# Patient Record
Sex: MALE | Race: BLACK OR AFRICAN AMERICAN | Employment: OTHER | URBAN - METROPOLITAN AREA
[De-identification: names, ages, dates, MRNs, and addresses within clinical notes are randomized per-mention and may not be internally consistent; named-entity substitution may affect disease eponyms.]

---

## 2021-05-25 ENCOUNTER — APPOINTMENT (OUTPATIENT)
Dept: GENERAL RADIOLOGY | Age: 86
DRG: 309 | End: 2021-05-25
Payer: MEDICARE

## 2021-05-25 ENCOUNTER — HOSPITAL ENCOUNTER (INPATIENT)
Age: 86
LOS: 2 days | Discharge: HOME | DRG: 309 | End: 2021-05-27
Attending: EMERGENCY MEDICINE | Admitting: INTERNAL MEDICINE
Payer: MEDICARE

## 2021-05-25 DIAGNOSIS — R07.9 CHEST PAIN, UNSPECIFIED TYPE: Primary | ICD-10-CM

## 2021-05-25 PROBLEM — Z96.641 HISTORY OF RIGHT HIP REPLACEMENT: Status: ACTIVE | Noted: 2021-05-25

## 2021-05-25 PROBLEM — E78.5 HYPERLIPIDEMIA: Status: ACTIVE | Noted: 2021-05-25

## 2021-05-25 PROBLEM — Z86.718 HISTORY OF DVT OF LOWER EXTREMITY: Status: ACTIVE | Noted: 2021-05-25

## 2021-05-25 PROBLEM — Z98.890 HISTORY OF BACK SURGERY: Status: ACTIVE | Noted: 2021-05-25

## 2021-05-25 PROBLEM — I48.91 ATRIAL FIBRILLATION (HCC): Status: ACTIVE | Noted: 2021-05-25

## 2021-05-25 PROBLEM — I10 HYPERTENSION: Status: ACTIVE | Noted: 2021-05-25

## 2021-05-25 PROBLEM — I50.9 CHF (CONGESTIVE HEART FAILURE) (HCC): Status: ACTIVE | Noted: 2021-05-25

## 2021-05-25 PROBLEM — Z87.891 FORMER SMOKER: Status: ACTIVE | Noted: 2021-05-25

## 2021-05-25 PROBLEM — J18.9 PNEUMONIA: Status: ACTIVE | Noted: 2021-05-25

## 2021-05-25 PROBLEM — M54.50 LOW BACK PAIN: Status: ACTIVE | Noted: 2021-05-25

## 2021-05-25 PROBLEM — Z79.01 ON CONTINUOUS ORAL ANTICOAGULATION: Status: ACTIVE | Noted: 2021-05-25

## 2021-05-25 PROBLEM — Z98.890 HISTORY OF INGUINAL HERNIA REPAIR: Status: ACTIVE | Noted: 2021-05-25

## 2021-05-25 PROBLEM — H26.9 CATARACT OF BOTH EYES: Status: ACTIVE | Noted: 2021-05-25

## 2021-05-25 PROBLEM — K21.9 GERD (GASTROESOPHAGEAL REFLUX DISEASE): Status: ACTIVE | Noted: 2021-05-25

## 2021-05-25 PROBLEM — Z87.19 HISTORY OF INGUINAL HERNIA REPAIR: Status: ACTIVE | Noted: 2021-05-25

## 2021-05-25 PROBLEM — N40.0 ENLARGED PROSTATE: Status: ACTIVE | Noted: 2021-05-25

## 2021-05-25 PROBLEM — Z95.0 PACEMAKER: Status: ACTIVE | Noted: 2021-05-25

## 2021-05-25 LAB
ABSOLUTE EOS #: 0.2 K/UL (ref 0–0.4)
ABSOLUTE IMMATURE GRANULOCYTE: ABNORMAL K/UL (ref 0–0.3)
ABSOLUTE LYMPH #: 1 K/UL (ref 1–4.8)
ABSOLUTE MONO #: 0.5 K/UL (ref 0.1–1.3)
ALBUMIN SERPL-MCNC: 3.9 G/DL (ref 3.5–5.2)
ALBUMIN/GLOBULIN RATIO: ABNORMAL (ref 1–2.5)
ALP BLD-CCNC: 119 U/L (ref 40–129)
ALT SERPL-CCNC: 40 U/L (ref 5–41)
ANION GAP SERPL CALCULATED.3IONS-SCNC: 10 MMOL/L (ref 9–17)
AST SERPL-CCNC: 42 U/L
BASOPHILS # BLD: 1 % (ref 0–2)
BASOPHILS ABSOLUTE: 0 K/UL (ref 0–0.2)
BILIRUB SERPL-MCNC: 0.28 MG/DL (ref 0.3–1.2)
BNP INTERPRETATION: ABNORMAL
BUN BLDV-MCNC: 33 MG/DL (ref 8–23)
BUN/CREAT BLD: ABNORMAL (ref 9–20)
CALCIUM SERPL-MCNC: 9.2 MG/DL (ref 8.6–10.4)
CHLORIDE BLD-SCNC: 102 MMOL/L (ref 98–107)
CO2: 25 MMOL/L (ref 20–31)
CREAT SERPL-MCNC: 1.25 MG/DL (ref 0.7–1.2)
DIFFERENTIAL TYPE: ABNORMAL
EOSINOPHILS RELATIVE PERCENT: 4 % (ref 0–4)
GFR AFRICAN AMERICAN: >60 ML/MIN
GFR NON-AFRICAN AMERICAN: 55 ML/MIN
GFR SERPL CREATININE-BSD FRML MDRD: ABNORMAL ML/MIN/{1.73_M2}
GFR SERPL CREATININE-BSD FRML MDRD: ABNORMAL ML/MIN/{1.73_M2}
GLUCOSE BLD-MCNC: 104 MG/DL (ref 70–99)
HCT VFR BLD CALC: 29.5 % (ref 41–53)
HEMOGLOBIN: 9.4 G/DL (ref 13.5–17.5)
IMMATURE GRANULOCYTES: ABNORMAL %
LYMPHOCYTES # BLD: 21 % (ref 24–44)
MCH RBC QN AUTO: 28.3 PG (ref 26–34)
MCHC RBC AUTO-ENTMCNC: 31.8 G/DL (ref 31–37)
MCV RBC AUTO: 88.9 FL (ref 80–100)
MONOCYTES # BLD: 11 % (ref 1–7)
NRBC AUTOMATED: ABNORMAL PER 100 WBC
PDW BLD-RTO: 15.4 % (ref 11.5–14.9)
PLATELET # BLD: 235 K/UL (ref 150–450)
PLATELET ESTIMATE: ABNORMAL
PMV BLD AUTO: 7 FL (ref 6–12)
POTASSIUM SERPL-SCNC: 3.9 MMOL/L (ref 3.7–5.3)
PRO-BNP: 1700 PG/ML
RBC # BLD: 3.31 M/UL (ref 4.5–5.9)
RBC # BLD: ABNORMAL 10*6/UL
SEG NEUTROPHILS: 63 % (ref 36–66)
SEGMENTED NEUTROPHILS ABSOLUTE COUNT: 2.9 K/UL (ref 1.3–9.1)
SODIUM BLD-SCNC: 137 MMOL/L (ref 135–144)
TOTAL PROTEIN: 7.7 G/DL (ref 6.4–8.3)
TROPONIN INTERP: ABNORMAL
TROPONIN INTERP: ABNORMAL
TROPONIN T: ABNORMAL NG/ML
TROPONIN T: ABNORMAL NG/ML
TROPONIN, HIGH SENSITIVITY: 27 NG/L (ref 0–22)
TROPONIN, HIGH SENSITIVITY: 31 NG/L (ref 0–22)
WBC # BLD: 4.6 K/UL (ref 3.5–11)
WBC # BLD: ABNORMAL 10*3/UL

## 2021-05-25 PROCEDURE — 84484 ASSAY OF TROPONIN QUANT: CPT

## 2021-05-25 PROCEDURE — 99283 EMERGENCY DEPT VISIT LOW MDM: CPT

## 2021-05-25 PROCEDURE — 80053 COMPREHEN METABOLIC PANEL: CPT

## 2021-05-25 PROCEDURE — 71046 X-RAY EXAM CHEST 2 VIEWS: CPT

## 2021-05-25 PROCEDURE — 99223 1ST HOSP IP/OBS HIGH 75: CPT | Performed by: INTERNAL MEDICINE

## 2021-05-25 PROCEDURE — 2060000000 HC ICU INTERMEDIATE R&B

## 2021-05-25 PROCEDURE — 93005 ELECTROCARDIOGRAM TRACING: CPT | Performed by: STUDENT IN AN ORGANIZED HEALTH CARE EDUCATION/TRAINING PROGRAM

## 2021-05-25 PROCEDURE — 83880 ASSAY OF NATRIURETIC PEPTIDE: CPT

## 2021-05-25 PROCEDURE — 36415 COLL VENOUS BLD VENIPUNCTURE: CPT

## 2021-05-25 PROCEDURE — 85025 COMPLETE CBC W/AUTO DIFF WBC: CPT

## 2021-05-25 RX ORDER — BUPRENORPHINE AND NALOXONE 8; 2 MG/1; MG/1
1 FILM, SOLUBLE BUCCAL; SUBLINGUAL 2 TIMES DAILY
COMMUNITY

## 2021-05-25 RX ORDER — METOLAZONE 5 MG/1
5 TABLET ORAL DAILY
Status: ON HOLD | COMMUNITY
End: 2021-05-27 | Stop reason: HOSPADM

## 2021-05-25 RX ORDER — ERGOCALCIFEROL 1.25 MG/1
50000 CAPSULE ORAL WEEKLY
COMMUNITY
End: 2021-12-17

## 2021-05-25 RX ORDER — SIMVASTATIN 10 MG
10 TABLET ORAL NIGHTLY
COMMUNITY
End: 2021-10-26 | Stop reason: ALTCHOICE

## 2021-05-25 RX ORDER — FUROSEMIDE 40 MG/1
40 TABLET ORAL DAILY
Status: ON HOLD | COMMUNITY
End: 2021-05-27 | Stop reason: HOSPADM

## 2021-05-25 RX ORDER — MAGNESIUM OXIDE 400 MG/1
400 TABLET ORAL DAILY
COMMUNITY

## 2021-05-25 RX ORDER — METOPROLOL SUCCINATE 25 MG/1
25 TABLET, EXTENDED RELEASE ORAL NIGHTLY
Status: ON HOLD | COMMUNITY
End: 2021-06-27 | Stop reason: HOSPADM

## 2021-05-25 RX ORDER — METOPROLOL SUCCINATE 25 MG/1
50 TABLET, EXTENDED RELEASE ORAL DAILY
Status: ON HOLD | COMMUNITY
End: 2022-01-08 | Stop reason: HOSPADM

## 2021-05-25 RX ORDER — LISINOPRIL 40 MG/1
40 TABLET ORAL 2 TIMES DAILY
Status: ON HOLD | COMMUNITY
End: 2021-05-27 | Stop reason: HOSPADM

## 2021-05-25 RX ORDER — AMLODIPINE BESYLATE 5 MG/1
5 TABLET ORAL DAILY
Status: ON HOLD | COMMUNITY
End: 2021-05-27 | Stop reason: HOSPADM

## 2021-05-25 RX ORDER — LATANOPROST 50 UG/ML
1 SOLUTION/ DROPS OPHTHALMIC NIGHTLY
COMMUNITY

## 2021-05-25 RX ORDER — ASPIRIN 81 MG/1
324 TABLET, CHEWABLE ORAL ONCE
Status: COMPLETED | OUTPATIENT
Start: 2021-05-25 | End: 2021-05-26

## 2021-05-25 RX ORDER — FINASTERIDE 5 MG/1
5 TABLET, FILM COATED ORAL DAILY
COMMUNITY
End: 2022-01-06 | Stop reason: SDUPTHER

## 2021-05-25 NOTE — ED PROVIDER NOTES
[address]  Emergency Department Encounter  EmergencyMedicine Resident     Pt Madie Maldonado  MRN: [id number]  [place] 1935  Date of evaluation: 5/25/21  PCP:  No primary care provider on file. CHIEF COMPLAINT       Chief Complaint   Patient presents with    Palpitations    Dizziness       HISTORY OF PRESENT ILLNESS  (Location/Symptom, Timing/Onset, Context/Setting, Quality, Duration, Modifying Factors, Severity.)      Justin Daniels is a 80 y.o. male who presents with chest pain. Patient presents with 1 hour of chest pain, described as a pressure anterior, nonradiating, constant, moderate severity, does not know what makes it better or worse, associated with lightheadedness, shortness of breath, palpitations. Patient denies diaphoresis, fevers, chills, cough, congestion, rhinorrhea, headache, visual changes, abdominal pain, nausea, vomiting, diarrhea, dysuria, lower extremity swelling or pain. Patient has history of atrial fibrillation on Xarelto, has a pacemaker, HLD, HTN, CHF. Patient just moved to [place] from [place], is living with his son now, does not have a primary care provider or cardiologist in [place]. PAST MEDICAL / SURGICAL / SOCIAL / FAMILY HISTORY      has no past medical history on file. A. fib, CHF     has no past surgical history on file. Cardiac catheterization, pacer implantation    Social History     Socioeconomic History    Marital status:       Spouse name: Not on file    Number of children: Not on file    Years of education: Not on file    Highest education level: Not on file   Occupational History    Not on file   Tobacco Use    Smoking status: Not on file   Substance and Sexual Activity    Alcohol use: Not on file    Drug use: Not on file    Sexual activity: Not on file   Other Topics Concern    Not on file   Social History Narrative    Not on file     Social Determinants of Health     Financial Resource Strain:     Difficulty of Paying Living Expenses:    Food Insecurity:     Worried About Running Out of Food in the Last Year:     [address] in the Last Year:    Transportation Needs:     Lack of Transportation (Medical):  Lack of Transportation (Non-Medical):    Physical Activity:     Days of Exercise per Week:     Minutes of Exercise per Session:    Stress:     Feeling of Stress :    Social Connections:     Frequency of Communication with Friends and Family:     Frequency of Social Gatherings with Friends and Family:     Attends Zoroastrianism Services:     Active Member of Clubs or Organizations:     Attends Club or Organization Meetings:     Marital Status:    Intimate Partner Violence:     Fear of Current or Ex-Partner:     Emotionally Abused:     Physically Abused:     Sexually Abused:        History reviewed. No pertinent family history. Allergies:  Patient has no known allergies. Home Medications:  Prior to Admission medications    Medication Sig Start Date End Date Taking? Authorizing Provider   buprenorphine-naloxone (SUBOXONE) 8-2 MG FILM SL film Place 1 Film under the tongue 2 times daily.    Yes Historical Provider, MD   vitamin D (ERGOCALCIFEROL) 1.25 MG (14877 UT) CAPS capsule Take 50,000 Units by mouth once a week   Yes Historical Provider, MD   rivaroxaban (XARELTO) 15 MG TABS tablet Take 15 mg by mouth daily (with breakfast)   Yes Historical Provider, MD   magnesium oxide (MAG-OX) 400 MG tablet Take 400 mg by mouth daily   Yes Historical Provider, MD   latanoprost (XALATAN) 0.005 % ophthalmic solution Place 1 drop into both eyes nightly   Yes Historical Provider, MD   metOLazone (ZAROXOLYN) 5 MG tablet Take 5 mg by mouth daily   Yes Historical Provider, MD   metoprolol succinate (TOPROL XL) 25 MG extended release tablet Take 50 mg by mouth daily   Yes Historical Provider, MD   metoprolol succinate (TOPROL XL) 25 MG extended release tablet Take 25 mg by mouth nightly   Yes Historical Provider, MD finasteride (PROSCAR) 5 MG tablet Take 5 mg by mouth daily   Yes Historical Provider, MD   simvastatin (ZOCOR) 10 MG tablet Take 10 mg by mouth nightly   Yes Historical Provider, MD   lisinopril (PRINIVIL;ZESTRIL) 40 MG tablet Take 40 mg by mouth 2 times daily   Yes Historical Provider, MD   amLODIPine (NORVASC) 5 MG tablet Take 5 mg by mouth daily   Yes Historical Provider, MD   furosemide (LASIX) 40 MG tablet Take 40 mg by mouth daily   Yes Historical Provider, MD       REVIEW OF SYSTEMS    (2-9 systems for level 4, 10 or more for level 5)      Review of Systems   Positive for chest pain, lightheadedness, shortness of breath, palpitations. Patient denies diaphoresis, fevers, chills, cough, congestion, rhinorrhea, headache, visual changes, abdominal pain, nausea, vomiting, diarrhea, dysuria, lower extremity swelling or pain.     PHYSICAL EXAM   (up to 7 for level 4, 8 or more for level 5)      INITIAL VITALS:   BP (!) 173/83   Pulse 92   Temp 98.6 °F (37 °C) (Oral)   Resp 22   Ht 6' 1\" (1.854 m)   Wt 198 lb (89.8 kg)   SpO2 98%   BMI 26.12 kg/m²     Physical Exam   Patient is alert and oriented,, no acute distress, nontoxic-appearing, does not appear to be in a significant amount of pain, speaking in full sentences, head is atraumatic, EOMI, CTAB, regular rate and rhythm, no extra heart sounds, no abdominal tenderness, bilateral lower extremity swelling, but no pain to palpation, distal pulses intact and equal bilateral.    DIFFERENTIAL  DIAGNOSIS     PLAN (LABS / IMAGING / EKG):  Orders Placed This Encounter   Procedures    XR CHEST (2 VW)    CBC Auto Differential    Troponin    Brain Natriuretic Peptide    Comprehensive Metabolic Panel w/ Reflex to MG    Troponin    Telemetry monitoring - continuous duration    Pacer Interrogate    EKG 12 Lead    PATIENT STATUS (FROM ED OR OR/PROCEDURAL) Inpatient       MEDICATIONS ORDERED:  Orders Placed This Encounter   Medications    aspirin chewable tablet Total Protein 7.7 6.4 - 8.3 g/dL    Albumin 3.9 3.5 - 5.2 g/dL    Albumin/Globulin Ratio NOT REPORTED 1.0 - 2.5    GFR Non-African American 55 (L) >60 mL/min    GFR African American >60 >60 mL/min    GFR Comment          GFR Staging NOT REPORTED    Troponin   Result Value Ref Range    Troponin, High Sensitivity 27 (H) 0 - 22 ng/L    Troponin T NOT REPORTED <0.03 ng/mL    Troponin Interp NOT REPORTED    EKG 12 Lead   Result Value Ref Range    Ventricular Rate 77 BPM    Atrial Rate 75 BPM    QRS Duration 84 ms    Q-T Interval 382 ms    QTc Calculation (Bazett) 432 ms    R Axis 3 degrees    T Axis -74 degrees       IMPRESSION: 78-year-old male with history of heart disease, unable to visualize past procedures, presenting with chest pain, near syncope, will obtain cardiac work-up to evaluate for ACS. RADIOLOGY:  XR CHEST (2 VW)    Result Date: 5/25/2021  EXAMINATION: TWO XRAY VIEWS OF THE CHEST 5/25/2021 4:40 pm COMPARISON: None. HISTORY: ORDERING SYSTEM PROVIDED HISTORY: Chest pain TECHNOLOGIST PROVIDED HISTORY: Chest pain Reason for Exam: palpitations, dizziness Acuity: Acute Type of Exam: Initial FINDINGS: Low lung volumes with strandy densities at the lung bases. No focal consolidation, pneumothorax, or sizable effusion. Heart size is prominent with a dual lead left chest pacing device in place. Incidental note of colonic interposition on the right. No acute bony findings. Low lung volumes with strandy densities at the lung bases which are presumably due to atelectasis in the absence of infectious symptoms.        EKG  EKG Interpretation    Interpreted by me    Rhythm: normal sinus   Rate: normal  Axis: normal  Ectopy: none  Conduction: normal  ST Segments: no acute change  T Waves: no acute change  Q Waves: none    Clinical Impression: no acute changes and normal EKG    All EKG's are interpreted by the Emergency Department Physician who either signs or Co-signs this chart in the absence of a cardiologist.    EMERGENCY DEPARTMENT COURSE:  Patient came to emergency department, HPI and physical exam were conducted. All nursing notes were reviewed. EKG found no acute ST or T wave changes, initial troponin 31, follow-up troponin 27, will admit patient to the hospital for further cardiology evaluation. BNP elevated to 1700, unknown prior BNP levels. PROCEDURES:      CONSULTS:  IP CONSULT TO CARDIOLOGY    CRITICAL CARE:  Please see attending note    FINAL IMPRESSION      1. Chest pain, unspecified type          DISPOSITION / PLAN     DISPOSITION Admitted 05/25/2021 10:36:14 PM      PATIENT REFERRED TO:  No follow-up provider specified.     DISCHARGE MEDICATIONS:  New Prescriptions    No medications on file       Colin Elizondo MD  Emergency Medicine Resident    (Please note that portions of thisnote were completed with a voice recognition program.  Efforts were made to edit the dictations but occasionally words are mis-transcribed.)        Colin Elizondo MD  Resident  05/25/21 9041

## 2021-05-26 LAB
ANION GAP SERPL CALCULATED.3IONS-SCNC: 8 MMOL/L (ref 9–17)
BUN BLDV-MCNC: 27 MG/DL (ref 8–23)
BUN/CREAT BLD: ABNORMAL (ref 9–20)
CALCIUM SERPL-MCNC: 8.8 MG/DL (ref 8.6–10.4)
CHLORIDE BLD-SCNC: 104 MMOL/L (ref 98–107)
CO2: 26 MMOL/L (ref 20–31)
CREAT SERPL-MCNC: 0.95 MG/DL (ref 0.7–1.2)
EKG ATRIAL RATE: 75 BPM
EKG Q-T INTERVAL: 382 MS
EKG QRS DURATION: 84 MS
EKG QTC CALCULATION (BAZETT): 432 MS
EKG R AXIS: 3 DEGREES
EKG T AXIS: -74 DEGREES
EKG VENTRICULAR RATE: 77 BPM
GFR AFRICAN AMERICAN: >60 ML/MIN
GFR NON-AFRICAN AMERICAN: >60 ML/MIN
GFR SERPL CREATININE-BSD FRML MDRD: ABNORMAL ML/MIN/{1.73_M2}
GFR SERPL CREATININE-BSD FRML MDRD: ABNORMAL ML/MIN/{1.73_M2}
GLUCOSE BLD-MCNC: 133 MG/DL (ref 70–99)
HCT VFR BLD CALC: 27.1 % (ref 41–53)
HEMOGLOBIN: 8.7 G/DL (ref 13.5–17.5)
LV EF: 63 %
LVEF MODALITY: NORMAL
MAGNESIUM: 2 MG/DL (ref 1.6–2.6)
MCH RBC QN AUTO: 28.6 PG (ref 26–34)
MCHC RBC AUTO-ENTMCNC: 32.1 G/DL (ref 31–37)
MCV RBC AUTO: 89 FL (ref 80–100)
NRBC AUTOMATED: ABNORMAL PER 100 WBC
PDW BLD-RTO: 15.3 % (ref 11.5–14.9)
PLATELET # BLD: 211 K/UL (ref 150–450)
PMV BLD AUTO: 7.4 FL (ref 6–12)
POTASSIUM SERPL-SCNC: 3.6 MMOL/L (ref 3.7–5.3)
RBC # BLD: 3.04 M/UL (ref 4.5–5.9)
SODIUM BLD-SCNC: 138 MMOL/L (ref 135–144)
TSH SERPL DL<=0.05 MIU/L-ACNC: 1.08 MIU/L (ref 0.3–5)
WBC # BLD: 3.3 K/UL (ref 3.5–11)

## 2021-05-26 PROCEDURE — 84443 ASSAY THYROID STIM HORMONE: CPT

## 2021-05-26 PROCEDURE — 6370000000 HC RX 637 (ALT 250 FOR IP): Performed by: NURSE PRACTITIONER

## 2021-05-26 PROCEDURE — 93306 TTE W/DOPPLER COMPLETE: CPT

## 2021-05-26 PROCEDURE — 93010 ELECTROCARDIOGRAM REPORT: CPT | Performed by: INTERNAL MEDICINE

## 2021-05-26 PROCEDURE — 6370000000 HC RX 637 (ALT 250 FOR IP): Performed by: STUDENT IN AN ORGANIZED HEALTH CARE EDUCATION/TRAINING PROGRAM

## 2021-05-26 PROCEDURE — 2060000000 HC ICU INTERMEDIATE R&B

## 2021-05-26 PROCEDURE — 2580000003 HC RX 258: Performed by: NURSE PRACTITIONER

## 2021-05-26 PROCEDURE — 6370000000 HC RX 637 (ALT 250 FOR IP): Performed by: INTERNAL MEDICINE

## 2021-05-26 PROCEDURE — 36415 COLL VENOUS BLD VENIPUNCTURE: CPT

## 2021-05-26 PROCEDURE — 83735 ASSAY OF MAGNESIUM: CPT

## 2021-05-26 PROCEDURE — 85027 COMPLETE CBC AUTOMATED: CPT

## 2021-05-26 PROCEDURE — 80048 BASIC METABOLIC PNL TOTAL CA: CPT

## 2021-05-26 RX ORDER — FAMOTIDINE 20 MG/1
20 TABLET, FILM COATED ORAL DAILY
Status: DISCONTINUED | OUTPATIENT
Start: 2021-05-26 | End: 2021-05-27 | Stop reason: HOSPADM

## 2021-05-26 RX ORDER — BUPRENORPHINE AND NALOXONE 8; 2 MG/1; MG/1
1 FILM, SOLUBLE BUCCAL; SUBLINGUAL 2 TIMES DAILY
Status: DISCONTINUED | OUTPATIENT
Start: 2021-05-26 | End: 2021-05-27 | Stop reason: HOSPADM

## 2021-05-26 RX ORDER — POTASSIUM CHLORIDE 20 MEQ/1
40 TABLET, EXTENDED RELEASE ORAL PRN
Status: DISCONTINUED | OUTPATIENT
Start: 2021-05-26 | End: 2021-05-27 | Stop reason: HOSPADM

## 2021-05-26 RX ORDER — AMLODIPINE BESYLATE 5 MG/1
5 TABLET ORAL DAILY
Status: DISCONTINUED | OUTPATIENT
Start: 2021-05-26 | End: 2021-05-26

## 2021-05-26 RX ORDER — SODIUM CHLORIDE 0.9 % (FLUSH) 0.9 %
5-40 SYRINGE (ML) INJECTION EVERY 12 HOURS SCHEDULED
Status: DISCONTINUED | OUTPATIENT
Start: 2021-05-26 | End: 2021-05-27 | Stop reason: HOSPADM

## 2021-05-26 RX ORDER — ACETAMINOPHEN 650 MG/1
650 SUPPOSITORY RECTAL EVERY 6 HOURS PRN
Status: DISCONTINUED | OUTPATIENT
Start: 2021-05-26 | End: 2021-05-27 | Stop reason: HOSPADM

## 2021-05-26 RX ORDER — FUROSEMIDE 40 MG/1
40 TABLET ORAL DAILY
Status: DISCONTINUED | OUTPATIENT
Start: 2021-05-26 | End: 2021-05-26

## 2021-05-26 RX ORDER — METOLAZONE 5 MG/1
5 TABLET ORAL DAILY
Status: DISCONTINUED | OUTPATIENT
Start: 2021-05-26 | End: 2021-05-26

## 2021-05-26 RX ORDER — METOPROLOL SUCCINATE 50 MG/1
50 TABLET, EXTENDED RELEASE ORAL DAILY
Status: DISCONTINUED | OUTPATIENT
Start: 2021-05-26 | End: 2021-05-27

## 2021-05-26 RX ORDER — ACETAMINOPHEN 325 MG/1
650 TABLET ORAL EVERY 6 HOURS PRN
Status: DISCONTINUED | OUTPATIENT
Start: 2021-05-26 | End: 2021-05-27 | Stop reason: HOSPADM

## 2021-05-26 RX ORDER — MAGNESIUM SULFATE 1 G/100ML
1000 INJECTION INTRAVENOUS PRN
Status: DISCONTINUED | OUTPATIENT
Start: 2021-05-26 | End: 2021-05-27 | Stop reason: HOSPADM

## 2021-05-26 RX ORDER — LATANOPROST 50 UG/ML
1 SOLUTION/ DROPS OPHTHALMIC NIGHTLY
Status: DISCONTINUED | OUTPATIENT
Start: 2021-05-26 | End: 2021-05-27 | Stop reason: HOSPADM

## 2021-05-26 RX ORDER — POLYETHYLENE GLYCOL 3350 17 G/17G
17 POWDER, FOR SOLUTION ORAL DAILY PRN
Status: DISCONTINUED | OUTPATIENT
Start: 2021-05-26 | End: 2021-05-27 | Stop reason: HOSPADM

## 2021-05-26 RX ORDER — LISINOPRIL 20 MG/1
40 TABLET ORAL DAILY
Status: DISCONTINUED | OUTPATIENT
Start: 2021-05-27 | End: 2021-05-27 | Stop reason: HOSPADM

## 2021-05-26 RX ORDER — FUROSEMIDE 20 MG/1
20 TABLET ORAL DAILY
Status: DISCONTINUED | OUTPATIENT
Start: 2021-05-26 | End: 2021-05-27 | Stop reason: HOSPADM

## 2021-05-26 RX ORDER — FINASTERIDE 5 MG/1
5 TABLET, FILM COATED ORAL DAILY
Status: DISCONTINUED | OUTPATIENT
Start: 2021-05-26 | End: 2021-05-27 | Stop reason: HOSPADM

## 2021-05-26 RX ORDER — ASPIRIN 81 MG/1
81 TABLET, CHEWABLE ORAL DAILY
Status: DISCONTINUED | OUTPATIENT
Start: 2021-05-26 | End: 2021-05-27 | Stop reason: HOSPADM

## 2021-05-26 RX ORDER — MECLIZINE HYDROCHLORIDE 25 MG/1
12.5 TABLET ORAL 3 TIMES DAILY
Status: DISCONTINUED | OUTPATIENT
Start: 2021-05-26 | End: 2021-05-27 | Stop reason: HOSPADM

## 2021-05-26 RX ORDER — LANOLIN ALCOHOL/MO/W.PET/CERES
3 CREAM (GRAM) TOPICAL NIGHTLY PRN
Status: DISCONTINUED | OUTPATIENT
Start: 2021-05-26 | End: 2021-05-27 | Stop reason: HOSPADM

## 2021-05-26 RX ORDER — LISINOPRIL 20 MG/1
40 TABLET ORAL 2 TIMES DAILY
Status: DISCONTINUED | OUTPATIENT
Start: 2021-05-26 | End: 2021-05-26

## 2021-05-26 RX ORDER — SODIUM CHLORIDE 9 MG/ML
25 INJECTION, SOLUTION INTRAVENOUS PRN
Status: DISCONTINUED | OUTPATIENT
Start: 2021-05-26 | End: 2021-05-27 | Stop reason: HOSPADM

## 2021-05-26 RX ORDER — FAMOTIDINE 20 MG/1
20 TABLET, FILM COATED ORAL 2 TIMES DAILY
Status: DISCONTINUED | OUTPATIENT
Start: 2021-05-26 | End: 2021-05-26 | Stop reason: DRUGHIGH

## 2021-05-26 RX ORDER — ATORVASTATIN CALCIUM 10 MG/1
10 TABLET, FILM COATED ORAL NIGHTLY
Status: DISCONTINUED | OUTPATIENT
Start: 2021-05-26 | End: 2021-05-27 | Stop reason: HOSPADM

## 2021-05-26 RX ORDER — METOPROLOL SUCCINATE 25 MG/1
25 TABLET, EXTENDED RELEASE ORAL NIGHTLY
Status: DISCONTINUED | OUTPATIENT
Start: 2021-05-26 | End: 2021-05-27

## 2021-05-26 RX ORDER — PROMETHAZINE HYDROCHLORIDE 25 MG/1
12.5 TABLET ORAL EVERY 6 HOURS PRN
Status: DISCONTINUED | OUTPATIENT
Start: 2021-05-26 | End: 2021-05-27 | Stop reason: HOSPADM

## 2021-05-26 RX ORDER — SODIUM CHLORIDE 0.9 % (FLUSH) 0.9 %
10 SYRINGE (ML) INJECTION PRN
Status: DISCONTINUED | OUTPATIENT
Start: 2021-05-26 | End: 2021-05-27 | Stop reason: HOSPADM

## 2021-05-26 RX ORDER — ONDANSETRON 2 MG/ML
4 INJECTION INTRAMUSCULAR; INTRAVENOUS EVERY 6 HOURS PRN
Status: DISCONTINUED | OUTPATIENT
Start: 2021-05-26 | End: 2021-05-27 | Stop reason: HOSPADM

## 2021-05-26 RX ORDER — POTASSIUM CHLORIDE 7.45 MG/ML
10 INJECTION INTRAVENOUS PRN
Status: DISCONTINUED | OUTPATIENT
Start: 2021-05-26 | End: 2021-05-27 | Stop reason: HOSPADM

## 2021-05-26 RX ADMIN — ASPIRIN 81 MG CHEWABLE TABLET 324 MG: 81 TABLET CHEWABLE at 00:01

## 2021-05-26 RX ADMIN — METOPROLOL SUCCINATE 50 MG: 50 TABLET, EXTENDED RELEASE ORAL at 10:13

## 2021-05-26 RX ADMIN — MECLIZINE HYDROCHLORIDE 12.5 MG: 25 TABLET ORAL at 20:32

## 2021-05-26 RX ADMIN — FUROSEMIDE 20 MG: 20 TABLET ORAL at 12:24

## 2021-05-26 RX ADMIN — MECLIZINE HYDROCHLORIDE 12.5 MG: 25 TABLET ORAL at 12:23

## 2021-05-26 RX ADMIN — METOPROLOL SUCCINATE 25 MG: 25 TABLET, EXTENDED RELEASE ORAL at 20:32

## 2021-05-26 RX ADMIN — ATORVASTATIN CALCIUM 10 MG: 10 TABLET, FILM COATED ORAL at 20:32

## 2021-05-26 RX ADMIN — BUPRENORPHINE AND NALOXONE 1 FILM: 8; 2 FILM BUCCAL; SUBLINGUAL at 20:32

## 2021-05-26 RX ADMIN — LISINOPRIL 40 MG: 20 TABLET ORAL at 10:13

## 2021-05-26 RX ADMIN — SODIUM CHLORIDE, PRESERVATIVE FREE 10 ML: 5 INJECTION INTRAVENOUS at 20:35

## 2021-05-26 RX ADMIN — LATANOPROST 1 DROP: 50 SOLUTION OPHTHALMIC at 20:41

## 2021-05-26 RX ADMIN — POLYETHYLENE GLYCOL 3350 17 G: 17 POWDER, FOR SOLUTION ORAL at 17:52

## 2021-05-26 RX ADMIN — Medication 3 MG: at 02:04

## 2021-05-26 RX ADMIN — RIVAROXABAN 15 MG: 15 TABLET, FILM COATED ORAL at 10:17

## 2021-05-26 RX ADMIN — SODIUM CHLORIDE, PRESERVATIVE FREE 10 ML: 5 INJECTION INTRAVENOUS at 10:17

## 2021-05-26 RX ADMIN — ASPIRIN 81 MG: 81 TABLET, CHEWABLE ORAL at 10:13

## 2021-05-26 RX ADMIN — FAMOTIDINE 20 MG: 20 TABLET ORAL at 10:13

## 2021-05-26 RX ADMIN — FINASTERIDE 5 MG: 5 TABLET, FILM COATED ORAL at 10:13

## 2021-05-26 RX ADMIN — FUROSEMIDE 40 MG: 40 TABLET ORAL at 10:13

## 2021-05-26 ASSESSMENT — ENCOUNTER SYMPTOMS
DIARRHEA: 0
TROUBLE SWALLOWING: 0
BACK PAIN: 1
WHEEZING: 0
CONSTIPATION: 1
PHOTOPHOBIA: 0
SHORTNESS OF BREATH: 1
COLOR CHANGE: 0
ABDOMINAL PAIN: 1
VOMITING: 0
COUGH: 0
RHINORRHEA: 0
NAUSEA: 0
ALLERGIC/IMMUNOLOGIC NEGATIVE: 1

## 2021-05-26 ASSESSMENT — PAIN DESCRIPTION - ORIENTATION: ORIENTATION: LEFT

## 2021-05-26 ASSESSMENT — PAIN DESCRIPTION - LOCATION: LOCATION: HEAD

## 2021-05-26 ASSESSMENT — PAIN DESCRIPTION - PAIN TYPE: TYPE: ACUTE PAIN

## 2021-05-26 NOTE — PROGRESS NOTES
Nurse called Dr. Edison Phoenix to ask about resuming the suboxone order in home meds. Dr. Edison Phoenix requesting that Chip Norton np be contacted to restart. Nurse sent secure message to Andrea Pastor updating her that this medication was verified and patient requesting suboxone.

## 2021-05-26 NOTE — CONSULTS
[facility]  In Patient Cardiology Consult             Date:   5/26/2021  Patient name: Ashly Chairez  Date of admission:  5/25/2021  7:00 PM  MRN:   [id number]  YOB: 1935    Reason for Admission:  Chest pain, dizziness. CHIEF COMPLAINT:  Chest pain, dizziness. History Obtained From:  Pt and chart    HISTORY OF PRESENT ILLNESS:    This is an 80year old male who presents due to chest pain, dizziness. He admits to chest pain, sharp in nature, shooting from back, comes and goes. Admits to dizziness- describes room spinning sensation- like vertigo he has had in past.   Recently had cardiac cath after stress test.   Showed non obstructive CAD. Known to [facility] for:  Pt is a very pleasant 80year old male with hx of HF with preserved EF, a. Fib, SSS with pacemaker in 2019 and HTN . Last echo in 2020 December showed EF 17% with Diastolic dysfunction and mild AR, mild MR. Status post cardiac catheterization which showed no obstructive disease after the stress test was abnormal  He is feeling better with no further chest pain but has shortness of breath with exertion  The edema has improved and has no palpitation or syncope      Past Medical History:   has no past medical history on file. Past Surgical History:   has no past surgical history on file. Home Medications:    Prior to Admission medications    Medication Sig Start Date End Date Taking? Authorizing Provider   buprenorphine-naloxone (SUBOXONE) 8-2 MG FILM SL film Place 1 Film under the tongue 2 times daily.    Yes Historical Provider, MD   vitamin D (ERGOCALCIFEROL) 1.25 MG (61201 UT) CAPS capsule Take 50,000 Units by mouth once a week   Yes Historical Provider, MD   rivaroxaban (XARELTO) 15 MG TABS tablet Take 15 mg by mouth daily (with breakfast)   Yes Historical Provider, MD   magnesium oxide (MAG-OX) 400 MG tablet Take 400 mg by mouth daily   Yes Historical Provider, MD   latanoprost (XALATAN) 0.005 % ophthalmic solution Place 1 drop into both eyes nightly   Yes Historical Provider, MD   metOLazone (ZAROXOLYN) 5 MG tablet Take 5 mg by mouth daily   Yes Historical Provider, MD   metoprolol succinate (TOPROL XL) 25 MG extended release tablet Take 50 mg by mouth daily   Yes Historical Provider, MD   metoprolol succinate (TOPROL XL) 25 MG extended release tablet Take 25 mg by mouth nightly   Yes Historical Provider, MD   finasteride (PROSCAR) 5 MG tablet Take 5 mg by mouth daily   Yes Historical Provider, MD   simvastatin (ZOCOR) 10 MG tablet Take 10 mg by mouth nightly   Yes Historical Provider, MD   lisinopril (PRINIVIL;ZESTRIL) 40 MG tablet Take 40 mg by mouth 2 times daily   Yes Historical Provider, MD   amLODIPine (NORVASC) 5 MG tablet Take 5 mg by mouth daily   Yes Historical Provider, MD   furosemide (LASIX) 40 MG tablet Take 40 mg by mouth daily   Yes Historical Provider, MD       Allergies:  Aspirin, Cyclobenzaprine, Ibuprofen, and Azithromycin    Social History:   reports that he has never smoked. He has never used smokeless tobacco.     Family History:   History reviewed. No pertinent family history. REVIEW OF SYSTEMS:    · Constitutional: there has been no unanticipated weight loss. There's been No change in energy level, No change in activity level. · Eyes: No visual changes or diplopia. No scleral icterus. · ENT: No Headaches, hearing loss or vertigo. No mouth sores or sore throat. · Cardiovascular: As HPI  · Respiratory: As HPI  · Gastrointestinal: No abdominal pain, appetite loss, blood in stools. No change in bowel or bladder habits. · Genitourinary: No dysuria, trouble voiding, or hematuria. · Musculoskeletal:  No gait disturbance, No weakness or joint complaints. · Integumentary: No rash or pruritis. · Neurological: No headache, diplopia, change in muscle strength, numbness or tingling.  No change in gait, balance, coordination, mood, affect, memory, mentation, behavior. · Psychiatric: No anxiety, or depression. · Endocrine: No temperature intolerance. No excessive thirst, fluid intake, or urination. No tremor. · Hematologic/Lymphatic: No abnormal bruising or bleeding, blood clots or swollen lymph nodes. · Allergic/Immunologic: No nasal congestion or hives. PHYSICAL EXAM:    Physical Examination:    /81   Pulse 70   Temp 97.9 °F (36.6 °C) (Oral)   Resp 17   Ht 6' 1\" (1.854 m)   Wt 201 lb 11.5 oz (91.5 kg)   SpO2 97%   BMI 26.61 kg/m²    Constitutional and General Appearance: alert, cooperative, no distress and appears stated age  HEENT: PERRL, no cervical lymphadenopathy. No masses palpable. Normal oral mucosa  Respiratory:  · Normal excursion and expansion without use of accessory muscles  · Resp Auscultation: Good respiratory effort. No for increased work of breathing. On auscultation: Clear  Cardiovascular:  · Heart tones are crisp and normal. regular S1 and S2. Murmurs: none  · Jugular venous pulsation Normal  Abdomen:  · No masses or tenderness  · Bowel sounds present  Extremities:  ·  No Cyanosis or Clubbing  ·  Lower extremity edema: none  ·  Skin: Warm and dry  Neurological:  · Alert and oriented.   · Moves all extremities well  · No abnormalities of mood, affect, memory, mentation, or behavior are noted    DATA:    Diagnostics:      EKG:   Results for orders placed or performed during the hospital encounter of 05/25/21   EKG 12 Lead   Result Value Ref Range    Ventricular Rate 77 BPM    Atrial Rate 75 BPM    QRS Duration 84 ms    Q-T Interval 382 ms    QTc Calculation (Bazett) 432 ms    R Axis 3 degrees    T Axis -74 degrees    Narrative    Atrial fibrillation  with some V Paced beats (number 1-3, 9, 13)  ST & T wave abnormality, consider inferolateral ischemia  Abnormal ECG  No previous ECGs available         Labs:     CBC:   Recent Labs     05/25/21  1936 05/26/21  0437   WBC 4.6 3.3*   HGB 9.4* 8.7*   HCT 29.5* 27.1*    211     BMP: Recent Labs     05/25/21 1936 05/26/21  0437    138   K 3.9 3.6*   CO2 25 26   BUN 33* 27*   CREATININE 1.25* 0.95   LABGLOM 55* >60   GLUCOSE 104* 133*     BNP: No results for input(s): BNP in the last 72 hours. PT/INR: No results for input(s): PROTIME, INR in the last 72 hours. APTT:No results for input(s): APTT in the last 72 hours. CARDIAC ENZYMES:  Recent Labs     05/25/21 1936 05/25/21  2145   TROPHS 31* 32*     FASTING LIPID PANEL:No results found for: HDL, LDLDIRECT, LDLCALC, TRIG  LIVER PROFILE:  Recent Labs     05/25/21 1936   AST 42*   ALT 40   LABALBU 3.9         IMPRESSION:    Patient Active Problem List   Diagnosis    Atrial fibrillation ([address]    On continuous oral anticoagulation    CHF (congestive heart failure) ([address]   [place] Hyperlipidemia    Former smoker    Pacemaker    History of DVT of lower extremity    Enlarged prostate    Cataract of both eyes    GERD (gastroesophageal reflux disease)    History of inguinal hernia repair    Hypertension    Pneumonia    History of right hip replacement    History of back surgery    Low back pain    Chest pain     - Atypical Chest Pain- likely non- cardiac, sharp, shooting  - Recent cath - non obstructive CAD  - Minimally elevated trop, in setting of VARSHA, not suggestive of NSTMEI  - VARSHA  - Afib / SSS  - S/p PPM  - HTN  - Echo 12/20- EF 55%    RECOMMENDATIONS:  - get recent cath records  - check 2d Echo  - decrease lisinopril to qd (from bid)  - decrease lasix go 20 qd  - stop metolazone  - continue xarelto  - add meclizine  - will follow    Discussed with patient and nursing. Thank you for allowing me to participate in the care of this patient, please do not hesitate to call if you have any questions. Queen Doron DO, [address] [facility], [address], [address] [facility]  [facility]  [phone]

## 2021-05-26 NOTE — H&P
[address] [facility]     HISTORY AND PHYSICAL EXAMINATION            Date:   5/26/2021  Patientname:  Fan Donahue  Date of admission:  5/25/2021  7:00 PM  MRN:   [id number]  Account:  [de-identified]  YOB: 1935  PCP:    No primary care provider on file. Room:   [facility]  Code Status:    Full Code    CHIEF COMPLAINT     Chief Complaint   Patient presents with    Palpitations    Dizziness       HISTORY OF PRESENT ILLNESS  (Character, Onset, Location, Duration,  Exacerbating/RelievingFactors, Radiation,   Associated Symptoms, Severity )      The patient is a 80 y.o.  male, with a history of atrial fibrillation on Xarelto, pacemaker, hyperlipidemia, hypertension, CHF, enlarged prostate, GERD, low back pain, and former smoker. Patient presents with chest pain, shortness of breath, upper abdominal pain, and lightheadedness approximately 1 hour PTA. Patient denies headache, visual disturbance, cough, nausea, vomiting, diarrhea, dysuria, fever or chills. Patient recently moved to [facility] from [place]. He does not have a PCP or cardiologist in this area. He was previously seeing cardiologist at [facility]. HPI   1) Location/Symptom chest pain, abdominal pain, lightheadedness, shortness of breath  2) Timing/Onset: Sudden onset 1 hour PTA,  3) Context/Setting: Patient was lying down when symptoms started. When he got up symptoms did not go away. History of constipation, states he had a bowel movement yesterday  4) Quality: Substernal chest pressure nonradiating, sharp pain upper abdomen  5) Duration: continuous   6) Modifying Factors: No aggravating or alleviating factors  7) Severity: moderate        PAST MEDICAL HISTORY   Patient  has no past medical history on file. PAST SURGICAL HISTORY    Patient  has no past surgical history on file. FAMILY HISTORY    Patient family history is not on file.     SOCIAL HISTORY    Patient       HOME MEDICATIONS        Prior to Admission medications    Medication Sig Start Date End Date Taking? Authorizing Provider   buprenorphine-naloxone (SUBOXONE) 8-2 MG FILM SL film Place 1 Film under the tongue 2 times daily. Yes Historical Provider, MD   vitamin D (ERGOCALCIFEROL) 1.25 MG (48839 UT) CAPS capsule Take 50,000 Units by mouth once a week   Yes Historical Provider, MD   rivaroxaban (XARELTO) 15 MG TABS tablet Take 15 mg by mouth daily (with breakfast)   Yes Historical Provider, MD   magnesium oxide (MAG-OX) 400 MG tablet Take 400 mg by mouth daily   Yes Historical Provider, MD   latanoprost (XALATAN) 0.005 % ophthalmic solution Place 1 drop into both eyes nightly   Yes Historical Provider, MD   metOLazone (ZAROXOLYN) 5 MG tablet Take 5 mg by mouth daily   Yes Historical Provider, MD   metoprolol succinate (TOPROL XL) 25 MG extended release tablet Take 50 mg by mouth daily   Yes Historical Provider, MD   metoprolol succinate (TOPROL XL) 25 MG extended release tablet Take 25 mg by mouth nightly   Yes Historical Provider, MD   finasteride (PROSCAR) 5 MG tablet Take 5 mg by mouth daily   Yes Historical Provider, MD   simvastatin (ZOCOR) 10 MG tablet Take 10 mg by mouth nightly   Yes Historical Provider, MD   lisinopril (PRINIVIL;ZESTRIL) 40 MG tablet Take 40 mg by mouth 2 times daily   Yes Historical Provider, MD   amLODIPine (NORVASC) 5 MG tablet Take 5 mg by mouth daily   Yes Historical Provider, MD   furosemide (LASIX) 40 MG tablet Take 40 mg by mouth daily   Yes Historical Provider, MD       ALLERGIES      Patient has no known allergies. REVIEW OF SYSTEMS     Review of Systems   Constitutional: Positive for activity change. Negative for appetite change, chills, diaphoresis, fatigue and fever. HENT: Negative for congestion, rhinorrhea and trouble swallowing. Eyes: Negative for photophobia and visual disturbance. Respiratory: Positive for shortness of breath. Negative for cough and wheezing.     Cardiovascular: Positive for chest pain and leg swelling. Gastrointestinal: Positive for abdominal pain and constipation. Negative for diarrhea, nausea and vomiting. Endocrine: Negative for polydipsia, polyphagia and polyuria. Genitourinary: Negative for dysuria, flank pain and hematuria. Musculoskeletal: Positive for back pain. Negative for arthralgias and myalgias. Skin: Negative for color change, pallor and rash. Allergic/Immunologic: Negative. Neurological: Positive for dizziness and light-headedness. Negative for tremors, syncope, facial asymmetry, speech difficulty, weakness, numbness and headaches. Hematological: Negative. Psychiatric/Behavioral: Negative for agitation, behavioral problems, confusion, decreased concentration and hallucinations. The patient is not nervous/anxious. PHYSICAL EXAM      BP (!) 150/84   Pulse 70   Temp 97.9 °F (36.6 °C) (Oral)   Resp 18   Ht 6' 1\" (1.854 m)   Wt 198 lb (89.8 kg)   SpO2 97%   BMI 26.12 kg/m²  Body mass index is 26.12 kg/m². Physical Exam  Constitutional:       General: He is not in acute distress. Appearance: Normal appearance. He is well-developed, well-groomed and overweight. HENT:      Right Ear: External ear normal.      Left Ear: External ear normal.      Nose: Nose normal.   Eyes:      Extraocular Movements: Extraocular movements intact. Conjunctiva/sclera: Conjunctivae normal.      Pupils: Pupils are equal, round, and reactive to light. Cardiovascular:      Rate and Rhythm: Normal rate and regular rhythm. Pulses: Normal pulses. Pulmonary:      Effort: Tachypnea present. Breath sounds: Normal breath sounds. No decreased breath sounds, wheezing, rhonchi or rales. Abdominal:      General: There is no distension. Palpations: Abdomen is soft. Tenderness: There is abdominal tenderness in the epigastric area. There is no guarding or rebound. Negative signs include Leal's sign and McBurney's sign.    Musculoskeletal:      Right upper arm: Normal.      Left upper arm: Normal.      Cervical back: Normal range of motion and neck supple. Right lower le+ Edema present. Left lower le+ Edema present. Skin:     General: Skin is warm and dry. Capillary Refill: Capillary refill takes 2 to 3 seconds. Findings: No erythema. Neurological:      Mental Status: He is alert and oriented to person, place, and time. GCS: GCS eye subscore is 4. GCS verbal subscore is 5. GCS motor subscore is 6. Psychiatric:         Mood and Affect: Mood normal.         Behavior: Behavior normal.         Thought Content: Thought content normal.         Judgment: Judgment normal.       DIAGNOSTICS      EKG: (as documented in ED note):    Rhythm: normal sinus   Rate: normal  Axis: normal  Ectopy: none  Conduction: normal  ST Segments: no acute change  T Waves: no acute change  Q Waves: none     Clinical Impression: no acute changes and normal EKG    Labs:  CBC:   Recent Labs     21   WBC 4.6   HGB 9.4*        BMP:    Recent Labs     21      K 3.9      CO2 25   BUN 33*   CREATININE 1.25*   GLUCOSE 104*     S. Calcium:  Recent Labs     21   CALCIUM 9.2     S. Ionized Calcium:No results for input(s): IONCA in the last 72 hours. S. Magnesium:No results for input(s): MG in the last 72 hours. S. Phosphorus:No results for input(s): PHOS in the last 72 hours. S. Glucose:No results for input(s): POCGLU in the last 72 hours. Glycosylated hemoglobin A1C: No results found for: LABA1C  Hepatic:   Recent Labs     21   AST 42*   ALT 40   ALKPHOS 119     CARDIAC ENZY:   Recent Labs     21  2145   TROPHS 31* 27*     INR: No results for input(s): INR in the last 72 hours. BNP:   Recent Labs     21   PROBNP 1,700*      ABGs: No results for input(s): PH, PCO2, PO2, HCO3, O2SAT in the last 72 hours.   Lipids: No results for input(s): CHOL, TRIG, HDL, LDLCALC in the last 72 hours. Invalid input(s): LDL  Pancreatic functions:No results for input(s): LIPASE, AMYLASE in the last 72 hours. Daron Gallop: No results for input(s): LACTA in the last 72 hours. Thyroid functions: No results found for: TSH   U/A:No results for input(s): NITRITE, COLORU, WBCUA, RBCUA, MUCUS, BACTERIA, CLARITYU, SPECGRAV, LEUKOCYTESUR, BLOODU, GLUCOSEU, AMORPHOUS in the last 72 hours. Invalid input(s): Christina DivvyDown    Imaging/Diagonstics:     XR CHEST (2 VW)    Result Date: 5/25/2021  EXAMINATION: TWO XRAY VIEWS OF THE CHEST 5/25/2021 4:40 pm COMPARISON: None. HISTORY: ORDERING SYSTEM PROVIDED HISTORY: Chest pain TECHNOLOGIST PROVIDED HISTORY: Chest pain Reason for Exam: palpitations, dizziness Acuity: Acute Type of Exam: Initial FINDINGS: Low lung volumes with strandy densities at the lung bases. No focal consolidation, pneumothorax, or sizable effusion. Heart size is prominent with a dual lead left chest pacing device in place. Incidental note of colonic interposition on the right. No acute bony findings. Low lung volumes with strandy densities at the lung bases which are presumably due to atelectasis in the absence of infectious symptoms. ASSESSMENT  and  PLAN     Active Problems:    Atrial fibrillation (HCC)    On continuous oral anticoagulation    CHF (congestive heart failure) (HCC)    Hyperlipidemia    Pacemaker    Cataract of both eyes    GERD (gastroesophageal reflux disease)    Hypertension    Low back pain    Chest pain  Resolved Problems:    * No resolved hospital problems.  *    Plan:    Chest pain  -EKG shows normal sinus rhythm, no acute ST changes  -Chest x-ray shows low lung volumes with strandy densities at the lung bases which are presently due to atelectasis in the absence of infectious symptoms  -Troponin 31, 27  -Patient given aspirin 324 mg in the ED  -Aspirin 81 mg daily  -Consult cardiology    Atrial fibrillation  -Rate controlled, heart rate 70  -Xarelto 15

## 2021-05-26 NOTE — PROGRESS NOTES
Admit from ER to 2121. Pt presented to ER w/ palpitations and dizzyness. Admit Dx- chest pain. Hx- Afib, pacemaker (V-paced), CHF. Chest pain has resolved- per ER. Pt alert & oriented x3. Ambulates with cane (pt brought own). Pt voices pain on top of their head, on the left side- voicing that the pain \". ..comes and goes. \" Per pt- this pain has been going on for 3 weeks. Pt voices that they have had multiple surgeries\" on their stomach. Pt oriented to call light/procedures/policies. Pt denies needs at this time.

## 2021-05-26 NOTE — PROGRESS NOTES
Nurse called Dr. Abbey Barfield to update that patient wants suboxone reordered. Dr. Abbey Barfield requested this be on home med list and he will reconcile.  Nurse ensured that was listed on home med list.

## 2021-05-27 VITALS
RESPIRATION RATE: 16 BRPM | WEIGHT: 199.52 LBS | HEART RATE: 72 BPM | HEIGHT: 73 IN | DIASTOLIC BLOOD PRESSURE: 87 MMHG | TEMPERATURE: 97.3 F | BODY MASS INDEX: 26.44 KG/M2 | SYSTOLIC BLOOD PRESSURE: 132 MMHG | OXYGEN SATURATION: 100 %

## 2021-05-27 LAB
ANION GAP SERPL CALCULATED.3IONS-SCNC: 6 MMOL/L (ref 9–17)
BNP INTERPRETATION: ABNORMAL
BUN BLDV-MCNC: 24 MG/DL (ref 8–23)
BUN/CREAT BLD: ABNORMAL (ref 9–20)
CALCIUM SERPL-MCNC: 8.8 MG/DL (ref 8.6–10.4)
CHLORIDE BLD-SCNC: 107 MMOL/L (ref 98–107)
CO2: 27 MMOL/L (ref 20–31)
CREAT SERPL-MCNC: 0.83 MG/DL (ref 0.7–1.2)
GFR AFRICAN AMERICAN: >60 ML/MIN
GFR NON-AFRICAN AMERICAN: >60 ML/MIN
GFR SERPL CREATININE-BSD FRML MDRD: ABNORMAL ML/MIN/{1.73_M2}
GFR SERPL CREATININE-BSD FRML MDRD: ABNORMAL ML/MIN/{1.73_M2}
GLUCOSE BLD-MCNC: 101 MG/DL (ref 70–99)
HCT VFR BLD CALC: 30.3 % (ref 41–53)
HEMOGLOBIN: 9.7 G/DL (ref 13.5–17.5)
MAGNESIUM: 1.7 MG/DL (ref 1.6–2.6)
MCH RBC QN AUTO: 28.4 PG (ref 26–34)
MCHC RBC AUTO-ENTMCNC: 32 G/DL (ref 31–37)
MCV RBC AUTO: 88.6 FL (ref 80–100)
NRBC AUTOMATED: ABNORMAL PER 100 WBC
PDW BLD-RTO: 15.6 % (ref 11.5–14.9)
PLATELET # BLD: 222 K/UL (ref 150–450)
PMV BLD AUTO: 7.4 FL (ref 6–12)
POTASSIUM SERPL-SCNC: 3.9 MMOL/L (ref 3.7–5.3)
PRO-BNP: 3249 PG/ML
RBC # BLD: 3.42 M/UL (ref 4.5–5.9)
SODIUM BLD-SCNC: 140 MMOL/L (ref 135–144)
WBC # BLD: 3.5 K/UL (ref 3.5–11)

## 2021-05-27 PROCEDURE — 80048 BASIC METABOLIC PNL TOTAL CA: CPT

## 2021-05-27 PROCEDURE — 99239 HOSP IP/OBS DSCHRG MGMT >30: CPT | Performed by: INTERNAL MEDICINE

## 2021-05-27 PROCEDURE — 85027 COMPLETE CBC AUTOMATED: CPT

## 2021-05-27 PROCEDURE — 6370000000 HC RX 637 (ALT 250 FOR IP): Performed by: NURSE PRACTITIONER

## 2021-05-27 PROCEDURE — 83735 ASSAY OF MAGNESIUM: CPT

## 2021-05-27 PROCEDURE — 36415 COLL VENOUS BLD VENIPUNCTURE: CPT

## 2021-05-27 PROCEDURE — 2580000003 HC RX 258: Performed by: NURSE PRACTITIONER

## 2021-05-27 PROCEDURE — 6370000000 HC RX 637 (ALT 250 FOR IP): Performed by: INTERNAL MEDICINE

## 2021-05-27 PROCEDURE — 83880 ASSAY OF NATRIURETIC PEPTIDE: CPT

## 2021-05-27 PROCEDURE — 97161 PT EVAL LOW COMPLEX 20 MIN: CPT

## 2021-05-27 PROCEDURE — 97116 GAIT TRAINING THERAPY: CPT

## 2021-05-27 RX ORDER — LISINOPRIL 40 MG/1
40 TABLET ORAL DAILY
Qty: 30 TABLET | Refills: 3 | Status: ON HOLD | OUTPATIENT
Start: 2021-05-28 | End: 2021-09-25 | Stop reason: HOSPADM

## 2021-05-27 RX ORDER — ASPIRIN 81 MG/1
81 TABLET, CHEWABLE ORAL DAILY
Qty: 30 TABLET | Refills: 3 | Status: SHIPPED | OUTPATIENT
Start: 2021-05-28 | End: 2021-06-25

## 2021-05-27 RX ORDER — MECLIZINE HCL 12.5 MG/1
12.5 TABLET ORAL 3 TIMES DAILY
Qty: 30 TABLET | Refills: 0 | Status: SHIPPED | OUTPATIENT
Start: 2021-05-27 | End: 2021-06-06

## 2021-05-27 RX ORDER — METOPROLOL TARTRATE 50 MG/1
50 TABLET, FILM COATED ORAL EVERY MORNING
Status: DISCONTINUED | OUTPATIENT
Start: 2021-05-27 | End: 2021-05-27 | Stop reason: HOSPADM

## 2021-05-27 RX ORDER — FUROSEMIDE 20 MG/1
20 TABLET ORAL DAILY
Qty: 60 TABLET | Refills: 3 | Status: ON HOLD | OUTPATIENT
Start: 2021-05-28 | End: 2021-06-27 | Stop reason: HOSPADM

## 2021-05-27 RX ADMIN — SODIUM CHLORIDE, PRESERVATIVE FREE 10 ML: 5 INJECTION INTRAVENOUS at 08:39

## 2021-05-27 RX ADMIN — FAMOTIDINE 20 MG: 20 TABLET ORAL at 08:37

## 2021-05-27 RX ADMIN — FINASTERIDE 5 MG: 5 TABLET, FILM COATED ORAL at 08:36

## 2021-05-27 RX ADMIN — BUPRENORPHINE AND NALOXONE 1 FILM: 8; 2 FILM BUCCAL; SUBLINGUAL at 08:37

## 2021-05-27 RX ADMIN — ASPIRIN 81 MG: 81 TABLET, CHEWABLE ORAL at 08:37

## 2021-05-27 RX ADMIN — METOPROLOL SUCCINATE 50 MG: 50 TABLET, EXTENDED RELEASE ORAL at 08:36

## 2021-05-27 RX ADMIN — LISINOPRIL 40 MG: 20 TABLET ORAL at 08:36

## 2021-05-27 RX ADMIN — METOPROLOL TARTRATE 50 MG: 50 TABLET, FILM COATED ORAL at 15:11

## 2021-05-27 RX ADMIN — MECLIZINE HYDROCHLORIDE 12.5 MG: 25 TABLET ORAL at 08:36

## 2021-05-27 RX ADMIN — FUROSEMIDE 20 MG: 20 TABLET ORAL at 08:37

## 2021-05-27 RX ADMIN — MECLIZINE HYDROCHLORIDE 12.5 MG: 25 TABLET ORAL at 15:07

## 2021-05-27 RX ADMIN — POLYETHYLENE GLYCOL 3350 17 G: 17 POWDER, FOR SOLUTION ORAL at 08:43

## 2021-05-27 RX ADMIN — RIVAROXABAN 15 MG: 15 TABLET, FILM COATED ORAL at 08:37

## 2021-05-27 ASSESSMENT — PAIN DESCRIPTION - PROGRESSION: CLINICAL_PROGRESSION: NOT CHANGED

## 2021-05-27 ASSESSMENT — PAIN DESCRIPTION - ONSET: ONSET: ON-GOING

## 2021-05-27 ASSESSMENT — PAIN DESCRIPTION - FREQUENCY: FREQUENCY: CONTINUOUS

## 2021-05-27 ASSESSMENT — PAIN SCALES - GENERAL: PAINLEVEL_OUTOF10: 0

## 2021-05-27 ASSESSMENT — PAIN DESCRIPTION - DESCRIPTORS: DESCRIPTORS: ACHING

## 2021-05-27 NOTE — DISCHARGE SUMMARY
[place] [address] Internal Medicine    Discharge Summary     Patient ID: Ashly Chairez  :  1935   MRN: [id number]     ACCOUNT:  [de-identified]   Patient's PCP: No primary care provider on file.   Admit Date: 2021   Discharge Date: 2021    Length of Stay: 2  Code Status:  Full Code  Admitting Physician: Kaushik Wilkes MD  Discharge Physician: Kaushik Wilkes MD     Active Discharge Diagnoses:     Primary Problem  <principal problem not specified>      [place] Problems    Diagnosis Date Noted    Atrial fibrillation ([facility] [address] [I48.91] 2021    On continuous oral anticoagulation [Z79.01] 2021    CHF (congestive heart failure) ([address] [I50.9] 2021    Hyperlipidemia [E78.5] 2021    Pacemaker [Z95.0] 2021    Cataract of both eyes [H26.9] 2021    GERD (gastroesophageal reflux disease) [K21.9] 2021    Hypertension [I10] 2021    Low back pain [M54.5] 2021    Chest pain [R07.9] 2021       Admission Condition:  fair     Discharged Condition: fair    Hospital Stay:     Hospital Course:  Ashly Chairez is a 80 y.o. male who was admitted for the management of <principal problem not specified> , presented to ER with Palpitations and Dizziness  Patient has multiple medical problems include hypertension, atrial fibrillation on anticoagulation, heart failure with preserved ejection fraction s/p pacemaker with history of sick sinus syndrome, admitted with dizziness, his chronic neck arthritis, was evaluated by cardiologist, he has recent cardiac cath done at [facility], his medications were adjusted, patient was negative for orthostatic blood pressure checkup  Pacemaker interrogation was okay, patient getting discharged home        Significant therapeutic interventions:     Significant Diagnostic Studies:   Labs / Micro:        ,     Radiology:    ECHO Complete 2D W Doppler W Color    Result Date: Mary Jane Polo(Sonographer) on 2021 02:41  PM ---------------------------------------------------------------------------- ----------------------------------------------------------------------------  Electronically signed by Josh Ames(Interpreting physician) on 2021  03:48 PM ---------------------------------------------------------------------------- FINDINGS Left Atrium Left atrium is mildly dilated. Left Ventricle Left ventricle is normal in size and wall thickness. Global left ventricular systolic function is normal. Estimated LV EF 60-65 %. No obvious wall motion abnormality seen. Grade I (mild) left ventricular diastolic dysfunction. Right Atrium Right atrium is mildly dilated . Right Ventricle Right ventricle is mildly enlarged with normal RV systolic function. Prominent moderator band. Mitral Valve No obvious valvular abnormality seen. Mild mitral regurgitation. Aortic Valve No obvious valvular abnormality seen. No evidence of aortic insufficiency or stenosis. Tricuspid Valve No obvious valvular abnormality. Mild tricuspid regurgitation. Estimated right ventricular systolic pressure is 95TJFB. Pulmonic Valve Pulmonic valve was not well visualized. No evidence of pulmonic insufficiency or stenosis. Pericardial Effusion No significant pericardial effusion is seen. Pleural Effusion No pleural effusion seen. Miscellaneous Normal aortic root dimension. E/E' average = 10.4.  M-mode / 2D Measurements & Calculations:   LVIDd:5.27 cm(3.7 - 5.6 cm)       Diastolic YOTOCH:655 ml  FNYYX:1.60 cm(2.2 - 4.0 cm)       Systolic PJKIJC:83.0 ml  CUSJ:2.15 cm(0.6 - 1.1 cm)        Aortic Root:3.5 cm(2.0 - 3.7 cm)  LVPWd:1.04 cm(0.6 - 1.1 cm)       LA Dimension: 4 cm(1.9 - 4.0 cm)  Fractional Shortenin.83 %     LA volume/Index: 86 ml /40m^2  Calculated LVEF (%): 69.59 %      LVOT:1.8 cm   Mitral:                                Aortic   Peak E-Wave: 0.94 m/s                  Peak Velocity: 1.60 m/s  Peak A-Wave: 0.34 m/s                  Mean Velocity: 1.18 m/s  E/A Ratio: 2.79                        Peak Gradient: 10.24 mmHg  Peak Gradient: 3.52 mmHg               Mean Gradient: 6 mmHg  Deceleration Time: 208 msec                                          Area (continuity): 1.79 cm^2                                         AV VTI: 32.7 cm   Tricuspid:                             Pulmonic:   Estimated RVSP: 35 mmHg  Peak TR Velocity: 2.86 m/s  Peak TR Gradient: 32.7184 mmHg  Estimated RA Pressure: 3 mmHg                                         Estimated PASP: 35.72 mmHg  Septal Wall E' velocity:0.08 m/s Lateral Wall E' velocity:0.11 m/s    XR CHEST (2 VW)    Result Date: 5/25/2021  EXAMINATION: TWO XRAY VIEWS OF THE CHEST 5/25/2021 4:40 pm COMPARISON: None. HISTORY: ORDERING SYSTEM PROVIDED HISTORY: Chest pain TECHNOLOGIST PROVIDED HISTORY: Chest pain Reason for Exam: palpitations, dizziness Acuity: Acute Type of Exam: Initial FINDINGS: Low lung volumes with strandy densities at the lung bases. No focal consolidation, pneumothorax, or sizable effusion. Heart size is prominent with a dual lead left chest pacing device in place. Incidental note of colonic interposition on the right. No acute bony findings. Low lung volumes with strandy densities at the lung bases which are presumably due to atelectasis in the absence of infectious symptoms. Consultations:    Consults:     Final Specialist Recommendations/Findings:   IP CONSULT TO CARDIOLOGY      The patient was seen and examined on day of discharge and this discharge summary is in conjunction with any daily progress note from day of discharge. Discharge plan:     Disposition: Home    Physician Follow Up:     No follow-up provider specified.      Requiring Further Evaluation/Follow Up POST HOSPITALIZATION/Incidental Findings:    Diet: cardiac diet    Activity: As tolerated    Instructions to Patient:     Discharge Medications:      Medication List      START taking these medications    aspirin 81 MG chewable tablet  Take 1 tablet by mouth daily  Start taking on: May 28, 2021     meclizine 12.5 MG tablet  Commonly known as: ANTIVERT  Take 1 tablet by mouth 3 times daily for 10 days        CHANGE how you take these medications    furosemide 20 MG tablet  Commonly known as: LASIX  Take 1 tablet by mouth daily  Start taking on: May 28, 2021  What changed:   · medication strength  · how much to take     lisinopril 40 MG tablet  Commonly known as: PRINIVIL;ZESTRIL  Take 1 tablet by mouth daily  Start taking on: May 28, 2021  What changed: when to take this        CONTINUE taking these medications    finasteride 5 MG tablet  Commonly known as: PROSCAR     latanoprost 0.005 % ophthalmic solution  Commonly known as: XALATAN     magnesium oxide 400 MG tablet  Commonly known as: MAG-OX     * metoprolol succinate 25 MG extended release tablet  Commonly known as: TOPROL XL     * metoprolol succinate 25 MG extended release tablet  Commonly known as: TOPROL XL     simvastatin 10 MG tablet  Commonly known as: ZOCOR     Suboxone 8-2 MG Film SL film  Generic drug: buprenorphine-naloxone     vitamin D 1.25 MG (37495 UT) Caps capsule  Commonly known as: ERGOCALCIFEROL     Xarelto 15 MG Tabs tablet  Generic drug: rivaroxaban         * This list has 2 medication(s) that are the same as other medications prescribed for you. Read the directions carefully, and ask your doctor or other care provider to review them with you.             STOP taking these medications    amLODIPine 5 MG tablet  Commonly known as: NORVASC     metOLazone 5 MG tablet  Commonly known as: ZAROXOLYN           Where to Get Your Medications      These medications were sent to [facility] [address], [address] [phone] [facility], [address] [place] [address] Str. [address]    Phone: [phone]   · aspirin 81 MG chewable tablet  · furosemide 20 MG tablet  · lisinopril 40 MG

## 2021-05-27 NOTE — PROGRESS NOTES
[facility]   Progress Note                   Date:   5/27/2021  Patient name: Ashly Chairez  Date of admission:  5/25/2021  7:00 PM  MRN:   [id number]  YOB: 1935  PCP: No primary care provider on file. Reason for Admission:      Subjective:       Clinical Changes / Abnormalities: Patient denies any chest pain or pressures breathing is better. Reviewed the heart cath from 4/30/ 21,  Showed   40% LAD and left circumflex      Medications:   Scheduled Meds:   finasteride  5 mg Oral Daily    latanoprost  1 drop Both Eyes Nightly    metoprolol succinate  50 mg Oral Daily    metoprolol succinate  25 mg Oral Nightly    rivaroxaban  15 mg Oral Daily with breakfast    atorvastatin  10 mg Oral Nightly    sodium chloride flush  5-40 mL Intravenous 2 times per day    aspirin  81 mg Oral Daily    famotidine  20 mg Oral Daily    lisinopril  40 mg Oral Daily    meclizine  12.5 mg Oral TID    furosemide  20 mg Oral Daily    buprenorphine-naloxone  1 Film Sublingual BID     Continuous Infusions:   sodium chloride       CBC:   Recent Labs     05/25/21 1936 05/26/21 0437 05/27/21  0423   WBC 4.6 3.3* 3.5   HGB 9.4* 8.7* 9.7*    211 222     BMP:    Recent Labs     05/25/21 1936 05/26/21 0437 05/27/21  0423    138 140   K 3.9 3.6* 3.9    104 107   CO2 25 26 27   BUN 33* 27* 24*   CREATININE 1.25* 0.95 0.83   GLUCOSE 104* 133* 101*     Hepatic:   Recent Labs     05/25/21 1936   AST 42*   ALT 40   BILITOT 0.28*   ALKPHOS 119     Troponin: No results for input(s): TROPONINI in the last 72 hours. BNP: No results for input(s): BNP in the last 72 hours. Lipids: No results for input(s): CHOL, HDL in the last 72 hours. Invalid input(s): LDLCALCU  INR: No results for input(s): INR in the last 72 hours.     Objective:   Vitals: BP (!) 155/97   Pulse 70   Temp 98.4 °F (36.9 °C) (Oral)   Resp 15   Ht 6' 1\" (1.854 m)   Wt 199 lb 8.3 oz (90.5 kg)   SpO2 98%   BMI 26.32 kg/m²   I/O last 3 completed shifts: In: 900 [P.O.:900]  Out: 1900 [Urine:1900]  I/O this shift: In: 5 [P.O.:420]  Out: 200 [Urine:200]  Scheduled Meds:   finasteride  5 mg Oral Daily    latanoprost  1 drop Both Eyes Nightly    metoprolol succinate  50 mg Oral Daily    metoprolol succinate  25 mg Oral Nightly    rivaroxaban  15 mg Oral Daily with breakfast    atorvastatin  10 mg Oral Nightly    sodium chloride flush  5-40 mL Intravenous 2 times per day    aspirin  81 mg Oral Daily    famotidine  20 mg Oral Daily    lisinopril  40 mg Oral Daily    meclizine  12.5 mg Oral TID    furosemide  20 mg Oral Daily    buprenorphine-naloxone  1 Film Sublingual BID     Continuous Infusions:   sodium chloride       PRN Meds:.sodium chloride flush, sodium chloride, potassium chloride **OR** potassium alternative oral replacement **OR** potassium chloride, magnesium sulfate, promethazine **OR** ondansetron, polyethylene glycol, acetaminophen **OR** acetaminophen, melatonin, perflutren lipid microspheres    CONSTITUTIONAL: AOx4, no apparent distress, appears stated age   HEAD: normocephalic, atraumatic   EYES: PERRLA, EOMI   ENT: moist mucous membranes, uvula midline   NECK: symmetric, no midline tenderness to palpation   LUNGS: clear to auscultation bilaterally   CARDIOVASCULAR: IRregular rate and rhythm, no murmurs, rubs or gallops   ABDOMEN: Soft, non-tender, non-distended with normal active bowel sounds   SKIN: no rash       EKG: Baseline A. fib with V paced  ECHO: 5/25/21  Summary  Left ventricle is normal in size and wall thickness. Global left ventricular systolic function is normal. Estimated LV EF 60-65  %. Grade I (mild) left ventricular diastolic dysfunction. Both atria are mildly dilated. Right ventricle is mildly enlarged with normal RV systolic function. Prominent moderator band. Mild mitral regurgitation. Mild tricuspid regurgitation.  Estimated right ventricular systolic pressure  is 35mmHg.           Cardiac Angiography: 4/30/21 , LAD 40 % , LCX 40% , RCA mild irregularities            Assessment / Acute Cardiac Problems:       Patient Active Problem List:     Atrial fibrillation ([facility] [address]     On continuous oral anticoagulation     CHF (congestive heart failure) ([facility] [address]     Hyperlipidemia     Former smoker     Pacemaker     History of DVT of lower extremity     Enlarged prostate     Cataract of both eyes     GERD (gastroesophageal reflux disease)     History of inguinal hernia repair     Hypertension     Pneumonia     History of right hip replacement     History of back surgery     Low back pain     Chest pain      Plan of Treatment:   CAD with recent cath noncritical lesions we will continue current medications  Sick sinus syndrome, underlying A. fib, pacemaker in situ  We will continue current medication and Xarelto  Diastolic CHF continue low-dose diuretic, ACE inhibitor beta-blockers  Patient can be discharged home and follow in 2 to 3 weeks    Anette Blackmon MD, MD  [facility]  [phone]

## 2021-05-27 NOTE — CARE COORDINATION
ONGOING DISCHARGE PLAN:    Patient is alert and oriented x4. Spoke with patient regarding discharge plan and patient confirms that plan is still to return to home where he lives with son and DIL. He requested information for warm meals and pamphlet given for [employer] in [facility] - Patient was VERY appreciative. Patient states he no longer needs information about senior living apartments. Should discharge home later today. Will continue to follow for additional discharge needs.     Electronically signed by Jose Okeefe RN on 5/27/2021 at 3:02 PM

## 2021-05-27 NOTE — PROGRESS NOTES
Physical Therapy    Facility/Department: [address] [place] [address] CARE  Initial Assessment    NAME: Justin Daniels  : 1935  MRN: [id number]    Date of Service: 2021    Discharge Recommendations:  Patient would benefit from continued therapy after discharge   PT Equipment Recommendations  Equipment Needed: Yes  Mobility Devices: Caretha Lick: Rollator (4 Wheeled)    Assessment   Body structures, Functions, Activity limitations: Decreased functional mobility ; Decreased ADL status; Decreased strength;Decreased balance; Increased pain;Decreased posture;Decreased endurance;Decreased safe awareness  Assessment: Pt requiring 1 assist for safe mobility. Will trial rollator as pt may benefit from rollator use for fatigue and R LE discomfort. Treatment Diagnosis: Impaired functional mobility 2* chest pain  Specific instructions for Next Treatment: TRIAL ROLLATOR, stairs, HEP  Prognosis: Good  Decision Making: Low Complexity  History: 80 y.o. male who presents with chest pain. Patient presents with 1 hour of chest pain, described as a pressure anterior, nonradiating, constant, moderate severity, does not know what makes it better or worse, associated with lightheadedness, shortness of breath, palpitations. Patient denies diaphoresis, fevers, chills, cough, congestion, rhinorrhea, headache, visual changes, abdominal pain, nausea, vomiting, diarrhea, dysuria, lower extremity swelling or pain. Patient has history of atrial fibrillation on Xarelto, has a pacemaker, HLD, HTN, CHF. Patient just moved to [place] from [place], is living with his son now, does not have a primary care provider or cardiologist in [place]. Exam: ROM, MMT, bed mobility, transfers, amb, balance, endurance  Clinical Presentation: Pt alert, cooperative, pleasant.    Barriers to Learning: safety awareness, pain  REQUIRES PT FOLLOW UP: Yes  Activity Tolerance  Activity Tolerance: Patient Tolerated treatment well;Patient limited by endurance       Patient Diagnosis(es): The encounter diagnosis was Chest pain, unspecified type. has no past medical history on file. has no past surgical history on file. Restrictions  Restrictions/Precautions  Restrictions/Precautions: General Precautions, Fall Risk  Required Braces or Orthoses?: No  Implants present? : Metal implants, Pacemaker (B AGUSTO, L TKA,back fusion x2)  Position Activity Restriction  Other position/activity restrictions: up with assistance  Vision/Hearing  Vision: Impaired  Vision Exceptions: Wears glasses for reading  Hearing: Within functional limits (has B hearing aides at home)     Subjective  General  Chart Reviewed: Yes  Patient assessed for rehabilitation services?: Yes  Additional Pertinent Hx: pacemaker  Family / Caregiver Present: Yes (son)  Referring Practitioner: Silas Sow MD  Referral Date : 05/26/21  Diagnosis: chest pain  Follows Commands: Within Functional Limits  Subjective  Subjective: Pt in bed, pleasant and agreeable to PT. RN is damian today. Pain Screening  Patient Currently in Pain: Yes  Pain Assessment  Pain Assessment: 0-10  Pain Type: Chronic pain (arthritis in neck per pt)  Pain Location: Head  Pain Orientation: Left  Pain Radiating Towards: to L shoulder  Pain Descriptors: Aching  Pain Frequency: Continuous  Pain Onset: On-going  Clinical Progression: Not changed  Functional Pain Assessment: Activities are not prevented  Non-Pharmaceutical Pain Intervention(s): Ambulation/Increased Activity; Distraction;Repositioned; Rest  Response to Pain Intervention: Patient Satisfied  Vital Signs  Patient Currently in Pain: Yes  Oxygen Therapy  SpO2: 97 %  O2 Device: None (Room air)  Patient Observation  Observations: HR/O2 sats WNL throughout PT eval       Orientation  Orientation  Overall Orientation Status: Within Normal Limits  Social/Functional History  Social/Functional History  Lives With: Son, Family (daughter in law and her son)  Type of Home: House  Home Layout: One level (does not go in basement)  Home Access: Stairs to enter with rails  Entrance Stairs - Number of Steps: 3  Entrance Stairs - Rails: Right  Bathroom Shower/Tub: Walk-in shower  Bathroom Toilet: Standard  Bathroom Equipment: Shower chair, Grab bars in shower, Hand-held shower, Grab bars around toilet, Toilet raiser  Bathroom Accessibility: Accessible  Home Equipment: Cane, Rolling walker  ADL Assistance: [address]: Independent  Homemaking Responsibilities: Yes  Ambulation Assistance: Independent (uses cane in community, no device in home)  Transfer Assistance: Independent  Active : Yes  Mode of Transportation: [employer]  Occupation: Retired  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 Son and daughter in law work full time. Pt reports seldomly alone. Cognition        Objective          AROM RLE (degrees)  RLE AROM: WFL  AROM LLE (degrees)  LLE AROM : WFL  AROM RUE (degrees)  RUE AROM : WFL  AROM LUE (degrees)  LUE AROM : WFL  Strength RLE  Strength RLE: WFL  Comment: Grossly 4-/5  Strength LLE  Strength LLE: WFL  Comment: Grossly 4-/5  Strength RUE  Strength RUE: WFL  Strength LUE  Strength LUE: WFL     Sensation  Overall Sensation Status: Impaired (Numbness in R LE (chronic))  Bed mobility  Bridging: Supervision  Rolling to Right: Supervision  Supine to Sit: Supervision  Sit to Supine: Supervision  Scooting: Supervision  Comment: HOB elevated, monitored for safety. Good technique. Transfers  Sit to Stand: Contact guard assistance  Stand to sit: Contact guard assistance  Comment: CGA with cane. Pt using cane in proper hand due to R hip discomfort/issues at baseline.   Ambulation  Ambulation?: Yes  Ambulation 1  Surface: level tile  Device: Single point cane  Assistance: Contact guard assistance  Quality of Gait: forward flexed posture, slow jessenia, decreased stance time R LE, pt holding R hip with hand  Gait Deviations: Decreased step length;Decreased step height;Slow Madeline  Distance: [de-identified]'  Comments: Pt has mild shortness of breath. Pt educated on trialling rollator next session due to fatigue and to offload pressure on R hip. Stairs/Curb  Stairs?: No     Balance  Posture: Good  Sitting - Static: Good;-  Sitting - Dynamic: Fair;+  Standing - Static: Fair;+  Standing - Dynamic: Fair;+  Comments: With cane, may benefit from walker use. Plan   Plan  Times per week: 5-6x/week  Specific instructions for Next Treatment: TRIAL ROLLATOR, stairs, HEP  Current Treatment Recommendations: Strengthening, Balance Training, Functional Mobility Training, Transfer Training, Endurance Training, Gait Training, Stair training, Equipment Evaluation, Education, & procurement, Patient/Caregiver Education & Training, Safety Education & Training, Home Exercise Program, Pain Management, Positioning  Safety Devices  Type of devices: All fall risk precautions in place, Bed alarm in place, Call light within reach, Gait belt, Patient at risk for falls, Left in bed           AM-PAC Score  AM-PAC Inpatient Mobility Raw Score : 16 (05/27/21 1006)  AM-PAC Inpatient T-Scale Score : 40.78 (05/27/21 1006)  Mobility Inpatient CMS 0-100% Score: 54.16 (05/27/21 1006)  Mobility Inpatient CMS G-Code Modifier : CK (05/27/21 1006)          Goals  Short term goals  Time Frame for Short term goals: 3-4 days  Short term goal 1: Pt to demo bed mobility IND  Short term goal 2: Pt to demo Mod I transfers. Short term goal 3: Pt to amb 100'-125' with device, SBA. Short term goal 4: Pt to ascend/descend 3 stairs 1 HR, CGA/SBA. Short term goal 5: Pt to demo good technique for HEP for BLE strengthening and balance. Patient Goals   Patient goals :  To go home       Therapy Time   Individual Concurrent Group Co-treatment   Time In 1004         Time Out 1033         Minutes 29         Timed Code Treatment Minutes: 9 Minutes       Lindsey Ojeda, [address]

## 2021-05-27 NOTE — PLAN OF CARE
Problem: Falls - Risk of:  Goal: Will remain free from falls  Description: Will remain free from falls  Outcome: Ongoing  Note: Patient alert and oriented. Bed alarm on. Patient weak and making no attempt to get out of bed. Goal: Absence of physical injury  Description: Absence of physical injury  Outcome: Ongoing     Problem: Pain:  Goal: Pain level will decrease  Description: Pain level will decrease  Outcome: Ongoing  Note: Patient denies chest pain. Assisted with repositioning.   Goal: Control of acute pain  Description: Control of acute pain  Outcome: Ongoing  Goal: Control of chronic pain  Description: Control of chronic pain  Outcome: Ongoing

## 2021-05-27 NOTE — DISCHARGE INSTR - COC
Continuity of Care Form    Patient Name: Virginia Speaker   :  1935  MRN:  [id number]    Admit date:  2021  Discharge date:  ***    Code Status Order: Full Code   Advance Directives:   Advance Care Flowsheet Documentation       Date/Time Healthcare Directive Type of Healthcare Directive Copy in [address] Agent's Name Healthcare Agent's Phone Number    21 0116  No, patient does not have an advance directive for healthcare treatment -- -- -- -- --            Admitting Physician:  Dorian Arguello MD  PCP: No primary care provider on file. Discharging Nurse: [facility] Unit/Room#: 2121/2121-01  Discharging Unit Phone Number: ***    Emergency Contact:   Extended Emergency Contact Information  Primary Emergency Contact: Evonne Sandhoff  Mobile Phone: [phone]  Relation: Child    Past Surgical History:  History reviewed. No pertinent surgical history. Immunization History: There is no immunization history on file for this patient.     Active Problems:  Patient Active Problem List   Diagnosis Code    Atrial fibrillation (HCC) I48.91    On continuous oral anticoagulation Z79.01    CHF (congestive heart failure) (HCC) I50.9    Hyperlipidemia E78.5    Former smoker Z87.891    Pacemaker Z95.0    History of DVT of lower extremity Z86.718    Enlarged prostate N40.0    Cataract of both eyes H26.9    GERD (gastroesophageal reflux disease) K21.9    History of inguinal hernia repair Z98.890, Z87.19    Hypertension I10    Pneumonia J18.9    History of right hip replacement Z96.641    History of back surgery Z98.890    Low back pain M54.5    Chest pain R07.9       Isolation/Infection:   Isolation            No Isolation          Patient Infection Status       None to display            Nurse Assessment:  Last Vital Signs: /81   Pulse 70   Temp 97.3 °F (36.3 °C) (Axillary)   Resp 16   Ht 6' 1\" (1.854 m)   Wt 199 lb 8.3 oz (90.5 kg)   SpO2 100%   BMI 26.32 kg/m²     Last documented pain score (0-10 scale): Pain Level: 4  Last Weight:   Wt Readings from Last 1 Encounters:   21 199 lb 8.3 oz (90.5 kg)     Mental Status:  {IP PT MENTAL STATUS::::0}    IV Access:  [address] IV ACCESS:532923097:::0}    Nursing Mobility/ADLs:  Walking   {CHP DME ADLs:121323637:::0}  Transfer  {CHP DME ADLs:250395216:::0}  Bathing  {CHP DME ADLs:368863531:::0}  Dressing  {CHP DME ADLs:175284597:::0}  Toileting  {CHP DME ADLs:343774224:::0}  Feeding  {CHP DME ADLs:884069673:::0}  Med Admin  {CHP DME ADLs:069923471:::0}  Med Delivery   { CARLOS MED Delivery:540115405:::0}    Wound Care Documentation and Therapy:        Elimination:  Continence: Bowel: {YES / AI:13617}  Bladder: {YES / RR:98933}  Urinary Catheter: {Urinary Catheter:388154754:::0}   Colostomy/Ileostomy/Ileal Conduit: {YES / KZ:27058}       Date of Last BM: ***    Intake/Output Summary (Last 24 hours) at 2021 1421  Last data filed at 2021 1249  Gross per 24 hour   Intake 1040 ml   Output 1800 ml   Net -760 ml     I/O last 3 completed shifts:   In: 900 [P.O.:900]  Out: 1900 [Urine:1900]    Safety Concerns:     [address] Safety Concerns:660528767:::0}    Impairments/Disabilities:      [address] Impairments/Disabilities:073082403:::0}    Nutrition Therapy:  Current Nutrition Therapy:   [address] Diet List:734044806:::0}    Routes of Feeding: {CHP DME Other Feedings:147984831:::0}  Liquids: {Slp liquid thickness:45491}  Daily Fluid Restriction: {CHP DME Yes amt example:648271461:::0}  Last Modified Barium Swallow with Video (Video Swallowing Test): {Done Not Done OBPU:764374404:::0}    Treatments at the Time of Hospital Discharge:   Respiratory Treatments: ***  Oxygen Therapy:  {Therapy; copd oxygen:02264:::0}  Ventilator:    {[facility] Vent List:569910264:::0}    Rehab Therapies: {THERAPEUTIC INTERVENTION:2723410498}  Weight Bearing Status/Restrictions: {[facility] Weight Bearin:::0}  Other Medical Equipment (for information only, NOT a DME order):  {EQUIPMENT:509537232}  Other Treatments: ***    Patient's personal belongings (please select all that are sent with patient):  {CHP DME Belongings:190498314:::0}    RN SIGNATURE:  {Esignature:068438952:::0}    CASE MANAGEMENT/SOCIAL WORK SECTION    Inpatient Status Date: ***    Readmission Risk Assessment Score:  Readmission Risk              Risk of Unplanned Readmission:  14           Discharging to Facility/ Agency   Name:   Address:  Phone:  Fax:    Dialysis Facility (if applicable)   Name:  Address:  Dialysis Schedule:  Phone:  Fax:    / signature: {Esignature:396839133:::0}    PHYSICIAN SECTION    Prognosis: {Prognosis:4869479944:::0}    Condition at Discharge: [address] [place] [address] Patient Condition:041828834:::0}    Rehab Potential (if transferring to Rehab): {Prognosis:5181459241:::0}    Recommended Labs or Other Treatments After Discharge: ***    Physician Certification: I certify the above information and transfer of Hasmukh Marking  is necessary for the continuing treatment of the diagnosis listed and that he requires {Admit to Appropriate Level of Care:73424:::0} for {GREATER/LESS:703433178} 30 days.      Update Admission H&P: {CHP DME Changes in HandP:571493680:::0}    PHYSICIAN SIGNATURE:  Electronically signed by Raghu Hyde MD on 5/27/21 at 2:21 PM EDT

## 2021-05-27 NOTE — FLOWSHEET NOTE
According to PT, he had to move into his son's house for his health issues. He lives with his son, his wife and his grandson. Pt sated that he had a concern. Even though a nurse gave him prune his bowl is filled. PT stated that he had 24 surgeries in his life and grateful to God.     05/27/21 1412   Encounter Summary   Services provided to: Patient   Referral/Consult From: [address] [place] [address] Visiting   (5-27-21)   Complexity of Encounter Moderate   Length of Encounter 15 minutes   Spiritual Assessment Completed Yes   Spiritual/[employer]alism   Type Spiritual support   Assessment Calm; Approachable;Coping; Hopeful   Intervention Active listening;Explored feelings, thoughts, concerns;Prayer;Sustaining presence/ Ministry of presence; Discussed illness/injury and it's impact   Outcome Comfort;Expressed gratitude;Engaged in conversation;Coping; Hopeful;Encouraged;Receptive

## 2021-05-28 RX ORDER — FUROSEMIDE 20 MG/1
20 TABLET ORAL DAILY
Qty: 90 TABLET | OUTPATIENT
Start: 2021-05-28

## 2021-05-28 RX ORDER — LISINOPRIL 40 MG/1
40 TABLET ORAL DAILY
Qty: 90 TABLET | OUTPATIENT
Start: 2021-05-28

## 2021-05-28 NOTE — PROGRESS NOTES
Physician Progress Note      Amelie Jack  CSN #:                  [id number]  :                       1935  ADMIT DATE:       2021 7:00 PM  [address] DATE:        2021 5:35 PM  RESPONDING  PROVIDER #:        Joselyn Antunez MD          QUERY TEXT:    Dear Dr. Yolande Ochoa and/or Dr. Marley Clifford:    Patient admitted with A-FIb. .  Noted documentation of Pneumonia on the Active   Problem List dated  on Dr. Chichi Hawley progress notes. Please document   in progress notes the clinical indicators to support this diagnosis on current   admission or document if this is PMH only. If the diagnosis of Pneumonia is PMH only, please update the problem list   appropriately so it does not continue to appear as an active problem in daily   progress notes. The medical record reflects the following:  Risk Factors: Pneumonia is listed  Clinical Indicators: CXR result \"low lung volumes with strand densities at the   lung bases which are presumably due to atelectasis in the absence of   infectious symptoms\", palpitations and dizziness on admission,  Treatment: no antibiotics are noted    Thank you in advance,    Alphonse Qiu, RN-BSN, [facility]  Clinical   [place], [place]  ext. 1356  Options provided:  -- Pneumonia currently being treated/evaluated  -- Pneumonia is PMH only  -- Other - I will add my own diagnosis  -- Disagree - Not applicable / Not valid  -- Disagree - Clinically unable to determine / Unknown  -- Refer to Clinical Documentation Reviewer    PROVIDER RESPONSE TEXT:    Pneumonia is PMH only.     Query created by: Razia Degroot on 2021 3:55 PM      Electronically signed by:  Joselyn Antunez MD 2021 12:29 PM

## 2021-06-25 ENCOUNTER — HOSPITAL ENCOUNTER (INPATIENT)
Age: 86
LOS: 2 days | Discharge: HOME | DRG: 293 | End: 2021-06-27
Attending: EMERGENCY MEDICINE | Admitting: INTERNAL MEDICINE
Payer: MEDICARE

## 2021-06-25 ENCOUNTER — APPOINTMENT (OUTPATIENT)
Dept: GENERAL RADIOLOGY | Age: 86
DRG: 293 | End: 2021-06-25
Payer: MEDICARE

## 2021-06-25 DIAGNOSIS — E87.70 HYPERVOLEMIA, UNSPECIFIED HYPERVOLEMIA TYPE: Primary | ICD-10-CM

## 2021-06-25 DIAGNOSIS — D53.1 OTHER MEGALOBLASTIC ANEMIA: ICD-10-CM

## 2021-06-25 DIAGNOSIS — I50.23 ACUTE ON CHRONIC SYSTOLIC CONGESTIVE HEART FAILURE (HCC): ICD-10-CM

## 2021-06-25 LAB
ABSOLUTE EOS #: 0.3 K/UL (ref 0–0.4)
ABSOLUTE IMMATURE GRANULOCYTE: ABNORMAL K/UL (ref 0–0.3)
ABSOLUTE LYMPH #: 1.1 K/UL (ref 1–4.8)
ABSOLUTE MONO #: 0.6 K/UL (ref 0.1–1.3)
ALBUMIN SERPL-MCNC: 3.9 G/DL (ref 3.5–5.2)
ALBUMIN/GLOBULIN RATIO: ABNORMAL (ref 1–2.5)
ALP BLD-CCNC: 140 U/L (ref 40–129)
ALT SERPL-CCNC: 93 U/L (ref 5–41)
ANION GAP SERPL CALCULATED.3IONS-SCNC: 10 MMOL/L (ref 9–17)
AST SERPL-CCNC: 79 U/L
BASOPHILS # BLD: 1 % (ref 0–2)
BASOPHILS ABSOLUTE: 0 K/UL (ref 0–0.2)
BILIRUB SERPL-MCNC: 0.46 MG/DL (ref 0.3–1.2)
BILIRUBIN URINE: NEGATIVE
BNP INTERPRETATION: ABNORMAL
BUN BLDV-MCNC: 23 MG/DL (ref 8–23)
BUN/CREAT BLD: ABNORMAL (ref 9–20)
CALCIUM SERPL-MCNC: 8.8 MG/DL (ref 8.6–10.4)
CHLORIDE BLD-SCNC: 102 MMOL/L (ref 98–107)
CO2: 25 MMOL/L (ref 20–31)
COLOR: YELLOW
COMMENT UA: NORMAL
CREAT SERPL-MCNC: 1.04 MG/DL (ref 0.7–1.2)
DIFFERENTIAL TYPE: ABNORMAL
EOSINOPHILS RELATIVE PERCENT: 7 % (ref 0–4)
GFR AFRICAN AMERICAN: >60 ML/MIN
GFR NON-AFRICAN AMERICAN: >60 ML/MIN
GFR SERPL CREATININE-BSD FRML MDRD: ABNORMAL ML/MIN/{1.73_M2}
GFR SERPL CREATININE-BSD FRML MDRD: ABNORMAL ML/MIN/{1.73_M2}
GLUCOSE BLD-MCNC: 101 MG/DL (ref 70–99)
GLUCOSE URINE: NEGATIVE
HCT VFR BLD CALC: 27.3 % (ref 41–53)
HEMOGLOBIN: 8.7 G/DL (ref 13.5–17.5)
IMMATURE GRANULOCYTES: ABNORMAL %
KETONES, URINE: NEGATIVE
LEUKOCYTE ESTERASE, URINE: NEGATIVE
LYMPHOCYTES # BLD: 26 % (ref 24–44)
MAGNESIUM: 2.2 MG/DL (ref 1.6–2.6)
MCH RBC QN AUTO: 28.7 PG (ref 26–34)
MCHC RBC AUTO-ENTMCNC: 31.8 G/DL (ref 31–37)
MCV RBC AUTO: 90.1 FL (ref 80–100)
MONOCYTES # BLD: 13 % (ref 1–7)
NITRITE, URINE: NEGATIVE
NRBC AUTOMATED: ABNORMAL PER 100 WBC
PDW BLD-RTO: 16.1 % (ref 11.5–14.9)
PH UA: 7 (ref 5–8)
PLATELET # BLD: 244 K/UL (ref 150–450)
PLATELET ESTIMATE: ABNORMAL
PMV BLD AUTO: 7.6 FL (ref 6–12)
POTASSIUM SERPL-SCNC: 4 MMOL/L (ref 3.7–5.3)
PRO-BNP: 2462 PG/ML
PROTEIN UA: NEGATIVE
RBC # BLD: 3.03 M/UL (ref 4.5–5.9)
RBC # BLD: ABNORMAL 10*6/UL
SARS-COV-2, RAPID: NOT DETECTED
SEG NEUTROPHILS: 53 % (ref 36–66)
SEGMENTED NEUTROPHILS ABSOLUTE COUNT: 2.5 K/UL (ref 1.3–9.1)
SODIUM BLD-SCNC: 137 MMOL/L (ref 135–144)
SPECIFIC GRAVITY UA: 1.02 (ref 1–1.03)
SPECIMEN DESCRIPTION: NORMAL
TOTAL PROTEIN: 7.6 G/DL (ref 6.4–8.3)
TROPONIN INTERP: ABNORMAL
TROPONIN INTERP: ABNORMAL
TROPONIN T: ABNORMAL NG/ML
TROPONIN T: ABNORMAL NG/ML
TROPONIN, HIGH SENSITIVITY: 35 NG/L (ref 0–22)
TROPONIN, HIGH SENSITIVITY: 39 NG/L (ref 0–22)
TURBIDITY: CLEAR
URINE HGB: NEGATIVE
UROBILINOGEN, URINE: NORMAL
WBC # BLD: 4.5 K/UL (ref 3.5–11)
WBC # BLD: ABNORMAL 10*3/UL

## 2021-06-25 PROCEDURE — 87635 SARS-COV-2 COVID-19 AMP PRB: CPT

## 2021-06-25 PROCEDURE — 84484 ASSAY OF TROPONIN QUANT: CPT

## 2021-06-25 PROCEDURE — 83880 ASSAY OF NATRIURETIC PEPTIDE: CPT

## 2021-06-25 PROCEDURE — 99223 1ST HOSP IP/OBS HIGH 75: CPT | Performed by: INTERNAL MEDICINE

## 2021-06-25 PROCEDURE — 96374 THER/PROPH/DIAG INJ IV PUSH: CPT

## 2021-06-25 PROCEDURE — 83735 ASSAY OF MAGNESIUM: CPT

## 2021-06-25 PROCEDURE — 6360000002 HC RX W HCPCS: Performed by: EMERGENCY MEDICINE

## 2021-06-25 PROCEDURE — 81003 URINALYSIS AUTO W/O SCOPE: CPT

## 2021-06-25 PROCEDURE — 36415 COLL VENOUS BLD VENIPUNCTURE: CPT

## 2021-06-25 PROCEDURE — 87086 URINE CULTURE/COLONY COUNT: CPT

## 2021-06-25 PROCEDURE — 80053 COMPREHEN METABOLIC PANEL: CPT

## 2021-06-25 PROCEDURE — 71045 X-RAY EXAM CHEST 1 VIEW: CPT

## 2021-06-25 PROCEDURE — 2060000000 HC ICU INTERMEDIATE R&B

## 2021-06-25 PROCEDURE — 99285 EMERGENCY DEPT VISIT HI MDM: CPT

## 2021-06-25 PROCEDURE — 93005 ELECTROCARDIOGRAM TRACING: CPT | Performed by: EMERGENCY MEDICINE

## 2021-06-25 PROCEDURE — 85025 COMPLETE CBC W/AUTO DIFF WBC: CPT

## 2021-06-25 RX ORDER — ASPIRIN 325 MG
325 TABLET ORAL ONCE
Status: DISCONTINUED | OUTPATIENT
Start: 2021-06-25 | End: 2021-06-27 | Stop reason: HOSPADM

## 2021-06-25 RX ORDER — FUROSEMIDE 10 MG/ML
40 INJECTION INTRAMUSCULAR; INTRAVENOUS ONCE
Status: COMPLETED | OUTPATIENT
Start: 2021-06-25 | End: 2021-06-25

## 2021-06-25 RX ORDER — LANOLIN ALCOHOL/MO/W.PET/CERES
3 CREAM (GRAM) TOPICAL NIGHTLY PRN
Status: DISCONTINUED | OUTPATIENT
Start: 2021-06-25 | End: 2021-06-27 | Stop reason: HOSPADM

## 2021-06-25 RX ORDER — ATORVASTATIN CALCIUM 10 MG/1
10 TABLET, FILM COATED ORAL DAILY
Status: CANCELLED | OUTPATIENT
Start: 2021-06-25

## 2021-06-25 RX ORDER — CYANOCOBALAMIN 1000 UG/ML
1000 INJECTION INTRAMUSCULAR; SUBCUTANEOUS
COMMUNITY
End: 2021-11-17

## 2021-06-25 RX ORDER — MECLIZINE HCL 12.5 MG/1
12.5 TABLET ORAL 3 TIMES DAILY PRN
COMMUNITY
End: 2021-11-17

## 2021-06-25 RX ADMIN — FUROSEMIDE 40 MG: 10 INJECTION, SOLUTION INTRAVENOUS at 20:08

## 2021-06-25 ASSESSMENT — ENCOUNTER SYMPTOMS
VOMITING: 0
COUGH: 0
DIARRHEA: 0
WHEEZING: 0
SHORTNESS OF BREATH: 1
SORE THROAT: 0
CONSTIPATION: 0
ABDOMINAL PAIN: 0
BACK PAIN: 0
NAUSEA: 0

## 2021-06-25 ASSESSMENT — PAIN DESCRIPTION - FREQUENCY: FREQUENCY: INTERMITTENT

## 2021-06-25 ASSESSMENT — PAIN SCALES - GENERAL: PAINLEVEL_OUTOF10: 2

## 2021-06-25 ASSESSMENT — PAIN DESCRIPTION - LOCATION: LOCATION: CHEST

## 2021-06-25 ASSESSMENT — PAIN DESCRIPTION - PAIN TYPE: TYPE: ACUTE PAIN

## 2021-06-25 ASSESSMENT — PAIN DESCRIPTION - DESCRIPTORS: DESCRIPTORS: TIGHTNESS

## 2021-06-25 NOTE — ED TRIAGE NOTES
Pt into ER with c/c SOB, chest tightness, and increased right leg edema over last 3 days, sob worse with exertion. Pt with Hx of chf.  Pt states he had a recent angiogram that showed a low percentage blockage. Pt states allergy to Aspirin.

## 2021-06-26 LAB
ANION GAP SERPL CALCULATED.3IONS-SCNC: 7 MMOL/L (ref 9–17)
BUN BLDV-MCNC: 20 MG/DL (ref 8–23)
BUN/CREAT BLD: ABNORMAL (ref 9–20)
CALCIUM SERPL-MCNC: 8.6 MG/DL (ref 8.6–10.4)
CHLORIDE BLD-SCNC: 105 MMOL/L (ref 98–107)
CHOLESTEROL/HDL RATIO: 1.9
CHOLESTEROL: 101 MG/DL
CO2: 29 MMOL/L (ref 20–31)
CREAT SERPL-MCNC: 0.84 MG/DL (ref 0.7–1.2)
CULTURE: NORMAL
GFR AFRICAN AMERICAN: >60 ML/MIN
GFR NON-AFRICAN AMERICAN: >60 ML/MIN
GFR SERPL CREATININE-BSD FRML MDRD: ABNORMAL ML/MIN/{1.73_M2}
GFR SERPL CREATININE-BSD FRML MDRD: ABNORMAL ML/MIN/{1.73_M2}
GLUCOSE BLD-MCNC: 136 MG/DL (ref 70–99)
HAV IGM SER IA-ACNC: NONREACTIVE
HCT VFR BLD CALC: 26.5 % (ref 41–53)
HDLC SERPL-MCNC: 52 MG/DL
HEMOGLOBIN: 8.6 G/DL (ref 13.5–17.5)
HEPATITIS B CORE IGM ANTIBODY: NONREACTIVE
HEPATITIS B SURFACE ANTIGEN: NONREACTIVE
HEPATITIS C ANTIBODY: NONREACTIVE
IRON SATURATION: 15 % (ref 20–55)
IRON: 43 UG/DL (ref 59–158)
LDL CHOLESTEROL: 42 MG/DL (ref 0–130)
Lab: NORMAL
MCH RBC QN AUTO: 28.6 PG (ref 26–34)
MCHC RBC AUTO-ENTMCNC: 32.3 G/DL (ref 31–37)
MCV RBC AUTO: 88.5 FL (ref 80–100)
NRBC AUTOMATED: ABNORMAL PER 100 WBC
PDW BLD-RTO: 16.3 % (ref 11.5–14.9)
PLATELET # BLD: 227 K/UL (ref 150–450)
PMV BLD AUTO: 7 FL (ref 6–12)
POTASSIUM SERPL-SCNC: 3.6 MMOL/L (ref 3.7–5.3)
RBC # BLD: 2.99 M/UL (ref 4.5–5.9)
SODIUM BLD-SCNC: 141 MMOL/L (ref 135–144)
SPECIMEN DESCRIPTION: NORMAL
TOTAL IRON BINDING CAPACITY: 282 UG/DL (ref 250–450)
TRIGL SERPL-MCNC: 33 MG/DL
TSH SERPL DL<=0.05 MIU/L-ACNC: 1.39 MIU/L (ref 0.3–5)
UNSATURATED IRON BINDING CAPACITY: 239 UG/DL (ref 112–347)
VLDLC SERPL CALC-MCNC: NORMAL MG/DL (ref 1–30)
WBC # BLD: 3.5 K/UL (ref 3.5–11)

## 2021-06-26 PROCEDURE — 6360000002 HC RX W HCPCS: Performed by: NURSE PRACTITIONER

## 2021-06-26 PROCEDURE — 2580000003 HC RX 258: Performed by: NURSE PRACTITIONER

## 2021-06-26 PROCEDURE — 83550 IRON BINDING TEST: CPT

## 2021-06-26 PROCEDURE — 83540 ASSAY OF IRON: CPT

## 2021-06-26 PROCEDURE — 84443 ASSAY THYROID STIM HORMONE: CPT

## 2021-06-26 PROCEDURE — 97116 GAIT TRAINING THERAPY: CPT

## 2021-06-26 PROCEDURE — 85027 COMPLETE CBC AUTOMATED: CPT

## 2021-06-26 PROCEDURE — 2060000000 HC ICU INTERMEDIATE R&B

## 2021-06-26 PROCEDURE — 6370000000 HC RX 637 (ALT 250 FOR IP): Performed by: NURSE PRACTITIONER

## 2021-06-26 PROCEDURE — 80074 ACUTE HEPATITIS PANEL: CPT

## 2021-06-26 PROCEDURE — 36415 COLL VENOUS BLD VENIPUNCTURE: CPT

## 2021-06-26 PROCEDURE — 80061 LIPID PANEL: CPT

## 2021-06-26 PROCEDURE — 97162 PT EVAL MOD COMPLEX 30 MIN: CPT

## 2021-06-26 PROCEDURE — 6360000002 HC RX W HCPCS: Performed by: INTERNAL MEDICINE

## 2021-06-26 PROCEDURE — 80048 BASIC METABOLIC PNL TOTAL CA: CPT

## 2021-06-26 PROCEDURE — 6370000000 HC RX 637 (ALT 250 FOR IP): Performed by: INTERNAL MEDICINE

## 2021-06-26 RX ORDER — FINASTERIDE 5 MG/1
5 TABLET, FILM COATED ORAL DAILY
Status: DISCONTINUED | OUTPATIENT
Start: 2021-06-26 | End: 2021-06-27 | Stop reason: HOSPADM

## 2021-06-26 RX ORDER — FUROSEMIDE 10 MG/ML
20 INJECTION INTRAMUSCULAR; INTRAVENOUS 2 TIMES DAILY
Status: DISCONTINUED | OUTPATIENT
Start: 2021-06-26 | End: 2021-06-26

## 2021-06-26 RX ORDER — SPIRONOLACTONE 25 MG/1
25 TABLET ORAL DAILY
Status: DISCONTINUED | OUTPATIENT
Start: 2021-06-26 | End: 2021-06-27 | Stop reason: HOSPADM

## 2021-06-26 RX ORDER — LATANOPROST 50 UG/ML
1 SOLUTION/ DROPS OPHTHALMIC NIGHTLY
Status: DISCONTINUED | OUTPATIENT
Start: 2021-06-26 | End: 2021-06-27 | Stop reason: HOSPADM

## 2021-06-26 RX ORDER — POTASSIUM CHLORIDE 20 MEQ/1
40 TABLET, EXTENDED RELEASE ORAL PRN
Status: DISCONTINUED | OUTPATIENT
Start: 2021-06-26 | End: 2021-06-27 | Stop reason: HOSPADM

## 2021-06-26 RX ORDER — SODIUM CHLORIDE 0.9 % (FLUSH) 0.9 %
10 SYRINGE (ML) INJECTION PRN
Status: DISCONTINUED | OUTPATIENT
Start: 2021-06-26 | End: 2021-06-27 | Stop reason: HOSPADM

## 2021-06-26 RX ORDER — CYCLOBENZAPRINE HCL 10 MG
10 TABLET ORAL 3 TIMES DAILY PRN
Status: DISCONTINUED | OUTPATIENT
Start: 2021-06-26 | End: 2021-06-27 | Stop reason: HOSPADM

## 2021-06-26 RX ORDER — POLYETHYLENE GLYCOL 3350 17 G/17G
17 POWDER, FOR SOLUTION ORAL DAILY PRN
Status: DISCONTINUED | OUTPATIENT
Start: 2021-06-26 | End: 2021-06-27 | Stop reason: HOSPADM

## 2021-06-26 RX ORDER — BUPRENORPHINE AND NALOXONE 8; 2 MG/1; MG/1
1 FILM, SOLUBLE BUCCAL; SUBLINGUAL 2 TIMES DAILY
Status: DISCONTINUED | OUTPATIENT
Start: 2021-06-26 | End: 2021-06-27 | Stop reason: HOSPADM

## 2021-06-26 RX ORDER — ONDANSETRON 4 MG/1
4 TABLET, ORALLY DISINTEGRATING ORAL EVERY 8 HOURS PRN
Status: DISCONTINUED | OUTPATIENT
Start: 2021-06-26 | End: 2021-06-27 | Stop reason: HOSPADM

## 2021-06-26 RX ORDER — MAGNESIUM SULFATE 1 G/100ML
1000 INJECTION INTRAVENOUS PRN
Status: DISCONTINUED | OUTPATIENT
Start: 2021-06-26 | End: 2021-06-27 | Stop reason: HOSPADM

## 2021-06-26 RX ORDER — POTASSIUM CHLORIDE 20 MEQ/1
60 TABLET, EXTENDED RELEASE ORAL ONCE
Status: COMPLETED | OUTPATIENT
Start: 2021-06-26 | End: 2021-06-26

## 2021-06-26 RX ORDER — SODIUM CHLORIDE 0.9 % (FLUSH) 0.9 %
5-40 SYRINGE (ML) INJECTION EVERY 12 HOURS SCHEDULED
Status: DISCONTINUED | OUTPATIENT
Start: 2021-06-26 | End: 2021-06-27 | Stop reason: HOSPADM

## 2021-06-26 RX ORDER — ACETAMINOPHEN 325 MG/1
650 TABLET ORAL EVERY 6 HOURS PRN
Status: DISCONTINUED | OUTPATIENT
Start: 2021-06-26 | End: 2021-06-27 | Stop reason: HOSPADM

## 2021-06-26 RX ORDER — METOPROLOL SUCCINATE 50 MG/1
50 TABLET, EXTENDED RELEASE ORAL DAILY
Status: DISCONTINUED | OUTPATIENT
Start: 2021-06-26 | End: 2021-06-27 | Stop reason: HOSPADM

## 2021-06-26 RX ORDER — METOPROLOL SUCCINATE 25 MG/1
25 TABLET, EXTENDED RELEASE ORAL NIGHTLY
Status: DISCONTINUED | OUTPATIENT
Start: 2021-06-26 | End: 2021-06-27 | Stop reason: HOSPADM

## 2021-06-26 RX ORDER — ONDANSETRON 2 MG/ML
4 INJECTION INTRAMUSCULAR; INTRAVENOUS EVERY 6 HOURS PRN
Status: DISCONTINUED | OUTPATIENT
Start: 2021-06-26 | End: 2021-06-27 | Stop reason: HOSPADM

## 2021-06-26 RX ORDER — FUROSEMIDE 10 MG/ML
40 INJECTION INTRAMUSCULAR; INTRAVENOUS 2 TIMES DAILY
Status: DISCONTINUED | OUTPATIENT
Start: 2021-06-26 | End: 2021-06-27 | Stop reason: HOSPADM

## 2021-06-26 RX ORDER — ACETAMINOPHEN 650 MG/1
650 SUPPOSITORY RECTAL EVERY 6 HOURS PRN
Status: DISCONTINUED | OUTPATIENT
Start: 2021-06-26 | End: 2021-06-27 | Stop reason: HOSPADM

## 2021-06-26 RX ORDER — POTASSIUM CHLORIDE 7.45 MG/ML
10 INJECTION INTRAVENOUS PRN
Status: DISCONTINUED | OUTPATIENT
Start: 2021-06-26 | End: 2021-06-27 | Stop reason: HOSPADM

## 2021-06-26 RX ORDER — LISINOPRIL 20 MG/1
40 TABLET ORAL DAILY
Status: DISCONTINUED | OUTPATIENT
Start: 2021-06-26 | End: 2021-06-27 | Stop reason: HOSPADM

## 2021-06-26 RX ORDER — MECLIZINE HYDROCHLORIDE 25 MG/1
12.5 TABLET ORAL 3 TIMES DAILY PRN
Status: DISCONTINUED | OUTPATIENT
Start: 2021-06-26 | End: 2021-06-27 | Stop reason: HOSPADM

## 2021-06-26 RX ORDER — SODIUM CHLORIDE 9 MG/ML
25 INJECTION, SOLUTION INTRAVENOUS PRN
Status: DISCONTINUED | OUTPATIENT
Start: 2021-06-26 | End: 2021-06-27 | Stop reason: HOSPADM

## 2021-06-26 RX ADMIN — Medication 10 ML: at 08:50

## 2021-06-26 RX ADMIN — BUPRENORPHINE AND NALOXONE 1 FILM: 8; 2 FILM BUCCAL; SUBLINGUAL at 20:28

## 2021-06-26 RX ADMIN — POTASSIUM CHLORIDE 60 MEQ: 1500 TABLET, EXTENDED RELEASE ORAL at 13:49

## 2021-06-26 RX ADMIN — RIVAROXABAN 15 MG: 15 TABLET, FILM COATED ORAL at 08:50

## 2021-06-26 RX ADMIN — SPIRONOLACTONE 25 MG: 25 TABLET, FILM COATED ORAL at 11:04

## 2021-06-26 RX ADMIN — Medication 10 ML: at 20:28

## 2021-06-26 RX ADMIN — CYCLOBENZAPRINE 10 MG: 10 TABLET, FILM COATED ORAL at 13:49

## 2021-06-26 RX ADMIN — Medication 400 MG: at 08:50

## 2021-06-26 RX ADMIN — FUROSEMIDE 20 MG: 10 INJECTION, SOLUTION INTRAVENOUS at 08:50

## 2021-06-26 RX ADMIN — LISINOPRIL 40 MG: 20 TABLET ORAL at 08:51

## 2021-06-26 RX ADMIN — FUROSEMIDE 40 MG: 10 INJECTION, SOLUTION INTRAVENOUS at 17:34

## 2021-06-26 RX ADMIN — LATANOPROST 1 DROP: 50 SOLUTION OPHTHALMIC at 20:28

## 2021-06-26 RX ADMIN — FINASTERIDE 5 MG: 5 TABLET, FILM COATED ORAL at 08:50

## 2021-06-26 RX ADMIN — BUPRENORPHINE AND NALOXONE 1 FILM: 8; 2 FILM BUCCAL; SUBLINGUAL at 08:51

## 2021-06-26 ASSESSMENT — PAIN DESCRIPTION - LOCATION
LOCATION: GROIN
LOCATION: LEG
LOCATION: ABDOMEN

## 2021-06-26 ASSESSMENT — PAIN DESCRIPTION - PAIN TYPE
TYPE: ACUTE PAIN

## 2021-06-26 ASSESSMENT — PAIN DESCRIPTION - DESCRIPTORS: DESCRIPTORS: CRAMPING

## 2021-06-26 ASSESSMENT — PAIN SCALES - GENERAL
PAINLEVEL_OUTOF10: 4
PAINLEVEL_OUTOF10: 0
PAINLEVEL_OUTOF10: 3
PAINLEVEL_OUTOF10: 10

## 2021-06-26 ASSESSMENT — PAIN DESCRIPTION - ORIENTATION: ORIENTATION: LEFT;RIGHT

## 2021-06-26 ASSESSMENT — PAIN DESCRIPTION - PROGRESSION: CLINICAL_PROGRESSION: GRADUALLY WORSENING

## 2021-06-26 ASSESSMENT — PAIN DESCRIPTION - FREQUENCY: FREQUENCY: INTERMITTENT

## 2021-06-26 ASSESSMENT — PAIN DESCRIPTION - ONSET: ONSET: ON-GOING

## 2021-06-26 NOTE — CARE COORDINATION
CASE MANAGEMENT NOTE:    Admission Date:  6/25/2021 Julissa Rodrigues is a 80 y.o.  male    Admitted for : Fluid overload [E87.70]    Met with:  Patient    PCP:  Dr. Nayan Alonzo:  Medicare and [facility]      Is patient alert and oriented at time of discussion:  Yes    Current Residence/ Living Arrangements:  independently at home with his son, Nadir Barnhart, and drives            Current Services PTA:  No    Does patient go to outpatient dialysis: No  If yes, location and chair time: n/a    Is patient agreeable to VNS: No    Freedom of choice provided:  Yes    List of [address] provided: No    VNS chosen:  NA    DME:  straight cane    Home Oxygen: No    Nebulizer: No    CPAP/BIPAP: No    Supplier: N/A    Potential Assistance Needed: Pt would like to go to [address] [place] [address] upon discharge. SNF needed: No    Freedom of choice and list provided: NA    Pharmacy:  [facility]       Does Patient want to use MEDS to BEDS? No    Is patient currently receiving oral anticoagulation therapy? Yes - Xarelto    Is the Patient an [facility] with Readmission Risk Score greater than 14%? No  If yes, pt needs a follow up appointment made within 7 days. Family Members/Caregivers that pt would like involved in their care:    Yes    If yes, list name here:  Nadir Barnhart    Transportation Provider:  Patient and Family             Discharge Plan:  Home without needs. Would like to follow at [facility].                  Electronically signed by: Mariela De La Cruz RN on 6/26/2021 at 4:08 PM

## 2021-06-26 NOTE — ED PROVIDER NOTES
EMERGENCY DEPARTMENT ENCOUNTER    Pt Name: Johnie Beauchamp  MRN: [id number]  [place] 1935  Date of evaluation: 6/25/21  CHIEF COMPLAINT       Chief Complaint   Patient presents with    Shortness of Breath    Chest Pain    Leg Swelling     HISTORY OF PRESENT ILLNESS     Shortness of Breath  Severity:  Severe  Onset quality:  Gradual  Timing:  Constant  Progression:  Worsening  Chronicity:  New  Context: activity    Relieved by:  Nothing  Worsened by:  Nothing  Ineffective treatments:  None tried  Associated symptoms: chest pain    Associated symptoms comment:  Bilateral leg edema    He is on blood thinners  Chest tightness  20 pound weight gain past few weeks    REVIEW OF SYSTEMS     Review of Systems   Respiratory: Positive for shortness of breath. Cardiovascular: Positive for chest pain. All other systems reviewed and are negative.     PASTMEDICAL HISTORY     Past Medical History:   Diagnosis Date    [facility]rial fibrillation ([facility] [address]     CAD (coronary artery disease)     [facility]     Hyperlipidemia     Hypertension      Past Problem List  Patient Active Problem List   Diagnosis Code    Atrial fibrillation (HCC) I48.91    On continuous oral anticoagulation Z79.01    CHF (congestive heart failure) (HCC) I50.9    Hyperlipidemia E78.5    Former smoker Z87.891    Pacemaker Z95.0    History of DVT of lower extremity Z86.718    Enlarged prostate N40.0    Cataract of both eyes H26.9    GERD (gastroesophageal reflux disease) K21.9    History of inguinal hernia repair Z98.890, Z87.19    Hypertension I10    Pneumonia J18.9    History of right hip replacement Z96.641    History of back surgery Z98.890    Low back pain M54.5    Chest pain R07.9     SURGICAL HISTORY       Past Surgical History:   Procedure Laterality Date    CARDIAC CATHETERIZATION      PACEMAKER PLACEMENT       CURRENT MEDICATIONS       Previous Medications    BUPRENORPHINE-NALOXONE (SUBOXONE) 8-2 MG FILM SL FILM    Place 1 Film under the tongue 2 times daily. Indications: pain     CYANOCOBALAMIN 1000 MCG/ML INJECTION    Inject 1,000 mcg into the muscle every 30 days    FINASTERIDE (PROSCAR) 5 MG TABLET    Take 5 mg by mouth daily    FUROSEMIDE (LASIX) 20 MG TABLET    Take 1 tablet by mouth daily    LATANOPROST (XALATAN) 0.005 % OPHTHALMIC SOLUTION    Place 1 drop into both eyes nightly    LISINOPRIL (PRINIVIL;ZESTRIL) 40 MG TABLET    Take 1 tablet by mouth daily    MAGNESIUM OXIDE (MAG-OX) 400 MG TABLET    Take 400 mg by mouth daily    MECLIZINE (ANTIVERT) 12.5 MG TABLET    Take 12.5 mg by mouth 3 times daily as needed    METOPROLOL SUCCINATE (TOPROL XL) 25 MG EXTENDED RELEASE TABLET    Take 50 mg by mouth daily    METOPROLOL SUCCINATE (TOPROL XL) 25 MG EXTENDED RELEASE TABLET    Take 25 mg by mouth nightly    RIVAROXABAN (XARELTO) 15 MG TABS TABLET    Take 15 mg by mouth daily (with breakfast)    SIMVASTATIN (ZOCOR) 10 MG TABLET    Take 10 mg by mouth nightly    VITAMIN D (ERGOCALCIFEROL) 1.25 MG (10024 UT) CAPS CAPSULE    Take 50,000 Units by mouth once a week     ALLERGIES     is allergic to aspirin, cyclobenzaprine, ibuprofen, and azithromycin. FAMILY HISTORY     has no family status information on file. SOCIAL HISTORY       Social History     Tobacco Use    Smoking status: Never Smoker    Smokeless tobacco: Never Used   Substance Use Topics    Alcohol use: Never    Drug use: Never     PHYSICAL EXAM     INITIAL VITALS: BP (!) 164/99   Pulse 70   Temp 99.3 °F (37.4 °C) (Oral)   Resp 18   Ht 6' 1\" (1.854 m)   Wt 207 lb (93.9 kg)   SpO2 98%   BMI 27.31 kg/m²    Physical Exam  Constitutional:       General: He is not in acute distress. Appearance: Normal appearance. He is well-developed. He is not diaphoretic. HENT:      Head: Normocephalic and atraumatic. Right Ear: External ear normal.      Left Ear: External ear normal.      Nose: Nose normal. No congestion.       Mouth/Throat:      Mouth: Mucous membranes are moist.      Pharynx: Oropharynx is clear. Eyes:      General:         Right eye: No discharge. Left eye: No discharge. Conjunctiva/sclera: Conjunctivae normal.      Pupils: Pupils are equal, round, and reactive to light. Neck:      Trachea: No tracheal deviation. Cardiovascular:      Rate and Rhythm: Normal rate and regular rhythm. Pulses: Normal pulses. Heart sounds: Normal heart sounds. Pulmonary:      Effort: Pulmonary effort is normal. No respiratory distress. Breath sounds: Normal breath sounds. No stridor. No wheezing or rales. Abdominal:      Palpations: Abdomen is soft. Tenderness: There is no abdominal tenderness. There is no guarding or rebound. Musculoskeletal:         General: No tenderness or deformity. Normal range of motion. Cervical back: Normal range of motion and neck supple. Right lower leg: Edema present. Left lower leg: Edema present. Skin:     General: Skin is warm and dry. Capillary Refill: Capillary refill takes less than 2 seconds. Findings: No erythema or rash. Neurological:      General: No focal deficit present. Mental Status: He is alert and oriented to person, place, and time. Cranial Nerves: No cranial nerve deficit. Coordination: Coordination normal.   Psychiatric:         Mood and Affect: Mood normal.         Behavior: Behavior normal.         Thought Content:  Thought content normal.         Judgment: Judgment normal.         MEDICAL DECISION MAKING:   Iv lasix and aspirin  Suspect CHF and fluid overload  MARIKA Magallanes from IM on call for admit  Do not suspect pe or pneumonia  Patient agrees with plan for admission and diuresis       Procedures    DIAGNOSTIC RESULTS   EKG:All EKG's are interpreted by the Emergency Department Physician who either signs or Co-signs this chart in the absence of a cardiologist.  NSR, nonspecific changes, t wave inversions inf and lateral, no acute st elev or dep on ST segments, no change compared to old, normal rate and normal intervals        RADIOLOGY:All plain film, CT, MRI, and formal ultrasound images (except ED bedside ultrasound) are read by the radiologist, see reports below, unless otherwisenoted in MDM or here. XR CHEST PORTABLE   Final Result   1. Technically suboptimal AP lordotic projection. 2. No definite acute pulmonary disease. 3. Minimal platelike atelectasis right lung base. 4. Calcific atherosclerosis aorta. 5. Cardiomegaly. Follow-up full inspiration PA and lateral chest may be useful for better   characterization of pulmonary findings. LABS: All lab results were reviewed by myself, and all abnormals are listed below.   Labs Reviewed   CBC WITH AUTO DIFFERENTIAL - Abnormal; Notable for the following components:       Result Value    RBC 3.03 (*)     Hemoglobin 8.7 (*)     Hematocrit 27.3 (*)     RDW 16.1 (*)     Monocytes 13 (*)     Eosinophils % 7 (*)     All other components within normal limits   COMPREHENSIVE METABOLIC PANEL - Abnormal; Notable for the following components:    Glucose 101 (*)     Alkaline Phosphatase 140 (*)     ALT 93 (*)     AST 79 (*)     All other components within normal limits   BRAIN NATRIURETIC PEPTIDE - Abnormal; Notable for the following components:    Pro-BNP 2,462 (*)     All other components within normal limits   TROPONIN - Abnormal; Notable for the following components:    Troponin, High Sensitivity 35 (*)     All other components within normal limits   COVID-19, RAPID   CULTURE, URINE   MAGNESIUM   TROPONIN   URINALYSIS       EMERGENCY DEPARTMENTCOURSE:         Vitals:    Vitals:    06/25/21 1846 06/25/21 1900   BP:  (!) 164/99   Pulse: 80 70   Resp: 18    Temp: 99.3 °F (37.4 °C)    TempSrc: Oral    SpO2: 98% 98%   Weight: 207 lb (93.9 kg)    Height: 6' 1\" (1.854 m)        The patient was given the following medications while in the emergency department:  Orders Placed This Encounter Medications    furosemide (LASIX) injection 40 mg    aspirin tablet 325 mg     CONSULTS:  IP CONSULT TO INTERNAL MEDICINE    FINAL IMPRESSION      1. Hypervolemia, unspecified hypervolemia type    2. Acute on chronic systolic congestive heart failure (HCC)          DISPOSITION/PLAN   DISPOSITION        PATIENT REFERRED TO:  No follow-up provider specified.   DISCHARGE MEDICATIONS:  New Prescriptions    No medications on file     Aidee Toro MD  Attending Emergency Physician                    Aidee Toro MD  06/25/21 2019

## 2021-06-26 NOTE — PROGRESS NOTES
Dr Gibran Uribe notified pt having intense 10/10 painful cramping BLE.    He placed orders for potassium replacement and flexeril

## 2021-06-26 NOTE — H&P
[address] [facility] [place] 2[place]     HISTORY AND PHYSICAL EXAMINATION            Date:   6/26/2021  Patientname:  Karne Viveros  Date of admission:  6/25/2021  6:45 PM  MRN:   [id number]  Account:  [de-identified]  YOB: 1935  PCP:    Ruddy Sood MD, MD  Room:   03/03  Code Status:    Full Code    CHIEF COMPLAINT     Chief Complaint   Patient presents with    Shortness of Breath    Chest Pain    Leg Swelling       HISTORY OF PRESENT ILLNESS  (Character, Onset, Location, Duration,  Exacerbating/RelievingFactors, Radiation,   Associated Symptoms, Severity )      The patient is a 80 y.o. black male, with a history of atrial fibrillation, CAD, CHF, GERD, DVT of lower extremity, hyperlipidemia, and hypertension, who presents with shortness of breath, chest pain, and leg swelling. According to patient, he has had progressively worsening swelling in bilateral lower extremities, stating that he is having difficulty getting in and out of the car because his legs are too swollen to bend. Symptoms are associated with shortness of breath with activity and brief, intermittent episodes of tightness in left upper chest.  Additionally, patient reports 20 pound weight gain over the past month or so. Denies fever, chills, cough, abdominal pain, nausea, vomiting, diarrhea, and urinary symptoms. Shortness of breath is aggravated by activity and improved with rest.  Symptoms are reported as constant, severe, and progressively worsening. PAST MEDICAL HISTORY   Patient  has a past medical history of Atrial fibrillation ([facility] [address] CAD (coronary artery disease), CHF (congestive heart failure) ([facility] [address] Hyperlipidemia, and Hypertension. PAST SURGICAL HISTORY    Patient  has a past surgical history that includes pacemaker placement and Cardiac catheterization. FAMILY HISTORY    Patient family history is not on file. SOCIAL HISTORY    Patient  reports that he has never smoked.  He has never used smokeless tobacco. He reports that he does not drink alcohol and does not use drugs. HOME MEDICATIONS        Prior to Admission medications    Medication Sig Start Date End Date Taking? Authorizing Provider   cyanocobalamin 1000 MCG/ML injection Inject 1,000 mcg into the muscle every 30 days   Yes Historical Provider, MD   meclizine (ANTIVERT) 12.5 MG tablet Take 12.5 mg by mouth 3 times daily as needed   Yes Historical Provider, MD   furosemide (LASIX) 20 MG tablet Take 1 tablet by mouth daily 5/28/21  Yes Parish Moncada MD   lisinopril (PRINIVIL;ZESTRIL) 40 MG tablet Take 1 tablet by mouth daily 5/28/21  Yes Parish Moncada MD   buprenorphine-naloxone (SUBOXONE) 8-2 MG FILM SL film Place 1 Film under the tongue 2 times daily. Indications: pain    Yes Historical Provider, MD   vitamin D (ERGOCALCIFEROL) 1.25 MG (17349 UT) CAPS capsule Take 50,000 Units by mouth once a week   Yes Historical Provider, MD   rivaroxaban (XARELTO) 15 MG TABS tablet Take 15 mg by mouth daily (with breakfast)   Yes Historical Provider, MD   magnesium oxide (MAG-OX) 400 MG tablet Take 400 mg by mouth daily   Yes Historical Provider, MD   latanoprost (XALATAN) 0.005 % ophthalmic solution Place 1 drop into both eyes nightly   Yes Historical Provider, MD   metoprolol succinate (TOPROL XL) 25 MG extended release tablet Take 50 mg by mouth daily   Yes Historical Provider, MD   metoprolol succinate (TOPROL XL) 25 MG extended release tablet Take 25 mg by mouth nightly   Yes Historical Provider, MD   finasteride (PROSCAR) 5 MG tablet Take 5 mg by mouth daily   Yes Historical Provider, MD   simvastatin (ZOCOR) 10 MG tablet Take 10 mg by mouth nightly   Yes Historical Provider, MD       ALLERGIES      Aspirin, Cyclobenzaprine, Ibuprofen, and Azithromycin    REVIEW OF SYSTEMS     Review of Systems   Constitutional: Positive for unexpected weight change (reports 20 # weight gain since last admission).  Negative for chills, diaphoresis and fever.   HENT: Negative for congestion and sore throat. Respiratory: Positive for shortness of breath. Negative for cough and wheezing. Cardiovascular: Positive for chest pain and leg swelling. Negative for palpitations. Gastrointestinal: Negative for abdominal pain, constipation, diarrhea, nausea and vomiting. Genitourinary: Negative for dysuria, frequency and urgency. Musculoskeletal: Negative for back pain and myalgias. Skin: Negative for rash. Neurological: Negative for dizziness, weakness and headaches. Psychiatric/Behavioral: The patient is not nervous/anxious. PHYSICAL EXAM      BP (!) 145/91   Pulse 69   Temp 98.2 °F (36.8 °C) (Oral)   Resp 14   Ht 6' 1\" (1.854 m)   Wt 207 lb (93.9 kg)   SpO2 100%   BMI 27.31 kg/m²  Body mass index is 27.31 kg/m². Physical Exam  Constitutional:       General: He is not in acute distress. Appearance: He is well-developed. He is not diaphoretic. HENT:      Head: Normocephalic and atraumatic. Eyes:      Conjunctiva/sclera: Conjunctivae normal.      Pupils: Pupils are equal, round, and reactive to light. Neck:      Trachea: No tracheal deviation. Cardiovascular:      Rate and Rhythm: Normal rate and regular rhythm. Heart sounds: Normal heart sounds. No murmur heard. No friction rub. No gallop. Pulmonary:      Effort: Pulmonary effort is normal. No respiratory distress. Breath sounds: Normal breath sounds. No wheezing or rales. Chest:      Chest wall: No tenderness. Abdominal:      General: Bowel sounds are normal. There is no distension. Palpations: Abdomen is soft. Tenderness: There is no abdominal tenderness. There is no guarding. Musculoskeletal:         General: No tenderness. Normal range of motion. Cervical back: Normal range of motion and neck supple. Right lower leg: Edema present. Left lower leg: Edema present.       Comments: Pitting edema extends from feet to upper thighs Lymphadenopathy:      Cervical: No cervical adenopathy. Skin:     General: Skin is warm and dry. Coloration: Skin is not pale. Findings: No erythema or rash. Neurological:      Mental Status: He is alert and oriented to person, place, and time. Motor: No seizure activity. Coordination: Coordination normal.   Psychiatric:         Behavior: Behavior normal.         Thought Content: Thought content normal.       DIAGNOSTICS      EKG:   EKG 12 Lead [[id number]]    Collected: 06/25/21 1901    Updated: 06/25/21 1903     Ventricular Rate 68 BPM    Atrial Rate 68 BPM    P-R Interval 178 ms    QRS Duration 84 ms    Q-T Interval 408 ms    QTc Calculation (Bazett) 433 ms    P Axis -2 degrees    R Axis -8 degrees    T Axis -75 degrees   Narrative:     Sinus rhythm with Premature atrial complexes   ST & T wave abnormality, consider inferior ischemia   ST & T wave abnormality, consider anterolateral ischemia   Abnormal ECG   When compared with ECG of 25-MAY-2021 19:20,   Sinus rhythm has replaced Atrial fibrillation     Labs:  CBC:   Recent Labs     06/25/21 1915   WBC 4.5   HGB 8.7*        BMP:    Recent Labs     06/25/21 1915      K 4.0      CO2 25   BUN 23   CREATININE 1.04   GLUCOSE 101*     S. Calcium:  Recent Labs     06/25/21 1915   CALCIUM 8.8     S. Ionized Calcium:No results for input(s): IONCA in the last 72 hours. S. Magnesium:  Recent Labs     06/25/21 1915   MG 2.2     S. Phosphorus:No results for input(s): PHOS in the last 72 hours. S. Glucose:No results for input(s): POCGLU in the last 72 hours. Glycosylated hemoglobin A1C: No results found for: LABA1C  Hepatic:   Recent Labs     06/25/21 1915   AST 79*   ALT 93*   ALKPHOS 140*     CARDIAC ENZY:   Recent Labs     06/25/21 1915 06/25/21 2145   TROPHS 35* 39*     INR: No results for input(s): INR in the last 72 hours.   BNP:   Recent Labs     06/25/21 1915   PROBNP 2,462*      ABGs: No results for input(s): PH, PCO2, PO2, HCO3, O2SAT in the last 72 hours. Lipids: No results for input(s): CHOL, TRIG, HDL, LDLCALC in the last 72 hours. Invalid input(s): LDL  Pancreatic functions:No results for input(s): LIPASE, AMYLASE in the last 72 hours. Mendel Minks: No results for input(s): LACTA in the last 72 hours. Thyroid functions:   Lab Results   Component Value Date    TSH 1.08 05/26/2021      U/A:  Recent Labs     06/25/21 2015   [address] [place] [address] 1.017   LEUKOCYTESUR NEGATIVE   GLUCOSEU NEGATIVE       Imaging/Diagonstics:     XR CHEST PORTABLE    Result Date: 6/25/2021  EXAMINATION: ONE XRAY VIEW OF THE CHEST 6/25/2021 7:34 pm COMPARISON: Chest 05/25/2021 HISTORY: ORDERING SYSTEM PROVIDED HISTORY: CHF, short of breath TECHNOLOGIST PROVIDED HISTORY: CHF Reason for Exam: CHF Acuity: Unknown Type of Exam: Unknown FINDINGS: 2 images are presented. Technically suboptimal AP lordotic projection. The cardiac silhouette is enlarged. Calcifications involving the aorta reflect atherosclerosis. The mediastinal and hilar silhouettes appear unremarkable. No definite pulmonary infiltrate or consolidation evident. Thin curvilinear density at the right lung base is typical of platelike atelectasis. No pleural effusion is seen. No pneumothorax is seen. No acute osseous abnormality is identified. Stable left-sided pacemaker. 1. Technically suboptimal AP lordotic projection. 2. No definite acute pulmonary disease. 3. Minimal platelike atelectasis right lung base. 4. Calcific atherosclerosis aorta. 5. Cardiomegaly. Follow-up full inspiration PA and lateral chest may be useful for better characterization of pulmonary findings. ASSESSMENT  and  PLAN     Principal Problem:    Fluid overload  Active Problems: On continuous oral anticoagulation    CHF, acute on chronic [facility])    Pacemaker  Resolved Problems:    * No resolved hospital problems.  *    Plan:    Volume Overload and Acute on Chronic CHF  -Patient with history of CHF  --Presents with increased edema and shortness of breath  ---Reports 20 # weight gain over past month  -Per d/c meds 5/27, lasix decreased from 40 mg daily to 20 mg daily  --Patient previously on Zaroxolyn?  -Lasix 40 mg IV x 1 dose in ED  --Continue Lasix 20 mg IV BID  -Continue home dose Lisinopril 40 mg daily   -Continue home dose Metoprolol  -Daily weights; monitor I&O  -Monitor CBC, BMP; Check Lipids and TSH in am  --Mag 2.2 in ED  -Low sodium diet   -2D echo completed 5/26/21  --Estimated LV EF 60-65 %  -Consult CHF Clinic/Cardiac Rehab for possible admission to program    Elevated Liver Enzymes  -Alk Phos 140, -ALT 93, -AST 79  -Levels WNL on 5/65/21  -Hold atorvastatin  -Check hepatitis Panel  -Liver and GB ultrasound in am    Chronic Anticoagulation  -Patient currently on Xarelto 15 mg daily  -anticoagulated d/t history of a-fib and past DVT  -monitor for signs of bleeding    Consultations:     [facility], APRN - CNP   6/26/2021  3:33 AM    [address] [facility] [address] Tyrell Brown [address] [place] [address], [address] [place] [address]. Phone [phone]     Attending Physician Statement  I have discussed the care of Willy Sicard and I have examined the patient myselft and taken ros and hpi , including pertinent history and exam findings,  with the resident. I have reviewed the key elements of all parts of the encounter with the resident. I agree with the assessment, plan and orders as documented by the resident.   15-year-old -American gentleman with a chronic history of anemia normocytic history of grade 1 diastolic congestive heart failure   History of blood transfusion and iron supplements  Admitted with increasing weight gain 20 pounds leg swelling and dyspnea orthopnea elevated proBNP over 2500 clinically and failure diagnosed with acute on chronic diastolic congestive heart failure with the high-output cardiac failure due to anemia anemia work-up W57 folic acid iron ferritin  IV Lasix 40 twice daily fluid restriction      Electronically signed by Daria Corrales MD

## 2021-06-26 NOTE — PROGRESS NOTES
Medication History completed:    New medications: meclizine, vitamin B12    Medications discontinued: aspirin    Changes to dosing: none    Stated allergies: As listed    Other pertinent information: Medications confirmed with Jessica.      Thank you,  Mj Ruvalcaba, PharmD, BCPS  [phone]

## 2021-06-26 NOTE — PROGRESS NOTES
Physical Therapy    Facility/Department: [facility] PROGRESSIVE CARE  Initial Assessment    NAME: Elmer Pettit  : 1935  MRN: [id number]    Date of Service: 2021    Discharge Recommendations:  Patient would benefit from continued therapy after discharge   PT Equipment Recommendations  Equipment Needed:  (TBD)    Assessment   Body structures, Functions, Activity limitations: Decreased functional mobility ; Decreased endurance;Decreased balance  Assessment: continue per POC to maxmize potential for safe D/C  Treatment Diagnosis: impaired mobility due to LE edema  Specific instructions for Next Treatment: advance gait distance, instruct in HEP, progress to 3 steps w/ right rail  Prognosis: Good  Decision Making: Medium Complexity  History: admitted due to fluid overload  Exam: ROM, MMT, balance and mobility assessments  Clinical Presentation: gait w/ s cane 20' x 4 w/ CGA x 1, transfers sit <> stand w/ CGA x 1  PT Education: Goals;PT Role;Plan of Care  Barriers to Learning: none  REQUIRES PT FOLLOW UP: Yes  Activity Tolerance  Activity Tolerance: Patient limited by fatigue;Patient limited by endurance       Patient Diagnosis(es): The primary encounter diagnosis was Hypervolemia, unspecified hypervolemia type. A diagnosis of Acute on chronic systolic congestive heart failure (HCC) was also pertinent to this visit. has a past medical history of Atrial fibrillation ([address] CAD (coronary artery disease), CHF (congestive heart failure) ([address] Hyperlipidemia, and Hypertension. has a past surgical history that includes pacemaker placement and Cardiac catheterization.     Restrictions  Restrictions/Precautions  Restrictions/Precautions: Fall Risk (peripheral IV left antecuubital, troponins 39 on 2021)  Required Braces or Orthoses?: Yes (LS corsett- occassional use)  Implants present? : Metal implants (right  AGUSTO x 2, Left  TKA, back fusion x2)  Required Braces or Orthoses  Spinal: Lumbar Corset  Position Activity Restriction  Other position/activity restrictions: up w/ assist  Vision/Hearing  Vision: Impaired  Vision Exceptions: Wears glasses for reading  Hearing: Exceptions to [facility]  Hearing Exceptions: Hard of hearing/hearing concerns;Bilateral hearing aid (aides need repaired)     Subjective  General  Patient assessed for rehabilitation services?: Yes  Response To Previous Treatment: Not applicable  Family / Caregiver Present: No  Referring Practitioner: Dexter Metcalf CNP  Referral Date : 06/26/21  Diagnosis: fluid overload, hypervolemia, CHF  Follows Commands: Within Functional Limits  Other (Comment): OK per nurse Jennifer MCGUIRE to proceed w/ PT evaluation  Subjective  Subjective: pt reports that he has had difficulty breathing and walking due to LE edema. Pt reports that the swelling has been cycling but became very severe the last 4 days PTA.   Pain Screening  Patient Currently in Pain: Yes  Pain Assessment  Pain Assessment: 0-10  Pain Level: 3  Patient's Stated Pain Goal: No pain  Pain Type: Acute pain  Pain Location: Groin  Pain Descriptors: Cramping  Pain Frequency: Intermittent  Pain Onset: On-going  Clinical Progression: Gradually worsening  Non-Pharmaceutical Pain Intervention(s): Ambulation/Increased Activity;Repositioned  Response to Pain Intervention: Patient Satisfied  Multiple Pain Sites: No  Vital Signs  Patient Currently in Pain: Yes       Orientation  Orientation  Overall Orientation Status: Within Functional Limits (answered all l questions correctly)  Social/Functional History  Social/Functional History  Lives With: Family (son, daughter-in-law and her son)  Type of Home: House  Home Layout: One level ((does not go in basement)  Home Access: Stairs to enter with rails  Entrance Stairs - Number of Steps: 3  Entrance Stairs - Rails: Right  Bathroom Shower/Tub: Walk-in shower, Doors, Shower chair with back  [employer]: Standard (toilet raiser w/ grab bars)  Bathroom Equipment: Shower chair, Grab bars in shower, Hand-held shower, Grab bars around toilet, Toilet raiser  Bathroom Accessibility: Accessible  Home Equipment: Cane, Rolling walker, Reacher, Long-handled shoehorn, Sock aid  ADL Assistance: [address]: Independent (pt does hos own cooking, cleaning, laundry and grocery shopping)  Homemaking Responsibilities: Yes (pt does hos own cooking, cleaning, laundry and grocery shopping)  Ambulation Assistance: Independent (uses cane in community, no device in home)  Transfer Assistance: Independent  Active : Yes  Mode of Transportation: [employer]  Occupation: Retired  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 Heri Ferrara is a Senior in [employer]. Pt reports seldomly alone.   Cognition        Objective     Observation/Palpation  Observation: peripheral IV left antecuubital  Edema: edema bilateral LEs    AROM RLE (degrees)  RLE AROM: WFL  RLE General AROM: dorsiflexion to neutral  AROM LLE (degrees)  LLE AROM : WFL  LLE General AROM: dorsiflexion to neutral  AROM RUE (degrees)  RUE General AROM: see OT for UE assessment  AROM LUE (degrees)  LUE General AROM: see OT for UE assessment  Strength RLE  Comment: Grossly 4/5  Strength LLE  Comment: Grossly 4/5  Strength RUE  Comment: see OT for UE assessment  Strength LUE  Comment: see OT for UE assessment     Sensation  Overall Sensation Status: Impaired (C/O tingling and numbness right LE- chronic since back surgery)  Bed mobility  Rolling to Right: Modified independent  Supine to Sit: Supervision  Sit to Supine: Supervision  Scooting: Supervision  Comment: HOB elevated, pt dangled at the EOB w/ supervision  Transfers  Sit to Stand: Contact guard assistance  Stand to sit: Contact guard assistance  Stand Pivot Transfers: Contact guard assistance (used s cane)  Comment: initial step out to the right side w/ standing but pt self corrected his balance  Ambulation  Ambulation?: Yes  Ambulation 1  Surface: level tile  Device: Single point cane  Assistance: Contact guard assistance  Quality of Gait: forward flexed posture  Gait Deviations: Increased PAVITHRA; Slow Madeline  Distance: 20' x 4 in room  Comments: Pt has mild shortness of breath; O2 sat post gait 99% HR 69  Stairs/Curb  Stairs?: No     Balance  Sitting - Static: Good  Sitting - Dynamic: Good  Standing - Static: Good;- (used s cane)  Standing - Dynamic: Fair;+ (used s cane)        Plan   Plan  Times per week: 3-5 treatments/ 5 days  Times per day:  (3-5 treatments/ 5 days)  Specific instructions for Next Treatment: advance gait distance, instruct in HEP, progress to 3 steps w/ right rail  Current Treatment Recommendations: Strengthening, Home Exercise Program, Equipment Evaluation, Education, & procurement, Safety Education & Training, Gait Training, Transfer Training, Balance Training, Endurance Training, Stair training, Patient/Caregiver Education & Training  Safety Devices  Type of devices: All fall risk precautions in place, Gait belt, Bed alarm in place, Call light within reach, Patient at risk for falls, Left in bed, Nurse notified (nurse Mariela Boo.)    G-Code       OutComes Score                                                  AM-PAC Score  AM-PAC Inpatient Mobility Raw Score : 17 (06/26/21 0932)  AM-PAC Inpatient T-Scale Score : 42.13 (06/26/21 0932)  Mobility Inpatient CMS 0-100% Score: 50.57 (06/26/21 0932)  Mobility Inpatient CMS G-Code Modifier : CK (06/26/21 0932)          Goals  Short term goals  Time Frame for Short term goals: 3-5 treatments/ 5 days  Short term goal 1: Pt to demo good technique for HEP for BLE strengthening and balance  Short term goal 2: Pt to demo bed mobility IND  Short term goal 3: Pt to demo Mod I transfers.   Short term goal 4: Pt to amb 100'-125' with device MOD I  Short term goal 5: Pt to ascend/descend 3 stairs 1 HR, CGA/SBA  Patient Goals   Patient goals : return home       Therapy Time   Individual Concurrent Group Co-treatment   Time In 0932         Time Out 1005         Minutes 33         Timed Code Treatment Minutes: [address], PT

## 2021-06-26 NOTE — FLOWSHEET NOTE
06/26/21 1505   Encounter Summary   Services provided to: Patient   Referral/Consult From: Rounding   Complexity of Encounter Low   Length of Encounter 15 minutes   Spiritual/Voodoo   Type Spiritual support   Assessment Sleeping   Intervention Prayer

## 2021-06-26 NOTE — ED NOTES
Bed: 03  Expected date:   Expected time:   Means of arrival:   Comments:  Michael Villarreal RN  06/25/21 9303

## 2021-06-27 VITALS
RESPIRATION RATE: 16 BRPM | BODY MASS INDEX: 26.21 KG/M2 | HEART RATE: 72 BPM | SYSTOLIC BLOOD PRESSURE: 134 MMHG | OXYGEN SATURATION: 96 % | TEMPERATURE: 97.9 F | WEIGHT: 197.75 LBS | DIASTOLIC BLOOD PRESSURE: 81 MMHG | HEIGHT: 73 IN

## 2021-06-27 LAB
ANION GAP SERPL CALCULATED.3IONS-SCNC: 6 MMOL/L (ref 9–17)
BUN BLDV-MCNC: 22 MG/DL (ref 8–23)
BUN/CREAT BLD: ABNORMAL (ref 9–20)
CALCIUM SERPL-MCNC: 8.6 MG/DL (ref 8.6–10.4)
CHLORIDE BLD-SCNC: 104 MMOL/L (ref 98–107)
CO2: 28 MMOL/L (ref 20–31)
CREAT SERPL-MCNC: 0.82 MG/DL (ref 0.7–1.2)
GFR AFRICAN AMERICAN: >60 ML/MIN
GFR NON-AFRICAN AMERICAN: >60 ML/MIN
GFR SERPL CREATININE-BSD FRML MDRD: ABNORMAL ML/MIN/{1.73_M2}
GFR SERPL CREATININE-BSD FRML MDRD: ABNORMAL ML/MIN/{1.73_M2}
GLUCOSE BLD-MCNC: 115 MG/DL (ref 70–99)
HCT VFR BLD CALC: 30 % (ref 41–53)
HEMOGLOBIN: 9.5 G/DL (ref 13.5–17.5)
MCH RBC QN AUTO: 28.5 PG (ref 26–34)
MCHC RBC AUTO-ENTMCNC: 31.8 G/DL (ref 31–37)
MCV RBC AUTO: 89.7 FL (ref 80–100)
NRBC AUTOMATED: ABNORMAL PER 100 WBC
PDW BLD-RTO: 16.3 % (ref 11.5–14.9)
PLATELET # BLD: 248 K/UL (ref 150–450)
PMV BLD AUTO: 7 FL (ref 6–12)
POTASSIUM SERPL-SCNC: 4.2 MMOL/L (ref 3.7–5.3)
RBC # BLD: 3.34 M/UL (ref 4.5–5.9)
SODIUM BLD-SCNC: 138 MMOL/L (ref 135–144)
WBC # BLD: 4 K/UL (ref 3.5–11)

## 2021-06-27 PROCEDURE — 36415 COLL VENOUS BLD VENIPUNCTURE: CPT

## 2021-06-27 PROCEDURE — 80048 BASIC METABOLIC PNL TOTAL CA: CPT

## 2021-06-27 PROCEDURE — 85027 COMPLETE CBC AUTOMATED: CPT

## 2021-06-27 PROCEDURE — 99239 HOSP IP/OBS DSCHRG MGMT >30: CPT | Performed by: INTERNAL MEDICINE

## 2021-06-27 PROCEDURE — 6360000002 HC RX W HCPCS: Performed by: INTERNAL MEDICINE

## 2021-06-27 PROCEDURE — 2580000003 HC RX 258: Performed by: NURSE PRACTITIONER

## 2021-06-27 PROCEDURE — 6370000000 HC RX 637 (ALT 250 FOR IP): Performed by: INTERNAL MEDICINE

## 2021-06-27 PROCEDURE — 6370000000 HC RX 637 (ALT 250 FOR IP): Performed by: NURSE PRACTITIONER

## 2021-06-27 RX ORDER — BUMETANIDE 2 MG/1
2 TABLET ORAL DAILY
Qty: 30 TABLET | Refills: 3 | Status: SHIPPED | OUTPATIENT
Start: 2021-06-27 | End: 2021-11-05

## 2021-06-27 RX ORDER — SPIRONOLACTONE 25 MG/1
25 TABLET ORAL DAILY
Qty: 30 TABLET | Refills: 3 | Status: SHIPPED | OUTPATIENT
Start: 2021-06-28 | End: 2021-11-05

## 2021-06-27 RX ADMIN — BUPRENORPHINE AND NALOXONE 1 FILM: 8; 2 FILM BUCCAL; SUBLINGUAL at 07:41

## 2021-06-27 RX ADMIN — FUROSEMIDE 40 MG: 10 INJECTION, SOLUTION INTRAVENOUS at 07:40

## 2021-06-27 RX ADMIN — LISINOPRIL 40 MG: 20 TABLET ORAL at 07:41

## 2021-06-27 RX ADMIN — Medication 10 ML: at 07:40

## 2021-06-27 RX ADMIN — SPIRONOLACTONE 25 MG: 25 TABLET, FILM COATED ORAL at 07:41

## 2021-06-27 RX ADMIN — FINASTERIDE 5 MG: 5 TABLET, FILM COATED ORAL at 07:41

## 2021-06-27 RX ADMIN — POLYETHYLENE GLYCOL 3350 17 G: 17 POWDER, FOR SOLUTION ORAL at 07:41

## 2021-06-27 RX ADMIN — Medication 400 MG: at 07:40

## 2021-06-27 RX ADMIN — RIVAROXABAN 15 MG: 15 TABLET, FILM COATED ORAL at 07:41

## 2021-06-27 ASSESSMENT — PAIN SCALES - GENERAL: PAINLEVEL_OUTOF10: 0

## 2021-06-27 NOTE — PROGRESS NOTES
D/c paperwork reviewed with pt. Reviewed meds and next due times. Reviewed follow up appts. Iv and heart monitor removed.    Awaiting son

## 2021-06-27 NOTE — DISCHARGE SUMMARY
[place] [address] Internal Medicine    Discharge Summary     Patient ID: Jonn Chaevz  :  1935   MRN: [id number]     ACCOUNT:  [de-identified]   Patient's PCP: Juan Ramon Roe MD, MD  Admit Date: 2021   Discharge Date: 2021    Length of Stay: 2  Code Status:  Full Code  Admitting Physician: Jaskaran Duque MD  Discharge Physician: Jaskaran Duque MD     Active Discharge Diagnoses:     Primary Problem  Fluid overload      Hospital Problems  Active Hospital Problems    Diagnosis Date Noted    Fluid overload [E87.70] 2021    CHF, acute on chronic [address] [address] [I50.9] 2021    On continuous oral anticoagulation [Z79.01] 2021    Pacemaker [Z95.0] 2021       Admission Condition:  fair     Discharged Condition: fair    Hospital Stay:     Hospital Course:  Jonn Chavez is a 80 y.o. male who was admitted for the management of Fluid overload , presented to ER with Shortness of Breath, Chest Pain, and Leg Swelling  10year-old elderly -American gentleman with a history of normal ejection fraction history of chronic anemia undiagnosed admitted with increasing dyspnea leg edema weight gain proBNP 2600 diagnosed with acute on chronic diastolic congestive heart failure likely high-output cardiac failure from anemia preliminary results did not show any iron deficiency differential diagnosis is anemia of chronic disease versus bone marrow hypoproliferation patient was treated with the diuretics discharged on Bumex discontinued Lasix added Aldactone refer to hematologist for further anemia work-up patient would benefit from a congestive heart failure clinic  Patient interested in following the PCP close to [facility]  Ref to new pcp        Significant therapeutic interventions:     Significant Diagnostic Studies:   Labs / Micro:        ,     Radiology:    XR CHEST PORTABLE    Result Date: 2021  EXAMINATION: ONE XRAY VIEW OF THE CHEST 6/25/2021 7:34 pm COMPARISON: Chest 05/25/2021 HISTORY: ORDERING SYSTEM PROVIDED HISTORY: CHF, short of breath TECHNOLOGIST PROVIDED HISTORY: CHF Reason for Exam: CHF Acuity: Unknown Type of Exam: Unknown FINDINGS: 2 images are presented. Technically suboptimal AP lordotic projection. The cardiac silhouette is enlarged. Calcifications involving the aorta reflect atherosclerosis. The mediastinal and hilar silhouettes appear unremarkable. No definite pulmonary infiltrate or consolidation evident. Thin curvilinear density at the right lung base is typical of platelike atelectasis. No pleural effusion is seen. No pneumothorax is seen. No acute osseous abnormality is identified. Stable left-sided pacemaker. 1. Technically suboptimal AP lordotic projection. 2. No definite acute pulmonary disease. 3. Minimal platelike atelectasis right lung base. 4. Calcific atherosclerosis aorta. 5. Cardiomegaly. Follow-up full inspiration PA and lateral chest may be useful for better characterization of pulmonary findings. Consultations:    Consults:     Final Specialist Recommendations/Findings:   IP CONSULT TO INTERNAL MEDICINE  IP CONSULT TO CARDIAC REHAB      The patient was seen and examined on day of discharge and this discharge summary is in conjunction with any daily progress note from day of discharge.     Discharge plan:     Disposition: Home    Physician Follow Up:     Nidia Chacon [address] [place] [address] [facility] [address]  [phone]  Schedule an appointment as soon as possible for a visit      Lexii Gonzáles MD  [address] [facility]  [phone]    In 1 week  hospital follow up     [facility]  [phone]    In 1 week       Hem onc dr Zack Ellis    Requiring Further Evaluation/Follow Up POST HOSPITALIZATION/Incidental Findings:    Diet: cardiac diet low salt diet    Activity: As tolerated    Instructions to Patient: Discharge Medications:      Medication List      START taking these medications    bumetanide 2 MG tablet  Commonly known as: BUMEX  Take 1 tablet by mouth daily     spironolactone 25 MG tablet  Commonly known as: ALDACTONE  Take 1 tablet by mouth daily  Start taking on: June 28, 2021        CHANGE how you take these medications    metoprolol succinate 25 MG extended release tablet  Commonly known as: TOPROL XL  What changed: Another medication with the same name was removed. Continue taking this medication, and follow the directions you see here. CONTINUE taking these medications    cyanocobalamin 1000 MCG/ML injection     finasteride 5 MG tablet  Commonly known as: PROSCAR     latanoprost 0.005 % ophthalmic solution  Commonly known as: XALATAN     lisinopril 40 MG tablet  Commonly known as: PRINIVIL;ZESTRIL  Take 1 tablet by mouth daily     magnesium oxide 400 MG tablet  Commonly known as: MAG-OX     meclizine 12.5 MG tablet  Commonly known as: ANTIVERT     simvastatin 10 MG tablet  Commonly known as: ZOCOR     Suboxone 8-2 MG Film SL film  Generic drug: buprenorphine-naloxone     vitamin D 1.25 MG ([address]322 UT) Caps capsule  Commonly known as: ERGOCALCIFEROL     Xarelto 15 MG Tabs tablet  Generic drug: rivaroxaban        STOP taking these medications    furosemide 20 MG tablet  Commonly known as: LASIX           Where to Get Your Medications      These medications were sent to [address] [address]    Phone: [phone]   · bumetanide 2 MG tablet  · spironolactone 25 MG tablet         Time Spent on discharge is  35 mins in patient examination, evaluation, counseling as well as medication reconciliation, prescriptions for required medications, discharge plan and follow up.     Electronically signed by   Jane Woodruff MD  6/27/2021  10:14 AM      Thank you Dr. Calin Thacker Araceli Ferguson MD, MD for the opportunity to be involved in this patient's care.

## 2021-06-27 NOTE — DISCHARGE INSTR - DIET

## 2021-06-28 LAB
EKG ATRIAL RATE: 68 BPM
EKG P AXIS: -2 DEGREES
EKG P-R INTERVAL: 178 MS
EKG Q-T INTERVAL: 408 MS
EKG QRS DURATION: 84 MS
EKG QTC CALCULATION (BAZETT): 433 MS
EKG R AXIS: -8 DEGREES
EKG T AXIS: -75 DEGREES
EKG VENTRICULAR RATE: 68 BPM

## 2021-08-09 RX ORDER — LISINOPRIL 40 MG/1
40 TABLET ORAL DAILY
Qty: 90 TABLET | OUTPATIENT
Start: 2021-08-09

## 2021-08-20 ENCOUNTER — HOSPITAL ENCOUNTER (OUTPATIENT)
Age: 86
Discharge: HOME | End: 2021-08-20
Payer: MEDICARE

## 2021-08-20 ENCOUNTER — INITIAL CONSULT (OUTPATIENT)
Dept: ONCOLOGY | Age: 86
End: 2021-08-20
Payer: MEDICARE

## 2021-08-20 ENCOUNTER — TELEPHONE (OUTPATIENT)
Dept: ONCOLOGY | Age: 86
End: 2021-08-20

## 2021-08-20 VITALS
HEIGHT: 74 IN | WEIGHT: 205.7 LBS | TEMPERATURE: 98.4 F | BODY MASS INDEX: 26.4 KG/M2 | HEART RATE: 74 BPM | SYSTOLIC BLOOD PRESSURE: 181 MMHG | DIASTOLIC BLOOD PRESSURE: 85 MMHG

## 2021-08-20 DIAGNOSIS — Z86.718 HISTORY OF DVT OF LOWER EXTREMITY: ICD-10-CM

## 2021-08-20 DIAGNOSIS — I48.19 PERSISTENT ATRIAL FIBRILLATION (HCC): ICD-10-CM

## 2021-08-20 DIAGNOSIS — Z87.891 FORMER SMOKER: ICD-10-CM

## 2021-08-20 DIAGNOSIS — Z79.01 ON CONTINUOUS ORAL ANTICOAGULATION: ICD-10-CM

## 2021-08-20 DIAGNOSIS — D64.9 ANEMIA, UNSPECIFIED TYPE: ICD-10-CM

## 2021-08-20 DIAGNOSIS — D64.9 ANEMIA, UNSPECIFIED TYPE: Primary | ICD-10-CM

## 2021-08-20 LAB
ABSOLUTE EOS #: 0.2 K/UL (ref 0–0.4)
ABSOLUTE IMMATURE GRANULOCYTE: ABNORMAL K/UL (ref 0–0.3)
ABSOLUTE LYMPH #: 0.7 K/UL (ref 1–4.8)
ABSOLUTE MONO #: 0.4 K/UL (ref 0.1–1.2)
ABSOLUTE RETIC #: 0.04 M/UL (ref 0.03–0.08)
ANION GAP SERPL CALCULATED.3IONS-SCNC: 9 MMOL/L (ref 9–17)
BASOPHILS # BLD: 1 % (ref 0–2)
BASOPHILS ABSOLUTE: 0 K/UL (ref 0–0.2)
BUN BLDV-MCNC: 23 MG/DL (ref 8–23)
BUN/CREAT BLD: ABNORMAL (ref 9–20)
CALCIUM SERPL-MCNC: 9.3 MG/DL (ref 8.6–10.4)
CHLORIDE BLD-SCNC: 105 MMOL/L (ref 98–107)
CO2: 24 MMOL/L (ref 20–31)
CREAT SERPL-MCNC: 1.05 MG/DL (ref 0.7–1.2)
DIFFERENTIAL TYPE: ABNORMAL
EOSINOPHILS RELATIVE PERCENT: 5 % (ref 1–4)
FERRITIN: 35 UG/L (ref 30–400)
FOLATE: >20 NG/ML
FREE KAPPA/LAMBDA RATIO: 2.46 (ref 0.26–1.65)
GFR AFRICAN AMERICAN: >60 ML/MIN
GFR NON-AFRICAN AMERICAN: >60 ML/MIN
GFR SERPL CREATININE-BSD FRML MDRD: ABNORMAL ML/MIN/{1.73_M2}
GFR SERPL CREATININE-BSD FRML MDRD: ABNORMAL ML/MIN/{1.73_M2}
GLUCOSE BLD-MCNC: 110 MG/DL (ref 70–99)
HCT VFR BLD CALC: 28.5 % (ref 41–53)
HEMOGLOBIN: 9 G/DL (ref 13.5–17.5)
IMMATURE GRANULOCYTES: ABNORMAL %
IMMATURE RETIC FRACT: 16.8 % (ref 2.7–18.3)
IRON SATURATION: 12 % (ref 20–55)
IRON: 38 UG/DL (ref 59–158)
KAPPA FREE LIGHT CHAINS QNT: 5.32 MG/DL (ref 0.37–1.94)
LAMBDA FREE LIGHT CHAINS QNT: 2.16 MG/DL (ref 0.57–2.63)
LYMPHOCYTES # BLD: 22 % (ref 24–44)
MCH RBC QN AUTO: 28.2 PG (ref 26–34)
MCHC RBC AUTO-ENTMCNC: 31.7 G/DL (ref 31–37)
MCV RBC AUTO: 88.9 FL (ref 80–100)
MONOCYTES # BLD: 12 % (ref 2–11)
NRBC AUTOMATED: ABNORMAL PER 100 WBC
PDW BLD-RTO: 15.7 % (ref 12.5–15.4)
PLATELET # BLD: 254 K/UL (ref 140–450)
PLATELET ESTIMATE: ABNORMAL
PMV BLD AUTO: 6.9 FL (ref 6–12)
POTASSIUM SERPL-SCNC: 4.6 MMOL/L (ref 3.7–5.3)
RBC # BLD: 3.21 M/UL (ref 4.5–5.9)
RBC # BLD: ABNORMAL 10*6/UL
RETIC %: 1.4 % (ref 0.5–1.9)
RETIC HEMOGLOBIN: 28.5 PG (ref 28.2–35.7)
SEG NEUTROPHILS: 60 % (ref 36–66)
SEGMENTED NEUTROPHILS ABSOLUTE COUNT: 2 K/UL (ref 1.8–7.7)
SODIUM BLD-SCNC: 138 MMOL/L (ref 135–144)
TOTAL IRON BINDING CAPACITY: 319 UG/DL (ref 250–450)
TSH SERPL DL<=0.05 MIU/L-ACNC: 1.32 MIU/L (ref 0.3–5)
UNSATURATED IRON BINDING CAPACITY: 281 UG/DL (ref 112–347)
VITAMIN B-12: 370 PG/ML (ref 232–1245)
WBC # BLD: 3.4 K/UL (ref 3.5–11)
WBC # BLD: ABNORMAL 10*3/UL

## 2021-08-20 PROCEDURE — 84155 ASSAY OF PROTEIN SERUM: CPT

## 2021-08-20 PROCEDURE — G8427 DOCREV CUR MEDS BY ELIG CLIN: HCPCS | Performed by: INTERNAL MEDICINE

## 2021-08-20 PROCEDURE — 36415 COLL VENOUS BLD VENIPUNCTURE: CPT

## 2021-08-20 PROCEDURE — G8419 CALC BMI OUT NRM PARAM NOF/U: HCPCS | Performed by: INTERNAL MEDICINE

## 2021-08-20 PROCEDURE — 82607 VITAMIN B-12: CPT

## 2021-08-20 PROCEDURE — 84443 ASSAY THYROID STIM HORMONE: CPT

## 2021-08-20 PROCEDURE — 83540 ASSAY OF IRON: CPT

## 2021-08-20 PROCEDURE — 99202 OFFICE O/P NEW SF 15 MIN: CPT | Performed by: INTERNAL MEDICINE

## 2021-08-20 PROCEDURE — 85045 AUTOMATED RETICULOCYTE COUNT: CPT

## 2021-08-20 PROCEDURE — 84165 PROTEIN E-PHORESIS SERUM: CPT

## 2021-08-20 PROCEDURE — 82728 ASSAY OF FERRITIN: CPT

## 2021-08-20 PROCEDURE — 85025 COMPLETE CBC W/AUTO DIFF WBC: CPT

## 2021-08-20 PROCEDURE — 80048 BASIC METABOLIC PNL TOTAL CA: CPT

## 2021-08-20 PROCEDURE — 82746 ASSAY OF FOLIC ACID SERUM: CPT

## 2021-08-20 PROCEDURE — 86334 IMMUNOFIX E-PHORESIS SERUM: CPT

## 2021-08-20 PROCEDURE — 99204 OFFICE O/P NEW MOD 45 MIN: CPT | Performed by: INTERNAL MEDICINE

## 2021-08-20 PROCEDURE — 83550 IRON BINDING TEST: CPT

## 2021-08-20 PROCEDURE — 83883 ASSAY NEPHELOMETRY NOT SPEC: CPT

## 2021-08-20 RX ORDER — ASCORBIC ACID 250 MG
250 TABLET ORAL 2 TIMES DAILY
Qty: 60 TABLET | Refills: 3 | Status: SHIPPED | OUTPATIENT
Start: 2021-08-20 | End: 2022-02-07

## 2021-08-20 RX ORDER — FUROSEMIDE 40 MG/1
40 TABLET ORAL 2 TIMES DAILY
COMMUNITY
End: 2021-09-24 | Stop reason: ALTCHOICE

## 2021-08-20 RX ORDER — METOLAZONE 5 MG/1
5 TABLET ORAL DAILY
Status: ON HOLD | COMMUNITY
End: 2021-09-25 | Stop reason: HOSPADM

## 2021-08-20 RX ORDER — FERROUS SULFATE 325(65) MG
325 TABLET ORAL 2 TIMES DAILY
Qty: 60 TABLET | Refills: 5 | Status: SHIPPED | OUTPATIENT
Start: 2021-08-20 | End: 2022-06-03

## 2021-08-20 NOTE — PROGRESS NOTES
Dave Groves                                                                                                                  8/20/2021  MRN:   [id number]  YOB: 1935  PCP:                           Tayler Daley MD, MD  Referring Physician: Armand Chavez  Treating Physician Name: Stephanie Frazier MD      Reason for consultation:  Chief Complaint   Patient presents with    Consultation     Anemia    Fatigue   Patient presents to the clinic to establish care and for further work-up and evaluation. Current problems:  Anemia   Iron deficiency  Sick sinus syndrome and atrial fibrillation status post pacemaker 0108  Diastolic dysfunction  Chronic venous insufficiency  Chronic anticoagulation    Active and recent treatments:  Oral iron supplementation with vitamin C    Summary of Case/History:    Dave Groves a 80 y.o.male is a patient with anemia who presents to the clinic to establish care and for further work-up and evaluation. Patient was recently hospitalized back in June and at [facility] was noted to be anemic with hemoglobin 8.6. Iron studies showed iron saturation of 15%. Ferritin was not checked. Patient states that he is not taking any oral iron. Patient does take Xarelto 15 mg daily due to history of atrial fibrillation. Patient also has history of DVT once. Patient states that he does follow-up with a GI doctor in [place] and had endoscopy done last year. We do not have the records at this point. Patient also follows up with his cardiologist in [facility]. Patient is in the process of transferring his care to [place] and presents to the clinic to establish care. Denies any bloody bowel movements.     Past Medical History:   Past Medical History:   Diagnosis Date    Atrial fibrillation ([address]     CAD (coronary artery disease)     CHF (congestive heart failure) (HCC)     Hyperlipidemia     Hypertension        Past Surgical History:     Past Surgical History:   Procedure Laterality Date    CARDIAC CATHETERIZATION      PACEMAKER PLACEMENT         Patient Family Social History:     Social History     Socioeconomic History    Marital status:      Spouse name: None    Number of children: None    Years of education: None    Highest education level: None   Occupational History    None   Tobacco Use    Smoking status: Never Smoker    Smokeless tobacco: Never Used   Substance and Sexual Activity    Alcohol use: Never    Drug use: Never    Sexual activity: None   Other Topics Concern    None   Social History Narrative    None     Social Determinants of Health     Financial Resource Strain:     Difficulty of Paying Living Expenses:    Food Insecurity:     Worried About Running Out of Food in the Last Year:     Ran Out of Food in the Last Year:    Transportation Needs:     Lack of Transportation (Medical):      Lack of Transportation (Non-Medical):    Physical Activity:     Days of Exercise per Week:     Minutes of Exercise per Session:    Stress:     Feeling of Stress :    Social Connections:     Frequency of Communication with Friends and Family:     Frequency of Social Gatherings with Friends and Family:     Attends [employer] Services:     Active Member of Clubs or Organizations:     Attends Club or Organization Meetings:     Marital Status:    Intimate Partner Violence:     Fear of Current or Ex-Partner:     Emotionally Abused:     Physically Abused:     Sexually Abused:      Family history:    Hypertension and diabetes mellitus    Current Medications:     Current Outpatient Medications   Medication Sig Dispense Refill    furosemide (LASIX) 40 MG tablet Take 40 mg by mouth 2 times daily      metOLazone (ZAROXOLYN) 5 MG tablet Take 5 mg by mouth daily      spironolactone (ALDACTONE) 25 MG tablet Take 1 tablet by mouth daily 30 tablet 3    bumetanide (BUMEX) 2 MG tablet Take 1 tablet by mouth daily 30 tablet 3    meclizine (ANTIVERT) 12.5 MG tablet Take 12.5 mg by mouth 3 times daily as needed      lisinopril (PRINIVIL;ZESTRIL) 40 MG tablet Take 1 tablet by mouth daily 30 tablet 3    buprenorphine-naloxone (SUBOXONE) 8-2 MG FILM SL film Place 1 Film under the tongue 2 times daily. Indications: pain       vitamin D (ERGOCALCIFEROL) 1.25 MG (78660 UT) CAPS capsule Take 50,000 Units by mouth once a week      rivaroxaban (XARELTO) 15 MG TABS tablet Take 15 mg by mouth daily (with breakfast)      magnesium oxide (MAG-OX) 400 MG tablet Take 400 mg by mouth daily      latanoprost (XALATAN) 0.005 % ophthalmic solution Place 1 drop into both eyes nightly      metoprolol succinate (TOPROL XL) 25 MG extended release tablet Take 50 mg by mouth daily      finasteride (PROSCAR) 5 MG tablet Take 5 mg by mouth daily      simvastatin (ZOCOR) 10 MG tablet Take 10 mg by mouth nightly      cyanocobalamin 1000 MCG/ML injection Inject 1,000 mcg into the muscle every 30 days (Patient not taking: Reported on 8/20/2021)       No current facility-administered medications for this visit. Allergies:   Aspirin, Cyclobenzaprine, Ibuprofen, and Azithromycin    Review of Systems:    Constitutional: No fever or chills. No night sweats, no weight loss. Positive fatigue  Eyes: No eye discharge, double vision, or eye pain   HEENT: negative for sore mouth, sore throat, hoarseness and voice change   Respiratory: negative for cough , sputum, dyspnea, wheezing, hemoptysis, chest pain   Cardiovascular: negative for chest pain, dyspnea, palpitations, orthopnea, PND   Gastrointestinal: negative for nausea, vomiting, diarrhea, constipation, abdominal pain, Dysphagia, hematemesis and hematochezia   Genitourinary: negative for frequency, dysuria, nocturia, urinary incontinence, and hematuria   Integument: negative for rash, skin lesions, bruises.    Hematologic/Lymphatic: negative for easy bruising, bleeding, lymphadenopathy, or petechiae   Endocrine: negative for heat or cold intolerance,weight changes, change in bowel habits and hair loss   Musculoskeletal: negative for myalgias, arthralgias, pain, joint swelling,and bone pain   Neurological: negative for headaches, dizziness, seizures, weakness, numbness    Physical Exam:    Vitals: BP (!) 181/85   Pulse 74   Temp 98.4 °F (36.9 °C) (Oral)   Ht 6' 1.5\" (1.867 m)   Wt 205 lb 11.2 oz (93.3 kg)   BMI 26.77 kg/m²   General appearance - well appearing, no in pain or distress  Mental status - AAO X3  Eyes - pupils equal and reactive, extraocular eye movements intact  Mouth - mucous membranes moist, pharynx normal without lesions  Neck - supple, no significant adenopathy  Lymphatics - no palpable lymphadenopathy, no hepatosplenomegaly  Chest - clear to auscultation, no wheezes, rales or rhonchi, symmetric air entry  Heart - normal rate, regular rhythm, normal S1, S2, no murmurs  Abdomen - soft, nontender, nondistended, no masses or organomegaly  Neurological - alert, oriented, normal speech, no focal findings or movement disorder noted  Extremities - peripheral pulses normal, no pedal edema, no clubbing or cyanosis  Skin - normal coloration and turgor, no rashes, no suspicious skin lesions noted       DATA:    Results for orders placed or performed during the hospital encounter of 06/25/21   COVID-19, Rapid    Specimen: Nasopharyngeal Swab   Result Value Ref Range    Specimen Description . NASOPHARYNGEAL SWAB     SARS-CoV-2, Rapid Not Detected Not Detected   Culture, Urine    Specimen: Urine, clean catch   Result Value Ref Range    Specimen Description . CLEAN CATCH URINE     Special Requests NOT REPORTED     Culture NO SIGNIFICANT GROWTH    CBC Auto Differential   Result Value Ref Range    WBC 4.5 3.5 - 11.0 k/uL    RBC 3.03 (L) 4.5 - 5.9 m/uL    Hemoglobin 8.7 (L) 13.5 - 17.5 g/dL    Hematocrit 27.3 (L) 41 - 53 %    MCV 90.1 80 - 100 fL    MCH 28.7 26 - 34 pg    MCHC 31.8 31 - 37 g/dL    RDW 16.1 (H) 11.5 - 14.9 % Platelets 668 519 - 009 k/uL    MPV 7.6 6.0 - 12.0 fL    NRBC Automated NOT REPORTED per 100 WBC    Differential Type NOT REPORTED     Seg Neutrophils 53 36 - 66 %    Lymphocytes 26 24 - 44 %    Monocytes 13 (H) 1 - 7 %    Eosinophils % 7 (H) 0 - 4 %    Basophils 1 0 - 2 %    Immature Granulocytes NOT REPORTED 0 %    Segs Absolute 2.50 1.3 - 9.1 k/uL    Absolute Lymph # 1.10 1.0 - 4.8 k/uL    Absolute Mono # 0.60 0.1 - 1.3 k/uL    Absolute Eos # 0.30 0.0 - 0.4 k/uL    Basophils Absolute 0.00 0.0 - 0.2 k/uL    Absolute Immature Granulocyte NOT REPORTED 0.00 - 0.30 k/uL    WBC Morphology NOT REPORTED     RBC Morphology NOT REPORTED     Platelet Estimate NOT REPORTED    Comprehensive Metabolic Panel   Result Value Ref Range    Glucose 101 (H) 70 - 99 mg/dL    BUN 23 8 - 23 mg/dL    CREATININE 1.04 0.70 - 1.20 mg/dL    Bun/Cre Ratio NOT REPORTED 9 - 20    Calcium 8.8 8.6 - 10.4 mg/dL    Sodium 137 135 - 144 mmol/L    Potassium 4.0 3.7 - 5.3 mmol/L    Chloride 102 98 - 107 mmol/L    CO2 25 20 - 31 mmol/L    Anion Gap 10 9 - 17 mmol/L    Alkaline Phosphatase 140 (H) 40 - 129 U/L    ALT 93 (H) 5 - 41 U/L    AST 79 (H) <40 U/L    Total Bilirubin 0.46 0.3 - 1.2 mg/dL    Total Protein 7.6 6.4 - 8.3 g/dL    Albumin 3.9 3.5 - 5.2 g/dL    Albumin/Globulin Ratio NOT REPORTED 1.0 - 2.5    GFR Non-African American >60 >60 mL/min    GFR African American >60 >60 mL/min    GFR Comment          GFR Staging NOT REPORTED    Brain Natriuretic Peptide   Result Value Ref Range    Pro-BNP 2,462 (H) <300 pg/mL    BNP Interpretation Pro-BNP Reference Range:    Troponin   Result Value Ref Range    Troponin, High Sensitivity 35 (H) 0 - 22 ng/L    Troponin T NOT REPORTED <0.03 ng/mL    Troponin Interp NOT REPORTED    Troponin   Result Value Ref Range    Troponin, High Sensitivity 39 (H) 0 - 22 ng/L    Troponin T NOT REPORTED <0.03 ng/mL    Troponin Interp NOT REPORTED    Magnesium   Result Value Ref Range    Magnesium 2.2 1.6 - 2.6 mg/dL Urinalysis   Result Value Ref Range    Color, UA YELLOW YELLOW    Turbidity UA CLEAR CLEAR    Glucose, Ur NEGATIVE NEGATIVE    Bilirubin Urine NEGATIVE NEGATIVE    Ketones, Urine NEGATIVE NEGATIVE    Specific Gravity, UA 1.017 1.000 - 1.030    Urine Hgb NEGATIVE NEGATIVE    pH, UA 7.0 5.0 - 8.0    Protein, UA NEGATIVE NEGATIVE    Urobilinogen, Urine Normal Normal    Nitrite, Urine NEGATIVE NEGATIVE    Leukocyte Esterase, Urine NEGATIVE NEGATIVE    Urinalysis Comments       Microscopic exam not performed based on chemical results unless requested in original order.    Lipid Profile   Result Value Ref Range    Cholesterol 101 <200 mg/dL    HDL 52 >40 mg/dL    LDL Cholesterol 42 0 - 130 mg/dL    Chol/HDL Ratio 1.9 <5    Triglycerides 33 <150 mg/dL    VLDL NOT REPORTED 1 - 30 mg/dL   TSH w/reflex to FT4   Result Value Ref Range    TSH 1.39 0.30 - 5.00 mIU/L   Hepatitis Acute Kristen   Result Value Ref Range    Hepatitis B Surface Ag NONREACTIVE NONREACTIVE    Hepatitis C Ab NONREACTIVE NONREACTIVE    Hep B Core Ab, IgM NONREACTIVE NONREACTIVE    Hep A IgM NONREACTIVE NONREACTIVE   Basic Metabolic Panel w/ Reflex to MG   Result Value Ref Range    Glucose 136 (H) 70 - 99 mg/dL    BUN 20 8 - 23 mg/dL    CREATININE 0.84 0.70 - 1.20 mg/dL    Bun/Cre Ratio NOT REPORTED 9 - 20    Calcium 8.6 8.6 - 10.4 mg/dL    Sodium 141 135 - 144 mmol/L    Potassium 3.6 (L) 3.7 - 5.3 mmol/L    Chloride 105 98 - 107 mmol/L    CO2 29 20 - 31 mmol/L    Anion Gap 7 (L) 9 - 17 mmol/L    GFR Non-African American >60 >60 mL/min    GFR African American >60 >60 mL/min    GFR Comment          GFR Staging NOT REPORTED    CBC   Result Value Ref Range    WBC 3.5 3.5 - 11.0 k/uL    RBC 2.99 (L) 4.5 - 5.9 m/uL    Hemoglobin 8.6 (L) 13.5 - 17.5 g/dL    Hematocrit 26.5 (L) 41 - 53 %    MCV 88.5 80 - 100 fL    MCH 28.6 26 - 34 pg    MCHC 32.3 31 - 37 g/dL    RDW 16.3 (H) 11.5 - 14.9 %    Platelets 430 286 - 772 k/uL    MPV 7.0 6.0 - 12.0 fL    NRBC Automated NOT REPORTED per 100 WBC   Iron and TIBC   Result Value Ref Range    Iron 43 (L) 59 - 158 ug/dL    TIBC 282 250 - 450 ug/dL    Iron Saturation 15 (L) 20 - 55 %    UIBC 239 112 - 347 ug/dL   Basic Metabolic Panel w/ Reflex to MG   Result Value Ref Range    Glucose 115 (H) 70 - 99 mg/dL    BUN 22 8 - 23 mg/dL    CREATININE 0.82 0.70 - 1.20 mg/dL    Bun/Cre Ratio NOT REPORTED 9 - 20    Calcium 8.6 8.6 - 10.4 mg/dL    Sodium 138 135 - 144 mmol/L    Potassium 4.2 3.7 - 5.3 mmol/L    Chloride 104 98 - 107 mmol/L    CO2 28 20 - 31 mmol/L    Anion Gap 6 (L) 9 - 17 mmol/L    GFR Non-African American >60 >60 mL/min    GFR African American >60 >60 mL/min    GFR Comment          GFR Staging NOT REPORTED    CBC   Result Value Ref Range    WBC 4.0 3.5 - 11.0 k/uL    RBC 3.34 (L) 4.5 - 5.9 m/uL    Hemoglobin 9.5 (L) 13.5 - 17.5 g/dL    Hematocrit 30.0 (L) 41 - 53 %    MCV 89.7 80 - 100 fL    MCH 28.5 26 - 34 pg    MCHC 31.8 31 - 37 g/dL    RDW 16.3 (H) 11.5 - 14.9 %    Platelets 559 563 - 882 k/uL    MPV 7.0 6.0 - 12.0 fL    NRBC Automated NOT REPORTED per 100 WBC   EKG 12 Lead   Result Value Ref Range    Ventricular Rate 68 BPM    Atrial Rate 68 BPM    P-R Interval 178 ms    QRS Duration 84 ms    Q-T Interval 408 ms    QTc Calculation (Bazett) 433 ms    P Axis -2 degrees    R Axis -8 degrees    T Axis -75 degrees     Chest x-ray:    Impression   1. Technically suboptimal AP lordotic projection. 2. No definite acute pulmonary disease. 3. Minimal platelike atelectasis right lung base. 4. Calcific atherosclerosis aorta. 5. Cardiomegaly.    Follow-up full inspiration PA and lateral chest may be useful for better   characterization of pulmonary findings.           Impression:  Anemia   Iron deficiency  Sick sinus syndrome and atrial fibrillation status post pacemaker 8370  Diastolic dysfunction  Chronic venous insufficiency  Chronic anticoagulation    Plan:  I had a detailed discussion with the patient and personally went over results of lab work-up imaging studies and other relevant clinical data. Personally reviewed hospitalization record. Reviewed records from outside facility including patient's cardiologist.  Currently I do not have the patient's records from his gastroenterologist.  Patient will notify about his gastroenterologist name and office information and we will request records. Patient states that he has had endoscopy done without any obvious source of bleeding. Reviewed records from hospitalization which show iron deficiency anemia. I will start patient on oral iron supplementation. I will recheck labs today as well as iron status to see if patient would benefit from IV iron in the meanwhile we will continue home and oral iron and vitamin C  I will also order work-up to rule out other etiologies which could be contributing to the anemia  We will see patient back in office with the results of above test.      Pawan Valerio MD    I spent more than 60 minutes examining, evaluating, reviewing data, counseling the patient and coordinating care. Greater than 50% of time was spent face-to-face with the patient this note is created with the assistance of a speech recognition program.  While intending to generate a document that actually reflects the content of the visit, the document can still have some errors including those of syntax and sound a like substitutions which may escape proof reading. It such instances, actual meaning can be extrapolated by contextual diversion.

## 2021-08-20 NOTE — TELEPHONE ENCOUNTER
AVS from 8/20/21    Labs now  rv in 5 weeks ( will order labs for rv depending on labs from today)    PT had labs drawn today    RV scheduled 9/24/21 @ 11am    PT was given AVS and an appt schedule    Electronically signed by Janelle Ojeda on 8/20/2021 at 2:33 PM

## 2021-08-23 LAB — SURGICAL PATHOLOGY REPORT: NORMAL

## 2021-08-24 LAB
ALBUMIN (CALCULATED): 4.1 G/DL (ref 3.2–5.2)
ALBUMIN PERCENT: 60 % (ref 45–65)
ALPHA 1 PERCENT: 2 % (ref 3–6)
ALPHA 2 PERCENT: 9 % (ref 6–13)
ALPHA-1-GLOBULIN: 0.2 G/DL (ref 0.1–0.4)
ALPHA-2-GLOBULIN: 0.6 G/DL (ref 0.5–0.9)
BETA GLOBULIN: 0.7 G/DL (ref 0.5–1.1)
BETA PERCENT: 11 % (ref 11–19)
GAMMA GLOBULIN %: 19 % (ref 9–20)
GAMMA GLOBULIN: 1.3 G/DL (ref 0.5–1.5)
PATHOLOGIST REVIEW: NORMAL
PATHOLOGIST: ABNORMAL
PATHOLOGIST: NORMAL
PROTEIN ELECTROPHORESIS, SERUM: ABNORMAL
SERUM IFX INTERP: NORMAL
TOTAL PROT. SUM,%: 101 % (ref 98–102)
TOTAL PROT. SUM: 6.9 G/DL (ref 6.3–8.2)
TOTAL PROTEIN: 6.9 G/DL (ref 6.4–8.3)

## 2021-09-24 ENCOUNTER — APPOINTMENT (OUTPATIENT)
Dept: GENERAL RADIOLOGY | Age: 86
End: 2021-09-24
Payer: MEDICARE

## 2021-09-24 ENCOUNTER — HOSPITAL ENCOUNTER (OUTPATIENT)
Age: 86
Setting detail: OBSERVATION
Discharge: HOME | End: 2021-09-25
Attending: STUDENT IN AN ORGANIZED HEALTH CARE EDUCATION/TRAINING PROGRAM | Admitting: INTERNAL MEDICINE
Payer: MEDICARE

## 2021-09-24 DIAGNOSIS — G89.29 CHRONIC MIDLINE LOW BACK PAIN WITHOUT SCIATICA: ICD-10-CM

## 2021-09-24 DIAGNOSIS — R07.9 CHEST PAIN, UNSPECIFIED TYPE: Primary | ICD-10-CM

## 2021-09-24 DIAGNOSIS — M54.50 CHRONIC MIDLINE LOW BACK PAIN WITHOUT SCIATICA: ICD-10-CM

## 2021-09-24 DIAGNOSIS — N17.9 AKI (ACUTE KIDNEY INJURY) (HCC): ICD-10-CM

## 2021-09-24 DIAGNOSIS — R25.2 CRAMPS, EXTREMITY: ICD-10-CM

## 2021-09-24 LAB
ABSOLUTE EOS #: 0.2 K/UL (ref 0–0.4)
ABSOLUTE IMMATURE GRANULOCYTE: ABNORMAL K/UL (ref 0–0.3)
ABSOLUTE LYMPH #: 1.1 K/UL (ref 1–4.8)
ABSOLUTE MONO #: 0.5 K/UL (ref 0.1–1.3)
ANION GAP SERPL CALCULATED.3IONS-SCNC: 9 MMOL/L (ref 9–17)
BASOPHILS # BLD: 1 % (ref 0–2)
BASOPHILS ABSOLUTE: 0 K/UL (ref 0–0.2)
BNP INTERPRETATION: ABNORMAL
BUN BLDV-MCNC: 30 MG/DL (ref 8–23)
BUN/CREAT BLD: ABNORMAL (ref 9–20)
CALCIUM SERPL-MCNC: 8.8 MG/DL (ref 8.6–10.4)
CHLORIDE BLD-SCNC: 102 MMOL/L (ref 98–107)
CO2: 28 MMOL/L (ref 20–31)
CREAT SERPL-MCNC: 1.51 MG/DL (ref 0.7–1.2)
DIFFERENTIAL TYPE: ABNORMAL
EOSINOPHILS RELATIVE PERCENT: 6 % (ref 0–4)
GFR AFRICAN AMERICAN: 53 ML/MIN
GFR NON-AFRICAN AMERICAN: 44 ML/MIN
GFR SERPL CREATININE-BSD FRML MDRD: ABNORMAL ML/MIN/{1.73_M2}
GFR SERPL CREATININE-BSD FRML MDRD: ABNORMAL ML/MIN/{1.73_M2}
GLUCOSE BLD-MCNC: 90 MG/DL (ref 70–99)
HCT VFR BLD CALC: 28.3 % (ref 41–53)
HEMOGLOBIN: 9 G/DL (ref 13.5–17.5)
IMMATURE GRANULOCYTES: ABNORMAL %
LYMPHOCYTES # BLD: 28 % (ref 24–44)
MAGNESIUM: 2.1 MG/DL (ref 1.6–2.6)
MCH RBC QN AUTO: 28.7 PG (ref 26–34)
MCHC RBC AUTO-ENTMCNC: 31.9 G/DL (ref 31–37)
MCV RBC AUTO: 90.1 FL (ref 80–100)
MONOCYTES # BLD: 13 % (ref 1–7)
NRBC AUTOMATED: ABNORMAL PER 100 WBC
PDW BLD-RTO: 15.7 % (ref 11.5–14.9)
PHOSPHORUS: 4.2 MG/DL (ref 2.5–4.5)
PLATELET # BLD: 251 K/UL (ref 150–450)
PLATELET ESTIMATE: ABNORMAL
PMV BLD AUTO: 6.7 FL (ref 6–12)
POTASSIUM SERPL-SCNC: 4.6 MMOL/L (ref 3.7–5.3)
PRO-BNP: 905 PG/ML
RBC # BLD: 3.14 M/UL (ref 4.5–5.9)
RBC # BLD: ABNORMAL 10*6/UL
SEG NEUTROPHILS: 52 % (ref 36–66)
SEGMENTED NEUTROPHILS ABSOLUTE COUNT: 2.1 K/UL (ref 1.3–9.1)
SODIUM BLD-SCNC: 139 MMOL/L (ref 135–144)
TROPONIN INTERP: ABNORMAL
TROPONIN INTERP: ABNORMAL
TROPONIN T: ABNORMAL NG/ML
TROPONIN T: ABNORMAL NG/ML
TROPONIN, HIGH SENSITIVITY: 33 NG/L (ref 0–22)
TROPONIN, HIGH SENSITIVITY: 35 NG/L (ref 0–22)
WBC # BLD: 4 K/UL (ref 3.5–11)
WBC # BLD: ABNORMAL 10*3/UL

## 2021-09-24 PROCEDURE — 99284 EMERGENCY DEPT VISIT MOD MDM: CPT

## 2021-09-24 PROCEDURE — 80048 BASIC METABOLIC PNL TOTAL CA: CPT

## 2021-09-24 PROCEDURE — 71046 X-RAY EXAM CHEST 2 VIEWS: CPT

## 2021-09-24 PROCEDURE — 84100 ASSAY OF PHOSPHORUS: CPT

## 2021-09-24 PROCEDURE — 93005 ELECTROCARDIOGRAM TRACING: CPT | Performed by: STUDENT IN AN ORGANIZED HEALTH CARE EDUCATION/TRAINING PROGRAM

## 2021-09-24 PROCEDURE — 85025 COMPLETE CBC W/AUTO DIFF WBC: CPT

## 2021-09-24 PROCEDURE — 84484 ASSAY OF TROPONIN QUANT: CPT

## 2021-09-24 PROCEDURE — 6370000000 HC RX 637 (ALT 250 FOR IP): Performed by: STUDENT IN AN ORGANIZED HEALTH CARE EDUCATION/TRAINING PROGRAM

## 2021-09-24 PROCEDURE — 83735 ASSAY OF MAGNESIUM: CPT

## 2021-09-24 PROCEDURE — G0378 HOSPITAL OBSERVATION PER HR: HCPCS

## 2021-09-24 PROCEDURE — 83880 ASSAY OF NATRIURETIC PEPTIDE: CPT

## 2021-09-24 PROCEDURE — 36415 COLL VENOUS BLD VENIPUNCTURE: CPT

## 2021-09-24 RX ORDER — ACETAMINOPHEN 325 MG/1
650 TABLET ORAL ONCE
Status: COMPLETED | OUTPATIENT
Start: 2021-09-24 | End: 2021-09-24

## 2021-09-24 RX ADMIN — ACETAMINOPHEN 650 MG: 325 TABLET ORAL at 22:52

## 2021-09-24 ASSESSMENT — PAIN DESCRIPTION - LOCATION: LOCATION: CHEST

## 2021-09-24 ASSESSMENT — PAIN SCALES - GENERAL
PAINLEVEL_OUTOF10: 6
PAINLEVEL_OUTOF10: 5

## 2021-09-25 VITALS
DIASTOLIC BLOOD PRESSURE: 79 MMHG | BODY MASS INDEX: 25.18 KG/M2 | HEART RATE: 62 BPM | RESPIRATION RATE: 16 BRPM | OXYGEN SATURATION: 97 % | WEIGHT: 190 LBS | SYSTOLIC BLOOD PRESSURE: 124 MMHG | HEIGHT: 73 IN | TEMPERATURE: 98.4 F

## 2021-09-25 LAB
-: ABNORMAL
ALBUMIN SERPL-MCNC: 3.4 G/DL (ref 3.5–5.2)
ALBUMIN/GLOBULIN RATIO: ABNORMAL (ref 1–2.5)
ALP BLD-CCNC: 114 U/L (ref 40–129)
ALT SERPL-CCNC: 28 U/L (ref 5–41)
AMORPHOUS: ABNORMAL
ANION GAP SERPL CALCULATED.3IONS-SCNC: 9 MMOL/L (ref 9–17)
AST SERPL-CCNC: 27 U/L
BACTERIA: ABNORMAL
BILIRUB SERPL-MCNC: 0.23 MG/DL (ref 0.3–1.2)
BILIRUBIN URINE: NEGATIVE
BUN BLDV-MCNC: 26 MG/DL (ref 8–23)
BUN/CREAT BLD: ABNORMAL (ref 9–20)
CALCIUM SERPL-MCNC: 8.5 MG/DL (ref 8.6–10.4)
CASTS UA: ABNORMAL /LPF
CHLORIDE BLD-SCNC: 107 MMOL/L (ref 98–107)
CO2: 26 MMOL/L (ref 20–31)
COLOR: YELLOW
COMMENT UA: ABNORMAL
CREAT SERPL-MCNC: 1.04 MG/DL (ref 0.7–1.2)
CRYSTALS, UA: ABNORMAL /HPF
EKG ATRIAL RATE: 72 BPM
EKG P AXIS: 53 DEGREES
EKG P-R INTERVAL: 202 MS
EKG Q-T INTERVAL: 450 MS
EKG QRS DURATION: 148 MS
EKG QTC CALCULATION (BAZETT): 492 MS
EKG R AXIS: -72 DEGREES
EKG T AXIS: 92 DEGREES
EKG VENTRICULAR RATE: 72 BPM
EPITHELIAL CELLS UA: ABNORMAL /HPF
GFR AFRICAN AMERICAN: >60 ML/MIN
GFR NON-AFRICAN AMERICAN: >60 ML/MIN
GFR SERPL CREATININE-BSD FRML MDRD: ABNORMAL ML/MIN/{1.73_M2}
GFR SERPL CREATININE-BSD FRML MDRD: ABNORMAL ML/MIN/{1.73_M2}
GLUCOSE BLD-MCNC: 108 MG/DL (ref 70–99)
GLUCOSE URINE: NEGATIVE
HCT VFR BLD CALC: 26.6 % (ref 41–53)
HEMOGLOBIN: 8.6 G/DL (ref 13.5–17.5)
INR BLD: 1.2
KETONES, URINE: NEGATIVE
LEUKOCYTE ESTERASE, URINE: NEGATIVE
MCH RBC QN AUTO: 29.3 PG (ref 26–34)
MCHC RBC AUTO-ENTMCNC: 32.5 G/DL (ref 31–37)
MCV RBC AUTO: 90.2 FL (ref 80–100)
MUCUS: ABNORMAL
NITRITE, URINE: NEGATIVE
NRBC AUTOMATED: ABNORMAL PER 100 WBC
OTHER OBSERVATIONS UA: ABNORMAL
PDW BLD-RTO: 15.6 % (ref 11.5–14.9)
PH UA: 7.5 (ref 5–8)
PLATELET # BLD: 226 K/UL (ref 150–450)
PMV BLD AUTO: 7 FL (ref 6–12)
POTASSIUM SERPL-SCNC: 4.4 MMOL/L (ref 3.7–5.3)
PROTEIN UA: NEGATIVE
PROTHROMBIN TIME: 15 SEC (ref 11.8–14.6)
RBC # BLD: 2.95 M/UL (ref 4.5–5.9)
RBC UA: ABNORMAL /HPF
RENAL EPITHELIAL, UA: ABNORMAL /HPF
SODIUM BLD-SCNC: 142 MMOL/L (ref 135–144)
SODIUM,UR: 96 MMOL/L
SPECIFIC GRAVITY UA: 1.01 (ref 1–1.03)
TOTAL CK: 501 U/L (ref 39–308)
TOTAL PROTEIN, URINE: 4 MG/DL
TOTAL PROTEIN: 6.5 G/DL (ref 6.4–8.3)
TRICHOMONAS: ABNORMAL
TURBIDITY: CLEAR
URINE HGB: NEGATIVE
UROBILINOGEN, URINE: NORMAL
WBC # BLD: 3.6 K/UL (ref 3.5–11)
WBC UA: ABNORMAL /HPF
YEAST: ABNORMAL

## 2021-09-25 PROCEDURE — 36415 COLL VENOUS BLD VENIPUNCTURE: CPT

## 2021-09-25 PROCEDURE — 93010 ELECTROCARDIOGRAM REPORT: CPT | Performed by: INTERNAL MEDICINE

## 2021-09-25 PROCEDURE — 81001 URINALYSIS AUTO W/SCOPE: CPT

## 2021-09-25 PROCEDURE — 85610 PROTHROMBIN TIME: CPT

## 2021-09-25 PROCEDURE — 6370000000 HC RX 637 (ALT 250 FOR IP): Performed by: NURSE PRACTITIONER

## 2021-09-25 PROCEDURE — 84300 ASSAY OF URINE SODIUM: CPT

## 2021-09-25 PROCEDURE — 82550 ASSAY OF CK (CPK): CPT

## 2021-09-25 PROCEDURE — G0378 HOSPITAL OBSERVATION PER HR: HCPCS

## 2021-09-25 PROCEDURE — 99223 1ST HOSP IP/OBS HIGH 75: CPT | Performed by: INTERNAL MEDICINE

## 2021-09-25 PROCEDURE — 85027 COMPLETE CBC AUTOMATED: CPT

## 2021-09-25 PROCEDURE — 2580000003 HC RX 258: Performed by: NURSE PRACTITIONER

## 2021-09-25 PROCEDURE — 80053 COMPREHEN METABOLIC PANEL: CPT

## 2021-09-25 PROCEDURE — 84156 ASSAY OF PROTEIN URINE: CPT

## 2021-09-25 RX ORDER — ACETAMINOPHEN 650 MG/1
650 SUPPOSITORY RECTAL EVERY 6 HOURS PRN
Status: DISCONTINUED | OUTPATIENT
Start: 2021-09-25 | End: 2021-09-25 | Stop reason: HOSPADM

## 2021-09-25 RX ORDER — SODIUM CHLORIDE 9 MG/ML
25 INJECTION, SOLUTION INTRAVENOUS PRN
Status: DISCONTINUED | OUTPATIENT
Start: 2021-09-25 | End: 2021-09-25 | Stop reason: HOSPADM

## 2021-09-25 RX ORDER — ONDANSETRON 4 MG/1
4 TABLET, ORALLY DISINTEGRATING ORAL EVERY 8 HOURS PRN
Status: DISCONTINUED | OUTPATIENT
Start: 2021-09-25 | End: 2021-09-25 | Stop reason: HOSPADM

## 2021-09-25 RX ORDER — SODIUM CHLORIDE 0.9 % (FLUSH) 0.9 %
5-40 SYRINGE (ML) INJECTION EVERY 12 HOURS SCHEDULED
Status: DISCONTINUED | OUTPATIENT
Start: 2021-09-25 | End: 2021-09-25 | Stop reason: HOSPADM

## 2021-09-25 RX ORDER — ACETAMINOPHEN 325 MG/1
650 TABLET ORAL EVERY 6 HOURS PRN
Status: DISCONTINUED | OUTPATIENT
Start: 2021-09-25 | End: 2021-09-25 | Stop reason: HOSPADM

## 2021-09-25 RX ORDER — NITROGLYCERIN 0.4 MG/1
0.4 TABLET SUBLINGUAL EVERY 5 MIN PRN
Status: DISCONTINUED | OUTPATIENT
Start: 2021-09-25 | End: 2021-09-25 | Stop reason: HOSPADM

## 2021-09-25 RX ORDER — ATORVASTATIN CALCIUM 10 MG/1
10 TABLET, FILM COATED ORAL DAILY
Status: DISCONTINUED | OUTPATIENT
Start: 2021-09-25 | End: 2021-09-25 | Stop reason: HOSPADM

## 2021-09-25 RX ORDER — FERROUS SULFATE 325(65) MG
325 TABLET ORAL 2 TIMES DAILY
Status: DISCONTINUED | OUTPATIENT
Start: 2021-09-25 | End: 2021-09-25 | Stop reason: HOSPADM

## 2021-09-25 RX ORDER — BUPRENORPHINE AND NALOXONE 8; 2 MG/1; MG/1
1 FILM, SOLUBLE BUCCAL; SUBLINGUAL 2 TIMES DAILY
Status: DISCONTINUED | OUTPATIENT
Start: 2021-09-25 | End: 2021-09-25 | Stop reason: HOSPADM

## 2021-09-25 RX ORDER — SODIUM CHLORIDE 0.9 % (FLUSH) 0.9 %
5-40 SYRINGE (ML) INJECTION PRN
Status: DISCONTINUED | OUTPATIENT
Start: 2021-09-25 | End: 2021-09-25 | Stop reason: HOSPADM

## 2021-09-25 RX ORDER — AMLODIPINE BESYLATE 5 MG/1
5 TABLET ORAL DAILY
Qty: 30 TABLET | Refills: 3 | Status: SHIPPED | OUTPATIENT
Start: 2021-09-25 | End: 2021-11-10 | Stop reason: SDUPTHER

## 2021-09-25 RX ORDER — FINASTERIDE 5 MG/1
5 TABLET, FILM COATED ORAL DAILY
Status: DISCONTINUED | OUTPATIENT
Start: 2021-09-25 | End: 2021-09-25 | Stop reason: HOSPADM

## 2021-09-25 RX ORDER — ASCORBIC ACID 500 MG
250 TABLET ORAL 2 TIMES DAILY
Status: DISCONTINUED | OUTPATIENT
Start: 2021-09-25 | End: 2021-09-25 | Stop reason: HOSPADM

## 2021-09-25 RX ORDER — SODIUM CHLORIDE 9 MG/ML
INJECTION, SOLUTION INTRAVENOUS CONTINUOUS
Status: DISCONTINUED | OUTPATIENT
Start: 2021-09-25 | End: 2021-09-25

## 2021-09-25 RX ORDER — ONDANSETRON 2 MG/ML
4 INJECTION INTRAMUSCULAR; INTRAVENOUS EVERY 6 HOURS PRN
Status: DISCONTINUED | OUTPATIENT
Start: 2021-09-25 | End: 2021-09-25 | Stop reason: HOSPADM

## 2021-09-25 RX ORDER — AMLODIPINE BESYLATE 2.5 MG/1
2.5 TABLET ORAL DAILY
Status: DISCONTINUED | OUTPATIENT
Start: 2021-09-25 | End: 2021-09-25

## 2021-09-25 RX ORDER — METOPROLOL SUCCINATE 50 MG/1
50 TABLET, EXTENDED RELEASE ORAL DAILY
Status: DISCONTINUED | OUTPATIENT
Start: 2021-09-25 | End: 2021-09-25 | Stop reason: HOSPADM

## 2021-09-25 RX ORDER — LATANOPROST 50 UG/ML
1 SOLUTION/ DROPS OPHTHALMIC NIGHTLY
Status: DISCONTINUED | OUTPATIENT
Start: 2021-09-25 | End: 2021-09-25 | Stop reason: HOSPADM

## 2021-09-25 RX ADMIN — OXYCODONE HYDROCHLORIDE AND ACETAMINOPHEN 250 MG: 500 TABLET ORAL at 01:20

## 2021-09-25 RX ADMIN — FERROUS SULFATE TAB 325 MG (65 MG ELEMENTAL FE) 325 MG: 325 (65 FE) TAB at 09:38

## 2021-09-25 RX ADMIN — BUPRENORPHINE AND NALOXONE 1 FILM: 8; 2 FILM BUCCAL; SUBLINGUAL at 09:38

## 2021-09-25 RX ADMIN — FERROUS SULFATE TAB 325 MG (65 MG ELEMENTAL FE) 325 MG: 325 (65 FE) TAB at 01:20

## 2021-09-25 RX ADMIN — LATANOPROST 1 DROP: 50 SOLUTION OPHTHALMIC at 01:39

## 2021-09-25 RX ADMIN — OXYCODONE HYDROCHLORIDE AND ACETAMINOPHEN 250 MG: 500 TABLET ORAL at 09:38

## 2021-09-25 RX ADMIN — SODIUM CHLORIDE: 9 INJECTION, SOLUTION INTRAVENOUS at 01:21

## 2021-09-25 RX ADMIN — METOPROLOL SUCCINATE 50 MG: 50 TABLET, EXTENDED RELEASE ORAL at 09:38

## 2021-09-25 RX ADMIN — Medication 10 ML: at 09:45

## 2021-09-25 RX ADMIN — ATORVASTATIN CALCIUM 10 MG: 10 TABLET, FILM COATED ORAL at 09:38

## 2021-09-25 RX ADMIN — BUPRENORPHINE AND NALOXONE 1 FILM: 8; 2 FILM BUCCAL; SUBLINGUAL at 01:20

## 2021-09-25 RX ADMIN — RIVAROXABAN 15 MG: 15 TABLET, FILM COATED ORAL at 09:45

## 2021-09-25 RX ADMIN — FINASTERIDE 5 MG: 5 TABLET, FILM COATED ORAL at 09:38

## 2021-09-25 RX ADMIN — Medication 400 MG: at 09:38

## 2021-09-25 ASSESSMENT — PAIN SCALES - GENERAL: PAINLEVEL_OUTOF10: 3

## 2021-09-25 ASSESSMENT — ENCOUNTER SYMPTOMS
VOMITING: 0
WHEEZING: 0
SORE THROAT: 0
BACK PAIN: 0
COUGH: 0
ABDOMINAL PAIN: 0
DIARRHEA: 0
SHORTNESS OF BREATH: 1
CONSTIPATION: 0
NAUSEA: 0

## 2021-09-25 ASSESSMENT — PAIN DESCRIPTION - LOCATION: LOCATION: CHEST

## 2021-09-25 NOTE — ED PROVIDER NOTES
EMERGENCY DEPARTMENT ENCOUNTER   ATTENDING ATTESTATION     Pt Name: Kristina Larkin  MRN: [id number]  [place] 1935  Date of evaluation: 9/24/21       Kristina Larkin is a 80 y.o. male who presents with Chest Pain      MDM:   54-year-old male history of A. fib on Xarelto, CHF status post AICD pacemaker presents for evaluation with some shortness of breath, leg and hand cramping. Patient is taking Lasix for diuresis. Vital signs are stable. Work-up revealed a clear chest x-ray, baseline EKG with right bundle branch block and left axis deviation, labs show VARSHA. Suspect some overdiuresis with dehydration and acute kidney injury. Will admit for cardiology evaluation, repeat labs in the morning. Patient agreeable. EKG  Sinus rhythm rate of 72 left axis, bundle branch block T wave inversions in V1 V2 aVL unchanged from prior    Vitals:   Vitals:    09/24/21 2145 09/24/21 2200 09/24/21 2215 09/24/21 2245   BP: (!) 148/86 (!) 140/83 133/77 (!) 152/86   Pulse: 70 64 64 68   Resp: 14 19 16 16   Temp:       TempSrc:       SpO2: 97% 97% 98% 98%   Weight:       Height:             I personally evaluated and examined the patient in conjunction with the resident and agree with the assessment, treatment plan, and disposition of the patient as recorded by the resident. I performed a history and physical examination of the patient and discussed management with the resident. I reviewed the residents note and agree with the documented findings and plan of care. Any areas of disagreement are noted on the chart. I was personally present for the key portions of any procedures. I have documented in the chart those procedures where I was not present during the key portions. I have personally reviewed all images and agree with the resident's interpretation. I have reviewed the emergency nurses triage note.  I agree with the chief complaint, past medical history, past surgical history, allergies, medications, social and family history as documented unless otherwise noted.     Sandra Galvan MD  Attending Emergency Physician            Sandra Galvan MD  09/25/21 [id number]

## 2021-09-25 NOTE — ED NOTES
Report given to LEON Vazquez from ER. Report method in person   The following was reviewed with receiving RN:   Current vital signs:  BP (!) 152/86   Pulse 68   Temp 98.4 °F (36.9 °C) (Oral)   Resp 16   Ht 6' 1\" (1.854 m)   Wt 190 lb (86.2 kg)   SpO2 98%   BMI 25.07 kg/m²                MEWS Score: 1     Any medication or safety alerts were reviewed. Any pending diagnostics and notifications were also reviewed, as well as any safety concerns or issues, abnormal labs, abnormal imaging, and abnormal assessment findings. Questions were answered.             Patricia Galarza RN  09/24/21 0509

## 2021-09-25 NOTE — H&P
[address] [facility] [place]     HISTORY AND PHYSICAL EXAMINATION            Date:   9/25/2021  Patientname:  Jen Gutierrez  Date of admission:  9/24/2021  8:52 PM  MRN:   [id number]  Account:  [de-identified]  YOB: 1935  PCP:    Rajat Greer MD, MD  Room:   2176/5819-06  Code Status:    Full Code    CHIEF COMPLAINT     Chief Complaint   Patient presents with    Chest Pain       HISTORY OF PRESENT ILLNESS  (Character, Onset, Location, Duration,  Exacerbating/RelievingFactors, Radiation,   Associated Symptoms, Severity )      The patient is a 80 y.o. -American male, with a history of atrial fibrillation - anticoagulated on Xarelto, CHF, GERD hyperlipidemia, HTN, and CAD with pacemaker in place, who presents with chest pain. According to patient, he has been having intermittent episodes of left-sided chest pain and shortness of breath today; however, symptoms had resolved prior to arriving to the ED. Additionally, patient reports new onset cramping of lower extremities and left hand. Symptoms are associated with generalized weakness, which also started today. Denies fever, chills, cough, abdominal pain, nausea, vomiting, diarrhea, and urinary symptoms. There are no aggravating or alleviating factors. Symptoms are reported as intermittent and moderate; chest pain currently resolved. Patient sees Dr. Coty Orellana, cardiologist at [facility] [place], whose progress note indicates the patient had an echocardiogram 6/2021 showing EF 60% with mild MR and cardiac cath completed in spring 2020 showing nonobstructive CAD. PAST MEDICAL HISTORY   Patient  has a past medical history of Atrial fibrillation ([address] CAD (coronary artery disease), CHF (congestive heart failure) ([address] Hyperlipidemia, and Hypertension. PAST SURGICAL HISTORY    Patient  has a past surgical history that includes pacemaker placement and Cardiac catheterization.      FAMILY HISTORY 12.5 MG tablet Take 12.5 mg by mouth 3 times daily as needed    Historical Provider, MD   simvastatin (ZOCOR) 10 MG tablet Take 10 mg by mouth nightly    Historical Provider, MD       ALLERGIES      Aspirin, Cyclobenzaprine, Ibuprofen, and Azithromycin    REVIEW OF SYSTEMS     Review of Systems   Constitutional: Negative for chills, diaphoresis and fever. HENT: Negative for congestion and sore throat. Respiratory: Positive for shortness of breath. Negative for cough and wheezing. Cardiovascular: Positive for chest pain. Negative for palpitations and leg swelling. Gastrointestinal: Negative for abdominal pain, constipation, diarrhea, nausea and vomiting. Genitourinary: Negative for dysuria, frequency and urgency. Musculoskeletal: Positive for myalgias (muscle cramping bilateral lower extremities and left hand). Negative for back pain. Skin: Negative for rash. Neurological: Positive for weakness (generalized) and numbness (left fingertips). Negative for dizziness and headaches. Psychiatric/Behavioral: The patient is not nervous/anxious. PHYSICAL EXAM      BP (!) 148/78   Pulse 71   Temp 98.3 °F (36.8 °C) (Oral)   Resp 16   Ht 6' 1\" (1.854 m)   Wt 190 lb (86.2 kg)   SpO2 95%   BMI 25.07 kg/m²  Body mass index is 25.07 kg/m². Physical Exam  Constitutional:       General: He is not in acute distress. Appearance: He is well-developed. He is not diaphoretic. HENT:      Head: Normocephalic and atraumatic. Mouth/Throat:      Mouth: Mucous membranes are dry. Eyes:      Conjunctiva/sclera: Conjunctivae normal.      Pupils: Pupils are equal, round, and reactive to light. Neck:      Trachea: No tracheal deviation. Cardiovascular:      Rate and Rhythm: Normal rate and regular rhythm. Heart sounds: Normal heart sounds. No murmur heard. No friction rub. No gallop. Pulmonary:      Effort: Pulmonary effort is normal. No respiratory distress.       Breath sounds: Normal breath sounds. No wheezing or rales. Chest:      Chest wall: No tenderness. Abdominal:      General: Bowel sounds are normal. There is no distension. Palpations: Abdomen is soft. Tenderness: There is no abdominal tenderness. There is no guarding. Musculoskeletal:         General: No tenderness. Normal range of motion. Cervical back: Normal range of motion and neck supple. Right lower leg: Edema present. Left lower leg: Edema present. Comments: Mild edema - BLE; compression stockings in place   Lymphadenopathy:      Cervical: No cervical adenopathy. Skin:     General: Skin is warm and dry. Coloration: Skin is not pale. Findings: No erythema or rash. Neurological:      General: No focal deficit present. Mental Status: He is alert and oriented to person, place, and time. Motor: No seizure activity. Coordination: Coordination normal.      Comments: No neurovascular deficiencies noted of left hand   Psychiatric:         Behavior: Behavior normal.         Thought Content: Thought content normal.       DIAGNOSTICS      EKG: (as documented in ED note):  Rhythm: Sinus  Rate: normal  Axis: Left  Ectopy: none  Conduction: normal  ST Segments: no acute change  T Waves: T wave inversions in 1 aVL, V1, V2  Q Waves: none     Clinical Impression: LVH pattern, pacer spikes not evident, first-degree AV block, when compared to ECG in May of this year T wave inversions in aVL, V2 are new, resolution of T wave inversions in aVF, V5, V6. Abnormal EKG. Labs:  CBC:   Recent Labs     09/24/21 2109   WBC 4.0   HGB 9.0*        BMP:    Recent Labs     09/24/21 2109      K 4.6      CO2 28   BUN 30*   CREATININE 1.51*   GLUCOSE 90     S. Calcium:  Recent Labs     09/24/21 2109   CALCIUM 8.8     S. Ionized Calcium:No results for input(s): IONCA in the last 72 hours. S. Magnesium:  Recent Labs     09/24/21  2303   MG 2.1     S.  Phosphorus:  Recent Labs 09/24/21  2303   PHOS 4.2     S. Glucose:No results for input(s): POCGLU in the last 72 hours. Glycosylated hemoglobin A1C: No results found for: LABA1C  Hepatic: No results for input(s): AST, ALT, ALB, ALKPHOS in the last 72 hours. Invalid input(s):  PROT,  BILITOT,  BILIDIR  CARDIAC ENZY:   Recent Labs     09/24/21 2109 09/24/21  2303   TROPHS 33* 35*     INR: No results for input(s): INR in the last 72 hours. BNP:   Recent Labs     09/24/21 2109   PROBNP 905*      ABGs: No results for input(s): PH, PCO2, PO2, HCO3, O2SAT in the last 72 hours. Lipids: No results for input(s): CHOL, TRIG, HDL, LDLCALC in the last 72 hours. Invalid input(s): LDL  Pancreatic functions:No results for input(s): LIPASE, AMYLASE in the last 72 hours. [employer]: No results for input(s): LACTA in the last 72 hours. Thyroid functions:   Lab Results   Component Value Date    TSH 1.32 08/20/2021      U/A:  Recent Labs     09/25/21  0148   COLORU Yellow   WBCUA 0 TO 2   RBCUA 0 TO 2   MUCUS NOT REPORTED   BACTERIA FEW*   SPECGRAV 1.013   LEUKOCYTESUR NEGATIVE   GLUCOSEU NEGATIVE   AMORPHOUS NOT REPORTED       Imaging/Diagonstics:     XR CHEST (2 VW)    Result Date: 9/24/2021  EXAMINATION: TWO XRAY VIEWS OF THE CHEST 9/24/2021 9:46 pm COMPARISON: 06/25/2021. HISTORY: ORDERING SYSTEM PROVIDED HISTORY: Chest pain TECHNOLOGIST PROVIDED HISTORY: Chest pain Reason for Exam: Chest pain, back pain, shortness of breath, leg cramping. Acuity: Acute Type of Exam: Initial Additional signs and symptoms: Chest pain, back pain, shortness of breath, leg cramping. Relevant Medical/Surgical History: Chest pain, back pain, shortness of breath, leg cramping. FINDINGS: Again seen left chest pacemaker. Cardiomediastinal silhouette appears within normal limits. Again seen right basilar linear opacification, may related to atelectasis or scarring. No evidence of focal consolidation or overt pulmonary edema. Costophrenic angles are sharp.   No atrial fibrillation on anticoagulation, he was found to be in acute kidney injury, which resolved with IV fluids  Patient is requesting a cardiologist in left arm, I spoke with Olivia Cabrera , he will see the patient  Patient also has anemia, no obvious GI bleed  Patient follows with hematologist, started on iron pills and vitamin C  As per records from hematologist office, they are trying to get records from his GI since he has scopes in the past  Likely discharge today  We will discharge on lisinopril, Bumex, Aldactone, will hold metolazone  Will get BMP next week  I will see patient in the  office.

## 2021-09-25 NOTE — PLAN OF CARE
Problem: Infection:  Goal: Will remain free from infection  Description: Will remain free from infection  Note: Patient remains afebrile;  no signs of erythema, edema, or warmth. Will continue to monitor for signs/symptoms of infection. Problem: Safety:  Goal: Free from accidental physical injury  Description: Free from accidental physical injury  Note: No falls noted this shift. Patient ambulates with x1 staff assistance without difficulty. Bed kept in low position. Safe environment maintained. Bedside table & call light in reach. Uses call light appropriately when needing assistance. Problem: Pain:  Goal: Patient's pain/discomfort is manageable  Description: Patient's pain/discomfort is manageable  Note: Pt medicated with pain medication prn. Assessed all pain characteristics including level, type, location, frequency, and onset. Non-pharmacologic interventions offered to pt as well. Pt states pain is tolerable at this time. Will continue to monitor. Problem: Skin Integrity:  Goal: Skin integrity will stabilize  Description: Skin integrity will stabilize  Note: Skin assessment complete. Patient encouraged to turned and repositioned every two hours. Area kept free from moisture. Proper nourishment and fluids encouraged, as appropriate. Will continue to monitor for additional needs and changes in skin breakdown.

## 2021-09-25 NOTE — ED TRIAGE NOTES
Mode of arrival (squad #, walk in, police, etc) : walk in        Chief complaint(s): chest pain        Arrival Note (brief scenario, treatment PTA, etc). : Patient states he started with chest pain this morning and also has became short of breath. Patient states the pain is more on the left side of his chest. Patient denies pain radiating. Patient states he is also having tingling in both hands more in the left        C= \"Have you ever felt that you should Cut down on your drinking? \"     A= \"Have people Annoyed you by criticizing your drinking? \"  G= \"Have you ever felt bad or Guilty about your drinking? \"  E= \"Have you ever had a drink as an Eye-opener first thing in the morning to steady your nerves or to help a hangover? \" Deferred []      Reason for deferring: If yes to two or more: probable alcohol abuse.

## 2021-09-25 NOTE — CARE COORDINATION
After Visit Summary  Amalia Gutierres  MRN: [id number]    Icon primary diagnosis        VARSHA (acute kidney injury) (HCC)  Icon Date   9/24/2021 - 9/25/2021  Icon Location  [address] Icon phone   [phone]   Icon Checklist header    Care Plan Once You Return Home    Icon do   Do   Icon Checkbox     these medications from [place] [address] [address], [address] [phone] - f [phone]  1 amLODIPine  Icon read   Read   Icon Checkbox    Read these attachments  1 Renal Failure: Acute (English)  [employer] Sign-Up    Send messages to your doctor, view your test results, renew your prescriptions, schedule appointments, and more. Go to [email or web address], click \"Sign Up Now\", and enter your personal activation code: [email or web address]. Activation code expires 10/4/2021. After Visit Summary  Instructions     Icon phone    Need Help? After Visit Summary  (Discharge Instructions)     This summary was created for you.     Thank you for entrusting your care to us. The following information includes details about your hospital/visit stay along with steps you should take to help with your recovery once you leave the hospital. Follow-up with your Primary Care Provider when condition worsens. In the event of an emergency, call 911 or go to the nearest Emergency Department. At your follow-up appointment, ask your physician about any tests or studies that were not available at discharge. In this packet, you will find information about the topics listed below:     ? Instructions about your medications including a list of your home medications  ? A summary of your hospital visit  ? Follow-up appointments once you have left the hospital  ? Your care plan at home        You may receive a survey regarding the care you received during your stay. Your input is valuable to us.  We encourage you to complete and return your survey in the envelope provided. We hope you will choose us in the future for your healthcare needs.      Icon medication changes this visit         Your medications have changed    Icon medications to start taking   START taking:   amLODIPine (NORVASC)   Icon medications to stop taking   STOP taking:   furosemide 40 MG tablet (LASIX)    lisinopril 40 MG tablet (PRINIVIL;ZESTRIL)    metOLazone 5 MG tablet (ZAROXOLYN)   Review your updated medication list below. Care Plan Once You Return Home  Icon do   Do  Icon read   Read   Icon Lab Orders Placed Today            Labs and Other Follow-ups after Discharge    Jim Han MD, Internal Medicine, [place]   The patient can be scheduled with any member of the group, including the provider with the first available appointments. [facility] Internal Medicine - Shannan Jacobo, [address] [place]. [place], [place], [address] [facility]  Basic Metabolic Panel   Complete by: Oct 02, 2021        Your Visit     Here you will find information about your visit, including the reason for your visit. Please take this sheet with you when you visit your doctor or other health care provider in the future. It will help determine the best possible medical care for you at that time. If you have any questions once you leave the hospital, please call the department phone number listed below. In the event of an emergency, call 911 or go to the nearest Emergency Department.   Preventive Care     Date Due   Tetanus Combination Vaccine (1 - Tdap) Never done   Shingles Vaccine (1 of 2) Never done   Medicare Annual Wellness Visit Never done   Yearly Flu Vaccine (1) 09/01/2021   Cholesterol Screening 06/26/2022   Potassium monitoring 09/25/2022   Creatinine monitoring 09/25/2022          Follow Up Information and Future Appointments    Follow Up Information and Future Appointments          Follow up with Shannan Jacobo MD in 1 week(s)  Specialty: Internal Medicine [address] [facility] you have symptoms of withdrawal when the medication is stopped. Withdrawal symptoms can include nausea, sweating, chills, diarrhea, stomach cramps, and muscle aches. Withdrawal can last up to several weeks, depending on which drug you took and how long you took it. · Increased sensitivity to pain  · Constipation  · Nausea, vomiting, and dry mouth  · Sleepiness and dizziness  · Confusion  · Depression  · Low levels of testosterone that can result in lower sex drive, energy, and strength  · Itching and sweating     RISKS ARE GREATER WITH:                                                   · History of drug misuse, substance use disorder, or overdose  · Mental health conditions (such as depression or anxiety)  · Sleep apnea  · Older age (72 years or older)  · Pregnancy     Avoid alcohol while taking prescription opioids. Also, unless specifically advised by your health care provider, medications to avoid include:  · Benzodiazepines (such as alprazolam (Xanax) or diazepam (Valium))  · Muscle relaxants (such as carisoprodol (Soma) or cyclobenzaprine (Flexeril))  · Hypnotics (such as zolpidem (Ambien) or eszopiclone (Lunesta))  · Other prescription opioids     KNOW YOUR OPTIONS  Talk to your health care provider about ways to manage your pain that don't involve prescription opioids. Some of these options may actually work better and have fewer risks and side effects. Consult your physician before adding or stopping any medications, treatments, or physical activity. Options may include:  · Pain relievers such as acetaminophen (Tylenol), ibuprofen (Motrin, Advil), and naproxen (Naprosyn, Aleve)  · Some medications that are also used for depression or seizures  · Physical therapy and exercise  · Counseling to help patients learn how to cope better with triggers of pain and stress.   · Application of heat or cold compress  · Massage therapy  · Relaxation techniques     Be Informed  Make sure you know the name of your medication, how much and how often to take it, and its potential risks & side effects.     IF YOU ARE PRESCRIBED OPIOIDS FOR PAIN:  · Never take opioids in greater amounts or more often than prescribed. Remember the goal is not to be pain-free but to manage your pain at a tolerable level. · Follow up with your primary care provider to:  § Work together to create a plan on how to manage your pain. § Talk about ways to help manage your pain that don't involve prescription opioids. § Talk about any and all concerns and side effects. · Help prevent misuse and abuse. § Never sell or share prescription opioids  § Help prevent misuse and abuse. · Store prescription opioids in a secure place and out of reach of others (this may include visitors, children, friends, and family). · Safely dispose of unused/unwanted prescription opioids: Find your community drug take-back program or your pharmacy mail-back program or following guidance from the Food and Drug Administration (www.fda.gov/Drugs/ResourcesForYou). · Visit www.cdc.gov/drugoverdose to learn about the risks of opioid abuse and overdose. · If you believe you may be struggling with addiction, tell your health care provider and ask for guidance or call [address] [place] [address] at [phone].                                                               Medication List - All medications must be taken as prescribed. Contact your provider before stopping medications. There are NEW medications for you. START taking them after you leave the hospital    There are NEW medications for you.  START taking them after you leave the hospital     Next Dose Due AM NOON PM NIGHT   Icon medications to start taking   amLODIPine 5 MG tablet  Commonly known as: NORVASC  Take 1 tablet by mouth daily  Notes to patient: Tomorrow morning        These are medications you told us you were taking at home, CONTINUE taking them after you leave the hospital    These are medications you told us you were taking at home, CONTINUE taking them after you leave the hospital     Next Dose Due AM NOON PM NIGHT    ascorbic acid 250 MG tablet  Commonly known as: V-R VITAMIN C  Take 1 tablet by mouth 2 times daily Take with iron Tonight        bumetanide 2 MG tablet  Commonly known as: BUMEX  Take 1 tablet by mouth daily Tomorrow morning        cyanocobalamin 1000 MCG/ML injection  Inject 1,000 mcg into the muscle every 30 days Continue as before        ferrous sulfate 325 (65 Fe) MG tablet  Commonly known as: IRON 325  Take 1 tablet by mouth 2 times daily This afternoon        finasteride 5 MG tablet  Commonly known as: PROSCAR  Take 5 mg by mouth daily Tomorrow morning        latanoprost 0.005 % ophthalmic solution  Commonly known as: XALATAN  Place 1 drop into both eyes nightly Tonight        magnesium oxide 400 MG tablet  Commonly known as: MAG-OX  Take 400 mg by mouth daily Tomorrow morning        meclizine 12.5 MG tablet  Commonly known as: ANTIVERT  Take 12.5 mg by mouth 3 times daily as needed Continue as before        metoprolol succinate 25 MG extended release tablet  Commonly known as: TOPROL XL  Take 50 mg by mouth daily Tomorrow morning        simvastatin 10 MG tablet  Commonly known as: ZOCOR  Take 10 mg by mouth nightly Tonight        spironolactone 25 MG tablet  Commonly known as: ALDACTONE  Take 1 tablet by mouth daily Tomorrow morning        Suboxone 8-2 MG Film SL film  Generic drug: buprenorphine-naloxone  Place 1 Film under the tongue 2 times daily.  Indications: pain This afternoon        vitamin D 1.25 MG (69752 UT) Caps capsule  Commonly known as: ERGOCALCIFEROL  Take 50,000 Units by mouth once a week tuesdays Continue as before        Xarelto 15 MG Tabs tablet  Generic drug: rivaroxaban  Take 15 mg by mouth daily (with breakfast) Tomorrow morning       These are the medications you have told us you were taking at home STOP taking them after you leave the hospital    These are the medications you have told us you were taking at home STOP taking them after you leave the hospital   Icon medications to stop taking   furosemide 40 MG tablet  Commonly known as: LASIX   Icon medications to stop taking   lisinopril 40 MG tablet  Commonly known as: PRINIVIL;ZESTRIL   Icon medications to stop taking   metOLazone 5 MG tablet  Commonly known as: ZAROXOLYN          Where to  your medications     Icon medication  information                these medications at [facility] [address], [address] [phone] - F [phone]     amLODIPine  Address: [place] [address]   Phone: [phone]         CARLOS Instructions    Continuity of Care Form     Patient Name: Ondina Kaufman   :  1935                    MRN:  [id number]     Admit date:  2021                     Discharge date:  21     Code Status Order: Full Code   Advance Directives:       Admitting Physician:  Sam Tapia MD  PCP: Chacorta Edward MD, MD     Discharging Nurse:   [address] Unit/Room#: 2087/2087-01  Discharging Unit Phone Number: [phone]     Emergency Contact:   Extended Emergency Contact Information  Primary Emergency Contact: Dorothy Lepe  Mobile Phone: [phone]  Relation: Child     Past Surgical History:        Past Surgical History:   Procedure Laterality Date    CARDIAC CATHETERIZATION        PACEMAKER PLACEMENT             Immunization History:      There is no immunization history on file for this patient.     Active Problems:       Patient Active Problem List   Diagnosis Code    Atrial fibrillation ([facility] [address] I48.91    On continuous oral anticoagulation Z79.01    CHF, acute on chronic (HCC) I50.9    Hyperlipidemia E78.5    Former smoker Z87.891    Pacemaker Z95.0    History of DVT of lower extremity Z86.718    Enlarged prostate N40.0    Cataract of both eyes H26.9    GERD (gastroesophageal reflux disease) K21.9    History of inguinal hernia repair Z98.890, Z87.19    Hypertension I10    Pneumonia J18.9    History of right hip replacement Z96.641    History of back surgery Z98.890    Low back pain M54.5    Chest pain R07.9    Fluid overload E87.70    VARSHA (acute kidney injury) ([facility]) N17.9         Isolation/Infection:   Isolation               No Isolation             Patient Infection Status         Infection Onset Added Last Indicated Last Indicated By Review Planned Expiration Resolved Resolved By     None active     Resolved     COVID-19 Rule Out 06/25/21 06/25/21 06/25/21 COVID-19, Rapid (Ordered)     06/25/21 Rule-Out Test Resulted                Nurse Assessment:  Last Vital Signs: /79   Pulse 60   Temp 97.8 °F (36.6 °C) (Oral)   Resp 16   Ht 6' 1\" (1.854 m)   Wt 190 lb (86.2 kg)   SpO2 96%   BMI 25.07 kg/m²     Last documented pain score (0-10 scale): Pain Level: 3  Last Weight:       Wt Readings from Last 1 Encounters:   09/24/21 190 lb (86.2 kg)      Mental Status:  oriented and alert     IV Access:  - None     Nursing Mobility/ADLs:  Walking            Independent  Transfer            Independent  Bathing             Independent  Dressing           Independent  Toileting           Independent  Feeding            Independent  Med Admin      Independent  Med Delivery   whole     Wound Care Documentation and Therapy:     Elimination:  Continence:   · Bowel: Yes  · Bladder: Yes  Urinary Catheter: None   Colostomy/Ileostomy/Ileal Conduit: No     Date of Last BM: 9/24/21     Intake/Output Summary (Last 24 hours) at 9/25/2021 0942  Last data filed at 9/25/2021 0901      Gross per 24 hour   Intake 720 ml   Output 425 ml   Net 295 ml      I/O last 3 completed shifts:   In: 18 [P.O.:480]  Out: 225 [Urine:225]     Safety Concerns:     None     Impairments/Disabilities:      None     Nutrition Therapy:  Current Nutrition Therapy:   - Oral Diet:  Low Sodium (3-4gm)     Routes of Feeding: Oral  Liquids: No Restrictions  Daily Fluid Restriction: no  Last Modified Barium Swallow with Video (Video Swallowing Test): not done     Treatments at the Time of Hospital Discharge:   Respiratory Treatments: n/a  Oxygen Therapy:  is not on home oxygen therapy.   Ventilator:    - No ventilator support     Rehab Therapies: Physical Therapy  Weight Bearing Status/Restrictions: No weight bearing restirctions  Other Medical Equipment (for information only, NOT a DME order):  cane  Other Treatments: n/a     Patient's personal belongings (please select all that are sent with patient):  Glasses, Hearing Aides bilateral, Dentures upper and lower     RN SIGNATURE:  Electronically signed by Monie Meyer RN on 9/25/21 at 10:55 AM EDT     CASE MANAGEMENT/SOCIAL WORK SECTION     Inpatient Status Date: obs     Readmission Risk Assessment Score:  Readmission Risk              Risk of Unplanned Readmission:  0            Discharging to [address] [place] [address]  P: [phone]  F: [phone]     Dialysis Facility (if applicable)   · Name:  · Address:  · Dialysis Schedule:  · Phone:  · Fax:     / signature: Electronically signed by Shana Enrique RN on 9/25/21 at 11:07 AM EDT     PHYSICIAN SECTION     Prognosis: Good     Condition at Discharge: Stable     Rehab Potential (if transferring to Rehab): Good     Recommended Labs or Other Treatments After Discharge:      Physician Certification: I certify the above information and transfer of Herrera Neri  is necessary for the continuing treatment of the diagnosis listed and that he requires Home Care for greater 30 days.      Update Admission H&P: No change in H&P     PHYSICIAN SIGNATURE:  Electronically signed by Mir James MD on 9/25/21 at 9:42 AM EDT

## 2021-09-25 NOTE — DISCHARGE INSTR - COC
Continuity of Care Form    Patient Name: Idalia Olvera   :  1935  MRN:  [id number]    Admit date:  2021  Discharge date:  21    Code Status Order: Full Code   Advance Directives:      Admitting Physician:  Melchor Harper MD  PCP: Nuno Brown MD, MD    Discharging Nurse:   [address] Unit/Room#: 2087/2087-01  Discharging Unit Phone Number: [phone]    Emergency Contact:   Extended Emergency Contact Information  Primary Emergency Contact: Manjeet An  Mobile Phone: [phone]  Relation: Child    Past Surgical History:  Past Surgical History:   Procedure Laterality Date    CARDIAC CATHETERIZATION      PACEMAKER PLACEMENT         Immunization History: There is no immunization history on file for this patient.     Active Problems:  Patient Active Problem List   Diagnosis Code    Atrial fibrillation (HCC) I48.91    On continuous oral anticoagulation Z79.01    CHF, acute on chronic (HCC) I50.9    Hyperlipidemia E78.5    Former smoker Z87.891    Pacemaker Z95.0    History of DVT of lower extremity Z86.718    Enlarged prostate N40.0    Cataract of both eyes H26.9    GERD (gastroesophageal reflux disease) K21.9    History of inguinal hernia repair Z98.890, Z87.19    Hypertension I10    Pneumonia J18.9    History of right hip replacement Z96.641    History of back surgery Z98.890    Low back pain M54.5    Chest pain R07.9    Fluid overload E87.70    VARSHA (acute kidney injury) ([facility] [address] N17.9       Isolation/Infection:   Isolation            No Isolation          Patient Infection Status       Infection Onset Added Last Indicated Last Indicated By Review Planned Expiration Resolved Resolved By    None active    Resolved    COVID-19 Rule Out 21 COVID-19, Rapid (Ordered)   21 Rule-Out Test Resulted            Nurse Assessment:  Last Vital Signs: /79   Pulse 60   Temp 97.8 °F (36.6 °C) (Oral)   Resp 16   Ht 6' 1\" (1.854 m) Wt 190 lb (86.2 kg)   SpO2 96%   BMI 25.07 kg/m²     Last documented pain score (0-10 scale): Pain Level: 3  Last Weight:   Wt Readings from Last 1 Encounters:   09/24/21 190 lb (86.2 kg)     Mental Status:  oriented and alert    IV Access:  - None    Nursing Mobility/ADLs:  Walking   Independent  Transfer  Independent  Bathing  Independent  Dressing  Independent  Toileting  Independent  Feeding  Independent  Med [address] [facility] Delivery   whole    Wound Care Documentation and Therapy:        Elimination:  Continence:   · Bowel: Yes  · Bladder: Yes  Urinary Catheter: None   Colostomy/Ileostomy/Ileal Conduit: No       Date of Last BM: 9/24/21    Intake/Output Summary (Last 24 hours) at 9/25/2021 0942  Last data filed at 9/25/2021 0901  Gross per 24 hour   Intake 720 ml   Output 425 ml   Net 295 ml     I/O last 3 completed shifts: In: 480 [P.O.:480]  Out: 225 [Urine:225]    Safety Concerns:     None    Impairments/Disabilities:      None    Nutrition Therapy:  Current Nutrition Therapy:   - Oral Diet:  Low Sodium (3-4gm)    Routes of Feeding: Oral  Liquids: No Restrictions  Daily Fluid Restriction: no  Last Modified Barium Swallow with Video (Video Swallowing Test): not done    Treatments at the Time of Hospital Discharge:   Respiratory Treatments: n/a  Oxygen Therapy:  is not on home oxygen therapy.   Ventilator:    - No ventilator support    Rehab Therapies: Physical Therapy  Weight Bearing Status/Restrictions: No weight bearing restirctions  Other Medical Equipment (for information only, NOT a DME order):  cane  Other Treatments: n/a    Patient's personal belongings (please select all that are sent with patient):  Glasses, Hearing Aides bilateral, Dentures upper and lower    RN SIGNATURE:  Electronically signed by Lyn Mims RN on 9/25/21 at 10:55 AM EDT    CASE MANAGEMENT/SOCIAL WORK SECTION    Inpatient Status Date: obs    Readmission Risk Assessment Score:  Readmission Risk              Risk of

## 2021-09-25 NOTE — PROGRESS NOTES
Patient arrived to the unit from the ED vis wheelchair. Patient ambulated to his bed with a cane. All assessments completed. Patient was oriented to his room in addition to bed safety and use of call light. All patient items are within reach with bed to the lowest position beside raised up x2. No complain of pain or sign of distress noted at this time.

## 2021-09-25 NOTE — CONSULTS
[facility]  In Patient Cardiology Consult             Date:   9/25/2021  Patient name: Jonn Chavez  Date of admission:  9/24/2021  8:52 PM  MRN:   [id number]  YOB: 1935    Reason for Admission: Muscle cramping and chest pain    CHIEF COMPLAINT: Muscle cramping and chest pain    History Obtained From: The patient and chart    HISTORY OF PRESENT ILLNESS:    This is a 80-year-old male who previously used to reside in [place] who now presents to the hospital due to muscle cramping and chest pain. He describes his chest pain is sharp in nature. It started near the lateral aspect of his left rib and then radiated across his chest.  He denies any associated diaphoresis, nausea, vomiting, arm pain, jaw pain. He describes feeling increasing cramping in both his upper extremities and lower extremities. In the emergency room he was noted to be in acute kidney injury. We are consulted as the patient has now moved to [place] and would like to be established here locally. He is currently chest pain-free. His VARSHA has improved. Known to cardiology at [facility] for:  Impression / Diagnosis:  ICD-10-CM   1. Paroxysmal atrial fibrillation (CMS-[facility]) I48.0   2. Essential hypertension I10   3. Mixed hyperlipidemia E78.2   4. Venous insufficiency I87.2   5. Generalized edema R60.1   6. Chronic heart failure with preserved ejection fraction (CMS-[facility]) I50.32   7. Sick sinus syndrome (CMS-[facility]) I49.5     Cardiac Testing: ECHO 6/2021: EF 60% with mild MR  Cardiac cath spring 2021: Nonobstructive CAD. Plan:  1. Pt has wore his compression stockings for over 6 months and has failed treatment. He further is complaining of pain in his lower extremities. We will plan for venous ablation of the right leg and then reasses but he will likely need of the left leg as well. 2. Continue his compression stockings, lasix and metolazone. 3. Continue xeralto for A.  Fib and the metoprolol, rates are controlled. 4. Educated pt on importance of medical education and knowning his medicines and what he is taking. Total time spent evaluating the patient's status and his labs with previous records from my chart and PC that took more than 30 minutes. Enriqueta Ryan MD, [facility]  8/18/2021  [facility] [place] [facility] [place]         Past Medical History:    Past Medical History:   Diagnosis Date    Atrial fibrillation ([facility] [address]     CAD (coronary artery disease)     CHF (congestive heart failure) ([facility] [address]     Hyperlipidemia     Hypertension          Past Surgical History:    Past Surgical History:   Procedure Laterality Date    CARDIAC CATHETERIZATION      PACEMAKER PLACEMENT           Home Medications:    Outpatient Medications Marked as Taking for the 9/24/21 encounter [facility] Encounter)   Medication Sig Dispense Refill    ferrous sulfate (IRON 325) 325 (65 Fe) MG tablet Take 1 tablet by mouth 2 times daily 60 tablet 5    ascorbic acid (V-R VITAMIN C) 250 MG tablet Take 1 tablet by mouth 2 times daily Take with iron 60 tablet 3    spironolactone (ALDACTONE) 25 MG tablet Take 1 tablet by mouth daily 30 tablet 3    bumetanide (BUMEX) 2 MG tablet Take 1 tablet by mouth daily 30 tablet 3    cyanocobalamin 1000 MCG/ML injection Inject 1,000 mcg into the muscle every 30 days       lisinopril (PRINIVIL;ZESTRIL) 40 MG tablet Take 1 tablet by mouth daily 30 tablet 3    buprenorphine-naloxone (SUBOXONE) 8-2 MG FILM SL film Place 1 Film under the tongue 2 times daily.  Indications: pain       vitamin D (ERGOCALCIFEROL) 1.25 MG (26478 UT) CAPS capsule Take 50,000 Units by mouth once a week tuesdays      rivaroxaban (XARELTO) 15 MG TABS tablet Take 15 mg by mouth daily (with breakfast)      magnesium oxide (MAG-OX) 400 MG tablet Take 400 mg by mouth daily      latanoprost (XALATAN) 0.005 % ophthalmic solution Place 1 drop into both eyes nightly      metoprolol succinate (TOPROL XL) 25 MG extended release tablet Take 50 mg by mouth daily      finasteride (PROSCAR) 5 MG tablet Take 5 mg by mouth daily          Allergies:  Aspirin, Cyclobenzaprine, Ibuprofen, and Azithromycin    Social History:    Social History     Socioeconomic History    Marital status:      Spouse name: Not on file    Number of children: Not on file    Years of education: Not on file    Highest education level: Not on file   Occupational History    Not on file   Tobacco Use    Smoking status: Never Smoker    Smokeless tobacco: Never Used   Substance and Sexual Activity    Alcohol use: Never    Drug use: Never    Sexual activity: Not on file   Other Topics Concern    Not on file   Social History Narrative    Not on file     Social Determinants of Health     Financial Resource Strain:     Difficulty of Paying Living Expenses:    Food Insecurity:     Worried About Running Out of Food in the Last Year:     [address] in the Last Year:    Transportation Needs:     Lack of Transportation (Medical):  Lack of Transportation (Non-Medical):    Physical Activity:     Days of Exercise per Week:     Minutes of Exercise per Session:    Stress:     Feeling of Stress :    Social Connections:     Frequency of Communication with Friends and Family:     Frequency of Social Gatherings with Friends and Family:     Attends Cheondoism Services:     Active Member of Clubs or Organizations:     Attends Club or Organization Meetings:     Marital Status:    Intimate Partner Violence:     Fear of Current or Ex-Partner:     Emotionally Abused:     Physically Abused:     Sexually Abused:         Family History:   No family history on file. REVIEW OF SYSTEMS:    · Constitutional: there has been no unanticipated weight loss. There's been No change in energy level, No change in activity level. · Eyes: No visual changes or diplopia. No scleral icterus. · ENT: No Headaches, hearing loss or vertigo.  No mouth sores or sore throat. · Cardiovascular: As HPI  · Respiratory: As HPI  · Gastrointestinal: No abdominal pain, appetite loss, blood in stools. No change in bowel or bladder habits. · Genitourinary: No dysuria, trouble voiding, or hematuria. · Musculoskeletal:  No gait disturbance, No weakness or joint complaints. · Integumentary: No rash or pruritis. · Neurological: No headache, diplopia, change in muscle strength, numbness or tingling. No change in gait, balance, coordination, mood, affect, memory, mentation, behavior. · Psychiatric: No anxiety, or depression. · Endocrine: No temperature intolerance. No excessive thirst, fluid intake, or urination. No tremor. · Hematologic/Lymphatic: No abnormal bruising or bleeding, blood clots or swollen lymph nodes. · Allergic/Immunologic: No nasal congestion or hives. PHYSICAL EXAM:    Physical Examination:    /79   Pulse 60   Temp 97.8 °F (36.6 °C) (Oral)   Resp 16   Ht 6' 1\" (1.854 m)   Wt 190 lb (86.2 kg)   SpO2 96%   BMI 25.07 kg/m²    Constitutional and General Appearance: alert, cooperative, no distress and appears stated age  HEENT: PERRL, no cervical lymphadenopathy. No masses palpable. Normal oral mucosa  Respiratory:  · Normal excursion and expansion without use of accessory muscles  · Resp Auscultation: Good respiratory effort. No for increased work of breathing. On auscultation: Clear  Cardiovascular:  · Heart tones are crisp and normal. regular S1 and S2. Murmurs: none  · Jugular venous pulsation Normal  Abdomen:  · No masses or tenderness  · Bowel sounds present  Extremities:  ·  No Cyanosis or Clubbing  ·  Lower extremity edema: none  ·  Skin: Warm and dry  Neurological:  · Alert and oriented.   · Moves all extremities well  · No abnormalities of mood, affect, memory, mentation, or behavior are noted    DATA:    Diagnostics:      EKG:   Results for orders placed or performed during the hospital encounter of 09/24/21   EKG 12 Lead   Result Value Ref Range Ventricular Rate 72 BPM    Atrial Rate 72 BPM    P-R Interval 202 ms    QRS Duration 148 ms    Q-T Interval 450 ms    QTc Calculation (Bazett) 492 ms    P Axis 53 degrees    R Axis -72 degrees    T Axis 92 degrees    Narrative    Normal sinus rhythm  Left axis deviation  Right bundle branch block  Left ventricular hypertrophy with repolarization abnormality  Cannot rule out Anteroseptal infarct , age undetermined  Abnormal ECG  When compared with ECG of 25-JUN-2021 19:01,  Premature atrial complexes are no longer Present  Right bundle branch block is now Present  Minimal criteria for Anteroseptal infarct are now Present       Echo:  Results for orders placed or performed during the hospital encounter of 05/25/21   ECHO Complete 2D W Doppler W Color  Left ventricle is normal in size and wall thickness. Global left ventricular systolic function is normal. Estimated LV EF 60-65  %. Grade I (mild) left ventricular diastolic dysfunction. Both atria are mildly dilated. Right ventricle is mildly enlarged with normal RV systolic function. Prominent moderator band. Mild mitral regurgitation. Mild tricuspid regurgitation. Estimated right ventricular systolic pressure  is [id number]. Labs:     CBC:   Recent Labs     09/24/21 2109 09/25/21  0631   WBC 4.0 3.6   HGB 9.0* 8.6*   HCT 28.3* 26.6*    226     BMP:   Recent Labs     09/24/21 2109 09/25/21  0631    142   K 4.6 4.4   CO2 28 26   BUN 30* 26*   CREATININE 1.51* 1.04   LABGLOM 44* >60   GLUCOSE 90 108*     BNP: No results for input(s): BNP in the last 72 hours. PT/INR:   Recent Labs     09/25/21  0631   PROTIME 15.0*   INR 1.2     APTT:No results for input(s): APTT in the last 72 hours.   CARDIAC ENZYMES:  Recent Labs     09/24/21 2109 09/24/21  2303   TROPHS 33* 35*     FASTING LIPID PANEL:  Lab Results   Component Value Date    HDL 52 06/26/2021    TRIG 33 06/26/2021     LIVER PROFILE:  Recent Labs     09/25/21  0631   AST 27   ALT 28 LABALBU 3.4*         IMPRESSION:      - Atypical- likely non cardiac CP- resolved  - Cramping from VARSHA from diuretics  - Minimally elevated troponin- not suggestive of NSTEMI  - VARSHA / Dehydration- improved on IVF  - Non obs CAD on Cath Spring 2021- per Sharmila Penn  - PAF  - SSS s/p PPM  - Diastolic CHF- Preserved LVEF Echo 5/21    RECOMMENDATIONS:  - keep off metolazone  - agree with resumption of bumex  - keep of lisinopril  - add norvasc 5 qd  - can continue aldactone  - can follow up in clinic with me in 2 weeks. Discussed with patient and nursing. Thank you for allowing me to participate in the care of this patient, please do not hesitate to call if you have any questions. Jabari Morales DO, [facility], [address], [address], [place] [address] [facility]  [email or web address]  [phone]

## 2021-09-25 NOTE — CARE COORDINATION
Patient discharged to home.  Faxed CARLOS Facesheet and discharge AVS to [facility]  P: [phone]  F: [phone]

## 2021-09-25 NOTE — ED PROVIDER NOTES
[address] ED  Emergency Department Encounter  EmergencyMedicine Resident     Pt Kimi Lopez  MRN: [id number]  [place] 1935  Date of evaluation: 9/24/21  PCP:  Halima Lomax MD, MD    CHIEF COMPLAINT       Chief Complaint   Patient presents with    Chest Pain       HISTORY OF PRESENT ILLNESS  (Location/Symptom, Timing/Onset, Context/Setting, Quality, Duration, Modifying Factors, Severity.)      Alyson Morrison is a 80 y.o. male who presents with chest pain and shortness of breath. Started this morning and has been on and off. Unable to say if it is exertional or not. No associated nausea vomiting or diaphoresis. Of muscle cramps. No coughing or fevers. Patient has a history of A. fib, CAD without stents, CHF, hypertension, hyperlipidemia. Does have a pacemaker in place. Patient follows with cardiologist in [place] a doctor Armenta. He reports his last cardiac catheterization was a few months ago, no stents were placed. States he has never had a stent placed. Patient also complains of chronic body aches due to arthritis. PAST MEDICAL / SURGICAL / SOCIAL / FAMILY HISTORY      has a past medical history of Atrial fibrillation ([facility] [address] CAD (coronary artery disease), CHF (congestive heart failure) ([facility] [address] Hyperlipidemia, and Hypertension. has a past surgical history that includes pacemaker placement and Cardiac catheterization. Social History     Socioeconomic History    Marital status:       Spouse name: Not on file    Number of children: Not on file    Years of education: Not on file    Highest education level: Not on file   Occupational History    Not on file   Tobacco Use    Smoking status: Never Smoker    Smokeless tobacco: Never Used   Substance and Sexual Activity    Alcohol use: Never    Drug use: Never    Sexual activity: Not on file   Other Topics Concern    Not on file   Social History Narrative    Not on file     Social Determinants of Health     Financial Resource Strain:     Difficulty of Paying Living Expenses:    Food Insecurity:     Worried About Running Out of Food in the Last Year:     [address] in the Last Year:    Transportation Needs:     Lack of Transportation (Medical):  Lack of Transportation (Non-Medical):    Physical Activity:     Days of Exercise per Week:     Minutes of Exercise per Session:    Stress:     Feeling of Stress :    Social Connections:     Frequency of Communication with Friends and Family:     Frequency of Social Gatherings with Friends and Family:     Attends Jainism Services:     Active Member of Clubs or Organizations:     Attends Club or Organization Meetings:     Marital Status:    Intimate Partner Violence:     Fear of Current or Ex-Partner:     Emotionally Abused:     Physically Abused:     Sexually Abused:        No family history on file. Allergies:  Aspirin, Cyclobenzaprine, Ibuprofen, and Azithromycin    Home Medications:  Prior to Admission medications    Medication Sig Start Date End Date Taking? Authorizing Provider   ferrous sulfate (IRON 325) 325 (65 Fe) MG tablet Take 1 tablet by mouth 2 times daily 8/20/21  Yes Tad Palma MD   ascorbic acid (V-R VITAMIN C) 250 MG tablet Take 1 tablet by mouth 2 times daily Take with iron 8/20/21  Yes Tad Palma MD   spironolactone (ALDACTONE) 25 MG tablet Take 1 tablet by mouth daily 6/28/21  Yes Celia Mendoza MD   bumetanide (BUMEX) 2 MG tablet Take 1 tablet by mouth daily 6/27/21  Yes Celia Mendoza MD   cyanocobalamin 1000 MCG/ML injection Inject 1,000 mcg into the muscle every 30 days    Yes Historical Provider, MD   lisinopril (PRINIVIL;ZESTRIL) 40 MG tablet Take 1 tablet by mouth daily 5/28/21  Yes Sebastián Sommer MD   buprenorphine-naloxone (SUBOXONE) 8-2 MG FILM SL film Place 1 Film under the tongue 2 times daily.  Indications: pain    Yes Historical Provider, MD   vitamin D (ERGOCALCIFEROL) 1.25 MG (34228 UT) CAPS capsule Take 50,000 Units by mouth once a week tuesdays   Yes Historical Provider, MD   rivaroxaban (XARELTO) 15 MG TABS tablet Take 15 mg by mouth daily (with breakfast)   Yes Historical Provider, MD   magnesium oxide (MAG-OX) 400 MG tablet Take 400 mg by mouth daily   Yes Historical Provider, MD   latanoprost (XALATAN) 0.005 % ophthalmic solution Place 1 drop into both eyes nightly   Yes Historical Provider, MD   metoprolol succinate (TOPROL XL) 25 MG extended release tablet Take 50 mg by mouth daily   Yes Historical Provider, MD   finasteride (PROSCAR) 5 MG tablet Take 5 mg by mouth daily   Yes Historical Provider, MD   simvastatin (ZOCOR) 10 MG tablet Take 10 mg by mouth nightly   Yes Historical Provider, MD   metOLazone (ZAROXOLYN) 5 MG tablet Take 5 mg by mouth daily    Historical Provider, MD   meclizine (ANTIVERT) 12.5 MG tablet Take 12.5 mg by mouth 3 times daily as needed    Historical Provider, MD       REVIEW OF SYSTEMS    (2-9 systems for level 4, 10 or more for level 5)      Review of Systems   Constitutional: Negative for chills and fever. HENT: Negative for congestion and sore throat. Respiratory: Positive for shortness of breath. Negative for cough. Cardiovascular: Positive for chest pain. Gastrointestinal: Negative for abdominal pain, nausea and vomiting. Genitourinary: Negative for dysuria and frequency. Musculoskeletal: Positive for neck pain (chronic,. unchaged). Negative for neck stiffness. Cramps   Skin: Negative for rash. Neurological: Negative for weakness, numbness and headaches.         PHYSICAL EXAM   (up to 7 for level 4, 8 or more for level 5)      INITIAL VITALS:   BP (!) 147/88   Pulse 63   Temp 98.4 °F (36.9 °C) (Oral)   Resp 15   Ht 6' 1\" (1.854 m)   Wt 190 lb (86.2 kg)   SpO2 97%   BMI 25.07 kg/m²      Vitals:    09/24/21 2200 09/24/21 2215 09/24/21 2245 09/25/21 0000   BP: (!) 140/83 133/77 (!) 152/86 (!) 147/88   Pulse: 64 64 68 63 Resp: 19 16 16 15   Temp:       TempSrc:       SpO2: 97% 98% 98% 97%   Weight:       Height:            Physical Exam  Vitals reviewed. Constitutional:       General: He is not in acute distress. Appearance: He is well-developed. He is not ill-appearing. HENT:      Head: Normocephalic and atraumatic. Eyes:      Extraocular Movements: Extraocular movements intact. Pupils: Pupils are equal, round, and reactive to light. Cardiovascular:      Rate and Rhythm: Normal rate and regular rhythm. Pulses: Normal pulses. Pulmonary:      Effort: Pulmonary effort is normal.      Breath sounds: Normal breath sounds. Abdominal:      General: There is no distension. Palpations: Abdomen is soft. Tenderness: There is no abdominal tenderness. Musculoskeletal:         General: Normal range of motion. Cervical back: Normal range of motion and neck supple. Skin:     General: Skin is warm and dry. Neurological:      General: No focal deficit present. Mental Status: He is alert and oriented to person, place, and time.        DIFFERENTIAL  DIAGNOSIS     PLAN (LABS / IMAGING / EKG):  Orders Placed This Encounter   Procedures    XR CHEST (2 VW)    CBC Auto Differential    Basic Metabolic Panel w/ Reflex to MG    Troponin    Brain Natriuretic Peptide    Troponin    Magnesium    Phosphorus    Telemetry monitoring - continuous duration    Inpatient consult to Hospitalist    Pacer Interrogate    EKG 12 Lead    PATIENT STATUS (FROM ED OR OR/PROCEDURAL) Observation       MEDICATIONS ORDERED:  Orders Placed This Encounter   Medications    acetaminophen (TYLENOL) tablet 650 mg       DIAGNOSTIC RESULTS / EMERGENCY DEPARTMENT COURSE / MDM   LAB RESULTS:  Results for orders placed or performed during the hospital encounter of 09/24/21   CBC Auto Differential   Result Value Ref Range    WBC 4.0 3.5 - 11.0 k/uL    RBC 3.14 (L) 4.5 - 5.9 m/uL    Hemoglobin 9.0 (L) 13.5 - 17.5 g/dL Hematocrit 28.3 (L) 41 - 53 %    MCV 90.1 80 - 100 fL    MCH 28.7 26 - 34 pg    MCHC 31.9 31 - 37 g/dL    RDW 15.7 (H) 11.5 - 14.9 %    Platelets 581 410 - 261 k/uL    MPV 6.7 6.0 - 12.0 fL    NRBC Automated NOT REPORTED per 100 WBC    Differential Type NOT REPORTED     Seg Neutrophils 52 36 - 66 %    Lymphocytes 28 24 - 44 %    Monocytes 13 (H) 1 - 7 %    Eosinophils % 6 (H) 0 - 4 %    Basophils 1 0 - 2 %    Immature Granulocytes NOT REPORTED 0 %    Segs Absolute 2.10 1.3 - 9.1 k/uL    Absolute Lymph # 1.10 1.0 - 4.8 k/uL    Absolute Mono # 0.50 0.1 - 1.3 k/uL    Absolute Eos # 0.20 0.0 - 0.4 k/uL    Basophils Absolute 0.00 0.0 - 0.2 k/uL    Absolute Immature Granulocyte NOT REPORTED 0.00 - 0.30 k/uL    WBC Morphology NOT REPORTED     RBC Morphology NOT REPORTED     Platelet Estimate NOT REPORTED    Basic Metabolic Panel w/ Reflex to MG   Result Value Ref Range    Glucose 90 70 - 99 mg/dL    BUN 30 (H) 8 - 23 mg/dL    CREATININE 1.51 (H) 0.70 - 1.20 mg/dL    Bun/Cre Ratio NOT REPORTED 9 - 20    Calcium 8.8 8.6 - 10.4 mg/dL    Sodium 139 135 - 144 mmol/L    Potassium 4.6 3.7 - 5.3 mmol/L    Chloride 102 98 - 107 mmol/L    CO2 28 20 - 31 mmol/L    Anion Gap 9 9 - 17 mmol/L    GFR Non-African American 44 (L) >60 mL/min    GFR  53 (L) >60 mL/min    GFR Comment          GFR Staging NOT REPORTED    Troponin   Result Value Ref Range    Troponin, High Sensitivity 33 (H) 0 - 22 ng/L    Troponin T NOT REPORTED <0.03 ng/mL    Troponin Interp NOT REPORTED    Brain Natriuretic Peptide   Result Value Ref Range    Pro- (H) <300 pg/mL    BNP Interpretation Pro-BNP Reference Range:    Troponin   Result Value Ref Range    Troponin, High Sensitivity 35 (H) 0 - 22 ng/L    Troponin T NOT REPORTED <0.03 ng/mL    Troponin Interp NOT REPORTED    Magnesium   Result Value Ref Range    Magnesium 2.1 1.6 - 2.6 mg/dL   Phosphorus   Result Value Ref Range    Phosphorus 4.2 2.5 - 4.5 mg/dL         RADIOLOGY:  XR CHEST (2 VW)   Final Result   No acute cardiopulmonary process. EKG  EKG Interpretation    Interpreted by me    Rhythm: Sinus  Rate: normal  Axis: Left  Ectopy: none  Conduction: normal  ST Segments: no acute change  T Waves: T wave inversions in 1 aVL, V1, V2  Q Waves: none    Clinical Impression: LVH pattern, pacer spikes not evident, first-degree AV block, when compared to ECG in May of this year T wave inversions in aVL, V2 are new, resolution of T wave inversions in aVF, V5, V6. Abnormal EKG. All EKG's are interpreted by the Emergency Department Physician who either signs or Co-signs this chart in the absence of a cardiologist.      [facility]:    Patient here with chest pain and shortness of breath that resolved prior to coming to the emergency room. He appears younger than stated age, speaks clearly, vital signs within normal limits. Patient is on Xarelto for A. fib. He has strong equal radial pulses, chest x-ray shows nonwidened mediastinum doubt dissection. EKG does show some new T wave inversions but also resolution of all T wave inversions, no ST elevations or depressions. Pacer was interrogated and did not show any events. Concerned that chest pain could be cardiac in nature, given age and cardiac history patient was admitted for observation, repeat troponins and gentle fluid hydration. Patient has a mild VARSHA suspect his cramps and VARSHA may be due to overdiuresis. ED Course as of Sep 25 0035   Fri Sep 24, 2021   2144 At baseline   Troponin, High Sensitivity(!): 33 [CS]   2144 Below baseline. Lungs clear, no edema   Pro-BNP(!): 905 [CS]   2145 Mild varsha. Baseline 1   Creatinine(!): 1.51 [CS]   [address] [employer] Pacer: chronic  A. Fib, no ventricular arrhythmias, device working    [CS]   5319 Plan to admit patient for rehydration, trend troponins and possible cardiology evaluation. Patient may be overdiuresis and that is why he is having cramping sensations. [CS]      ED Course User Index  [CS] Jyoti Brooke DO         PROCEDURES:      CONSULTS:  IP CONSULT TO HOSPITALIST    CRITICAL CARE:  Please see attending note    FINAL IMPRESSION      1. Chest pain, unspecified type    2. Cramps, extremity    3. VARSHA (acute kidney injury) ([facility] [address]          DISPOSITION / PLAN     DISPOSITION Admitted 09/24/2021 11:07:19 PM      PATIENT REFERRED TO:  No follow-up provider specified.     DISCHARGE MEDICATIONS:  New Prescriptions    No medications on file       Jyoti Brooke DO  Emergency Medicine Resident    (Please note that portions of thisnote were completed with a voice recognition program.  Efforts were made to edit the dictations but occasionally words are mis-transcribed.)        Jyoti Brooke DO  Resident  09/25/21 9811

## 2021-09-27 NOTE — DISCHARGE SUMMARY
[place] [address] Internal Medicine    Discharge Summary     Patient ID: Kathleen Johnston  :  1935   MRN: [id number]     ACCOUNT:  [de-identified]   Patient's PCP: Lety Gutierrez MD, MD  Admit Date: 2021   Discharge Date: 2021    Length of Stay: 0  Code Status:  Prior  Admitting Physician: Lexii Gonzáles MD  Discharge Physician: Lexii Gonzáles MD     Active Discharge Diagnoses:     Primary Problem  VARSHA (acute kidney injury) [facility] Problems    Diagnosis Date Noted    VARSHA (acute kidney injury) ([facility] [address] [N17.9] 2021    On continuous oral anticoagulation [Z79.01] 2021    Chest pain [R07.9] 2021       Admission Condition:  fair     Discharged Condition: fair    Hospital Stay:     Hospital Course:  Kathleen Johnston is a 80 y.o. male who was admitted for the management of VARSHA (acute kidney injury) ([facility] , presented to ER with Chest Pain  Patient has multiple medical problems which include hypertension, coronary artery disease, sick sinus syndrome s/p pacemaker, chronic neck pain.   Patient admitted yesterday with complaints of chest pain, chest pain completely resolved  His troponins are flat  EKG is reported by ED physician, is unchanged  Patient as per record had cardiac cath done earlier this year which was negative  Last echo was 60%  Pacer was interrogated in emergency room which was negative  He has history of atrial fibrillation on anticoagulation, he was found to be in acute kidney injury, which resolved with IV fluids  Patient is requesting a cardiologist in left arm, I spoke with Debbie Lucero ,  Patient follows with hematologist, started on iron pills and vitamin C  As per records from hematologist office, they are trying to get records from his GI since he has scopes in the past  No further work-up was planned per cardiologist, patient getting discharged home, he will follow with cardiologist as medications    amLODIPine 5 MG tablet  Commonly known as: NORVASC  Take 1 tablet by mouth daily  Notes to patient: Tomorrow morning        CONTINUE taking these medications    ascorbic acid 250 MG tablet  Commonly known as: V-R VITAMIN C  Take 1 tablet by mouth 2 times daily Take with iron     bumetanide 2 MG tablet  Commonly known as: BUMEX  Take 1 tablet by mouth daily     cyanocobalamin 1000 MCG/ML injection     ferrous sulfate 325 (65 Fe) MG tablet  Commonly known as: IRON 325  Take 1 tablet by mouth 2 times daily     finasteride 5 MG tablet  Commonly known as: PROSCAR     latanoprost 0.005 % ophthalmic solution  Commonly known as: XALATAN     magnesium oxide 400 MG tablet  Commonly known as: MAG-OX     meclizine 12.5 MG tablet  Commonly known as: ANTIVERT     metoprolol succinate 25 MG extended release tablet  Commonly known as: TOPROL XL     simvastatin 10 MG tablet  Commonly known as: ZOCOR     spironolactone 25 MG tablet  Commonly known as: ALDACTONE  Take 1 tablet by mouth daily     Suboxone 8-2 MG Film SL film  Generic drug: buprenorphine-naloxone     vitamin D 1.25 MG ([address]70 UT) Caps capsule  Commonly known as: ERGOCALCIFEROL     Xarelto 15 MG Tabs tablet  Generic drug: rivaroxaban        STOP taking these medications    furosemide 40 MG tablet  Commonly known as: LASIX     lisinopril 40 MG tablet  Commonly known as: PRINIVIL;ZESTRIL     metOLazone 5 MG tablet  Commonly known as: ZAROXOLYN           Where to Get Your Medications      These medications were sent to [address] [facility], [address] [address] [address]    Phone: [phone]   · amLODIPine 5 MG tablet         Time Spent on discharge is  35 mins in patient examination, evaluation, counseling as well as medication reconciliation, prescriptions for required medications, discharge plan and follow up.     Electronically signed by   Clinton Che MD  9/27/2021  11:40 AM      Thank you Dr. Florinda Renteria MD, MD for the opportunity to be involved in this patient's care.

## 2021-10-26 ENCOUNTER — OFFICE VISIT (OUTPATIENT)
Dept: INTERNAL MEDICINE CLINIC | Age: 86
End: 2021-10-26
Payer: MEDICARE

## 2021-10-26 ENCOUNTER — TELEPHONE (OUTPATIENT)
Dept: INTERNAL MEDICINE CLINIC | Age: 86
End: 2021-10-26

## 2021-10-26 VITALS
SYSTOLIC BLOOD PRESSURE: 134 MMHG | HEIGHT: 73 IN | WEIGHT: 188 LBS | BODY MASS INDEX: 24.92 KG/M2 | DIASTOLIC BLOOD PRESSURE: 70 MMHG

## 2021-10-26 DIAGNOSIS — I50.33 ACUTE ON CHRONIC DIASTOLIC CONGESTIVE HEART FAILURE (HCC): ICD-10-CM

## 2021-10-26 DIAGNOSIS — I10 PRIMARY HYPERTENSION: ICD-10-CM

## 2021-10-26 DIAGNOSIS — I48.19 PERSISTENT ATRIAL FIBRILLATION (HCC): ICD-10-CM

## 2021-10-26 DIAGNOSIS — K59.03 DRUG-INDUCED CONSTIPATION: ICD-10-CM

## 2021-10-26 DIAGNOSIS — G89.29 CHRONIC MIDLINE LOW BACK PAIN WITHOUT SCIATICA: ICD-10-CM

## 2021-10-26 DIAGNOSIS — E53.8 VITAMIN B12 DEFICIENCY: Primary | ICD-10-CM

## 2021-10-26 DIAGNOSIS — Z95.0 PACEMAKER: ICD-10-CM

## 2021-10-26 DIAGNOSIS — K21.00 GASTROESOPHAGEAL REFLUX DISEASE WITH ESOPHAGITIS, UNSPECIFIED WHETHER HEMORRHAGE: ICD-10-CM

## 2021-10-26 DIAGNOSIS — M54.50 CHRONIC MIDLINE LOW BACK PAIN WITHOUT SCIATICA: ICD-10-CM

## 2021-10-26 DIAGNOSIS — R74.8 ELEVATED CK: ICD-10-CM

## 2021-10-26 PROCEDURE — G8484 FLU IMMUNIZE NO ADMIN: HCPCS | Performed by: INTERNAL MEDICINE

## 2021-10-26 PROCEDURE — 4040F PNEUMOC VAC/ADMIN/RCVD: CPT | Performed by: INTERNAL MEDICINE

## 2021-10-26 PROCEDURE — 99214 OFFICE O/P EST MOD 30 MIN: CPT | Performed by: INTERNAL MEDICINE

## 2021-10-26 PROCEDURE — G8427 DOCREV CUR MEDS BY ELIG CLIN: HCPCS | Performed by: INTERNAL MEDICINE

## 2021-10-26 PROCEDURE — G8420 CALC BMI NORM PARAMETERS: HCPCS | Performed by: INTERNAL MEDICINE

## 2021-10-26 PROCEDURE — 1036F TOBACCO NON-USER: CPT | Performed by: INTERNAL MEDICINE

## 2021-10-26 PROCEDURE — 1123F ACP DISCUSS/DSCN MKR DOCD: CPT | Performed by: INTERNAL MEDICINE

## 2021-10-26 RX ORDER — PSEUDOEPHEDRINE HCL 30 MG
TABLET ORAL
COMMUNITY
End: 2021-11-17 | Stop reason: SDUPTHER

## 2021-10-26 RX ORDER — DOCUSATE SODIUM 100 MG/1
100 CAPSULE, LIQUID FILLED ORAL 2 TIMES DAILY PRN
Qty: 60 CAPSULE | Refills: 3 | Status: SHIPPED | OUTPATIENT
Start: 2021-10-26 | End: 2021-11-25

## 2021-10-26 RX ORDER — FAMOTIDINE 20 MG/1
20 TABLET, FILM COATED ORAL DAILY
Qty: 60 TABLET | Refills: 3 | Status: SHIPPED | OUTPATIENT
Start: 2021-10-26 | End: 2022-05-26

## 2021-10-26 RX ORDER — DOCUSATE SODIUM 100 MG/1
CAPSULE, LIQUID FILLED ORAL
COMMUNITY
Start: 2021-10-13 | End: 2021-10-26 | Stop reason: SDUPTHER

## 2021-10-26 SDOH — ECONOMIC STABILITY: FOOD INSECURITY: WITHIN THE PAST 12 MONTHS, YOU WORRIED THAT YOUR FOOD WOULD RUN OUT BEFORE YOU GOT MONEY TO BUY MORE.: NEVER TRUE

## 2021-10-26 SDOH — ECONOMIC STABILITY: FOOD INSECURITY: WITHIN THE PAST 12 MONTHS, THE FOOD YOU BOUGHT JUST DIDN'T LAST AND YOU DIDN'T HAVE MONEY TO GET MORE.: NEVER TRUE

## 2021-10-26 ASSESSMENT — PATIENT HEALTH QUESTIONNAIRE - PHQ9
SUM OF ALL RESPONSES TO PHQ QUESTIONS 1-9: 0
SUM OF ALL RESPONSES TO PHQ9 QUESTIONS 1 & 2: 0
SUM OF ALL RESPONSES TO PHQ QUESTIONS 1-9: 0
SUM OF ALL RESPONSES TO PHQ QUESTIONS 1-9: 0
2. FEELING DOWN, DEPRESSED OR HOPELESS: 0
1. LITTLE INTEREST OR PLEASURE IN DOING THINGS: 0

## 2021-10-26 ASSESSMENT — SOCIAL DETERMINANTS OF HEALTH (SDOH): HOW HARD IS IT FOR YOU TO PAY FOR THE VERY BASICS LIKE FOOD, HOUSING, MEDICAL CARE, AND HEATING?: NOT HARD AT ALL

## 2021-10-26 NOTE — PROGRESS NOTES
Subjective:      Patient ID: Max Bradshaw is a 80 y.o. male. HPI-patient is here to establish care. He has multiple medical problems which include hypertension, coronary artery disease, history of sick sinus syndrome s/p pacemaker, chronic neck pain, chronic anemia, patient follows with hematologist  He was recently admitted at [facility], with chest pain, patient was evaluated by cardiologist, he underwent echocardiogram, which was okay in May  Patient underwent recent interrogation  Patient today is feeling much better, he has appointment coming with cardiologist  Patient complaining of muscle ache affecting both thighs , his last CK was high on statins  No new complaints    Review of Systems   Constitutional: Positive for fatigue. Negative for activity change, appetite change, chills and diaphoresis. HENT: Negative for congestion, dental problem, ear discharge, facial swelling and hearing loss. Respiratory: Positive for shortness of breath (Occasional ). Negative for apnea, cough, chest tightness and wheezing. Cardiovascular: Negative for chest pain and leg swelling. Gastrointestinal: Negative for abdominal distention, abdominal pain, constipation and diarrhea. Genitourinary: Negative for difficulty urinating, dysuria, enuresis, flank pain and frequency. Musculoskeletal: Positive for arthralgias (both Thighs ), back pain, gait problem (use scane ) and neck pain. Negative for joint swelling. Skin: Negative for color change, pallor and rash. Neurological: Negative for dizziness, seizures, facial asymmetry, light-headedness, numbness and headaches. Psychiatric/Behavioral: Negative for agitation, behavioral problems, confusion, decreased concentration and dysphoric mood. Objective:   Physical Exam  Vitals and nursing note reviewed. Constitutional:       General: He is not in acute distress. Appearance: He is well-developed. He is obese. He is not diaphoretic. Comments: Uses cane    HENT:      Head: Normocephalic and atraumatic. Mouth/Throat:      Pharynx: No oropharyngeal exudate. Eyes:      General: No scleral icterus. Right eye: No discharge. Left eye: No discharge. Conjunctiva/sclera: Conjunctivae normal.      Pupils: Pupils are equal, round, and reactive to light. Neck:      Thyroid: No thyromegaly. Vascular: No JVD. Trachea: No tracheal deviation. Cardiovascular:      Rate and Rhythm: Normal rate. Heart sounds: Normal heart sounds. No murmur heard. No gallop. Comments: Pacemaker present   Pulmonary:      Effort: Pulmonary effort is normal. No respiratory distress. Breath sounds: Normal breath sounds. No stridor. No wheezing or rales. Abdominal:      General: Bowel sounds are normal. There is no distension. Palpations: Abdomen is soft. Tenderness: There is no abdominal tenderness. There is no guarding or rebound. Musculoskeletal:         General: No tenderness. Normal range of motion. Cervical back: Normal range of motion and neck supple. Skin:     General: Skin is warm and dry. Findings: No erythema or rash. Neurological:      Mental Status: He is alert and oriented to person, place, and time. Assessment / Plan:     Social History     Socioeconomic History    Marital status:       Spouse name: None    Number of children: None    Years of education: None    Highest education level: None   Occupational History    None   Tobacco Use    Smoking status: Never Smoker    Smokeless tobacco: Never Used   Substance and Sexual Activity    Alcohol use: Never    Drug use: Never    Sexual activity: None   Other Topics Concern    None   Social History Narrative    None     Social Determinants of Health     Financial Resource Strain: Low Risk     Difficulty of Paying Living Expenses: Not hard at all   Food Insecurity: No Food Insecurity    Worried About Running Out of Food in the Last Year: Never true    [address] in the Last Year: Never true   Transportation Needs:     Lack of Transportation (Medical):  Lack of Transportation (Non-Medical):    Physical Activity:     Days of Exercise per Week:     Minutes of Exercise per Session:    Stress:     Feeling of Stress :    Social Connections:     Frequency of Communication with Friends and Family:     Frequency of Social Gatherings with Friends and Family:     Attends Orthodox Services:     Active Member of Clubs or Organizations:     Attends Club or Organization Meetings:     Marital Status:    Intimate Partner Violence:     Fear of Current or Ex-Partner:     Emotionally Abused:     Physically Abused:     Sexually Abused:         No family history on file. 1. Vitamin B12 deficiency    - cyanocobalamin ([employer] VITAMIN B12) 1000 MCG tablet; Take 1 tablet by mouth daily  Dispense: 30 tablet; Refill: 3    2. Persistent atrial fibrillation (HCC)  Rate Controlled ,on AC     3. Acute on chronic diastolic congestive heart failure ([facility] [address]  Compensated     4. Pacemaker  Had recent pacer interrogation, follows with cardiology    5. Chronic midline low back pain without sciatica  Stable    6. Primary hypertension  Controlled    7. Drug-induced constipation    - docusate sodium (COLACE) 100 MG capsule; Take 1 capsule by mouth 2 times daily as needed for Constipation  Dispense: 60 capsule; Refill: 3    8. Elevated CK  Had elevated CK, will stop statins  - Coenzyme Q10 100 MG CHEW; Take 1 tablet by mouth daily  Dispense: 90 tablet; Refill: 0    9. Gastroesophageal reflux disease with esophagitis, unspecified whether hemorrhage  - famotidine (PEPCID) 20 MG tablet; Take 1 tablet by mouth daily  Dispense: 60 tablet; Refill: 3      · Return in about 2 months (around 12/26/2021). · Reviewed prior labs and health maintenance. · Discussed use, benefit, and side effects of prescribed medications.   Barriers to medication

## 2021-10-28 DIAGNOSIS — E53.8 VITAMIN B12 DEFICIENCY: ICD-10-CM

## 2021-10-28 ASSESSMENT — ENCOUNTER SYMPTOMS
ABDOMINAL DISTENTION: 0
SHORTNESS OF BREATH: 1
WHEEZING: 0
ABDOMINAL PAIN: 0
COLOR CHANGE: 0
FACIAL SWELLING: 0
COUGH: 0
BACK PAIN: 1
APNEA: 0
CHEST TIGHTNESS: 0
CONSTIPATION: 0
DIARRHEA: 0

## 2021-10-28 NOTE — TELEPHONE ENCOUNTER
Pt called in regards to not receiving cyanocobalamin ([employer] VITAMIN B12) 1000 MCG tablet when medication was picked up from pharmacy.  Pt uses [facility] [address], [address] [facility] [phone]

## 2021-11-02 ENCOUNTER — HOSPITAL ENCOUNTER (OUTPATIENT)
Age: 86
Setting detail: SPECIMEN
Discharge: HOME | End: 2021-11-02
Payer: MEDICARE

## 2021-11-02 LAB
ALT SERPL-CCNC: 42 U/L (ref 5–41)
AST SERPL-CCNC: 47 U/L
CHOLESTEROL/HDL RATIO: 2.2
CHOLESTEROL: 125 MG/DL
HDLC SERPL-MCNC: 57 MG/DL
LDL CHOLESTEROL: 54 MG/DL (ref 0–130)
TRIGL SERPL-MCNC: 70 MG/DL
VLDLC SERPL CALC-MCNC: NORMAL MG/DL (ref 1–30)

## 2021-11-02 PROCEDURE — 84460 ALANINE AMINO (ALT) (SGPT): CPT

## 2021-11-02 PROCEDURE — 80061 LIPID PANEL: CPT

## 2021-11-02 PROCEDURE — 84450 TRANSFERASE (AST) (SGOT): CPT

## 2021-11-05 ENCOUNTER — NURSE TRIAGE (OUTPATIENT)
Dept: OTHER | Facility: CLINIC | Age: 86
End: 2021-11-05

## 2021-11-05 RX ORDER — SPIRONOLACTONE 25 MG/1
25 TABLET ORAL DAILY
Qty: 90 TABLET | Refills: 1 | Status: SHIPPED | OUTPATIENT
Start: 2021-11-05 | End: 2022-03-07 | Stop reason: ALTCHOICE

## 2021-11-05 RX ORDER — BUMETANIDE 2 MG/1
2 TABLET ORAL DAILY
Qty: 90 TABLET | Refills: 0 | Status: ON HOLD | OUTPATIENT
Start: 2021-11-05 | End: 2022-01-08 | Stop reason: HOSPADM

## 2021-11-05 NOTE — TELEPHONE ENCOUNTER
Received call from Kayleigh garcia  at [facility]  with [employer]. Brief description of triage: swelling in both legs, SOB, back pain. During call the patient said the connection was bad and this writer did disconnect after clarifying a phone number. This writer returned a call to the patient and received a VM message on an unidentified line. This writer returned a second call to the patient. Triage indicates for patient to have a second level triage. This writer did speak to [place] at the PCP office. Douglas Medrano at the PCP office requested the call come to her. Care advice to be given by the office. Writer provided warm transfer to [place]  at PCP office      Attention Provider: Thank you for allowing me to participate in the care of your patient. The patient was connected to triage in response to information provided to the ECC/PSC. Please do not respond through this encounter as the response is not directed to a shared pool. Reason for Disposition   SEVERE swelling (e.g., swelling extends above knee, entire leg is swollen, weeping fluid)    Answer Assessment - Initial Assessment Questions  1. ONSET: \"When did the swelling start? \" (e.g., minutes, hours, days)      2 days ago    2. LOCATION: \"What part of the leg is swollen? \"  \"Are both legs swollen or just one leg? \"      Thighs into his feet    3. SEVERITY: \"How bad is the swelling? \" (e.g., localized; mild, moderate, severe)   - Localized - small area of swelling localized to one leg   - MILD pedal edema - swelling limited to foot and ankle, pitting edema < 1/4 inch (6 mm) deep, rest and elevation eliminate most or all swelling   - MODERATE edema - swelling of lower leg to knee, pitting edema > 1/4 inch (6 mm) deep, rest and elevation only partially reduce swelling   - SEVERE edema - swelling extends above knee, facial or hand swelling present       Moderate swelling- pitting     4. REDNESS: \"Does the swelling look red or infected? \"        No redness    5. PAIN: \"Is the swelling painful to touch? \" If so, ask: \"How painful is it? \"   (Scale 1-10; mild, moderate or severe)       Uncomfortable when walking but not when sitting    6. FEVER: \"Do you have a fever? \" If so, ask: \"What is it, how was it measured, and when did it start? \"         Denies    7. CAUSE: \"What do you think is causing the leg swelling? \"      Has happened before and he was on a water pill and he got dehydrated from taking the water pills    8. MEDICAL HISTORY: \"Do you have a history of heart failure, kidney disease, liver failure, or cancer? \"      Pacemaker      9. RECURRENT SYMPTOM: \"Have you had leg swelling before? \" If so, ask: \"When was the last time? \" \"What happened that time? \"        Has happened before 3 weeks ago    10. OTHER SYMPTOMS: \"Do you have any other symptoms? \" (e.g., chest pain, difficulty breathing)      SOB with exertion        11. PREGNANCY: \"Is there any chance you are pregnant? \" \"When was your last menstrual period? \"        N/a male    Protocols used: LEG SWELLING AND EDEMA-ADULT-[place]    See above documentation

## 2021-11-08 ENCOUNTER — NURSE TRIAGE (OUTPATIENT)
Dept: OTHER | Facility: CLINIC | Age: 86
End: 2021-11-08

## 2021-11-08 NOTE — TELEPHONE ENCOUNTER
Received call from BODØ  at [facility] with [employer]. Pt hung up prior to [facility]  1st attempt 0927    Brief description of triage: 81 y/o with swelling of legs. Pt called on 3 days ago  Pt had an appointment was told to rescheduled   Shortness of breath and not as bad now per pt reports history of CHF     Pt reports being in the hospital 3 weeks ago for the same symptoms pt saw the heart doctor     Contacting [facility] pt had an appointment 3 days ago did not make it because his car broke down  Advised pt if uable to make appointments and having new or worsening symptoms  See ucc/ED       Care advice provided, patient verbalizes understanding; denies any other questions or concerns; instructed to call back for any new or worsening symptoms. Message sent to     Attention Provider: Thank you for allowing me to participate in the care of your patient. The patient was connected to triage in response to information provided to the ECC/PSC. Please do not respond through this encounter as the response is not directed to a shared pool. Reason for Disposition   Caller requesting an appointment, triage offered and declined     See notes    Answer Assessment - Initial Assessment Questions  1. REASON FOR CALL or QUESTION: \"What is your reason for calling today? \" or \"How can I best  help you? \" or \"What question do you have that I can help answer? \"      Reschedule a missed appointment     2. CALLER: Document the source of call. (e.g., laboratory, patient).       Pt    Protocols used: PCP CALL - NO TRIAGE-ADULT-

## 2021-11-10 ENCOUNTER — HOSPITAL ENCOUNTER (OUTPATIENT)
Facility: CLINIC | Age: 86
Discharge: HOME | End: 2021-11-12
Payer: MEDICARE

## 2021-11-10 ENCOUNTER — HOSPITAL ENCOUNTER (OUTPATIENT)
Age: 86
Setting detail: SPECIMEN
Discharge: HOME | End: 2021-11-10
Payer: MEDICARE

## 2021-11-10 ENCOUNTER — OFFICE VISIT (OUTPATIENT)
Dept: INTERNAL MEDICINE CLINIC | Age: 86
End: 2021-11-10
Payer: MEDICARE

## 2021-11-10 ENCOUNTER — HOSPITAL ENCOUNTER (OUTPATIENT)
Dept: GENERAL RADIOLOGY | Facility: CLINIC | Age: 86
Discharge: HOME | End: 2021-11-12
Payer: MEDICARE

## 2021-11-10 VITALS
HEART RATE: 163 BPM | OXYGEN SATURATION: 91 % | HEIGHT: 73 IN | DIASTOLIC BLOOD PRESSURE: 90 MMHG | WEIGHT: 189 LBS | SYSTOLIC BLOOD PRESSURE: 164 MMHG | BODY MASS INDEX: 25.05 KG/M2

## 2021-11-10 DIAGNOSIS — I10 PRIMARY HYPERTENSION: ICD-10-CM

## 2021-11-10 DIAGNOSIS — M25.511 CHRONIC RIGHT SHOULDER PAIN: ICD-10-CM

## 2021-11-10 DIAGNOSIS — I48.91 ATRIAL FIBRILLATION, UNSPECIFIED TYPE (HCC): Primary | ICD-10-CM

## 2021-11-10 DIAGNOSIS — I50.9 CONGESTIVE HEART FAILURE, UNSPECIFIED HF CHRONICITY, UNSPECIFIED HEART FAILURE TYPE (HCC): ICD-10-CM

## 2021-11-10 DIAGNOSIS — I48.19 PERSISTENT ATRIAL FIBRILLATION (HCC): ICD-10-CM

## 2021-11-10 DIAGNOSIS — N17.9 AKI (ACUTE KIDNEY INJURY) (HCC): ICD-10-CM

## 2021-11-10 DIAGNOSIS — G89.29 CHRONIC RIGHT SHOULDER PAIN: ICD-10-CM

## 2021-11-10 DIAGNOSIS — M79.89 RIGHT LEG SWELLING: ICD-10-CM

## 2021-11-10 LAB
ANION GAP SERPL CALCULATED.3IONS-SCNC: 12 MMOL/L (ref 9–17)
BUN BLDV-MCNC: 29 MG/DL (ref 8–23)
BUN/CREAT BLD: ABNORMAL (ref 9–20)
CALCIUM SERPL-MCNC: 8.9 MG/DL (ref 8.6–10.4)
CHLORIDE BLD-SCNC: 105 MMOL/L (ref 98–107)
CO2: 25 MMOL/L (ref 20–31)
CREAT SERPL-MCNC: 1.11 MG/DL (ref 0.7–1.2)
GFR AFRICAN AMERICAN: >60 ML/MIN
GFR NON-AFRICAN AMERICAN: >60 ML/MIN
GFR SERPL CREATININE-BSD FRML MDRD: ABNORMAL ML/MIN/{1.73_M2}
GFR SERPL CREATININE-BSD FRML MDRD: ABNORMAL ML/MIN/{1.73_M2}
GLUCOSE BLD-MCNC: 105 MG/DL (ref 70–99)
POTASSIUM SERPL-SCNC: 4.3 MMOL/L (ref 3.7–5.3)
SODIUM BLD-SCNC: 142 MMOL/L (ref 135–144)

## 2021-11-10 PROCEDURE — G8427 DOCREV CUR MEDS BY ELIG CLIN: HCPCS | Performed by: INTERNAL MEDICINE

## 2021-11-10 PROCEDURE — 99214 OFFICE O/P EST MOD 30 MIN: CPT | Performed by: INTERNAL MEDICINE

## 2021-11-10 PROCEDURE — 73030 X-RAY EXAM OF SHOULDER: CPT

## 2021-11-10 PROCEDURE — G8420 CALC BMI NORM PARAMETERS: HCPCS | Performed by: INTERNAL MEDICINE

## 2021-11-10 PROCEDURE — 4040F PNEUMOC VAC/ADMIN/RCVD: CPT | Performed by: INTERNAL MEDICINE

## 2021-11-10 PROCEDURE — 1036F TOBACCO NON-USER: CPT | Performed by: INTERNAL MEDICINE

## 2021-11-10 PROCEDURE — 1123F ACP DISCUSS/DSCN MKR DOCD: CPT | Performed by: INTERNAL MEDICINE

## 2021-11-10 PROCEDURE — G8484 FLU IMMUNIZE NO ADMIN: HCPCS | Performed by: INTERNAL MEDICINE

## 2021-11-10 RX ORDER — BUMETANIDE 1 MG/1
1 TABLET ORAL DAILY
Qty: 90 TABLET | Refills: 1 | Status: SHIPPED | OUTPATIENT
Start: 2021-11-10 | End: 2021-12-02 | Stop reason: DRUGHIGH

## 2021-11-10 RX ORDER — AMLODIPINE BESYLATE 5 MG/1
10 TABLET ORAL DAILY
Qty: 90 TABLET | Refills: 3 | Status: SHIPPED | OUTPATIENT
Start: 2021-11-10 | End: 2022-03-21

## 2021-11-10 ASSESSMENT — ENCOUNTER SYMPTOMS
CHEST TIGHTNESS: 0
ABDOMINAL DISTENTION: 0
FACIAL SWELLING: 0
DIARRHEA: 0
BACK PAIN: 1
SHORTNESS OF BREATH: 1
WHEEZING: 0
APNEA: 0
ABDOMINAL PAIN: 0
CONSTIPATION: 0
COLOR CHANGE: 0
COUGH: 0

## 2021-11-10 NOTE — PROGRESS NOTES
Subjective:      Patient ID: Amaris Cameron is a 80 y.o. male. HPI      HPI   1) Location/Symptom - swelling in Right leg   2) Timing/Onset: Long time   3) Context/Setting: Since he has Replacement of Right Hip   4) Quality: no pain in right leg   5) Duration: continuous   6) Modifying Factors: Associated with Shortness of Breath   7) Severity: moderate   Patient was Admitted in hospital Recently , was on Metolozone and Bumex , off [address] since Discharge from Hospital   Has Chronic Pain in Neck , has pain with Movement of Right arm at Shoulder level   Taking Norvasc for long period of time  Review of Systems   Constitutional: Negative for activity change, appetite change, chills and diaphoresis. HENT: Negative for congestion, dental problem, ear discharge, facial swelling and hearing loss. Respiratory: Positive for shortness of breath. Negative for apnea, cough, chest tightness and wheezing. Cardiovascular: Positive for leg swelling (right leg ). Negative for chest pain. Gastrointestinal: Negative for abdominal distention, abdominal pain, constipation and diarrhea. Genitourinary: Negative for difficulty urinating, dysuria, enuresis, flank pain and frequency. Musculoskeletal: Positive for arthralgias (Right shoulder ), back pain, neck pain and neck stiffness. Negative for gait problem and joint swelling. Skin: Negative for color change, pallor and rash. Neurological: Negative for dizziness, seizures, facial asymmetry, light-headedness, numbness and headaches. Psychiatric/Behavioral: Negative for agitation, behavioral problems, confusion, decreased concentration and dysphoric mood. Objective:   Physical Exam  Vitals and nursing note reviewed. Constitutional:       General: He is not in acute distress. Appearance: He is well-developed. He is obese. He is not diaphoretic. HENT:      Head: Normocephalic and atraumatic. Mouth/Throat:      Pharynx: No oropharyngeal exudate. Eyes:      General: No scleral icterus. Right eye: No discharge. Left eye: No discharge. Conjunctiva/sclera: Conjunctivae normal.      Pupils: Pupils are equal, round, and reactive to light. Neck:      Thyroid: No thyromegaly. Vascular: No JVD. Trachea: No tracheal deviation. Cardiovascular:      Rate and Rhythm: Normal rate. Heart sounds: Normal heart sounds. No murmur heard. No gallop. Pulmonary:      Effort: Pulmonary effort is normal. No respiratory distress. Breath sounds: Normal breath sounds. No stridor. No wheezing or rales. Abdominal:      General: Bowel sounds are normal. There is no distension. Palpations: Abdomen is soft. Tenderness: There is no abdominal tenderness. There is no guarding or rebound. Musculoskeletal:         General: No tenderness. Normal range of motion. Cervical back: Normal range of motion and neck supple. Right lower leg: Edema (2+ edema) present. Comments: Uses cane   Skin:     General: Skin is warm and dry. Findings: No erythema or rash. Neurological:      Mental Status: He is alert and oriented to person, place, and time. Assessment / Plan:   1. Atrial fibrillation, unspecified type (HCC)  Rate Controlled, on AC     2. VARSHA (acute kidney injury) ([address]  stable     3. Persistent atrial fibrillation ([address]    4. Primary hypertension  Uncontrolled   Increasing Norvasc to 10   - amLODIPine (NORVASC) 5 MG tablet; Take 2 tablets by mouth daily  Dispense: 90 tablet; Refill: 3    5. Congestive heart failure, unspecified HF chronicity, unspecified heart failure type ([address]  Increasing Bumex from 2 mg daily to 2 mg in the morning and 1 mg in the afternoon  Low-sodium diet  - bumetanide (BUMEX) 1 MG tablet; Take 1 tablet by mouth daily  Dispense: 90 tablet; Refill: 1  - Basic Metabolic Panel; Future    6. Right leg swelling  - US DUP LOWER EXTREMITY RIGHT BRITNEY; Future    7.  Chronic right shoulder pain  - Pneumococcal 65+ years Vaccine  Completed    Hepatitis A vaccine  Aged Out    Hepatitis B vaccine  Aged Out    Hib vaccine  Aged Out    Meningococcal (ACWY) vaccine  Aged Out

## 2021-11-16 ENCOUNTER — HOSPITAL ENCOUNTER (OUTPATIENT)
Dept: VASCULAR LAB | Age: 86
Discharge: HOME | DRG: 281 | End: 2021-11-16
Payer: MEDICARE

## 2021-11-16 DIAGNOSIS — M79.89 RIGHT LEG SWELLING: ICD-10-CM

## 2021-11-16 PROCEDURE — 93971 EXTREMITY STUDY: CPT

## 2021-11-17 ENCOUNTER — HOSPITAL ENCOUNTER (INPATIENT)
Age: 86
LOS: 1 days | Discharge: HOME | DRG: 281 | End: 2021-11-18
Attending: EMERGENCY MEDICINE | Admitting: INTERNAL MEDICINE
Payer: MEDICARE

## 2021-11-17 ENCOUNTER — APPOINTMENT (OUTPATIENT)
Dept: GENERAL RADIOLOGY | Age: 86
DRG: 281 | End: 2021-11-17
Payer: MEDICARE

## 2021-11-17 DIAGNOSIS — R07.9 CHEST PAIN, UNSPECIFIED TYPE: Primary | ICD-10-CM

## 2021-11-17 PROBLEM — I50.9 CHRONIC CHF (CONGESTIVE HEART FAILURE) (HCC): Status: ACTIVE | Noted: 2021-11-17

## 2021-11-17 PROBLEM — I21.4 NSTEMI (NON-ST ELEVATED MYOCARDIAL INFARCTION) (HCC): Status: ACTIVE | Noted: 2021-11-17

## 2021-11-17 LAB
ABSOLUTE EOS #: 0.4 K/UL (ref 0–0.4)
ABSOLUTE IMMATURE GRANULOCYTE: ABNORMAL K/UL (ref 0–0.3)
ABSOLUTE LYMPH #: 1.1 K/UL (ref 1–4.8)
ABSOLUTE MONO #: 0.5 K/UL (ref 0.1–1.3)
ANION GAP SERPL CALCULATED.3IONS-SCNC: 10 MMOL/L (ref 9–17)
BASOPHILS # BLD: 1 % (ref 0–2)
BASOPHILS ABSOLUTE: 0 K/UL (ref 0–0.2)
BNP INTERPRETATION: ABNORMAL
BUN BLDV-MCNC: 34 MG/DL (ref 8–23)
BUN/CREAT BLD: ABNORMAL (ref 9–20)
CALCIUM SERPL-MCNC: 9.2 MG/DL (ref 8.6–10.4)
CHLORIDE BLD-SCNC: 102 MMOL/L (ref 98–107)
CO2: 26 MMOL/L (ref 20–31)
CREAT SERPL-MCNC: 1.52 MG/DL (ref 0.7–1.2)
DIFFERENTIAL TYPE: ABNORMAL
EOSINOPHILS RELATIVE PERCENT: 9 % (ref 0–4)
GFR AFRICAN AMERICAN: 53 ML/MIN
GFR NON-AFRICAN AMERICAN: 44 ML/MIN
GFR SERPL CREATININE-BSD FRML MDRD: ABNORMAL ML/MIN/{1.73_M2}
GFR SERPL CREATININE-BSD FRML MDRD: ABNORMAL ML/MIN/{1.73_M2}
GLUCOSE BLD-MCNC: 109 MG/DL (ref 70–99)
HCT VFR BLD CALC: 29.4 % (ref 41–53)
HCT VFR BLD CALC: 31.6 % (ref 41–53)
HEMOGLOBIN: 10.2 G/DL (ref 13.5–17.5)
HEMOGLOBIN: 9.7 G/DL (ref 13.5–17.5)
IMMATURE GRANULOCYTES: ABNORMAL %
INR BLD: 1.4
LYMPHOCYTES # BLD: 22 % (ref 24–44)
MAGNESIUM: 2.4 MG/DL (ref 1.6–2.6)
MCH RBC QN AUTO: 29.2 PG (ref 26–34)
MCH RBC QN AUTO: 29.9 PG (ref 26–34)
MCHC RBC AUTO-ENTMCNC: 32.3 G/DL (ref 31–37)
MCHC RBC AUTO-ENTMCNC: 33 G/DL (ref 31–37)
MCV RBC AUTO: 90.5 FL (ref 80–100)
MCV RBC AUTO: 90.6 FL (ref 80–100)
MONOCYTES # BLD: 11 % (ref 1–7)
NRBC AUTOMATED: ABNORMAL PER 100 WBC
NRBC AUTOMATED: ABNORMAL PER 100 WBC
PARTIAL THROMBOPLASTIN TIME: 100.9 SEC (ref 24–36)
PDW BLD-RTO: 14.8 % (ref 11.5–14.9)
PDW BLD-RTO: 15 % (ref 11.5–14.9)
PLATELET # BLD: 221 K/UL (ref 150–450)
PLATELET # BLD: 238 K/UL (ref 150–450)
PLATELET ESTIMATE: ABNORMAL
PMV BLD AUTO: 7.2 FL (ref 6–12)
PMV BLD AUTO: 7.3 FL (ref 6–12)
POTASSIUM SERPL-SCNC: 4.5 MMOL/L (ref 3.7–5.3)
PRO-BNP: 632 PG/ML
PROTHROMBIN TIME: 17.4 SEC (ref 11.8–14.6)
RBC # BLD: 3.24 M/UL (ref 4.5–5.9)
RBC # BLD: 3.49 M/UL (ref 4.5–5.9)
RBC # BLD: ABNORMAL 10*6/UL
SARS-COV-2, RAPID: NOT DETECTED
SEG NEUTROPHILS: 57 % (ref 36–66)
SEGMENTED NEUTROPHILS ABSOLUTE COUNT: 2.8 K/UL (ref 1.3–9.1)
SODIUM BLD-SCNC: 138 MMOL/L (ref 135–144)
SPECIMEN DESCRIPTION: NORMAL
TROPONIN INTERP: ABNORMAL
TROPONIN INTERP: ABNORMAL
TROPONIN T: ABNORMAL NG/ML
TROPONIN T: ABNORMAL NG/ML
TROPONIN, HIGH SENSITIVITY: 40 NG/L (ref 0–22)
TROPONIN, HIGH SENSITIVITY: 41 NG/L (ref 0–22)
WBC # BLD: 4.5 K/UL (ref 3.5–11)
WBC # BLD: 4.9 K/UL (ref 3.5–11)
WBC # BLD: ABNORMAL 10*3/UL

## 2021-11-17 PROCEDURE — 93005 ELECTROCARDIOGRAM TRACING: CPT | Performed by: STUDENT IN AN ORGANIZED HEALTH CARE EDUCATION/TRAINING PROGRAM

## 2021-11-17 PROCEDURE — 6370000000 HC RX 637 (ALT 250 FOR IP): Performed by: STUDENT IN AN ORGANIZED HEALTH CARE EDUCATION/TRAINING PROGRAM

## 2021-11-17 PROCEDURE — 85730 THROMBOPLASTIN TIME PARTIAL: CPT

## 2021-11-17 PROCEDURE — 87635 SARS-COV-2 COVID-19 AMP PRB: CPT

## 2021-11-17 PROCEDURE — 99284 EMERGENCY DEPT VISIT MOD MDM: CPT

## 2021-11-17 PROCEDURE — 85025 COMPLETE CBC W/AUTO DIFF WBC: CPT

## 2021-11-17 PROCEDURE — 83735 ASSAY OF MAGNESIUM: CPT

## 2021-11-17 PROCEDURE — 84484 ASSAY OF TROPONIN QUANT: CPT

## 2021-11-17 PROCEDURE — 6370000000 HC RX 637 (ALT 250 FOR IP)

## 2021-11-17 PROCEDURE — 2060000000 HC ICU INTERMEDIATE R&B

## 2021-11-17 PROCEDURE — 6360000002 HC RX W HCPCS

## 2021-11-17 PROCEDURE — 85027 COMPLETE CBC AUTOMATED: CPT

## 2021-11-17 PROCEDURE — 2580000003 HC RX 258

## 2021-11-17 PROCEDURE — 36415 COLL VENOUS BLD VENIPUNCTURE: CPT

## 2021-11-17 PROCEDURE — 99223 1ST HOSP IP/OBS HIGH 75: CPT | Performed by: INTERNAL MEDICINE

## 2021-11-17 PROCEDURE — 80048 BASIC METABOLIC PNL TOTAL CA: CPT

## 2021-11-17 PROCEDURE — 83880 ASSAY OF NATRIURETIC PEPTIDE: CPT

## 2021-11-17 PROCEDURE — 85610 PROTHROMBIN TIME: CPT

## 2021-11-17 PROCEDURE — 71045 X-RAY EXAM CHEST 1 VIEW: CPT

## 2021-11-17 RX ORDER — SODIUM CHLORIDE 0.9 % (FLUSH) 0.9 %
5-40 SYRINGE (ML) INJECTION PRN
Status: DISCONTINUED | OUTPATIENT
Start: 2021-11-17 | End: 2021-11-18 | Stop reason: HOSPADM

## 2021-11-17 RX ORDER — FINASTERIDE 5 MG/1
5 TABLET, FILM COATED ORAL DAILY
Status: DISCONTINUED | OUTPATIENT
Start: 2021-11-17 | End: 2021-11-18 | Stop reason: HOSPADM

## 2021-11-17 RX ORDER — AMLODIPINE BESYLATE 10 MG/1
10 TABLET ORAL DAILY
Status: DISCONTINUED | OUTPATIENT
Start: 2021-11-17 | End: 2021-11-18 | Stop reason: HOSPADM

## 2021-11-17 RX ORDER — SODIUM CHLORIDE 0.9 % (FLUSH) 0.9 %
5-40 SYRINGE (ML) INJECTION EVERY 12 HOURS SCHEDULED
Status: DISCONTINUED | OUTPATIENT
Start: 2021-11-17 | End: 2021-11-18 | Stop reason: HOSPADM

## 2021-11-17 RX ORDER — POLYETHYLENE GLYCOL 3350 17 G/17G
17 POWDER, FOR SOLUTION ORAL DAILY PRN
Status: DISCONTINUED | OUTPATIENT
Start: 2021-11-17 | End: 2021-11-18 | Stop reason: HOSPADM

## 2021-11-17 RX ORDER — FAMOTIDINE 20 MG/1
20 TABLET, FILM COATED ORAL DAILY
Status: DISCONTINUED | OUTPATIENT
Start: 2021-11-17 | End: 2021-11-18 | Stop reason: HOSPADM

## 2021-11-17 RX ORDER — ONDANSETRON 2 MG/ML
4 INJECTION INTRAMUSCULAR; INTRAVENOUS EVERY 6 HOURS PRN
Status: DISCONTINUED | OUTPATIENT
Start: 2021-11-17 | End: 2021-11-18 | Stop reason: HOSPADM

## 2021-11-17 RX ORDER — HEPARIN SODIUM 1000 [USP'U]/ML
4000 INJECTION, SOLUTION INTRAVENOUS; SUBCUTANEOUS PRN
Status: DISCONTINUED | OUTPATIENT
Start: 2021-11-17 | End: 2021-11-18

## 2021-11-17 RX ORDER — BUMETANIDE 1 MG/1
2 TABLET ORAL DAILY
Status: DISCONTINUED | OUTPATIENT
Start: 2021-11-17 | End: 2021-11-18 | Stop reason: HOSPADM

## 2021-11-17 RX ORDER — HEPARIN SODIUM 10000 [USP'U]/100ML
5-30 INJECTION, SOLUTION INTRAVENOUS CONTINUOUS
Status: DISCONTINUED | OUTPATIENT
Start: 2021-11-17 | End: 2021-11-18

## 2021-11-17 RX ORDER — HEPARIN SODIUM 1000 [USP'U]/ML
4000 INJECTION, SOLUTION INTRAVENOUS; SUBCUTANEOUS ONCE
Status: COMPLETED | OUTPATIENT
Start: 2021-11-17 | End: 2021-11-17

## 2021-11-17 RX ORDER — HEPARIN SODIUM 1000 [USP'U]/ML
2000 INJECTION, SOLUTION INTRAVENOUS; SUBCUTANEOUS PRN
Status: DISCONTINUED | OUTPATIENT
Start: 2021-11-17 | End: 2021-11-18

## 2021-11-17 RX ORDER — ASPIRIN 81 MG/1
324 TABLET, CHEWABLE ORAL ONCE
Status: COMPLETED | OUTPATIENT
Start: 2021-11-17 | End: 2021-11-17

## 2021-11-17 RX ORDER — SPIRONOLACTONE 25 MG/1
25 TABLET ORAL DAILY
Status: DISCONTINUED | OUTPATIENT
Start: 2021-11-17 | End: 2021-11-18 | Stop reason: HOSPADM

## 2021-11-17 RX ORDER — METOPROLOL SUCCINATE 25 MG/1
25 TABLET, EXTENDED RELEASE ORAL NIGHTLY
COMMUNITY
End: 2022-01-07

## 2021-11-17 RX ORDER — LATANOPROST 50 UG/ML
1 SOLUTION/ DROPS OPHTHALMIC NIGHTLY
Status: DISCONTINUED | OUTPATIENT
Start: 2021-11-17 | End: 2021-11-18 | Stop reason: HOSPADM

## 2021-11-17 RX ORDER — SODIUM CHLORIDE 9 MG/ML
25 INJECTION, SOLUTION INTRAVENOUS PRN
Status: DISCONTINUED | OUTPATIENT
Start: 2021-11-17 | End: 2021-11-18 | Stop reason: HOSPADM

## 2021-11-17 RX ORDER — BUMETANIDE 1 MG/1
1 TABLET ORAL DAILY
Status: DISCONTINUED | OUTPATIENT
Start: 2021-11-17 | End: 2021-11-18 | Stop reason: HOSPADM

## 2021-11-17 RX ORDER — SPIRONOLACTONE 25 MG/1
1 TABLET ORAL DAILY
Status: CANCELLED | OUTPATIENT
Start: 2021-11-17

## 2021-11-17 RX ORDER — ONDANSETRON 4 MG/1
4 TABLET, ORALLY DISINTEGRATING ORAL EVERY 8 HOURS PRN
Status: DISCONTINUED | OUTPATIENT
Start: 2021-11-17 | End: 2021-11-18 | Stop reason: HOSPADM

## 2021-11-17 RX ORDER — METOPROLOL SUCCINATE 25 MG/1
25 TABLET, EXTENDED RELEASE ORAL NIGHTLY
Status: DISCONTINUED | OUTPATIENT
Start: 2021-11-17 | End: 2021-11-18 | Stop reason: HOSPADM

## 2021-11-17 RX ORDER — METOPROLOL SUCCINATE 50 MG/1
50 TABLET, EXTENDED RELEASE ORAL DAILY
Status: DISCONTINUED | OUTPATIENT
Start: 2021-11-17 | End: 2021-11-18 | Stop reason: HOSPADM

## 2021-11-17 RX ADMIN — HEPARIN SODIUM 11.4 UNITS/KG/HR: 10000 INJECTION, SOLUTION INTRAVENOUS at 20:59

## 2021-11-17 RX ADMIN — LATANOPROST 1 DROP: 50 SOLUTION OPHTHALMIC at 21:01

## 2021-11-17 RX ADMIN — SODIUM CHLORIDE, PRESERVATIVE FREE 10 ML: 5 INJECTION INTRAVENOUS at 20:58

## 2021-11-17 RX ADMIN — METOPROLOL SUCCINATE 25 MG: 25 TABLET, EXTENDED RELEASE ORAL at 21:01

## 2021-11-17 RX ADMIN — ASPIRIN 81 MG CHEWABLE TABLET 324 MG: 81 TABLET CHEWABLE at 17:57

## 2021-11-17 RX ADMIN — HEPARIN SODIUM 4000 UNITS: 1000 INJECTION, SOLUTION INTRAVENOUS; SUBCUTANEOUS at 20:59

## 2021-11-17 ASSESSMENT — ENCOUNTER SYMPTOMS
SHORTNESS OF BREATH: 1
VOMITING: 0
ABDOMINAL DISTENTION: 0
APNEA: 0
SORE THROAT: 0
RHINORRHEA: 0
WHEEZING: 0
DIARRHEA: 0
COUGH: 0
CHEST TIGHTNESS: 0
NAUSEA: 0
ABDOMINAL PAIN: 0
CONSTIPATION: 0
BACK PAIN: 0
CHOKING: 0

## 2021-11-17 ASSESSMENT — PAIN DESCRIPTION - PAIN TYPE
TYPE: ACUTE PAIN
TYPE: CHRONIC PAIN

## 2021-11-17 ASSESSMENT — PAIN DESCRIPTION - LOCATION
LOCATION: SHOULDER
LOCATION: CHEST

## 2021-11-17 ASSESSMENT — PAIN SCALES - GENERAL
PAINLEVEL_OUTOF10: 5
PAINLEVEL_OUTOF10: 6

## 2021-11-17 NOTE — ED PROVIDER NOTES
[facility] ED  Emergency Department Encounter  Emergency Medicine Resident     Pt Name: Bolling Cranker  MRN: [id number]  Maylingflaly 1935  Date of evaluation: 11/17/21  PCP:  Daniel Melendez MD    [address] [place] [address]       Chief Complaint   Patient presents with    Chest Pain    Shortness of Breath    Leg Pain       HISTORY OFPRESENT ILLNESS  (Location/Symptom, Timing/Onset, Context/Setting, Quality, Duration, Modifying Mattie Iba.)      Bolling Cranker is a 80 y.o. male who presents with chest pain, shortness of breath and leg pain. Patient was making his bed sitting in a wheelchair when the pain began. He had sharp/stabbing substernal chest pain that was 6 out of 10 in intensity. He did not take anything to try to relieve the pain. He was also complaining of pain to bilateral shoulders and neck that was cramping in nature for the past week. Also with cramping of the lower extremities for 2 weeks. Medical history significant for CHF, CAD, atrial fibrillation, AICD placement. No abdominal pain, nausea, vomiting, fever, chills, diarrhea, constipation, headache or blurred vision. No numbness/tingling of the arms or legs. PAST MEDICAL / SURGICAL / SOCIAL / FAMILY HISTORY      has a past medical history of Atrial fibrillation ([address] CAD (coronary artery disease), CHF (congestive heart failure) ([address] Hyperlipidemia, and Hypertension. has a past surgical history that includes pacemaker placement and Cardiac catheterization. Social:  reports that he has never smoked. He has never used smokeless tobacco. He reports that he does not drink alcohol and does not use drugs. Family Hx: History reviewed. No pertinent family history. Allergies:  Aspirin, Cyclobenzaprine, Ibuprofen, Azithromycin, and Hydrocodone-acetaminophen    Home Medications:  Prior to Admission medications    Medication Sig Start Date End Date Taking?  Authorizing Provider   amLODIPine (NORVASC) 5 MG tablet Take 2 tablets by mouth daily 11/10/21   Radha Osman MD   bumetanide (BUMEX) 1 MG tablet Take 1 tablet by mouth daily 11/10/21   Radha Osman MD   spironolactone (ALDACTONE) 25 MG tablet TAKE 1 TABLET BY MOUTH DAILY 11/5/21   Radha Osman MD   bumetanide (BUMEX) 2 MG tablet TAKE 1 TABLET BY MOUTH DAILY 11/5/21   Radha Osman MD   cyanocobalamin ([employer] VITAMIN B12) 1000 MCG tablet Take 1 tablet by mouth daily 11/3/21   Radha Osman MD   docusate (COLACE, DULCOLAX) 100 MG CAPS docusate sodium 100 mg capsule   TAKE 1 CAPSULE BY MOUTH DAILY AS NEEDED    Historical Provider, MD   docusate sodium (COLACE) 100 MG capsule Take 1 capsule by mouth 2 times daily as needed for Constipation 10/26/21 11/25/21  Radha Osman MD   Coenzyme Q10 100 MG CHEW Take 1 tablet by mouth daily 10/26/21   Radha Osman MD   famotidine (PEPCID) 20 MG tablet Take 1 tablet by mouth daily 10/26/21   Radha Osman MD   ferrous sulfate (IRON 325) 325 (65 Fe) MG tablet Take 1 tablet by mouth 2 times daily 8/20/21   Solomon Elias MD   ascorbic acid (V-R VITAMIN C) 250 MG tablet Take 1 tablet by mouth 2 times daily Take with iron 8/20/21   Solomon Elias MD   cyanocobalamin 1000 MCG/ML injection Inject 1,000 mcg into the muscle every 30 days     Historical Provider, MD   meclizine (ANTIVERT) 12.5 MG tablet Take 12.5 mg by mouth 3 times daily as needed    Historical Provider, MD   buprenorphine-naloxone (SUBOXONE) 8-2 MG FILM SL film Place 1 Film under the tongue 2 times daily.  Indications: pain     Historical Provider, MD   vitamin D (ERGOCALCIFEROL) 1.25 MG (89227 UT) CAPS capsule Take 50,000 Units by mouth once a week tuesdays    Historical Provider, MD   rivaroxaban (XARELTO) 15 MG TABS tablet Take 15 mg by mouth daily (with breakfast)    Historical Provider, MD   magnesium oxide (MAG-OX) 400 MG tablet Take 400 mg by mouth daily    Historical Provider, MD   latanoprost (XALATAN) 0.005 % ophthalmic solution Place 1 drop into both eyes nightly    Historical Provider, MD   metoprolol succinate (TOPROL XL) 25 MG extended release tablet Take 50 mg by mouth daily    Historical Provider, MD   finasteride (PROSCAR) 5 MG tablet Take 5 mg by mouth daily    Historical Provider, MD       REVIEW OFSYSTEMS    (2-9 systems for level 4, 10 or more for level 5)      Review of Systems   Constitutional: Positive for fatigue. Negative for appetite change, chills and fever. HENT: Negative for congestion, rhinorrhea, sneezing and sore throat. Eyes: Negative for visual disturbance. Respiratory: Positive for shortness of breath. Negative for cough. Cardiovascular: Positive for chest pain and leg swelling. Gastrointestinal: Negative for abdominal pain, diarrhea, nausea and vomiting. Genitourinary: Negative for dysuria. Musculoskeletal: Positive for myalgias. Negative for neck pain and neck stiffness. Skin: Negative for rash and wound. Neurological: Negative for dizziness, syncope, light-headedness and headaches. Psychiatric/Behavioral: Negative for dysphoric mood and suicidal ideas. PHYSICAL EXAM   (up to 7 for level 4, 8 or more forlevel 5)      INITIAL VITALS:   Vitals:    11/17/21 1550   BP: 112/77   Pulse: 69   Resp: 18   Temp: 98 °F (36.7 °C)   SpO2: 100%        Physical Exam  Vitals and nursing note reviewed. Constitutional:       General: He is not in acute distress. Appearance: Normal appearance. He is normal weight. He is not ill-appearing, toxic-appearing or diaphoretic. HENT:      Nose: Nose normal.      Mouth/Throat:      Mouth: Mucous membranes are moist.      Pharynx: Oropharynx is clear. Eyes:      Extraocular Movements: Extraocular movements intact. Conjunctiva/sclera: Conjunctivae normal.      Pupils: Pupils are equal, round, and reactive to light. Cardiovascular:      Rate and Rhythm: Normal rate and regular rhythm. Pulses: Normal pulses. Heart sounds: Normal heart sounds.    Pulmonary:      Effort: Pulmonary effort is normal.      Breath sounds: Normal breath sounds. Abdominal:      General: There is no distension. Palpations: Abdomen is soft. Tenderness: There is no abdominal tenderness. There is no right CVA tenderness, left CVA tenderness or guarding. Musculoskeletal:         General: Normal range of motion. Cervical back: Normal range of motion and neck supple. Skin:     General: Skin is warm. Capillary Refill: Capillary refill takes less than 2 seconds. Neurological:      General: No focal deficit present. Mental Status: He is alert and oriented to person, place, and time. Psychiatric:         Mood and Affect: Mood normal.         Behavior: Behavior normal.         DIFFERENTIAL  DIAGNOSIS     Initial MDM/Plan: 80 y.o. male who presents with acute onset chest pain while making his bed. Also has had weeks of cramps. Significant coronary artery disease history. Vital signs reviewed and within normal limits. Physical examination unremarkable, heart rate and rhythm with no murmurs rubs or gallops. Lungs clear to auscultation bilaterally. Plan to obtain EKG, CBC, BMP, troponin, chest x-ray. Concern for possible ACS, NSTEMI or STEMI. Also considering CHF exacerbation or COPD exacerbation.     DIAGNOSTIC RESULTS / EMERGENCYDEPARTMENT COURSE / MDM     LABS:  Labs Reviewed   CBC WITH AUTO DIFFERENTIAL - Abnormal; Notable for the following components:       Result Value    RBC 3.49 (*)     Hemoglobin 10.2 (*)     Hematocrit 31.6 (*)     RDW 15.0 (*)     Lymphocytes 22 (*)     Monocytes 11 (*)     Eosinophils % 9 (*)     All other components within normal limits   BASIC METABOLIC PANEL - Abnormal; Notable for the following components:    Glucose 109 (*)     BUN 34 (*)     CREATININE 1.52 (*)     GFR Non- 44 (*)     GFR  53 (*)     All other components within normal limits   TROPONIN - Abnormal; Notable for the following components:    Troponin, High Sensitivity 41 (*)     All other components within normal limits   BRAIN NATRIURETIC PEPTIDE - Abnormal; Notable for the following components:    Pro- (*)     All other components within normal limits   COVID-19, RAPID   MAGNESIUM   TROPONIN     RADIOLOGY:  XR CHEST PORTABLE    Result Date: 11/17/2021  EXAMINATION: ONE XRAY VIEW OF THE CHEST 11/17/2021 4:29 pm COMPARISON: Chest 09/24/2021 HISTORY: ORDERING SYSTEM PROVIDED HISTORY: CP, SOB, hx CHF TECHNOLOGIST PROVIDED HISTORY: CP, SOB, hx CHF Reason for Exam: CP, SOB, hx CHF Acuity: Unknown Type of Exam: Unknown FINDINGS: Stable appearing interposition splenic flexure under 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 Possible small hiatal hernia. Stable left-sided pacemaker. The cardiac silhouette is enlarged. Calcifications involving the aorta reflect atherosclerosis. The mediastinal and hilar silhouettes appear unremarkable. Minimal subsegmental atelectasis lateral lower right lung. No focal consolidation nodule evident. No pneumothorax is seen. No acute osseous abnormality is identified. 1. No acute pulmonary disease. 2. Calcific atherosclerosis aorta. 3. Cardiomegaly. 4. 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 5. Possible small hiatal hernia.        EKG  EKG Interpretation    Interpreted by emergency department physician    Rhythm: atrial fibrillation - controlled  Rate: normal  Axis: left  Ectopy: none  Conduction: normal  ST Segments: no acute change  T Waves: inversion in anterior, lateral leads  Q Waves: none    Clinical Impression: More pronounced T wave inversions in V1, V2, V3, V4, V5, V6 then compared to EKG 9/24    Ananda Roach MD    All EKG's are interpreted by the Emergency Department Physicianwho either signs or Co-signs this chart in the absence of a cardiologist.    Robin REAGAN [address] COURSE:  ED Course as of 11/17/21 1751   Wed Nov 17, 2021   1557 EKG reviewed, I don't think it is stemi criteria, but it does like like subendocardial ischemia, he does have some of these changes seen on old ekg studies, triage nurse bringing him back now. [WM]   1734 Troponin, High Sensitivity(!): 41  Baseline ~35, will repeat [JT]   1735 Creatinine(!): 1.52  Baseline 1.1 [JT]   1750 DW Dr Elton Babinski for admit [WM]      ED Course User Index  [JT] Keenan Hu MD  [] Karma Tello MD          PROCEDURES:  None    CONSULTS:  IP CONSULT TO INTERNAL MEDICINE      FINAL IMPRESSION      1. Chest pain, unspecified type         DISPOSITION / PLAN     DISPOSITION Admitted 11/17/2021 06:48:46 PM      PATIENT REFERRED TO:  No follow-up provider specified.     DISCHARGE MEDICATIONS:  New Prescriptions    No medications on file       Keenan Hu MD  Emergency Medicine Resident    (Please note that portions of this note were completed with a voice recognition program.Efforts were made to edit the dictations but occasionally words are mis-transcribed.)        Keenan Hu MD  Resident  11/21/21 9928

## 2021-11-17 NOTE — ED TRIAGE NOTES
Mode of arrival (squad #, walk in, police, etc) : Walk in        Chief complaint(s): Chest pain, leg cramps, shortness of breath        Arrival Note (brief scenario, treatment PTA, etc). : Pt arrives to ED c/o chest pain with shortness of breath that started about 5 hours ago. Patient also states he has had palpitations. Patient c/o cramping in his legs as well. EKG completed in triage. C= \"Have you ever felt that you should Cut down on your drinking? \"  No  A= \"Have people Annoyed you by criticizing your drinking? \"  No  G= \"Have you ever felt bad or Guilty about your drinking? \"  No  E= \"Have you ever had a drink as an Eye-opener first thing in the morning to steady your nerves or to help a hangover? \"  No      Deferred []      Reason for deferring: N/A    *If yes to two or more: probable alcohol abuse. *

## 2021-11-17 NOTE — H&P
[address]     HISTORY AND PHYSICAL EXAMINATION            Date:   11/17/2021  Patient name:  Regina Melendez  Date of admission:  11/17/2021  3:58 PM  MRN:   [id number]  Account:  [de-identified]  YOB: 1935  PCP:    Kena Mccarty MD  Room:   04/04  Code Status:    Prior    Chief Complaint:     Chief Complaint   Patient presents with    Chest Pain    Shortness of Breath    Leg Pain       History Obtained From:     patient    History of Present Illness: The patient is a 80 y.o. Non- / non  male who presents withChest Pain, Shortness of Breath, and Leg Pain   and he is admitted to the hospital for the management of NSTEMI. Patient was making his but this morning when he began having a stabbing substernal chest pain. The pain was approximately 6 out of 10, not relieved by rest and aggravated by activity. Patient did not take any medication for his pain. Patient also has had a cramping pain in his bilateral shoulders, arms and legs for the past 2 weeks that have been getting worse. Patient denies any diaphoresis, nausea or vomiting. He is sitting comfortably now. Relevant past medical history includes CHF, CAD, A. fib and sick sinus syndrome with AICD implanted, multiple abdominal procedures and severe osteoarthritis of the cervical spine. Patient becomes short of breath when just walking across the room. Patient had a cardiac cath earlier this year which was negative and most recent echo showed an EF of 60%. Troponins done in the ED show 41 and 40 on repeat. ECG showed increasing T wave inversions. Patient also has an VARSHA at presentation with a creatinine of 1.5 from a baseline of 1.1.         Past Medical History:     Past Medical History:   Diagnosis Date    Atrial fibrillation ([address]     CAD (coronary artery disease)     CHF (congestive heart failure) (HCC)     Hyperlipidemia     Hypertension         Past SurgicalHistory:     Past Surgical History:   Procedure Laterality Date    CARDIAC CATHETERIZATION      PACEMAKER PLACEMENT          Medications Prior to Admission:        Prior to Admission medications    Medication Sig Start Date End Date Taking? Authorizing Provider   metoprolol succinate (TOPROL XL) 25 MG extended release tablet Take 25 mg by mouth nightly   Yes Historical Provider, MD   amLODIPine (NORVASC) 5 MG tablet Take 2 tablets by mouth daily 11/10/21  Yes Lord Beverley MD   bumetanide (BUMEX) 1 MG tablet Take 1 tablet by mouth daily 11/10/21  Yes Lord Beverley MD   spironolactone (ALDACTONE) 25 MG tablet TAKE 1 TABLET BY MOUTH DAILY 11/5/21  Yes Lord Beverley MD   bumetanide (BUMEX) 2 MG tablet TAKE 1 TABLET BY MOUTH DAILY 11/5/21  Yes Lord Beverley MD   cyanocobalamin ([employer] VITAMIN B12) 1000 MCG tablet Take 1 tablet by mouth daily 11/3/21  Yes Lord Beverley MD   docusate sodium (COLACE) 100 MG capsule Take 1 capsule by mouth 2 times daily as needed for Constipation 10/26/21 11/25/21 Yes Lord Beverley MD   Coenzyme Q10 100 MG CHEW Take 1 tablet by mouth daily 10/26/21  Yes Lord Beverley MD   famotidine (PEPCID) 20 MG tablet Take 1 tablet by mouth daily 10/26/21  Yes Lord Beverley MD   ferrous sulfate (IRON 325) 325 (65 Fe) MG tablet Take 1 tablet by mouth 2 times daily 8/20/21  Yes Nayely Ford MD   ascorbic acid (V-R VITAMIN C) 250 MG tablet Take 1 tablet by mouth 2 times daily Take with iron 8/20/21  Yes Nayely Ford MD   buprenorphine-naloxone (SUBOXONE) 8-2 MG FILM SL film Place 1 Film under the tongue 2 times daily.  Indications: pain    Yes Historical Provider, MD   vitamin D (ERGOCALCIFEROL) 1.25 MG (32973 UT) CAPS capsule Take 50,000 Units by mouth once a week tuesdays   Yes Historical Provider, MD   rivaroxaban (XARELTO) 15 MG TABS tablet Take 15 mg by mouth daily (with breakfast) Yes Historical Provider, MD   magnesium oxide (MAG-OX) 400 MG tablet Take 400 mg by mouth daily   Yes Historical Provider, MD   latanoprost (XALATAN) 0.005 % ophthalmic solution Place 1 drop into both eyes nightly   Yes Historical Provider, MD   metoprolol succinate (TOPROL XL) 25 MG extended release tablet Take 50 mg by mouth daily   Yes Historical Provider, MD   finasteride (PROSCAR) 5 MG tablet Take 5 mg by mouth daily   Yes Historical Provider, MD        Allergies:     Aspirin, Cyclobenzaprine, Ibuprofen, Azithromycin, and Hydrocodone-acetaminophen    Social History:     Tobacco:    reports that he has never smoked. He has never used smokeless tobacco.  Alcohol:      reports no history of alcohol use. Drug Use:  reports no history of drug use. Family History:     History reviewed. No pertinent family history. Review of Systems:     Positive and Negative as described in HPI. Review of Systems   Constitutional: Negative for diaphoresis, fatigue and fever. Respiratory: Positive for shortness of breath. Negative for apnea, cough, choking, chest tightness and wheezing. Cardiovascular: Positive for chest pain and leg swelling. Negative for palpitations. Gastrointestinal: Negative for abdominal distention, abdominal pain, constipation, diarrhea, nausea and vomiting. Genitourinary: Negative for dysuria, flank pain, frequency and urgency. Musculoskeletal: Positive for arthralgias (moderate neck pain and crepitus), neck pain and neck stiffness. Negative for back pain, gait problem and myalgias. Neurological: Negative for dizziness, tremors, syncope, facial asymmetry, weakness, light-headedness, numbness and headaches. Psychiatric/Behavioral: Negative for dysphoric mood and sleep disturbance. The patient is not nervous/anxious.         Physical Exam:   /77   Pulse 69   Temp 98 °F (36.7 °C) (Oral)   Resp 18   Ht 6' 1\" (1.854 m)   Wt 186 lb (84.4 kg)   SpO2 100%   BMI 24.54 kg/m²   Temp Hematocrit 31.6 (L) 41 - 53 %    MCV 90.5 80 - 100 fL    MCH 29.2 26 - 34 pg    MCHC 32.3 31 - 37 g/dL    RDW 15.0 (H) 11.5 - 14.9 %    Platelets 441 664 - 827 k/uL    MPV 7.2 6.0 - 12.0 fL    NRBC Automated NOT REPORTED per 100 WBC    Differential Type NOT REPORTED     Seg Neutrophils 57 36 - 66 %    Lymphocytes 22 (L) 24 - 44 %    Monocytes 11 (H) 1 - 7 %    Eosinophils % 9 (H) 0 - 4 %    Basophils 1 0 - 2 %    Immature Granulocytes NOT REPORTED 0 %    Segs Absolute 2.80 1.3 - 9.1 k/uL    Absolute Lymph # 1.10 1.0 - 4.8 k/uL    Absolute Mono # 0.50 0.1 - 1.3 k/uL    Absolute Eos # 0.40 0.0 - 0.4 k/uL    Basophils Absolute 0.00 0.0 - 0.2 k/uL    Absolute Immature Granulocyte NOT REPORTED 0.00 - 0.30 k/uL    WBC Morphology NOT REPORTED     RBC Morphology NOT REPORTED     Platelet Estimate NOT REPORTED    Basic Metabolic Prof    Collection Time: 11/17/21  5:00 PM   Result Value Ref Range    Glucose 109 (H) 70 - 99 mg/dL    BUN 34 (H) 8 - 23 mg/dL    CREATININE 1.52 (H) 0.70 - 1.20 mg/dL    Bun/Cre Ratio NOT REPORTED 9 - 20    Calcium 9.2 8.6 - 10.4 mg/dL    Sodium 138 135 - 144 mmol/L    Potassium 4.5 3.7 - 5.3 mmol/L    Chloride 102 98 - 107 mmol/L    CO2 26 20 - 31 mmol/L    Anion Gap 10 9 - 17 mmol/L    GFR Non-African American 44 (L) >60 mL/min    GFR  53 (L) >60 mL/min    GFR Comment          GFR Staging NOT REPORTED    Magnesium    Collection Time: 11/17/21  5:00 PM   Result Value Ref Range    Magnesium 2.4 1.6 - 2.6 mg/dL   Troponin    Collection Time: 11/17/21  5:00 PM   Result Value Ref Range    Troponin, High Sensitivity 41 (H) 0 - 22 ng/L    Troponin T NOT REPORTED <0.03 ng/mL    Troponin Interp NOT REPORTED    Brain Natriuretic Peptide    Collection Time: 11/17/21  5:00 PM   Result Value Ref Range    Pro- (H) <300 pg/mL    BNP Interpretation NOT REPORTED    COVID-19, Rapid    Collection Time: 11/17/21  5:03 PM    Specimen: Nasopharyngeal Swab   Result Value Ref Range    Specimen Description . NASOPHARYNGEAL SWAB     SARS-CoV-2, Rapid Not Detected Not Detected   Troponin    Collection Time: 11/17/21  5:55 PM   Result Value Ref Range    Troponin, High Sensitivity 40 (H) 0 - 22 ng/L    Troponin T NOT REPORTED <0.03 ng/mL    Troponin Interp NOT REPORTED        Imaging/Diagnostics:  XR SHOULDER RIGHT (MIN 2 VIEWS)    Result Date: 11/10/2021  EXAMINATION: THREE XRAY VIEWS OF THE RIGHT SHOULDER 11/10/2021 10:06 am COMPARISON: None. HISTORY: ORDERING SYSTEM PROVIDED HISTORY: Chronic right shoulder pain TECHNOLOGIST PROVIDED HISTORY: Reason for Exam: Pt states chronic right shoulder pain many years pt states pain radiates across neck into left shoulder also Acuity: Chronic Type of Exam: Initial Additional signs and symptoms: bilateral shoulder pain with right being worse 80year-old male with chronic right shoulder pain FINDINGS: Right AC joint grossly unremarkable. Mild degenerative changes of the right glenohumeral joint. Visualized ribs appear intact. No acute fracture or dislocation. Left-sided cardiac pacemaker device leads partially visualized. Atherosclerotic calcification of the aorta. Mild right basilar atelectasis. 1. Mild degenerative change of the right glenohumeral joint. 2. No acute fracture or dislocation. 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 Possible small hiatal hernia. Stable left-sided pacemaker. The cardiac silhouette is enlarged. Calcifications involving the aorta reflect atherosclerosis. The mediastinal and hilar silhouettes appear unremarkable.  Minimal subsegmental atelectasis lateral lower right lung. No focal consolidation nodule evident. No pneumothorax is seen. No acute osseous abnormality is identified. 1. No acute pulmonary disease. 2. Calcific atherosclerosis aorta. 3. Cardiomegaly. 4. 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 5. Possible small hiatal hernia. VL Lower Extremity Venous Right    Result Date: 11/16/2021    [address] [place] [address]  Vascular Lower Extremities DVT Study Procedure   Patient Name      Merged with [facility]     Date of Study             11/16/2021                    DONNA   Date of Birth     1935   Gender                    Male   Age               80 year(s)   Race                      Black   Room Number   Corporate ID #    [id number]   Patient Acct #    [de-identified]   MR #              [id number]       Baylee Matson   Accession #       [id number]   Interpreting Physician    Raj Cortés   Referring Nurse                Referring Physician       Neftaly Contreras  Practitioner  Procedure Type of Study:   Veins: Lower Extremities DVT Study, Venous Scan Lower Right. Indications for Study:Leg Swelling. Patient Status:Out Patient. Technical Quality:Adequate visualization. Conclusions   Summary   No evidence of deep venous thrombosis in the right lower extremity. Chronic phlebitis of the right small saphenous vein. Signature   ----------------------------------------------------------------  Electronically signed by Yehuda Hoffman(Sonographer) on  11/16/2021 08:25 AM  ----------------------------------------------------------------   ----------------------------------------------------------------  Electronically signed by Raj Cortés(Interpreting physician)  on 11/16/2021 06:05 PM  ----------------------------------------------------------------  Findings:   Right Impression:                           Left Impression:   The common femoral, femoral, popliteal and  The common femoral vein  tibial veins demonstrate normal             demonstrates normal  compressibility and augmentation. compressibility. Normal compressibility of the great  saphenous vein. Partial compressibility of the small  saphenous vein in the proximal calf. Velocities are measured in cm/s ; Diameters are measured in cm Right Lower Extremities DVT Study Measurements Right 2D Measurements +------------------------------------+----------+---------------+----------+ ! Location                            ! Visualized! Compressibility! Thrombosis! +------------------------------------+----------+---------------+----------+ ! Common Femoral                      !Yes       ! Yes            ! None      ! +------------------------------------+----------+---------------+----------+ ! Prox Femoral                        !Yes       ! Yes            ! None      ! +------------------------------------+----------+---------------+----------+ ! Mid Femoral                         !Yes       ! Yes            ! None      ! +------------------------------------+----------+---------------+----------+ ! Dist Femoral                        !Yes       ! Yes            ! None      ! +------------------------------------+----------+---------------+----------+ ! Popliteal                           !Yes       ! Yes            ! None      ! +------------------------------------+----------+---------------+----------+ ! Sapheno Femoral Junction            ! Yes       ! Yes            ! None      ! +------------------------------------+----------+---------------+----------+ ! PTV                                 ! Yes       ! Yes            ! None      ! +------------------------------------+----------+---------------+----------+ ! Peroneal                            !Yes       ! Yes            ! None      ! +------------------------------------+----------+---------------+----------+ ! Gastroc !Yes       !Yes            ! None      ! +------------------------------------+----------+---------------+----------+ ! GSV Thigh                           ! Yes       ! Yes            ! None      ! +------------------------------------+----------+---------------+----------+ ! GSV Knee                            ! Yes       ! Yes            ! None      ! +------------------------------------+----------+---------------+----------+ ! GSV Ankle                           ! Yes       ! Yes            ! None      ! +------------------------------------+----------+---------------+----------+ ! SSV                                 ! Yes       ! Yes            ! None      ! +------------------------------------+----------+---------------+----------+ Right Doppler Measurements +---------------------------+------+------+--------------------------------+ ! Location                   ! Signal!Reflux! Reflux (msec)                   ! +---------------------------+------+------+--------------------------------+ ! Common Femoral             !Phasic!      !                                ! +---------------------------+------+------+--------------------------------+ ! Prox Femoral               !Phasic!      !                                ! +---------------------------+------+------+--------------------------------+ ! Popliteal                  !Phasic!      !                                ! +---------------------------+------+------+--------------------------------+ Left Lower Extremities DVT Study Measurements Left 2D Measurements +------------------------------------+----------+---------------+----------+ ! Location                            ! Visualized! Compressibility! Thrombosis! +------------------------------------+----------+---------------+----------+ ! Common Femoral                      !Yes       ! Yes            ! None      ! +------------------------------------+----------+---------------+----------+ Left Doppler Measurements +----------------------------+------+------+-------------------------------+ ! Location                    ! Signal!Reflux! Reflux (msec)                  ! +----------------------------+------+------+-------------------------------+ ! Common Femoral              !Phasic!      !                               ! +----------------------------+------+------+-------------------------------+      Assessment :      Primary Problem  NSTEMI (non-ST elevated myocardial infarction) [facility])    Active Hospital Problems    Diagnosis Date Noted    NSTEMI (non-ST elevated myocardial infarction) ([facility] [I21.4] 11/17/2021    Chronic CHF (congestive heart failure) ([facility] [I50.9] 11/17/2021    VARSHA (acute kidney injury) ([facility] [N17.9] 09/24/2021    Atrial fibrillation ([facility] [I48.91] 05/25/2021       Plan:     Patient status Admit as inpatient in the  Progressive Unit/Step down    NSTEMI  Patient has history of CAD  Patient was taking Xarelto 15 at home  1 day history of chest pain  Tropes in the ED were 41 and 40  ECG showed increasing T wave inversions  Full dose heparin  Cardiology consult    Atrial fibrillation  Continue home dose metoprolol 25 nightly  Patient has AICD implanted    VARSHA  Creatinine was found to be 1.5 in the ED from a baseline of 1.1  Holding patient's home Bumex  Holding off on IV fluids as to not aggravate patient's CHF  We will follow creatinine    CHF    Symptoms of SOB on exertion  CXR clear  Continuing home dose of aldactone, metoprolol    Hypertension  Continue home dose amlodipine 10 mg, metoprolol 25 nightly    Full dose heparin, patiently adequately anticoagulated  Diet: low salt, NPO at midnight      Consultations:   IP CONSULT TO INTERNAL MEDICINE  IP CONSULT TO CARDIOLOGY  IP CONSULT TO SOCIAL WORK     Patient is admitted as inpatient status because of co-morbiditieslisted above, severity of signs and symptoms as outlined, requirement for current medical therapies and most importantly because of direct risk to patient if care not provided in a hospital setting. Halley Weston MD  11/17/2021  7:00 PM    Copy sent to Dr. Usama Rubio MD    Attestation and add on       I have discussed the care of Ashely Brizuela , including pertinent history and exam findings,      11/17/21    with the resident. I have seen and examined the patient and the key elements of all parts of the encounter have been performed by me . I agree with the assessment, plan and orders as documented by the resident. Principal Problem:    NSTEMI (non-ST elevated myocardial infarction) ([facility]  Active Problems:    Atrial fibrillation (HCC)    VARSHA (acute kidney injury) ([facility]    Chronic CHF (congestive heart failure) ([facility]    Acute inferolateral myocardial infarction [facility])  Resolved Problems:    * No resolved hospital problems. *            ---- ;        Medications: Allergies:     Allergies   Allergen Reactions    Aspirin Nausea Only    Cyclobenzaprine Nausea Only    Ibuprofen Nausea Only    Azithromycin Nausea Only    Hydrocodone-Acetaminophen      per pt, he is having upset stomach when taking norco       Current Meds:   Scheduled Meds:    buprenorphine-naloxone  1 Film SubLINGual BID    coenzyme Q10  100 mg Oral Daily    cyanocobalamin  1,000 mcg Oral Daily    ferrous sulfate  325 mg Oral BID    vitamin D  50,000 Units Oral Weekly    ascorbic acid  250 mg Oral BID    amLODIPine  10 mg Oral Daily    [Held by provider] bumetanide  1 mg Oral Daily    [Held by provider] bumetanide  2 mg Oral Daily    famotidine  20 mg Oral Daily    finasteride  5 mg Oral Daily    latanoprost  1 drop Both Eyes Nightly    magnesium oxide  400 mg Oral Daily    metoprolol succinate  50 mg Oral Daily    [Held by provider] rivaroxaban  15 mg Oral Daily with breakfast    metoprolol succinate  25 mg Oral Nightly    spironolactone  25 mg Oral Daily    sodium chloride flush  5-40 mL IntraVENous 2 times per day     Continuous Infusions:    sodium chloride       PRN Meds: sodium chloride flush, sodium chloride, ondansetron **OR** ondansetron, polyethylene glycol        MD JACQUELINE [address], [address] [place] [address].    Phone [phone]   Fax: [phone]  Answering Service: [phone]

## 2021-11-17 NOTE — ED PROVIDER NOTES
EMERGENCY DEPARTMENT ENCOUNTER   ATTENDING ATTESTATION     Pt Name: Waylon Aldana  MRN: [id number]  [place] 1935  Date of evaluation: 11/17/21       Waylon Aldana is a 80 y.o. male who presents with Chest Pain, Shortness of Breath, and Leg Pain      MDM:   smiley shoulder pain for 2 weeks, progressing to cp  Hx of pacemaker and CHF  Concern for CAD ACS  ekg showing some new changes  Doesn't meet stemi criteria  Admitting to medicine with card eval    Vitals:   Vitals:    11/17/21 1550   BP: 112/77   Pulse: 69   Resp: 18   Temp: 98 °F (36.7 °C)   TempSrc: Oral   SpO2: 100%   Weight: 186 lb (84.4 kg)   Height: 6' 1\" (1.854 m)         I personally evaluated and examined the patient in conjunction with the resident and agree with the assessment, treatment plan, and disposition of the patient as recorded by the resident. I performed a history and physical examination of the patient and discussed management with the resident. I reviewed the residents note and agree with the documented findings and plan of care. Any areas of disagreement are noted on the chart. I was personally present for the key portions of any procedures. I have documented in the chart those procedures where I was not present during the key portions. I have personally reviewed all images and agree with the resident's interpretation. I have reviewed the emergency nurses triage note. I agree with the chief complaint, past medical history, past surgical history, allergies, medications, social and family history as documented unless otherwise noted. The care is provided during an unprecedented national emergency due to the novel coronavirus, COVID 19.   Laury Wilkes MD  Attending Emergency Physician           Laury Wilkes MD  11/17/21 4724

## 2021-11-17 NOTE — PROGRESS NOTES
Medication History completed:    New medications: None    Medications discontinued: cyanocobalamin injection, meclizine    Changes to dosing: metoprolol succinate 25 m tablets every morning, 1 tablet every night    Stated allergies: As listed    Other pertinent information: Confirmed medication list with [address] [place] [address]. Buprenorphine-naloxone last filled for 30 day supply on 10/25/2021 per [facility].      Santa Neri, PharmD Candidate [id number]

## 2021-11-18 VITALS
TEMPERATURE: 98.7 F | HEART RATE: 70 BPM | DIASTOLIC BLOOD PRESSURE: 90 MMHG | OXYGEN SATURATION: 98 % | RESPIRATION RATE: 20 BRPM | HEIGHT: 73 IN | BODY MASS INDEX: 25.51 KG/M2 | SYSTOLIC BLOOD PRESSURE: 137 MMHG | WEIGHT: 192.46 LBS

## 2021-11-18 PROBLEM — I21.19 ACUTE INFEROLATERAL MYOCARDIAL INFARCTION (HCC): Status: ACTIVE | Noted: 2021-11-18

## 2021-11-18 LAB
ANION GAP SERPL CALCULATED.3IONS-SCNC: 8 MMOL/L (ref 9–17)
BUN BLDV-MCNC: 23 MG/DL (ref 8–23)
BUN/CREAT BLD: ABNORMAL (ref 9–20)
CALCIUM SERPL-MCNC: 8.9 MG/DL (ref 8.6–10.4)
CHLORIDE BLD-SCNC: 106 MMOL/L (ref 98–107)
CO2: 25 MMOL/L (ref 20–31)
CREAT SERPL-MCNC: 0.88 MG/DL (ref 0.7–1.2)
EKG ATRIAL RATE: 76 BPM
EKG Q-T INTERVAL: 374 MS
EKG QRS DURATION: 76 MS
EKG QTC CALCULATION (BAZETT): 420 MS
EKG R AXIS: 8 DEGREES
EKG T AXIS: -78 DEGREES
EKG VENTRICULAR RATE: 76 BPM
GFR AFRICAN AMERICAN: >60 ML/MIN
GFR NON-AFRICAN AMERICAN: >60 ML/MIN
GFR SERPL CREATININE-BSD FRML MDRD: ABNORMAL ML/MIN/{1.73_M2}
GFR SERPL CREATININE-BSD FRML MDRD: ABNORMAL ML/MIN/{1.73_M2}
GLUCOSE BLD-MCNC: 108 MG/DL (ref 70–99)
PARTIAL THROMBOPLASTIN TIME: 100.5 SEC (ref 24–36)
PARTIAL THROMBOPLASTIN TIME: 43.5 SEC (ref 24–36)
POTASSIUM SERPL-SCNC: 4.1 MMOL/L (ref 3.7–5.3)
SODIUM BLD-SCNC: 139 MMOL/L (ref 135–144)
TROPONIN INTERP: ABNORMAL
TROPONIN T: ABNORMAL NG/ML
TROPONIN, HIGH SENSITIVITY: 34 NG/L (ref 0–22)

## 2021-11-18 PROCEDURE — 36415 COLL VENOUS BLD VENIPUNCTURE: CPT

## 2021-11-18 PROCEDURE — 97162 PT EVAL MOD COMPLEX 30 MIN: CPT

## 2021-11-18 PROCEDURE — 99239 HOSP IP/OBS DSCHRG MGMT >30: CPT | Performed by: INTERNAL MEDICINE

## 2021-11-18 PROCEDURE — 85730 THROMBOPLASTIN TIME PARTIAL: CPT

## 2021-11-18 PROCEDURE — 80048 BASIC METABOLIC PNL TOTAL CA: CPT

## 2021-11-18 PROCEDURE — 93010 ELECTROCARDIOGRAM REPORT: CPT | Performed by: INTERNAL MEDICINE

## 2021-11-18 PROCEDURE — 6360000002 HC RX W HCPCS

## 2021-11-18 PROCEDURE — 84484 ASSAY OF TROPONIN QUANT: CPT

## 2021-11-18 PROCEDURE — 6370000000 HC RX 637 (ALT 250 FOR IP)

## 2021-11-18 PROCEDURE — 97166 OT EVAL MOD COMPLEX 45 MIN: CPT

## 2021-11-18 PROCEDURE — 97530 THERAPEUTIC ACTIVITIES: CPT

## 2021-11-18 PROCEDURE — 97116 GAIT TRAINING THERAPY: CPT

## 2021-11-18 RX ORDER — LANOLIN ALCOHOL/MO/W.PET/CERES
1000 CREAM (GRAM) TOPICAL DAILY
Status: DISCONTINUED | OUTPATIENT
Start: 2021-11-18 | End: 2021-11-18 | Stop reason: HOSPADM

## 2021-11-18 RX ORDER — BUPRENORPHINE AND NALOXONE 8; 2 MG/1; MG/1
1 FILM, SOLUBLE BUCCAL; SUBLINGUAL 2 TIMES DAILY
Status: DISCONTINUED | OUTPATIENT
Start: 2021-11-18 | End: 2021-11-18 | Stop reason: HOSPADM

## 2021-11-18 RX ORDER — ERGOCALCIFEROL 1.25 MG/1
50000 CAPSULE ORAL WEEKLY
Status: DISCONTINUED | OUTPATIENT
Start: 2021-11-18 | End: 2021-11-18 | Stop reason: HOSPADM

## 2021-11-18 RX ORDER — ASCORBIC ACID 500 MG
250 TABLET ORAL 2 TIMES DAILY
Status: DISCONTINUED | OUTPATIENT
Start: 2021-11-18 | End: 2021-11-18 | Stop reason: HOSPADM

## 2021-11-18 RX ORDER — UBIDECARENONE 100 MG
100 CAPSULE ORAL DAILY
Status: DISCONTINUED | OUTPATIENT
Start: 2021-11-18 | End: 2021-11-18 | Stop reason: HOSPADM

## 2021-11-18 RX ORDER — FERROUS SULFATE 325(65) MG
325 TABLET ORAL 2 TIMES DAILY
Status: DISCONTINUED | OUTPATIENT
Start: 2021-11-18 | End: 2021-11-18 | Stop reason: HOSPADM

## 2021-11-18 RX ADMIN — Medication 400 MG: at 08:05

## 2021-11-18 RX ADMIN — BUPRENORPHINE AND NALOXONE 1 FILM: 8; 2 FILM BUCCAL; SUBLINGUAL at 13:33

## 2021-11-18 RX ADMIN — OXYCODONE HYDROCHLORIDE AND ACETAMINOPHEN 250 MG: 500 TABLET ORAL at 13:33

## 2021-11-18 RX ADMIN — ERGOCALCIFEROL 50000 UNITS: 1.25 CAPSULE ORAL at 14:16

## 2021-11-18 RX ADMIN — FERROUS SULFATE TAB 325 MG (65 MG ELEMENTAL FE) 325 MG: 325 (65 FE) TAB at 13:33

## 2021-11-18 RX ADMIN — HEPARIN SODIUM 2000 UNITS: 1000 INJECTION, SOLUTION INTRAVENOUS; SUBCUTANEOUS at 11:52

## 2021-11-18 RX ADMIN — AMLODIPINE BESYLATE 10 MG: 10 TABLET ORAL at 08:04

## 2021-11-18 RX ADMIN — CYANOCOBALAMIN TAB 1000 MCG 1000 MCG: 1000 TAB at 13:35

## 2021-11-18 RX ADMIN — METOPROLOL SUCCINATE 50 MG: 50 TABLET, EXTENDED RELEASE ORAL at 08:04

## 2021-11-18 RX ADMIN — POLYETHYLENE GLYCOL 3350 17 G: 17 POWDER, FOR SOLUTION ORAL at 13:33

## 2021-11-18 RX ADMIN — HEPARIN SODIUM 9.4 UNITS/KG/HR: 10000 INJECTION, SOLUTION INTRAVENOUS at 11:50

## 2021-11-18 RX ADMIN — FINASTERIDE 5 MG: 5 TABLET, FILM COATED ORAL at 08:04

## 2021-11-18 RX ADMIN — FAMOTIDINE 20 MG: 20 TABLET, FILM COATED ORAL at 08:05

## 2021-11-18 RX ADMIN — SPIRONOLACTONE 25 MG: 25 TABLET, FILM COATED ORAL at 08:04

## 2021-11-18 RX ADMIN — Medication 100 MG: at 14:16

## 2021-11-18 ASSESSMENT — PAIN - FUNCTIONAL ASSESSMENT: PAIN_FUNCTIONAL_ASSESSMENT: PREVENTS OR INTERFERES SOME ACTIVE ACTIVITIES AND ADLS

## 2021-11-18 ASSESSMENT — PAIN SCALES - GENERAL
PAINLEVEL_OUTOF10: 5
PAINLEVEL_OUTOF10: 6
PAINLEVEL_OUTOF10: 6
PAINLEVEL_OUTOF10: 5
PAINLEVEL_OUTOF10: 6

## 2021-11-18 ASSESSMENT — PAIN DESCRIPTION - PAIN TYPE
TYPE: CHRONIC PAIN

## 2021-11-18 ASSESSMENT — PAIN DESCRIPTION - LOCATION
LOCATION: SHOULDER
LOCATION: SHOULDER;NECK;LEG
LOCATION: SHOULDER;NECK
LOCATION: SHOULDER;NECK
LOCATION: SHOULDER

## 2021-11-18 ASSESSMENT — ENCOUNTER SYMPTOMS
DIARRHEA: 0
SHORTNESS OF BREATH: 1
NAUSEA: 0
CONSTIPATION: 0
APNEA: 0
WHEEZING: 0
CHEST TIGHTNESS: 0
VOMITING: 0
BACK PAIN: 0
ABDOMINAL PAIN: 1
CHOKING: 0
ABDOMINAL DISTENTION: 0
COUGH: 0

## 2021-11-18 ASSESSMENT — PAIN DESCRIPTION - FREQUENCY
FREQUENCY: CONTINUOUS

## 2021-11-18 ASSESSMENT — PAIN DESCRIPTION - PROGRESSION
CLINICAL_PROGRESSION: GRADUALLY WORSENING
CLINICAL_PROGRESSION: GRADUALLY WORSENING

## 2021-11-18 ASSESSMENT — PAIN DESCRIPTION - ONSET
ONSET: ON-GOING
ONSET: ON-GOING

## 2021-11-18 NOTE — PROGRESS NOTES
[address]    PROGRESS NOTE             11/18/2021    8:25 AM    Name:   Amaris Cameron  MRN:     [id number]     Acct:      [de-identified]   Room:   2094/2094-01  IP Day:  1  Admit Date:  11/17/2021  3:58 PM    PCP:  Noa Henriquez MD  Code Status:  Full Code    Subjective:     C/C:   Chief Complaint   Patient presents with    Chest Pain    Shortness of Breath    Leg Pain     Interval History Status: not changed. Patient seen and examined at bedside. Chest pain is better but patient continues to feel uncomfortable and malaise. Pain in his neck with radiating numbness bilateral arms this causes him discomfort. Repeat troponin this morning showed downward trending. Patient to be evaluated by cardiology today for further recommendations. Brief History:     The patient is a 80 y.o. Non- / non  male who presents withChest Pain, Shortness of Breath, and Leg Pain   and he is admitted to the hospital for the management of NSTEMI. Patient was making his but this morning when he began having a stabbing substernal chest pain. The pain was approximately 6 out of 10, not relieved by rest and aggravated by activity. Patient did not take any medication for his pain. Patient also has had a cramping pain in his bilateral shoulders, arms and legs for the past 2 weeks that have been getting worse. Patient denies any diaphoresis, nausea or vomiting. He is sitting comfortably now. Relevant past medical history includes CHF, CAD, A. fib and sick sinus syndrome with AICD implanted, multiple abdominal procedures and severe osteoarthritis of the cervical spine. Patient becomes short of breath when just walking across the room. Patient had a cardiac cath earlier this year which was negative and most recent echo showed an EF of 60%. Troponins done in the ED show 41 and 40 on repeat. ECG showed increasing T wave inversions.   Patient also has an VARSHA at presentation with a creatinine of 1.5 from a baseline of 1.1. Full dose heparin was started. Repeat troponins in the morning are trending downwards. Review of Systems:     Review of Systems   Constitutional: Negative for diaphoresis, fatigue and fever. Respiratory: Positive for shortness of breath. Negative for apnea, cough, choking, chest tightness and wheezing. Cardiovascular: Positive for chest pain and leg swelling. Negative for palpitations. Gastrointestinal: Positive for abdominal pain (Sharp abdominal pain right lower quadrant). Negative for abdominal distention, constipation, diarrhea, nausea and vomiting. Genitourinary: Negative for dysuria, flank pain, frequency and urgency. Musculoskeletal: Positive for arthralgias (moderate neck pain and crepitus), neck pain and neck stiffness. Negative for back pain, gait problem and myalgias. Neurological: Positive for numbness (Numbness in bilateral arms and hands). Negative for dizziness, tremors, syncope, facial asymmetry, weakness, light-headedness and headaches. Psychiatric/Behavioral: Negative for dysphoric mood and sleep disturbance. The patient is not nervous/anxious. Medications: Allergies:     Allergies   Allergen Reactions    Aspirin Nausea Only    Cyclobenzaprine Nausea Only    Ibuprofen Nausea Only    Azithromycin Nausea Only    Hydrocodone-Acetaminophen      per pt, he is having upset stomach when taking norco       Current Meds:   Scheduled Meds:    amLODIPine  10 mg Oral Daily    [Held by provider] bumetanide  1 mg Oral Daily    [Held by provider] bumetanide  2 mg Oral Daily    famotidine  20 mg Oral Daily    finasteride  5 mg Oral Daily    latanoprost  1 drop Both Eyes Nightly    magnesium oxide  400 mg Oral Daily    metoprolol succinate  50 mg Oral Daily    [Held by provider] rivaroxaban  15 mg Oral Daily with breakfast    metoprolol succinate  25 mg Oral Nightly    spironolactone  25 mg Oral Daily    sodium chloride flush  5-40 mL IntraVENous 2 times per day     Continuous Infusions:    heparin (PORCINE) Infusion 7.4 Units/kg/hr (21 0529)    sodium chloride       PRN Meds: heparin (porcine), heparin (porcine), sodium chloride flush, sodium chloride, ondansetron **OR** ondansetron, polyethylene glycol    Data:     Past Medical History:   has a past medical history of Atrial fibrillation ([facility] [address] CAD (coronary artery disease), CHF (congestive heart failure) ([facility] [address] Hyperlipidemia, and Hypertension. Social History:   reports that he has never smoked. He has never used smokeless tobacco. He reports that he does not drink alcohol and does not use drugs. Family History: History reviewed. No pertinent family history. Vitals:  /77   Pulse 70   Temp 98.1 °F (36.7 °C) (Oral)   Resp 16   Ht 6' 1\" (1.854 m)   Wt 192 lb 7.4 oz (87.3 kg)   SpO2 98%   BMI 25.39 kg/m²   Temp (24hrs), Av °F (36.7 °C), Min:97.9 °F (36.6 °C), Max:98.1 °F (36.7 °C)    No results for input(s): POCGLU in the last 72 hours. I/O(24Hr): Intake/Output Summary (Last 24 hours) at 2021 0825  Last data filed at 2021 2357  Gross per 24 hour   Intake    Output 250 ml   Net -250 ml       Labs:    [unfilled]    Lab Results   Component Value Date/Time    SPECIAL NOT REPORTED 2021 08:15 PM     Lab Results   Component Value Date/Time    CULTURE NO SIGNIFICANT GROWTH 2021 08:15 PM       [unfilled]    Radiology:    XR SHOULDER RIGHT (MIN 2 VIEWS)    Result Date: 11/10/2021  EXAMINATION: THREE XRAY VIEWS OF THE RIGHT SHOULDER 11/10/2021 10:06 am COMPARISON: None.  HISTORY: ORDERING SYSTEM PROVIDED HISTORY: Chronic right shoulder pain TECHNOLOGIST PROVIDED HISTORY: Reason for Exam: Pt states chronic right shoulder pain many years pt states pain radiates across neck into left shoulder also Acuity: Chronic Type of Exam: Initial Additional signs and symptoms: bilateral shoulder pain with right being Number   Corporate ID #    [id number]   Patient Acct #    [de-identified]   MR #              [id number]       [facility]   Accession #       [id number]   Interpreting Physician    Raj Cortés   Referring Nurse                Referring Physician       Neftaly Contreras  Practitioner  Procedure Type of Study:   Veins: Lower Extremities DVT Study, Venous Scan Lower Right. Indications for Study:Leg Swelling. Patient Status:Out Patient. Technical Quality:Adequate visualization. Conclusions   Summary   No evidence of deep venous thrombosis in the right lower extremity. Chronic phlebitis of the right small saphenous vein. Signature   ----------------------------------------------------------------  Electronically signed by Yehuda Wheat(Sonographer) on  11/16/2021 08:25 AM  ----------------------------------------------------------------   ----------------------------------------------------------------  Electronically signed by Raj Cortés(Interpreting physician)  on 11/16/2021 06:05 PM  ----------------------------------------------------------------  Findings:   Right Impression:                           Left Impression:  The common femoral, femoral, popliteal and  The common femoral vein  tibial veins demonstrate normal             demonstrates normal  compressibility and augmentation. compressibility. Normal compressibility of the great  saphenous vein. Partial compressibility of the small  saphenous vein in the proximal calf. Velocities are measured in cm/s ; Diameters are measured in cm Right Lower Extremities DVT Study Measurements Right 2D Measurements +------------------------------------+----------+---------------+----------+ ! Location                            ! Visualized! Compressibility! Thrombosis! +------------------------------------+----------+---------------+----------+ ! Common Femoral                      !Yes       ! Yes            ! None      ! +------------------------------------+----------+---------------+----------+ ! Prox Femoral                        !Yes       ! Yes            ! None      ! +------------------------------------+----------+---------------+----------+ ! Mid Femoral                         !Yes       ! Yes            ! None      ! +------------------------------------+----------+---------------+----------+ ! Dist Femoral                        !Yes       ! Yes            ! None      ! +------------------------------------+----------+---------------+----------+ ! Popliteal                           !Yes       ! Yes            ! None      ! +------------------------------------+----------+---------------+----------+ ! Sapheno Femoral Junction            ! Yes       ! Yes            ! None      ! +------------------------------------+----------+---------------+----------+ ! PTV                                 ! Yes       ! Yes            ! None      ! +------------------------------------+----------+---------------+----------+ ! Peroneal                            !Yes       ! Yes            ! None      ! +------------------------------------+----------+---------------+----------+ ! Gastroc                             ! Yes       ! Yes            ! None      ! +------------------------------------+----------+---------------+----------+ ! GSV Thigh                           ! Yes       ! Yes            ! None      ! +------------------------------------+----------+---------------+----------+ ! GSV Knee                            ! Yes       ! Yes            ! None      ! +------------------------------------+----------+---------------+----------+ ! GSV Ankle                           ! Yes       ! Yes            ! None      ! +------------------------------------+----------+---------------+----------+ ! SSV                                 ! Yes       ! Yes            ! None      ! +------------------------------------+----------+---------------+----------+ Right Doppler Measurements +---------------------------+------+------+--------------------------------+ ! Location                   ! Signal!Reflux! Reflux (msec)                   ! +---------------------------+------+------+--------------------------------+ ! Common Femoral             !Phasic!      !                                ! +---------------------------+------+------+--------------------------------+ ! Prox Femoral               !Phasic!      !                                ! +---------------------------+------+------+--------------------------------+ ! Popliteal                  !Phasic!      !                                ! +---------------------------+------+------+--------------------------------+ Left Lower Extremities DVT Study Measurements Left 2D Measurements +------------------------------------+----------+---------------+----------+ ! Location                            ! Visualized! Compressibility! Thrombosis! +------------------------------------+----------+---------------+----------+ ! Common Femoral                      !Yes       ! Yes            ! None      ! +------------------------------------+----------+---------------+----------+ Left Doppler Measurements +----------------------------+------+------+-------------------------------+ ! Location                    ! Signal!Reflux! Reflux (msec)                  ! +----------------------------+------+------+-------------------------------+ ! Common Femoral              !Phasic!      !                               ! +----------------------------+------+------+-------------------------------+        Physical Examination:        Physical Exam  Constitutional:       Appearance: Normal appearance. HENT:      Mouth/Throat:      Mouth: Mucous membranes are moist.      Pharynx: Oropharynx is clear. Eyes:      Extraocular Movements: Extraocular movements intact. Conjunctiva/sclera: Conjunctivae normal.      Pupils: Pupils are equal, round, and reactive to light. Comments: Arcus faustinalizabeth   Neck:      Comments: Pain throughout range of motion  Cardiovascular:      Rate and Rhythm: Normal rate and regular rhythm. Pulses: Normal pulses. Heart sounds: Murmur (Systolic murmur) heard. Pulmonary:      Effort: Pulmonary effort is normal.      Breath sounds: Normal breath sounds. No wheezing or rales. Abdominal:      General: Abdomen is flat. There is no distension. Palpations: Abdomen is soft. There is mass. Tenderness: There is no abdominal tenderness. There is no guarding. Comments: Small 2 cm in diameter mass in epigastric region  No sign of hernia   Musculoskeletal:         General: No swelling or deformity. Normal range of motion. Cervical back: Neck supple. Tenderness present. No rigidity. Right lower leg: Edema present. Left lower leg: Edema present. Comments: Patient wearing compression stockings   Skin:     General: Skin is warm and dry. Capillary Refill: Capillary refill takes less than 2 seconds. Neurological:      General: No focal deficit present. Mental Status: He is alert and oriented to person, place, and time. Psychiatric:         Mood and Affect: Mood normal.         Behavior: Behavior normal.           Assessment:        Primary Problem  NSTEMI (non-ST elevated myocardial infarction) [facility])    Active Hospital Problems    Diagnosis Date Noted    NSTEMI (non-ST elevated myocardial infarction) ([facility] [address] [I21.4] 11/17/2021    Chronic CHF (congestive heart failure) ([facility] [address] [I50.9] 11/17/2021    VARSHA (acute kidney injury) ([facility] [address] [N17.9] 09/24/2021    Atrial fibrillation ([facility] [address] [I48.91] 05/25/2021       Plan:        NSTEMI  Patient has history of CAD  Patient was taking Xarelto 15 at home, currently off it during hospitalization  1 day history of chest pain  Tropes in the ED were 39 and 40. Repeat this morning showed 34  ECG showed increasing T wave inversions, repeat ECG in a.m.   Full dose heparin  Cardiology consult     Atrial fibrillation  Continue home dose metoprolol 25 nightly  Patient has AICD implanted     AVRSHA  Creatinine was found to be 1.5 in the ED from a baseline of 1.1  Creatinine today 0.88  Holding patient's home Bumex  Holding off on IV fluids as to not aggravate patient's CHF  We will follow creatinine     CHF    Symptoms of SOB on exertion  CXR clear  Continuing home dose of aldactone, metoprolol     Hypertension  Continue home dose amlodipine 10 mg, metoprolol 25 nightly     Full dose heparin, patiently adequately anticoagulated  Diet: low salt, NPO at midnight  CODE STATUS full    Funmi Felipe MD  11/18/2021  8:25 AM

## 2021-11-18 NOTE — DISCHARGE INSTR - COC
Continuity of Care Form    Patient Name: Ree Mi   :  1935  MRN:  [id number]    Admit date:  2021  Discharge date:  ***    Code Status Order: Full Code   Advance Directives:      Admitting Physician:  Aniceto Payne MD  PCP: Gerrianne Cranker, MD    Discharging Nurse: [facility] Unit/Room#: 2094/2094-01  Discharging Unit Phone Number: ***    Emergency Contact:   Extended Emergency Contact Information  Primary Emergency Contact: Mojgan Higgins  Mobile Phone: [phone]  Relation: Child    Past Surgical History:  Past Surgical History:   Procedure Laterality Date    CARDIAC CATHETERIZATION      PACEMAKER PLACEMENT         Immunization History:   Immunization History   Administered Date(s) Administered    COVID-19, Pfizer, PF, 30mcg/0.3mL 2021, 2021, 2021    Influenza Virus Vaccine 10/30/2011, 10/09/2015, 2016    Influenza, High Dose (Fluzone 65 yrs and older) 10/20/2018, 10/01/2019    Influenza, High-dose, Quadv, 72 yrs +, IM (Fluzone) 2020    Pneumococcal Conjugate 13-valent ([facility]) 10/30/2019    Pneumococcal Polysaccharide (Uttcjaoax47) 10/30/2011, 2020       Active Problems:  Patient Active Problem List   Diagnosis Code    Atrial fibrillation ([facility] I48.91    On continuous oral anticoagulation Z79.01    CHF, acute on chronic ([facility] I50.9    Hyperlipidemia E78.5    Former smoker Z87.891    Pacemaker Z95.0    History of DVT of lower extremity Z86.718    Enlarged prostate N40.0    Cataract of both eyes H26.9    GERD (gastroesophageal reflux disease) K21.9    History of inguinal hernia repair Z98.890, Z87.19    Hypertension I10    Pneumonia J18.9    History of right hip replacement Z96.641    History of back surgery Z98.890    Low back pain M54.50    Chest pain R07.9    Fluid overload E87.70    VARSHA (acute kidney injury) ([facility] N17.9    NSTEMI (non-ST elevated myocardial infarction) ([facility] I21.4    Chronic CHF (congestive heart failure) ([facility] I50.9 Acute inferolateral myocardial infarction (HCC) I21.19       Isolation/Infection:   Isolation            No Isolation          Patient Infection Status       Infection Onset Added Last Indicated Last Indicated By Review Planned Expiration Resolved Resolved By    None active    Resolved    COVID-19 (Rule Out) 11/17/21 11/17/21 11/17/21 COVID-19, Rapid (Ordered)   11/17/21 Rule-Out Test Resulted    COVID-19 (Rule Out) 06/25/21 06/25/21 06/25/21 COVID-19, Rapid (Ordered)   06/25/21 Rule-Out Test Resulted            Nurse Assessment:  Last Vital Signs: BP (!) 137/90   Pulse 70   Temp 98.7 °F (37.1 °C) (Oral)   Resp 20   Ht 6' 1\" (1.854 m)   Wt 192 lb 7.4 oz (87.3 kg)   SpO2 98%   BMI 25.39 kg/m²     Last documented pain score (0-10 scale): Pain Level: 5  Last Weight:   Wt Readings from Last 1 Encounters:   11/17/21 192 lb 7.4 oz (87.3 kg)     Mental Status:  oriented and alert    IV Access:  - None    Nursing Mobility/ADLs:  Walking   Independent  Transfer  Independent  Bathing  Independent  Dressing  Independent  Toileting  Independent  Feeding  [address]  Independent  Med Delivery   whole    Wound Care Documentation and Therapy:        Elimination:  Continence: Bowel: Yes  Bladder: Yes  Urinary Catheter: None   Colostomy/Ileostomy/Ileal Conduit: No       Date of Last BM: 11/17/21    Intake/Output Summary (Last 24 hours) at 11/18/2021 1448  Last data filed at 11/18/2021 0903  Gross per 24 hour   Intake    Output 360 ml   Net -360 ml     I/O last 3 completed shifts:  In: -   Out: 250 [Urine:250]    Safety Concerns: At Risk for Falls    Impairments/Disabilities:      None    Nutrition Therapy:  Current Nutrition Therapy:   - Oral Diet:  Low Sodium (2gm)    Routes of Feeding: Oral  Liquids:  Thin Liquids  Daily Fluid Restriction: no  Last Modified Barium Swallow with Video (Video Swallowing Test): not done    Treatments at the Time of Hospital Discharge:   Respiratory Treatments: none  Oxygen Therapy:  is not on home oxygen therapy. Ventilator:    - No ventilator support    Rehab Therapies: none  Weight Bearing Status/Restrictions: No weight bearing restirctions  Other Medical Equipment (for information only, NOT a DME order):  cane  Other Treatments: none    Patient's personal belongings (please select all that are sent with patient):  Glasses, Hearing Aides bilateral, Dentures upper and lower, clothes, cane, cell phone    RN SIGNATURE:  Electronically signed by Kasia Min RN on 11/18/21 at 3:10 PM EST    CASE MANAGEMENT/SOCIAL WORK SECTION    Inpatient Status Date: ***    Readmission Risk Assessment Score:  Readmission Risk              Risk of Unplanned Readmission:  21           Discharging to Facility/ Agency   Name:   Address:  Phone:  Fax:    Dialysis Facility (if applicable)   Name:  Address:  Dialysis Schedule:  Phone:  Fax:    / signature: {Esignature:[id number]}    PHYSICIAN SECTION    Prognosis: {Prognosis:5167076451}    Condition at Discharge: Vincent Andrews Patient Condition:[id number]}    Rehab Potential (if transferring to Rehab): {Prognosis:2646892266}    Recommended Labs or Other Treatments After Discharge: ***    Physician Certification: I certify the above information and transfer of Nikolai Winchester  is necessary for the continuing treatment of the diagnosis listed and that he requires {Admit to Appropriate Level of Care:42497} for {GREATER/LESS:702816733} 30 days.      Update Admission H&P: {P DME Changes in [facility]:032599006}    PHYSICIAN SIGNATURE:  Electronically signed by Faye Moore MD on 11/18/21 at 2:48 PM EST

## 2021-11-18 NOTE — DISCHARGE SUMMARY
[address]    Discharge Summary     Patient ID: Arya Elder  :  1935   MRN: [id number]     ACCOUNT:  [de-identified]   Patient's PCP: Js Yanes MD  Admit Date: 2021   Discharge Date: 2021    Length of Stay: 1  Code Status:  Full Code  Admitting Physician: Alys Schlatter, MD  Discharge Physician: Juvenal Chen MD     Active Discharge Diagnoses:       Primary Problem  NSTEMI (non-ST elevated myocardial infarction) [facility])      [place] Problems    Diagnosis Date Noted    Acute inferolateral myocardial infarction [facility]) [I21.19] 2021    NSTEMI (non-ST elevated myocardial infarction) ([facility] [I21.4] 2021    Chronic CHF (congestive heart failure) ([facility] [I50.9] 2021    VARSHA (acute kidney injury) ([facility] [N17.9] 2021    Atrial fibrillation ([facility] [I48.91] 2021       Admission Condition:  poor     Discharged Condition: good    Hospital Stay:       Hospital Course:  Arya Elder is a 80 y.o. male who was admitted for the management of   NSTEMI (non-ST elevated myocardial infarction) ([facility] , presented to ER with Chest Pain, Shortness of Breath, and Leg Pain. Patient was making his bed the morning of presentation when he began having a stabbing substernal chest pain. The pain was approximately 6 out of 10, not relieved by rest and hydrate by activity. He did not take any medications for relief of his pain. Patient is also complaining of a cramping pain in his bilateral shoulders, arms and legs for the past 2 weeks having getting worse. Patient denied any diaphoresis, nausea or vomiting. Past medical history significant for CHF, CAD, A. fib and sick sinus syndrome with AICD implanted, multiple abdominal surgeries and severe osteoarthritis of the cervical spine. Patient became short of breath just walking across the room.   Patient had a cardiac cath recently which was negative and most recent echo showing EF of 60%. Troponins done in the ED were 41 and 40 on repeat. ECG showed increasing T wave inversions and possible ST depression suggestive of inferolateral wall MI. Patient also had an VARSHA on presentation with creatinine of 1.5. Patient was started on full dose heparin and cardiology consult was put in. Patient was admitted overnight. Patient was evaluated by cardiology who recommended no further cardiac work-up and follow-up as an outpatient. Patient is already on Xarelto 15 mg for anticoagulation. Patient is medically stable for discharge          Significant therapeutic interventions: Full dose heparin    Significant Diagnostic Studies:   Labs / Micro:  CBC:   Lab Results   Component Value Date    WBC 4.5 11/17/2021    RBC 3.24 11/17/2021    HGB 9.7 11/17/2021    HCT 29.4 11/17/2021    MCV 90.6 11/17/2021    MCH 29.9 11/17/2021    MCHC 33.0 11/17/2021    RDW 14.8 11/17/2021     11/17/2021     BMP:    Lab Results   Component Value Date    GLUCOSE 108 11/18/2021     11/18/2021    K 4.1 11/18/2021     11/18/2021    CO2 25 11/18/2021    ANIONGAP 8 11/18/2021    BUN 23 11/18/2021    CREATININE 0.88 11/18/2021    BUNCRER NOT REPORTED 11/18/2021    CALCIUM 8.9 11/18/2021    LABGLOM >60 11/18/2021    GFRAA >60 11/18/2021    GFR      11/18/2021    GFR NOT REPORTED 11/18/2021         Radiology:    XR SHOULDER RIGHT (MIN 2 VIEWS)    Result Date: 11/10/2021  EXAMINATION: THREE XRAY VIEWS OF THE RIGHT SHOULDER 11/10/2021 10:06 am COMPARISON: None.  HISTORY: ORDERING SYSTEM PROVIDED HISTORY: Chronic right shoulder pain TECHNOLOGIST PROVIDED HISTORY: Reason for Exam: Pt states chronic right shoulder pain many years pt states pain radiates across neck into left shoulder also Acuity: Chronic Type of Exam: Initial Additional signs and symptoms: bilateral shoulder pain with right being worse 80year-old male with chronic right shoulder pain FINDINGS: Right AC joint grossly unremarkable. Mild degenerative changes of the right glenohumeral joint. Visualized ribs appear intact. No acute fracture or dislocation. Left-sided cardiac pacemaker device leads partially visualized. Atherosclerotic calcification of the aorta. Mild right basilar atelectasis. 1. Mild degenerative change of the right glenohumeral joint. 2. No acute fracture or dislocation. 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 Possible small hiatal hernia. Stable left-sided pacemaker. The cardiac silhouette is enlarged. Calcifications involving the aorta reflect atherosclerosis. The mediastinal and hilar silhouettes appear unremarkable. Minimal subsegmental atelectasis lateral lower right lung. No focal consolidation nodule evident. No pneumothorax is seen. No acute osseous abnormality is identified. 1. No acute pulmonary disease. 2. Calcific atherosclerosis aorta. 3. Cardiomegaly. 4. 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 5. Possible small hiatal hernia.      VL Lower Extremity Venous Right    Result Date: 11/16/2021    [address] [place] [address]  Vascular Lower Extremities DVT Study Procedure   Patient Name      [facility]     Date of Study             11/16/2021                    DONNA   Date of Birth     1935   Gender                    Male   Age               80 year(s)   Race                      Black   Room Number   Corporate ID #    [id number]   Patient Acct #    [de-identified]   MR # [id number] Shey Welchchandrakant   Accession #       [id number]   Interpreting Physician    Raj Cortés   Referring Nurse                Referring Physician       Neftaly Contreras  Practitioner  Procedure Type of Study:   Veins: Lower Extremities DVT Study, Venous Scan Lower Right. Indications for Study:Leg Swelling. Patient Status:Out Patient. Technical Quality:Adequate visualization. Conclusions   Summary   No evidence of deep venous thrombosis in the right lower extremity. Chronic phlebitis of the right small saphenous vein. Signature   ----------------------------------------------------------------  Electronically signed by Yehuda Hightower(Sonographer) on  11/16/2021 08:25 AM  ----------------------------------------------------------------   ----------------------------------------------------------------  Electronically signed by Raj Cortés(Interpreting physician)  on 11/16/2021 06:05 PM  ----------------------------------------------------------------  Findings:   Right Impression:                           Left Impression:  The common femoral, femoral, popliteal and  The common femoral vein  tibial veins demonstrate normal             demonstrates normal  compressibility and augmentation. compressibility. Normal compressibility of the great  saphenous vein. Partial compressibility of the small  saphenous vein in the proximal calf. Velocities are measured in cm/s ; Diameters are measured in cm Right Lower Extremities DVT Study Measurements Right 2D Measurements +------------------------------------+----------+---------------+----------+ ! Location                            ! Visualized! Compressibility! Thrombosis! +------------------------------------+----------+---------------+----------+ ! Common Femoral                      !Yes       ! Yes            ! None      ! +------------------------------------+----------+---------------+----------+ ! Prox Femoral !Yes       !Yes            ! None      ! +------------------------------------+----------+---------------+----------+ ! Mid Femoral                         !Yes       ! Yes            ! None      ! +------------------------------------+----------+---------------+----------+ ! Dist Femoral                        !Yes       ! Yes            ! None      ! +------------------------------------+----------+---------------+----------+ ! Popliteal                           !Yes       ! Yes            ! None      ! +------------------------------------+----------+---------------+----------+ ! Sapheno Femoral Junction            ! Yes       ! Yes            ! None      ! +------------------------------------+----------+---------------+----------+ ! PTV                                 ! Yes       ! Yes            ! None      ! +------------------------------------+----------+---------------+----------+ ! Peroneal                            !Yes       ! Yes            ! None      ! +------------------------------------+----------+---------------+----------+ ! Gastroc                             ! Yes       ! Yes            ! None      ! +------------------------------------+----------+---------------+----------+ ! GSV Thigh                           ! Yes       ! Yes            ! None      ! +------------------------------------+----------+---------------+----------+ ! GSV Knee                            ! Yes       ! Yes            ! None      ! +------------------------------------+----------+---------------+----------+ ! GSV Ankle                           ! Yes       ! Yes            ! None      ! +------------------------------------+----------+---------------+----------+ ! SSV                                 ! Yes       ! Yes            ! None      ! +------------------------------------+----------+---------------+----------+ Right Doppler Measurements +---------------------------+------+------+--------------------------------+ ! Location appointment as soon as possible for a visit      MD [facility] [address] [facility] [phone]    In 2 weeks         Requiring Further Evaluation/Follow Up POST HOSPITALIZATION/Incidental Findings: Severe osteoarthritis of the neck resulting in numbness of the bilateral shoulders and arms    Diet: cardiac diet    Activity: As tolerated    Instructions to Patient: Follow-up with primary care physician and cardiology as an outpatient. If symptoms recur or worsen please return to the ED immediately.     Discharge Medications:      Medication List      ASK your doctor about these medications    amLODIPine 5 MG tablet  Commonly known as: NORVASC  Take 2 tablets by mouth daily     ascorbic acid 250 MG tablet  Commonly known as: V-R VITAMIN C  Take 1 tablet by mouth 2 times daily Take with iron     * bumetanide 2 MG tablet  Commonly known as: BUMEX  TAKE 1 TABLET BY MOUTH DAILY     * bumetanide 1 MG tablet  Commonly known as: BUMEX  Take 1 tablet by mouth daily     Coenzyme Q10 100 MG Chew  Take 1 tablet by mouth daily     cyanocobalamin 1000 MCG tablet  Commonly known as: [employer] VITAMIN B12  Take 1 tablet by mouth daily  Ask about: Which instructions should I use?     docusate sodium 100 MG capsule  Commonly known as: COLACE  Take 1 capsule by mouth 2 times daily as needed for Constipation  Ask about: Which instructions should I use?     famotidine 20 MG tablet  Commonly known as: PEPCID  Take 1 tablet by mouth daily     ferrous sulfate 325 (65 Fe) MG tablet  Commonly known as: IRON 325  Take 1 tablet by mouth 2 times daily     finasteride 5 MG tablet  Commonly known as: PROSCAR     latanoprost 0.005 % ophthalmic solution  Commonly known as: XALATAN     magnesium oxide 400 MG tablet  Commonly known as: MAG-OX     * metoprolol succinate 25 MG extended release tablet  Commonly known as: TOPROL XL     * metoprolol succinate 25 MG extended release tablet  Commonly known as: TOPROL XL  cyanocobalamin  1,000 mcg Oral Daily    ferrous sulfate  325 mg Oral BID    vitamin D  50,000 Units Oral Weekly    ascorbic acid  250 mg Oral BID    amLODIPine  10 mg Oral Daily    [Held by provider] bumetanide  1 mg Oral Daily    [Held by provider] bumetanide  2 mg Oral Daily    famotidine  20 mg Oral Daily    finasteride  5 mg Oral Daily    latanoprost  1 drop Both Eyes Nightly    magnesium oxide  400 mg Oral Daily    metoprolol succinate  50 mg Oral Daily    [Held by provider] rivaroxaban  15 mg Oral Daily with breakfast    metoprolol succinate  25 mg Oral Nightly    spironolactone  25 mg Oral Daily    sodium chloride flush  5-40 mL IntraVENous 2 times per day     Continuous Infusions:    sodium chloride       PRN Meds: sodium chloride flush, sodium chloride, ondansetron **OR** ondansetron, polyethylene glycol        MD JACQUELINE Perez [address], [address] [place] [address].    Phone [phone]   Fax: [phone]  Answering Service: [phone]

## 2021-11-18 NOTE — CONSULTS
[facility]    Consult                        Today's Date: 11/18/2021  Patient Name: Brigitte Ceja  Date of admission: 11/17/2021  3:58 PM  Patient's age: 80 y. o., 1935  Admission Dx: NSTEMI (non-ST elevated [facility] infarction) [facility] [I21.4]  Chest pain, unspecified type [R07.9]    Reason for Consult:  NSTEMI. Requesting Physician: Alondra Huston MD    CHIEF COMPLAINT: Chest pain and shortness of breath    History Obtained From:  patient    HISTORY OF PRESENT ILLNESS:      The patient is a 80 y.o.  male who is admitted to the hospital for chest pain  The patient presented with chest pain. Left-sided. More with deep breathing and coughing and reproducible over his chest.  He does have shortness of breath on exertion with no orthopnea or paroxysmal nocturnal dyspnea no dizziness no lightheadedness and no syncope. Past Medical History:   has a past medical history of Atrial fibrillation ([facility] CAD (coronary artery disease), CHF (congestive heart failure) ([facility] Hyperlipidemia, and Hypertension. Past Surgical History:   has a past surgical history that includes pacemaker placement and Cardiac catheterization. Home Medications:    Prior to Admission medications    Medication Sig Start Date End Date Taking?  Authorizing Provider   metoprolol succinate (TOPROL XL) 25 MG extended release tablet Take 25 mg by mouth nightly   Yes Historical Provider, MD   amLODIPine (NORVASC) 5 MG tablet Take 2 tablets by mouth daily 11/10/21  Yes Silvana Valverde MD   bumetanide (BUMEX) 1 MG tablet Take 1 tablet by mouth daily 11/10/21  Yes Silvana Valverde MD   spironolactone (ALDACTONE) 25 MG tablet TAKE 1 TABLET BY MOUTH DAILY 11/5/21  Yes Silvana Valverde MD   bumetanide (BUMEX) 2 MG tablet TAKE 1 TABLET BY MOUTH DAILY 11/5/21  Yes Silvana Valverde MD   cyanocobalamin ([employer] VITAMIN B12) 1000 MCG tablet Take 1 tablet by mouth daily 11/3/21  Yes Silvana Valverde MD   docusate sodium (COLACE) 100 MG capsule Take 1 capsule by mouth 2 times daily as needed for Constipation 10/26/21 11/25/21 Yes Samia Yan MD   Coenzyme Q10 100 MG CHEW Take 1 tablet by mouth daily 10/26/21  Yes Samia Yan MD   famotidine (PEPCID) 20 MG tablet Take 1 tablet by mouth daily 10/26/21  Yes Samia Yan MD   ferrous sulfate (IRON 325) 325 (65 Fe) MG tablet Take 1 tablet by mouth 2 times daily 8/20/21  Yes Cassidy Duran MD   ascorbic acid (V-R VITAMIN C) 250 MG tablet Take 1 tablet by mouth 2 times daily Take with iron 8/20/21  Yes Cassidy Duran MD   buprenorphine-naloxone (SUBOXONE) 8-2 MG FILM SL film Place 1 Film under the tongue 2 times daily. Indications: pain    Yes Historical Provider, MD   vitamin D (ERGOCALCIFEROL) 1.25 MG (76905 UT) CAPS capsule Take 50,000 Units by mouth once a week tuesdays   Yes Historical Provider, MD   rivaroxaban (XARELTO) 15 MG TABS tablet Take 15 mg by mouth daily (with breakfast)   Yes Historical Provider, MD   magnesium oxide (MAG-OX) 400 MG tablet Take 400 mg by mouth daily   Yes Historical Provider, MD   latanoprost (XALATAN) 0.005 % ophthalmic solution Place 1 drop into both eyes nightly   Yes Historical Provider, MD   metoprolol succinate (TOPROL XL) 25 MG extended release tablet Take 50 mg by mouth daily   Yes Historical Provider, MD   finasteride (PROSCAR) 5 MG tablet Take 5 mg by mouth daily   Yes Historical Provider, MD       Allergies:  Aspirin, Cyclobenzaprine, Ibuprofen, Azithromycin, and Hydrocodone-acetaminophen    Social History:   reports that he has never smoked. He has never used smokeless tobacco. He reports that he does not drink alcohol and does not use drugs. Family History: family history is not on file. No h/o sudden cardiac death. No for premature CAD    REVIEW OF SYSTEMS:    · Constitutional: there has been no unanticipated weight loss. There's been Yes change in energy level, Yes change in activity level.      · Eyes: No visual changes or diplopia. No scleral icterus. · ENT: No Headaches, hearing loss or vertigo. No mouth sores or sore throat. · Cardiovascular: Yes chest pain, Yes dyspnea on exertion, No palpitations or No loss of consciousness. No cough, hemoptysis, No pleuritic pain, or phlebitis. · Respiratory: No cough or wheezing, no sputum production. No hematemesis. · Gastrointestinal: No abdominal pain, appetite loss, blood in stools. No change in bowel or bladder habits. · Genitourinary: No dysuria, trouble voiding, or hematuria. · Musculoskeletal:  No gait disturbance, No weakness or joint complaints. · Integumentary: No rash or pruritis. · Neurological: No headache, diplopia, change in muscle strength, numbness or tingling. No change in gait, balance, coordination, mood, affect, memory, mentation, behavior. · Psychiatric: No anxiety, or depression. · Endocrine: No temperature intolerance. No excessive thirst, fluid intake, or urination. No tremor. · Hematologic/Lymphatic: No abnormal bruising or bleeding, blood clots or swollen lymph nodes. · Allergic/Immunologic: No nasal congestion or hives. PHYSICAL EXAM:      BP (!) 137/90   Pulse 70   Temp 98.7 °F (37.1 °C) (Oral)   Resp 20   Ht 6' 1\" (1.854 m)   Wt 192 lb 7.4 oz (87.3 kg)   SpO2 98%   BMI 25.39 kg/m²    Constitutional and General Appearance: alert, cooperative, no distress and appears stated age  HEENT: PERRL, no cervical lymphadenopathy. No masses palpable. Normal oral mucosa  Respiratory:  · Normal excursion and expansion without use of accessory muscles  · Resp Auscultation: Good respiratory effort. No for increased work of breathing.  On auscultation: clear to auscultation bilaterally  Cardiovascular:  · The apical impulse is not displaced  · Heart tones are crisp and normal. regular S1 and S2.  · Jugular venous pulsation Normal  · The carotid upstroke is normal in amplitude and contour without delay or bruit  · Peripheral pulses are symmetrical and full  Abdomen:  · No masses or tenderness  · Bowel sounds present  Extremities:  ·  No Cyanosis or Clubbing  ·  Lower extremity edema: Yes  · Skin: Warm and dry  Neurological:  · Alert and oriented. · Moves all extremities well  · No abnormalities of mood, affect, memory, mentation, or behavior are noted    DATA:    Diagnostics:    EKG: Ventricular paced rhythm    Echo 5/2021  Left ventricle is normal in size and wall thickness. Global left ventricular systolic function is normal. Estimated LV EF 60-65 %. Grade I (mild) left ventricular diastolic dysfunction. Both atria are mildly dilated. Right ventricle is mildly enlarged with normal RV systolic function. Prominent moderator band. Mild mitral regurgitation. Mild tricuspid regurgitation. Estimated right ventricular systolic pressure is 86WWHL. Cardiac Angiography: 4/30/21 , LAD 40 % , LCX 40% , RCA mild irregularities     Labs:     CBC:   Recent Labs     11/17/21  1700 11/17/21 2110   WBC 4.9 4.5   HGB 10.2* 9.7*   HCT 31.6* 29.4*    221     BMP:   Recent Labs     11/17/21  1700 11/18/21  0624    139   K 4.5 4.1   CO2 26 25   BUN 34* 23   CREATININE 1.52* 0.88   LABGLOM 44* >60   GLUCOSE 109* 108*     BNP: No results for input(s): BNP in the last 72 hours. PT/INR:   Recent Labs     11/17/21 2110   PROTIME 17.4*   INR 1.4     APTT:  Recent Labs     11/18/21  0307 11/18/21  1121   APTT 100.5* 43.5*     CARDIAC ENZYMES:No results for input(s): CKTOTAL, CKMB, CKMBINDEX, TROPONINI in the last 72 hours. FASTING LIPID PANEL:  Lab Results   Component Value Date    HDL 57 11/02/2021    TRIG 70 11/02/2021     LIVER PROFILE:No results for input(s): AST, ALT, LABALBU in the last 72 hours. IMPRESSION/RECOMMENDATIONS:  1. Chest pain atypical and reproducible. Mildly elevated high-sensitivity troponin and is trending down. Coronary angiography on April 2021 showed mild nonobstructive CAD.   No further cardiac work-up is needed at the present time okay to stop IV heparin. 2.  History of permanent atrial fibrillation. Rate controlled on Xarelto. History of sick sinus syndrome s/p pacemaker insertion. 3.  Chronic CHF with preserved EF. Continue current medications. No further cardiac work-up will follow up with the patient as an outpatient. Discussed with patient and Nurse.     Negra Gresham MD, MD  [facility]           [phone]

## 2021-11-18 NOTE — PROGRESS NOTES
Physical Therapy    Facility/Department: [facility] PROGRESSIVE CARE  Initial Assessment    NAME: Nima Cervantes  : 1935  MRN: [id number]    Date of Service: 2021    Discharge Recommendations:  Patient would benefit from continued therapy after discharge   PT Equipment Recommendations  Equipment Needed:  (TBD)    Assessment   Body structures, Functions, Activity limitations: Decreased functional mobility ; Decreased high-level IADLs; Decreased posture; Decreased ADL status; Decreased ROM; Decreased strength; Increased pain; Decreased balance; Decreased endurance  Assessment: pt needs supervision with supine <=>sit transfers, SBA for sit <=> stand, CGA (SBA- 2nd person for line management) for walking, CGA for stepping. increased SOB with ambulation and step training, and unsteadiness with gait. FALL RISK  Treatment Diagnosis: Impaired fucntional mobility secondary to medical complications, decrease ROM of in R hip, and increased fatigue with fucntional activity. Specific instructions for Next Treatment: instruct on bed mobility, transfers and ambulation. progress to step training  Prognosis: Good  Decision Making: Medium Complexity  History: CAD (coronary artery disease), CHF (congestive heart failure) (HCC),Hyperlipidemia, Hypertension  Exam: MMTs, ROM, bed mobility, transfers and ambulation  PT Education: Goals; PT Role; Plan of Care; Transfer Training; General Safety; Gait Training; Functional Mobility Training; Injury Prevention  REQUIRES PT FOLLOW UP: Yes  Activity Tolerance  Activity Tolerance: Patient limited by fatigue; Treatment limited secondary to medical complications (free text); Patient limited by endurance       Patient Diagnosis(es): The encounter diagnosis was Chest pain, unspecified type. has a past medical history of Atrial fibrillation ([address] CAD (coronary artery disease), CHF (congestive heart failure) ([address] Hyperlipidemia, and Hypertension.    has a past surgical history that includes pacemaker placement and Cardiac catheterization. Restrictions  Restrictions/Precautions  Restrictions/Precautions: Fall Risk, General Precautions, Up as Tolerated  Required Braces or Orthoses?: Yes (R elastic knee brace PRN)  Implants present? : Metal implants, Pacemaker (Rods/screws lumbar, R AGUSTO, L TKA)  Vision/Hearing  Vision: Impaired  Vision Exceptions: Wears glasses for distance; Wears glasses for reading  Hearing: Exceptions to [facility]  Hearing Exceptions: Bilateral hearing aid; Hard of hearing/hearing concerns (hearing aids not present)     Subjective  General  Chart Reviewed: Yes  Patient assessed for rehabilitation services?: Yes  Additional Pertinent Hx: CAD (coronary artery disease), CHF (congestive heart failure) (HCC),Hyperlipidemia, Hypertension  Response To Previous Treatment: Not applicable  Family / Caregiver Present: No  Referring Practitioner: Steph So MD  Referral Date : 11/17/21  Diagnosis: NSTEMI  Follows Commands: Within Functional Limits  Other (Comment): okay per nurse Jennifer to see patient  Subjective  Subjective: Pt seen laying in with HOB elevated. Reports pain in neck, shoulders and legs rated at 6/10. Pt states he got bad artrithis in neck, back and legs.   Pain Screening  Patient Currently in Pain: Yes  Pain Assessment  Pain Assessment: 0-10  Pain Level: 6 (5 to 6)  Pain Type: Chronic pain  Pain Location: Shoulder; Neck; Leg  Pain Frequency: Continuous  Pain Onset: On-going  Clinical Progression: Gradually worsening  Functional Pain Assessment: Prevents or interferes some active activities and ADLs  Non-Pharmaceutical Pain Intervention(s): Ambulation/Increased Activity; Repositioned  Response to Pain Intervention: Patient Satisfied  Vital Signs  Patient Currently in Pain: Yes (Pain with movement)       Orientation  Orientation  Overall Orientation Status: Within Functional Limits  Social/Functional History  Social/Functional History  Lives With: Son (Son, son's wife, grandson)  Type of Home: House  Home Layout: One level (Basement but pt does not go down)  Home Access: Stairs to enter without rails  Entrance Stairs - Number of Steps: 1 step at front, 1 step in garage (pt mainly uses garage entrance)  Bathroom Shower/Tub: Walk-in shower  Bathroom Toilet: Handicap height  Bathroom Equipment: Grab bars in shower, Hand-held shower, Toilet raiser, Grab bars around toilet  Bathroom Accessibility: Not accessible  Home Equipment: Cane, Rolling walker  Receives Help From: Family  ADL Assistance: [address]: Independent  Homemaking Responsibilities: Yes  Ambulation Assistance: Independent (Uses cane for community distances)  Transfer Assistance: Independent  Active : Yes  Mode of Transportation: [employer]  IADL Comments: sleeps in a flat bed, no recent falls  Additional Comments: Pt's family able to assist as needed, son and son's wife work full time  Cognition        Objective     Observation/Palpation  Posture: Fair  Observation: peripheral IV L antecube    AROM RLE (degrees)  RLE General AROM: Limited hip flexiion d/t RTHA,  otherwise ankle and knee are without fucntional limits  AROM LLE (degrees)  LLE AROM : WFL  AROM RUE (degrees)  RUE General AROM: see OT for UE assessement  AROM LUE (degrees)  LUE General AROM: see OT for UE assessement  Strength RLE  R Hip Flexion: 4-/5  R Hip ABduction: 4-/5  R Hip ADduction: 4-/5  R Knee Flexion: 4/5  R Knee Extension: 4/5  R Ankle Dorsiflexion: 4/5  R Ankle Plantar flexion: 4/5  Strength LLE  L Hip Flexion: 4/5  L Hip ABduction: 4/5  L Hip ADduction: 4/5  L Knee Flexion: 4/5  L Knee Extension: 4/5  L Ankle Dorsiflexion: 4/5  L Ankle Plantar Flexion: 4/5  Strength RUE  Comment: see OT for UE assessement  Strength LUE  Comment: see OT for UE assessement     Sensation  Overall Sensation Status: Impaired (Numbness/Tingling in L fingers tips intermittently)  Bed mobility  Supine to Sit: Supervision  Sit to Supine: Supervision  Transfers  Sit to Stand: Stand by assistance (use of straight cane)  Stand to sit: Stand by assistance (use of straight cane)  Ambulation  Ambulation?: Yes  WB Status: full WB  Ambulation 1  Surface: level tile  Device: Single point cane  Assistance: Contact guard assistance (SBA for IV)  Gait Deviations: Slow Madeline; Increased PAVITHRA  Distance: 20ft + 20ft  Comments: increased unsteadiness with gait, increased PAVITHRA. increased SOB with ambulation- O2 sat(99)  and HR(70 to 72) monitored -  Stairs/Curb  Stairs?: Yes  Stairs  # Steps : 3  Stairs Height: 4\"  Rails: Right ascending  Device: Single pt cane  Assistance: Contact guard assistance (SBA for IV)     Balance  Posture: Fair  Sitting - Static: Fair  Sitting - Dynamic: Fair  Standing - Static: Fair (use of single point cane)  Standing - Dynamic: Fair (use of single point cane)  Comments: pt is minimally unsteady with gait and needs UE support to maintain sitting/standing. Functional Reach Test  Fall Risk (Score of <10 inches is fall risk): Yes        Plan   Plan  Times per week: 5 to 7 times  Times per day:  (5 to 7 times)  Specific instructions for Next Treatment: instruct on bed mobility, transfers and ambulation. progress to step training  Current Treatment Recommendations: Strengthening, ROM, Balance Training, Functional Mobility Training, Transfer Training, ADL/Self-care Training, Gait Training, IADL Training, Endurance Training, Pain Management, Safety Education & Training, Positioning  Safety Devices  Type of devices:  All fall risk precautions in place, Left in chair, Nurse notified, Gait belt, Patient at risk for falls, Call light within reach (nurse Jennifer notified)  Restraints  Initially in place: No    G-Code       OutComes Score                                                  AM-PAC Score  AM-PAC Inpatient Mobility Raw Score : 18 (11/18/21 0828)  AM-PAC Inpatient T-Scale Score : 43.63 (11/18/21 2034)  Mobility Inpatient CMS 0-100% Score: 46.58 (11/18/21 9069)  Mobility Inpatient CMS G-Code Modifier : CK (11/18/21 8197)          Goals  Short term goals  Time Frame for Short term goals: 5 to 7 times  Short term goal 1: Pt to demonstrate ability to perform sit <=> stand transfers with supervision using Single point cane. Short term goal 2: Pt to demonstrate ability to walk 150ft with SBA using Single point cane. Short term goal 3: Pt to demonstrate ability to ascend and descend 1 step with supersion using single point cane  Short term goal 4: Pt to demonstrate ability to maintain sitting at EOB for 10 to 15mins with minimal use of UE support  Short term goal 5: Pt to demonstrate ability to decreased unsteadiness with static and dynamic gait to improve safety with ambulation. Patient Goals   Patient goals : pt will like go back home       Therapy Time   Individual Concurrent Group Co-treatment   Time In 0828         Time Out 0904         Minutes 36         Timed Code Treatment Minutes: 12 Minutes    PT evaluation/treatment is completed by Mary Lou Peacock, SPT under the direct supervision of co-signing therapist, who agrees with all documentation and evaluation/treatment.     Ruth Delgadooh SPT

## 2021-11-18 NOTE — PROGRESS NOTES
Spoke to dr Allison Licea with cardio regarding pt. Keep pt npo at this time, he will see him around 1 pm to evaluate.

## 2021-11-18 NOTE — PROGRESS NOTES
Per dr Aleyda Watkins cardio, ok to d/c heparin drip. And ok to eat.  Residents notified no further cardiac workup needed

## 2021-11-19 ENCOUNTER — OFFICE VISIT (OUTPATIENT)
Dept: ONCOLOGY | Age: 86
End: 2021-11-19
Payer: MEDICARE

## 2021-11-19 ENCOUNTER — TELEPHONE (OUTPATIENT)
Dept: INTERNAL MEDICINE CLINIC | Age: 86
End: 2021-11-19

## 2021-11-19 ENCOUNTER — TELEPHONE (OUTPATIENT)
Dept: ONCOLOGY | Age: 86
End: 2021-11-19

## 2021-11-19 ENCOUNTER — HOSPITAL ENCOUNTER (OUTPATIENT)
Age: 86
Discharge: HOME | End: 2021-11-19
Payer: MEDICARE

## 2021-11-19 VITALS
BODY MASS INDEX: 26.33 KG/M2 | HEART RATE: 91 BPM | WEIGHT: 199.6 LBS | DIASTOLIC BLOOD PRESSURE: 82 MMHG | SYSTOLIC BLOOD PRESSURE: 157 MMHG | TEMPERATURE: 97.7 F

## 2021-11-19 DIAGNOSIS — I48.19 PERSISTENT ATRIAL FIBRILLATION (HCC): ICD-10-CM

## 2021-11-19 DIAGNOSIS — Z79.01 ON CONTINUOUS ORAL ANTICOAGULATION: ICD-10-CM

## 2021-11-19 DIAGNOSIS — Z86.718 HISTORY OF DVT OF LOWER EXTREMITY: ICD-10-CM

## 2021-11-19 DIAGNOSIS — D64.9 ANEMIA, UNSPECIFIED TYPE: Primary | ICD-10-CM

## 2021-11-19 DIAGNOSIS — D64.9 ANEMIA, UNSPECIFIED TYPE: ICD-10-CM

## 2021-11-19 DIAGNOSIS — Z87.891 FORMER SMOKER: ICD-10-CM

## 2021-11-19 LAB
ABSOLUTE EOS #: 0.2 K/UL (ref 0–0.4)
ABSOLUTE IMMATURE GRANULOCYTE: ABNORMAL K/UL (ref 0–0.3)
ABSOLUTE LYMPH #: 1.2 K/UL (ref 1–4.8)
ABSOLUTE MONO #: 0.4 K/UL (ref 0.1–1.2)
BASOPHILS # BLD: 1 % (ref 0–2)
BASOPHILS ABSOLUTE: 0 K/UL (ref 0–0.2)
DIFFERENTIAL TYPE: ABNORMAL
EOSINOPHILS RELATIVE PERCENT: 6 % (ref 1–4)
FERRITIN: 69 UG/L (ref 30–400)
FOLATE: >20 NG/ML
HCT VFR BLD CALC: 32.9 % (ref 41–53)
HEMOGLOBIN: 10.5 G/DL (ref 13.5–17.5)
IMMATURE GRANULOCYTES: ABNORMAL %
IRON SATURATION: 40 % (ref 20–55)
IRON: 122 UG/DL (ref 59–158)
LYMPHOCYTES # BLD: 31 % (ref 24–44)
MCH RBC QN AUTO: 28.7 PG (ref 26–34)
MCHC RBC AUTO-ENTMCNC: 31.9 G/DL (ref 31–37)
MCV RBC AUTO: 90.2 FL (ref 80–100)
MONOCYTES # BLD: 11 % (ref 2–11)
NRBC AUTOMATED: ABNORMAL PER 100 WBC
PDW BLD-RTO: 15 % (ref 12.5–15.4)
PLATELET # BLD: 271 K/UL (ref 140–450)
PLATELET ESTIMATE: ABNORMAL
PMV BLD AUTO: 6.6 FL (ref 6–12)
RBC # BLD: 3.65 M/UL (ref 4.5–5.9)
RBC # BLD: ABNORMAL 10*6/UL
SEG NEUTROPHILS: 51 % (ref 36–66)
SEGMENTED NEUTROPHILS ABSOLUTE COUNT: 1.9 K/UL (ref 1.8–7.7)
TOTAL IRON BINDING CAPACITY: 304 UG/DL (ref 250–450)
UNSATURATED IRON BINDING CAPACITY: 182 UG/DL (ref 112–347)
VITAMIN B-12: 995 PG/ML (ref 232–1245)
WBC # BLD: 3.7 K/UL (ref 3.5–11)
WBC # BLD: ABNORMAL 10*3/UL

## 2021-11-19 PROCEDURE — G8484 FLU IMMUNIZE NO ADMIN: HCPCS | Performed by: INTERNAL MEDICINE

## 2021-11-19 PROCEDURE — 1123F ACP DISCUSS/DSCN MKR DOCD: CPT | Performed by: INTERNAL MEDICINE

## 2021-11-19 PROCEDURE — 82746 ASSAY OF FOLIC ACID SERUM: CPT

## 2021-11-19 PROCEDURE — 99211 OFF/OP EST MAY X REQ PHY/QHP: CPT | Performed by: INTERNAL MEDICINE

## 2021-11-19 PROCEDURE — G8427 DOCREV CUR MEDS BY ELIG CLIN: HCPCS | Performed by: INTERNAL MEDICINE

## 2021-11-19 PROCEDURE — 99214 OFFICE O/P EST MOD 30 MIN: CPT | Performed by: INTERNAL MEDICINE

## 2021-11-19 PROCEDURE — 4040F PNEUMOC VAC/ADMIN/RCVD: CPT | Performed by: INTERNAL MEDICINE

## 2021-11-19 PROCEDURE — 82728 ASSAY OF FERRITIN: CPT

## 2021-11-19 PROCEDURE — 85025 COMPLETE CBC W/AUTO DIFF WBC: CPT

## 2021-11-19 PROCEDURE — 83550 IRON BINDING TEST: CPT

## 2021-11-19 PROCEDURE — 82607 VITAMIN B-12: CPT

## 2021-11-19 PROCEDURE — G8417 CALC BMI ABV UP PARAM F/U: HCPCS | Performed by: INTERNAL MEDICINE

## 2021-11-19 PROCEDURE — 36415 COLL VENOUS BLD VENIPUNCTURE: CPT

## 2021-11-19 PROCEDURE — 1036F TOBACCO NON-USER: CPT | Performed by: INTERNAL MEDICINE

## 2021-11-19 PROCEDURE — 1111F DSCHRG MED/CURRENT MED MERGE: CPT | Performed by: INTERNAL MEDICINE

## 2021-11-19 PROCEDURE — 83540 ASSAY OF IRON: CPT

## 2021-11-19 NOTE — TELEPHONE ENCOUNTER
AVS from 11/19/21     rv in 3 months with labs prior      rv scheduled for 2/18/22 @ 11am pt will have labs drawn one week prior      Pt was given AVS and appt schedule

## 2021-11-19 NOTE — PROGRESS NOTES
Xu Uriostegui                                                                                                                  11/19/2021  MRN:   [id number]  YOB: 1935  PCP:                           Gerardo Smith MD  Referring Physician: No ref. provider found  Treating Physician Name: Taj Roman MD      Reason for Visit:  Chief Complaint   Patient presents with    Follow-up     review status of disease    Follow-Up from Hospital     Patient was in [facility] for his heart    Patient presents to the clinic to establish care and for further work-up and evaluation. Current problems:  Anemia   Iron deficiency  Sick sinus syndrome and atrial fibrillation status post pacemaker 4387  Diastolic dysfunction  Chronic venous insufficiency  Chronic anticoagulation    Active and recent treatments:  Oral iron supplementation with vitamin C    Interval history:    Patient presents to the clinic for follow-up visit and to discuss results of his lab work-up. Patient was hospitalized earlier and discharged yesterday. Patient was hospitalized with the chest pain. Patient continues to be on oral iron. Hemoglobin has improved to 10.5. Patient had his labs drawn today and iron studies are not available yet. States that he is taking iron along with vitamin C twice a day. Does complain of constipation with iron however takes laxatives which takes care of it. Summary of Case/History:    Xu Uriostegui a 80 y.o.male is a patient with anemia who presents to the clinic to establish care and for further work-up and evaluation. Patient was recently hospitalized back in June and at [facility] was noted to be anemic with hemoglobin 8.6. Iron studies showed iron saturation of 15%. Ferritin was not checked. Patient states that he is not taking any oral iron. Patient does take Xarelto 15 mg daily due to history of atrial fibrillation. Patient also has history of DVT once. Patient states that he does follow-up with a GI doctor in [place] and had endoscopy done last year. We do not have the records at this point. Patient also follows up with his cardiologist in [facility]. Patient is in the process of transferring his care to [place] and presents to the clinic to establish care. Denies any bloody bowel movements. Past Medical History:   Past Medical History:   Diagnosis Date    Atrial fibrillation ([address]     CAD (coronary artery disease)     CHF (congestive heart failure) (HCC)     Hyperlipidemia     Hypertension        Past Surgical History:     Past Surgical History:   Procedure Laterality Date    CARDIAC CATHETERIZATION      PACEMAKER PLACEMENT         Patient Family Social History:     Social History     Socioeconomic History    Marital status:      Spouse name: None    Number of children: None    Years of education: None    Highest education level: None   Occupational History    None   Tobacco Use    Smoking status: Never Smoker    Smokeless tobacco: Never Used   Substance and Sexual Activity    Alcohol use: Never    Drug use: Never    Sexual activity: None   Other Topics Concern    None   Social History Narrative    None     Social Determinants of Health     Financial Resource Strain: Low Risk     Difficulty of Paying Living Expenses: Not hard at all   Food Insecurity: No Food Insecurity    Worried About Running Out of Food in the Last Year: Never true    [address] in the Last Year: Never true   Transportation Needs:     Lack of Transportation (Medical): Not on file    Lack of Transportation (Non-Medical):  Not on file   Physical Activity:     Days of Exercise per Week: Not on file    Minutes of Exercise per Session: Not on file   Stress:     Feeling of Stress : Not on file   Social Connections:     Frequency of Communication with Friends and Family: Not on file    Frequency of Social Gatherings with Friends and Family: Not on file   Courtenay Spurling Attends [employer] Services: Not on file   [address] [facility] or Organizations: Not on file    Attends Club or Organization Meetings: Not on file    Marital Status: Not on file   Intimate Partner Violence:     Fear of Current or Ex-Partner: Not on file    Emotionally Abused: Not on file    Physically Abused: Not on file    Sexually Abused: Not on file   Housing Stability:     Unable to Pay for Housing in the Last Year: Not on file    Number of [place] in the Last Year: Not on file    Unstable Housing in the Last Year: Not on file     Family history:    Hypertension and diabetes mellitus    Current Medications:     Current Outpatient Medications   Medication Sig Dispense Refill    metoprolol succinate (TOPROL XL) 25 MG extended release tablet Take 25 mg by mouth nightly      amLODIPine (NORVASC) 5 MG tablet Take 2 tablets by mouth daily 90 tablet 3    bumetanide (BUMEX) 1 MG tablet Take 1 tablet by mouth daily 90 tablet 1    spironolactone (ALDACTONE) 25 MG tablet TAKE 1 TABLET BY MOUTH DAILY 90 tablet 1    cyanocobalamin ([employer] VITAMIN B12) 1000 MCG tablet Take 1 tablet by mouth daily 30 tablet 3    docusate sodium (COLACE) 100 MG capsule Take 1 capsule by mouth 2 times daily as needed for Constipation 60 capsule 3    Coenzyme Q10 100 MG CHEW Take 1 tablet by mouth daily 90 tablet 0    famotidine (PEPCID) 20 MG tablet Take 1 tablet by mouth daily 60 tablet 3    ferrous sulfate (IRON 325) 325 (65 Fe) MG tablet Take 1 tablet by mouth 2 times daily 60 tablet 5    ascorbic acid (V-R VITAMIN C) 250 MG tablet Take 1 tablet by mouth 2 times daily Take with iron 60 tablet 3    vitamin D (ERGOCALCIFEROL) 1.25 MG (43295 UT) CAPS capsule Take 50,000 Units by mouth once a week tuesdays      rivaroxaban (XARELTO) 15 MG TABS tablet Take 15 mg by mouth daily (with breakfast)      magnesium oxide (MAG-OX) 400 MG tablet Take 400 mg by mouth daily      latanoprost (XALATAN) 0.005 % ophthalmic solution Place 1 drop into both eyes nightly      metoprolol succinate (TOPROL XL) 25 MG extended release tablet Take 50 mg by mouth daily      finasteride (PROSCAR) 5 MG tablet Take 5 mg by mouth daily      bumetanide (BUMEX) 2 MG tablet TAKE 1 TABLET BY MOUTH DAILY 90 tablet 0    buprenorphine-naloxone (SUBOXONE) 8-2 MG FILM SL film Place 1 Film under the tongue 2 times daily. Indications: pain        No current facility-administered medications for this visit. Allergies:   Aspirin, Cyclobenzaprine, Ibuprofen, Azithromycin, and Hydrocodone-acetaminophen    Review of Systems:    Constitutional: No fever or chills. No night sweats, no weight loss. Positive fatigue  Eyes: No eye discharge, double vision, or eye pain   HEENT: negative for sore mouth, sore throat, hoarseness and voice change   Respiratory: negative for cough , sputum, dyspnea, wheezing, hemoptysis, chest pain   Cardiovascular: negative for chest pain, dyspnea, palpitations, orthopnea, PND   Gastrointestinal: negative for nausea, vomiting, diarrhea, constipation, abdominal pain, Dysphagia, hematemesis and hematochezia   Genitourinary: negative for frequency, dysuria, nocturia, urinary incontinence, and hematuria   Integument: negative for rash, skin lesions, bruises.    Hematologic/Lymphatic: negative for easy bruising, bleeding, lymphadenopathy, or petechiae   Endocrine: negative for heat or cold intolerance,weight changes, change in bowel habits and hair loss   Musculoskeletal: negative for myalgias, arthralgias, pain, joint swelling,and bone pain   Neurological: negative for headaches, dizziness, seizures, weakness, numbness    Physical Exam:    Vitals: BP (!) 157/82   Pulse 91   Temp 97.7 °F (36.5 °C) (Temporal)   Wt 199 lb 9.6 oz (90.5 kg)   BMI 26.33 kg/m²   General appearance - well appearing, no in pain or distress  Mental status - AAO X3  Eyes - pupils equal and reactive, extraocular eye movements intact  Mouth - mucous membranes moist, pharynx normal without lesions  Neck - supple, no significant adenopathy  Lymphatics - no palpable lymphadenopathy, no hepatosplenomegaly  Chest - clear to auscultation, no wheezes, rales or rhonchi, symmetric air entry  Heart - normal rate, regular rhythm, normal S1, S2, no murmurs  Abdomen - soft, nontender, nondistended, no masses or organomegaly  Neurological - alert, oriented, normal speech, no focal findings or movement disorder noted  Extremities - peripheral pulses normal, no pedal edema, no clubbing or cyanosis  Skin - normal coloration and turgor, no rashes, no suspicious skin lesions noted       DATA:    Results for orders placed or performed during the hospital encounter of 11/19/21   CBC Auto Differential   Result Value Ref Range    WBC 3.7 3.5 - 11.0 k/uL    RBC 3.65 (L) 4.5 - 5.9 m/uL    Hemoglobin 10.5 (L) 13.5 - 17.5 g/dL    Hematocrit 32.9 (L) 41 - 53 %    MCV 90.2 80 - 100 fL    MCH 28.7 26 - 34 pg    MCHC 31.9 31 - 37 g/dL    RDW 15.0 12.5 - 15.4 %    Platelets 925 269 - 024 k/uL    MPV 6.6 6.0 - 12.0 fL    NRBC Automated NOT REPORTED per 100 WBC    Differential Type NOT REPORTED     Seg Neutrophils 51 36 - 66 %    Lymphocytes 31 24 - 44 %    Monocytes 11 2 - 11 %    Eosinophils % 6 (H) 1 - 4 %    Basophils 1 0 - 2 %    Immature Granulocytes NOT REPORTED 0 %    Segs Absolute 1.90 1.8 - 7.7 k/uL    Absolute Lymph # 1.20 1.0 - 4.8 k/uL    Absolute Mono # 0.40 0.1 - 1.2 k/uL    Absolute Eos # 0.20 0.0 - 0.4 k/uL    Basophils Absolute 0.00 0.0 - 0.2 k/uL    Absolute Immature Granulocyte NOT REPORTED 0.00 - 0.30 k/uL    WBC Morphology NOT REPORTED     RBC Morphology NOT REPORTED     Platelet Estimate NOT REPORTED      Chest x-ray:    Impression   1. Technically suboptimal AP lordotic projection. 2. No definite acute pulmonary disease. 3. Minimal platelike atelectasis right lung base. 4. Calcific atherosclerosis aorta. 5. Cardiomegaly.    Follow-up full inspiration PA and lateral chest may be useful for better   characterization of pulmonary findings.           Impression:  Anemia   Iron deficiency  Sick sinus syndrome and atrial fibrillation status post pacemaker 4307  Diastolic dysfunction  Chronic venous insufficiency  Chronic anticoagulation    Plan:  I had a detailed discussion with the patient and personally went over results of lab work-up imaging studies and other relevant clinical data. Reviewed results of available clinical data. Hemoglobin has improved. Iron studies are pending. I will follow up on the results  As previously planned if patient has had an adequate response to oral iron we will give IV iron. Patient has had IV iron in the past.  Reviewed records from hospitalization  Patient continues to be on anticoagulation. Denies any bleeding episodes. Primary bone marrow disorder is also differential however will monitor patient at this point and assess response to oral iron. No plans for bone marrow biopsy at this point    Addendum  Patient labs show hemoglobin of 10.5. Iron stores are adequate. Patient has adequate J82 folic acid stores. Patient's kidney function is within range. Primary bone marrow disorder is high in differential.  We will continue to monitor at this time given advanced age and overall frail condition. If hemoglobin drops below 10 we will consider KADYEN therapy. Bridger Machado MD    this note is created with the assistance of a speech recognition program.  While intending to generate a document that actually reflects the content of the visit, the document can still have some errors including those of syntax and sound a like substitutions which may escape proof reading. It such instances, actual meaning can be extrapolated by contextual diversion.

## 2021-11-19 NOTE — TELEPHONE ENCOUNTER
Pt called requesting results of shoulder xray and also venous scan of rt lower leg. Pt states that he is still having rt shoulder pain and rt leg pain. Please advise.

## 2021-11-19 NOTE — TELEPHONE ENCOUNTER
Pt informed per Dr. Judieth Lesch, xray of shoulder shows mild degree of arthritic changes, pt should use otc Tylenol & lidocain patches to relieve pain. Pt was also informed per Dr. Judieth Lesch, belen scan was negative for DVT. Pt states that swelling has resolved and did not have any further questions.

## 2021-11-22 ENCOUNTER — TELEPHONE (OUTPATIENT)
Dept: INTERNAL MEDICINE CLINIC | Age: 86
End: 2021-11-22

## 2021-11-22 NOTE — TELEPHONE ENCOUNTER
----- Message from Paula Dunlap sent at 11/19/2021  1:12 PM EST -----  Subject: Appointment Request    Reason for Call: Routine ED Follow Up Visit    QUESTIONS  Type of Appointment? Established Patient  Reason for appointment request? Available appointments did not meet   patient need  Additional Information for Provider? PT needs to have an ED follow up with   his provider, Dr. Santana Anguiano, to follow up on heart attack - no upcoming   appointments for [facility] please reach out to PT  ---------------------------------------------------------------------------  --------------  [address]  What is the best way for the office to contact you? OK to leave message on   voicemail  Preferred Call Back Phone Number? [phone]  ---------------------------------------------------------------------------  --------------  SCRIPT ANSWERS  Relationship to Patient? Self  (Patient requests to see provider urgently. )? No  Do you have any questions for your primary care provider that need to be   answered prior to your appointment? No  Have you been diagnosed with, awaiting test results for, or told that you   are suspected of having COVID-19 (Coronavirus)? (If patient has tested   negative or was tested as a requirement for work, school, or travel and   not based on symptoms, answer no)? No  Within the past two weeks have you developed any of the following symptoms   (answer no if symptoms have been present longer than 2 weeks or began   more than 2 weeks ago)? Fever or Chills, Cough, Shortness of breath or   difficulty breathing, Loss of taste or smell, Sore throat, Nasal   congestion, Sneezing or runny nose, Fatigue or generalized body aches   (answer no if pain is specific to a body part e.g. back pain), Diarrhea,   Headache? No  Have you had close contact with someone with COVID-19 in the last 14 days? No  (Service Expert  click yes below to proceed with [email or web address] As Usual   Scheduling)?  Yes

## 2021-11-22 NOTE — TELEPHONE ENCOUNTER
Called pt, stated he has an appt with Dr Amber Payan on 11/26, he can do his ED follow up then. Pt stated okay, he also has an appt with the cardiologist 11/26 also.

## 2021-11-24 LAB
EKG ATRIAL RATE: 74 BPM
EKG Q-T INTERVAL: 458 MS
EKG QRS DURATION: 156 MS
EKG QTC CALCULATION (BAZETT): 494 MS
EKG R AXIS: -74 DEGREES
EKG T AXIS: 84 DEGREES
EKG VENTRICULAR RATE: 70 BPM

## 2021-11-24 NOTE — PROGRESS NOTES
Physician Progress Note      Shukri Mcdonald  CSN #:                  [id number]  :                       1935  ADMIT DATE:       2021 3:58 PM  Va Jovel DATE:        2021 4:24 PM  RESPONDING  PROVIDER #:        Clint ARIZMENDI          QUERY TEXT:    Patient admitted with chest pain. If possible, please document in the   progress notes and discharge summary if you are evaluating and/or treating any   of the following: The medical record reflects the following:  Risk Factors: 80 yr old with CAD, permanent Afib  Clinical Indicators: Troponins 34>40>41 ECG showed increasing T wave   inversions and possible ST depression suggestive of inferolateral wall MI, Per   cardiology Chest pain reproducible, Coronary cath 2021 showed mild   nonobstructive CAD  Treatment: Heparin gtt stopped, Xarelto, EKG, lab monitoring    Thank you, please contact me for any questions! Mahogany Johnson RN CDI Supervisor   [phone]  Options provided:  -- NSTEMI  -- Type 2 MI  -- Demand Ischemia only, no MI  -- Unstable Angina  -- Other - I will add my own diagnosis  -- Disagree - Not applicable / Not valid  -- Disagree - Clinically unable to determine / Unknown  -- Refer to Clinical Documentation Reviewer    PROVIDER RESPONSE TEXT:    This patient has an Acute inferolateral myocardial infarction confirmed.     Query created by: Bucky Packer on 2021 7:37 AM      Electronically signed by:  Malinda Martínez 2021 7:54 AM

## 2021-12-02 ENCOUNTER — OFFICE VISIT (OUTPATIENT)
Dept: INTERNAL MEDICINE CLINIC | Age: 86
End: 2021-12-02
Payer: MEDICARE

## 2021-12-02 VITALS
DIASTOLIC BLOOD PRESSURE: 80 MMHG | OXYGEN SATURATION: 96 % | BODY MASS INDEX: 26.77 KG/M2 | SYSTOLIC BLOOD PRESSURE: 138 MMHG | HEART RATE: 71 BPM | WEIGHT: 202 LBS | HEIGHT: 73 IN

## 2021-12-02 DIAGNOSIS — I48.91 ATRIAL FIBRILLATION, UNSPECIFIED TYPE (HCC): ICD-10-CM

## 2021-12-02 DIAGNOSIS — I21.4 NSTEMI (NON-ST ELEVATED MYOCARDIAL INFARCTION) (HCC): Primary | ICD-10-CM

## 2021-12-02 DIAGNOSIS — N17.9 AKI (ACUTE KIDNEY INJURY) (HCC): ICD-10-CM

## 2021-12-02 DIAGNOSIS — Z86.718 HISTORY OF DVT OF LOWER EXTREMITY: ICD-10-CM

## 2021-12-02 DIAGNOSIS — D50.9 IRON DEFICIENCY ANEMIA, UNSPECIFIED IRON DEFICIENCY ANEMIA TYPE: ICD-10-CM

## 2021-12-02 DIAGNOSIS — Z95.0 PACEMAKER: ICD-10-CM

## 2021-12-02 DIAGNOSIS — I50.9 CHRONIC CONGESTIVE HEART FAILURE, UNSPECIFIED HEART FAILURE TYPE (HCC): ICD-10-CM

## 2021-12-02 PROCEDURE — G8427 DOCREV CUR MEDS BY ELIG CLIN: HCPCS | Performed by: INTERNAL MEDICINE

## 2021-12-02 PROCEDURE — 1123F ACP DISCUSS/DSCN MKR DOCD: CPT | Performed by: INTERNAL MEDICINE

## 2021-12-02 PROCEDURE — G8417 CALC BMI ABV UP PARAM F/U: HCPCS | Performed by: INTERNAL MEDICINE

## 2021-12-02 PROCEDURE — 1036F TOBACCO NON-USER: CPT | Performed by: INTERNAL MEDICINE

## 2021-12-02 PROCEDURE — 99214 OFFICE O/P EST MOD 30 MIN: CPT | Performed by: INTERNAL MEDICINE

## 2021-12-02 PROCEDURE — 4040F PNEUMOC VAC/ADMIN/RCVD: CPT | Performed by: INTERNAL MEDICINE

## 2021-12-02 PROCEDURE — 1111F DSCHRG MED/CURRENT MED MERGE: CPT | Performed by: INTERNAL MEDICINE

## 2021-12-02 PROCEDURE — G8484 FLU IMMUNIZE NO ADMIN: HCPCS | Performed by: INTERNAL MEDICINE

## 2021-12-02 NOTE — PROGRESS NOTES
Visit Information    Have you changed or started any medications since your last visit including any over-the-counter medicines, vitamins, or herbal medicines? no   Are you having any side effects from any of your medications? -  no  Have you stopped taking any of your medications? Is so, why? -  no    Have you seen any other physician or provider since your last visit? Yes - Records Obtained  Have you had any other diagnostic tests since your last visit? Yes - Records Obtained  Have you been seen in the emergency room and/or had an admission to a hospital since we last saw you? Yes - Records Obtained  Have you had your routine dental cleaning in the past 6 months? no    Have you activated your [employer] account? If not, what are your barriers?  No:      Patient Care Team:  Nikole Mccarty MD as PCP - General (Internal Medicine)  Nikole Mccarty MD as PCP - [facility]    Medical History Review  Past Medical, Family, and Social History reviewed and does contribute to the patient presenting condition    Health Maintenance   Topic Date Due    DTaP/Tdap/Td vaccine (1 - Tdap) Never done    Shingles Vaccine (1 of 2) Never done   [employer] Visit (AWV)  Never done    Potassium monitoring  11/18/2022    Creatinine monitoring  11/18/2022    Flu vaccine  Completed    Pneumococcal 65+ years Vaccine  Completed    COVID-19 Vaccine  Completed    Hepatitis A vaccine  Aged Out    Hepatitis B vaccine  Aged Out    Hib vaccine  Aged Out    Meningococcal (ACWY) vaccine  Aged Out     SUBJECTIVE:  Omar Hdez is a 80 y.o. male patient who  comes for complaints of   Chief Complaint   Patient presents with   [address] from [facility] [place]     11/17/21 [facility] chest pain     Leg Swelling     Hospital follow up  Was admitted from 11/17-11/18  Was seen for Chest pain  treated for suspected NSTEMI and VARSHA- seen by cardiology, advsied OP cardiac w/up- seein cards 12/8  Denies any CP/SOB since    Context-history significant for CHF chronic diastolic EF 34%, CAD, A. fib and sick sinus syndrome with AICD implanted, multiple abdominal surgeries and severe osteoarthritis of the cervical spine  On Eliquis for [facility]    Has h/o DVT,   Also anemia  Seeing hematology    A-fib  HR controlled  Denies CP/plpitaations/SOB    CHF  Currently compensated. on lisinopril  Metoprolol  Started on aldactone in this admission  Pt was on bumex 2mg daily prior to admission, no changes made in bumex dose  Med list cleaned up- d/c'd the 1mg tab script. REVIEW OF SYSTEMS (except Subjective (HPI))  GENERAL: No fevers / chills  RESPIRATORY: Negative for cough, wheezing or shortness of breath  CARDIOVASCULAR: Negative for chest pain or palpitations. GI: no nausea, vomiting, or diarrhea  NEURO: No history of headaches    Past Medical History:   Diagnosis Date    Atrial fibrillation ([facility]     CAD (coronary artery disease)     CHF (congestive heart failure) (HCC)     Hyperlipidemia     Hypertension        SOCIAL HISTORY:  Social History     Socioeconomic History    Marital status:      Spouse name: Not on file    Number of children: Not on file    Years of education: Not on file    Highest education level: Not on file   Occupational History    Not on file   Tobacco Use    Smoking status: Never Smoker    Smokeless tobacco: Never Used   Substance and Sexual Activity    Alcohol use: Never    Drug use: Never    Sexual activity: Not on file   Other Topics Concern    Not on file   Social History Narrative    Not on file     Social Determinants of Health     Financial Resource Strain: Low Risk     Difficulty of Paying Living Expenses: Not hard at all   Food Insecurity: No Food Insecurity    Worried About Running Out of Food in the Last Year: Never true    [address] in the Last Year: Never true   Transportation Needs:     Lack of Transportation (Medical): Not on file    Lack of Transportation (Non-Medical):  Not on file Physical Activity:     Days of Exercise per Week: Not on file    Minutes of Exercise per Session: Not on file   Stress:     Feeling of Stress : Not on file   Social Connections:     Frequency of Communication with Friends and Family: Not on file    Frequency of Social Gatherings with Friends and Family: Not on file    Attends [employer] Services: Not on file    Active Member of Clubs or Organizations: Not on file    Attends Club or Organization Meetings: Not on file    Marital Status: Not on file   Intimate Partner Violence:     Fear of Current or Ex-Partner: Not on file    Emotionally Abused: Not on file    Physically Abused: Not on file    Sexually Abused: Not on file   Housing Stability:     Unable to Pay for Housing in the Last Year: Not on file    Number of Places Lived in the Last Year: Not on file    Unstable Housing in the Last Year: Not on file           CURRENT MEDICATIONS:  Current Outpatient Medications   Medication Sig Dispense Refill    triamterene-hydroCHLOROthiazide (DYAZIDE) 37.5-25 MG per capsule triamterene 37.5 mg-hydrochlorothiazide 25 mg capsule   TK 1 C PO QAM      traZODone (DESYREL) 50 MG tablet trazodone 50 mg tablet   TK 1 T PO QD IN THE NOHEMI      senna-docusate (PERICOLACE) 8.6-50 MG per tablet Stimulant Laxative Plus 8.6 mg-50 mg tablet   TAKE 1 TABLET BY MOUTH 1 TO 2 TIMES A DAY AS NEEDED FOR CONSTIPATION      metOLazone (ZAROXOLYN) 5 MG tablet metolazone 5 mg tablet      methocarbamol (ROBAXIN) 750 MG tablet methocarbamol 750 mg tablet   TAKE 1 TABLET BY MOUTH TWICE DAILY AS NEEDED FOR SPASMS      MAGNESIUM-OXIDE 400 (241.3 Mg) MG TABS tablet TAKE 1 TABLET BY MOUTH EVERY DAY      lisinopril (PRINIVIL;ZESTRIL) 40 MG tablet lisinopril 40 mg tablet   TAKE 1 TABLET BY MOUTH TWICE DAILY      HYDROcodone-acetaminophen (NORCO) 7.5-325 MG per tablet hydrocodone 7.5 mg-acetaminophen 325 mg tablet   TAKE 1 TABLET BY MOUTH EVERY 6 HOURS AS NEEDED FOR PAIN      hydrALAZINE (APRESOLINE) 25 MG tablet hydralazine 25 mg tablet      furosemide (LASIX) 40 MG tablet Take 40 mg by mouth      escitalopram (LEXAPRO) 10 MG tablet escitalopram 10 mg tablet   TAKE 1 TABLET BY MOUTH EVERY DAY      metoprolol succinate (TOPROL XL) 25 MG extended release tablet Take 25 mg by mouth nightly      amLODIPine (NORVASC) 5 MG tablet Take 2 tablets by mouth daily 90 tablet 3    bumetanide (BUMEX) 1 MG tablet Take 1 tablet by mouth daily 90 tablet 1    spironolactone (ALDACTONE) 25 MG tablet TAKE 1 TABLET BY MOUTH DAILY 90 tablet 1    bumetanide (BUMEX) 2 MG tablet TAKE 1 TABLET BY MOUTH DAILY 90 tablet 0    cyanocobalamin ([employer]S VITAMIN B12) 1000 MCG tablet Take 1 tablet by mouth daily 30 tablet 3    Coenzyme Q10 100 MG CHEW Take 1 tablet by mouth daily 90 tablet 0    famotidine (PEPCID) 20 MG tablet Take 1 tablet by mouth daily 60 tablet 3    ferrous sulfate (IRON 325) 325 (65 Fe) MG tablet Take 1 tablet by mouth 2 times daily 60 tablet 5    ascorbic acid (V-R VITAMIN C) 250 MG tablet Take 1 tablet by mouth 2 times daily Take with iron 60 tablet 3    buprenorphine-naloxone (SUBOXONE) 8-2 MG FILM SL film Place 1 Film under the tongue 2 times daily. Indications: pain       vitamin D (ERGOCALCIFEROL) 1.25 MG (34509 UT) CAPS capsule Take 50,000 Units by mouth once a week tuesdays      rivaroxaban (XARELTO) 15 MG TABS tablet Take 15 mg by mouth daily (with breakfast)      magnesium oxide (MAG-OX) 400 MG tablet Take 400 mg by mouth daily      latanoprost (XALATAN) 0.005 % ophthalmic solution Place 1 drop into both eyes nightly      metoprolol succinate (TOPROL XL) 25 MG extended release tablet Take 50 mg by mouth daily      finasteride (PROSCAR) 5 MG tablet Take 5 mg by mouth daily       No current facility-administered medications for this visit. OBJECTIVE:  Vitals:    12/02/21 1118   BP: 138/80   Pulse: 71   SpO2: 96%     Body mass index is 26.65 kg/m².           General exam (except above):  General appearance - well appearing, alert, in no acute distress  Head - Atraumatic, normocephalic  Eyes - EOMI, no jaundice or pallor  Lungs - Lungs clear to auscultation. No wheezing, rhonchi, rales  Heart - irregular  without murmur, gallop, or rubs. No ectopy  Abdomen - Abdomen soft, non-tender. Bowel sounds normal. No masses, organomegaly  Extremities -No significant edema, or skin discoloration. Good capillary refill. Neuro - Pt Alert, awake and oriented x 3. No gross focal neurological deficits    ASSESSMENT AND PLAN (MEDICAL DECISION MAKING):     Domitila Cazares was seen today for follow-up from hospital and leg swelling.     Diagnoses and all orders for this visit:    NSTEMI (non-ST elevated myocardial infarction) ([address]  Comments:  cards recommended OP follow up    VARSHA (acute kidney injury) ([address]    Atrial fibrillation, unspecified type ([address]    Chronic congestive heart failure, unspecified heart failure type ([address]  Comments:  some leg swelling,     History of DVT of lower extremity    Pacemaker    Iron deficiency anemia, unspecified iron deficiency anemia type  Comments:  Hb 10.5 , has orders for repeat labs from Hematology           Follow up in: Ava Velasco MD

## 2021-12-08 ENCOUNTER — HOSPITAL ENCOUNTER (OUTPATIENT)
Age: 86
Setting detail: SPECIMEN
Discharge: HOME | End: 2021-12-08

## 2021-12-08 DIAGNOSIS — Z86.718 HISTORY OF DVT OF LOWER EXTREMITY: ICD-10-CM

## 2021-12-08 DIAGNOSIS — Z79.01 ON CONTINUOUS ORAL ANTICOAGULATION: ICD-10-CM

## 2021-12-08 DIAGNOSIS — D64.9 ANEMIA, UNSPECIFIED TYPE: ICD-10-CM

## 2021-12-08 DIAGNOSIS — I48.19 PERSISTENT ATRIAL FIBRILLATION (HCC): ICD-10-CM

## 2021-12-08 LAB
ABSOLUTE EOS #: 0.32 K/UL (ref 0–0.44)
ABSOLUTE IMMATURE GRANULOCYTE: <0.03 K/UL (ref 0–0.3)
ABSOLUTE LYMPH #: 1.48 K/UL (ref 1.1–3.7)
ABSOLUTE MONO #: 0.54 K/UL (ref 0.1–1.2)
ANION GAP SERPL CALCULATED.3IONS-SCNC: 12 MMOL/L (ref 9–17)
BASOPHILS # BLD: 1 % (ref 0–2)
BASOPHILS ABSOLUTE: 0.04 K/UL (ref 0–0.2)
BUN BLDV-MCNC: 24 MG/DL (ref 8–23)
BUN/CREAT BLD: ABNORMAL (ref 9–20)
CALCIUM SERPL-MCNC: 9 MG/DL (ref 8.6–10.4)
CHLORIDE BLD-SCNC: 105 MMOL/L (ref 98–107)
CO2: 23 MMOL/L (ref 20–31)
CREAT SERPL-MCNC: 1.03 MG/DL (ref 0.7–1.2)
DIFFERENTIAL TYPE: ABNORMAL
EOSINOPHILS RELATIVE PERCENT: 8 % (ref 1–4)
FERRITIN: 55 UG/L (ref 30–400)
FOLATE: >20 NG/ML
GFR AFRICAN AMERICAN: >60 ML/MIN
GFR NON-AFRICAN AMERICAN: >60 ML/MIN
GFR SERPL CREATININE-BSD FRML MDRD: ABNORMAL ML/MIN/{1.73_M2}
GFR SERPL CREATININE-BSD FRML MDRD: ABNORMAL ML/MIN/{1.73_M2}
GLUCOSE BLD-MCNC: 113 MG/DL (ref 70–99)
HCT VFR BLD CALC: 32.5 % (ref 40.7–50.3)
HEMOGLOBIN: 9.6 G/DL (ref 13–17)
IMMATURE GRANULOCYTES: 0 %
IRON SATURATION: 33 % (ref 20–55)
IRON: 91 UG/DL (ref 59–158)
LYMPHOCYTES # BLD: 35 % (ref 24–43)
MCH RBC QN AUTO: 29.3 PG (ref 25.2–33.5)
MCHC RBC AUTO-ENTMCNC: 29.5 G/DL (ref 28.4–34.8)
MCV RBC AUTO: 99.1 FL (ref 82.6–102.9)
MONOCYTES # BLD: 13 % (ref 3–12)
NRBC AUTOMATED: 0 PER 100 WBC
PDW BLD-RTO: 14.9 % (ref 11.8–14.4)
PLATELET # BLD: 186 K/UL (ref 138–453)
PLATELET ESTIMATE: ABNORMAL
PMV BLD AUTO: 10.1 FL (ref 8.1–13.5)
POTASSIUM SERPL-SCNC: 4.6 MMOL/L (ref 3.7–5.3)
RBC # BLD: 3.28 M/UL (ref 4.21–5.77)
RBC # BLD: ABNORMAL 10*6/UL
SEG NEUTROPHILS: 43 % (ref 36–65)
SEGMENTED NEUTROPHILS ABSOLUTE COUNT: 1.8 K/UL (ref 1.5–8.1)
SODIUM BLD-SCNC: 140 MMOL/L (ref 135–144)
TOTAL IRON BINDING CAPACITY: 278 UG/DL (ref 250–450)
UNSATURATED IRON BINDING CAPACITY: 187 UG/DL (ref 112–347)
VITAMIN B-12: 1087 PG/ML (ref 232–1245)
WBC # BLD: 4.2 K/UL (ref 3.5–11.3)
WBC # BLD: ABNORMAL 10*3/UL

## 2021-12-17 RX ORDER — ERGOCALCIFEROL 1.25 MG/1
CAPSULE ORAL
Qty: 12 CAPSULE | Refills: 1 | Status: SHIPPED | OUTPATIENT
Start: 2021-12-17 | End: 2022-01-07

## 2021-12-17 RX ORDER — ERGOCALCIFEROL 1.25 MG/1
CAPSULE ORAL
Qty: 12 CAPSULE | Refills: 1 | Status: SHIPPED | OUTPATIENT
Start: 2021-12-17 | End: 2022-05-26

## 2022-01-06 RX ORDER — FINASTERIDE 5 MG/1
5 TABLET, FILM COATED ORAL DAILY
Qty: 30 TABLET | Refills: 1 | Status: SHIPPED | OUTPATIENT
Start: 2022-01-06 | End: 2022-03-21

## 2022-01-07 ENCOUNTER — APPOINTMENT (OUTPATIENT)
Dept: GENERAL RADIOLOGY | Age: 87
DRG: 303 | End: 2022-01-07
Payer: MEDICARE

## 2022-01-07 ENCOUNTER — HOSPITAL ENCOUNTER (INPATIENT)
Age: 87
LOS: 1 days | Discharge: HOME | DRG: 303 | End: 2022-01-08
Attending: EMERGENCY MEDICINE | Admitting: INTERNAL MEDICINE
Payer: MEDICARE

## 2022-01-07 DIAGNOSIS — R07.9 CHEST PAIN, UNSPECIFIED TYPE: Primary | ICD-10-CM

## 2022-01-07 LAB
ABSOLUTE EOS #: 0.2 K/UL (ref 0–0.4)
ABSOLUTE IMMATURE GRANULOCYTE: ABNORMAL K/UL (ref 0–0.3)
ABSOLUTE LYMPH #: 1.2 K/UL (ref 1–4.8)
ABSOLUTE MONO #: 0.5 K/UL (ref 0.1–1.3)
ALBUMIN SERPL-MCNC: 3.9 G/DL (ref 3.5–5.2)
ALBUMIN/GLOBULIN RATIO: ABNORMAL (ref 1–2.5)
ALP BLD-CCNC: 132 U/L (ref 40–129)
ALT SERPL-CCNC: 28 U/L (ref 5–41)
ANION GAP SERPL CALCULATED.3IONS-SCNC: 10 MMOL/L (ref 9–17)
AST SERPL-CCNC: 25 U/L
BASOPHILS # BLD: 1 % (ref 0–2)
BASOPHILS ABSOLUTE: 0 K/UL (ref 0–0.2)
BILIRUB SERPL-MCNC: 0.39 MG/DL (ref 0.3–1.2)
BUN BLDV-MCNC: 21 MG/DL (ref 8–23)
BUN/CREAT BLD: ABNORMAL (ref 9–20)
CALCIUM SERPL-MCNC: 9 MG/DL (ref 8.6–10.4)
CHLORIDE BLD-SCNC: 104 MMOL/L (ref 98–107)
CO2: 23 MMOL/L (ref 20–31)
CREAT SERPL-MCNC: 1.2 MG/DL (ref 0.7–1.2)
DIFFERENTIAL TYPE: ABNORMAL
EOSINOPHILS RELATIVE PERCENT: 4 % (ref 0–4)
GFR AFRICAN AMERICAN: >60 ML/MIN
GFR NON-AFRICAN AMERICAN: 57 ML/MIN
GFR SERPL CREATININE-BSD FRML MDRD: ABNORMAL ML/MIN/{1.73_M2}
GFR SERPL CREATININE-BSD FRML MDRD: ABNORMAL ML/MIN/{1.73_M2}
GLUCOSE BLD-MCNC: 114 MG/DL (ref 70–99)
HCT VFR BLD CALC: 30 % (ref 41–53)
HEMOGLOBIN: 9.8 G/DL (ref 13.5–17.5)
IMMATURE GRANULOCYTES: ABNORMAL %
LYMPHOCYTES # BLD: 27 % (ref 24–44)
MCH RBC QN AUTO: 29.7 PG (ref 26–34)
MCHC RBC AUTO-ENTMCNC: 32.7 G/DL (ref 31–37)
MCV RBC AUTO: 90.8 FL (ref 80–100)
MONOCYTES # BLD: 11 % (ref 1–7)
NRBC AUTOMATED: ABNORMAL PER 100 WBC
PDW BLD-RTO: 14.8 % (ref 11.5–14.9)
PLATELET # BLD: 244 K/UL (ref 150–450)
PLATELET ESTIMATE: ABNORMAL
PMV BLD AUTO: 6.7 FL (ref 6–12)
POTASSIUM SERPL-SCNC: 4.7 MMOL/L (ref 3.7–5.3)
RBC # BLD: 3.3 M/UL (ref 4.5–5.9)
RBC # BLD: ABNORMAL 10*6/UL
SEG NEUTROPHILS: 57 % (ref 36–66)
SEGMENTED NEUTROPHILS ABSOLUTE COUNT: 2.6 K/UL (ref 1.3–9.1)
SODIUM BLD-SCNC: 137 MMOL/L (ref 135–144)
TOTAL PROTEIN: 6.8 G/DL (ref 6.4–8.3)
TROPONIN INTERP: ABNORMAL
TROPONIN T: ABNORMAL NG/ML
TROPONIN, HIGH SENSITIVITY: 30 NG/L (ref 0–22)
TROPONIN, HIGH SENSITIVITY: 31 NG/L (ref 0–22)
TROPONIN, HIGH SENSITIVITY: 32 NG/L (ref 0–22)
TROPONIN, HIGH SENSITIVITY: 34 NG/L (ref 0–22)
WBC # BLD: 4.5 K/UL (ref 3.5–11)
WBC # BLD: ABNORMAL 10*3/UL

## 2022-01-07 PROCEDURE — 2580000003 HC RX 258

## 2022-01-07 PROCEDURE — 93005 ELECTROCARDIOGRAM TRACING: CPT

## 2022-01-07 PROCEDURE — 6370000000 HC RX 637 (ALT 250 FOR IP): Performed by: EMERGENCY MEDICINE

## 2022-01-07 PROCEDURE — 85025 COMPLETE CBC W/AUTO DIFF WBC: CPT

## 2022-01-07 PROCEDURE — 84484 ASSAY OF TROPONIN QUANT: CPT

## 2022-01-07 PROCEDURE — 71045 X-RAY EXAM CHEST 1 VIEW: CPT

## 2022-01-07 PROCEDURE — 2060000000 HC ICU INTERMEDIATE R&B

## 2022-01-07 PROCEDURE — 36415 COLL VENOUS BLD VENIPUNCTURE: CPT

## 2022-01-07 PROCEDURE — 80053 COMPREHEN METABOLIC PANEL: CPT

## 2022-01-07 PROCEDURE — 6370000000 HC RX 637 (ALT 250 FOR IP)

## 2022-01-07 PROCEDURE — 99284 EMERGENCY DEPT VISIT MOD MDM: CPT

## 2022-01-07 RX ORDER — AMLODIPINE BESYLATE 10 MG/1
10 TABLET ORAL DAILY
Status: DISCONTINUED | OUTPATIENT
Start: 2022-01-07 | End: 2022-01-08 | Stop reason: HOSPADM

## 2022-01-07 RX ORDER — NITROGLYCERIN 0.4 MG/1
0.4 TABLET SUBLINGUAL EVERY 5 MIN PRN
Status: DISCONTINUED | OUTPATIENT
Start: 2022-01-07 | End: 2022-01-08 | Stop reason: HOSPADM

## 2022-01-07 RX ORDER — ACETAMINOPHEN 650 MG/1
650 SUPPOSITORY RECTAL EVERY 6 HOURS PRN
Status: DISCONTINUED | OUTPATIENT
Start: 2022-01-07 | End: 2022-01-08 | Stop reason: HOSPADM

## 2022-01-07 RX ORDER — FERROUS SULFATE 325(65) MG
325 TABLET ORAL 2 TIMES DAILY
Status: DISCONTINUED | OUTPATIENT
Start: 2022-01-07 | End: 2022-01-08 | Stop reason: HOSPADM

## 2022-01-07 RX ORDER — ACETAMINOPHEN 325 MG/1
650 TABLET ORAL EVERY 6 HOURS PRN
Status: DISCONTINUED | OUTPATIENT
Start: 2022-01-07 | End: 2022-01-08 | Stop reason: HOSPADM

## 2022-01-07 RX ORDER — HYDRALAZINE HYDROCHLORIDE 25 MG/1
25 TABLET, FILM COATED ORAL 2 TIMES DAILY
COMMUNITY
End: 2022-03-07

## 2022-01-07 RX ORDER — BUMETANIDE 1 MG/1
2 TABLET ORAL DAILY
Status: DISCONTINUED | OUTPATIENT
Start: 2022-01-07 | End: 2022-01-08

## 2022-01-07 RX ORDER — FAMOTIDINE 20 MG/1
20 TABLET, FILM COATED ORAL DAILY
Status: DISCONTINUED | OUTPATIENT
Start: 2022-01-07 | End: 2022-01-08 | Stop reason: HOSPADM

## 2022-01-07 RX ORDER — ASCORBIC ACID 500 MG
250 TABLET ORAL 2 TIMES DAILY
Status: DISCONTINUED | OUTPATIENT
Start: 2022-01-07 | End: 2022-01-08 | Stop reason: HOSPADM

## 2022-01-07 RX ORDER — ONDANSETRON 2 MG/ML
4 INJECTION INTRAMUSCULAR; INTRAVENOUS EVERY 6 HOURS PRN
Status: DISCONTINUED | OUTPATIENT
Start: 2022-01-07 | End: 2022-01-08 | Stop reason: HOSPADM

## 2022-01-07 RX ORDER — UBIDECARENONE 50 MG
50 CAPSULE ORAL DAILY
Status: DISCONTINUED | OUTPATIENT
Start: 2022-01-07 | End: 2022-01-07

## 2022-01-07 RX ORDER — SODIUM CHLORIDE 0.9 % (FLUSH) 0.9 %
5-40 SYRINGE (ML) INJECTION EVERY 12 HOURS SCHEDULED
Status: DISCONTINUED | OUTPATIENT
Start: 2022-01-07 | End: 2022-01-08 | Stop reason: HOSPADM

## 2022-01-07 RX ORDER — DOCUSATE SODIUM 100 MG/1
100 CAPSULE, LIQUID FILLED ORAL 2 TIMES DAILY PRN
COMMUNITY

## 2022-01-07 RX ORDER — SODIUM CHLORIDE 0.9 % (FLUSH) 0.9 %
5-40 SYRINGE (ML) INJECTION PRN
Status: DISCONTINUED | OUTPATIENT
Start: 2022-01-07 | End: 2022-01-08 | Stop reason: HOSPADM

## 2022-01-07 RX ORDER — ASPIRIN 325 MG
325 TABLET ORAL ONCE
Status: COMPLETED | OUTPATIENT
Start: 2022-01-07 | End: 2022-01-07

## 2022-01-07 RX ORDER — SODIUM CHLORIDE 9 MG/ML
25 INJECTION, SOLUTION INTRAVENOUS PRN
Status: DISCONTINUED | OUTPATIENT
Start: 2022-01-07 | End: 2022-01-08 | Stop reason: HOSPADM

## 2022-01-07 RX ORDER — POLYETHYLENE GLYCOL 3350 17 G/17G
17 POWDER, FOR SOLUTION ORAL DAILY PRN
Status: DISCONTINUED | OUTPATIENT
Start: 2022-01-07 | End: 2022-01-08 | Stop reason: HOSPADM

## 2022-01-07 RX ORDER — LATANOPROST 50 UG/ML
1 SOLUTION/ DROPS OPHTHALMIC NIGHTLY
Status: DISCONTINUED | OUTPATIENT
Start: 2022-01-07 | End: 2022-01-08 | Stop reason: HOSPADM

## 2022-01-07 RX ORDER — SPIRONOLACTONE 25 MG/1
25 TABLET ORAL DAILY
Status: DISCONTINUED | OUTPATIENT
Start: 2022-01-07 | End: 2022-01-08 | Stop reason: HOSPADM

## 2022-01-07 RX ORDER — METOPROLOL SUCCINATE 50 MG/1
50 TABLET, EXTENDED RELEASE ORAL DAILY
Status: DISCONTINUED | OUTPATIENT
Start: 2022-01-07 | End: 2022-01-08 | Stop reason: HOSPADM

## 2022-01-07 RX ORDER — ERGOCALCIFEROL 1.25 MG/1
50000 CAPSULE ORAL WEEKLY
Status: DISCONTINUED | OUTPATIENT
Start: 2022-01-09 | End: 2022-01-08 | Stop reason: HOSPADM

## 2022-01-07 RX ORDER — BUPRENORPHINE AND NALOXONE 8; 2 MG/1; MG/1
1 FILM, SOLUBLE BUCCAL; SUBLINGUAL 2 TIMES DAILY
Status: DISCONTINUED | OUTPATIENT
Start: 2022-01-07 | End: 2022-01-08 | Stop reason: HOSPADM

## 2022-01-07 RX ORDER — LANOLIN ALCOHOL/MO/W.PET/CERES
1000 CREAM (GRAM) TOPICAL DAILY
Status: DISCONTINUED | OUTPATIENT
Start: 2022-01-07 | End: 2022-01-08 | Stop reason: HOSPADM

## 2022-01-07 RX ORDER — POTASSIUM CHLORIDE 20 MEQ/1
40 TABLET, EXTENDED RELEASE ORAL PRN
Status: DISCONTINUED | OUTPATIENT
Start: 2022-01-07 | End: 2022-01-08 | Stop reason: HOSPADM

## 2022-01-07 RX ORDER — ESCITALOPRAM OXALATE 10 MG/1
10 TABLET ORAL DAILY
Status: DISCONTINUED | OUTPATIENT
Start: 2022-01-07 | End: 2022-01-08 | Stop reason: HOSPADM

## 2022-01-07 RX ORDER — ONDANSETRON 4 MG/1
4 TABLET, ORALLY DISINTEGRATING ORAL EVERY 8 HOURS PRN
Status: DISCONTINUED | OUTPATIENT
Start: 2022-01-07 | End: 2022-01-08 | Stop reason: HOSPADM

## 2022-01-07 RX ORDER — POTASSIUM CHLORIDE 7.45 MG/ML
10 INJECTION INTRAVENOUS PRN
Status: DISCONTINUED | OUTPATIENT
Start: 2022-01-07 | End: 2022-01-08 | Stop reason: HOSPADM

## 2022-01-07 RX ADMIN — FERROUS SULFATE TAB 325 MG (65 MG ELEMENTAL FE) 325 MG: 325 (65 FE) TAB at 20:38

## 2022-01-07 RX ADMIN — ASPIRIN 325 MG ORAL TABLET 325 MG: 325 PILL ORAL at 10:45

## 2022-01-07 RX ADMIN — LATANOPROST 1 DROP: 50 SOLUTION OPHTHALMIC at 20:39

## 2022-01-07 RX ADMIN — SODIUM CHLORIDE, PRESERVATIVE FREE 10 ML: 5 INJECTION INTRAVENOUS at 20:45

## 2022-01-07 RX ADMIN — NITROGLYCERIN 0.4 MG: 0.4 TABLET SUBLINGUAL at 10:46

## 2022-01-07 RX ADMIN — BUPRENORPHINE AND NALOXONE 1 FILM: 8; 2 FILM BUCCAL; SUBLINGUAL at 20:38

## 2022-01-07 RX ADMIN — OXYCODONE HYDROCHLORIDE AND ACETAMINOPHEN 250 MG: 500 TABLET ORAL at 20:38

## 2022-01-07 ASSESSMENT — ENCOUNTER SYMPTOMS
SHORTNESS OF BREATH: 0
CONSTIPATION: 0
SHORTNESS OF BREATH: 1
NAUSEA: 0
ABDOMINAL PAIN: 0
VOMITING: 0
CHOKING: 0
COUGH: 0
BACK PAIN: 1
PHOTOPHOBIA: 0
WHEEZING: 0
EYE PAIN: 0
CHEST TIGHTNESS: 1
APNEA: 0
DIARRHEA: 0
ABDOMINAL PAIN: 1
ABDOMINAL DISTENTION: 0

## 2022-01-07 NOTE — ED PROVIDER NOTES
[facility]      Pt Name: Starr Colon  MRN: [id number]  [place] 1935  Date of evaluation: 1/7/22    CHIEF COMPLAINT       Chief Complaint   Patient presents with    Chest Pain    Shortness of Breath     HISTORY OF PRESENT ILLNESS   80 y.o. male presents with c/o chest pain. Symptoms started about a week ago. Pain is described as \"gas\" in character, 5/10 in severity. The pain is located primarily in the left chest with no radiation. Symptoms would last about 5 to 10 minutes. The pain has been intermittent in course. Symptoms have happened about 4-5 times over the last week. The patient tried alcaseltzer prior to arrival with  relief of symptoms. The patient reports that he was driving to his cardiologist office today and the symptoms happened again. They were associated with dizziness and sob and so he came right to the emergency department. Pt was recently admitted to the hospital in November of 2021 for evaluation of chest pain. He had a mild troponin elevation. He had a cardiac catheterization in April 2021 that showed non-obstructive cad. He had an echocardiogram in may 2021 that showed normal ejection fraction and grade 1 diastolic dysfunction. The patient has afib. He has a pacemaker. REVIEW OF SYSTEMS       Review of Systems   Constitutional: Negative for chills and fever. HENT: Negative for congestion. Eyes: Negative for visual disturbance. Respiratory: Negative for cough and shortness of breath. Cardiovascular: Positive for chest pain. Gastrointestinal: Negative for abdominal pain, diarrhea, nausea and vomiting. Genitourinary: Negative for difficulty urinating. Musculoskeletal: Negative for gait problem. Skin: Negative for rash. Neurological: Positive for dizziness and light-headedness.        PAST MEDICAL HISTORY     Past Medical History:   Diagnosis Date    Atrial fibrillation ([address]     CAD (coronary artery disease)  CHF [facility]congestive heart failure) ([facility]     Hyperlipidemia     Hypertension        SURGICAL HISTORY       Past Surgical History:   Procedure Laterality Date    CARDIAC CATHETERIZATION      PACEMAKER PLACEMENT         CURRENT MEDICATIONS       Current Discharge Medication List      CONTINUE these medications which have NOT CHANGED    Details   docusate sodium (COLACE) 100 MG capsule Take 100 mg by mouth 2 times daily as needed for Constipation      hydrALAZINE (APRESOLINE) 25 MG tablet Take 25 mg by mouth 2 times daily      finasteride (PROSCAR) 5 MG tablet Take 1 tablet by mouth daily  Qty: 30 tablet, Refills: 1      rivaroxaban (XARELTO) 15 MG TABS tablet Take 1 tablet by mouth daily (with breakfast)  Qty: 30 tablet, Refills: 1      vitamin D (ERGOCALCIFEROL) 1.25 MG (46893 UT) CAPS capsule TAKE 1 CAPSULE BY MOUTH EVERY WEEK  Qty: 12 capsule, Refills: 1      escitalopram (LEXAPRO) 10 MG tablet escitalopram 10 mg tablet   TAKE 1 TABLET BY MOUTH EVERY DAY      amLODIPine (NORVASC) 5 MG tablet Take 2 tablets by mouth daily  Qty: 90 tablet, Refills: 3    Associated Diagnoses: Primary hypertension      spironolactone (ALDACTONE) 25 MG tablet TAKE 1 TABLET BY MOUTH DAILY  Qty: 90 tablet, Refills: 1    Comments: **Patient requests 90 days supply**      bumetanide (BUMEX) 2 MG tablet TAKE 1 TABLET BY MOUTH DAILY  Qty: 90 tablet, Refills: 0    Comments: **Patient requests 90 days supply**      cyanocobalamin ([employer]S VITAMIN B12) 1000 MCG tablet Take 1 tablet by mouth daily  Qty: 30 tablet, Refills: 3    Associated Diagnoses: Vitamin B12 deficiency      Coenzyme Q10 100 MG CHEW Take 1 tablet by mouth daily  Qty: 90 tablet, Refills: 0    Associated Diagnoses: Elevated CK      famotidine (PEPCID) 20 MG tablet Take 1 tablet by mouth daily  Qty: 60 tablet, Refills: 3    Associated Diagnoses: Gastroesophageal reflux disease with esophagitis, unspecified whether hemorrhage      ferrous sulfate (IRON 325) 325 (65 Fe) MG tablet Take 1 tablet by mouth 2 times daily  Qty: 60 tablet, Refills: 5    Associated Diagnoses: Anemia, unspecified type      ascorbic acid (V-R VITAMIN C) 250 MG tablet Take 1 tablet by mouth 2 times daily Take with iron  Qty: 60 tablet, Refills: 3    Associated Diagnoses: Anemia, unspecified type      buprenorphine-naloxone (SUBOXONE) 8-2 MG FILM SL film Place 1 Film under the tongue 2 times daily. Indications: pain       magnesium oxide (MAG-OX) 400 MG tablet Take 400 mg by mouth daily      latanoprost (XALATAN) 0.005 % ophthalmic solution Place 1 drop into both eyes nightly      metoprolol succinate (TOPROL XL) 25 MG extended release tablet Take 50 mg by mouth daily      HYDROcodone-acetaminophen (NORCO) 7.5-325 MG per tablet hydrocodone 7.5 mg-acetaminophen 325 mg tablet   TAKE 1 TABLET BY MOUTH EVERY 6 HOURS AS NEEDED FOR PAIN             ALLERGIES     is allergic to aspirin, cyclobenzaprine, ibuprofen, azithromycin, and hydrocodone-acetaminophen. FAMILY HISTORY     has no family status information on file. SOCIAL HISTORY      reports that he has never smoked. He has never used smokeless tobacco. He reports that he does not drink alcohol and does not use drugs.     PHYSICAL EXAM     INITIAL VITALS: /76   Pulse 70   Temp 98 °F (36.7 °C) (Oral)   Resp 22   SpO2 98%   Gen: NAD  Head: Normocephalic, atraumatic, no diaphoresis  Eye: Pupils equal round reactive to light, no conjunctivitis  ENT: MMM  Neck: no JVD  Heart: Regular rate and rhythm no murmurs, no rubs  Lungs: Clear to auscultation bilaterally, no respiratory distress, no crackles, no rales  Chest wall: No crepitus, no focal tenderness palpation  Abdomen: Soft, nontender, nondistended, with no peritoneal signs  Neurologic: Patient is alert and oriented x3, motor and sensation is intact in all 4 extremities, fluent speech  Extremities: Full range of motion, no cyanosis, bilateral edema, no signs of trauma, no tenderness to palpation, no calf ttp    MEDICAL DECISION MAKING:     MDM  80 y.o. male presenting with chest pain. Differential diagnosis of ACS, Pna, lung mass, pneumothorax, symptomatic anemia. CBC, biochemical profile, troponin, EKG, chest x-ray  ordered. Treatment with aspirin and nitroglyerin. ED Course as of 01/07/22 1842   Marleen Pineda Jan 07, 2022   1433 Spoke with Dr. Deo Green, cardiology. He would like the patient placed in the hospital for further cardiac evaluation given his worsening symptoms. Consulting internal medicine service. [KW]      ED Course User Index  [KW] Angie Barlow MD     DIAGNOSTIC RESULTS     EKG: All EKG's are interpreted by the Emergency Department Physician who either signs or Co-signs this chart in the absence of a cardiologist.  EKG shows a sinus rhythm. HR is 70, , , no GIOVANNI, No STD, TWI, the axis Is leftwards. RADIOLOGY:All plain film, CT, MRI, and formal ultrasound images (except ED bedside ultrasound) are read by the radiologist and the images and interpretations are directly viewed by the emergency physician. XR CHEST PORTABLE   Final Result   Unchanged appearance of the chest without acute airspace disease identified. LABS: All lab results were reviewed by myself, and all abnormals are listed below.   Labs Reviewed   CBC WITH AUTO DIFFERENTIAL - Abnormal; Notable for the following components:       Result Value    RBC 3.30 (*)     Hemoglobin 9.8 (*)     Hematocrit 30.0 (*)     Monocytes 11 (*)     All other components within normal limits   COMPREHENSIVE METABOLIC PANEL - Abnormal; Notable for the following components:    Glucose 114 (*)     Alkaline Phosphatase 132 (*)     GFR Non- 57 (*)     All other components within normal limits   TROPONIN - Abnormal; Notable for the following components:    Troponin, High Sensitivity 34 (*)     All other components within normal limits   TROPONIN - Abnormal; Notable for the following components:    Troponin, High Sensitivity 31 (*)     All other components within normal limits   TROPONIN - Abnormal; Notable for the following components:    Troponin, High Sensitivity 30 (*)     All other components within normal limits   TROPONIN   CBC   BASIC METABOLIC PANEL W/ REFLEX TO MG FOR LOW K       EMERGENCY DEPARTMENT COURSE:   Vitals:    Vitals:    01/07/22 1330 01/07/22 1615 01/07/22 1759 01/07/22 1806   BP: 131/89  137/76    Pulse: 70 75 70 70   Resp: 20 9 22    Temp:   98 °F (36.7 °C)    TempSrc:   Oral    SpO2: 96% 97% 98%        The patient was given the following medications while in the emergency department:  Orders Placed This Encounter   Medications    nitroGLYCERIN (NITROSTAT) SL tablet 0.4 mg    aspirin tablet 325 mg    amLODIPine (NORVASC) tablet 10 mg    ascorbic acid (VITAMIN C) tablet 250 mg    bumetanide (BUMEX) tablet 2 mg    Coenzyme Q10 CAPS 50 mg    vitamin B-12 (CYANOCOBALAMIN) tablet 1,000 mcg    buprenorphine-naloxone (SUBOXONE) 8-2 MG SL film 1 Film    escitalopram (LEXAPRO) tablet 10 mg    famotidine (PEPCID) tablet 20 mg    ferrous sulfate (IRON 325) tablet 325 mg    latanoprost (XALATAN) 0.005 % ophthalmic solution 1 drop    metoprolol succinate (TOPROL XL) extended release tablet 50 mg    spironolactone (ALDACTONE) tablet 25 mg    vitamin D (ERGOCALCIFEROL) capsule 50,000 Units    rivaroxaban (XARELTO) tablet 15 mg    sodium chloride flush 0.9 % injection 5-40 mL    sodium chloride flush 0.9 % injection 5-40 mL    0.9 % sodium chloride infusion    OR Linked Order Group     ondansetron (ZOFRAN-ODT) disintegrating tablet 4 mg     ondansetron (ZOFRAN) injection 4 mg    OR Linked Order Group     acetaminophen (TYLENOL) tablet 650 mg     acetaminophen (TYLENOL) suppository 650 mg    polyethylene glycol (GLYCOLAX) packet 17 g    OR Linked Order Group     potassium chloride (KLOR-CON M) extended release tablet 40 mEq     potassium bicarb-citric acid (EFFER-K) effervescent tablet 40 mEq     potassium chloride 10 mEq/100 mL IVPB (Peripheral Line)    nitroGLYCERIN (NITROSTAT) SL tablet 0.4 mg     -------------------------  CRITICAL CARE:   CONSULTS: IP CONSULT TO CARDIOLOGY  IP CONSULT TO SOCIAL WORK  PROCEDURES: Procedures     FINAL IMPRESSION      1. Chest pain, unspecified type          DISPOSITION/PLAN   DISPOSITION Admitted 01/07/2022 04:14:30 PM      PATIENT REFERRED TO:  No follow-up provider specified.     DISCHARGE MEDICATIONS:  Current Discharge Medication List            Jihan Harmon MD  Attending Emergency Physician                      Jihan Harmon MD  01/07/22 1023

## 2022-01-07 NOTE — ED NOTES
Patient placed on cardiac, SPO2, and NIBP. IV established, and blood work labeled with correct patient label and sent to lab.        Marshal Vizcaino RN  01/07/22 4000

## 2022-01-07 NOTE — PROGRESS NOTES
Medication History completed:    New medications: none    Medications discontinued:  Lisinopril 40 mg  Methocarbamol 750 mg  Senna  Trazodone 50 mg    Changes to dosing: none    Stated allergies: as listed     Other pertinent information: Medications confirmed by [employer]. Thank you,  Cherelle Del Toro   PharmD.  Candidate 2022

## 2022-01-07 NOTE — ED NOTES
Patient to emergency department with complaints of  Left sided chest tightness, Sob, and tingling in his left digits. Pt states this started this morning while he was on his way to his cardiologist appointment. Pt states he noticed some chest discomfort described as tightness last night.       Sidney Naidu RN  01/07/22 1057

## 2022-01-07 NOTE — ED NOTES
Pt states chest pain has been relieved after administration of nitro     Jhon Stockton, LEON  01/07/22 111

## 2022-01-07 NOTE — ED NOTES
Pts pace maker interrogated by [employer], per tech information will be faxed to ED within 20 minutes      Cyndy Tillman RN  01/07/22 7477

## 2022-01-07 NOTE — H&P
[address]      [facility]     HISTORY AND PHYSICAL EXAMINATION            Date:   1/7/2022  Patient name:  Justin Daniels  Date of admission:  1/7/2022 10:32 AM  MRN:   [id number]  Account:  [de-identified]  YOB: 1935  PCP:    Garrick Mayfield MD  Room:   01/01  Code Status:    Prior    Chief Complaint:     Chief Complaint   Patient presents with    Chest Pain    Shortness of Breath       History Obtained From:     patient    History of Present Illness: The patient is a 80 y.o. Non- / non  male who presents withChest Pain and Shortness of Breath   and he is admitted to the hospital for the management of Chest pain. PMH significant for CHF, CAD, A. FIB and sick sinus syndrome with AICD implanted, DVT, multiple abdominal surgeries, and severe osteoarthritis of the cervical spine. He was evaluated at [facility] in November for a similar presentation which was negative and he has been following up with cardiology as an outpatient. Patient was having an argument in the morning when he began having shortness of breath, substernal chest pressure and pain 6/10 radiating to the lateral chest wall and left shoulder. He had associated lightheadedness and confusion but did not lose consciousness. He denied any nausea, vomiting, diaphoresis. The pain was relieved by nitroglycerin in the ED. The patient is sitting in bed comfortably and denies any pain. Troponins were 34,31, ECG showed a paced rhythm. Rest of the workup was unremarkable. Patient had been taking Xarelto as prescribed as an outpatient. He states that he has recently been having worsening shortness of breath, not associated with pain, on mild exertion. He has been getting winded walking from his car to the door. Denies orthopnea.     Most recent echo 5/25/21 showed EF 60-65% and no tablet Take 1 tablet by mouth 2 times daily Take with iron 8/20/21  Yes Dorita Riley MD   buprenorphine-naloxone (SUBOXONE) 8-2 MG FILM SL film Place 1 Film under the tongue 2 times daily. Indications: pain    Yes Historical Provider, MD   magnesium oxide (MAG-OX) 400 MG tablet Take 400 mg by mouth daily   Yes Historical Provider, MD   latanoprost (XALATAN) 0.005 % ophthalmic solution Place 1 drop into both eyes nightly   Yes Historical Provider, MD   metoprolol succinate (TOPROL XL) 25 MG extended release tablet Take 50 mg by mouth daily   Yes Historical Provider, MD   HYDROcodone-acetaminophen (NORCO) 7.5-325 MG per tablet hydrocodone 7.5 mg-acetaminophen 325 mg tablet   TAKE 1 TABLET BY MOUTH EVERY 6 HOURS AS NEEDED FOR PAIN    Historical Provider, MD        Allergies:     Aspirin, Cyclobenzaprine, Ibuprofen, Azithromycin, and Hydrocodone-acetaminophen    Social History:     Tobacco:    reports that he has never smoked. He has never used smokeless tobacco.  Alcohol:      reports no history of alcohol use. Drug Use:  reports no history of drug use. Family History:     History reviewed. No pertinent family history. Review of Systems:     Positive and Negative as described in HPI. Review of Systems   Constitutional: Negative for activity change, appetite change, chills, fatigue and fever. Eyes: Negative for photophobia, pain and visual disturbance. Respiratory: Positive for chest tightness and shortness of breath. Negative for apnea, cough, choking and wheezing. Cardiovascular: Positive for chest pain and leg swelling. Negative for palpitations. Gastrointestinal: Positive for abdominal pain (RLQ pain secondary to small known hernia). Negative for abdominal distention, constipation, diarrhea, nausea and vomiting. Genitourinary: Negative for dysuria, flank pain, frequency and urgency.    Musculoskeletal: Positive for arthralgias (Severe osteoarthritis of cervical spine, and bilateral shoulders), back pain and neck pain. Negative for gait problem, myalgias and neck stiffness. Neurological: Negative for dizziness, tremors, syncope, facial asymmetry, speech difficulty, weakness, light-headedness, numbness and headaches. Psychiatric/Behavioral: Negative for agitation, behavioral problems, confusion, decreased concentration, dysphoric mood, hallucinations and sleep disturbance. The patient is not nervous/anxious and is not hyperactive. Physical Exam:   /89   Pulse 75   Temp 97.9 °F (36.6 °C)   Resp 9   SpO2 97%   Temp (24hrs), Av.9 °F (36.6 °C), Min:97.9 °F (36.6 °C), Max:97.9 °F (36.6 °C)    No results for input(s): POCGLU in the last 72 hours. No intake or output data in the 24 hours ending 22 1644    Physical Exam  Constitutional:       General: He is not in acute distress. Appearance: Normal appearance. HENT:      Head: Normocephalic and atraumatic. Mouth/Throat:      Mouth: Mucous membranes are moist.      Pharynx: Oropharynx is clear. Eyes:      Extraocular Movements: Extraocular movements intact. Conjunctiva/sclera: Conjunctivae normal.      Pupils: Pupils are equal, round, and reactive to light. Comments: Arcus senilis   Cardiovascular:      Rate and Rhythm: Normal rate and regular rhythm. Pulses: Normal pulses. Heart sounds: Murmur (systolic murmur) heard. Pulmonary:      Effort: Pulmonary effort is normal. No respiratory distress. Breath sounds: Normal breath sounds. No wheezing, rhonchi or rales. Abdominal:      General: Abdomen is flat. There is no distension. Palpations: Abdomen is soft. There is mass (Central solid mass post surgery). Tenderness: There is no abdominal tenderness. There is no right CVA tenderness, left CVA tenderness or guarding. Hernia: A hernia is present. Musculoskeletal:      Cervical back: Normal range of motion and neck supple. Right lower leg: Edema present.       Left lower leg: Edema present. Comments: Compression socks on  Range of movement limited bilateral shoulders   Skin:     General: Skin is warm and dry. Capillary Refill: Capillary refill takes less than 2 seconds. Coloration: Skin is not jaundiced or pale. Findings: No bruising. Neurological:      General: No focal deficit present. Mental Status: He is alert and oriented to person, place, and time. Psychiatric:         Mood and Affect: Mood normal.         Behavior: Behavior normal.         Thought Content:  Thought content normal.         Judgment: Judgment normal.         Investigations:     Laboratory Testing:  Recent Results (from the past 24 hour(s))   EKG 12 Lead    Collection Time: 01/07/22 10:39 AM   Result Value Ref Range    Ventricular Rate 70 BPM    Atrial Rate 72 BPM    QRS Duration 152 ms    Q-T Interval 442 ms    QTc Calculation (Bazett) 477 ms    R Axis -77 degrees    T Axis 90 degrees   CBC with DIFF    Collection Time: 01/07/22 10:44 AM   Result Value Ref Range    WBC 4.5 3.5 - 11.0 k/uL    RBC 3.30 (L) 4.5 - 5.9 m/uL    Hemoglobin 9.8 (L) 13.5 - 17.5 g/dL    Hematocrit 30.0 (L) 41 - 53 %    MCV 90.8 80 - 100 fL    MCH 29.7 26 - 34 pg    MCHC 32.7 31 - 37 g/dL    RDW 14.8 11.5 - 14.9 %    Platelets 125 111 - 890 k/uL    MPV 6.7 6.0 - 12.0 fL    NRBC Automated NOT REPORTED per 100 WBC    Differential Type NOT REPORTED     Seg Neutrophils 57 36 - 66 %    Lymphocytes 27 24 - 44 %    Monocytes 11 (H) 1 - 7 %    Eosinophils % 4 0 - 4 %    Basophils 1 0 - 2 %    Immature Granulocytes NOT REPORTED 0 %    Segs Absolute 2.60 1.3 - 9.1 k/uL    Absolute Lymph # 1.20 1.0 - 4.8 k/uL    Absolute Mono # 0.50 0.1 - 1.3 k/uL    Absolute Eos # 0.20 0.0 - 0.4 k/uL    Basophils Absolute 0.00 0.0 - 0.2 k/uL    Absolute Immature Granulocyte NOT REPORTED 0.00 - 0.30 k/uL    WBC Morphology NOT REPORTED     RBC Morphology NOT REPORTED     Platelet Estimate NOT REPORTED    Comprehensive Metabolic Panel Collection Time: 01/07/22 10:44 AM   Result Value Ref Range    Glucose 114 (H) 70 - 99 mg/dL    BUN 21 8 - 23 mg/dL    CREATININE 1.20 0.70 - 1.20 mg/dL    Bun/Cre Ratio NOT REPORTED 9 - 20    Calcium 9.0 8.6 - 10.4 mg/dL    Sodium 137 135 - 144 mmol/L    Potassium 4.7 3.7 - 5.3 mmol/L    Chloride 104 98 - 107 mmol/L    CO2 23 20 - 31 mmol/L    Anion Gap 10 9 - 17 mmol/L    Alkaline Phosphatase 132 (H) 40 - 129 U/L    ALT 28 5 - 41 U/L    AST 25 <40 U/L    Total Bilirubin 0.39 0.3 - 1.2 mg/dL    Total Protein 6.8 6.4 - 8.3 g/dL    Albumin 3.9 3.5 - 5.2 g/dL    Albumin/Globulin Ratio NOT REPORTED 1.0 - 2.5    GFR Non- 57 (L) >60 mL/min    GFR African American >60 >60 mL/min    GFR Comment          GFR Staging NOT REPORTED    Troponin    Collection Time: 01/07/22 10:44 AM   Result Value Ref Range    Troponin, High Sensitivity 34 (H) 0 - 22 ng/L    Troponin T NOT REPORTED <0.03 ng/mL    Troponin Interp NOT REPORTED    Troponin    Collection Time: 01/07/22 12:40 PM   Result Value Ref Range    Troponin, High Sensitivity 31 (H) 0 - 22 ng/L    Troponin T NOT REPORTED <0.03 ng/mL    Troponin Interp NOT REPORTED        Imaging/Diagnostics:  XR CHEST PORTABLE    Result Date: 1/7/2022  EXAMINATION: ONE XRAY VIEW OF THE CHEST 1/7/2022 10:59 am COMPARISON: 11/17/2021, 09/24/2021 HISTORY: ORDERING SYSTEM PROVIDED HISTORY: cp TECHNOLOGIST PROVIDED HISTORY: cp Reason for Exam: Chest pain today h/o heart attack FINDINGS: The cardiomediastinal silhouette is unchanged in appearance. Left subclavian dual chamber pacer in place. Linear opacities in the lung bases to the again demonstrated, suggestive of scarring versus subsegmental atelectasis. There is no consolidation, pneumothorax, or evidence of edema. No effusion is appreciated. The osseous structures are unchanged in appearance. Unchanged appearance of the chest without acute airspace disease identified.        Assessment :      Primary Problem  Chest pain    Active Hospital Problems    Diagnosis Date Noted    Chest pain [R07.9] 05/25/2021    Atrial fibrillation ([facility] [I48.91] 05/25/2021    On continuous oral anticoagulation [Z79.01] 05/25/2021    CHF, acute on chronic ([facility] [address] [I50.9] 05/25/2021    Pacemaker [Z95.0] 05/25/2021    History of DVT of lower extremity [Z86.718] 05/25/2021       Plan:     Patient status Admit as inpatient in the  Progressive Unit/Step down    Chest Pain  - Recently Worked up 9/21 for similar presentation  - history of Afib on xeralto  - Most recent Cardiac echo 5/21 shows EF 60-65%  - Cardiac Cath spring 2021: non-obstructive CAD  - AICD implanted  - Cardiac consult  - Continue home Bumex 2 mg daily  - PRN Nitrostat  - ECG in the AM  - Telemetry monitoring  - Cardiology evaluation    CHF  - Continue home Norvasc 10 mg daily, Toprol 50 mg daily, Aldactone 25 mg daily  - Low sodium diet  - F/U cardio recs    Anemia  - Hg 9.8 today, stable  - Continue home Iron, Vit B12  - F/U CBC    DVT Prophylaxis: [id number] daily  GI prophylaxis: Home Pepcid  Diet: Cardiac diet  Dispo: TBD      Consultations:   IP CONSULT TO CARDIOLOGY  IP CONSULT TO SOCIAL WORK    Patient is admitted as inpatient status because of co-morbiditieslisted above, severity of signs and symptoms as outlined, requirement for current medical therapies and most importantly because of direct risk to patient if care not provided in a hospital setting.     Adrianne Multani MD  1/7/2022  4:44 PM    Copy sent to Dr. Clara Carroll MD

## 2022-01-08 VITALS
HEIGHT: 73 IN | SYSTOLIC BLOOD PRESSURE: 122 MMHG | BODY MASS INDEX: 26 KG/M2 | DIASTOLIC BLOOD PRESSURE: 78 MMHG | RESPIRATION RATE: 18 BRPM | TEMPERATURE: 98.4 F | OXYGEN SATURATION: 97 % | WEIGHT: 196.21 LBS | HEART RATE: 67 BPM

## 2022-01-08 LAB
ANION GAP SERPL CALCULATED.3IONS-SCNC: 6 MMOL/L (ref 9–17)
BUN BLDV-MCNC: 18 MG/DL (ref 8–23)
BUN/CREAT BLD: ABNORMAL (ref 9–20)
CALCIUM SERPL-MCNC: 8.8 MG/DL (ref 8.6–10.4)
CHLORIDE BLD-SCNC: 108 MMOL/L (ref 98–107)
CO2: 25 MMOL/L (ref 20–31)
CREAT SERPL-MCNC: 0.89 MG/DL (ref 0.7–1.2)
GFR AFRICAN AMERICAN: >60 ML/MIN
GFR NON-AFRICAN AMERICAN: >60 ML/MIN
GFR SERPL CREATININE-BSD FRML MDRD: ABNORMAL ML/MIN/{1.73_M2}
GFR SERPL CREATININE-BSD FRML MDRD: ABNORMAL ML/MIN/{1.73_M2}
GLUCOSE BLD-MCNC: 101 MG/DL (ref 70–99)
HCT VFR BLD CALC: 29.7 % (ref 41–53)
HEMOGLOBIN: 9.7 G/DL (ref 13.5–17.5)
MCH RBC QN AUTO: 29.7 PG (ref 26–34)
MCHC RBC AUTO-ENTMCNC: 32.7 G/DL (ref 31–37)
MCV RBC AUTO: 90.9 FL (ref 80–100)
NRBC AUTOMATED: ABNORMAL PER 100 WBC
PDW BLD-RTO: 14.9 % (ref 11.5–14.9)
PLATELET # BLD: 229 K/UL (ref 150–450)
PMV BLD AUTO: 7 FL (ref 6–12)
POTASSIUM SERPL-SCNC: 4.6 MMOL/L (ref 3.7–5.3)
RBC # BLD: 3.27 M/UL (ref 4.5–5.9)
SODIUM BLD-SCNC: 139 MMOL/L (ref 135–144)
TROPONIN INTERP: ABNORMAL
TROPONIN T: ABNORMAL NG/ML
TROPONIN, HIGH SENSITIVITY: 29 NG/L (ref 0–22)
WBC # BLD: 3.7 K/UL (ref 3.5–11)

## 2022-01-08 PROCEDURE — 99223 1ST HOSP IP/OBS HIGH 75: CPT | Performed by: INTERNAL MEDICINE

## 2022-01-08 PROCEDURE — 97530 THERAPEUTIC ACTIVITIES: CPT

## 2022-01-08 PROCEDURE — 2580000003 HC RX 258

## 2022-01-08 PROCEDURE — 6370000000 HC RX 637 (ALT 250 FOR IP): Performed by: INTERNAL MEDICINE

## 2022-01-08 PROCEDURE — 80048 BASIC METABOLIC PNL TOTAL CA: CPT

## 2022-01-08 PROCEDURE — 97166 OT EVAL MOD COMPLEX 45 MIN: CPT

## 2022-01-08 PROCEDURE — 93005 ELECTROCARDIOGRAM TRACING: CPT

## 2022-01-08 PROCEDURE — 97162 PT EVAL MOD COMPLEX 30 MIN: CPT

## 2022-01-08 PROCEDURE — 6370000000 HC RX 637 (ALT 250 FOR IP)

## 2022-01-08 PROCEDURE — 97165 OT EVAL LOW COMPLEX 30 MIN: CPT

## 2022-01-08 PROCEDURE — 85027 COMPLETE CBC AUTOMATED: CPT

## 2022-01-08 PROCEDURE — 97116 GAIT TRAINING THERAPY: CPT

## 2022-01-08 PROCEDURE — 36415 COLL VENOUS BLD VENIPUNCTURE: CPT

## 2022-01-08 PROCEDURE — 84484 ASSAY OF TROPONIN QUANT: CPT

## 2022-01-08 RX ORDER — METOPROLOL SUCCINATE 50 MG/1
50 TABLET, EXTENDED RELEASE ORAL DAILY
Qty: 30 TABLET | Refills: 3 | Status: SHIPPED | OUTPATIENT
Start: 2022-01-09 | End: 2022-01-12

## 2022-01-08 RX ORDER — BUMETANIDE 2 MG/1
2 TABLET ORAL 2 TIMES DAILY
Qty: 60 TABLET | Refills: 3 | Status: ON HOLD | OUTPATIENT
Start: 2022-01-08 | End: 2022-03-08 | Stop reason: SDUPTHER

## 2022-01-08 RX ORDER — NITROGLYCERIN 0.4 MG/1
TABLET SUBLINGUAL
Qty: 25 TABLET | Refills: 0 | Status: SHIPPED | OUTPATIENT
Start: 2022-01-08

## 2022-01-08 RX ORDER — BUMETANIDE 1 MG/1
2 TABLET ORAL 2 TIMES DAILY
Status: DISCONTINUED | OUTPATIENT
Start: 2022-01-08 | End: 2022-01-08 | Stop reason: HOSPADM

## 2022-01-08 RX ADMIN — FERROUS SULFATE TAB 325 MG (65 MG ELEMENTAL FE) 325 MG: 325 (65 FE) TAB at 08:15

## 2022-01-08 RX ADMIN — BUPRENORPHINE AND NALOXONE 1 FILM: 8; 2 FILM BUCCAL; SUBLINGUAL at 09:54

## 2022-01-08 RX ADMIN — SPIRONOLACTONE 25 MG: 25 TABLET ORAL at 08:15

## 2022-01-08 RX ADMIN — Medication 1000 MCG: at 08:15

## 2022-01-08 RX ADMIN — FAMOTIDINE 20 MG: 20 TABLET, FILM COATED ORAL at 08:21

## 2022-01-08 RX ADMIN — METOPROLOL SUCCINATE 50 MG: 50 TABLET, EXTENDED RELEASE ORAL at 08:15

## 2022-01-08 RX ADMIN — ESCITALOPRAM OXALATE 10 MG: 10 TABLET ORAL at 08:15

## 2022-01-08 RX ADMIN — Medication 50 MG: at 09:54

## 2022-01-08 RX ADMIN — OXYCODONE HYDROCHLORIDE AND ACETAMINOPHEN 250 MG: 500 TABLET ORAL at 08:15

## 2022-01-08 RX ADMIN — RIVAROXABAN 15 MG: 15 TABLET, FILM COATED ORAL at 08:15

## 2022-01-08 RX ADMIN — SODIUM CHLORIDE, PRESERVATIVE FREE 10 ML: 5 INJECTION INTRAVENOUS at 09:55

## 2022-01-08 RX ADMIN — BUMETANIDE 2 MG: 1 TABLET ORAL at 08:15

## 2022-01-08 RX ADMIN — AMLODIPINE BESYLATE 10 MG: 10 TABLET ORAL at 08:15

## 2022-01-08 RX ADMIN — BUMETANIDE 2 MG: 1 TABLET ORAL at 14:16

## 2022-01-08 ASSESSMENT — PAIN DESCRIPTION - ORIENTATION
ORIENTATION: LEFT
ORIENTATION: LEFT

## 2022-01-08 ASSESSMENT — PAIN SCALES - GENERAL
PAINLEVEL_OUTOF10: 4
PAINLEVEL_OUTOF10: 4

## 2022-01-08 ASSESSMENT — ENCOUNTER SYMPTOMS
ABDOMINAL PAIN: 0
VOMITING: 0
CONSTIPATION: 0
NAUSEA: 0
DIARRHEA: 0
SHORTNESS OF BREATH: 0
COUGH: 0

## 2022-01-08 ASSESSMENT — PAIN DESCRIPTION - LOCATION
LOCATION: CHEST
LOCATION: CHEST

## 2022-01-08 ASSESSMENT — PAIN DESCRIPTION - PAIN TYPE: TYPE: ACUTE PAIN

## 2022-01-08 NOTE — PROGRESS NOTES
[facility]    PROGRESS NOTE             1/8/2022    8:53 AM    Name:   Yonatan Galvez  MRN:     [id number]     Acct:      [de-identified]   Room:   2097/2097-01   Day:  1  Admit Date:  1/7/2022 10:32 AM    PCP:  Contreras Ridley MD  Code Status:  Full Code    Subjective:     C/C:   Chief Complaint   Patient presents with    Chest Pain    Shortness of Breath     Interval History Status: improved. Patient seen and examined at bedside. Vital signs stable this morning. Patient is on Xarelto 15 mg, Norvasc 10 mg, Bumex 2 mg, metoprolol 50 mg, and Aldactone 25 mg. No acute events reported overnight. Cardiology will see patient today. Patient reported shortness of breath with physical therapy today. Denies chest pain, nausea, vomiting, headaches, dizziness and lightheadedness. Brief History:     The patient is a 80 y.o. Non- / non  male who presents withChest Pain and Shortness of Breath   and he is admitted to the hospital for the management of Chest pain. PMH significant for CHF, CAD, A. FIB and sick sinus syndrome with AICD implanted, DVT, multiple abdominal surgeries, and severe osteoarthritis of the cervical spine. He was evaluated at [facility] in November for a similar presentation which was negative and he has been following up with cardiology as an outpatient.      Patient was having an argument in the morning when he began having shortness of breath, substernal chest pressure and pain 6/10 radiating to the lateral chest wall and left shoulder. He had associated lightheadedness and confusion but did not lose consciousness. He denied any nausea, vomiting, diaphoresis. The pain was relieved by nitroglycerin in the ED. The patient is sitting in bed comfortably and denies any pain. Troponins were 34,31, ECG showed a paced rhythm. Rest of the workup was unremarkable.  Patient had been taking Xarelto as prescribed as an outpatient. He states that he has recently been having worsening shortness of breath, not associated with pain, on mild exertion. He has been getting winded walking from his car to the door. Denies orthopnea.     Most recent echo 5/25/21 showed EF 60-65% and no concerning valvular dysfunction. Cardiac Cath done in spring 2021 showed nonobstructive CAD.        Review of Systems:     Review of Systems   Constitutional: Negative for chills, fatigue and fever. Respiratory: Negative for cough and shortness of breath. Cardiovascular: Negative for chest pain. Gastrointestinal: Negative for abdominal pain, constipation, diarrhea, nausea and vomiting. Genitourinary: Negative for dysuria and frequency. Neurological: Negative for dizziness, light-headedness, numbness and headaches. Medications: Allergies:     Allergies   Allergen Reactions    Aspirin Nausea Only    Cyclobenzaprine Nausea Only    Ibuprofen Nausea Only    Azithromycin Nausea Only    Hydrocodone-Acetaminophen      per pt, he is having upset stomach when taking norco       Current Meds:   Scheduled Meds:    amLODIPine  10 mg Oral Daily    ascorbic acid  250 mg Oral BID    bumetanide  2 mg Oral Daily    cyanocobalamin  1,000 mcg Oral Daily    buprenorphine-naloxone  1 Film SubLINGual BID    escitalopram  10 mg Oral Daily    famotidine  20 mg Oral Daily    ferrous sulfate  325 mg Oral BID    latanoprost  1 drop Both Eyes Nightly    metoprolol succinate  50 mg Oral Daily    spironolactone  25 mg Oral Daily    [START ON 1/9/2022] vitamin D  50,000 Units Oral Weekly    rivaroxaban  15 mg Oral Daily with breakfast    sodium chloride flush  5-40 mL IntraVENous 2 times per day    co-enzyme Q-10  50 mg Oral Daily     Continuous Infusions:    sodium chloride       PRN Meds: nitroGLYCERIN, sodium chloride flush, sodium chloride, ondansetron **OR** ondansetron, acetaminophen **OR** acetaminophen, polyethylene glycol, potassium chloride **OR** potassium alternative oral replacement **OR** potassium chloride, nitroGLYCERIN    Data:     Past Medical History:   has a past medical history of Atrial fibrillation ([facility] [address] CAD (coronary artery disease), CHF (congestive heart failure) ([facility] [address] Hyperlipidemia, and Hypertension. Social History:   reports that he has never smoked. He has never used smokeless tobacco. He reports that he does not drink alcohol and does not use drugs. Family History: History reviewed. No pertinent family history. Vitals:  BP (!) 157/98   Pulse 67   Temp 98.1 °F (36.7 °C) (Oral)   Resp 19   Ht 6' 1\" (1.854 m)   Wt 196 lb 3.4 oz (89 kg)   SpO2 98%   BMI 25.89 kg/m²   Temp (24hrs), Av.9 °F (36.6 °C), Min:97.7 °F (36.5 °C), Max:98.1 °F (36.7 °C)    No results for input(s): POCGLU in the last 72 hours. I/O(24Hr): Intake/Output Summary (Last 24 hours) at 2022 0853  Last data filed at 2022 0806  Gross per 24 hour   Intake 930 ml   Output --   Net 930 ml       Labs:  [unfilled]    Lab Results   Component Value Date/Time    SPECIAL NOT REPORTED 2021 08:15 PM     Lab Results   Component Value Date/Time    CULTURE NO SIGNIFICANT GROWTH 2021 08:15 PM       [unfilled]    Radiology:    XR CHEST PORTABLE    Result Date: 2022  EXAMINATION: ONE XRAY VIEW OF THE CHEST 2022 10:59 am COMPARISON: 2021, 2021 HISTORY: ORDERING SYSTEM PROVIDED HISTORY: cp TECHNOLOGIST PROVIDED HISTORY: cp Reason for Exam: Chest pain today h/o heart attack FINDINGS: The cardiomediastinal silhouette is unchanged in appearance. Left subclavian dual chamber pacer in place. Linear opacities in the lung bases to the again demonstrated, suggestive of scarring versus subsegmental atelectasis. There is no consolidation, pneumothorax, or evidence of edema. No effusion is appreciated. The osseous structures are unchanged in appearance.      Unchanged appearance of the chest without acute airspace disease identified. Physical Examination:        Physical Exam  Constitutional:       Appearance: Normal appearance. HENT:      Head: Normocephalic and atraumatic. Cardiovascular:      Rate and Rhythm: Normal rate and regular rhythm. Pulses: Normal pulses. Heart sounds: Normal heart sounds. No murmur heard. No friction rub. No gallop. Pulmonary:      Effort: Pulmonary effort is normal. No respiratory distress. Breath sounds: Normal breath sounds. No stridor. No wheezing or rhonchi. Abdominal:      General: Abdomen is flat. There is no distension. Palpations: Abdomen is soft. There is no mass. Tenderness: There is no abdominal tenderness. There is no guarding or rebound. Hernia: No hernia is present. Neurological:      Mental Status: He is alert.            Assessment:        Primary Problem  Chest pain    Active Hospital Problems    Diagnosis Date Noted    Chest pain [R07.9] 05/25/2021    Atrial fibrillation ([facility] [address] [I48.91] 05/25/2021    On continuous oral anticoagulation [Z79.01] 05/25/2021    CHF, acute on chronic ([address] [I50.9] 05/25/2021    Pacemaker [Z95.0] 05/25/2021    History of DVT of lower extremity [Z86.718] 05/25/2021       Plan:        Chest Pain  - Recently Worked up 9/21 for similar presentation  - history of Afib on xeralto  - Most recent Cardiac echo 5/21 shows EF 60-65%  - Cardiac Cath spring 2021: non-obstructive CAD  - AICD implanted  - Cardiac consult  - Continue home Bumex 2 mg daily  - PRN Nitrostat  - ECG in the AM  - Telemetry monitoring  - Cardiology evaluation     CHF  - Continue home Norvasc 10 mg daily, Toprol 50 mg daily, Aldactone 25 mg daily  - Low sodium diet  - F/U cardio recs     Anemia  - Hg 9.8 today, stable  - Continue home Iron, Vit B12  - F/U CBC     DVT Prophylaxis: [id number] daily  GI prophylaxis: Home Pepcid  Diet: Cardiac diet  Dispo: ROBBY Abernathy MD  1/8/2022  8:53 AM     I have discussed the care of Susan Byrd including pertinent history and exam findings,    today with the resident. I have seen and examined the patient and the key elements of all parts of the encounter have been performed by me . I agree with the assessment, plan and orders as documented by the resident. Principal Problem:    Chest pain  Active Problems:    Atrial fibrillation (HCC)    On continuous oral anticoagulation    CHF, acute on chronic [facility])    Pacemaker    History of DVT of lower extremity  Resolved Problems:    * No resolved hospital problems. *        Overall  course ;                                   are improving over time.         Admitted with chest pain, shortness of breath  Has multiple medical problems cardiomyopathy, s/p pacemaker  Troponins, stable  EKG, unchanged  Eval by cardiologist, dose of Bumex increased  DC planning          Electronically signed by Ricky Deshpande MD

## 2022-01-08 NOTE — PROGRESS NOTES
Physical Therapy    Facility/Department: [address] [place] [address] CARE  Initial Assessment    NAME: Mary Merritt  : 1935  MRN: [id number]    Date of Service: 2022    Discharge Recommendations:  Patient would benefit from continued therapy after discharge   PT Equipment Recommendations  Equipment Needed: No (pt hase necessary equipment at home)    Assessment   Body structures, Functions, Activity limitations: Decreased functional mobility ; Decreased safe awareness;Decreased endurance;Decreased balance;Decreased posture;Decreased strength  Assessment: Pt presents with decreased functional mobility and activity tolerance after being hospitalized for cardiac concerns. He does have mild SOB with assessment but vitals remain stable. He demos bed mobility with supervision, transfers with SBA, and ambulation with SPC with CGA. He frequently needs redirected to activity in session. Cues provided to slow down movements for safety as tends to move quickly. Encouraged pt to remain active while in the hospital with ambulation with staff and AROM of extermities. Feel he would benefit from continuing with PT while hospitalized to increase activity tolerance and overall mobility to ensure he can safely return home near his P[facility] of [facility] with intermittent supervision  Treatment Diagnosis: Impaired fucntional mobility secondary to medical complications, decrease ROM of in R hip, and increased fatigue with functional activity.   Specific instructions for Next Treatment: ambulation with vitals monitoring, 1 step training with SPC  Prognosis: Good  Decision Making: Medium Complexity  History: CAD, packemaker, CHF, HTN, multiple back and joint surgeries  Exam: MMTs, ROM, bed mobility, transfers and ambulation  PT Education: Goals;Plan of Care;PT Role;General Safety;Gait Training;Equipment  Patient Education: Discussed safety with needing to slow down ambulation to improve stability  Activity Tolerance  Activity Tolerance: Patient Tolerated treatment well  Activity Tolerance: Pt does complain of SOB after ambulation but vitals stable. Improves with rest       Patient Diagnosis(es): The encounter diagnosis was Chest pain, unspecified type. has a past medical history of Atrial fibrillation ([address] CAD (coronary artery disease), CHF (congestive heart failure) ([address] Hyperlipidemia, and Hypertension. has a past surgical history that includes pacemaker placement and Cardiac catheterization. Restrictions  Restrictions/Precautions  Restrictions/Precautions: General Precautions,Fall Risk  Required Braces or Orthoses?: No (Pt does report he intermittently wears a back brace)  Implants present? : Pacemaker,Metal implants (multiple spine sx; R AGUSTO, L TKA)  Vision/Hearing  Vision: Impaired  Vision Exceptions: Wears glasses for reading  Hearing: Exceptions to [facility]  Hearing Exceptions: Bilateral hearing aid;Hard of hearing/hearing concerns (not currently wearing hearing aids)     Subjective  General  Chart Reviewed: Yes  Patient assessed for rehabilitation services?: Yes  Additional Pertinent Hx: Hx of multiple cardiac conditions, multiple joint and back surgeries  Family / Caregiver Present: No  Referring Practitioner: Dr. Madrid Rather  Diagnosis: chest pain  Follows Commands: Within Functional Limits  Other (Comment): okay for eval per RN -- Armand whom is present in room administering meds on arrival  General Comment  Comments: Pt sitting in bed upon arrival and agreeable to evaluation. Subjective  Subjective: Pt reports he was admitted to hospital due chest pain and SOB. He is having mild discomfort on the L side of his chest but would still like to participate with PT.   Pain Screening  Patient Currently in Pain: Yes  Pain Assessment  Pain Assessment: 0-10  Pain Level: 4  Pain Location: Chest  Pain Orientation: Left  Vital Signs  Patient Currently in Pain: Yes       Orientation  Orientation  Overall Orientation Status: Within Normal Limits  Social/Functional History  Social/Functional History  Lives With: Son,Family (son, son's wife, and grandson)  Type of Home: House  Home Layout: One level  Home Access: Stairs to enter without rails  Entrance Stairs - Number of Steps: 1 step at front, 1 in garage  Bathroom Shower/Tub: Walk-in shower (has a shower chair if needed, but standing for showers)  Bathroom Toilet: Handicap height  Bathroom Equipment: Grab bars in shower,Hand-held shower,Toilet raiser,Shower chair  Bathroom Accessibility: Not accessible  Home Equipment: Cane,Rolling walker (uses cane for distances, no AD use in home)  Receives Help From: Family  ADL Assistance: Independent  Homemaking Assistance: Independent  Homemaking Responsibilities: Yes (does the cooking, IND)  Ambulation Assistance: Independent (no device inside home, cane for longer distances)  Transfer Assistance: Independent  Active : Yes  Mode of Transportation: SUV  IADL Comments: sleeps in a flat bed, no recent falls  Additional Comments: Pt's family able to assist as needed, son and son's wife work full time  Cognition        Objective     Observation/Palpation  Posture: Fair (forward flexed)  Observation: Pt on RA; HR ranges 67 bpm @ rest to 92 bpm with ambulation; SpO2 ranges 100-97% within session    AROM RLE (degrees)  RLE General AROM: Limited R hip flexion d/t R AGUSTO; otherwise ankle & knee WFL  AROM LLE (degrees)  LLE AROM : WFL  AROM RUE (degrees)  RUE General AROM: see OT for assessment  AROM LUE (degrees)  LUE General AROM: see OT for assessment  Strength RLE  R Hip Flexion: 4/5  R Hip ABduction: 4/5  R Hip ADduction: 5/5  R Knee Flexion: 4/5  R Knee Extension: 4+/5  R Ankle Dorsiflexion: 5/5  R Ankle Plantar flexion: 5/5  Strength LLE  L Hip Flexion: 4+/5  L Hip ABduction: 4/5  L Hip ADduction: 4+/5  L Knee Flexion: 4/5  L Knee Extension: 4+/5  L Ankle Dorsiflexion: 4+/5  L Ankle Plantar Flexion: 4+/5  Strength RUE  Comment: see OT for assessment  Strength RUTH  Comment: see OT for assessment     Sensation  Overall Sensation Status: Impaired (intermittent tingling in L finger tips reported)  Bed mobility  Rolling to Left: Supervision  Supine to Sit: Supervision  Comment: completes supine to sit EOB with supervision from flat bed  Transfers  Sit to Stand: Stand by assistance  Stand to sit: Stand by assistance  Stand Pivot Transfers: Stand by assistance  Comment: Pt completes STS from EOB with SBA due to moving fast and needing to stabilize. Use SPC for transfers, cues to slow down movements  Ambulation  Ambulation?: Yes  Ambulation 1  Surface: level tile  Device: Single point cane  Assistance: Contact guard assistance  Quality of Gait: Forward flexed trunk, limited hip extension, decreased push off, tends to move quickly, cues to slow down. Mild lateral path deviations  Distance: 100  Comments: Completed 100ft into hallway with SPC and CGA. Does report mild SOB by end of walk, SpO2 97% on RA. Pt returned to sitting in chair.      Balance  Posture: Fair  Sitting - Static: Good (tends to frequently lean back showing core strength)  Sitting - Dynamic: Good  Standing - Static: Fair;+  Standing - Dynamic: [address]  Times per week: 5-7x/wk  Specific instructions for Next Treatment: ambulation with vitals monitoring, 1 step training with SPC  Current Treatment Recommendations: Strengthening,Balance Training,Transfer Training,Endurance Training,Gait Training,Stair training,Patient/Caregiver Education & Training,Home Exercise Program,Safety Education & Training  Safety Devices  Type of devices: Left in chair,Gait belt,Nurse notified,Call light within reach    AM-PAC Score  AM-PAC Inpatient Mobility Raw Score : 20 (01/08/22 1005)  AM-PAC Inpatient T-Scale Score : 47.67 (01/08/22 1005)  Mobility Inpatient CMS 0-100% Score: 35.83 (01/08/22 1005)  Mobility Inpatient CMS G-Code Modifier : Leslie Beatty (01/08/22 1005)          Goals  Short term goals  Time Frame for Short term goals: 5 to 7 days  Short term goal 1: Pt to demonstrate ability to walk 150ft with SBA using Single point cane to navigate home  Short term goal 2: Pt to be IND with bed mobility from flat bed to allow for return home at [facility]  Short term goal 3: Pt to demonstrate ability to ascend and descend 1 step with supersion using single point cane to access home  Short term goal 4: Pt to be able to tolerate at least 30 minutes of gross exercise with appropriate rest breaks and no increase in symptoms demonstrating improved functional activity tolerance needed to complete  ADLs  Patient Goals   Patient goals : to return home       Therapy Time   Individual Concurrent Group Co-treatment   Time In 0818         Time Out 0844         Minutes 26         Timed Code Treatment Minutes: 8 Minutes       Marcy Franklin PT

## 2022-01-08 NOTE — CARE COORDINATION
CASE MANAGEMENT NOTE:    Admission Date:  1/7/2022 Ozzie Louise is a 80 y.o.  male    Admitted for : Chest pain [R07.9]  Chest pain, unspecified type [R07.9]    Met with:  Patient    PCP:  Dr. Riley Figueroa:  Medicare      Is patient alert and oriented at time of discussion:  Yes    Current Residence/ Living Arrangements:  independently at home , Kareem Mccoy lives w/ him          Current Services PTA:  Yes, VNS, [facility]    Does patient go to outpatient dialysis: No  If yes, location and chair time: NA    Is patient agreeable to VNS: Yes    Freedom of choice provided:  Yes    List of [address] provided: No    VNS chosen:  Shweta Guthrie? DME:  straight cane, toilet Riser, GB, Walk In Shower    Home Oxygen: No    Nebulizer: No    CPAP/BIPAP: No    Supplier: N/A    Potential Assistance Needed: Wants CHF, Clinic, did not have Ride last Admission, Can drive himself    SNF needed: No    Freedom of choice and list provided: NA    Pharmacy:  [facility] in        Does Patient want to use MEDS to BEDS? No    Is patient currently receiving oral anticoagulation therapy? Yes,Xarelto PTA    Is the Patient an [facility] with Readmission Risk Score greater than 14%? No  If yes, pt needs a follow up appointment made within 7 days. Family Members/Caregivers that pt would like involved in their care:    Yes    If yes, list name here:  Kareem Mccoy    Transportation Provider:  Patient             Discharge Plan:  1/8/22 Medicare Pt. Lives in 1 story home w/ SonIndia. DME- Oscar Isaacp, toilet Riser, GB, Walk In [facility]. Current w/ VNS,Elarmin Sánchez, Colby Galindo, following, Xarelto PTA, PT/OT, Cardio, Pacer, Cath in April. Wants CHF Clinic, now drives. Dulce will need signed/completed.  Will follow//KB                 Electronically signed by: Jama Alpers, RN on 1/8/2022 at 11:29 AM

## 2022-01-08 NOTE — DISCHARGE SUMMARY
[address]    Discharge Summary     Patient ID: Kimmie Kapoor  :  1935   MRN: [id number]     ACCOUNT:  [de-identified]   Patient's PCP: Jeronimo Ty MD  Admit Date: 2022   Discharge Date: 2022     Length of Stay: 1  Code Status:  Full Code  Admitting Physician: Jeronimo Ty MD  Discharge Physician: Sami Roca MD     Active Discharge Diagnoses:       Primary Problem  Chest pain      [place] Problems    Diagnosis Date Noted    Chest pain [R07.9] 2021    Atrial fibrillation ([facility] [I48.91] 2021    On continuous oral anticoagulation [Z79.01] 2021    CHF, acute on chronic ([facility] [I50.9] 2021    Pacemaker [Z95.0] 2021    History of DVT of lower extremity [Z86.718] 2021       Admission Condition:  fair     Discharged Condition: good    Hospital Stay:       Hospital Course: The patient is X 89 y.o.  Non- / non  male who presents withChest Pain and Shortness of Breath and he is admitted to the hospital for the management of Chest pain. PMH significant for CHF, CAD, A. FIB and sick sinus syndrome with AICD implanted, DVT, multiple abdominal surgeries, and severe osteoarthritis of the cervical spine.   Patient was having an argument in the morning when he began having shortness of breath, substernal chest pressure and pain 6/10 radiating to the lateral chest wall and left shoulder. He had associated lightheadedness and confusion but did not lose consciousness. He denied any nausea, vomiting, diaphoresis. The pain was relieved by nitroglycerin in the ED. Troponins were 34,31, ECG showed a paced rhythm. Rest of the workup was unremarkable. Patient had been taking Xarelto as prescribed as an outpatient. He states that he has recently been having worsening shortness of breath, not associated with pain, on mild exertion.  He has been getting winded walking from his car to the door. Denies orthopnea. Most recent echo 5/25/21 showed EF 60-65% and no concerning valvular dysfunction. Cardiac Cath done in spring 2021 showed nonobstructive CAD.   Cardiology evaluated patient, and increase Bumex to 2 mg oral twice daily.   Patient cleared for discharge and follow-up in 1 to 2 weeks.         Significant therapeutic interventions:   Chest x-ray  Bumex    Significant Diagnostic Studies:   Labs / Micro:  CBC:   Lab Results   Component Value Date    WBC 3.7 01/08/2022    RBC 3.27 01/08/2022    HGB 9.7 01/08/2022    HCT 29.7 01/08/2022    MCV 90.9 01/08/2022    MCH 29.7 01/08/2022    MCHC 32.7 01/08/2022    RDW 14.9 01/08/2022     01/08/2022     BMP:    Lab Results   Component Value Date    GLUCOSE 101 01/08/2022     01/08/2022    K 4.6 01/08/2022     01/08/2022    CO2 25 01/08/2022    ANIONGAP 6 01/08/2022    BUN 18 01/08/2022    CREATININE 0.89 01/08/2022    BUNCRER NOT REPORTED 01/08/2022    CALCIUM 8.8 01/08/2022    LABGLOM >60 01/08/2022    GFRAA >60 01/08/2022    GFR      01/08/2022    GFR NOT REPORTED 01/08/2022     HFP:    Lab Results   Component Value Date    PROT 6.8 01/07/2022     CMP:    Lab Results   Component Value Date    GLUCOSE 101 01/08/2022     01/08/2022    K 4.6 01/08/2022     01/08/2022    CO2 25 01/08/2022    BUN 18 01/08/2022    CREATININE 0.89 01/08/2022    ANIONGAP 6 01/08/2022    ALKPHOS 132 01/07/2022    ALT 28 01/07/2022    AST 25 01/07/2022    BILITOT 0.39 01/07/2022    LABALBU 3.9 01/07/2022    ALBUMIN NOT REPORTED 01/07/2022    LABGLOM >60 01/08/2022    GFRAA >60 01/08/2022    GFR      01/08/2022    GFR NOT REPORTED 01/08/2022    PROT 6.8 01/07/2022    CALCIUM 8.8 01/08/2022     PT/INR:    Lab Results   Component Value Date    PROTIME 17.4 11/17/2021    INR 1.4 11/17/2021     U/A:    Lab Results   Component Value Date    COLORU Yellow 09/25/2021    TURBIDITY Clear 09/25/2021    SPECGRAV 1.013 09/25/2021    HGBUR NEGATIVE 09/25/2021    PHUR 7.5 09/25/2021    PROTEINU NEGATIVE 09/25/2021    GLUCOSEU NEGATIVE 09/25/2021    KETUA NEGATIVE 09/25/2021    BILIRUBINUR NEGATIVE 09/25/2021    UROBILINOGEN Normal 09/25/2021    NITRU NEGATIVE 09/25/2021    LEUKOCYTESUR NEGATIVE 09/25/2021       Radiology:    XR CHEST PORTABLE    Result Date: 1/7/2022  EXAMINATION: ONE XRAY VIEW OF THE CHEST 1/7/2022 10:59 am COMPARISON: 11/17/2021, 09/24/2021 HISTORY: ORDERING SYSTEM PROVIDED HISTORY: cp TECHNOLOGIST PROVIDED HISTORY: cp Reason for Exam: Chest pain today h/o heart attack FINDINGS: The cardiomediastinal silhouette is unchanged in appearance. Left subclavian dual chamber pacer in place. Linear opacities in the lung bases to the again demonstrated, suggestive of scarring versus subsegmental atelectasis. There is no consolidation, pneumothorax, or evidence of edema. No effusion is appreciated. The osseous structures are unchanged in appearance. Unchanged appearance of the chest without acute airspace disease identified. Consultations:    Consults:     Final Specialist Recommendations/Findings:   IP CONSULT TO CARDIOLOGY  IP CONSULT TO SOCIAL WORK      The patient was seen and examined on day of discharge and this discharge summary is in conjunction with any daily progress note from day of discharge. Discharge plan:       Disposition: Home    Physician Follow Up:     [address] [facility]     They will call you at home to set up a time to come to your house. [facility]  [address] [facility]  [phone]    Call them when you get home to see if you can still get services.     Shaka Camarena, [address] [facility]  [phone]    In 1 week  Call and schedule when you get home       Requiring Further Evaluation/Follow Up POST HOSPITALIZATION/Incidental Findings:     Diet: regular diet    Activity: As tolerated    Instructions to Patient:   Take medications as prescribed. Follow-up with cardiologist in 1 to 2 weeks. If symptoms recur and chest pain worsens and shortness of breath develops return to ER. Discharge Medications:      Medication List      START taking these medications    nitroGLYCERIN 0.4 MG SL tablet  Commonly known as: NITROSTAT  up to max of 3 total doses. If no relief after 1 dose, call 911.         CHANGE how you take these medications    bumetanide 2 MG tablet  Commonly known as: BUMEX  Take 1 tablet by mouth 2 times daily  What changed: when to take this     metoprolol succinate 50 MG extended release tablet  Commonly known as: TOPROL XL  Take 1 tablet by mouth daily  Start taking on: January 9, 2022  What changed: medication strength        CONTINUE taking these medications    amLODIPine 5 MG tablet  Commonly known as: NORVASC  Take 2 tablets by mouth daily     ascorbic acid 250 MG tablet  Commonly known as: V-R VITAMIN C  Take 1 tablet by mouth 2 times daily Take with iron     Coenzyme Q10 100 MG Chew  Take 1 tablet by mouth daily     cyanocobalamin 1000 MCG tablet  Commonly known as: [employer] VITAMIN B12  Take 1 tablet by mouth daily     docusate sodium 100 MG capsule  Commonly known as: COLACE     escitalopram 10 MG tablet  Commonly known as: LEXAPRO     famotidine 20 MG tablet  Commonly known as: PEPCID  Take 1 tablet by mouth daily     ferrous sulfate 325 (65 Fe) MG tablet  Commonly known as: IRON 325  Take 1 tablet by mouth 2 times daily     finasteride 5 MG tablet  Commonly known as: PROSCAR  Take 1 tablet by mouth daily     hydrALAZINE 25 MG tablet  Commonly known as: APRESOLINE     HYDROcodone-acetaminophen 7.5-325 MG per tablet  Commonly known as: NORCO     latanoprost 0.005 % ophthalmic solution  Commonly known as: XALATAN     magnesium oxide 400 MG tablet  Commonly known as: MAG-OX     rivaroxaban 15 MG Tabs tablet  Commonly known as: Xarelto  Take 1 tablet by mouth daily (with breakfast)     spironolactone 25 MG tablet  Commonly known as: ALDACTONE  TAKE 1 TABLET BY MOUTH DAILY     Suboxone 8-2 MG Film SL film  Generic drug: buprenorphine-naloxone     vitamin D 1.25 MG (84652 UT) Caps capsule  Commonly known as: ERGOCALCIFEROL  TAKE 1 CAPSULE BY MOUTH EVERY WEEK           Where to Get Your Medications      These medications were sent to [facility] [address], [address] [phone] - [address] [address]    Phone: [phone]   · bumetanide 2 MG tablet  · metoprolol succinate 50 MG extended release tablet  · nitroGLYCERIN 0.4 MG SL tablet         Time Spent on discharge is  31 mins in patient examination, evaluation, counseling as well as medication reconciliation, prescriptions for required medications, discharge plan and follow up. Electronically signed by   Angelica Lynn MD  1/8/2022  4:07 PM      Thank you Dr. Sebastien Gama MD for the opportunity to be involved in this patient's care. Attending Physician Statement  I have discussed the care of Starr Colon and I have examined the patient myselft and taken ros and hpi , including pertinent history and exam findings,  with the resident. I have reviewed the key elements of all parts of the encounter with the resident. I agree with the assessment, plan and orders as documented by the resident. I spent over 35 mins in direct patient care as above and reviewing medications and counseling for discharge .         Electronically signed by Sebastien Gama MD

## 2022-01-08 NOTE — CONSULTS
[facility]   Consult Note         Today's Date: 1/8/2022  Patient Name: Cb Kee  Date of admission: 1/7/2022 10:32 AM  Patient's age: 80 y. o., 1935  Admission Dx: Chest pain [R07.9]  Chest pain, unspecified type [R07.9]    Reason for Consult:  Cardiac evaluation    Requesting Physician: Maxi Correa MD    REASON FOR CONSULT:  SOB    History Obtained From:  Patient, chart, staff, records    HISTORY OF PRESENT ILLNESS:      The patient is a 80 y.o. male who is admitted to the hospital for SOB and ARAUJO. No CP. Says he is out of breath pretty quickly. Cardiac work up has been negative. Has known CM and ICD in place. Past Medical History:   has a past medical history of Atrial fibrillation ([facility] CAD (coronary artery disease), CHF (congestive heart failure) ([facility] Hyperlipidemia, and Hypertension. Past Surgical History:   has a past surgical history that includes pacemaker placement and Cardiac catheterization. Home Medications:    Prior to Admission medications    Medication Sig Start Date End Date Taking?  Authorizing Provider   docusate sodium (COLACE) 100 MG capsule Take 100 mg by mouth 2 times daily as needed for Constipation   Yes Historical Provider, MD   hydrALAZINE (APRESOLINE) 25 MG tablet Take 25 mg by mouth 2 times daily   Yes Historical Provider, MD   finasteride (PROSCAR) 5 MG tablet Take 1 tablet by mouth daily 1/6/22  Yes Maxi Correa MD   rivaroxaban (XARELTO) 15 MG TABS tablet Take 1 tablet by mouth daily (with breakfast) 1/6/22  Yes Maxi Correa MD   vitamin D (ERGOCALCIFEROL) 1.25 MG (06122 UT) CAPS capsule TAKE 1 CAPSULE BY MOUTH EVERY WEEK 12/17/21  Yes Margie Law MD   escitalopram (LEXAPRO) 10 MG tablet escitalopram 10 mg tablet   TAKE 1 TABLET BY MOUTH EVERY DAY   Yes Historical Provider, MD   amLODIPine (NORVASC) 5 MG tablet Take 2 tablets by mouth daily 11/10/21  Yes Maxi Correa MD   spironolactone (ALDACTONE) 25 MG tablet TAKE 1 TABLET BY MOUTH DAILY 11/5/21  Yes Arthur Rowe MD   bumetanide (BUMEX) 2 MG tablet TAKE 1 TABLET BY MOUTH DAILY 11/5/21  Yes Arthur Rowe MD   cyanocobalamin ([employer] VITAMIN B12) 1000 MCG tablet Take 1 tablet by mouth daily 11/3/21  Yes Arthur Rowe MD   Coenzyme Q10 100 MG CHEW Take 1 tablet by mouth daily 10/26/21  Yes Arthur Rowe MD   famotidine (PEPCID) 20 MG tablet Take 1 tablet by mouth daily 10/26/21  Yes Arthur Rowe MD   ferrous sulfate (IRON 325) 325 (65 Fe) MG tablet Take 1 tablet by mouth 2 times daily 8/20/21  Yes Linus Fox MD   ascorbic acid (V-R VITAMIN C) 250 MG tablet Take 1 tablet by mouth 2 times daily Take with iron 8/20/21  Yes Linus Fox MD   buprenorphine-naloxone (SUBOXONE) 8-2 MG FILM SL film Place 1 Film under the tongue 2 times daily.  Indications: pain    Yes Historical Provider, MD   magnesium oxide (MAG-OX) 400 MG tablet Take 400 mg by mouth daily   Yes Historical Provider, MD   latanoprost (XALATAN) 0.005 % ophthalmic solution Place 1 drop into both eyes nightly   Yes Historical Provider, MD   metoprolol succinate (TOPROL XL) 25 MG extended release tablet Take 50 mg by mouth daily   Yes Historical Provider, MD   HYDROcodone-acetaminophen (NORCO) 7.5-325 MG per tablet hydrocodone 7.5 mg-acetaminophen 325 mg tablet   TAKE 1 TABLET BY MOUTH EVERY 6 HOURS AS NEEDED FOR PAIN    Historical Provider, MD     amLODIPine, 10 mg, Oral, Daily    ascorbic acid, 250 mg, Oral, BID    bumetanide, 2 mg, Oral, Daily    cyanocobalamin, 1,000 mcg, Oral, Daily    buprenorphine-naloxone, 1 Film, SubLINGual, BID    escitalopram, 10 mg, Oral, Daily    famotidine, 20 mg, Oral, Daily    ferrous sulfate, 325 mg, Oral, BID    latanoprost, 1 drop, Both Eyes, Nightly    metoprolol succinate, 50 mg, Oral, Daily    spironolactone, 25 mg, Oral, Daily    [START ON 1/9/2022] vitamin D, 50,000 Units, Oral, Weekly    rivaroxaban, 15 mg, Oral, Daily with breakfast    sodium chloride flush, 5-40 mL, IntraVENous, 2 times per day    co-enzyme Q-10, 50 mg, Oral, Daily      Allergies:  Aspirin, Cyclobenzaprine, Ibuprofen, Azithromycin, and Hydrocodone-acetaminophen    Social History:   reports that he has never smoked. He has never used smokeless tobacco. He reports that he does not drink alcohol and does not use drugs. Family History: family history is not on file. No h/o sudden cardiac death. REVIEW OF SYSTEMS:    · Constitutional: there has been no unanticipated weight loss. There's been No change in energy level, No change in activity level. · Eyes: No visual changes or diplopia. No scleral icterus. · ENT: No Headaches, hearing loss or vertigo. No mouth sores or sore throat. · Cardiovascular: per HPI  · Respiratory: per HPI  · Gastrointestinal: No abdominal pain, appetite loss, blood in stools. No change in bowel or bladder habits. · Genitourinary: No dysuria, trouble voiding, or hematuria. · Musculoskeletal:  No gait disturbance, No weakness or joint complaints. · Integumentary: No rash or pruritis. · Neurological: No headache, diplopia, change in muscle strength, numbness or tingling. No change in gait, balance, coordination, mood, affect, memory, mentation, behavior. · Psychiatric: No anxiety, or depression. · Endocrine: No temperature intolerance. No excessive thirst, fluid intake, or urination. No tremor. · Hematologic/Lymphatic: No abnormal bruising or bleeding, blood clots or swollen lymph nodes. · Allergic/Immunologic: No nasal congestion or hives. PHYSICAL EXAM:      BP (!) 157/98   Pulse 67   Temp 98.1 °F (36.7 °C) (Oral)   Resp 19   Ht 6' 1\" (1.854 m)   Wt 196 lb 3.4 oz (89 kg)   SpO2 98%   BMI 25.89 kg/m²    Constitutional and General Appearance: alert, cooperative, no distress and appears stated age  HEENT: PERRL, no cervical lymphadenopathy. No masses palpable.  Normal oral mucosa  Respiratory:  · Normal excursion and expansion without use of accessory muscles  · Resp Auscultation: Good respiratory effort. No for increased work of breathing. On auscultation: clear to auscultation bilaterally  Cardiovascular:  · The apical impulse is not displaced  · Heart tones are crisp and normal. regular S1 and S2.  · Jugular venous pulsation Normal  · The carotid upstroke is normal in amplitude and contour without delay or bruit  · Peripheral pulses are symmetrical and full   Abdomen:   · No masses or tenderness  · Bowel sounds present  Extremities:  ·  No Cyanosis or Clubbing  ·  Lower extremity edema: No  ·  Skin: Warm and dry  Neurological:  · Alert and oriented. · Moves all extremities well  · No abnormalities of mood, affect, memory, mentation, or behavior are noted        EKG:    Date: 01/08/22  Reading: No acute ischemia      LAST ECHO:  Date:  Findings Summary:      LAST Stress Test:   Date of last ST:  Major Findings:    LAST Cardiac Angiography:.  Date:  Findings:      Labs:     CBC:   Recent Labs     01/07/22  1044 01/08/22  0610   WBC 4.5 3.7   HGB 9.8* 9.7*   HCT 30.0* 29.7*    229     BMP:   Recent Labs     01/07/22  1044 01/08/22  0610    139   K 4.7 4.6   CO2 23 25   BUN 21 18   CREATININE 1.20 0.89   LABGLOM 57* >60   GLUCOSE 114* 101*     BNP: No results for input(s): BNP in the last 72 hours. PT/INR: No results for input(s): PROTIME, INR in the last 72 hours. APTT:No results for input(s): APTT in the last 72 hours. CARDIAC ENZYMES:No results for input(s): CKTOTAL, CKMB, CKMBINDEX, TROPONINI in the last 72 hours.   FASTING LIPID PANEL:  Lab Results   Component Value Date    HDL 57 11/02/2021    TRIG 70 11/02/2021     LIVER PROFILE:  Recent Labs     01/07/22  1044   AST 25   ALT 28   LABALBU 3.9     Troponins: Invalid input(s): TROPONIN     Other Current Problems  Patient Active Problem List   Diagnosis    Atrial fibrillation ([facility]    On continuous oral anticoagulation    CHF, acute on chronic ([facility]    Hyperlipidemia    Former smoker    Pacemaker    History of DVT of lower extremity    Enlarged prostate    Cataract of both eyes    GERD (gastroesophageal reflux disease)    History of inguinal hernia repair    Hypertension    Pneumonia    History of right hip replacement    History of back surgery    Low back pain    Chest pain    Fluid overload    VARSHA (acute kidney injury) ([address]    NSTEMI (non-ST elevated myocardial infarction) (HCC)    Chronic CHF (congestive heart failure) (HCC)    Acute inferolateral myocardial infarction ([address]           IMPRESSION & Recommendations:      ARAUJO and SOB- likely diastolic CHF. Increasse bumex to 2mg po bid  No CP. ECG unremarkable and trops negative. EF 65% on recent echo  Afib- stable  Cath 2021- mild non obstructive CAD  If otherwise stable, can DC home  Follow up in 1-2 weeks  Will follow peripherally, please call back if needed. Discussed with patient, family, and Nurse. Electronically signed by Vero Fisher DO on 1/8/2022 at 12:12 PM    Vero Fisher, [id number] [facility]  [email or web address]  [phone]

## 2022-01-08 NOTE — PROGRESS NOTES
[address] [facility]   Occupational Therapy Evaluation  Date: 22  Patient Name: Arlette Osman       Room: 91/6975-64  MRN: [id number]  Account: [de-identified]   : 1935  (80 y.o.) Gender: male     Discharge Recommendations: The patient's needs are being met with no further Occupational Therapy recommended at discharge. Equipment Needed:  (TBD)    Referring Practitioner: Cody Ha MD  Diagnosis: Chest pain  Additional Pertinent Hx: 80 y.o. male who was admitted to the hospital for the management of Chest pain. PMH significant for CHF, CAD, A. FIB and sick sinus syndrome with AICD implanted, DVT, multiple abdominal surgeries, and severe osteoarthritis of the cervical spine. He was evaluated at [address] [place] [address] in November for a similar presentation which was negative and he has been following up with cardiology as an outpatient    Treatment Diagnosis: Impaired self-care status  Past Medical History:  has a past medical history of Atrial fibrillation ([address] CAD (coronary artery disease), CHF (congestive heart failure) ([address] Hyperlipidemia, and Hypertension. Past Surgical History:   has a past surgical history that includes pacemaker placement and Cardiac catheterization.     Restrictions  Restrictions/Precautions: General Precautions,Fall Risk  Implants present? : Pacemaker,Metal implants  Required Braces or Orthoses?: Yes (Intermittent use of back brace, R elastic knee brace)     Vitals  Temp: 98.1 °F (36.7 °C)  Pulse: 67  Resp: 19  BP: (!) 157/98  Height: 6' 1\" (185.4 cm)  Weight: 196 lb 3.4 oz (89 kg)  BMI (Calculated): 25.9  Oxygen Therapy  SpO2: 98 %  Pulse Oximeter Device Mode: Intermittent  Pulse Oximeter Device Location: Finger  O2 Device: None (Room air)  Level of Consciousness: Alert (0)    Subjective     Overall Orientation Status: Within Functional Limits  Vision  Vision: Impaired  Vision Exceptions: Wears glasses for reading  Hearing  Hearing: Exceptions to WFL  Hearing Exceptions: Bilateral hearing aid,Hard of hearing/hearing concerns (not currently wearing hearing aids)  Social/Functional History  Lives With: Son,Family (son, son's wife, and grandson)  Type of Home: House  Home Layout: One level  Home Access: Stairs to enter without rails  Entrance Stairs - Number of Steps: 1 step at front, 1 in garage  Bathroom Shower/Tub: Walk-in shower (has a shower chair if needed, but standing for showers)  Bathroom Toilet: Handicap height  Bathroom Equipment: Grab bars in shower,Hand-held shower,Toilet raiser,Shower chair  Bathroom Accessibility: Not accessible  Home Equipment: Cane,Rolling walker (uses cane for distances, no AD use in home)  Receives Help From: Family  ADL Assistance: Independent  Homemaking Assistance: Independent  Homemaking Responsibilities: Yes (does the cooking, IND)  Ambulation Assistance: Independent (no device inside home, cane for longer distances)  Transfer Assistance: Independent  Active : Yes  Mode of Transportation: [facility]  IADL Comments: sleeps in a flat bed, no recent falls  Additional Comments: Pt's family able to assist as needed, son and son's wife work full time  Pain Assessment  Pain Assessment: 0-10  Pain Level: 4  Pain Type: Acute pain  Pain Location: Chest  Pain Orientation: Left    Objective      Cognition  Overall Cognitive Status: WFL   Sensation  Overall Sensation Status: Impaired (Episode of tingling in L finger tips)   ADL  Feeding: Modified independent   Grooming: Modified independent   UE Bathing: Stand by assistance  LE Bathing: Contact guard assistance  UE Dressing: Stand by assistance  LE Dressing: Contact guard assistance  Toileting: Contact guard assistance  Additional Comments: ADL scores based on skilled observation and clinical reasoning, unless otherwise noted. Assistance required due to low endurance, SOB, impacting safety and independence with self care.      UE Function           LUE Strength  L Hand General: 4/5  LUE Strength Comment: Overall 4/5     LUE Tone: Normotonic     LUE AROM (degrees)  LUE AROM : WFL     Left Hand AROM (degrees)  Left Hand AROM: WFL  RUE Strength  R Hand General: 4/5  RUE Strength Comment: Overall 4/5      RUE Tone: Normotonic     RUE AROM (degrees)  RUE AROM : WFL     Right Hand AROM (degrees)  Right Hand AROM: WFL    Fine Motor Skills  Coordination  Movements Are Fluid And Coordinated: Yes                           Mobility  Supine to Sit: Supervision       Balance  Sitting Balance: Supervision  Standing Balance: Contact guard assistance  Standing Balance  Time: 3-4 minutes  Activity: functional transfers, functional mobility  Comment: UE support with straight cane  Functional Mobility  Functional - Mobility Device: Cane  Activity:  (Throughout hallway)  Assist Level: Contact guard assistance  Functional Mobility Comments: CGA for safety; quick pace, cued to slow   Bed mobility  Rolling to Left: Supervision  Supine to Sit: Supervision  Scooting: Supervision  Comment: HOB slightly elevated, no complaints of lightheadedness/dizziness     Transfers  Sit to stand: Stand by assistance  Stand to sit: Stand by assistance  Functional Activity Tolerance  Functional Activity Tolerance:  Tolerates 30 min exercise with multiple rests     Assessment  Assessment  Performance deficits / Impairments: Decreased functional mobility ,Decreased strength,Decreased ADL status,Decreased ROM,Decreased endurance,Decreased balance  Treatment Diagnosis: Impaired self-care status  Prognosis: Good  Decision Making: Low Complexity  REQUIRES OT FOLLOW UP: Yes  Discharge Recommendations: Home with assist PRN  Activity Tolerance: Patient Tolerated treatment well         Functional Outcome Measures  AM-PAC Daily Activity Inpatient   How much help for putting on and taking off regular lower body clothing?: A Little  How much help for Bathing?: A Little  How much help for Toileting?: A Little  How much help for putting on and taking off regular upper body clothing?: None  How much help for taking care of personal grooming?: None  How much help for eating meals?: None  AM-PAC Inpatient Daily Activity Raw Score: 21  AM-PAC Inpatient ADL T-Scale Score : 44.27  ADL Inpatient CMS 0-100% Score: 32.79  ADL Inpatient CMS G-Code Modifier : CJ       Goals  Short term goals  Time Frame for Short term goals: By discharge  Short term goal 1: Patient will perform BADLs with mod I and good safety  Short term goal 2: Patient will perform tranfers/functional mobility with Pattie and good safety  Short term goal 3: Patient will V/D EC/WS techniques that are applicable to his daily routine  Short term goal 4: Patient will actively participate in 15+ minutes of therapeutic exercise/functional activity to promote safety and independence with self care and mobility    Plan  Safety Devices  Safety Devices in place: Yes  Type of devices:  All fall risk precautions in place,Call light within reach,Gait belt,Left in chair,Nurse notified     Plan  Times per week: 3-4  Current Treatment Recommendations: Strengthening,Balance Training,ROM,Functional Mobility Training,Endurance Training,Patient/Caregiver Education & Training,Equipment Evaluation, Education, & procurement,Self-Care / ADL,Safety Education & Training       Equipment Recommendations  Equipment Needed:  (TBD)  OT Individual Minutes  Time In: 0818  Time Out: 7605  Minutes: 26    Electronically signed by Carrie Avila OT on 1/8/22 at 10:00 AM EST         01/08/22 1000   OT Individual Minutes   Time In 0818   Time Out 8462   Minutes 26   Time Code Minutes    Timed Code Treatment Minutes 10 Minutes

## 2022-01-08 NOTE — DISCHARGE INSTR - COC
Continuity of Care Form    Patient Name: Jimi Triplett   :  1935  MRN:  [id number]    Admit date:  2022  Discharge date:  ***    Code Status Order: Full Code   Advance Directives:      Admitting Physician:  Ricky Deshpande MD  PCP: Ricky Deshpande MD    Discharging Nurse: [facility] Unit/Room#: 2097/2097-01  Discharging Unit Phone Number: ***    Emergency Contact:   Extended Emergency Contact Information  Primary Emergency Contact: Lebron Gonzalez  Mobile Phone: [phone]  Relation: Child    Past Surgical History:  Past Surgical History:   Procedure Laterality Date    CARDIAC CATHETERIZATION      PACEMAKER PLACEMENT         Immunization History:   Immunization History   Administered Date(s) Administered    COVID-19, Pfizer, PF, 30mcg/0.3mL 2021, 2021, 2021    Influenza Virus Vaccine 10/30/2011, 10/09/2015, 2016    Influenza, High Dose (Fluzone 65 yrs and older) 10/20/2018, 10/01/2019    Influenza, High-dose, Quadv, 72 yrs +, IM (Fluzone) 2020, 10/13/2021    Pneumococcal Conjugate 13-valent (Clemon Anes) 10/30/2019    Pneumococcal Polysaccharide ([id number]) 10/30/2011, 2020       Active Problems:  Patient Active Problem List   Diagnosis Code    Atrial fibrillation ([facility] I48.91    On continuous oral anticoagulation Z79.01    CHF, acute on chronic ([facility] I50.9    Hyperlipidemia E78.5    Former smoker Z87.891    Pacemaker Z95.0    History of DVT of lower extremity Z86.718    Enlarged prostate N40.0    Cataract of both eyes H26.9    GERD (gastroesophageal reflux disease) K21.9    History of inguinal hernia repair Z98.890, Z87.19    Hypertension I10    Pneumonia J18.9    History of right hip replacement Z96.641    History of back surgery Z98.890    Low back pain M54.50    Chest pain R07.9    Fluid overload E87.70    VARSHA (acute kidney injury) ([facility] N17.9    NSTEMI (non-ST elevated myocardial infarction) ([facility] I21.4    Chronic CHF (congestive heart failure) ([facility] I50.9    Acute inferolateral myocardial infarction (HCC) I21.19       Isolation/Infection:   Isolation            No Isolation          Patient Infection Status       Infection Onset Added Last Indicated Last Indicated By Review Planned Expiration Resolved Resolved By    None active    Resolved    COVID-19 (Rule Out) 11/17/21 11/17/21 11/17/21 COVID-19, Rapid (Ordered)   11/17/21 Rule-Out Test Resulted    COVID-19 (Rule Out) 06/25/21 06/25/21 06/25/21 COVID-19, Rapid (Ordered)   06/25/21 Rule-Out Test Resulted            Nurse Assessment:  Last Vital Signs: /78   Pulse 67   Temp 98.4 °F (36.9 °C) (Axillary)   Resp 18   Ht 6' 1\" (1.854 m)   Wt 196 lb 3.4 oz (89 kg)   SpO2 97%   BMI 25.89 kg/m²     Last documented pain score (0-10 scale): Pain Level: 4  Last Weight:   Wt Readings from Last 1 Encounters:   01/08/22 196 lb 3.4 oz (89 kg)     Mental Status:  {IP PT MENTAL STATUS:20030}    IV Access:  { CARLOS IV ACCESS:945310514}    Nursing Mobility/ADLs:  Walking   {[facility] DME TEPT:101136790}  Transfer  {[facility] DME VDVU:891166928}  Bathing  {P DME CYYK:450855802}  Dressing  {P DME XUEF:438026186}  Toileting  {P DME VAMB:843363744}  Feeding  {[facility] DME DOTE:119758139}  Med Admin  {[facility] DME EJPN:831768763}  Med Delivery   { CARLOS MED Delivery:208215419}    Wound Care Documentation and Therapy:        Elimination:  Continence: Bowel: {YES / PS:53205}  Bladder: {YES / LO:83535}  Urinary Catheter: {Urinary Catheter:237852834}   Colostomy/Ileostomy/Ileal Conduit: {YES / NN:21208}       Date of Last BM: 1/7/22    Intake/Output Summary (Last 24 hours) at 1/8/2022 1427  Last data filed at 1/8/2022 1212  Gross per 24 hour   Intake 1110 ml   Output --   Net 1110 ml     I/O last 3 completed shifts:   In: 750 [P.O.:750]  Out: -     Safety Concerns:     [address] Safety Concerns:907929608}    Impairments/Disabilities:      [address] GONZ[address] Impairments/Disabilities:131753765}    Nutrition Therapy:  Current Nutrition Therapy:   Vincent GONZALEZ Diet List:767408377}    Routes of Feeding: {CHP DME Other Feedings:365388167}  Liquids: {Slp liquid thickness:47943}  Daily Fluid Restriction: {CHP DME Yes amt example:584455090}  Last Modified Barium Swallow with Video (Video Swallowing Test): {Done Not Done UUNT:706601616}    Treatments at the Time of Hospital Discharge:   Respiratory Treatments: none  Oxygen Therapy:  {Therapy; copd oxygen:34791}  Ventilator:    {MH CC Vent TPSN:790100216}    Rehab Therapies: Physical Therapy and Occupational Therapy  Weight Bearing Status/Restrictions: No weight bearing restirctions  Other Medical Equipment (for information only, NOT a DME order):  cane  Other Treatments: Skilled Nursing Assessment Per Protocol. Medication Education & Monitoring. Resume previous services as PTA. Pt. Wants to go to [facility]. Please follow. Patient's personal belongings (please select all that are sent with patient):  {CHP DME Belongings:280874970}    RN SIGNATURE:  Electronically signed by Ana Riddle RN on 1/8/22 at 3:21 PM EST    CASE MANAGEMENT/SOCIAL WORK SECTION    Inpatient Status Date: ***    Readmission Risk Assessment Score:  Readmission Risk              Risk of Unplanned Readmission:  22           Discharging to [facility]: [phone]  F: [phone]     Dialysis Facility (if applicable)   Name:  Address:  Dialysis Schedule:  Phone:  Fax:    / signature: Electronically signed by Triny Mahan RN on 1/8/22 at 2:28 PM EST    PHYSICIAN SECTION    Prognosis: Good    Condition at Discharge: Stable    Rehab Potential (if transferring to Rehab): Good    Recommended Labs or Other Treatments After Discharge:     Physician Certification: I certify the above information and transfer of Justin Daniels  is necessary for the continuing treatment of the diagnosis listed and that he requires [address] for less 30 days.      Update Admission H&P: No change in H&P    PHYSICIAN SIGNATURE: Electronically signed by Alok Jaquez MD on 1/8/22 at 2:29 PM EST

## 2022-01-08 NOTE — CARE COORDINATION
Writer notified, Ramona Horan, from [facility] [employer] [place] [facility] [place] today. He will follow & will process all paperwork.

## 2022-01-08 NOTE — PLAN OF CARE
Problem: Falls - Risk of:  Goal: Will remain free from falls  Description: Will remain free from falls  Outcome: Ongoing  Goal: Absence of physical injury  Description: Absence of physical injury  Outcome: Ongoing     Problem: Skin Integrity:  Goal: Will show no infection signs and symptoms  Description: Will show no infection signs and symptoms  Outcome: Ongoing  Goal: Absence of new skin breakdown  Description: Absence of new skin breakdown  Outcome: Ongoing  Note: Assess the overall condition of the skin. Check on bony prominences such as the sacrum, trochanters, scapulae, elbows, heels, inner and outer malleolus, inner and outer knees, back of head). Reinforce the importance of turning, mobility, and ambulation. Problem: Pain:  Goal: Pain level will decrease  Description: Pain level will decrease  Outcome: Ongoing  Goal: Control of acute pain  Description: Control of acute pain  Outcome: Ongoing  Note: Assess the location, characteristics, onset, duration, frequency, quality, and severity of pain. Encourage immediate report of pain. Use appropriate pain scale to rate pain. Manage pain using nonpharmacologic/pharmacologic interventions.    Goal: Control of chronic pain  Description: Control of chronic pain  Outcome: Ongoing

## 2022-01-11 NOTE — PROGRESS NOTES
Physician Progress Note      B[place]  CSN #:                  [id number]  :                       1935  ADMIT DATE:       2022 10:32 AM  DISCH DATE:        2022 4:40 PM  RESPONDING  PROVIDER #:        Mariel Garcia MD          QUERY TEXT:    Pt admitted with complaints of chest pain. Pt noted to have multiple noted   potential causes of chest pain. If possible, please document in progress notes   and discharge summary if you are evaluating and/or treating any of the   following: The medical record reflects the following:  Risk Factors: 80 y.o. male with extensive PMH including atrial fibrillation,   CHF, CAD, Hypertension, Hyperlipidemia, and presence of pacemaker/ AICD. Clinical Indicators: In the setting of above risk factors, pt presented for   evaluation of chest pain off and on for 1 week. Per H&P : Most recent echo   21 showed EF 60-65%. Per  Medicine PN: He states that he has recently   been having worsening shortness of breath, not associated with pain, on mild   exertion. Cardiac Cath done in spring 2021 showed nonobstructive CAD. CXR   : There is no consolidation, pneumothorax, or evidence of edema. No   effusion is appreciated. Per  Cardiology Consult Note: ARAUJO and SOB- likely   diastolic CHF. Increase Bumex to 2mg po bid. T  Treatment: Bumex 2 mg increased from once daily to twice daily, Low sodium   diet, cardiology consult. Options provided:  -- Chest pain due to CAD with unstable angina  -- Chest pain due to CHF  -- Chest pain due to demand ischemia secondary to anemia  -- Chest pain due to ***  -- Other - I will add my own diagnosis  -- Disagree - Not applicable / Not valid  -- Disagree - Clinically unable to determine / Unknown  -- Refer to Clinical Documentation Reviewer    PROVIDER RESPONSE TEXT:    This patient has chest pain due to CAD.     Query created by: Salome Welch on 2022 6:58 AM      Electronically signed by:   Mariel Garcia MD 1/11/2022 11:45 AM

## 2022-01-12 ENCOUNTER — APPOINTMENT (OUTPATIENT)
Dept: GENERAL RADIOLOGY | Age: 87
DRG: 287 | End: 2022-01-12
Payer: MEDICARE

## 2022-01-12 ENCOUNTER — HOSPITAL ENCOUNTER (INPATIENT)
Age: 87
LOS: 2 days | Discharge: HOME HEALTH | DRG: 287 | End: 2022-01-14
Attending: EMERGENCY MEDICINE | Admitting: INTERNAL MEDICINE
Payer: MEDICARE

## 2022-01-12 DIAGNOSIS — I21.4 NSTEMI (NON-ST ELEVATED MYOCARDIAL INFARCTION) (HCC): Primary | ICD-10-CM

## 2022-01-12 LAB
ABSOLUTE EOS #: 0.1 K/UL (ref 0–0.4)
ABSOLUTE IMMATURE GRANULOCYTE: ABNORMAL K/UL (ref 0–0.3)
ABSOLUTE LYMPH #: 0.6 K/UL (ref 1–4.8)
ABSOLUTE MONO #: 0.3 K/UL (ref 0.1–1.3)
ANION GAP SERPL CALCULATED.3IONS-SCNC: 12 MMOL/L (ref 9–17)
BASOPHILS # BLD: 1 % (ref 0–2)
BASOPHILS ABSOLUTE: 0 K/UL (ref 0–0.2)
BNP INTERPRETATION: ABNORMAL
BUN BLDV-MCNC: 22 MG/DL (ref 8–23)
BUN/CREAT BLD: ABNORMAL (ref 9–20)
CALCIUM SERPL-MCNC: 9.7 MG/DL (ref 8.6–10.4)
CHLORIDE BLD-SCNC: 101 MMOL/L (ref 98–107)
CO2: 25 MMOL/L (ref 20–31)
CREAT SERPL-MCNC: 1.2 MG/DL (ref 0.7–1.2)
DIFFERENTIAL TYPE: ABNORMAL
EKG ATRIAL RATE: 72 BPM
EKG Q-T INTERVAL: 442 MS
EKG QRS DURATION: 152 MS
EKG QTC CALCULATION (BAZETT): 477 MS
EKG R AXIS: -77 DEGREES
EKG T AXIS: 90 DEGREES
EKG VENTRICULAR RATE: 70 BPM
EOSINOPHILS RELATIVE PERCENT: 1 % (ref 0–4)
GFR AFRICAN AMERICAN: >60 ML/MIN
GFR NON-AFRICAN AMERICAN: 57 ML/MIN
GFR SERPL CREATININE-BSD FRML MDRD: ABNORMAL ML/MIN/{1.73_M2}
GFR SERPL CREATININE-BSD FRML MDRD: ABNORMAL ML/MIN/{1.73_M2}
GLUCOSE BLD-MCNC: 126 MG/DL (ref 70–99)
HCT VFR BLD CALC: 34.5 % (ref 41–53)
HEMOGLOBIN: 11.3 G/DL (ref 13.5–17.5)
IMMATURE GRANULOCYTES: ABNORMAL %
INR BLD: 2
LYMPHOCYTES # BLD: 13 % (ref 24–44)
MCH RBC QN AUTO: 29.7 PG (ref 26–34)
MCHC RBC AUTO-ENTMCNC: 32.8 G/DL (ref 31–37)
MCV RBC AUTO: 90.6 FL (ref 80–100)
MONOCYTES # BLD: 8 % (ref 1–7)
NRBC AUTOMATED: ABNORMAL PER 100 WBC
PARTIAL THROMBOPLASTIN TIME: 35.1 SEC (ref 24–36)
PARTIAL THROMBOPLASTIN TIME: 52.6 SEC (ref 24–36)
PDW BLD-RTO: 14.8 % (ref 11.5–14.9)
PLATELET # BLD: 260 K/UL (ref 150–450)
PLATELET ESTIMATE: ABNORMAL
PMV BLD AUTO: 7 FL (ref 6–12)
POTASSIUM SERPL-SCNC: 4.3 MMOL/L (ref 3.7–5.3)
PRO-BNP: 1264 PG/ML
PROTHROMBIN TIME: 22.7 SEC (ref 11.8–14.6)
RBC # BLD: 3.81 M/UL (ref 4.5–5.9)
RBC # BLD: ABNORMAL 10*6/UL
SEG NEUTROPHILS: 77 % (ref 36–66)
SEGMENTED NEUTROPHILS ABSOLUTE COUNT: 3.6 K/UL (ref 1.3–9.1)
SODIUM BLD-SCNC: 138 MMOL/L (ref 135–144)
TROPONIN INTERP: ABNORMAL
TROPONIN INTERP: ABNORMAL
TROPONIN T: ABNORMAL NG/ML
TROPONIN T: ABNORMAL NG/ML
TROPONIN, HIGH SENSITIVITY: 44 NG/L (ref 0–22)
TROPONIN, HIGH SENSITIVITY: 51 NG/L (ref 0–22)
TSH SERPL DL<=0.05 MIU/L-ACNC: 1.3 MIU/L (ref 0.3–5)
WBC # BLD: 4.7 K/UL (ref 3.5–11)
WBC # BLD: ABNORMAL 10*3/UL

## 2022-01-12 PROCEDURE — 6370000000 HC RX 637 (ALT 250 FOR IP): Performed by: STUDENT IN AN ORGANIZED HEALTH CARE EDUCATION/TRAINING PROGRAM

## 2022-01-12 PROCEDURE — 80048 BASIC METABOLIC PNL TOTAL CA: CPT

## 2022-01-12 PROCEDURE — 2580000003 HC RX 258: Performed by: STUDENT IN AN ORGANIZED HEALTH CARE EDUCATION/TRAINING PROGRAM

## 2022-01-12 PROCEDURE — 85730 THROMBOPLASTIN TIME PARTIAL: CPT

## 2022-01-12 PROCEDURE — 2500000003 HC RX 250 WO HCPCS

## 2022-01-12 PROCEDURE — 85025 COMPLETE CBC W/AUTO DIFF WBC: CPT

## 2022-01-12 PROCEDURE — 6370000000 HC RX 637 (ALT 250 FOR IP): Performed by: EMERGENCY MEDICINE

## 2022-01-12 PROCEDURE — 6360000002 HC RX W HCPCS: Performed by: EMERGENCY MEDICINE

## 2022-01-12 PROCEDURE — 93005 ELECTROCARDIOGRAM TRACING: CPT | Performed by: EMERGENCY MEDICINE

## 2022-01-12 PROCEDURE — 99285 EMERGENCY DEPT VISIT HI MDM: CPT

## 2022-01-12 PROCEDURE — 71045 X-RAY EXAM CHEST 1 VIEW: CPT

## 2022-01-12 PROCEDURE — 2060000000 HC ICU INTERMEDIATE R&B

## 2022-01-12 PROCEDURE — 6370000000 HC RX 637 (ALT 250 FOR IP): Performed by: NURSE PRACTITIONER

## 2022-01-12 PROCEDURE — 2580000003 HC RX 258: Performed by: EMERGENCY MEDICINE

## 2022-01-12 PROCEDURE — 36415 COLL VENOUS BLD VENIPUNCTURE: CPT

## 2022-01-12 PROCEDURE — 84443 ASSAY THYROID STIM HORMONE: CPT

## 2022-01-12 PROCEDURE — 84484 ASSAY OF TROPONIN QUANT: CPT

## 2022-01-12 PROCEDURE — 96365 THER/PROPH/DIAG IV INF INIT: CPT

## 2022-01-12 PROCEDURE — 99223 1ST HOSP IP/OBS HIGH 75: CPT | Performed by: INTERNAL MEDICINE

## 2022-01-12 PROCEDURE — 83880 ASSAY OF NATRIURETIC PEPTIDE: CPT

## 2022-01-12 PROCEDURE — 85610 PROTHROMBIN TIME: CPT

## 2022-01-12 RX ORDER — SODIUM CHLORIDE 0.9 % (FLUSH) 0.9 %
5-40 SYRINGE (ML) INJECTION EVERY 12 HOURS SCHEDULED
Status: DISCONTINUED | OUTPATIENT
Start: 2022-01-12 | End: 2022-01-14 | Stop reason: HOSPADM

## 2022-01-12 RX ORDER — BUMETANIDE 1 MG/1
2 TABLET ORAL 2 TIMES DAILY
Status: CANCELLED | OUTPATIENT
Start: 2022-01-12

## 2022-01-12 RX ORDER — FINASTERIDE 5 MG/1
5 TABLET, FILM COATED ORAL DAILY
Status: DISCONTINUED | OUTPATIENT
Start: 2022-01-12 | End: 2022-01-14 | Stop reason: HOSPADM

## 2022-01-12 RX ORDER — SPIRONOLACTONE 25 MG/1
25 TABLET ORAL DAILY
Status: DISCONTINUED | OUTPATIENT
Start: 2022-01-12 | End: 2022-01-14 | Stop reason: HOSPADM

## 2022-01-12 RX ORDER — HEPARIN SODIUM 1000 [USP'U]/ML
4000 INJECTION, SOLUTION INTRAVENOUS; SUBCUTANEOUS ONCE
Status: COMPLETED | OUTPATIENT
Start: 2022-01-12 | End: 2022-01-12

## 2022-01-12 RX ORDER — AMLODIPINE BESYLATE 5 MG/1
10 TABLET ORAL DAILY
Status: DISCONTINUED | OUTPATIENT
Start: 2022-01-12 | End: 2022-01-14 | Stop reason: HOSPADM

## 2022-01-12 RX ORDER — METOPROLOL SUCCINATE 25 MG/1
50 TABLET, EXTENDED RELEASE ORAL EVERY MORNING
COMMUNITY
End: 2022-02-09 | Stop reason: SDUPTHER

## 2022-01-12 RX ORDER — METOPROLOL SUCCINATE 25 MG/1
25 TABLET, EXTENDED RELEASE ORAL NIGHTLY
Status: ON HOLD | COMMUNITY
End: 2022-01-14 | Stop reason: HOSPADM

## 2022-01-12 RX ORDER — HEPARIN SODIUM 1000 [USP'U]/ML
2000 INJECTION, SOLUTION INTRAVENOUS; SUBCUTANEOUS PRN
Status: DISCONTINUED | OUTPATIENT
Start: 2022-01-12 | End: 2022-01-13

## 2022-01-12 RX ORDER — POLYETHYLENE GLYCOL 3350 17 G/17G
17 POWDER, FOR SOLUTION ORAL DAILY PRN
Status: DISCONTINUED | OUTPATIENT
Start: 2022-01-12 | End: 2022-01-14 | Stop reason: HOSPADM

## 2022-01-12 RX ORDER — HYDRALAZINE HYDROCHLORIDE 25 MG/1
25 TABLET, FILM COATED ORAL 2 TIMES DAILY
Status: DISCONTINUED | OUTPATIENT
Start: 2022-01-12 | End: 2022-01-14 | Stop reason: HOSPADM

## 2022-01-12 RX ORDER — BUPRENORPHINE AND NALOXONE 8; 2 MG/1; MG/1
1 FILM, SOLUBLE BUCCAL; SUBLINGUAL 2 TIMES DAILY
Status: DISCONTINUED | OUTPATIENT
Start: 2022-01-12 | End: 2022-01-14 | Stop reason: HOSPADM

## 2022-01-12 RX ORDER — HEPARIN SODIUM 1000 [USP'U]/ML
4000 INJECTION, SOLUTION INTRAVENOUS; SUBCUTANEOUS PRN
Status: DISCONTINUED | OUTPATIENT
Start: 2022-01-12 | End: 2022-01-13

## 2022-01-12 RX ORDER — SODIUM CHLORIDE 0.9 % (FLUSH) 0.9 %
5-40 SYRINGE (ML) INJECTION PRN
Status: DISCONTINUED | OUTPATIENT
Start: 2022-01-12 | End: 2022-01-14 | Stop reason: HOSPADM

## 2022-01-12 RX ORDER — BUMETANIDE 0.25 MG/ML
2 INJECTION, SOLUTION INTRAMUSCULAR; INTRAVENOUS 2 TIMES DAILY
Status: DISCONTINUED | OUTPATIENT
Start: 2022-01-12 | End: 2022-01-13

## 2022-01-12 RX ORDER — SODIUM CHLORIDE 9 MG/ML
25 INJECTION, SOLUTION INTRAVENOUS PRN
Status: DISCONTINUED | OUTPATIENT
Start: 2022-01-12 | End: 2022-01-14 | Stop reason: HOSPADM

## 2022-01-12 RX ORDER — METOPROLOL SUCCINATE 25 MG/1
25 TABLET, EXTENDED RELEASE ORAL NIGHTLY
Status: DISCONTINUED | OUTPATIENT
Start: 2022-01-12 | End: 2022-01-14 | Stop reason: HOSPADM

## 2022-01-12 RX ORDER — ONDANSETRON 4 MG/1
4 TABLET, ORALLY DISINTEGRATING ORAL EVERY 8 HOURS PRN
Status: DISCONTINUED | OUTPATIENT
Start: 2022-01-12 | End: 2022-01-14 | Stop reason: HOSPADM

## 2022-01-12 RX ORDER — ONDANSETRON 2 MG/ML
4 INJECTION INTRAMUSCULAR; INTRAVENOUS EVERY 6 HOURS PRN
Status: DISCONTINUED | OUTPATIENT
Start: 2022-01-12 | End: 2022-01-14 | Stop reason: HOSPADM

## 2022-01-12 RX ORDER — NITROGLYCERIN 0.4 MG/1
0.4 TABLET SUBLINGUAL EVERY 5 MIN PRN
Status: DISCONTINUED | OUTPATIENT
Start: 2022-01-12 | End: 2022-01-14 | Stop reason: HOSPADM

## 2022-01-12 RX ADMIN — BUPRENORPHINE AND NALOXONE 1 FILM: 8; 2 FILM BUCCAL; SUBLINGUAL at 23:27

## 2022-01-12 RX ADMIN — FINASTERIDE 5 MG: 5 TABLET, FILM COATED ORAL at 20:40

## 2022-01-12 RX ADMIN — HYDRALAZINE HYDROCHLORIDE 25 MG: 25 TABLET, FILM COATED ORAL at 20:40

## 2022-01-12 RX ADMIN — Medication 10 ML: at 20:41

## 2022-01-12 RX ADMIN — SPIRONOLACTONE 25 MG: 25 TABLET ORAL at 20:40

## 2022-01-12 RX ADMIN — HEPARIN SODIUM 4000 UNITS: 1000 INJECTION, SOLUTION INTRAVENOUS; SUBCUTANEOUS at 16:01

## 2022-01-12 RX ADMIN — HEPARIN SODIUM 12 UNITS/KG/HR: 10000 INJECTION, SOLUTION INTRAVENOUS; SUBCUTANEOUS at 16:02

## 2022-01-12 RX ADMIN — NITROGLYCERIN 0.4 MG: 0.4 TABLET, ORALLY DISINTEGRATING SUBLINGUAL at 14:15

## 2022-01-12 RX ADMIN — AMLODIPINE BESYLATE 10 MG: 5 TABLET ORAL at 19:10

## 2022-01-12 RX ADMIN — BUMETANIDE 2 MG: 0.25 INJECTION, SOLUTION INTRAMUSCULAR; INTRAVENOUS at 20:40

## 2022-01-12 RX ADMIN — METOPROLOL SUCCINATE 25 MG: 25 TABLET, EXTENDED RELEASE ORAL at 20:39

## 2022-01-12 ASSESSMENT — ENCOUNTER SYMPTOMS
ABDOMINAL PAIN: 0
BACK PAIN: 1
CONSTIPATION: 0
SHORTNESS OF BREATH: 1
CHOKING: 0
EYE PAIN: 0
CHEST TIGHTNESS: 1
WHEEZING: 0
COLOR CHANGE: 0
ABDOMINAL PAIN: 1
BACK PAIN: 0
DIARRHEA: 0
NAUSEA: 0
PHOTOPHOBIA: 0
COUGH: 0
APNEA: 0
VOMITING: 0
ABDOMINAL DISTENTION: 0

## 2022-01-12 ASSESSMENT — PAIN DESCRIPTION - LOCATION: LOCATION: CHEST

## 2022-01-12 ASSESSMENT — PAIN DESCRIPTION - PAIN TYPE: TYPE: ACUTE PAIN

## 2022-01-12 ASSESSMENT — PAIN DESCRIPTION - DESCRIPTORS: DESCRIPTORS: ACHING

## 2022-01-12 ASSESSMENT — PAIN SCALES - GENERAL: PAINLEVEL_OUTOF10: 5

## 2022-01-12 NOTE — ED PROVIDER NOTES
EMERGENCY DEPARTMENT ENCOUNTER    Pt Name: Cb Kee  MRN: [id number]  [place] 1935  Date of evaluation: 1/12/22  CHIEF COMPLAINT       Chief Complaint   Patient presents with    Chest Pain    Atrial Fibrillation     HISTORY OF PRESENT ILLNESS   80-year-old male presents for complaint of palpitations, chest pain, and feeling he is going to pass out. Patient reports he woke up this morning and started getting ready and getting dressed states he developed chest pain at that time, states he pain was occasionally sharp but felt mostly like a full pressure sensation, states he was also feeling some palpitations and felt like he was going to pass out. Patient reports he was also feeling little short of breath at that time. Patient then stated he called squad who brought him in for evaluation. Patient was given nitro by squad which improved his chest pain. Patient reports that he is still having some fullness sensation, states he is still feeling intermittent sharp pain, states he is just feeling overall weak right now. Reports currently that his shortness of breath is improved, denies any significant swelling in the lower extremities, admits compliance with his meds. The history is provided by the patient. REVIEW OF SYSTEMS     Review of Systems   Constitutional: Negative for chills and fever. HENT: Negative for congestion and ear pain. Eyes: Negative for pain. Respiratory: Positive for shortness of breath. Cardiovascular: Positive for chest pain and palpitations. Negative for leg swelling. Gastrointestinal: Negative for abdominal pain. Genitourinary: Negative for dysuria and flank pain. Musculoskeletal: Negative for back pain. Skin: Negative for color change. Neurological: Positive for dizziness and light-headedness. Negative for numbness and headaches. Psychiatric/Behavioral: Negative for confusion. All other systems reviewed and are negative.     PASTMEDICAL HISTORY Past Medical History:   Diagnosis Date    Atrial fibrillation ([facility]     CAD (coronary artery disease)     CH[facility]congestive heart failure) ([facility])     Hyperlipidemia     Hypertension      Past Problem List  Patient Active Problem List   Diagnosis Code    Atrial fibrillation ([facility] I48.91    On continuous oral anticoagulation Z79.01    CHF, acute on chronic ([facility]) I50.9    Hyperlipidemia E78.5    Former smoker Z87.891    Pacemaker Z95.0    History of DVT of lower extremity Z86.718    Enlarged prostate N40.0    Cataract of both eyes H26.9    GERD (gastroesophageal reflux disease) K21.9    History of inguinal hernia repair Z98.890, Z87.19    Hypertension I10    Pneumonia J18.9    History of right hip replacement Z96.641    History of back surgery Z98.890    Low back pain M54.50    Chest pain R07.9    Fluid overload E87.70    VARSHA (acute kidney injury) ([facility] N17.9    NSTEMI (non-ST elevated myocardial infarction) ([facility] I21.4    Chronic CHF (congestive heart failure) ([facility]) I50.9    Acute inferolateral myocardial infarction ([facility]) I21.19     SURGICAL HISTORY       Past Surgical History:   Procedure Laterality Date    CARDIAC CATHETERIZATION      PACEMAKER PLACEMENT       CURRENT MEDICATIONS       Previous Medications    AMLODIPINE (NORVASC) 5 MG TABLET    Take 2 tablets by mouth daily    ASCORBIC ACID (V-R VITAMIN C) 250 MG TABLET    Take 1 tablet by mouth 2 times daily Take with iron    BUMETANIDE (BUMEX) 2 MG TABLET    Take 1 tablet by mouth 2 times daily    BUPRENORPHINE-NALOXONE (SUBOXONE) 8-2 MG FILM SL FILM    Place 1 Film under the tongue 2 times daily.  Indications: pain     COENZYME Q10 100 MG CHEW    Take 1 tablet by mouth daily    CYANOCOBALAMIN ([employer] VITAMIN B12) 1000 MCG TABLET    Take 1 tablet by mouth daily    DOCUSATE SODIUM (COLACE) 100 MG CAPSULE    Take 100 mg by mouth 2 times daily as needed for Constipation    FAMOTIDINE (PEPCID) 20 MG TABLET    Take 1 tablet by mouth daily FERROUS SULFATE (IRON 325) 325 (65 FE) MG TABLET    Take 1 tablet by mouth 2 times daily    FINASTERIDE (PROSCAR) 5 MG TABLET    Take 1 tablet by mouth daily    HYDRALAZINE (APRESOLINE) 25 MG TABLET    Take 25 mg by mouth 2 times daily    LATANOPROST (XALATAN) 0.005 % OPHTHALMIC SOLUTION    Place 1 drop into both eyes nightly    MAGNESIUM OXIDE (MAG-OX) 400 MG TABLET    Take 400 mg by mouth daily    METOPROLOL SUCCINATE (TOPROL XL) 25 MG EXTENDED RELEASE TABLET    Take 50 mg by mouth every morning    METOPROLOL SUCCINATE (TOPROL XL) 25 MG EXTENDED RELEASE TABLET    Take 25 mg by mouth nightly    NITROGLYCERIN (NITROSTAT) 0.4 MG SL TABLET    up to max of 3 total doses. If no relief after 1 dose, call 911. RIVAROXABAN (XARELTO) 15 MG TABS TABLET    Take 1 tablet by mouth daily (with breakfast)    SPIRONOLACTONE (ALDACTONE) 25 MG TABLET    TAKE 1 TABLET BY MOUTH DAILY    VITAMIN D (ERGOCALCIFEROL) 1.25 MG (88019 UT) CAPS CAPSULE    TAKE 1 CAPSULE BY MOUTH EVERY WEEK     ALLERGIES     is allergic to aspirin, cyclobenzaprine, ibuprofen, azithromycin, and hydrocodone-acetaminophen. FAMILY HISTORY     has no family status information on file. SOCIAL HISTORY       Social History     Tobacco Use    Smoking status: Never Smoker    Smokeless tobacco: Never Used   Substance Use Topics    Alcohol use: Never    Drug use: Never     PHYSICAL EXAM     INITIAL VITALS: /84   Pulse 70   Temp 98.8 °F (37.1 °C)   Resp 18   SpO2 99%    Physical Exam  Vitals and nursing note reviewed. Constitutional:       Appearance: Normal appearance. HENT:      Head: Normocephalic and atraumatic. Right Ear: External ear normal.      Left Ear: External ear normal.      Nose: Nose normal.      Mouth/Throat:      Mouth: Mucous membranes are moist.   Eyes:      Pupils: Pupils are equal, round, and reactive to light. Cardiovascular:      Rate and Rhythm: Normal rate. Rhythm irregularly irregular. Pulses: Normal pulses. Radial pulses are 2+ on the right side and 2+ on the left side. Heart sounds: Normal heart sounds. Comments: Trace edema in bilateral lower extremities  Pulmonary:      Effort: Pulmonary effort is normal.      Breath sounds: Normal breath sounds. Abdominal:      General: Abdomen is flat. Palpations: Abdomen is soft. Tenderness: There is no abdominal tenderness. Musculoskeletal:         General: No tenderness. Normal range of motion. Cervical back: Neck supple. Right lower leg: Edema present. Left lower leg: Edema present. Skin:     General: Skin is warm and dry. Capillary Refill: Capillary refill takes less than 2 seconds. Neurological:      General: No focal deficit present. Mental Status: He is alert and oriented to person, place, and time. Cranial Nerves: Cranial nerves are intact. Sensory: Sensation is intact. Motor: Motor function is intact. Psychiatric:         Behavior: Behavior normal.         MEDICAL DECISION MAKINyear-old male presents for chest pain, shortness of breath, and palpitations.   On initial exam patient in no acute distress, vitals are stable, patient does have extensive cardiac history, does have pacemaker, will check cardiac work-up, patient did have pacemaker interrogated as well, discussed with pacemaker rep who said that patient has had some runs of A. fib with RVR, no V. tach or V. fib episodes, reports that pacer is working correctly    Labs reviewed initial troponin noted to be 44, repeat of 51    Patient was reevaluated without significant change of his chest pain after nitro    Discussed with cardiology Dr. Geoff Arrington, he would like patient started on heparin drip given the change in troponin and would like patient admitted for further work-up and cardiology will see him    Results were discussed with patient, discussed need for admission, patient is agreeable    Spoke with Dr. Elza Harper who accepts admission with cardiology consult. Patient demonstrates understanding and agreement with the plan, was given the opportunity to ask questions, and these questions were answered to the best of the provided information at this time. VS stable for transfer. This dictation was prepared using [employer] voice recognition software. CRITICAL CARE:       PROCEDURES:    Procedures    DIAGNOSTIC RESULTS   EKG:All EKG's are interpreted by the Emergency Department Physician who either signs or Co-signs this chart in the absence of a cardiologist.     A. fib with ventricular paced complexes rate of 78, no significant ST segment elevation or depression, T wave inversions noted in the inferior and lateral leads, when compared to prior, no significant changes    Repeat EKG showing paced rhythm, no significant change from prior    RADIOLOGY:All plain film, CT, MRI, and formal ultrasound images (except ED bedside ultrasound) are read by the radiologist, see reports below, unless otherwisenoted in MDM or here. XR CHEST PORTABLE   Final Result   Similar appearance of opacities in the right lower lobe compatible with   atelectasis. No acute airspace infiltrate. LABS: All lab results were reviewed by myself, and all abnormals are listed below.   Labs Reviewed   CBC WITH AUTO DIFFERENTIAL - Abnormal; Notable for the following components:       Result Value    RBC 3.81 (*)     Hemoglobin 11.3 (*)     Hematocrit 34.5 (*)     Seg Neutrophils 77 (*)     Lymphocytes 13 (*)     Monocytes 8 (*)     Absolute Lymph # 0.60 (*)     All other components within normal limits   BASIC METABOLIC PANEL W/ REFLEX TO MG FOR LOW K - Abnormal; Notable for the following components:    Glucose 126 (*)     GFR Non- 57 (*)     All other components within normal limits   TROPONIN - Abnormal; Notable for the following components:    Troponin, High Sensitivity 44 (*)     All other components within normal limits   BRAIN NATRIURETIC PEPTIDE - Abnormal; Notable for the following components:    Pro-BNP 1,264 (*)     All other components within normal limits   PROTIME-INR - Abnormal; Notable for the following components:    Protime 22.7 (*)     All other components within normal limits   TROPONIN - Abnormal; Notable for the following components:    Troponin, High Sensitivity 51 (*)     All other components within normal limits   APTT   TSH WITH REFLEX   APTT   APTT       EMERGENCY DEPARTMENTCOURSE:         Vitals:    Vitals:    01/12/22 1403 01/12/22 1431 01/12/22 1501 01/12/22 1531   BP:  123/88 129/81 118/84   Pulse:  128 70 70   Resp:  14 15 18   Temp:       SpO2: 95% 97% 98% 99%       The patient was given the following medications while in the emergency department:  Orders Placed This Encounter   Medications    nitroGLYCERIN (NITROSTAT) SL tablet 0.4 mg    heparin (porcine) injection 4,000 Units    heparin (porcine) injection 4,000 Units    heparin (porcine) injection 2,000 Units    heparin (porcine) 25,000 Units in dextrose 5 % 250 mL infusion     CONSULTS:  IP CONSULT TO CARDIOLOGY  IP CONSULT TO INTERNAL MEDICINE    FINAL IMPRESSION      1. NSTEMI (non-ST elevated myocardial infarction) (HCC)          DISPOSITION/PLAN   DISPOSITION        PATIENT REFERRED TO:  No follow-up provider specified. DISCHARGE MEDICATIONS:  New Prescriptions    No medications on file     The care is provided during an unprecedented national emergency due to the novel coronavirus, COVID 19.   Arti Stover DO  Attending Emergency Physician                  Arti Stover DO  01/12/22 [id number]

## 2022-01-12 NOTE — PROGRESS NOTES
Medication History completed:    New medications: none     Medications discontinued: Norco, Escitalopram     Changes to dosing: Metoprolol succinate changed to 25 mg 2 tablets in the morning and 1 tablet in the evening     Stated allergies: as listed     Other pertinent information: Medications confirmed with [facility]. Thank you,  Berny McginnisD.  Candidate 2022

## 2022-01-12 NOTE — H&P
[address] [facility] [address]      [address]     HISTORY AND PHYSICAL EXAMINATION            Date:   1/12/2022  Patient name:  Jimi Triplett  Date of admission:  1/12/2022 11:37 AM  MRN:   [id number]  Account:  [de-identified]  YOB: 1935  PCP:    Ricky Deshpande MD  Room:   03/03  Code Status:    Prior    Chief Complaint:     Chief Complaint   Patient presents with    Chest Pain    Atrial Fibrillation       History Obtained From:     patient    History of Present Illness: The patient is a 80 y.o. Non- / non  male who presents withChest Pain and Atrial Fibrillation   and he is admitted to the hospital for the management of  NSTEMI. Patient was recently discharged on 1/8 for a similar presentation. PMH significant for CHF, CAD, Afib, and sick sinus syndrome with AICD implanted, DVT, multiple abdominal surgeries, and severe osteoarthritis of the cervical spine. He has been following up with cardiology as an outpatient. Patient was at home when he began feeling chest pain 4/10 with heaviness, shortness of breath, headache, light headedness, tunneled vision, malaise and weakness. He called his home nurse who then called EMS. He was given nitrostat and his symptoms resolved. He is still having intermittent sharp jolts of pain in his chest. His pacer was interrogated in the ED. EKG similar to previous, RBBB and previous inferior and anterior infarcts. Trops were 44, repeat 51. ProBNP 1200. Rest of the workup is unremarkable. Patient has been compliant with eliquis. Cardiology was contacted and started patient on full dose heparin and will assess patient in the morning. Code status confirmed with patient, he desires to be FULL CODE.         Past Medical History:     Past Medical History:   Diagnosis Date    Atrial fibrillation ([address]     CAD (coronary mouth 2 times daily Take with iron 8/20/21  Yes Adiel Zuniga MD   buprenorphine-naloxone (SUBOXONE) 8-2 MG FILM SL film Place 1 Film under the tongue 2 times daily. Indications: pain    Yes Historical Provider, MD   magnesium oxide (MAG-OX) 400 MG tablet Take 400 mg by mouth daily   Yes Historical Provider, MD   latanoprost (XALATAN) 0.005 % ophthalmic solution Place 1 drop into both eyes nightly   Yes Historical Provider, MD   Coenzyme Q10 100 MG CHEW Take 1 tablet by mouth daily 10/26/21   Sebastien Gama MD   famotidine (PEPCID) 20 MG tablet Take 1 tablet by mouth daily 10/26/21   Sebastien Gama MD        Allergies:     Aspirin, Cyclobenzaprine, Ibuprofen, Azithromycin, and Hydrocodone-acetaminophen    Social History:     Tobacco:    reports that he has never smoked. He has never used smokeless tobacco.  Alcohol:      reports no history of alcohol use. Drug Use:  reports no history of drug use. Family History:     History reviewed. No pertinent family history. Review of Systems:     Positive and Negative as described in HPI. Review of Systems   Constitutional: Negative for activity change, appetite change, chills, fatigue and fever. Eyes: Negative for photophobia, pain and visual disturbance. Respiratory: Positive for chest tightness and shortness of breath. Negative for apnea, cough, choking and wheezing. Cardiovascular: Positive for chest pain and leg swelling. Negative for palpitations. Gastrointestinal: Positive for abdominal pain (RLQ pain secondary to small known hernia). Negative for abdominal distention, constipation, diarrhea, nausea and vomiting. Genitourinary: Negative for dysuria, flank pain, frequency and urgency. Musculoskeletal: Positive for arthralgias (Severe osteoarthritis of cervical spine, and bilateral shoulders), back pain and neck pain. Negative for gait problem, myalgias and neck stiffness.    Neurological: Negative for dizziness, tremors, syncope, facial asymmetry, speech difficulty, weakness, light-headedness, numbness and headaches. Psychiatric/Behavioral: Negative for agitation, behavioral problems, confusion, decreased concentration, dysphoric mood, hallucinations and sleep disturbance. The patient is not nervous/anxious and is not hyperactive. Physical Exam:   BP (!) 148/92   Pulse 70   Temp 98.8 °F (37.1 °C)   Resp 18   SpO2 99%   Temp (24hrs), Av.8 °F (37.1 °C), Min:98.8 °F (37.1 °C), Max:98.8 °F (37.1 °C)    No results for input(s): POCGLU in the last 72 hours. No intake or output data in the 24 hours ending 22 1748    Physical Exam  Constitutional:       General: He is not in acute distress. Appearance: Normal appearance. HENT:      Head: Normocephalic and atraumatic. Mouth/Throat:      Mouth: Mucous membranes are moist.      Pharynx: Oropharynx is clear. Eyes:      Extraocular Movements: Extraocular movements intact. Conjunctiva/sclera: Conjunctivae normal.      Pupils: Pupils are equal, round, and reactive to light. Comments: Arcus senilis   Cardiovascular:      Rate and Rhythm: Normal rate. Rhythm irregular. Pulses: Normal pulses. Heart sounds: Murmur (systolic murmur) heard. Comments: Intermittent Sharp shooting substernal pain, possibly AICD firing  Pulmonary:      Effort: Pulmonary effort is normal. No respiratory distress. Breath sounds: Normal breath sounds. No wheezing, rhonchi or rales. Abdominal:      General: Abdomen is flat. There is no distension. Palpations: Abdomen is soft. There is mass (Central solid mass post surgery). Tenderness: There is no abdominal tenderness. There is no right CVA tenderness, left CVA tenderness or guarding. Hernia: A hernia is present. Musculoskeletal:      Cervical back: Normal range of motion and neck supple. Right lower leg: Edema (Edema to upper thighs. Compression socks on) present. Left lower leg: Edema present. Comments: Compression socks on  Range of movement limited bilateral shoulders   Skin:     General: Skin is warm and dry. Capillary Refill: Capillary refill takes less than 2 seconds. Coloration: Skin is not jaundiced or pale. Findings: No bruising. Neurological:      General: No focal deficit present. Mental Status: He is alert and oriented to person, place, and time. Psychiatric:         Mood and Affect: Mood normal.         Behavior: Behavior normal.         Thought Content:  Thought content normal.         Judgment: Judgment normal.         Investigations:     Laboratory Testing:  Recent Results (from the past 24 hour(s))   CBC Auto Differential    Collection Time: 01/12/22 11:54 AM   Result Value Ref Range    WBC 4.7 3.5 - 11.0 k/uL    RBC 3.81 (L) 4.5 - 5.9 m/uL    Hemoglobin 11.3 (L) 13.5 - 17.5 g/dL    Hematocrit 34.5 (L) 41 - 53 %    MCV 90.6 80 - 100 fL    MCH 29.7 26 - 34 pg    MCHC 32.8 31 - 37 g/dL    RDW 14.8 11.5 - 14.9 %    Platelets 531 336 - 217 k/uL    MPV 7.0 6.0 - 12.0 fL    NRBC Automated NOT REPORTED per 100 WBC    Differential Type NOT REPORTED     Seg Neutrophils 77 (H) 36 - 66 %    Lymphocytes 13 (L) 24 - 44 %    Monocytes 8 (H) 1 - 7 %    Eosinophils % 1 0 - 4 %    Basophils 1 0 - 2 %    Immature Granulocytes NOT REPORTED 0 %    Segs Absolute 3.60 1.3 - 9.1 k/uL    Absolute Lymph # 0.60 (L) 1.0 - 4.8 k/uL    Absolute Mono # 0.30 0.1 - 1.3 k/uL    Absolute Eos # 0.10 0.0 - 0.4 k/uL    Basophils Absolute 0.00 0.0 - 0.2 k/uL    Absolute Immature Granulocyte NOT REPORTED 0.00 - 0.30 k/uL    WBC Morphology NOT REPORTED     RBC Morphology NOT REPORTED     Platelet Estimate NOT REPORTED    Basic Metabolic Panel w/ Reflex to MG    Collection Time: 01/12/22 11:54 AM   Result Value Ref Range    Glucose 126 (H) 70 - 99 mg/dL    BUN 22 8 - 23 mg/dL    CREATININE 1.20 0.70 - 1.20 mg/dL    Bun/Cre Ratio NOT REPORTED 9 - 20    Calcium 9.7 8.6 - 10.4 mg/dL    Sodium 138 135 - 144 mmol/L    Potassium 4.3 3.7 - 5.3 mmol/L    Chloride 101 98 - 107 mmol/L    CO2 25 20 - 31 mmol/L    Anion Gap 12 9 - 17 mmol/L    GFR Non-African American 57 (L) >60 mL/min    GFR African American >60 >60 mL/min    GFR Comment          GFR Staging NOT REPORTED    Troponin    Collection Time: 01/12/22 11:54 AM   Result Value Ref Range    Troponin, High Sensitivity 44 (H) 0 - 22 ng/L    Troponin T NOT REPORTED <0.03 ng/mL    Troponin Interp NOT REPORTED    Brain Natriuretic Peptide    Collection Time: 01/12/22 11:54 AM   Result Value Ref Range    Pro-BNP 1,264 (H) <300 pg/mL    BNP Interpretation NOT REPORTED    Protime-INR    Collection Time: 01/12/22 11:54 AM   Result Value Ref Range    Protime 22.7 (H) 11.8 - 14.6 sec    INR 2.0    APTT    Collection Time: 01/12/22 11:54 AM   Result Value Ref Range    PTT 35.1 24.0 - 36.0 sec   EKG 12 Lead    Collection Time: 01/12/22  1:49 PM   Result Value Ref Range    Ventricular Rate 72 BPM    Atrial Rate 80 BPM    QRS Duration 162 ms    Q-T Interval 442 ms    QTc Calculation (Bazett) 483 ms    R Axis -75 degrees    T Axis 90 degrees   Troponin    Collection Time: 01/12/22  2:00 PM   Result Value Ref Range    Troponin, High Sensitivity 51 (H) 0 - 22 ng/L    Troponin T NOT REPORTED <0.03 ng/mL    Troponin Interp NOT REPORTED    TSH w/reflex to FT4    Collection Time: 01/12/22  2:00 PM   Result Value Ref Range    TSH 1.30 0.30 - 5.00 mIU/L       Imaging/Diagnostics:  XR CHEST PORTABLE    Result Date: 1/12/2022  EXAMINATION: ONE XRAY VIEW OF THE CHEST 1/12/2022 11:48 am COMPARISON: Chest x-ray dated 7 January 2022 HISTORY: ORDERING SYSTEM PROVIDED HISTORY: pain TECHNOLOGIST PROVIDED HISTORY: pain Reason for Exam: Chest pain FINDINGS: Left-sided ICD with leads in stable position. Similar appearance of strand-like opacities in the right lower lobe likely represent atelectasis.  No pneumothorax or pleural effusion stable cardiomediastinal silhouette     Similar appearance of opacities in the right lower lobe compatible with atelectasis. No acute airspace infiltrate. XR CHEST PORTABLE    Result Date: 1/7/2022  EXAMINATION: ONE XRAY VIEW OF THE CHEST 1/7/2022 10:59 am COMPARISON: 11/17/2021, 09/24/2021 HISTORY: ORDERING SYSTEM PROVIDED HISTORY: cp TECHNOLOGIST PROVIDED HISTORY: cp Reason for Exam: Chest pain today h/o heart attack FINDINGS: The cardiomediastinal silhouette is unchanged in appearance. Left subclavian dual chamber pacer in place. Linear opacities in the lung bases to the again demonstrated, suggestive of scarring versus subsegmental atelectasis. There is no consolidation, pneumothorax, or evidence of edema. No effusion is appreciated. The osseous structures are unchanged in appearance. Unchanged appearance of the chest without acute airspace disease identified.        Assessment :      Primary Problem  Chest pain    Active Hospital Problems    Diagnosis Date Noted    Chest pain [R07.9] 05/25/2021    Atrial fibrillation ([address] [I48.91] 05/25/2021    On continuous oral anticoagulation [Z79.01] 05/25/2021    Pacemaker [Z95.0] 05/25/2021    History of DVT of lower extremity [Z86.718] 05/25/2021    Hypertension [I10] 05/25/2021       Plan:     Patient status Admit as inpatient in the  Progressive Unit/Step down    NSTEMI  - Recently Worked up 9/21 and 1/7 for similar presentation  - history of Afib on xeralto  - Most recent Cardiac echo 5/21 shows EF 60-65%  - Cardiac Cath spring 2021: non-obstructive CAD  - AICD implanted  - Trops 44, 51  - Full dose Heparin  - PRN Nitrostat  - Telemetry monitoring  - Cardiology evaluation     CHF  - Continue home Norvasc 10 mg daily, Toprol 25 mg nightly, Aldactone 25 mg daily  - Low sodium diet  - Continue home Bumex 2 mg BID  - F/U cardio recs     DVT Prophylaxis: On heparin full dose  GI prophylaxis: Home Pepcid  Diet: Cardiac diet  Dispo: TBD    Consultations:   IP CONSULT TO CARDIOLOGY  IP CONSULT TO INTERNAL MEDICINE  IP CONSULT TO SOCIAL WORK    Patient is admitted as inpatient status because of co-morbiditieslisted above, severity of signs and symptoms as outlined, requirement for current medical therapies and most importantly because of direct risk to patient if care not provided in a hospital setting. Rohit Skelton MD  1/12/2022  5:48 PM    Copy sent to Dr. Spike Maki MD      Attestation and add on       I have discussed the care of Ozzie Louise , including pertinent history and exam findings,      1/12/22    with the resident. I have seen and examined the patient and the key elements of all parts of the encounter have been performed by me . I agree with the assessment, plan and orders as documented by the resident. Principal Problem:    Chest pain  Active Problems:    Atrial fibrillation (HCC)    On continuous oral anticoagulation    Pacemaker    History of DVT of lower extremity    Hypertension  Resolved Problems:    * No resolved hospital problems. *         ---- ;     MD JACQUELINE Ruelas[address], [address] [place] [address].    Phone [phone]   Fax: [phone]  Answering Service: [phone]

## 2022-01-12 NOTE — ED TRIAGE NOTES
Mode of arrival (squad #, walk in, police, etc) : Etta Day        Chief complaint(s): Chest pain, Afib        Arrival Note (brief scenario, treatment PTA, etc). : Pt states he was feeling dizzy this morning with chest pain. Pt states he tool 1 nitro at home with relief. Pt states he was here last week and nothing was found to be wrong at that time. C= \"Have you ever felt that you should Cut down on your drinking? \"  No  A= \"Have people Annoyed you by criticizing your drinking? \"  No  G= \"Have you ever felt bad or Guilty about your drinking? \"  No  E= \"Have you ever had a drink as an Eye-opener first thing in the morning to steady your nerves or to help a hangover? \"  No      Deferred []      Reason for deferring: N/A    *If yes to two or more: probable alcohol abuse. *

## 2022-01-13 ENCOUNTER — HOSPITAL ENCOUNTER (OUTPATIENT)
Dept: CARDIAC CATH/INVASIVE PROCEDURES | Age: 87
Discharge: HOME | End: 2022-01-13
Payer: COMMERCIAL

## 2022-01-13 ENCOUNTER — APPOINTMENT (OUTPATIENT)
Dept: NON INVASIVE DIAGNOSTICS | Age: 87
DRG: 287 | End: 2022-01-13
Payer: MEDICARE

## 2022-01-13 PROBLEM — I50.32 CHRONIC DIASTOLIC CONGESTIVE HEART FAILURE (HCC): Status: ACTIVE | Noted: 2022-01-13

## 2022-01-13 PROBLEM — I20.0 UNSTABLE ANGINA (HCC): Status: ACTIVE | Noted: 2022-01-13

## 2022-01-13 LAB
ABSOLUTE EOS #: 0.07 K/UL (ref 0–0.4)
ABSOLUTE IMMATURE GRANULOCYTE: ABNORMAL K/UL (ref 0–0.3)
ABSOLUTE LYMPH #: 1.22 K/UL (ref 1–4.8)
ABSOLUTE MONO #: 0.3 K/UL (ref 0.1–1.3)
ANION GAP SERPL CALCULATED.3IONS-SCNC: 10 MMOL/L (ref 9–17)
BASOPHILS # BLD: 0 % (ref 0–2)
BASOPHILS ABSOLUTE: 0 K/UL (ref 0–0.2)
BUN BLDV-MCNC: 24 MG/DL (ref 8–23)
BUN/CREAT BLD: ABNORMAL (ref 9–20)
CALCIUM SERPL-MCNC: 9.2 MG/DL (ref 8.6–10.4)
CHLORIDE BLD-SCNC: 102 MMOL/L (ref 98–107)
CO2: 26 MMOL/L (ref 20–31)
CREAT SERPL-MCNC: 1.22 MG/DL (ref 0.7–1.2)
DIFFERENTIAL TYPE: ABNORMAL
EKG ATRIAL RATE: 394 BPM
EKG ATRIAL RATE: 80 BPM
EKG Q-T INTERVAL: 362 MS
EKG Q-T INTERVAL: 442 MS
EKG QRS DURATION: 162 MS
EKG QRS DURATION: 74 MS
EKG QTC CALCULATION (BAZETT): 387 MS
EKG QTC CALCULATION (BAZETT): 483 MS
EKG R AXIS: -75 DEGREES
EKG R AXIS: 36 DEGREES
EKG T AXIS: -79 DEGREES
EKG T AXIS: 90 DEGREES
EKG VENTRICULAR RATE: 69 BPM
EKG VENTRICULAR RATE: 72 BPM
EOSINOPHILS RELATIVE PERCENT: 2 % (ref 0–4)
GFR AFRICAN AMERICAN: >60 ML/MIN
GFR NON-AFRICAN AMERICAN: 56 ML/MIN
GFR SERPL CREATININE-BSD FRML MDRD: ABNORMAL ML/MIN/{1.73_M2}
GFR SERPL CREATININE-BSD FRML MDRD: ABNORMAL ML/MIN/{1.73_M2}
GLUCOSE BLD-MCNC: 148 MG/DL (ref 70–99)
HCT VFR BLD CALC: 32.2 % (ref 41–53)
HEMOGLOBIN: 10.6 G/DL (ref 13.5–17.5)
IMMATURE GRANULOCYTES: ABNORMAL %
LV EF: 55 %
LVEF MODALITY: NORMAL
LYMPHOCYTES # BLD: 37 % (ref 24–44)
MCH RBC QN AUTO: 29.6 PG (ref 26–34)
MCHC RBC AUTO-ENTMCNC: 32.9 G/DL (ref 31–37)
MCV RBC AUTO: 90 FL (ref 80–100)
MONOCYTES # BLD: 9 % (ref 1–7)
MORPHOLOGY: ABNORMAL
MORPHOLOGY: ABNORMAL
NRBC AUTOMATED: ABNORMAL PER 100 WBC
PARTIAL THROMBOPLASTIN TIME: 36.4 SEC (ref 24–36)
PARTIAL THROMBOPLASTIN TIME: >150 SEC (ref 24–36)
PDW BLD-RTO: 14.7 % (ref 11.5–14.9)
PLATELET # BLD: 232 K/UL (ref 150–450)
PLATELET ESTIMATE: ABNORMAL
PMV BLD AUTO: 7 FL (ref 6–12)
POTASSIUM SERPL-SCNC: 3.8 MMOL/L (ref 3.7–5.3)
RBC # BLD: 3.58 M/UL (ref 4.5–5.9)
RBC # BLD: ABNORMAL 10*6/UL
SEG NEUTROPHILS: 52 % (ref 36–66)
SEGMENTED NEUTROPHILS ABSOLUTE COUNT: 1.71 K/UL (ref 1.3–9.1)
SODIUM BLD-SCNC: 138 MMOL/L (ref 135–144)
WBC # BLD: 3.3 K/UL (ref 3.5–11)
WBC # BLD: ABNORMAL 10*3/UL

## 2022-01-13 PROCEDURE — C1894 INTRO/SHEATH, NON-LASER: HCPCS

## 2022-01-13 PROCEDURE — 80048 BASIC METABOLIC PNL TOTAL CA: CPT

## 2022-01-13 PROCEDURE — 2709999900 HC NON-CHARGEABLE SUPPLY

## 2022-01-13 PROCEDURE — 6360000002 HC RX W HCPCS: Performed by: STUDENT IN AN ORGANIZED HEALTH CARE EDUCATION/TRAINING PROGRAM

## 2022-01-13 PROCEDURE — 93454 CORONARY ARTERY ANGIO S&I: CPT

## 2022-01-13 PROCEDURE — 6370000000 HC RX 637 (ALT 250 FOR IP)

## 2022-01-13 PROCEDURE — 2060000000 HC ICU INTERMEDIATE R&B

## 2022-01-13 PROCEDURE — 97165 OT EVAL LOW COMPLEX 30 MIN: CPT

## 2022-01-13 PROCEDURE — 93010 ELECTROCARDIOGRAM REPORT: CPT | Performed by: INTERNAL MEDICINE

## 2022-01-13 PROCEDURE — 7100000000 HC PACU RECOVERY - FIRST 15 MIN

## 2022-01-13 PROCEDURE — C8929 TTE W OR WO FOL WCON,DOPPLER: HCPCS

## 2022-01-13 PROCEDURE — 6370000000 HC RX 637 (ALT 250 FOR IP): Performed by: INTERNAL MEDICINE

## 2022-01-13 PROCEDURE — 6360000004 HC RX CONTRAST MEDICATION: Performed by: INTERNAL MEDICINE

## 2022-01-13 PROCEDURE — 99233 SBSQ HOSP IP/OBS HIGH 50: CPT | Performed by: INTERNAL MEDICINE

## 2022-01-13 PROCEDURE — 85025 COMPLETE CBC W/AUTO DIFF WBC: CPT

## 2022-01-13 PROCEDURE — 2500000003 HC RX 250 WO HCPCS

## 2022-01-13 PROCEDURE — 7100000001 HC PACU RECOVERY - ADDTL 15 MIN

## 2022-01-13 PROCEDURE — 36415 COLL VENOUS BLD VENIPUNCTURE: CPT

## 2022-01-13 PROCEDURE — 6360000004 HC RX CONTRAST MEDICATION

## 2022-01-13 PROCEDURE — 85730 THROMBOPLASTIN TIME PARTIAL: CPT

## 2022-01-13 PROCEDURE — 6370000000 HC RX 637 (ALT 250 FOR IP): Performed by: STUDENT IN AN ORGANIZED HEALTH CARE EDUCATION/TRAINING PROGRAM

## 2022-01-13 PROCEDURE — C1760 CLOSURE DEV, VASC: HCPCS

## 2022-01-13 PROCEDURE — 6360000002 HC RX W HCPCS

## 2022-01-13 PROCEDURE — 2580000003 HC RX 258: Performed by: STUDENT IN AN ORGANIZED HEALTH CARE EDUCATION/TRAINING PROGRAM

## 2022-01-13 PROCEDURE — 6370000000 HC RX 637 (ALT 250 FOR IP): Performed by: NURSE PRACTITIONER

## 2022-01-13 PROCEDURE — B2111ZZ FLUOROSCOPY OF MULTIPLE CORONARY ARTERIES USING LOW OSMOLAR CONTRAST: ICD-10-PCS | Performed by: INTERNAL MEDICINE

## 2022-01-13 PROCEDURE — 4A023N7 MEASUREMENT OF CARDIAC SAMPLING AND PRESSURE, LEFT HEART, PERCUTANEOUS APPROACH: ICD-10-PCS | Performed by: INTERNAL MEDICINE

## 2022-01-13 PROCEDURE — 97161 PT EVAL LOW COMPLEX 20 MIN: CPT

## 2022-01-13 RX ORDER — BUMETANIDE 1 MG/1
2 TABLET ORAL DAILY
Status: DISCONTINUED | OUTPATIENT
Start: 2022-01-13 | End: 2022-01-14 | Stop reason: HOSPADM

## 2022-01-13 RX ORDER — ASPIRIN 81 MG/1
81 TABLET, CHEWABLE ORAL DAILY
Status: DISCONTINUED | OUTPATIENT
Start: 2022-01-13 | End: 2022-01-14 | Stop reason: HOSPADM

## 2022-01-13 RX ORDER — SODIUM CHLORIDE 0.9 % (FLUSH) 0.9 %
5-40 SYRINGE (ML) INJECTION EVERY 12 HOURS SCHEDULED
Status: CANCELLED | OUTPATIENT
Start: 2022-01-13

## 2022-01-13 RX ORDER — ACETAMINOPHEN 325 MG/1
650 TABLET ORAL EVERY 4 HOURS PRN
Status: CANCELLED | OUTPATIENT
Start: 2022-01-13

## 2022-01-13 RX ORDER — SODIUM CHLORIDE 0.9 % (FLUSH) 0.9 %
5-40 SYRINGE (ML) INJECTION PRN
Status: CANCELLED | OUTPATIENT
Start: 2022-01-13

## 2022-01-13 RX ORDER — SODIUM CHLORIDE 9 MG/ML
25 INJECTION, SOLUTION INTRAVENOUS PRN
Status: CANCELLED | OUTPATIENT
Start: 2022-01-13

## 2022-01-13 RX ADMIN — HYDRALAZINE HYDROCHLORIDE 25 MG: 25 TABLET, FILM COATED ORAL at 09:55

## 2022-01-13 RX ADMIN — Medication 10 ML: at 20:24

## 2022-01-13 RX ADMIN — PERFLUTREN 2.2 MG: 6.52 INJECTION, SUSPENSION INTRAVENOUS at 12:52

## 2022-01-13 RX ADMIN — ASPIRIN 81 MG 81 MG: 81 TABLET ORAL at 12:22

## 2022-01-13 RX ADMIN — AMLODIPINE BESYLATE 10 MG: 5 TABLET ORAL at 09:55

## 2022-01-13 RX ADMIN — METOPROLOL SUCCINATE 25 MG: 25 TABLET, EXTENDED RELEASE ORAL at 23:37

## 2022-01-13 RX ADMIN — BUMETANIDE 2 MG: 1 TABLET ORAL at 09:54

## 2022-01-13 RX ADMIN — FINASTERIDE 5 MG: 5 TABLET, FILM COATED ORAL at 09:55

## 2022-01-13 RX ADMIN — BUPRENORPHINE AND NALOXONE 1 FILM: 8; 2 FILM BUCCAL; SUBLINGUAL at 20:23

## 2022-01-13 RX ADMIN — BUPRENORPHINE AND NALOXONE 1 FILM: 8; 2 FILM BUCCAL; SUBLINGUAL at 09:55

## 2022-01-13 RX ADMIN — SPIRONOLACTONE 25 MG: 25 TABLET ORAL at 09:55

## 2022-01-13 RX ADMIN — HEPARIN SODIUM 2000 UNITS: 1000 INJECTION, SOLUTION INTRAVENOUS; SUBCUTANEOUS at 09:54

## 2022-01-13 ASSESSMENT — ENCOUNTER SYMPTOMS
BACK PAIN: 1
PHOTOPHOBIA: 0
EYE PAIN: 0
ABDOMINAL DISTENTION: 0
SHORTNESS OF BREATH: 1
NAUSEA: 0
CHOKING: 0
DIARRHEA: 0
CONSTIPATION: 0
COUGH: 0
VOMITING: 0
WHEEZING: 0
CHEST TIGHTNESS: 1
APNEA: 0
ABDOMINAL PAIN: 1

## 2022-01-13 ASSESSMENT — PAIN SCALES - GENERAL: PAINLEVEL_OUTOF10: 0

## 2022-01-13 ASSESSMENT — PAIN DESCRIPTION - LOCATION: LOCATION: LEG

## 2022-01-13 ASSESSMENT — PAIN DESCRIPTION - DESCRIPTORS: DESCRIPTORS: CRAMPING

## 2022-01-13 ASSESSMENT — PAIN DESCRIPTION - PAIN TYPE: TYPE: ACUTE PAIN

## 2022-01-13 ASSESSMENT — PAIN DESCRIPTION - ORIENTATION: ORIENTATION: RIGHT

## 2022-01-13 NOTE — ED NOTES
Nurse to nurse report given to Lisy Britt, [place]. No acute distress noted.       Luciano Alexander RN  01/12/22 1914

## 2022-01-13 NOTE — DISCHARGE INSTR - COC
Continuity of Care Form    Patient Name: Crystal Decker   :  1935  MRN:  [id number]    Admit date:  2022  Discharge date:  22    Code Status Order: Full Code   Advance Directives:      Admitting Physician:  Rossy Mederos MD  PCP: Alexandre Saavedra MD    Discharging Nurse: [facility] Unit/Room#: 2099/2099-01  Discharging Unit Phone Number: [phone]    Emergency Contact:   Extended Emergency Contact Information  Primary Emergency Contact: Tierra Nava S Damián Rincon Phone: [phone]  Mobile Phone: [phone]  Relation: Child    Past Surgical History:  Past Surgical History:   Procedure Laterality Date    CARDIAC CATHETERIZATION      PACEMAKER PLACEMENT         Immunization History:   Immunization History   Administered Date(s) Administered    COVID-19, Pfizer, PF, 30mcg/0.3mL 2021, 2021, 2021    Influenza Virus Vaccine 10/30/2011, 10/09/2015, 2016    Influenza, High Dose (Fluzone 65 yrs and older) 10/20/2018, 10/01/2019    Influenza, High-dose, Quadv, 65 yrs +, IM (Fluzone) 2020, 10/13/2021    Pneumococcal Conjugate 13-valent (Landon Grain) 10/30/2019    Pneumococcal Polysaccharide ([id number]) 10/30/2011, 2020       Active Problems:  Patient Active Problem List   Diagnosis Code    [facility] I48.91    On continuous oral anticoagulation Z79.01    CHF, acute on chronic (HCC) I50.9    Hyperlipidemia E78.5    Former smoker Z87.891    Pacemaker Z95.0    History of DVT of lower extremity Z86.718    Enlarged prostate N40.0    Cataract of both eyes H26.9    GERD (gastroesophageal reflux disease) K21.9    History of inguinal hernia repair Z98.890, Z87.19    Hypertension I10    Pneumonia J18.9    History of right hip replacement Z96.641    History of back surgery Z98.890    Low back pain M54.50    Chest pain R07.9    Fluid overload E87.70    VARSHA (acute kidney injury) ([facility] N17.9    NSTEMI (non-ST elevated myocardial infarction) ([facility] I21.4 Chronic CHF (congestive heart failure) ([facility]) I50.9    Acute inferolateral myocardial infarction ([facility]) I21.19       Isolation/Infection:   Isolation            No Isolation          Patient Infection Status       Infection Onset Added Last Indicated Last Indicated By Review Planned Expiration Resolved Resolved By    None active    Resolved    COVID-19 (Rule Out) 11/17/21 11/17/21 11/17/21 COVID-19, Rapid (Ordered)   11/17/21 Rule-Out Test Resulted    COVID-19 (Rule Out) 06/25/21 06/25/21 06/25/21 COVID-19, Rapid (Ordered)   06/25/21 Rule-Out Test Resulted            Nurse Assessment:  Last Vital Signs: /76   Pulse 70   Temp 98.4 °F (36.9 °C)   Resp 20   SpO2 97%     Last documented pain score (0-10 scale): Pain Level: 0  Last Weight:   Wt Readings from Last 1 Encounters:   01/08/22 196 lb 3.4 oz (89 kg)     Mental Status:  oriented    IV Access:  - None    Nursing Mobility/ADLs:  Walking   Assisted  Transfer  Assisted  Bathing  Independent  Dressing  Independent  Toileting  Assisted  Feeding  Independent  Med Admin  Independent  Med Delivery   whole    Wound Care Documentation and Therapy:        Elimination:  Continence: Bowel: Yes  Bladder: Yes  Urinary Catheter: None   Colostomy/Ileostomy/Ileal Conduit: No       Date of Last BM: 1-12-22    Intake/Output Summary (Last 24 hours) at 1/13/2022 1322  Last data filed at 1/13/2022 1216  Gross per 24 hour   Intake 0 ml   Output 2000 ml   Net -2000 ml     I/O last 3 completed shifts:  In: -   Out: 2000 [Urine:2000]    Safety Concerns:      At Risk for Falls    Impairments/Disabilities:      None    Nutrition Therapy:  Current Nutrition Therapy:   - Oral Diet:  Cardiac and Low Sodium (3-4gm)    Routes of Feeding: Oral  Liquids: No Restrictions  Daily Fluid Restriction: no  Last Modified Barium Swallow with Video (Video Swallowing Test): not done    Treatments at the Time of Hospital Discharge:   Respiratory Treatments: as needed  Oxygen Therapy:  is not on home oxygen therapy. Ventilator:    - No ventilator support    Rehab Therapies: Physical Therapy and Occupational Therapy  Weight Bearing Status/Restrictions: No weight bearing restirctions  Other Medical Equipment (for information only, NOT a DME order):  cane  Other Treatments: Skilled Nursing assessment and monitoring,medication education. Resume services as PTA. Patient's personal belongings (please select all that are sent with patient):  Glasses    RN SIGNATURE:  Electronically signed by Stevenson Campos RN on 1/14/22 at 4:19 PM EST    CASE MANAGEMENT/SOCIAL WORK SECTION    Inpatient Status Date: 1/12/2022    Readmission Risk Assessment Score:  Readmission Risk              Risk of Unplanned Readmission:  28           Discharging to Facility/ [address] [place] [address] Street: [phone]  F: [address] [facility]  to evaluate patient two weeks prior to discharge from home health to determine post-discharge services. / signature: Electronically signed by Shelly Dunham RN on 1/13/22 at 1:22 PM EST    PHYSICIAN SECTION    Prognosis: Good    Condition at Discharge: Stable    Rehab Potential (if transferring to Rehab): Good    Recommended Labs or Other Treatments After Discharge:     Physician Certification: I certify the above information and transfer of Esme Pierson  is necessary for the continuing treatment of the diagnosis listed and that he requires Home Care for less 30 days.      Update Admission H&P: No change in H&P    PHYSICIAN SIGNATURE:  Electronically signed by Art Redd MD on 1/14/22 at 1:00 PM EST

## 2022-01-13 NOTE — FLOWSHEET NOTE
Patient is a delightful man with a big smile who shared his medical history with writer. He lives with his son and grandson and shared info about the family dynamics. He is a man bridgette and enjoyed taking with a  and welcomed prayer. 01/13/22 1822   Encounter Summary   Services provided to: Patient   Referral/Consult From: [address] [place] [address] Visiting   (1-13-22)   Complexity of Encounter Moderate   Length of Encounter 15 minutes   Spiritual Assessment Completed Yes   Spiritual/Islam   Type Spiritual support   Assessment Calm; Approachable   Intervention Active listening;Explored feelings, thoughts, concerns;Explored coping resources;Sustaining presence/ Ministry of presence;Prayer;Discussed illness/injury and it's impact; Discussed belief system/Restorationist practices/bridgette   Outcome Expressed gratitude;Engaged in conversation;Expressed feelings/needs/concerns;Coping;Receptive; Shared life review

## 2022-01-13 NOTE — CARE COORDINATION
Pt returned from procedure at 1700 via cot/transport. Order received to d/c all Heparin orders and to start Xarelto Friday morning. He is currently eating a boxed lunch and is having no pain.

## 2022-01-13 NOTE — PROGRESS NOTES
[address] [facility] [place]    PROGRESS NOTE             1/13/2022    4:34 PM    Name:   Rolly Shay  MRN:     [id number]     Acct:      [de-identified]   Room:   2099/2099-01  IP Day:  1  Admit Date:  1/12/2022 11:37 AM    PCP:  Hailey Cox MD  Code Status:  Full Code    Subjective:     C/C:   Chief Complaint   Patient presents with    Chest Pain    Atrial Fibrillation     Interval History Status: improved. Patient seen and examined at bedside. Patient states that his chest pain has subsided and he is no longer having the jolting sensation in his chest. Patient NPO AM prior to cardio eval.    Brief History:     The patient is a 80 y.o.  presents with Chest Pain and Atrial Fibrillation   and he is admitted to the hospital for the management of  NSTEMI. Patient was recently discharged on 1/8 for a similar presentation. PMH significant for CHF, CAD, Afib, and sick sinus syndrome with AICD implanted, DVT, multiple abdominal surgeries, and severe osteoarthritis of the cervical spine. He has been following up with cardiology as an outpatient.      Patient was at home when he began feeling chest pain 4/10 with heaviness, shortness of breath, headache, light headedness, tunneled vision, malaise and weakness. He called his home nurse who then called EMS. He was given nitrostat and his symptoms resolved. He is still having intermittent sharp jolts of pain in his chest. His pacer was interrogated in the ED. EKG similar to previous, RBBB and previous inferior and anterior infarcts.     Trops were 44, repeat 51. ProBNP 1200. Rest of the workup is unremarkable. Patient has been compliant with eliquis. Cardiology was contacted and started patient on full dose heparin and will assess patient in the morning. Code status confirmed with patient, he desires to be FULL CODE.     Review of Systems:     Review of Systems   Constitutional: Negative for activity change, appetite change, chills, fatigue and fever. Eyes: Negative for photophobia, pain and visual disturbance. Respiratory: Positive for chest tightness and shortness of breath. Negative for apnea, cough, choking and wheezing. Cardiovascular: Positive for chest pain and leg swelling. Negative for palpitations. Gastrointestinal: Positive for abdominal pain (RLQ pain secondary to small known hernia). Negative for abdominal distention, constipation, diarrhea, nausea and vomiting. Genitourinary: Negative for dysuria, flank pain, frequency and urgency. Musculoskeletal: Positive for arthralgias (Severe osteoarthritis of cervical spine, and bilateral shoulders), back pain and neck pain. Negative for gait problem, myalgias and neck stiffness. Neurological: Negative for dizziness, tremors, syncope, facial asymmetry, speech difficulty, weakness, light-headedness, numbness and headaches. Psychiatric/Behavioral: Negative for agitation, behavioral problems, confusion, decreased concentration, dysphoric mood, hallucinations and sleep disturbance. The patient is not nervous/anxious and is not hyperactive. Medications: Allergies:     Allergies   Allergen Reactions    Aspirin Nausea Only    Cyclobenzaprine Nausea Only    Ibuprofen Nausea Only    Azithromycin Nausea Only    Hydrocodone-Acetaminophen      per pt, he is having upset stomach when taking norco       Current Meds:   Scheduled Meds:    bumetanide  2 mg Oral Daily    aspirin  81 mg Oral Daily    amLODIPine  10 mg Oral Daily    hydrALAZINE  25 mg Oral BID    finasteride  5 mg Oral Daily    metoprolol succinate  25 mg Oral Nightly    spironolactone  25 mg Oral Daily    sodium chloride flush  5-40 mL IntraVENous 2 times per day    [Held by provider] rivaroxaban  15 mg Oral Daily with breakfast    buprenorphine-naloxone  1 Film SubLINGual BID     Continuous Infusions:    heparin (PORCINE) Infusion 8 Units/kg/hr (01/13/22 0515)    sodium chloride       PRN Meds: nitroGLYCERIN, heparin (porcine), heparin (porcine), sodium chloride flush, sodium chloride, ondansetron **OR** ondansetron, polyethylene glycol    Data:     Past Medical History:   has a past medical history of Atrial fibrillation ([facility] [address] CAD (coronary artery disease), CHF (congestive heart failure) ([facility] [address] Hyperlipidemia, and Hypertension. Social History:   reports that he has never smoked. He has never used smokeless tobacco. He reports that he does not drink alcohol and does not use drugs. Family History: History reviewed. No pertinent family history. Vitals:  /76   Pulse 70   Temp 98.4 °F (36.9 °C)   Resp 20   SpO2 97%   Temp (24hrs), Av.3 °F (36.8 °C), Min:98.2 °F (36.8 °C), Max:98.4 °F (36.9 °C)    No results for input(s): POCGLU in the last 72 hours. I/O(24Hr): Intake/Output Summary (Last 24 hours) at 2022 1634  Last data filed at 2022 1216  Gross per 24 hour   Intake 0 ml   Output 2000 ml   Net -2000 ml       Labs:    [unfilled]    Lab Results   Component Value Date/Time    SPECIAL NOT REPORTED 2021 08:15 PM     Lab Results   Component Value Date/Time    CULTURE NO SIGNIFICANT GROWTH 2021 08:15 PM       [unfilled]    Radiology:    XR CHEST PORTABLE    Result Date: 2022  EXAMINATION: ONE XRAY VIEW OF THE CHEST 2022 11:48 am COMPARISON: Chest x-ray dated 2022 HISTORY: ORDERING SYSTEM PROVIDED HISTORY: pain TECHNOLOGIST PROVIDED HISTORY: pain Reason for Exam: Chest pain FINDINGS: Left-sided ICD with leads in stable position. Similar appearance of strand-like opacities in the right lower lobe likely represent atelectasis. No pneumothorax or pleural effusion stable cardiomediastinal silhouette     Similar appearance of opacities in the right lower lobe compatible with atelectasis. No acute airspace infiltrate.      XR CHEST PORTABLE    Result Date: 2022  EXAMINATION: ONE XRAY VIEW OF THE CHEST 2022 10:59 am COMPARISON: 11/17/2021, 09/24/2021 HISTORY: ORDERING SYSTEM PROVIDED HISTORY: cp TECHNOLOGIST PROVIDED HISTORY: cp Reason for Exam: Chest pain today h/o heart attack FINDINGS: The cardiomediastinal silhouette is unchanged in appearance. Left subclavian dual chamber pacer in place. Linear opacities in the lung bases to the again demonstrated, suggestive of scarring versus subsegmental atelectasis. There is no consolidation, pneumothorax, or evidence of edema. No effusion is appreciated. The osseous structures are unchanged in appearance. Unchanged appearance of the chest without acute airspace disease identified. Physical Examination:        Physical Exam  Constitutional:       General: He is not in acute distress. Appearance: Normal appearance. HENT:      Head: Normocephalic and atraumatic. Mouth/Throat:      Mouth: Mucous membranes are moist.      Pharynx: Oropharynx is clear. Eyes:      Extraocular Movements: Extraocular movements intact. Conjunctiva/sclera: Conjunctivae normal.      Pupils: Pupils are equal, round, and reactive to light. Comments: Arcus senilis   Cardiovascular:      Rate and Rhythm: Normal rate. Rhythm irregular. Pulses: Normal pulses. Heart sounds: Murmur (systolic murmur) heard. Comments: Intermittent Sharp shooting substernal pain, possibly AICD firing  Pulmonary:      Effort: Pulmonary effort is normal. No respiratory distress. Breath sounds: Normal breath sounds. No wheezing, rhonchi or rales. Abdominal:      General: Abdomen is flat. There is no distension. Palpations: Abdomen is soft. There is mass (Central solid mass post surgery). Tenderness: There is no abdominal tenderness. There is no right CVA tenderness, left CVA tenderness or guarding. Hernia: A hernia is present. Musculoskeletal:      Cervical back: Normal range of motion and neck supple. Right lower leg: Edema (Edema to upper thighs.  Compression socks on) present. Left lower leg: Edema present. Comments: Compression socks on  Range of movement limited bilateral shoulders   Skin:     General: Skin is warm and dry. Capillary Refill: Capillary refill takes less than 2 seconds. Coloration: Skin is not jaundiced or pale. Findings: No bruising. Neurological:      General: No focal deficit present. Mental Status: He is alert and oriented to person, place, and time. Psychiatric:         Mood and Affect: Mood normal.         Behavior: Behavior normal.         Thought Content: Thought content normal.         Judgment: Judgment normal.           Assessment:        Primary Problem  Chest pain    Active Hospital Problems    Diagnosis Date Noted    Unstable angina ([facility] [address] [I20.0] 01/13/2022    Chronic diastolic congestive heart failure (HCC) [I50.32] 01/13/2022    Chest pain [R07.9] 05/25/2021    Atrial fibrillation ([facility] [address] [I48.91] 05/25/2021    On continuous oral anticoagulation [Z79.01] 05/25/2021    Pacemaker [Z95.0] 05/25/2021    History of DVT of lower extremity [Z86.718] 05/25/2021    Hypertension [I10] 05/25/2021       Plan:        NSTEMI  - Recently Worked up 9/21 and 1/7 for similar presentation  - history of Afib on xeralto  - Most recent Cardiac echo 5/21 shows EF 60-65%  - Cardiac Cath spring 2021: non-obstructive CAD  - AICD implanted  - Trops 44, 51  - Full dose Heparin  - PRN Nitrostat  - Telemetry monitoring  - Cardiology evaluation     CHF  - Continue home Norvasc 10 mg daily, Toprol 25 mg nightly, Aldactone 25 mg daily  - Low sodium diet  - Continue home Bumex 2 mg BID. IV switched to oral  - Urine output 2L in 24hrs  - F/U cardio recs    VARSHA  - Small Creatinine bump this morning, possibly secondary to bumex  - Cr. 1.22 this am, 1.2 yesterday.  Appears baseline is around 0.88  - Bumex switched to oral     DVT Prophylaxis: On heparin full dose  GI prophylaxis: Home Pepcid  Diet: NPO  Dispo: TBKAYLYNN Higuera MD  1/13/2022  4:34 PM         Attestation and add on       I have discussed the care of Tawanda Meneses , including pertinent history and exam findings,      1/13/22    with the resident. I have seen and examined the patient and the key elements of all parts of the encounter have been performed by me . I agree with the assessment, plan and orders as documented by the resident. Principal Problem:    Chest pain  Active Problems:    Atrial fibrillation (HCC)    On continuous oral anticoagulation    Pacemaker    History of DVT of lower extremity    Hypertension    Unstable angina (HCC)    Chronic diastolic congestive heart failure ([address]  Resolved Problems:    * No resolved hospital problems. *         --cardiology input  - ;Chest pain recurrent admission  CAD  Chronic diastolic CHF  H/o AICD  Chronic atrial fibrillation     RECOMMENDATIONS:  ASA 81mg po qday  Continue IV heparin  Continue bumex  Continue metoprolol and amlodipine with aldactone  Obtain TTE  Need cardiac cath for risk stratification. Risk and benefits of cardiac catheterization were discussed in detail. Risk of bleeding, requiring blood transfusion, vascular complication requiring surgery, renal insufficieny with need of dialysis, CVA, MI, death and anesthesia complications including intubation were discussed. Patient agrees to proceed and verbalizes MD JACQUELINE Hopkins [address], [address] [place] [address].    Phone [phone]   Fax: [phone]  Answering Service: [phone]

## 2022-01-13 NOTE — ED NOTES
Patient ambulated to bathroom and back with cane from home without problems. States had BM. Back to bed without problems.      Brent Richardson RN  01/13/22 [id number]

## 2022-01-13 NOTE — H&P
[facility]  Procedure History and Physical Update          Patient Name: Justin Daniels  MRN:    [id number]  YOB: 1935  Date of evaluation:  1/13/2022    Procedure:    Cardiac cath +/- PCI    Indication for procedure:  NSTEMi      Please refer to the office note completed by Dr. Caryn Robison on 1/13/2022 in the medical record and note that:    [x] I have examined the patient and reviewed the H&P/Consult and there are no changes to be made to the assessment or plan. [] I have examined the patient and reviewed the H&P/Consult and have noted the following changes:    Past Medical History:   Diagnosis Date    Atrial fibrillation ([facility] [address]     CAD (coronary artery disease)     CHF (congestive heart failure) ([facility] [address]     Hyperlipidemia     Hypertension        Past Surgical History:   Procedure Laterality Date    CARDIAC CATHETERIZATION      PACEMAKER PLACEMENT         No family history on file. Allergies   Allergen Reactions    Aspirin Nausea Only    Cyclobenzaprine Nausea Only    Ibuprofen Nausea Only    Azithromycin Nausea Only    Hydrocodone-Acetaminophen      per pt, he is having upset stomach when taking norco       Prior to Admission medications    Medication Sig Start Date End Date Taking? Authorizing Provider   metoprolol succinate (TOPROL XL) 25 MG extended release tablet Take 50 mg by mouth every morning    Historical Provider, MD   metoprolol succinate (TOPROL XL) 25 MG extended release tablet Take 25 mg by mouth nightly    Historical Provider, MD   nitroGLYCERIN (NITROSTAT) 0.4 MG SL tablet up to max of 3 total doses.  If no relief after 1 dose, call 911. 1/8/22   Marga Kelly MD   bumetanide (BUMEX) 2 MG tablet Take 1 tablet by mouth 2 times daily 1/8/22   Marga Kelly MD   docusate sodium (COLACE) 100 MG capsule Take 100 mg by mouth 2 times daily as needed for Constipation    Historical Provider, MD   hydrALAZINE (APRESOLINE) 25 MG tablet Take 25 mg by mouth 2 times daily Historical Provider, MD   finasteride (PROSCAR) 5 MG tablet Take 1 tablet by mouth daily 1/6/22   Patricia Vincent MD   rivaroxaban (XARELTO) 15 MG TABS tablet Take 1 tablet by mouth daily (with breakfast) 1/6/22   Patricia Vincent MD   vitamin D (ERGOCALCIFEROL) 1.25 MG (18114 UT) CAPS capsule TAKE 1 CAPSULE BY MOUTH EVERY WEEK 12/17/21   Simeon Willard MD   amLODIPine (NORVASC) 5 MG tablet Take 2 tablets by mouth daily 11/10/21   Patricia Vincent MD   spironolactone (ALDACTONE) 25 MG tablet TAKE 1 TABLET BY MOUTH DAILY 11/5/21   Patricia Vincent MD   cyanocobalamin ([employer] VITAMIN B12) 1000 MCG tablet Take 1 tablet by mouth daily 11/3/21   Patricia Vincent MD   Coenzyme Q10 100 MG CHEW Take 1 tablet by mouth daily 10/26/21   Patricia Vincent MD   famotidine (PEPCID) 20 MG tablet Take 1 tablet by mouth daily 10/26/21   Patricia Vincent MD   ferrous sulfate (IRON 325) 325 (65 Fe) MG tablet Take 1 tablet by mouth 2 times daily 8/20/21   Simeon Willard MD   ascorbic acid (V-R VITAMIN C) 250 MG tablet Take 1 tablet by mouth 2 times daily Take with iron 8/20/21   Simeon Willard MD   buprenorphine-naloxone (SUBOXONE) 8-2 MG FILM SL film Place 1 Film under the tongue 2 times daily. Indications: pain     Historical Provider, MD   magnesium oxide (MAG-OX) 400 MG tablet Take 400 mg by mouth daily    Historical Provider, MD   latanoprost (XALATAN) 0.005 % ophthalmic solution Place 1 drop into both eyes nightly    Historical Provider, MD         There were no vitals filed for this visit. Constitutional and General Appearance:   alert, cooperative, no distress and appears stated age  [de-identified]:  PERRL, EOMI  Respiratory:  Normal excursion and expansion without use of accessory muscles  Resp Auscultation:  Good respiratory effort. No for increased work of breathing. On auscultation: clear to auscultation bilaterally  Cardiovascular:  Regular rate and rhythm.   S1/S2  No murmurs  The apical impulse is not displaced  Abdomen:  Soft  Bowel sounds present  Non-tender to palpation  Extremities:  No cyanosis or clubbing  Lower extremity edema:   Skin:  Warm and dry  Neurological:  Alert and oriented. Moves all extremities well      Plan:  Proceed with planned procedure. Further orders to follow. Risks, benefits, and alternatives of cardiac catheterization were discussed, in detail, with patient. Risks include, but not limited to, bleeding, requiring blood transfusion, vascular complication requiring surgery, renal failure with need of dialysis, CVA, MI, death and anesthesia complications including intubation were discussed. Patient verbalized understanding and agreed to proceed with the procedure understanding the above risks and alternatives to the procedure. Jazzmine Duarte MD       Cardiovascular Fellow PGY-4  1/13/2022, 12:26 PM      Attending Physician Statement  I have discussed the case of Carmen Case including pertinent history and exam findings with the resident. I have seen and examined the patient and the key elements of the encounter have been performed by me. I agree with the assessment, plan and orders as documented by the resident With changes made to the note.      Electronically signed by Sarwat Poe MD on 1/13/2022 at 4:00 PM.    [facility]      [phone]

## 2022-01-13 NOTE — ED NOTES
Report called to [facility], RN on progressive.      Manuel López RN  01/13/22 2438

## 2022-01-13 NOTE — PROGRESS NOTES
Patient admitted, consent signed and questions answered. Patient ready for procedure. Call light to reach with side rails up 2 of 2. SITES clipped.

## 2022-01-13 NOTE — OP NOTE
[facility]    CARDIAC CATHETERIZATION    Date:   1/13/2022  Patient name:  Jeffery Simeon  Date of admission:  1/13/2022  1:00 PM  MRN:   [id number]  YOB: 1935    Operators:  Primary:   Lorenza Sellers MD (Attending Physician)    Assistant/CV fellow:    Procedure performed:     [x] Left Heart Catheterization. [] Graft Angiography.  [] Left Ventriculography. [] Right Heart Catheterization. [x] Coronary Angiography. [] Aortic Valve Studies. [] PCI:      [] Other:       Pre Procedure Conscious Sedation Data:  ASA Class:    [] I [] II [x] III [] IV    Mallampati Class:  [] I [x] II [] III [] IV      Indication:  [] STEMI      [] + Stress test  [x] ACS      [] + EKG Changes  [] Non Q MI       [] Significant Risk Factors  [] Recurrent Angina             [] Diabetes Mellitus    [] New LBBB      [] Uncontrolled HTN. [] CHF / Low EF changes     [] Abnormal CTA / Ca Score      Procedure:  Access:  [x] Femoral  [] Radial  artery       [x] Right  [] Left    Procedure: After informed consent was obtained with explanation of the risks and benefits, patient was brought to the cath lab. The access area was prepped and draped in sterile fashion. 1% lidocaine was used for local block. The artery was cannulated with 6  Fr sheath with brisk arterial blood return. The side port was frequently flushed and aspirated with normal saline. Findings:    LMCA: Normal 0% stenosis. LAD: Diffuse irregualrtities 40-50%. LCx: Diffuse irregularities 30-40%. RCA: Diffuse irregularities 30-40%. Coronary Tree      Dominance: Right     Ventriculography Findings:  LV was not done     Procedure Data  Procedure Start Time: 01/13/2022 14:23. Procedure End Time: 01/13/2022  14:40. The procedure was explained in detail to the patient. Risks, complications  and alternative treatments were reviewed. Written consent was obtained.      Diagnostic Cath Status: Urgent     Entry Locations    - Percutaneous access was performed through the Right Femoral artery. A 6      Fr sheath was inserted. Hemostasis was successfully obtained using Mynx      (Access). Estimated Blood Loss: Less than 25 mL    Conclusions:   Non-obstructive CAD. LV was not done. Recommendations:      Medical therapy as needed. Risk factor modification. ____________________________________________________________________    History and Risk Factors    [x] Hypertension     [] Family history of CAD  [x] Hyperlipidemia     [] Cerebrovascular Disease   [] Prior MI       [] Peripheral Vascular disease   [] Prior PCI              [] Diabetes Mellitus    [] Left Main PCI. [] Currently on Dialysis. [] Prior CABG. [] Currently smoker. [] Cardiac Arrest outside of healthcare facility. [] Yes    [x] No        Witnessed     [] Yes   [] No     Arrest after arrival of EMS  [] Yes   [] No     [] Cardiac Arrest at other Facility. [] Yes   [x] No    Pre-Procedure Information. Heart Failure       [] Yes    [x] No        Class  [] I      [] II  [] III    [] IV. New Diagnosis    [] Yes  [] No    HF Type      [] Systolic   [] Diastolic          [] Unknown. Diagnostic Test:   EKG       [] Normal   [x] Abnormal    New antiarrhythmia medications:    [] Yes   [] No   New onset atrial fibrillation / Flutter     [] Yes   [] No   ECG Abnormalities:      [] V. Fib   [] Sepideh V. Tach           [] NS V. T   [] New LBBB           [] T.  Inv  []  ST dev > 0.5 mm         [] PVC's freq  [] PVC's infrequent    Stress Test Performed:      [] Yes    [x] No     Type:     [] Stress Echo   [] Exercise Stress Test (no imaging)      [] Stress Nuclear  [] Stress Imaging     Results   [] Negative   [] Positive        [] Indeterminate  [] Unavailable     If Positive/ Risk / Extent of Ischemia:       [] Low  [] Intermediate         [] High  [] Unavailable      Cardiac CTA Performed:     [] Yes    [x] No      Results   [] CAD   [] Non obstructive CAD      [] No CAD   [] Uncertain      [] Unknown   [] Structural Disease. Pre Procedure Medications:   [] Yes    [x] No         [] ASA   [] Beta Blockers      [] Nitrate   [] Ca Channel Blockers      [] Ranolazine   [] Statin       [] Plavix/Others antiplatelets      Cristy Pitts MD       Cardiovascular Fellow PGY-4  1/13/2022, 2:50 PM      Physician Statement  I have discussed the case of Rolan Fuller including pertinent history and exam findings with the resident. I have seen and examined the patient and the key elements of the encounter have been performed by me. I agree with the assessment, plan and orders as documented by the resident With changes made to the note. Procedure performed by me.     Electronically signed by Jaron Vazquez MD on 1/13/2022 at 4:02 PM.    [facility]      [phone]

## 2022-01-13 NOTE — CONSULTS
[facility]    Consult                        Today's Date: 1/13/2022  Patient Name: Justin Daniels  Date of admission: 1/12/2022 11:37 AM  Patient's age: 80 y. o., 1935  Admission Dx: Chest pain [R07.9]  NSTEMI (non-ST elevated myocardial infarction) ([facility] [address] [I21.4]    Reason for Consult:  Cardiac evaluation    Requesting Physician: Da Guallpa MD    CHIEF COMPLAINT:  Chest pain, dyspnea    History Obtained From:  patient, electronic medical record    HISTORY OF PRESENT ILLNESS:      The patient is a 80 y.o.  male who is admitted to the hospital for chest pain. He reported 4/10 chest pressure and dyspnea. Patient was discharged few days ago. Patient has h/o CHF, afib, AICD, DVT. Past Medical History:   has a past medical history of Atrial fibrillation ([facility] [address] CAD (coronary artery disease), CHF (congestive heart failure) ([facility] [address] Hyperlipidemia, and Hypertension. Past Surgical History:   has a past surgical history that includes pacemaker placement and Cardiac catheterization. Home Medications:    Prior to Admission medications    Medication Sig Start Date End Date Taking? Authorizing Provider   metoprolol succinate (TOPROL XL) 25 MG extended release tablet Take 50 mg by mouth every morning   Yes Historical Provider, MD   metoprolol succinate (TOPROL XL) 25 MG extended release tablet Take 25 mg by mouth nightly   Yes Historical Provider, MD   nitroGLYCERIN (NITROSTAT) 0.4 MG SL tablet up to max of 3 total doses.  If no relief after 1 dose, call 911. 1/8/22  Yes Marga Kelly MD   bumetanide (BUMEX) 2 MG tablet Take 1 tablet by mouth 2 times daily 1/8/22  Yes Marga Kelly MD   docusate sodium (COLACE) 100 MG capsule Take 100 mg by mouth 2 times daily as needed for Constipation   Yes Historical Provider, MD   hydrALAZINE (APRESOLINE) 25 MG tablet Take 25 mg by mouth 2 times daily   Yes Historical Provider, MD   finasteride (PROSCAR) 5 MG tablet Take 1 tablet by mouth daily 1/6/22  Yes Rosalee Fallon MD   rivaroxaban (XARELTO) 15 MG TABS tablet Take 1 tablet by mouth daily (with breakfast) 1/6/22  Yes Rosalee Fallon MD   vitamin D (ERGOCALCIFEROL) 1.25 MG (70459 UT) CAPS capsule TAKE 1 CAPSULE BY MOUTH EVERY WEEK 12/17/21  Yes Flako Diaz MD   amLODIPine (NORVASC) 5 MG tablet Take 2 tablets by mouth daily 11/10/21  Yes Rosalee Fallon MD   spironolactone (ALDACTONE) 25 MG tablet TAKE 1 TABLET BY MOUTH DAILY 11/5/21  Yes Rosalee Fallon MD   cyanocobalamin ([employer] VITAMIN B12) 1000 MCG tablet Take 1 tablet by mouth daily 11/3/21  Yes Rosalee Fallon MD   ferrous sulfate (IRON 325) 325 (65 Fe) MG tablet Take 1 tablet by mouth 2 times daily 8/20/21  Yes Flako Diaz MD   ascorbic acid (V-R VITAMIN C) 250 MG tablet Take 1 tablet by mouth 2 times daily Take with iron 8/20/21  Yes Flako Diaz MD   buprenorphine-naloxone (SUBOXONE) 8-2 MG FILM SL film Place 1 Film under the tongue 2 times daily. Indications: pain    Yes Historical Provider, MD   magnesium oxide (MAG-OX) 400 MG tablet Take 400 mg by mouth daily   Yes Historical Provider, MD   latanoprost (XALATAN) 0.005 % ophthalmic solution Place 1 drop into both eyes nightly   Yes Historical Provider, MD   Coenzyme Q10 100 MG CHEW Take 1 tablet by mouth daily 10/26/21   Rosalee Fallon MD   famotidine (PEPCID) 20 MG tablet Take 1 tablet by mouth daily 10/26/21   Rosalee Fallon MD       Allergies:  Aspirin, Cyclobenzaprine, Ibuprofen, Azithromycin, and Hydrocodone-acetaminophen    Social History:   reports that he has never smoked. He has never used smokeless tobacco. He reports that he does not drink alcohol and does not use drugs. Family History: family history is not on file. No h/o sudden cardiac death. No for premature CAD    REVIEW OF SYSTEMS:    · Constitutional: there has been no unanticipated weight loss. There's been No change in energy level, No change in activity level. · Eyes: No visual changes or diplopia.  No scleral icterus. · ENT: No Headaches, hearing loss or vertigo. No mouth sores or sore throat. · Cardiovascular: see above  · Respiratory: see above  · Gastrointestinal: No abdominal pain, appetite loss, blood in stools. · Genitourinary: No dysuria, trouble voiding, or hematuria. · Musculoskeletal:  No gait disturbance, No weakness or joint complaints. · Integumentary: No rash or pruritis. · Neurological: No headache or diplopia. No tingling  · Psychiatric: No anxiety, or depression. · Endocrine: No temperature intolerance. · Hematologic/Lymphatic: No abnormal bruising or bleeding, blood clots or swollen lymph nodes. · Allergic/Immunologic: No nasal congestion or hives. PHYSICAL EXAM:      /84   Pulse 70   Temp 98.2 °F (36.8 °C)   Resp 16   SpO2 99%    Constitutional and General Appearance: alert, cooperative, no distress and appears stated age  HEENT: PERRL, no cervical lymphadenopathy. No masses palpable. Normal oral mucosa  Respiratory:  · Normal excursion and expansion without use of accessory muscles  · Resp Auscultation: Good respiratory effort. No for increased work of breathing. On auscultation: clear to auscultation bilaterally  Cardiovascular:  · Heart tones are crisp and normal. regular S1 and S2.  · Jugular venous pulsation Normal  · The carotid upstroke is normal in amplitude and contour without delay or bruit   Abdomen:   · soft  · Bowel sounds present  Extremities:  ·  No edema  Neurological:  · Alert and oriented. DATA:    Diagnostics:    EKG: Atrial fibrillation, demand pacing. ST-T wave abnormality inferior ischemia. TTE 5/25/21  Summary  Left ventricle is normal in size and wall thickness. Global left ventricular systolic function is normal. Estimated LV EF 60-65%. Grade I (mild) left ventricular diastolic dysfunction. Both atria are mildly dilated. Right ventricle is mildly enlarged with normal RV systolic function. Prominent moderator band.   Mild mitral risk stratification. Risk and benefits of cardiac catheterization were discussed in detail. Risk of bleeding, requiring blood transfusion, vascular complication requiring surgery, renal insufficieny with need of dialysis, CVA, MI, death and anesthesia complications including intubation were discussed. Patient agrees to proceed and verbalizes understanding. Discussed with patient and Nurse.     Nidia Oliveros [address] Cardiology Consult           [phone]

## 2022-01-13 NOTE — PROGRESS NOTES
Received post CATH procedure to [facility] room 7. Assessment obtained. Restrictions reviewed with patient. Post procedure pathway initiated. GROIN site soft ,GROIN dry and intact. No hematoma noted. Family at side. Patient without complaints. Head of bed flat with RIGHT leg straight.

## 2022-01-13 NOTE — PROGRESS NOTES
[address]   Occupational Therapy Evaluation  Date: 22  Patient Name: Susan Byrd       Room: 7350/5076-10  MRN: [id number]  Account: [de-identified]   : 1935  (80 y.o.) Gender: male     Discharge Recommendations: The patient may need non-skilled ADL assistance after discharge. Equipment Needed:  (TBD)    Referring Practitioner: Trung Avila MD  Diagnosis: Chest pain  Additional Pertinent Hx: 80year old male who was admitted to the hospital for the management of  NSTEMI. Patient was recently discharged on  for a similar presentation. PMH significant for CHF, CAD, Afib, and sick sinus syndrome with AICD implanted, DVT, multiple abdominal surgeries, and severe osteoarthritis of the cervical spine. Treatment Diagnosis: Impaired self-care status  Past Medical History:  has a past medical history of Atrial fibrillation ([address] CAD (coronary artery disease), CHF (congestive heart failure) ([address] Hyperlipidemia, and Hypertension. Past Surgical History:   has a past surgical history that includes pacemaker placement and Cardiac catheterization. Restrictions  Restrictions/Precautions: General Precautions,Cardiac,Fall Risk  Implants present? : Pacemaker,Metal implants  Required Braces or Orthoses?: Yes (Back brace, R elastic knee brace PRN)     Vitals  Temp: 98.4 °F (36.9 °C)  Pulse: 70  Resp: 20  BP: 108/76  Oxygen Therapy  SpO2: 97 %  Pulse Oximeter Device Mode: Intermittent  Pulse Oximeter Device Location: Finger  O2 Device: None (Room air)  Level of Consciousness: Alert (0)    Subjective  Subjective: \"Ouch! See? I keep getting muscle cramps. \" Pt referring to pain/cramps in LEs, especially RLE  Comments: Okay for OT/PT per RN   Overall Orientation Status: Within Functional Limits  Vision  Vision: Impaired  Vision Exceptions: Wears glasses for reading  Hearing  Hearing: Exceptions to [facility]  Hearing Exceptions: Bilateral hearing aid (Aids not present at time of eval)  Social/Functional History  Lives With: Family (son and his family)  Type of Home: House  Home Layout: One level  Home Access: Stairs to enter without rails  Entrance Stairs - Number of Steps: 1  Bathroom Shower/Tub: Walk-in shower  Bathroom Toilet: Handicap height  Bathroom Equipment: Shower chair,Grab bars in [place] [facility] Financial Accessibility: Not accessible  Home Equipment: [employer] Help From: Kirk Stallworth (nurse 3x/wk)  ADL Assistance: Independent  Homemaking Assistance: Independent  Homemaking Responsibilities: Yes  Ambulation Assistance: Independent (with cane)  Transfer Assistance: Independent  Active : Yes  Mode of Transportation: Appear  IADL Comments: sleeps in a flat bed  Additional Comments: Majority of social history gathered from previous eval (1/8). [facility] visiting 2-3 times a week. Pt's son and son's wife work full time. Pain Assessment  Pain Assessment: 0-10  Pain Type: Acute pain  Pain Location: Leg  Pain Orientation: Right  Pain Descriptors: Cramping    Objective      Cognition  Overall Cognitive Status: WFL   Sensation  Overall Sensation Status: WFL (pt denies)   ADL  Feeding: Modified independent   Grooming: Modified independent   UE Bathing: Stand by assistance  LE Bathing: Contact guard assistance  UE Dressing: Stand by assistance  LE Dressing: Contact guard assistance  Toileting: Contact guard assistance  Additional Comments: ADL scores based on skilled observation and clinical reasoning, unless otherwise noted.  Assistance required due to increased SOB, pain, low endurance, impacting safety and independence with self care    UE Function           LUE Strength  L Hand General: 4/5  LUE Strength Comment: Overall 4/5     LUE Tone: Normotonic     LUE AROM (degrees)  LUE AROM : WFL     Left Hand AROM (degrees)  Left Hand AROM: WFL  RUE Strength  R Hand General: 4/5  RUE Strength Comment: Overall 4/5      RUE Tone: Normotonic     RUE AROM (degrees)  RUE AROM : WFL     Right Hand AROM (degrees)  Right Hand AROM: WFL    Fine Motor Skills  Coordination  Movements Are Fluid And Coordinated: Yes                           Mobility  Supine to Sit: Stand by assistance  Sit to Supine: Stand by assistance       Balance  Sitting Balance: Supervision  Standing Balance: Contact guard assistance  Standing Balance  Time: 5-6 minutes  Activity: functional transfers, functional mobility  Comment: with cane for UE support. Functional Mobility  Functional - Mobility Device: Cane  Activity:  (Throughout hallway)  Assist Level: Contact guard assistance  Functional Mobility Comments: slightly unsteady gait, increased SOB with activity. Pt requested a standing rest break. Bed mobility  Supine to Sit: Stand by assistance  Sit to Supine: Stand by assistance  Scooting: Supervision  Comment: HOB flat     Transfers  Sit to stand: Contact guard assistance  Stand to sit: Contact guard assistance  Transfer Comments: Cane in R hand  Functional Activity Tolerance  Functional Activity Tolerance:  Tolerates 30 min exercise with multiple rests     Assessment  Assessment  Performance deficits / Impairments: Decreased functional mobility ,Decreased ADL status,Decreased strength,Decreased endurance,Decreased balance,Decreased high-level IADLs  Treatment Diagnosis: Impaired self-care status  Prognosis: Good  Decision Making: Low Complexity  REQUIRES OT FOLLOW UP: Yes  Discharge Recommendations: Home with assist PRN  Activity Tolerance: Patient Tolerated treatment well,Patient limited by pain         Functional Outcome Measures  AM-PAC Daily Activity Inpatient   How much help for putting on and taking off regular lower body clothing?: A Little  How much help for Bathing?: A Little  How much help for Toileting?: A Little  How much help for putting on and taking off regular upper body clothing?: None  How much help for taking care of personal grooming?: None  How much help for eating meals?: None  AM-[facility] Inpatient Daily Activity Raw Score: 21  AM-PAC Inpatient ADL T-Scale Score : 44.27  ADL Inpatient CMS 0-100% Score: 32.79  ADL Inpatient CMS G-Code Modifier : CJ       Goals  Short term goals  Time Frame for Short term goals: By discharge  Short term goal 1: Patient will perform BADLs with mod I and good safety  Short term goal 2: Patient will tolerate standing 10+ minutes, with no LOB, while engaged in self care/functional activity of choice  Short term goal 3: Patient will V/D at least three EC/WS techniques that are applicable to his daily routine  Short term goal 4: Patient will V/D at least three fall prevention strategies to promote safety with daily routine  Short term goal 5: Patient will actively participate in 15+ minutes of therapeutic exercise/functional activity to promote safety and independence with self care and mobility  Short term goal 6: Patient will perform transfers/functional mobility with mod I and good safety    Plan  Safety Devices  Safety Devices in place: Yes  Type of devices:  All fall risk precautions in place,Call light within reach,Gait belt,Patient at risk for falls,Left in bed,Nurse notified     Plan  Times per week: 3-4  Current Treatment Recommendations: Bam Cabral Mobility Training,Endurance Training       Equipment Recommendations  Equipment Needed:  (TBD)  OT Individual Minutes  Time In: 4253  Time Out: 9922  Minutes: 20    Electronically signed by Subhash Guo OT on 1/13/22 at 4:49 PM EST         01/13/22 1649   OT Individual Minutes   Time In 1018   Time Out 6471   [id number] 21

## 2022-01-13 NOTE — PROGRESS NOTES
Physical Therapy    Facility/Department: [address] [place] [address] CARE  Initial Assessment    NAME: Sumaya Massey  : 1935  MRN: [id number]    Date of Service: 2022    Discharge Recommendations:  Home with assist PRN   PT Equipment Recommendations  Equipment Needed: No    Assessment   Body structures, Functions, Activity limitations: Decreased functional mobility ; Decreased endurance  Assessment: Impaired mobility due to decreased tolerance to activity  Decision Making: Low Complexity  History: CHF  Exam: decreased endurance  Clinical Presentation: evolving  REQUIRES PT FOLLOW UP: Yes  Activity Tolerance  Activity Tolerance: Patient limited by endurance       Patient Diagnosis(es): The encounter diagnosis was NSTEMI (non-ST elevated myocardial infarction) ([facility] has a past medical history of Atrial fibrillation ([facility] CAD (coronary artery disease), CHF (congestive heart failure) ([facility] Hyperlipidemia, and Hypertension. has a past surgical history that includes pacemaker placement and Cardiac catheterization.     Restrictions  Restrictions/Precautions  Restrictions/Precautions: Cardiac  Implants present? : Pacemaker          Subjective  General  Patient assessed for rehabilitation services?: Yes  Family / Caregiver Present: No  Follows Commands: Within Functional Limits  Subjective  Subjective: pt has c/c of \" getting cramps\" in both of his calves  Pain Screening  Patient Currently in Pain: Yes          Orientation  Orientation  Overall Orientation Status: Within Functional Limits  Social/Functional History  Social/Functional History  Lives With: Family (son and his family)  Type of Home: House  Home Layout: One level  Home Access: Stairs to enter without rails  Entrance Stairs - Number of Steps: 1  Bathroom Shower/Tub: Walk-in shower  Bathroom Toilet: Handicap height  Bathroom Equipment: Shower chair,Grab bars in shower,Hand-held shower  Bathroom Accessibility: Not accessible  Home Equipment: Rolling Ming myers  Receives Help From: [facility] (nurse 3x/wk)  ADL Assistance: Independent  Ambulation Assistance: Independent (with cane)  Transfer Assistance: Independent  Active : Yes  Mode of Transportation: SUV  IADL Comments: sleeps in a flat bed  Additional Comments: [place] visiting 2-3 times a week.         Objective          AROM RLE (degrees)  RLE AROM: WFL  AROM LLE (degrees)  LLE AROM : WFL  Strength RLE  Strength RLE: WFL  Strength Other  Other: testing limited due to pt getting cramps in both calves with attempts at MMT        Bed mobility  Supine to Sit: Stand by assistance  Sit to Supine: Stand by assistance  Scooting: Supervision  Transfers  Sit to Stand: Stand by assistance  Stand to sit: Stand by assistance  Ambulation  Ambulation?: Yes  Ambulation 1  Device: Single point cane  Assistance: Stand by assistance;Contact guard assistance  Gait Deviations: Slow Madeline  Distance: 75ft x 2  Comments: pt had to have one standing rest break  Stairs/Curb  Stairs?: No     Balance  Sitting - Static: Good  Sitting - Dynamic: Good  Standing - Static: Fair;+ (SBA)  Standing - Dynamic: Fair (CGA)        Plan   Plan  Times per week: 6-7x/wk  Current Treatment Recommendations: Functional Mobility Training,Gait Training,Endurance Training,Safety Education & Training  Safety Devices  Type of devices: Call light within reach,Patient at risk for falls,Left in bed,Bed alarm in place    Goals  Short term goals  Time Frame for Short term goals: 4-5 days  Short term goal 1: mod-I bed mobility  Short term goal 2: mod-I transfers  Short term goal 3: mod-I gait with cane x 100-150ft without rest break  Short term goal 4: negotiate 1-2 steps with SBA  Short term goal 5: pt able to tolerate 15-20min of therapeutic activity/exercise with min/no SOB       Therapy Time   Individual Concurrent Group Co-treatment   Time In 1017         Time Out 1036         Minutes [address], PT

## 2022-01-13 NOTE — ED NOTES
Spoke with patient's son, Karen Crawford, and updated on plan of care. Patient aware.      Vane Stephenson RN  01/12/22 2025

## 2022-01-13 NOTE — CARE COORDINATION
CASE MANAGEMENT NOTE:    Admission Date:  1/12/2022 Jessica Hill is a 80 y.o.  male    Admitted for : Chest pain [R07.9]  NSTEMI (non-ST elevated myocardial infarction) ([facility] [address] [I21.4]    Met with:  Patient    PCP:  Dr Sotomayor Seek:  Medicare      Is patient alert and oriented at time of discussion:  Yes    Current Residence/ Living Arrangements:  independently at home             Current Services PTA:  Yes, current with [facility]    Does patient go to outpatient dialysis: No  If yes, location and chair time:     Is patient agreeable to VNS: Yes    Freedom of choice provided:  Yes    List of [address] provided: No    VNS chosen:  Yes, [facility] and wants to resume services    DME:  straight cane and toilt riser    Home Oxygen: No    Nebulizer: No    CPAP/BIPAP: No    Supplier: N/A    Potential Assistance Needed: No    SNF needed: No    Freedom of choice and list provided: NA    Pharmacy:  Jessica in [address]       Does Patient want to use MEDS to BEDS? No    Is patient currently receiving oral anticoagulation therapy? Yes    Is the Patient an JAYESH VOFany [facility] with Readmission Risk Score greater than 14%? No  If yes, pt needs a follow up appointment made within 7 days. Family Members/Caregivers that pt would like involved in their care:    Yes    If yes, list name here:  Salome Trejo    Transportation Provider:  Family             Discharge Plan:  1/13/22 - Medicare - Patient lives with  Son Sherlene Essex. DME: cane, Toilet riser, [facility] - [facility]. Heparin gtt, Cardiac consult,  To have Cardiac cath, plan is to return home with VNS, CARLOS needs signed and completed. Will follow . //pf                Electronically signed by: Cornell Rice RN on 1/13/2022 at 1:16 PM

## 2022-01-14 VITALS
SYSTOLIC BLOOD PRESSURE: 119 MMHG | HEART RATE: 67 BPM | DIASTOLIC BLOOD PRESSURE: 70 MMHG | RESPIRATION RATE: 18 BRPM | BODY MASS INDEX: 25.6 KG/M2 | WEIGHT: 194 LBS | OXYGEN SATURATION: 94 % | TEMPERATURE: 97.9 F

## 2022-01-14 LAB
ANION GAP SERPL CALCULATED.3IONS-SCNC: 11 MMOL/L (ref 9–17)
BUN BLDV-MCNC: 27 MG/DL (ref 8–23)
BUN/CREAT BLD: ABNORMAL (ref 9–20)
CALCIUM SERPL-MCNC: 9.4 MG/DL (ref 8.6–10.4)
CHLORIDE BLD-SCNC: 101 MMOL/L (ref 98–107)
CO2: 28 MMOL/L (ref 20–31)
CREAT SERPL-MCNC: 1.16 MG/DL (ref 0.7–1.2)
GFR AFRICAN AMERICAN: >60 ML/MIN
GFR NON-AFRICAN AMERICAN: 60 ML/MIN
GFR SERPL CREATININE-BSD FRML MDRD: ABNORMAL ML/MIN/{1.73_M2}
GFR SERPL CREATININE-BSD FRML MDRD: ABNORMAL ML/MIN/{1.73_M2}
GLUCOSE BLD-MCNC: 119 MG/DL (ref 70–99)
HCT VFR BLD CALC: 34.8 % (ref 41–53)
HEMOGLOBIN: 11.6 G/DL (ref 13.5–17.5)
MCH RBC QN AUTO: 30.1 PG (ref 26–34)
MCHC RBC AUTO-ENTMCNC: 33.4 G/DL (ref 31–37)
MCV RBC AUTO: 89.9 FL (ref 80–100)
NRBC AUTOMATED: ABNORMAL PER 100 WBC
PDW BLD-RTO: 14.8 % (ref 11.5–14.9)
PLATELET # BLD: 240 K/UL (ref 150–450)
PMV BLD AUTO: 7.4 FL (ref 6–12)
POTASSIUM SERPL-SCNC: 4.4 MMOL/L (ref 3.7–5.3)
RBC # BLD: 3.87 M/UL (ref 4.5–5.9)
SODIUM BLD-SCNC: 140 MMOL/L (ref 135–144)
WBC # BLD: 3.2 K/UL (ref 3.5–11)

## 2022-01-14 PROCEDURE — 6370000000 HC RX 637 (ALT 250 FOR IP): Performed by: STUDENT IN AN ORGANIZED HEALTH CARE EDUCATION/TRAINING PROGRAM

## 2022-01-14 PROCEDURE — 6370000000 HC RX 637 (ALT 250 FOR IP): Performed by: INTERNAL MEDICINE

## 2022-01-14 PROCEDURE — 80048 BASIC METABOLIC PNL TOTAL CA: CPT

## 2022-01-14 PROCEDURE — 97116 GAIT TRAINING THERAPY: CPT

## 2022-01-14 PROCEDURE — 99232 SBSQ HOSP IP/OBS MODERATE 35: CPT | Performed by: INTERNAL MEDICINE

## 2022-01-14 PROCEDURE — 6370000000 HC RX 637 (ALT 250 FOR IP): Performed by: NURSE PRACTITIONER

## 2022-01-14 PROCEDURE — 6370000000 HC RX 637 (ALT 250 FOR IP)

## 2022-01-14 PROCEDURE — 2580000003 HC RX 258: Performed by: STUDENT IN AN ORGANIZED HEALTH CARE EDUCATION/TRAINING PROGRAM

## 2022-01-14 PROCEDURE — 85027 COMPLETE CBC AUTOMATED: CPT

## 2022-01-14 PROCEDURE — 36415 COLL VENOUS BLD VENIPUNCTURE: CPT

## 2022-01-14 PROCEDURE — 97530 THERAPEUTIC ACTIVITIES: CPT

## 2022-01-14 RX ORDER — METOPROLOL SUCCINATE 25 MG/1
25 TABLET, EXTENDED RELEASE ORAL NIGHTLY
Qty: 30 TABLET | Refills: 3 | Status: SHIPPED | OUTPATIENT
Start: 2022-01-14 | End: 2022-02-09 | Stop reason: SDUPTHER

## 2022-01-14 RX ADMIN — BUPRENORPHINE AND NALOXONE 1 FILM: 8; 2 FILM BUCCAL; SUBLINGUAL at 09:27

## 2022-01-14 RX ADMIN — SPIRONOLACTONE 25 MG: 25 TABLET ORAL at 09:26

## 2022-01-14 RX ADMIN — Medication 10 ML: at 09:30

## 2022-01-14 RX ADMIN — ASPIRIN 81 MG 81 MG: 81 TABLET ORAL at 09:26

## 2022-01-14 RX ADMIN — AMLODIPINE BESYLATE 10 MG: 5 TABLET ORAL at 09:29

## 2022-01-14 RX ADMIN — RIVAROXABAN 15 MG: 15 TABLET, FILM COATED ORAL at 09:27

## 2022-01-14 RX ADMIN — BUMETANIDE 2 MG: 1 TABLET ORAL at 09:29

## 2022-01-14 RX ADMIN — FINASTERIDE 5 MG: 5 TABLET, FILM COATED ORAL at 09:26

## 2022-01-14 RX ADMIN — HYDRALAZINE HYDROCHLORIDE 25 MG: 25 TABLET, FILM COATED ORAL at 09:29

## 2022-01-14 ASSESSMENT — ENCOUNTER SYMPTOMS
CHOKING: 0
DIARRHEA: 0
EYE PAIN: 0
APNEA: 0
ABDOMINAL DISTENTION: 0
ABDOMINAL PAIN: 1
PHOTOPHOBIA: 0
WHEEZING: 0
NAUSEA: 0
CHEST TIGHTNESS: 1
COUGH: 0
CONSTIPATION: 0
SHORTNESS OF BREATH: 1
BACK PAIN: 1
VOMITING: 0

## 2022-01-14 NOTE — DISCHARGE SUMMARY
[address]    Discharge Summary     Patient ID: Sumaya Massey  :  1935   MRN: [id number]     ACCOUNT:  [de-identified]   Patient's PCP: Belén Douglas MD  Admit Date: 2022   Discharge Date: 2022   Length of Stay: 2  Code Status:  Full Code  Admitting Physician: Niesha Lowe MD  Discharge Physician: Adan Zarate MD     Active Discharge Diagnoses:       Primary Problem  Chest pain      [place] Problems    Diagnosis Date Noted    Unstable angina ([facility] [I20.0] 2022    Chronic diastolic congestive heart failure ([facility] [I50.32] 2022    Chest pain [R07.9] 2021    Atrial fibrillation ([facility] [I48.91] 2021    On continuous oral anticoagulation [Z79.01] 2021    Pacemaker [Z95.0] 2021    History of DVT of lower extremity [Z86.718] 2021    Hypertension [I10] 2021       Admission Condition:  fair     Discharged Condition: stable    Hospital Stay:       Hospital Course:  Sumaya Massey is a 80 y.o. male who was admitted for the management of   Chest pain , presented to ER with Chest Pain and Atrial Fibrillation. Patient was recently discharged on  for a similar presentation. PMH significant for CHF, CAD, Afib, and sick sinus syndrome with AICD implanted, DVT, multiple abdominal surgeries, and severe osteoarthritis of the cervical spine. He has been following up with cardiology as an outpatient.      Patient was at home when he began feeling chest pain 4/10 with heaviness, shortness of breath, headache, light headedness, tunneled vision, malaise and weakness. He called his home nurse who then called EMS. He was given nitrostat and his symptoms resolved. He is still having intermittent sharp jolts of pain in his chest. His pacer was interrogated in the ED. EKG similar to previous, RBBB and previous inferior and anterior infarcts.     Trops were 44, repeat 51.  ProBNP 1200. Rest of the workup is unremarkable. Patient has been compliant with eliquis. Cardiology was contacted and started patient on full dose heparin and will assess patient in the morning. Code status confirmed with patient, he desired to be FULL CODE.     The next day patient is no longer complaining of chest pain. Cardiac cath done unchanged from previous, non obstructive cardiomyopathy and 40-50% obstruction. Heparin DC'd and restarted on home xarelto. Stable to DC from cardiovascular standpoint.           Significant therapeutic interventions: Cardiac cath done 1/13    Significant Diagnostic Studies:   Labs / Micro:  CBC:   Lab Results   Component Value Date    WBC 3.2 01/14/2022    RBC 3.87 01/14/2022    HGB 11.6 01/14/2022    HCT 34.8 01/14/2022    MCV 89.9 01/14/2022    MCH 30.1 01/14/2022    MCHC 33.4 01/14/2022    RDW 14.8 01/14/2022     01/14/2022     BMP:    Lab Results   Component Value Date    GLUCOSE 119 01/14/2022     01/14/2022    K 4.4 01/14/2022     01/14/2022    CO2 28 01/14/2022    ANIONGAP 11 01/14/2022    BUN 27 01/14/2022    CREATININE 1.16 01/14/2022    BUNCRER NOT REPORTED 01/14/2022    CALCIUM 9.4 01/14/2022    LABGLOM 60 01/14/2022    GFRAA >60 01/14/2022    GFR      01/14/2022    GFR NOT REPORTED 01/14/2022     Radiology:    ECHO Complete 2D W Doppler W Color    Result Date: 1/14/2022  16[facility] Transthoracic Echocardiography Report (TTE)  Patient Name [facility]    Date of Study               01/13/2022               DONNA   Date of      1935  Gender                      Male  Birth   Age          80 year(s)  Race                        Black   Room Number  2099        Height:                     73 inch, 185.42 cm   Corporate ID [id number]    Weight:                     196 pounds, 88.9 kg  #   Patient Acct [de-identified]   BSA:          2.13 m^2      BMI:      25.86  #                                                              kg/m^2   MR # [id number] [facility]   Accession #  [id number]  Interpreting Physician      Karol Garcia   Fellow                   Referring Nurse                           Practitioner   Interpreting             Referring Physician         Gama Murray  Type of Study   TTE procedure:2D Echocardiogram, M-Mode, Doppler, Color Doppler. Procedure Date Date: 01/13/2022 Start: 11:56 AM Study Location: [facility] Technical Quality: Fair visualization Indications:Chest pain and Myocardial infarction. History / Tech. Comments: CHF ICD AF Patient Status: Inpatient Height: 73 inches Weight: 196 pounds BSA: 2.13 m^2 BMI: 25.86 kg/m^2 Rhythm: Ventricular paced rhythm HR: 70 bpm BP: 125/84 mmHg Allergies   - *Unlisted:(ASA, ibuprofen, cyclobenzepine, azithromycin, hydrocordone     acetaminaphen). CONCLUSIONS Summary Contrast was utilized on this study. Small LV cavity. Moderately increased LV wall thickness. Normal left ventricular ejection fraction >55% No obvious segmental wall motion abnormalities seen. LA and RA appears dilated Normal right ventricular size and function. Pacemaker / ICD lead seen in right ventricle. Aortic valve is trileaflet. No aortic stenosis. Mild aortic insufficiency. Normal aortic root dimension. Normal mitral valve structure and function. Mild mitral regurgitation. Normal tricuspid valve structure and function. Mild tricuspid regurgitation. Estimated right ventricular systolic pressure is 24 mmHg. Normal right ventricular systolic pressure. IVC normal diameter & inspiratory collapse indicating normal RA filling pressure . No significant pericardial effusion is seen.  Signature ----------------------------------------------------------------------------  Electronically signed by Karol Garcia(Interpreting physician) on  01/14/2022 11:27 AM ---------------------------------------------------------------------------- FINDINGS Left Atrium Left atrial dilatation. Left Ventricle Small LV cavity. Normal left ventricular ejection fraction >55 %. No segmental wall motion abnormalities seen. Right Atrium Right atrial dilatation. Right Ventricle Normal right ventricular size and function. Pacemaker / ICD lead seen in right ventricle. Mitral Valve Normal mitral valve structure and function. Mild mitral regurgitation. Aortic Valve Aortic valve is trileaflet. Mild aortic insufficiency. No aortic stenosis. Tricuspid Valve Normal tricuspid valve structure and function. Mild tricuspid regurgitation. Estimated right ventricular systolic pressure is 24 mmHg. Normal right ventricular systolic pressure. Pulmonic Valve Pulmonic valve not well visualized but Doppler velocities are normal. No pulmonic insufficiency. Pericardial Effusion No significant pericardial effusion is seen. Miscellaneous Normal aortic root dimension. E/e' average 12.2 IVC normal diameter & inspiratory collapse indicating normal RA filling pressure .  M-mode / 2D Measurements & Calculations:   LVIDd:3.94 cm(3.7 - 5.6 cm)      Diastolic WHFZTP:28 ml  NQJGH:9.21 cm(2.2 - 4.0 cm)      Systolic URZIDU:86 ml  DGEI:8.6 cm(0.6 - 1.1 cm)        Aortic Root:3.6 cm(2.0 - 3.7 cm)  LVPWd:1.61 cm(0.6 - 1.1 cm)      LA Dimension: 5.3 cm(1.9 - 4.0 cm)  Fractional Shortenin.58 %    LA volume/Index: 57 ml /27m^2  Calculated LVEF (%): 60.24 %     LVOT:2.1 cm   Mitral:                                Aortic   Peak E-Wave: 0.80 m/s                  Peak Velocity: 1.49 m/s                                         Mean Velocity: 1.09 m/s  Peak Gradient: 2.56 mmHg               Peak Gradient: 8.88 mmHg  Deceleration Time: 232 msec            Mean Gradient: 5 mmHg                                         AI P1/2t: 540 msec                                          Area (continuity): 2.01 cm^2                                         AV VTI: 26.4 cm   Tricuspid:                             Pulmonic:   Estimated RVSP: 24 mmHg  Peak TR Velocity: 2.28 m/s  Peak TR Gradient: 20.7936 mmHg  Estimated RA Pressure: 3 mmHg                                         Estimated PASP: 23.79 mmHg  Septal Wall E' velocity:0.06 m/s Lateral Wall E' velocity:0.06 m/s    XR CHEST PORTABLE    Result Date: 1/12/2022  EXAMINATION: ONE XRAY VIEW OF THE CHEST 1/12/2022 11:48 am COMPARISON: Chest x-ray dated 7 January 2022 HISTORY: ORDERING SYSTEM PROVIDED HISTORY: pain TECHNOLOGIST PROVIDED HISTORY: pain Reason for Exam: Chest pain FINDINGS: Left-sided ICD with leads in stable position. Similar appearance of strand-like opacities in the right lower lobe likely represent atelectasis. No pneumothorax or pleural effusion stable cardiomediastinal silhouette     Similar appearance of opacities in the right lower lobe compatible with atelectasis. No acute airspace infiltrate. XR CHEST PORTABLE    Result Date: 1/7/2022  EXAMINATION: ONE XRAY VIEW OF THE CHEST 1/7/2022 10:59 am COMPARISON: 11/17/2021, 09/24/2021 HISTORY: ORDERING SYSTEM PROVIDED HISTORY: cp TECHNOLOGIST PROVIDED HISTORY: cp Reason for Exam: Chest pain today h/o heart attack FINDINGS: The cardiomediastinal silhouette is unchanged in appearance. Left subclavian dual chamber pacer in place. Linear opacities in the lung bases to the again demonstrated, suggestive of scarring versus subsegmental atelectasis. There is no consolidation, pneumothorax, or evidence of edema. No effusion is appreciated. The osseous structures are unchanged in appearance. Unchanged appearance of the chest without acute airspace disease identified. Consultations:    Consults:     Final Specialist Recommendations/Findings:   IP CONSULT TO CARDIOLOGY  IP CONSULT TO INTERNAL MEDICINE  IP CONSULT TO SOCIAL WORK      The patient was seen and examined on day of discharge and this discharge summary is in conjunction with any daily progress note from day of discharge.     Discharge plan:       Disposition: Home    Physician Follow Up:     [address] [facility] [phone]        [facility]  [address]  On 2/1/2022  at 9:45 with Luna Madden, CNP  [facility]. Elena Dean, [address]  [facility]  [phone]    In 1 week         Requiring Further Evaluation/Follow Up POST HOSPITALIZATION/Incidental Findings: Patient may be having some degree of anxiety that is provoking his symptoms, they appear to only happen when he is alone and subside when he gets to the hospital.    Diet: cardiac diet    Activity: As tolerated    Instructions to Patient: Please take medication as prescribed and follow up with PCP and cardiologist. If symptoms worsen or return please return to the ED.     Discharge Medications:      Medication List      CONTINUE taking these medications    amLODIPine 5 MG tablet  Commonly known as: NORVASC  Take 2 tablets by mouth daily     ascorbic acid 250 MG tablet  Commonly known as: V-R VITAMIN C  Take 1 tablet by mouth 2 times daily Take with iron     bumetanide 2 MG tablet  Commonly known as: BUMEX  Take 1 tablet by mouth 2 times daily     Coenzyme Q10 100 MG Chew  Take 1 tablet by mouth daily     cyanocobalamin 1000 MCG tablet  Commonly known as: CVS VITAMIN B12  Take 1 tablet by mouth daily     docusate sodium 100 MG capsule  Commonly known as: COLACE     famotidine 20 MG tablet  Commonly known as: PEPCID  Take 1 tablet by mouth daily     ferrous sulfate 325 (65 Fe) MG tablet  Commonly known as: IRON 325  Take 1 tablet by mouth 2 times daily     finasteride 5 MG tablet  Commonly known as: PROSCAR  Take 1 tablet by mouth daily     hydrALAZINE 25 MG tablet  Commonly known as: APRESOLINE     latanoprost 0.005 % ophthalmic solution  Commonly known as: XALATAN     magnesium oxide 400 MG tablet  Commonly known as: MAG-OX     * metoprolol succinate 25 MG extended release tablet  Commonly known as: TOPROL XL     * metoprolol succinate 25 MG extended release tablet  Commonly known as: TOPROL XL  Take 1 tablet by mouth nightly     nitroGLYCERIN 0.4 MG SL tablet  Commonly known as: NITROSTAT  up to max of 3 total doses. If no relief after 1 dose, call 911. rivaroxaban 15 MG Tabs tablet  Commonly known as: Xarelto  Take 1 tablet by mouth daily (with breakfast)     spironolactone 25 MG tablet  Commonly known as: ALDACTONE  TAKE 1 TABLET BY MOUTH DAILY     Suboxone 8-2 MG Film SL film  Generic drug: buprenorphine-naloxone     vitamin D 1.25 MG (40465 UT) Caps capsule  Commonly known as: ERGOCALCIFEROL  TAKE 1 CAPSULE BY MOUTH EVERY WEEK         * This list has 2 medication(s) that are the same as other medications prescribed for you. Read the directions carefully, and ask your doctor or other care provider to review them with you. Where to Get Your Medications      These medications were sent to [address] [facility], [address] [place] [address] [phone] [facility] [phone]  [address] [address]    Phone: [phone]   · metoprolol succinate 25 MG extended release tablet       Electronically signed by   Rohit Skelton MD  1/14/2022  2:49 PM      Thank you Dr. Spike Maki MD for the opportunity to be involved in this patient's care.

## 2022-01-14 NOTE — PROGRESS NOTES
[facility]    PROGRESS NOTE             1/14/2022    7:16 AM    Name:   Jamaal Veliz  MRN:     [id number]     Acct:      [de-identified]   Room:   2099/2099-01  IP Day:  2  Admit Date:  1/12/2022 11:37 AM    PCP:  Yohan Coburn MD  Code Status:  Full Code    Subjective:     C/C:   Chief Complaint   Patient presents with    Chest Pain    Atrial Fibrillation     Interval History Status: improved. Patient seen and examined at bedside. Had an episode of dizziness while laying in bed early this morning. It has since subsided. Cath site looks good, pulses strong. Brief History:     The patient is a 80 y.o.  presents with Chest Pain and Atrial Fibrillation   and he is admitted to the hospital for the management of  NSTEMI. Patient was recently discharged on 1/8 for a similar presentation. PMH significant for CHF, CAD, Afib, and sick sinus syndrome with AICD implanted, DVT, multiple abdominal surgeries, and severe osteoarthritis of the cervical spine. He has been following up with cardiology as an outpatient. Patient was at home when he began feeling chest pain 4/10 with heaviness, shortness of breath, headache, light headedness, tunneled vision, malaise and weakness. He called his home nurse who then called EMS. He was given nitrostat and his symptoms resolved. He is still having intermittent sharp jolts of pain in his chest. His pacer was interrogated in the ED. EKG similar to previous, RBBB and previous inferior and anterior infarcts. Trops were 44, repeat 51. ProBNP 1200. Rest of the workup is unremarkable. Patient has been compliant with eliquis. Cardiology was contacted and started patient on full dose heparin and will assess patient in the morning. Code status confirmed with patient, he desires to be FULL CODE.    1/13: No longer complaining of chest pain.  Cardiac cath done yesterday unchanged from previous, non obstructive buprenorphine-naloxone  1 Film SubLINGual BID     Continuous Infusions:    sodium chloride       PRN Meds: nitroGLYCERIN, sodium chloride flush, sodium chloride, ondansetron **OR** ondansetron, polyethylene glycol    Data:     Past Medical History:   has a past medical history of Atrial fibrillation ([facility] CAD (coronary artery disease), CHF (congestive heart failure) ([facility] [address] Hyperlipidemia, and Hypertension. Social History:   reports that he has never smoked. He has never used smokeless tobacco. He reports that he does not drink alcohol and does not use drugs. Family History: History reviewed. No pertinent family history. Vitals:  /77   Pulse 70   Temp 98 °F (36.7 °C) (Oral)   Resp 16   Wt 194 lb 0.1 oz (88 kg)   SpO2 96%   BMI 25.60 kg/m²   Temp (24hrs), Av.2 °F (36.8 °C), Min:98 °F (36.7 °C), Max:98.4 °F (36.9 °C)    No results for input(s): POCGLU in the last 72 hours. I/O(24Hr): Intake/Output Summary (Last 24 hours) at 2022 0716  Last data filed at 2022 0420  Gross per 24 hour   Intake 355 ml   Output 450 ml   Net -95 ml       Labs:    [unfilled]    Lab Results   Component Value Date/Time    SPECIAL NOT REPORTED 2021 08:15 PM     Lab Results   Component Value Date/Time    CULTURE NO SIGNIFICANT GROWTH 2021 08:15 PM       [unfilled]    Radiology:    XR CHEST PORTABLE    Result Date: 2022  EXAMINATION: ONE XRAY VIEW OF THE CHEST 2022 11:48 am COMPARISON: Chest x-ray dated 2022 HISTORY: ORDERING SYSTEM PROVIDED HISTORY: pain TECHNOLOGIST PROVIDED HISTORY: pain Reason for Exam: Chest pain FINDINGS: Left-sided ICD with leads in stable position. Similar appearance of strand-like opacities in the right lower lobe likely represent atelectasis. No pneumothorax or pleural effusion stable cardiomediastinal silhouette     Similar appearance of opacities in the right lower lobe compatible with atelectasis. No acute airspace infiltrate.      XR CHEST PORTABLE    Result Date: 1/7/2022  EXAMINATION: ONE XRAY VIEW OF THE CHEST 1/7/2022 10:59 am COMPARISON: 11/17/2021, 09/24/2021 HISTORY: ORDERING SYSTEM PROVIDED HISTORY: cp TECHNOLOGIST PROVIDED HISTORY: cp Reason for Exam: Chest pain today h/o heart attack FINDINGS: The cardiomediastinal silhouette is unchanged in appearance. Left subclavian dual chamber pacer in place. Linear opacities in the lung bases to the again demonstrated, suggestive of scarring versus subsegmental atelectasis. There is no consolidation, pneumothorax, or evidence of edema. No effusion is appreciated. The osseous structures are unchanged in appearance. Unchanged appearance of the chest without acute airspace disease identified. Physical Examination:        Physical Exam  Constitutional:       General: He is not in acute distress. Appearance: Normal appearance. HENT:      Head: Normocephalic and atraumatic. Mouth/Throat:      Mouth: Mucous membranes are moist.      Pharynx: Oropharynx is clear. Eyes:      Extraocular Movements: Extraocular movements intact. Conjunctiva/sclera: Conjunctivae normal.      Pupils: Pupils are equal, round, and reactive to light. Comments: Arcus senilis   Cardiovascular:      Rate and Rhythm: Normal rate. Rhythm irregular. Pulses: Normal pulses. Heart sounds: Murmur (systolic murmur) heard. Comments: Intermittent Sharp shooting substernal pain, possibly AICD firing  Pulmonary:      Effort: Pulmonary effort is normal. No respiratory distress. Breath sounds: Normal breath sounds. No wheezing, rhonchi or rales. Abdominal:      General: Abdomen is flat. There is no distension. Palpations: Abdomen is soft. There is mass (Central solid mass post surgery). Tenderness: There is no abdominal tenderness. There is no right CVA tenderness, left CVA tenderness or guarding. Hernia: A hernia is present.    Musculoskeletal:      Cervical back: Normal range of motion and neck supple. Right lower leg: No edema (Edema to upper thighs. Compression socks on). Left lower leg: No edema. Comments: R fem cath site, no signs of hematoma   Compression socks on  Range of movement limited bilateral shoulders     Skin:     General: Skin is warm and dry. Capillary Refill: Capillary refill takes less than 2 seconds. Coloration: Skin is not jaundiced or pale. Findings: No bruising. Neurological:      General: No focal deficit present. Mental Status: He is alert and oriented to person, place, and time. Psychiatric:         Mood and Affect: Mood normal.         Behavior: Behavior normal.         Thought Content: Thought content normal.         Judgment: Judgment normal.           Assessment:        Primary Problem  Chest pain    Active Hospital Problems    Diagnosis Date Noted    Unstable angina ([facility] [address] [I20.0] 01/13/2022    Chronic diastolic congestive heart failure (HCC) [I50.32] 01/13/2022    Chest pain [R07.9] 05/25/2021    Atrial fibrillation ([facility] [facility] [I48.91] 05/25/2021    On continuous oral anticoagulation [Z79.01] 05/25/2021    Pacemaker [Z95.0] 05/25/2021    History of DVT of lower extremity [Z86.718] 05/25/2021    Hypertension [I10] 05/25/2021       Plan:        NSTEMI  - Recently Worked up 9/21 and 1/7 for similar presentation  - history of Afib on xeralto  - Most recent Cardiac echo 5/21 shows EF 60-65%  - Cardiac Cath spring 2021: non-obstructive CAD  - AICD implanted  - Trops 44, 51  - PRN Nitrostat  - Telemetry monitoring  - Cardiology evaluation  - Cardiac cath done 1/14 shows 40-50% stenosis of LAD, non-obstructive cardiomyopathy  - Heparin DC'd, restart Xarelto 15 mg daily  - F/U echo results     CHF  - Continue home Norvasc 10 mg daily, Toprol 25 mg nightly, Aldactone 25 mg daily  - Low sodium diet  - Continue home Bumex 2 mg BID.  IV switched to oral  - F/U cardio recs    VARSHA  - Small Creatinine bump this morning, possibly secondary to bumex  - Cr. 1.22. Appears baseline is around 0.88  - Bumex switched to oral     DVT Prophylaxis: On heparin full dose  GI prophylaxis: Home Pepcid  Diet: Cardiac diet  Dispo: Home with care, patient expresses interest in SNF placement    Evon Johnson MD  1/14/2022  7:16 AM         Attestation and add on       I have discussed the care of Gisela Figueroa , including pertinent history and exam findings,      1/14/22    with the resident. I have seen and examined the patient and the key elements of all parts of the encounter have been performed by me . I agree with the assessment, plan and orders as documented by the resident. Principal Problem:    Chest pain  Active Problems:    Atrial fibrillation (HCC)    On continuous oral anticoagulation    Pacemaker    History of DVT of lower extremity    Hypertension    Unstable angina (HCC)    Chronic diastolic congestive heart failure ([facility] [address]  Resolved Problems:    * No resolved hospital problems. *         ---- ;     MD JACQUELINE [facility]  [address] [place] [address].    Phone [phone]   Fax: [phone]  Answering Service: [phone]

## 2022-01-14 NOTE — PROGRESS NOTES
[facility]  Progress Note                   Date:   1/14/2022  Patient name: Rolan Fuller  Date of admission:  1/12/2022 11:37 AM  MRN:   [id number]  YOB: 1935  PCP: Dawn Damon MD    Reason for Admission: Chest pain [R07.9]  NSTEMI (non-ST elevated myocardial infarction) ([facility] [address] [I21.4]    Subjective:       Clinical Changes /Abnormalities:  Patient seen and examined in room after discussion with RN. Denies chest pain or SOB. S/p CATH at [facility] Vs yesterday with non obstructive CAD reviewed. V pacing on tele. Review of Systems    Medications:   Scheduled Meds:   bumetanide  2 mg Oral Daily    aspirin  81 mg Oral Daily    amLODIPine  10 mg Oral Daily    hydrALAZINE  25 mg Oral BID    finasteride  5 mg Oral Daily    metoprolol succinate  25 mg Oral Nightly    spironolactone  25 mg Oral Daily    sodium chloride flush  5-40 mL IntraVENous 2 times per day    rivaroxaban  15 mg Oral Daily with breakfast    buprenorphine-naloxone  1 Film SubLINGual BID     Continuous Infusions:   sodium chloride       CBC:   Recent Labs     01/12/22  1154 01/13/22  0619 01/14/22  0554   WBC 4.7 3.3* 3.2*   HGB 11.3* 10.6* 11.6*    232 240     BMP:    Recent Labs     01/12/22  1154 01/13/22  0619 01/14/22  0554    138 140   K 4.3 3.8 4.4    102 101   CO2 25 26 28   BUN 22 24* 27*   CREATININE 1.20 1.22* 1.16   GLUCOSE 126* 148* 119*     Hepatic:No results for input(s): AST, ALT, ALB, BILITOT, ALKPHOS in the last 72 hours. Troponin:   Recent Labs     01/12/22  1154 01/12/22  1400   TROPHS 44* 51*     BNP: No results for input(s): BNP in the last 72 hours. Lipids: No results for input(s): CHOL, HDL in the last 72 hours.     Invalid input(s): LDLCALCU  INR:   Recent Labs     01/12/22  1154   INR 2.0     DIAGNOSTIC DATA  EKG  ECHO  STRESS  CATH 1/13/2022  Findings:     LMCA: Normal 0% stenosis.     LAD: Diffuse irregualrtities 40-50%.     LCx: Diffuse irregularities 30-40%.     RCA: Diffuse irregularities 30-40%.     Coronary Tree      Dominance: Right     Ventriculography Findings:  LV was not done     Procedure Data  Procedure Start Time: 01/13/2022 14:23. Procedure End Time: 01/13/2022  14:40.     The procedure was explained in detail to the patient. Risks, complications  and alternative treatments were reviewed. Written consent was obtained.     Diagnostic Cath Status: Urgent     Entry Locations    - Percutaneous access was performed through the Right Femoral artery. A 6      Fr sheath was inserted. Hemostasis was successfully obtained using [employer]      (Access).     Estimated Blood Loss: Less than 25 mL     Conclusions:   Non-obstructive CAD.   LV was not done.     Recommendations:      Medical therapy as needed.   Risk factor modification. EKG: Atrial fibrillation, demand pacing. ST-T wave abnormality inferior ischemia.     ECHO 1/13/2022  Summary  Contrast was utilized on this study. Small LV cavity. Moderately increased LV wall thickness. Normal left ventricular ejection fraction >55%  No obvious segmental wall motion abnormalities seen. LA and RA appears dilated  Normal right ventricular size and function. Pacemaker / ICD lead seen in  right ventricle. Aortic valve is trileaflet. No aortic stenosis. Mild aortic insufficiency. Normal aortic root dimension. Normal mitral valve structure and function. Mild mitral regurgitation. Normal tricuspid valve structure and function. Mild tricuspid regurgitation. Estimated right ventricular systolic pressure is 24 mmHg. Normal right  ventricular systolic pressure. IVC normal diameter & inspiratory collapse indicating normal RA filling  pressure . No significant pericardial effusion is seen. TTE 5/25/21  Summary  Left ventricle is normal in size and wall thickness. Global left ventricular systolic function is normal. Estimated LV EF 60-65%. Grade I (mild) left ventricular diastolic dysfunction.   Both atria are mildly dilated. Right ventricle is mildly enlarged with normal RV systolic function. Prominent moderator band. Mild mitral regurgitation. Mild tricuspid regurgitation. Estimated right ventricular systolic pressure is 38SUDF.     Cath 4/30/21 at [facility] 40% LAD and LCX  Objective:   Vitals: /83   Pulse 70   Temp 98 °F (36.7 °C)   Resp 18   Wt 194 lb 0.1 oz (88 kg)   SpO2 98%   BMI 25.60 kg/m²   General appearance: alert and cooperative with exam  HEENT: Head: Normocephalic, no lesions, without obvious abnormality. Neck:no JVD, trachea midline, no adenopathy  Lungs: Clear to auscultation  Heart: Regular rate and rhythm, s1/s2 auscultated, no murmurs  Abdomen: soft, non-tender, bowel sounds active  Extremities: no edema  Neurologic: not done  Right Femoral artery site:  CDI         Assessment / Acute Cardiac Problems:   Chest pain recurrent admission  CAD  Chronic diastolic CHF  H/o AICD  Chronic atrial fibrillation    Patient Active Problem List:     Atrial fibrillation (HCC)     On continuous oral anticoagulation     CHF, acute on chronic (HCC)     Hyperlipidemia     Former smoker     Pacemaker     History of DVT of lower extremity     Enlarged prostate     Cataract of both eyes     GERD (gastroesophageal reflux disease)     History of inguinal hernia repair     Hypertension     Pneumonia     History of right hip replacement     History of back surgery     Low back pain     Chest pain     Fluid overload     VARSHA (acute kidney injury) ([address]     NSTEMI (non-ST elevated myocardial infarction) ([facility])     Chronic CHF (congestive heart failure) (HCC)     Acute inferolateral myocardial infarction (HCC)     Unstable angina (HCC)     Chronic diastolic congestive heart failure ([address]      Plan of Treatment:   1. Chest pain. S/p cath 1/13/2022 with non-obstructive CAD.   Discussed in detail with patient post cath POC including but not limited to medications, diet, exercise, right femoral artery site care, and

## 2022-01-14 NOTE — PROGRESS NOTES
[address]   INPATIENT OCCUPATIONAL THERAPY  PROGRESS NOTE  Date: 2022  Patient Name: Hubert Howe      Room: 2400/6785-45  MRN: [id number]    : 1935  (80 y.o.) Gender: male     Discharge Recommendations: The patient's needs are being met with no further Occupational Therapy recommended at discharge. Equipment Needed:  (TBD)    Referring Practitioner: Sb Uriarte MD  Diagnosis: Chest pain  General  Chart Reviewed: Yes  Patient assessed for rehabilitation services?: Yes  Additional Pertinent Hx: 80year old male who was admitted to the hospital for the management of  NSTEMI. Patient was recently discharged on  for a similar presentation. PMH significant for CHF, CAD, Afib, and sick sinus syndrome with AICD implanted, DVT, multiple abdominal surgeries, and severe osteoarthritis of the cervical spine. Family / Caregiver Present: No  Referring Practitioner: Sb Uriarte MD  Diagnosis: Chest pain    Restrictions  Restrictions/Precautions: General Precautions,Cardiac,Fall Risk  Implants present? : Pacemaker,Metal implants  Required Braces or Orthoses?: Yes (Back brace, R elastic knee brace PRN)      Subjective  Subjective: pt states that he lives with his son. Comments: pt alert and motivated  Patient Currently in Pain: Denies  Overall Orientation Status: Within Functional Limits          Objective  Cognition  Overall Cognitive Status: WFL  Bed mobility  Rolling to Right: Stand by assistance  Supine to Sit: Stand by assistance  Sit to Supine: Stand by assistance  Scooting: Stand by assistance  Balance  Sitting Balance: Supervision  Standing Balance: Stand by assistance  Standing Balance  Time: 5-6 minutes  Activity:  functional mobility  Comment: with straight cane for UE support.    Functional Mobility  Functional - Mobility Device: Cane  Activity:  (Throughout hallway)  Assist Level: Stand by assistance  Functional Mobility Comments: slightly unsteady gait, increased SOB with activity. Pt requested a standing rest break. ADL  Feeding: Modified independent   Grooming: Modified independent   UE Bathing: Stand by assistance  LE Bathing: Stand by assistance  UE Dressing: Stand by assistance  LE Dressing: Stand by assistance (donns socks sitting EOB)  Toileting: Modified independent   Additional Comments: ADL scores based on skilled observation and clinical reasoning, unless otherwise noted. Assistance required due to increased SOB and decreased endurance     Transfers  Sit to stand: Stand by assistance  Stand to sit: Stand by assistance  Transfer Comments: straight Cane in R hand                             Assessment  Performance deficits / Impairments: Decreased functional mobility ; Decreased ADL status; Decreased strength;Decreased endurance;Decreased balance;Decreased high-level IADLs  Prognosis: Good  Discharge Recommendations: Home with assist PRN  Activity Tolerance: Patient Tolerated treatment well;Patient limited by pain  Safety Devices in place: Yes  Type of devices: All fall risk precautions in place;Call light within reach;Gait belt;Patient at risk for falls; Left in bed;Nurse notified  Equipment Recommendations  Equipment Needed:  (TBD)          Patient Education:  Patient Education: OT POC, HOME SAFETY/FALL PREVENTION, pursed lip breathing tech for SOB mgt  Learner:patient  Method: demonstration, explanation and handout       Outcome: acknowledged understanding and demonstrated understanding     Plan  Safety Devices  Safety Devices in place: Yes  Type of devices:  All fall risk precautions in place,Call light within reach,Gait belt,Patient at risk for falls,Left in bed,Nurse notified  Plan  Times per week: 3-4  Current Treatment Recommendations: Strengthening,ROM,Balance Training,Functional Mobility Training,Endurance Training      Goals  Short term goals  Time Frame for Short term goals: By discharge  Short term goal 1: Patient will perform BADLs with mod I and good safety  Short term goal 2: Patient will tolerate standing 10+ minutes, with no LOB, while engaged in self care/functional activity of choice  Short term goal 3: Patient will V/D at least three EC/WS techniques that are applicable to his daily routine  Short term goal 4: Patient will V/D at least three fall prevention strategies to promote safety with daily routine  Short term goal 5: Patient will actively participate in 15+ minutes of therapeutic exercise/functional activity to promote safety and independence with self care and mobility  Short term goal 6: Patient will perform transfers/functional mobility with mod I and good safety    OT Individual Minutes  Time In: 1458  Time Out: [address] [facility]  Minutes: 27      Electronically signed by FAYE Romo on 1/14/22 at 4:02 PM EST

## 2022-01-14 NOTE — PROGRESS NOTES
Physician Progress Note      B[place]ineda  CSN #:                  [id number]  :                       1935  ADMIT DATE:       2022 11:37 AM  DISCH DATE:  RESPONDING  PROVIDER #:        ZABRINA ARIZMENDI          QUERY TEXT:    Patient admitted with NSTEMI. Noted documentation of Acute Kidney Injury in   IM progress note on   In order to support the diagnosis of VARSHA, please   include additional clinical indicators in your documentation. Or please   document if the diagnosis of VARSHA has been ruled out after further study    The medical record reflects the following:  Risk Factors: 86yo, Cardiac cath, IV bumex x1  Clinical Indicators: Creat 1.2-->1.22-->1.16; per IM progress note--> small   creatinine bump, possibly secondary to bumex, appears creat baseline is around   0.88; Treatment: IV bumex switched to PO, BMPx3. I/O q8h, monitoring,    Defined by Kidney Disease Improving Global Outcomes (KDIGO) clinical practice   guideline for acute kidney injury:  -Increase in SCr by greater than or equal to 0.3 mg/dl within 48 hours; or  -Increase or decrease in SCr to greater than or equal to 1.5 times baseline,   which is known or presumed to have occurred within the prior 7 days; or  -Urine volume < 0.5ml/kg/h for 6 hours  Options provided:  -- Acute kidney injury evidenced by, Please document evidence as well as   baseline creatinine, if known.   -- Acute kidney injury ruled out after study  -- Other - I will add my own diagnosis  -- Disagree - Not applicable / Not valid  -- Disagree - Clinically unable to determine / Unknown  -- Refer to Clinical Documentation Reviewer    PROVIDER RESPONSE TEXT:    This patient has an acute kidney injury as evidenced by Increased creatinine   >20% above baseline of 0.88    Query created by: Myranda Alvarez on 2022 9:16 AM      Electronically signed by:  Zander ARIZMENDI 2022 10:31 AM

## 2022-01-14 NOTE — PROGRESS NOTES
Physical Therapy  [address] [facility]    Physical Therapy Progress Note    Date: 22  Patient Name: Ema Zuleta       Room: /2099-01  MRN: [id number]   Account: [de-identified]   : 1935  (80 y.o.)   Gender: male     Discharge Recommendations   Home with assist PRN  Equipment Needed: No    Referring Practitioner: (P) Anne Melgar MD  Diagnosis: (P) Chest pain  Restrictions/Precautions: Cardiac  Implants present? : Pacemaker   Past Medical History:  has a past medical history of Atrial fibrillation ([address] CAD (coronary artery disease), CHF (congestive heart failure) ([address] Hyperlipidemia, and Hypertension. Past Surgical History:   has a past surgical history that includes pacemaker placement and Cardiac catheterization. Overall Orientation Status: Within Functional Limits  Restrictions/Precautions  Restrictions/Precautions: Cardiac  Required Braces or Orthoses?: Yes (Back brace, R elastic knee brace PRN)  Implants present? : Pacemaker    Subjective: Patient reports he is planning to DC this PM.  Comments: Patient very pleasant and motivated to get up out of bed. Vital Signs  Patient Currently in Pain: Denies                   Bed Mobility:   Rolling: Supervision  Supine to Sit: Supervision  Sit to Supine: Supervision  Scooting: Supervision      Transfers:  Sit to Stand: Stand by assistance  Stand to sit: Stand by assistance  Bed to Chair: Stand by assistance              Ambulation 1  Surface: level tile  Device: Single point cane  Assistance: Stand by assistance  Quality of Gait: Poor posture but steady pace. No LOB. Gait Deviations: Slow Madeline  Distance: 300 ft  Comments: Patient required seated break post amb.         Stairs/Curb  Stairs?: No                        Sitting - Static: Good  Sitting - Dynamic: Good  Standing - Static: Fair;+ (SBA)  Standing - Dynamic: Fair (CGA)                 Activity Tolerance: Patient limited by endurance  PT Equipment Recommendations  Equipment Needed: No       Assessment  Activity Tolerance: Patient limited by endurance   Body structures, Functions, Activity limitations: Decreased functional mobility ; Decreased endurance  Assessment: Impaired mobility due to decreased tolerance to activity  Discharge Recommendations: Home with assist PRN     Type of devices: Call light within reach; Patient at risk for falls; Left in bed;Gait belt; All fall risk precautions in place     Plan  Times per week: 6-7x/wk  Current Treatment Recommendations: Functional Mobility Training,Gait Training,Endurance Training,Safety Education & Training        Goals  Short term goals  Time Frame for Short term goals: 4-5 days  Short term goal 1: mod-I bed mobility  Short term goal 2: mod-I transfers  Short term goal 3: mod-I gait with cane x 100-150ft without rest break  Short term goal 4: negotiate 1-2 steps with SBA  Short term goal 5: pt able to tolerate 15-20min of therapeutic activity/exercise with min/no SOB    PT Individual Minutes  Time In: 1458  Time Out: [address]  Minutes: 27    Electronically signed by Michael Callejas PTA on 1/14/22 at 3:53 PM EST

## 2022-01-14 NOTE — PROGRESS NOTES
Physician Progress Note      Anurag Chambers  CSN #:                  [id number]  :                       1935  ADMIT DATE:       2022 11:37 AM  DISCH DATE:  RESPONDING  PROVIDER #:        ZABRINA ARIZMENDI          QUERY TEXT:    Pt admitted with documented NSTEMI. Pt noted to have non-obstructive CAD on   cardiac cath. Pt also noted to have runs of A. Fib with RVR per pacemaker   interrogation. If possible, please document in progress notes and discharge   summary if you are evaluating and/or treating any of the following: The medical record reflects the following:  Risk Factors: 85yo Hx CAD HTN CHF afib, has pacer/AICD  Clinical Indicators: per ED notes--> pt presents for complaint of   palpitations, chest pain, and feeling like he is going to pass out, given   Nitro by squad which improved his chest pain, still with some fullness   sensation with intermittent sharp pain, pacemaker interrogated as well,   discussed with pacemaker rep who said pt has had some runs of A. Fib with RVR;   cardiac cath report--> Non-obstructive CAD; echo--> EF > 55%, no wall motion   abnormalities; EKG from --> atrial fib with frequent V-paced complexes ;   troponin 44-->51  Treatment: IV heparin, cardiac cath, cardio consult, PRN Nitro, continuous   telemetry, echo, EKG, troponin levels, pacer interrogation  Options provided:  -- Chest pain due to atrial fib  -- Chest pain due to CAD with angina, NSTEMI ruled out  -- Chest pain due to, Please document cause. -- Other - I will add my own diagnosis  -- Disagree - Not applicable / Not valid  -- Disagree - Clinically unable to determine / Unknown  -- Refer to Clinical Documentation Reviewer    PROVIDER RESPONSE TEXT:    This patient has chest pain due to CAD with angina, NSTEMI ruled out after   study.     Query created by: Praveena Perales on 2022 2:03 PM      Electronically signed by:  Rebekah Hu 2022 2:52 PM

## 2022-01-14 NOTE — CARE COORDINATION
Writer notified, Jon Fletcher, from [facility], that pt. Will DC today. He will process all information & will follow. Instructed to call writer w/ any questions.

## 2022-01-14 NOTE — PLAN OF CARE
Problem: Pain:  Goal: Pain level will decrease  Description: Pain level will decrease  Outcome: Ongoing   Pain will be controlled during this shift  Problem: Falls - Risk of:  Goal: Will remain free from falls  Description: Will remain free from falls  Outcome: Ongoing   There will be no falls during this shift  Problem: SKIN INTEGRITY  Goal: Skin integrity is maintained or improved  Outcome: Ongoing   Skin will remain intact during this shift

## 2022-01-14 NOTE — DISCHARGE INSTR - DIET
Good nutrition is important when healing from an illness, injury, or surgery. Follow any nutrition recommendations given to you during your hospital stay. If you were given an oral nutrition supplement while in the hospital, continue to take this supplement at home. You can take it with meals, in-between meals, and/or before bedtime. These supplements can be purchased at most local grocery stores, pharmacies, and chain [email or web address]. If you have any questions about your diet or nutrition, call the hospital and ask for the dietitian.       CARDIAC LOW SODIUM DIET

## 2022-01-14 NOTE — PLAN OF CARE
Problem: Pain:  Goal: Pain level will decrease  Description: Pain level will decrease  1/14/2022 0247 by Mimi Long RN  Outcome: Ongoing     No pain reported this shift. Problem: Falls - Risk of:  Goal: Will remain free from falls  Description: Will remain free from falls  1/14/2022 0247 by Mimi Long RN  Outcome: Ongoing     No falls noted this shift. Bed kept in low position. Safe environment maintained. Bedside table & call light in reach. Uses call light appropriately when needing assistance.         Problem: SKIN INTEGRITY  Goal: Skin integrity is maintained or improved  1/14/2022 0247 by Mimi Long RN  Outcome: Ongoing

## 2022-02-01 ENCOUNTER — HOSPITAL ENCOUNTER (OUTPATIENT)
Age: 87
Setting detail: SPECIMEN
Discharge: HOME | End: 2022-02-01

## 2022-02-01 LAB
ANION GAP SERPL CALCULATED.3IONS-SCNC: 19 MMOL/L (ref 9–17)
BUN BLDV-MCNC: 30 MG/DL (ref 8–23)
BUN/CREAT BLD: ABNORMAL (ref 9–20)
CALCIUM SERPL-MCNC: 9.5 MG/DL (ref 8.6–10.4)
CHLORIDE BLD-SCNC: 106 MMOL/L (ref 98–107)
CO2: 21 MMOL/L (ref 20–31)
CREAT SERPL-MCNC: 1.23 MG/DL (ref 0.7–1.2)
GFR AFRICAN AMERICAN: >60 ML/MIN
GFR NON-AFRICAN AMERICAN: 56 ML/MIN
GFR SERPL CREATININE-BSD FRML MDRD: ABNORMAL ML/MIN/{1.73_M2}
GFR SERPL CREATININE-BSD FRML MDRD: ABNORMAL ML/MIN/{1.73_M2}
GLUCOSE BLD-MCNC: 94 MG/DL (ref 70–99)
MAGNESIUM: 2.2 MG/DL (ref 1.6–2.6)
POTASSIUM SERPL-SCNC: 4.2 MMOL/L (ref 3.7–5.3)
SODIUM BLD-SCNC: 146 MMOL/L (ref 135–144)

## 2022-02-02 ENCOUNTER — VIRTUAL VISIT (OUTPATIENT)
Dept: INTERNAL MEDICINE CLINIC | Age: 87
End: 2022-02-02
Payer: MEDICARE

## 2022-02-02 DIAGNOSIS — I48.91 ATRIAL FIBRILLATION, UNSPECIFIED TYPE (HCC): Primary | ICD-10-CM

## 2022-02-02 DIAGNOSIS — D50.9 IRON DEFICIENCY ANEMIA, UNSPECIFIED IRON DEFICIENCY ANEMIA TYPE: ICD-10-CM

## 2022-02-02 DIAGNOSIS — I10 PRIMARY HYPERTENSION: ICD-10-CM

## 2022-02-02 PROCEDURE — 99213 OFFICE O/P EST LOW 20 MIN: CPT | Performed by: INTERNAL MEDICINE

## 2022-02-02 PROCEDURE — 1111F DSCHRG MED/CURRENT MED MERGE: CPT | Performed by: INTERNAL MEDICINE

## 2022-02-02 PROCEDURE — G8427 DOCREV CUR MEDS BY ELIG CLIN: HCPCS | Performed by: INTERNAL MEDICINE

## 2022-02-02 PROCEDURE — 1123F ACP DISCUSS/DSCN MKR DOCD: CPT | Performed by: INTERNAL MEDICINE

## 2022-02-02 PROCEDURE — 4040F PNEUMOC VAC/ADMIN/RCVD: CPT | Performed by: INTERNAL MEDICINE

## 2022-02-02 ASSESSMENT — ENCOUNTER SYMPTOMS
ABDOMINAL DISTENTION: 0
DIARRHEA: 0
CONSTIPATION: 0
APNEA: 0
FACIAL SWELLING: 0
CHEST TIGHTNESS: 0
ABDOMINAL PAIN: 1
WHEEZING: 0
SHORTNESS OF BREATH: 0
BACK PAIN: 0
COLOR CHANGE: 0
COUGH: 0

## 2022-02-02 NOTE — PROGRESS NOTES
Subjective:      Patient ID: Sumaya Massey is a 80 y.o. male. HPI  Patient is here for evaluation of multiple medical problems. He has hypertension, atrial fibrillation, coronary artery disease  Was recently admitted in [facility] with chest pain  Had cardiac cath, which was negative  Patient seen nurse petitioner with cardiologist recently, had lab work done just yesterday  He follows with oncologist for vitamin B12 deficiency anemia  No new complaints, chest pain has improved  He told me that occasionally he noticed pain in left side of his abdominal, when he bent to tie shoes, pain improved after change in position, going for long time , no nausea, vomiting, loose stools, blood in stools  He told me he had multiple surgeries in the past  Review of Systems   Constitutional: Negative for activity change, appetite change, chills and diaphoresis. HENT: Negative for congestion, dental problem, ear discharge, facial swelling and hearing loss. Respiratory: Negative for apnea, cough, chest tightness, shortness of breath and wheezing. Cardiovascular: Negative for chest pain and leg swelling. Gastrointestinal: Positive for abdominal pain (left lower abdomen ). Negative for abdominal distention, constipation and diarrhea. Genitourinary: Negative for difficulty urinating, dysuria, enuresis, flank pain and frequency. Musculoskeletal: Negative for arthralgias, back pain, gait problem and joint swelling. Skin: Negative for color change, pallor and rash. Neurological: Negative for dizziness, seizures, facial asymmetry, light-headedness, numbness and headaches. Psychiatric/Behavioral: Negative for agitation, behavioral problems, confusion, decreased concentration and dysphoric mood. Objective:   Physical Exam  Patient examined via telephone visit  Assessment / Plan:   1. Atrial fibrillation, unspecified type ([address]  Rate Controlled     2. Primary hypertension  Controlled     3.  Iron done   [employer] Visit (AWV)  Never done    Depression Screen  10/26/2022    Potassium monitoring  02/01/2023    Creatinine monitoring  02/01/2023    Flu vaccine  Completed    Pneumococcal 65+ years Vaccine  Completed    COVID-19 Vaccine  Completed    Hepatitis A vaccine  Aged Out    Hepatitis B vaccine  Aged Out    Hib vaccine  Aged Out    Meningococcal (ACWY) vaccine  Aged Out

## 2022-02-03 LAB
EKG ATRIAL RATE: 394 BPM
EKG Q-T INTERVAL: 454 MS
EKG QRS DURATION: 156 MS
EKG QTC CALCULATION (BAZETT): 490 MS
EKG R AXIS: -75 DEGREES
EKG T AXIS: 85 DEGREES
EKG VENTRICULAR RATE: 70 BPM

## 2022-02-04 DIAGNOSIS — D64.9 ANEMIA, UNSPECIFIED TYPE: ICD-10-CM

## 2022-02-07 RX ORDER — MULTIVIT WITH MINERALS/LUTEIN
TABLET ORAL
Qty: 60 TABLET | Refills: 3 | Status: SHIPPED | OUTPATIENT
Start: 2022-02-07

## 2022-02-09 RX ORDER — METOPROLOL SUCCINATE 25 MG/1
25 TABLET, EXTENDED RELEASE ORAL NIGHTLY
Qty: 30 TABLET | Refills: 3 | Status: SHIPPED | OUTPATIENT
Start: 2022-02-09 | End: 2022-03-07 | Stop reason: SDUPTHER

## 2022-02-09 RX ORDER — METOPROLOL SUCCINATE 25 MG/1
50 TABLET, EXTENDED RELEASE ORAL EVERY MORNING
Qty: 60 TABLET | Refills: 2 | Status: SHIPPED | OUTPATIENT
Start: 2022-02-09 | End: 2022-05-26 | Stop reason: SDUPTHER

## 2022-02-09 NOTE — TELEPHONE ENCOUNTER
----- Message from Suzan Hernandez sent at 2/8/2022  4:52 PM EST -----  Subject: Refill Request    QUESTIONS  Name of Medication? metoprolol succinate (TOPROL XL) 25 MG extended   release tablet  Patient-reported dosage and instructions? 25 mg 2 in the morning 1 at   night   How many days do you have left? 0  [facility]erred [facility]? [id number]  Pharmacy phone number (if available)? [phone]  ---------------------------------------------------------------------------  --------------  CALL BACK INFO  What is the best way for the office to contact you? OK to leave message on   voicemail  Preferred Call Back Phone Number?  [phone]

## 2022-02-10 NOTE — TELEPHONE ENCOUNTER
Patient called and we confirmed that he is only taking one 25 mg tablet daily. He said that he has always gone by the bottles so he is not sure how it got sent the way it did.

## 2022-02-23 ENCOUNTER — HOSPITAL ENCOUNTER (OUTPATIENT)
Age: 87
Setting detail: SPECIMEN
Discharge: HOME | End: 2022-02-23

## 2022-02-23 LAB
ANION GAP SERPL CALCULATED.3IONS-SCNC: 15 MMOL/L (ref 9–17)
BUN BLDV-MCNC: 28 MG/DL (ref 8–23)
CALCIUM SERPL-MCNC: 9.2 MG/DL (ref 8.6–10.4)
CHLORIDE BLD-SCNC: 105 MMOL/L (ref 98–107)
CO2: 25 MMOL/L (ref 20–31)
CREAT SERPL-MCNC: 0.96 MG/DL (ref 0.7–1.2)
GFR AFRICAN AMERICAN: >60 ML/MIN
GFR NON-AFRICAN AMERICAN: >60 ML/MIN
GFR SERPL CREATININE-BSD FRML MDRD: ABNORMAL ML/MIN/{1.73_M2}
GLUCOSE BLD-MCNC: 110 MG/DL (ref 70–99)
POTASSIUM SERPL-SCNC: 4.6 MMOL/L (ref 3.7–5.3)
SODIUM BLD-SCNC: 145 MMOL/L (ref 135–144)

## 2022-03-07 ENCOUNTER — APPOINTMENT (OUTPATIENT)
Dept: GENERAL RADIOLOGY | Age: 87
End: 2022-03-07
Payer: MEDICARE

## 2022-03-07 ENCOUNTER — HOSPITAL ENCOUNTER (OUTPATIENT)
Age: 87
Setting detail: OBSERVATION
Discharge: HOME | End: 2022-03-08
Attending: STUDENT IN AN ORGANIZED HEALTH CARE EDUCATION/TRAINING PROGRAM | Admitting: INTERNAL MEDICINE
Payer: MEDICARE

## 2022-03-07 DIAGNOSIS — R07.9 CHEST PAIN, UNSPECIFIED TYPE: Primary | ICD-10-CM

## 2022-03-07 LAB
ABSOLUTE EOS #: 0.2 K/UL (ref 0–0.4)
ABSOLUTE LYMPH #: 1 K/UL (ref 1–4.8)
ABSOLUTE MONO #: 0.4 K/UL (ref 0.1–1.3)
ALBUMIN SERPL-MCNC: 4.1 G/DL (ref 3.5–5.2)
ALP BLD-CCNC: 139 U/L (ref 40–129)
ALT SERPL-CCNC: 25 U/L (ref 5–41)
ANION GAP SERPL CALCULATED.3IONS-SCNC: 10 MMOL/L (ref 9–17)
AST SERPL-CCNC: 27 U/L
BACTERIA: NORMAL
BASOPHILS # BLD: 1 % (ref 0–2)
BASOPHILS ABSOLUTE: 0 K/UL (ref 0–0.2)
BILIRUB SERPL-MCNC: 0.29 MG/DL (ref 0.3–1.2)
BILIRUBIN URINE: NEGATIVE
BUN BLDV-MCNC: 27 MG/DL (ref 8–23)
CALCIUM SERPL-MCNC: 8.9 MG/DL (ref 8.6–10.4)
CASTS UA: NORMAL /LPF
CHLORIDE BLD-SCNC: 104 MMOL/L (ref 98–107)
CO2: 25 MMOL/L (ref 20–31)
COLOR: YELLOW
CREAT SERPL-MCNC: 1.08 MG/DL (ref 0.7–1.2)
EOSINOPHILS RELATIVE PERCENT: 5 % (ref 0–4)
EPITHELIAL CELLS UA: NORMAL /HPF
GFR AFRICAN AMERICAN: >60 ML/MIN
GFR NON-AFRICAN AMERICAN: >60 ML/MIN
GFR SERPL CREATININE-BSD FRML MDRD: ABNORMAL ML/MIN/{1.73_M2}
GLUCOSE BLD-MCNC: 99 MG/DL (ref 70–99)
GLUCOSE URINE: NEGATIVE
HCT VFR BLD CALC: 29.9 % (ref 41–53)
HEMOGLOBIN: 10.2 G/DL (ref 13.5–17.5)
INR BLD: 1.4
KETONES, URINE: NEGATIVE
LEUKOCYTE ESTERASE, URINE: NEGATIVE
LYMPHOCYTES # BLD: 23 % (ref 24–44)
MCH RBC QN AUTO: 31 PG (ref 26–34)
MCHC RBC AUTO-ENTMCNC: 34.2 G/DL (ref 31–37)
MCV RBC AUTO: 90.5 FL (ref 80–100)
MONOCYTES # BLD: 9 % (ref 1–7)
NITRITE, URINE: NEGATIVE
PARTIAL THROMBOPLASTIN TIME: 32.8 SEC (ref 24–36)
PDW BLD-RTO: 14 % (ref 11.5–14.9)
PH UA: 6.5 (ref 5–8)
PLATELET # BLD: 253 K/UL (ref 150–450)
PMV BLD AUTO: 7 FL (ref 6–12)
POTASSIUM SERPL-SCNC: 4 MMOL/L (ref 3.7–5.3)
PROTEIN UA: NEGATIVE
PROTHROMBIN TIME: 16.7 SEC (ref 11.8–14.6)
RBC # BLD: 3.31 M/UL (ref 4.5–5.9)
RBC UA: NORMAL /HPF
SEG NEUTROPHILS: 62 % (ref 36–66)
SEGMENTED NEUTROPHILS ABSOLUTE COUNT: 2.7 K/UL (ref 1.3–9.1)
SODIUM BLD-SCNC: 139 MMOL/L (ref 135–144)
SPECIFIC GRAVITY UA: 1.01 (ref 1–1.03)
TOTAL PROTEIN: 7.4 G/DL (ref 6.4–8.3)
TROPONIN, HIGH SENSITIVITY: 35 NG/L (ref 0–22)
TROPONIN, HIGH SENSITIVITY: 35 NG/L (ref 0–22)
TURBIDITY: CLEAR
URINE HGB: NEGATIVE
UROBILINOGEN, URINE: NORMAL
WBC # BLD: 4.3 K/UL (ref 3.5–11)
WBC UA: NORMAL /HPF

## 2022-03-07 PROCEDURE — 81001 URINALYSIS AUTO W/SCOPE: CPT

## 2022-03-07 PROCEDURE — 83880 ASSAY OF NATRIURETIC PEPTIDE: CPT

## 2022-03-07 PROCEDURE — 93005 ELECTROCARDIOGRAM TRACING: CPT | Performed by: STUDENT IN AN ORGANIZED HEALTH CARE EDUCATION/TRAINING PROGRAM

## 2022-03-07 PROCEDURE — 99285 EMERGENCY DEPT VISIT HI MDM: CPT

## 2022-03-07 PROCEDURE — 71045 X-RAY EXAM CHEST 1 VIEW: CPT

## 2022-03-07 PROCEDURE — 99220 PR INITIAL OBSERVATION CARE/DAY 70 MINUTES: CPT | Performed by: INTERNAL MEDICINE

## 2022-03-07 PROCEDURE — 80053 COMPREHEN METABOLIC PANEL: CPT

## 2022-03-07 PROCEDURE — 85730 THROMBOPLASTIN TIME PARTIAL: CPT

## 2022-03-07 PROCEDURE — 85025 COMPLETE CBC W/AUTO DIFF WBC: CPT

## 2022-03-07 PROCEDURE — 85610 PROTHROMBIN TIME: CPT

## 2022-03-07 PROCEDURE — G0378 HOSPITAL OBSERVATION PER HR: HCPCS

## 2022-03-07 PROCEDURE — 6370000000 HC RX 637 (ALT 250 FOR IP): Performed by: STUDENT IN AN ORGANIZED HEALTH CARE EDUCATION/TRAINING PROGRAM

## 2022-03-07 PROCEDURE — 36415 COLL VENOUS BLD VENIPUNCTURE: CPT

## 2022-03-07 PROCEDURE — 84484 ASSAY OF TROPONIN QUANT: CPT

## 2022-03-07 RX ORDER — FINASTERIDE 5 MG/1
5 TABLET, FILM COATED ORAL DAILY
Status: DISCONTINUED | OUTPATIENT
Start: 2022-03-08 | End: 2022-03-08 | Stop reason: HOSPADM

## 2022-03-07 RX ORDER — SODIUM CHLORIDE 9 MG/ML
25 INJECTION, SOLUTION INTRAVENOUS PRN
Status: DISCONTINUED | OUTPATIENT
Start: 2022-03-07 | End: 2022-03-08 | Stop reason: HOSPADM

## 2022-03-07 RX ORDER — BUMETANIDE 0.25 MG/ML
1.5 INJECTION, SOLUTION INTRAMUSCULAR; INTRAVENOUS 2 TIMES DAILY
Status: DISCONTINUED | OUTPATIENT
Start: 2022-03-07 | End: 2022-03-08

## 2022-03-07 RX ORDER — LATANOPROST 50 UG/ML
1 SOLUTION/ DROPS OPHTHALMIC NIGHTLY
Status: DISCONTINUED | OUTPATIENT
Start: 2022-03-07 | End: 2022-03-08 | Stop reason: HOSPADM

## 2022-03-07 RX ORDER — SODIUM CHLORIDE 0.9 % (FLUSH) 0.9 %
5-40 SYRINGE (ML) INJECTION EVERY 12 HOURS SCHEDULED
Status: DISCONTINUED | OUTPATIENT
Start: 2022-03-07 | End: 2022-03-08 | Stop reason: HOSPADM

## 2022-03-07 RX ORDER — ONDANSETRON 2 MG/ML
4 INJECTION INTRAMUSCULAR; INTRAVENOUS EVERY 6 HOURS PRN
Status: DISCONTINUED | OUTPATIENT
Start: 2022-03-07 | End: 2022-03-08 | Stop reason: HOSPADM

## 2022-03-07 RX ORDER — AMLODIPINE BESYLATE 5 MG/1
10 TABLET ORAL DAILY
Status: DISCONTINUED | OUTPATIENT
Start: 2022-03-08 | End: 2022-03-08 | Stop reason: HOSPADM

## 2022-03-07 RX ORDER — SODIUM CHLORIDE 0.9 % (FLUSH) 0.9 %
10 SYRINGE (ML) INJECTION PRN
Status: DISCONTINUED | OUTPATIENT
Start: 2022-03-07 | End: 2022-03-08 | Stop reason: HOSPADM

## 2022-03-07 RX ORDER — LANOLIN ALCOHOL/MO/W.PET/CERES
1000 CREAM (GRAM) TOPICAL DAILY
Status: DISCONTINUED | OUTPATIENT
Start: 2022-03-08 | End: 2022-03-08 | Stop reason: HOSPADM

## 2022-03-07 RX ORDER — METOPROLOL SUCCINATE 100 MG/1
100 TABLET, EXTENDED RELEASE ORAL EVERY MORNING
Status: DISCONTINUED | OUTPATIENT
Start: 2022-03-08 | End: 2022-03-08 | Stop reason: HOSPADM

## 2022-03-07 RX ORDER — ERGOCALCIFEROL 1.25 MG/1
50000 CAPSULE ORAL WEEKLY
Status: DISCONTINUED | OUTPATIENT
Start: 2022-03-08 | End: 2022-03-08 | Stop reason: HOSPADM

## 2022-03-07 RX ORDER — ACETAMINOPHEN 325 MG/1
650 TABLET ORAL EVERY 6 HOURS PRN
Status: DISCONTINUED | OUTPATIENT
Start: 2022-03-07 | End: 2022-03-08 | Stop reason: HOSPADM

## 2022-03-07 RX ORDER — MULTIVIT WITH MINERALS/LUTEIN
250 TABLET ORAL 2 TIMES DAILY
Status: DISCONTINUED | OUTPATIENT
Start: 2022-03-07 | End: 2022-03-08

## 2022-03-07 RX ORDER — ACETAMINOPHEN 650 MG/1
650 SUPPOSITORY RECTAL EVERY 6 HOURS PRN
Status: DISCONTINUED | OUTPATIENT
Start: 2022-03-07 | End: 2022-03-08 | Stop reason: HOSPADM

## 2022-03-07 RX ORDER — FERROUS SULFATE 325(65) MG
325 TABLET ORAL 2 TIMES DAILY
Status: DISCONTINUED | OUTPATIENT
Start: 2022-03-07 | End: 2022-03-08

## 2022-03-07 RX ORDER — NITROGLYCERIN 0.4 MG/1
0.4 TABLET SUBLINGUAL EVERY 5 MIN PRN
Status: DISCONTINUED | OUTPATIENT
Start: 2022-03-07 | End: 2022-03-08 | Stop reason: SDUPTHER

## 2022-03-07 RX ORDER — ONDANSETRON 4 MG/1
4 TABLET, ORALLY DISINTEGRATING ORAL EVERY 8 HOURS PRN
Status: DISCONTINUED | OUTPATIENT
Start: 2022-03-07 | End: 2022-03-08 | Stop reason: HOSPADM

## 2022-03-07 RX ORDER — BUPRENORPHINE AND NALOXONE 8; 2 MG/1; MG/1
1 FILM, SOLUBLE BUCCAL; SUBLINGUAL 2 TIMES DAILY
Status: DISCONTINUED | OUTPATIENT
Start: 2022-03-07 | End: 2022-03-08

## 2022-03-07 RX ORDER — DOCUSATE SODIUM 100 MG/1
100 CAPSULE, LIQUID FILLED ORAL 2 TIMES DAILY PRN
Status: DISCONTINUED | OUTPATIENT
Start: 2022-03-07 | End: 2022-03-08 | Stop reason: HOSPADM

## 2022-03-07 RX ORDER — FAMOTIDINE 20 MG/1
20 TABLET, FILM COATED ORAL DAILY
Status: DISCONTINUED | OUTPATIENT
Start: 2022-03-08 | End: 2022-03-08 | Stop reason: HOSPADM

## 2022-03-07 RX ORDER — NITROGLYCERIN 0.4 MG/1
0.4 TABLET SUBLINGUAL EVERY 5 MIN PRN
Status: DISCONTINUED | OUTPATIENT
Start: 2022-03-07 | End: 2022-03-08 | Stop reason: HOSPADM

## 2022-03-07 RX ADMIN — NITROGLYCERIN 0.4 MG: 0.4 TABLET SUBLINGUAL at 21:48

## 2022-03-07 ASSESSMENT — ENCOUNTER SYMPTOMS
BACK PAIN: 0
FACIAL SWELLING: 0
EYE PAIN: 0
DIARRHEA: 0
WHEEZING: 0
SHORTNESS OF BREATH: 1
SORE THROAT: 0
COLOR CHANGE: 0
ABDOMINAL PAIN: 0
CONSTIPATION: 0
EYE REDNESS: 0
RHINORRHEA: 0
NAUSEA: 0
SHORTNESS OF BREATH: 0
COUGH: 0
VOMITING: 0

## 2022-03-07 NOTE — ED TRIAGE NOTES
Patient to emergency department with complaints of chest pain, sob, left arm pain for 2 hours, along with right thigh swelling. Pt A& O x4, ambulatory in triage. 100% RA.

## 2022-03-07 NOTE — Clinical Note
Discharge Plan[de-identified] Other/[facility])   Telemetry/Cardiac Monitoring Required?: Yes

## 2022-03-08 VITALS
TEMPERATURE: 98 F | RESPIRATION RATE: 16 BRPM | HEART RATE: 70 BPM | DIASTOLIC BLOOD PRESSURE: 85 MMHG | HEIGHT: 73 IN | BODY MASS INDEX: 25.71 KG/M2 | WEIGHT: 194 LBS | OXYGEN SATURATION: 97 % | SYSTOLIC BLOOD PRESSURE: 142 MMHG

## 2022-03-08 LAB — PRO-BNP: 3004 PG/ML

## 2022-03-08 PROCEDURE — G0378 HOSPITAL OBSERVATION PER HR: HCPCS

## 2022-03-08 PROCEDURE — 6370000000 HC RX 637 (ALT 250 FOR IP): Performed by: NURSE PRACTITIONER

## 2022-03-08 PROCEDURE — 2500000003 HC RX 250 WO HCPCS: Performed by: NURSE PRACTITIONER

## 2022-03-08 PROCEDURE — 2580000003 HC RX 258: Performed by: NURSE PRACTITIONER

## 2022-03-08 PROCEDURE — 96374 THER/PROPH/DIAG INJ IV PUSH: CPT

## 2022-03-08 RX ORDER — MULTIVIT WITH MINERALS/LUTEIN
250 TABLET ORAL 2 TIMES DAILY
Status: DISCONTINUED | OUTPATIENT
Start: 2022-03-08 | End: 2022-03-08 | Stop reason: HOSPADM

## 2022-03-08 RX ORDER — BUPRENORPHINE AND NALOXONE 8; 2 MG/1; MG/1
1 FILM, SOLUBLE BUCCAL; SUBLINGUAL 2 TIMES DAILY
Status: DISCONTINUED | OUTPATIENT
Start: 2022-03-08 | End: 2022-03-08 | Stop reason: HOSPADM

## 2022-03-08 RX ORDER — UBIDECARENONE 100 MG
100 CAPSULE ORAL DAILY
Status: DISCONTINUED | OUTPATIENT
Start: 2022-03-08 | End: 2022-03-08 | Stop reason: HOSPADM

## 2022-03-08 RX ORDER — FERROUS SULFATE 325(65) MG
325 TABLET ORAL 2 TIMES DAILY
Status: DISCONTINUED | OUTPATIENT
Start: 2022-03-08 | End: 2022-03-08 | Stop reason: HOSPADM

## 2022-03-08 RX ORDER — BUMETANIDE 0.25 MG/ML
1.5 INJECTION, SOLUTION INTRAMUSCULAR; INTRAVENOUS 2 TIMES DAILY
Status: DISCONTINUED | OUTPATIENT
Start: 2022-03-08 | End: 2022-03-08 | Stop reason: HOSPADM

## 2022-03-08 RX ORDER — BUMETANIDE 2 MG/1
2 TABLET ORAL 2 TIMES DAILY
Qty: 60 TABLET | Refills: 3 | Status: SHIPPED | OUTPATIENT
Start: 2022-03-08 | End: 2022-05-09

## 2022-03-08 RX ORDER — SPIRONOLACTONE 25 MG/1
25 TABLET ORAL DAILY
Qty: 90 TABLET | Refills: 1 | Status: SHIPPED | OUTPATIENT
Start: 2022-03-08 | End: 2022-05-26 | Stop reason: ALTCHOICE

## 2022-03-08 RX ADMIN — SODIUM CHLORIDE, PRESERVATIVE FREE 10 ML: 5 INJECTION INTRAVENOUS at 03:12

## 2022-03-08 RX ADMIN — SODIUM CHLORIDE, PRESERVATIVE FREE 10 ML: 5 INJECTION INTRAVENOUS at 09:00

## 2022-03-08 RX ADMIN — Medication 250 MG: at 05:26

## 2022-03-08 RX ADMIN — BUPRENORPHINE AND NALOXONE 1 FILM: 8; 2 FILM BUCCAL; SUBLINGUAL at 09:00

## 2022-03-08 RX ADMIN — FERROUS SULFATE TAB 325 MG (65 MG ELEMENTAL FE) 325 MG: 325 (65 FE) TAB at 05:26

## 2022-03-08 RX ADMIN — BUMETANIDE 1.5 MG: 0.25 INJECTION INTRAMUSCULAR; INTRAVENOUS at 05:27

## 2022-03-08 ASSESSMENT — PAIN DESCRIPTION - ORIENTATION: ORIENTATION: RIGHT

## 2022-03-08 ASSESSMENT — PAIN - FUNCTIONAL ASSESSMENT: PAIN_FUNCTIONAL_ASSESSMENT: ACTIVITIES ARE NOT PREVENTED

## 2022-03-08 ASSESSMENT — PAIN DESCRIPTION - ONSET: ONSET: SUDDEN

## 2022-03-08 ASSESSMENT — PAIN DESCRIPTION - PROGRESSION: CLINICAL_PROGRESSION: GRADUALLY WORSENING

## 2022-03-08 ASSESSMENT — PAIN DESCRIPTION - PAIN TYPE: TYPE: ACUTE PAIN

## 2022-03-08 ASSESSMENT — PAIN DESCRIPTION - LOCATION: LOCATION: CHEST

## 2022-03-08 ASSESSMENT — PAIN DESCRIPTION - DESCRIPTORS: DESCRIPTORS: SHARP;JABBING

## 2022-03-08 ASSESSMENT — PAIN SCALES - GENERAL: PAINLEVEL_OUTOF10: 5

## 2022-03-08 ASSESSMENT — PAIN DESCRIPTION - FREQUENCY: FREQUENCY: INTERMITTENT

## 2022-03-08 NOTE — CARE COORDINATION
DISCHARGE PLANNING NOTE:      Notified Mar Yates with [facility] that patients plan is to now discharge today.      Electronically signed by Darcie Resendez RN on 3/8/2022 at 3:01 PM

## 2022-03-08 NOTE — ED NOTES
Report given to [facility] LEON ANGUIANO from PCU. Report method by phone   The following was reviewed with receiving RN:   Current vital signs:  /80   Pulse 70   Temp 98.6 °F (37 °C)   Resp 11   Ht 6' 1\" (1.854 m)   Wt 194 lb (88 kg)   SpO2 99%   BMI 25.60 kg/m²                MEWS Score: 1     Any medication or safety alerts were reviewed. Any pending diagnostics and notifications were also reviewed, as well as any safety concerns or issues, abnormal labs, abnormal imaging, and abnormal assessment findings. Questions were answered.             [facility]  03/08/22 [id number]

## 2022-03-08 NOTE — DISCHARGE SUMMARY
[place] [address] Internal Medicine    Discharge Summary     Patient ID: Linda Powell  :  1935   MRN: [id number]     ACCOUNT:  [de-identified]   Patient's PCP: Frank George MD  Admit Date: 3/7/2022   Discharge Date: 3/8/22  Length of Stay: 0  Code Status:  Full Code  Admitting Physician: Freedom Espinoza MD  Discharge Physician: Freedom Espinoza MD     Active Discharge Diagnoses:     Primary Problem  Chest pain      [place] Problems    Diagnosis Date Noted    Fluid overload [E87.70] 2021    On continuous oral anticoagulation [Z79.01] 2021    Atrial fibrillation ([address] [I48.91] 2021    Chest pain [R07.9] 2021       Admission Condition:  fair     Discharged Condition: fair    Hospital Stay:     Hospital Course:  Linda Powell is a 80 y.o. male who was admitted for the management of Chest pain , presented to ER with Chest Pain and Leg Swelling    80year-old male admitted for leg swelling secondary to acute exacerbation of chronic diastolic heart failure and cardiac sounding chest pain   1. Leg swelling, no hypoxia, elevated proBNP -CHF exacerbation, responding to IV Lasix  2. Chest pain-unstable angina, recent cardiac cath  nonobstructive CAD-troponins flat, EKG nonischemic, first-degree AV block  3. Known A. fib-rate controlled, continue Xarelto    Cardiology review was obtained. Agreed with iv diuresis and transition to PO bumex  Ok to take nitro as needed.    Okay to discharge from cardiac standpoint    Significant therapeutic interventions: As above    Significant Diagnostic Studies:   Labs / Micro:    Lab Results   Component Value Date    WBC 4.3 2022    HGB 10.2 (L) 2022    HCT 29.9 (L) 2022    MCV 90.5 2022     2022          Lab Results   Component Value Date     2022    K 4.0 2022     2022    CO2 25 2022    BUN 27 2022    CREATININE 1.08 03/07/2022    GLUCOSE 99 03/07/2022    CALCIUM 8.9 03/07/2022          Radiology:    XR CHEST PORTABLE    Result Date: 3/7/2022  EXAMINATION: ONE XRAY VIEW OF THE CHEST 3/7/2022 8:14 pm COMPARISON: 01/12/2022 HISTORY: ORDERING SYSTEM PROVIDED HISTORY: chest pain TECHNOLOGIST PROVIDED HISTORY: chest pain Reason for Exam: chest pain, sob FINDINGS: Cardiomediastinal silhouette is unchanged in size. Left subclavian cardiac pacing device is unchanged. Aortic atherosclerosis. No large pleural effusion. Lucency in the left lung apex is only seen on 1 AP view, likely due to a skin fold. Linear scarring/atelectasis in the right lung base is again noted. No consolidation in the left lung. No acute osseous abnormality. Linear scarring/atelectasis in the right lung base is again noted. No new cardiopulmonary abnormality. Consultations:    Consults:     Final Specialist Recommendations/Findings:   IP CONSULT TO CARDIOLOGY      The patient was seen and examined on day of discharge and this discharge summary is in conjunction with any daily progress note from day of discharge. Discharge plan:     Disposition: home      Instructions to Patient: Keep follow-up appointments      Requiring Further Evaluation/Follow Up POST HOSPITALIZATION/Incidental Findings:     Physician Follow Up:     No follow-up provider specified. Activity: Resume as directed    Discharge Medications:      Medication List      CHANGE how you take these medications    bumetanide 2 MG tablet  Commonly known as: BUMEX  Take 1 tablet by mouth 2 times daily  What changed:   · how much to take  · when to take this     metoprolol succinate 25 MG extended release tablet  Commonly known as: TOPROL XL  Take 2 tablets by mouth every morning  What changed: how much to take     vitamin D 1.25 MG (02636 UT) Caps capsule  Commonly known as: ERGOCALCIFEROL  TAKE 1 CAPSULE BY MOUTH EVERY WEEK  What changed: See the new instructions. CONTINUE taking these medications    amLODIPine 5 MG tablet  Commonly known as: NORVASC  Take 2 tablets by mouth daily     Coenzyme Q10 100 MG Chew  Take 1 tablet by mouth daily     cyanocobalamin 1000 MCG tablet  Commonly known as: [employer] VITAMIN B12  Take 1 tablet by mouth daily     docusate sodium 100 MG capsule  Commonly known as: COLACE     famotidine 20 MG tablet  Commonly known as: PEPCID  Take 1 tablet by mouth daily     ferrous sulfate 325 (65 Fe) MG tablet  Commonly known as: IRON 325  Take 1 tablet by mouth 2 times daily     finasteride 5 MG tablet  Commonly known as: PROSCAR  Take 1 tablet by mouth daily     latanoprost 0.005 % ophthalmic solution  Commonly known as: XALATAN     magnesium oxide 400 MG tablet  Commonly known as: MAG-OX     nitroGLYCERIN 0.4 MG SL tablet  Commonly known as: NITROSTAT  up to max of 3 total doses. If no relief after 1 dose, call 911. rivaroxaban 15 MG Tabs tablet  Commonly known as: Xarelto  Take 1 tablet by mouth daily (with breakfast)     spironolactone 25 MG tablet  Commonly known as: ALDACTONE  Take 1 tablet by mouth daily     Suboxone 8-2 MG Film SL film  Generic drug: buprenorphine-naloxone     vitamin C 250 MG tablet  TAKE 1 TABLET BY MOUTH TWICE DAILY(TAKE WITH IRON)           Where to Get Your Medications      These medications were sent to [address] [address]    Phone: [phone]   · bumetanide 2 MG tablet  · spironolactone 25 MG tablet         Time Spent on discharge is  35 mins in patient examination, evaluation, counseling as well as medication reconciliation, prescriptions for required medications, discharge plan and follow up. Electronically signed by   Wong Castro MD  3/8/2022  1:52 PM      Thank you Dr. Suze Avilez MD for the opportunity to be involved in this patient's care.

## 2022-03-08 NOTE — PROGRESS NOTES
Discharge teaching and instructions for diagnosis/procedure of Chest pain completed with patient using teachback method. AVS reviewed. Printed prescriptions given to patient. Patient voiced understanding regarding prescriptions, follow up appointments, and care of self at home.  Discharged in a wheelchair to  home with support per family

## 2022-03-08 NOTE — ED NOTES
Patient resting comfortably with no complaints at this time. Pt denies chest pain after 1 nitroglycerin SL administration.       [facility]  03/07/22 [id number]

## 2022-03-08 NOTE — H&P
[address] [facility]     HISTORY AND PHYSICAL EXAMINATION            Date:   3/8/2022  Patientname:  Gely Hinds  Date of admission:  3/7/2022  7:30 PM  MRN:   [id number]  Account:  [de-identified]  YOB: 1935  PCP:    Flaco Marrero MD  Room:   09/09  Code Status:    Full Code    CHIEF COMPLAINT     Chief Complaint   Patient presents with    Chest Pain    Leg Swelling       HISTORY OF PRESENT ILLNESS  (Character, Onset, Location, Duration,  Exacerbating/RelievingFactors, Radiation,   Associated Symptoms, Severity )      The patient is a 80 y.o. -American male, with a history of MI, A. Fib-chronically anticoagulated, chronic CHF, hyperlipidemia, HTN, pacemaker, and unstable angina, who presents with chest pain and leg swelling. According to patient, he had a sudden onset of stabbing pain in the left side of his chest while sitting on the edge of the bed talking to  on the telephone. Symptoms are associated with shortness of breath and leg swelling. Denies fever, chills, cough, abdominal pain, nausea, vomiting, diarrhea, and urinary symptoms. Symptoms are aggravated by stress. Patient reports that pain resolved after receiving a dose of NTG in ED. States that he did not think to try the NTG at home when symptoms started. Symptoms are reported as constant and moderate, but now resolved. PAST MEDICAL HISTORY   Patient  has a past medical history of Atrial fibrillation ([address] CAD (coronary artery disease), CHF (congestive heart failure) ([address] Hyperlipidemia, and Hypertension. PAST SURGICAL HISTORY    Patient  has a past surgical history that includes pacemaker placement and Cardiac catheterization. FAMILY HISTORY    Patient family history is not on file. SOCIAL HISTORY    Patient  reports that he has never smoked. He has never used smokeless tobacco. He reports that he does not drink alcohol and does not use drugs.      HOME MEDICATIONS Prior to Admission medications    Medication Sig Start Date End Date Taking? Authorizing Provider   metoprolol succinate (TOPROL XL) 25 MG extended release tablet Take 2 tablets by mouth every morning  Patient taking differently: Take 100 mg by mouth every morning  2/9/22  Yes Contreras Ridley MD   Ascorbic Acid (VITAMIN C) 250 MG tablet TAKE 1 TABLET BY MOUTH TWICE DAILY(TAKE WITH IRON) 2/7/22  Yes Vladimir Duran MD   bumetanide (BUMEX) 2 MG tablet Take 1 tablet by mouth 2 times daily  Patient taking differently: Take 3 mg by mouth daily  1/8/22  Yes Jenifer Lu MD   docusate sodium (COLACE) 100 MG capsule Take 100 mg by mouth 2 times daily as needed for Constipation   Yes Historical Provider, MD   finasteride (PROSCAR) 5 MG tablet Take 1 tablet by mouth daily 1/6/22  Yes Contreras Ridley MD   rivaroxaban (XARELTO) 15 MG TABS tablet Take 1 tablet by mouth daily (with breakfast) 1/6/22  Yes Contreras Ridley MD   vitamin D (ERGOCALCIFEROL) 1.25 MG (06325 UT) CAPS capsule TAKE 1 CAPSULE BY MOUTH EVERY WEEK  Patient taking differently: once a week tuesdays 12/17/21  Yes Vladimir Duran MD   amLODIPine (NORVASC) 5 MG tablet Take 2 tablets by mouth daily 11/10/21  Yes Contreras Ridley MD   cyanocobalamin ([employer] VITAMIN B12) 1000 MCG tablet Take 1 tablet by mouth daily 11/3/21  Yes Contreras Ridley MD   Coenzyme Q10 100 MG CHEW Take 1 tablet by mouth daily 10/26/21  Yes Contreras Ridley MD   famotidine (PEPCID) 20 MG tablet Take 1 tablet by mouth daily 10/26/21  Yes Contreras Ridley MD   ferrous sulfate (IRON 325) 325 (65 Fe) MG tablet Take 1 tablet by mouth 2 times daily 8/20/21  Yes Vladimir Duran MD   buprenorphine-naloxone (SUBOXONE) 8-2 MG FILM SL film Place 1 Film under the tongue 2 times daily.  Indications: pain    Yes Historical Provider, MD   magnesium oxide (MAG-OX) 400 MG tablet Take 400 mg by mouth daily   Yes Historical Provider, MD   latanoprost (XALATAN) 0.005 % ophthalmic solution Place 1 drop into both eyes nightly Yes Historical Provider, MD   nitroGLYCERIN (NITROSTAT) 0.4 MG SL tablet up to max of 3 total doses. If no relief after 1 dose, call 911. 1/8/22   Lebron Vallecillo MD       ALLERGIES      Aspirin, Cyclobenzaprine, Ibuprofen, Azithromycin, and Hydrocodone-acetaminophen    REVIEW OF SYSTEMS     Review of Systems   Constitutional: Negative for chills, diaphoresis and fever. HENT: Negative for congestion and sore throat. Respiratory: Positive for shortness of breath. Negative for cough and wheezing. Cardiovascular: Positive for chest pain and leg swelling. Negative for palpitations. Gastrointestinal: Negative for abdominal pain, constipation, diarrhea, nausea and vomiting. Genitourinary: Negative for dysuria, frequency and urgency. Musculoskeletal: Negative for back pain and myalgias. Skin: Negative for rash. Neurological: Negative for dizziness, weakness and headaches. Psychiatric/Behavioral: The patient is not nervous/anxious. PHYSICAL EXAM      /80   Pulse 70   Temp 98.6 °F (37 °C)   Resp 11   Ht 6' 1\" (1.854 m)   Wt 194 lb (88 kg)   SpO2 99%   BMI 25.60 kg/m²  Body mass index is 25.6 kg/m². Physical Exam  Constitutional:       General: He is not in acute distress. Appearance: He is well-developed. He is not diaphoretic. HENT:      Head: Normocephalic and atraumatic. Eyes:      Conjunctiva/sclera: Conjunctivae normal.      Pupils: Pupils are equal, round, and reactive to light. Neck:      Trachea: No tracheal deviation. Cardiovascular:      Rate and Rhythm: Normal rate and regular rhythm. Heart sounds: Normal heart sounds. No murmur heard. No friction rub. No gallop. Pulmonary:      Effort: Pulmonary effort is normal. No respiratory distress. Breath sounds: Normal breath sounds. No wheezing or rales. Chest:      Chest wall: Tenderness (left lateral near axilla) present. Abdominal:      General: Bowel sounds are normal. There is no distension. Palpations: Abdomen is soft. Tenderness: There is no abdominal tenderness. There is no guarding. Musculoskeletal:         General: No tenderness. Normal range of motion. Cervical back: Normal range of motion and neck supple. Right lower leg: Edema present. Left lower leg: Edema present. Comments: 2+ edema BLE    Lymphadenopathy:      Cervical: No cervical adenopathy. Skin:     General: Skin is warm and dry. Coloration: Skin is not pale. Findings: No erythema or rash. Neurological:      Mental Status: He is alert and oriented to person, place, and time. Motor: No seizure activity. Coordination: Coordination normal.   Psychiatric:         Behavior: Behavior normal.         Thought Content: Thought content normal.       DIAGNOSTICS      EKG: (as documented in ED note):    Ventricularly paced rhythm rate of 70 no change from prior     Labs:  CBC:   Recent Labs     03/07/22 2034   WBC 4.3   HGB 10.2*        BMP:    Recent Labs     03/07/22 2034      K 4.0      CO2 25   BUN 27*   CREATININE 1.08   GLUCOSE 99     S. Calcium:  Recent Labs     03/07/22 2034   CALCIUM 8.9     S. Ionized Calcium:No results for input(s): IONCA in the last 72 hours. S. Magnesium:No results for input(s): MG in the last 72 hours. S. Phosphorus:No results for input(s): PHOS in the last 72 hours. S. Glucose:No results for input(s): POCGLU in the last 72 hours. Glycosylated hemoglobin A1C: No results found for: LABA1C  Hepatic:   Recent Labs     03/07/22 2034   AST 27   ALT 25   ALKPHOS 139*     CARDIAC ENZY:   Recent Labs     03/07/22 2034 03/07/22 2232   TROPHS 35* 35*     INR:   Recent Labs     03/07/22 2122   INR 1.4     BNP:   Recent Labs     03/07/22 2034   PROBNP 3,004*      ABGs: No results for input(s): PH, PCO2, PO2, HCO3, O2SAT in the last 72 hours. Lipids: No results for input(s): CHOL, TRIG, HDL, LDL, LDLCALC in the last 72 hours.   Pancreatic functions:No results for input(s): LIPASE, AMYLASE in the last 72 hours. Madelin Lease: No results for input(s): LACTA in the last 72 hours. Thyroid functions:   Lab Results   Component Value Date    TSH 1.30 01/12/2022      U/A:  Recent Labs     03/07/22 2038   COLORU Yellow   WBCUA 0 TO 2   RBCUA 0 TO 2   BACTERIA None   SPECGRAV 1.011   LEUKOCYTESUR NEGATIVE   GLUCOSEU NEGATIVE     No results for input(s): COVID19 in the last 72 hours. Imaging/Diagonstics:     XR CHEST PORTABLE    Result Date: 3/7/2022  EXAMINATION: ONE XRAY VIEW OF THE CHEST 3/7/2022 8:14 pm COMPARISON: 01/12/2022 HISTORY: ORDERING SYSTEM PROVIDED HISTORY: chest pain TECHNOLOGIST PROVIDED HISTORY: chest pain Reason for Exam: chest pain, sob FINDINGS: Cardiomediastinal silhouette is unchanged in size. Left subclavian cardiac pacing device is unchanged. Aortic atherosclerosis. No large pleural effusion. Lucency in the left lung apex is only seen on 1 AP view, likely due to a skin fold. Linear scarring/atelectasis in the right lung base is again noted. No consolidation in the left lung. No acute osseous abnormality. Linear scarring/atelectasis in the right lung base is again noted. No new cardiopulmonary abnormality. ASSESSMENT  and  PLAN     Principal Problem:    Chest pain  Active Problems:    Atrial fibrillation (HCC)    On continuous oral anticoagulation    Fluid overload  Resolved Problems:    * No resolved hospital problems.  *    Plan:    Chest Pain  -Troponin  35 x 2  -EKG - ventricular paced rhythm  -Pro-BNP - 3,004  -Check magnesium, & HgbA1c in am  --had recent TSH and lipid panel - WNL  -CXR - linear scarring/atelectasis - rt lung base  --No new abnormality  -2D echocardiogram completed on 1/13/22  -Pain currently resolved  -EKG PRN chest pain  -Cardiac cath completed 1/13/2022  --Non-obstructive CAD  -Cardiology consulted in ED  --Will see in am    Fluid Overload  -2+ BLE edema  -Bumex dose recent;y decreased from 4 mg  to 3 mg daily  --Higher dose \"Dried up kidneys\"  -Change bumex to IV for now  -Monitor Urine output  -Low sodium diet    Chronic Anticoagulation  -Patient anticoagulated d/t atrial fibirlation  -Continue home dose Xarelto  -monitor for signs of bleeding      Consultations:     [address] DARCI Fitzgerald - CNP   3/8/2022  5:56 AM    [address]  [place], [address] [place] [address]. Phone [phone]     Attending Physician Statement  I have discussed the care of Tawanda Meneses with the CNP. I have examined the patient myself and taken ros and hpi , including pertinent history and exam findings. I have reviewed the key elements of all parts of the encounter with the CNPt. I agree with the assessment, plan and orders as documented by the CNP. 60-year-old male admitted for leg swelling secondary to acute exacerbation of chronic diastolic heart failure and cardiac sounding chest pain   1. Leg swelling, no hypoxia, elevated proBNP -CHF exacerbation, responding to IV Lasix  2. Chest pain-unstable angina, recent cardiac cath January 22 nonobstructive CAD-troponins flat, EKG nonischemic, first-degree AV block  3. Known A. fib-rate controlled, continue Xarelto    Body mass index is 25.6 kg/m².       DVT prophylaxis-Xarelto    Electronically signed by Dawn Mcdonnell MD

## 2022-03-08 NOTE — ED NOTES
Pt resting comfortably with no complaints at this time.       [facility]  03/08/22 7670

## 2022-03-08 NOTE — ED PROVIDER NOTES
EMERGENCY DEPARTMENT ENCOUNTER    Pt Name: Ozzie Louise  MRN: [id number]  [place] 1935  Date of evaluation: 3/7/22  CHIEF COMPLAINT       Chief Complaint   Patient presents with    Chest Pain    Leg Swelling     HISTORY OF PRESENT ILLNESS   HPI  49-year-old male history of A. fib, CAD, hypertension, hyperlipidemia presents for evaluation of leg swelling and chest pain. Patient deals with a symptoms intermittently chronically but they worsened over the past several hours. Describes left-sided substernal stabbing pain. Nonradiating. Moderate and constant. Worse with exertion. No shortness of breath. No nausea vomiting or cough or fever. Having associated bilateral leg swelling and some cramping type sensation. no other associated symptoms. REVIEW OF SYSTEMS     Review of Systems   Constitutional: Negative for chills and fatigue. HENT: Negative for facial swelling, nosebleeds, rhinorrhea and sore throat. Eyes: Negative for pain and redness. Respiratory: Negative for cough and shortness of breath. Cardiovascular: Positive for chest pain and leg swelling. Gastrointestinal: Negative for abdominal pain, diarrhea, nausea and vomiting. Genitourinary: Negative for flank pain and hematuria. Musculoskeletal: Negative for arthralgias and back pain. Skin: Negative for color change and rash. Neurological: Negative for dizziness, tremors, facial asymmetry, speech difficulty, weakness and numbness.      PASTMEDICAL HISTORY     Past Medical History:   Diagnosis Date    Atrial fibrillation ([facility] [address]     CAD (coronary artery disease)     CHF (congestive heart failure) (HCC)     Hyperlipidemia     Hypertension      Past Problem List  Patient Active Problem List   Diagnosis Code    Atrial fibrillation (HCC) I48.91    On continuous oral anticoagulation Z79.01    CHF, acute on chronic (HCC) I50.9    Hyperlipidemia E78.5    Former smoker Z87.891    Pacemaker Z95.0    History of DVT of lower extremity Z86.718    Enlarged prostate N40.0    Cataract of both eyes H26.9    GERD (gastroesophageal reflux disease) K21.9    History of inguinal hernia repair Z98.890, Z87.19    Hypertension I10    Pneumonia J18.9    History of right hip replacement Z96.641    History of back surgery Z98.890    Low back pain M54.50    Chest pain R07.9    Fluid overload E87.70    VARSHA (acute kidney injury) ([facility]) N17.9    NSTEMI (non-ST elevated myocardial infarction) ([facility]) I21.4    Chronic CHF (congestive heart failure) ([facility]) I50.9    Acute inferolateral myocardial infarction ([facility]) I21.19    Unstable angina ([facility]) I20.0    Chronic diastolic congestive heart failure ([facility]) I50.32     SURGICAL HISTORY       Past Surgical History:   Procedure Laterality Date    CARDIAC CATHETERIZATION      PACEMAKER PLACEMENT       CURRENT MEDICATIONS       Previous Medications    AMLODIPINE (NORVASC) 5 MG TABLET    Take 2 tablets by mouth daily    ASCORBIC ACID (VITAMIN C) 250 MG TABLET    TAKE 1 TABLET BY MOUTH TWICE DAILY(TAKE WITH IRON)    BUMETANIDE (BUMEX) 2 MG TABLET    Take 1 tablet by mouth 2 times daily    BUPRENORPHINE-NALOXONE (SUBOXONE) 8-2 MG FILM SL FILM    Place 1 Film under the tongue 2 times daily.  Indications: pain     COENZYME Q10 100 MG CHEW    Take 1 tablet by mouth daily    CYANOCOBALAMIN ([employer] VITAMIN B12) 1000 MCG TABLET    Take 1 tablet by mouth daily    DOCUSATE SODIUM (COLACE) 100 MG CAPSULE    Take 100 mg by mouth 2 times daily as needed for Constipation    FAMOTIDINE (PEPCID) 20 MG TABLET    Take 1 tablet by mouth daily    FERROUS SULFATE (IRON 325) 325 (65 FE) MG TABLET    Take 1 tablet by mouth 2 times daily    FINASTERIDE (PROSCAR) 5 MG TABLET    Take 1 tablet by mouth daily    HYDRALAZINE (APRESOLINE) 25 MG TABLET    Take 25 mg by mouth 2 times daily    LATANOPROST (XALATAN) 0.005 % OPHTHALMIC SOLUTION    Place 1 drop into both eyes nightly    MAGNESIUM OXIDE (MAG-OX) 400 MG TABLET    Take 400 mg by mouth daily    METOPROLOL SUCCINATE (TOPROL XL) 25 MG EXTENDED RELEASE TABLET    Take 2 tablets by mouth every morning    METOPROLOL SUCCINATE (TOPROL XL) 25 MG EXTENDED RELEASE TABLET    Take 1 tablet by mouth nightly    NITROGLYCERIN (NITROSTAT) 0.4 MG SL TABLET    up to max of 3 total doses. If no relief after 1 dose, call 911. RIVAROXABAN (XARELTO) 15 MG TABS TABLET    Take 1 tablet by mouth daily (with breakfast)    SPIRONOLACTONE (ALDACTONE) 25 MG TABLET    TAKE 1 TABLET BY MOUTH DAILY    VITAMIN D (ERGOCALCIFEROL) 1.25 MG (18131 UT) CAPS CAPSULE    TAKE 1 CAPSULE BY MOUTH EVERY WEEK     ALLERGIES     is allergic to aspirin, cyclobenzaprine, ibuprofen, azithromycin, and hydrocodone-acetaminophen. FAMILY HISTORY     has no family status information on file. SOCIAL HISTORY       Social History     Tobacco Use    Smoking status: Never Smoker    Smokeless tobacco: Never Used   Substance Use Topics    Alcohol use: Never    Drug use: Never     PHYSICAL EXAM     INITIAL VITALS: /80   Pulse 75   Temp 98.6 °F (37 °C)   Resp 16   Ht 6' 1\" (1.854 m)   Wt 194 lb (88 kg)   SpO2 100%   BMI 25.60 kg/m²    Physical Exam  Vitals and nursing note reviewed. Constitutional:       Appearance: Normal appearance. HENT:      Head: Normocephalic and atraumatic. Nose: Nose normal.   Eyes:      Extraocular Movements: Extraocular movements intact. Pupils: Pupils are equal, round, and reactive to light. Cardiovascular:      Rate and Rhythm: Normal rate and regular rhythm. Pulmonary:      Effort: Pulmonary effort is normal. No respiratory distress. Breath sounds: Normal breath sounds. Abdominal:      General: Abdomen is flat. There is no distension. Palpations: Abdomen is soft. There is no mass. Musculoskeletal:         General: No swelling. Normal range of motion. Cervical back: Normal range of motion. No rigidity.       Comments: 2+ symmetric bilateral lower extremity edema Skin:     General: Skin is warm and dry. Neurological:      General: No focal deficit present. Mental Status: He is alert. Mental status is at baseline. Cranial Nerves: No cranial nerve deficit. MEDICAL DECISION MAKIN-year-old male presents for evaluation of chest pain and bilateral lower extremity swelling. Will evaluate for heart failure, renal failure, pneumonia, pneumothorax, pleural effusion, arrhythmia, symptomatic anemia. Initial work-up is reassuring. Discussed with cardiology and patient. Patient does not feel comfortable going home. We will bring in for observation and evaluation by cardiology in the morning. CRITICAL CARE:       PROCEDURES:    Procedures    DIAGNOSTIC RESULTS   EKG:All EKG's are interpreted by the Emergency Department Physician who either signs or Co-signs this chart in the absence of a cardiologist.    Ventricularly paced rhythm rate of 70 no change from prior    RADIOLOGY:All plain film, CT, MRI, and formal ultrasound images (except ED bedside ultrasound) are read by the radiologist, see reports below, unless otherwisenoted in MDM or here. XR CHEST PORTABLE   Final Result   Linear scarring/atelectasis in the right lung base is again noted. No new   cardiopulmonary abnormality. LABS: All lab results were reviewed by myself, and all abnormals are listed below.   Labs Reviewed   CBC WITH AUTO DIFFERENTIAL - Abnormal; Notable for the following components:       Result Value    RBC 3.31 (*)     Hemoglobin 10.2 (*)     Hematocrit 29.9 (*)     Lymphocytes 23 (*)     Monocytes 9 (*)     Eosinophils % 5 (*)     All other components within normal limits   COMPREHENSIVE METABOLIC PANEL W/ REFLEX TO MG FOR LOW K - Abnormal; Notable for the following components:    BUN 27 (*)     Alkaline Phosphatase 139 (*)     Total Bilirubin 0.29 (*)     All other components within normal limits   TROPONIN - Abnormal; Notable for the following components:

## 2022-03-08 NOTE — CONSULTS
[facility]   Consult Note         Today's Date: 3/8/2022  Patient Name: Rolly Shay  Date of admission: 3/7/2022  7:30 PM  Patient's age: 80 y. o., 1935  Admission Dx: Chest pain [R07.9]  Chest pain, unspecified type [R07.9]    Reason for Consult:  Cardiac evaluation    Requesting Physician: Marlon Fuller MD    REASON FOR CONSULT:  CP and congestion. , leg edema    History Obtained From:  Patient, chart, staff, records    HISTORY OF PRESENT ILLNESS:      The patient is a 80 y.o. male who is admitted to the hospital for chest pressure and leg edema. Was voluem overloaded. Resolvede with iv diuretics. Says he feels much batter and is back at baseline. Past Medical History:   has a past medical history of Atrial fibrillation ([address] CAD (coronary artery disease), CHF (congestive heart failure) ([facility] [address] Hyperlipidemia, and Hypertension. Past Surgical History:   has a past surgical history that includes pacemaker placement and Cardiac catheterization. Home Medications:    Prior to Admission medications    Medication Sig Start Date End Date Taking?  Authorizing Provider   bumetanide (BUMEX) 2 MG tablet Take 1 tablet by mouth 2 times daily 3/8/22  Yes Marlon Fuller MD   spironolactone (ALDACTONE) 25 MG tablet Take 1 tablet by mouth daily 3/8/22  Yes Marlon Fuller MD   metoprolol succinate (TOPROL XL) 25 MG extended release tablet Take 2 tablets by mouth every morning  Patient taking differently: Take 100 mg by mouth every morning  2/9/22  Yes Hailey Cox MD   Ascorbic Acid (VITAMIN C) 250 MG tablet TAKE 1 TABLET BY MOUTH TWICE DAILY(TAKE WITH IRON) 2/7/22  Yes Ashly Hester MD   docusate sodium (COLACE) 100 MG capsule Take 100 mg by mouth 2 times daily as needed for Constipation   Yes Historical Provider, MD   finasteride (PROSCAR) 5 MG tablet Take 1 tablet by mouth daily 1/6/22  Yes Hailey Cox MD   rivaroxaban (XARELTO) 15 MG TABS tablet Take 1 tablet by mouth daily (with breakfast) 1/6/22  Yes Spike Maki MD   vitamin D (ERGOCALCIFEROL) 1.25 MG (30392 UT) CAPS capsule TAKE 1 CAPSULE BY MOUTH EVERY WEEK  Patient taking differently: once a week tuesdays 12/17/21  Yes Jenny Munguia MD   amLODIPine (NORVASC) 5 MG tablet Take 2 tablets by mouth daily 11/10/21  Yes Spike Maki MD   cyanocobalamin ([employer] VITAMIN B12) 1000 MCG tablet Take 1 tablet by mouth daily 11/3/21  Yes Spike Maki MD   Coenzyme Q10 100 MG CHEW Take 1 tablet by mouth daily 10/26/21  Yes Spike Maki MD   famotidine (PEPCID) 20 MG tablet Take 1 tablet by mouth daily 10/26/21  Yes Spike Maki MD   ferrous sulfate (IRON 325) 325 (65 Fe) MG tablet Take 1 tablet by mouth 2 times daily 8/20/21  Yes Jenny Munguia MD   buprenorphine-naloxone (SUBOXONE) 8-2 MG FILM SL film Place 1 Film under the tongue 2 times daily. Indications: pain    Yes Historical Provider, MD   magnesium oxide (MAG-OX) 400 MG tablet Take 400 mg by mouth daily   Yes Historical Provider, MD   latanoprost (XALATAN) 0.005 % ophthalmic solution Place 1 drop into both eyes nightly   Yes Historical Provider, MD   nitroGLYCERIN (NITROSTAT) 0.4 MG SL tablet up to max of 3 total doses.  If no relief after 1 dose, call 911. 1/8/22   Bc Orr MD       buprenorphine-naloxone, 1 Film, SubLINGual, BID    bumetanide, 1.5 mg, IntraVENous, BID    ferrous sulfate, 325 mg, Oral, BID    vitamin C, 250 mg, Oral, BID    coenzyme Q10, 100 mg, Oral, Daily    amLODIPine, 10 mg, Oral, Daily    cyanocobalamin, 1,000 mcg, Oral, Daily    famotidine, 20 mg, Oral, Daily    finasteride, 5 mg, Oral, Daily    latanoprost, 1 drop, Both Eyes, Nightly    magnesium oxide, 400 mg, Oral, Daily    metoprolol succinate, 100 mg, Oral, QAM    rivaroxaban, 15 mg, Oral, Daily with breakfast    vitamin D, 50,000 Units, Oral, Weekly    sodium chloride flush, 5-40 mL, IntraVENous, 2 times per day      Allergies:  Aspirin, Cyclobenzaprine, Ibuprofen, Azithromycin, and Hydrocodone-acetaminophen    Social History:   reports that he has never smoked. He has never used smokeless tobacco. He reports that he does not drink alcohol and does not use drugs. Family History: family history is not on file. No h/o sudden cardiac death. REVIEW OF SYSTEMS:    · Constitutional: there has been no unanticipated weight loss. There's been No change in energy level, No change in activity level. · Eyes: No visual changes or diplopia. No scleral icterus. · ENT: No Headaches, hearing loss or vertigo. No mouth sores or sore throat. · Cardiovascular: per HPI  · Respiratory: per HPI  · Gastrointestinal: No abdominal pain, appetite loss, blood in stools. No change in bowel or bladder habits. · Genitourinary: No dysuria, trouble voiding, or hematuria. · Musculoskeletal:  No gait disturbance, No weakness or joint complaints. · Integumentary: No rash or pruritis. · Neurological: No headache, diplopia, change in muscle strength, numbness or tingling. No change in gait, balance, coordination, mood, affect, memory, mentation, behavior. · Psychiatric: No anxiety, or depression. · Endocrine: No temperature intolerance. No excessive thirst, fluid intake, or urination. No tremor. · Hematologic/Lymphatic: No abnormal bruising or bleeding, blood clots or swollen lymph nodes. · Allergic/Immunologic: No nasal congestion or hives. PHYSICAL EXAM:      BP (!) 142/85   Pulse 70   Temp 97.8 °F (36.6 °C) (Oral)   Resp 14   Ht 6' 1\" (1.854 m)   Wt 194 lb (88 kg)   SpO2 98%   BMI 25.60 kg/m²    Constitutional and General Appearance: alert, cooperative, no distress and appears stated age  HEENT: PERRL, no cervical lymphadenopathy. No masses palpable. Normal oral mucosa  Respiratory:  · Normal excursion and expansion without use of accessory muscles  · Resp Auscultation: Good respiratory effort. No for increased work of breathing.  On auscultation: clear to auscultation bilaterally  Cardiovascular:  · The apical impulse is not displaced  · Heart tones are crisp and normal. regular S1 and S2.  · Jugular venous pulsation Normal  · The carotid upstroke is normal in amplitude and contour without delay or bruit  · Peripheral pulses are symmetrical and full   Abdomen:   · No masses or tenderness  · Bowel sounds present  Extremities:  ·  No Cyanosis or Clubbing  ·  Lower extremity edema: No  ·  Skin: Warm and dry  Neurological:  · Alert and oriented. · Moves all extremities well  · No abnormalities of mood, affect, memory, mentation, or behavior are noted        EKG:    Date: 03/08/22  Reading: No acute ischemia      LAST ECHO:  Date:  Findings Summary:      LAST Stress Test:   Date of last ST:  Major Findings:    LAST Cardiac Angiography:.  Date:  Findings:      Labs:     CBC:   Recent Labs     03/07/22 2034   WBC 4.3   HGB 10.2*   HCT 29.9*        BMP:   Recent Labs     03/07/22 2034      K 4.0   CO2 25   BUN 27*   CREATININE 1.08   LABGLOM >60   GLUCOSE 99     BNP: No results for input(s): BNP in the last 72 hours. PT/INR:   Recent Labs     03/07/22 2122   PROTIME 16.7*   INR 1.4     APTT:  Recent Labs     03/07/22 2122   APTT 32.8     CARDIAC ENZYMES:No results for input(s): CKTOTAL, CKMB, CKMBINDEX, TROPONINI in the last 72 hours.   FASTING LIPID PANEL:  Lab Results   Component Value Date    HDL 57 11/02/2021    TRIG 70 11/02/2021     LIVER PROFILE:  Recent Labs     03/07/22 2034   AST 27   ALT 25   LABALBU 4.1     Troponins: Invalid input(s): TROPONIN     Other Current Problems  Patient Active Problem List   Diagnosis    Atrial fibrillation ([address]    On continuous oral anticoagulation    CHF, acute on chronic ([address]    Hyperlipidemia    Former smoker    Pacemaker    History of DVT of lower extremity    Enlarged prostate    Cataract of both eyes    GERD (gastroesophageal reflux disease)    History of inguinal hernia repair    Hypertension    Pneumonia  History of right hip replacement    History of back surgery    Low back pain    Chest pain    Fluid overload    VARSHA (acute kidney injury) ([address]    [facility]STEMI [facility]non-ST elevated [facility] infarction) ([facility]    Chronic CHF (congestive heart failure) (HCC)    Acute inferolateral myocardial infarction (HCC)    Unstable angina (HCC)    Chronic diastolic congestive heart failure ([address]   ECHO 1/13/2022  Summary  Contrast was utilized on this study. Small LV cavity. Moderately increased LV wall thickness. Normal left ventricular ejection fraction >55%  No obvious segmental wall motion abnormalities seen. LA and RA appears dilated  Normal right ventricular size and function. Pacemaker / ICD lead seen in  right ventricle. Aortic valve is trileaflet. No aortic stenosis. Mild aortic insufficiency. Normal aortic root dimension. Normal mitral valve structure and function. Mild mitral regurgitation. Normal tricuspid valve structure and function. Mild tricuspid regurgitation. Estimated right ventricular systolic pressure is 24 mmHg. Normal right  ventricular systolic pressure. IVC normal diameter & inspiratory collapse indicating normal RA filling  pressure . No significant pericardial effusion is seen.     TTE 5/25/21  Summary  Left ventricle is normal in size and wall thickness. Global left ventricular systolic function is normal. Estimated LV EF 60-65%. Grade I (mild) left ventricular diastolic dysfunction. Both atria are mildly dilated. Right ventricle is mildly enlarged with normal RV systolic function. Prominent moderator band. Mild mitral regurgitation. Mild tricuspid regurgitation. Estimated right ventricular systolic pressure is 50NBVT. Cath 1/13/22  LMCA: Normal 0% stenosis. LAD: Diffuse irregualrtities 40-50%. LCx: Diffuse irregularities 30-40%. RCA: Diffuse irregularities 30-40%      IMPRESSION & Recommendations:      Chest pain and congestion from volume overload.  Resolved with iv diuretics. Says home dose bumex 3mg works well for him. Ok to take nitro as needed  Recent cath from January reviewed  Afib- chronic  SSS- has ICD  Ok to DC home from cardiac standpoint and follow up in 1-2 weeks. Discussed with patient, family, and Nurse. Electronically signed by Chely Singleton DO on 3/8/2022 at [address] [place] [address], [address] [place] [address] [facility]  [facility] [employer]  [phone]

## 2022-03-08 NOTE — ED NOTES
Ambulated pt to restroom. Pt tolerated well.       Jaspreet [facility]  03/08/22 6926

## 2022-03-08 NOTE — DISCHARGE INSTR - COC
Continuity of Care Form    Patient Name: Jeffery Simeon   :  1935  MRN:  [id number]    Admit date:  3/7/2022  Discharge date:  ***    Code Status Order: Full Code   Advance Directives:      Admitting Physician:  Mikal Liao MD  PCP: Stephanie Brooke MD    Discharging Nurse: [facility] Unit/Room#: 2103/2103-01  Discharging Unit Phone Number: ***    Emergency Contact:   Extended Emergency Contact Information  Primary Emergency Contact: Franc Bailey, [address]a Rincon Phone: [phone]  Mobile Phone: [phone]  Relation: Child    Past Surgical History:  Past Surgical History:   Procedure Laterality Date    CARDIAC CATHETERIZATION      PACEMAKER PLACEMENT         Immunization History:   Immunization History   Administered Date(s) Administered    COVID-19, Pfizer Purple top, DILUTE for use, 12+ yrs, 30mcg/0.3mL dose 2021, 2021, 2021    Influenza Virus Vaccine 10/30/2011, 10/09/2015, 2016    Influenza, High Dose (Fluzone 65 yrs and older) 10/20/2018, 10/01/2019    Influenza, High-dose, Quadv, 72 yrs +, IM (Fluzone) 2020, 10/13/2021    Pneumococcal Conjugate 13-valent (Michaeleen Presser) 10/30/2019    Pneumococcal Polysaccharide (Intvcxzgz13) 10/30/2011, 2020       Active Problems:  Patient Active Problem List   Diagnosis Code    Atrial fibrillation ([facility] [address] I48.91    On continuous oral anticoagulation Z79.01    CHF, acute on chronic ([address] I50.9    Hyperlipidemia E78.5    Former smoker Z87.891    Pacemaker Z95.0    History of DVT of lower extremity Z86.718    Enlarged prostate N40.0    Cataract of both eyes H26.9    GERD (gastroesophageal reflux disease) K21.9    History of inguinal hernia repair Z98.890, Z87.19    Hypertension I10    Pneumonia J18.9    History of right hip replacement Z96.641    History of back surgery Z98.890    Low back pain M54.50    Chest pain R07.9    Fluid overload E87.70    VARSHA (acute kidney injury) ([facility] [address] N17.9    NSTEMI (non-ST elevated myocardial infarction) ([facility] [address] I21.4    Chronic CHF (congestive heart failure) ([facility]) I50.9    Acute inferolateral myocardial infarction (HCC) I21.19    Unstable angina ([facility]) I20.0    Chronic diastolic congestive heart failure (HCC) I50.32       Isolation/Infection:   Isolation            No Isolation          Patient Infection Status       Infection Onset Added Last Indicated Last Indicated By Review Planned Expiration Resolved Resolved By    None active    Resolved    COVID-19 (Rule Out) 21 COVID-19, Rapid (Ordered)   21 Rule-Out Test Resulted    COVID-19 (Rule Out) 21 COVID-19, Rapid (Ordered)   21 Rule-Out Test Resulted            Nurse Assessment:  Last Vital Signs: BP (!) 142/85   Pulse 70   Temp 97.8 °F (36.6 °C) (Oral)   Resp 14   Ht 6' 1\" (1.854 m)   Wt 194 lb (88 kg)   SpO2 98%   BMI 25.60 kg/m²     Last documented pain score (0-10 scale):    Last Weight:   Wt Readings from Last 1 Encounters:   22 194 lb (88 kg)     Mental Status:  {IP PT MENTAL STATUS:}    IV Access:  { CARLOS IV ACCESS:[id number]}    Nursing Mobility/ADLs:  Walking   {P DME DAK}  Transfer  {[facility] DME GFYF:920771469}  Bathing  {[facility] DME XARV:479832935}  Dressing  {[facility] DME SQRU:170727086}  Toileting  {[facility] DME PHHD:224004221}  Feeding  {[facility] DME MASU:695744297}  Med Admin  {[facility] DME RQTL:455548999}  Med Delivery   { CARLOS MED Delivery:886960871}    Wound Care Documentation and Therapy:        Elimination:  Continence:    Bowel: {YES / ZD:68962}  Bladder: {YES / JN:48461}  Urinary Catheter: {Urinary Catheter:606433753}   Colostomy/Ileostomy/Ileal Conduit: {YES / JT:07522}       Date of Last BM: ***    Intake/Output Summary (Last 24 hours) at 3/8/2022 1355  Last data filed at 3/8/2022 1049  Gross per 24 hour   Intake --   Output 1500 ml   Net -1500 ml     I/O last 3 completed shifts:  In: -   Out: 1000 [Urine:1000]    Safety Concerns:     [address] Changes in QXKCS:[id number]}    PHYSICIAN SIGNATURE:  Electronically signed by Jen Childers MD on 3/8/22 at 1:55 PM EST

## 2022-03-10 DIAGNOSIS — I50.9 ACUTE ON CHRONIC CONGESTIVE HEART FAILURE, UNSPECIFIED HEART FAILURE TYPE (HCC): Primary | ICD-10-CM

## 2022-03-10 DIAGNOSIS — I20.0 UNSTABLE ANGINA (HCC): ICD-10-CM

## 2022-03-10 LAB
EKG ATRIAL RATE: 70 BPM
EKG P AXIS: 74 DEGREES
EKG P-R INTERVAL: 306 MS
EKG Q-T INTERVAL: 444 MS
EKG QRS DURATION: 142 MS
EKG QTC CALCULATION (BAZETT): 479 MS
EKG R AXIS: -75 DEGREES
EKG T AXIS: 86 DEGREES
EKG VENTRICULAR RATE: 70 BPM

## 2022-03-10 PROCEDURE — 93010 ELECTROCARDIOGRAM REPORT: CPT | Performed by: INTERNAL MEDICINE

## 2022-03-11 RX ORDER — RIVAROXABAN 15 MG/1
TABLET, FILM COATED ORAL
Qty: 30 TABLET | Refills: 1 | Status: SHIPPED | OUTPATIENT
Start: 2022-03-11 | End: 2022-05-16

## 2022-03-12 ENCOUNTER — APPOINTMENT (OUTPATIENT)
Dept: GENERAL RADIOLOGY | Age: 87
DRG: 389 | End: 2022-03-12
Payer: MEDICARE

## 2022-03-12 ENCOUNTER — APPOINTMENT (OUTPATIENT)
Dept: CT IMAGING | Age: 87
DRG: 389 | End: 2022-03-12
Payer: MEDICARE

## 2022-03-12 ENCOUNTER — TELEPHONE (OUTPATIENT)
Dept: OTHER | Facility: CLINIC | Age: 87
End: 2022-03-12

## 2022-03-12 ENCOUNTER — HOSPITAL ENCOUNTER (INPATIENT)
Age: 87
LOS: 3 days | Discharge: HOME HEALTH | DRG: 389 | End: 2022-03-15
Attending: EMERGENCY MEDICINE | Admitting: INTERNAL MEDICINE
Payer: MEDICARE

## 2022-03-12 DIAGNOSIS — K56.609 SMALL BOWEL OBSTRUCTION (HCC): Primary | ICD-10-CM

## 2022-03-12 LAB
ABSOLUTE EOS #: 0 K/UL (ref 0–0.4)
ABSOLUTE LYMPH #: 0.4 K/UL (ref 1–4.8)
ABSOLUTE MONO #: 0.4 K/UL (ref 0.1–1.3)
ALBUMIN SERPL-MCNC: 4 G/DL (ref 3.5–5.2)
ALP BLD-CCNC: 149 U/L (ref 40–129)
ALT SERPL-CCNC: 23 U/L (ref 5–41)
ANION GAP SERPL CALCULATED.3IONS-SCNC: 12 MMOL/L (ref 9–17)
AST SERPL-CCNC: 23 U/L
BACTERIA: ABNORMAL
BASOPHILS # BLD: 0 % (ref 0–2)
BASOPHILS ABSOLUTE: 0 K/UL (ref 0–0.2)
BILIRUB SERPL-MCNC: 0.58 MG/DL (ref 0.3–1.2)
BILIRUBIN URINE: NEGATIVE
BUN BLDV-MCNC: 15 MG/DL (ref 8–23)
CALCIUM SERPL-MCNC: 9.3 MG/DL (ref 8.6–10.4)
CASTS UA: ABNORMAL /LPF
CHLORIDE BLD-SCNC: 99 MMOL/L (ref 98–107)
CO2: 23 MMOL/L (ref 20–31)
COLOR: YELLOW
CREAT SERPL-MCNC: 1 MG/DL (ref 0.7–1.2)
EOSINOPHILS RELATIVE PERCENT: 0 % (ref 0–4)
EPITHELIAL CELLS UA: ABNORMAL /HPF
GFR AFRICAN AMERICAN: >60 ML/MIN
GFR NON-AFRICAN AMERICAN: >60 ML/MIN
GFR SERPL CREATININE-BSD FRML MDRD: ABNORMAL ML/MIN/{1.73_M2}
GLUCOSE BLD-MCNC: 145 MG/DL (ref 70–99)
GLUCOSE URINE: NEGATIVE
HCT VFR BLD CALC: 34.4 % (ref 41–53)
HEMOGLOBIN: 11.4 G/DL (ref 13.5–17.5)
INR BLD: 1.1
KETONES, URINE: NEGATIVE
LEUKOCYTE ESTERASE, URINE: ABNORMAL
LIPASE: 36 U/L (ref 13–60)
LYMPHOCYTES # BLD: 7 % (ref 24–44)
MCH RBC QN AUTO: 29.8 PG (ref 26–34)
MCHC RBC AUTO-ENTMCNC: 33 G/DL (ref 31–37)
MCV RBC AUTO: 90.3 FL (ref 80–100)
MONOCYTES # BLD: 8 % (ref 1–7)
NITRITE, URINE: NEGATIVE
PARTIAL THROMBOPLASTIN TIME: 29.4 SEC (ref 24–36)
PDW BLD-RTO: 13.9 % (ref 11.5–14.9)
PH UA: 7.5 (ref 5–8)
PLATELET # BLD: 257 K/UL (ref 150–450)
PMV BLD AUTO: 7 FL (ref 6–12)
POTASSIUM SERPL-SCNC: 4 MMOL/L (ref 3.7–5.3)
PRO-BNP: 3233 PG/ML
PROTEIN UA: NEGATIVE
PROTHROMBIN TIME: 14.5 SEC (ref 11.8–14.6)
RBC # BLD: 3.81 M/UL (ref 4.5–5.9)
RBC UA: ABNORMAL /HPF
SEG NEUTROPHILS: 85 % (ref 36–66)
SEGMENTED NEUTROPHILS ABSOLUTE COUNT: 4.9 K/UL (ref 1.3–9.1)
SODIUM BLD-SCNC: 134 MMOL/L (ref 135–144)
SPECIFIC GRAVITY UA: 1.01 (ref 1–1.03)
TOTAL PROTEIN: 7.6 G/DL (ref 6.4–8.3)
TROPONIN, HIGH SENSITIVITY: 28 NG/L (ref 0–22)
TURBIDITY: CLEAR
URINE HGB: NEGATIVE
UROBILINOGEN, URINE: NORMAL
WBC # BLD: 5.8 K/UL (ref 3.5–11)
WBC UA: ABNORMAL /HPF

## 2022-03-12 PROCEDURE — 83690 ASSAY OF LIPASE: CPT

## 2022-03-12 PROCEDURE — 2580000003 HC RX 258: Performed by: INTERNAL MEDICINE

## 2022-03-12 PROCEDURE — 6360000002 HC RX W HCPCS: Performed by: INTERNAL MEDICINE

## 2022-03-12 PROCEDURE — 84484 ASSAY OF TROPONIN QUANT: CPT

## 2022-03-12 PROCEDURE — 81001 URINALYSIS AUTO W/SCOPE: CPT

## 2022-03-12 PROCEDURE — 6370000000 HC RX 637 (ALT 250 FOR IP): Performed by: EMERGENCY MEDICINE

## 2022-03-12 PROCEDURE — 1200000000 HC SEMI PRIVATE

## 2022-03-12 PROCEDURE — 99223 1ST HOSP IP/OBS HIGH 75: CPT | Performed by: INTERNAL MEDICINE

## 2022-03-12 PROCEDURE — 80053 COMPREHEN METABOLIC PANEL: CPT

## 2022-03-12 PROCEDURE — 85610 PROTHROMBIN TIME: CPT

## 2022-03-12 PROCEDURE — 74177 CT ABD & PELVIS W/CONTRAST: CPT

## 2022-03-12 PROCEDURE — 96374 THER/PROPH/DIAG INJ IV PUSH: CPT

## 2022-03-12 PROCEDURE — 99285 EMERGENCY DEPT VISIT HI MDM: CPT

## 2022-03-12 PROCEDURE — 6370000000 HC RX 637 (ALT 250 FOR IP)

## 2022-03-12 PROCEDURE — 6360000002 HC RX W HCPCS: Performed by: EMERGENCY MEDICINE

## 2022-03-12 PROCEDURE — 2580000003 HC RX 258: Performed by: EMERGENCY MEDICINE

## 2022-03-12 PROCEDURE — 71045 X-RAY EXAM CHEST 1 VIEW: CPT

## 2022-03-12 PROCEDURE — 85730 THROMBOPLASTIN TIME PARTIAL: CPT

## 2022-03-12 PROCEDURE — 93005 ELECTROCARDIOGRAM TRACING: CPT | Performed by: EMERGENCY MEDICINE

## 2022-03-12 PROCEDURE — 83880 ASSAY OF NATRIURETIC PEPTIDE: CPT

## 2022-03-12 PROCEDURE — 6360000004 HC RX CONTRAST MEDICATION: Performed by: EMERGENCY MEDICINE

## 2022-03-12 PROCEDURE — 36415 COLL VENOUS BLD VENIPUNCTURE: CPT

## 2022-03-12 PROCEDURE — 85025 COMPLETE CBC W/AUTO DIFF WBC: CPT

## 2022-03-12 RX ORDER — POTASSIUM CHLORIDE 20 MEQ/1
40 TABLET, EXTENDED RELEASE ORAL PRN
Status: DISCONTINUED | OUTPATIENT
Start: 2022-03-12 | End: 2022-03-15 | Stop reason: HOSPADM

## 2022-03-12 RX ORDER — METOPROLOL SUCCINATE 50 MG/1
50 TABLET, EXTENDED RELEASE ORAL EVERY MORNING
Status: DISCONTINUED | OUTPATIENT
Start: 2022-03-13 | End: 2022-03-15 | Stop reason: HOSPADM

## 2022-03-12 RX ORDER — BUPRENORPHINE AND NALOXONE 8; 2 MG/1; MG/1
1 FILM, SOLUBLE BUCCAL; SUBLINGUAL DAILY
Status: DISCONTINUED | OUTPATIENT
Start: 2022-03-12 | End: 2022-03-12

## 2022-03-12 RX ORDER — POLYETHYLENE GLYCOL 3350 17 G/17G
17 POWDER, FOR SOLUTION ORAL DAILY PRN
Status: DISCONTINUED | OUTPATIENT
Start: 2022-03-12 | End: 2022-03-15 | Stop reason: HOSPADM

## 2022-03-12 RX ORDER — SODIUM CHLORIDE 0.9 % (FLUSH) 0.9 %
5-40 SYRINGE (ML) INJECTION EVERY 12 HOURS SCHEDULED
Status: DISCONTINUED | OUTPATIENT
Start: 2022-03-12 | End: 2022-03-15 | Stop reason: HOSPADM

## 2022-03-12 RX ORDER — BUPRENORPHINE AND NALOXONE 8; 2 MG/1; MG/1
1 FILM, SOLUBLE BUCCAL; SUBLINGUAL 2 TIMES DAILY
Status: DISCONTINUED | OUTPATIENT
Start: 2022-03-12 | End: 2022-03-15 | Stop reason: HOSPADM

## 2022-03-12 RX ORDER — BUPRENORPHINE AND NALOXONE 8; 2 MG/1; MG/1
1 FILM, SOLUBLE BUCCAL; SUBLINGUAL ONCE
Status: COMPLETED | OUTPATIENT
Start: 2022-03-12 | End: 2022-03-12

## 2022-03-12 RX ORDER — SODIUM CHLORIDE 9 MG/ML
25 INJECTION, SOLUTION INTRAVENOUS PRN
Status: DISCONTINUED | OUTPATIENT
Start: 2022-03-12 | End: 2022-03-15 | Stop reason: HOSPADM

## 2022-03-12 RX ORDER — ONDANSETRON 4 MG/1
4 TABLET, ORALLY DISINTEGRATING ORAL EVERY 8 HOURS PRN
Status: DISCONTINUED | OUTPATIENT
Start: 2022-03-12 | End: 2022-03-15 | Stop reason: HOSPADM

## 2022-03-12 RX ORDER — ACETAMINOPHEN 325 MG/1
650 TABLET ORAL EVERY 6 HOURS PRN
Status: DISCONTINUED | OUTPATIENT
Start: 2022-03-12 | End: 2022-03-15 | Stop reason: HOSPADM

## 2022-03-12 RX ORDER — ONDANSETRON 2 MG/ML
4 INJECTION INTRAMUSCULAR; INTRAVENOUS EVERY 6 HOURS PRN
Status: DISCONTINUED | OUTPATIENT
Start: 2022-03-12 | End: 2022-03-14

## 2022-03-12 RX ORDER — POTASSIUM CHLORIDE 7.45 MG/ML
10 INJECTION INTRAVENOUS PRN
Status: DISCONTINUED | OUTPATIENT
Start: 2022-03-12 | End: 2022-03-15 | Stop reason: HOSPADM

## 2022-03-12 RX ORDER — FINASTERIDE 5 MG/1
5 TABLET, FILM COATED ORAL DAILY
Status: DISCONTINUED | OUTPATIENT
Start: 2022-03-13 | End: 2022-03-15 | Stop reason: HOSPADM

## 2022-03-12 RX ORDER — SODIUM CHLORIDE 0.9 % (FLUSH) 0.9 %
10 SYRINGE (ML) INJECTION PRN
Status: DISCONTINUED | OUTPATIENT
Start: 2022-03-12 | End: 2022-03-15 | Stop reason: HOSPADM

## 2022-03-12 RX ORDER — MAGNESIUM SULFATE 1 G/100ML
1000 INJECTION INTRAVENOUS PRN
Status: DISCONTINUED | OUTPATIENT
Start: 2022-03-12 | End: 2022-03-15 | Stop reason: HOSPADM

## 2022-03-12 RX ORDER — ACETAMINOPHEN 650 MG/1
650 SUPPOSITORY RECTAL EVERY 6 HOURS PRN
Status: DISCONTINUED | OUTPATIENT
Start: 2022-03-12 | End: 2022-03-15 | Stop reason: HOSPADM

## 2022-03-12 RX ORDER — METOCLOPRAMIDE HYDROCHLORIDE 5 MG/ML
10 INJECTION INTRAMUSCULAR; INTRAVENOUS ONCE
Status: COMPLETED | OUTPATIENT
Start: 2022-03-12 | End: 2022-03-12

## 2022-03-12 RX ORDER — SODIUM CHLORIDE 0.9 % (FLUSH) 0.9 %
5-40 SYRINGE (ML) INJECTION PRN
Status: DISCONTINUED | OUTPATIENT
Start: 2022-03-12 | End: 2022-03-15 | Stop reason: HOSPADM

## 2022-03-12 RX ORDER — ONDANSETRON 2 MG/ML
4 INJECTION INTRAMUSCULAR; INTRAVENOUS EVERY 6 HOURS PRN
Status: DISCONTINUED | OUTPATIENT
Start: 2022-03-12 | End: 2022-03-15 | Stop reason: HOSPADM

## 2022-03-12 RX ORDER — 0.9 % SODIUM CHLORIDE 0.9 %
80 INTRAVENOUS SOLUTION INTRAVENOUS ONCE
Status: COMPLETED | OUTPATIENT
Start: 2022-03-12 | End: 2022-03-12

## 2022-03-12 RX ORDER — BUMETANIDE 1 MG/1
2 TABLET ORAL 2 TIMES DAILY
Status: DISCONTINUED | OUTPATIENT
Start: 2022-03-12 | End: 2022-03-15 | Stop reason: HOSPADM

## 2022-03-12 RX ORDER — SPIRONOLACTONE 25 MG/1
25 TABLET ORAL DAILY
Status: DISCONTINUED | OUTPATIENT
Start: 2022-03-13 | End: 2022-03-15 | Stop reason: HOSPADM

## 2022-03-12 RX ORDER — ONDANSETRON 4 MG/1
4 TABLET, ORALLY DISINTEGRATING ORAL EVERY 8 HOURS PRN
Status: DISCONTINUED | OUTPATIENT
Start: 2022-03-12 | End: 2022-03-14

## 2022-03-12 RX ORDER — LIDOCAINE HYDROCHLORIDE 20 MG/ML
JELLY TOPICAL
Status: COMPLETED
Start: 2022-03-12 | End: 2022-03-12

## 2022-03-12 RX ORDER — SODIUM CHLORIDE 9 MG/ML
1000 INJECTION, SOLUTION INTRAVENOUS CONTINUOUS
Status: DISCONTINUED | OUTPATIENT
Start: 2022-03-12 | End: 2022-03-13

## 2022-03-12 RX ORDER — MORPHINE SULFATE 2 MG/ML
2 INJECTION, SOLUTION INTRAMUSCULAR; INTRAVENOUS EVERY 4 HOURS PRN
Status: DISCONTINUED | OUTPATIENT
Start: 2022-03-12 | End: 2022-03-14

## 2022-03-12 RX ORDER — AMLODIPINE BESYLATE 10 MG/1
10 TABLET ORAL DAILY
Status: DISCONTINUED | OUTPATIENT
Start: 2022-03-13 | End: 2022-03-15 | Stop reason: HOSPADM

## 2022-03-12 RX ADMIN — SODIUM CHLORIDE 80 ML: 9 INJECTION, SOLUTION INTRAVENOUS at 12:20

## 2022-03-12 RX ADMIN — SODIUM CHLORIDE 1000 ML: 9 INJECTION, SOLUTION INTRAVENOUS at 15:43

## 2022-03-12 RX ADMIN — METOCLOPRAMIDE 10 MG: 5 INJECTION, SOLUTION INTRAMUSCULAR; INTRAVENOUS at 11:03

## 2022-03-12 RX ADMIN — SODIUM CHLORIDE, PRESERVATIVE FREE 10 ML: 5 INJECTION INTRAVENOUS at 12:25

## 2022-03-12 RX ADMIN — Medication 240 ML: at 18:00

## 2022-03-12 RX ADMIN — MORPHINE SULFATE 2 MG: 2 INJECTION, SOLUTION INTRAMUSCULAR; INTRAVENOUS at 22:32

## 2022-03-12 RX ADMIN — BUPRENORPHINE HYDROCHLORIDE, NALOXONE HYDROCHLORIDE 1 FILM: 8; 2 FILM, SOLUBLE BUCCAL; SUBLINGUAL at 14:01

## 2022-03-12 RX ADMIN — ENOXAPARIN SODIUM 40 MG: 100 INJECTION SUBCUTANEOUS at 18:57

## 2022-03-12 RX ADMIN — LIDOCAINE HYDROCHLORIDE: 20 JELLY TOPICAL at 15:39

## 2022-03-12 RX ADMIN — Medication 10 ML: at 22:32

## 2022-03-12 RX ADMIN — IOPAMIDOL 75 ML: 755 INJECTION, SOLUTION INTRAVENOUS at 12:25

## 2022-03-12 ASSESSMENT — ENCOUNTER SYMPTOMS
ABDOMINAL PAIN: 1
EYE PAIN: 0
NAUSEA: 1
BACK PAIN: 0
VOMITING: 1
COLOR CHANGE: 0
SHORTNESS OF BREATH: 0
CONSTIPATION: 1

## 2022-03-12 ASSESSMENT — PAIN SCALES - GENERAL
PAINLEVEL_OUTOF10: 5

## 2022-03-12 ASSESSMENT — PAIN - FUNCTIONAL ASSESSMENT: PAIN_FUNCTIONAL_ASSESSMENT: 0-10

## 2022-03-12 NOTE — ED NOTES
Patient had large amount formed brown stool after enema.   Shannon-care given and back to bed with assist.     Donna Woodruff RN  03/12/22 8995

## 2022-03-12 NOTE — ED NOTES
Stand by assist with patient to and from bathroom, patient was able to have a medium size bowel movement, patient back in bed resting comfortably, no needs at this time, comfort measures provided.  Patient asking for stool softener prescription will speak with Chrissy about this request      Irena Jenkins RN  03/12/22 5580

## 2022-03-12 NOTE — ED PROVIDER NOTES
EMERGENCY DEPARTMENT ENCOUNTER    Pt Name: Sumaya Massey  MRN: [id number]  [place] 1935  Date of evaluation: 3/12/22  CHIEF COMPLAINT       Chief Complaint   Patient presents with    Emesis     HISTORY OF PRESENT ILLNESS   42-year-old male presents for complaint of nausea, abdominal pain. Patient reports symptoms started yesterday, states that he woke up this morning was having worsening nausea and had an episode of vomiting, states been having some right-sided abdominal pain, describes as an aching pain, denies radiation of the pain, denies anything making it better or worse, denies any associated fevers or chills, denies any diarrhea, does report he has been constipated and has not had a bowel movement in the last 2 or 3 days. Patient does report multiple abdominal surgeries and history of prior bowel obstruction as well. Denies any difficulty urinating or pain with urination. Denies any associated chest pain, shortness of breath, recent illness or sick contacts. The history is provided by the patient. REVIEW OF SYSTEMS     Review of Systems   Constitutional: Negative for chills and fever. HENT: Negative for congestion and ear pain. Eyes: Negative for pain. Respiratory: Negative for shortness of breath. Cardiovascular: Negative for chest pain, palpitations and leg swelling. Gastrointestinal: Positive for abdominal pain, constipation, nausea and vomiting. Genitourinary: Negative for dysuria and flank pain. Musculoskeletal: Negative for back pain. Skin: Negative for color change. Neurological: Negative for numbness and headaches. Psychiatric/Behavioral: Negative for confusion. All other systems reviewed and are negative.     PASTMEDICAL HISTORY     Past Medical History:   Diagnosis Date    Atrial fibrillation ([address]     CAD (coronary artery disease)     CHF (congestive heart failure) (HCC)     Hyperlipidemia     Hypertension      Past Problem List  Patient Active Problem List   Diagnosis Code    Atrial fibrillation (HCC) I48.91    On continuous oral anticoagulation Z79.01    CHF, acute on chronic ([facility]) I50.9    Hyperlipidemia E78.5    Former smoker Z87.891    Pacemaker Z95.0    History of DVT of lower extremity Z86.718    Enlarged prostate N40.0    Cataract of both eyes H26.9    GERD (gastroesophageal reflux disease) K21.9    History of inguinal hernia repair Z98.890, Z87.19    Hypertension I10    Pneumonia J18.9    History of right hip replacement Z96.641    History of back surgery Z98.890    Low back pain M54.50    Chest pain R07.9    Fluid overload E87.70    VARSHA (acute kidney injury) ([facility] [address] N17.9    NSTEMI (non-ST elevated myocardial infarction) ([facility]) I21.4    Chronic CHF (congestive heart failure) ([facility]) I50.9    Acute inferolateral myocardial infarction ([facility]) I21.19    Unstable angina ([facility]) I20.0    Chronic diastolic congestive heart failure ([facility]) I50.32     SURGICAL HISTORY       Past Surgical History:   Procedure Laterality Date    CARDIAC CATHETERIZATION      PACEMAKER PLACEMENT       CURRENT MEDICATIONS       Previous Medications    AMLODIPINE (NORVASC) 5 MG TABLET    Take 2 tablets by mouth daily    ASCORBIC ACID (VITAMIN C) 250 MG TABLET    TAKE 1 TABLET BY MOUTH TWICE DAILY(TAKE WITH IRON)    BUMETANIDE (BUMEX) 2 MG TABLET    Take 1 tablet by mouth 2 times daily    BUPRENORPHINE-NALOXONE (SUBOXONE) 8-2 MG FILM SL FILM    Place 1 Film under the tongue 2 times daily.  Indications: pain     COENZYME Q10 100 MG CHEW    Take 1 tablet by mouth daily    CYANOCOBALAMIN ([employer] VITAMIN B12) 1000 MCG TABLET    Take 1 tablet by mouth daily    DOCUSATE SODIUM (COLACE) 100 MG CAPSULE    Take 100 mg by mouth 2 times daily as needed for Constipation    FAMOTIDINE (PEPCID) 20 MG TABLET    Take 1 tablet by mouth daily    FERROUS SULFATE (IRON 325) 325 (65 FE) MG TABLET    Take 1 tablet by mouth 2 times daily    FINASTERIDE (PROSCAR) 5 MG TABLET    Take 1 tablet by mouth daily    LATANOPROST (XALATAN) 0.005 % OPHTHALMIC SOLUTION    Place 1 drop into both eyes nightly    MAGNESIUM OXIDE (MAG-OX) 400 MG TABLET    Take 400 mg by mouth daily    METOPROLOL SUCCINATE (TOPROL XL) 25 MG EXTENDED RELEASE TABLET    Take 2 tablets by mouth every morning    NITROGLYCERIN (NITROSTAT) 0.4 MG SL TABLET    up to max of 3 total doses. If no relief after 1 dose, call 911. SPIRONOLACTONE (ALDACTONE) 25 MG TABLET    Take 1 tablet by mouth daily    VITAMIN D (ERGOCALCIFEROL) 1.25 MG (31781 UT) CAPS CAPSULE    TAKE 1 CAPSULE BY MOUTH EVERY WEEK    XARELTO 15 MG TABS TABLET    TAKE 1 TABLET BY MOUTH DAILY WITH BREAKFAST     ALLERGIES     is allergic to aspirin, cyclobenzaprine, ibuprofen, azithromycin, and hydrocodone-acetaminophen. FAMILY HISTORY     has no family status information on file. SOCIAL HISTORY       Social History     Tobacco Use    Smoking status: Never Smoker    Smokeless tobacco: Never Used   Substance Use Topics    Alcohol use: Never    Drug use: Never     PHYSICAL EXAM     INITIAL VITALS: /84   Pulse 67   Temp 98.8 °F (37.1 °C)   Resp 18   Ht 6' 1\" (1.854 m)   Wt 194 lb (88 kg)   SpO2 95%   BMI 25.60 kg/m²    Physical Exam  Vitals and nursing note reviewed. Constitutional:       Appearance: Normal appearance. HENT:      Head: Normocephalic and atraumatic. Right Ear: External ear normal.      Left Ear: External ear normal.      Nose: Nose normal.      Mouth/Throat:      Mouth: Mucous membranes are moist.   Eyes:      Pupils: Pupils are equal, round, and reactive to light. Cardiovascular:      Rate and Rhythm: Normal rate and regular rhythm. Pulses: Normal pulses. Heart sounds: Normal heart sounds. Pulmonary:      Effort: Pulmonary effort is normal.      Breath sounds: Normal breath sounds. Abdominal:      General: Abdomen is flat. A surgical scar is present. Palpations: Abdomen is soft. Tenderness:  There is abdominal tenderness in the right upper quadrant and right lower quadrant. There is no guarding or rebound. Musculoskeletal:         General: No tenderness. Normal range of motion. Cervical back: Neck supple. Skin:     General: Skin is warm and dry. Capillary Refill: Capillary refill takes less than 2 seconds. Neurological:      General: No focal deficit present. Mental Status: He is alert and oriented to person, place, and time. Psychiatric:         Behavior: Behavior normal.         MEDICAL DECISION MAKIN-year-old male presents for abdominal pain nausea and vomiting. On initial exam patient in no acute distress, vitals are stable, does have some right-sided abdominal tenderness, abdomen is soft no rebound or guarding, will check labs and CT    Labs reviewed and unremarkable CT reviewed showing dilated loops of proximal small bowel concerning for obstruction with transition point in the left mid abdomen    We will place NG tube, did discuss with general surgery Dr. Ulises Robbins, who agrees with NG tube continued fluids and would like patient to have a milk and molasses enema and admitted    Discussed result with patient, discussed need for admission, he is agreeable    Spoke with Dr. Mirlaned Chris who accepts admission with general surgery consult. Patient demonstrates understanding and agreement with the plan, was given the opportunity to ask questions, and these questions were answered to the best of the provided information at this time. VS stable for transfer. This dictation was prepared using G[employer]o voice recognition software.            CRITICAL CARE:       PROCEDURES:    Procedures    DIAGNOSTIC RESULTS   EKG:All EKG's are interpreted by the Emergency Department Physician who either signs or Co-signs this chart in the absence of a cardiologist.        RADIOLOGY:All plain film, CT, MRI, and formal ultrasound images (except ED bedside ultrasound) are read by the radiologist, see reports below, unless otherwisenoted in MDM or here. CT ABDOMEN PELVIS W IV CONTRAST Additional Contrast? None   Preliminary Result   Dilated loops of proximal small bowel are concerning for obstruction. Transition point is suspected within the left mid abdomen. Focal dilation of   the large bowel at the splenic flexure could represent underlying ileus. Large colonic and rectal stool, suggesting constipation. Right adrenal gland nodule measures 1.9 x 1.8 cm. Consider further   evaluation with nonemergent adrenal CT or MR. Subcentimeter hypodensity within the tail of the pancreas is too small to   adequately characterize by CT. Consider further evaluation with MRI. XR CHEST PORTABLE   Final Result   No acute findings in the chest.           LABS: All lab results were reviewed by myself, and all abnormals are listed below.   Labs Reviewed   CBC WITH AUTO DIFFERENTIAL - Abnormal; Notable for the following components:       Result Value    RBC 3.81 (*)     Hemoglobin 11.4 (*)     Hematocrit 34.4 (*)     Seg Neutrophils 85 (*)     Lymphocytes 7 (*)     Monocytes 8 (*)     Absolute Lymph # 0.40 (*)     All other components within normal limits   COMPREHENSIVE METABOLIC PANEL W/ REFLEX TO MG FOR LOW K - Abnormal; Notable for the following components:    Glucose 145 (*)     Sodium 134 (*)     Alkaline Phosphatase 149 (*)     All other components within normal limits   TROPONIN - Abnormal; Notable for the following components:    Troponin, High Sensitivity 28 (*)     All other components within normal limits   BRAIN NATRIURETIC PEPTIDE - Abnormal; Notable for the following components:    Pro-BNP 3,233 (*)     All other components within normal limits   URINALYSIS WITH MICROSCOPIC - Abnormal; Notable for the following components:    Leukocyte Esterase, Urine TRACE (*)     All other components within normal limits   LIPASE   PROTIME-INR   APTT       EMERGENCY DEPARTMENTCOURSE:         Vitals: Vitals:    03/12/22 1109 03/12/22 1233 03/12/22 1403 03/12/22 1554   BP: 122/72 (!) 144/92 135/76 130/84   Pulse: 70 61 72 67   Resp: 16 16 16 18   Temp:       SpO2: 97% 99% 99% 95%   Weight:       Height:           The patient was given the following medications while in the emergency department:  Orders Placed This Encounter   Medications    metoclopramide (REGLAN) injection 10 mg    0.9 % sodium chloride bolus    sodium chloride flush 0.9 % injection 10 mL    iopamidol (ISOVUE-370) 76 % injection 75 mL    DISCONTD: buprenorphine-naloxone (SUBOXONE) 8-2 MG SL film 1 Film    buprenorphine-naloxone (SUBOXONE) 8-2 MG SL film 1 Film    0.9 % sodium chloride infusion    milk and molasses enema 240 mL    lidocaine (XYLOCAINE) 2 % uro-jet     Jennifer Scarce: cabinet override     CONSULTS:  IP CONSULT TO GENERAL SURGERY  IP CONSULT TO INTERNAL MEDICINE    FINAL IMPRESSION      1. Small bowel obstruction [facility])          DISPOSITION/PLAN   DISPOSITION Decision To Admit 03/12/2022 02:29:22 PM      PATIENT REFERRED TO:  No follow-up provider specified. DISCHARGE MEDICATIONS:  New Prescriptions    No medications on file     The care is provided during an unprecedented national emergency due to the novel coronavirus, COVID 19.   DO Ana Melgar DO  03/12/22 4137

## 2022-03-12 NOTE — ED TRIAGE NOTES
Patient to emergency department with complaints of vomiting, acid reflux and RUQ pain since this morning. Pt states he takes 2 saboxone a day, but has not taken it for 3 days.  Also reports constipation

## 2022-03-13 ENCOUNTER — APPOINTMENT (OUTPATIENT)
Dept: GENERAL RADIOLOGY | Age: 87
DRG: 389 | End: 2022-03-13
Payer: MEDICARE

## 2022-03-13 LAB
ABSOLUTE EOS #: 0.3 K/UL (ref 0–0.4)
ABSOLUTE LYMPH #: 1 K/UL (ref 1–4.8)
ABSOLUTE MONO #: 0.5 K/UL (ref 0.1–1.3)
ANION GAP SERPL CALCULATED.3IONS-SCNC: 11 MMOL/L (ref 9–17)
BASOPHILS # BLD: 1 % (ref 0–2)
BASOPHILS ABSOLUTE: 0 K/UL (ref 0–0.2)
BUN BLDV-MCNC: 19 MG/DL (ref 8–23)
CALCIUM SERPL-MCNC: 9.1 MG/DL (ref 8.6–10.4)
CHLORIDE BLD-SCNC: 105 MMOL/L (ref 98–107)
CO2: 24 MMOL/L (ref 20–31)
CREAT SERPL-MCNC: 0.9 MG/DL (ref 0.7–1.2)
EOSINOPHILS RELATIVE PERCENT: 7 % (ref 0–4)
GFR AFRICAN AMERICAN: >60 ML/MIN
GFR NON-AFRICAN AMERICAN: >60 ML/MIN
GFR SERPL CREATININE-BSD FRML MDRD: ABNORMAL ML/MIN/{1.73_M2}
GLUCOSE BLD-MCNC: 101 MG/DL (ref 70–99)
HCT VFR BLD CALC: 33.6 % (ref 41–53)
HEMOGLOBIN: 11 G/DL (ref 13.5–17.5)
INR BLD: 1.2
LYMPHOCYTES # BLD: 25 % (ref 24–44)
MCH RBC QN AUTO: 29.9 PG (ref 26–34)
MCHC RBC AUTO-ENTMCNC: 32.8 G/DL (ref 31–37)
MCV RBC AUTO: 91.4 FL (ref 80–100)
MONOCYTES # BLD: 13 % (ref 1–7)
PDW BLD-RTO: 14 % (ref 11.5–14.9)
PLATELET # BLD: 249 K/UL (ref 150–450)
PMV BLD AUTO: 7 FL (ref 6–12)
POTASSIUM SERPL-SCNC: 3.8 MMOL/L (ref 3.7–5.3)
PROTHROMBIN TIME: 14.9 SEC (ref 11.8–14.6)
RBC # BLD: 3.67 M/UL (ref 4.5–5.9)
SEG NEUTROPHILS: 54 % (ref 36–66)
SEGMENTED NEUTROPHILS ABSOLUTE COUNT: 2.2 K/UL (ref 1.3–9.1)
SODIUM BLD-SCNC: 140 MMOL/L (ref 135–144)
TROPONIN, HIGH SENSITIVITY: 28 NG/L (ref 0–22)
TROPONIN, HIGH SENSITIVITY: 29 NG/L (ref 0–22)
WBC # BLD: 4 K/UL (ref 3.5–11)

## 2022-03-13 PROCEDURE — 93005 ELECTROCARDIOGRAM TRACING: CPT | Performed by: INTERNAL MEDICINE

## 2022-03-13 PROCEDURE — 97116 GAIT TRAINING THERAPY: CPT

## 2022-03-13 PROCEDURE — 6370000000 HC RX 637 (ALT 250 FOR IP): Performed by: INTERNAL MEDICINE

## 2022-03-13 PROCEDURE — 6360000002 HC RX W HCPCS: Performed by: INTERNAL MEDICINE

## 2022-03-13 PROCEDURE — 85025 COMPLETE CBC W/AUTO DIFF WBC: CPT

## 2022-03-13 PROCEDURE — 74018 RADEX ABDOMEN 1 VIEW: CPT

## 2022-03-13 PROCEDURE — 6360000002 HC RX W HCPCS: Performed by: SURGERY

## 2022-03-13 PROCEDURE — 36415 COLL VENOUS BLD VENIPUNCTURE: CPT

## 2022-03-13 PROCEDURE — 97162 PT EVAL MOD COMPLEX 30 MIN: CPT

## 2022-03-13 PROCEDURE — C9113 INJ PANTOPRAZOLE SODIUM, VIA: HCPCS | Performed by: SURGERY

## 2022-03-13 PROCEDURE — 2580000003 HC RX 258: Performed by: INTERNAL MEDICINE

## 2022-03-13 PROCEDURE — 85610 PROTHROMBIN TIME: CPT

## 2022-03-13 PROCEDURE — 80048 BASIC METABOLIC PNL TOTAL CA: CPT

## 2022-03-13 PROCEDURE — 2580000003 HC RX 258: Performed by: SURGERY

## 2022-03-13 PROCEDURE — 1200000000 HC SEMI PRIVATE

## 2022-03-13 PROCEDURE — 84484 ASSAY OF TROPONIN QUANT: CPT

## 2022-03-13 PROCEDURE — 99233 SBSQ HOSP IP/OBS HIGH 50: CPT | Performed by: INTERNAL MEDICINE

## 2022-03-13 RX ORDER — PANTOPRAZOLE SODIUM 40 MG/10ML
40 INJECTION, POWDER, LYOPHILIZED, FOR SOLUTION INTRAVENOUS DAILY
Status: DISCONTINUED | OUTPATIENT
Start: 2022-03-13 | End: 2022-03-14

## 2022-03-13 RX ORDER — SODIUM CHLORIDE 9 MG/ML
INJECTION, SOLUTION INTRAVENOUS CONTINUOUS
Status: DISCONTINUED | OUTPATIENT
Start: 2022-03-13 | End: 2022-03-15 | Stop reason: HOSPADM

## 2022-03-13 RX ORDER — NITROGLYCERIN 0.4 MG/1
0.4 TABLET SUBLINGUAL EVERY 5 MIN PRN
Status: DISCONTINUED | OUTPATIENT
Start: 2022-03-13 | End: 2022-03-15 | Stop reason: HOSPADM

## 2022-03-13 RX ORDER — SODIUM CHLORIDE 0.9 % (FLUSH) 0.9 %
10 SYRINGE (ML) INJECTION DAILY
Status: DISCONTINUED | OUTPATIENT
Start: 2022-03-13 | End: 2022-03-14

## 2022-03-13 RX ADMIN — SODIUM CHLORIDE, PRESERVATIVE FREE 10 ML: 5 INJECTION INTRAVENOUS at 08:08

## 2022-03-13 RX ADMIN — NITROGLYCERIN 0.4 MG: 0.4 TABLET SUBLINGUAL at 14:41

## 2022-03-13 RX ADMIN — MORPHINE SULFATE 2 MG: 2 INJECTION, SOLUTION INTRAMUSCULAR; INTRAVENOUS at 20:45

## 2022-03-13 RX ADMIN — ENOXAPARIN SODIUM 40 MG: 100 INJECTION SUBCUTANEOUS at 08:07

## 2022-03-13 RX ADMIN — SODIUM CHLORIDE: 9 INJECTION, SOLUTION INTRAVENOUS at 14:19

## 2022-03-13 RX ADMIN — MORPHINE SULFATE 2 MG: 2 INJECTION, SOLUTION INTRAMUSCULAR; INTRAVENOUS at 16:30

## 2022-03-13 RX ADMIN — PHENOL 1 SPRAY: 1.5 LIQUID ORAL at 14:21

## 2022-03-13 RX ADMIN — MORPHINE SULFATE 2 MG: 2 INJECTION, SOLUTION INTRAMUSCULAR; INTRAVENOUS at 11:13

## 2022-03-13 RX ADMIN — MORPHINE SULFATE 2 MG: 2 INJECTION, SOLUTION INTRAMUSCULAR; INTRAVENOUS at 07:06

## 2022-03-13 RX ADMIN — MORPHINE SULFATE 2 MG: 2 INJECTION, SOLUTION INTRAMUSCULAR; INTRAVENOUS at 03:04

## 2022-03-13 RX ADMIN — SODIUM CHLORIDE: 9 INJECTION, SOLUTION INTRAVENOUS at 06:38

## 2022-03-13 RX ADMIN — PANTOPRAZOLE SODIUM 40 MG: 40 INJECTION, POWDER, FOR SOLUTION INTRAVENOUS at 08:07

## 2022-03-13 ASSESSMENT — PAIN DESCRIPTION - ORIENTATION: ORIENTATION: MID

## 2022-03-13 ASSESSMENT — PAIN SCALES - GENERAL
PAINLEVEL_OUTOF10: 6
PAINLEVEL_OUTOF10: 5
PAINLEVEL_OUTOF10: 4
PAINLEVEL_OUTOF10: 5
PAINLEVEL_OUTOF10: 5
PAINLEVEL_OUTOF10: 4
PAINLEVEL_OUTOF10: 6

## 2022-03-13 ASSESSMENT — PAIN DESCRIPTION - PAIN TYPE
TYPE: ACUTE PAIN
TYPE: ACUTE PAIN

## 2022-03-13 ASSESSMENT — PAIN DESCRIPTION - DESCRIPTORS: DESCRIPTORS: SHARP

## 2022-03-13 ASSESSMENT — PAIN DESCRIPTION - LOCATION
LOCATION: THROAT
LOCATION: THROAT

## 2022-03-13 ASSESSMENT — PAIN DESCRIPTION - FREQUENCY: FREQUENCY: CONTINUOUS

## 2022-03-13 ASSESSMENT — PAIN DESCRIPTION - ONSET: ONSET: ON-GOING

## 2022-03-13 NOTE — PROGRESS NOTES
Patient admitted to room 2055 from ED by wheelchair. Assessment and vitals complete. Patient oriented to room.

## 2022-03-13 NOTE — PROGRESS NOTES
[place] [address] Internal Medicine    Progress Note    3/13/2022    3:37 PM    Name:   Cb Kee  MRN:     [id number]     [place]:      [de-identified]   Room:   2055/2055-01  IP Day:  1  Admit Date:  3/12/2022 10:05 AM    PCP:   Maxi Correa MD  Code Status:  Full Code    Subjective:     C/C:   Chief Complaint   Patient presents with    Emesis         Interval History Status: Patient complaining of chest pain today    HPI:     87-year gentleman with underlying history of multiple abdominal surgeries, small bowel torsion in the past, presented with increased abdominal pain, found to have small bowel obstruction in the ER, NG placed, CT of the abdomen pelvis showed extensive stool burden, general surgery consulted,  Patient also has underlying history of atrial fibrillation on Xarelto, CHF on Bumex, recently seen by cardiology, sick sinus syndrome, has an ICD in place. Patient had a big bowel movement in the ER, with relief of symptoms    Review of Systems:     Denies any shortness of breath or cough  Denies chest pain or palpitations  Denies abdominal pain, diarrhea vomiting  Denies any new numbness tremors or weakness. Medications: Allergies:     Allergies   Allergen Reactions    Aspirin Nausea Only    Cyclobenzaprine Nausea Only    Ibuprofen Nausea Only    Azithromycin Nausea Only    Hydrocodone-Acetaminophen      per pt, he is having upset stomach when taking norco       Current Meds:   Scheduled Meds:    pantoprazole  40 mg IntraVENous Daily    And    sodium chloride flush  10 mL IntraVENous Daily    sodium chloride flush  5-40 mL IntraVENous 2 times per day    enoxaparin  40 mg SubCUTAneous Daily    [Held by provider] amLODIPine  10 mg Oral Daily    [Held by provider] bumetanide  2 mg Oral BID    [Held by provider] buprenorphine-naloxone  1 Film SubLINGual BID    [Held by provider] finasteride  5 mg Oral Daily    [Held by provider] metoprolol succinate  50 mg Oral QAM    [Held by provider] spironolactone  25 mg Oral Daily    [Held by provider] rivaroxaban  15 mg Oral Daily    sodium chloride flush  5-40 mL IntraVENous 2 times per day     Continuous Infusions:    sodium chloride 125 mL/hr at 22 1419    sodium chloride      sodium chloride       PRN Meds: phenol, nitroGLYCERIN, sodium chloride flush, sodium chloride flush, sodium chloride, ondansetron **OR** ondansetron, sodium chloride flush, sodium chloride, potassium chloride **OR** potassium alternative oral replacement **OR** potassium chloride, magnesium sulfate, ondansetron **OR** ondansetron, polyethylene glycol, acetaminophen **OR** acetaminophen, morphine    Data:     Past Medical History:   has a past medical history of Atrial fibrillation ([facility] [address] CAD (coronary artery disease), CHF (congestive heart failure) ([facility] [address] Hyperlipidemia, and Hypertension. Social History:   reports that he has never smoked. He has never used smokeless tobacco. He reports that he does not drink alcohol and does not use drugs. Family History: History reviewed. No pertinent family history. Vitals:  /84   Pulse 70   Temp 98.8 °F (37.1 °C) (Oral)   Resp 18   Ht 6' 1\" (1.854 m)   Wt 194 lb (88 kg)   SpO2 98%   BMI 25.60 kg/m²   Temp (24hrs), Av.3 °F (36.8 °C), Min:97.9 °F (36.6 °C), Max:98.8 °F (37.1 °C)    No results for input(s): POCGLU in the last 72 hours. I/O (24Hr):     Intake/Output Summary (Last 24 hours) at 3/13/2022 1537  Last data filed at 3/13/2022 1535  Gross per 24 hour   Intake --   Output 1000 ml   Net -1000 ml       Labs:    Lab Results   Component Value Date    WBC 4.0 2022    HGB 11.0 (L) 2022    HCT 33.6 (L) 2022    MCV 91.4 2022     2022     Lab Results   Component Value Date     2022    K 3.8 2022     2022    CO2 24 2022    BUN 19 2022    CREATININE 0.90 2022    GLUCOSE 101 2022    CALCIUM 9.1 03/13/2022          Lab Results   Component Value Date/Time    SPECIAL NOT REPORTED 06/25/2021 08:15 PM     Lab Results   Component Value Date/Time    CULTURE NO SIGNIFICANT GROWTH 06/25/2021 08:15 PM         Radiology:    Recent data reviewed    Physical Examination:        General appearance:  alert, cooperative and no distress  Eyes: Anicteric sclera. Pupils are equally round and reactive to light. Extraocular movements are intact. Lungs:  clear to auscultation bilaterally, normal effort  Heart:  regular rate and rhythm, no murmur  Abdomen:  soft, nontender, nondistended, normal bowel sounds, no masses, hepatomegaly, splenomegaly  Extremities:  no edema, redness, tenderness in the calves  Skin:  no gross lesions, rashes, induration  Neuro:  Alert, oriented X 3, no new focal weakness  Assessment:        Primary Problem  Small bowel obstruction [facility])    Active Hospital Problems    Diagnosis Date Noted    Small bowel obstruction ([address] [K56.609] 03/12/2022    SBO (small bowel obstruction) ([address] [K56.609] 03/12/2022    Chronic diastolic congestive heart failure (HCC) [I50.32] 01/13/2022    Chronic CHF (congestive heart failure) ([address] [I50.9] 11/17/2021    Atrial fibrillation ([address] [I48.91] 05/25/2021    On continuous oral anticoagulation [Z79.01] 05/25/2021    Hyperlipidemia [E78.5] 05/25/2021    Former smoker [B43.668] 05/25/2021    Pacemaker [Z95.0] 05/25/2021    History of DVT of lower extremity [Z86.718] 05/25/2021    GERD (gastroesophageal reflux disease) [K21.9] 05/25/2021    Hypertension [I10] 05/25/2021               Plan:          1. Acute small bowel obstruction-resolved NG to low intermittent wall suction, small bowel follow-through tomorrow 29  2. A. fib rate controlled Xarelto on hold  3. Pacemaker in place  4.  Chronic combined systolic diastolic heart failure currently compensated    3/13  Episode of chest pain this afternoon  Troponins flat, EKG was reviewed by myself, sinus rhythm no changes when compared to previous EKG   Patient has prior history of coronary artery disease, A. fib sick sinus syndrome AICD in situ; recent admission and November 2021 with chest pain and EKG changes; patient was discharged with recommendation for outpatient cardiac work-up  Unclear what work-up he had; since he is having recurrent chest pain will reconsult cardiology        Cornell Rice MD  3/13/2022  3:37 PM

## 2022-03-13 NOTE — PROGRESS NOTES
Patient complaining of pain 5/10. Patient is currently NPO and has an NG to LIWS. Patient requesting something for pain, but only has PO tylenol. Perfectserve sent to Dr. Kee Underwood. New orders received. Patient admission complete.

## 2022-03-13 NOTE — CARE COORDINATION
CASE MANAGEMENT NOTE:    Admission Date:  3/12/2022 Jessica Valenzuela is a 80 y.o.  male    Admitted for : Small bowel obstruction ([facility] [address] [K56.609]  SBO (small bowel obstruction) ([facility] [address] [J20.586]    Met with:  Patient    PCP:  Dr. Meagan Martinez:  Medicare      Is patient alert and oriented at time of discussion:  Yes    Current Residence/ Living Arrangements:  independently at home w/ Son Dusty             Current Services PTA:  Yes, VNS, Jay    Does patient go to outpatient dialysis: No  If yes, location and chair time: NA    Is patient agreeable to VNS: Yes    Freedom of choice provided:  Yes    List of [address] provided: No    VNS chosen:  Yes, Will continue w/ Oletha Mast    DME:  straight cane, walker and shower chair, Reacher, toilet Riser    Home Oxygen: No    Nebulizer: No    CPAP/BIPAP: No    Supplier: N/A    Potential Assistance Needed: No    SNF needed: No    Freedom of choice and list provided: NA    Pharmacy:  [facility] in [address]       Does Patient want to use MEDS to BEDS? No    Is patient currently receiving oral anticoagulation therapy? Yes, ATUL Uribe    Is the Patient an [facility] with Readmission Risk Score greater than 14%? No  If yes, pt needs a follow up appointment made within 7 days. Family Members/Caregivers that pt would like involved in their care:    Yes    If yes, list name here:  Son [address]    Transportation Provider:  Patient             Discharge Plan:  3/13/22 Medicare Pt. Lives w/ Sabine Cubatracy. DME- cane, toilet riser, Reacher, walker, SC. Current w/ VNS, Nathanieltha Teresa, Kelly Elena, following. JOSEPH SWAN, PT/OT, Surgery. BNP- 0885.  Jojo POLLARD, Dulce will need signed/completed, will follow//KB                Electronically signed by: Osiris Cullen RN on 3/13/2022 at 10:47 AM

## 2022-03-13 NOTE — ED NOTES
Patient transported to room 2055 via wheelchair.  Hand off given to Pura Serna, [place] Manny  03/12/22 1796

## 2022-03-13 NOTE — H&P
[facility] Internal Medicine  Demetrius Lo MD; Tayo Bran MD; Cortney Naidu MD; MD Freedom Headley MD; MD JACQUELINE Mariee [facility] Internal Medicine   [facility]    HISTORY AND PHYSICAL EXAMINATION            Date:   3/12/2022  Patient name:  Linda Powell  Date of admission:  3/12/2022 10:05 AM  MRN:   [id number]  Account:  [de-identified]  YOB: 1935  PCP:    Frank George MD  Room:   05/05  Code Status:    Full Code    Chief Complaint:     Chief Complaint   Patient presents with    Emesis   Small bowel obstruction    History Obtained From:     patient    History of Present Illness:     Linda Powell is a 80 y.o. Non- / non  male who presents with Emesis   and is admitted to the hospital for the management of Small bowel obstruction ([facility] [address] 87-year gentleman with underlying history of multiple abdominal surgeries, small bowel torsion in the past, presented with increased abdominal pain, found to have small bowel obstruction in the ER, NG placed, CT of the abdomen pelvis showed extensive stool burden, general surgery consulted,  Patient also has underlying history of atrial fibrillation on Xarelto, CHF on Bumex, recently seen by cardiology, sick sinus syndrome, has an ICD in place. Patient had a big bowel movement in the ER, feels much relieved at this time,      Past Medical History:     Past Medical History:   Diagnosis Date    Atrial fibrillation ([address]     CAD (coronary artery disease)     CHF (congestive heart failure) (HCC)     Hyperlipidemia     Hypertension         Past Surgical History:     Past Surgical History:   Procedure Laterality Date    CARDIAC CATHETERIZATION      PACEMAKER PLACEMENT          Medications Prior to Admission:     Prior to Admission medications    Medication Sig Start Date End Date Taking?  Authorizing Provider   XARELTO 15 MG TABS tablet TAKE 1 TABLET BY MOUTH DAILY WITH BREAKFAST 3/11/22   Lyly Fisher MD   bumetanide (BUMEX) 2 MG tablet Take 1 tablet by mouth 2 times daily 3/8/22   Jan Menon MD   spironolactone (ALDACTONE) 25 MG tablet Take 1 tablet by mouth daily 3/8/22   Jan Menon MD   metoprolol succinate (TOPROL XL) 25 MG extended release tablet Take 2 tablets by mouth every morning  Patient taking differently: Take 100 mg by mouth every morning  2/9/22   Alok Jaquez MD   Ascorbic Acid (VITAMIN C) 250 MG tablet TAKE 1 TABLET BY MOUTH TWICE DAILY(TAKE WITH IRON) 2/7/22   Cain Chen MD   nitroGLYCERIN (NITROSTAT) 0.4 MG SL tablet up to max of 3 total doses. If no relief after 1 dose, call 911. 1/8/22   Bakari Rajan MD   docusate sodium (COLACE) 100 MG capsule Take 100 mg by mouth 2 times daily as needed for Constipation    Historical Provider, MD   finasteride (PROSCAR) 5 MG tablet Take 1 tablet by mouth daily 1/6/22   Alok Jaquez MD   vitamin D (ERGOCALCIFEROL) 1.25 MG (36955 UT) CAPS capsule TAKE 1 CAPSULE BY MOUTH EVERY WEEK  Patient taking differently: once a week tuesdays 12/17/21   Cain Chen MD   amLODIPine (NORVASC) 5 MG tablet Take 2 tablets by mouth daily 11/10/21   Alok Jaquez MD   cyanocobalamin ([employer] VITAMIN B12) 1000 MCG tablet Take 1 tablet by mouth daily 11/3/21   Alok Jaquez MD   Coenzyme Q10 100 MG CHEW Take 1 tablet by mouth daily 10/26/21   Alok Jaquez MD   famotidine (PEPCID) 20 MG tablet Take 1 tablet by mouth daily 10/26/21   Alok Jaquez MD   ferrous sulfate (IRON 325) 325 (65 Fe) MG tablet Take 1 tablet by mouth 2 times daily 8/20/21   Cain Chen MD   buprenorphine-naloxone (SUBOXONE) 8-2 MG FILM SL film Place 1 Film under the tongue 2 times daily.  Indications: pain     Historical Provider, MD   magnesium oxide (MAG-OX) 400 MG tablet Take 400 mg by mouth daily    Historical Provider, MD   latanoprost (XALATAN) 0.005 % ophthalmic solution Place 1 drop into both eyes nightly    Historical Provider, MD        Allergies:     Aspirin, Cyclobenzaprine, Ibuprofen, Azithromycin, and Hydrocodone-acetaminophen    Social History:     Tobacco:    reports that he has never smoked. He has never used smokeless tobacco.  Alcohol:      reports no history of alcohol use. Drug Use:  reports no history of drug use. Family History:     History reviewed. No pertinent family history. Review of Systems:     Positive and Negative as described in HPI. CONSTITUTIONAL:  negative for fevers, chills, sweats, fatigue, weight loss  HEENT:  negative for vision, hearing changes, runny nose, throat pain  RESPIRATORY:  negative for shortness of breath, cough, congestion, wheezing  CARDIOVASCULAR:  negative for chest pain, palpitations  GASTROINTESTINAL: Positive for abdominal pain  GENITOURINARY:  negative for difficulty of urination, burning with urination, frequency   INTEGUMENT:  negative for rash, skin lesions, easy bruising   HEMATOLOGIC/LYMPHATIC:  negative for swelling/edema   ALLERGIC/IMMUNOLOGIC:  negative for urticaria , itching  ENDOCRINE:  negative increase in drinking, increase in urination, hot or cold intolerance  MUSCULOSKELETAL:  negative joint pains, muscle aches, swelling of joints  NEUROLOGICAL:  negative for headaches, dizziness, lightheadedness, numbness, pain, tingling extremities  BEHAVIOR/PSYCH:  negative for depression, anxiety    Physical Exam:   /73   Pulse 66   Temp 98.8 °F (37.1 °C)   Resp 16   Ht 6' 1\" (1.854 m)   Wt 194 lb (88 kg)   SpO2 97%   BMI 25.60 kg/m²   Temp (24hrs), Av.8 °F (37.1 °C), Min:98.8 °F (37.1 °C), Max:98.8 °F (37.1 °C)    No results for input(s): POCGLU in the last 72 hours.     Intake/Output Summary (Last 24 hours) at 3/12/2022 2014  Last data filed at 3/12/2022 1405  Gross per 24 hour   Intake --   Output 500 ml   Net -500 ml       General Appearance: alert, well appearing, and in no acute distress  Mental status: oriented to person, place, and time  Head: normocephalic, atraumatic  Eye: no icterus, redness, pupils equal and reactive, extraocular eye movements intact, conjunctiva clear  Ear: normal external ear, no discharge, hearing intact  Nose: no drainage noted  Mouth: mucous membranes moist  Neck: supple, no carotid bruits, thyroid not palpable  Lungs: Bilateral equal air entry, clear to ausculation, no wheezing, rales or rhonchi, normal effort  Cardiovascular: normal rate, regular rhythm, no murmur, gallop, rub  Abdomen: Mildly tender, midline scar well-healed, mid abdomen small 3 into 5 cm hard mass, possible fibrosis from the old surgery  Neurologic: There are no new focal motor or sensory deficits, normal muscle tone and bulk, no abnormal sensation, normal speech, cranial nerves II through XII grossly intact  Skin: No gross lesions, rashes, bruising or bleeding on exposed skin area  Extremities: peripheral pulses palpable, no pedal edema or calf pain with palpation  Psych: normal affect    Investigations:      Laboratory Testing:  Recent Results (from the past 24 hour(s))   EKG 12 Lead    Collection Time: 03/12/22 10:52 AM   Result Value Ref Range    Ventricular Rate 69 BPM    Atrial Rate 69 BPM    P-R Interval 202 ms    QRS Duration 80 ms    Q-T Interval 416 ms    QTc Calculation (Bazett) 445 ms    P Axis 83 degrees    R Axis -17 degrees    T Axis -82 degrees   CBC with Auto Differential    Collection Time: 03/12/22 11:05 AM   Result Value Ref Range    WBC 5.8 3.5 - 11.0 k/uL    RBC 3.81 (L) 4.5 - 5.9 m/uL    Hemoglobin 11.4 (L) 13.5 - 17.5 g/dL    Hematocrit 34.4 (L) 41 - 53 %    MCV 90.3 80 - 100 fL    MCH 29.8 26 - 34 pg    MCHC 33.0 31 - 37 g/dL    RDW 13.9 11.5 - 14.9 %    Platelets 516 578 - 494 k/uL    MPV 7.0 6.0 - 12.0 fL    Seg Neutrophils 85 (H) 36 - 66 %    Lymphocytes 7 (L) 24 - 44 %    Monocytes 8 (H) 1 - 7 %    Eosinophils % 0 0 - 4 %    Basophils 0 0 - 2 %    Segs Absolute 4.90 1.3 - 9.1 k/uL    Absolute Lymph # 0.40 (L) 1.0 - 4.8 k/uL Absolute Mono # 0.40 0.1 - 1.3 k/uL    Absolute Eos # 0.00 0.0 - 0.4 k/uL    Basophils Absolute 0.00 0.0 - 0.2 k/uL   Comprehensive Metabolic Panel w/ Reflex to MG    Collection Time: 03/12/22 11:05 AM   Result Value Ref Range    Glucose 145 (H) 70 - 99 mg/dL    BUN 15 8 - 23 mg/dL    CREATININE 1.00 0.70 - 1.20 mg/dL    Calcium 9.3 8.6 - 10.4 mg/dL    Sodium 134 (L) 135 - 144 mmol/L    Potassium 4.0 3.7 - 5.3 mmol/L    Chloride 99 98 - 107 mmol/L    CO2 23 20 - 31 mmol/L    Anion Gap 12 9 - 17 mmol/L    Alkaline Phosphatase 149 (H) 40 - 129 U/L    ALT 23 5 - 41 U/L    AST 23 <40 U/L    Total Bilirubin 0.58 0.3 - 1.2 mg/dL    Total Protein 7.6 6.4 - 8.3 g/dL    Albumin 4.0 3.5 - 5.2 g/dL    GFR Non-African American >60 >60 mL/min    GFR African American >60 >60 mL/min    GFR Comment         Lipase    Collection Time: 03/12/22 11:05 AM   Result Value Ref Range    Lipase 36 13 - 60 U/L   Troponin    Collection Time: 03/12/22 11:05 AM   Result Value Ref Range    Troponin, High Sensitivity 28 (H) 0 - 22 ng/L   Brain Natriuretic Peptide    Collection Time: 03/12/22 11:05 AM   Result Value Ref Range    Pro-BNP 3,233 (H) <300 pg/mL   Protime-INR    Collection Time: 03/12/22 11:05 AM   Result Value Ref Range    Protime 14.5 11.8 - 14.6 sec    INR 1.1    APTT    Collection Time: 03/12/22 11:05 AM   Result Value Ref Range    PTT 29.4 24.0 - 36.0 sec   Urinalysis with Microscopic    Collection Time: 03/12/22 11:14 AM   Result Value Ref Range    Color, UA Yellow Yellow    Turbidity UA Clear Clear    Glucose, Ur NEGATIVE NEGATIVE    Bilirubin Urine NEGATIVE NEGATIVE    Ketones, Urine NEGATIVE NEGATIVE    Specific Gravity, UA 1.010 1.000 - 1.030    Urine Hgb NEGATIVE NEGATIVE    pH, UA 7.5 5.0 - 8.0    Protein, UA NEGATIVE NEGATIVE    Urobilinogen, Urine Normal Normal    Nitrite, Urine NEGATIVE NEGATIVE    Leukocyte Esterase, Urine TRACE (A) NEGATIVE    WBC, UA 10 TO 20 /HPF    RBC, UA 0 TO 2 /HPF    Casts UA 6 TO 9 /LPF Epithelial Cells UA 0 TO 2 /HPF    Bacteria, UA None None       Imaging/Diagnostics:  CT ABDOMEN PELVIS W IV CONTRAST Additional Contrast? None    Result Date: 3/12/2022  Dilated loops of proximal small bowel are concerning for obstruction. Transition point is suspected within the left mid abdomen. Focal dilation of the large bowel at the splenic flexure could represent underlying ileus. Large colonic and rectal stool, suggesting constipation. Right adrenal gland nodule measures 1.9 x 1.8 cm. Consider further evaluation with nonemergent adrenal CT or MR. Subcentimeter hypodensity within the tail of the pancreas is too small to adequately characterize by CT. Consider further evaluation with MRI. XR CHEST PORTABLE    Result Date: 3/12/2022  No acute findings in the chest.     XR CHEST PORTABLE    Result Date: 3/7/2022  Linear scarring/atelectasis in the right lung base is again noted. No new cardiopulmonary abnormality. Assessment :      Hospital Problems           Last Modified POA    * (Principal) Small bowel obstruction ([address] 3/12/2022 Yes    Atrial fibrillation ([address] 3/12/2022 Yes    On continuous oral anticoagulation 3/12/2022 Yes    Hyperlipidemia 3/12/2022 Yes    Former smoker 3/12/2022 Yes    Pacemaker 3/12/2022 Yes    History of DVT of lower extremity 3/12/2022 Yes    GERD (gastroesophageal reflux disease) 3/12/2022 Yes    Hypertension 3/12/2022 Yes    Chronic CHF (congestive heart failure) ([address] 3/12/2022 Yes    Chronic diastolic congestive heart failure ([address] 3/12/2022 Yes    SBO (small bowel obstruction) ([address] 3/12/2022 Yes          Plan:     Patient status inpatient in the Progressive Unit/Step down    1. Acute small bowel obstruction, NG in place, gentle hydration, general surgery on board, n.p.o. at this time  2. Afib rate controlled , xarelto , on hold now   3. ICD/PAcemaker inplace   4. Ch CHF systolic and diastolic stable   5. Full code status   6.  Home meds ordered and held due to NPO 7. Mild elevated troponin, baseline,      Consultations:   [address] CONSULT TO INTERNAL MEDICINE     Patient is admitted as inpatient status because of co-morbidities listed above, severity of signs and symptoms as outlined, requirement for current medical therapies and most importantly because of direct risk to patient if care not provided in a hospital setting. Expected length of stay > 48 hours. Jerad Jackson MD  3/12/2022  8:14 PM    Copy sent to Dr. Contreras Ridley MD    Please note that this chart was generated using voice recognition Dragon dictation software. Although every effort was made to ensure the accuracy of this automated transcription, some errors in transcription may have occurred.

## 2022-03-13 NOTE — PROGRESS NOTES
Physical Therapy    Facility/Department: [facility] MED SURG  Initial Assessment    NAME: Angie Swift  : 1935  MRN: [id number]    Date of Service: 3/13/2022    Discharge Recommendations:  Continue to assess pending progress   PT Equipment Recommendations  Equipment Needed: No    Assessment   Body structures, Functions, Activity limitations: Decreased functional mobility ; Decreased balance;Decreased strength;Decreased endurance; Increased pain  Assessment: continue per POC to maxmize potential for safe D/C  Treatment Diagnosis: impaired mobility due to SBO  Specific instructions for Next Treatment: instruct in exercise, advance gait distance and progress to 1 step  Prognosis: Good  Decision Making: Medium Complexity  History: admitted due to SBO  Exam: ROM, MMT, balance and mobility assessments  Clinical Presentation: gait w/ s cane 5' forward and back w/ SBA x 1- limited due to NG to VICTORINOWS, MOD I bed mobility, SBA sit <> stand  PT Education: Plan of Care;PT Role;Goals;Transfer Training;General Safety;Home Exercise Program;Gait Training  Barriers to Learning: none  REQUIRES PT FOLLOW UP: Yes  Activity Tolerance  Activity Tolerance: Patient Tolerated treatment well       Patient Diagnosis(es): The encounter diagnosis was Small bowel obstruction ([facility] [address] has a past medical history of Atrial fibrillation ([address] CAD (coronary artery disease), CHF (congestive heart failure) ([address] Hyperlipidemia, and Hypertension. has a past surgical history that includes pacemaker placement and Cardiac catheterization.     Restrictions  Restrictions/Precautions  Restrictions/Precautions:  (NG to LIWS, peripheral IV left antecubital, NPO, troponins 35 on 3-7-2022)  Required Braces or Orthoses?: Yes (LS corsett when back is bothering him)  Implants present? : Pacemaker,Metal implants (left TKR, right  THR x 2 ( and ))  Required Braces or Orthoses  Spinal: Lumbar Corset  Position Activity Restriction  Other position/activity restrictions: up w/ assist, NPO  Vision/Hearing  Vision: Impaired  Vision Exceptions: Wears glasses for reading  Hearing: Exceptions to [facility]  Hearing Exceptions: Hard of hearing/hearing concerns;Bilateral hearing aid     Subjective  General  Patient assessed for rehabilitation services?: Yes  Response To Previous Treatment: Not applicable  Family / Caregiver Present: No  Referring Practitioner: Dr. Moises Simpson  Referral Date : 03/12/22  Diagnosis: SBO  Follows Commands: Within Functional Limits  Other (Comment): OK per nurse Lolis to proceed w/ PT evaluation  Subjective  Subjective: pt reports that he is having a rough day. Pt reports increased pain in throat due to the position of the NG. Pt had reported that he developed nausea, abdominal pain and emesis just prior to admission.   Pain Screening  Patient Currently in Pain: Yes  Pain Assessment  Pain Assessment: 0-10  Pain Level: 4  Patient's Stated Pain Goal: No pain  Pain Type: Acute pain  Pain Location: Throat  Pain Descriptors: Sharp  Pain Frequency: Continuous  Pain Onset: On-going  Non-Pharmaceutical Pain Intervention(s): Ambulation/Increased Activity;Repositioned  Response to Pain Intervention: Patient Satisfied  Multiple Pain Sites: No  Vital Signs  Patient Currently in Pain: Yes       Orientation  Orientation  Overall Orientation Status: Within Functional Limits (answered all questions correctly)  Social/Functional History  Social/Functional History  Lives With: Family (son, dtr-in-law and grandson)  Type of Home: House  Home Layout: One level  Home Access: Stairs to enter with rails  Entrance Stairs - Number of Steps: 2 steps w/o rail at front porch, 1 step w/ right grab bar  through the garage- uses garage entrance entrance  Entrance Stairs - Rails: Right  Bathroom Shower/Tub: Walk-in shower,Curtain,Shower chair with back  Bathroom Toilet: Handicap height (toilet raiser w/ grab bars)  Bathroom Equipment: Grab bars around toilet,Toilet raiser,Grab bars in shower,Shower chair  Home Equipment: Cane,Rolling walker,Reacher,Long-handled shoehorn,Sock aid,Alert Button  ADL Assistance: Independent (wears support hose bilateral LEs- able to don/ doff independently, independent bathing, dressing and toileting)  Homemaking Assistance: Independent (uses electric cart when grocery shopping, states he does his own laundry, cooking and cleaning)  Homemaking Responsibilities: Yes  Ambulation Assistance: Independent (uses s cane for community distances, usually no device inside)  Transfer Assistance: Independent  Active : Yes  Mode of Transportation: Car  Occupation: Retired  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 [facility]- unsure of name)  Cognition        Objective     Observation/Palpation  Observation: NG to LIWS, peripheral IV left antecubital, NPO, wears black support hose bilateral legs    AROM RLE (degrees)  RLE AROM: WFL  AROM LLE (degrees)  LLE AROM : WFL  AROM RUE (degrees)  RUE General AROM: see OT for UE assessments  AROM LUE (degrees)  LUE General AROM: see OT for UE assessments  Strength RLE  Comment: grossly 4+/5  Strength LLE  Comment: grossly 4+/5  Strength RUE  Comment: see OT for UE assessments  Strength LUE  Comment: see OT for UE assessments     Sensation  Overall Sensation Status: WFL (denies)  Bed mobility  Rolling to Left: Modified independent  Supine to Sit: Modified independent  Sit to Supine: Modified independent  Scooting: Modified independent  Comment: HOB elevated for above activities, pt dangles independently  Transfers  Sit to Stand: Stand by assistance  Stand to sit: Stand by assistance  Comment: deferred up in chair- pt requests to return to bed  Ambulation  Ambulation?: Yes  Ambulation 1  Surface: level tile  Device: Single point cane  Assistance: Stand by assistance  Gait Deviations: Slow Madeline  Distance: 5' forward and then retro  Comments: deferred further distance to NG to LIWS  Stairs/Curb  Stairs?: No     Balance  Sitting - Static: Good  Sitting - Dynamic: Good  Standing - Static: Good (used s cane)  Standing - Dynamic: Good;- (used s cane)        Plan   Plan  Times per week: 3-5 treatments/ 5 days  Times per day:  (3-5 treatments/ 5 days)  Specific instructions for Next Treatment: instruct in exercise, advance gait distance and progress to 1 step  Current Treatment Recommendations: Strengthening,Transfer Training,Endurance Training,Patient/Caregiver Education & Training,Equipment Evaluation, Education, & procurement,Balance Training,Gait Training,Home Exercise Program,Safety Education & Training,Stair training,Functional Mobility Training  Safety Devices  Type of devices: Gait belt,Left in bed,Call light within reach,Nurse notified (nurse Loils)    G-Code       OutComes Score                                                  AM-PAC Score  -PAC Inpatient Mobility Raw Score : 16 (03/13/22 0825)  -PAC Inpatient T-Scale Score : 40.78 (03/13/22 0825)  Mobility Inpatient CMS 0-100% Score: 54.16 (03/13/22 0825)  Mobility Inpatient CMS G-Code Modifier : CK (03/13/22 0825)          Goals  Short term goals  Time Frame for Short term goals: 3-5 treatments/ 5 days  Short term goal 1: Pt to demonstrate  good technique for HEP for BLE strengthening and balance  Short term goal 2: Pt to demonstrate bed mobility IND from a flat surface  Short term goal 3: Pt to demonstrate  Mod I transfers using s cane  Short term goal 4: Pt to demonstrate ambulation  150' ' with device MOD I for community ambulation  Short term goal 5: Pt to demonstrate ability to ascend/descend 1 step w/  right rail w/ SBA  Patient Goals Patient goals : return home w/ son and his family       Therapy Time   Individual Concurrent Group Co-treatment   Time In 0825         Time Out 0859         Minutes 34         Timed Code Treatment Minutes: [address], PT

## 2022-03-13 NOTE — PROGRESS NOTES
[address]   OCCUPATIONAL THERAPY MISSED TREATMENT NOTE   INPATIENT   Date: 3/13/22  Patient Name: Rolly Shay       Room: 4626/8679-44  MRN: [id number]   Account #: [de-identified]    : 1935  (80 y.o.)  Gender: male                 REASON FOR MISSED TREATMENT:  Patient with another ancillary department   -    Needs Evaluation        Kassidy Chauhan, OT

## 2022-03-13 NOTE — ED NOTES
Call placed to Dr. Loreta Garcia regarding patient condition and request to change order from PCU to Med-Surg. According to Dr. Loreta Garcia the Pt was supposed to go to Med-Surg from the time he was accepted. Order updated with [facility] per Dr. Loreta Garcia.      Dexter Katz RN  03/12/22 [id number]

## 2022-03-13 NOTE — CONSULTS
General Surgery Consult      Pt Name: Silas Rankin  MRN: [id number]  YOB: 1935  Date of evaluation: 3/13/2022  Primary Care Physician: Alok Jaquez MD   Patient evaluated at the request of  Dr. Kelly Hansen  Reason for evaluation: abdominal pain    SUBJECTIVE:   History of Chief Complaint:    Silas Rankin is a 80 y.o. male who presents with abdominal pain. patient with history of small bowel obstruction in the past. Has had surgery for small bowel obstruction. Patient had nausea. Symptoms started yesterday progressively got worse during the day. Episode of vomiting. Evidence of constipation. No bowel movement for the last 2-3 days. No urinary symptoms. multiple other medical issues including atrial fibrillation on Xarelto. CHF 6 sinus syndrome patient has ICD in place. Patient was given  enema in the emergency department had 2 bowel movements. Symptom onset has been acute for a time period of few hour(s). Severity is described as moderate. Course of his symptoms over time is acute. Past Medical History   has a past medical history of Atrial fibrillation ([address] CAD (coronary artery disease), CHF (congestive heart failure) ([address] Hyperlipidemia, and Hypertension. Past Surgical History   has a past surgical history that includes pacemaker placement and Cardiac catheterization. Medications  Prior to Admission medications    Medication Sig Start Date End Date Taking?  Authorizing Provider   XARELTO 15 MG TABS tablet TAKE 1 TABLET BY MOUTH DAILY WITH BREAKFAST 3/11/22   Lyly Fisher MD   bumetanide (BUMEX) 2 MG tablet Take 1 tablet by mouth 2 times daily 3/8/22   Jan Menon MD   spironolactone (ALDACTONE) 25 MG tablet Take 1 tablet by mouth daily 3/8/22   Jan Menon MD   metoprolol succinate (TOPROL XL) 25 MG extended release tablet Take 2 tablets by mouth every morning  Patient taking differently: Take 100 mg by mouth every morning  2/9/22   Alok Jaquez MD   Ascorbic Acid (VITAMIN C) 250 MG tablet TAKE 1 TABLET BY MOUTH TWICE DAILY(TAKE WITH IRON) 2/7/22   Jackie Bagley MD   nitroGLYCERIN (NITROSTAT) 0.4 MG SL tablet up to max of 3 total doses. If no relief after 1 dose, call 911. 1/8/22   Farhad Manley MD   docusate sodium (COLACE) 100 MG capsule Take 100 mg by mouth 2 times daily as needed for Constipation    Historical Provider, MD   finasteride (PROSCAR) 5 MG tablet Take 1 tablet by mouth daily 1/6/22   Ifeoma Aldrich MD   vitamin D (ERGOCALCIFEROL) 1.25 MG (07393 UT) CAPS capsule TAKE 1 CAPSULE BY MOUTH EVERY WEEK  Patient taking differently: once a week tuesdays 12/17/21   Jackie Bagley MD   amLODIPine (NORVASC) 5 MG tablet Take 2 tablets by mouth daily 11/10/21   Ifeoma Aldrich MD   cyanocobalamin ([employer] VITAMIN B12) 1000 MCG tablet Take 1 tablet by mouth daily 11/3/21   Ifeoma Aldrich MD   Coenzyme Q10 100 MG CHEW Take 1 tablet by mouth daily 10/26/21   Ifeoam Aldrich MD   famotidine (PEPCID) 20 MG tablet Take 1 tablet by mouth daily 10/26/21   Ifeoma Aldrich MD   ferrous sulfate (IRON 325) 325 (65 Fe) MG tablet Take 1 tablet by mouth 2 times daily 8/20/21   Jackie Bagley MD   buprenorphine-naloxone (SUBOXONE) 8-2 MG FILM SL film Place 1 Film under the tongue 2 times daily. Indications: pain     Historical Provider, MD   magnesium oxide (MAG-OX) 400 MG tablet Take 400 mg by mouth daily    Historical Provider, MD   latanoprost (XALATAN) 0.005 % ophthalmic solution Place 1 drop into both eyes nightly    Historical Provider, MD     Allergies  is allergic to aspirin, cyclobenzaprine, ibuprofen, azithromycin, and hydrocodone-acetaminophen. Family History  family history is not on file. Social History   reports that he has never smoked. He has never used smokeless tobacco. He reports that he does not drink alcohol and does not use drugs. Review of Systems:  All 10 systems review was conducted. Please refer to chart.     OBJECTIVE:   VITALS:  height is 6' 1\" (1.854 m) and weight is 194 lb (88 kg). His oral temperature is 98.8 °F (37.1 °C). His blood pressure is 130/84 and his pulse is 70. His respiration is 18 and oxygen saturation is 98%. CONSTITUTIONAL: Alert and oriented times 3, no acute distress and cooperative to examination with proper mood and affect. SKIN: Skin color, texture, turgor normal. No rashes or lesions. LYMPH: no cervical nodes, no inguinal nodes  HEENT: Head is normocephalic, atraumatic. EOMI, PERRLA  NECK: Supple, symmetrical, trachea midline, no adenopathy, thyroid symmetric, not enlarged and no tenderness, skin normal  CHEST/LUNGS: chest symmetric with normal A/P diameter, normal respiratory rate and rhythm, lungs clear to auscultation without wheezes, rales or rhonchi. No accessory muscle use. Scars None   CARDIOVASCULAR: Heart regular rate and rhythm Normal S1 and S2. . Carotid and femoral pulses 2+/4 and equal bilaterally  ABDOMEN: soft abdomen nondistended mild tenderness. No peritoneal signs. Previous surgical scars are well-healed. RECTAL: deferred, not clinically indicated  NEUROLOGIC: There are no focalizing motor or sensory deficits. CN II-XII are grossly intact.   EXTREMITIES: no cyanosis, no clubbing and no edema    LABS:   CBC with Differential:    Lab Results   Component Value Date    WBC 4.0 03/13/2022    RBC 3.67 03/13/2022    HGB 11.0 03/13/2022    HCT 33.6 03/13/2022     03/13/2022    MCV 91.4 03/13/2022    MCH 29.9 03/13/2022    MCHC 32.8 03/13/2022    RDW 14.0 03/13/2022    LYMPHOPCT 25 03/13/2022    MONOPCT 13 03/13/2022    BASOPCT 1 03/13/2022    MONOSABS 0.50 03/13/2022    LYMPHSABS 1.00 03/13/2022    EOSABS 0.30 03/13/2022    BASOSABS 0.00 03/13/2022    DIFFTYPE NOT REPORTED 01/13/2022     BMP:    Lab Results   Component Value Date     03/13/2022    K 3.8 03/13/2022     03/13/2022    CO2 24 03/13/2022    BUN 19 03/13/2022    LABALBU 4.0 03/12/2022    CREATININE 0.90 03/13/2022    CALCIUM 9.1 03/13/2022    GFRAA >60 03/13/2022 LABGLOM >60 03/13/2022    GLUCOSE 101 03/13/2022     Hepatic Function Panel:    Lab Results   Component Value Date    ALKPHOS 149 03/12/2022    ALT 23 03/12/2022    AST 23 03/12/2022    PROT 7.6 03/12/2022    BILITOT 0.58 03/12/2022    LABALBU 4.0 03/12/2022     Calcium:    Lab Results   Component Value Date    CALCIUM 9.1 03/13/2022     Magnesium:    Lab Results   Component Value Date    MG 2.2 02/01/2022     Phosphorus:    Lab Results   Component Value Date    PHOS 4.2 09/24/2021     PT/INR:    Lab Results   Component Value Date    PROTIME 14.9 03/13/2022    INR 1.2 03/13/2022     ABG:  No results found for: PHART, PH, EEB0JSL, PCO2, PO2ART, PO2, ITE4UMU, HCO3, BEART, BE, THGBART, THB, ZBX1AUA, I8RAEDWA, O2SAT  Urine Culture:  No components found for: CURINE  Blood Culture:  No components found for: CBLOOD, CFUNGUSBL  Stool Culture:  No components found for: CSTOOL    RADIOLOGY:   I have personally reviewed the following films:  XR ABDOMEN (KUB) (SINGLE AP VIEW)    Result Date: 3/13/2022  EXAMINATION: ONE SUPINE XRAY VIEW(S) OF THE ABDOMEN 3/13/2022 6:15 am COMPARISON: None. HISTORY: ORDERING SYSTEM PROVIDED HISTORY: pain TECHNOLOGIST PROVIDED HISTORY: pain Reason for Exam: abdominal pain, follow up SBO FINDINGS: Portable supine view of the abdomen time stamped at 617 hours demonstrates contrast material in the bladder presumably from CT scan of 1 day earlier. Moderate colonic stool load is present. Dilated loops of small bowel are noted less prominent than on prior CT of 1 day earlier. There is also gaseous prominence of the colon, overall appearance favoring ileus. Intestinal tube terminates in the stomach. Portions of a unipolar pacemaker are noted. Postsurgical changes are noted lumbar spine and right hip with indwelling hardware. Bridging hardware is present in the right SI joint. Gas pattern on today's study favors ileus. No organomegaly or free air. Appliances as above.   Small bowel diameter decreased from CT scan of 1 day earlier. CT ABDOMEN PELVIS W IV CONTRAST Additional Contrast? None    Result Date: 3/12/2022  EXAMINATION: CT OF THE ABDOMEN AND PELVIS WITH CONTRAST 3/12/2022 12:08 pm TECHNIQUE: CT of the abdomen and pelvis was performed with the administration of intravenous contrast. Multiplanar reformatted images are provided for review. Dose modulation, iterative reconstruction, and/or weight based adjustment of the mA/kV was utilized to reduce the radiation dose to as low as reasonably achievable. COMPARISON: None HISTORY: ORDERING SYSTEM PROVIDED HISTORY: pain TECHNOLOGIST PROVIDED HISTORY: pain Decision Support Exception - unselect if not a suspected or confirmed emergency medical condition->Emergency Medical Condition (MA) Reason for Exam: Emesis Additional signs and symptoms: pt states generalized abdominal pain, nausea and vomiting FINDINGS: Lower Chest: Bibasilar atelectasis. Borderline cardiomegaly. Organs: No intrahepatic ductal dilatation. Normal gallbladder, partially contracted and limited in evaluation. Subcentimeter low-density structure near the tail of the pancreas is too small to adequately characterize by CT (series 2, image 43). Nodular appearance of the left adrenal gland may represent hypertrophy. Heterogenous and possibly enhancing nodule within the right adrenal gland measures 1.9 x 1.8 cm. There are subcentimeter bilateral renal hypodensities that are too small to characterize, however, statistically favor cysts. Right renal cysts measure up to 3 cm. No visualized hydronephrosis or obstructing calculus. GI/Bowel: Small hiatal hernia. Proximal small bowel dilatation with air-fluid levels with likely transition point in the left mid abdomen. Moderate to large colonic stool, which causes mass effect on the liver. Mild dilation of the splenic flexure. Large amount of rectal stool. Pelvis: Limited in evaluation due to beam hardening artifact.   No pelvic mass, adenopathy, or fluid collection. Peritoneum/Retroperitoneum: Atherosclerotic calcifications are demonstrated throughout the course of the aorta without aneurysmal dilatation. No large volume ascites or discrete free intraperitoneal air. Bones/Soft Tissues: Diffuse demineralization. Age related degenerative changes of the visualized osseous structures without focal destructive lesion. Postsurgical findings of the right hip and SI joint. Variation of the distal sternum is likely secondary to remote injury or congenital variant. Dilated loops of proximal small bowel are concerning for obstruction. Transition point is suspected within the left mid abdomen. Focal dilation of the large bowel at the splenic flexure could represent underlying ileus. Large colonic and rectal stool, suggesting constipation. Right adrenal gland nodule measures 1.9 x 1.8 cm. Consider further evaluation with nonemergent adrenal CT or MR. Subcentimeter hypodensity within the tail of the pancreas is too small to adequately characterize by CT. Consider further evaluation with MRI. XR CHEST PORTABLE    Result Date: 3/12/2022  EXAMINATION: ONE XRAY VIEW OF THE CHEST 3/12/2022 11:02 am COMPARISON: Chest radiograph 03/07/2022 HISTORY: ORDERING SYSTEM PROVIDED HISTORY: pain TECHNOLOGIST PROVIDED HISTORY: pain Reason for Exam: pain FINDINGS: Unchanged left subclavian dual chamber pacemaker. Partially included changes of lumbar spine posterior fusion and discectomy. Unchanged linear scarring in the right lung base overlying persistent elevation or eventration of the right hemidiaphragm. Otherwise clear lungs. No definite findings of pneumothorax or pleural effusion. Normal mediastinal, hilar, and cardiac contours. No obvious acute fracture. Joints maintain anatomic alignment. No acute findings in the chest.         IMPRESSION:   1. Abdominal pain  2.  Dilated loops of proximal small bowel concerning for obstruction. 3. Constipation. 4. Right adrenal gland nodule measuring 1.9 cm.  5. Subcentimeter hypodensity within the tail of the pancreas too small to characterize. 6. Multiple previous abdominal surgeries including surgery for bowel obstruction. 7. Cardiac issues on Xarelto. 8. Today's abdominal x-ray reveals gas pattern consistent with ileus. No free air. Small bowel diameter decreased compared to yesterday. does not have any pertinent problems on file. PLAN:   1. Continue bowel rest. IV hydration. NG tube to low intermittent suction. GI/DVT prophylaxis. Another MOM enema today. Small bowel follow-through tomorrow. Conservative management for now. Discussed with nursing staff and the patient. Thank you for this interesting consult and for allowing us to participate in the care of this patient. If you have any questions please don't hesitate to call.           Electronically signed by Trish Alvarez MD  on 3/13/2022 at 4:30 PM

## 2022-03-14 ENCOUNTER — TELEPHONE (OUTPATIENT)
Dept: INTERNAL MEDICINE CLINIC | Age: 87
End: 2022-03-14

## 2022-03-14 ENCOUNTER — APPOINTMENT (OUTPATIENT)
Dept: GENERAL RADIOLOGY | Age: 87
DRG: 389 | End: 2022-03-14
Payer: MEDICARE

## 2022-03-14 LAB
ABSOLUTE EOS #: 0.2 K/UL (ref 0–0.4)
ABSOLUTE LYMPH #: 0.8 K/UL (ref 1–4.8)
ABSOLUTE MONO #: 0.5 K/UL (ref 0.1–1.3)
ANION GAP SERPL CALCULATED.3IONS-SCNC: 13 MMOL/L (ref 9–17)
BASOPHILS # BLD: 1 % (ref 0–2)
BASOPHILS ABSOLUTE: 0 K/UL (ref 0–0.2)
BUN BLDV-MCNC: 17 MG/DL (ref 8–23)
CALCIUM SERPL-MCNC: 8.8 MG/DL (ref 8.6–10.4)
CHLORIDE BLD-SCNC: 106 MMOL/L (ref 98–107)
CO2: 20 MMOL/L (ref 20–31)
CREAT SERPL-MCNC: 0.77 MG/DL (ref 0.7–1.2)
EKG ATRIAL RATE: 62 BPM
EKG ATRIAL RATE: 69 BPM
EKG P AXIS: 83 DEGREES
EKG P-R INTERVAL: 202 MS
EKG Q-T INTERVAL: 416 MS
EKG Q-T INTERVAL: 454 MS
EKG QRS DURATION: 162 MS
EKG QRS DURATION: 80 MS
EKG QTC CALCULATION (BAZETT): 445 MS
EKG QTC CALCULATION (BAZETT): 490 MS
EKG R AXIS: -17 DEGREES
EKG R AXIS: -74 DEGREES
EKG T AXIS: -82 DEGREES
EKG T AXIS: 96 DEGREES
EKG VENTRICULAR RATE: 69 BPM
EKG VENTRICULAR RATE: 70 BPM
EOSINOPHILS RELATIVE PERCENT: 4 % (ref 0–4)
GFR AFRICAN AMERICAN: >60 ML/MIN
GFR NON-AFRICAN AMERICAN: >60 ML/MIN
GFR SERPL CREATININE-BSD FRML MDRD: NORMAL ML/MIN/{1.73_M2}
GLUCOSE BLD-MCNC: 82 MG/DL (ref 70–99)
HCT VFR BLD CALC: 33.8 % (ref 41–53)
HEMOGLOBIN: 11.1 G/DL (ref 13.5–17.5)
LYMPHOCYTES # BLD: 18 % (ref 24–44)
MCH RBC QN AUTO: 29.9 PG (ref 26–34)
MCHC RBC AUTO-ENTMCNC: 32.7 G/DL (ref 31–37)
MCV RBC AUTO: 91.4 FL (ref 80–100)
MONOCYTES # BLD: 11 % (ref 1–7)
PDW BLD-RTO: 14.1 % (ref 11.5–14.9)
PLATELET # BLD: 246 K/UL (ref 150–450)
PMV BLD AUTO: 7 FL (ref 6–12)
POTASSIUM SERPL-SCNC: 4.1 MMOL/L (ref 3.7–5.3)
RBC # BLD: 3.7 M/UL (ref 4.5–5.9)
SEG NEUTROPHILS: 66 % (ref 36–66)
SEGMENTED NEUTROPHILS ABSOLUTE COUNT: 2.9 K/UL (ref 1.3–9.1)
SODIUM BLD-SCNC: 139 MMOL/L (ref 135–144)
TROPONIN, HIGH SENSITIVITY: 27 NG/L (ref 0–22)
WBC # BLD: 4.3 K/UL (ref 3.5–11)

## 2022-03-14 PROCEDURE — 6370000000 HC RX 637 (ALT 250 FOR IP): Performed by: INTERNAL MEDICINE

## 2022-03-14 PROCEDURE — 99232 SBSQ HOSP IP/OBS MODERATE 35: CPT | Performed by: INTERNAL MEDICINE

## 2022-03-14 PROCEDURE — C9113 INJ PANTOPRAZOLE SODIUM, VIA: HCPCS | Performed by: SURGERY

## 2022-03-14 PROCEDURE — 36415 COLL VENOUS BLD VENIPUNCTURE: CPT

## 2022-03-14 PROCEDURE — 85025 COMPLETE CBC W/AUTO DIFF WBC: CPT

## 2022-03-14 PROCEDURE — 84484 ASSAY OF TROPONIN QUANT: CPT

## 2022-03-14 PROCEDURE — 2500000003 HC RX 250 WO HCPCS: Performed by: INTERNAL MEDICINE

## 2022-03-14 PROCEDURE — 2580000003 HC RX 258: Performed by: SURGERY

## 2022-03-14 PROCEDURE — 1200000000 HC SEMI PRIVATE

## 2022-03-14 PROCEDURE — 6360000004 HC RX CONTRAST MEDICATION: Performed by: INTERNAL MEDICINE

## 2022-03-14 PROCEDURE — 74250 X-RAY XM SM INT 1CNTRST STD: CPT

## 2022-03-14 PROCEDURE — 6360000002 HC RX W HCPCS: Performed by: INTERNAL MEDICINE

## 2022-03-14 PROCEDURE — 80048 BASIC METABOLIC PNL TOTAL CA: CPT

## 2022-03-14 PROCEDURE — 6360000002 HC RX W HCPCS: Performed by: SURGERY

## 2022-03-14 RX ORDER — LATANOPROST 50 UG/ML
1 SOLUTION/ DROPS OPHTHALMIC NIGHTLY
Status: DISCONTINUED | OUTPATIENT
Start: 2022-03-14 | End: 2022-03-15 | Stop reason: HOSPADM

## 2022-03-14 RX ORDER — PANTOPRAZOLE SODIUM 40 MG/1
40 TABLET, DELAYED RELEASE ORAL
Status: DISCONTINUED | OUTPATIENT
Start: 2022-03-15 | End: 2022-03-15 | Stop reason: HOSPADM

## 2022-03-14 RX ORDER — METOPROLOL TARTRATE 5 MG/5ML
5 INJECTION INTRAVENOUS EVERY 8 HOURS
Status: DISCONTINUED | OUTPATIENT
Start: 2022-03-14 | End: 2022-03-14

## 2022-03-14 RX ADMIN — DIATRIZOATE MEGLUMINE AND DIATRIZOATE SODIUM 270 ML: 660; 100 LIQUID ORAL; RECTAL at 10:23

## 2022-03-14 RX ADMIN — HYDROMORPHONE HYDROCHLORIDE 0.5 MG: 1 INJECTION, SOLUTION INTRAMUSCULAR; INTRAVENOUS; SUBCUTANEOUS at 04:37

## 2022-03-14 RX ADMIN — MORPHINE SULFATE 2 MG: 2 INJECTION, SOLUTION INTRAMUSCULAR; INTRAVENOUS at 00:37

## 2022-03-14 RX ADMIN — SODIUM CHLORIDE: 9 INJECTION, SOLUTION INTRAVENOUS at 16:13

## 2022-03-14 RX ADMIN — SODIUM CHLORIDE, PRESERVATIVE FREE 10 ML: 5 INJECTION INTRAVENOUS at 09:20

## 2022-03-14 RX ADMIN — NITROGLYCERIN 0.4 MG: 0.4 TABLET SUBLINGUAL at 00:37

## 2022-03-14 RX ADMIN — METOPROLOL TARTRATE 5 MG: 1 INJECTION, SOLUTION INTRAVENOUS at 11:39

## 2022-03-14 RX ADMIN — RIVAROXABAN 15 MG: 15 TABLET, FILM COATED ORAL at 18:02

## 2022-03-14 RX ADMIN — BUPRENORPHINE AND NALOXONE 1 FILM: 8; 2 FILM BUCCAL; SUBLINGUAL at 19:47

## 2022-03-14 RX ADMIN — HYDROMORPHONE HYDROCHLORIDE 0.5 MG: 1 INJECTION, SOLUTION INTRAMUSCULAR; INTRAVENOUS; SUBCUTANEOUS at 13:20

## 2022-03-14 RX ADMIN — PANTOPRAZOLE SODIUM 40 MG: 40 INJECTION, POWDER, FOR SOLUTION INTRAVENOUS at 09:02

## 2022-03-14 RX ADMIN — LATANOPROST 1 DROP: 50 SOLUTION OPHTHALMIC at 19:48

## 2022-03-14 RX ADMIN — HYDROMORPHONE HYDROCHLORIDE 0.5 MG: 1 INJECTION, SOLUTION INTRAMUSCULAR; INTRAVENOUS; SUBCUTANEOUS at 09:02

## 2022-03-14 RX ADMIN — BUMETANIDE 2 MG: 1 TABLET ORAL at 19:47

## 2022-03-14 ASSESSMENT — PAIN SCALES - GENERAL
PAINLEVEL_OUTOF10: 5
PAINLEVEL_OUTOF10: 6
PAINLEVEL_OUTOF10: 5
PAINLEVEL_OUTOF10: 5

## 2022-03-14 NOTE — PLAN OF CARE
Problem: Falls - Risk of:  Goal: Will remain free from falls  Description: Will remain free from falls  3/14/2022 1759 by David Mcnulty RN  Outcome: Met This Shift  3/14/2022 0519 by Chema Morgan RN  Outcome: Ongoing  Goal: Absence of physical injury  Description: Absence of physical injury  3/14/2022 1759 by David Mcnulty RN  Outcome: Met This Shift  3/14/2022 0519 by Chema Morgan RN  Outcome: Ongoing     Problem: Pain:  Goal: Pain level will decrease  Description: Pain level will decrease  3/14/2022 1759 by David Mcnulty RN  Outcome: Met This Shift  3/14/2022 0519 by Chema Morgan RN  Outcome: Ongoing  Goal: Control of acute pain  Description: Control of acute pain  3/14/2022 1759 by David Mcnulty RN  Outcome: Met This Shift  3/14/2022 0519 by Chema Morgan RN  Outcome: Ongoing  Goal: Control of chronic pain  Description: Control of chronic pain  3/14/2022 1759 by David Mcnulty RN  Outcome: Met This Shift  3/14/2022 0519 by Chema Morgan RN  Outcome: Ongoing     Problem: Musculor/Skeletal Functional Status  Goal: Highest potential functional level  3/14/2022 1759 by David Mcnulty RN  Outcome: Met This Shift  3/14/2022 0519 by Chema Morgan RN  Outcome: Ongoing  Goal: Absence of falls  3/14/2022 1759 by David Mcnulty RN  Outcome: Met This Shift  3/14/2022 0519 by Chema Morgan RN  Outcome: Ongoing

## 2022-03-14 NOTE — PROGRESS NOTES
sodium chloride       PRN Meds: HYDROmorphone, diatrizoate meglumine-sodium, phenol, nitroGLYCERIN, sodium chloride flush, sodium chloride flush, sodium chloride, ondansetron **OR** ondansetron, sodium chloride flush, sodium chloride, potassium chloride **OR** potassium alternative oral replacement **OR** potassium chloride, magnesium sulfate, polyethylene glycol, acetaminophen **OR** acetaminophen    Data:     Past Medical History:   has a past medical history of Atrial fibrillation ([facility] CAD (coronary artery disease), CHF (congestive heart failure) ([facility] [facility] Hyperlipidemia, and Hypertension. Social History:   reports that he has never smoked. He has never used smokeless tobacco. He reports that he does not drink alcohol and does not use drugs. Family History: History reviewed. No pertinent family history. Vitals:  BP (!) 161/92   Pulse 73   Temp 97.5 °F (36.4 °C)   Resp 18   Ht 6' 1\" (1.854 m)   Wt 194 lb (88 kg)   SpO2 96%   BMI 25.60 kg/m²   Temp (24hrs), Av.9 °F (36.6 °C), Min:97.5 °F (36.4 °C), Max:98.2 °F (36.8 °C)    No results for input(s): POCGLU in the last 72 hours. I/O (24Hr):     Intake/Output Summary (Last 24 hours) at 3/14/2022 1619  Last data filed at 3/14/2022 1345  Gross per 24 hour   Intake 10 ml   Output 1550 ml   Net -1540 ml       Labs:    Lab Results   Component Value Date    WBC 4.3 2022    HGB 11.1 (L) 2022    HCT 33.8 (L) 2022    MCV 91.4 2022     2022     Lab Results   Component Value Date     2022    K 4.1 2022     2022    CO2 20 2022    BUN 17 2022    CREATININE 0.77 2022    GLUCOSE 82 2022    CALCIUM 8.8 2022          Lab Results   Component Value Date/Time    SPECIAL NOT REPORTED 2021 08:15 PM     Lab Results   Component Value Date/Time    CULTURE NO SIGNIFICANT GROWTH 2021 08:15 PM         Radiology:    Recent data reviewed    Physical Examination: General appearance:  alert, cooperative and no distress  Eyes: Anicteric sclera. Pupils are equally round and reactive to light. Extraocular movements are intact. Lungs:  clear to auscultation bilaterally, normal effort  Heart:  regular rate and rhythm, no murmur  Abdomen:  soft, nontender, nondistended, normal bowel sounds, no masses, hepatomegaly, splenomegaly  Extremities:  no edema, redness, tenderness in the calves  Skin:  no gross lesions, rashes, induration  Neuro:  Alert, oriented X 3, no new focal weakness  Assessment:        Primary Problem  Small bowel obstruction [facility])    Active Hospital Problems    Diagnosis Date Noted    Small bowel obstruction ([facility] [address] [K56.609] 03/12/2022    SBO (small bowel obstruction) ([facility] [address] [K56.609] 03/12/2022    Chronic diastolic congestive heart failure (HCC) [I50.32] 01/13/2022    Chronic CHF (congestive heart failure) ([facility] [address] [I50.9] 11/17/2021    Atrial fibrillation ([facility] [address] [I48.91] 05/25/2021    On continuous oral anticoagulation [Z79.01] 05/25/2021    Hyperlipidemia [E78.5] 05/25/2021    Former smoker [D71.792] 05/25/2021    Pacemaker [Z95.0] 05/25/2021    History of DVT of lower extremity [Z86.718] 05/25/2021    GERD (gastroesophageal reflux disease) [K21.9] 05/25/2021    Hypertension [I10] 05/25/2021               Plan:          1. Acute small bowel obstruction-resolved NG to low intermittent wall suction, small bowel follow-through tomorrow 29  2. A. fib rate controlled Xarelto on hold  3. Pacemaker in place  4.  Chronic combined systolic diastolic heart failure currently compensated    3/13  Episode of chest pain this afternoon  Troponins flat, EKG was reviewed by myself, sinus rhythm no changes when compared to previous EKG   Patient has prior history of coronary artery disease, A. fib sick sinus syndrome AICD in situ; recent admission and November 2021 with chest pain and EKG changes; patient was discharged with recommendation for outpatient cardiac work-up  Unclear what work-up he had; since he is having recurrent chest pain will reconsult cardiology    3/14  NG tube out, tolerating full liquid diet  Appreciate cardiology recommendations  Anticipate discharge tomorrow    Kiet Whitley MD  3/14/2022  4:19 PM

## 2022-03-14 NOTE — PROGRESS NOTES
[address]   OCCUPATIONAL THERAPY MISSED TREATMENT NOTE   INPATIENT   Date: 3/14/22  Patient Name: Gely Hinds       Room: 8786/4590-73  MRN: [id number]   Account #: [de-identified]    : 1935  (80 y.o.)  Gender: male                 REASON FOR MISSED TREATMENT:  Patient at testing and/or off the floor   -   Other - 0957- OT attempted to see pt for eval. Per RN, pt is off the floor due to small bowel follow through. OT will continue to follow and reassess as able.        Charbel Nicole, S/OT

## 2022-03-14 NOTE — TELEPHONE ENCOUNTER
----- Message from Gregoria Luz sent at 3/14/2022  8:32 AM EDT -----  Subject: Message to Provider    QUESTIONS  Information for Provider? Patient in [facility] currently. Canceled today's appt, 03/14. Please be advised. ---------------------------------------------------------------------------  --------------  Jerel YADAV  What is the best way for the office to contact you? Do not leave any   message, patient will call back for answer  Preferred Call Back Phone Number? [phone]  ---------------------------------------------------------------------------  --------------  SCRIPT ANSWERS  Relationship to Patient?  Self

## 2022-03-14 NOTE — PROGRESS NOTES
Pt had been tachy consistent through out the morning on monitor I called Dr. Shannan Matt cardiologist per perfect serve to discuss any orders to be placed. He called back and stated will be rounding soon.

## 2022-03-14 NOTE — PROGRESS NOTES
Patient complaining of chest pain. Nitro administered. 4138 - patient states chest pain is still there. RN offered another nitro to patient. Patient denied, states it feels more like gas. Patient stable at this time, RN will continue to montlorena.

## 2022-03-14 NOTE — TELEPHONE ENCOUNTER
----- Message from Di Taylor sent at 3/14/2022  8:32 AM EDT -----  Subject: Message to Provider    QUESTIONS  Information for Provider? Patient in [facility] currently. Canceled today's appt, 03/14. Please be advised. ---------------------------------------------------------------------------  --------------  Chey YADAV  What is the best way for the office to contact you? Do not leave any   message, patient will call back for answer  Preferred Call Back Phone Number? [phone]  ---------------------------------------------------------------------------  --------------  SCRIPT ANSWERS  Relationship to Patient?  Self

## 2022-03-14 NOTE — PROGRESS NOTES
[facility]                 PATIENT NAME: Cb Kee     TODAY'S DATE: 3/14/2022, 10:48 AM    SUBJECTIVE:    Pt seen and examined. Afebrile, intermittent HTN and tachycardia. Cardiology following. No leukocytosis, hemoglobin stable. Patient states he had some pain overnight but feels significantly better this morning. Denies current abdominal pain. Had a large BM. No N/V. NG with 400mL bilious output overnight. OBJECTIVE:   VITALS:  BP (!) 167/94   Pulse 70   Temp 97.9 °F (36.6 °C)   Resp 18   Ht 6' 1\" (1.854 m)   Wt 194 lb (88 kg)   SpO2 98%   BMI 25.60 kg/m²      INTAKE/OUTPUT:      Intake/Output Summary (Last 24 hours) at 3/14/2022 1048  Last data filed at 3/14/2022 0920  Gross per 24 hour   Intake 10 ml   Output 1250 ml   Net -1240 ml                 CONSTITUTIONAL:  awake and alert.   No acute distress  HEART:   RRR currently   LUNGS:   Decreased air entry at bases, no wheezing   ABDOMEN:   Abdomen soft, non-tender, non-distended  EXTREMITIES:   No pedal edema    Data:  CBC:   Lab Results   Component Value Date    WBC 4.3 03/14/2022    RBC 3.70 03/14/2022    HGB 11.1 03/14/2022    HCT 33.8 03/14/2022    MCV 91.4 03/14/2022    MCH 29.9 03/14/2022    MCHC 32.7 03/14/2022    RDW 14.1 03/14/2022     03/14/2022    MPV 7.0 03/14/2022     BMP:    Lab Results   Component Value Date     03/14/2022    K 4.1 03/14/2022     03/14/2022    CO2 20 03/14/2022    BUN 17 03/14/2022    LABALBU 4.0 03/12/2022    CREATININE 0.77 03/14/2022    CALCIUM 8.8 03/14/2022    GFRAA >60 03/14/2022    LABGLOM >60 03/14/2022    GLUCOSE 82 03/14/2022       Radiology Review:  No new images to review, await SBFT      ASSESSMENT     Principal Problem:    Small bowel obstruction (HCC)  Active Problems:    Atrial fibrillation ([address]    On continuous oral anticoagulation    Hyperlipidemia    Former smoker    Pacemaker    History of DVT of lower extremity    GERD (gastroesophageal reflux disease)    Hypertension    Chronic CHF (congestive heart failure) (HCC)    Chronic diastolic congestive heart failure (HCC)    SBO (small bowel obstruction) ([facility] [address]  Resolved Problems:    * No resolved hospital problems. *      Plan  1. NPO  2. NG to LIWS  3. SBFT today  4. Surgically stable  5. Continue medical management   6. Patient was seen and examined. Small bowel follow-through shows no obstruction. NG tube was removed. Patient has had several bowel movements. Started on full liquids. Likely discharge to home tomorrow.       Electronically signed by Meng Canales PA-C  [id number] [address]

## 2022-03-14 NOTE — PROGRESS NOTES
Physical Therapy        Physical Therapy Cancel Note      DATE: 3/14/2022    NAME: Yonatan Galvez  MRN: [id number]   : 1935      Patient not seen this date for Physical Therapy due to:    3- at [id number]- pt refused PT treatment- states he just recently returned from SBFT and wants to be cleaned up. Will attempt to see later today if time permits.       Electronically signed by Noelle Nino PT on 3/14/2022 at 1:53 PM

## 2022-03-14 NOTE — PROGRESS NOTES
Dr. Samantha Ascencio notified SBFT results show nonobstructive bowel gas pattern. Orders received to discontinue NG and start full liquids.

## 2022-03-14 NOTE — DISCHARGE INSTR - COC
Continuity of Care Form    Patient Name: Angie Swift   :  1935  MRN:  [id number]    Admit date:  3/12/2022  Discharge date:  ***    Code Status Order: Full Code   Advance Directives:      Admitting Physician:  Elizabeth Amos MD  PCP: Heather Marroquin MD    Discharging Nurse: [facility] Unit/Room#: 5/5-  Discharging Unit Phone Number: ***    Emergency Contact:   Extended Emergency Contact Information  Primary Emergency Contact: Kyle Woo, [address] Phone: [phone]  Mobile Phone: [phone]  Relation: Child    Past Surgical History:  Past Surgical History:   Procedure Laterality Date    CARDIAC CATHETERIZATION      PACEMAKER PLACEMENT         Immunization History:   Immunization History   Administered Date(s) Administered    COVID-19, Pfizer Purple top, DILUTE for use, 12+ yrs, 30mcg/0.3mL dose 2021, 2021, 2021    Influenza Virus Vaccine 10/30/2011, 10/09/2015, 2016    Influenza, High Dose (Fluzone 65 yrs and older) 10/20/2018, 10/01/2019    Influenza, High-dose, Quadv, 72 yrs +, IM (Fluzone) 2020, 10/13/2021    Pneumococcal Conjugate 13-valent (Kelin Balsam) 10/30/2019    Pneumococcal Polysaccharide (Mhfaqsizu27) 10/30/2011, 2020       Active Problems:  Patient Active Problem List   Diagnosis Code    Atrial fibrillation ([facility] I48.91    On continuous oral anticoagulation Z79.01    CHF, acute on chronic ([address] I50.9    Hyperlipidemia E78.5    Former smoker Z87.891    Pacemaker Z95.0    History of DVT of lower extremity Z86.718    Enlarged prostate N40.0    Cataract of both eyes H26.9    GERD (gastroesophageal reflux disease) K21.9    History of inguinal hernia repair Z98.890, Z87.19    Hypertension I10    Pneumonia J18.9    History of right hip replacement Z96.641    History of back surgery Z98.890    Low back pain M54.50    Chest pain R07.9    Fluid overload E87.70    VARSHA (acute kidney injury) ([facility] N17.9    NSTEMI (non-ST elevated myocardial infarction) ([facility] I21.4    Chronic CHF (congestive heart failure) ([facility]) I50.9    Acute inferolateral myocardial infarction ([facility]) I21.19    Unstable angina ([facility]) I20.0    Chronic diastolic congestive heart failure ([facility]) I50.32    Small bowel obstruction ([facility]) K56.609    SBO (small bowel obstruction) ([facility] K56.609       Isolation/Infection:   Isolation            No Isolation          Patient Infection Status       Infection Onset Added Last Indicated Last Indicated By Review Planned Expiration Resolved Resolved By    None active    Resolved    COVID-19 (Rule Out) 11/17/21 11/17/21 11/17/21 COVID-19, Rapid (Ordered)   11/17/21 Rule-Out Test Resulted    COVID-19 (Rule Out) 06/25/21 06/25/21 06/25/21 COVID-19, Rapid (Ordered)   06/25/21 Rule-Out Test Resulted            Nurse Assessment:  Last Vital Signs: BP (!) 167/94   Pulse 70   Temp 97.9 °F (36.6 °C)   Resp 18   Ht 6' 1\" (1.854 m)   Wt 194 lb (88 kg)   SpO2 98%   BMI 25.60 kg/m²     Last documented pain score (0-10 scale): Pain Level: 5  Last Weight:   Wt Readings from Last 1 Encounters:   03/12/22 194 lb (88 kg)     Mental Status:  {IP PT MENTAL STATUS:71618}    IV Access:  { CARLOS IV ACCESS:659358201}    Nursing Mobility/ADLs:  Walking   {[facility] DME ASIU:893734230}  Transfer  {[facility] DME IAUX:963025047}  Bathing  {[facility] DME DNCR:413971103}  Dressing  {P DME YXWR:803470492}  Toileting  {[facility] DME DFYJ:638760070}  Feeding  {[facility] DME OIJJ:523261765}  Med Admin  {[facility] DME PC:362482444}  Med Delivery   { [facility]ROS MED Delivery:509595159}    Wound Care Documentation and Therapy:        Elimination:  Continence:    Bowel: {YES / HD:58703}  Bladder: {YES / WH:45612}  Urinary Catheter: {Urinary Catheter:091582834}   Colostomy/Ileostomy/Ileal Conduit: {YES / BP:52605}       Date of Last BM: ***    Intake/Output Summary (Last 24 hours) at 3/14/2022 0911  Last data filed at 3/14/2022 0740  Gross per 24 hour   Intake --   Output 1250 ml   Net -1250 ml     I/O last 3 completed shifts:  In: -   Out: 1700 [Urine:1250; Emesis/NG output:450]    Safety Concerns:     [address] Safety Concerns:131027528}    Impairments/Disabilities:      [address] Impairments/Disabilities:079587070}    Nutrition Therapy:  Current Nutrition Therapy:   [address] Diet List:353351766}    Routes of Feeding: {CHP DME Other Feedings:799445720}  Liquids: {Slp liquid thickness:33883}  Daily Fluid Restriction: {CHP DME Yes amt example:373401088}  Last Modified Barium Swallow with Video (Video Swallowing Test): {Done Not Done TOKL:055362124}    Treatments at the Time of Hospital Discharge:   Respiratory Treatments: ***  Oxygen Therapy:  {Therapy; copd oxygen:29628}  Ventilator:    {MH CC Vent FJS}    Rehab Therapies: Physical Therapy and Occupational Therapy  Weight Bearing Status/Restrictions: No weight bearing restrictions  Other Medical Equipment (for information only, NOT a DME order):  {EQUIPMENT:425253668}  Other Treatments: Skilled nursing assessment and monitoring. Medication education and monitoring.      Patient's personal belongings (please select all that are sent with patient):  {CHP DME Belongings:190778959}    RN SIGNATURE:  {Esignature:306249219}    CASE MANAGEMENT/SOCIAL WORK SECTION    Inpatient Status Date: ***    Readmission Risk Assessment Score:  Readmission Risk              Risk of Unplanned Readmission:  32           Discharging to [facility]: [phone]  F: [phone]     Dialysis Facility (if applicable)   Name:  Address:  Dialysis Schedule:  Phone:  Fax:    / signature: Electronically signed by Janeth Galeas RN on 3/14/22 at 9:12 AM EDT    PHYSICIAN SECTION    Prognosis: {Prognosis:9949929116}    Condition at Discharge: [address] Patient Condition:213450949}    Rehab Potential (if transferring to Rehab): {Prognosis:6774938054}    Recommended Labs or Other Treatments After Discharge: ***    Physician Certification: I certify the above information and transfer of Jeffery Simeon  is necessary for the continuing treatment of the diagnosis listed and that he requires {Admit to Appropriate Level of Care:95602} for {GREATER/LESS:456729950} 30 days.      Update Admission H&P: {[facility]64[facility]}    PHYSICIAN SIGNATURE:  {Esignature:154680477}

## 2022-03-14 NOTE — PROGRESS NOTES
Patient complaining of pain unrelieved by current pain medications. Patient requesting something different for pain. RN spoke with Dr. Alan Sage, new orders received.

## 2022-03-14 NOTE — CARE COORDINATION
ONGOING DISCHARGE PLAN:    Patient is alert and oriented x4. Spoke with patient regarding discharge plan and patient confirms that plan is still home with [facility], pt was current with them PTA. Pt going for small bowel follow thru tday. NG tube remains in place. Pt did have BM overnight. Pt remains NPO. Awaiting surgical input. PT/OT eval    Will continue to follow for additional discharge needs.     Electronically signed by Sudha Trinh RN on 3/14/2022 at 11:09 AM

## 2022-03-15 VITALS
RESPIRATION RATE: 16 BRPM | WEIGHT: 194 LBS | HEART RATE: 74 BPM | OXYGEN SATURATION: 92 % | SYSTOLIC BLOOD PRESSURE: 138 MMHG | HEIGHT: 73 IN | TEMPERATURE: 98.4 F | BODY MASS INDEX: 25.71 KG/M2 | DIASTOLIC BLOOD PRESSURE: 99 MMHG

## 2022-03-15 LAB
ABSOLUTE EOS #: 0.2 K/UL (ref 0–0.4)
ABSOLUTE LYMPH #: 0.9 K/UL (ref 1–4.8)
ABSOLUTE MONO #: 0.5 K/UL (ref 0.1–1.3)
ANION GAP SERPL CALCULATED.3IONS-SCNC: 12 MMOL/L (ref 9–17)
BASOPHILS # BLD: 1 % (ref 0–2)
BASOPHILS ABSOLUTE: 0 K/UL (ref 0–0.2)
BUN BLDV-MCNC: 15 MG/DL (ref 8–23)
CALCIUM SERPL-MCNC: 8.9 MG/DL (ref 8.6–10.4)
CHLORIDE BLD-SCNC: 107 MMOL/L (ref 98–107)
CO2: 22 MMOL/L (ref 20–31)
CREAT SERPL-MCNC: 0.89 MG/DL (ref 0.7–1.2)
EOSINOPHILS RELATIVE PERCENT: 6 % (ref 0–4)
GFR AFRICAN AMERICAN: >60 ML/MIN
GFR NON-AFRICAN AMERICAN: >60 ML/MIN
GFR SERPL CREATININE-BSD FRML MDRD: ABNORMAL ML/MIN/{1.73_M2}
GLUCOSE BLD-MCNC: 110 MG/DL (ref 70–99)
HCT VFR BLD CALC: 32.9 % (ref 41–53)
HEMOGLOBIN: 10.9 G/DL (ref 13.5–17.5)
LYMPHOCYTES # BLD: 23 % (ref 24–44)
MCH RBC QN AUTO: 30.2 PG (ref 26–34)
MCHC RBC AUTO-ENTMCNC: 33.1 G/DL (ref 31–37)
MCV RBC AUTO: 91.1 FL (ref 80–100)
MONOCYTES # BLD: 12 % (ref 1–7)
PDW BLD-RTO: 13.9 % (ref 11.5–14.9)
PLATELET # BLD: 238 K/UL (ref 150–450)
PMV BLD AUTO: 7.2 FL (ref 6–12)
POTASSIUM SERPL-SCNC: 3.7 MMOL/L (ref 3.7–5.3)
RBC # BLD: 3.61 M/UL (ref 4.5–5.9)
SEG NEUTROPHILS: 58 % (ref 36–66)
SEGMENTED NEUTROPHILS ABSOLUTE COUNT: 2.3 K/UL (ref 1.3–9.1)
SODIUM BLD-SCNC: 141 MMOL/L (ref 135–144)
WBC # BLD: 4 K/UL (ref 3.5–11)

## 2022-03-15 PROCEDURE — 82728 ASSAY OF FERRITIN: CPT

## 2022-03-15 PROCEDURE — 36415 COLL VENOUS BLD VENIPUNCTURE: CPT

## 2022-03-15 PROCEDURE — 6370000000 HC RX 637 (ALT 250 FOR IP): Performed by: INTERNAL MEDICINE

## 2022-03-15 PROCEDURE — 99239 HOSP IP/OBS DSCHRG MGMT >30: CPT | Performed by: INTERNAL MEDICINE

## 2022-03-15 PROCEDURE — 83550 IRON BINDING TEST: CPT

## 2022-03-15 PROCEDURE — 85025 COMPLETE CBC W/AUTO DIFF WBC: CPT

## 2022-03-15 PROCEDURE — 2580000003 HC RX 258: Performed by: SURGERY

## 2022-03-15 PROCEDURE — 83540 ASSAY OF IRON: CPT

## 2022-03-15 PROCEDURE — 80048 BASIC METABOLIC PNL TOTAL CA: CPT

## 2022-03-15 RX ADMIN — BUMETANIDE 2 MG: 1 TABLET ORAL at 08:12

## 2022-03-15 RX ADMIN — SPIRONOLACTONE 25 MG: 25 TABLET, FILM COATED ORAL at 08:12

## 2022-03-15 RX ADMIN — BUPRENORPHINE AND NALOXONE 1 FILM: 8; 2 FILM BUCCAL; SUBLINGUAL at 08:12

## 2022-03-15 RX ADMIN — SODIUM CHLORIDE: 9 INJECTION, SOLUTION INTRAVENOUS at 08:45

## 2022-03-15 RX ADMIN — PANTOPRAZOLE SODIUM 40 MG: 40 TABLET, DELAYED RELEASE ORAL at 06:32

## 2022-03-15 RX ADMIN — METOPROLOL SUCCINATE 50 MG: 50 TABLET, EXTENDED RELEASE ORAL at 08:12

## 2022-03-15 RX ADMIN — AMLODIPINE BESYLATE 10 MG: 10 TABLET ORAL at 08:12

## 2022-03-15 RX ADMIN — FINASTERIDE 5 MG: 5 TABLET, FILM COATED ORAL at 08:12

## 2022-03-15 NOTE — PLAN OF CARE
Problem: Falls - Risk of:  Goal: Will remain free from falls  Description: Will remain free from falls  3/15/2022 1413 by Gabrielle Riley RN  Outcome: Completed  3/15/2022 0440 by Mack Quigley RN  Outcome: Ongoing  Goal: Absence of physical injury  Description: Absence of physical injury  3/15/2022 1413 by Gabrielle Riley RN  Outcome: Completed  3/15/2022 0440 by Mack Quigley RN  Outcome: Ongoing     Problem: Pain:  Goal: Pain level will decrease  Description: Pain level will decrease  3/15/2022 1413 by Gabrielle Riley RN  Outcome: Completed  3/15/2022 0440 by Mack Quigley RN  Outcome: Ongoing  Goal: Control of acute pain  Description: Control of acute pain  3/15/2022 1413 by Gabrielle Riley RN  Outcome: Completed  3/15/2022 0440 by Mack Quigley RN  Outcome: Ongoing  Goal: Control of chronic pain  Description: Control of chronic pain  3/15/2022 1413 by Gabrielle Riley RN  Outcome: Completed  3/15/2022 0440 by Mack Quigley RN  Outcome: Ongoing     Problem: Musculor/Skeletal Functional Status  Goal: Highest potential functional level  3/15/2022 1413 by Gabrielle Riley RN  Outcome: Completed  3/15/2022 0440 by Mack Quigley RN  Outcome: Ongoing  Goal: Absence of falls  3/15/2022 1413 by Gabrielle Riley RN  Outcome: Completed  3/15/2022 0440 by Mack Quigley RN  Outcome: Ongoing

## 2022-03-15 NOTE — DISCHARGE SUMMARY
[place] [address] Internal Medicine    Discharge Summary     Patient ID: Ema Zuleta  :  1935   MRN: [id number]     ACCOUNT:  [de-identified]   Patient's PCP: Rudy Johnson MD  Admit Date: 3/12/2022   Discharge Date: 3/15/22  Length of Stay: 3  Code Status:  Full Code  Admitting Physician: De Gary MD  Discharge Physician: De Gary MD     Active Discharge Diagnoses:     Primary Problem  Small bowel obstruction [facility])      [facility] Problems    Diagnosis Date Noted    Small bowel obstruction ([address] [S54.265] 2022    SBO (small bowel obstruction) ([address] [Q04.359] 2022    Chronic diastolic congestive heart failure ([address] [I50.32] 2022    Chronic CHF (congestive heart failure) ([address] [I50.9] 2021    Atrial fibrillation ([facility] [I48.91] 2021    On continuous oral anticoagulation [Z79.01] 2021    Hyperlipidemia [E78.5] 2021    Former smoker [Z87.891] 2021    Pacemaker [Z95.0] 2021    History of DVT of lower extremity [Z86.718] 2021    GERD (gastroesophageal reflux disease) [K21.9] 2021    Hypertension [I10] 2021       Admission Condition:  fair     Discharged Condition: fair    Hospital Stay:     Hospital Course:  Ema Zuleta is a 80 y.o. male who was admitted for the management of Small bowel obstruction ([address] , presented to ER with Emesis    87-year gentleman with underlying history of multiple abdominal surgeries, small bowel torsion in the past, presented with increased abdominal pain, found to have small bowel obstruction in the ER, NG placed, CT of the abdomen pelvis showed extensive stool burden, general surgery consulted,  Patient also has underlying history of atrial fibrillation on Xarelto, CHF on Bumex, recently seen by cardiology, sick sinus syndrome, has an ICD in place. Patient had a large bowel movement in the ER, with relief of symptoms    1.  Acute small bowel obstruction-resolved NG to low intermittent wall suction, small bowel follow-through 3/14- normal  2. A. fib rate controlled Xarelto on hold  3. Pacemaker in place  4. Chronic combined systolic diastolic heart failure currently compensated  5. Chest pain episode on 3/13-trop flat, EKG no change, cardiology consulted, okay with discharge        Significant therapeutic interventions: As above    Significant Diagnostic Studies:   Labs / Micro:    Lab Results   Component Value Date    WBC 4.0 03/15/2022    HGB 10.9 (L) 03/15/2022    HCT 32.9 (L) 03/15/2022    MCV 91.1 03/15/2022     03/15/2022          Lab Results   Component Value Date     03/15/2022    K 3.7 03/15/2022     03/15/2022    CO2 22 03/15/2022    BUN 15 03/15/2022    CREATININE 0.89 03/15/2022    GLUCOSE 110 03/15/2022    CALCIUM 8.9 03/15/2022          Radiology:    XR ABDOMEN (KUB) (SINGLE AP VIEW)    Result Date: 3/13/2022  EXAMINATION: ONE SUPINE XRAY VIEW(S) OF THE ABDOMEN 3/13/2022 6:15 am COMPARISON: None. HISTORY: ORDERING SYSTEM PROVIDED HISTORY: pain TECHNOLOGIST PROVIDED HISTORY: pain Reason for Exam: abdominal pain, follow up SBO FINDINGS: Portable supine view of the abdomen time stamped at 617 hours demonstrates contrast material in the bladder presumably from CT scan of 1 day earlier. Moderate colonic stool load is present. Dilated loops of small bowel are noted less prominent than on prior CT of 1 day earlier. There is also gaseous prominence of the colon, overall appearance favoring ileus. Intestinal tube terminates in the stomach. Portions of a unipolar pacemaker are noted. Postsurgical changes are noted lumbar spine and right hip with indwelling hardware. Bridging hardware is present in the right SI joint. Gas pattern on today's study favors ileus. No organomegaly or free air. Appliances as above. Small bowel diameter decreased from CT scan of 1 day earlier.      CT ABDOMEN PELVIS W IV CONTRAST Additional Contrast? None    Result Date: 3/12/2022  EXAMINATION: CT OF THE ABDOMEN AND PELVIS WITH CONTRAST 3/12/2022 12:08 pm TECHNIQUE: CT of the abdomen and pelvis was performed with the administration of intravenous contrast. Multiplanar reformatted images are provided for review. Dose modulation, iterative reconstruction, and/or weight based adjustment of the mA/kV was utilized to reduce the radiation dose to as low as reasonably achievable. COMPARISON: None HISTORY: ORDERING SYSTEM PROVIDED HISTORY: pain TECHNOLOGIST PROVIDED HISTORY: pain Decision Support Exception - unselect if not a suspected or confirmed emergency medical condition->Emergency Medical Condition (MA) Reason for Exam: Emesis Additional signs and symptoms: pt states generalized abdominal pain, nausea and vomiting FINDINGS: Lower Chest: Bibasilar atelectasis. Borderline cardiomegaly. Organs: No intrahepatic ductal dilatation. Normal gallbladder, partially contracted and limited in evaluation. Subcentimeter low-density structure near the tail of the pancreas is too small to adequately characterize by CT (series 2, image 43). Nodular appearance of the left adrenal gland may represent hypertrophy. Heterogenous and possibly enhancing nodule within the right adrenal gland measures 1.9 x 1.8 cm. There are subcentimeter bilateral renal hypodensities that are too small to characterize, however, statistically favor cysts. Right renal cysts measure up to 3 cm. No visualized hydronephrosis or obstructing calculus. GI/Bowel: Small hiatal hernia. Proximal small bowel dilatation with air-fluid levels with likely transition point in the left mid abdomen. Moderate to large colonic stool, which causes mass effect on the liver. Mild dilation of the splenic flexure. Large amount of rectal stool. Pelvis: Limited in evaluation due to beam hardening artifact. No pelvic mass, adenopathy, or fluid collection.  Peritoneum/Retroperitoneum: Atherosclerotic calcifications are demonstrated throughout the course of the aorta without aneurysmal dilatation. No large volume ascites or discrete free intraperitoneal air. Bones/Soft Tissues: Diffuse demineralization. Age related degenerative changes of the visualized osseous structures without focal destructive lesion. Postsurgical findings of the right hip and SI joint. Variation of the distal sternum is likely secondary to remote injury or congenital variant. Dilated loops of proximal small bowel are concerning for obstruction. Transition point is suspected within the left mid abdomen. Focal dilation of the large bowel at the splenic flexure could represent underlying ileus. Large colonic and rectal stool, suggesting constipation. Right adrenal gland nodule measures 1.9 x 1.8 cm. Consider further evaluation with nonemergent adrenal CT or MR. Subcentimeter hypodensity within the tail of the pancreas is too small to adequately characterize by CT. Consider further evaluation with MRI. XR CHEST PORTABLE    Result Date: 3/12/2022  EXAMINATION: ONE XRAY VIEW OF THE CHEST 3/12/2022 11:02 am COMPARISON: Chest radiograph 03/07/2022 HISTORY: ORDERING SYSTEM PROVIDED HISTORY: pain TECHNOLOGIST PROVIDED HISTORY: pain Reason for Exam: pain FINDINGS: Unchanged left subclavian dual chamber pacemaker. Partially included changes of lumbar spine posterior fusion and discectomy. Unchanged linear scarring in the right lung base overlying persistent elevation or eventration of the right hemidiaphragm. Otherwise clear lungs. No definite findings of pneumothorax or pleural effusion. Normal mediastinal, hilar, and cardiac contours. No obvious acute fracture. Joints maintain anatomic alignment.      No acute findings in the chest.     XR CHEST PORTABLE    Result Date: 3/7/2022  EXAMINATION: ONE XRAY VIEW OF THE CHEST 3/7/2022 8:14 pm COMPARISON: 01/12/2022 HISTORY: ORDERING SYSTEM PROVIDED HISTORY: chest pain TECHNOLOGIST PROVIDED HISTORY: chest pain Reason for Exam: chest pain, sob FINDINGS: Cardiomediastinal silhouette is unchanged in size. Left subclavian cardiac pacing device is unchanged. Aortic atherosclerosis. No large pleural effusion. Lucency in the left lung apex is only seen on 1 AP view, likely due to a skin fold. Linear scarring/atelectasis in the right lung base is again noted. No consolidation in the left lung. No acute osseous abnormality. Linear scarring/atelectasis in the right lung base is again noted. No new cardiopulmonary abnormality. FL SMALL BOWEL FOLLOW THROUGH ONLY    Result Date: 3/14/2022  EXAMINATION: SMALL BOWEL FOLLOW THROUGH SERIES 3/14/2022 TECHNIQUE: Small bowel follow through series was performed with overhead images and spot images. COMPARISON: Abdominal radiographs performed 03/13/2022. HISTORY: ORDERING SYSTEM PROVIDED HISTORY: small bowel obstruction TECHNOLOGIST PROVIDED HISTORY: Please use Gastrografin. small bowel obstruction FINDINGS: A  image demonstrates a nonspecific bowel gas pattern. There is a gastric tube with the tip in the proximal aspect of the stomach. There is no free air. There are no suspicious calcifications. There is no acute osseous abnormality. Contrast material is administered through the gastric tube. The small bowel loops are normal in caliber. There is no evidence for obstruction. Contrast is seen within the colon at 60 minutes. There is no mass effect. There is no tethering. There is no evidence for extraluminal contrast.  There is no acute osseous abnormality. The surrounding soft tissues are unremarkable. Nonobstructive bowel gas pattern.  RECOMMENDATIONS: Unavailable         Consultations:    Consults:     Final Specialist Recommendations/Findings:   IP CONSULT TO GENERAL SURGERY  IP CONSULT TO INTERNAL MEDICINE  IP CONSULT TO CARDIOLOGY      The patient was seen and examined on day of discharge and this discharge summary is in conjunction with any daily progress note from day of discharge. Discharge plan:     Disposition: normal      Instructions to Patient: Keep follow-up appointments      Requiring Further Evaluation/Follow Up POST HOSPITALIZATION/Incidental Findings:     Physician Follow Up:     [address] [facility]     They will call you to schedule time to come out to the house. Suze Avilez MD  [address]  [phone]    In 2 weeks         Activity: Resume as directed    Discharge Medications:      Medication List      CHANGE how you take these medications    metoprolol succinate 25 MG extended release tablet  Commonly known as: TOPROL XL  Take 2 tablets by mouth every morning  What changed: how much to take     vitamin D 1.25 MG (60945 UT) Caps capsule  Commonly known as: ERGOCALCIFEROL  TAKE 1 CAPSULE BY MOUTH EVERY WEEK  What changed: See the new instructions. CONTINUE taking these medications    amLODIPine 5 MG tablet  Commonly known as: NORVASC  Take 2 tablets by mouth daily     bumetanide 2 MG tablet  Commonly known as: BUMEX  Take 1 tablet by mouth 2 times daily     Coenzyme Q10 100 MG Chew  Take 1 tablet by mouth daily     cyanocobalamin 1000 MCG tablet  Commonly known as: [employer] VITAMIN B12  Take 1 tablet by mouth daily     docusate sodium 100 MG capsule  Commonly known as: COLACE     famotidine 20 MG tablet  Commonly known as: PEPCID  Take 1 tablet by mouth daily     ferrous sulfate 325 (65 Fe) MG tablet  Commonly known as: IRON 325  Take 1 tablet by mouth 2 times daily     finasteride 5 MG tablet  Commonly known as: PROSCAR  Take 1 tablet by mouth daily     latanoprost 0.005 % ophthalmic solution  Commonly known as: XALATAN     magnesium oxide 400 MG tablet  Commonly known as: MAG-OX     nitroGLYCERIN 0.4 MG SL tablet  Commonly known as: NITROSTAT  up to max of 3 total doses. If no relief after 1 dose, call 911. spironolactone 25 MG tablet  Commonly known as: ALDACTONE  Take 1 tablet by mouth daily     Suboxone 8-2 MG Film SL film  Generic drug: buprenorphine-naloxone     vitamin C 250 MG tablet  TAKE 1 TABLET BY MOUTH TWICE DAILY(TAKE WITH IRON)     Xarelto 15 MG Tabs tablet  Generic drug: rivaroxaban  TAKE 1 TABLET BY MOUTH DAILY WITH BREAKFAST            Time Spent on discharge is  35 mins in patient examination, evaluation, counseling as well as medication reconciliation, prescriptions for required medications, discharge plan and follow up. Electronically signed by   Judge Emy MD  3/15/2022  12:39 PM      Thank you Dr. Gabe Worthington MD for the opportunity to be involved in this patient's care.

## 2022-03-15 NOTE — PROGRESS NOTES
[facility]   Progress Note                   Date:   3/15/2022  Patient name: Gisela Figueroa  Date of admission:  3/12/2022 10:05 AM  MRN:   [id number]  YOB: 1935  PCP: Pauline Young MD    Reason for Admission:  Abdominal pain, bowel obstruction     Subjective: There were no acute events overnight, remained hemodynamically stable, denies chest pain, dyspnea, orthopnea. Tolerating clear liquids. Had an episode of tachycardia, resolved with iv lopressor and now back on toprol, currently HR 70's    Medications:   Scheduled Meds:   pantoprazole  40 mg Oral QAM AC    latanoprost  1 drop Both Eyes Nightly    sodium chloride flush  5-40 mL IntraVENous 2 times per day    amLODIPine  10 mg Oral Daily    bumetanide  2 mg Oral BID    buprenorphine-naloxone  1 Film SubLINGual BID    finasteride  5 mg Oral Daily    metoprolol succinate  50 mg Oral QAM    spironolactone  25 mg Oral Daily    rivaroxaban  15 mg Oral Daily    sodium chloride flush  5-40 mL IntraVENous 2 times per day       Continuous Infusions:   sodium chloride 125 mL/hr at 03/15/22 0845    sodium chloride      sodium chloride         CBC:   Recent Labs     03/13/22  0823 03/14/22  0650 03/15/22  0544   WBC 4.0 4.3 4.0   HGB 11.0* 11.1* 10.9*    246 238     BMP:    Recent Labs     03/13/22  0823 03/14/22  0650 03/15/22  0544    139 141   K 3.8 4.1 3.7    106 107   CO2 24 20 22   BUN 19 17 15   CREATININE 0.90 0.77 0.89   GLUCOSE 101* 82 110*     Hepatic:   Recent Labs     03/12/22  1105   AST 23   ALT 23   BILITOT 0.58   ALKPHOS 149*     Troponin: No results for input(s): TROPONINI in the last 72 hours. BNP: No results for input(s): BNP in the last 72 hours. Lipids: No results for input(s): CHOL, HDL in the last 72 hours.     Invalid input(s): LDLCALCU  INR:   Recent Labs     03/12/22  1105 03/13/22  0823   INR 1.1 1.2       Objective:   Vitals: /85   Pulse 70   Temp 97.7 °F (36.5 °C) Resp 16   Ht 6' 1\" (1.854 m)   Wt 194 lb (88 kg)   SpO2 99%   BMI 25.60 kg/m²     General appearance: awake, alert, in no apparent respiratory distress   HEENT: Head: Normocephalic, no lesions, without obvious abnormality  Neck: no JVD  Lungs: clear to auscultation bilaterally, no basilar rales, no wheezing   Heart: regular rate and rhythm, S1, S2 normal, no murmur, click, rub or gallop  Abdomen: soft, non-tender; bowel sounds normal  Extremities: No LE edema  Neurologic: Mental status: Alert, oriented. Motor and sensory not done. EKG 3/13/2022:  V paced rhythm, underlying atrial fibrillation       Echocardiogram 1/14/2022:  Summary  Contrast was utilized on this study. Small LV cavity. Moderately increased LV wall thickness. Normal left ventricular ejection fraction >55%  No obvious segmental wall motion abnormalities seen. LA and RA appears dilated  Normal right ventricular size and function. Pacemaker / ICD lead seen in  right ventricle. Aortic valve is trileaflet. No aortic stenosis. Mild aortic insufficiency. Normal aortic root dimension. Normal mitral valve structure and function. Mild mitral regurgitation. Normal tricuspid valve structure and function. Mild tricuspid regurgitation. Estimated right ventricular systolic pressure is 24 mmHg. Normal right  ventricular systolic pressure. IVC normal diameter & inspiratory collapse indicating normal RA filling  pressure . No significant pericardial effusion is seen. Coronary Angiography 1/13/2022: LMCA: Normal 0% stenosis. LAD: Diffuse irregualrtities 40-50%. LCx: Diffuse irregularities 30-40%. RCA: Diffuse irregularities 30-40%. Assessment:   1. Small bowel obstruction   2. Sick sinus syndrome with chronic/permanent atrial fibrillation  3. S/p PPM in situ   4. Moderate Nonobstructive CAD on recent cath in 01/2022  5.  Chronic diastolic CHF/HFpEF    Patient Active Problem List:     Atrial fibrillation ([address]     On continuous oral anticoagulation     CHF, acute on chronic (HCC)     Hyperlipidemia     Former smoker     Pacemaker     History of DVT of lower extremity     Enlarged prostate     Cataract of both eyes     GERD (gastroesophageal reflux disease)     History of inguinal hernia repair     Hypertension     Pneumonia     History of right hip replacement     History of back surgery     Low back pain     Chest pain     Fluid overload     VARSHA (acute kidney injury) ([address]     NSTEMI (non-ST elevated myocardial infarction) ([address]     Chronic CHF (congestive heart failure) ([facility])     Acute inferolateral myocardial infarction ([address]     Unstable angina (HCC)     Chronic diastolic congestive heart failure (HCC)     Small bowel obstruction (HCC)     SBO (small bowel obstruction) ([address]        Treatment Plan:   1. Continue toprol xl and xarelto   2. CHF well compensated on examination with no fluid overload  3. Po bumex home dose  4. No objection to discharge home from cardiac standpoint with OP follow up with primary cardiologist as scheduled.        Brent Sierra MD, [facility]

## 2022-03-15 NOTE — PROGRESS NOTES
[facility] [address]                 PATIENT NAME: Cb Kee     TODAY'S DATE: 3/15/2022, 12:11 PM    SUBJECTIVE:    Pt seen and examined. Afebrile, VSS. No leukocytosis, hemoglobin stable. SBFT yesterday revealed nonobstructive bowel gas pattern. NGT removed. Patient is doing well. Denies abdominal pain. Bowels have moved multiple times. Tolerating full liquids, no N/V.      OBJECTIVE:   VITALS:  /85   Pulse 70   Temp 97.7 °F (36.5 °C)   Resp 16   Ht 6' 1\" (1.854 m)   Wt 194 lb (88 kg)   SpO2 99%   BMI 25.60 kg/m²      INTAKE/OUTPUT:      Intake/Output Summary (Last 24 hours) at 3/15/2022 1211  Last data filed at 3/15/2022 0537  Gross per 24 hour   Intake --   Output 2300 ml   Net -2300 ml                 CONSTITUTIONAL:  awake and alert. No acute distress  HEART:   RRR  LUNGS:   Decreased air entry at bases, no wheezing   ABDOMEN:   Abdomen soft, non-tender, non-distended  EXTREMITIES:   No pedal edema    Data:  CBC:   Lab Results   Component Value Date    WBC 4.0 03/15/2022    RBC 3.61 03/15/2022    HGB 10.9 03/15/2022    HCT 32.9 03/15/2022    MCV 91.1 03/15/2022    MCH 30.2 03/15/2022    MCHC 33.1 03/15/2022    RDW 13.9 03/15/2022     03/15/2022    MPV 7.2 03/15/2022     BMP:    Lab Results   Component Value Date     03/15/2022    K 3.7 03/15/2022     03/15/2022    CO2 22 03/15/2022    BUN 15 03/15/2022    LABALBU 4.0 03/12/2022    CREATININE 0.89 03/15/2022    CALCIUM 8.9 03/15/2022    GFRAA >60 03/15/2022    LABGLOM >60 03/15/2022    GLUCOSE 110 03/15/2022       Radiology Review:      [facility] SMALL BOWEL FOLLOW THROUGH ONLY [[id number]] Collected: 03/14/22 1126      Order Status: Completed Updated: 03/14/22 1151     Narrative:       EXAMINATION:   SMALL BOWEL FOLLOW THROUGH SERIES     3/14/2022     TECHNIQUE:   Small bowel follow through series was performed with overhead images and spot   images.      COMPARISON:   Abdominal radiographs performed

## 2022-03-15 NOTE — PROGRESS NOTES
Pt is being discharged, read pt discharge paper work. Discontinued his IV and made sure all belonging brought into hospital were packed up. Pt is being pushed out.

## 2022-03-16 ENCOUNTER — CARE COORDINATION (OUTPATIENT)
Dept: CARE COORDINATION | Age: 87
End: 2022-03-16

## 2022-03-16 DIAGNOSIS — D64.9 ANEMIA, UNSPECIFIED TYPE: Primary | ICD-10-CM

## 2022-03-16 LAB
FERRITIN: 92 UG/L (ref 30–400)
IRON SATURATION: 23 % (ref 20–55)
IRON: 54 UG/DL (ref 59–158)
TOTAL IRON BINDING CAPACITY: 234 UG/DL (ref 250–450)
UNSATURATED IRON BINDING CAPACITY: 180 UG/DL (ref 112–347)

## 2022-03-17 SDOH — ECONOMIC STABILITY: HOUSING INSECURITY
IN THE LAST 12 MONTHS, WAS THERE A TIME WHEN YOU DID NOT HAVE A STEADY PLACE TO SLEEP OR SLEPT IN A SHELTER (INCLUDING NOW)?: NO

## 2022-03-17 SDOH — ECONOMIC STABILITY: HOUSING INSECURITY: IN THE LAST 12 MONTHS, HOW MANY PLACES HAVE YOU LIVED?: 1

## 2022-03-17 SDOH — ECONOMIC STABILITY: INCOME INSECURITY: IN THE LAST 12 MONTHS, WAS THERE A TIME WHEN YOU WERE NOT ABLE TO PAY THE MORTGAGE OR RENT ON TIME?: NO

## 2022-03-17 NOTE — CARE COORDINATION
Praveen Chris 1626 IP Discharge Follow up Call    Date of discharge: 3.16.22  Facility: [facility]  Non-face-to-face services provided:  Obtained and reviewed discharge summary and/or continuity of care documents  Communication with home health agencies or other community services the patient is currently using-[facility]    Reason for Hospital Visit:  Primary Problem  Small bowel obstruction [facility])      Discharged with Home Health?:  Yes    Date of first visit after discharge:  ACM called [facility] alerted them that patient is back home. Status:     improved    Did you receive a discharge summary with list of medication from the hospital? Yes  Review of Instructions:     Understands what to report/when to return?:  Yes   Understands discharge instructions?:  Yes   Following discharge instructions?:  Yes     Is there any lingering symptoms? No  Are you eating and drinking OK? Yes  Any other problems i.e. Constipation, other symptoms? No  Are there any new complaints of pain? No  If you have a wound is the dressing clean, dry, and intact? N/A  Understands wound care regimen? N/A    FU appts/Provider:    Future Appointments   Date Time Provider Diego Pitts   3/23/2022 11:30 AM Hany Cerda MD [address] [place] [address]     PCP office notified and will call to schedule hospital follow up. New Medications at discharge?:     Yes                      Medication Reconciliation by phone -     Each medication was reviewed (both pre and post hospitalization)  Yes    Were there discrepancies in medications? No  If YES, were discrepancies addressed? Not Applicable    Understands Medications? Yes   Patient stated that his cardiologist changed doses of 2 medications. Has all of medication and/or prescriptions   Yes  Taking Medications? Yes  Can you swallow your pills? Yes    Is the patient having any trouble with ADL's or IADL's? No  Is the patient able to move around as expected?  Yes    ACM will enroll to care coordination, will reach put in 3-5 days   Heart Failure Education outreach Date/Time: 3/17/2022 2:52 PM    Ambulatory Care Manager (ACM) contacted the patient by telephone to perform Ambulatory Care Coordination. Verified name and  with patient as identifiers. Provided introduction to self, and explanation of the Ambulatory Care Manager's role. ACM reviewed that a Health Healthy tips for the Spring packet has been mailed to the them. ACM reviewed CHF zones, daily weights, fluid restriction, the importance of low sodium diet and healthy tips packet with the patient. Instructed patient to call their PCP or cardiologist  if they have a weight gain of 3 lbs in 2 days or 5 lbs in a week. Patient reminded that there is a physician on call 24 hours a day / 7 days a week should the patient have questions or concerns. The patient verbalized understanding.

## 2022-03-20 DIAGNOSIS — N40.0 ENLARGED PROSTATE: Primary | ICD-10-CM

## 2022-03-20 DIAGNOSIS — I10 PRIMARY HYPERTENSION: ICD-10-CM

## 2022-03-21 RX ORDER — AMLODIPINE BESYLATE 5 MG/1
10 TABLET ORAL DAILY
Qty: 180 TABLET | Refills: 3 | Status: SHIPPED | OUTPATIENT
Start: 2022-03-21

## 2022-03-21 RX ORDER — FINASTERIDE 5 MG/1
5 TABLET, FILM COATED ORAL DAILY
Qty: 90 TABLET | Refills: 3 | Status: SHIPPED | OUTPATIENT
Start: 2022-03-21

## 2022-03-23 ENCOUNTER — CARE COORDINATION (OUTPATIENT)
Dept: CARE COORDINATION | Age: 87
End: 2022-03-23

## 2022-03-23 NOTE — CARE COORDINATION
CHF [facility],  walk-in clinic and right care-right place-right time information sheets was mailed out to pt.

## 2022-03-24 ENCOUNTER — CARE COORDINATION (OUTPATIENT)
Dept: CARE COORDINATION | Age: 87
End: 2022-03-24

## 2022-03-24 SDOH — ECONOMIC STABILITY: TRANSPORTATION INSECURITY
IN THE PAST 12 MONTHS, HAS THE LACK OF TRANSPORTATION KEPT YOU FROM MEDICAL APPOINTMENTS OR FROM GETTING MEDICATIONS?: YES

## 2022-03-24 SDOH — ECONOMIC STABILITY: TRANSPORTATION INSECURITY
IN THE PAST 12 MONTHS, HAS LACK OF TRANSPORTATION KEPT YOU FROM MEETINGS, WORK, OR FROM GETTING THINGS NEEDED FOR DAILY LIVING?: YES

## 2022-03-25 ENCOUNTER — CARE COORDINATION (OUTPATIENT)
Dept: CARE COORDINATION | Age: 87
End: 2022-03-25

## 2022-03-25 NOTE — CARE COORDINATION
HC attempted to make contact with patient for referral for possible transportation need. No Answer. Left voicemail. Care Plan   [facility]. will attempt to make contact with pt next business day.

## 2022-03-29 ENCOUNTER — CARE COORDINATION (OUTPATIENT)
Dept: CARE COORDINATION | Age: 87
End: 2022-03-29

## 2022-03-29 NOTE — CARE COORDINATION
HC phoned and left message for Ashok De Leon of the [address] on Aging to refer this patient for transportation assistance. HC received a voicemail from Nina Carbajal stating that she takes care of the  [place] of [place] and [address] cares for the [address]  part of [place]. Nina Carbajal stated that she would reach out to the   patient and to [address] as well. HC attempted to contact the patient  to verify that he's clear on his medical transportation. There was no   answer. HC left a message for the patient and provided contact information for a return call.     Plan of Care  [facility] OF [place], [facility]. will follow up with patient if no answer within 3-5 days

## 2022-03-31 ENCOUNTER — CARE COORDINATION (OUTPATIENT)
Dept: CARE COORDINATION | Age: 87
End: 2022-03-31

## 2022-03-31 SDOH — HEALTH STABILITY: PHYSICAL HEALTH: ON AVERAGE, HOW MANY MINUTES DO YOU ENGAGE IN EXERCISE AT THIS LEVEL?: 0 MIN

## 2022-03-31 SDOH — HEALTH STABILITY: PHYSICAL HEALTH: ON AVERAGE, HOW MANY DAYS PER WEEK DO YOU ENGAGE IN MODERATE TO STRENUOUS EXERCISE (LIKE A BRISK WALK)?: 0 DAYS

## 2022-03-31 NOTE — CARE COORDINATION
Ambulatory Care Coordination Note  3/31/2022  CM Risk Score: 1  Charlson 10 Year Mortality Risk Score: 98%     ACC: Rebecca Hernandez RN    Summary  Date Care Coordination Episode Started:  3.16.21  Week: 3    Reason patient is in Care Coordination:     Recent hospitalization  59% admission or ED risk    Topics Discussed Today:     1) Follow up on referral for transportation  2) Discussed upcoming appointments  3) CHF education    Interventions completed today:    1.message routed to [facility] regarding    transportation concern. 2.Appointment reminders given  3. CHF education started    Assessments completed today:     1. Initial assessment  2. Medication reconciliation  3. SDOH in progress  4. CHF Health assessment started      Care Coordinator plan of care:     Continue with education, offer dietician referral      Care Coordination Interventions    Program Enrollment: Complex Care  Referral from Primary Care Provider: No  Suggested Interventions and Community Resources  Fall Risk Prevention: In Process  Home Health Services: Completed  Social Work: Completed  Transportation Support: In Process  Zone Management Tools: In Process         Goals Addressed                 This Visit's Progress     Conditions and Symptoms   Improving     I will schedule office visits, as directed by my provider. I will keep my appointment or reschedule if I have to cancel. I will notify my provider of any barriers to my plan of care. I will follow my Zone Management tool to seek urgent or emergent care. I will notify my provider of any symptoms that indicate a worsening of my condition. Barriers: overwhelmed by complexity of regimen  Plan for overcoming my barriers: work with ACM   Confidence: 8/10  Anticipated Goal Completion Date: 6.15.22              Prior to Admission medications    Medication Sig Start Date End Date Taking?  Authorizing Provider   finasteride (PROSCAR) 5 MG tablet TAKE 1 TABLET BY MOUTH DAILY 3/21/22 Princess Hawley MD   amLODIPine (NORVASC) 5 MG tablet TAKE 2 TABLETS BY MOUTH DAILY 3/21/22   Princess Hawley MD   XARELTO 15 MG TABS tablet TAKE 1 TABLET BY MOUTH DAILY WITH BREAKFAST 3/11/22   Denilson Sun MD   bumetanide (BUMEX) 2 MG tablet Take 1 tablet by mouth 2 times daily 3/8/22   Taylor Buchanan MD   spironolactone (ALDACTONE) 25 MG tablet Take 1 tablet by mouth daily 3/8/22   Taylor Buchanan MD   metoprolol succinate (TOPROL XL) 25 MG extended release tablet Take 2 tablets by mouth every morning  Patient taking differently: Take 100 mg by mouth every morning  2/9/22   Princess Hawley MD   Ascorbic Acid (VITAMIN C) 250 MG tablet TAKE 1 TABLET BY MOUTH TWICE DAILY(TAKE WITH IRON) 2/7/22   Braydon Rhoades MD   nitroGLYCERIN (NITROSTAT) 0.4 MG SL tablet up to max of 3 total doses. If no relief after 1 dose, call 911. 1/8/22   Beatris Bergman MD   docusate sodium (COLACE) 100 MG capsule Take 100 mg by mouth 2 times daily as needed for Constipation    Historical Provider, MD   vitamin D (ERGOCALCIFEROL) 1.25 MG (32407 UT) CAPS capsule TAKE 1 [address]  Patient taking differently: once a week tuesdays 12/17/21   Braydon Rhoades MD   cyanocobalamin ([employer] VITAMIN B12) 1000 MCG tablet Take 1 tablet by mouth daily 11/3/21   Princess Hawley MD   Coenzyme Q10 100 MG CHEW Take 1 tablet by mouth daily 10/26/21   Princess Hawley MD   famotidine (PEPCID) 20 MG tablet Take 1 tablet by mouth daily 10/26/21   Princess Hawley MD   ferrous sulfate (IRON 325) 325 (65 Fe) MG tablet Take 1 tablet by mouth 2 times daily 8/20/21   Braydon Rhoades MD   buprenorphine-naloxone (SUBOXONE) 8-2 MG FILM SL film Place 1 Film under the tongue 2 times daily.  Indications: pain     Historical Provider, MD   magnesium oxide (MAG-OX) 400 MG tablet Take 400 mg by mouth daily    Historical Provider, MD   latanoprost (XALATAN) 0.005 % ophthalmic solution Place 1 drop into both eyes nightly    Historical Provider, MD       Future Appointments   Date Time Provider Diego Pitts   4/6/2022  3:00 PM Hany Cerda MD [address] [place] [address]   4/13/2022  2:15 PM [facility]     ,   Congestive Heart Failure Assessment       No patient-reported symptoms      Symptoms:     Salt intake watch compared to last visit: stable      and   General Assessment    Do you have any symptoms that are causing concern?: No

## 2022-03-31 NOTE — CARE COORDINATION
HC received a message from [facility], who stated that the  Patient is concerned because he's supposed to have a return call from  [facility] from [facility] transportation. HC attempted to contact  [facility] and there was no answer. HC left a message for Vishal Curry, and provided patient contact information for a return call. HC also provided her contact information. Patient returned 's call, she updated him on the steps that she  had left a message for Graciela GARRISON For transportation and provided   his contact information. Patient was appreciative and stated that  he would wait for the return call from Vishal Curry.     Plan of Care  [facility] OF [place], [facility]. will follow up with patient regarding his transportation needs

## 2022-04-01 ENCOUNTER — CARE COORDINATION (OUTPATIENT)
Dept: CARE COORDINATION | Age: 87
End: 2022-04-01

## 2022-04-01 NOTE — CARE COORDINATION
Graciela Ang/[facility]/Transportation phoned HC today to   inform that she has spoken with the patient and they have everything  worked out. Myra Alves stated that she asked the permission to schedule  his appointments in order that she can accommodate his transportation  needs. HC phoned and spoke with the patient and he confirmed that   he had spoken with Myra Alves and is very pleased with the results. HC encouraged patient to call with any futures social needs.     Plan of Care  [facility] OF [place], [facility]. will follow up with patient regarding transportation

## 2022-04-05 DIAGNOSIS — E53.8 VITAMIN B12 DEFICIENCY: ICD-10-CM

## 2022-04-05 RX ORDER — LANOLIN ALCOHOL/MO/W.PET/CERES
CREAM (GRAM) TOPICAL
Qty: 30 TABLET | Refills: 3 | Status: SHIPPED | OUTPATIENT
Start: 2022-04-05 | End: 2022-06-17

## 2022-04-06 ENCOUNTER — CARE COORDINATION (OUTPATIENT)
Dept: CARE COORDINATION | Age: 87
End: 2022-04-06

## 2022-04-06 NOTE — CARE COORDINATION
Ambulatory Care Coordination Note  4/6/2022  CM Risk Score: 1  Charlson 10 Year Mortality Risk Score: 98%     ACC: Cindy Catalan RN    Summary  Date Care Coordination Episode Started:  3.16.22  Week: 4    Reason patient is in Care Coordination:     Recent hospitalization    Topics Discussed Today:     1) Medication needs, patient declined any needs today. 2) Transportation, patient stated he is using his transportation today for an appointment, very grateful. 3) Upcoming appointments. Assessments completed today:     1. Medication reconciliation  2. SDOH in progress   3. CHF (Health assessments)      Care Coordinator plan of care: Follow up call in 2 weeks for needs. Care Coordination Interventions    Program Enrollment: Complex Care  Referral from Primary Care Provider: No  Suggested Interventions and Community Resources  Fall Risk Prevention: In Process  Home Health Services: Completed  Social Work: Completed  Transportation Support: In Process  Zone Management Tools: In Process         Goals Addressed                 This Visit's Progress     Community Resource Goal   Improving     Patent needs assistance with transportation to medical appointments    Barriers: fear of failure, lack of motivation, financial, lack of support, overwhelmed by complexity of regimen, stress, time constraints, medication side effects, and lack of education    Plan for overcoming my barriers: [facility] [place]. referred patient to [employer] on   Aging. Confidence: 9/10    Anticipated Goal Completion Date: 06/29/22       Conditions and Symptoms   Improving     I will schedule office visits, as directed by my provider. I will keep my appointment or reschedule if I have to cancel. I will notify my provider of any barriers to my plan of care. I will follow my Zone Management tool to seek urgent or emergent care. I will notify my provider of any symptoms that indicate a worsening of my condition.     Barriers: overwhelmed by complexity of regimen  Plan for overcoming my barriers: work with ACM   Confidence: 8/10  Anticipated Goal Completion Date: 6.15.22              Prior to Admission medications    Medication Sig Start Date End Date Taking? Authorizing Provider   vitamin B-12 (CYANOCOBALAMIN) 1000 MCG tablet TAKE 1 TABLET BY MOUTH DAILY 4/5/22   Phyllis Waters MD   finasteride (PROSCAR) 5 MG tablet TAKE 1 TABLET BY MOUTH DAILY 3/21/22   Phyllis Waters MD   amLODIPine (NORVASC) 5 MG tablet TAKE 2 TABLETS BY MOUTH DAILY 3/21/22   Phyllis Waters MD   XARELTO 15 MG TABS tablet TAKE 1 TABLET BY MOUTH DAILY WITH BREAKFAST 3/11/22   Yumiko Jaimes MD   bumetanide (BUMEX) 2 MG tablet Take 1 tablet by mouth 2 times daily 3/8/22   Lanny Mcclain MD   spironolactone (ALDACTONE) 25 MG tablet Take 1 tablet by mouth daily 3/8/22   Lanny Mcclain MD   metoprolol succinate (TOPROL XL) 25 MG extended release tablet Take 2 tablets by mouth every morning  Patient taking differently: Take 100 mg by mouth every morning  2/9/22   Phyllis Waters MD   Ascorbic Acid (VITAMIN C) 250 MG tablet TAKE 1 TABLET BY MOUTH TWICE DAILY(TAKE WITH IRON) 2/7/22   Kira Martínez MD   nitroGLYCERIN (NITROSTAT) 0.4 MG SL tablet up to max of 3 total doses.  If no relief after 1 dose, call 911. 1/8/22   Aren Del Rio MD   docusate sodium (COLACE) 100 MG capsule Take 100 mg by mouth 2 times daily as needed for Constipation    Historical Provider, MD   vitamin D (ERGOCALCIFEROL) 1.25 MG (90388 UT) CAPS capsule TAKE 1 CAPSULE BY MOUTH EVERY WEEK  Patient taking differently: once a week tuesdays 12/17/21   Kira Martínez MD   Coenzyme Q10 100 MG CHEW Take 1 tablet by mouth daily 10/26/21   Phyllis Waters MD   famotidine (PEPCID) 20 MG tablet Take 1 tablet by mouth daily 10/26/21   Phyllis Waters MD   ferrous sulfate (IRON 325) 325 (65 Fe) MG tablet Take 1 tablet by mouth 2 times daily 8/20/21   Kira Martínez MD   buprenorphine-naloxone (SUBOXONE) 8-2 MG FILM SL film Place 1 Film under the tongue 2 times daily.  Indications: pain     Historical Provider, MD   magnesium oxide (MAG-OX) 400 MG tablet Take 400 mg by mouth daily    Historical Provider, MD   latanoprost (XALATAN) 0.005 % ophthalmic solution Place 1 drop into both eyes nightly    Historical Provider, MD       Future Appointments   Date Time Provider Diego Pitts   4/6/2022  3:00 PM Suraj Marie  [place] [id number]   4/13/2022  2:15 PM Lex Cowden, MD [address]      and   Congestive Heart Failure Assessment    Are you currently restricting fluids?: Other  Do you understand a low sodium diet?: Yes  Do you understand how to read food labels?: Yes  Do you salt your food before tasting it?: No     No patient-reported symptoms      Symptoms:     Weight trend: stable  Salt intake watch compared to last visit: stable

## 2022-04-13 ENCOUNTER — OFFICE VISIT (OUTPATIENT)
Dept: INTERNAL MEDICINE CLINIC | Age: 87
End: 2022-04-13
Payer: MEDICARE

## 2022-04-13 ENCOUNTER — CARE COORDINATION (OUTPATIENT)
Dept: CARE COORDINATION | Age: 87
End: 2022-04-13

## 2022-04-13 VITALS
BODY MASS INDEX: 23.46 KG/M2 | OXYGEN SATURATION: 98 % | DIASTOLIC BLOOD PRESSURE: 70 MMHG | HEIGHT: 73 IN | SYSTOLIC BLOOD PRESSURE: 128 MMHG | WEIGHT: 177 LBS | HEART RATE: 70 BPM

## 2022-04-13 DIAGNOSIS — D35.01 ADRENAL ADENOMA, RIGHT: ICD-10-CM

## 2022-04-13 DIAGNOSIS — I10 PRIMARY HYPERTENSION: ICD-10-CM

## 2022-04-13 DIAGNOSIS — I48.91 ATRIAL FIBRILLATION, UNSPECIFIED TYPE (HCC): Primary | ICD-10-CM

## 2022-04-13 DIAGNOSIS — I50.32 CHRONIC DIASTOLIC CONGESTIVE HEART FAILURE (HCC): ICD-10-CM

## 2022-04-13 PROCEDURE — G8420 CALC BMI NORM PARAMETERS: HCPCS | Performed by: INTERNAL MEDICINE

## 2022-04-13 PROCEDURE — G8427 DOCREV CUR MEDS BY ELIG CLIN: HCPCS | Performed by: INTERNAL MEDICINE

## 2022-04-13 PROCEDURE — 99214 OFFICE O/P EST MOD 30 MIN: CPT | Performed by: INTERNAL MEDICINE

## 2022-04-13 PROCEDURE — 4040F PNEUMOC VAC/ADMIN/RCVD: CPT | Performed by: INTERNAL MEDICINE

## 2022-04-13 PROCEDURE — 1036F TOBACCO NON-USER: CPT | Performed by: INTERNAL MEDICINE

## 2022-04-13 PROCEDURE — 1111F DSCHRG MED/CURRENT MED MERGE: CPT | Performed by: INTERNAL MEDICINE

## 2022-04-13 PROCEDURE — 1123F ACP DISCUSS/DSCN MKR DOCD: CPT | Performed by: INTERNAL MEDICINE

## 2022-04-13 RX ORDER — HYDRALAZINE HYDROCHLORIDE 25 MG/1
TABLET, FILM COATED ORAL
COMMUNITY
Start: 2022-04-12

## 2022-04-13 NOTE — PROGRESS NOTES
Subjective:      Patient ID: Marilu Cloud is a 80 y.o. male. HPI  Patient, is here for evaluation multiple medical problems. Atrial fibrillation, on anticoagulation, CHF, sick sinus syndrome status post AICD he has history of multiple abdominal surgeries, admitted with abdominal pain, found to have small bowel obstruction, patient underwent CT abdomen pelvis, he was managed conservatively  Patient symptoms are resolved, during his hospital stay, he underwent CT abdomen pelvis, concerning for right adrenal nodule, advised to have adrenal protocol CT scan  Patient feels generalized fatigue and tiredness  Review of Systems   Constitutional: Negative for activity change, appetite change, chills and diaphoresis. HENT: Negative for congestion, dental problem, ear discharge, facial swelling and hearing loss. Respiratory: Negative for apnea, cough, chest tightness, shortness of breath and wheezing. Cardiovascular: Negative for chest pain and leg swelling. Gastrointestinal: Positive for abdominal pain (left lower abdomen , Improving ). Negative for abdominal distention, constipation and diarrhea. Genitourinary: Negative for difficulty urinating, dysuria, enuresis, flank pain and frequency. Musculoskeletal: Negative for arthralgias, back pain, gait problem and joint swelling. Skin: Negative for color change, pallor and rash. Neurological: Negative for dizziness, seizures, facial asymmetry, light-headedness, numbness and headaches. Psychiatric/Behavioral: Negative for agitation, behavioral problems, confusion, decreased concentration and dysphoric mood. Objective:   Physical Exam  Vitals and nursing note reviewed. Constitutional:       General: He is not in acute distress. Appearance: He is well-developed. He is not diaphoretic. HENT:      Head: Normocephalic and atraumatic. Mouth/Throat:      Pharynx: No oropharyngeal exudate. Eyes:      General: No scleral icterus.         Right eye: No discharge. Left eye: No discharge. Conjunctiva/sclera: Conjunctivae normal.      Pupils: Pupils are equal, round, and reactive to light. Neck:      Thyroid: No thyromegaly. Vascular: No JVD. Trachea: No tracheal deviation. Cardiovascular:      Rate and Rhythm: Normal rate. Heart sounds: Normal heart sounds. No murmur heard. No gallop. Pulmonary:      Effort: Pulmonary effort is normal. No respiratory distress. Breath sounds: Normal breath sounds. No stridor. No wheezing or rales. Abdominal:      General: Bowel sounds are normal. There is no distension. Palpations: Abdomen is soft. Tenderness: There is no abdominal tenderness. There is no guarding or rebound. Musculoskeletal:         General: No tenderness. Normal range of motion. Cervical back: Normal range of motion and neck supple. Comments: Slight Pedal edema Bilaterally    Skin:     General: Skin is warm and dry. Findings: No erythema or rash. Neurological:      Mental Status: He is alert and oriented to person, place, and time. Assessment / Plan:   1. Atrial fibrillation, unspecified type (HCC)  Rate Controlled , on AC     2. Chronic diastolic congestive heart failure ([address]  Compensated on Diuretics     3. Adrenal adenoma, right  - CT ADRENALS W CONTRAST; Future    4. Primary hypertension  Controlled   - hydrALAZINE (APRESOLINE) 25 MG tablet      · Return in about 3 months (around 7/13/2022). · Reviewed prior labs and health maintenance. · Discussed use, benefit, and side effects of prescribed medications. Barriers to medication compliance addressed. All patient questions answered. Pt voiced understanding. MD [facility]  4/21/2022, 3:51 PM    Please note that this chart was generated using voice recognition Dragon dictation software.   Although every effort was made to ensure the accuracy of this automated transcription, some errors

## 2022-04-13 NOTE — CARE COORDINATION
[facility]. phoned patient to follow up on his transportation needs. Patient reported that today will be his actual first time to utilize the transportation. He stated that he appreciates the   support and looks for it to continue. Plan of Care  [facility]. will follow up on the transportation progress. ...

## 2022-04-14 ENCOUNTER — CARE COORDINATION (OUTPATIENT)
Dept: CARE COORDINATION | Age: 87
End: 2022-04-14

## 2022-04-20 ENCOUNTER — HOSPITAL ENCOUNTER (OUTPATIENT)
Dept: CT IMAGING | Age: 87
Discharge: HOME | End: 2022-04-22
Payer: MEDICARE

## 2022-04-20 ENCOUNTER — TELEPHONE (OUTPATIENT)
Dept: INTERNAL MEDICINE CLINIC | Age: 87
End: 2022-04-20

## 2022-04-20 DIAGNOSIS — D35.01 ADRENAL ADENOMA, RIGHT: ICD-10-CM

## 2022-04-20 LAB
GFR NON-AFRICAN AMERICAN: 41 ML/MIN
GFR SERPL CREATININE-BSD FRML MDRD: 50 ML/MIN
GFR SERPL CREATININE-BSD FRML MDRD: ABNORMAL ML/MIN/{1.73_M2}
POC CREATININE: 1.6 MG/DL (ref 0.51–1.19)

## 2022-04-20 PROCEDURE — 74160 CT ABDOMEN W/CONTRAST: CPT

## 2022-04-20 PROCEDURE — 82565 ASSAY OF CREATININE: CPT

## 2022-04-20 PROCEDURE — 6360000004 HC RX CONTRAST MEDICATION: Performed by: INTERNAL MEDICINE

## 2022-04-20 PROCEDURE — 2580000003 HC RX 258: Performed by: INTERNAL MEDICINE

## 2022-04-20 RX ORDER — SODIUM CHLORIDE 0.9 % (FLUSH) 0.9 %
10 SYRINGE (ML) INJECTION PRN
Status: DISCONTINUED | OUTPATIENT
Start: 2022-04-20 | End: 2022-04-23 | Stop reason: HOSPADM

## 2022-04-20 RX ORDER — 0.9 % SODIUM CHLORIDE 0.9 %
80 INTRAVENOUS SOLUTION INTRAVENOUS ONCE
Status: COMPLETED | OUTPATIENT
Start: 2022-04-20 | End: 2022-04-20

## 2022-04-20 RX ADMIN — SODIUM CHLORIDE, PRESERVATIVE FREE 10 ML: 5 INJECTION INTRAVENOUS at 10:35

## 2022-04-20 RX ADMIN — IOPAMIDOL 75 ML: 755 INJECTION, SOLUTION INTRAVENOUS at 10:35

## 2022-04-20 RX ADMIN — SODIUM CHLORIDE 80 ML: 9 INJECTION, SOLUTION INTRAVENOUS at 10:35

## 2022-04-21 ASSESSMENT — ENCOUNTER SYMPTOMS
COUGH: 0
ABDOMINAL PAIN: 1
COLOR CHANGE: 0
BACK PAIN: 0
CHEST TIGHTNESS: 0
SHORTNESS OF BREATH: 0
WHEEZING: 0
ABDOMINAL DISTENTION: 0
DIARRHEA: 0
APNEA: 0
FACIAL SWELLING: 0
CONSTIPATION: 0

## 2022-04-22 ENCOUNTER — CARE COORDINATION (OUTPATIENT)
Dept: CARE COORDINATION | Age: 87
End: 2022-04-22

## 2022-04-22 NOTE — CARE COORDINATION
Attempting to reach patient for a follow up care coordination call regarding any needs, questions or concerns. Maye Virk, [address]  Phone rang busy.

## 2022-04-26 ENCOUNTER — CARE COORDINATION (OUTPATIENT)
Dept: CARE COORDINATION | Age: 87
End: 2022-04-26

## 2022-04-26 NOTE — CARE COORDINATION
Attempting to reach patient for a follow up care coordination call regarding any needs, questions or concerns.   Glen Espinoza, [email or web address]

## 2022-04-28 ENCOUNTER — OFFICE VISIT (OUTPATIENT)
Dept: INTERNAL MEDICINE CLINIC | Age: 87
End: 2022-04-28
Payer: MEDICARE

## 2022-04-28 ENCOUNTER — HOSPITAL ENCOUNTER (OUTPATIENT)
Age: 87
Setting detail: SPECIMEN
Discharge: HOME | End: 2022-04-28

## 2022-04-28 VITALS
DIASTOLIC BLOOD PRESSURE: 82 MMHG | WEIGHT: 203.4 LBS | HEIGHT: 73 IN | OXYGEN SATURATION: 99 % | BODY MASS INDEX: 26.96 KG/M2 | HEART RATE: 81 BPM | SYSTOLIC BLOOD PRESSURE: 130 MMHG

## 2022-04-28 DIAGNOSIS — N17.9 AKI (ACUTE KIDNEY INJURY) (HCC): ICD-10-CM

## 2022-04-28 DIAGNOSIS — I48.91 ATRIAL FIBRILLATION, UNSPECIFIED TYPE (HCC): Primary | ICD-10-CM

## 2022-04-28 DIAGNOSIS — D35.01 ADRENAL ADENOMA, RIGHT: ICD-10-CM

## 2022-04-28 DIAGNOSIS — I21.4 NSTEMI (NON-ST ELEVATED MYOCARDIAL INFARCTION) (HCC): ICD-10-CM

## 2022-04-28 DIAGNOSIS — I50.33 ACUTE ON CHRONIC DIASTOLIC CONGESTIVE HEART FAILURE (HCC): ICD-10-CM

## 2022-04-28 DIAGNOSIS — I48.91 ATRIAL FIBRILLATION, UNSPECIFIED TYPE (HCC): ICD-10-CM

## 2022-04-28 LAB
ANION GAP SERPL CALCULATED.3IONS-SCNC: 11 MMOL/L (ref 9–17)
BUN BLDV-MCNC: 30 MG/DL (ref 8–23)
CALCIUM SERPL-MCNC: 9.1 MG/DL (ref 8.6–10.4)
CHLORIDE BLD-SCNC: 102 MMOL/L (ref 98–107)
CO2: 26 MMOL/L (ref 20–31)
CREAT SERPL-MCNC: 1.18 MG/DL (ref 0.7–1.2)
GFR AFRICAN AMERICAN: >60 ML/MIN
GFR NON-AFRICAN AMERICAN: 58 ML/MIN
GFR SERPL CREATININE-BSD FRML MDRD: ABNORMAL ML/MIN/{1.73_M2}
GLUCOSE BLD-MCNC: 97 MG/DL (ref 70–99)
POTASSIUM SERPL-SCNC: 4.2 MMOL/L (ref 3.7–5.3)
SODIUM BLD-SCNC: 139 MMOL/L (ref 135–144)

## 2022-04-28 PROCEDURE — G8417 CALC BMI ABV UP PARAM F/U: HCPCS | Performed by: INTERNAL MEDICINE

## 2022-04-28 PROCEDURE — 1123F ACP DISCUSS/DSCN MKR DOCD: CPT | Performed by: INTERNAL MEDICINE

## 2022-04-28 PROCEDURE — 99214 OFFICE O/P EST MOD 30 MIN: CPT | Performed by: INTERNAL MEDICINE

## 2022-04-28 PROCEDURE — 4040F PNEUMOC VAC/ADMIN/RCVD: CPT | Performed by: INTERNAL MEDICINE

## 2022-04-28 PROCEDURE — 1036F TOBACCO NON-USER: CPT | Performed by: INTERNAL MEDICINE

## 2022-04-28 PROCEDURE — G8427 DOCREV CUR MEDS BY ELIG CLIN: HCPCS | Performed by: INTERNAL MEDICINE

## 2022-04-28 ASSESSMENT — ENCOUNTER SYMPTOMS
CHEST TIGHTNESS: 0
SHORTNESS OF BREATH: 0
ABDOMINAL DISTENTION: 0
CONSTIPATION: 0
ABDOMINAL PAIN: 0
DIARRHEA: 0
COUGH: 0
WHEEZING: 0
FACIAL SWELLING: 0
BACK PAIN: 0
APNEA: 0
COLOR CHANGE: 0

## 2022-04-28 NOTE — PROGRESS NOTES
Subjective:      Patient ID: Belinda Tillman is a 80 y.o. male. HPI Patient, is here for evaluation multiple medical problems. Atrial fibrillation, on anticoagulation, CHF, sick sinus syndrome status post AICD he has history of multiple abdominal surgeries,  Patient was recently hospitalized in [facility] with small bowel obstruction, underwent CT abdomen pelvis, concerning for adrenal adenoma, patient underwent CT abdomen pelvis with contrast for adrenal adenoma which turns out to be benign  Before CT scan, creatinine was checked which was high, patient was on Bumex 2 mg twice a day and Aldactone, I requested him to stop Aldactone and reduce dose of Bumex to 2 mg once daily  Patient feels that swelling in legs is getting worse, he has gained weight from 1 77-2 03  No shortness of breath, chest pain  Review of Systems   Constitutional: Positive for fatigue and unexpected weight change (Weight gain). Negative for activity change, appetite change, chills and diaphoresis. HENT: Negative for congestion, dental problem, ear discharge, facial swelling and hearing loss. Respiratory: Negative for apnea, cough, chest tightness, shortness of breath and wheezing. Cardiovascular: Positive for leg swelling. Negative for chest pain. Gastrointestinal: Negative for abdominal distention, abdominal pain, constipation and diarrhea. Genitourinary: Negative for difficulty urinating, dysuria, enuresis, flank pain and frequency. Musculoskeletal: Negative for arthralgias, back pain, gait problem and joint swelling. Skin: Negative for color change, pallor and rash. Neurological: Negative for dizziness, seizures, facial asymmetry, light-headedness, numbness and headaches. Psychiatric/Behavioral: Negative for agitation, behavioral problems, confusion, decreased concentration and dysphoric mood. Objective:   Physical Exam  Constitutional:       Appearance: He is well-developed. He is obese.  He is not diaphoretic. HENT:      Head: Normocephalic and atraumatic. Mouth/Throat:      Pharynx: No oropharyngeal exudate. Eyes:      General: No scleral icterus. Right eye: No discharge. Left eye: No discharge. Conjunctiva/sclera: Conjunctivae normal.      Pupils: Pupils are equal, round, and reactive to light. Neck:      Thyroid: No thyromegaly. Vascular: No JVD. Trachea: No tracheal deviation. Cardiovascular:      Rate and Rhythm: Normal rate. Heart sounds: Normal heart sounds. No murmur heard. No gallop. Comments: Systolic Murmur in Pulmonary ARea   Pulmonary:      Effort: Pulmonary effort is normal. No respiratory distress. Breath sounds: Normal breath sounds. No stridor. No wheezing or rales. Chest:      Chest wall: No tenderness. Abdominal:      General: Bowel sounds are normal. There is no distension. Palpations: Abdomen is soft. Tenderness: There is no abdominal tenderness. There is no guarding or rebound. Musculoskeletal:         General: Normal range of motion. Cervical back: Normal range of motion and neck supple. Right lower leg: Edema present. Left lower leg: Edema (2+ pitting edema in both legs) present. Neurological:      Mental Status: He is alert and oriented to person, place, and time. Assessment / Plan:   1. Atrial fibrillation, unspecified type ([address]  - Basic Metabolic Panel; Future    2. Acute on chronic diastolic congestive heart failure (HCC)  Has weight gain and worsening swelling in legs     3. NSTEMI (non-ST elevated myocardial infarction) ([address]  Resolved     4. VARSHA (acute kidney injury) ([address]  - Basic Metabolic Panel; Future    5.  Adrenal adenoma, right  Discussed results of CT scan with the patient next    Very difficult situation, patient has weight gain, swelling in legs, he recently had VARSHA, I ordered stat BMP  Depending on the BMP, will address his diuretics  If BMP is okay need to increase Bumex  Otherwise, may need to refer to nephrologist      · Return in about 4 weeks (around 5/26/2022). · Reviewed prior labs and health maintenance. · Discussed use, benefit, and side effects of prescribed medications. Barriers to medication compliance addressed. All patient questions answered. Pt voiced understanding. Phyllis Waters MD  [facility]  4/28/2022, 11:13 AM    Please note that this chart was generated using voice recognition Dragon dictation software. Although every effort was made to ensure the accuracy of this automated transcription, some errors in transcription may have occurred. Visit Information    Have you changed or started any medications since your last visit including any over-the-counter medicines, vitamins, or herbal medicines? no   Are you having any side effects from any of your medications? -  no  Have you stopped taking any of your medications? Is so, why? -  no    Have you seen any other physician or provider since your last visit? Yes - Records Obtained  Have you had any other diagnostic tests since your last visit? Yes - Records Obtained  Have you been seen in the emergency room and/or had an admission to a hospital since we last saw you? No  Have you had your routine dental cleaning in the past 6 months? no    Have you activated your [employer] [facility] account? If not, what are your barriers?  No:      Patient Care Team:  Phyllis Waters MD as PCP - General (Internal Medicine)  Phyllis Waters MD as P[facility]  Filiberto Crisostomo RN as [address] as Health     Medical History Review  Past Medical, Family, and Social History reviewed and does contribute to the patient presenting condition    Health Maintenance   Topic Date Due    DTaP/Tdap/Td vaccine (1 - Tdap) Never done    Shingles Vaccine (1 of 2) Never done   [employer] Visit (AWV)  Never done    Depression Screen  10/26/2022    Potassium  03/15/2023  Creatinine  04/20/2023    Flu vaccine  Completed    Pneumococcal 65+ years Vaccine  Completed    COVID-19 Vaccine  Completed    Hepatitis A vaccine  Aged Out    Hepatitis B vaccine  Aged Out    Hib vaccine  Aged Out    Meningococcal (ACWY) vaccine  Aged Out

## 2022-04-29 ENCOUNTER — TELEPHONE (OUTPATIENT)
Dept: INTERNAL MEDICINE CLINIC | Age: 87
End: 2022-04-29

## 2022-04-29 ENCOUNTER — CARE COORDINATION (OUTPATIENT)
Dept: CARE COORDINATION | Age: 87
End: 2022-04-29

## 2022-04-29 DIAGNOSIS — N17.9 AKI (ACUTE KIDNEY INJURY) (HCC): Primary | ICD-10-CM

## 2022-04-29 NOTE — CARE COORDINATION
Labs completed, please advise on any medication changes or nephrology referral per your office note.

## 2022-05-02 ENCOUNTER — CARE COORDINATION (OUTPATIENT)
Dept: CARE COORDINATION | Age: 87
End: 2022-05-02

## 2022-05-02 NOTE — CARE COORDINATION
Ambulatory Care Coordination Note  5/2/2022  CM Risk Score: 1  Charlson 10 Year Mortality Risk Score: 98%     ACC: Kait Wilkins, LEON    Summary  Date Care Coordination Episode Started:  3.16.22    Reason patient is in Care Coordination:       Recent hospitalization    Topics Discussed Today:     1) Medications, patient reports that the pharmacy did not have the medication PCP put him on until today. He will pick it up and start it right away. 2) Swelling in leg, patient reports that it has not improved but that he did not increased the bumex yet. 3) Patient denied any needs and will call ACM if leg swelling does not improve. Patient stated he thinks it is related to a joint replacement he had several years ago. Assessments completed today:     1. Fall risk  2. Initial assessment  3. Medication reconciliation  4. SDOH  5.  CHF (Health assessments)      Care Coordinator plan of care: Follow up call in 1 week, check on swelling, ACP documents, continue education on CHF. Care Coordination Interventions    Program Enrollment: Complex Care  Referral from Primary Care Provider: No  Suggested Interventions and Community Resources  Fall Risk Prevention: In Process  Home Health Services: Completed  Social Work: Completed  Transportation Support: In Process  Zone Management Tools: In Process         Goals Addressed    None         Prior to Admission medications    Medication Sig Start Date End Date Taking?  Authorizing Provider   hydrALAZINE (APRESOLINE) 25 MG tablet  4/12/22   Historical Provider, MD   vitamin B-12 (CYANOCOBALAMIN) 1000 MCG tablet TAKE 1 TABLET BY MOUTH DAILY 4/5/22   Kallie Saavedra MD   finasteride (PROSCAR) 5 MG tablet TAKE 1 TABLET BY MOUTH DAILY 3/21/22   Kallie Saavedra MD   amLODIPine (NORVASC) 5 MG tablet TAKE 2 TABLETS BY MOUTH DAILY 3/21/22   Kallie Saavedra MD   XARELTO 15 MG TABS tablet TAKE 1 TABLET BY MOUTH DAILY WITH BREAKFAST 3/11/22   Radha Rodriguez MD   bumetanide (BUMEX) 2 MG tablet Take 1 tablet by mouth 2 times daily 3/8/22   Minnie Farrar MD   spironolactone (ALDACTONE) 25 MG tablet Take 1 tablet by mouth daily 3/8/22   Minnie Farrar MD   metoprolol succinate (TOPROL XL) 25 MG extended release tablet Take 2 tablets by mouth every morning  Patient taking differently: Take 100 mg by mouth every morning  2/9/22   Maryuri Gregorio MD   Ascorbic Acid (VITAMIN C) 250 MG tablet TAKE 1 TABLET BY MOUTH TWICE DAILY(TAKE WITH IRON) 2/7/22   Huber Choudhary MD   nitroGLYCERIN (NITROSTAT) 0.4 MG SL tablet up to max of 3 total doses. If no relief after 1 dose, call 911. 1/8/22   Tavo Cole MD   docusate sodium (COLACE) 100 MG capsule Take 100 mg by mouth 2 times daily as needed for Constipation    Historical Provider, MD   vitamin D (ERGOCALCIFEROL) 1.25 MG (96382 UT) CAPS capsule TAKE 1 [address]  Patient taking differently: once a week tuesdays 12/17/21   Huber Choudhary MD   Coenzyme Q10 100 MG CHEW Take 1 tablet by mouth daily 10/26/21   Maryuri Gregorio MD   famotidine (PEPCID) 20 MG tablet Take 1 tablet by mouth daily 10/26/21   Maryuri Gregorio MD   ferrous sulfate (IRON 325) 325 (65 Fe) MG tablet Take 1 tablet by mouth 2 times daily 8/20/21   Huber Choudhary MD   buprenorphine-naloxone (SUBOXONE) 8-2 MG FILM SL film Place 1 Film under the tongue 2 times daily.  Indications: pain     Historical Provider, MD   magnesium oxide (MAG-OX) 400 MG tablet Take 400 mg by mouth daily    Historical Provider, MD   latanoprost (XALATAN) 0.005 % ophthalmic solution Place 1 drop into both eyes nightly    Historical Provider, MD       Future Appointments   Date Time Provider Diego Denisi   5/13/2022  8:00 AM Huber Choudhary MD [address] [place] [address]   5/26/2022 11:45 AM Maryuri Gregorio MD [address]   7/13/2022 10:00 AM MD Gem Vee     ,   Congestive Heart Failure Assessment    Are you currently restricting fluids?: Other  Do you understand a low sodium diet?: Yes  Do you understand how to read food labels?: Yes  Do you salt your food before tasting it?: No         Symptoms:         and   General Assessment    Do you have any symptoms that are causing concern?: Yes  Progression since Onset: Unchanged  Reported Symptoms: Other (Comment: swollen leg)

## 2022-05-06 DIAGNOSIS — D64.9 ANEMIA, UNSPECIFIED TYPE: Primary | ICD-10-CM

## 2022-05-09 ENCOUNTER — CARE COORDINATION (OUTPATIENT)
Dept: CARE COORDINATION | Age: 87
End: 2022-05-09

## 2022-05-09 DIAGNOSIS — E53.8 VITAMIN B12 DEFICIENCY: Primary | ICD-10-CM

## 2022-05-09 RX ORDER — BUMETANIDE 2 MG/1
TABLET ORAL
Qty: 180 TABLET | Refills: 1 | Status: SHIPPED | OUTPATIENT
Start: 2022-05-09

## 2022-05-09 NOTE — CARE COORDINATION
Ambulatory Care Coordination Note  5/9/2022  CM Risk Score: 1  Charlson 10 Year Mortality Risk Score: 98%     ACC: Shira Carver RN    Summary  Date Care Coordination Episode Started: 3.16.22        Reason patient is in Care Coordination:     Recent hospitalization    Topics Discussed Today:     1) Leg swelling, per patient his legs are back to normal. Swelling has resolved. 2) Weight, per patient his weight has went down several pounds from losing the water in his legs. 3) Appointments, patient was reminded of upcoming appointments. 4) Fatigue, per patient the B12 medication that he is on is not as effective as the B12 injections he used to get. He would like to know if PCP would order injections. ACM will route request to PCP and follow up with patient. Assessments completed today:     1. Initial assessment  2. Medication reconciliation  3. SDOH  4. CHF (Health assessments)      Care Coordinator plan of care: Follow up with PCP recommendations and CHF education. Care Coordination Interventions    Program Enrollment: Complex Care  Referral from Primary Care Provider: No  Suggested Interventions and Community Resources  Fall Risk Prevention: In Process  Home Health Services: Completed  Social Work: Completed  Transportation Support: In Process  Zone Management Tools: In Process         Goals Addressed                 This Visit's Progress     Conditions and Symptoms   Improving     I will schedule office visits, as directed by my provider. I will keep my appointment or reschedule if I have to cancel. I will notify my provider of any barriers to my plan of care. I will follow my Zone Management tool to seek urgent or emergent care. I will notify my provider of any symptoms that indicate a worsening of my condition.     Barriers: overwhelmed by complexity of regimen  Plan for overcoming my barriers: work with ACM   Confidence: 8/10  Anticipated Goal Completion Date: 6.15.22              Prior to Admission medications    Medication Sig Start Date End Date Taking? Authorizing Provider   hydrALAZINE (APRESOLINE) 25 MG tablet  4/12/22   Historical Provider, MD   vitamin B-12 (CYANOCOBALAMIN) 1000 MCG tablet TAKE 1 TABLET BY MOUTH DAILY 4/5/22   Aaron Lo MD   finasteride (PROSCAR) 5 MG tablet TAKE 1 TABLET BY MOUTH DAILY 3/21/22   Aaron Lo MD   amLODIPine (NORVASC) 5 MG tablet TAKE 2 TABLETS BY MOUTH DAILY 3/21/22   Aaron Lo MD   XARELTO 15 MG TABS tablet TAKE 1 TABLET BY MOUTH DAILY WITH BREAKFAST 3/11/22   Solo Adams MD   bumetanide (BUMEX) 2 MG tablet Take 1 tablet by mouth 2 times daily 3/8/22   Fazal Frazier MD   spironolactone (ALDACTONE) 25 MG tablet Take 1 tablet by mouth daily 3/8/22   Fazal Frazier MD   metoprolol succinate (TOPROL XL) 25 MG extended release tablet Take 2 tablets by mouth every morning  Patient taking differently: Take 100 mg by mouth every morning  2/9/22   Aaron Lo MD   Ascorbic Acid (VITAMIN C) 250 MG tablet TAKE 1 TABLET BY MOUTH TWICE DAILY(TAKE WITH IRON) 2/7/22   Deanne Booker MD   nitroGLYCERIN (NITROSTAT) 0.4 MG SL tablet up to max of 3 total doses. If no relief after 1 dose, call 911. 1/8/22   Sonali Rm MD   docusate sodium (COLACE) 100 MG capsule Take 100 mg by mouth 2 times daily as needed for Constipation    Historical Provider, MD   vitamin D (ERGOCALCIFEROL) 1.25 MG (71389 UT) CAPS capsule TAKE 1 [address]  Patient taking differently: once a week tuesdays 12/17/21   Deanne Booker MD   Coenzyme Q10 100 MG CHEW Take 1 tablet by mouth daily 10/26/21   Aaron Lo MD   famotidine (PEPCID) 20 MG tablet Take 1 tablet by mouth daily 10/26/21   Aaron Lo MD   ferrous sulfate (IRON 325) 325 (65 Fe) MG tablet Take 1 tablet by mouth 2 times daily 8/20/21   Deanne Booker MD   buprenorphine-naloxone (SUBOXONE) 8-2 MG FILM SL film Place 1 Film under the tongue 2 times daily.  Indications: pain     Historical Provider, MD   magnesium oxide (MAG-OX) 400 MG tablet Take 400 mg by mouth daily    Historical Provider, MD   latanoprost (XALATAN) 0.005 % ophthalmic solution Place 1 drop into both eyes nightly    Historical Provider, MD       Future Appointments   Date Time Provider Diego Pitts   5/13/2022  8:00 AM Aren Freire MD [address] [place] [address]   5/26/2022 11:45 AM Herminia Márquez MD [address]   7/13/2022 10:00 AM Herminia Márquez MD [address]     ,   Congestive Heart Failure Assessment    Are you currently restricting fluids?: Other  Do you understand a low sodium diet?: Yes  Do you understand how to read food labels?: Yes  Do you salt your food before tasting it?: No     No patient-reported symptoms      Symptoms:  CHF associated leg swelling: Neg, CHF associated shortness of breath: Neg      Symptom course: stable  Weight trend: decreasing steadily  Salt intake watch compared to last visit: stable      and   General Assessment    Do you have any symptoms that are causing concern?: Yes  Progression since Onset: Unchanged  Reported Symptoms: Fatigue

## 2022-05-10 NOTE — CARE COORDINATION
Patient returned call, he has an appt this week with hematology. AC asked patient if he wanted [facility] to see if he could get his lab done there. He agreed. ACM called office and they will draw his lab while he is there. Patient was very grateful for this. No other needs at this time.

## 2022-05-11 ENCOUNTER — TELEPHONE (OUTPATIENT)
Dept: INTERNAL MEDICINE CLINIC | Age: 87
End: 2022-05-11

## 2022-05-11 DIAGNOSIS — R11.0 NAUSEA: Primary | ICD-10-CM

## 2022-05-11 NOTE — TELEPHONE ENCOUNTER
Last OV: 4/28/22  Next OV: 5/26    Graciela with [facility] calling on behalf of patient stating the last 2-3 days he is having a nausea, dry heaving--not bringing anything up, decreased appetite & 1 small BM today. Requesting provider please order something to help relieve the nausea.     Please advise

## 2022-05-12 RX ORDER — ONDANSETRON 4 MG/1
4 TABLET, ORALLY DISINTEGRATING ORAL 3 TIMES DAILY PRN
Qty: 21 TABLET | Refills: 0 | Status: SHIPPED | OUTPATIENT
Start: 2022-05-12

## 2022-05-12 NOTE — TELEPHONE ENCOUNTER
Order signed for Zofran  If symptoms do not improve, patient need to see us in the office, or go to emergency room for evaluation

## 2022-05-13 ENCOUNTER — TELEPHONE (OUTPATIENT)
Dept: INTERNAL MEDICINE CLINIC | Age: 87
End: 2022-05-13

## 2022-05-13 ENCOUNTER — OFFICE VISIT (OUTPATIENT)
Dept: ONCOLOGY | Age: 87
End: 2022-05-13
Payer: MEDICARE

## 2022-05-13 ENCOUNTER — TELEPHONE (OUTPATIENT)
Dept: INTERVENTIONAL RADIOLOGY/VASCULAR | Age: 87
End: 2022-05-13

## 2022-05-13 ENCOUNTER — HOSPITAL ENCOUNTER (OUTPATIENT)
Age: 87
Discharge: HOME | End: 2022-05-13
Payer: MEDICARE

## 2022-05-13 ENCOUNTER — TELEPHONE (OUTPATIENT)
Dept: ONCOLOGY | Age: 87
End: 2022-05-13

## 2022-05-13 VITALS
HEART RATE: 68 BPM | BODY MASS INDEX: 25.16 KG/M2 | RESPIRATION RATE: 16 BRPM | DIASTOLIC BLOOD PRESSURE: 57 MMHG | TEMPERATURE: 97.4 F | SYSTOLIC BLOOD PRESSURE: 130 MMHG | WEIGHT: 190.7 LBS

## 2022-05-13 DIAGNOSIS — M54.50 CHRONIC MIDLINE LOW BACK PAIN WITHOUT SCIATICA: ICD-10-CM

## 2022-05-13 DIAGNOSIS — I48.19 PERSISTENT ATRIAL FIBRILLATION (HCC): ICD-10-CM

## 2022-05-13 DIAGNOSIS — D64.9 ANEMIA, UNSPECIFIED TYPE: ICD-10-CM

## 2022-05-13 DIAGNOSIS — N17.9 AKI (ACUTE KIDNEY INJURY) (HCC): Primary | ICD-10-CM

## 2022-05-13 DIAGNOSIS — D64.9 ANEMIA, UNSPECIFIED TYPE: Primary | ICD-10-CM

## 2022-05-13 DIAGNOSIS — G89.29 CHRONIC MIDLINE LOW BACK PAIN WITHOUT SCIATICA: ICD-10-CM

## 2022-05-13 DIAGNOSIS — Z86.718 HISTORY OF DVT OF LOWER EXTREMITY: ICD-10-CM

## 2022-05-13 DIAGNOSIS — Z79.01 ON CONTINUOUS ORAL ANTICOAGULATION: ICD-10-CM

## 2022-05-13 DIAGNOSIS — E53.8 VITAMIN B12 DEFICIENCY: ICD-10-CM

## 2022-05-13 LAB
ABSOLUTE EOS #: 0.3 K/UL (ref 0–0.4)
ABSOLUTE LYMPH #: 0.8 K/UL (ref 1–4.8)
ABSOLUTE MONO #: 0.4 K/UL (ref 0.1–1.2)
ANION GAP SERPL CALCULATED.3IONS-SCNC: 11 MMOL/L (ref 9–17)
BASOPHILS # BLD: 1 % (ref 0–2)
BASOPHILS ABSOLUTE: 0 K/UL (ref 0–0.2)
BUN BLDV-MCNC: 41 MG/DL (ref 8–23)
CALCIUM SERPL-MCNC: 8.7 MG/DL (ref 8.6–10.4)
CHLORIDE BLD-SCNC: 95 MMOL/L (ref 98–107)
CO2: 28 MMOL/L (ref 20–31)
CREAT SERPL-MCNC: 1.77 MG/DL (ref 0.7–1.2)
EOSINOPHILS RELATIVE PERCENT: 10 % (ref 1–4)
FOLATE: >20 NG/ML
GFR AFRICAN AMERICAN: 44 ML/MIN
GFR NON-AFRICAN AMERICAN: 37 ML/MIN
GFR SERPL CREATININE-BSD FRML MDRD: ABNORMAL ML/MIN/{1.73_M2}
GLUCOSE BLD-MCNC: 118 MG/DL (ref 70–99)
HCT VFR BLD CALC: 31.7 % (ref 41–53)
HEMOGLOBIN: 10.3 G/DL (ref 13.5–17.5)
IRON SATURATION: 11 % (ref 20–55)
IRON: 36 UG/DL (ref 59–158)
LYMPHOCYTES # BLD: 26 % (ref 24–44)
MCH RBC QN AUTO: 29.6 PG (ref 26–34)
MCHC RBC AUTO-ENTMCNC: 32.6 G/DL (ref 31–37)
MCV RBC AUTO: 91 FL (ref 80–100)
MONOCYTES # BLD: 14 % (ref 2–11)
PDW BLD-RTO: 14.1 % (ref 12.5–15.4)
PLATELET # BLD: 221 K/UL (ref 140–450)
PMV BLD AUTO: 6.9 FL (ref 6–12)
POTASSIUM SERPL-SCNC: 4.2 MMOL/L (ref 3.7–5.3)
RBC # BLD: 3.49 M/UL (ref 4.5–5.9)
SEG NEUTROPHILS: 49 % (ref 36–66)
SEGMENTED NEUTROPHILS ABSOLUTE COUNT: 1.6 K/UL (ref 1.8–7.7)
SODIUM BLD-SCNC: 134 MMOL/L (ref 135–144)
TOTAL IRON BINDING CAPACITY: 315 UG/DL (ref 250–450)
UNSATURATED IRON BINDING CAPACITY: 279 UG/DL (ref 112–347)
VITAMIN B-12: >2000 PG/ML (ref 232–1245)
WBC # BLD: 3.2 K/UL (ref 3.5–11)

## 2022-05-13 PROCEDURE — 36415 COLL VENOUS BLD VENIPUNCTURE: CPT

## 2022-05-13 PROCEDURE — 82746 ASSAY OF FOLIC ACID SERUM: CPT

## 2022-05-13 PROCEDURE — 80048 BASIC METABOLIC PNL TOTAL CA: CPT

## 2022-05-13 PROCEDURE — 83550 IRON BINDING TEST: CPT

## 2022-05-13 PROCEDURE — 1123F ACP DISCUSS/DSCN MKR DOCD: CPT | Performed by: INTERNAL MEDICINE

## 2022-05-13 PROCEDURE — 99211 OFF/OP EST MAY X REQ PHY/QHP: CPT

## 2022-05-13 PROCEDURE — 83540 ASSAY OF IRON: CPT

## 2022-05-13 PROCEDURE — 1036F TOBACCO NON-USER: CPT | Performed by: INTERNAL MEDICINE

## 2022-05-13 PROCEDURE — G8427 DOCREV CUR MEDS BY ELIG CLIN: HCPCS | Performed by: INTERNAL MEDICINE

## 2022-05-13 PROCEDURE — 82607 VITAMIN B-12: CPT

## 2022-05-13 PROCEDURE — 4040F PNEUMOC VAC/ADMIN/RCVD: CPT | Performed by: INTERNAL MEDICINE

## 2022-05-13 PROCEDURE — 85025 COMPLETE CBC W/AUTO DIFF WBC: CPT

## 2022-05-13 PROCEDURE — G8417 CALC BMI ABV UP PARAM F/U: HCPCS | Performed by: INTERNAL MEDICINE

## 2022-05-13 PROCEDURE — 99214 OFFICE O/P EST MOD 30 MIN: CPT | Performed by: INTERNAL MEDICINE

## 2022-05-13 NOTE — TELEPHONE ENCOUNTER
avs from 5/13/22     Bone marrow in 4 weeks rv in 6 weeks     IR was notified and will call pt to schedule bone marrow.    rv on 6/17/22 @ 10:45am.     Pt was given AVS and appt schedule    Electronically signed by Omer King on 5/13/2022 at 9:00 AM

## 2022-05-13 NOTE — PROGRESS NOTES
Niles Cohen                                                                                                                  5/13/2022  MRN:   [id number]  YOB: 1935  PCP:                           Clinton Che MD  Referring Physician: No ref. provider found  Treating Physician Name: Lj Wilcox MD      Reason for Visit:  Chief Complaint   Patient presents with    Follow-up    Constipation   Patient presents to the clinic to discuss results of his lab work-up and discuss further treatment plan    Current problems:  Anemia   Iron deficiency  Sick sinus syndrome and atrial fibrillation status post pacemaker 6911  Diastolic dysfunction  Chronic venous insufficiency  Chronic anticoagulation    Active and recent treatments:  Oral iron supplementation with vitamin C  Planning bone marrow biopsy    Interval history:  Patient presents to the clinic to discuss results of his lab work-up. Patient continues to be anemic despite of having adequate iron stores. Patient lately also has been nauseated. He has not been eating or drinking enough his kidney function is worsened. However patient states that now he is starting to feel better. Does not feel nauseated at the time of visit. His primary care physician did call in nausea medication which he is going to  today. Continues to be on iron. Denies noticing any bleeding. Continues to be in anticoagulation. During this visit patient's allergy, social, medical, surgical history and medications were reviewed and updated. Summary of Case/History:    Niles Cohen a 80 y. o.male is a patient with anemia who presents to the clinic to establish care and for further work-up and evaluation. Patient was recently hospitalized back in June and at [facility] was noted to be anemic with hemoglobin 8.6. Iron studies showed iron saturation of 15%. Ferritin was not checked.   Patient states that he is not taking any oral iron.  Patient does take Xarelto 15 mg daily due to history of atrial fibrillation. Patient also has history of DVT once. Patient states that he does follow-up with a GI doctor in [place] and had endoscopy done last year. We do not have the records at this point. Patient also follows up with his cardiologist in [facility]. Patient is in the process of transferring his care to [address] [place] [address] and presents to the clinic to establish care. Denies any bloody bowel movements. Past Medical History:   Past Medical History:   Diagnosis Date    Atrial fibrillation ([address]     CAD (coronary artery disease)     CHF (congestive heart failure) (HCC)     Hyperlipidemia     Hypertension        Past Surgical History:     Past Surgical History:   Procedure Laterality Date    CARDIAC CATHETERIZATION      PACEMAKER PLACEMENT         Patient Family Social History:     Social History     Socioeconomic History    Marital status:      Spouse name: None    Number of children: None    Years of education: None    Highest education level: None   Occupational History    None   Tobacco Use    Smoking status: Never Smoker    Smokeless tobacco: Never Used   Substance and Sexual Activity    Alcohol use: Never    Drug use: Never    Sexual activity: None   Other Topics Concern    None   Social History Narrative    None     Social Determinants of Health     Financial Resource Strain: Low Risk     Difficulty of Paying Living Expenses: Not hard at all   Food Insecurity: No Food Insecurity    Worried About Running Out of Food in the Last Year: Never true    Padilla of Food in the Last Year: Never true   Transportation Needs: Unmet Transportation Needs    Lack of Transportation (Medical): Yes    Lack of Transportation (Non-Medical): Yes   Physical Activity: Inactive    Days of Exercise per Week: 0 days    Minutes of Exercise per Session: 0 min   Stress: No Stress Concern Present    Feeling of Stress :  Only a little   Social Connections:     Frequency of Communication with Friends and Family: Not on file    Frequency of Social Gatherings with Friends and Family: Not on file    Attends Baptism Services: Not on file    Active Member of Clubs or Organizations: Not on file    Attends Club or Organization Meetings: Not on file    Marital Status: Not on file   Intimate Partner Violence:     Fear of Current or Ex-Partner: Not on file    Emotionally Abused: Not on file    Physically Abused: Not on file    Sexually Abused: Not on file   Housing Stability: [address] Unable to Pay for Housing in the Last Year: No    Number of Places Lived in the Last Year: 1    Unstable Housing in the Last Year: No     Family history:    Hypertension and diabetes mellitus    Current Medications:     Current Outpatient Medications   Medication Sig Dispense Refill    bumetanide (BUMEX) 2 MG tablet TAKE 1 TABLET BY MOUTH TWICE DAILY 180 tablet 1    vitamin B-12 (CYANOCOBALAMIN) 1000 MCG tablet TAKE 1 TABLET BY MOUTH DAILY 30 tablet 3    finasteride (PROSCAR) 5 MG tablet TAKE 1 TABLET BY MOUTH DAILY 90 tablet 3    amLODIPine (NORVASC) 5 MG tablet TAKE 2 TABLETS BY MOUTH DAILY 180 tablet 3    XARELTO 15 MG TABS tablet TAKE 1 TABLET BY MOUTH DAILY WITH BREAKFAST 30 tablet 1    metoprolol succinate (TOPROL XL) 25 MG extended release tablet Take 2 tablets by mouth every morning (Patient taking differently: Take 100 mg by mouth every morning ) 60 tablet 2    Ascorbic Acid (VITAMIN C) 250 MG tablet TAKE 1 TABLET BY MOUTH TWICE DAILY(TAKE WITH IRON) 60 tablet 3    nitroGLYCERIN (NITROSTAT) 0.4 MG SL tablet up to max of 3 total doses.  If no relief after 1 dose, call 911. 25 tablet 0    docusate sodium (COLACE) 100 MG capsule Take 100 mg by mouth 2 times daily as needed for Constipation      vitamin D (ERGOCALCIFEROL) 1.25 MG (84365 UT) CAPS capsule TAKE 1 CAPSULE BY MOUTH EVERY WEEK (Patient taking differently: once a week tuesdays) 12 capsule 1  Coenzyme Q10 100 MG CHEW Take 1 tablet by mouth daily 90 tablet 0    famotidine (PEPCID) 20 MG tablet Take 1 tablet by mouth daily 60 tablet 3    ferrous sulfate (IRON 325) 325 (65 Fe) MG tablet Take 1 tablet by mouth 2 times daily 60 tablet 5    buprenorphine-naloxone (SUBOXONE) 8-2 MG FILM SL film Place 1 Film under the tongue 2 times daily. Indications: pain       magnesium oxide (MAG-OX) 400 MG tablet Take 400 mg by mouth daily      latanoprost (XALATAN) 0.005 % ophthalmic solution Place 1 drop into both eyes nightly      ondansetron (ZOFRAN-ODT) 4 MG disintegrating tablet Take 1 tablet by mouth 3 times daily as needed for Nausea or Vomiting (Patient not taking: Reported on 5/13/2022) 21 tablet 0    hydrALAZINE (APRESOLINE) 25 MG tablet       spironolactone (ALDACTONE) 25 MG tablet Take 1 tablet by mouth daily (Patient not taking: Reported on 5/13/2022) 90 tablet 1     No current facility-administered medications for this visit. Allergies:   Aspirin, Cyclobenzaprine, Ibuprofen, Azithromycin, and Hydrocodone-acetaminophen    Review of Systems:    Constitutional: No fever or chills. No night sweats, no weight loss. Positive fatigue  Eyes: No eye discharge, double vision, or eye pain   HEENT: negative for sore mouth, sore throat, hoarseness and voice change   Respiratory: negative for cough , sputum, dyspnea, wheezing, hemoptysis, chest pain   Cardiovascular: negative for chest pain, dyspnea, palpitations, orthopnea, PND   Gastrointestinal: negative for nausea, vomiting, diarrhea, constipation, abdominal pain, Dysphagia, hematemesis and hematochezia   Genitourinary: negative for frequency, dysuria, nocturia, urinary incontinence, and hematuria   Integument: negative for rash, skin lesions, bruises.    Hematologic/Lymphatic: negative for easy bruising, bleeding, lymphadenopathy, or petechiae   Endocrine: negative for heat or cold intolerance,weight changes, change in bowel habits and hair loss Musculoskeletal: negative for myalgias, arthralgias, pain, joint swelling,and bone pain   Neurological: negative for headaches, dizziness, seizures, weakness, numbness    Physical Exam:    Vitals: BP (!) 130/57   Pulse 68   Temp 97.4 °F (36.3 °C) (Temporal)   Resp 16   Wt 190 lb 11.2 oz (86.5 kg)   BMI 25.16 kg/m²   General appearance - well appearing, no in pain or distress  Mental status - AAO X3  Eyes - pupils equal and reactive, extraocular eye movements intact  Mouth - mucous membranes moist, pharynx normal without lesions  Neck - supple, no significant adenopathy  Lymphatics - no palpable lymphadenopathy, no hepatosplenomegaly  Chest - clear to auscultation, no wheezes, rales or rhonchi, symmetric air entry  Heart - normal rate, regular rhythm, normal S1, S2, no murmurs  Abdomen - soft, nontender, nondistended, no masses or organomegaly  Neurological - alert, oriented, normal speech, no focal findings or movement disorder noted  Extremities - peripheral pulses normal, no pedal edema, no clubbing or cyanosis  Skin - normal coloration and turgor, no rashes, no suspicious skin lesions noted       DATA:    Results for orders placed or performed during the hospital encounter of 05/13/22   CBC Auto Differential   Result Value Ref Range    WBC 3.2 (L) 3.5 - 11.0 k/uL    RBC 3.49 (L) 4.5 - 5.9 m/uL    Hemoglobin 10.3 (L) 13.5 - 17.5 g/dL    Hematocrit 31.7 (L) 41 - 53 %    MCV 91.0 80 - 100 fL    MCH 29.6 26 - 34 pg    MCHC 32.6 31 - 37 g/dL    RDW 14.1 12.5 - 15.4 %    Platelets 934 659 - 379 k/uL    MPV 6.9 6.0 - 12.0 fL    Seg Neutrophils 49 36 - 66 %    Lymphocytes 26 24 - 44 %    Monocytes 14 (H) 2 - 11 %    Eosinophils % 10 (H) 1 - 4 %    Basophils 1 0 - 2 %    Segs Absolute 1.60 (L) 1.8 - 7.7 k/uL    Absolute Lymph # 0.80 (L) 1.0 - 4.8 k/uL    Absolute Mono # 0.40 0.1 - 1.2 k/uL    Absolute Eos # 0.30 0.0 - 0.4 k/uL    Basophils Absolute 0.00 0.0 - 0.2 k/uL   Basic Metabolic Panel   Result Value Ref Range    Glucose 118 (H) 70 - 99 mg/dL    BUN 41 (H) 8 - 23 mg/dL    CREATININE 1.77 (H) 0.70 - 1.20 mg/dL    Calcium 8.7 8.6 - 10.4 mg/dL    Sodium 134 (L) 135 - 144 mmol/L    Potassium 4.2 3.7 - 5.3 mmol/L    Chloride 95 (L) 98 - 107 mmol/L    CO2 28 20 - 31 mmol/L    Anion Gap 11 9 - 17 mmol/L    GFR Non-African American 37 (L) >60 mL/min    GFR  44 (L) >60 mL/min    GFR Comment           Chest x-ray:    Impression   1. Technically suboptimal AP lordotic projection. 2. No definite acute pulmonary disease. 3. Minimal platelike atelectasis right lung base. 4. Calcific atherosclerosis aorta. 5. Cardiomegaly. Follow-up full inspiration PA and lateral chest may be useful for better   characterization of pulmonary findings.           Impression:  Anemia   Iron deficiency  Sick sinus syndrome and atrial fibrillation status post pacemaker 6492  Diastolic dysfunction  Chronic venous insufficiency  Chronic anticoagulation    Plan:  I had a detailed discussion with the patient and personally went over results of lab work-up imaging studies and other relevant clinical data. Hemoglobin continues to be low but stable. Earlier patient was noted to have adequate iron stores. Discussed differential diagnosis of anemia. I am concerned about primary bone marrow disorder. Revisited bone marrow biopsy patient is now agreeable. I will schedule it. Patient also educated on keeping himself hydrated. I believe worsening of patient's kidney function is likely due to his nausea not drinking enough however he is now starting to feel better. Continues to be on anticoagulation without any adverse events  Continue oral iron supplementation at this time. Patient has had IV iron in the past  Return to clinic to discuss results of bone marrow biopsy. Patient had multiple questions which answered to the best my ability.       Rashida Medina MD    this note is created with the assistance of a speech recognition program.  While intending to generate a document that actually reflects the content of the visit, the document can still have some errors including those of syntax and sound a like substitutions which may escape proof reading. It such instances, actual meaning can be extrapolated by contextual diversion.

## 2022-05-16 ENCOUNTER — CARE COORDINATION (OUTPATIENT)
Dept: CARE COORDINATION | Age: 87
End: 2022-05-16

## 2022-05-16 RX ORDER — RIVAROXABAN 15 MG/1
TABLET, FILM COATED ORAL
Qty: 30 TABLET | Refills: 1 | Status: SHIPPED | OUTPATIENT
Start: 2022-05-16 | End: 2022-07-05

## 2022-05-16 NOTE — CARE COORDINATION
[facility]. phoned patient to day to follow up on hs transportation needs. Patient   stated that his transportation is working really good. Graciela/  from the [facility], schedules the patient's appointments and coordinates his rides. Patient acknowledged being very pleased with the process and denied any other social needs at this time.       Plan of Care  [facility], [facility]. will sign off at this time

## 2022-05-18 ENCOUNTER — CARE COORDINATION (OUTPATIENT)
Dept: CARE COORDINATION | Age: 87
End: 2022-05-18

## 2022-05-18 NOTE — CARE COORDINATION
Heart Failure Education outreach Date/Time: 2022 1:21 PM    Ambulatory Care Manager (ACM) contacted the patient by telephone to perform Ambulatory Care Coordination. Verified name and  with patient as identifiers. Provided introduction to self, and explanation of the Ambulatory Care Manager's role. ACM reviewed that a Health Healthy tips for the Summer packet has been mailed to the them. ACM reviewed CHF zones, daily weights, fluid restriction, the importance of low sodium diet and healthy tips packet with the patient. Instructed patient to call their PCP if they have a weight gain of 3 lbs in 2 days or 5 lbs in a week. Patient reminded that there is a physician on call 24 hours a day / 7 days a week should the patient have questions or concerns. The patient verbalized understanding. Would like results from recent labs, will send request to PCP. Was told by Hematology that he needs a Bone marrow  biopsy but he does not understand when or how to schedule it or what it entails. ACM called Dr. Aminata Triana office to get some advice and had to leave a VM. Will follow up with recommendations.

## 2022-05-18 NOTE — CARE COORDINATION
SADI received a call from oncology stating that they have been trying to reach patient to schedule him. They left a number and requested he calls then (IR) to schedule the biopsy. SADI called patient and he reported they had just reached him and he is scheduled for 5.31.22. He was instructed to hold his Xarelto for 2 days before. He believes that his grandson can take him but he will contact MARCIE ASAP if not.

## 2022-05-19 ENCOUNTER — TELEPHONE (OUTPATIENT)
Dept: INTERNAL MEDICINE CLINIC | Age: 86
End: 2022-05-19

## 2022-05-19 DIAGNOSIS — D50.9 IRON DEFICIENCY ANEMIA, UNSPECIFIED IRON DEFICIENCY ANEMIA TYPE: Primary | ICD-10-CM

## 2022-05-26 ENCOUNTER — OFFICE VISIT (OUTPATIENT)
Dept: INTERNAL MEDICINE CLINIC | Age: 87
End: 2022-05-26
Payer: MEDICARE

## 2022-05-26 VITALS
SYSTOLIC BLOOD PRESSURE: 160 MMHG | HEART RATE: 72 BPM | BODY MASS INDEX: 25.5 KG/M2 | DIASTOLIC BLOOD PRESSURE: 92 MMHG | OXYGEN SATURATION: 99 % | HEIGHT: 73 IN | WEIGHT: 192.4 LBS

## 2022-05-26 DIAGNOSIS — I50.33 ACUTE ON CHRONIC DIASTOLIC CONGESTIVE HEART FAILURE (HCC): ICD-10-CM

## 2022-05-26 DIAGNOSIS — K56.609 SBO (SMALL BOWEL OBSTRUCTION) (HCC): ICD-10-CM

## 2022-05-26 DIAGNOSIS — N17.9 AKI (ACUTE KIDNEY INJURY) (HCC): ICD-10-CM

## 2022-05-26 DIAGNOSIS — I48.91 ATRIAL FIBRILLATION, UNSPECIFIED TYPE (HCC): Primary | ICD-10-CM

## 2022-05-26 DIAGNOSIS — I20.0 UNSTABLE ANGINA (HCC): ICD-10-CM

## 2022-05-26 DIAGNOSIS — I50.9 CHRONIC CONGESTIVE HEART FAILURE, UNSPECIFIED HEART FAILURE TYPE (HCC): ICD-10-CM

## 2022-05-26 DIAGNOSIS — K21.00 GASTROESOPHAGEAL REFLUX DISEASE WITH ESOPHAGITIS, UNSPECIFIED WHETHER HEMORRHAGE: ICD-10-CM

## 2022-05-26 PROCEDURE — 1124F ACP DISCUSS-NO DSCNMKR DOCD: CPT | Performed by: INTERNAL MEDICINE

## 2022-05-26 PROCEDURE — G8427 DOCREV CUR MEDS BY ELIG CLIN: HCPCS | Performed by: INTERNAL MEDICINE

## 2022-05-26 PROCEDURE — G8417 CALC BMI ABV UP PARAM F/U: HCPCS | Performed by: INTERNAL MEDICINE

## 2022-05-26 PROCEDURE — 93000 ELECTROCARDIOGRAM COMPLETE: CPT | Performed by: INTERNAL MEDICINE

## 2022-05-26 PROCEDURE — 1036F TOBACCO NON-USER: CPT | Performed by: INTERNAL MEDICINE

## 2022-05-26 PROCEDURE — 99214 OFFICE O/P EST MOD 30 MIN: CPT | Performed by: INTERNAL MEDICINE

## 2022-05-26 RX ORDER — FAMOTIDINE 20 MG/1
20 TABLET, FILM COATED ORAL DAILY
Qty: 90 TABLET | Refills: 0 | Status: SHIPPED | OUTPATIENT
Start: 2022-05-26 | End: 2022-08-15

## 2022-05-26 RX ORDER — ERGOCALCIFEROL 1.25 MG/1
50000 CAPSULE ORAL WEEKLY
Qty: 12 CAPSULE | Refills: 2 | Status: SHIPPED | OUTPATIENT
Start: 2022-05-26 | End: 2022-10-24

## 2022-05-26 RX ORDER — METOPROLOL SUCCINATE 25 MG/1
50 TABLET, EXTENDED RELEASE ORAL EVERY MORNING
Qty: 60 TABLET | Refills: 2 | Status: SHIPPED | OUTPATIENT
Start: 2022-05-26 | End: 2022-05-26 | Stop reason: ALTCHOICE

## 2022-05-26 ASSESSMENT — PATIENT HEALTH QUESTIONNAIRE - PHQ9
SUM OF ALL RESPONSES TO PHQ QUESTIONS 1-9: 0
9. THOUGHTS THAT YOU WOULD BE BETTER OFF DEAD, OR OF HURTING YOURSELF: 0
10. IF YOU CHECKED OFF ANY PROBLEMS, HOW DIFFICULT HAVE THESE PROBLEMS MADE IT FOR YOU TO DO YOUR WORK, TAKE CARE OF THINGS AT HOME, OR GET ALONG WITH OTHER PEOPLE: 0
8. MOVING OR SPEAKING SO SLOWLY THAT OTHER PEOPLE COULD HAVE NOTICED. OR THE OPPOSITE, BEING SO FIGETY OR RESTLESS THAT YOU HAVE BEEN MOVING AROUND A LOT MORE THAN USUAL: 0
2. FEELING DOWN, DEPRESSED OR HOPELESS: 0
4. FEELING TIRED OR HAVING LITTLE ENERGY: 0
5. POOR APPETITE OR OVEREATING: 0
SUM OF ALL RESPONSES TO PHQ QUESTIONS 1-9: 0
3. TROUBLE FALLING OR STAYING ASLEEP: 0
1. LITTLE INTEREST OR PLEASURE IN DOING THINGS: 0
6. FEELING BAD ABOUT YOURSELF - OR THAT YOU ARE A FAILURE OR HAVE LET YOURSELF OR YOUR FAMILY DOWN: 0
SUM OF ALL RESPONSES TO PHQ QUESTIONS 1-9: 0
SUM OF ALL RESPONSES TO PHQ9 QUESTIONS 1 & 2: 0
7. TROUBLE CONCENTRATING ON THINGS, SUCH AS READING THE NEWSPAPER OR WATCHING TELEVISION: 0
SUM OF ALL RESPONSES TO PHQ QUESTIONS 1-9: 0

## 2022-05-26 NOTE — PROGRESS NOTES
Subjective:      Patient ID: Niles Cohen is a 80 y.o. male. HPI Patient, is here for evaluation multiple medical problems.  Atrial fibrillation, on anticoagulation, CHF, sick sinus syndrome status post AICD  He has chronic anemia, following with hematologist  Plan for bone marrow biopsy  Had recent lab work, creatinine is 1.77 increased from 1.18, patient also received contrast for adrenal protocol CT  Patient, feels short of breath and gained weight, when his diuretic dose was reduced  He has chronic arthritis in both knees and back  Review of Systems   Constitutional: Positive for fatigue and unexpected weight change (Weight gain). Negative for activity change, appetite change, chills and diaphoresis. HENT: Negative for congestion, dental problem, ear discharge, facial swelling and hearing loss. Respiratory: Negative for apnea, cough, chest tightness, shortness of breath and wheezing. Cardiovascular: Positive for leg swelling. Negative for chest pain. Gastrointestinal: Negative for abdominal distention, abdominal pain, constipation and diarrhea. Genitourinary: Negative for difficulty urinating, dysuria, enuresis, flank pain and frequency. Musculoskeletal: Positive for arthralgias (both knees ) and back pain. Negative for gait problem and joint swelling. Uses cane    Skin: Negative for color change, pallor and rash. Neurological: Negative for dizziness, seizures, facial asymmetry, light-headedness, numbness and headaches. Psychiatric/Behavioral: Negative for agitation, behavioral problems, confusion, decreased concentration and dysphoric mood. Objective:   Physical Exam  Constitutional:       Appearance: He is well-developed. He is obese. He is not diaphoretic. HENT:      Head: Normocephalic and atraumatic. Mouth/Throat:      Pharynx: No oropharyngeal exudate. Eyes:      General: No scleral icterus. Right eye: No discharge. Left eye: No discharge. Conjunctiva/sclera: Conjunctivae normal.      Pupils: Pupils are equal, round, and reactive to light. Neck:      Thyroid: No thyromegaly. Vascular: No JVD. Trachea: No tracheal deviation. Cardiovascular:      Rate and Rhythm: Normal rate. Heart sounds: Normal heart sounds. No murmur heard. No gallop. Comments: Systolic Murmur in Pulmonary ARea   Pulmonary:      Effort: Pulmonary effort is normal. No respiratory distress. Breath sounds: Normal breath sounds. No stridor. No wheezing or rales. Chest:      Chest wall: No tenderness. Abdominal:      General: Bowel sounds are normal. There is no distension. Palpations: Abdomen is soft. Tenderness: There is no abdominal tenderness. There is no guarding or rebound. Musculoskeletal:         General: Normal range of motion. Cervical back: Normal range of motion and neck supple. Right lower leg: Edema present. Left lower leg: Edema (2+ pitting edema in both legs) present. Neurological:      Mental Status: He is alert and oriented to person, place, and time. Assessment / Plan:   1. Atrial fibrillation, unspecified type ([address]  - EKG 12 lead    2. Acute on chronic diastolic congestive heart failure ([address]  On bumex 2mg daily     3. Chronic congestive heart failure, unspecified heart failure type ([address]      4. SBO (small bowel obstruction) (HCC)  Resolved     5. Unstable angina ([address]    6. VARSHA (acute kidney injury) ([address]  - Basic Metabolic Panel; Future  - [facility] ([facility]) - Shila Almonte MD, Nephrology, [place]  On diuretics with CHF  Did receive contrast  Advised to avoid NSAIDs      · Return in about 4 weeks (around 6/23/2022). · Reviewed prior labs and health maintenance. · Discussed use, benefit, and side effects of prescribed medications. Barriers to medication compliance addressed. All patient questions answered. Pt voiced understanding.          MD JACQUELINE [facility]  6/1/2022, 10:34 PM    Please note that this chart was generated using voice recognition Dragon dictation software. Although every effort was made to ensure the accuracy of this automated transcription, some errors in transcription may have occurred. Visit Information    Have you changed or started any medications since your last visit including any over-the-counter medicines, vitamins, or herbal medicines? no   Are you having any side effects from any of your medications? -  no  Have you stopped taking any of your medications? Is so, why? -  no    Have you seen any other physician or provider since your last visit? Yes - Records Obtained  Have you had any other diagnostic tests since your last visit? No  Have you been seen in the emergency room and/or had an admission to a hospital since we last saw you? No  Have you had your routine dental cleaning in the past 6 months? no    Have you activated your [employer] account? If not, what are your barriers?  No:      Patient Care Team:  Lonnie Shepard MD as PCP - General (Internal Medicine)  Lonnie Shepard MD as PCP - [address] [place] [address]  [facility] Provider  Albertina Burns RN as [address] [place] [address] MD Chance as Consulting Physician (Hematology and Oncology)    Medical History Review  Past Medical, Family, and Social History reviewed and does contribute to the patient presenting condition    Health Maintenance   Topic Date Due    Annual Wellness Visit (AWV)  Never done    DTaP/Tdap/Td vaccine (1 - Tdap) Never done    Shingles vaccine (1 of 2) Never done    Depression Screen  10/26/2022    Flu vaccine  Completed    Pneumococcal 65+ years Vaccine  Completed    COVID-19 Vaccine  Completed    Hepatitis A vaccine  Aged Out    Hepatitis B vaccine  Aged Out    Hib vaccine  Aged Out    Meningococcal (ACWY) vaccine  Aged Out

## 2022-05-27 NOTE — TELEPHONE ENCOUNTER
----- Message from Sangeeta Vickers sent at 5/27/2022 11:16 AM EDT -----  Subject: Message to Provider    QUESTIONS  Information for Provider? Pt was seen on 5/26 by PCP was informed to stop   taking leprolazol 25mg, but went to  medication at pharamacy was   given RX. Pt is confused and needs pcp to clarify what RX pt needs to take   and not take. Please contact as soon as possible.   ---------------------------------------------------------------------------  --------------  CALL BACK INFO  What is the best way for the office to contact you? OK to leave message on   voicemail  Preferred Call Back Phone Number? [phone]  ---------------------------------------------------------------------------  --------------  SCRIPT ANSWERS  Relationship to Patient?  Self

## 2022-05-31 ENCOUNTER — HOSPITAL ENCOUNTER (OUTPATIENT)
Dept: CT IMAGING | Age: 87
Discharge: HOME | End: 2022-06-02
Payer: MEDICARE

## 2022-05-31 VITALS
HEIGHT: 73 IN | OXYGEN SATURATION: 96 % | TEMPERATURE: 97.2 F | BODY MASS INDEX: 24.65 KG/M2 | WEIGHT: 186 LBS | RESPIRATION RATE: 18 BRPM | SYSTOLIC BLOOD PRESSURE: 133 MMHG | DIASTOLIC BLOOD PRESSURE: 74 MMHG | HEART RATE: 72 BPM

## 2022-05-31 DIAGNOSIS — D64.9 ANEMIA, UNSPECIFIED TYPE: ICD-10-CM

## 2022-05-31 LAB
ABSOLUTE EOS #: 0.2 K/UL (ref 0–0.4)
ABSOLUTE LYMPH #: 0.5 K/UL (ref 1–4.8)
ABSOLUTE MONO #: 0.3 K/UL (ref 0.1–1.3)
ABSOLUTE RETIC #: 0.07 M/UL (ref 0.02–0.1)
BASOPHILS # BLD: 1 % (ref 0–2)
BASOPHILS ABSOLUTE: 0 K/UL (ref 0–0.2)
EOSINOPHILS RELATIVE PERCENT: 5 % (ref 0–4)
HCT VFR BLD CALC: 30.6 % (ref 41–53)
HEMOGLOBIN: 9.9 G/DL (ref 13.5–17.5)
INR BLD: 1.1
LYMPHOCYTES # BLD: 15 % (ref 24–44)
MCH RBC QN AUTO: 29.7 PG (ref 26–34)
MCHC RBC AUTO-ENTMCNC: 32.3 G/DL (ref 31–37)
MCV RBC AUTO: 91.9 FL (ref 80–100)
MONOCYTES # BLD: 9 % (ref 1–7)
PARTIAL THROMBOPLASTIN TIME: 30.2 SEC (ref 24–36)
PDW BLD-RTO: 14.5 % (ref 11.5–14.9)
PLATELET # BLD: 239 K/UL (ref 150–450)
PMV BLD AUTO: 6.9 FL (ref 6–12)
PROTHROMBIN TIME: 14.6 SEC (ref 11.8–14.6)
RBC # BLD: 3.33 M/UL (ref 4.5–5.9)
RETIC %: 1.9 % (ref 0.5–2)
SEG NEUTROPHILS: 70 % (ref 36–66)
SEGMENTED NEUTROPHILS ABSOLUTE COUNT: 2.5 K/UL (ref 1.3–9.1)
WBC # BLD: 3.6 K/UL (ref 3.5–11)

## 2022-05-31 PROCEDURE — 7100000030 HC ASPR PHASE II RECOVERY - FIRST 15 MIN

## 2022-05-31 PROCEDURE — 85610 PROTHROMBIN TIME: CPT

## 2022-05-31 PROCEDURE — 2580000003 HC RX 258: Performed by: RADIOLOGY

## 2022-05-31 PROCEDURE — 38221 DX BONE MARROW BIOPSIES: CPT

## 2022-05-31 PROCEDURE — 7100000031 HC ASPR PHASE II RECOVERY - ADDTL 15 MIN

## 2022-05-31 PROCEDURE — 85025 COMPLETE CBC W/AUTO DIFF WBC: CPT

## 2022-05-31 PROCEDURE — 88237 TISSUE CULTURE BONE MARROW: CPT

## 2022-05-31 PROCEDURE — 88305 TISSUE EXAM BY PATHOLOGIST: CPT

## 2022-05-31 PROCEDURE — 7100000010 HC PHASE II RECOVERY - FIRST 15 MIN

## 2022-05-31 PROCEDURE — 88360 TUMOR IMMUNOHISTOCHEM/MANUAL: CPT

## 2022-05-31 PROCEDURE — 88185 FLOWCYTOMETRY/TC ADD-ON: CPT

## 2022-05-31 PROCEDURE — 36415 COLL VENOUS BLD VENIPUNCTURE: CPT

## 2022-05-31 PROCEDURE — 88280 CHROMOSOME KARYOTYPE STUDY: CPT

## 2022-05-31 PROCEDURE — 88275 CYTOGENETICS 100-300: CPT

## 2022-05-31 PROCEDURE — 88271 CYTOGENETICS DNA PROBE: CPT

## 2022-05-31 PROCEDURE — 88184 FLOWCYTOMETRY/ TC 1 MARKER: CPT

## 2022-05-31 PROCEDURE — 77012 CT SCAN FOR NEEDLE BIOPSY: CPT

## 2022-05-31 PROCEDURE — 7100000011 HC PHASE II RECOVERY - ADDTL 15 MIN

## 2022-05-31 PROCEDURE — 88264 CHROMOSOME ANALYSIS 20-25: CPT

## 2022-05-31 PROCEDURE — 85730 THROMBOPLASTIN TIME PARTIAL: CPT

## 2022-05-31 PROCEDURE — 6360000002 HC RX W HCPCS: Performed by: RADIOLOGY

## 2022-05-31 PROCEDURE — 88313 SPECIAL STAINS GROUP 2: CPT

## 2022-05-31 PROCEDURE — 85045 AUTOMATED RETICULOCYTE COUNT: CPT

## 2022-05-31 PROCEDURE — 88311 DECALCIFY TISSUE: CPT

## 2022-05-31 RX ORDER — SODIUM CHLORIDE 0.9 % (FLUSH) 0.9 %
5-40 SYRINGE (ML) INJECTION PRN
Status: DISCONTINUED | OUTPATIENT
Start: 2022-05-31 | End: 2022-06-03 | Stop reason: HOSPADM

## 2022-05-31 RX ORDER — ACETAMINOPHEN 325 MG/1
650 TABLET ORAL EVERY 4 HOURS PRN
Status: DISCONTINUED | OUTPATIENT
Start: 2022-05-31 | End: 2022-06-03 | Stop reason: HOSPADM

## 2022-05-31 RX ORDER — MIDAZOLAM HYDROCHLORIDE 1 MG/ML
INJECTION INTRAMUSCULAR; INTRAVENOUS
Status: COMPLETED | OUTPATIENT
Start: 2022-05-31 | End: 2022-05-31

## 2022-05-31 RX ORDER — SODIUM CHLORIDE 9 MG/ML
INJECTION, SOLUTION INTRAVENOUS CONTINUOUS
Status: DISCONTINUED | OUTPATIENT
Start: 2022-05-31 | End: 2022-06-03 | Stop reason: HOSPADM

## 2022-05-31 RX ORDER — FENTANYL CITRATE 50 UG/ML
INJECTION, SOLUTION INTRAMUSCULAR; INTRAVENOUS
Status: COMPLETED | OUTPATIENT
Start: 2022-05-31 | End: 2022-05-31

## 2022-05-31 RX ORDER — SODIUM CHLORIDE 9 MG/ML
INJECTION, SOLUTION INTRAVENOUS PRN
Status: DISCONTINUED | OUTPATIENT
Start: 2022-05-31 | End: 2022-06-03 | Stop reason: HOSPADM

## 2022-05-31 RX ORDER — SODIUM CHLORIDE 0.9 % (FLUSH) 0.9 %
5-40 SYRINGE (ML) INJECTION EVERY 12 HOURS SCHEDULED
Status: DISCONTINUED | OUTPATIENT
Start: 2022-05-31 | End: 2022-06-03 | Stop reason: HOSPADM

## 2022-05-31 RX ADMIN — Medication 25 ML: at 10:11

## 2022-05-31 RX ADMIN — FENTANYL CITRATE 50 MCG: 50 INJECTION, SOLUTION INTRAMUSCULAR; INTRAVENOUS at 11:43

## 2022-05-31 RX ADMIN — MIDAZOLAM 0.5 MG: 1 INJECTION INTRAMUSCULAR; INTRAVENOUS at 11:43

## 2022-05-31 ASSESSMENT — PAIN - FUNCTIONAL ASSESSMENT: PAIN_FUNCTIONAL_ASSESSMENT: 0-10

## 2022-05-31 NOTE — POST SEDATION
Sedation Post Procedure Note    Patient Name: Alona Mcallister   YOB: 1935  Room/Bed: Room/bed info not found  Medical Record Number: [id number]  Date: 5/31/2022   Time: 11:52 AM         Physicians/Assistants: Kanchan Kline MD, MD    Procedure Performed:  CT guided random bone marrow biopsy    Post-Sedation Vital Signs:  Vitals:    05/31/22 1150   BP: 130/78   Pulse: 65   Resp: 20   Temp:    SpO2: 100%      Vital signs were reviewed and were stable after the procedure (see flow sheet for vitals)            Complications: none    Electronically signed by Kanchan Kline MD on 5/31/2022 at 11:52 AM

## 2022-05-31 NOTE — TELEPHONE ENCOUNTER
I believe he is referring to the spironolactone that was discontinued at last visit.  He must have had a refill remaining at pharmacy- I can call pharmacy and discontinue any remaining refills and advise pt to stop taking this medication

## 2022-05-31 NOTE — H&P
HISTORY and [facility] [id number]       NAME:  Casie Mcguire  MRN: [id number]   YOB: 1935   Date: 5/31/2022   Age: 80 y.o. Gender: male       COMPLAINT AND PRESENT HISTORY:     Casie Mcguire is 80 y.o.,  male, here for IR BONE MARROW BIOPSY.   pattient states that he has had this procedure before years ago. Pt has hx of Anemia, DVT, Persistant A FIB, and Continuous anticoagulation. Patient is seeing Dr. Silas Hwang, Oncology, see his notes:    Interval History  Patient present to the clinic to discuss results of his lab work up. Patient continues to be anemic despite of having adequate iron stores. Patient lately also has been   nauseated. He has not been eating or drinking enough his kidney function is worsened. However patient states that now he is starting to feel better. Does not feel nauseated at the time of visit. His primary care physician did call in nausea medication which he is going to  today. Continues to be on iron. Denies noticing any bleeding. Continues to be in anticoagulation. Summary of Case/History:  Casie Mcguire a 80 y. o.male is a patient with anemia who presents to the clinic to establish care and for further work-up and evaluation. Patient was recently hospitalized back in June and at [facility] was noted to be anemic with hemoglobin 8.6. Iron studies showed iron saturation of 15%. Ferritin was not checked. Patient states that he is not taking any oral iron. Patient does take Xarelto 15 mg daily due to history of atrial fibrillation. Patient also has history of DVT once. Patient states that he does follow-up with a GI doctor in [place] and had endoscopy done last year. We do not have the records at this point. Patient also follows up with his cardiologist in [facility]. Patient is in the process of transferring his care the [place] and presents to the clinic to establish care.  Denies any bloody bowel movements. Plan:  I had a detailed discussion with the patient and personally went over results of lab work-up imaging studies and other relevant clinical data. Hemoglobin continues to be low but stable.  Earlier patient was noted to have adequate iron stores.  Discussed differential diagnosis of anemia.  I am concerned about primary bone marrow disorder.  Revisited bone marrow biopsy patient is now agreeable.  I will schedule it.     Patient presently has symptoms of : Fatigue,  Lack of energy. SOB. Patient denies any appetite changes. No nausea, vomiting. No abdominal pain presently, was in hospital about 3 wks ago with bowel obstruction. . No urinary or bowel issues. No hematochezia or hematuria. No fever or  Chills.      Past Medical History  Atrial Fibrillation, CAD, CHF, HTN, HLD     Pt is s/p Pacemaker placement. Pt is on Xarelto, Toprol, Hydralazine,  Bumex, Aldactone,  Norvasc     The symptoms started 2 years   Patient has been NPO since midnight. No blood thinners in the past 3 days. Patient took his medicine with sip of water. Patient will be getting labs:  PT/INR          Lab Results   Component Value Date     WBC 3.2 (L) 05/13/2022     HGB 10.3 (L) 05/13/2022     HCT 31.7 (L) 05/13/2022     MCV 91.0 05/13/2022      05/13/2022            Lab Results   Component Value Date     IRON 36 (L) 05/13/2022     TIBC 315 05/13/2022     FERRITIN 92 03/15/2022      PAST MEDICAL HISTORY     Past Medical History:   Diagnosis Date    Atrial fibrillation ([address]     CAD (coronary artery disease)     CHF (congestive heart failure) (HCC)     Hyperlipidemia     Hypertension        SURGICAL HISTORY       Past Surgical History:   Procedure Laterality Date    CARDIAC CATHETERIZATION      PACEMAKER PLACEMENT         FAMILY HISTORY     No family history on file. SOCIAL HISTORY       Social History     Socioeconomic History    Marital status:       Spouse name: Not on file    Number of children: Not on file    Years of education: Not on file    Highest education level: Not on file   Occupational History    Not on file   Tobacco Use    Smoking status: Never Smoker    Smokeless tobacco: Never Used   Substance and Sexual Activity    Alcohol use: Never    Drug use: Never    Sexual activity: Not on file   Other Topics Concern    Not on file   Social History Narrative    Not on file     Social Determinants of Health     Financial Resource Strain: Low Risk     Difficulty of Paying Living Expenses: Not hard at all   Food Insecurity: No Food Insecurity    Worried About Running Out of Food in the Last Year: Never true    Padilla of Food in the Last Year: Never true   Transportation Needs: Unmet Transportation Needs    Lack of Transportation (Medical): Yes    Lack of Transportation (Non-Medical): Yes   Physical Activity: Inactive    Days of Exercise per Week: 0 days    Minutes of Exercise per Session: 0 min   Stress: No Stress Concern Present    Feeling of Stress :  Only a little   Social Connections:     Frequency of Communication with Friends and Family: Not on file    Frequency of Social Gatherings with Friends and Family: Not on file    Attends [employer] Services: Not on file    Active Member of Clubs or Organizations: Not on file    Attends Club or Organization Meetings: Not on file    Marital Status: Not on file   Intimate Partner Violence:     Fear of Current or Ex-Partner: Not on file    Emotionally Abused: Not on file    Physically Abused: Not on file    Sexually Abused: Not on file   Housing Stability: [address] Unable to Pay for Housing in the Last Year: No    Number of [place] in the Last Year: 1    Unstable Housing in the Last Year: No           REVIEW OF SYSTEMS      Allergies   Allergen Reactions    Aspirin Nausea Only    Cyclobenzaprine Nausea Only    Ibuprofen Nausea Only    Azithromycin Nausea Only    Hydrocodone-Acetaminophen      per pt, he is having upset stomach when taking norco       Current Outpatient Medications on File Prior to Encounter   Medication Sig Dispense Refill    famotidine (PEPCID) 20 MG tablet TAKE 1 TABLET BY MOUTH DAILY 90 tablet 0    vitamin D (ERGOCALCIFEROL) 1.25 MG (79038 UT) CAPS capsule Take 1 capsule by mouth once a week tuesdays 12 capsule 2    XARELTO 15 MG TABS tablet TAKE 1 TABLET BY MOUTH DAILY WITH BREAKFAST 30 tablet 1    ondansetron (ZOFRAN-ODT) 4 MG disintegrating tablet Take 1 tablet by mouth 3 times daily as needed for Nausea or Vomiting (Patient not taking: Reported on 5/13/2022) 21 tablet 0    bumetanide (BUMEX) 2 MG tablet TAKE 1 TABLET BY MOUTH TWICE DAILY 180 tablet 1    hydrALAZINE (APRESOLINE) 25 MG tablet       vitamin B-12 (CYANOCOBALAMIN) 1000 MCG tablet TAKE 1 TABLET BY MOUTH DAILY 30 tablet 3    finasteride (PROSCAR) 5 MG tablet TAKE 1 TABLET BY MOUTH DAILY 90 tablet 3    amLODIPine (NORVASC) 5 MG tablet TAKE 2 TABLETS BY MOUTH DAILY 180 tablet 3    Ascorbic Acid (VITAMIN C) 250 MG tablet TAKE 1 TABLET BY MOUTH TWICE DAILY(TAKE WITH IRON) 60 tablet 3    nitroGLYCERIN (NITROSTAT) 0.4 MG SL tablet up to max of 3 total doses. If no relief after 1 dose, call 911. 25 tablet 0    docusate sodium (COLACE) 100 MG capsule Take 100 mg by mouth 2 times daily as needed for Constipation      Coenzyme Q10 100 MG CHEW Take 1 tablet by mouth daily 90 tablet 0    ferrous sulfate (IRON 325) 325 (65 Fe) MG tablet Take 1 tablet by mouth 2 times daily 60 tablet 5    buprenorphine-naloxone (SUBOXONE) 8-2 MG FILM SL film Place 1 Film under the tongue 2 times daily. Indications: pain       magnesium oxide (MAG-OX) 400 MG tablet Take 400 mg by mouth daily      latanoprost (XALATAN) 0.005 % ophthalmic solution Place 1 drop into both eyes nightly       No current facility-administered medications on file prior to encounter. Negative except for what is mentioned in the HPI.      GENERAL PHYSICAL EXAM Vitals: BP (!) 147/80   Pulse 69   Temp 97.1 °F (36.2 °C) (Infrared)   Resp 18   Ht 6' 1\" (1.854 m)   Wt 192 lb (87.1 kg)   SpO2 99%   BMI 25.33 kg/m²  Body mass index is 25.33 kg/m². GENERAL APPEARANCE:   Belinda Tillman is 80 y.o., male, not obese, nourished, conscious, alert. Does not appear to be distress or pain at this time. SKIN:  Warm, dry, no cyanosis or jaundice. HEAD:  Normocephalic, atraumatic, no swelling or tenderness. EYES:  Pupils equal, reactive to light. EARS:  No discharge, no marked hearing loss. NOSE:  No rhinorrhea, epistaxis or septal deformity. THROAT:  Not congested. No ulceration bleeding or discharge. NECK:  No stiffness, trachea central.  No palpable masses or L.N.                 CHEST:  Symmetrical and equal on expansion. HEART:  RRR . No audible murmurs or gallops. Pacemaker on left side. LUNGS:  Equal on expansion, normal breath sounds. No adventitious sounds. ABDOMEN:  Obese. Soft on palpation. No dysphagia, No localized tenderness. No guarding or rigidity. LYMPHATICS:  No palpable cervical lymphadenopathy. LOCOMOTOR, BACK AND SPINE:  No tenderness or deformities. EXTREMITIES:  Symmetrical, no pretibial edema. No discoloration or ulcerations. NEUROLOGIC:  The patient is conscious, alert, oriented,Cranial nerve II-XII intact, taste and smell were not examined. No apparent focal sensory or motor deficits.              PROVISIONAL DIAGNOSES / SURGERY:      IR BONE MARROW BIOPSY     ANEMIA     PERSISTENT ATRIAL FIB     DVT    Patient Active Problem List    Diagnosis Date Noted    Small bowel obstruction ([address] 03/12/2022    SBO (small bowel obstruction) ([address] 03/12/2022    Unstable angina ([address] 01/13/2022    Chronic diastolic congestive heart failure ([address] 01/13/2022    Acute inferolateral myocardial infarction [facility]) 11/18/2021    NSTEMI (non-ST elevated myocardial infarction) ([facility] 11/17/2021    Chronic CHF (congestive heart failure) ([facility] 11/17/2021    VARSHA (acute kidney injury) ([facility] 09/24/2021    Fluid overload 06/25/2021    Atrial fibrillation ([facility] 05/25/2021    On continuous oral anticoagulation 05/25/2021    CHF, acute on chronic ([facility] 05/25/2021    Hyperlipidemia 05/25/2021    Former smoker 05/25/2021    Pacemaker 05/25/2021    History of DVT of lower extremity 05/25/2021    Enlarged prostate 05/25/2021    Cataract of both eyes 05/25/2021    GERD (gastroesophageal reflux disease) 05/25/2021    History of inguinal hernia repair 05/25/2021    Hypertension 05/25/2021    Pneumonia 05/25/2021    History of right hip replacement 05/25/2021    History of back surgery 05/25/2021    Low back pain 05/25/2021    Chest pain 05/25/2021           MIGUEL PAIGE, DARCI - CNP on 5/31/2022 at 9:46 AM

## 2022-05-31 NOTE — PRE SEDATION
Sedation Pre-Procedure Note    Patient Name: Isabelle Phillips   YOB: 1935  Room/Bed: Room/bed info not found  Medical Record Number: [id number]  Date: 5/31/2022   Time: 11:08 AM       Indication:  Anemia    Consent: I have discussed with the patient and/or the patient representative the indication, alternatives, and the possible risks and/or complications of the planned procedure and the anesthesia methods. The patient and/or patient representative appear to understand and agree to proceed. Vital Signs:   Vitals:    05/31/22 0932   BP: (!) 147/80   Pulse: 69   Resp: 18   Temp: 97.1 °F (36.2 °C)   SpO2: 99%       Past Medical History:   has a past medical history of Arthritis, Atrial fibrillation ([facility] CAD (coronary artery disease), CHF (congestive heart failure) ([facility] Glaucoma, Hx of blood clots, Hyperlipidemia, Hypertension, and MI (myocardial infarction) ([facility] Past Surgical History:   has a past surgical history that includes pacemaker placement; Abdomen surgery; Cardiac catheterization; Appendectomy; Colonoscopy; eye surgery; hernia repair; bone marrow biopsy; and joint replacement. Medications:   Scheduled Meds:    sodium chloride flush  5-40 mL IntraVENous 2 times per day     Continuous Infusions:    sodium chloride       PRN Meds: sodium chloride flush, sodium chloride  Home Meds:   Prior to Admission medications    Medication Sig Start Date End Date Taking?  Authorizing Provider   famotidine (PEPCID) 20 MG tablet TAKE 1 TABLET BY MOUTH DAILY 5/26/22   Phyllis Waters MD   vitamin D (ERGOCALCIFEROL) 1.25 MG (39665 UT) CAPS capsule Take 1 capsule by mouth once a week tuesdays 5/26/22   Kira Martínez MD   XARELTO 15 MG TABS tablet TAKE 1 TABLET BY MOUTH DAILY WITH BREAKFAST 5/16/22   Phyllis Waters MD   ondansetron (ZOFRAN-ODT) 4 MG disintegrating tablet Take 1 tablet by mouth 3 times daily as needed for Nausea or Vomiting  Patient not taking: Reported on 5/13/2022 5/12/22   Neftaly Shalom Coburn MD   bumetanide (BUMEX) 2 MG tablet TAKE 1 TABLET BY MOUTH TWICE DAILY 5/9/22   Clinton Che MD   hydrALAZINE (APRESOLINE) 25 MG tablet  4/12/22   Historical Provider, MD   vitamin B-12 (CYANOCOBALAMIN) 1000 MCG tablet TAKE 1 TABLET BY MOUTH DAILY 4/5/22   Clinton Che MD   finasteride (PROSCAR) 5 MG tablet TAKE 1 TABLET BY MOUTH DAILY 3/21/22   Clinton Che MD   amLODIPine (NORVASC) 5 MG tablet TAKE 2 TABLETS BY MOUTH DAILY 3/21/22   Clinton Che MD   Ascorbic Acid (VITAMIN C) 250 MG tablet TAKE 1 TABLET BY MOUTH TWICE DAILY(TAKE WITH IRON) 2/7/22   Alexia Mathew MD   nitroGLYCERIN (NITROSTAT) 0.4 MG SL tablet up to max of 3 total doses. If no relief after 1 dose, call 911. 1/8/22   Murray Talamantes MD   docusate sodium (COLACE) 100 MG capsule Take 100 mg by mouth 2 times daily as needed for Constipation    Historical Provider, MD   Coenzyme Q10 100 MG CHEW Take 1 tablet by mouth daily 10/26/21   Clinton Che MD   ferrous sulfate (IRON 325) 325 (65 Fe) MG tablet Take 1 tablet by mouth 2 times daily 8/20/21   Alexia Mathew MD   buprenorphine-naloxone (SUBOXONE) 8-2 MG FILM SL film Place 1 Film under the tongue 2 times daily. Indications: pain     Historical Provider, MD   magnesium oxide (MAG-OX) 400 MG tablet Take 400 mg by mouth daily  Patient not taking: Reported on 5/31/2022    Historical Provider, MD   latanoprost (XALATAN) 0.005 % ophthalmic solution Place 1 drop into both eyes nightly    Historical Provider, MD     Coumadin Use Last 7 Days:  no  Antiplatelet drug therapy use last 7 days: no  Other anticoagulant use last 7 days: no  Additional Medication Information:  See [facility] [place] [facility]      Pre-Sedation Documentation and Exam:   I have reviewed the patient's history and review of systems.     Mallampati Airway Assessment:  Mallampati Class II - (soft palate, fauces & uvula are visible)    Prior History of Anesthesia Complications:   none    ASA Classification:  Class 2 - A normal healthy patient with mild systemic disease    Sedation/ Anesthesia Plan:   intravenous sedation    Medications Planned:   midazolam (Versed) intravenously and fentanyl intravenously    Patient is an appropriate candidate for plan of sedation: yes    Electronically signed by Adela Bentley MD on 5/31/2022 at 11:08 AM

## 2022-05-31 NOTE — BRIEF OP NOTE
Brief Postoperative Note    Aida Stevens  YOB: 1935  [id number]    Pre-operative Diagnosis: Anemia    Post-operative Diagnosis: Same    Procedure: CT guided random bone marrow biopsy    Anesthesia: Moderate Sedation    Surgeons/Assistants: Oc    Estimated Blood Loss: less than 50     Complications: None    Specimens: Was Obtained: single 11 G core and aspirate      Electronically signed by Uli Ahmadi MD on 5/31/2022 at 11:52 AM

## 2022-06-01 ENCOUNTER — CARE COORDINATION (OUTPATIENT)
Dept: CARE COORDINATION | Age: 87
End: 2022-06-01

## 2022-06-01 LAB
MISCELLANEOUS LAB TEST RESULT: NORMAL
TEST NAME: NORMAL

## 2022-06-01 ASSESSMENT — ENCOUNTER SYMPTOMS
CONSTIPATION: 0
FACIAL SWELLING: 0
SHORTNESS OF BREATH: 0
DIARRHEA: 0
COUGH: 0
COLOR CHANGE: 0
CHEST TIGHTNESS: 0
ABDOMINAL DISTENTION: 0
APNEA: 0
WHEEZING: 0
ABDOMINAL PAIN: 0
BACK PAIN: 1

## 2022-06-01 NOTE — CARE COORDINATION
Attempting to reach patient for a follow up care coordination call regarding any needs, questions or concerns. Erin Hanna, [address]  Phone stated that party is not available.

## 2022-06-02 ENCOUNTER — HOSPITAL ENCOUNTER (OUTPATIENT)
Age: 87
Setting detail: SPECIMEN
Discharge: HOME | End: 2022-06-02

## 2022-06-02 DIAGNOSIS — N17.9 AKI (ACUTE KIDNEY INJURY) (HCC): ICD-10-CM

## 2022-06-02 LAB
ANION GAP SERPL CALCULATED.3IONS-SCNC: 12 MMOL/L (ref 9–17)
BUN BLDV-MCNC: 15 MG/DL (ref 8–23)
CALCIUM SERPL-MCNC: 9.2 MG/DL (ref 8.6–10.4)
CHLORIDE BLD-SCNC: 102 MMOL/L (ref 98–107)
CO2: 24 MMOL/L (ref 20–31)
CREAT SERPL-MCNC: 1.03 MG/DL (ref 0.7–1.2)
GFR AFRICAN AMERICAN: >60 ML/MIN
GFR NON-AFRICAN AMERICAN: >60 ML/MIN
GFR SERPL CREATININE-BSD FRML MDRD: ABNORMAL ML/MIN/{1.73_M2}
GLUCOSE BLD-MCNC: 105 MG/DL (ref 70–99)
POTASSIUM SERPL-SCNC: 4.3 MMOL/L (ref 3.7–5.3)
SODIUM BLD-SCNC: 138 MMOL/L (ref 135–144)

## 2022-06-03 DIAGNOSIS — D64.9 ANEMIA, UNSPECIFIED TYPE: ICD-10-CM

## 2022-06-03 LAB — BONE MARROW REPORT: NORMAL

## 2022-06-03 RX ORDER — FERROUS SULFATE 325(65) MG
TABLET ORAL
Qty: 60 TABLET | Refills: 5 | Status: SHIPPED | OUTPATIENT
Start: 2022-06-03 | End: 2022-06-17

## 2022-06-07 ENCOUNTER — CARE COORDINATION (OUTPATIENT)
Dept: CARE COORDINATION | Age: 87
End: 2022-06-07

## 2022-06-07 LAB — CHROMOSOME STUDY: NORMAL

## 2022-06-07 NOTE — CARE COORDINATION
Ambulatory Care Coordination Note  6/7/2022  CM Risk Score: 1  Charlson 10 Year Mortality Risk Score: 98%     ACC: Noman Miner, LEON    Summary  Date Care Coordination Episode Started:  3.16.22    Reason patient is in Care Coordination:     High admission risk score    Topics Discussed Today:     1) Nephrology referral, patient is concerned that he has not received a call to schedule. He thinks he was told it was because of his insurance? ACM spoke with office. Bria Merced reports that she is working on the schedule and will call patient as soon as she can get him an appt. Patient notified. 2) Medications, per patient no needs. 3) CHF, no complaints  Patient denied any needs from PCP today      Assessments completed today:     1. Fall risk  2. Initial assessment  3. Medication reconciliation  4. SDOH  5. CHF  (Health assessments)      Care Coordinator plan of care: Follow up call in 1-2 weeks for any needs, continue with education. Care Coordination Interventions    Program Enrollment: Complex Care  Referral from Primary Care Provider: No  Suggested Interventions and Community Resources  Fall Risk Prevention: In Process  Home Health Services: Completed  Social Work: Completed  Transportation Support: In Process  Zone Management Tools: In Process         Goals Addressed                 This Visit's Progress     Conditions and Symptoms   On track     I will schedule office visits, as directed by my provider. I will keep my appointment or reschedule if I have to cancel. I will notify my provider of any barriers to my plan of care. I will follow my Zone Management tool to seek urgent or emergent care. I will notify my provider of any symptoms that indicate a worsening of my condition.     Barriers: overwhelmed by complexity of regimen  Plan for overcoming my barriers: work with [facility]   Confidence: 8/10  Anticipated Goal Completion Date: 6.15.22              Prior to Admission medications    Medication Sig Start Date End Date Taking? Authorizing Provider   FEROSUL 325 (65 Fe) MG tablet TAKE 1 TABLET BY MOUTH TWICE DAILY 6/3/22   Cristopher Ontiveros MD   famotidine (PEPCID) 20 MG tablet TAKE 1 TABLET BY MOUTH DAILY 5/26/22   Darin Mackay MD   vitamin D (ERGOCALCIFEROL) 1.25 MG (88674 UT) CAPS capsule Take 1 capsule by mouth once a week tuesdays 5/26/22   Cristopher Ontiveros MD   XARELTO 15 MG TABS tablet TAKE 1 TABLET BY MOUTH DAILY WITH BREAKFAST 5/16/22   Darin Mackay MD   ondansetron (ZOFRAN-ODT) 4 MG disintegrating tablet Take 1 tablet by mouth 3 times daily as needed for Nausea or Vomiting  Patient not taking: Reported on 5/13/2022 5/12/22   Darin Mackay MD   bumetanide (BUMEX) 2 MG tablet TAKE 1 TABLET BY MOUTH TWICE DAILY 5/9/22   Darin Mackay MD   hydrALAZINE (APRESOLINE) 25 MG tablet  4/12/22   Historical Provider, MD   vitamin B-12 (CYANOCOBALAMIN) 1000 MCG tablet TAKE 1 TABLET BY MOUTH DAILY 4/5/22   Darin Mackay MD   finasteride (PROSCAR) 5 MG tablet TAKE 1 TABLET BY MOUTH DAILY 3/21/22   Darin Mackay MD   amLODIPine (NORVASC) 5 MG tablet TAKE 2 TABLETS BY MOUTH DAILY 3/21/22   Darin Mackay MD   Ascorbic Acid (VITAMIN C) 250 MG tablet TAKE 1 TABLET BY MOUTH TWICE DAILY(TAKE WITH IRON) 2/7/22   Cristopher Ontiveros MD   nitroGLYCERIN (NITROSTAT) 0.4 MG SL tablet up to max of 3 total doses. If no relief after 1 dose, call 911. 1/8/22   Dalton Pineda MD   docusate sodium (COLACE) 100 MG capsule Take 100 mg by mouth 2 times daily as needed for Constipation    Historical Provider, MD   Coenzyme Q10 100 MG CHEW Take 1 tablet by mouth daily 10/26/21   Darin Mackay MD   buprenorphine-naloxone (SUBOXONE) 8-2 MG FILM SL film Place 1 Film under the tongue 2 times daily.  Indications: pain     Historical Provider, MD   magnesium oxide (MAG-OX) 400 MG tablet Take 400 mg by mouth daily  Patient not taking: Reported on 5/31/2022    Historical Provider, MD   latanoprost (XALATAN) 0.005 % ophthalmic solution Place 1 drop into both eyes nightly    Historical Provider, MD       Future Appointments   Date Time Provider Diego Pitts   6/17/2022 10:45 AM Maribell Wilkins MD [address] [place] [address]   6/23/2022  1:00 PM [facility]     ,   Congestive Heart Failure Assessment    Are you currently restricting fluids?: Other  Do you understand a low sodium diet?: Yes  Do you understand how to read food labels?: Yes  Do you salt your food before tasting it?: No     No patient-reported symptoms      Symptoms:     Symptom course: stable  Salt intake watch compared to last visit: stable      and   General Assessment    Do you have any symptoms that are causing concern?: No

## 2022-06-15 PROBLEM — N18.32 STAGE 3B CHRONIC KIDNEY DISEASE (HCC): Status: ACTIVE | Noted: 2022-06-15

## 2022-06-17 ENCOUNTER — OFFICE VISIT (OUTPATIENT)
Dept: ONCOLOGY | Age: 87
End: 2022-06-17
Payer: MEDICARE

## 2022-06-17 ENCOUNTER — TELEPHONE (OUTPATIENT)
Dept: ONCOLOGY | Age: 87
End: 2022-06-17

## 2022-06-17 VITALS
SYSTOLIC BLOOD PRESSURE: 161 MMHG | WEIGHT: 194 LBS | BODY MASS INDEX: 25.6 KG/M2 | HEART RATE: 70 BPM | TEMPERATURE: 97 F | DIASTOLIC BLOOD PRESSURE: 83 MMHG

## 2022-06-17 DIAGNOSIS — Z86.718 HISTORY OF DVT OF LOWER EXTREMITY: ICD-10-CM

## 2022-06-17 DIAGNOSIS — D64.9 ANEMIA, UNSPECIFIED TYPE: ICD-10-CM

## 2022-06-17 DIAGNOSIS — E61.1 IRON DEFICIENCY: Primary | ICD-10-CM

## 2022-06-17 DIAGNOSIS — Z79.01 ON CONTINUOUS ORAL ANTICOAGULATION: ICD-10-CM

## 2022-06-17 DIAGNOSIS — I48.19 PERSISTENT ATRIAL FIBRILLATION (HCC): ICD-10-CM

## 2022-06-17 PROCEDURE — G8427 DOCREV CUR MEDS BY ELIG CLIN: HCPCS | Performed by: INTERNAL MEDICINE

## 2022-06-17 PROCEDURE — 99214 OFFICE O/P EST MOD 30 MIN: CPT | Performed by: INTERNAL MEDICINE

## 2022-06-17 PROCEDURE — 99211 OFF/OP EST MAY X REQ PHY/QHP: CPT | Performed by: INTERNAL MEDICINE

## 2022-06-17 PROCEDURE — 1124F ACP DISCUSS-NO DSCNMKR DOCD: CPT | Performed by: INTERNAL MEDICINE

## 2022-06-17 PROCEDURE — G8417 CALC BMI ABV UP PARAM F/U: HCPCS | Performed by: INTERNAL MEDICINE

## 2022-06-17 PROCEDURE — 99211 OFF/OP EST MAY X REQ PHY/QHP: CPT

## 2022-06-17 PROCEDURE — 1036F TOBACCO NON-USER: CPT | Performed by: INTERNAL MEDICINE

## 2022-06-17 RX ORDER — SODIUM CHLORIDE 9 MG/ML
5-250 INJECTION, SOLUTION INTRAVENOUS PRN
Status: CANCELLED | OUTPATIENT
Start: 2022-06-17

## 2022-06-17 RX ORDER — SODIUM CHLORIDE 0.9 % (FLUSH) 0.9 %
5-40 SYRINGE (ML) INJECTION PRN
Status: CANCELLED | OUTPATIENT
Start: 2022-06-17

## 2022-06-17 RX ORDER — ALBUTEROL SULFATE 90 UG/1
4 AEROSOL, METERED RESPIRATORY (INHALATION) PRN
Status: CANCELLED | OUTPATIENT
Start: 2022-06-17

## 2022-06-17 RX ORDER — SODIUM CHLORIDE 9 MG/ML
INJECTION, SOLUTION INTRAVENOUS CONTINUOUS
Status: CANCELLED | OUTPATIENT
Start: 2022-06-17

## 2022-06-17 RX ORDER — FAMOTIDINE 10 MG/ML
20 INJECTION, SOLUTION INTRAVENOUS
Status: CANCELLED | OUTPATIENT
Start: 2022-06-17

## 2022-06-17 RX ORDER — FERROUS SULFATE 325(65) MG
325 TABLET ORAL EVERY OTHER DAY
Qty: 60 TABLET | Refills: 1 | Status: SHIPPED | OUTPATIENT
Start: 2022-06-17 | End: 2022-09-16

## 2022-06-17 RX ORDER — ACETAMINOPHEN 325 MG/1
650 TABLET ORAL
Status: CANCELLED | OUTPATIENT
Start: 2022-06-17

## 2022-06-17 RX ORDER — DIPHENHYDRAMINE HYDROCHLORIDE 50 MG/ML
50 INJECTION INTRAMUSCULAR; INTRAVENOUS
Status: CANCELLED | OUTPATIENT
Start: 2022-06-17

## 2022-06-17 RX ORDER — HEPARIN SODIUM (PORCINE) LOCK FLUSH IV SOLN 100 UNIT/ML 100 UNIT/ML
500 SOLUTION INTRAVENOUS PRN
Status: CANCELLED | OUTPATIENT
Start: 2022-06-17

## 2022-06-17 RX ORDER — ONDANSETRON 2 MG/ML
8 INJECTION INTRAMUSCULAR; INTRAVENOUS
Status: CANCELLED | OUTPATIENT
Start: 2022-06-17

## 2022-06-17 RX ORDER — EPINEPHRINE 1 MG/ML
0.3 INJECTION, SOLUTION, CONCENTRATE INTRAVENOUS PRN
Status: CANCELLED | OUTPATIENT
Start: 2022-06-17

## 2022-06-17 NOTE — TELEPHONE ENCOUNTER
AVS from 6/17/22     injectafer   rv in 3-4 months with labs       *AVS given to chemo sched for P/C once approved will be sched for injectafer    *rv is sched for [email or web address] w/labs cbc,vitb12,folate    PT was given AVS and appt schedule

## 2022-06-17 NOTE — PROGRESS NOTES
Miah Ellsworth                                                                                                                  6/17/2022  MRN:   [id number]  YOB: 1935  PCP:                           Melchor Harper MD  Referring Physician: No ref. provider found  Treating Physician Name: Caleb Alonzo MD      Reason for Visit:  Chief Complaint   Patient presents with    Follow-up     review status of disease    Results     discuss bone marrow    Patient presents to the clinic to discuss results of his lab work-up and discuss further treatment plan    Current problems:  Anemia   Iron deficiency  Sick sinus syndrome and atrial fibrillation status post pacemaker 8188  Diastolic dysfunction  Chronic venous insufficiency  Chronic anticoagulation    Active and recent treatments:  Oral iron supplementation with vitamin C  S/p bone marrow biopsy    Interval history:  Patient presents to the clinic to discuss results of his bone marrow biopsy and discuss further recommendations. He has been taking oral iron as directed. During this visit patient's allergy, social, medical, surgical history and medications were reviewed and updated. Summary of Case/History:    Miah Ellsworth a 80 y. o.male is a patient with anemia who presents to the clinic to establish care and for further work-up and evaluation. Patient was recently hospitalized back in June and at [facility] was noted to be anemic with hemoglobin 8.6. Iron studies showed iron saturation of 15%. Ferritin was not checked. Patient states that he is not taking any oral iron. Patient does take Xarelto 15 mg daily due to history of atrial fibrillation. Patient also has history of DVT once. Patient states that he does follow-up with a GI doctor in [place] and had endoscopy done last year. We do not have the records at this point. Patient also follows up with his cardiologist in [facility].   Patient is in the process of transferring his care to [place] and presents to the clinic to establish care. Denies any bloody bowel movements. Past Medical History:   Past Medical History:   Diagnosis Date    Arthritis     Atrial fibrillation ([facility]     CAD (coronary artery disease)     CHF (congestive heart failure) (HCC)     Glaucoma     Hx of blood clots     with hernia surgery    Hyperlipidemia     Hypertension     MI (myocardial infarction) ([facility]        Past Surgical History:     Past Surgical History:   Procedure Laterality Date    ABDOMEN SURGERY      gastric resection    APPENDECTOMY      BONE MARROW BIOPSY      CARDIAC CATHETERIZATION      COLONOSCOPY      CT BONE MARROW BIOPSY  5/31/2022    CT BONE MARROW BIOPSY 5/31/2022 [facility] CT SCAN    EYE SURGERY      smiley cataracts    HERNIA REPAIR      inguinal smiley    JOINT REPLACEMENT      rt hip x 2, lt knee    PACEMAKER PLACEMENT         Patient Family Social History:     Social History     Socioeconomic History    Marital status:      Spouse name: None    Number of children: None    Years of education: None    Highest education level: None   Occupational History    None   Tobacco Use    Smoking status: Never Smoker    Smokeless tobacco: Never Used   Substance and Sexual Activity    Alcohol use: Never    Drug use: Never    Sexual activity: None   Other Topics Concern    None   Social History Narrative    None     Social Determinants of Health     Financial Resource Strain: Low Risk     Difficulty of Paying Living Expenses: Not hard at all   Food Insecurity: No Food Insecurity    Worried About Running Out of Food in the Last Year: Never true    Padilla of Food in the Last Year: Never true   Transportation Needs: Unmet Transportation Needs    Lack of Transportation (Medical):  Yes    Lack of Transportation (Non-Medical): Yes   Physical Activity: Inactive    Days of Exercise per Week: 0 days    Minutes of Exercise per Session: 0 min   Stress: No Stress Concern Present    Feeling of Stress :  Only a little   Social Connections:     Frequency of Communication with Friends and Family: Not on file    Frequency of Social Gatherings with Friends and Family: Not on file    Attends Christianity Services: Not on file    Active Member of Clubs or Organizations: Not on file    Attends Club or Organization Meetings: Not on file    Marital Status: Not on file   Intimate Partner Violence:     Fear of Current or Ex-Partner: Not on file    Emotionally Abused: Not on file    Physically Abused: Not on file    Sexually Abused: Not on file   Housing Stability: [address] Unable to Pay for Housing in the Last Year: No    Number of Places Lived in the Last Year: 1    Unstable Housing in the Last Year: No     Family history:    Hypertension and diabetes mellitus    Current Medications:     Current Outpatient Medications   Medication Sig Dispense Refill    FEROSUL 325 (65 Fe) MG tablet TAKE 1 TABLET BY MOUTH TWICE DAILY 60 tablet 5    famotidine (PEPCID) 20 MG tablet TAKE 1 TABLET BY MOUTH DAILY 90 tablet 0    vitamin D (ERGOCALCIFEROL) 1.25 MG (35994 UT) CAPS capsule Take 1 capsule by mouth once a week tuesdays 12 capsule 2    XARELTO 15 MG TABS tablet TAKE 1 TABLET BY MOUTH DAILY WITH BREAKFAST 30 tablet 1    ondansetron (ZOFRAN-ODT) 4 MG disintegrating tablet Take 1 tablet by mouth 3 times daily as needed for Nausea or Vomiting 21 tablet 0    bumetanide (BUMEX) 2 MG tablet TAKE 1 TABLET BY MOUTH TWICE DAILY 180 tablet 1    hydrALAZINE (APRESOLINE) 25 MG tablet       vitamin B-12 (CYANOCOBALAMIN) 1000 MCG tablet TAKE 1 TABLET BY MOUTH DAILY 30 tablet 3    finasteride (PROSCAR) 5 MG tablet TAKE 1 TABLET BY MOUTH DAILY 90 tablet 3    amLODIPine (NORVASC) 5 MG tablet TAKE 2 TABLETS BY MOUTH DAILY 180 tablet 3    Ascorbic Acid (VITAMIN C) 250 MG tablet TAKE 1 TABLET BY MOUTH TWICE DAILY(TAKE WITH IRON) 60 tablet 3    nitroGLYCERIN (NITROSTAT) 0.4 MG SL tablet up to max of 3 total doses. If no relief after 1 dose, call 911. 25 tablet 0    docusate sodium (COLACE) 100 MG capsule Take 100 mg by mouth 2 times daily as needed for Constipation      Coenzyme Q10 100 MG CHEW Take 1 tablet by mouth daily 90 tablet 0    buprenorphine-naloxone (SUBOXONE) 8-2 MG FILM SL film Place 1 Film under the tongue 2 times daily. Indications: pain       magnesium oxide (MAG-OX) 400 MG tablet Take 400 mg by mouth daily       latanoprost (XALATAN) 0.005 % ophthalmic solution Place 1 drop into both eyes nightly       No current facility-administered medications for this visit. Allergies:   Aspirin, Cyclobenzaprine, Ibuprofen, Azithromycin, and Hydrocodone-acetaminophen    Review of Systems:    Constitutional: No fever or chills. No night sweats, no weight loss. Positive fatigue  Eyes: No eye discharge, double vision, or eye pain   HEENT: negative for sore mouth, sore throat, hoarseness and voice change   Respiratory: negative for cough , sputum, dyspnea, wheezing, hemoptysis, chest pain   Cardiovascular: negative for chest pain, dyspnea, palpitations, orthopnea, PND   Gastrointestinal: negative for nausea, vomiting, diarrhea, constipation, abdominal pain, Dysphagia, hematemesis and hematochezia   Genitourinary: negative for frequency, dysuria, nocturia, urinary incontinence, and hematuria   Integument: negative for rash, skin lesions, bruises.    Hematologic/Lymphatic: negative for easy bruising, bleeding, lymphadenopathy, or petechiae   Endocrine: negative for heat or cold intolerance,weight changes, change in bowel habits and hair loss   Musculoskeletal: negative for myalgias, arthralgias, pain, joint swelling,and bone pain   Neurological: negative for headaches, dizziness, seizures, weakness, numbness    Physical Exam:    Vitals: BP (!) 161/83   Pulse 70   Temp 97 °F (36.1 °C) (Temporal)   Wt 194 lb (88 kg)   BMI 25.60 kg/m²   General appearance - well appearing, no in pain or distress  Mental status - AAO X3  Eyes - pupils equal and reactive, extraocular eye movements intact  Mouth - mucous membranes moist, pharynx normal without lesions  Neck - supple, no significant adenopathy  Lymphatics - no palpable lymphadenopathy, no hepatosplenomegaly  Chest - clear to auscultation, no wheezes, rales or rhonchi, symmetric air entry  Heart - normal rate, regular rhythm, normal S1, S2, no murmurs  Abdomen - soft, nontender, nondistended, no masses or organomegaly  Neurological - alert, oriented, normal speech, no focal findings or movement disorder noted  Extremities - peripheral pulses normal, no pedal edema, no clubbing or cyanosis  Skin - normal coloration and turgor, no rashes, no suspicious skin lesions noted       DATA:    Results for orders placed or performed during the hospital encounter of 87/20/92   Basic Metabolic Panel   Result Value Ref Range    Glucose 105 (H) 70 - 99 mg/dL    BUN 15 8 - 23 mg/dL    CREATININE 1.03 0.70 - 1.20 mg/dL    Calcium 9.2 8.6 - 10.4 mg/dL    Sodium 138 135 - 144 mmol/L    Potassium 4.3 3.7 - 5.3 mmol/L    Chloride 102 98 - 107 mmol/L    CO2 24 20 - 31 mmol/L    Anion Gap 12 9 - 17 mmol/L    GFR Non-African American >60 >60 mL/min    GFR African American >60 >60 mL/min    GFR Comment                 Impression:  Anemia   Iron deficiency  Sick sinus syndrome and atrial fibrillation status post pacemaker 6248  Diastolic dysfunction  Chronic venous insufficiency  Chronic anticoagulation    Plan: We reviewed his bone marrow biopsy results, negative for mds, shows absent iron stores. I explained his HGB is low but stable . Iron stores negative in bone marrow even though previously blood iron studies have shown adeqaute stores. Kidney function seems to be adequate doubt anmeia from renal diseaseWill give iv iron   D/c b12   reduce oral iron to once 3 times weekly. Return in 3 months with lab work.      Scribe Attestation   This note was created by Manuel Degroot acting as scribe for the physician signing this note  Electronically Signed  Noreen Gonzalez, 6/17/2022  Scribe, [employer]. Attending Attestation   Note was reviewed and edited. I am in agreement with the note as entered    Bright Tolliver MD      this note is created with the assistance of a speech recognition program.  While intending to generate a document that actually reflects the content of the visit, the document can still have some errors including those of syntax and sound a like substitutions which may escape proof reading. It such instances, actual meaning can be extrapolated by contextual diversion.

## 2022-06-22 ENCOUNTER — CARE COORDINATION (OUTPATIENT)
Dept: CARE COORDINATION | Age: 87
End: 2022-06-22

## 2022-06-22 NOTE — CARE COORDINATION
Ambulatory Care Coordination Note  6/22/2022  CM Risk Score: 2  Charlson 10 Year Mortality Risk Score: 100%     ACC: Nicolas Perdue, LEON    Summary  Date Care Coordination Episode Started:  3.16.22    Reason patient is in Care Coordination:     Following hospital DC     Topics Discussed Today:     1) Appointments, patient reports he did get a call to schedule with Nephrology but it is not for several weeks. He plans to attend PCP appt on 6.23.22. 2) Medications, no needs per patient  3) Home care, will be out today per patient. Assessments completed today:     1. Initial assessment  2. Medication reconciliation  3. SDOH  4. CHF (Health assessments)      Care Coordinator plan of care:    ACM routed concern regarding nephrology appt to PCP as a FYI. ACM will follow up with patient in 1-2 weeks for needs. Care Coordination Interventions    Program Enrollment: Complex Care  Referral from Primary Care Provider: No  Suggested Interventions and Community Resources  Fall Risk Prevention: In Process  Home Health Services: Completed  Social Work: Completed  Transportation Support: In Process  Zone Management Tools: In Process         Goals Addressed                 This Visit's Progress     Conditions and Symptoms   On track     I will schedule office visits, as directed by my provider. I will keep my appointment or reschedule if I have to cancel. I will notify my provider of any barriers to my plan of care. I will follow my Zone Management tool to seek urgent or emergent care. I will notify my provider of any symptoms that indicate a worsening of my condition. Barriers: overwhelmed by complexity of regimen  Plan for overcoming my barriers: work with ACM   Confidence: 8/10  Anticipated Goal Completion Date: 6.15.22              Prior to Admission medications    Medication Sig Start Date End Date Taking?  Authorizing Provider   metoprolol succinate (TOPROL XL) 25 MG extended release tablet TAKE 2 TABLETS BY MOUTH EVERY MORNING 5/26/22   Historical Provider, MD   ferrous sulfate (FEROSUL) 325 (65 Fe) MG tablet Take 1 tablet by mouth every other day 6/17/22   Pavan Mcgraw MD   famotidine (PEPCID) 20 MG tablet TAKE 1 TABLET BY MOUTH DAILY 5/26/22   Bushra Barfield MD   vitamin D (ERGOCALCIFEROL) 1.25 MG (18430 UT) CAPS capsule Take 1 capsule by mouth once a week tuesdays 5/26/22   Pavan Mcgraw MD   XARELTO 15 MG TABS tablet TAKE 1 TABLET BY MOUTH DAILY WITH BREAKFAST 5/16/22   Bushra Barfield MD   ondansetron (ZOFRAN-ODT) 4 MG disintegrating tablet Take 1 tablet by mouth 3 times daily as needed for Nausea or Vomiting 5/12/22   Bushra Barfield MD   bumetanide (BUMEX) 2 MG tablet TAKE 1 TABLET BY MOUTH TWICE DAILY 5/9/22   Bushra Barfield MD   hydrALAZINE (APRESOLINE) 25 MG tablet  4/12/22   Historical Provider, MD   finasteride (PROSCAR) 5 MG tablet TAKE 1 TABLET BY MOUTH DAILY 3/21/22   Bushra Barfield MD   amLODIPine (NORVASC) 5 MG tablet TAKE 2 TABLETS BY MOUTH DAILY 3/21/22   Bushra Barfield MD   Ascorbic Acid (VITAMIN C) 250 MG tablet TAKE 1 TABLET BY MOUTH TWICE DAILY(TAKE WITH IRON) 2/7/22   Pavan Mcgraw MD   nitroGLYCERIN (NITROSTAT) 0.4 MG SL tablet up to max of 3 total doses. If no relief after 1 dose, call 911. 1/8/22   Cristina Gallegos MD   docusate sodium (COLACE) 100 MG capsule Take 100 mg by mouth 2 times daily as needed for Constipation    Historical Provider, MD   Coenzyme Q10 100 MG CHEW Take 1 tablet by mouth daily 10/26/21   Bushra Barfield MD   buprenorphine-naloxone (SUBOXONE) 8-2 MG FILM SL film Place 1 Film under the tongue 2 times daily.  Indications: pain     Historical Provider, MD   magnesium oxide (MAG-OX) 400 MG tablet Take 400 mg by mouth daily     Historical Provider, MD   latanoprost (XALATAN) 0.005 % ophthalmic solution Place 1 drop into both eyes nightly    Historical Provider, MD       Future Appointments   Date Time Provider Diego Pitts   6/23/2022  1:00 PM Bushra Barfield MD [facility]   7/27/2022  9:30 AM MD Robert [facility]   9/14/2022 12:00 PM Aren Freire MD [facility]     ,   Congestive Heart Failure Assessment    Are you currently restricting fluids?: Other  Do you understand a low sodium diet?: Yes  Do you understand how to read food labels?: Yes  Do you salt your food before tasting it?: No     No patient-reported symptoms      Symptoms:     Salt intake watch compared to last visit: stable      and   General Assessment    Do you have any symptoms that are causing concern?: No

## 2022-06-23 ENCOUNTER — TELEPHONE (OUTPATIENT)
Dept: ONCOLOGY | Age: 87
End: 2022-06-23

## 2022-06-28 ENCOUNTER — CARE COORDINATION (OUTPATIENT)
Dept: CARE COORDINATION | Age: 87
End: 2022-06-28

## 2022-06-28 NOTE — CARE COORDINATION
Heart Failure Education outreach Date/Time: 2022 9:23 AM    Ambulatory Care Manager (ACM) contacted the patient by telephone to perform Ambulatory Care Coordination. Verified name and  with patient as identifiers. Provided introduction to self, and explanation of the Ambulatory Care Manager's role. ACM reviewed that a Health Healthy tips for the Summer packet has been mailed to the them. ACM reviewed CHF zones, daily weights, fluid restriction, the importance of low sodium diet and healthy tips packet with the patient. Instructed patient to call their PCP if they have a weight gain of 3 lbs in 2 days or 5 lbs in a week. Patient reminded that there is a physician on call 24 hours a day / 7 days a week should the patient have questions or concerns. The patient verbalized understanding.

## 2022-07-01 ENCOUNTER — HOSPITAL ENCOUNTER (OUTPATIENT)
Dept: INFUSION THERAPY | Age: 87
Discharge: HOME | End: 2022-07-01
Payer: MEDICARE

## 2022-07-01 VITALS
TEMPERATURE: 98.3 F | RESPIRATION RATE: 18 BRPM | DIASTOLIC BLOOD PRESSURE: 78 MMHG | HEART RATE: 71 BPM | SYSTOLIC BLOOD PRESSURE: 137 MMHG

## 2022-07-01 DIAGNOSIS — E61.1 IRON DEFICIENCY: Primary | ICD-10-CM

## 2022-07-01 PROCEDURE — 6360000002 HC RX W HCPCS: Performed by: INTERNAL MEDICINE

## 2022-07-01 PROCEDURE — 96365 THER/PROPH/DIAG IV INF INIT: CPT

## 2022-07-01 PROCEDURE — 2580000003 HC RX 258: Performed by: INTERNAL MEDICINE

## 2022-07-01 RX ORDER — SODIUM CHLORIDE 9 MG/ML
INJECTION, SOLUTION INTRAVENOUS CONTINUOUS
Status: CANCELLED | OUTPATIENT
Start: 2022-07-08

## 2022-07-01 RX ORDER — DIPHENHYDRAMINE HYDROCHLORIDE 50 MG/ML
50 INJECTION INTRAMUSCULAR; INTRAVENOUS
Status: CANCELLED | OUTPATIENT
Start: 2022-07-08

## 2022-07-01 RX ORDER — SODIUM CHLORIDE 9 MG/ML
5-250 INJECTION, SOLUTION INTRAVENOUS PRN
Status: CANCELLED | OUTPATIENT
Start: 2022-07-08

## 2022-07-01 RX ORDER — ALBUTEROL SULFATE 90 UG/1
4 AEROSOL, METERED RESPIRATORY (INHALATION) PRN
Status: CANCELLED | OUTPATIENT
Start: 2022-07-08

## 2022-07-01 RX ORDER — SODIUM CHLORIDE 0.9 % (FLUSH) 0.9 %
5-40 SYRINGE (ML) INJECTION PRN
Status: CANCELLED | OUTPATIENT
Start: 2022-07-08

## 2022-07-01 RX ORDER — FAMOTIDINE 10 MG/ML
20 INJECTION, SOLUTION INTRAVENOUS
Status: CANCELLED | OUTPATIENT
Start: 2022-07-08

## 2022-07-01 RX ORDER — ACETAMINOPHEN 325 MG/1
650 TABLET ORAL
Status: CANCELLED | OUTPATIENT
Start: 2022-07-08

## 2022-07-01 RX ORDER — SODIUM CHLORIDE 9 MG/ML
5-250 INJECTION, SOLUTION INTRAVENOUS PRN
Status: DISCONTINUED | OUTPATIENT
Start: 2022-07-01 | End: 2022-07-02 | Stop reason: HOSPADM

## 2022-07-01 RX ORDER — HEPARIN SODIUM (PORCINE) LOCK FLUSH IV SOLN 100 UNIT/ML 100 UNIT/ML
500 SOLUTION INTRAVENOUS PRN
Status: CANCELLED | OUTPATIENT
Start: 2022-07-08

## 2022-07-01 RX ORDER — ONDANSETRON 2 MG/ML
8 INJECTION INTRAMUSCULAR; INTRAVENOUS
Status: CANCELLED | OUTPATIENT
Start: 2022-07-08

## 2022-07-01 RX ORDER — EPINEPHRINE 1 MG/ML
0.3 INJECTION, SOLUTION, CONCENTRATE INTRAVENOUS PRN
Status: CANCELLED | OUTPATIENT
Start: 2022-07-08

## 2022-07-01 RX ADMIN — SODIUM CHLORIDE 50 ML/HR: 9 INJECTION, SOLUTION INTRAVENOUS at 13:02

## 2022-07-01 RX ADMIN — FERRIC CARBOXYMALTOSE INJECTION 750 MG: 50 INJECTION, SOLUTION INTRAVENOUS at 13:10

## 2022-07-01 NOTE — PROGRESS NOTES
Pt here for #1 of 2 iron infusion. Denies any problems. Tolerates very well. Will return 7/8 for #2 of 2.

## 2022-07-05 RX ORDER — RIVAROXABAN 15 MG/1
TABLET, FILM COATED ORAL
Qty: 30 TABLET | Refills: 1 | Status: SHIPPED | OUTPATIENT
Start: 2022-07-05 | End: 2022-09-16 | Stop reason: SDUPTHER

## 2022-07-08 ENCOUNTER — HOSPITAL ENCOUNTER (OUTPATIENT)
Dept: INFUSION THERAPY | Age: 87
Discharge: HOME | End: 2022-07-08
Payer: MEDICARE

## 2022-07-08 VITALS
HEART RATE: 71 BPM | SYSTOLIC BLOOD PRESSURE: 109 MMHG | RESPIRATION RATE: 18 BRPM | DIASTOLIC BLOOD PRESSURE: 65 MMHG | TEMPERATURE: 98.1 F

## 2022-07-08 DIAGNOSIS — E61.1 IRON DEFICIENCY: Primary | ICD-10-CM

## 2022-07-08 PROCEDURE — 6360000002 HC RX W HCPCS: Performed by: INTERNAL MEDICINE

## 2022-07-08 PROCEDURE — 96365 THER/PROPH/DIAG IV INF INIT: CPT

## 2022-07-08 PROCEDURE — 2580000003 HC RX 258: Performed by: INTERNAL MEDICINE

## 2022-07-08 RX ORDER — EPINEPHRINE 1 MG/ML
0.3 INJECTION, SOLUTION, CONCENTRATE INTRAVENOUS PRN
OUTPATIENT
Start: 2022-07-08

## 2022-07-08 RX ORDER — HEPARIN SODIUM (PORCINE) LOCK FLUSH IV SOLN 100 UNIT/ML 100 UNIT/ML
500 SOLUTION INTRAVENOUS PRN
OUTPATIENT
Start: 2022-07-08

## 2022-07-08 RX ORDER — ACETAMINOPHEN 325 MG/1
650 TABLET ORAL
OUTPATIENT
Start: 2022-07-08

## 2022-07-08 RX ORDER — SODIUM CHLORIDE 9 MG/ML
INJECTION, SOLUTION INTRAVENOUS CONTINUOUS
OUTPATIENT
Start: 2022-07-08

## 2022-07-08 RX ORDER — DIPHENHYDRAMINE HYDROCHLORIDE 50 MG/ML
50 INJECTION INTRAMUSCULAR; INTRAVENOUS
OUTPATIENT
Start: 2022-07-08

## 2022-07-08 RX ORDER — ONDANSETRON 2 MG/ML
8 INJECTION INTRAMUSCULAR; INTRAVENOUS
OUTPATIENT
Start: 2022-07-08

## 2022-07-08 RX ORDER — SODIUM CHLORIDE 0.9 % (FLUSH) 0.9 %
5-40 SYRINGE (ML) INJECTION PRN
OUTPATIENT
Start: 2022-07-08

## 2022-07-08 RX ORDER — FAMOTIDINE 10 MG/ML
20 INJECTION, SOLUTION INTRAVENOUS
OUTPATIENT
Start: 2022-07-08

## 2022-07-08 RX ORDER — ALBUTEROL SULFATE 90 UG/1
4 AEROSOL, METERED RESPIRATORY (INHALATION) PRN
OUTPATIENT
Start: 2022-07-08

## 2022-07-08 RX ORDER — SODIUM CHLORIDE 9 MG/ML
5-250 INJECTION, SOLUTION INTRAVENOUS PRN
Status: CANCELLED | OUTPATIENT
Start: 2022-07-08

## 2022-07-08 RX ORDER — SODIUM CHLORIDE 9 MG/ML
5-250 INJECTION, SOLUTION INTRAVENOUS PRN
Status: DISCONTINUED | OUTPATIENT
Start: 2022-07-08 | End: 2022-07-09 | Stop reason: HOSPADM

## 2022-07-08 RX ORDER — SODIUM CHLORIDE 9 MG/ML
5-250 INJECTION, SOLUTION INTRAVENOUS PRN
OUTPATIENT
Start: 2022-07-08

## 2022-07-08 RX ADMIN — FERRIC CARBOXYMALTOSE INJECTION 750 MG: 50 INJECTION, SOLUTION INTRAVENOUS at 13:48

## 2022-07-08 RX ADMIN — SODIUM CHLORIDE 50 ML/HR: 9 INJECTION, SOLUTION INTRAVENOUS at 13:47

## 2022-07-08 NOTE — PROGRESS NOTES
Pt here for #2 of 2 iron infusions. Denies any problems. Tolerates very well. Will return 9/14 with Dr visit.

## 2022-07-14 ENCOUNTER — CARE COORDINATION (OUTPATIENT)
Dept: CARE COORDINATION | Age: 87
End: 2022-07-14

## 2022-07-18 ENCOUNTER — CARE COORDINATION (OUTPATIENT)
Dept: CARE COORDINATION | Age: 87
End: 2022-07-18

## 2022-07-19 ENCOUNTER — HOSPITAL ENCOUNTER (OUTPATIENT)
Age: 87
Setting detail: SPECIMEN
Discharge: HOME | End: 2022-07-19

## 2022-07-19 DIAGNOSIS — N18.2 ANEMIA IN STAGE 2 CHRONIC KIDNEY DISEASE: ICD-10-CM

## 2022-07-19 DIAGNOSIS — N18.2 CKD (CHRONIC KIDNEY DISEASE), STAGE II: ICD-10-CM

## 2022-07-19 DIAGNOSIS — N18.2 BENIGN HYPERTENSION WITH CHRONIC KIDNEY DISEASE, STAGE II: ICD-10-CM

## 2022-07-19 DIAGNOSIS — I12.9 BENIGN HYPERTENSION WITH CHRONIC KIDNEY DISEASE, STAGE II: ICD-10-CM

## 2022-07-19 DIAGNOSIS — D63.1 ANEMIA IN STAGE 2 CHRONIC KIDNEY DISEASE: ICD-10-CM

## 2022-07-19 LAB
-: NORMAL
ABSOLUTE EOS #: 0.27 K/UL (ref 0–0.44)
ABSOLUTE IMMATURE GRANULOCYTE: <0.03 K/UL (ref 0–0.3)
ABSOLUTE LYMPH #: 1.1 K/UL (ref 1.1–3.7)
ABSOLUTE MONO #: 0.65 K/UL (ref 0.1–1.2)
ANION GAP SERPL CALCULATED.3IONS-SCNC: 11 MMOL/L (ref 9–17)
BASOPHILS # BLD: 0 % (ref 0–2)
BASOPHILS ABSOLUTE: <0.03 K/UL (ref 0–0.2)
BILIRUBIN URINE: NEGATIVE
BUN BLDV-MCNC: 21 MG/DL (ref 8–23)
CALCIUM SERPL-MCNC: 8.8 MG/DL (ref 8.6–10.4)
CASTS UA: NORMAL /LPF (ref 0–8)
CHLORIDE BLD-SCNC: 102 MMOL/L (ref 98–107)
CO2: 27 MMOL/L (ref 20–31)
COLOR: YELLOW
CREAT SERPL-MCNC: 1.11 MG/DL (ref 0.7–1.2)
EOSINOPHILS RELATIVE PERCENT: 5 % (ref 1–4)
EPITHELIAL CELLS UA: NORMAL /HPF (ref 0–5)
GFR AFRICAN AMERICAN: >60 ML/MIN
GFR NON-AFRICAN AMERICAN: >60 ML/MIN
GFR SERPL CREATININE-BSD FRML MDRD: ABNORMAL ML/MIN/{1.73_M2}
GLUCOSE BLD-MCNC: 107 MG/DL (ref 70–99)
GLUCOSE URINE: NEGATIVE
HCT VFR BLD CALC: 32.9 % (ref 40.7–50.3)
HEMOGLOBIN: 10.1 G/DL (ref 13–17)
IMMATURE GRANULOCYTES: 0 %
KETONES, URINE: NEGATIVE
LEUKOCYTE ESTERASE, URINE: NEGATIVE
LYMPHOCYTES # BLD: 22 % (ref 24–43)
MAGNESIUM: 1.8 MG/DL (ref 1.6–2.6)
MCH RBC QN AUTO: 29.5 PG (ref 25.2–33.5)
MCHC RBC AUTO-ENTMCNC: 30.7 G/DL (ref 28.4–34.8)
MCV RBC AUTO: 96.2 FL (ref 82.6–102.9)
MONOCYTES # BLD: 13 % (ref 3–12)
NITRITE, URINE: NEGATIVE
NRBC AUTOMATED: 0 PER 100 WBC
PDW BLD-RTO: 14.5 % (ref 11.8–14.4)
PH UA: 6 (ref 5–8)
PHOSPHORUS: 1.9 MG/DL (ref 2.5–4.5)
PLATELET # BLD: 159 K/UL (ref 138–453)
PMV BLD AUTO: 11.9 FL (ref 8.1–13.5)
POTASSIUM SERPL-SCNC: 3.9 MMOL/L (ref 3.7–5.3)
PROTEIN UA: NEGATIVE
RBC # BLD: 3.42 M/UL (ref 4.21–5.77)
RBC # BLD: ABNORMAL 10*6/UL
RBC UA: NORMAL /HPF (ref 0–4)
SEG NEUTROPHILS: 60 % (ref 36–65)
SEGMENTED NEUTROPHILS ABSOLUTE COUNT: 2.98 K/UL (ref 1.5–8.1)
SODIUM BLD-SCNC: 140 MMOL/L (ref 135–144)
SPECIFIC GRAVITY UA: 1.01 (ref 1–1.03)
TURBIDITY: CLEAR
URINE HGB: NEGATIVE
UROBILINOGEN, URINE: NORMAL
WBC # BLD: 5 K/UL (ref 3.5–11.3)
WBC UA: NORMAL /HPF (ref 0–5)

## 2022-07-19 NOTE — CARE COORDINATION
Ambulatory Care Coordination Note  7/19/2022    ACC: Jules Begum, RN    Summary  Date Care Coordination Episode Started:  3.16.22    Reason patient is in Care Coordination:     High risk score, admission     Topics Discussed Today:     PCP appointment reminder for 7.20.22  Swelling in 1 leg, patient reports that this is a chronic condition. Will discuss with PCP at appointment tomorrow. Medications, patient reports that he has all of his medications at this time. Assessments completed today:     Fall risk  Initial assessment  Medication reconciliation  SDOH  CHF   (Health assessments)      Care Coordinator plan of care: Follow up call in 1-2 weeks, if no needs will consider graduating from [address] [place] [address] at that time. Care Coordination Interventions    Program Enrollment: Complex Care  Referral from Primary Care Provider: No  Suggested Interventions and Community Resources  Fall Risk Prevention: In Process  Home Health Services: Completed  Social Work: Completed  Transportation Support: In Process  Zone Management Tools: In Process          Goals Addressed                   This Visit's Progress     Conditions and Symptoms   On track     I will schedule office visits, as directed by my provider. I will keep my appointment or reschedule if I have to cancel. I will notify my provider of any barriers to my plan of care. I will follow my Zone Management tool to seek urgent or emergent care. I will notify my provider of any symptoms that indicate a worsening of my condition. Barriers: overwhelmed by complexity of regimen  Plan for overcoming my barriers: work with ACM   Confidence: 8/10  Anticipated Goal Completion Date: 6.15.22                Prior to Admission medications    Medication Sig Start Date End Date Taking?  Authorizing Provider   XARELTO 15 MG TABS tablet TAKE 1 TABLET BY MOUTH DAILY WITH BREAKFAST 7/5/22   Tanya Monique MD   metoprolol succinate (TOPROL XL) 25 MG extended release tablet TAKE 2 TABLETS BY MOUTH EVERY MORNING 5/26/22   Historical Provider, MD   ferrous sulfate (FEROSUL) 325 (65 Fe) MG tablet Take 1 tablet by mouth every other day 6/17/22   Jaci Gonsales MD   famotidine (PEPCID) 20 MG tablet TAKE 1 TABLET BY MOUTH DAILY 5/26/22   Ousmane Brasher MD   vitamin D (ERGOCALCIFEROL) 1.25 MG (24914 UT) CAPS capsule Take 1 capsule by mouth once a week tuesdays 5/26/22   Jaci Gonsales MD   ondansetron (ZOFRAN-ODT) 4 MG disintegrating tablet Take 1 tablet by mouth 3 times daily as needed for Nausea or Vomiting 5/12/22   Ousmane Brasher MD   bumetanide (BUMEX) 2 MG tablet TAKE 1 TABLET BY MOUTH TWICE DAILY 5/9/22   Ousmane Brasher MD   hydrALAZINE (APRESOLINE) 25 MG tablet  4/12/22   Historical Provider, MD   finasteride (PROSCAR) 5 MG tablet TAKE 1 TABLET BY MOUTH DAILY 3/21/22   Ousmane Brasher MD   amLODIPine (NORVASC) 5 MG tablet TAKE 2 TABLETS BY MOUTH DAILY 3/21/22   Ousmane Brasher MD   Ascorbic Acid (VITAMIN C) 250 MG tablet TAKE 1 TABLET BY MOUTH TWICE DAILY(TAKE WITH IRON) 2/7/22   Jaci Gonsales MD   nitroGLYCERIN (NITROSTAT) 0.4 MG SL tablet up to max of 3 total doses. If no relief after 1 dose, call 911. 1/8/22   Renita Calvillo MD   docusate sodium (COLACE) 100 MG capsule Take 100 mg by mouth 2 times daily as needed for Constipation    Historical Provider, MD   Coenzyme Q10 100 MG CHEW Take 1 tablet by mouth daily 10/26/21   Ousmane Brasher MD   buprenorphine-naloxone (SUBOXONE) 8-2 MG FILM SL film Place 1 Film under the tongue 2 times daily.  Indications: pain     Historical Provider, MD   magnesium oxide (MAG-OX) 400 MG tablet Take 400 mg by mouth daily     Historical Provider, MD   latanoprost (XALATAN) 0.005 % ophthalmic solution Place 1 drop into both eyes nightly    Historical Provider, MD       Future Appointments   Date Time Provider Diego Pitts   7/20/2022 11:30 AM MD Gem Denis   7/25/2022  1:30 PM Viki Reeves MD [facility] 9/14/2022 12:00 PM Ryan Balderas MD [address] [place] [address]   ,   Congestive Heart Failure Assessment    Are you currently restricting fluids?: Other  Do you understand a low sodium diet?: Yes  Do you understand how to read food labels?: Yes  Do you salt your food before tasting it?: No     No patient-reported symptoms      Symptoms:     Symptom course: stable  Weight trend: stable  Salt intake watch compared to last visit: stable     , and   General Assessment    Do you have any symptoms that are causing concern?: Yes  Progression since Onset: Intermittent - Waxing/Waning  Reported Symptoms: Other (Comment: swelling in one leg)

## 2022-07-20 ENCOUNTER — OFFICE VISIT (OUTPATIENT)
Dept: INTERNAL MEDICINE CLINIC | Age: 87
End: 2022-07-20
Payer: MEDICARE

## 2022-07-20 VITALS
BODY MASS INDEX: 25.71 KG/M2 | SYSTOLIC BLOOD PRESSURE: 130 MMHG | HEIGHT: 73 IN | DIASTOLIC BLOOD PRESSURE: 76 MMHG | WEIGHT: 194 LBS

## 2022-07-20 DIAGNOSIS — Z00.00 INITIAL MEDICARE ANNUAL WELLNESS VISIT: ICD-10-CM

## 2022-07-20 DIAGNOSIS — I50.22 CHRONIC SYSTOLIC (CONGESTIVE) HEART FAILURE (HCC): ICD-10-CM

## 2022-07-20 DIAGNOSIS — I25.119 CORONARY ARTERY DISEASE INVOLVING NATIVE CORONARY ARTERY OF NATIVE HEART WITH ANGINA PECTORIS (HCC): ICD-10-CM

## 2022-07-20 DIAGNOSIS — Z95.0 PACEMAKER: ICD-10-CM

## 2022-07-20 DIAGNOSIS — I21.4 NSTEMI (NON-ST ELEVATED MYOCARDIAL INFARCTION) (HCC): ICD-10-CM

## 2022-07-20 DIAGNOSIS — N18.32 STAGE 3B CHRONIC KIDNEY DISEASE (HCC): ICD-10-CM

## 2022-07-20 DIAGNOSIS — Z71.89 ACP (ADVANCE CARE PLANNING): ICD-10-CM

## 2022-07-20 DIAGNOSIS — I48.91 ATRIAL FIBRILLATION, UNSPECIFIED TYPE (HCC): Primary | ICD-10-CM

## 2022-07-20 DIAGNOSIS — I50.33 ACUTE ON CHRONIC DIASTOLIC CONGESTIVE HEART FAILURE (HCC): ICD-10-CM

## 2022-07-20 DIAGNOSIS — I10 PRIMARY HYPERTENSION: ICD-10-CM

## 2022-07-20 PROCEDURE — 1124F ACP DISCUSS-NO DSCNMKR DOCD: CPT | Performed by: INTERNAL MEDICINE

## 2022-07-20 PROCEDURE — G0438 PPPS, INITIAL VISIT: HCPCS | Performed by: INTERNAL MEDICINE

## 2022-07-20 RX ORDER — LANOLIN ALCOHOL/MO/W.PET/CERES
CREAM (GRAM) TOPICAL
COMMUNITY
Start: 2022-07-12

## 2022-07-20 ASSESSMENT — PATIENT HEALTH QUESTIONNAIRE - PHQ9
2. FEELING DOWN, DEPRESSED OR HOPELESS: 2
SUM OF ALL RESPONSES TO PHQ QUESTIONS 1-9: 2
SUM OF ALL RESPONSES TO PHQ9 QUESTIONS 1 & 2: 2
SUM OF ALL RESPONSES TO PHQ QUESTIONS 1-9: 2
1. LITTLE INTEREST OR PLEASURE IN DOING THINGS: 0

## 2022-07-20 ASSESSMENT — ENCOUNTER SYMPTOMS
SHORTNESS OF BREATH: 0
CHEST TIGHTNESS: 0

## 2022-07-20 ASSESSMENT — LIFESTYLE VARIABLES
HOW OFTEN DO YOU HAVE A DRINK CONTAINING ALCOHOL: NEVER
HOW MANY STANDARD DRINKS CONTAINING ALCOHOL DO YOU HAVE ON A TYPICAL DAY: PATIENT DOES NOT DRINK

## 2022-07-20 NOTE — PATIENT INSTRUCTIONS
Personalized Preventive Plan for Carmencita Cha - 7/20/2022  Medicare offers a range of preventive health benefits. Some of the tests and screenings are paid in full while other may be subject to a deductible, co-insurance, and/or copay. Some of these benefits include a comprehensive review of your medical history including lifestyle, illnesses that may run in your family, and various assessments and screenings as appropriate. After reviewing your medical record and screening and assessments performed today your provider may have ordered immunizations, labs, imaging, and/or referrals for you. A list of these orders (if applicable) as well as your Preventive Care list are included within your After Visit Summary for your review. Other Preventive Recommendations:    A preventive eye exam performed by an eye specialist is recommended every 1-2 years to screen for glaucoma; cataracts, macular degeneration, and other eye disorders. A preventive dental visit is recommended every 6 months. Try to get at least 150 minutes of exercise per week or 10,000 steps per day on a pedometer . Order or download the FREE \"Exercise & Physical Activity: Your Everyday Guide\" from [employer]. Call 2-753.153.3391 or search [employer] online. You need 2887-0294 mg of calcium and 5433-9739 IU of vitamin D per day. It is possible to meet your calcium requirement with diet alone, but a vitamin D supplement is usually necessary to meet this goal.  When exposed to the sun, use a sunscreen that protects against both UVA and UVB radiation with an SPF of 30 or greater. Reapply every 2 to 3 hours or after sweating, drying off with a towel, or swimming. Always wear a seat belt when traveling in a car. Always wear a helmet when riding a bicycle or motorcycle.

## 2022-07-20 NOTE — PROGRESS NOTES
Subjective:      Patient ID: Cristo Lai is a 80 y.o. male. Coronary Artery Disease  Presents for follow-up visit. Pertinent negatives include no chest pain, chest pressure, chest tightness, dizziness, leg swelling, muscle weakness, palpitations, shortness of breath or weight gain. The symptoms have been stable. Compliance with diet is good. Compliance with exercise is good. Compliance with medications is good. Positive and Negative as described in HPI    Constitutional:  negative for  fevers, chills, sweats, fatigue, and weight loss  HEENT:  negative for vision or hearing changes,   Respiratory:  negative for shortness of breath, cough, or congestion  Cardiovascular:  negative for  chest pain, palpitations, edema  Gastrointestinal:  negative for nausea, vomiting, diarrhea, constipation, abdominal pain  Genitourinary:  negative for frequency, dysuria  Integument/Breast:  negative for rash, skin lesions,   Musculoskeletal:  negative for muscle aches or joint pain  Neurological:  negative for headaches, dizziness, numbness, pain and tingling extremities  Behavior/Psych:  negative for depression and anxiety  No family history on file. Social History     Socioeconomic History    Marital status:     Tobacco Use    Smoking status: Never    Smokeless tobacco: Never   Substance and Sexual Activity    Alcohol use: Never    Drug use: Never     Social Determinants of Health     Financial Resource Strain: Low Risk     Difficulty of Paying Living Expenses: Not hard at all   Food Insecurity: No Food Insecurity    Worried About Running Out of Food in the Last Year: Never true    Ran Out of Food in the Last Year: Never true   Transportation Needs: Unmet Transportation Needs    Lack of Transportation (Medical): Yes    Lack of Transportation (Non-Medical): Yes   Physical Activity: Inactive    Days of Exercise per Week: 0 days    Minutes of Exercise per Session: 0 min   Stress: No Stress Concern Present    Feeling of Stress : Only a little   Housing Stability: Low Risk     Unable to Pay for Housing in the Last Year: No    Number of Places Lived in the Last Year: 1    Unstable Housing in the Last Year: No        Review of Systems   Constitutional:  Negative for weight gain. Respiratory:  Negative for chest tightness and shortness of breath. Cardiovascular:  Negative for chest pain, palpitations and leg swelling. Musculoskeletal:  Negative for muscle weakness. Neurological:  Negative for dizziness. Objective:   Physical Exam  Constitutional:       Appearance: He is well-developed. He is obese. He is not diaphoretic. HENT:      Head: Normocephalic and atraumatic. Mouth/Throat:      Pharynx: No oropharyngeal exudate. Eyes:      General: No scleral icterus. Right eye: No discharge. Left eye: No discharge. Conjunctiva/sclera: Conjunctivae normal.      Pupils: Pupils are equal, round, and reactive to light. Neck:      Thyroid: No thyromegaly. Vascular: No JVD. Trachea: No tracheal deviation. Cardiovascular:      Rate and Rhythm: Normal rate. Heart sounds: Normal heart sounds. No murmur heard. No gallop. Comments: Systolic Murmur in Pulmonary ARea   Pulmonary:      Effort: Pulmonary effort is normal. No respiratory distress. Breath sounds: Normal breath sounds. No stridor. No wheezing or rales. Chest:      Chest wall: No tenderness. Abdominal:      General: Bowel sounds are normal. There is no distension. Palpations: Abdomen is soft. Tenderness: There is no abdominal tenderness. There is no guarding or rebound. Musculoskeletal:         General: Normal range of motion. Cervical back: Normal range of motion and neck supple. Right lower leg: Edema present. Left lower leg: Edema (2+ pitting edema in both legs) present. Neurological:      Mental Status: He is alert and oriented to person, place, and time.      Assessment / Plan: Diagnosis Orders   1. Atrial fibrillation, unspecified type ([facility] [address]        2. Chronic systolic (congestive) heart failure        3. Acute on chronic diastolic congestive heart failure ([facility] [address]        4. Pacemaker        5. Primary hypertension        6. NSTEMI (non-ST elevated myocardial infarction) ([facility] [address]        7. Stage 3b chronic kidney disease ([facility] [address]         Return in about 12 weeks (around 10/12/2022). No orders of the defined types were placed in this encounter. Questions and lab work was reviewed. His most recent BUN was 21 and creatinine was 1.11. Have chronically low hemoglobin which was 10.1 and his blood sugar was normal.  Continues to have 1-2+ leg edema and is on diuretic therapy      Visit Information    Have you changed or started any medications since your last visit including any over-the-counter medicines, vitamins, or herbal medicines? no   Are you having any side effects from any of your medications? -  no  Have you stopped taking any of your medications? Is so, why? -  no    Have you seen any other physician or provider since your last visit? Yes - Records Obtained  Have you had any other diagnostic tests since your last visit? Yes - Records Obtained  Have you been seen in the emergency room and/or had an admission to a hospital since we last saw you? No  Have you had your routine dental cleaning in the past 6 months? no    Have you activated your [employer] account? If not, what are your barriers?  No:      Patient Care Team:  Vianney Novak MD as PCP - General (Internal Medicine)  Vinaney Novak MD as PCP - [facility]  Pro Salmeron RN as [address] Del Real MD as Consulting Physician (Hematology and Oncology)    Medical History Review  Past Medical, Family, and Social History reviewed and does not contribute to the patient presenting condition    Health Maintenance   Topic Date Due    Annual Wellness Visit (AWV)  Never done    DTaP/Tdap/Td vaccine (1 - Tdap) Never done    Shingles vaccine (1 of 2) Never done    COVID-19 Vaccine (4 - Booster for Pfizer series) 03/11/2022    Flu vaccine (1) 09/01/2022    Depression Screen  05/26/2023    Pneumococcal 65+ years Vaccine  Completed    Hepatitis A vaccine  Aged Out    Hepatitis B vaccine  Aged Out    Hib vaccine  Aged Out    Meningococcal (ACWY) vaccine  Aged [employer] Annual Wellness Visit    Mana Conner is here for Medicare AWV (Review kidney function )    Assessment & Plan   Atrial fibrillation, unspecified type (HCC)  Chronic systolic (congestive) heart failure  Acute on chronic diastolic congestive heart failure ([facility] [address]  Pacemaker  Primary hypertension  NSTEMI (non-ST elevated myocardial infarction) ([facility] [address]  Stage 3b chronic kidney disease ([facility] [address]  ACP (advance care planning)  -     [place] ADVANCED CARE PLAN FACE TO FACE, EA ADD'L 30 MIN [55188]  Initial Medicare annual wellness visit  -     [facility] [address] TO [address] [facility], BELL ADD'L 27 MIN [id number]  Coronary artery disease involving native coronary artery of native heart with angina pectoris ([facility] [address]    Recommendations for Preventive Services Due: see orders and patient instructions/AVS.  Recommended screening schedule for the next 5-10 years is provided to the patient in written form: see Patient Instructions/AVS.     Return for Medicare Annual Wellness Visit in 1 year. Subjective   The following acute and/or chronic problems were also addressed today:    Patient's complete Health Risk Assessment and screening values have been reviewed and are found in Flowsheets. The following problems were reviewed today and where indicated follow up appointments were made and/or referrals ordered.     Positive Risk Factor Screenings with Interventions:             General Health and ACP:  General  In general, how would you say your health is?: Fair  In the past 7 days, have you experienced any of the following: New or Increased Pain, New or Increased Fatigue, Loneliness, Social Isolation, Stress or Anger?: No  Do you get the social and emotional support that you need?: Yes    Advance Directives       Power of  Living Will ACP-Advance Directive ACP-Power of     Not on File Not on File Not on File Not on File          General Health Risk Interventions:      Health Habits/Nutrition:  Physical Activity: Inactive    Days of Exercise per Week: 0 days    Minutes of Exercise per Session: 0 min     Have you lost any weight without trying in the past 3 months?: No  Body mass index: (!) 25.59  Have you seen the dentist within the past year?: (!) No  Health Habits/Nutrition Interventions:  His nutrition and health habits are normal and he is doing well in general             Objective   Vitals:    07/20/22 1040   BP: 130/76   Site: Left Upper Arm   Weight: 194 lb (88 kg)   Height: 6' 1\" (1.854 m)      Body mass index is 25.6 kg/m². Allergies   Allergen Reactions    Aspirin Nausea Only    Cyclobenzaprine Nausea Only    Ibuprofen Nausea Only    Azithromycin Nausea Only    Hydrocodone-Acetaminophen      per pt, he is having upset stomach when taking norco     Prior to Visit Medications    Medication Sig Taking?  Authorizing Provider   vitamin B-12 (CYANOCOBALAMIN) 1000 MCG tablet TAKE 1 TABLET BY MOUTH DAILY Yes Historical Provider, MD   XARELTO 15 MG TABS tablet TAKE 1 TABLET BY MOUTH DAILY WITH BREAKFAST Yes Usama Rubio MD   metoprolol succinate (TOPROL XL) 25 MG extended release tablet TAKE 2 TABLETS BY MOUTH EVERY MORNING Yes Historical Provider, MD   ferrous sulfate (FEROSUL) 325 (65 Fe) MG tablet Take 1 tablet by mouth every other day Yes Triny Aquino MD   famotidine (PEPCID) 20 MG tablet TAKE 1 TABLET BY MOUTH DAILY Yes Usama Rubio MD   vitamin D (ERGOCALCIFEROL) 1.25 MG (08633 UT) CAPS capsule Take 1 capsule by mouth once a week tuesdays Yes Triny Aquino MD   ondansetron (ZOFRAN-ODT) 4 MG disintegrating tablet Take 1 tablet by mouth 3 times daily as needed for Nausea or Vomiting Yes Lamine Bah MD   bumetanide (BUMEX) 2 MG tablet TAKE 1 TABLET BY MOUTH TWICE DAILY Yes Lamine Bah MD   hydrALAZINE (APRESOLINE) 25 MG tablet  Yes Historical Provider, MD   finasteride (PROSCAR) 5 MG tablet TAKE 1 TABLET BY MOUTH DAILY Yes Lamine Bah MD   amLODIPine (NORVASC) 5 MG tablet TAKE 2 TABLETS BY MOUTH DAILY Yes Lamine Bah MD   Ascorbic Acid (VITAMIN C) 250 MG tablet TAKE 1 TABLET BY MOUTH TWICE DAILY(TAKE WITH IRON) Yes Rebecca Ortiz MD   nitroGLYCERIN (NITROSTAT) 0.4 MG SL tablet up to max of 3 total doses. If no relief after 1 dose, call 911. Yes Zola Dakins, MD   docusate sodium (COLACE) 100 MG capsule Take 100 mg by mouth 2 times daily as needed for Constipation Yes Historical Provider, MD   Coenzyme Q10 100 MG CHEW Take 1 tablet by mouth daily Yes Lamine Bah MD   buprenorphine-naloxone (SUBOXONE) 8-2 MG FILM SL film Place 1 Film under the tongue 2 times daily.  Indications: pain  Yes Historical Provider, MD   magnesium oxide (MAG-OX) 400 MG tablet Take 400 mg by mouth daily  Yes Historical Provider, MD   latanoprost (XALATAN) 0.005 % ophthalmic solution Place 1 drop into both eyes nightly Yes Historical Provider, MD Liu (Including outside providers/suppliers regularly involved in providing care):   Patient Care Team:  Lamine Bah MD as PCP - General (Internal Medicine)  Lamine Bah MD as PCP - [facility] Empaneled Provider  Jono Hubbard RN as Rey Del Real MD as Consulting Physician (Hematology and Oncology)     Reviewed and updated this visit:  Allergies  Meds  Problems

## 2022-07-25 PROBLEM — R53.83 FATIGUE: Status: ACTIVE | Noted: 2019-01-22

## 2022-07-25 PROBLEM — R60.9 EDEMA: Status: ACTIVE | Noted: 2020-06-03

## 2022-07-25 PROBLEM — D64.9 ANEMIA: Status: ACTIVE | Noted: 2019-01-22

## 2022-07-25 PROBLEM — R00.1 BRADYCARDIA: Status: ACTIVE | Noted: 2019-10-17

## 2022-07-25 PROBLEM — H40.003 GLAUCOMA SUSPECT OF BOTH EYES: Status: ACTIVE | Noted: 2017-05-30

## 2022-07-25 PROBLEM — E66.9 CLASS 1 OBESITY: Status: ACTIVE | Noted: 2022-07-25

## 2022-07-25 PROBLEM — I87.2 VENOUS INSUFFICIENCY: Status: ACTIVE | Noted: 2020-06-03

## 2022-07-25 PROBLEM — E66.811 CLASS 1 OBESITY: Status: ACTIVE | Noted: 2022-07-25

## 2022-07-25 PROBLEM — I48.0 PAROXYSMAL ATRIAL FIBRILLATION (HCC): Status: ACTIVE | Noted: 2022-07-25

## 2022-07-25 PROBLEM — I49.5 SICK SINUS SYNDROME (HCC): Status: ACTIVE | Noted: 2019-10-02

## 2022-08-03 ENCOUNTER — CARE COORDINATION (OUTPATIENT)
Dept: CARE COORDINATION | Age: 87
End: 2022-08-03

## 2022-08-08 ENCOUNTER — CARE COORDINATION (OUTPATIENT)
Dept: CARE COORDINATION | Age: 87
End: 2022-08-08

## 2022-08-08 NOTE — CARE COORDINATION
Ambulatory Care Coordination Note  8/8/2022    ACC: Danica Camara, RN    Summary  Date Care Coordination Episode Started:  3.16.22    Reason patient is in Care Coordination:     High admission risk score    Topics Discussed Today:     CHF, patient reports continued swelling in his legs. Has a follow up with cardiology on 8.31.22, no change in weight or SOB. He does elevate his legs while resting. Renal services, patient confirmed that he went to his initial appointment. No other needs per patient, ACM encouraged him to call with any concerns in the future. Assessments completed today:     Fall risk  Initial assessment  Medication reconciliation  SDOH  CHF (Health assessments)      Care Coordinator plan of care: Will graduate at this time but will be happy to assist in the future if needed. Care Coordination Interventions    Program Enrollment: Complex Care  Referral from Primary Care Provider: No  Suggested Interventions and Community Resources  Fall Risk Prevention: In Process  Home Health Services: Completed  Social Work: Completed  Transportation Support: In Process  Zone Management Tools: In Process          Goals Addressed                   This Visit's Progress     Conditions and Symptoms   On track     I will schedule office visits, as directed by my provider. I will keep my appointment or reschedule if I have to cancel. I will notify my provider of any barriers to my plan of care. I will follow my Zone Management tool to seek urgent or emergent care. I will notify my provider of any symptoms that indicate a worsening of my condition. Barriers: overwhelmed by complexity of regimen  Plan for overcoming my barriers: work with [facility]   Confidence: 8/10  Anticipated Goal Completion Date: 6.15.22                Prior to Admission medications    Medication Sig Start Date End Date Taking?  Authorizing Provider   metOLazone (ZAROXOLYN) 2.5 MG tablet Take 1 tablet by mouth in the morning. 7/25/22 Jeremías Weaver MD   vitamin B-12 (CYANOCOBALAMIN) 1000 MCG tablet TAKE 1 TABLET BY MOUTH DAILY 7/12/22   Historical Provider, MD   XARELTO 15 MG TABS tablet TAKE 1 TABLET BY MOUTH DAILY WITH BREAKFAST 7/5/22   Lamine Bah MD   metoprolol succinate (TOPROL XL) 100 MG extended release tablet 150 mg 5/26/22   Historical Provider, MD   ferrous sulfate (FEROSUL) 325 (65 Fe) MG tablet Take 1 tablet by mouth every other day 6/17/22   Rebecca Ortiz MD   famotidine (PEPCID) 20 MG tablet TAKE 1 TABLET BY MOUTH DAILY 5/26/22   Lamine Bah MD   vitamin D (ERGOCALCIFEROL) 1.25 MG (49974 UT) CAPS capsule Take 1 capsule by mouth once a week tuesdays 5/26/22   Rebecca Ortiz MD   ondansetron (ZOFRAN-ODT) 4 MG disintegrating tablet Take 1 tablet by mouth 3 times daily as needed for Nausea or Vomiting 5/12/22   Lamine Bah MD   bumetanide (BUMEX) 2 MG tablet TAKE 1 TABLET BY MOUTH TWICE DAILY 5/9/22   Lamine Bah MD   hydrALAZINE (APRESOLINE) 25 MG tablet  4/12/22   Historical Provider, MD   finasteride (PROSCAR) 5 MG tablet TAKE 1 TABLET BY MOUTH DAILY 3/21/22   Lamine Bah MD   amLODIPine (NORVASC) 5 MG tablet TAKE 2 TABLETS BY MOUTH DAILY 3/21/22   Lamine Bah MD   Ascorbic Acid (VITAMIN C) 250 MG tablet TAKE 1 TABLET BY MOUTH TWICE DAILY(TAKE WITH IRON) 2/7/22   Rebecca Ortiz MD   nitroGLYCERIN (NITROSTAT) 0.4 MG SL tablet up to max of 3 total doses. If no relief after 1 dose, call 911. 1/8/22   Zola Dakins, MD   docusate sodium (COLACE) 100 MG capsule Take 100 mg by mouth 2 times daily as needed for Constipation    Historical Provider, MD   Coenzyme Q10 100 MG CHEW Take 1 tablet by mouth daily 10/26/21   Lamine Bah MD   buprenorphine-naloxone (SUBOXONE) 8-2 MG FILM SL film Place 1 Film under the tongue 2 times daily.  Indications: pain     Historical Provider, MD   magnesium oxide (MAG-OX) 400 MG tablet Take 400 mg by mouth daily     Historical Provider, MD   latanoprost (XALATAN) 0.005 % ophthalmic solution Place 1 drop into both eyes nightly    Historical Provider, MD       Future Appointments   Date Time Provider Diego Pitts   9/14/2022 12:00 PM Jose Carlos Solorzano  [place] [id number]   10/18/2022 10:30 AM Tereasa Lanes, MD [facility]   10/24/2022 11:00 AM Nancy De La Cruz MD [facility]   ,   Congestive Heart Failure Assessment    Are you currently restricting fluids?: Other  Do you understand a low sodium diet?: Yes  Do you understand how to read food labels?: Yes  Do you salt your food before tasting it?: No         Symptoms:     Symptom course: no change  Weight trend: stable  Salt intake watch compared to last visit: stable     , and   General Assessment    Do you have any symptoms that are causing concern?: Yes  Progression since Onset: Intermittent - Waxing/Waning  Reported Symptoms: Other (Comment: edema)

## 2022-08-12 DIAGNOSIS — K21.00 GASTROESOPHAGEAL REFLUX DISEASE WITH ESOPHAGITIS, UNSPECIFIED WHETHER HEMORRHAGE: ICD-10-CM

## 2022-08-15 RX ORDER — FAMOTIDINE 20 MG/1
20 TABLET, FILM COATED ORAL DAILY
Qty: 90 TABLET | Refills: 0 | Status: SHIPPED | OUTPATIENT
Start: 2022-08-15 | End: 2022-11-02

## 2022-08-22 ENCOUNTER — HOSPITAL ENCOUNTER (OUTPATIENT)
Age: 87
Setting detail: SPECIMEN
Discharge: HOME | End: 2022-08-22

## 2022-08-22 DIAGNOSIS — N18.2 BENIGN HYPERTENSION WITH CHRONIC KIDNEY DISEASE, STAGE II: ICD-10-CM

## 2022-08-22 DIAGNOSIS — D63.1 ANEMIA IN STAGE 2 CHRONIC KIDNEY DISEASE: ICD-10-CM

## 2022-08-22 DIAGNOSIS — I12.9 BENIGN HYPERTENSION WITH CHRONIC KIDNEY DISEASE, STAGE II: ICD-10-CM

## 2022-08-22 DIAGNOSIS — E83.39 HYPOPHOSPHATEMIA: ICD-10-CM

## 2022-08-22 DIAGNOSIS — N18.2 ANEMIA IN STAGE 2 CHRONIC KIDNEY DISEASE: ICD-10-CM

## 2022-08-22 DIAGNOSIS — N18.2 CKD (CHRONIC KIDNEY DISEASE), STAGE II: ICD-10-CM

## 2022-08-22 LAB
ANION GAP SERPL CALCULATED.3IONS-SCNC: 15 MMOL/L (ref 9–17)
BUN BLDV-MCNC: 39 MG/DL (ref 8–23)
CALCIUM SERPL-MCNC: 8.9 MG/DL (ref 8.6–10.4)
CHLORIDE BLD-SCNC: 100 MMOL/L (ref 98–107)
CO2: 27 MMOL/L (ref 20–31)
CREAT SERPL-MCNC: 1.38 MG/DL (ref 0.7–1.2)
GFR AFRICAN AMERICAN: 59 ML/MIN
GFR NON-AFRICAN AMERICAN: 49 ML/MIN
GFR SERPL CREATININE-BSD FRML MDRD: ABNORMAL ML/MIN/{1.73_M2}
GLUCOSE BLD-MCNC: 117 MG/DL (ref 70–99)
POTASSIUM SERPL-SCNC: 4.1 MMOL/L (ref 3.7–5.3)
SODIUM BLD-SCNC: 142 MMOL/L (ref 135–144)

## 2022-09-14 ENCOUNTER — TELEPHONE (OUTPATIENT)
Dept: ONCOLOGY | Age: 87
End: 2022-09-14

## 2022-09-14 NOTE — TELEPHONE ENCOUNTER
received referral from medical oncology office stating patient called in asking about his transportation. According to Care Coordinator notes patient utilizes [address] on [address]. Number provided to patient who states that is who he has been using.

## 2022-09-16 ENCOUNTER — TELEPHONE (OUTPATIENT)
Dept: ONCOLOGY | Age: 87
End: 2022-09-16

## 2022-09-16 ENCOUNTER — TELEPHONE (OUTPATIENT)
Dept: INTERNAL MEDICINE CLINIC | Age: 87
End: 2022-09-16

## 2022-09-16 ENCOUNTER — OFFICE VISIT (OUTPATIENT)
Dept: ONCOLOGY | Age: 87
End: 2022-09-16
Payer: MEDICARE

## 2022-09-16 ENCOUNTER — HOSPITAL ENCOUNTER (OUTPATIENT)
Age: 87
Discharge: HOME | End: 2022-09-16
Payer: MEDICARE

## 2022-09-16 VITALS
DIASTOLIC BLOOD PRESSURE: 79 MMHG | BODY MASS INDEX: 26.25 KG/M2 | WEIGHT: 199 LBS | SYSTOLIC BLOOD PRESSURE: 162 MMHG | TEMPERATURE: 96.8 F | HEART RATE: 69 BPM

## 2022-09-16 DIAGNOSIS — D64.9 ANEMIA, UNSPECIFIED TYPE: ICD-10-CM

## 2022-09-16 DIAGNOSIS — E61.1 IRON DEFICIENCY: ICD-10-CM

## 2022-09-16 DIAGNOSIS — D64.9 ANEMIA, UNSPECIFIED TYPE: Primary | ICD-10-CM

## 2022-09-16 LAB
ABSOLUTE EOS #: 0.1 K/UL (ref 0–0.4)
ABSOLUTE LYMPH #: 0.8 K/UL (ref 1–4.8)
ABSOLUTE MONO #: 0.4 K/UL (ref 0.1–1.2)
BASOPHILS # BLD: 1 % (ref 0–2)
BASOPHILS ABSOLUTE: 0 K/UL (ref 0–0.2)
EOSINOPHILS RELATIVE PERCENT: 3 % (ref 1–4)
FERRITIN: 611 NG/ML (ref 30–400)
FOLATE: >20 NG/ML
HCT VFR BLD CALC: 28.9 % (ref 41–53)
HEMOGLOBIN: 9.2 G/DL (ref 13.5–17.5)
IRON SATURATION: 23 % (ref 20–55)
IRON: 47 UG/DL (ref 59–158)
LYMPHOCYTES # BLD: 20 % (ref 24–44)
MCH RBC QN AUTO: 29.6 PG (ref 26–34)
MCHC RBC AUTO-ENTMCNC: 31.7 G/DL (ref 31–37)
MCV RBC AUTO: 93.4 FL (ref 80–100)
MONOCYTES # BLD: 10 % (ref 2–11)
PDW BLD-RTO: 14.3 % (ref 12.5–15.4)
PLATELET # BLD: 188 K/UL (ref 140–450)
PMV BLD AUTO: 7.5 FL (ref 6–12)
RBC # BLD: 3.1 M/UL (ref 4.5–5.9)
SEG NEUTROPHILS: 66 % (ref 36–66)
SEGMENTED NEUTROPHILS ABSOLUTE COUNT: 2.5 K/UL (ref 1.8–7.7)
TOTAL IRON BINDING CAPACITY: 208 UG/DL (ref 250–450)
UNSATURATED IRON BINDING CAPACITY: 161 UG/DL (ref 112–347)
VITAMIN B-12: 1208 PG/ML (ref 232–1245)
WBC # BLD: 3.8 K/UL (ref 3.5–11)

## 2022-09-16 PROCEDURE — 82746 ASSAY OF FOLIC ACID SERUM: CPT

## 2022-09-16 PROCEDURE — 1036F TOBACCO NON-USER: CPT | Performed by: INTERNAL MEDICINE

## 2022-09-16 PROCEDURE — 83540 ASSAY OF IRON: CPT

## 2022-09-16 PROCEDURE — G8417 CALC BMI ABV UP PARAM F/U: HCPCS | Performed by: INTERNAL MEDICINE

## 2022-09-16 PROCEDURE — 82728 ASSAY OF FERRITIN: CPT

## 2022-09-16 PROCEDURE — 99211 OFF/OP EST MAY X REQ PHY/QHP: CPT | Performed by: INTERNAL MEDICINE

## 2022-09-16 PROCEDURE — 99214 OFFICE O/P EST MOD 30 MIN: CPT | Performed by: INTERNAL MEDICINE

## 2022-09-16 PROCEDURE — 85025 COMPLETE CBC W/AUTO DIFF WBC: CPT

## 2022-09-16 PROCEDURE — 82607 VITAMIN B-12: CPT

## 2022-09-16 PROCEDURE — G8427 DOCREV CUR MEDS BY ELIG CLIN: HCPCS | Performed by: INTERNAL MEDICINE

## 2022-09-16 PROCEDURE — 36415 COLL VENOUS BLD VENIPUNCTURE: CPT

## 2022-09-16 PROCEDURE — 1124F ACP DISCUSS-NO DSCNMKR DOCD: CPT | Performed by: INTERNAL MEDICINE

## 2022-09-16 PROCEDURE — 83550 IRON BINDING TEST: CPT

## 2022-09-16 RX ORDER — RIVAROXABAN 15 MG/1
TABLET, FILM COATED ORAL
Qty: 30 TABLET | Refills: 1 | Status: SHIPPED | OUTPATIENT
Start: 2022-09-16

## 2022-09-16 NOTE — TELEPHONE ENCOUNTER
Home care nurse called to inform that patient had a fall off his bed a couple of days ago, has a small abrasion above right eyebrow with no other injuries or bruising. Advised ER due to hit his head, patient refused stating that he is doing okay.

## 2022-09-16 NOTE — TELEPHONE ENCOUNTER
AVS from 9/16/22    Rv in 3 Months      Will decide labs based on labs from today       Rv scheduled for 12/16/22 @ 10:30am  Labs will be decided on based on labs from 9/16/22    PT was given AVS and appt schedule

## 2022-09-16 NOTE — PROGRESS NOTES
Irineo Burns                                                                                                                  9/16/2022  MRN:   [id number]  YOB: 1935  PCP:                           Noa Henriquez MD  Referring Physician: No ref. provider found  Treating Physician Name: Mihaela Jolley MD      Reason for Visit:  Chief Complaint   Patient presents with    Follow-up     Review status of disease    Discuss Labs    Other     Right leg is swollen and tender   Patient presents to the clinic to discuss results with lab work-up. Current problems:  Anemia   Iron deficiency  Sick sinus syndrome and atrial fibrillation status post pacemaker 6102  Diastolic dysfunction  Chronic venous insufficiency  Chronic anticoagulation    Active and recent treatments:  Oral iron supplementation with vitamin C  S/p bone marrow biopsy    Interval history:  Patient presents to the clinic to discuss results of his lab work-up. He received IV iron but feels that it did not help him much. During this visit patient's allergy, social, medical, surgical history and medications were reviewed and updated. Summary of Case/History:    Irineo Burns a 80 y. o.male is a patient with anemia who presents to the clinic to establish care and for further work-up and evaluation. Patient was recently hospitalized back in June and at [facility] was noted to be anemic with hemoglobin 8.6. Iron studies showed iron saturation of 15%. Ferritin was not checked. Patient states that he is not taking any oral iron. Patient does take Xarelto 15 mg daily due to history of atrial fibrillation. Patient also has history of DVT once. Patient states that he does follow-up with a GI doctor in [place] and had endoscopy done last year. We do not have the records at this point. Patient also follows up with his cardiologist in [facility].   Patient is in the process of transferring his care to [place] and medications for this visit. Allergies:   Aspirin, Cyclobenzaprine, Ibuprofen, Azithromycin, and Hydrocodone-acetaminophen    Review of Systems:    Constitutional: No fever or chills. No night sweats, no weight loss. Positive fatigue  Eyes: No eye discharge, double vision, or eye pain   HEENT: negative for sore mouth, sore throat, hoarseness and voice change   Respiratory: negative for cough , sputum, dyspnea, wheezing, hemoptysis, chest pain   Cardiovascular: negative for chest pain, dyspnea, palpitations, orthopnea, PND   Gastrointestinal: negative for nausea, vomiting, diarrhea, constipation, abdominal pain, Dysphagia, hematemesis and hematochezia   Genitourinary: negative for frequency, dysuria, nocturia, urinary incontinence, and hematuria   Integument: negative for rash, skin lesions, bruises.    Hematologic/Lymphatic: negative for easy bruising, bleeding, lymphadenopathy, or petechiae   Endocrine: negative for heat or cold intolerance,weight changes, change in bowel habits and hair loss   Musculoskeletal: negative for myalgias, arthralgias, pain, joint swelling,and bone pain   Neurological: negative for headaches, dizziness, seizures, weakness, numbness    Physical Exam:    Vitals: BP (!) 162/79   Pulse 69   Temp 96.8 °F (36 °C) (Temporal)   Wt 199 lb (90.3 kg)   BMI 26.25 kg/m²   General appearance - well appearing, no in pain or distress  Mental status - AAO X3  Eyes - pupils equal and reactive, extraocular eye movements intact  Mouth - mucous membranes moist, pharynx normal without lesions  Neck - supple, no significant adenopathy  Lymphatics - no palpable lymphadenopathy, no hepatosplenomegaly  Chest - clear to auscultation, no wheezes, rales or rhonchi, symmetric air entry  Heart - normal rate, regular rhythm, normal S1, S2, no murmurs  Abdomen - soft, nontender, nondistended, no masses or organomegaly  Neurological - alert, oriented, normal speech, no focal findings or movement disorder noted  Extremities - peripheral pulses normal, no pedal edema, no clubbing or cyanosis  Skin - normal coloration and turgor, no rashes, no suspicious skin lesions noted       DATA:    Results for orders placed or performed during the hospital encounter of 09/16/22   CBC with Auto Differential   Result Value Ref Range    WBC 3.8 3.5 - 11.0 k/uL    RBC 3.10 (L) 4.5 - 5.9 m/uL    Hemoglobin 9.2 (L) 13.5 - 17.5 g/dL    Hematocrit 28.9 (L) 41 - 53 %    MCV 93.4 80 - 100 fL    MCH 29.6 26 - 34 pg    MCHC 31.7 31 - 37 g/dL    RDW 14.3 12.5 - 15.4 %    Platelets 932 405 - 967 k/uL    MPV 7.5 6.0 - 12.0 fL    Seg Neutrophils 66 36 - 66 %    Lymphocytes 20 (L) 24 - 44 %    Monocytes 10 2 - 11 %    Eosinophils % 3 1 - 4 %    Basophils 1 0 - 2 %    Segs Absolute 2.50 1.8 - 7.7 k/uL    Absolute Lymph # 0.80 (L) 1.0 - 4.8 k/uL    Absolute Mono # 0.40 0.1 - 1.2 k/uL    Absolute Eos # 0.10 0.0 - 0.4 k/uL    Basophils Absolute 0.00 0.0 - 0.2 k/uL           Impression:  Anemia   Iron deficiency  Sick sinus syndrome and atrial fibrillation status post pacemaker 1369  Diastolic dysfunction  Chronic venous insufficiency  Chronic anticoagulation    Plan:  Reviewed available clinical data. CBC shows hemoglobin of 9.2. Remaining lab work is pending. We will follow-up on the results of lab work-up. I believe CKD is also contributing to the anemia. We advised the patient to start taking oral iron once a day. We will schedule follow-up depending upon the labs from today. Addendum:    Patient's lab work-up shows adequate iron stores. Patient has adequate Q88 folic acid stores. However patient continues to be anemic. Patient ferritin is 611. Previously patient bone marrow had erythroid hyperplasia with a normal cellular marrow. There were absent iron stores. Patient did receive IV iron but has not had any response in regards to the hemoglobin. MDS FISH panel was negative.   We will continue to monitor hemoglobin at this point I suspect patient may have a low-grade MDS as suggested by erythroid hyperplasia on the bone marrow. If hemoglobin continues to decline we will consider KAYDEN therapy. Monty Walter MD      this note is created with the assistance of a speech recognition program.  While intending to generate a document that actually reflects the content of the visit, the document can still have some errors including those of syntax and sound a like substitutions which may escape proof reading. It such instances, actual meaning can be extrapolated by contextual diversion.

## 2022-09-21 ENCOUNTER — TELEPHONE (OUTPATIENT)
Dept: INTERNAL MEDICINE CLINIC | Age: 87
End: 2022-09-21

## 2022-10-04 ENCOUNTER — OFFICE VISIT (OUTPATIENT)
Dept: INTERNAL MEDICINE CLINIC | Age: 87
End: 2022-10-04
Payer: MEDICARE

## 2022-10-04 VITALS
OXYGEN SATURATION: 91 % | DIASTOLIC BLOOD PRESSURE: 82 MMHG | BODY MASS INDEX: 26.25 KG/M2 | WEIGHT: 199 LBS | SYSTOLIC BLOOD PRESSURE: 126 MMHG

## 2022-10-04 DIAGNOSIS — N18.32 STAGE 3B CHRONIC KIDNEY DISEASE (HCC): ICD-10-CM

## 2022-10-04 DIAGNOSIS — G89.29 CHRONIC PAIN OF RIGHT KNEE: ICD-10-CM

## 2022-10-04 DIAGNOSIS — I50.22 CHRONIC SYSTOLIC (CONGESTIVE) HEART FAILURE (HCC): ICD-10-CM

## 2022-10-04 DIAGNOSIS — M25.561 CHRONIC PAIN OF RIGHT KNEE: ICD-10-CM

## 2022-10-04 DIAGNOSIS — M25.551 RIGHT HIP PAIN: Primary | ICD-10-CM

## 2022-10-04 PROCEDURE — 1124F ACP DISCUSS-NO DSCNMKR DOCD: CPT | Performed by: INTERNAL MEDICINE

## 2022-10-04 PROCEDURE — 1036F TOBACCO NON-USER: CPT | Performed by: INTERNAL MEDICINE

## 2022-10-04 PROCEDURE — G8417 CALC BMI ABV UP PARAM F/U: HCPCS | Performed by: INTERNAL MEDICINE

## 2022-10-04 PROCEDURE — 99213 OFFICE O/P EST LOW 20 MIN: CPT | Performed by: INTERNAL MEDICINE

## 2022-10-04 PROCEDURE — G8484 FLU IMMUNIZE NO ADMIN: HCPCS | Performed by: INTERNAL MEDICINE

## 2022-10-04 PROCEDURE — G8427 DOCREV CUR MEDS BY ELIG CLIN: HCPCS | Performed by: INTERNAL MEDICINE

## 2022-10-04 NOTE — PROGRESS NOTES
Visit Information    Have you changed or started any medications since your last visit including any over-the-counter medicines, vitamins, or herbal medicines? no   Are you having any side effects from any of your medications? -  no  Have you stopped taking any of your medications? Is so, why? -  no    Have you seen any other physician or provider since your last visit? No  Have you had any other diagnostic tests since your last visit? No  Have you been seen in the emergency room and/or had an admission to a hospital since we last saw you? No  Have you had your routine dental cleaning in the past 6 months? no    Have you activated your [employer] account? If not, what are your barriers?  No: declined     Patient Care Team:  Erling Mcardle, MD as PCP - General (Internal Medicine)  Erling Mcardle, MD as PCP - [address] [place] [address] Dr Ruiz Provider  [facility], MD as Consulting Physician (Hematology and Oncology)    Medical History Review  Past Medical, Family, and Social History reviewed and does contribute to the patient presenting condition    Health Maintenance   Topic Date Due    DTaP/Tdap/Td vaccine (1 - Tdap) Never done    Shingles vaccine (1 of 2) Never done    COVID-19 Vaccine (4 - Booster for Cole Peter series) 03/11/2022    Depression Screen  07/20/2023    Annual Wellness Visit (AWV)  07/21/2023    Flu vaccine  Completed    Pneumococcal 65+ years Vaccine  Completed    Hepatitis A vaccine  Aged Out    Hepatitis B vaccine  Aged Out    Hib vaccine  Aged Out    Meningococcal (ACWY) vaccine  Aged Out

## 2022-10-04 NOTE — PROGRESS NOTES
[address] [address]  Dept: [phone]  Dept Fax: [phone]    Jim Hyatt is a 80 y.o. male who presents today for his medicalconditions/complaints as noted below. Jim Hyatt is c/o of Joint Pain (Right leg swelling that goes on and off)      HPI:     Joint Pain   The pain is present in the right hip, right upper leg and right knee. This is a chronic problem. The quality of the pain is described as aching. The symptoms are aggravated by activity. Treatments tried: he has renal insufficiency last CR was 1.3 up from 1.1. Edema  This is a chronic problem. The current episode started more than 1 year ago. The problem has been waxing and waning. Associated symptoms include arthralgias. Treatments tried: he is on bumex but with the renal insuffieciency if it is increased it worsens the Cr. Past Medical History:   Diagnosis Date    Arthritis     Atrial fibrillation (HCC)     CAD (coronary artery disease)     CHF (congestive heart failure) (HCC)     Glaucoma     Hx of blood clots     with hernia surgery    Hyperlipidemia     Hypertension     MI (myocardial infarction) ([facility] [address]         Current Outpatient Medications   Medication Sig Dispense Refill    XARELTO 15 MG TABS tablet TAKE 1 TABLET BY MOUTH DAILY WITH BREAKFAST 30 tablet 1    famotidine (PEPCID) 20 MG tablet TAKE 1 TABLET BY MOUTH DAILY 90 tablet 0    metOLazone (ZAROXOLYN) 2.5 MG tablet Take 1 tablet by mouth in the morning.  4 tablet 0    vitamin B-12 (CYANOCOBALAMIN) 1000 MCG tablet TAKE 1 TABLET BY MOUTH DAILY      metoprolol succinate (TOPROL XL) 100 MG extended release tablet 150 mg      vitamin D (ERGOCALCIFEROL) 1.25 MG (76332 UT) CAPS capsule Take 1 capsule by mouth once a week tuesdays 12 capsule 2    ondansetron (ZOFRAN-ODT) 4 MG disintegrating tablet Take 1 tablet by mouth 3 times daily as needed for Nausea or Vomiting 21 tablet 0    bumetanide (BUMEX) 2 MG tablet TAKE 1 TABLET BY MOUTH TWICE DAILY 180 tablet 1    hydrALAZINE (APRESOLINE) 25 MG tablet       finasteride (PROSCAR) 5 MG tablet TAKE 1 TABLET BY MOUTH DAILY 90 tablet 3    amLODIPine (NORVASC) 5 MG tablet TAKE 2 TABLETS BY MOUTH DAILY 180 tablet 3    Ascorbic Acid (VITAMIN C) 250 MG tablet TAKE 1 TABLET BY MOUTH TWICE DAILY(TAKE WITH IRON) 60 tablet 3    nitroGLYCERIN (NITROSTAT) 0.4 MG SL tablet up to max of 3 total doses. If no relief after 1 dose, call 911. 25 tablet 0    docusate sodium (COLACE) 100 MG capsule Take 100 mg by mouth 2 times daily as needed for Constipation      Coenzyme Q10 100 MG CHEW Take 1 tablet by mouth daily 90 tablet 0    buprenorphine-naloxone (SUBOXONE) 8-2 MG FILM SL film Place 1 Film under the tongue 2 times daily. Indications: pain       magnesium oxide (MAG-OX) 400 MG tablet Take 400 mg by mouth daily       latanoprost (XALATAN) 0.005 % ophthalmic solution Place 1 drop into both eyes nightly       No current facility-administered medications for this visit. Allergies   Allergen Reactions    Aspirin Nausea Only    Cyclobenzaprine Nausea Only    Ibuprofen Nausea Only    Azithromycin Nausea Only    Hydrocodone-Acetaminophen      per pt, he is having upset stomach when taking norco       Health Maintenance   Topic Date Due    DTaP/Tdap/Td vaccine (1 - Tdap) Never done    Shingles vaccine (1 of 2) Never done    COVID-19 Vaccine (4 - Booster for Pfizer series) 03/11/2022    Depression Screen  07/20/2023    Annual Wellness Visit (AWV)  07/21/2023    Flu vaccine  Completed    Pneumococcal 65+ years Vaccine  Completed    Hepatitis A vaccine  Aged Out    Hepatitis B vaccine  Aged Out    Hib vaccine  Aged Out    Meningococcal (ACWY) vaccine  Aged Out       Subjective:      Review of Systems   Musculoskeletal:  Positive for arthralgias. All other systems reviewed and are negative. Objective:     Physical Exam  Vitals reviewed. Constitutional:       Appearance: He is well-developed.    HENT: Head: Normocephalic and atraumatic. Eyes:      Conjunctiva/sclera: Conjunctivae normal.      Pupils: Pupils are equal, round, and reactive to light. Neck:      Thyroid: No thyromegaly. Vascular: No JVD. Cardiovascular:      Rate and Rhythm: Normal rate and regular rhythm. Heart sounds: Normal heart sounds. No murmur heard. Pulmonary:      Effort: Pulmonary effort is normal.      Breath sounds: Normal breath sounds. Abdominal:      General: Bowel sounds are normal.      Palpations: Abdomen is soft. Musculoskeletal:         General: Normal range of motion. Cervical back: Normal range of motion and neck supple. Right lower leg: No edema. Left lower leg: No edema (no pitting at this time). Skin:     General: Skin is warm and dry. Neurological:      Mental Status: He is alert and oriented to person, place, and time. Deep Tendon Reflexes: Reflexes are normal and symmetric. /82 (Site: Right Upper Arm, Position: Sitting)   Wt 199 lb (90.3 kg)   SpO2 91%   BMI 26.25 kg/m²       Assessment:       Diagnosis Orders   1. Right hip pain        2. Chronic pain of right knee        3. Stage 3b chronic kidney disease ([address]        4. Chronic systolic (congestive) heart failure            Plan:      No follow-ups on file. No orders of the defined types were placed in this encounter. No orders of the defined types were placed in this encounter. No change in the diuretic. Currently he is receiving PT for right leg. Patient given educational materials - see patient instructions. Discussed use, benefit, and side effects of prescribed medications. All patientquestions answered. Pt voiced understanding.     Electronically signed by Blanch Brunner, MD on 10/4/2022at 11:35 AM

## 2022-10-17 ENCOUNTER — HOSPITAL ENCOUNTER (OUTPATIENT)
Age: 87
Discharge: HOME | End: 2022-10-17
Payer: MEDICARE

## 2022-10-17 DIAGNOSIS — D63.1 ANEMIA IN STAGE 2 CHRONIC KIDNEY DISEASE: ICD-10-CM

## 2022-10-17 DIAGNOSIS — N18.2 CKD (CHRONIC KIDNEY DISEASE), STAGE II: ICD-10-CM

## 2022-10-17 DIAGNOSIS — N18.2 ANEMIA IN STAGE 2 CHRONIC KIDNEY DISEASE: ICD-10-CM

## 2022-10-17 DIAGNOSIS — I12.9 BENIGN HYPERTENSION WITH CHRONIC KIDNEY DISEASE, STAGE II: ICD-10-CM

## 2022-10-17 DIAGNOSIS — N18.2 BENIGN HYPERTENSION WITH CHRONIC KIDNEY DISEASE, STAGE II: ICD-10-CM

## 2022-10-17 DIAGNOSIS — E83.39 HYPOPHOSPHATEMIA: ICD-10-CM

## 2022-10-17 LAB
ABSOLUTE EOS #: 0.1 K/UL (ref 0–0.4)
ABSOLUTE LYMPH #: 0.7 K/UL (ref 1–4.8)
ABSOLUTE MONO #: 0.4 K/UL (ref 0.1–1.3)
ANION GAP SERPL CALCULATED.3IONS-SCNC: 10 MMOL/L (ref 9–17)
BACTERIA: ABNORMAL
BASOPHILS # BLD: 1 % (ref 0–2)
BASOPHILS ABSOLUTE: 0 K/UL (ref 0–0.2)
BILIRUBIN URINE: NEGATIVE
BUN BLDV-MCNC: 30 MG/DL (ref 8–23)
CALCIUM SERPL-MCNC: 9.1 MG/DL (ref 8.6–10.4)
CASTS UA: ABNORMAL /LPF
CHLORIDE BLD-SCNC: 104 MMOL/L (ref 98–107)
CO2: 28 MMOL/L (ref 20–31)
COLOR: YELLOW
CREAT SERPL-MCNC: 1.58 MG/DL (ref 0.7–1.2)
EOSINOPHILS RELATIVE PERCENT: 2 % (ref 0–4)
EPITHELIAL CELLS UA: ABNORMAL /HPF
GFR SERPL CREATININE-BSD FRML MDRD: 42 ML/MIN/1.73M2
GLUCOSE BLD-MCNC: 122 MG/DL (ref 70–99)
GLUCOSE URINE: NEGATIVE
HCT VFR BLD CALC: 27 % (ref 41–53)
HEMOGLOBIN: 8.8 G/DL (ref 13.5–17.5)
KETONES, URINE: ABNORMAL
LEUKOCYTE ESTERASE, URINE: NEGATIVE
LYMPHOCYTES # BLD: 15 % (ref 24–44)
MAGNESIUM: 2 MG/DL (ref 1.6–2.6)
MCH RBC QN AUTO: 30.2 PG (ref 26–34)
MCHC RBC AUTO-ENTMCNC: 32.6 G/DL (ref 31–37)
MCV RBC AUTO: 92.9 FL (ref 80–100)
MONOCYTES # BLD: 10 % (ref 1–7)
NITRITE, URINE: NEGATIVE
PDW BLD-RTO: 13.9 % (ref 11.5–14.9)
PH UA: 6.5 (ref 5–8)
PHOSPHORUS: 3.3 MG/DL (ref 2.5–4.5)
PLATELET # BLD: 196 K/UL (ref 150–450)
PMV BLD AUTO: 7.5 FL (ref 6–12)
POTASSIUM SERPL-SCNC: 4.4 MMOL/L (ref 3.7–5.3)
PROTEIN UA: NEGATIVE
RBC # BLD: 2.91 M/UL (ref 4.5–5.9)
RBC UA: ABNORMAL /HPF
SEG NEUTROPHILS: 72 % (ref 36–66)
SEGMENTED NEUTROPHILS ABSOLUTE COUNT: 3.1 K/UL (ref 1.3–9.1)
SODIUM BLD-SCNC: 142 MMOL/L (ref 135–144)
SPECIFIC GRAVITY UA: 1.02 (ref 1–1.03)
TURBIDITY: CLEAR
URINE HGB: NEGATIVE
UROBILINOGEN, URINE: NORMAL
WBC # BLD: 4.3 K/UL (ref 3.5–11)
WBC UA: ABNORMAL /HPF

## 2022-10-17 PROCEDURE — 84100 ASSAY OF PHOSPHORUS: CPT

## 2022-10-17 PROCEDURE — 83735 ASSAY OF MAGNESIUM: CPT

## 2022-10-17 PROCEDURE — 85025 COMPLETE CBC W/AUTO DIFF WBC: CPT

## 2022-10-17 PROCEDURE — 81001 URINALYSIS AUTO W/SCOPE: CPT

## 2022-10-17 PROCEDURE — 36415 COLL VENOUS BLD VENIPUNCTURE: CPT

## 2022-10-17 PROCEDURE — 80048 BASIC METABOLIC PNL TOTAL CA: CPT

## 2022-10-24 RX ORDER — ERGOCALCIFEROL 1.25 MG/1
CAPSULE ORAL
Qty: 12 CAPSULE | Refills: 2 | Status: SHIPPED | OUTPATIENT
Start: 2022-10-24

## 2022-11-02 DIAGNOSIS — K21.00 GASTROESOPHAGEAL REFLUX DISEASE WITH ESOPHAGITIS, UNSPECIFIED WHETHER HEMORRHAGE: ICD-10-CM

## 2022-11-02 RX ORDER — FAMOTIDINE 20 MG/1
20 TABLET, FILM COATED ORAL DAILY
Qty: 90 TABLET | Refills: 0 | Status: SHIPPED | OUTPATIENT
Start: 2022-11-02

## 2022-11-10 RX ORDER — BUMETANIDE 2 MG/1
2 TABLET ORAL DAILY
Qty: 180 TABLET | Refills: 1 | Status: SHIPPED | OUTPATIENT
Start: 2022-11-10 | End: 2023-05-09

## 2022-11-18 RX ORDER — RIVAROXABAN 15 MG/1
TABLET, FILM COATED ORAL
Qty: 30 TABLET | Refills: 1 | Status: SHIPPED | OUTPATIENT
Start: 2022-11-18

## 2022-12-10 ENCOUNTER — HOSPITAL ENCOUNTER (OUTPATIENT)
Age: 87
Discharge: HOME | End: 2022-12-10
Payer: MEDICARE

## 2022-12-10 DIAGNOSIS — E61.1 IRON DEFICIENCY: ICD-10-CM

## 2022-12-10 DIAGNOSIS — D64.9 ANEMIA, UNSPECIFIED TYPE: ICD-10-CM

## 2022-12-10 LAB
ABSOLUTE EOS #: 0.2 K/UL (ref 0–0.4)
ABSOLUTE LYMPH #: 0.8 K/UL (ref 1–4.8)
ABSOLUTE MONO #: 0.5 K/UL (ref 0.1–1.3)
BASOPHILS # BLD: 1 % (ref 0–2)
BASOPHILS ABSOLUTE: 0 K/UL (ref 0–0.2)
EOSINOPHILS RELATIVE PERCENT: 5 % (ref 0–4)
FERRITIN: 333 NG/ML (ref 30–400)
FOLATE: 15.7 NG/ML
HCT VFR BLD CALC: 31 % (ref 41–53)
HEMOGLOBIN: 9.7 G/DL (ref 13.5–17.5)
IRON SATURATION: 27 % (ref 20–55)
IRON: 71 UG/DL (ref 59–158)
LYMPHOCYTES # BLD: 21 % (ref 24–44)
MCH RBC QN AUTO: 29.1 PG (ref 26–34)
MCHC RBC AUTO-ENTMCNC: 31.5 G/DL (ref 31–37)
MCV RBC AUTO: 92.5 FL (ref 80–100)
MONOCYTES # BLD: 12 % (ref 1–7)
PDW BLD-RTO: 14.4 % (ref 11.5–14.9)
PLATELET # BLD: 202 K/UL (ref 150–450)
PMV BLD AUTO: 8 FL (ref 6–12)
RBC # BLD: 3.35 M/UL (ref 4.5–5.9)
SEG NEUTROPHILS: 61 % (ref 36–66)
SEGMENTED NEUTROPHILS ABSOLUTE COUNT: 2.3 K/UL (ref 1.3–9.1)
TOTAL IRON BINDING CAPACITY: 260 UG/DL (ref 250–450)
UNSATURATED IRON BINDING CAPACITY: 189 UG/DL (ref 112–347)
VITAMIN B-12: 261 PG/ML (ref 232–1245)
WBC # BLD: 3.9 K/UL (ref 3.5–11)

## 2022-12-10 PROCEDURE — 36415 COLL VENOUS BLD VENIPUNCTURE: CPT

## 2022-12-10 PROCEDURE — 85025 COMPLETE CBC W/AUTO DIFF WBC: CPT

## 2022-12-10 PROCEDURE — 82746 ASSAY OF FOLIC ACID SERUM: CPT

## 2022-12-10 PROCEDURE — 83550 IRON BINDING TEST: CPT

## 2022-12-10 PROCEDURE — 82668 ASSAY OF ERYTHROPOIETIN: CPT

## 2022-12-10 PROCEDURE — 82728 ASSAY OF FERRITIN: CPT

## 2022-12-10 PROCEDURE — 83540 ASSAY OF IRON: CPT

## 2022-12-10 PROCEDURE — 82607 VITAMIN B-12: CPT

## 2022-12-11 LAB — ERYTHROPOIETIN: 11 MU/ML (ref 4–27)

## 2022-12-20 ENCOUNTER — TELEPHONE (OUTPATIENT)
Dept: INTERNAL MEDICINE CLINIC | Age: 87
End: 2022-12-20

## 2022-12-20 NOTE — TELEPHONE ENCOUNTER
Patient is reporting left sided chest pain and left leg swelling, swelling has been going on for 3 days no improvement with his diuretic. Chest pain started last night patient has a history of a DVT. No appointments available in the office this week patient was advised to go to the ER for evaluation. He did verbalized understanding and agreered to have his daughter take his today.

## 2022-12-27 NOTE — TELEPHONE ENCOUNTER
Patient called to inform that he was feeling much better, informed that he was advised to the ER on 12/20 and asked if he went and he answered no. Explained the importance of going to the ER due his symptoms, he then informed me that he is going to call a family member for a ride.

## 2023-01-17 ENCOUNTER — OFFICE VISIT (OUTPATIENT)
Dept: INTERNAL MEDICINE CLINIC | Age: 88
End: 2023-01-17
Payer: MEDICARE

## 2023-01-17 VITALS
SYSTOLIC BLOOD PRESSURE: 126 MMHG | DIASTOLIC BLOOD PRESSURE: 78 MMHG | BODY MASS INDEX: 23.06 KG/M2 | HEIGHT: 73 IN | OXYGEN SATURATION: 100 % | HEART RATE: 76 BPM | WEIGHT: 174 LBS

## 2023-01-17 DIAGNOSIS — N18.32 STAGE 3B CHRONIC KIDNEY DISEASE (HCC): ICD-10-CM

## 2023-01-17 DIAGNOSIS — I25.119 CORONARY ARTERY DISEASE INVOLVING NATIVE CORONARY ARTERY OF NATIVE HEART WITH ANGINA PECTORIS (HCC): ICD-10-CM

## 2023-01-17 DIAGNOSIS — I48.91 ATRIAL FIBRILLATION, UNSPECIFIED TYPE (HCC): ICD-10-CM

## 2023-01-17 DIAGNOSIS — K21.00 GASTROESOPHAGEAL REFLUX DISEASE WITH ESOPHAGITIS, UNSPECIFIED WHETHER HEMORRHAGE: ICD-10-CM

## 2023-01-17 DIAGNOSIS — N17.9 AKI (ACUTE KIDNEY INJURY) (HCC): Primary | ICD-10-CM

## 2023-01-17 DIAGNOSIS — I50.22 CHRONIC SYSTOLIC (CONGESTIVE) HEART FAILURE (HCC): ICD-10-CM

## 2023-01-17 PROCEDURE — G8420 CALC BMI NORM PARAMETERS: HCPCS | Performed by: INTERNAL MEDICINE

## 2023-01-17 PROCEDURE — 1036F TOBACCO NON-USER: CPT | Performed by: INTERNAL MEDICINE

## 2023-01-17 PROCEDURE — 1124F ACP DISCUSS-NO DSCNMKR DOCD: CPT | Performed by: INTERNAL MEDICINE

## 2023-01-17 PROCEDURE — G8427 DOCREV CUR MEDS BY ELIG CLIN: HCPCS | Performed by: INTERNAL MEDICINE

## 2023-01-17 PROCEDURE — G8484 FLU IMMUNIZE NO ADMIN: HCPCS | Performed by: INTERNAL MEDICINE

## 2023-01-17 PROCEDURE — 99214 OFFICE O/P EST MOD 30 MIN: CPT | Performed by: INTERNAL MEDICINE

## 2023-01-17 RX ORDER — FAMOTIDINE 20 MG/1
20 TABLET, FILM COATED ORAL 2 TIMES DAILY
Qty: 180 TABLET | Refills: 0 | Status: SHIPPED | OUTPATIENT
Start: 2023-01-17

## 2023-01-17 RX ORDER — RIVAROXABAN 15 MG/1
TABLET, FILM COATED ORAL
Qty: 30 TABLET | Refills: 1 | Status: SHIPPED | OUTPATIENT
Start: 2023-01-17

## 2023-01-17 SDOH — ECONOMIC STABILITY: FOOD INSECURITY: WITHIN THE PAST 12 MONTHS, YOU WORRIED THAT YOUR FOOD WOULD RUN OUT BEFORE YOU GOT MONEY TO BUY MORE.: NEVER TRUE

## 2023-01-17 SDOH — ECONOMIC STABILITY: FOOD INSECURITY: WITHIN THE PAST 12 MONTHS, THE FOOD YOU BOUGHT JUST DIDN'T LAST AND YOU DIDN'T HAVE MONEY TO GET MORE.: NEVER TRUE

## 2023-01-17 ASSESSMENT — PATIENT HEALTH QUESTIONNAIRE - PHQ9
SUM OF ALL RESPONSES TO PHQ9 QUESTIONS 1 & 2: 0
SUM OF ALL RESPONSES TO PHQ QUESTIONS 1-9: 0
1. LITTLE INTEREST OR PLEASURE IN DOING THINGS: 0
SUM OF ALL RESPONSES TO PHQ QUESTIONS 1-9: 0
2. FEELING DOWN, DEPRESSED OR HOPELESS: 0

## 2023-01-17 ASSESSMENT — SOCIAL DETERMINANTS OF HEALTH (SDOH): HOW HARD IS IT FOR YOU TO PAY FOR THE VERY BASICS LIKE FOOD, HOUSING, MEDICAL CARE, AND HEATING?: NOT HARD AT ALL

## 2023-01-17 NOTE — PROGRESS NOTES
[facility]  [address] [address]  Dept: [phone]  Dept Fax: [phone]    Office Progress/Follow Up Note  Date of patient's visit: 1/17/2023  Patient's Name:  Hemanth Barrera YOB: 1935            Patient Care Team:  Neftaly Contreras MD as PCP - General (Internal Medicine)  Neftaly Contreras MD as PCP - [facility] Empaneled Provider  Wolf Sharma MD as Consulting Physician (Hematology and Oncology)    REASON FOR VISIT: Routine outpatient follow up/Same day visit/Post hospital/ED visit    HISTORY OF PRESENT ILLNESS:      Chief Complaint   Patient presents with    Leg Pain     Pt c/o RT leg pain and swelling x1 week        History was obtained from the patient. Hemanth Barrera is a 88 y.o. is here for a   Patient, is here for evaluation multiple medical problems.  Atrial fibrillation, on anticoagulation, CHF, sick sinus syndrome status post AICD  He has chronic anemia, following with hematologist, had BM Biospy in past , may needs to be on erythropoietin   Has CKD , follows with nephrologist   Has Upset Stomach , just yesterday , feels Nausea , No vomiting , had eaten Chicken and Spinach   Has H/o Bowel obstruction in passing , Passing stools   On pepcid for GERD       Health Maintenance Due   Topic Date Due    DTaP/Tdap/Td vaccine (1 - Tdap) Never done    Shingles vaccine (1 of 2) Never done    COVID-19 Vaccine (4 - Booster for Pfizer series) 01/06/2022       Allergies   Allergen Reactions    Aspirin Nausea Only    Cyclobenzaprine Nausea Only    Ibuprofen Nausea Only    Azithromycin Nausea Only    Hydrocodone-Acetaminophen      per pt, he is having upset stomach when taking norco         MEDICATIONS:     Current Outpatient Medications   Medication Sig Dispense Refill    XARELTO 15 MG TABS tablet TAKE 1 TABLET BY MOUTH DAILY WITH BREAKFAST 30 tablet 1    bumetanide (BUMEX) 2 MG tablet Take 1 tablet by mouth daily 180 tablet 1    famotidine (PEPCID) 20  MG tablet TAKE 1 TABLET BY MOUTH DAILY 90 tablet 0    vitamin D (ERGOCALCIFEROL) 1.25 MG (58092 UT) CAPS capsule TAKE 1 CAPSULE BY MOUTH EVERY WEEK 12 capsule 2    metOLazone (ZAROXOLYN) 2.5 MG tablet Take 1 tablet by mouth in the morning. 4 tablet 0    metoprolol succinate (TOPROL XL) 100 MG extended release tablet 150 mg      ondansetron (ZOFRAN-ODT) 4 MG disintegrating tablet Take 1 tablet by mouth 3 times daily as needed for Nausea or Vomiting 21 tablet 0    hydrALAZINE (APRESOLINE) 25 MG tablet       finasteride (PROSCAR) 5 MG tablet TAKE 1 TABLET BY MOUTH DAILY 90 tablet 3    amLODIPine (NORVASC) 5 MG tablet TAKE 2 TABLETS BY MOUTH DAILY 180 tablet 3    Ascorbic Acid (VITAMIN C) 250 MG tablet TAKE 1 TABLET BY MOUTH TWICE DAILY(TAKE WITH IRON) 60 tablet 3    nitroGLYCERIN (NITROSTAT) 0.4 MG SL tablet up to max of 3 total doses. If no relief after 1 dose, call 911. 25 tablet 0    Coenzyme Q10 100 MG CHEW Take 1 tablet by mouth daily 90 tablet 0    buprenorphine-naloxone (SUBOXONE) 8-2 MG FILM SL film Place 1 Film under the tongue 2 times daily. Indications: pain       magnesium oxide (MAG-OX) 400 MG tablet Take 400 mg by mouth daily       latanoprost (XALATAN) 0.005 % ophthalmic solution Place 1 drop into both eyes nightly      vitamin B-12 (CYANOCOBALAMIN) 1000 MCG tablet TAKE 1 TABLET BY MOUTH DAILY (Patient not taking: Reported on 1/17/2023)      docusate sodium (COLACE) 100 MG capsule Take 100 mg by mouth 2 times daily as needed for Constipation (Patient not taking: Reported on 1/17/2023)       No current facility-administered medications for this visit.       SOCIAL HISTORY    Reviewed and no change from previous record. Hemanth  reports that he has never smoked. He has never used smokeless tobacco.    FAMILY HISTORY:    Reviewed and No change from previous visit  family history is not on file.    OF SYSTEMS:    General : Negative for fatigue, weight loss, appetite change  HEENT : Negative for nasal  congestion, sneezing, runny nose, sinus pain  Respiratory : Negative for shortness of breath cough, congestion, wheezing  Cardiovascular: Negative for chest pain, palpitations, shortness of breath  GI: positive for Nausea, Abdominal Discomfort   Genitourinary : negative for dysuria, urinary frequency, urinary urgency, hematuria  Neurological : Negative for headache, dizziness, gait change,   Psych : Negative for depression, anxiety  Skin: Negative rash and skin lesions  Musculoskeletal : Negative for joint pain, muscle pain      PHYSICAL EXAM:      Vitals:    01/17/23 1504   BP: 126/78   Pulse: 76   SpO2: 100%   Weight: 174 lb (78.9 kg)   Height: 6' 1\" (1.854 m)     BP Readings from Last 3 Encounters:   01/17/23 126/78   11/07/22 128/72   10/04/22 126/82        Physical Exam  Vitals and nursing note reviewed. Constitutional:       General: He is not in acute distress. Appearance: He is well-developed. He is not diaphoretic. HENT:      Head: Normocephalic and atraumatic. Mouth/Throat:      Pharynx: No oropharyngeal exudate. Eyes:      General: No scleral icterus. Right eye: No discharge. Left eye: No discharge. Conjunctiva/sclera: Conjunctivae normal.      Pupils: Pupils are equal, round, and reactive to light. Neck:      Thyroid: No thyromegaly. Vascular: No JVD. Trachea: No tracheal deviation. Cardiovascular:      Rate and Rhythm: Normal rate. Heart sounds: Normal heart sounds. No murmur heard. No gallop. Pulmonary:      Effort: Pulmonary effort is normal. No respiratory distress. Breath sounds: Normal breath sounds. No stridor. No wheezing or rales. Abdominal:      General: Bowel sounds are normal. There is no distension. Palpations: Abdomen is soft. Tenderness: There is no abdominal tenderness. There is no guarding or rebound. Musculoskeletal:         General: No tenderness. Normal range of motion.       Cervical back: Normal range of motion and neck supple. Right lower leg: Edema (Right > left) present. Left lower leg: Edema present. Skin:     General: Skin is warm and dry. Findings: No erythema or rash. Neurological:      Mental Status: He is alert and oriented to person, place, and time. No results found for: LABA1C  No results found for: EAG   LABORATORY FINDINGS:    CBC:  Lab Results   Component Value Date/Time    WBC 3.9 12/10/2022 10:08 AM    HGB 9.7 12/10/2022 10:08 AM     12/10/2022 10:08 AM       BMP:    Lab Results   Component Value Date/Time     10/17/2022 10:26 AM    K 4.4 10/17/2022 10:26 AM     10/17/2022 10:26 AM    CO2 28 10/17/2022 10:26 AM    BUN 30 10/17/2022 10:26 AM    CREATININE 1.58 10/17/2022 10:26 AM    GLUCOSE 122 10/17/2022 10:26 AM       HEMOGLOBIN A1C: No results found for: LABA1C    FASTING LIPID PANEL:  Lab Results   Component Value Date    CHOL 125 11/02/2021    HDL 57 11/02/2021    TRIG 70 11/02/2021       ASSESSMENT AND PLAN:    1. VARSHA (acute kidney injury) ([facility] [address]  Stable   Follows with Nephrologist     2. Stage 3b chronic kidney disease ([facility] [address]    3. Gastroesophageal reflux disease with esophagitis, unspecified whether hemorrhage  - famotidine (PEPCID) 20 MG tablet; Take 1 tablet by mouth 2 times daily  Dispense: 180 tablet; Refill: 0    4. Coronary artery disease involving native coronary artery of native heart with angina pectoris (HCC)  Stable     5. Chronic systolic (congestive) heart failure (HCC)  Compensated     6. Atrial fibrillation, unspecified type ([facility])  Rate Control , on AC     I suspects his symptoms are due to Food Poisoning   Advise to increase Pepcid to BID  If symptoms do not improve in couple of days, advised to call my office,  Patient has no vomiting, passing stools  Abdominal soft, having good bowel sounds  I do not suspect patient has small bowel obstruction  Return in about 3 months (around 4/17/2023).     Reviewed prior labs and health maintenance. Discussed use, benefit, and side effects of prescribed medications. Barriers to medication compliance addressed. All patient questions answered. Pt voiced understanding. MD JACQUELINE Alcantar [facility]  1/17/2023, 5:12 PM    Please note that this chart was generated using voice recognition Dragon dictation software. Although every effort was made to ensure the accuracy of this automated transcription, some errors in transcription may have occurred. FOLLOW UP AND INSTRUCTIONS:   No follow-ups on file. César Vides received counseling on the following healthy behaviors: nutrition, exercise, and medication adherence    Discussed use, benefit, and side effects of prescribed medications. Barriers to medication compliance addressed. All patient questions answered. Pt voiced understanding. Patient given educational materials - see patient instructions    MD JACQUELINE Carbajal [facility]  1/17/2023, 3:08 PM    Please note that this chart was generated using voice recognition Dragon dictation software. Although every effort was made to ensure the accuracy of this automatedtranscription, some errors in transcription may have occurred. Visit Information    Have you changed or started any medications since your last visit including any over-the-counter medicines, vitamins, or herbal medicines? no   Are you having any side effects from any of your medications? -  no  Have you stopped taking any of your medications? Is so, why? -  no    Have you seen any other physician or provider since your last visit? Yes - Records Obtained  Have you had any other diagnostic tests since your last visit? No  Have you been seen in the emergency room and/or had an admission to a hospital since we last saw you? No  Have you had your routine dental cleaning in the past 6 months? no    Have you activated your [employer] account? If not, what are your barriers?  No:      Patient Care Team:  Kizzy Kam, MD as PCP - General (Internal Medicine)  Maryuri Gregorio MD as PCP - [facility] Empaneled Provider  Huber Choudhary MD as Consulting Physician (Hematology and Oncology)    Medical History Review  Past Medical, Family, and Social History reviewed and does not contribute to the patient presenting condition    Health Maintenance   Topic Date Due    DTaP/Tdap/Td vaccine (1 - Tdap) Never done    Shingles vaccine (1 of 2) Never done    COVID-19 Vaccine (4 - Booster for Cole Peter series) 01/06/2022    Depression Screen  07/20/2023    Annual Wellness Visit (AWV)  07/21/2023    Flu vaccine  Completed    Pneumococcal 65+ years Vaccine  Completed    Hepatitis A vaccine  Aged Out    Hib vaccine  Aged Out    Meningococcal (ACWY) vaccine  Aged Out

## 2023-01-27 DIAGNOSIS — I10 PRIMARY HYPERTENSION: ICD-10-CM

## 2023-01-27 RX ORDER — AMLODIPINE BESYLATE 5 MG/1
10 TABLET ORAL DAILY
Qty: 180 TABLET | Refills: 3 | Status: SHIPPED | OUTPATIENT
Start: 2023-01-27

## 2023-01-30 DIAGNOSIS — N40.0 ENLARGED PROSTATE: ICD-10-CM

## 2023-01-30 RX ORDER — FINASTERIDE 5 MG/1
5 TABLET, FILM COATED ORAL DAILY
Qty: 90 TABLET | Refills: 3 | Status: SHIPPED | OUTPATIENT
Start: 2023-01-30

## 2023-02-02 RX ORDER — DOCUSATE SODIUM 100 MG/1
100 CAPSULE, LIQUID FILLED ORAL 2 TIMES DAILY PRN
Qty: 180 CAPSULE | Refills: 2 | Status: SHIPPED | OUTPATIENT
Start: 2023-02-02

## 2023-02-24 ENCOUNTER — HOSPITAL ENCOUNTER (OUTPATIENT)
Dept: INFUSION THERAPY | Age: 88
Discharge: HOME | End: 2023-02-24
Payer: MEDICARE

## 2023-02-24 ENCOUNTER — HOSPITAL ENCOUNTER (OUTPATIENT)
Age: 88
End: 2023-02-24
Payer: MEDICARE

## 2023-02-24 ENCOUNTER — TELEPHONE (OUTPATIENT)
Dept: ONCOLOGY | Age: 88
End: 2023-02-24

## 2023-02-24 ENCOUNTER — OFFICE VISIT (OUTPATIENT)
Dept: ONCOLOGY | Age: 88
End: 2023-02-24

## 2023-02-24 ENCOUNTER — HOSPITAL ENCOUNTER (OUTPATIENT)
Age: 88
Discharge: HOME | End: 2023-02-24
Payer: MEDICARE

## 2023-02-24 VITALS
BODY MASS INDEX: 26.81 KG/M2 | WEIGHT: 203.2 LBS | TEMPERATURE: 98.1 F | SYSTOLIC BLOOD PRESSURE: 160 MMHG | DIASTOLIC BLOOD PRESSURE: 84 MMHG | HEART RATE: 78 BPM

## 2023-02-24 DIAGNOSIS — D64.9 ANEMIA, UNSPECIFIED TYPE: ICD-10-CM

## 2023-02-24 DIAGNOSIS — E53.8 B12 DEFICIENCY: Primary | ICD-10-CM

## 2023-02-24 DIAGNOSIS — E61.1 IRON DEFICIENCY: ICD-10-CM

## 2023-02-24 LAB
ABSOLUTE EOS #: 0.1 K/UL (ref 0–0.4)
ABSOLUTE LYMPH #: 0.7 K/UL (ref 1–4.8)
ABSOLUTE MONO #: 0.4 K/UL (ref 0.1–1.2)
BASOPHILS # BLD: 1 % (ref 0–2)
BASOPHILS ABSOLUTE: 0 K/UL (ref 0–0.2)
EOSINOPHILS RELATIVE PERCENT: 3 % (ref 1–4)
FERRITIN SERPL-MCNC: 230 NG/ML (ref 30–400)
FOLATE SERPL-MCNC: 18.6 NG/ML
HCT VFR BLD AUTO: 30.4 % (ref 41–53)
HGB BLD-MCNC: 9.8 G/DL (ref 13.5–17.5)
IRON SATURATION: 17 % (ref 20–55)
IRON SERPL-MCNC: 42 UG/DL (ref 59–158)
LYMPHOCYTES # BLD: 16 % (ref 24–44)
MCH RBC QN AUTO: 29.5 PG (ref 26–34)
MCHC RBC AUTO-ENTMCNC: 32.2 G/DL (ref 31–37)
MCV RBC AUTO: 91.5 FL (ref 80–100)
MONOCYTES # BLD: 9 % (ref 2–11)
PDW BLD-RTO: 14.1 % (ref 12.5–15.4)
PLATELET # BLD AUTO: 180 K/UL (ref 140–450)
PMV BLD AUTO: 7 FL (ref 6–12)
RBC # BLD: 3.32 M/UL (ref 4.5–5.9)
SEG NEUTROPHILS: 71 % (ref 36–66)
SEGMENTED NEUTROPHILS ABSOLUTE COUNT: 3.3 K/UL (ref 1.8–7.7)
TIBC SERPL-MCNC: 244 UG/DL (ref 250–450)
UNSATURATED IRON BINDING CAPACITY: 202 UG/DL (ref 112–347)
VIT B12 SERPL-MCNC: 231 PG/ML (ref 232–1245)
WBC # BLD AUTO: 4.6 K/UL (ref 3.5–11)

## 2023-02-24 PROCEDURE — 83540 ASSAY OF IRON: CPT

## 2023-02-24 PROCEDURE — 83550 IRON BINDING TEST: CPT

## 2023-02-24 PROCEDURE — 82607 VITAMIN B-12: CPT

## 2023-02-24 PROCEDURE — 85025 COMPLETE CBC W/AUTO DIFF WBC: CPT

## 2023-02-24 PROCEDURE — 36415 COLL VENOUS BLD VENIPUNCTURE: CPT

## 2023-02-24 PROCEDURE — 82746 ASSAY OF FOLIC ACID SERUM: CPT

## 2023-02-24 PROCEDURE — 82728 ASSAY OF FERRITIN: CPT

## 2023-02-24 RX ORDER — SODIUM CHLORIDE 9 MG/ML
INJECTION, SOLUTION INTRAVENOUS CONTINUOUS
OUTPATIENT
Start: 2023-03-24

## 2023-02-24 RX ORDER — ALBUTEROL SULFATE 90 UG/1
4 AEROSOL, METERED RESPIRATORY (INHALATION) PRN
OUTPATIENT
Start: 2023-03-24

## 2023-02-24 RX ORDER — ONDANSETRON 2 MG/ML
8 INJECTION INTRAMUSCULAR; INTRAVENOUS
Status: CANCELLED | OUTPATIENT
Start: 2023-02-24

## 2023-02-24 RX ORDER — SODIUM CHLORIDE 9 MG/ML
INJECTION, SOLUTION INTRAVENOUS CONTINUOUS
Status: CANCELLED | OUTPATIENT
Start: 2023-02-24

## 2023-02-24 RX ORDER — CYANOCOBALAMIN 1000 UG/ML
1000 INJECTION, SOLUTION INTRAMUSCULAR; SUBCUTANEOUS ONCE
Status: CANCELLED
Start: 2023-02-24

## 2023-02-24 RX ORDER — DIPHENHYDRAMINE HYDROCHLORIDE 50 MG/ML
50 INJECTION INTRAMUSCULAR; INTRAVENOUS
Status: CANCELLED | OUTPATIENT
Start: 2023-02-24

## 2023-02-24 RX ORDER — ALBUTEROL SULFATE 90 UG/1
4 AEROSOL, METERED RESPIRATORY (INHALATION) PRN
Status: CANCELLED | OUTPATIENT
Start: 2023-02-24

## 2023-02-24 RX ORDER — EPINEPHRINE 1 MG/ML
0.3 INJECTION, SOLUTION, CONCENTRATE INTRAVENOUS PRN
OUTPATIENT
Start: 2023-03-24

## 2023-02-24 RX ORDER — EPINEPHRINE 1 MG/ML
0.3 INJECTION, SOLUTION, CONCENTRATE INTRAVENOUS PRN
Status: CANCELLED | OUTPATIENT
Start: 2023-02-24

## 2023-02-24 RX ORDER — ACETAMINOPHEN 325 MG/1
650 TABLET ORAL
OUTPATIENT
Start: 2023-03-24

## 2023-02-24 RX ORDER — DIPHENHYDRAMINE HYDROCHLORIDE 50 MG/ML
50 INJECTION INTRAMUSCULAR; INTRAVENOUS
OUTPATIENT
Start: 2023-03-24

## 2023-02-24 RX ORDER — ACETAMINOPHEN 325 MG/1
325 TABLET ORAL
Status: CANCELLED | OUTPATIENT
Start: 2023-02-24

## 2023-02-24 RX ORDER — FAMOTIDINE 10 MG/ML
20 INJECTION, SOLUTION INTRAVENOUS
OUTPATIENT
Start: 2023-03-24

## 2023-02-24 RX ORDER — ONDANSETRON 2 MG/ML
8 INJECTION INTRAMUSCULAR; INTRAVENOUS
OUTPATIENT
Start: 2023-03-24

## 2023-02-24 RX ORDER — CYANOCOBALAMIN 1000 UG/ML
1000 INJECTION, SOLUTION INTRAMUSCULAR; SUBCUTANEOUS ONCE
Status: DISCONTINUED
Start: 2023-02-24 | End: 2023-02-25 | Stop reason: HOSPADM

## 2023-02-24 RX ORDER — CYANOCOBALAMIN 1000 UG/ML
1000 INJECTION, SOLUTION INTRAMUSCULAR; SUBCUTANEOUS ONCE
Start: 2023-03-24

## 2023-02-24 RX ORDER — CYANOCOBALAMIN 1000 UG/ML
1000 INJECTION, SOLUTION INTRAMUSCULAR; SUBCUTANEOUS ONCE
Status: DISCONTINUED
Start: 2023-02-24 | End: 2023-02-24

## 2023-02-24 RX ORDER — FAMOTIDINE 10 MG/ML
20 INJECTION, SOLUTION INTRAVENOUS
Status: CANCELLED | OUTPATIENT
Start: 2023-02-24

## 2023-02-24 RX ORDER — ACETAMINOPHEN 325 MG/1
325 TABLET ORAL
OUTPATIENT
Start: 2023-03-24

## 2023-02-24 RX ORDER — ACETAMINOPHEN 325 MG/1
650 TABLET ORAL
Status: CANCELLED | OUTPATIENT
Start: 2023-02-24

## 2023-02-24 NOTE — PROGRESS NOTES
Dot Pollock                                                                                                                  2/24/2023  MRN:   [id number]  YOB: 1935  PCP:                           Sharon Dixon MD  Referring Physician: No ref. provider found  Treating Physician Name: Nhan Fallon MD      Reason for Visit:  Chief Complaint   Patient presents with    Follow-up     Review status of disease    Discuss Labs    Fatigue     Low energy. Weak        Current problems:  Anemia   Iron deficiency  Sick sinus syndrome and atrial fibrillation status post pacemaker 5000  Diastolic dysfunction  Chronic venous insufficiency  Chronic anticoagulation    Active and recent treatments:  Oral iron supplementation with vitamin C  S/p bone marrow biopsy    Interval history:  Patient presents to the clinic for a follow-up visit and to discuss results with lab work-up. Patient complains of being tired complains of low energy. Wants to know if he can go back on the B12 shots which made him feel better. During this visit patient's allergy, social, medical, surgical history and medications were reviewed and updated. Summary of Case/History:    Dot Pollock a 80 y.o.male is a patient with anemia who presents to the clinic to establish care and for further work-up and evaluation. Patient was recently hospitalized back in June and at [facility] was noted to be anemic with hemoglobin 8.6. Iron studies showed iron saturation of 15%. Ferritin was not checked. Patient states that he is not taking any oral iron. Patient does take Xarelto 15 mg daily due to history of atrial fibrillation. Patient also has history of DVT once. Patient states that he does follow-up with a GI doctor in [place] and had endoscopy done last year. We do not have the records at this point. Patient also follows up with his cardiologist in [facility].   Patient is in the process of transferring his care to [place] and presents to the clinic to establish care. Denies any bloody bowel movements. Past Medical History:   Past Medical History:   Diagnosis Date    Arthritis     Atrial fibrillation (HCC)     CAD (coronary artery disease)     CHF (congestive heart failure) (HCC)     Glaucoma     Hx of blood clots     with hernia surgery    Hyperlipidemia     Hypertension     MI (myocardial infarction) ([facility] [address]        Past Surgical History:     Past Surgical History:   Procedure Laterality Date    ABDOMEN SURGERY      gastric resection    APPENDECTOMY      BONE MARROW BIOPSY      CARDIAC CATHETERIZATION      COLONOSCOPY      CT BONE MARROW BIOPSY  5/31/2022    CT BONE MARROW BIOPSY 5/31/2022 [facility] CT SCAN    EYE SURGERY      smiley cataracts    HERNIA REPAIR      inguinal smiley    JOINT REPLACEMENT      rt hip x 2, lt knee    PACEMAKER PLACEMENT         Patient Family Social History:     Social History     Socioeconomic History    Marital status:      Spouse name: None    Number of children: None    Years of education: None    Highest education level: None   Tobacco Use    Smoking status: Never    Smokeless tobacco: Never   Substance and Sexual Activity    Alcohol use: Never    Drug use: Never     Social Determinants of Health     Financial Resource Strain: Low Risk     Difficulty of Paying Living Expenses: Not hard at all   Food Insecurity: No Food Insecurity    Worried About Running Out of Food in the Last Year: Never true    Ran Out of Food in the Last Year: Never true   Transportation Needs: Unmet Transportation Needs    Lack of Transportation (Medical): Yes    Lack of Transportation (Non-Medical): Yes   Physical Activity: Inactive    Days of Exercise per Week: 0 days    Minutes of Exercise per Session: 0 min   Stress: No Stress Concern Present    Feeling of Stress :  Only a little   Housing Stability: Low Risk     Unable to Pay for Housing in the Last Year: No    Number of [place] in the Last Year: 1 Unstable Housing in the Last Year: No     Family history:    Hypertension and diabetes mellitus    Current Medications:     Current Outpatient Medications   Medication Sig Dispense Refill    docusate sodium (COLACE) 100 MG capsule Take 1 capsule by mouth 2 times daily as needed for Constipation 180 capsule 2    finasteride (PROSCAR) 5 MG tablet Take 1 tablet by mouth daily 90 tablet 3    amLODIPine (NORVASC) 5 MG tablet TAKE 2 TABLETS BY MOUTH DAILY 180 tablet 3    XARELTO 15 MG TABS tablet TAKE 1 TABLET BY MOUTH DAILY WITH BREAKFAST 30 tablet 1    famotidine (PEPCID) 20 MG tablet Take 1 tablet by mouth 2 times daily 180 tablet 0    bumetanide (BUMEX) 2 MG tablet Take 1 tablet by mouth daily 180 tablet 1    vitamin D (ERGOCALCIFEROL) 1.25 MG (79720 UT) CAPS capsule TAKE 1 CAPSULE BY MOUTH EVERY WEEK 12 capsule 2    metOLazone (ZAROXOLYN) 2.5 MG tablet Take 1 tablet by mouth in the morning. 4 tablet 0    metoprolol succinate (TOPROL XL) 100 MG extended release tablet 150 mg      ondansetron (ZOFRAN-ODT) 4 MG disintegrating tablet Take 1 tablet by mouth 3 times daily as needed for Nausea or Vomiting 21 tablet 0    hydrALAZINE (APRESOLINE) 25 MG tablet       Ascorbic Acid (VITAMIN C) 250 MG tablet TAKE 1 TABLET BY MOUTH TWICE DAILY(TAKE WITH IRON) 60 tablet 3    nitroGLYCERIN (NITROSTAT) 0.4 MG SL tablet up to max of 3 total doses. If no relief after 1 dose, call 911. 25 tablet 0    Coenzyme Q10 100 MG CHEW Take 1 tablet by mouth daily 90 tablet 0    buprenorphine-naloxone (SUBOXONE) 8-2 MG FILM SL film Place 1 Film under the tongue 2 times daily. Indications: pain       magnesium oxide (MAG-OX) 400 MG tablet Take 400 mg by mouth daily       latanoprost (XALATAN) 0.005 % ophthalmic solution Place 1 drop into both eyes nightly      vitamin B-12 (CYANOCOBALAMIN) 1000 MCG tablet TAKE 1 TABLET BY MOUTH DAILY (Patient not taking: No sig reported)       No current facility-administered medications for this visit. Allergies:   Aspirin, Cyclobenzaprine, Ibuprofen, Azithromycin, and Hydrocodone-acetaminophen    Review of Systems:    Constitutional: No fever or chills. No night sweats, no weight loss. Positive fatigue  Eyes: No eye discharge, double vision, or eye pain   HEENT: negative for sore mouth, sore throat, hoarseness and voice change   Respiratory: negative for cough , sputum, dyspnea, wheezing, hemoptysis, chest pain   Cardiovascular: negative for chest pain, dyspnea, palpitations, orthopnea, PND   Gastrointestinal: negative for nausea, vomiting, diarrhea, constipation, abdominal pain, Dysphagia, hematemesis and hematochezia   Genitourinary: negative for frequency, dysuria, nocturia, urinary incontinence, and hematuria   Integument: negative for rash, skin lesions, bruises.    Hematologic/Lymphatic: negative for easy bruising, bleeding, lymphadenopathy, or petechiae   Endocrine: negative for heat or cold intolerance,weight changes, change in bowel habits and hair loss   Musculoskeletal: negative for myalgias, arthralgias, pain, joint swelling,and bone pain   Neurological: negative for headaches, dizziness, seizures, weakness, numbness    Physical Exam:    Vitals: BP (!) 160/84   Pulse 78   Temp 98.1 °F (36.7 °C) (Temporal)   Wt 203 lb 3.2 oz (92.2 kg)   BMI 26.81 kg/m²   General appearance - well appearing, no in pain or distress  Mental status - AAO X3  Eyes - pupils equal and reactive, extraocular eye movements intact  Mouth - mucous membranes moist, pharynx normal without lesions  Neck - supple, no significant adenopathy  Lymphatics - no palpable lymphadenopathy, no hepatosplenomegaly  Chest - clear to auscultation, no wheezes, rales or rhonchi, symmetric air entry  Heart - normal rate, regular rhythm, normal S1, S2, no murmurs  Abdomen - soft, nontender, nondistended, no masses or organomegaly  Neurological - alert, oriented, normal speech, no focal findings or movement disorder noted  Extremities - peripheral pulses normal, no pedal edema, no clubbing or cyanosis  Skin - normal coloration and turgor, no rashes, no suspicious skin lesions noted       DATA:    Results for orders placed or performed during the hospital encounter of 12/10/22   Ferritin   Result Value Ref Range    Ferritin 333 30 - 400 ng/mL   Iron and TIBC   Result Value Ref Range    Iron 71 59 - 158 ug/dL    TIBC 260 250 - 450 ug/dL    Iron Saturation 27 20 - 55 %    UIBC 189 112 - 347 ug/dL   Vitamin B12 & Folate   Result Value Ref Range    Vitamin B-12 261 232 - 1245 pg/mL    Folate 15.7 >4.8 ng/mL   Erythropoietin   Result Value Ref Range    Erythropoietin 11 4 - 27 mU/mL   CBC with Auto Differential   Result Value Ref Range    WBC 3.9 3.5 - 11.0 k/uL    RBC 3.35 (L) 4.5 - 5.9 m/uL    Hemoglobin 9.7 (L) 13.5 - 17.5 g/dL    Hematocrit 31.0 (L) 41 - 53 %    MCV 92.5 80 - 100 fL    MCH 29.1 26 - 34 pg    MCHC 31.5 31 - 37 g/dL    RDW 14.4 11.5 - 14.9 %    Platelets 052 236 - 356 k/uL    MPV 8.0 6.0 - 12.0 fL    Seg Neutrophils 61 36 - 66 %    Lymphocytes 21 (L) 24 - 44 %    Monocytes 12 (H) 1 - 7 %    Eosinophils % 5 (H) 0 - 4 %    Basophils 1 0 - 2 %    Segs Absolute 2.30 1.3 - 9.1 k/uL    Absolute Lymph # 0.80 (L) 1.0 - 4.8 k/uL    Absolute Mono # 0.50 0.1 - 1.3 k/uL    Absolute Eos # 0.20 0.0 - 0.4 k/uL    Basophils Absolute 0.00 0.0 - 0.2 k/uL       Impression:  Anemia   Iron deficiency  Sick sinus syndrome and atrial fibrillation status post pacemaker 2595  Diastolic dysfunction  Chronic venous insufficiency  Chronic anticoagulation    Plan:  Personally reviewed results of lab work-up and other relevant clinical data.   B12 level is low we will start patient on B12 supplements  Oral iron supplement once every other day  Continues to be on anticoagulation  CKD is also contributing to the anemia we can consider KAYDEN therapy if hemoglobin persistently below 10  Previously patient bone marrow had erythroid hyperplasia with a normal cellular marrow. There were absent iron stores. MDS FISH panel was negative. We will continue to monitor hemoglobin at this point I suspect patient may have a low-grade MDS as suggested by erythroid hyperplasia on the bone marrow. If hemoglobin continues to decline we will consider KAYDEN therapy. Patient multiple questions was answered to the best of my ability. Alfredo Jolley MD      this note is created with the assistance of a speech recognition program.  While intending to generate a document that actually reflects the content of the visit, the document can still have some errors including those of syntax and sound a like substitutions which may escape proof reading. It such instances, actual meaning can be extrapolated by contextual diversion.

## 2023-02-24 NOTE — PROGRESS NOTES
Patient here for B12 injection. He tolerated injection well. He saw Dr. Barbi Elena today see his dictation. He is due to return next month for next B12 injection.

## 2023-02-24 NOTE — TELEPHONE ENCOUNTER
AVS from 2/24/23    B12 injectiosn , can pt get one today   Labs now   Rv in 4 months , will decide about labs based on labs from today       *b12 today   *rv is sched for [email or web address]    PT was given AVS and appt schedule

## 2023-02-24 NOTE — PATIENT INSTRUCTIONS
B12 injectrio , can pt get one today   Labs now   Rv in 4 months , will decide about labs based on labs from today

## 2023-02-28 ENCOUNTER — HOSPITAL ENCOUNTER (OUTPATIENT)
Age: 88
Discharge: HOME | End: 2023-02-28
Payer: MEDICARE

## 2023-02-28 DIAGNOSIS — N18.32 ANEMIA IN STAGE 3B CHRONIC KIDNEY DISEASE (HCC): ICD-10-CM

## 2023-02-28 DIAGNOSIS — D63.1 ANEMIA IN STAGE 3B CHRONIC KIDNEY DISEASE (HCC): ICD-10-CM

## 2023-02-28 DIAGNOSIS — I12.9 BENIGN HYPERTENSION WITH CKD (CHRONIC KIDNEY DISEASE) STAGE III (HCC): ICD-10-CM

## 2023-02-28 DIAGNOSIS — N18.32 STAGE 3B CHRONIC KIDNEY DISEASE (HCC): ICD-10-CM

## 2023-02-28 DIAGNOSIS — N18.30 BENIGN HYPERTENSION WITH CKD (CHRONIC KIDNEY DISEASE) STAGE III (HCC): ICD-10-CM

## 2023-02-28 LAB
ABSOLUTE EOS #: 0.27 K/UL (ref 0–0.4)
ABSOLUTE LYMPH #: 0.9 K/UL (ref 1–4.8)
ABSOLUTE MONO #: 0.43 K/UL (ref 0.1–1.3)
ANION GAP SERPL CALCULATED.3IONS-SCNC: 9 MMOL/L (ref 9–17)
BACTERIA: ABNORMAL
BASOPHILS # BLD: 1 % (ref 0–2)
BASOPHILS ABSOLUTE: 0.04 K/UL (ref 0–0.2)
BILIRUBIN URINE: NEGATIVE
BUN SERPL-MCNC: 27 MG/DL (ref 8–23)
CALCIUM SERPL-MCNC: 9.1 MG/DL (ref 8.6–10.4)
CASTS UA: ABNORMAL /LPF
CHLORIDE SERPL-SCNC: 102 MMOL/L (ref 98–107)
CO2 SERPL-SCNC: 28 MMOL/L (ref 20–31)
COLOR: YELLOW
CREAT SERPL-MCNC: 1.13 MG/DL (ref 0.7–1.2)
EOSINOPHILS RELATIVE PERCENT: 7 % (ref 0–4)
EPITHELIAL CELLS UA: ABNORMAL /HPF
GFR SERPL CREATININE-BSD FRML MDRD: >60 ML/MIN/1.73M2
GLUCOSE SERPL-MCNC: 115 MG/DL (ref 70–99)
GLUCOSE UR STRIP.AUTO-MCNC: NEGATIVE MG/DL
HCT VFR BLD AUTO: 30.8 % (ref 41–53)
HGB BLD-MCNC: 10.1 G/DL (ref 13.5–17.5)
KETONES UR STRIP.AUTO-MCNC: ABNORMAL MG/DL
LEUKOCYTE ESTERASE UR QL STRIP.AUTO: NEGATIVE
LYMPHOCYTES # BLD: 23 % (ref 24–44)
MAGNESIUM SERPL-MCNC: 2.1 MG/DL (ref 1.6–2.6)
MCH RBC QN AUTO: 29.9 PG (ref 26–34)
MCHC RBC AUTO-ENTMCNC: 32.8 G/DL (ref 31–37)
MCV RBC AUTO: 91.3 FL (ref 80–100)
MONOCYTES # BLD: 11 % (ref 1–7)
MORPHOLOGY: NORMAL
NITRITE UR QL STRIP.AUTO: NEGATIVE
PDW BLD-RTO: 14.2 % (ref 11.5–14.9)
PHOSPHATE SERPL-MCNC: 4.1 MG/DL (ref 2.5–4.5)
PLATELET # BLD AUTO: 175 K/UL (ref 150–450)
PMV BLD AUTO: 7.4 FL (ref 6–12)
POTASSIUM SERPL-SCNC: 4.5 MMOL/L (ref 3.7–5.3)
PROT UR STRIP.AUTO-MCNC: 6 MG/DL (ref 5–8)
PROT UR STRIP.AUTO-MCNC: NEGATIVE MG/DL
RBC # BLD: 3.37 M/UL (ref 4.5–5.9)
RBC CLUMPS #/AREA URNS AUTO: ABNORMAL /HPF
SEG NEUTROPHILS: 58 % (ref 36–66)
SEGMENTED NEUTROPHILS ABSOLUTE COUNT: 2.26 K/UL (ref 1.3–9.1)
SODIUM SERPL-SCNC: 139 MMOL/L (ref 135–144)
SPECIFIC GRAVITY UA: 1.02 (ref 1–1.03)
TURBIDITY: CLEAR
URINE HGB: NEGATIVE
UROBILINOGEN, URINE: NORMAL
WBC # BLD AUTO: 3.9 K/UL (ref 3.5–11)
WBC UA: ABNORMAL /HPF

## 2023-02-28 PROCEDURE — 83735 ASSAY OF MAGNESIUM: CPT

## 2023-02-28 PROCEDURE — 36415 COLL VENOUS BLD VENIPUNCTURE: CPT

## 2023-02-28 PROCEDURE — 81001 URINALYSIS AUTO W/SCOPE: CPT

## 2023-02-28 PROCEDURE — 85025 COMPLETE CBC W/AUTO DIFF WBC: CPT

## 2023-02-28 PROCEDURE — 80048 BASIC METABOLIC PNL TOTAL CA: CPT

## 2023-02-28 PROCEDURE — 84100 ASSAY OF PHOSPHORUS: CPT

## 2023-03-05 RX ORDER — FERROUS SULFATE 325(65) MG
325 TABLET ORAL EVERY OTHER DAY
Qty: 90 TABLET | Refills: 1 | Status: SHIPPED | OUTPATIENT
Start: 2023-03-05

## 2023-03-07 PROBLEM — K59.01 SLOW TRANSIT CONSTIPATION: Status: ACTIVE | Noted: 2023-03-07

## 2023-03-07 PROBLEM — K59.04 FUNCTIONAL CONSTIPATION: Status: ACTIVE | Noted: 2023-03-07

## 2023-03-16 RX ORDER — RIVAROXABAN 15 MG/1
TABLET, FILM COATED ORAL
Qty: 30 TABLET | Refills: 1 | Status: SHIPPED | OUTPATIENT
Start: 2023-03-16

## 2023-04-07 ENCOUNTER — TELEPHONE (OUTPATIENT)
Dept: ONCOLOGY | Age: 88
End: 2023-04-07

## 2023-04-07 NOTE — TELEPHONE ENCOUNTER
received referral stating patient has been missing injections due to issue with transportation provider.  called patient and left a message.

## 2023-04-14 ENCOUNTER — HOSPITAL ENCOUNTER (OUTPATIENT)
Dept: INFUSION THERAPY | Age: 88
Discharge: HOME | End: 2023-04-14
Payer: MEDICARE

## 2023-04-14 DIAGNOSIS — E53.8 B12 DEFICIENCY: Primary | ICD-10-CM

## 2023-04-14 PROCEDURE — 6360000002 HC RX W HCPCS: Performed by: INTERNAL MEDICINE

## 2023-04-14 PROCEDURE — 96372 THER/PROPH/DIAG INJ SC/IM: CPT

## 2023-04-14 RX ORDER — FAMOTIDINE 10 MG/ML
20 INJECTION, SOLUTION INTRAVENOUS
OUTPATIENT
Start: 2023-04-21

## 2023-04-14 RX ORDER — CYANOCOBALAMIN 1000 UG/ML
1000 INJECTION, SOLUTION INTRAMUSCULAR; SUBCUTANEOUS ONCE
Start: 2023-04-21

## 2023-04-14 RX ORDER — ONDANSETRON 2 MG/ML
8 INJECTION INTRAMUSCULAR; INTRAVENOUS
OUTPATIENT
Start: 2023-04-21

## 2023-04-14 RX ORDER — DIPHENHYDRAMINE HYDROCHLORIDE 50 MG/ML
50 INJECTION INTRAMUSCULAR; INTRAVENOUS
OUTPATIENT
Start: 2023-04-21

## 2023-04-14 RX ORDER — SODIUM CHLORIDE 9 MG/ML
INJECTION, SOLUTION INTRAVENOUS CONTINUOUS
OUTPATIENT
Start: 2023-04-21

## 2023-04-14 RX ORDER — EPINEPHRINE 1 MG/ML
0.3 INJECTION, SOLUTION, CONCENTRATE INTRAVENOUS PRN
OUTPATIENT
Start: 2023-04-21

## 2023-04-14 RX ORDER — ACETAMINOPHEN 325 MG/1
325 TABLET ORAL
OUTPATIENT
Start: 2023-04-21

## 2023-04-14 RX ORDER — ALBUTEROL SULFATE 90 UG/1
4 AEROSOL, METERED RESPIRATORY (INHALATION) PRN
OUTPATIENT
Start: 2023-04-21

## 2023-04-14 RX ORDER — ACETAMINOPHEN 325 MG/1
650 TABLET ORAL
OUTPATIENT
Start: 2023-04-21

## 2023-04-14 RX ORDER — CYANOCOBALAMIN 1000 UG/ML
1000 INJECTION, SOLUTION INTRAMUSCULAR; SUBCUTANEOUS ONCE
Status: COMPLETED
Start: 2023-04-14 | End: 2023-04-14

## 2023-04-14 RX ADMIN — CYANOCOBALAMIN 1000 MCG: 1000 INJECTION, SOLUTION INTRAMUSCULAR at 09:36

## 2023-04-14 NOTE — PROGRESS NOTES
Pt here for B12. Denies any problems. Tolerates very well. Will return 5/12 for injection and 6/23 for Dr visit.

## 2023-04-21 ENCOUNTER — OFFICE VISIT (OUTPATIENT)
Dept: INTERNAL MEDICINE CLINIC | Age: 88
End: 2023-04-21
Payer: MEDICARE

## 2023-04-21 VITALS
OXYGEN SATURATION: 100 % | DIASTOLIC BLOOD PRESSURE: 64 MMHG | BODY MASS INDEX: 27.41 KG/M2 | HEIGHT: 72 IN | SYSTOLIC BLOOD PRESSURE: 122 MMHG | WEIGHT: 202.4 LBS | HEART RATE: 70 BPM

## 2023-04-21 DIAGNOSIS — I10 PRIMARY HYPERTENSION: Primary | ICD-10-CM

## 2023-04-21 DIAGNOSIS — M25.551 RIGHT HIP PAIN: ICD-10-CM

## 2023-04-21 DIAGNOSIS — I25.119 CORONARY ARTERY DISEASE INVOLVING NATIVE CORONARY ARTERY OF NATIVE HEART WITH ANGINA PECTORIS (HCC): ICD-10-CM

## 2023-04-21 DIAGNOSIS — I48.91 ATRIAL FIBRILLATION, UNSPECIFIED TYPE (HCC): ICD-10-CM

## 2023-04-21 DIAGNOSIS — N18.32 STAGE 3B CHRONIC KIDNEY DISEASE (HCC): ICD-10-CM

## 2023-04-21 DIAGNOSIS — I50.22 CHRONIC SYSTOLIC (CONGESTIVE) HEART FAILURE (HCC): ICD-10-CM

## 2023-04-21 PROCEDURE — 1124F ACP DISCUSS-NO DSCNMKR DOCD: CPT | Performed by: INTERNAL MEDICINE

## 2023-04-21 PROCEDURE — 1036F TOBACCO NON-USER: CPT | Performed by: INTERNAL MEDICINE

## 2023-04-21 PROCEDURE — G8417 CALC BMI ABV UP PARAM F/U: HCPCS | Performed by: INTERNAL MEDICINE

## 2023-04-21 PROCEDURE — G8427 DOCREV CUR MEDS BY ELIG CLIN: HCPCS | Performed by: INTERNAL MEDICINE

## 2023-04-21 PROCEDURE — 99214 OFFICE O/P EST MOD 30 MIN: CPT | Performed by: INTERNAL MEDICINE

## 2023-04-21 RX ORDER — FERROUS SULFATE 325(65) MG
1 TABLET ORAL 2 TIMES DAILY
COMMUNITY

## 2023-04-21 SDOH — ECONOMIC STABILITY: FOOD INSECURITY: WITHIN THE PAST 12 MONTHS, YOU WORRIED THAT YOUR FOOD WOULD RUN OUT BEFORE YOU GOT MONEY TO BUY MORE.: NEVER TRUE

## 2023-04-21 SDOH — ECONOMIC STABILITY: INCOME INSECURITY: HOW HARD IS IT FOR YOU TO PAY FOR THE VERY BASICS LIKE FOOD, HOUSING, MEDICAL CARE, AND HEATING?: NOT HARD AT ALL

## 2023-04-21 SDOH — ECONOMIC STABILITY: FOOD INSECURITY: WITHIN THE PAST 12 MONTHS, THE FOOD YOU BOUGHT JUST DIDN'T LAST AND YOU DIDN'T HAVE MONEY TO GET MORE.: NEVER TRUE

## 2023-04-21 ASSESSMENT — ENCOUNTER SYMPTOMS
ABDOMINAL DISTENTION: 0
SHORTNESS OF BREATH: 0
FACIAL SWELLING: 0
CONSTIPATION: 0
ABDOMINAL PAIN: 0
APNEA: 0
CHEST TIGHTNESS: 0
BACK PAIN: 1
DIARRHEA: 0
WHEEZING: 0
COUGH: 0
COLOR CHANGE: 0

## 2023-04-21 NOTE — PROGRESS NOTES
No discharge. Left eye: No discharge. Conjunctiva/sclera: Conjunctivae normal.      Pupils: Pupils are equal, round, and reactive to light. Neck:      Thyroid: No thyromegaly. Vascular: No JVD. Trachea: No tracheal deviation. Cardiovascular:      Rate and Rhythm: Normal rate. Heart sounds: Murmur (Pulmonary area) heard. No gallop. Pulmonary:      Effort: Pulmonary effort is normal. No respiratory distress. Breath sounds: Normal breath sounds. No stridor. No wheezing or rales. Abdominal:      General: Bowel sounds are normal. There is no distension. Palpations: Abdomen is soft. Tenderness: There is no abdominal tenderness. There is no guarding or rebound. Musculoskeletal:         General: No tenderness. Normal range of motion. Cervical back: Normal range of motion and neck supple. Right lower leg: Edema (Right > left) present. Left lower leg: Edema present. Skin:     General: Skin is warm and dry. Findings: No erythema or rash. Neurological:      Mental Status: He is alert and oriented to person, place, and time. Assessment / Plan:   1. Primary hypertension  Controlled   - Handicap placard    2. Coronary artery disease involving native coronary artery of native heart with angina pectoris (HCC)  - Handicap placard    3. Right hip pain  Has OA , S/p B/L Hip Replacement   - Handicap placard    4. Stage 3b chronic kidney disease ([address]  Follows with nephro     5. Chronic systolic (congestive) heart failure (HCC)  Stable   On Bumex , Low salt diet     6. Atrial fibrillation, unspecified type ([address]  Rate Controlled   On AC       Return in about 4 months (around 8/21/2023). Reviewed prior labs and health maintenance. Discussed use, benefit, and side effects of prescribed medications. Barriers to medication compliance addressed. All patient questions answered. Pt voiced understanding.          Diamante Painting MD  [facility]

## 2023-05-15 RX ORDER — RIVAROXABAN 15 MG/1
TABLET, FILM COATED ORAL
Qty: 30 TABLET | Refills: 1 | Status: SHIPPED | OUTPATIENT
Start: 2023-05-15

## 2023-05-19 ENCOUNTER — HOSPITAL ENCOUNTER (OUTPATIENT)
Dept: INFUSION THERAPY | Age: 88
Discharge: HOME | End: 2023-05-19
Payer: MEDICARE

## 2023-05-19 DIAGNOSIS — E53.8 B12 DEFICIENCY: Primary | ICD-10-CM

## 2023-05-19 PROCEDURE — 6360000002 HC RX W HCPCS: Performed by: INTERNAL MEDICINE

## 2023-05-19 PROCEDURE — 96372 THER/PROPH/DIAG INJ SC/IM: CPT

## 2023-05-19 RX ORDER — ALBUTEROL SULFATE 90 UG/1
4 AEROSOL, METERED RESPIRATORY (INHALATION) PRN
OUTPATIENT
Start: 2023-06-09

## 2023-05-19 RX ORDER — ACETAMINOPHEN 325 MG/1
650 TABLET ORAL
OUTPATIENT
Start: 2023-06-09

## 2023-05-19 RX ORDER — ONDANSETRON 2 MG/ML
8 INJECTION INTRAMUSCULAR; INTRAVENOUS
OUTPATIENT
Start: 2023-06-09

## 2023-05-19 RX ORDER — DIPHENHYDRAMINE HYDROCHLORIDE 50 MG/ML
50 INJECTION INTRAMUSCULAR; INTRAVENOUS
OUTPATIENT
Start: 2023-06-09

## 2023-05-19 RX ORDER — SODIUM CHLORIDE 9 MG/ML
INJECTION, SOLUTION INTRAVENOUS CONTINUOUS
OUTPATIENT
Start: 2023-06-09

## 2023-05-19 RX ORDER — CYANOCOBALAMIN 1000 UG/ML
1000 INJECTION, SOLUTION INTRAMUSCULAR; SUBCUTANEOUS ONCE
Status: COMPLETED
Start: 2023-05-19 | End: 2023-05-19

## 2023-05-19 RX ORDER — CYANOCOBALAMIN 1000 UG/ML
1000 INJECTION, SOLUTION INTRAMUSCULAR; SUBCUTANEOUS ONCE
Start: 2023-06-09

## 2023-05-19 RX ORDER — EPINEPHRINE 1 MG/ML
0.3 INJECTION, SOLUTION, CONCENTRATE INTRAVENOUS PRN
OUTPATIENT
Start: 2023-06-09

## 2023-05-19 RX ORDER — FAMOTIDINE 10 MG/ML
20 INJECTION, SOLUTION INTRAVENOUS
OUTPATIENT
Start: 2023-06-09

## 2023-05-19 RX ORDER — ACETAMINOPHEN 325 MG/1
325 TABLET ORAL
OUTPATIENT
Start: 2023-06-09

## 2023-05-19 RX ADMIN — CYANOCOBALAMIN 1000 MCG: 1000 INJECTION, SOLUTION INTRAMUSCULAR; SUBCUTANEOUS at 13:41

## 2023-05-19 NOTE — PROGRESS NOTES
Pt here for B12  injection. Injection given without incident. Pt discharged in stable condition. Returns 6/16/23 for next injection.

## 2023-05-31 ENCOUNTER — HOSPITAL ENCOUNTER (OUTPATIENT)
Dept: GENERAL RADIOLOGY | Facility: CLINIC | Age: 88
Discharge: HOME | End: 2023-06-02
Payer: MEDICARE

## 2023-05-31 ENCOUNTER — HOSPITAL ENCOUNTER (OUTPATIENT)
Facility: CLINIC | Age: 88
Discharge: HOME | End: 2023-06-02
Payer: MEDICARE

## 2023-05-31 ENCOUNTER — OFFICE VISIT (OUTPATIENT)
Dept: INTERNAL MEDICINE CLINIC | Age: 88
End: 2023-05-31
Payer: MEDICARE

## 2023-05-31 VITALS
BODY MASS INDEX: 27.4 KG/M2 | HEART RATE: 86 BPM | OXYGEN SATURATION: 97 % | DIASTOLIC BLOOD PRESSURE: 82 MMHG | WEIGHT: 202 LBS | SYSTOLIC BLOOD PRESSURE: 138 MMHG

## 2023-05-31 DIAGNOSIS — M25.551 RIGHT HIP PAIN: ICD-10-CM

## 2023-05-31 DIAGNOSIS — M25.551 RIGHT HIP PAIN: Primary | ICD-10-CM
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
## 2023-05-31 PROCEDURE — 73502 X-RAY EXAM HIP UNI 2-3 VIEWS: CPT

## 2023-05-31 PROCEDURE — G8427 DOCREV CUR MEDS BY ELIG CLIN: HCPCS | Performed by: INTERNAL MEDICINE

## 2023-05-31 PROCEDURE — G8417 CALC BMI ABV UP PARAM F/U: HCPCS | Performed by: INTERNAL MEDICINE

## 2023-05-31 PROCEDURE — 1036F TOBACCO NON-USER: CPT | Performed by: INTERNAL MEDICINE

## 2023-05-31 PROCEDURE — 1124F ACP DISCUSS-NO DSCNMKR DOCD: CPT | Performed by: INTERNAL MEDICINE

## 2023-05-31 PROCEDURE — 99213 OFFICE O/P EST LOW 20 MIN: CPT | Performed by: INTERNAL MEDICINE

## 2023-05-31 RX ORDER — LIDOCAINE 50 MG/G
1 PATCH TOPICAL DAILY
Qty: 10 PATCH | Refills: 0 | Status: SHIPPED | OUTPATIENT
Start: 2023-05-31 | End: 2023-06-10

## 2023-05-31 NOTE — PROGRESS NOTES
[address]  [place] [address]  Dept: [phone]  Dept Fax: [phone]    Bronson Ormond is a 80 y.o. male who presents today for his medicalconditions/complaints as noted below. Bronson Ormond is c/o of Fall (2 days ago, fell outside on back and right hip)      HPI:     Fall  The accident occurred 2 days ago. The fall occurred while walking (walking backwards in garage). He fell from a height of 1 to 2 ft. He landed on [place]. The point of impact was the right hip. The pain is present in the right hip and back. The pain is at a severity of 4/10. The pain is moderate. The symptoms are aggravated by movement. He has tried acetaminophen, ice and heat for the symptoms. The treatment provided mild relief. Past Medical History:   Diagnosis Date    Arthritis     Atrial fibrillation (HCC)     CAD (coronary artery disease)     CHF (congestive heart failure) (HCC)     Glaucoma     Hx of blood clots     with hernia surgery    Hyperlipidemia     Hypertension     MI (myocardial infarction) ([facility] [address]         Current Outpatient Medications   Medication Sig Dispense Refill    lidocaine (LIDODERM) 5 % Place 1 patch onto the skin daily for 10 days 12 hours on, 12 hours off.  10 patch 0    XARELTO 15 MG TABS tablet TAKE 1 TABLET BY MOUTH DAILY WITH BREAKFAST 30 tablet 1    ferrous sulfate (IRON 325) 325 (65 Fe) MG tablet Take 1 tablet by mouth 2 times daily      senna-docusate (PERICOLACE) 8.6-50 MG per tablet TAKE 1 TABLET BY MOUTH 1 TO 2 TIMES A DAY AS NEEDED FOR CONSTIPATION      methocarbamol (ROBAXIN) 750 MG tablet TAKE 1 TABLET BY MOUTH TWICE DAILY AS NEEDED FOR SPASMS      metOLazone (ZAROXOLYN) 2.5 MG tablet Take 1 tab on Monday and Friday 30 tablet 2    docusate sodium (COLACE) 100 MG capsule Take 1 capsule by mouth 2 times daily as needed for Constipation 180 capsule 2    finasteride (PROSCAR) 5 MG tablet Take 1 tablet by mouth daily 90 tablet 3    amLODIPine

## 2023-06-01 ENCOUNTER — TELEPHONE (OUTPATIENT)
Dept: INTERNAL MEDICINE CLINIC | Age: 88
End: 2023-06-01

## 2023-06-02 DIAGNOSIS — K21.00 GASTROESOPHAGEAL REFLUX DISEASE WITH ESOPHAGITIS, UNSPECIFIED WHETHER HEMORRHAGE: ICD-10-CM

## 2023-06-02 RX ORDER — FAMOTIDINE 20 MG/1
TABLET, FILM COATED ORAL
Qty: 180 TABLET | Refills: 0 | Status: SHIPPED | OUTPATIENT
Start: 2023-06-02

## 2023-06-06 ENCOUNTER — TELEPHONE (OUTPATIENT)
Dept: INTERNAL MEDICINE CLINIC | Age: 88
End: 2023-06-06

## 2023-06-06 DIAGNOSIS — R11.2 NAUSEA AND VOMITING, UNSPECIFIED VOMITING TYPE: Primary | ICD-10-CM

## 2023-06-06 RX ORDER — ONDANSETRON 4 MG/1
4 TABLET, FILM COATED ORAL 3 TIMES DAILY PRN
Qty: 15 TABLET | Refills: 0 | Status: SHIPPED | OUTPATIENT
Start: 2023-06-06

## 2023-06-06 NOTE — TELEPHONE ENCOUNTER
----- Message from Lukasz Espinal sent at 6/6/2023  9:05 AM EDT -----  Subject: Message to Provider    QUESTIONS  Information for Provider? Patient has an appointment scheduled for 6/16   for ongoing abdominal issues and vomiting. They are unable to get a ride   for a sooner appointment, but were hoping to be prescribed something to   help with the symptoms prior to their appointment. They requested   something to help specifically with the frequent vomiting; they are unable   to keep food down when they eat. Patient said to leave a text if they did   not answer. ---------------------------------------------------------------------------  --------------  Lindsay Melendez [facility]  [phone]; OK to leave message on voicemail  ---------------------------------------------------------------------------  --------------  SCRIPT ANSWERS  Relationship to Patient?  Self

## 2023-06-06 NOTE — TELEPHONE ENCOUNTER
Spoke with patient who stated his nausea and vomiting started this morning. Patient stated he has been unable to keep any food or water down. Is requesting medication prior to appointment. Please advise. He does not have a ride to the office sooner than 6/16.

## 2023-06-16 ENCOUNTER — HOSPITAL ENCOUNTER (OUTPATIENT)
Dept: INFUSION THERAPY | Age: 88
Discharge: HOME | End: 2023-06-16

## 2023-06-19 RX ORDER — ERGOCALCIFEROL 1.25 MG/1
CAPSULE ORAL
Qty: 12 CAPSULE | Refills: 2 | Status: SHIPPED | OUTPATIENT
Start: 2023-06-19

## 2023-06-23 ENCOUNTER — OFFICE VISIT (OUTPATIENT)
Dept: ONCOLOGY | Age: 88
End: 2023-06-23
Payer: MEDICARE

## 2023-06-23 ENCOUNTER — TELEPHONE (OUTPATIENT)
Dept: ONCOLOGY | Age: 88
End: 2023-06-23

## 2023-06-23 ENCOUNTER — HOSPITAL ENCOUNTER (OUTPATIENT)
Dept: INFUSION THERAPY | Age: 88
Discharge: HOME | End: 2023-06-23
Payer: MEDICARE

## 2023-06-23 VITALS
TEMPERATURE: 97.5 F | SYSTOLIC BLOOD PRESSURE: 158 MMHG | RESPIRATION RATE: 16 BRPM | WEIGHT: 204.2 LBS | HEART RATE: 68 BPM | DIASTOLIC BLOOD PRESSURE: 82 MMHG | OXYGEN SATURATION: 100 % | BODY MASS INDEX: 26.58 KG/M2

## 2023-06-23 DIAGNOSIS — E61.1 IRON DEFICIENCY: ICD-10-CM

## 2023-06-23 DIAGNOSIS — E53.8 B12 DEFICIENCY: ICD-10-CM

## 2023-06-23 DIAGNOSIS — D64.9 ANEMIA, UNSPECIFIED TYPE: Primary | ICD-10-CM

## 2023-06-23 LAB
BASOPHILS # BLD: 0 K/UL (ref 0–0.2)
BASOPHILS NFR BLD: 1 % (ref 0–2)
EOSINOPHIL # BLD: 0.2 K/UL (ref 0–0.4)
EOSINOPHILS RELATIVE PERCENT: 6 % (ref 1–4)
ERYTHROCYTE [DISTWIDTH] IN BLOOD BY AUTOMATED COUNT: 14.2 % (ref 12.5–15.4)
HCT VFR BLD AUTO: 30.9 % (ref 41–53)
HGB BLD-MCNC: 10.2 G/DL (ref 13.5–17.5)
LYMPHOCYTES # BLD: 25 % (ref 24–44)
LYMPHOCYTES NFR BLD: 0.8 K/UL (ref 1–4.8)
MCH RBC QN AUTO: 29.9 PG (ref 26–34)
MCHC RBC AUTO-ENTMCNC: 32.8 G/DL (ref 31–37)
MCV RBC AUTO: 91.1 FL (ref 80–100)
MONOCYTES NFR BLD: 0.4 K/UL (ref 0.1–1.2)
MONOCYTES NFR BLD: 12 % (ref 2–11)
NEUTROPHILS NFR BLD: 56 % (ref 36–66)
NEUTS SEG NFR BLD: 2 K/UL (ref 1.8–7.7)
PLATELET # BLD AUTO: 177 K/UL (ref 140–450)
PMV BLD AUTO: 6.9 FL (ref 6–12)
RBC # BLD AUTO: 3.4 M/UL (ref 4.5–5.9)
WBC OTHER # BLD: 3.4 K/UL (ref 3.5–11)

## 2023-06-23 PROCEDURE — 6360000002 HC RX W HCPCS: Performed by: INTERNAL MEDICINE

## 2023-06-23 PROCEDURE — 82728 ASSAY OF FERRITIN: CPT

## 2023-06-23 PROCEDURE — 83550 IRON BINDING TEST: CPT

## 2023-06-23 PROCEDURE — 85027 COMPLETE CBC AUTOMATED: CPT

## 2023-06-23 PROCEDURE — G8427 DOCREV CUR MEDS BY ELIG CLIN: HCPCS | Performed by: INTERNAL MEDICINE

## 2023-06-23 PROCEDURE — 1124F ACP DISCUSS-NO DSCNMKR DOCD: CPT | Performed by: INTERNAL MEDICINE

## 2023-06-23 PROCEDURE — 83540 ASSAY OF IRON: CPT

## 2023-06-23 PROCEDURE — 96372 THER/PROPH/DIAG INJ SC/IM: CPT

## 2023-06-23 PROCEDURE — G8417 CALC BMI ABV UP PARAM F/U: HCPCS | Performed by: INTERNAL MEDICINE

## 2023-06-23 PROCEDURE — 99214 OFFICE O/P EST MOD 30 MIN: CPT | Performed by: INTERNAL MEDICINE

## 2023-06-23 PROCEDURE — 1036F TOBACCO NON-USER: CPT | Performed by: INTERNAL MEDICINE

## 2023-06-23 PROCEDURE — 99211 OFF/OP EST MAY X REQ PHY/QHP: CPT | Performed by: INTERNAL MEDICINE

## 2023-06-23 PROCEDURE — 36415 COLL VENOUS BLD VENIPUNCTURE: CPT

## 2023-06-23 RX ORDER — ONDANSETRON 2 MG/ML
8 INJECTION INTRAMUSCULAR; INTRAVENOUS
OUTPATIENT
Start: 2023-07-14

## 2023-06-23 RX ORDER — ACETAMINOPHEN 325 MG/1
325 TABLET ORAL
OUTPATIENT
Start: 2023-07-14

## 2023-06-23 RX ORDER — FERROUS SULFATE 325(65) MG
325 TABLET ORAL EVERY OTHER DAY
Qty: 90 TABLET | Refills: 1 | Status: SHIPPED | OUTPATIENT
Start: 2023-06-23

## 2023-06-23 RX ORDER — ACETAMINOPHEN 325 MG/1
650 TABLET ORAL
OUTPATIENT
Start: 2023-07-14

## 2023-06-23 RX ORDER — ALBUTEROL SULFATE 90 UG/1
4 AEROSOL, METERED RESPIRATORY (INHALATION) PRN
OUTPATIENT
Start: 2023-07-14

## 2023-06-23 RX ORDER — EPINEPHRINE 1 MG/ML
0.3 INJECTION, SOLUTION, CONCENTRATE INTRAVENOUS PRN
OUTPATIENT
Start: 2023-07-14

## 2023-06-23 RX ORDER — CYANOCOBALAMIN 1000 UG/ML
1000 INJECTION, SOLUTION INTRAMUSCULAR; SUBCUTANEOUS ONCE
Start: 2023-07-14

## 2023-06-23 RX ORDER — CYANOCOBALAMIN 1000 UG/ML
1000 INJECTION, SOLUTION INTRAMUSCULAR; SUBCUTANEOUS ONCE
Status: COMPLETED
Start: 2023-06-23 | End: 2023-06-23

## 2023-06-23 RX ORDER — SODIUM CHLORIDE 9 MG/ML
INJECTION, SOLUTION INTRAVENOUS CONTINUOUS
OUTPATIENT
Start: 2023-07-14

## 2023-06-23 RX ORDER — DIPHENHYDRAMINE HYDROCHLORIDE 50 MG/ML
50 INJECTION INTRAMUSCULAR; INTRAVENOUS
OUTPATIENT
Start: 2023-07-14

## 2023-06-23 RX ORDER — FAMOTIDINE 10 MG/ML
20 INJECTION, SOLUTION INTRAVENOUS
OUTPATIENT
Start: 2023-07-14

## 2023-06-23 RX ADMIN — CYANOCOBALAMIN 1000 MCG: 1000 INJECTION, SOLUTION INTRAMUSCULAR; SUBCUTANEOUS at 15:01

## 2023-06-23 NOTE — TELEPHONE ENCOUNTER
AVS from 6/23/23      Rv in 4 months     Labs on rv   cbc b12 folate iron studies      Continue b12         Rv sched for 10/25 @ 1:00 pm     Labs at md visit    Pt was given AVS and appointment schedule    Electronically signed by Espinoza Mendes on 6/23/2023 at 2:51 PM

## 2023-06-23 NOTE — PROGRESS NOTES
Nadine Tran                                                                                                                  6/23/2023  MRN:   [id number]  YOB: 1935  PCP:                           Yazmin Julian MD  Referring Physician: No ref. provider found  Treating Physician Name: Tommy Fragoso MD      Reason for Visit:  Chief Complaint   Patient presents with    Follow-up       Current problems:  Anemia   Iron deficiency  Sick sinus syndrome and atrial fibrillation status post pacemaker 2898  Diastolic dysfunction  Chronic venous insufficiency  Chronic anticoagulation    Active and recent treatments:  Oral iron supplementation with vitamin C  S/p bone marrow biopsy    Interval history:  Patient presents to the clinic for a follow-up visit and to discuss further treatment plan. Patient hemoglobin is stable. Iron studies show adequate iron stores. Patient also scheduled for B12 shots. Still complains of being tired but able to take care of himself     During this visit patient's allergy, social, medical, surgical history and medications were reviewed and updated. Summary of Case/History:    Nadine Tran a 80 y.o.male is a patient with anemia who presents to the clinic to establish care and for further work-up and evaluation. Patient was recently hospitalized back in June and at [facility] was noted to be anemic with hemoglobin 8.6. Iron studies showed iron saturation of 15%. Ferritin was not checked. Patient states that he is not taking any oral iron. Patient does take Xarelto 15 mg daily due to history of atrial fibrillation. Patient also has history of DVT once. Patient states that he does follow-up with a GI doctor in [place] and had endoscopy done last year. We do not have the records at this point. Patient also follows up with his cardiologist in [facility].   Patient is in the process of transferring his care to [place] and presents to the clinic

## 2023-06-24 LAB
FERRITIN SERPL-MCNC: 172 NG/ML (ref 30–400)
IRON SATN MFR SERPL: 22 % (ref 20–55)
IRON SERPL-MCNC: 53 UG/DL (ref 59–158)
TIBC SERPL-MCNC: 238 UG/DL (ref 250–450)
UNSATURATED IRON BINDING CAPACITY: 185 UG/DL (ref 112–347)

## 2023-06-28 ENCOUNTER — APPOINTMENT (OUTPATIENT)
Dept: GENERAL RADIOLOGY | Age: 88
End: 2023-06-28
Payer: MEDICARE

## 2023-06-28 ENCOUNTER — HOSPITAL ENCOUNTER (EMERGENCY)
Age: 88
Discharge: HOME | End: 2023-06-28
Attending: EMERGENCY MEDICINE
Payer: MEDICARE

## 2023-06-28 VITALS
SYSTOLIC BLOOD PRESSURE: 140 MMHG | OXYGEN SATURATION: 97 % | TEMPERATURE: 98.3 F | HEIGHT: 73 IN | WEIGHT: 204 LBS | HEART RATE: 67 BPM | RESPIRATION RATE: 15 BRPM | DIASTOLIC BLOOD PRESSURE: 77 MMHG | BODY MASS INDEX: 27.04 KG/M2

## 2023-06-28 DIAGNOSIS — R07.89 CHEST WALL PAIN: Primary | ICD-10-CM

## 2023-06-28 LAB
ALBUMIN SERPL-MCNC: 4.1 G/DL (ref 3.5–5.2)
ALP SERPL-CCNC: 165 U/L (ref 40–129)
ALT SERPL-CCNC: 24 U/L (ref 5–41)
ANION GAP SERPL CALCULATED.3IONS-SCNC: 11 MMOL/L (ref 9–17)
AST SERPL-CCNC: 27 U/L
BASOPHILS # BLD: 0 K/UL (ref 0–0.2)
BASOPHILS NFR BLD: 1 % (ref 0–2)
BILIRUB SERPL-MCNC: 0.5 MG/DL (ref 0.3–1.2)
BNP SERPL-MCNC: 1321 PG/ML
BUN SERPL-MCNC: 25 MG/DL (ref 8–23)
CALCIUM SERPL-MCNC: 9.1 MG/DL (ref 8.6–10.4)
CHLORIDE SERPL-SCNC: 104 MMOL/L (ref 98–107)
CO2 SERPL-SCNC: 28 MMOL/L (ref 20–31)
CREAT SERPL-MCNC: 1.12 MG/DL (ref 0.7–1.2)
D DIMER PPP FEU-MCNC: 0.76 UG/ML FEU (ref 0–0.59)
EOSINOPHIL # BLD: 0.2 K/UL (ref 0–0.4)
EOSINOPHILS RELATIVE PERCENT: 4 % (ref 0–4)
ERYTHROCYTE [DISTWIDTH] IN BLOOD BY AUTOMATED COUNT: 14.2 % (ref 11.5–14.9)
GFR SERPL CREATININE-BSD FRML MDRD: >60 ML/MIN/1.73M2
GLUCOSE SERPL-MCNC: 109 MG/DL (ref 70–99)
HCT VFR BLD AUTO: 30.1 % (ref 41–53)
HGB BLD-MCNC: 9.5 G/DL (ref 13.5–17.5)
INR PPP: 1.7
LYMPHOCYTES # BLD: 23 % (ref 24–44)
LYMPHOCYTES NFR BLD: 1 K/UL (ref 1–4.8)
MCH RBC QN AUTO: 28.7 PG (ref 26–34)
MCHC RBC AUTO-ENTMCNC: 31.5 G/DL (ref 31–37)
MCV RBC AUTO: 91.1 FL (ref 80–100)
MONOCYTES NFR BLD: 0.6 K/UL (ref 0.1–1.3)
MONOCYTES NFR BLD: 15 % (ref 1–7)
NEUTROPHILS NFR BLD: 57 % (ref 36–66)
NEUTS SEG NFR BLD: 2.5 K/UL (ref 1.3–9.1)
PARTIAL THROMBOPLASTIN TIME: 34.6 SEC (ref 24–36)
PLATELET # BLD AUTO: 193 K/UL (ref 150–450)
PMV BLD AUTO: 7.6 FL (ref 6–12)
POTASSIUM SERPL-SCNC: 3.9 MMOL/L (ref 3.7–5.3)
PROT SERPL-MCNC: 7.1 G/DL (ref 6.4–8.3)
PROTHROMBIN TIME: 20.2 SEC (ref 11.8–14.6)
RBC # BLD AUTO: 3.3 M/UL (ref 4.5–5.9)
SODIUM SERPL-SCNC: 143 MMOL/L (ref 135–144)
TROPONIN I SERPL HS-MCNC: 38 NG/L (ref 0–22)
TROPONIN I SERPL HS-MCNC: 42 NG/L (ref 0–22)
WBC OTHER # BLD: 4.3 K/UL (ref 3.5–11)

## 2023-06-28 PROCEDURE — 84484 ASSAY OF TROPONIN QUANT: CPT

## 2023-06-28 PROCEDURE — 85379 FIBRIN DEGRADATION QUANT: CPT

## 2023-06-28 PROCEDURE — 99285 EMERGENCY DEPT VISIT HI MDM: CPT

## 2023-06-28 PROCEDURE — 6360000002 HC RX W HCPCS: Performed by: EMERGENCY MEDICINE

## 2023-06-28 PROCEDURE — 93005 ELECTROCARDIOGRAM TRACING: CPT | Performed by: EMERGENCY MEDICINE

## 2023-06-28 PROCEDURE — 85730 THROMBOPLASTIN TIME PARTIAL: CPT

## 2023-06-28 PROCEDURE — 71045 X-RAY EXAM CHEST 1 VIEW: CPT

## 2023-06-28 PROCEDURE — 80053 COMPREHEN METABOLIC PANEL: CPT

## 2023-06-28 PROCEDURE — 83880 ASSAY OF NATRIURETIC PEPTIDE: CPT

## 2023-06-28 PROCEDURE — 85610 PROTHROMBIN TIME: CPT

## 2023-06-28 PROCEDURE — 85027 COMPLETE CBC AUTOMATED: CPT

## 2023-06-28 PROCEDURE — 96374 THER/PROPH/DIAG INJ IV PUSH: CPT

## 2023-06-28 PROCEDURE — 36415 COLL VENOUS BLD VENIPUNCTURE: CPT

## 2023-06-28 RX ORDER — MORPHINE SULFATE 2 MG/ML
2 INJECTION, SOLUTION INTRAMUSCULAR; INTRAVENOUS ONCE
Status: COMPLETED | OUTPATIENT
Start: 2023-06-28 | End: 2023-06-28

## 2023-06-28 RX ADMIN — MORPHINE SULFATE 2 MG: 2 INJECTION, SOLUTION INTRAMUSCULAR; INTRAVENOUS at 18:06

## 2023-06-29 LAB
EKG ATRIAL RATE: 67 BPM
EKG P AXIS: 86 DEGREES
EKG P-R INTERVAL: 196 MS
EKG Q-T INTERVAL: 450 MS
EKG QRS DURATION: 142 MS
EKG QTC CALCULATION (BAZETT): 475 MS
EKG R AXIS: -73 DEGREES
EKG T AXIS: 93 DEGREES
EKG VENTRICULAR RATE: 67 BPM

## 2023-07-06 DIAGNOSIS — R11.2 NAUSEA AND VOMITING, UNSPECIFIED VOMITING TYPE: ICD-10-CM

## 2023-07-06 RX ORDER — ONDANSETRON 4 MG/1
4 TABLET, FILM COATED ORAL 3 TIMES DAILY PRN
Qty: 15 TABLET | Refills: 0 | Status: SHIPPED | OUTPATIENT
Start: 2023-07-06

## 2023-07-06 NOTE — TELEPHONE ENCOUNTER
Patient is nauseas and requesting a refill of Zofran. He was recently in the ER. Patient stated he only vomited once and that was when he tried [address]

## 2023-07-24 RX ORDER — RIVAROXABAN 15 MG/1
TABLET, FILM COATED ORAL
Qty: 30 TABLET | Refills: 1 | Status: SHIPPED | OUTPATIENT
Start: 2023-07-24

## 2023-07-28 ENCOUNTER — TELEPHONE (OUTPATIENT)
Dept: ONCOLOGY | Age: 88
End: 2023-07-28

## 2023-07-28 NOTE — TELEPHONE ENCOUNTER
received referral from Medical Oncology stating patient having issues with transportation to injection.  called patient who states he schedules with \"someone\" but not sure who or where they are affiliated with.  unable to get transportation today but is looking into other resources for transportation.

## 2023-07-31 ENCOUNTER — TELEPHONE (OUTPATIENT)
Dept: ONCOLOGY | Age: 88
End: 2023-07-31

## 2023-07-31 ENCOUNTER — HOSPITAL ENCOUNTER (INPATIENT)
Age: 88
LOS: 4 days | Discharge: HOME HEALTH | DRG: 368 | End: 2023-08-04
Attending: EMERGENCY MEDICINE | Admitting: INTERNAL MEDICINE
Payer: MEDICARE

## 2023-07-31 ENCOUNTER — OFFICE VISIT (OUTPATIENT)
Dept: INTERNAL MEDICINE CLINIC | Age: 88
End: 2023-07-31
Payer: MEDICARE

## 2023-07-31 ENCOUNTER — APPOINTMENT (OUTPATIENT)
Dept: CT IMAGING | Age: 88
DRG: 368 | End: 2023-07-31
Payer: MEDICARE

## 2023-07-31 VITALS
BODY MASS INDEX: 22.53 KG/M2 | DIASTOLIC BLOOD PRESSURE: 64 MMHG | WEIGHT: 170 LBS | HEART RATE: 89 BPM | OXYGEN SATURATION: 91 % | SYSTOLIC BLOOD PRESSURE: 138 MMHG | HEIGHT: 73 IN

## 2023-07-31 DIAGNOSIS — R11.2 NAUSEA AND VOMITING, UNSPECIFIED VOMITING TYPE: ICD-10-CM

## 2023-07-31 DIAGNOSIS — R10.13 EPIGASTRIC PAIN: ICD-10-CM

## 2023-07-31 DIAGNOSIS — R19.7 NAUSEA VOMITING AND DIARRHEA: Primary | ICD-10-CM

## 2023-07-31 DIAGNOSIS — K52.9 GASTROENTERITIS: Primary | ICD-10-CM

## 2023-07-31 DIAGNOSIS — I25.119 CORONARY ARTERY DISEASE INVOLVING NATIVE CORONARY ARTERY OF NATIVE HEART WITH ANGINA PECTORIS (HCC): ICD-10-CM

## 2023-07-31 DIAGNOSIS — D50.0 ANEMIA DUE TO BLOOD LOSS: ICD-10-CM

## 2023-07-31 DIAGNOSIS — I20.0 UNSTABLE ANGINA (HCC): ICD-10-CM

## 2023-07-31 DIAGNOSIS — R11.2 NAUSEA VOMITING AND DIARRHEA: Primary | ICD-10-CM

## 2023-07-31 PROBLEM — K56.609 SMALL BOWEL OBSTRUCTION (HCC): Status: RESOLVED | Noted: 2022-03-12 | Resolved: 2023-07-31

## 2023-07-31 LAB
ALBUMIN SERPL-MCNC: 4.3 G/DL (ref 3.5–5.2)
ALP SERPL-CCNC: 156 U/L (ref 40–129)
ALT SERPL-CCNC: 43 U/L (ref 5–41)
ANION GAP SERPL CALCULATED.3IONS-SCNC: 10 MMOL/L (ref 9–17)
AST SERPL-CCNC: 28 U/L
BACTERIA URNS QL MICRO: NORMAL
BASOPHILS # BLD: 0 K/UL (ref 0–0.2)
BASOPHILS NFR BLD: 0 % (ref 0–2)
BILIRUB SERPL-MCNC: 0.6 MG/DL (ref 0.3–1.2)
BILIRUB UR QL STRIP: NEGATIVE
BUN SERPL-MCNC: 22 MG/DL (ref 8–23)
CALCIUM SERPL-MCNC: 9.9 MG/DL (ref 8.6–10.4)
CASTS #/AREA URNS LPF: NORMAL /LPF
CASTS #/AREA URNS LPF: NORMAL /LPF
CHLORIDE SERPL-SCNC: 101 MMOL/L (ref 98–107)
CLARITY UR: CLEAR
CO2 SERPL-SCNC: 28 MMOL/L (ref 20–31)
COLOR UR: YELLOW
CREAT SERPL-MCNC: 1.4 MG/DL (ref 0.7–1.2)
EOSINOPHIL # BLD: 0 K/UL (ref 0–0.4)
EOSINOPHILS RELATIVE PERCENT: 0 % (ref 0–4)
EPI CELLS #/AREA URNS HPF: NORMAL /HPF
ERYTHROCYTE [DISTWIDTH] IN BLOOD BY AUTOMATED COUNT: 14 % (ref 11.5–14.9)
GFR SERPL CREATININE-BSD FRML MDRD: 48 ML/MIN/1.73M2
GLUCOSE SERPL-MCNC: 137 MG/DL (ref 70–99)
GLUCOSE UR STRIP-MCNC: NEGATIVE MG/DL
HCT VFR BLD AUTO: 35.1 % (ref 41–53)
HGB BLD-MCNC: 11.7 G/DL (ref 13.5–17.5)
HGB UR QL STRIP.AUTO: NEGATIVE
KETONES UR STRIP-MCNC: NEGATIVE MG/DL
LACTATE BLDV-SCNC: 2.2 MMOL/L (ref 0.5–2.2)
LEUKOCYTE ESTERASE UR QL STRIP: NEGATIVE
LIPASE SERPL-CCNC: 23 U/L (ref 13–60)
LYMPHOCYTES NFR BLD: 0.6 K/UL (ref 1–4.8)
LYMPHOCYTES RELATIVE PERCENT: 10 % (ref 24–44)
MAGNESIUM SERPL-MCNC: 2.1 MG/DL (ref 1.6–2.6)
MCH RBC QN AUTO: 30.3 PG (ref 26–34)
MCHC RBC AUTO-ENTMCNC: 33.4 G/DL (ref 31–37)
MCV RBC AUTO: 90.8 FL (ref 80–100)
MONOCYTES NFR BLD: 0.5 K/UL (ref 0.1–1.3)
MONOCYTES NFR BLD: 8 % (ref 1–7)
NEUTROPHILS NFR BLD: 82 % (ref 36–66)
NEUTS SEG NFR BLD: 4.7 K/UL (ref 1.3–9.1)
NITRITE UR QL STRIP: NEGATIVE
PH UR STRIP: 6.5 [PH] (ref 5–8)
PLATELET # BLD AUTO: 236 K/UL (ref 150–450)
PMV BLD AUTO: 7.3 FL (ref 6–12)
POTASSIUM SERPL-SCNC: 4.2 MMOL/L (ref 3.7–5.3)
PROT SERPL-MCNC: 7.9 G/DL (ref 6.4–8.3)
PROT UR STRIP-MCNC: ABNORMAL MG/DL
RBC # BLD AUTO: 3.86 M/UL (ref 4.5–5.9)
RBC #/AREA URNS HPF: NORMAL /HPF
SODIUM SERPL-SCNC: 139 MMOL/L (ref 135–144)
SP GR UR STRIP: 1.06 (ref 1–1.03)
TROPONIN I SERPL HS-MCNC: 58 NG/L (ref 0–22)
TROPONIN I SERPL HS-MCNC: 61 NG/L (ref 0–22)
TROPONIN I SERPL HS-MCNC: 65 NG/L (ref 0–22)
UROBILINOGEN UR STRIP-ACNC: NORMAL EU/DL (ref 0–1)
WBC #/AREA URNS HPF: NORMAL /HPF
WBC OTHER # BLD: 5.8 K/UL (ref 3.5–11)

## 2023-07-31 PROCEDURE — 99214 OFFICE O/P EST MOD 30 MIN: CPT | Performed by: INTERNAL MEDICINE

## 2023-07-31 PROCEDURE — 99223 1ST HOSP IP/OBS HIGH 75: CPT | Performed by: INTERNAL MEDICINE

## 2023-07-31 PROCEDURE — G8420 CALC BMI NORM PARAMETERS: HCPCS | Performed by: INTERNAL MEDICINE

## 2023-07-31 PROCEDURE — 84484 ASSAY OF TROPONIN QUANT: CPT

## 2023-07-31 PROCEDURE — 81001 URINALYSIS AUTO W/SCOPE: CPT

## 2023-07-31 PROCEDURE — G8428 CUR MEDS NOT DOCUMENT: HCPCS | Performed by: INTERNAL MEDICINE

## 2023-07-31 PROCEDURE — 96375 TX/PRO/DX INJ NEW DRUG ADDON: CPT

## 2023-07-31 PROCEDURE — 1036F TOBACCO NON-USER: CPT | Performed by: INTERNAL MEDICINE

## 2023-07-31 PROCEDURE — 2580000003 HC RX 258: Performed by: EMERGENCY MEDICINE

## 2023-07-31 PROCEDURE — 2580000003 HC RX 258

## 2023-07-31 PROCEDURE — 74177 CT ABD & PELVIS W/CONTRAST: CPT

## 2023-07-31 PROCEDURE — 80053 COMPREHEN METABOLIC PANEL: CPT

## 2023-07-31 PROCEDURE — 1124F ACP DISCUSS-NO DSCNMKR DOCD: CPT | Performed by: INTERNAL MEDICINE

## 2023-07-31 PROCEDURE — 2500000003 HC RX 250 WO HCPCS: Performed by: EMERGENCY MEDICINE

## 2023-07-31 PROCEDURE — 99285 EMERGENCY DEPT VISIT HI MDM: CPT

## 2023-07-31 PROCEDURE — 36415 COLL VENOUS BLD VENIPUNCTURE: CPT

## 2023-07-31 PROCEDURE — 2060000000 HC ICU INTERMEDIATE R&B

## 2023-07-31 PROCEDURE — 83605 ASSAY OF LACTIC ACID: CPT

## 2023-07-31 PROCEDURE — 6370000000 HC RX 637 (ALT 250 FOR IP)

## 2023-07-31 PROCEDURE — 85025 COMPLETE CBC W/AUTO DIFF WBC: CPT

## 2023-07-31 PROCEDURE — 6360000004 HC RX CONTRAST MEDICATION: Performed by: EMERGENCY MEDICINE

## 2023-07-31 PROCEDURE — 93005 ELECTROCARDIOGRAM TRACING: CPT | Performed by: EMERGENCY MEDICINE

## 2023-07-31 PROCEDURE — 83735 ASSAY OF MAGNESIUM: CPT

## 2023-07-31 PROCEDURE — 96374 THER/PROPH/DIAG INJ IV PUSH: CPT

## 2023-07-31 PROCEDURE — 83690 ASSAY OF LIPASE: CPT

## 2023-07-31 PROCEDURE — 6360000002 HC RX W HCPCS: Performed by: EMERGENCY MEDICINE

## 2023-07-31 RX ORDER — ONDANSETRON 4 MG/1
4 TABLET, ORALLY DISINTEGRATING ORAL EVERY 8 HOURS PRN
Status: DISCONTINUED | OUTPATIENT
Start: 2023-07-31 | End: 2023-07-31

## 2023-07-31 RX ORDER — HYDRALAZINE HYDROCHLORIDE 20 MG/ML
10 INJECTION INTRAMUSCULAR; INTRAVENOUS EVERY 6 HOURS PRN
Status: DISCONTINUED | OUTPATIENT
Start: 2023-07-31 | End: 2023-08-04 | Stop reason: HOSPADM

## 2023-07-31 RX ORDER — ONDANSETRON 2 MG/ML
8 INJECTION INTRAMUSCULAR; INTRAVENOUS ONCE
Status: COMPLETED | OUTPATIENT
Start: 2023-07-31 | End: 2023-07-31

## 2023-07-31 RX ORDER — DOCUSATE SODIUM 100 MG/1
100 CAPSULE, LIQUID FILLED ORAL 2 TIMES DAILY
Status: DISCONTINUED | OUTPATIENT
Start: 2023-07-31 | End: 2023-08-04 | Stop reason: HOSPADM

## 2023-07-31 RX ORDER — SODIUM CHLORIDE 0.9 % (FLUSH) 0.9 %
5-40 SYRINGE (ML) INJECTION PRN
Status: DISCONTINUED | OUTPATIENT
Start: 2023-07-31 | End: 2023-08-04 | Stop reason: HOSPADM

## 2023-07-31 RX ORDER — SODIUM CHLORIDE 9 MG/ML
INJECTION, SOLUTION INTRAVENOUS CONTINUOUS
Status: DISCONTINUED | OUTPATIENT
Start: 2023-07-31 | End: 2023-08-03

## 2023-07-31 RX ORDER — NITROGLYCERIN 0.4 MG/1
0.4 TABLET SUBLINGUAL 3 TIMES DAILY PRN
Status: DISCONTINUED | OUTPATIENT
Start: 2023-07-31 | End: 2023-08-04 | Stop reason: HOSPADM

## 2023-07-31 RX ORDER — SODIUM CHLORIDE 9 MG/ML
INJECTION, SOLUTION INTRAVENOUS PRN
Status: DISCONTINUED | OUTPATIENT
Start: 2023-07-31 | End: 2023-08-04 | Stop reason: HOSPADM

## 2023-07-31 RX ORDER — PROMETHAZINE HYDROCHLORIDE 25 MG/ML
6.25 INJECTION, SOLUTION INTRAMUSCULAR; INTRAVENOUS EVERY 6 HOURS PRN
Status: DISCONTINUED | OUTPATIENT
Start: 2023-07-31 | End: 2023-08-04 | Stop reason: HOSPADM

## 2023-07-31 RX ORDER — FINASTERIDE 5 MG/1
5 TABLET, FILM COATED ORAL DAILY
Status: DISCONTINUED | OUTPATIENT
Start: 2023-08-01 | End: 2023-08-04 | Stop reason: HOSPADM

## 2023-07-31 RX ORDER — SODIUM CHLORIDE 9 MG/ML
INJECTION, SOLUTION INTRAVENOUS CONTINUOUS
Status: DISCONTINUED | OUTPATIENT
Start: 2023-07-31 | End: 2023-08-02

## 2023-07-31 RX ORDER — LATANOPROST 50 UG/ML
1 SOLUTION/ DROPS OPHTHALMIC NIGHTLY
Status: DISCONTINUED | OUTPATIENT
Start: 2023-07-31 | End: 2023-08-04 | Stop reason: HOSPADM

## 2023-07-31 RX ORDER — CYANOCOBALAMIN 1000 UG/ML
1000 INJECTION, SOLUTION INTRAMUSCULAR; SUBCUTANEOUS
COMMUNITY

## 2023-07-31 RX ORDER — BUMETANIDE 1 MG/1
2 TABLET ORAL DAILY
Status: DISCONTINUED | OUTPATIENT
Start: 2023-07-31 | End: 2023-08-04 | Stop reason: HOSPADM

## 2023-07-31 RX ORDER — DOCUSATE SODIUM 100 MG/1
100 CAPSULE, LIQUID FILLED ORAL 2 TIMES DAILY
COMMUNITY

## 2023-07-31 RX ORDER — BUPRENORPHINE AND NALOXONE 8; 2 MG/1; MG/1
1 FILM, SOLUBLE BUCCAL; SUBLINGUAL 2 TIMES DAILY
Status: DISCONTINUED | OUTPATIENT
Start: 2023-07-31 | End: 2023-08-04 | Stop reason: HOSPADM

## 2023-07-31 RX ORDER — METHOCARBAMOL 750 MG/1
750 TABLET, FILM COATED ORAL 3 TIMES DAILY PRN
Status: DISCONTINUED | OUTPATIENT
Start: 2023-07-31 | End: 2023-07-31

## 2023-07-31 RX ORDER — CYANOCOBALAMIN, ISOPROPYL ALCOHOL 1000MCG/ML
1 KIT INJECTION WEEKLY
COMMUNITY
End: 2023-07-31 | Stop reason: DRUGHIGH

## 2023-07-31 RX ORDER — SODIUM CHLORIDE 0.9 % (FLUSH) 0.9 %
5-40 SYRINGE (ML) INJECTION EVERY 12 HOURS SCHEDULED
Status: DISCONTINUED | OUTPATIENT
Start: 2023-07-31 | End: 2023-08-04 | Stop reason: HOSPADM

## 2023-07-31 RX ORDER — SODIUM CHLORIDE 0.9 % (FLUSH) 0.9 %
10 SYRINGE (ML) INJECTION PRN
Status: DISCONTINUED | OUTPATIENT
Start: 2023-07-31 | End: 2023-08-04 | Stop reason: HOSPADM

## 2023-07-31 RX ORDER — PANTOPRAZOLE SODIUM 40 MG/1
40 TABLET, DELAYED RELEASE ORAL
Status: DISCONTINUED | OUTPATIENT
Start: 2023-08-01 | End: 2023-08-04 | Stop reason: HOSPADM

## 2023-07-31 RX ORDER — METOLAZONE 2.5 MG/1
2.5 TABLET ORAL DAILY
Status: DISCONTINUED | OUTPATIENT
Start: 2023-08-01 | End: 2023-08-04 | Stop reason: HOSPADM

## 2023-07-31 RX ORDER — ACETAMINOPHEN 325 MG/1
650 TABLET ORAL EVERY 6 HOURS PRN
COMMUNITY

## 2023-07-31 RX ORDER — BUPRENORPHINE AND NALOXONE 8; 2 MG/1; MG/1
1 FILM, SOLUBLE BUCCAL; SUBLINGUAL 2 TIMES DAILY
Status: DISCONTINUED | OUTPATIENT
Start: 2023-07-31 | End: 2023-07-31

## 2023-07-31 RX ORDER — 0.9 % SODIUM CHLORIDE 0.9 %
100 INTRAVENOUS SOLUTION INTRAVENOUS ONCE
Status: COMPLETED | OUTPATIENT
Start: 2023-07-31 | End: 2023-07-31

## 2023-07-31 RX ORDER — ONDANSETRON 2 MG/ML
4 INJECTION INTRAMUSCULAR; INTRAVENOUS EVERY 6 HOURS PRN
Status: DISCONTINUED | OUTPATIENT
Start: 2023-07-31 | End: 2023-07-31

## 2023-07-31 RX ORDER — POLYETHYLENE GLYCOL 3350 17 G/17G
17 POWDER, FOR SOLUTION ORAL DAILY PRN
Status: DISCONTINUED | OUTPATIENT
Start: 2023-07-31 | End: 2023-08-04 | Stop reason: HOSPADM

## 2023-07-31 RX ORDER — AMLODIPINE BESYLATE 10 MG/1
10 TABLET ORAL DAILY
Status: DISCONTINUED | OUTPATIENT
Start: 2023-07-31 | End: 2023-08-04 | Stop reason: HOSPADM

## 2023-07-31 RX ADMIN — SODIUM CHLORIDE: 9 INJECTION, SOLUTION INTRAVENOUS at 13:25

## 2023-07-31 RX ADMIN — IOPAMIDOL 75 ML: 755 INJECTION, SOLUTION INTRAVENOUS at 12:33

## 2023-07-31 RX ADMIN — BUPRENORPHINE AND NALOXONE 1 FILM: 8; 2 FILM BUCCAL; SUBLINGUAL at 20:38

## 2023-07-31 RX ADMIN — ONDANSETRON 8 MG: 2 INJECTION INTRAMUSCULAR; INTRAVENOUS at 11:41

## 2023-07-31 RX ADMIN — SODIUM CHLORIDE: 9 INJECTION, SOLUTION INTRAVENOUS at 15:52

## 2023-07-31 RX ADMIN — SODIUM CHLORIDE 100 ML: 9 INJECTION, SOLUTION INTRAVENOUS at 12:33

## 2023-07-31 RX ADMIN — LATANOPROST 1 DROP: 50 SOLUTION OPHTHALMIC at 20:38

## 2023-07-31 RX ADMIN — BUMETANIDE 2 MG: 1 TABLET ORAL at 16:15

## 2023-07-31 RX ADMIN — SODIUM CHLORIDE, PRESERVATIVE FREE 10 ML: 5 INJECTION INTRAVENOUS at 12:33

## 2023-07-31 RX ADMIN — DOCUSATE SODIUM 100 MG: 100 CAPSULE, LIQUID FILLED ORAL at 20:38

## 2023-07-31 RX ADMIN — HYDROMORPHONE HYDROCHLORIDE 1 MG: 1 INJECTION, SOLUTION INTRAMUSCULAR; INTRAVENOUS; SUBCUTANEOUS at 11:41

## 2023-07-31 ASSESSMENT — ENCOUNTER SYMPTOMS
CHEST TIGHTNESS: 0
BACK PAIN: 0
COUGH: 0
VOMITING: 1
SHORTNESS OF BREATH: 0
WHEEZING: 0
ABDOMINAL DISTENTION: 0
SHORTNESS OF BREATH: 0
ABDOMINAL DISTENTION: 0
COLOR CHANGE: 0
CONSTIPATION: 1
BLOOD IN STOOL: 0
DIARRHEA: 1
ABDOMINAL PAIN: 1
ANAL BLEEDING: 0
VOMITING: 1
NAUSEA: 1
DIARRHEA: 1
COUGH: 0
NAUSEA: 1
CONSTIPATION: 0
APNEA: 0
ABDOMINAL PAIN: 1
FACIAL SWELLING: 0

## 2023-07-31 ASSESSMENT — PAIN DESCRIPTION - LOCATION: LOCATION: ABDOMEN

## 2023-07-31 ASSESSMENT — PAIN - FUNCTIONAL ASSESSMENT: PAIN_FUNCTIONAL_ASSESSMENT: 0-10

## 2023-07-31 ASSESSMENT — PAIN SCALES - GENERAL
PAINLEVEL_OUTOF10: 8
PAINLEVEL_OUTOF10: 5

## 2023-07-31 NOTE — TELEPHONE ENCOUNTER
called patient to discuss transportation options. Message left. Upon chart review patient currently admitted.

## 2023-07-31 NOTE — ED TRIAGE NOTES
Mode of arrival (squad #, walk in, police, etc) : walk-in        Chief complaint(s): chest pain, abd pain, emesis        Arrival Note (brief scenario, treatment PTA, etc). : Pt reports chest pain, abd pain, emesis x3 days. Pt reports he saw Philip Duong this morning and was sent to ED. C= \"Have you ever felt that you should Cut down on your drinking? \"  No  A= \"Have people Annoyed you by criticizing your drinking? \"  No  G= \"Have you ever felt bad or Guilty about your drinking? \"  No  E= \"Have you ever had a drink as an Eye-opener first thing in the morning to steady your nerves or to help a hangover? \"  No      Deferred []      Reason for deferring: N/A    *If yes to two or more: probable alcohol abuse. *

## 2023-07-31 NOTE — PROGRESS NOTES
Pharmacy Medication History Note      List of current medications patient is taking is complete. Source of information: Patient, dispense history, [place]ARRS    Changes made to medication list:  Medications flagged for removal (include reason, ex. noncompliance):  Methocarbamol: patient reports not taking anymore. Last filled 1/8/2021    Medications removed (include reason, ex. therapy complete or physician discontinued):  NONE    Medications added/doses adjusted:  Acetaminophen added per patient  Bumetanide changed to 2mg once daily  Docusate added per patient for constipation associated with Iron supplement  Vitamin B12 changed to injectable per patient    Other notes (ex. Recent course of antibiotics, Coumadin dosing):  Patient reported receiving vitamin B12 injections once monthly at [address] [facility] doctor\" office with last dose being 7/14/23. He reports having other appointments that he missed recently and wasn't sure if he gets the injection weekly or not. No recent fill history of B12 tablets or injections. Most recent visit summary from oncology reports patient is supposed to be taking one 1000 mcg tablet each morning. Clarified weekly vitamin D taken on Mondays. Patient reports taking his dose this morning (7/31/23)  Per OARRS: buprenorphine-naloxone 8-2 sublingual film #60 for 30 days supply last filled on 7/5/23    Denies use of other OTC or herbal medications.       Allergies clarified    Medication list provided to the patient:NONE  Medication education provided to the patient:NONE      Electronically signed by Rakel Sullivan on 7/31/2023 at 2:01 PM

## 2023-07-31 NOTE — PROGRESS NOTES
confusion, decreased concentration and dysphoric mood. Objective:   Physical Exam  Vitals and nursing note reviewed. Constitutional:       General: He is not in acute distress. Appearance: He is well-developed. He is not diaphoretic. Comments: Very week , Anxious    HENT:      Head: Normocephalic and atraumatic. Mouth/Throat:      Pharynx: No oropharyngeal exudate. Eyes:      General: No scleral icterus. Right eye: No discharge. Left eye: No discharge. Conjunctiva/sclera: Conjunctivae normal.      Pupils: Pupils are equal, round, and reactive to light. Neck:      Thyroid: No thyromegaly. Vascular: No JVD. Trachea: No tracheal deviation. Cardiovascular:      Rate and Rhythm: Normal rate. Heart sounds: Normal heart sounds. No murmur heard. No gallop. Pulmonary:      Effort: Pulmonary effort is normal. No respiratory distress. Breath sounds: Normal breath sounds. No stridor. No wheezing or rales. Abdominal:      General: Bowel sounds are normal. There is no distension. Palpations: Abdomen is soft. Tenderness: There is no abdominal tenderness. There is no guarding or rebound. Comments: Midline scar present    Musculoskeletal:         General: No tenderness. Normal range of motion. Cervical back: Normal range of motion and neck supple. Skin:     General: Skin is warm and dry. Findings: No erythema or rash. Neurological:      Mental Status: He is alert and oriented to person, place, and time. Assessment / Plan:   1.  Gastroenteritis   Patient, presented with nausea, vomiting, loose stools, abdominal cramping, although patient mentioned the symptoms are going on for last 2 to 3 days  He has lost substantial amount of weight, close to 30 pounds in last 1 month  He told me he has not able to hold anything down  Feeling extremely weak  History of multiple bowel surgeries and resection  Will need

## 2023-08-01 ENCOUNTER — HOSPITAL ENCOUNTER (INPATIENT)
Age: 88
Discharge: HOME | DRG: 368 | End: 2023-08-03
Payer: MEDICARE

## 2023-08-01 ENCOUNTER — APPOINTMENT (OUTPATIENT)
Age: 88
DRG: 368 | End: 2023-08-01
Attending: INTERNAL MEDICINE
Payer: MEDICARE

## 2023-08-01 ENCOUNTER — APPOINTMENT (OUTPATIENT)
Dept: NUCLEAR MEDICINE | Age: 88
DRG: 368 | End: 2023-08-01
Payer: MEDICARE

## 2023-08-01 VITALS — HEART RATE: 64 BPM | SYSTOLIC BLOOD PRESSURE: 162 MMHG | DIASTOLIC BLOOD PRESSURE: 91 MMHG

## 2023-08-01 LAB
ANION GAP SERPL CALCULATED.3IONS-SCNC: 7 MMOL/L (ref 9–17)
BASOPHILS # BLD: 0 K/UL (ref 0–0.2)
BASOPHILS NFR BLD: 1 % (ref 0–2)
BUN SERPL-MCNC: 21 MG/DL (ref 8–23)
CALCIUM SERPL-MCNC: 8.8 MG/DL (ref 8.6–10.4)
CHLORIDE SERPL-SCNC: 104 MMOL/L (ref 98–107)
CO2 SERPL-SCNC: 27 MMOL/L (ref 20–31)
CREAT SERPL-MCNC: 1.1 MG/DL (ref 0.7–1.2)
ECHO AO ROOT DIAM: 3.2 CM
ECHO AO ROOT INDEX: 1.58 CM/M2
ECHO AR MAX VEL PISA: 3.9 M/S
ECHO AV AREA PEAK VELOCITY: 2.1 CM2
ECHO AV AREA VTI: 2.1 CM2
ECHO AV AREA/BSA PEAK VELOCITY: 1 CM2/M2
ECHO AV AREA/BSA VTI: 1 CM2/M2
ECHO AV MEAN GRADIENT: 5 MMHG
ECHO AV MEAN VELOCITY: 1.1 M/S
ECHO AV PEAK GRADIENT: 9 MMHG
ECHO AV PEAK VELOCITY: 1.5 M/S
ECHO AV REGURGITANT PHT: 539 MS
ECHO AV VELOCITY RATIO: 0.73
ECHO AV VTI: 32.1 CM
ECHO BSA: 1.99 M2
ECHO BSA: 1.99 M2
ECHO EST RA PRESSURE: 8 MMHG
ECHO LA AREA 2C: 20 CM2
ECHO LA AREA 4C: 25.1 CM2
ECHO LA DIAMETER INDEX: 1.87 CM/M2
ECHO LA DIAMETER: 3.8 CM
ECHO LA MAJOR AXIS: 5.9 CM
ECHO LA MINOR AXIS: 6 CM
ECHO LA TO AORTIC ROOT RATIO: 1.19
ECHO LA VOL 2C: 54 ML (ref 18–58)
ECHO LA VOL 4C: 84 ML (ref 18–58)
ECHO LA VOL BP: 68 ML (ref 18–58)
ECHO LA VOL/BSA BIPLANE: 33 ML/M2 (ref 16–34)
ECHO LA VOLUME INDEX A2C: 27 ML/M2 (ref 16–34)
ECHO LA VOLUME INDEX A4C: 41 ML/M2 (ref 16–34)
ECHO LV E' LATERAL VELOCITY: 9 CM/S
ECHO LV E' SEPTAL VELOCITY: 3 CM/S
ECHO LV FRACTIONAL SHORTENING: 37 % (ref 28–44)
ECHO LV INTERNAL DIMENSION DIASTOLE INDEX: 2.51 CM/M2
ECHO LV INTERNAL DIMENSION DIASTOLIC: 5.1 CM (ref 4.2–5.9)
ECHO LV INTERNAL DIMENSION SYSTOLIC INDEX: 1.58 CM/M2
ECHO LV INTERNAL DIMENSION SYSTOLIC: 3.2 CM
ECHO LV IVSD: 1.2 CM (ref 0.6–1)
ECHO LV MASS 2D: 241.2 G (ref 88–224)
ECHO LV MASS INDEX 2D: 118.8 G/M2 (ref 49–115)
ECHO LV POSTERIOR WALL DIASTOLIC: 1.2 CM (ref 0.6–1)
ECHO LV RELATIVE WALL THICKNESS RATIO: 0.47
ECHO LVOT AREA: 2.8 CM2
ECHO LVOT AV VTI INDEX: 0.73
ECHO LVOT DIAM: 1.9 CM
ECHO LVOT MEAN GRADIENT: 3 MMHG
ECHO LVOT PEAK GRADIENT: 5 MMHG
ECHO LVOT PEAK VELOCITY: 1.1 M/S
ECHO LVOT STROKE VOLUME INDEX: 32.7 ML/M2
ECHO LVOT SV: 66.3 ML
ECHO LVOT VTI: 23.4 CM
ECHO MV A VELOCITY: 0.54 M/S
ECHO MV AREA VTI: 1.7 CM2
ECHO MV E DECELERATION TIME (DT): 232 MS
ECHO MV E VELOCITY: 0.63 M/S
ECHO MV E/A RATIO: 1.17
ECHO MV E/E' LATERAL: 7
ECHO MV E/E' RATIO (AVERAGED): 14
ECHO MV E/E' SEPTAL: 21
ECHO MV LVOT VTI INDEX: 1.65
ECHO MV MAX VELOCITY: 0.9 M/S
ECHO MV MEAN GRADIENT: 1 MMHG
ECHO MV MEAN VELOCITY: 0.5 M/S
ECHO MV PEAK GRADIENT: 3 MMHG
ECHO MV VTI: 38.7 CM
ECHO RA AREA 4C: 21 CM2
ECHO RIGHT VENTRICULAR SYSTOLIC PRESSURE (RVSP): 36 MMHG
ECHO RV TAPSE: 1.1 CM (ref 1.7–?)
ECHO TV REGURGITANT MAX VELOCITY: 2.63 M/S
ECHO TV REGURGITANT PEAK GRADIENT: 28 MMHG
EKG ATRIAL RATE: 85 BPM
EKG P AXIS: 72 DEGREES
EKG P-R INTERVAL: 206 MS
EKG Q-T INTERVAL: 406 MS
EKG QRS DURATION: 122 MS
EKG QTC CALCULATION (BAZETT): 483 MS
EKG R AXIS: -72 DEGREES
EKG T AXIS: 96 DEGREES
EKG VENTRICULAR RATE: 85 BPM
EOSINOPHIL # BLD: 0.3 K/UL (ref 0–0.4)
EOSINOPHILS RELATIVE PERCENT: 7 % (ref 0–4)
ERYTHROCYTE [DISTWIDTH] IN BLOOD BY AUTOMATED COUNT: 13.8 % (ref 11.5–14.9)
GFR SERPL CREATININE-BSD FRML MDRD: >60 ML/MIN/1.73M2
GLUCOSE SERPL-MCNC: 105 MG/DL (ref 70–99)
HCT VFR BLD AUTO: 30.8 % (ref 41–53)
HGB BLD-MCNC: 10.2 G/DL (ref 13.5–17.5)
LYMPHOCYTES NFR BLD: 1 K/UL (ref 1–4.8)
LYMPHOCYTES RELATIVE PERCENT: 26 % (ref 24–44)
MCH RBC QN AUTO: 30.3 PG (ref 26–34)
MCHC RBC AUTO-ENTMCNC: 33 G/DL (ref 31–37)
MCV RBC AUTO: 91.6 FL (ref 80–100)
MONOCYTES NFR BLD: 0.4 K/UL (ref 0.1–1.3)
MONOCYTES NFR BLD: 11 % (ref 1–7)
NEUTROPHILS NFR BLD: 55 % (ref 36–66)
NEUTS SEG NFR BLD: 2.1 K/UL (ref 1.3–9.1)
PLATELET # BLD AUTO: 218 K/UL (ref 150–450)
PMV BLD AUTO: 7.3 FL (ref 6–12)
POTASSIUM SERPL-SCNC: 4 MMOL/L (ref 3.7–5.3)
RBC # BLD AUTO: 3.36 M/UL (ref 4.5–5.9)
SODIUM SERPL-SCNC: 138 MMOL/L (ref 135–144)
STRESS BASELINE DIAS BP: 91 MMHG
STRESS BASELINE HR: 64 BPM
STRESS BASELINE SYS BP: 162 MMHG
STRESS ESTIMATED WORKLOAD: 1 METS
STRESS PEAK DIAS BP: 91 MMHG
STRESS PEAK SYS BP: 162 MMHG
STRESS PERCENT HR ACHIEVED: 62 %
STRESS POST PEAK HR: 82 BPM
STRESS RATE PRESSURE PRODUCT: NORMAL BPM*MMHG
STRESS STAGE RECOVERY 1 BP: NORMAL MMHG
STRESS STAGE RECOVERY 1 COMMENTS: NORMAL
STRESS STAGE RECOVERY 1 DURATION: 2 MIN:SEC
STRESS STAGE RECOVERY 1 HR: 73 BPM
STRESS STAGE RECOVERY 2 COMMENTS: NORMAL
STRESS STAGE RECOVERY 2 DURATION: 3 MIN:SEC
STRESS STAGE RECOVERY 3 BP: NORMAL MMHG
STRESS STAGE RECOVERY 3 DURATION: 4 MIN:SEC
STRESS STAGE RECOVERY 3 HR: 71 BPM
STRESS STAGE RECOVERY 4 BP: NORMAL MMHG
STRESS STAGE RECOVERY 4 COMMENTS: NORMAL
STRESS STAGE RECOVERY 4 DURATION: 6 MIN:SEC
STRESS STAGE RECOVERY 4 HR: 77 BPM
STRESS TARGET HR: 132 BPM
TID: 1.12
WBC OTHER # BLD: 3.9 K/UL (ref 3.5–11)

## 2023-08-01 PROCEDURE — A9500 TC99M SESTAMIBI: HCPCS | Performed by: INTERNAL MEDICINE

## 2023-08-01 PROCEDURE — 2060000000 HC ICU INTERMEDIATE R&B

## 2023-08-01 PROCEDURE — 78452 HT MUSCLE IMAGE SPECT MULT: CPT

## 2023-08-01 PROCEDURE — 97530 THERAPEUTIC ACTIVITIES: CPT

## 2023-08-01 PROCEDURE — 93017 CV STRESS TEST TRACING ONLY: CPT

## 2023-08-01 PROCEDURE — 2580000003 HC RX 258: Performed by: INTERNAL MEDICINE

## 2023-08-01 PROCEDURE — 80048 BASIC METABOLIC PNL TOTAL CA: CPT

## 2023-08-01 PROCEDURE — 36415 COLL VENOUS BLD VENIPUNCTURE: CPT

## 2023-08-01 PROCEDURE — 93010 ELECTROCARDIOGRAM REPORT: CPT | Performed by: INTERNAL MEDICINE

## 2023-08-01 PROCEDURE — 99233 SBSQ HOSP IP/OBS HIGH 50: CPT | Performed by: INTERNAL MEDICINE

## 2023-08-01 PROCEDURE — 6370000000 HC RX 637 (ALT 250 FOR IP)

## 2023-08-01 PROCEDURE — 6360000002 HC RX W HCPCS

## 2023-08-01 PROCEDURE — 97166 OT EVAL MOD COMPLEX 45 MIN: CPT

## 2023-08-01 PROCEDURE — 97162 PT EVAL MOD COMPLEX 30 MIN: CPT

## 2023-08-01 PROCEDURE — 3430000000 HC RX DIAGNOSTIC RADIOPHARMACEUTICAL: Performed by: INTERNAL MEDICINE

## 2023-08-01 PROCEDURE — 6360000002 HC RX W HCPCS: Performed by: INTERNAL MEDICINE

## 2023-08-01 PROCEDURE — 85025 COMPLETE CBC W/AUTO DIFF WBC: CPT

## 2023-08-01 PROCEDURE — 93306 TTE W/DOPPLER COMPLETE: CPT

## 2023-08-01 RX ORDER — AMINOPHYLLINE DIHYDRATE 25 MG/ML
50 INJECTION, SOLUTION INTRAVENOUS
Status: DISCONTINUED | OUTPATIENT
Start: 2023-08-01 | End: 2023-08-04 | Stop reason: HOSPADM

## 2023-08-01 RX ORDER — TETRAKIS(2-METHOXYISOBUTYLISOCYANIDE)COPPER(I) TETRAFLUOROBORATE 1 MG/ML
35 INJECTION, POWDER, LYOPHILIZED, FOR SOLUTION INTRAVENOUS
Status: COMPLETED | OUTPATIENT
Start: 2023-08-01 | End: 2023-08-01

## 2023-08-01 RX ORDER — TETRAKIS(2-METHOXYISOBUTYLISOCYANIDE)COPPER(I) TETRAFLUOROBORATE 1 MG/ML
16.8 INJECTION, POWDER, LYOPHILIZED, FOR SOLUTION INTRAVENOUS
Status: COMPLETED | OUTPATIENT
Start: 2023-08-01 | End: 2023-08-01

## 2023-08-01 RX ORDER — SCOLOPAMINE TRANSDERMAL SYSTEM 1 MG/1
1 PATCH, EXTENDED RELEASE TRANSDERMAL
Status: DISCONTINUED | OUTPATIENT
Start: 2023-08-01 | End: 2023-08-04 | Stop reason: HOSPADM

## 2023-08-01 RX ORDER — PROCHLORPERAZINE EDISYLATE 5 MG/ML
10 INJECTION INTRAMUSCULAR; INTRAVENOUS ONCE
Status: COMPLETED | OUTPATIENT
Start: 2023-08-01 | End: 2023-08-01

## 2023-08-01 RX ORDER — SODIUM CHLORIDE 0.9 % (FLUSH) 0.9 %
10 SYRINGE (ML) INJECTION PRN
Status: DISCONTINUED | OUTPATIENT
Start: 2023-08-01 | End: 2023-08-04 | Stop reason: HOSPADM

## 2023-08-01 RX ORDER — REGADENOSON 0.08 MG/ML
0.4 INJECTION, SOLUTION INTRAVENOUS
Status: COMPLETED | OUTPATIENT
Start: 2023-08-01 | End: 2023-08-01

## 2023-08-01 RX ADMIN — Medication 39.2 MILLICURIE: at 13:23

## 2023-08-01 RX ADMIN — RIVAROXABAN 15 MG: 15 TABLET, FILM COATED ORAL at 08:19

## 2023-08-01 RX ADMIN — PROMETHAZINE HYDROCHLORIDE 6.25 MG: 25 INJECTION INTRAMUSCULAR; INTRAVENOUS at 11:40

## 2023-08-01 RX ADMIN — FINASTERIDE 5 MG: 5 TABLET, FILM COATED ORAL at 08:18

## 2023-08-01 RX ADMIN — Medication 16.7 MILLICURIE: at 09:32

## 2023-08-01 RX ADMIN — PANTOPRAZOLE SODIUM 40 MG: 40 TABLET, DELAYED RELEASE ORAL at 06:31

## 2023-08-01 RX ADMIN — DOCUSATE SODIUM 100 MG: 100 CAPSULE, LIQUID FILLED ORAL at 20:49

## 2023-08-01 RX ADMIN — PROCHLORPERAZINE EDISYLATE 10 MG: 5 INJECTION INTRAMUSCULAR; INTRAVENOUS at 16:48

## 2023-08-01 RX ADMIN — BUPRENORPHINE AND NALOXONE 1 FILM: 8; 2 FILM BUCCAL; SUBLINGUAL at 20:49

## 2023-08-01 RX ADMIN — METOPROLOL SUCCINATE 150 MG: 100 TABLET, EXTENDED RELEASE ORAL at 08:18

## 2023-08-01 RX ADMIN — LATANOPROST 1 DROP: 50 SOLUTION OPHTHALMIC at 20:48

## 2023-08-01 RX ADMIN — SODIUM CHLORIDE, PRESERVATIVE FREE 10 ML: 5 INJECTION INTRAVENOUS at 13:23

## 2023-08-01 RX ADMIN — AMLODIPINE BESYLATE 10 MG: 10 TABLET ORAL at 08:18

## 2023-08-01 RX ADMIN — REGADENOSON 0.4 MG: 0.08 INJECTION, SOLUTION INTRAVENOUS at 09:26

## 2023-08-01 RX ADMIN — DOCUSATE SODIUM 100 MG: 100 CAPSULE, LIQUID FILLED ORAL at 08:18

## 2023-08-01 RX ADMIN — BUPRENORPHINE AND NALOXONE 1 FILM: 8; 2 FILM BUCCAL; SUBLINGUAL at 08:19

## 2023-08-01 NOTE — PLAN OF CARE
Problem: Safety - Adult  Goal: Free from fall injury  Outcome: Progressing     Problem: Pain  Goal: Verbalizes/displays adequate comfort level or baseline comfort level  Outcome: Progressing     Problem: Skin/Tissue Integrity  Goal: Absence of new skin breakdown  Description: 1. Monitor for areas of redness and/or skin breakdown  2. Assess vascular access sites hourly  3. Every 4-6 hours minimum:  Change oxygen saturation probe site  4. Every 4-6 hours:  If on nasal continuous positive airway pressure, respiratory therapy assess nares and determine need for appliance change or resting period.   Outcome: Progressing     Problem: Discharge Planning  Goal: Discharge to home or other facility with appropriate resources  Outcome: Progressing

## 2023-08-01 NOTE — PROGRESS NOTES
Pt arrived to floor via wheelchair from ED and was transfered to bed. Vitals taken, telemetry applied. Admission and assessment complete. No distress noted. Education initiated and reviewed with patient. Call light within reach, and pt educated on its use. Bed in lowest position, and locked. Side rails up x 2. Denied further questions or needs at this time.

## 2023-08-01 NOTE — PROGRESS NOTES
[facility]   Occupational Therapy Evaluation  Date: 23  Patient Name: Araceli Wilkinson       Room: 3603/2791-12  MRN: [id number]  Account: [de-identified]   : 1935  (80 y.o.) Gender: male     Discharge Recommendations:  Further Occupational Therapy is recommended upon facility discharge. OT Equipment Recommendations  Other: TBD    Referring Practitioner: Rashel Rosen MD  Diagnosis: Epigastric pain      Treatment Diagnosis: Impaired self care status    Past Medical History:  has a past medical history of Arthritis, Atrial fibrillation ([address]), CAD (coronary artery disease), CHF (congestive heart failure) ([address]), Glaucoma, Hx of blood clots, Hyperlipidemia, Hypertension, and MI (myocardial infarction) ([address]). Past Surgical History:   has a past surgical history that includes pacemaker placement; Abdomen surgery; Cardiac catheterization; Appendectomy; Colonoscopy; eye surgery; hernia repair; bone marrow biopsy; joint replacement; and CT BIOPSY BONE MARROW (2022). Restrictions  Restrictions/Precautions  Restrictions/Precautions: General Precautions; Fall Risk  Implants present? : Metal implants; Pacemaker (R Hip; back sx, L Knee)      Vitals  Vitals  Pulse: 60  Heart Rate Source: Monitor  BP: 122/81  BP Location: Left upper arm  BP Method: Automatic  Patient Position: Semi fowlers  MAP (Calculated): 95  Respirations: 18  SpO2: 98 %  O2 Device: None (Room air)     Subjective  Subjective: Pt was pleasant and agreeable to OT/PT eval  Subjective  Pain: Pt reports 6/10 pain in abdomen for nausea      Social/Functional History  Social/Functional History  Lives With: Son  Type of Home: House  Home Layout: One level, Performs ADL's on one level, Able to Live on Main level with bedroom/bathroom  Home Access: Level entry  Bathroom Shower/Tub: Walk-in shower, Curtain, Shower chair with back  [employer]: Standard  Bathroom Equipment: Grab bars in shower, Hand-held shower, Toilet raiser 46.65  ADL Inpatient CMS G-Code Modifier : CK       Goals     Short Term Goals  Time Frame for Short Term Goals: By discharge  Short Term Goal 1: Pt will complete BADLs with Mod I and Good safety with use of AE as needed  Short Term Goal 2: Pt will complete functinal transfers/mobility during self care tasks with Mod I and Good safety with use of least restrictive device  Short Term Goal 3: Pt will tolerate standing 5+ minutes during functional activity of choice with Good safety  Short Term Goal 4: Pt will verbalize/demonstrate Good understanding of home safety/fall prevention strategies to increase safety and independence with self care and mobility  Short Term Goal 5: Pt will participate in 15+ minutes of therapeutic exercises/functional activities to increase safety and independence with self care and mobility    Plan  Occupational Therapy Plan  Times Per Week: 4-6  Current Treatment Recommendations: Self-Care / ADL, Strengthening, Balance training, Functional mobility training, Endurance training, Safety education & training, Patient/Caregiver education & training, Equipment evaluation, education, & procurement, Home management training      OT Individual Minutes  OT Individual Minutes  Time In: 7433  Time Out: 8303  Minutes: 39  Time Code Minutes   Timed Code Treatment Minutes: 23 Minutes      Electronically signed by SHEELA Jacob on 8/1/23 at 4:29 PM EDT

## 2023-08-01 NOTE — PROGRESS NOTES
Attending Physician Statement  I have discussed the care of Maci Manley and I have examined the patient myself and taken ROS and HPI, including pertinent history and exam findings, with the resident. I have reviewed the key elements of all parts of the encounter with the resident. I agree with the assessment, plan and orders as documented by the resident.   Stress test pending  Echocardiogram showed preserved ejection fraction  VARSHA has resolved  Patient not able to keep food down, complaining of severe nausea, vomiting and abdominal pain  Abdominal examination is benign  General surgery Abdominal CT showed chronic findings, will consult general surgery and GI, lost 30 lbs  because of nausea and vomiting        Electronically signed by Valerio Du MD

## 2023-08-01 NOTE — PROGRESS NOTES
The following Perfect Serve was sent to Dr. Khoa Tyler: Hi, just wanted to let you know his 3rd trop came back at 72. 61-58-65 is his trend. No c/o chest pain at this time. Plan for a stress and echo tomorrow. Thank you! 2312: RN ordered a repeat troponin at 0800 tomorrow. See Orders.

## 2023-08-01 NOTE — PROGRESS NOTES
Physical Therapy  Facility/Department: [address] [place] [address]  Physical Therapy Initial Assessment    Name: Irene Rachel  : 1935  MRN: [id number]  Date of Service: 2023    Discharge Recommendations:  Therapy recommended at discharge, Patient would benefit from continued therapy after discharge          Patient Diagnosis(es): The primary encounter diagnosis was Nausea vomiting and diarrhea. Diagnoses of Epigastric pain, Coronary artery disease involving native coronary artery of native heart with angina pectoris ([address]), and Unstable angina ([address]) were also pertinent to this visit. Past Medical History:  has a past medical history of Arthritis, Atrial fibrillation ([address]), CAD (coronary artery disease), CHF (congestive heart failure) ([address]), Glaucoma, Hx of blood clots, Hyperlipidemia, Hypertension, and MI (myocardial infarction) ([address]). Past Surgical History:  has a past surgical history that includes pacemaker placement; Abdomen surgery; Cardiac catheterization; Appendectomy; Colonoscopy; eye surgery; hernia repair; bone marrow biopsy; joint replacement; and CT BIOPSY BONE MARROW (2022). Assessment   Assessment: Pt presents with impaired mobility 2* impaired balance, posture, and RLE pain and weakness. The patient requires SBA for transfers, Tex for ambulation with SPC 2* 1 LOB laterally.  PT services are warranted to improve pt's function and safety with mobility  Treatment Diagnosis: Impaired mobility  Specific Instructions for Next Treatment: Balance, postural, and gait training  Therapy Prognosis: Fair  Decision Making: Medium Complexity  Exam: ROM, MMT, Balance, and functional mobility assessments  Requires PT Follow-Up: Yes     Plan   Physcial Therapy Plan  General Plan: 5-7 times per week  Specific Instructions for Next Treatment: Balance, postural, and gait training  Current Treatment Recommendations: Strengthening, ROM, Balance training, Functional mobility training, Gait training,

## 2023-08-01 NOTE — PROGRESS NOTES
Comprehensive Nutrition Assessment    Type and Reason for Visit:  Initial, Positive Nutrition Screen (Poor appetite and intake, wt loss)    Nutrition Recommendations/Plan:   Continue current diet, unless otherwise ordered. Add Magic Cups twice daily, unless otherwise ordered. Malnutrition Assessment:  Malnutrition Status:  Severe malnutrition (08/01/23 1728)    Context:  Acute Illness     Findings of the 6 clinical characteristics of malnutrition:  Energy Intake:  50% or less of estimated energy requirements for 5 or more days  Weight Loss:  Greater than 7.5% over 3 months     Body Fat Loss: Moderate body fat loss Triceps   Muscle Mass Loss: Moderate muscle mass loss Clavicles (pectoralis & deltoids), Hand (interosseous)  Fluid Accumulation:  Mild Extremities   Strength:  Not Performed    Nutrition Assessment:    Pt admitted with nausea and vomiting. Pt was hungry and wanting to eat lunch at time of visit earlier today, with oral diet recently resumed following stress test. Pt willing to receive Magic Cups (order for fluid restriction noted). States his appetite was decreased at home and when he did eat, he often was eating unhealthy foods such as fast foods that were high in sodium. Reports unintended wt loss. Note General Surgery consult has been ordered. Nutrition Related Findings:    Edema: +1 RLE, LLE. Hx: CHF, HTN, Hyperlipidemia, CAD. Current Nutrition Intake & Therapies:    Average Meal Intake:  (pt had been NPO)     ADULT DIET; Regular; 1500 ml    Anthropometric Measures:  Height: 6' 1\" (185.4 cm)  Ideal Body Weight (IBW): 184 lbs (84 kg)    Admission Body Weight: 174 lb 2.6 oz (79 kg)  Current Body Weight: 174 lb 2.6 oz (79 kg), 94.7 % IBW.     Current BMI (kg/m2): 23  Usual Body Weight: 200 lb (90.7 kg) (per pt)  % Weight Change (Calculated): -12.9                    BMI Categories: Normal Weight (BMI 22.0 to 24.9) age over 72 (low end of normal range)    Estimated Daily Nutrient Needs:  Energy Requirements Based On: Formula  Weight Used for Energy Requirements: Admission  Energy (kcal/day): 1800 based on [place] with 1.2 factor; 2050 for wt gain, with additional 250 kcal  Weight Used for Protein Requirements: Admission  Protein (g/day):  based on 1.2-1.3 gm per kg      Nutrition Diagnosis:   Severe malnutrition related to inadequate protein-energy intake as evidenced by Criteria as identified in malnutrition assessment    Nutrition Interventions:   Food and/or Nutrient Delivery: Continue Current Diet (Continue diet as ordered. Add oral nutrition supplements unless otherwise ordered)  Nutrition Education/Counseling: No recommendation at this time  Coordination of Nutrition Care: Continue to monitor while inpatient       Goals:     Goals: Meet at least 75% of estimated needs       Nutrition Monitoring and Evaluation:   Behavioral-Environmental Outcomes: None Identified  Food/Nutrient Intake Outcomes: Food and Nutrient Intake, Diet Advancement/Tolerance  Physical Signs/Symptoms Outcomes: Biochemical Data, GI Status, Nausea or Vomiting, Fluid Status or Edema, Weight    Discharge Planning:     Too soon to determine     Rachel Blandon RD, LD  Contact: [phone]

## 2023-08-01 NOTE — CARE COORDINATION
Case Management Assessment  Initial Evaluation    Date/Time of Evaluation: 8/1/2023 3:37 PM  Assessment Completed by: Nani Ordonez RN    If patient is discharged prior to next notation, then this note serves as note for discharge by case management. Patient Name: Jj Quinteros                   YOB: 1935  Diagnosis: Epigastric pain [R10.13]  Small bowel obstruction ([address]) [K56.609]  Nausea vomiting and diarrhea [R11.2, R19.7]                   Date / Time: 7/31/2023 10:46 AM    Patient Admission Status: Inpatient   Readmission Risk (Low < 19, Mod (19-27), High > 27): Readmission Risk Score: 17.8    Current PCP: Cierra You MD  PCP verified by CM? No    Chart Reviewed: Yes      History Provided by: Patient  Patient Orientation: Alert and Oriented    Patient Cognition: Alert    Hospitalization in the last 30 days (Readmission):  No    If yes, Readmission Assessment in CM Navigator will be completed. Advance Directives:      Code Status: Full Code   Patient's Primary Decision Maker is: Patient Declined (Legal Next of Kin Remains as Decision Maker)    Primary Decision Maker: Kristen Suzanna  Child - [phone]    Discharge Planning:    Patient lives with: Children Type of Home: House  Primary Care Giver: Self  Patient Support Systems include: Children   Current Financial resources: Medicare  Current community resources:    Current services prior to admission: Transportation            Current DME:              Type of Home Care services:  None    ADLS  Prior functional level: Independent in ADLs/IADLs  Current functional level: Independent in ADLs/IADLs    PT AM-PAC:   /24  OT AM-PAC:   /24    Family can provide assistance at DC: Yes  Would you like Case Management to discuss the discharge plan with any other family members/significant others, and if so, who?  Yes  Plans to Return to Present Housing: Yes  Other Identified Issues/Barriers to RETURNING to current housing: no  Potential Assistance needed at discharge: Transportation            Potential DME:    Patient expects to discharge to: [address] [facility] for transportation at discharge: Family    Financial    Payor: Adan Kang / Plan: [address] / Product Type: *No Product type* /     Does insurance require precert for SNF: Yes    Potential assistance Purchasing Medications: No  Meds-to-Beds request:        [id number] [place], [address] [phone] - F [phone]  [facility] [address]  Phone: [phone] Fax: [phone]      Notes:    Factors facilitating achievement of predicted outcomes: Family support    Barriers to discharge: Medical complications    Additional Case Management Notes: 8/1/23 [employer] Medicare Pt is from home in a one story home with his family DME cane VNS denies Plan is to discharge to home with no needs will follow .//tv    The Plan for Transition of Care is related to the following treatment goals of Epigastric pain [R10.13]  Small bowel obstruction (HCC) [K56.609]  Nausea vomiting and diarrhea [R11.2, B49.4]    IF APPLICABLE: The Patient and/or patient representative Kira Hoyos and his family were provided with a choice of provider and agrees with the discharge plan. Freedom of choice list with basic dialogue that supports the patient's individualized plan of care/goals and shares the quality data associated with the providers was provided to:     Patient Representative Name:       The Patient and/or Patient Representative Agree with the Discharge Plan?  Yes    Sim Man RN  Case Management Department  Ph: [phone] Fax: [phone]

## 2023-08-01 NOTE — ACP (ADVANCE CARE PLANNING)
Advance Care Planning     Advance Care Planning Activator (Inpatient)  Conversation Note      Date of ACP Conversation: 8/1/2023     Conversation Conducted with: Patient with Decision Making Capacity    ACP Activator: [place], RN        Health Care Decision Maker:     Current Designated Health Care Decision Maker:     Primary Decision Maker: Maggy Everett - Demarcus - [phone]  Click here to complete Healthcare Decision Makers including section of the Healthcare Decision Maker Relationship (ie \"Primary\")      Care Preferences    Ventilation: \"If you were in your present state of health and suddenly became very ill and were unable to breathe on your own, what would your preference be about the use of a ventilator (breathing machine) if it were available to you? \"      Would the patient desire the use of ventilator (breathing machine)?: yes    \"If your health worsens and it becomes clear that your chance of recovery is unlikely, what would your preference be about the use of a ventilator (breathing machine) if it were available to you? \"     Would the patient desire the use of ventilator (breathing machine)?: Yes      Resuscitation  \"CPR works best to restart the heart when there is a sudden event, like a heart attack, in someone who is otherwise healthy. Unfortunately, CPR does not typically restart the heart for people who have serious health conditions or who are very sick. \"    \"In the event your heart stopped as a result of an underlying serious health condition, would you want attempts to be made to restart your heart (answer \"yes\" for attempt to resuscitate) or would you prefer a natural death (answer \"no\" for do not attempt to resuscitate)? \" yes       [] Yes   [] No   Educated Patient / Berle Picket regarding differences between Advance Directives and portable DNR orders.     Length of ACP Conversation in minutes:      Conversation Outcomes:  ACP discussion completed    Follow-up plan:    [] Schedule

## 2023-08-02 ENCOUNTER — ANESTHESIA (OUTPATIENT)
Dept: ENDOSCOPY | Age: 88
End: 2023-08-02
Payer: MEDICARE

## 2023-08-02 ENCOUNTER — ANESTHESIA EVENT (OUTPATIENT)
Dept: ENDOSCOPY | Age: 88
End: 2023-08-02
Payer: MEDICARE

## 2023-08-02 ENCOUNTER — APPOINTMENT (OUTPATIENT)
Dept: GENERAL RADIOLOGY | Age: 88
DRG: 368 | End: 2023-08-02
Attending: INTERNAL MEDICINE
Payer: MEDICARE

## 2023-08-02 PROBLEM — R79.89 ABNORMAL LFTS: Status: ACTIVE | Noted: 2023-08-02

## 2023-08-02 PROBLEM — R10.11 RIGHT UPPER QUADRANT ABDOMINAL PAIN: Status: ACTIVE | Noted: 2023-08-02

## 2023-08-02 PROBLEM — R93.3 ABNORMAL FINDING ON GI TRACT IMAGING: Status: ACTIVE | Noted: 2023-08-02

## 2023-08-02 PROBLEM — R19.4 ALTERED BOWEL HABITS: Status: ACTIVE | Noted: 2023-08-02

## 2023-08-02 LAB
A1AT SERPL-MCNC: 144 MG/DL (ref 90–200)
AFP SERPL-MCNC: 2.3 UG/L
ANION GAP SERPL CALCULATED.3IONS-SCNC: 10 MMOL/L (ref 9–17)
BASOPHILS # BLD: 0 K/UL (ref 0–0.2)
BASOPHILS NFR BLD: 1 % (ref 0–2)
BUN SERPL-MCNC: 20 MG/DL (ref 8–23)
CALCIUM SERPL-MCNC: 8.5 MG/DL (ref 8.6–10.4)
CERULOPLASMIN SERPL-MCNC: 26 MG/DL (ref 15–30)
CHLORIDE SERPL-SCNC: 107 MMOL/L (ref 98–107)
CO2 SERPL-SCNC: 23 MMOL/L (ref 20–31)
CREAT SERPL-MCNC: 0.9 MG/DL (ref 0.7–1.2)
EOSINOPHIL # BLD: 0.3 K/UL (ref 0–0.4)
EOSINOPHILS RELATIVE PERCENT: 8 % (ref 0–4)
ERYTHROCYTE [DISTWIDTH] IN BLOOD BY AUTOMATED COUNT: 13.9 % (ref 11.5–14.9)
FERRITIN SERPL-MCNC: 237 NG/ML (ref 30–400)
GFR SERPL CREATININE-BSD FRML MDRD: >60 ML/MIN/1.73M2
GGT SERPL-CCNC: 12 U/L (ref 8–61)
GLUCOSE SERPL-MCNC: 100 MG/DL (ref 70–99)
HAV IGM SERPL QL IA: NONREACTIVE
HBV CORE IGM SERPL QL IA: NONREACTIVE
HBV SURFACE AG SERPL QL IA: NONREACTIVE
HCT VFR BLD AUTO: 30.6 % (ref 41–53)
HCV AB SERPL QL IA: NONREACTIVE
HGB BLD-MCNC: 10.1 G/DL (ref 13.5–17.5)
IRON SATN MFR SERPL: 17 % (ref 20–55)
IRON SERPL-MCNC: 36 UG/DL (ref 59–158)
LYMPHOCYTES NFR BLD: 1.1 K/UL (ref 1–4.8)
LYMPHOCYTES RELATIVE PERCENT: 26 % (ref 24–44)
MCH RBC QN AUTO: 30 PG (ref 26–34)
MCHC RBC AUTO-ENTMCNC: 32.9 G/DL (ref 31–37)
MCV RBC AUTO: 91.3 FL (ref 80–100)
MONOCYTES NFR BLD: 0.5 K/UL (ref 0.1–1.3)
MONOCYTES NFR BLD: 11 % (ref 1–7)
NEUTROPHILS NFR BLD: 54 % (ref 36–66)
NEUTS SEG NFR BLD: 2.4 K/UL (ref 1.3–9.1)
PLATELET # BLD AUTO: 193 K/UL (ref 150–450)
PMV BLD AUTO: 7.4 FL (ref 6–12)
POTASSIUM SERPL-SCNC: 3.6 MMOL/L (ref 3.7–5.3)
RBC # BLD AUTO: 3.36 M/UL (ref 4.5–5.9)
SODIUM SERPL-SCNC: 140 MMOL/L (ref 135–144)
TIBC SERPL-MCNC: 213 UG/DL (ref 250–450)
TROPONIN I SERPL HS-MCNC: 44 NG/L (ref 0–22)
TSH SERPL DL<=0.05 MIU/L-ACNC: 3.68 UIU/ML (ref 0.3–5)
UNSATURATED IRON BINDING CAPACITY: 177 UG/DL (ref 112–347)
WBC OTHER # BLD: 4.4 K/UL (ref 3.5–11)

## 2023-08-02 PROCEDURE — 84484 ASSAY OF TROPONIN QUANT: CPT

## 2023-08-02 PROCEDURE — 82784 ASSAY IGA/IGD/IGG/IGM EACH: CPT

## 2023-08-02 PROCEDURE — 3700000001 HC ADD 15 MINUTES (ANESTHESIA): Performed by: INTERNAL MEDICINE

## 2023-08-02 PROCEDURE — 6370000000 HC RX 637 (ALT 250 FOR IP): Performed by: INTERNAL MEDICINE

## 2023-08-02 PROCEDURE — 85025 COMPLETE CBC W/AUTO DIFF WBC: CPT

## 2023-08-02 PROCEDURE — 83540 ASSAY OF IRON: CPT

## 2023-08-02 PROCEDURE — 36415 COLL VENOUS BLD VENIPUNCTURE: CPT

## 2023-08-02 PROCEDURE — 6370000000 HC RX 637 (ALT 250 FOR IP)

## 2023-08-02 PROCEDURE — 2580000003 HC RX 258: Performed by: INTERNAL MEDICINE

## 2023-08-02 PROCEDURE — 6360000002 HC RX W HCPCS: Performed by: INTERNAL MEDICINE

## 2023-08-02 PROCEDURE — 82105 ALPHA-FETOPROTEIN SERUM: CPT

## 2023-08-02 PROCEDURE — 88312 SPECIAL STAINS GROUP 1: CPT

## 2023-08-02 PROCEDURE — 80074 ACUTE HEPATITIS PANEL: CPT

## 2023-08-02 PROCEDURE — 2580000003 HC RX 258

## 2023-08-02 PROCEDURE — 2709999900 HC NON-CHARGEABLE SUPPLY: Performed by: INTERNAL MEDICINE

## 2023-08-02 PROCEDURE — 7100000001 HC PACU RECOVERY - ADDTL 15 MIN: Performed by: INTERNAL MEDICINE

## 2023-08-02 PROCEDURE — 82390 ASSAY OF CERULOPLASMIN: CPT

## 2023-08-02 PROCEDURE — 3609012400 HC EGD TRANSORAL BIOPSY SINGLE/MULTIPLE: Performed by: INTERNAL MEDICINE

## 2023-08-02 PROCEDURE — 82728 ASSAY OF FERRITIN: CPT

## 2023-08-02 PROCEDURE — 0DB68ZX EXCISION OF STOMACH, VIA NATURAL OR ARTIFICIAL OPENING ENDOSCOPIC, DIAGNOSTIC: ICD-10-PCS | Performed by: INTERNAL MEDICINE

## 2023-08-02 PROCEDURE — 71045 X-RAY EXAM CHEST 1 VIEW: CPT

## 2023-08-02 PROCEDURE — 1200000000 HC SEMI PRIVATE

## 2023-08-02 PROCEDURE — 83550 IRON BINDING TEST: CPT

## 2023-08-02 PROCEDURE — 84443 ASSAY THYROID STIM HORMONE: CPT

## 2023-08-02 PROCEDURE — 7100000000 HC PACU RECOVERY - FIRST 15 MIN: Performed by: INTERNAL MEDICINE

## 2023-08-02 PROCEDURE — 6360000002 HC RX W HCPCS: Performed by: NURSE ANESTHETIST, CERTIFIED REGISTERED

## 2023-08-02 PROCEDURE — 82977 ASSAY OF GGT: CPT

## 2023-08-02 PROCEDURE — 86038 ANTINUCLEAR ANTIBODIES: CPT

## 2023-08-02 PROCEDURE — 80048 BASIC METABOLIC PNL TOTAL CA: CPT

## 2023-08-02 PROCEDURE — 0DB98ZX EXCISION OF DUODENUM, VIA NATURAL OR ARTIFICIAL OPENING ENDOSCOPIC, DIAGNOSTIC: ICD-10-PCS | Performed by: INTERNAL MEDICINE

## 2023-08-02 PROCEDURE — 82787 IGG 1 2 3 OR 4 EACH: CPT

## 2023-08-02 PROCEDURE — 83516 IMMUNOASSAY NONANTIBODY: CPT

## 2023-08-02 PROCEDURE — 88305 TISSUE EXAM BY PATHOLOGIST: CPT

## 2023-08-02 PROCEDURE — 6370000000 HC RX 637 (ALT 250 FOR IP): Performed by: NURSE PRACTITIONER

## 2023-08-02 PROCEDURE — 0DB58ZX EXCISION OF ESOPHAGUS, VIA NATURAL OR ARTIFICIAL OPENING ENDOSCOPIC, DIAGNOSTIC: ICD-10-PCS | Performed by: INTERNAL MEDICINE

## 2023-08-02 PROCEDURE — 99233 SBSQ HOSP IP/OBS HIGH 50: CPT | Performed by: INTERNAL MEDICINE

## 2023-08-02 PROCEDURE — 82103 ALPHA-1-ANTITRYPSIN TOTAL: CPT

## 2023-08-02 PROCEDURE — 86225 DNA ANTIBODY NATIVE: CPT

## 2023-08-02 PROCEDURE — 99223 1ST HOSP IP/OBS HIGH 75: CPT | Performed by: INTERNAL MEDICINE

## 2023-08-02 PROCEDURE — 3700000000 HC ANESTHESIA ATTENDED CARE: Performed by: INTERNAL MEDICINE

## 2023-08-02 PROCEDURE — 2500000003 HC RX 250 WO HCPCS: Performed by: NURSE ANESTHETIST, CERTIFIED REGISTERED

## 2023-08-02 RX ORDER — FLUCONAZOLE 100 MG/1
200 TABLET ORAL DAILY
Status: DISCONTINUED | OUTPATIENT
Start: 2023-08-03 | End: 2023-08-04 | Stop reason: HOSPADM

## 2023-08-02 RX ORDER — POTASSIUM CHLORIDE 20 MEQ/1
40 TABLET, EXTENDED RELEASE ORAL ONCE
Status: COMPLETED | OUTPATIENT
Start: 2023-08-02 | End: 2023-08-02

## 2023-08-02 RX ORDER — FLUCONAZOLE 100 MG/1
400 TABLET ORAL ONCE
Status: COMPLETED | OUTPATIENT
Start: 2023-08-02 | End: 2023-08-02

## 2023-08-02 RX ORDER — PROPOFOL 10 MG/ML
INJECTION, EMULSION INTRAVENOUS PRN
Status: DISCONTINUED | OUTPATIENT
Start: 2023-08-02 | End: 2023-08-02 | Stop reason: SDUPTHER

## 2023-08-02 RX ORDER — ACETAMINOPHEN 325 MG/1
650 TABLET ORAL EVERY 4 HOURS PRN
Status: DISCONTINUED | OUTPATIENT
Start: 2023-08-02 | End: 2023-08-04 | Stop reason: HOSPADM

## 2023-08-02 RX ORDER — POTASSIUM CHLORIDE 7.45 MG/ML
10 INJECTION INTRAVENOUS
Status: DISCONTINUED | OUTPATIENT
Start: 2023-08-02 | End: 2023-08-02

## 2023-08-02 RX ORDER — LIDOCAINE HYDROCHLORIDE 20 MG/ML
INJECTION, SOLUTION EPIDURAL; INFILTRATION; INTRACAUDAL; PERINEURAL PRN
Status: DISCONTINUED | OUTPATIENT
Start: 2023-08-02 | End: 2023-08-02 | Stop reason: SDUPTHER

## 2023-08-02 RX ORDER — BISACODYL 5 MG/1
20 TABLET, DELAYED RELEASE ORAL ONCE
Status: COMPLETED | OUTPATIENT
Start: 2023-08-02 | End: 2023-08-02

## 2023-08-02 RX ADMIN — LATANOPROST 1 DROP: 50 SOLUTION OPHTHALMIC at 21:44

## 2023-08-02 RX ADMIN — DOCUSATE SODIUM 100 MG: 100 CAPSULE, LIQUID FILLED ORAL at 10:58

## 2023-08-02 RX ADMIN — PROPOFOL 80 MG: 10 INJECTION, EMULSION INTRAVENOUS at 09:39

## 2023-08-02 RX ADMIN — BUPRENORPHINE AND NALOXONE 1 FILM: 8; 2 FILM BUCCAL; SUBLINGUAL at 10:58

## 2023-08-02 RX ADMIN — POLYETHYLENE GLYCOL 3350 17 G: 17 POWDER, FOR SOLUTION ORAL at 05:46

## 2023-08-02 RX ADMIN — BISACODYL 20 MG: 5 TABLET, COATED ORAL at 13:09

## 2023-08-02 RX ADMIN — AMLODIPINE BESYLATE 10 MG: 10 TABLET ORAL at 10:57

## 2023-08-02 RX ADMIN — SODIUM CHLORIDE, PRESERVATIVE FREE 10 ML: 5 INJECTION INTRAVENOUS at 21:35

## 2023-08-02 RX ADMIN — SODIUM CHLORIDE: 9 INJECTION, SOLUTION INTRAVENOUS at 08:26

## 2023-08-02 RX ADMIN — PROMETHAZINE HYDROCHLORIDE 6.25 MG: 25 INJECTION INTRAMUSCULAR; INTRAVENOUS at 23:22

## 2023-08-02 RX ADMIN — DOCUSATE SODIUM 100 MG: 100 CAPSULE, LIQUID FILLED ORAL at 21:35

## 2023-08-02 RX ADMIN — BUPRENORPHINE AND NALOXONE 1 FILM: 8; 2 FILM BUCCAL; SUBLINGUAL at 21:35

## 2023-08-02 RX ADMIN — LIDOCAINE HYDROCHLORIDE 60 MG: 20 INJECTION, SOLUTION EPIDURAL; INFILTRATION; INTRACAUDAL; PERINEURAL at 09:39

## 2023-08-02 RX ADMIN — PROPOFOL 20 MG: 10 INJECTION, EMULSION INTRAVENOUS at 09:45

## 2023-08-02 RX ADMIN — SODIUM CHLORIDE, PRESERVATIVE FREE 10 ML: 5 INJECTION INTRAVENOUS at 11:06

## 2023-08-02 RX ADMIN — ACETAMINOPHEN 650 MG: 325 TABLET ORAL at 06:19

## 2023-08-02 RX ADMIN — FINASTERIDE 5 MG: 5 TABLET, FILM COATED ORAL at 10:58

## 2023-08-02 RX ADMIN — PANTOPRAZOLE SODIUM 40 MG: 40 TABLET, DELAYED RELEASE ORAL at 05:41

## 2023-08-02 RX ADMIN — POLYETHYLENE GLYCOL-3350 AND ELECTROLYTES 4000 ML: 236; 6.74; 5.86; 2.97; 22.74 POWDER, FOR SOLUTION ORAL at 13:47

## 2023-08-02 RX ADMIN — FLUCONAZOLE 400 MG: 100 TABLET ORAL at 16:28

## 2023-08-02 RX ADMIN — PROPOFOL 20 MG: 10 INJECTION, EMULSION INTRAVENOUS at 09:42

## 2023-08-02 RX ADMIN — METOPROLOL SUCCINATE 150 MG: 100 TABLET, EXTENDED RELEASE ORAL at 10:57

## 2023-08-02 RX ADMIN — POTASSIUM CHLORIDE 40 MEQ: 1500 TABLET, EXTENDED RELEASE ORAL at 13:09

## 2023-08-02 ASSESSMENT — ENCOUNTER SYMPTOMS
WHEEZING: 0
ABDOMINAL DISTENTION: 0
BLOOD IN STOOL: 0
COUGH: 0
SHORTNESS OF BREATH: 0
TROUBLE SWALLOWING: 1
DIARRHEA: 1
NAUSEA: 1
CONSTIPATION: 1
ABDOMINAL PAIN: 1
VOMITING: 1
ANAL BLEEDING: 0

## 2023-08-02 ASSESSMENT — PAIN SCALES - GENERAL: PAINLEVEL_OUTOF10: 3

## 2023-08-02 ASSESSMENT — PAIN DESCRIPTION - LOCATION: LOCATION: HEAD

## 2023-08-02 ASSESSMENT — PAIN - FUNCTIONAL ASSESSMENT: PAIN_FUNCTIONAL_ASSESSMENT: 0-10

## 2023-08-02 ASSESSMENT — PAIN DESCRIPTION - ORIENTATION: ORIENTATION: LEFT

## 2023-08-02 ASSESSMENT — PAIN DESCRIPTION - DESCRIPTORS: DESCRIPTORS: ACHING

## 2023-08-02 NOTE — PROGRESS NOTES
Patient was seen and examined. Awake alert in no acute distress. Prepping for colonoscopy tomorrow. Afebrile vital signs are stable. Abdomen is soft nondistended nontender. Extremity nontender. Blood work reviewed. BMP normal.  WBC count 4.4. Hemoglobin 10.1. EGD revealed rule out Candida esophagitis. 3 cm hiatal hernia. Status post distal antrectomy. Billroth II type anastomosis. No abnormality in the small bowel. Colonoscopy tomorrow. Liver ultrasound tomorrow. Continue medical management.

## 2023-08-02 NOTE — PROGRESS NOTES
failure) ([address])     Glaucoma     Hx of blood clots     with hernia surgery    Hyperlipidemia     Hypertension     MI (myocardial infarction) [facility])         Past SurgicalHistory:     Past Surgical History:   Procedure Laterality Date    ABDOMEN SURGERY      gastric resection    APPENDECTOMY      BONE MARROW BIOPSY      CARDIAC CATHETERIZATION      COLONOSCOPY      CT BONE MARROW BIOPSY  5/31/2022    CT BONE MARROW BIOPSY 5/31/2022 [facility] CT SCAN    EYE SURGERY      smiley cataracts    HERNIA REPAIR      inguinal smiley    JOINT REPLACEMENT      rt hip x 2, lt knee    PACEMAKER PLACEMENT      UPPER GASTROINTESTINAL ENDOSCOPY N/A 8/2/2023    EGD BIOPSY performed by Nenita Stewart MD at [address] ENDO        Medications Prior to Admission:     Prior to Admission medications    Medication Sig Start Date End Date Taking?  Authorizing Provider   acetaminophen (TYLENOL) 325 MG tablet Take 2 tablets by mouth every 6 hours as needed for Pain   Yes Historical Provider, MD   docusate sodium (COLACE) 100 MG capsule Take 1 capsule by mouth 2 times daily   Yes Historical Provider, MD   cyanocobalamin 1000 MCG/ML injection Inject 1 mL into the muscle every 30 days   Yes Historical Provider, MD   XARELTO 15 MG TABS tablet TAKE 1 TABLET BY MOUTH DAILY WITH BREAKFAST 7/24/23   Scar Mendoza MD   ondansetron (ZOFRAN) 4 MG tablet Take 1 tablet by mouth 3 times daily as needed for Nausea or Vomiting 7/6/23   Cony Donis MD   ferrous sulfate (IRON 325) 325 (65 Fe) MG tablet Take 1 tablet by mouth every other day 6/23/23   Velma Rojas MD   vitamin D (ERGOCALCIFEROL) 1.25 MG (20982 UT) CAPS capsule TAKE 1 CAPSULE BY MOUTH EVERY WEEK  Patient taking differently: Take 1 capsule by mouth once a week Mondays 6/19/23   Velma Rojas MD   famotidine (PEPCID) 20 MG tablet TAKE 1 TABLET BY MOUTH TWICE DAILY 6/2/23   Cony Donis MD   methocarbamol (ROBAXIN) 750 MG tablet TAKE 1 TABLET BY MOUTH TWICE DAILY AS NEEDED FOR SPASMS    Historical Provider, MD Paroxysmal atrial fibrillation ([address]) [I48.0] 07/25/2022     Priority: Medium    Sick sinus syndrome ([address]) [I49.5] 10/02/2019     Priority: Medium    Chronic anemia [D64.9] 01/22/2019     Priority: Medium    Glaucoma suspect of both eyes [H40.003] 05/30/2017     Priority: Medium    Right upper quadrant abdominal pain [R10.11] 08/02/2023    Altered bowel habits [R19.4] 08/02/2023    Abnormal finding on GI tract imaging [R93.3] 08/02/2023    Abnormal LFTs [R79.89] 08/02/2023    Intractable nausea and vomiting [R11.2] 07/31/2023    Fluid overload [E87.70] 06/25/2021    GERD (gastroesophageal reflux disease) [K21.9] 05/25/2021    Hypertension [I10] 05/25/2021       Plan:     Patient status Admit as inpatient in the  Progressive Unit/Step down    Intractable nausea and vomiting  -Continue Zofran IV 4 mg every 6 hours as needed for nausea  -Continue normal saline infusion at 75 mL/h  -PO intake as tolerated    VARSHA  -Cr elevated at 1.4, baseline about 1.2.  -Home Bumex 2 mg and metolazone 2.5 mg on hold for now  -Gentle hydration with normal saline infusion at 75 mL/h  -Patient follows with nephrology for chronic fluid overload    Paroxsymal Atrial fibrillation  -Continue home Xarelto 50 mg oral daily  -Continue home metoprolol 150 mg oral daily    Coronary artery disease  -Continue home nitroglycerin SL tablet 0.4 mg 3 times daily as needed for chest pain  -Repeat troponin    Essential Hypertension  -Continue home amlodipine 10 mg oral daily  -Hydralazine 10 mg IV every 6 hours as needed for SBP>160    Hx of multiple abdominal surgeries with opioid dependence  -Continue home Suboxone  -Continue home Colace 100 mg daily for constipation  -Start GlycoLax 17 g oral daily as needed for increased constipation    Bilateral Glaucoma  -Continue latanoprost 0.005% ophthalmic solution 1 drop to both eyes nightly      DVT prophylaxis: Continue home Xarelto 15 mg oral daily  GI prophylaxis: Protonix 20 mg oral daily  Diet: Regular adult diet as tolerated  Disposition: Home    OT/PT/SW all consulted    Code Status: Full code        8/2  Patient seen and examined, patient is s/p EGD today, suspicious for Candida esophagitis, will start Diflucan  Patient to have colonoscopy tomorrow  Seen by surgery, no surgical intervention currently  His creatinine has normalized, patient on IV fluids, will decrease the IV fluids, patient on 2 L of oxygen per nasal cannula, no respiratory distress, will check chest x-ray, no pedal edema no sign of volume overload present currently, will resume Bumex likely from tomorrow  Cardiology signed off    Consultations:   IP CONSULT TO INTERNAL MEDICINE  IP CONSULT TO CARDIOLOGY  IP CONSULT TO SOCIAL WORK  IP CONSULT TO GI  [address]

## 2023-08-02 NOTE — CARE COORDINATION
ONGOING DISCHARGE PLAN:    Patient is alert and oriented x4. Spoke with patient regarding discharge plan and patient confirms that plan is agreeable to [facility]. Lives w/ son. He stated he had [facility] in the past and would like again. Referral sent. 29 LB unplanned weight loss, nausea/vomiting  Hgb 10.1    POD #0 EGD-candida esophagus, biopsies taken, erythema  8/3 colonoscopy planned    Will continue to follow for additional discharge needs. If patient is discharged prior to next notation, then this note serves as note for discharge by case management.     Electronically signed by Randal Reyes RN on 8/2/2023 at 11:53 AM

## 2023-08-02 NOTE — H&P (VIEW-ONLY)
GI ATTENDING  Gastroenterology  Consultation Note     . REASON FOR CONSULTATION:  Nausea Vomiting, altered bowel habits    HISTORY OF PRESENT ILLNESS:       Calin Lopez is an 80 y.o.  male with a past medical history of several prior abdominal surgeries and resections, A-fib on Xarelto, CHF on Bumex, and sick sinus syndrome s/p AICD, GERD, and BPH who presents with complaints of nausea/vomiting (intractable) and abdominal pain. Pain is significantly worse with eating. He reports alternating bowel habits predominantly constipation for which he intermittently uses miralax. No evidence of GI bleed noted. Noted to have elevated elk phos (156) and ALT (43). Lipase was normal. CBC w/ normocytic anemia. CT with dilated 2nd and proximal 3rd part of duodenum but no evidence of SMA syndrome. Cardiac workup negative. The patient reports having history of perforated gastric ulcer for which he underwent surgery in 1965. He had one similar episode of abdominal pain few months ago as well. Had appendix removed. Previous GI history:  Last colonoscopy >5 years ago. EGD in 1965 when ulcer diagnosed.     PAST MEDICAL HISTORY:  Past Medical History:   Diagnosis Date    Arthritis     Atrial fibrillation (HCC)     CAD (coronary artery disease)     CHF (congestive heart failure) (HCC)     Glaucoma     Hx of blood clots     with hernia surgery    Hyperlipidemia     Hypertension     MI (myocardial infarction) ([address])      Past Surgical History:   Procedure Laterality Date    ABDOMEN SURGERY      gastric resection    APPENDECTOMY      BONE MARROW BIOPSY      CARDIAC CATHETERIZATION      COLONOSCOPY      CT BONE MARROW BIOPSY  5/31/2022    CT BONE MARROW BIOPSY 5/31/2022 [facility] CT SCAN    EYE SURGERY      smiley cataracts    HERNIA REPAIR      inguinal smiley    JOINT REPLACEMENT      rt hip x 2, lt knee    PACEMAKER PLACEMENT         ALLERGIES:  Allergies   Allergen Reactions    Aspirin Nausea Only    Cyclobenzaprine

## 2023-08-02 NOTE — ANESTHESIA PRE PROCEDURE
Department of Anesthesiology  Preprocedure Note       Name:  Clinton Stark   Age:  80 y.o.  :  1935                                          MRN:  [id number]         Date:  2023      Surgeon: Ramona Hernadez):  Phong Whittington MD    Procedure: Procedure(s):  EGD BIOPSY    Medications prior to admission:   Prior to Admission medications    Medication Sig Start Date End Date Taking?  Authorizing Provider   acetaminophen (TYLENOL) 325 MG tablet Take 2 tablets by mouth every 6 hours as needed for Pain   Yes Historical Provider, MD   docusate sodium (COLACE) 100 MG capsule Take 1 capsule by mouth 2 times daily   Yes Historical Provider, MD   cyanocobalamin 1000 MCG/ML injection Inject 1 mL into the muscle every 30 days   Yes Historical Provider, MD   XARELTO 15 MG TABS tablet TAKE 1 TABLET BY MOUTH DAILY WITH BREAKFAST 23   Burak Juan MD   ondansetron (ZOFRAN) 4 MG tablet Take 1 tablet by mouth 3 times daily as needed for Nausea or Vomiting 23   Bushra Blair MD   ferrous sulfate (IRON 325) 325 (65 Fe) MG tablet Take 1 tablet by mouth every other day 23   Rosa Mansfield MD   vitamin D (ERGOCALCIFEROL) 1.25 MG (14837 UT) CAPS capsule TAKE 1 CAPSULE BY MOUTH EVERY WEEK  Patient taking differently: Take 1 capsule by mouth once a week 23   Rosa Mansfield MD   famotidine (PEPCID) 20 MG tablet TAKE 1 TABLET BY MOUTH TWICE DAILY 23   Bushra Blair MD   methocarbamol (ROBAXIN) 750 MG tablet TAKE 1 TABLET BY MOUTH TWICE DAILY AS NEEDED FOR SPASMS    Historical Provider, MD   metOLazone (ZAROXOLYN) 2.5 MG tablet Take 1 tab on Monday and Friday 3/7/23   Nico Powell MD   finasteride (PROSCAR) 5 MG tablet Take 1 tablet by mouth daily 23   Noemí Guthrie MD   amLODIPine (NORVASC) 5 MG tablet TAKE 2 TABLETS BY MOUTH DAILY 23   Bushra Blair MD   bumetanide (BUMEX) 2 MG tablet Take 1 tablet by mouth daily 11/10/22 6/23/24  Bushra Blair MD   vitamin B-12 (CYANOCOBALAMIN)

## 2023-08-02 NOTE — PLAN OF CARE
Problem: Safety - Adult  Goal: Free from fall injury  8/2/2023 0453 by Henna Jaimes RN  Outcome: Progressing     Problem: Chronic Conditions and Co-morbidities  Goal: Patient's chronic conditions and co-morbidity symptoms are monitored and maintained or improved  8/2/2023 0453 by Henna Jaimes RN  Outcome: Progressing     Problem: Skin/Tissue Integrity  Goal: Absence of new skin breakdown  Description: 1. Monitor for areas of redness and/or skin breakdown  2. Assess vascular access sites hourly  3. Every 4-6 hours minimum:  Change oxygen saturation probe site  4. Every 4-6 hours:  If on nasal continuous positive airway pressure, respiratory therapy assess nares and determine need for appliance change or resting period.   8/2/2023 0453 by Henna Jaimes RN  Outcome: Progressing     Problem: Pain  Goal: Verbalizes/displays adequate comfort level or baseline comfort level  8/2/2023 0453 by Henna Jaimes RN  Outcome: Progressing     Problem: Discharge Planning  Goal: Discharge to home or other facility with appropriate resources  8/2/2023 0453 by Henna Jaimes RN  Outcome: Progressing

## 2023-08-02 NOTE — ANESTHESIA PRE PROCEDURE
WBC 4.4 08/02/2023 04:51 AM    RBC 3.36 08/02/2023 04:51 AM    HGB 10.1 08/02/2023 04:51 AM    HCT 30.6 08/02/2023 04:51 AM    MCV 91.3 08/02/2023 04:51 AM    RDW 13.9 08/02/2023 04:51 AM     08/02/2023 04:51 AM       CMP:   Lab Results   Component Value Date/Time     08/02/2023 04:51 AM    K 3.6 08/02/2023 04:51 AM     08/02/2023 04:51 AM    CO2 23 08/02/2023 04:51 AM    BUN 20 08/02/2023 04:51 AM    CREATININE 0.9 08/02/2023 04:51 AM    GFRAA 59 08/22/2022 10:02 AM    LABGLOM >60 08/02/2023 04:51 AM    GLUCOSE 100 08/02/2023 04:51 AM    PROT 7.9 07/31/2023 11:52 AM    CALCIUM 8.5 08/02/2023 04:51 AM    BILITOT 0.6 07/31/2023 11:52 AM    ALKPHOS 156 07/31/2023 11:52 AM    AST 28 07/31/2023 11:52 AM    ALT 43 07/31/2023 11:52 AM       POC Tests: No results for input(s): POCGLU, POCNA, POCK, POCCL, POCBUN, POCHEMO, POCHCT in the last 72 hours.     Coags:   Lab Results   Component Value Date/Time    PROTIME 20.2 06/28/2023 04:25 PM    INR 1.7 06/28/2023 04:25 PM    APTT 34.6 06/28/2023 04:25 PM       HCG (If Applicable): No results found for: PREGTESTUR, PREGSERUM, HCG, HCGQUANT     ABGs: No results found for: PHART, PO2ART, XSD4GRC, IEW1FEX, BEART, M6FBBDDC     Type & Screen (If Applicable):  No results found for: LABABO, LABRH    Drug/Infectious Status (If Applicable):  Lab Results   Component Value Date/Time    HEPCAB NONREACTIVE 06/26/2021 05:53 AM       COVID-19 Screening (If Applicable):   Lab Results   Component Value Date/Time    COVID19 Not Detected 11/17/2021 05:03 PM           Anesthesia Evaluation  Patient summary reviewed and Nursing notes reviewed no history of anesthetic complications:   Airway: Mallampati: II  TM distance: >3 FB   Neck ROM: full  Mouth opening: > = 3 FB   Dental:    (+) upper dentures and lower dentures      Pulmonary:Negative Pulmonary ROS and normal exam  breath sounds clear to auscultation                             Cardiovascular:    (+) hypertension:,

## 2023-08-02 NOTE — OP NOTE
EGD report    Esophagogastroduodenoscopy (EGD) Procedure Note    Procedure:  EGD with Biopsies    Indications:  Nausea/vomiting, altered bowel habits, abdominal pain    Sedation:  MAC    Attending Physician:  Dr. Nikolas Tiwari MD    Assistant:  None    Procedure Details:    Informed consent was obtained for the procedure, including sedation. Risks of infection, perforation, hemorrhage, adverse drug reaction, and aspiration were discussed. The patient was placed in the left lateral decubitus position. The patient was monitored continuously with ECG tracing, pulse oximetry, blood pressure monitoring, and direct observation. The gastroscope was inserted into the mouth and advanced under direct vision to proximal jejunum. A careful inspection was made as the gastroscope was withdrawn, including a retroflexed view of the proximal stomach; findings and interventions are described below. Appropriate photodocumentation was obtained. Findings: The esophagus was noted to have white plaques in the upper and middle segment. Biopsies obtained to rule out Candida. The Z line was regular and noted at 40 cm from the incisors. A 3 cm hiatal hernia was noted. Surgical changes noted in the stomach characterized by distal antrectomy, anastomosis that opened into small bowel. 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 The afferent limb appeared to be patent and continued into the remaining small bowel. No abnormality noted in small bowel. Biopsies from small bowel obtained. The anastomotic area had erythema and few scattered erosions. Biopsies from these area obtained to rule out H pylori. Complications:  None           Estimated blood loss:  Minimal    Disposition:  [facility]           Condition: stable    Impression:    White plaque lesions in the upper and middle esophagus with suspicion of candida. Biopsied.   Surgical

## 2023-08-02 NOTE — PROGRESS NOTES
Pt picked up by transport for EGD prior to K+ being administered, surgery notified. Writer spoke with Donna Marroquin regarding same. Pt asymptomatic regarding CP and N/V at this time.

## 2023-08-02 NOTE — PROGRESS NOTES
[address]    PROGRESS NOTE             8/2/2023    1:52 PM    Name:   Lesa Cerna  MRN:     [id number]     Acct:      [de-identified]   Room:   2122/2122-01   Day:  2  Admit Date:  7/31/2023 10:46 AM    PCP:  Viviana Henderson MD  Code Status:  Full Code    Subjective:     C/C:   Chief Complaint   Patient presents with    Emesis    Abdominal Pain    Chest Pain     Interval History Status: not changed. The patient was examined at bedside. Stress test came back with low risk and the ECG showed left bundle branch block with normal rhythm, echocardiogram showed preserved ejection fraction, cardiology did sign off the patient. VARSHA has resolved. Patient was not able to keep food down complaining of severe nausea vomiting and abdominal pain General surgery was consulted. Abdominal CT was done and showed chronic finding, patient lost 30 pounds nausea and vomiting. EGD was done and showed white plaque in mid and lower esophagus and anastomotic area from previous Billroth II reconstruction showed few erosions and erythema. Biopsies obtained from the small bowel and esophagus. Continue to follow biopsy results. His potassium today was 3.6 and we did replace it. Brief History:     Lesa Cerna is an 80 y.o.  male with a past medical history of several prior abdominal surgeries and resections, A-fib on Xarelto, CHF on Bumex, and sick sinus syndrome s/p AICD, GERD, history of perforated peptic ulcer disease, gastrectomy and BPH who presents with a 3 to 4-day history of nausea, vomiting, abdominal pain, and inability to tolerate oral intake. He has had 4-5 episodes of emesis with constant nausea and dry heaving. He describes his abdominal pain as an achy diffuse pain which is constant. The pain is made worse with oral intake, and it is made better with Dilaudid and Zofran which he received in the ED.  he

## 2023-08-02 NOTE — DISCHARGE INSTR - COC
Continuity of Care Form    Patient Name: Lesa Cerna   :  1935  MRN:  [id number]    Admit date:  2023  Discharge date:  2023    Code Status Order: Full Code   Advance Directives:   [address] Documentation       Date/Time Healthcare Directive Type of Healthcare Directive Copy in [facility] Agent's Name Healthcare Agent's Phone Number    [phone] No, patient does not have an advance directive for healthcare treatment  educated on forms -- -- -- -- --            Admitting Physician:  Leslie Enrique MD  PCP: Viviana Henderson MD    Discharging Nurse:  Karin Lemos, [address] Unit/Room#: 2122/212-01  Discharging Unit Phone Number: [phone]    Emergency Contact:   Extended Emergency Contact Information  Primary Emergency Contact: Paulita Hamman, [address] [place] [address] Phone: [phone]  Mobile Phone: [phone]  Relation: Child    Past Surgical History:  Past Surgical History:   Procedure Laterality Date    ABDOMEN SURGERY      gastric resection    APPENDECTOMY      BONE MARROW BIOPSY      CARDIAC CATHETERIZATION      COLONOSCOPY      CT BONE MARROW BIOPSY  2022    CT BONE MARROW BIOPSY 2022 [facility] CT SCAN    EYE SURGERY      smiley cataracts    HERNIA REPAIR      inguinal smiley    JOINT REPLACEMENT      rt hip x 2, lt knee    PACEMAKER PLACEMENT      UPPER GASTROINTESTINAL ENDOSCOPY N/A 2023    EGD BIOPSY performed by Magen Du MD at [address] ENDO       Immunization History:   Immunization History   Administered Date(s) Administered    COVID-19, PFIZER PURPLE top, DILUTE for use, (age 15 y+), 30mcg/0.3mL 2021, 2021    Influenza Virus Vaccine 10/30/2011, 10/09/2015, 2016    Influenza, FLUZONE (age 72 y+), High Dose, 0.7mL 2020, 10/13/2021, 2022    Influenza, High Dose (Fluzone 65 yrs and older) 10/20/2018, 10/01/2019    Pneumococcal, PCV-13, PREVNAR 15, (age 6w+), IM, 0.5mL 10/30/2019    Pneumococcal, PCV20, PREVNAR 1500 ml  Last Modified Barium Swallow with Video (Video Swallowing Test): not done    Treatments at the Time of Hospital Discharge:   Respiratory Treatments: none  Oxygen Therapy:  is not on home oxygen therapy. Ventilator:    - No ventilator support    Rehab Therapies: Physical Therapy and Occupational Therapy  Weight Bearing Status/Restrictions: No weight bearing restrictions  Other Medical Equipment (for information only, NOT a DME order):  cane  Other Treatments: Skilled Nursing assessment and monitoring. Medication education and monitoring per protocol. Patient's personal belongings (please select all that are sent with patient): All belongings left with patient and ride. RN SIGNATURE:  Electronically signed by Maurice Rosario RN on 8/3/23 at 4:46 PM EDT    CASE MANAGEMENT/SOCIAL WORK SECTION    Inpatient Status Date: 7/31/2023    Readmission Risk Assessment Score:  Readmission Risk              Risk of Unplanned Readmission:  20           Discharging to Facility/ [address] [place] [address]  P: [phone]  F: [phone]     Dialysis Facility (if applicable)   Name:  Address:  Dialysis Schedule:  Phone:  Fax:    / signature: Electronically signed by Kris Figueroa RN on 8/2/23 at 2:26 PM EDT    PHYSICIAN SECTION    Prognosis: Good    Condition at Discharge: Stable    Rehab Potential (if transferring to Rehab): Good    Recommended Labs or Other Treatments After Discharge:     Physician Certification: I certify the above information and transfer of Neisha Manzo  is necessary for the continuing treatment of the diagnosis listed and that he requires Home Care for less 30 days.      Update Admission H&P: No change in H&P    PHYSICIAN SIGNATURE:  Electronically signed by Marivel Nolasco MD on 8/3/23 at 3:07 PM EDT

## 2023-08-02 NOTE — PLAN OF CARE
Problem: Discharge Planning  Goal: Discharge to home or other facility with appropriate resources  8/2/2023 1741 by Sharon Bustos RN  Outcome: Progressing     Problem: Pain  Goal: Verbalizes/displays adequate comfort level or baseline comfort level  8/2/2023 1741 by Sharon Bustos RN  Outcome: Progressing     Problem: ABCDS Injury Assessment  Goal: Absence of physical injury  8/2/2023 1741 by Sharon Bustos RN  Outcome: Progressing     Problem: Skin/Tissue Integrity  Goal: Absence of new skin breakdown  Description: 1. Monitor for areas of redness and/or skin breakdown  2. Assess vascular access sites hourly  3. Every 4-6 hours minimum:  Change oxygen saturation probe site  4. Every 4-6 hours:  If on nasal continuous positive airway pressure, respiratory therapy assess nares and determine need for appliance change or resting period.   8/2/2023 1741 by Sharon Bustos RN  Outcome: Progressing     Problem: Safety - Adult  Goal: Free from fall injury  8/2/2023 1741 by Sharon Bustos RN  Outcome: Progressing     Problem: Chronic Conditions and Co-morbidities  Goal: Patient's chronic conditions and co-morbidity symptoms are monitored and maintained or improved  8/2/2023 1741 by Sharon Bustos RN  Outcome: Progressing

## 2023-08-03 ENCOUNTER — APPOINTMENT (OUTPATIENT)
Dept: ULTRASOUND IMAGING | Age: 88
DRG: 368 | End: 2023-08-03
Payer: MEDICARE

## 2023-08-03 ENCOUNTER — TELEPHONE (OUTPATIENT)
Dept: INTERNAL MEDICINE CLINIC | Age: 88
End: 2023-08-03

## 2023-08-03 ENCOUNTER — ANESTHESIA (OUTPATIENT)
Dept: ENDOSCOPY | Age: 88
End: 2023-08-03
Payer: MEDICARE

## 2023-08-03 LAB
ANION GAP SERPL CALCULATED.3IONS-SCNC: 8 MMOL/L (ref 9–17)
BASOPHILS # BLD: 0 K/UL (ref 0–0.2)
BASOPHILS NFR BLD: 0 % (ref 0–2)
BUN SERPL-MCNC: 13 MG/DL (ref 8–23)
CALCIUM SERPL-MCNC: 8.4 MG/DL (ref 8.6–10.4)
CHLORIDE SERPL-SCNC: 107 MMOL/L (ref 98–107)
CO2 SERPL-SCNC: 23 MMOL/L (ref 20–31)
CREAT SERPL-MCNC: 0.8 MG/DL (ref 0.7–1.2)
EOSINOPHIL # BLD: 0.2 K/UL (ref 0–0.4)
EOSINOPHILS RELATIVE PERCENT: 3 % (ref 0–4)
ERYTHROCYTE [DISTWIDTH] IN BLOOD BY AUTOMATED COUNT: 14 % (ref 11.5–14.9)
GFR SERPL CREATININE-BSD FRML MDRD: >60 ML/MIN/1.73M2
GLUCOSE SERPL-MCNC: 106 MG/DL (ref 70–99)
HCT VFR BLD AUTO: 30.3 % (ref 41–53)
HGB BLD-MCNC: 9.8 G/DL (ref 13.5–17.5)
LYMPHOCYTES NFR BLD: 0.8 K/UL (ref 1–4.8)
LYMPHOCYTES RELATIVE PERCENT: 13 % (ref 24–44)
MCH RBC QN AUTO: 30.2 PG (ref 26–34)
MCHC RBC AUTO-ENTMCNC: 32.4 G/DL (ref 31–37)
MCV RBC AUTO: 93.1 FL (ref 80–100)
MONOCYTES NFR BLD: 0.6 K/UL (ref 0.1–1.3)
MONOCYTES NFR BLD: 9 % (ref 1–7)
NEUTROPHILS NFR BLD: 75 % (ref 36–66)
NEUTS SEG NFR BLD: 4.9 K/UL (ref 1.3–9.1)
PLATELET # BLD AUTO: 187 K/UL (ref 150–450)
PMV BLD AUTO: 7.5 FL (ref 6–12)
POTASSIUM SERPL-SCNC: 4.3 MMOL/L (ref 3.7–5.3)
RBC # BLD AUTO: 3.25 M/UL (ref 4.5–5.9)
SODIUM SERPL-SCNC: 138 MMOL/L (ref 135–144)
WBC OTHER # BLD: 6.6 K/UL (ref 3.5–11)

## 2023-08-03 PROCEDURE — 2580000003 HC RX 258: Performed by: INTERNAL MEDICINE

## 2023-08-03 PROCEDURE — 3700000000 HC ANESTHESIA ATTENDED CARE: Performed by: INTERNAL MEDICINE

## 2023-08-03 PROCEDURE — 7100000001 HC PACU RECOVERY - ADDTL 15 MIN: Performed by: INTERNAL MEDICINE

## 2023-08-03 PROCEDURE — 1200000000 HC SEMI PRIVATE

## 2023-08-03 PROCEDURE — 3609010300 HC COLONOSCOPY W/BIOPSY SINGLE/MULTIPLE: Performed by: INTERNAL MEDICINE

## 2023-08-03 PROCEDURE — 2709999900 HC NON-CHARGEABLE SUPPLY: Performed by: INTERNAL MEDICINE

## 2023-08-03 PROCEDURE — 0DBE8ZX EXCISION OF LARGE INTESTINE, VIA NATURAL OR ARTIFICIAL OPENING ENDOSCOPIC, DIAGNOSTIC: ICD-10-PCS | Performed by: INTERNAL MEDICINE

## 2023-08-03 PROCEDURE — 36415 COLL VENOUS BLD VENIPUNCTURE: CPT

## 2023-08-03 PROCEDURE — 80048 BASIC METABOLIC PNL TOTAL CA: CPT

## 2023-08-03 PROCEDURE — 6370000000 HC RX 637 (ALT 250 FOR IP): Performed by: INTERNAL MEDICINE

## 2023-08-03 PROCEDURE — 85025 COMPLETE CBC W/AUTO DIFF WBC: CPT

## 2023-08-03 PROCEDURE — 3700000001 HC ADD 15 MINUTES (ANESTHESIA): Performed by: INTERNAL MEDICINE

## 2023-08-03 PROCEDURE — 6360000002 HC RX W HCPCS: Performed by: NURSE ANESTHETIST, CERTIFIED REGISTERED

## 2023-08-03 PROCEDURE — 76705 ECHO EXAM OF ABDOMEN: CPT

## 2023-08-03 PROCEDURE — 7100000000 HC PACU RECOVERY - FIRST 15 MIN: Performed by: INTERNAL MEDICINE

## 2023-08-03 PROCEDURE — 99233 SBSQ HOSP IP/OBS HIGH 50: CPT | Performed by: INTERNAL MEDICINE

## 2023-08-03 PROCEDURE — 88305 TISSUE EXAM BY PATHOLOGIST: CPT

## 2023-08-03 PROCEDURE — 45380 COLONOSCOPY AND BIOPSY: CPT | Performed by: INTERNAL MEDICINE

## 2023-08-03 PROCEDURE — 2500000003 HC RX 250 WO HCPCS: Performed by: NURSE ANESTHETIST, CERTIFIED REGISTERED

## 2023-08-03 RX ORDER — FLUCONAZOLE 200 MG/1
200 TABLET ORAL DAILY
Qty: 7 TABLET | Refills: 0 | Status: SHIPPED | OUTPATIENT
Start: 2023-08-04 | End: 2023-08-11

## 2023-08-03 RX ORDER — LIDOCAINE HYDROCHLORIDE 10 MG/ML
INJECTION, SOLUTION EPIDURAL; INFILTRATION; INTRACAUDAL; PERINEURAL PRN
Status: DISCONTINUED | OUTPATIENT
Start: 2023-08-03 | End: 2023-08-03 | Stop reason: SDUPTHER

## 2023-08-03 RX ORDER — PROPOFOL 10 MG/ML
INJECTION, EMULSION INTRAVENOUS PRN
Status: DISCONTINUED | OUTPATIENT
Start: 2023-08-03 | End: 2023-08-03 | Stop reason: SDUPTHER

## 2023-08-03 RX ORDER — EPHEDRINE SULFATE/0.9% NACL/PF 50 MG/5 ML
SYRINGE (ML) INTRAVENOUS PRN
Status: DISCONTINUED | OUTPATIENT
Start: 2023-08-03 | End: 2023-08-03 | Stop reason: SDUPTHER

## 2023-08-03 RX ORDER — PROPOFOL 10 MG/ML
INJECTION, EMULSION INTRAVENOUS CONTINUOUS PRN
Status: DISCONTINUED | OUTPATIENT
Start: 2023-08-03 | End: 2023-08-03 | Stop reason: SDUPTHER

## 2023-08-03 RX ADMIN — BUPRENORPHINE AND NALOXONE 1 FILM: 8; 2 FILM BUCCAL; SUBLINGUAL at 11:42

## 2023-08-03 RX ADMIN — LIDOCAINE HYDROCHLORIDE 50 MG: 10 INJECTION, SOLUTION EPIDURAL; INFILTRATION; INTRACAUDAL; PERINEURAL at 08:30

## 2023-08-03 RX ADMIN — SODIUM CHLORIDE: 9 INJECTION, SOLUTION INTRAVENOUS at 08:07

## 2023-08-03 RX ADMIN — LATANOPROST 1 DROP: 50 SOLUTION OPHTHALMIC at 23:16

## 2023-08-03 RX ADMIN — FINASTERIDE 5 MG: 5 TABLET, FILM COATED ORAL at 14:05

## 2023-08-03 RX ADMIN — SODIUM CHLORIDE, PRESERVATIVE FREE 10 ML: 5 INJECTION INTRAVENOUS at 23:16

## 2023-08-03 RX ADMIN — Medication 10 MG: at 08:41

## 2023-08-03 RX ADMIN — PROPOFOL 70 MG: 10 INJECTION, EMULSION INTRAVENOUS at 08:30

## 2023-08-03 RX ADMIN — BUPRENORPHINE AND NALOXONE 1 FILM: 8; 2 FILM BUCCAL; SUBLINGUAL at 23:16

## 2023-08-03 RX ADMIN — SODIUM CHLORIDE, PRESERVATIVE FREE 10 ML: 5 INJECTION INTRAVENOUS at 11:46

## 2023-08-03 RX ADMIN — METOPROLOL SUCCINATE 150 MG: 100 TABLET, EXTENDED RELEASE ORAL at 14:06

## 2023-08-03 RX ADMIN — AMLODIPINE BESYLATE 10 MG: 10 TABLET ORAL at 14:04

## 2023-08-03 RX ADMIN — PROPOFOL 75 MCG/KG/MIN: 10 INJECTION, EMULSION INTRAVENOUS at 08:30

## 2023-08-03 RX ADMIN — Medication 10 MG: at 08:51

## 2023-08-03 RX ADMIN — FLUCONAZOLE 200 MG: 100 TABLET ORAL at 14:06

## 2023-08-03 RX ADMIN — DOCUSATE SODIUM 100 MG: 100 CAPSULE, LIQUID FILLED ORAL at 23:16

## 2023-08-03 ASSESSMENT — PAIN - FUNCTIONAL ASSESSMENT: PAIN_FUNCTIONAL_ASSESSMENT: 0-10

## 2023-08-03 NOTE — OP NOTE
Colonoscopy report    Colonoscopy Procedure Note    Procedure:  Colonoscopy w/ biopsies    Indications: Alternating bowel habits, Abdominal pain, anemia, unintentional weight loss    Sedation:  MAC    Attending Physician:  Dr. Oskar Nuno MD.     Assistant Physician: None    Consent:   Informed consent was obtained for the procedure after explaining the risks including bleeding, perforation, aspiration, arrhythmia, risks related to sedation, reaction to medications and rarely death, benefits and alternatives to the patient. The patient verbalized understanding and agreed to proceed with the procedure. Procedure Details: The patient was placed in the left lateral decubitus position. Oxygen and cardiac monitoring equipment was attached. The patient's vital signs were monitored continuously  throughout the procedure. After appropriate sedation was achieved, a rectal examination was performed. The pediatric colonoscope was inserted into the rectum and advanced under direct vision to the cecum, which was identified by the ileocecal valve and appendiceal orifice. The quality of the colonic preparation was poor. A careful inspection was made as the colonoscope was withdrawn, including a retroflexed view of the rectum; findings and interventions are described below. Appropriate photodocumentation was obtained. Complications:  None    Estimated blood loss:  Minimal           [facility]position:  [facility]           Condition: stable    Withdrawal Time: 9 minutes    Findings: The bowel prep was poor. The advancement of the scope was technically challenging due to significant looping and redundant colon. Successful intubation of cecum achieved with abdominal pressure. The terminal ileum could not be intubated due to significant looping of the colonoscope. The mucosa appeared normal within the limitations of the exam. Random colon biopsies obtained.    No significant masses or large polyps seen, however, given the prep subtle lesions could be missed. The retroflexion exam was also unremarkable. Endoscopic Impression:    Poor prep. Normal mucosa, biopsies for microscopic colitis obtained. Otherwise normal colonoscopy. Recommendations: Follow pathology results. Given the prep, may consider repeating colonoscopy as outpatient with 2 day extended prep within 6 months. Miralax daily for constipation  No objection to discharge the patient today after US completed. Attending Attestation: I performed the procedure.     Jordan Mehta MD

## 2023-08-03 NOTE — PROGRESS NOTES
Physician Progress Note      Dario Casillas  CSN #:                  [id number]  :                       1935  ADMIT DATE:       2023 10:46 AM  DISCH DATE:  RESPONDING  PROVIDER #:        Rick Norris MD          QUERY TEXT:    Patient admitted with chest pain and abdominal pain. Noted documentation of   NSTEMI and atypical chest pain, likely musculoskeletal.  If possible, please document in progress notes and discharge summary if you   are evaluating and /or treating any of the following: The medical record reflects the following:  Risk Factors: chest pain  Clinical Indicators: presented with chest pain and abdominal pain, NSTEMI is   documented as well as atypical chest pain likely musculoskeletal  Treatment: Cardiology consult, labs, imaging, monitoring  Options provided:  -- NSTEMI confirmed and musculoskeletal chest pain ruled out  -- Musculoskeletal chest pain confirmed and NSTEMI ruled out  -- NSTEMI and musculoskeletal chest pain confirmed  -- Other - I will add my own diagnosis  -- Disagree - Not applicable / Not valid  -- Disagree - Clinically unable to determine / Unknown  -- Refer to Clinical Documentation Reviewer    PROVIDER RESPONSE TEXT:    After study, NSTEMI confirmed and musculoskeletal chest pain ruled out.     Query created by: Niko Callejas on 2023 9:58 AM      Electronically signed by:  Rick Norris MD 8/3/2023 3:10 PM

## 2023-08-03 NOTE — PROGRESS NOTES
Discussed patient's concerns for discharge today with Resident team. Patient worried to return home today d/t frequent stools and  his son not available until tomorrow to help with stand-by assistance. Patient notified he will discharge tomorrow.

## 2023-08-03 NOTE — PLAN OF CARE
Problem: Discharge Planning  Goal: Discharge to home or other facility with appropriate resources  8/2/2023 1741 by Johanna Odonnell RN  Outcome: Progressing     Problem: Pain  Goal: Verbalizes/displays adequate comfort level or baseline comfort level  8/2/2023 1741 by Johanna Odonnell RN  Outcome: Progressing     Problem: ABCDS Injury Assessment  Goal: Absence of physical injury  8/2/2023 1741 by Johanna Odonnell RN  Outcome: Progressing     Problem: Skin/Tissue Integrity  Goal: Absence of new skin breakdown  Description: 1. Monitor for areas of redness and/or skin breakdown  2. Assess vascular access sites hourly  3. Every 4-6 hours minimum:  Change oxygen saturation probe site  4. Every 4-6 hours:  If on nasal continuous positive airway pressure, respiratory therapy assess nares and determine need for appliance change or resting period.   8/2/2023 1741 by Johanna Odonnell RN  Outcome: Progressing     Problem: Safety - Adult  Goal: Free from fall injury  8/2/2023 1741 by Johanna Odonnell RN  Outcome: Progressing     Problem: Chronic Conditions and Co-morbidities  Goal: Patient's chronic conditions and co-morbidity symptoms are monitored and maintained or improved  8/2/2023 1741 by Johanna Odonnell RN  Outcome: Progressing

## 2023-08-03 NOTE — PROGRESS NOTES
Portions of the distal 2nd and proximal 3rd portion of the duodenum are distended, similar to prior exam.  This may be a chronic finding. There do not appear to be findings related to the SMA origin off the aorta to suggest SMA syndrome. Unopacified large or small bowel otherwise appears to be normal in caliber. No definite wall thickening is seen to unopacified large or small bowel. Appendix not definitely seen but no findings to suggest acute appendicitis. Stomach appears grossly unremarkable. Interposition of large bowel under the right hemidiaphragm anteriorly. Pelvis: Streak artifact from right hip prosthesis somewhat limits evaluation. Unremarkable urinary bladder and prostate within this limitation. Peritoneum/Retroperitoneum: No ascites or focal fluid collections. No intraperitoneal free air. No evidence for abdominal aortic aneurysm. No lymphadenopathy. Bones/Soft Tissues: No acute bone or soft tissue abnormality. No suspicious focal bony lesions. Chronic postsurgical change along with multilevel degenerative change redemonstrated to the spine, not appreciably changed from prior exam.  No evidence for hardware complication is seen. Right hip prosthesis is redemonstrated. Chronic heterotopic bone formation redemonstrated extending inferiorly from the right superior pubic ramus. Portions of the distal 2nd and proximal 3rd portion of the duodenum are distended, similar to prior remote exam from 03/12/2022. This may be a chronic finding. No findings involving origin of the SMA off the aorta to suggest SMA syndrome. Stable right adrenal nodule measuring 1.8 cm. This was previously evaluated by adrenal mass protocol CT 04/20/2022 and reference to that study can be made for additional information.   Current study can serve as the previously recommended six-month follow-up from the adrenal mass protocol CT and given the stability the right adrenal nodule is felt compatible with benign lipid poor attending, Dr Juan F Santillan. Franklin Sheets MD  TY Resident  8/3/2023 10:44 AM        Attending Physician Statement  I have discussed the care of Alina Orta and I have examined the patient myself and taken ROS and HPI, including pertinent history and exam findings, with the resident. I have reviewed the key elements of all parts of the encounter with the resident. I agree with the assessment, plan and orders as documented by the resident. Patient symptomatically improved, cleared for discharge by GI and cardiology, to follow-up with GI as outpatient,, drop in hemoglobin with hemoglobin of 9.8, will send on p.o. iron,   Continue to have loose stool likely secondary to GoLytely, patient to have colonoscopy as outpatient and follow-up closely with GI as outpatient,  Discharge today.   Electronically signed by Juan Bermudez MD

## 2023-08-03 NOTE — CARE COORDINATION
ONGOING DISCHARGE PLAN:    Patient is alert and oriented x4. Spoke with patient regarding discharge plan and patient confirms that plan is still home with [facility]. POD #1 EGD-candida esophagus, biopsies taken, erythema    Oral diflucan    POD #0 colonoscopy-poor prep, repeat OP     ABD US pending    IMM letter provided to patient. Patient offered four hours to make informed decision regarding appeal process; patient agreeable to discharge. Will continue to follow for additional discharge needs. If patient is discharged prior to next notation, then this note serves as note for discharge by case management.     Electronically signed by Ke Parker RN on 8/3/2023 at 11:15 AM

## 2023-08-03 NOTE — PROGRESS NOTES
[id number] [facility]   OCCUPATIONAL THERAPY MISSED TREATMENT NOTE   INPATIENT   Date: 8/3/23  Patient Name: Lesa Cerna       Room: [facility]  MRN: [id number]   Account #: [de-identified]    : 1935  (80 y.o.)  Gender: male   Referring Practitioner: Amaury Valentin MD  Diagnosis: Epigastric pain             REASON FOR MISSED TREATMENT:  8/3/23    -   Surgery  - pt O[facility] for colonoscopy with biopsy. OT will check status in PM as time permits.     [id number]       Electronically signed by SHEELA Garcia on 8/3/23 at 9:07 AM EDT

## 2023-08-03 NOTE — PLAN OF CARE
Problem: Discharge Planning  Goal: Discharge to home or other facility with appropriate resources  Outcome: Progressing  Flowsheets (Taken 8/3/2023 1620)  Discharge to home or other facility with appropriate resources:   Identify barriers to discharge with patient and caregiver   Arrange for needed discharge resources and transportation as appropriate   Identify discharge learning needs (meds, wound care, etc)  Note: Pt scheduled to discharge tomorrow, see notes. Problem: Pain  Goal: Verbalizes/displays adequate comfort level or baseline comfort level  Outcome: Progressing  Flowsheets (Taken 8/3/2023 1620)  Verbalizes/displays adequate comfort level or baseline comfort level:   Encourage patient to monitor pain and request assistance   Assess pain using appropriate pain scale   Implement non-pharmacological measures as appropriate and evaluate response  Note: Patient denies pain this shift. Problem: ABCDS Injury Assessment  Goal: Absence of physical injury  Outcome: Progressing  Flowsheets (Taken 8/3/2023 1620)  Absence of Physical Injury: Implement safety measures based on patient assessment  Note: Fall assessment performed and appropriate measures implemented. Room freed from clutter. Bed in lowest position with wheels locked. Call light in place. ID band in place. Problem: Skin/Tissue Integrity  Goal: Absence of new skin breakdown  Description: 1. Monitor for areas of redness and/or skin breakdown  2. Assess vascular access sites hourly  3. Every 4-6 hours minimum:  Change oxygen saturation probe site  4. Every 4-6 hours:  If on nasal continuous positive airway pressure, respiratory therapy assess nares and determine need for appliance change or resting period. Outcome: Progressing  Note: Skin assessment complete. No new skin breakdown issues this shift. Will continue to monitor for additional needs or changes to skin breakdown.        Problem: Safety - Adult  Goal: Free from fall

## 2023-08-04 VITALS
HEIGHT: 73 IN | WEIGHT: 183.2 LBS | TEMPERATURE: 98.7 F | SYSTOLIC BLOOD PRESSURE: 97 MMHG | BODY MASS INDEX: 24.28 KG/M2 | HEART RATE: 59 BPM | DIASTOLIC BLOOD PRESSURE: 58 MMHG | RESPIRATION RATE: 16 BRPM | OXYGEN SATURATION: 99 %

## 2023-08-04 LAB
ANA SER QL IA: NEGATIVE
ANION GAP SERPL CALCULATED.3IONS-SCNC: 11 MMOL/L (ref 9–17)
BASOPHILS # BLD: 0 K/UL (ref 0–0.2)
BASOPHILS NFR BLD: 0 % (ref 0–2)
BUN SERPL-MCNC: 11 MG/DL (ref 8–23)
CALCIUM SERPL-MCNC: 8.6 MG/DL (ref 8.6–10.4)
CHLORIDE SERPL-SCNC: 105 MMOL/L (ref 98–107)
CO2 SERPL-SCNC: 21 MMOL/L (ref 20–31)
CREAT SERPL-MCNC: 0.8 MG/DL (ref 0.7–1.2)
DSDNA IGG SER QL IA: <0.5 IU/ML
EOSINOPHIL # BLD: 0.2 K/UL (ref 0–0.4)
EOSINOPHILS RELATIVE PERCENT: 4 % (ref 0–4)
ERYTHROCYTE [DISTWIDTH] IN BLOOD BY AUTOMATED COUNT: 14.3 % (ref 11.5–14.9)
GFR SERPL CREATININE-BSD FRML MDRD: >60 ML/MIN/1.73M2
GLIADIN IGA SER IA-ACNC: 2.6 U/ML
GLIADIN IGG SER IA-ACNC: 1.9 U/ML
GLUCOSE SERPL-MCNC: 118 MG/DL (ref 70–99)
HCT VFR BLD AUTO: 29.6 % (ref 41–53)
HGB BLD-MCNC: 9.1 G/DL (ref 13.5–17.5)
IGA SERPL-MCNC: 227 MG/DL (ref 70–400)
IGG 1: 753 MG/DL (ref 240–1118)
IGG 2: 445 MG/DL (ref 124–549)
IGG 3: 141 MG/DL (ref 21–134)
IGG4 SER-MCNC: 29 MG/DL (ref 1–123)
LYMPHOCYTES NFR BLD: 0.7 K/UL (ref 1–4.8)
LYMPHOCYTES RELATIVE PERCENT: 17 % (ref 24–44)
MCH RBC QN AUTO: 28.7 PG (ref 26–34)
MCHC RBC AUTO-ENTMCNC: 30.6 G/DL (ref 31–37)
MCV RBC AUTO: 93.7 FL (ref 80–100)
MITOCHONDRIA M2 IGG SER-ACNC: 1.1 U/ML (ref 0–4)
MONOCYTES NFR BLD: 0.4 K/UL (ref 0.1–1.3)
MONOCYTES NFR BLD: 10 % (ref 1–7)
NEUTROPHILS NFR BLD: 69 % (ref 36–66)
NEUTS SEG NFR BLD: 3 K/UL (ref 1.3–9.1)
NUCLEAR IGG SER IA-RTO: 0.5 U/ML
PLATELET # BLD AUTO: 182 K/UL (ref 150–450)
PMV BLD AUTO: 7.5 FL (ref 6–12)
POTASSIUM SERPL-SCNC: 4.4 MMOL/L (ref 3.7–5.3)
RBC # BLD AUTO: 3.16 M/UL (ref 4.5–5.9)
SMOOTH MUSCLE ANTIBODY: 11 UNITS (ref 0–19)
SODIUM SERPL-SCNC: 137 MMOL/L (ref 135–144)
SURGICAL PATHOLOGY REPORT: NORMAL
TTG IGA SER IA-ACNC: 0.7 U/ML
WBC OTHER # BLD: 4.3 K/UL (ref 3.5–11)

## 2023-08-04 PROCEDURE — 99232 SBSQ HOSP IP/OBS MODERATE 35: CPT | Performed by: INTERNAL MEDICINE

## 2023-08-04 PROCEDURE — 36415 COLL VENOUS BLD VENIPUNCTURE: CPT

## 2023-08-04 PROCEDURE — 6370000000 HC RX 637 (ALT 250 FOR IP): Performed by: INTERNAL MEDICINE

## 2023-08-04 PROCEDURE — 97110 THERAPEUTIC EXERCISES: CPT

## 2023-08-04 PROCEDURE — 97530 THERAPEUTIC ACTIVITIES: CPT

## 2023-08-04 PROCEDURE — 80048 BASIC METABOLIC PNL TOTAL CA: CPT

## 2023-08-04 PROCEDURE — 85025 COMPLETE CBC W/AUTO DIFF WBC: CPT

## 2023-08-04 PROCEDURE — 2580000003 HC RX 258: Performed by: INTERNAL MEDICINE

## 2023-08-04 RX ORDER — FERROUS SULFATE 325(65) MG
325 TABLET ORAL EVERY OTHER DAY
Qty: 90 TABLET | Refills: 0 | Status: SHIPPED | OUTPATIENT
Start: 2023-08-04

## 2023-08-04 RX ADMIN — METOPROLOL SUCCINATE 150 MG: 100 TABLET, EXTENDED RELEASE ORAL at 08:21

## 2023-08-04 RX ADMIN — FLUCONAZOLE 200 MG: 100 TABLET ORAL at 08:21

## 2023-08-04 RX ADMIN — FINASTERIDE 5 MG: 5 TABLET, FILM COATED ORAL at 08:23

## 2023-08-04 RX ADMIN — AMLODIPINE BESYLATE 10 MG: 10 TABLET ORAL at 08:23

## 2023-08-04 RX ADMIN — SODIUM CHLORIDE, PRESERVATIVE FREE 10 ML: 5 INJECTION INTRAVENOUS at 08:25

## 2023-08-04 RX ADMIN — BUMETANIDE 2 MG: 1 TABLET ORAL at 08:23

## 2023-08-04 RX ADMIN — DOCUSATE SODIUM 100 MG: 100 CAPSULE, LIQUID FILLED ORAL at 08:21

## 2023-08-04 RX ADMIN — PANTOPRAZOLE SODIUM 40 MG: 40 TABLET, DELAYED RELEASE ORAL at 05:51

## 2023-08-04 RX ADMIN — BUPRENORPHINE AND NALOXONE 1 FILM: 8; 2 FILM BUCCAL; SUBLINGUAL at 08:30

## 2023-08-04 ASSESSMENT — ENCOUNTER SYMPTOMS
DIARRHEA: 0
RECTAL PAIN: 0
COUGH: 0
VOMITING: 0
ABDOMINAL PAIN: 0
BLOOD IN STOOL: 0
ABDOMINAL DISTENTION: 0
CONSTIPATION: 0
NAUSEA: 1
SHORTNESS OF BREATH: 0

## 2023-08-04 NOTE — PROGRESS NOTES
[address] [facility]   INPATIENT OCCUPATIONAL THERAPY  PROGRESS NOTE  Date: 2023  Patient Name: Neena Page       Room: 6312/5424-44  MRN: [id number]    : 1935  (80 y.o.)  Gender: male   Referring Practitioner: Chetna Garcia MD  Diagnosis: Epigastric pain      Discharge Recommendations:  Further Occupational Therapy is recommended upon facility discharge. OT Equipment Recommendations  Other: TBD    Restrictions/Precautions  Restrictions/Precautions  Restrictions/Precautions: General Precautions; Fall Risk  Implants present? : Metal implants; Pacemaker (R Hip; back sx, L Knee)    Subjective  Subjective  Subjective: \"Oh yeah, we can do some exercises like I do at home\"  Subjective  Pain: Pt denies any pain at this time. Comments: RN Shereen fletcher tx. Objective  Cognition  Overall Cognitive Status: WFL    Activities of Daily Living  ADL  Feeding: Setup  Grooming: Setup  UE Bathing: Stand by assistance  LE Bathing: Minimal assistance  UE Dressing: Stand by assistance  LE Dressing: Stand by assistance  Toileting: Minimal assistance  Additional Comments: ADL scores based on clinical reasoning and skilled observation unless otherwise noted.  Pt currently limited due to decreased strength, balance, activity tolerance impacting safety and independence with self care tasks    Balance  Balance  Sitting Balance: Supervision (sitting unsupported EOB)  Standing Balance: Contact guard assistance  Standing Balance  Time: ~2 minutes  Activity: functional mobility throughout room/hallway  Comment: use of straight cane, flexed posture    Transfers/Mobility  Bed mobility  Supine to Sit: Stand by assistance  Sit to Supine: Stand by assistance  Scooting: Stand by assistance  Bed Mobility Comments: Bed mobility completed with HOB elevated  Transfers  Sit to stand: Contact guard assistance  Stand to sit: Contact guard assistance  Transfer Comments: VC for hand placements    Functional Mobility  Functional - Equipment evaluation, education, & procurement, Home management training    Assessment  Activity Tolerance  Activity Tolerance: Patient Tolerated treatment well  Assessment  Performance deficits / Impairments: Decreased ADL status, Decreased functional mobility , Decreased strength, Decreased endurance, Decreased balance, Decreased high-level IADLs  Treatment Diagnosis: Impaired self care status  Prognosis: Good  Decision Making: Medium Complexity  Discharge Recommendations: Patient would benefit from continued therapy after discharge  OT Equipment Recommendations  Other: TBD  Safety Devices  Type of Devices: Left in bed, Bed alarm in place, Call light within reach    [facility] Daily Activities Inpatient        08/04/23 0754   AM-[facility] Daily Activity - Inpatient    How much help is needed for putting on and taking off regular lower body clothing? 3   How much help is needed for bathing (which includes washing, rinsing, drying)? 3   How much help is needed for toileting (which includes using toilet, bedpan, or urinal)? 3   How much help is needed for putting on and taking off regular upper body clothing? 3   How much help is needed for taking care of personal grooming? 3   How much help for eating meals?  3   [facility] Inpatient Daily Activity Raw Score 18   [facility] Inpatient ADL T-Scale Score  38.66   ADL Inpatient CMS 0-100% Score 46.65   ADL Inpatient CMS G-Code Modifier  CK        08/04/23 0912   OT Individual Minutes   Time In 0912   Time Out 0938   Minutes 26         Electronically signed by GAGAN Grewal on 8/4/23 at 1:39 PM EDT

## 2023-08-04 NOTE — PROGRESS NOTES
[address]    PROGRESS NOTE             8/4/2023    10:31 AM    Name:   Hiwot Dc  MRN:     [id number]     Acct:      [de-identified]   Room:   2122/[facility] Day:  4  Admit Date:  7/31/2023 10:46 AM    PCP:  Amanda Farias MD  Code Status:  Full Code    Subjective:     C/C:   Chief Complaint   Patient presents with    Emesis    Abdominal Pain    Chest Pain     Interval History Status: improved. Patient seen and examined at bedside. Feeling much better. Complaining of mild nausea after food. Denying any abdominal pain, or vomiting, constipation or diarrhea. States that he did have a bowel movement yesterday before his colonoscopy. Denies any fevers or chills, shortness of breath, chest pain, lightheadedness or weakness. Patient wanting to stay at the hospital 1 more day as he is unsure about his situation at home. Explained to him how hospital acquired infections are concerned. Patient medically stable for discharge. Patient cleared by GI for discharge. Brief History:     Hiwot Dc is an 80 y.o.  male with a past medical history of several prior abdominal surgeries and resections, A-fib on Xarelto, CHF on Bumex, and sick sinus syndrome s/p AICD, GERD, history of perforated peptic ulcer disease, gastrectomy and BPH who presents with a 3 to 4-day history of nausea, vomiting, abdominal pain, and inability to tolerate oral intake. He has had 4-5 episodes of emesis with constant nausea and dry heaving. He describes his abdominal pain as an achy diffuse pain which is constant. The pain is made worse with oral intake, and it is made better with Dilaudid and Zofran which he received in the ED. he denies blood in the vomit, hematuria, and hematochezia. He has had associated insomnia due to the pain and vomiting. The patient denies tobacco, alcohol, and street drug use.   He reports his last bowel artery of native heart with angina pectoris ([address]) [I25.119] 01/17/2023     Priority: Medium    Paroxysmal atrial fibrillation ([address]) [I48.0] 07/25/2022     Priority: Medium    Sick sinus syndrome ([address]) [I49.5] 10/02/2019     Priority: Medium    Chronic anemia [D64.9] 01/22/2019     Priority: Medium    Glaucoma suspect of both eyes [H40.003] 05/30/2017     Priority: Medium    Right upper quadrant abdominal pain [R10.11] 08/02/2023    Altered bowel habits [R19.4] 08/02/2023    Abnormal finding on GI tract imaging [R93.3] 08/02/2023    Abnormal LFTs [R79.89] 08/02/2023    Nausea and vomiting [R11.2] 07/31/2023    Fluid overload [E87.70] 06/25/2021    GERD (gastroesophageal reflux disease) [K21.9] 05/25/2021    Hypertension [I10] 05/25/2021       Plan:        Intractable nausea and vomiting  -Following pathology results from biopsy of the small intestine and esophagus  -Colonoscopy was done today, but was poor due to improper bowel preparation, biobsy for microscopic colitis obtained  -Colonoscopy may be repeated with 2 days of extended prep within 6 months.  -Avoid nonsteroidal anti-inflammatory drugs  -Protonix once daily, recommended opening the capsule before taking  -Continue Zofran IV 4 mg every 6 hours as needed for nausea  -Continue normal saline infusion at 75 mL/h  -Fluconazole  200 mg oral daily for possible Candida infection of the esophagus 2 weeks  -Hepatitis panel came back negative.  -Celiac results pending  -Ultrasound liver and gallbladder unremarkable. -GI okay for discharge  -Bumex will be resumed.      VARSHA  - His creatinine has normalized  - Bumex will be resumed  - Patient follows with nephrology for chronic fluid overload     Paroxsymal Atrial fibrillation  - Continue home Xarelto 50 mg oral daily  - Continue home metoprolol 150 mg oral daily     Coronary artery disease  - Continue home nitroglycerin SL tablet 0.4 mg 3 times daily as needed for chest pain  - Stress test - low risk and ECG showed left bundle branch block with normal rythm      Essential Hypertension  - Continue home amlodipine 10 mg oral daily  - Hydralazine 10 mg IV every 6 hours as needed for SBP>160     Hx of multiple abdominal surgeries with opioid dependence  - Continue home Suboxone  - Continue home Colace 100 mg daily for constipation  - Start GlycoLax 17 g oral daily as needed for increased constipation     Bilateral Glaucoma  - Continue latanoprost 0.005% ophthalmic solution 1 drop to both eyes nightly  - Patient on 2 L of oxygen via nasal cannula with no respiratory distress  - chest x-ray showed elevated right hemidiaphragm xith no acute abnormality  - Potassium was replacement     DVT prophylaxis: Continue home Xarelto 15 mg oral daily  GI prophylaxis: Protonix 20 mg oral daily  Diet: Regular adult diet as tolerated  Disposition: Home with [facility]     OT/PT/SW all consulted     Code Status: Full code    Plan will be discussed with the attending, Dr Pedro Brower MD  PGY I Family Medicine Resident  8/4/2023 10:31 AM     Attestation and add on       I have discussed the care of Balaji Magana , including pertinent history and exam findings,      8/4/23    with the resident. I have seen and examined the patient and the key elements of all parts of the encounter have been performed by me . I agree with the assessment, plan and orders as documented by the resident. MD JACQUELINE RamonSt. Louis [facility]'s [facility]  [address] [facility]  [address] [facility], [address] [place] [address].    Phone [phone]   Fax: [phone]  Answering Service: [phone]

## 2023-08-04 NOTE — PLAN OF CARE
Problem: Discharge Planning  Goal: Discharge to home or other facility with appropriate resources  Problem: Gastrointestinal - Adult  Goal: Minimal or absence of nausea and vomiting  Outcome: Progressing     Problem: Gastrointestinal - Adult  Goal: Maintains or returns to baseline bowel function  Outcome: Progressing     Problem: Gastrointestinal - Adult  Goal: Maintains adequate nutritional intake  Outcome: Progressing

## 2023-08-04 NOTE — PLAN OF CARE
Problem: Gastrointestinal - Adult  Goal: Minimal or absence of nausea and vomiting  8/4/2023 1623 by Janae Barney RN  Outcome: Adequate for Discharge     Problem: Gastrointestinal - Adult  Goal: Maintains or returns to baseline bowel function  8/4/2023 1623 by Janae Barney RN  Outcome: Adequate for Discharge     Problem: Gastrointestinal - Adult  Goal: Maintains adequate nutritional intake  8/4/2023 1623 by Janae Barney RN  Outcome: Adequate for Discharge

## 2023-08-04 NOTE — PROGRESS NOTES
DATE: 2023    NAME: Maty Ling  MRN: [id number]   : 1935    Patient not seen this date for Physical Therapy due to:      [] Cancel by RN or physician due to:    [] Hemodialysis    [] Critical Lab Value Level     [] Blood transfusion in progress    [] Acute or unstable cardiovascular status   _MAP < 55 or more than >115  _HR < 40 or > 130    [] Acute or unstable pulmonary status   -FiO2 > 60%   _RR < 5 or >40    _O2 sats < 85%    [] Strict Bedrest    [] Off Unit for surgery or procedure    [] Off Unit for testing       [] Pending imaging to R/O fracture    [] Refusal by Patient      [x] Other Pt with other staff x 2 attempts, will continue to pursue at a later date. [] PT being discontinued at this time. Patient independent. No further needs. [] PT being discontinued at this time as the patient has been transferred to hospice care. No further needs.       Obinna Sullivan, PTA

## 2023-08-04 NOTE — CARE COORDINATION
ONGOING DISCHARGE PLAN:    Patient is alert and oriented x4. Spoke with patient regarding discharge plan and patient confirms that plan is still home with [facility]. POD #2 EGD-candida esophagus, biopsies taken, erythema    Oral diflucan       POD #1 colonoscopy-poor prep, repeat OP         Regular diet    Will continue to follow for additional discharge needs. If patient is discharged prior to next notation, then this note serves as note for discharge by case management.     Electronically signed by Danuta Steiner RN on 8/4/2023 at 11:14 AM

## 2023-08-05 NOTE — DISCHARGE SUMMARY
[facility]    Discharge Summary     Patient ID: Sarah Roman  :  1935   MRN: [id number]     ACCOUNT:  [de-identified]   Patient's PCP: Kasia Marrero MD  Admit Date: 2023   Discharge Date:    Length of Stay: 4  Code Status:  Prior  Admitting Physician: Nae Linton MD  Discharge Physician: Lora Bradford MD     Active Discharge Diagnoses:       Primary Problem  Nausea and vomiting      [address] Problems    Diagnosis Date Noted    Functional constipation [K59.04] 2023     Priority: Medium    Coronary artery disease involving native coronary artery of native heart with angina pectoris ([address]) [I25.119] 2023     Priority: Medium    Paroxysmal atrial fibrillation ([address]) [I48.0] 2022     Priority: Medium    Sick sinus syndrome ([address]) [I49.5] 10/02/2019     Priority: Medium    Chronic anemia [D64.9] 2019     Priority: Medium    Glaucoma suspect of both eyes [P29.581] 2017     Priority: Medium    Right upper quadrant abdominal pain [R10.11] 2023    Altered bowel habits [R19.4] 2023    Abnormal finding on GI tract imaging [R93.3] 2023    Abnormal LFTs [R79.89] 2023    Nausea and vomiting [R11.2] 2023    Fluid overload [E87.70] 2021    GERD (gastroesophageal reflux disease) [K21.9] 2021    Hypertension [I10] 2021       Admission Condition:  poor     Discharged Condition: good    Hospital Stay:       Hospital Course:  Sarah Roman is a 80 y.o. male who was admitted for the management of  Nausea and vomiting , presented to ER with 3 to 4 days history of nausea, vomiting, abdominal pain and inability to tolerate oral intake. His abdominal pain was achy diffuse pain which is constant. Was noted to have mildly elevated troponin. He was noted to have mild VARSHA creatinine of 1.4.   Stress test came back with low risk and cardiology MI 3. Stress-induced perfusion abnormality encumbering 5%-9.9% of the myocardium or stress segmental scores indicating 1 vascular territory with abnormalities but without LV dilation 4. Small wall motion abnormality involving 1-2 segments and only 1 coronary bed. Low Risk (Less than 1% annual death or MI) 1. Normal or small myocardial perfusion defect at rest or with stress encumbering less than 5% of the myocardium. Consultations:    Consults:     Final Specialist Recommendations/Findings:   IP CONSULT TO INTERNAL MEDICINE  IP CONSULT TO CARDIOLOGY  IP CONSULT TO SOCIAL WORK  IP CONSULT TO GI  IP CONSULT TO GENERAL SURGERY      The patient was seen and examined on day of discharge and this discharge summary is in conjunction with any daily progress note from day of discharge.     Discharge plan:       Disposition: Home    Physician Follow Up:     [address] [id number]  [phone]           Requiring Further Evaluation/Follow Up POST HOSPITALIZATION/Incidental Findings: Colonoscope 2 days post discharge    Diet: regular diet    Activity: As tolerated    Instructions to Patient:   Follow up with [facility] physician  Discharge Medications:      Medication List        START taking these medications      fluconazole 200 MG tablet  Commonly known as: DIFLUCAN  Take 1 tablet by mouth daily for 7 days            CHANGE how you take these medications      vitamin D 1.25 MG (31170 UT) Caps capsule  Commonly known as: ERGOCALCIFEROL  TAKE 1 CAPSULE BY MOUTH EVERY WEEK  What changed: additional instructions            CONTINUE taking these medications      acetaminophen 325 MG tablet  Commonly known as: TYLENOL     amLODIPine 5 MG tablet  Commonly known as: NORVASC  TAKE 2 TABLETS BY MOUTH DAILY     bumetanide 2 MG tablet  Commonly known as: BUMEX  Take 1 tablet by mouth daily     buprenorphine-naloxone 8-2 MG Film SL film  Commonly known as: SUBOXONE     docusate sodium 100 MG opportunity to be involved in this patient's care.

## 2023-08-07 ENCOUNTER — CARE COORDINATION (OUTPATIENT)
Dept: CASE MANAGEMENT | Age: 88
End: 2023-08-07

## 2023-08-07 LAB — SURGICAL PATHOLOGY REPORT: NORMAL

## 2023-08-07 RX ORDER — FLUCONAZOLE 200 MG/1
TABLET ORAL
Qty: 15 TABLET | Refills: 0 | Status: SHIPPED | OUTPATIENT
Start: 2023-08-07

## 2023-08-07 NOTE — CARE COORDINATION
Care Transitions Outreach Attempt    Call within 2 business days of discharge: Yes   Attempted to reach patient for transitions of care follow up. Unable to reach patient. Patient: Sarah Roman Patient : 1935 MRN: [id number]    Last Discharge [address]       Date Complaint Diagnosis Description Type Department Provider    23 Emesis; Abdominal Pain; Chest Pain Nausea vomiting and diarrhea . .. ED to Hosp-Admission (Discharged) (ADMITTED) DARIUS Linton MD; Balwinder Ron. .. # 1 attempt-spoke to patient, he requested writer call back was busy, writer contacted Artem Swenson from [facility], confirmed referral was received, soc for 23, writer tried to reach patient again, no answer, left vm message, will follow//JU      Was this an external facility discharge?  No Discharge Facility: [address]    Noted following upcoming appointments from discharge chart review:   [facility] follow up appointment(s):   Future Appointments   Date Time Provider [address] [place] [address]   2023 10:30 AM Kasia Marrero MD [address] [place] [address]   2023  1:30 PM STV PB MED ONC CHAIR [address] [facility]   10/25/2023  1:00 PM Vin Harper MD U[facility]     Non-[facility] follow up appointment(s):

## 2023-08-08 ENCOUNTER — APPOINTMENT (OUTPATIENT)
Dept: GENERAL RADIOLOGY | Age: 88
End: 2023-08-08
Payer: MEDICARE

## 2023-08-08 ENCOUNTER — HOSPITAL ENCOUNTER (EMERGENCY)
Age: 88
Discharge: HOME | End: 2023-08-08
Attending: STUDENT IN AN ORGANIZED HEALTH CARE EDUCATION/TRAINING PROGRAM
Payer: MEDICARE

## 2023-08-08 VITALS
BODY MASS INDEX: 24.65 KG/M2 | SYSTOLIC BLOOD PRESSURE: 118 MMHG | TEMPERATURE: 98.2 F | HEIGHT: 72 IN | OXYGEN SATURATION: 97 % | DIASTOLIC BLOOD PRESSURE: 71 MMHG | WEIGHT: 182 LBS | RESPIRATION RATE: 18 BRPM | HEART RATE: 79 BPM

## 2023-08-08 DIAGNOSIS — R68.89 WITHDRAWAL COMPLAINT: Primary | ICD-10-CM

## 2023-08-08 LAB
ALBUMIN SERPL-MCNC: 3.8 G/DL (ref 3.5–5.2)
ALP SERPL-CCNC: 119 U/L (ref 40–129)
ALT SERPL-CCNC: 22 U/L (ref 5–41)
ANION GAP SERPL CALCULATED.3IONS-SCNC: 13 MMOL/L (ref 9–17)
AST SERPL-CCNC: 22 U/L
BASOPHILS # BLD: 0 K/UL (ref 0–0.2)
BASOPHILS NFR BLD: 1 % (ref 0–2)
BILIRUB SERPL-MCNC: 0.5 MG/DL (ref 0.3–1.2)
BUN SERPL-MCNC: 19 MG/DL (ref 8–23)
CALCIUM SERPL-MCNC: 9.2 MG/DL (ref 8.6–10.4)
CHLORIDE SERPL-SCNC: 101 MMOL/L (ref 98–107)
CO2 SERPL-SCNC: 25 MMOL/L (ref 20–31)
CREAT SERPL-MCNC: 1.1 MG/DL (ref 0.7–1.2)
ECHO AO ROOT DIAM: 3.2 CM
ECHO AO ROOT INDEX: 1.58 CM/M2
ECHO AR MAX VEL PISA: 3.9 M/S
ECHO AV AREA PEAK VELOCITY: 2.1 CM2
ECHO AV AREA VTI: 2.1 CM2
ECHO AV AREA/BSA PEAK VELOCITY: 1 CM2/M2
ECHO AV AREA/BSA VTI: 1 CM2/M2
ECHO AV MEAN GRADIENT: 5 MMHG
ECHO AV MEAN VELOCITY: 1.1 M/S
ECHO AV PEAK GRADIENT: 9 MMHG
ECHO AV PEAK VELOCITY: 1.5 M/S
ECHO AV REGURGITANT PHT: 539 MS
ECHO AV VELOCITY RATIO: 0.73
ECHO AV VTI: 32.1 CM
ECHO BSA: 1.99 M2
ECHO EST RA PRESSURE: 8 MMHG
ECHO LA AREA 2C: 20 CM2
ECHO LA AREA 4C: 25.1 CM2
ECHO LA DIAMETER INDEX: 1.87 CM/M2
ECHO LA DIAMETER: 3.8 CM
ECHO LA MAJOR AXIS: 5.9 CM
ECHO LA MINOR AXIS: 6 CM
ECHO LA TO AORTIC ROOT RATIO: 1.19
ECHO LA VOL 2C: 54 ML (ref 18–58)
ECHO LA VOL 4C: 84 ML (ref 18–58)
ECHO LA VOL BP: 68 ML (ref 18–58)
ECHO LA VOL/BSA BIPLANE: 33 ML/M2 (ref 16–34)
ECHO LA VOLUME INDEX A2C: 27 ML/M2 (ref 16–34)
ECHO LA VOLUME INDEX A4C: 41 ML/M2 (ref 16–34)
ECHO LV E' LATERAL VELOCITY: 9 CM/S
ECHO LV E' SEPTAL VELOCITY: 3 CM/S
ECHO LV FRACTIONAL SHORTENING: 37 % (ref 28–44)
ECHO LV INTERNAL DIMENSION DIASTOLE INDEX: 2.51 CM/M2
ECHO LV INTERNAL DIMENSION DIASTOLIC: 5.1 CM (ref 4.2–5.9)
ECHO LV INTERNAL DIMENSION SYSTOLIC INDEX: 1.58 CM/M2
ECHO LV INTERNAL DIMENSION SYSTOLIC: 3.2 CM
ECHO LV IVSD: 1.2 CM (ref 0.6–1)
ECHO LV MASS 2D: 241.2 G (ref 88–224)
ECHO LV MASS INDEX 2D: 118.8 G/M2 (ref 49–115)
ECHO LV POSTERIOR WALL DIASTOLIC: 1.2 CM (ref 0.6–1)
ECHO LV RELATIVE WALL THICKNESS RATIO: 0.47
ECHO LVOT AREA: 2.8 CM2
ECHO LVOT AV VTI INDEX: 0.73
ECHO LVOT DIAM: 1.9 CM
ECHO LVOT MEAN GRADIENT: 3 MMHG
ECHO LVOT PEAK GRADIENT: 5 MMHG
ECHO LVOT PEAK VELOCITY: 1.1 M/S
ECHO LVOT STROKE VOLUME INDEX: 32.7 ML/M2
ECHO LVOT SV: 66.3 ML
ECHO LVOT VTI: 23.4 CM
ECHO MV A VELOCITY: 0.54 M/S
ECHO MV AREA VTI: 1.7 CM2
ECHO MV E DECELERATION TIME (DT): 232 MS
ECHO MV E VELOCITY: 0.63 M/S
ECHO MV E/A RATIO: 1.17
ECHO MV E/E' LATERAL: 7
ECHO MV E/E' RATIO (AVERAGED): 14
ECHO MV E/E' SEPTAL: 21
ECHO MV LVOT VTI INDEX: 1.65
ECHO MV MAX VELOCITY: 0.9 M/S
ECHO MV MEAN GRADIENT: 1 MMHG
ECHO MV MEAN VELOCITY: 0.5 M/S
ECHO MV PEAK GRADIENT: 3 MMHG
ECHO MV VTI: 38.7 CM
ECHO RA AREA 4C: 21 CM2
ECHO RIGHT VENTRICULAR SYSTOLIC PRESSURE (RVSP): 36 MMHG
ECHO RV TAPSE: 1.1 CM (ref 1.7–?)
ECHO TV REGURGITANT MAX VELOCITY: 2.63 M/S
ECHO TV REGURGITANT PEAK GRADIENT: 28 MMHG
EKG ATRIAL RATE: 83 BPM
EKG P AXIS: 36 DEGREES
EKG P-R INTERVAL: 200 MS
EKG Q-T INTERVAL: 392 MS
EKG QRS DURATION: 120 MS
EKG QTC CALCULATION (BAZETT): 460 MS
EKG R AXIS: -60 DEGREES
EKG T AXIS: 98 DEGREES
EKG VENTRICULAR RATE: 83 BPM
EOSINOPHIL # BLD: 0.2 K/UL (ref 0–0.4)
EOSINOPHILS RELATIVE PERCENT: 4 % (ref 0–4)
ERYTHROCYTE [DISTWIDTH] IN BLOOD BY AUTOMATED COUNT: 13.6 % (ref 11.5–14.9)
GFR SERPL CREATININE-BSD FRML MDRD: >60 ML/MIN/1.73M2
GLUCOSE SERPL-MCNC: 139 MG/DL (ref 70–99)
HCT VFR BLD AUTO: 32.8 % (ref 41–53)
HGB BLD-MCNC: 10.3 G/DL (ref 13.5–17.5)
LIPASE SERPL-CCNC: 16 U/L (ref 13–60)
LYMPHOCYTES NFR BLD: 0.7 K/UL (ref 1–4.8)
LYMPHOCYTES RELATIVE PERCENT: 18 % (ref 24–44)
MCH RBC QN AUTO: 28.6 PG (ref 26–34)
MCHC RBC AUTO-ENTMCNC: 31.4 G/DL (ref 31–37)
MCV RBC AUTO: 90.9 FL (ref 80–100)
MONOCYTES NFR BLD: 0.5 K/UL (ref 0.1–1.3)
MONOCYTES NFR BLD: 12 % (ref 1–7)
NEUTROPHILS NFR BLD: 65 % (ref 36–66)
NEUTS SEG NFR BLD: 2.6 K/UL (ref 1.3–9.1)
PLATELET # BLD AUTO: 225 K/UL (ref 150–450)
PMV BLD AUTO: 7.4 FL (ref 6–12)
POTASSIUM SERPL-SCNC: 4.2 MMOL/L (ref 3.7–5.3)
PROT SERPL-MCNC: 7.4 G/DL (ref 6.4–8.3)
RBC # BLD AUTO: 3.6 M/UL (ref 4.5–5.9)
SODIUM SERPL-SCNC: 139 MMOL/L (ref 135–144)
TROPONIN I SERPL HS-MCNC: 42 NG/L (ref 0–22)
TROPONIN I SERPL HS-MCNC: 45 NG/L (ref 0–22)
WBC OTHER # BLD: 3.9 K/UL (ref 3.5–11)

## 2023-08-08 PROCEDURE — 84484 ASSAY OF TROPONIN QUANT: CPT

## 2023-08-08 PROCEDURE — 6370000000 HC RX 637 (ALT 250 FOR IP): Performed by: STUDENT IN AN ORGANIZED HEALTH CARE EDUCATION/TRAINING PROGRAM

## 2023-08-08 PROCEDURE — 99285 EMERGENCY DEPT VISIT HI MDM: CPT

## 2023-08-08 PROCEDURE — 83690 ASSAY OF LIPASE: CPT

## 2023-08-08 PROCEDURE — 93005 ELECTROCARDIOGRAM TRACING: CPT | Performed by: STUDENT IN AN ORGANIZED HEALTH CARE EDUCATION/TRAINING PROGRAM

## 2023-08-08 PROCEDURE — 85025 COMPLETE CBC W/AUTO DIFF WBC: CPT

## 2023-08-08 PROCEDURE — 36415 COLL VENOUS BLD VENIPUNCTURE: CPT

## 2023-08-08 PROCEDURE — 93010 ELECTROCARDIOGRAM REPORT: CPT | Performed by: INTERNAL MEDICINE

## 2023-08-08 PROCEDURE — 71045 X-RAY EXAM CHEST 1 VIEW: CPT

## 2023-08-08 PROCEDURE — 80053 COMPREHEN METABOLIC PANEL: CPT

## 2023-08-08 RX ORDER — ONDANSETRON 4 MG/1
4 TABLET, ORALLY DISINTEGRATING ORAL ONCE
Status: COMPLETED | OUTPATIENT
Start: 2023-08-08 | End: 2023-08-08

## 2023-08-08 RX ORDER — PROMETHAZINE HYDROCHLORIDE 25 MG/1
25 TABLET ORAL EVERY 6 HOURS PRN
Qty: 20 TABLET | Refills: 0 | Status: SHIPPED | OUTPATIENT
Start: 2023-08-08 | End: 2023-08-08 | Stop reason: SDUPTHER

## 2023-08-08 RX ORDER — DICYCLOMINE HCL 20 MG
20 TABLET ORAL 4 TIMES DAILY
Qty: 28 TABLET | Refills: 0 | Status: SHIPPED | OUTPATIENT
Start: 2023-08-08 | End: 2023-08-15

## 2023-08-08 RX ORDER — FLUCONAZOLE 100 MG/1
200 TABLET ORAL ONCE
Status: COMPLETED | OUTPATIENT
Start: 2023-08-08 | End: 2023-08-08

## 2023-08-08 RX ORDER — PROMETHAZINE HYDROCHLORIDE 25 MG/1
25 TABLET ORAL EVERY 6 HOURS PRN
Qty: 20 TABLET | Refills: 0 | Status: SHIPPED | OUTPATIENT
Start: 2023-08-08 | End: 2023-08-15

## 2023-08-08 RX ORDER — DICYCLOMINE HCL 20 MG
20 TABLET ORAL 4 TIMES DAILY
Qty: 28 TABLET | Refills: 0 | Status: SHIPPED | OUTPATIENT
Start: 2023-08-08 | End: 2023-08-08 | Stop reason: SDUPTHER

## 2023-08-08 RX ORDER — BUPRENORPHINE HYDROCHLORIDE AND NALOXONE HYDROCHLORIDE DIHYDRATE 8; 2 MG/1; MG/1
1 TABLET SUBLINGUAL DAILY
Qty: 2 TABLET | Refills: 0 | Status: SHIPPED | OUTPATIENT
Start: 2023-08-09 | End: 2023-08-11

## 2023-08-08 RX ORDER — PROMETHAZINE HYDROCHLORIDE 25 MG/1
25 TABLET ORAL ONCE
Status: COMPLETED | OUTPATIENT
Start: 2023-08-08 | End: 2023-08-08

## 2023-08-08 RX ORDER — DICYCLOMINE HYDROCHLORIDE 10 MG/1
10 CAPSULE ORAL ONCE
Status: COMPLETED | OUTPATIENT
Start: 2023-08-08 | End: 2023-08-08

## 2023-08-08 RX ORDER — BUPRENORPHINE AND NALOXONE 8; 2 MG/1; MG/1
1 FILM, SOLUBLE BUCCAL; SUBLINGUAL ONCE
Status: COMPLETED | OUTPATIENT
Start: 2023-08-08 | End: 2023-08-08

## 2023-08-08 RX ADMIN — BUPRENORPHINE AND NALOXONE 1 FILM: 8; 2 FILM, SOLUBLE BUCCAL; SUBLINGUAL at 08:34

## 2023-08-08 RX ADMIN — FLUCONAZOLE 200 MG: 100 TABLET ORAL at 09:13

## 2023-08-08 RX ADMIN — DICYCLOMINE HYDROCHLORIDE 10 MG: 10 CAPSULE ORAL at 10:07

## 2023-08-08 RX ADMIN — ONDANSETRON 4 MG: 4 TABLET, ORALLY DISINTEGRATING ORAL at 10:07

## 2023-08-08 RX ADMIN — PROMETHAZINE HYDROCHLORIDE 25 MG: 25 TABLET ORAL at 11:06

## 2023-08-08 ASSESSMENT — ENCOUNTER SYMPTOMS
COUGH: 0
RHINORRHEA: 0
ABDOMINAL PAIN: 1
VOMITING: 0
NAUSEA: 1
SHORTNESS OF BREATH: 0

## 2023-08-08 ASSESSMENT — PAIN DESCRIPTION - LOCATION: LOCATION: ABDOMEN

## 2023-08-08 ASSESSMENT — PAIN SCALES - GENERAL
PAINLEVEL_OUTOF10: 10
PAINLEVEL_OUTOF10: 5

## 2023-08-08 ASSESSMENT — PAIN - FUNCTIONAL ASSESSMENT: PAIN_FUNCTIONAL_ASSESSMENT: 0-10

## 2023-08-08 NOTE — DISCHARGE INSTRUCTIONS
Please follow-up with your family doctor and with the doctor prescribing her Suboxone tomorrow  You may take the Phenergan as needed as prescribed in addition to the Bentyl as needed as prescribed  Your abdominal discomfort should get better once you restart your Suboxone  And please continue taking the Diflucan once a day every day as prescribed from your previous hospital stay  If you have any severe worsening of symptoms, please return to the emergency room

## 2023-08-08 NOTE — PROGRESS NOTES
Physician Progress Note      Alyce Peterson  [facility] #:                  [id number]  :                       1935  ADMIT DATE:       2023 10:46 AM  DISCH DATE:        2023 5:06 PM  RESPONDING  PROVIDER #:        Soco Gordillo MD          QUERY TEXT:    Pt admitted with nausea and vomiting. Pt noted to have candida on esophageal   biopsy. If possible, please document in progress notes and discharge summary   the relationship, if any, between nausea and vomiting and the following: The medical record reflects the following:  Risk Factors: nausea and vomiting  Clinical Indicators: presented with nausea and vomiting, EGD biopsy of   esophagus came back + for candidiasis  Treatment: Labs, imaging, monitoring, GI consult, EGD, colonoscopy  Options provided:  -- Nausea and vomiting due to esophageal candidiasis  -- Nausea and vomiting due to SBO  -- Nausea and vomiting due to, Please document cause. -- Other - I will add my own diagnosis  -- Disagree - Not applicable / Not valid  -- Disagree - Clinically unable to determine / Unknown  -- Refer to Clinical Documentation Reviewer    PROVIDER RESPONSE TEXT:    This patient has Nausea and vomiting due to esophageal candidiasis.     Query created by: Sven Taylor on 2023 9:04 AM      Electronically signed by:  Soco Gordillo MD 2023 9:08 AM

## 2023-08-08 NOTE — ED PROVIDER NOTES
states that his son will be taking him to his appointment tomorrow. [AP]      ED Course User Index  [AP] Sandra Blackman, DO       Patient repeat assessment, shared decision making, and disposition discussion: Patient feeling better. Will discharge with 2 doses of Suboxone until he can go to his appointment which actually turned out to be Thursday. Patient did confirm this while in the room. Therefore patient was discharged with a prescription for Suboxone for 2 days. MIPS:  [None]    Social determinants of health impacting treatment or disposition:  none    Code Status Discussion:  Did not have Code status discussion since patient being discharged      PROCEDURES:  none    CONSULTS:  None    CRITICAL CARE:  There was a high probability of clinically significant/life threatening deterioration in this patient's condition which required my urgent intervention. Total critical care time was 0 minutes. This excludes any time for separately reportable procedures. FINAL IMPRESSION     1. Withdrawal complaint          DISPOSITION / PLAN   DISPOSITION Decision To Discharge 08/08/2023 10:48:50 AM      Evaluation and treatment course in the ED, and plan of care upon discharge was discussed in length with the patient/patient representative. Patient/patient representative had no further questions prior to being discharged and was instructed to return to the ED for new or worsening symptoms. Any changes to existing medications or new prescriptions were reviewed with patient/patient representative and they expressed understanding of how to correctly take their medications and the possible side effects.     PATIENT REFERRED TO:  Inna Donis  [facility] [address]  [phone]    Schedule an appointment as soon as possible for a visit         DISCHARGE MEDICATIONS:  Discharge Medication List as of 8/8/2023 10:50 AM        START taking these medications    Details   promethazine

## 2023-08-08 NOTE — ED NOTES
Suboxone administration form sent back to pharmacy via tube system, pharmacy aware     Talmadge Shone, RN  08/08/23 0270

## 2023-08-08 NOTE — ED TRIAGE NOTES
Pt in with c/o abd pain and left sided chest pain for over a week pt thinks that he is going through suboxone withdrawal pt unable to get medication filled in [place] and would have to go back to [place]

## 2023-08-25 ENCOUNTER — OFFICE VISIT (OUTPATIENT)
Dept: INTERNAL MEDICINE CLINIC | Age: 88
End: 2023-08-25

## 2023-08-25 ENCOUNTER — HOSPITAL ENCOUNTER (OUTPATIENT)
Dept: INFUSION THERAPY | Age: 88
Discharge: HOME | End: 2023-08-25
Payer: MEDICARE

## 2023-08-25 VITALS
DIASTOLIC BLOOD PRESSURE: 62 MMHG | BODY MASS INDEX: 24.68 KG/M2 | SYSTOLIC BLOOD PRESSURE: 110 MMHG | WEIGHT: 182 LBS | HEART RATE: 82 BPM | OXYGEN SATURATION: 95 %

## 2023-08-25 DIAGNOSIS — E53.8 VITAMIN B12 DEFICIENCY: Primary | ICD-10-CM

## 2023-08-25 DIAGNOSIS — I25.119 CORONARY ARTERY DISEASE INVOLVING NATIVE CORONARY ARTERY OF NATIVE HEART WITH ANGINA PECTORIS (HCC): ICD-10-CM

## 2023-08-25 DIAGNOSIS — I10 PRIMARY HYPERTENSION: ICD-10-CM

## 2023-08-25 DIAGNOSIS — I50.22 CHRONIC SYSTOLIC (CONGESTIVE) HEART FAILURE (HCC): ICD-10-CM

## 2023-08-25 DIAGNOSIS — N18.32 STAGE 3B CHRONIC KIDNEY DISEASE (HCC): ICD-10-CM

## 2023-08-25 DIAGNOSIS — E53.8 B12 DEFICIENCY: Primary | ICD-10-CM

## 2023-08-25 DIAGNOSIS — I48.91 ATRIAL FIBRILLATION, UNSPECIFIED TYPE (HCC): ICD-10-CM

## 2023-08-25 PROCEDURE — 6360000002 HC RX W HCPCS: Performed by: INTERNAL MEDICINE

## 2023-08-25 PROCEDURE — 96372 THER/PROPH/DIAG INJ SC/IM: CPT

## 2023-08-25 RX ORDER — SODIUM CHLORIDE 9 MG/ML
INJECTION, SOLUTION INTRAVENOUS CONTINUOUS
OUTPATIENT
Start: 2023-09-15

## 2023-08-25 RX ORDER — FAMOTIDINE 10 MG/ML
20 INJECTION, SOLUTION INTRAVENOUS
OUTPATIENT
Start: 2023-09-15

## 2023-08-25 RX ORDER — EPINEPHRINE 1 MG/ML
0.3 INJECTION, SOLUTION, CONCENTRATE INTRAVENOUS PRN
OUTPATIENT
Start: 2023-09-15

## 2023-08-25 RX ORDER — ALBUTEROL SULFATE 90 UG/1
4 AEROSOL, METERED RESPIRATORY (INHALATION) PRN
OUTPATIENT
Start: 2023-09-15

## 2023-08-25 RX ORDER — CYANOCOBALAMIN 1000 UG/ML
1000 INJECTION, SOLUTION INTRAMUSCULAR; SUBCUTANEOUS ONCE
Start: 2023-09-15

## 2023-08-25 RX ORDER — ONDANSETRON 2 MG/ML
8 INJECTION INTRAMUSCULAR; INTRAVENOUS
OUTPATIENT
Start: 2023-09-15

## 2023-08-25 RX ORDER — ACETAMINOPHEN 325 MG/1
650 TABLET ORAL
OUTPATIENT
Start: 2023-09-15

## 2023-08-25 RX ORDER — DIPHENHYDRAMINE HYDROCHLORIDE 50 MG/ML
50 INJECTION INTRAMUSCULAR; INTRAVENOUS
OUTPATIENT
Start: 2023-09-15

## 2023-08-25 RX ORDER — CYANOCOBALAMIN 1000 UG/ML
1000 INJECTION, SOLUTION INTRAMUSCULAR; SUBCUTANEOUS ONCE
Status: COMPLETED
Start: 2023-08-25 | End: 2023-08-25

## 2023-08-25 RX ORDER — ACETAMINOPHEN 325 MG/1
325 TABLET ORAL
OUTPATIENT
Start: 2023-09-15

## 2023-08-25 RX ADMIN — CYANOCOBALAMIN 1000 MCG: 1000 INJECTION, SOLUTION INTRAMUSCULAR; SUBCUTANEOUS at 15:23

## 2023-08-25 ASSESSMENT — ENCOUNTER SYMPTOMS
CONSTIPATION: 0
BACK PAIN: 0
SHORTNESS OF BREATH: 0
FACIAL SWELLING: 0
APNEA: 0
COLOR CHANGE: 0
CHEST TIGHTNESS: 0
DIARRHEA: 0
VOMITING: 0
ABDOMINAL PAIN: 0
ABDOMINAL DISTENTION: 0
WHEEZING: 0
NAUSEA: 0
COUGH: 0

## 2023-08-25 NOTE — PROGRESS NOTES
Patient arrived for B12 injection. Complete without incident. Discharged in stable condition. Returns 9/22/2023 for next b12.

## 2023-08-25 NOTE — PROGRESS NOTES
Subjective:      Patient ID: Grey Simmons is a 80 y.o. male. HPI  Patient, is here for evaluation multiple medical problems. Atrial fibrillation, on anticoagulation, CHF on Bumex , sick sinus syndrome status post AICD  He has chronic anemia, following with hematologist, had BM Biospy in past, on Vitamin B12 Replacement ( May have Low grade MDS per Dr Angel Dean )   Has CKD , follows with nephrologist   On suboxone   Had stress test done recently - was Normal   Had EGD and Colonoscopy , when he was in hospital in August   Has Anemia - on Iron   TSH /hepatitis panel/celiac dz panel - negative   Has Low Vitamin b12 - on replacement   Had CT abdomen   Has stable Adrenal adenoma   Review of Systems   Constitutional:  Positive for fatigue. Negative for activity change, appetite change, chills, diaphoresis and unexpected weight change. HENT:  Negative for congestion, dental problem, ear discharge, facial swelling and hearing loss. Respiratory:  Negative for apnea, cough, chest tightness, shortness of breath and wheezing. Cardiovascular:  Negative for chest pain and leg swelling. Gastrointestinal:  Negative for abdominal distention, abdominal pain, constipation, diarrhea, nausea and vomiting. Genitourinary:  Negative for difficulty urinating, dysuria, enuresis, flank pain and frequency. Musculoskeletal:  Negative for arthralgias, back pain, gait problem and joint swelling. Skin:  Negative for color change, pallor and rash. Neurological:  Negative for dizziness, seizures, facial asymmetry, light-headedness, numbness and headaches. Psychiatric/Behavioral:  Negative for agitation, behavioral problems, confusion, decreased concentration and dysphoric mood. Objective:   Physical Exam  Vitals and nursing note reviewed. Constitutional:       General: He is not in acute distress. Appearance: He is well-developed. He is not diaphoretic. HENT:      Head: Normocephalic and atraumatic.

## 2023-08-25 NOTE — PROGRESS NOTES
Visit Information    Have you changed or started any medications since your last visit including any over-the-counter medicines, vitamins, or herbal medicines? no   Are you having any side effects from any of your medications? -  no  Have you stopped taking any of your medications? Is so, why? -  no    Have you seen any other physician or provider since your last visit? Yes - Records Obtained  Have you had any other diagnostic tests since your last visit? Yes - Records Obtained  Have you been seen in the emergency room and/or had an admission to a hospital since we last saw you? Yes - Records Obtained  Have you had your routine dental cleaning in the past 6 months? no    Have you activated your [employer] account? If not, what are your barriers?  No:      Patient Care Team:  Rad Du MD as PCP - General (Internal Medicine)  Rad Du MD as PCP - Empaneled Provider  Vitaliy Guzman MD as Consulting Physician (Hematology and Oncology)    Medical History Review  Past Medical, Family, and Social History reviewed and does contribute to the patient presenting condition    Health Maintenance   Topic Date Due    DTaP/Tdap/Td vaccine (1 - Tdap) Never done    Shingles vaccine (1 of 2) Never done    COVID-19 Vaccine (4 - Booster for [place] series) 01/06/2022    Annual Wellness Visit (AWV)  07/21/2023    Flu vaccine (1) 08/01/2023    Depression Screen  01/17/2024    Pneumococcal 65+ years Vaccine  Completed    Hepatitis A vaccine  Aged Out    Hib vaccine  Aged Out    Meningococcal (ACWY) vaccine  Aged Out    Lipids  Discontinued

## 2023-09-11 ENCOUNTER — TELEPHONE (OUTPATIENT)
Dept: INTERNAL MEDICINE CLINIC | Age: 88
End: 2023-09-11

## 2023-09-11 DIAGNOSIS — K21.00 GASTROESOPHAGEAL REFLUX DISEASE WITH ESOPHAGITIS, UNSPECIFIED WHETHER HEMORRHAGE: Primary | ICD-10-CM

## 2023-09-22 ENCOUNTER — HOSPITAL ENCOUNTER (OUTPATIENT)
Dept: INFUSION THERAPY | Age: 88
Discharge: HOME | End: 2023-09-22
Payer: MEDICARE

## 2023-09-22 DIAGNOSIS — E53.8 B12 DEFICIENCY: Primary | ICD-10-CM

## 2023-09-22 PROCEDURE — 6360000002 HC RX W HCPCS: Performed by: INTERNAL MEDICINE

## 2023-09-22 PROCEDURE — 96372 THER/PROPH/DIAG INJ SC/IM: CPT

## 2023-09-22 RX ORDER — CYANOCOBALAMIN 1000 UG/ML
1000 INJECTION, SOLUTION INTRAMUSCULAR; SUBCUTANEOUS ONCE
Status: COMPLETED
Start: 2023-09-22 | End: 2023-09-22

## 2023-09-22 RX ORDER — EPINEPHRINE 1 MG/ML
0.3 INJECTION, SOLUTION, CONCENTRATE INTRAVENOUS PRN
OUTPATIENT
Start: 2023-10-20

## 2023-09-22 RX ORDER — ALBUTEROL SULFATE 90 UG/1
4 AEROSOL, METERED RESPIRATORY (INHALATION) PRN
OUTPATIENT
Start: 2023-10-20

## 2023-09-22 RX ORDER — ACETAMINOPHEN 325 MG/1
325 TABLET ORAL
OUTPATIENT
Start: 2023-10-20

## 2023-09-22 RX ORDER — FAMOTIDINE 10 MG/ML
20 INJECTION, SOLUTION INTRAVENOUS
OUTPATIENT
Start: 2023-10-20

## 2023-09-22 RX ORDER — ACETAMINOPHEN 325 MG/1
650 TABLET ORAL
OUTPATIENT
Start: 2023-10-20

## 2023-09-22 RX ORDER — ONDANSETRON 2 MG/ML
8 INJECTION INTRAMUSCULAR; INTRAVENOUS
OUTPATIENT
Start: 2023-10-20

## 2023-09-22 RX ORDER — DIPHENHYDRAMINE HYDROCHLORIDE 50 MG/ML
50 INJECTION INTRAMUSCULAR; INTRAVENOUS
OUTPATIENT
Start: 2023-10-20

## 2023-09-22 RX ORDER — CYANOCOBALAMIN 1000 UG/ML
1000 INJECTION, SOLUTION INTRAMUSCULAR; SUBCUTANEOUS ONCE
Start: 2023-10-20

## 2023-09-22 RX ORDER — SODIUM CHLORIDE 9 MG/ML
INJECTION, SOLUTION INTRAVENOUS CONTINUOUS
OUTPATIENT
Start: 2023-10-20

## 2023-09-22 RX ADMIN — CYANOCOBALAMIN 1000 MCG: 1000 INJECTION, SOLUTION INTRAMUSCULAR; SUBCUTANEOUS at 14:12

## 2023-09-22 NOTE — PROGRESS NOTES
Patient arrived for B12 injection. Pt denies any new complaints. Injection given without issue. Pt stable at discharge. Scheduled to return 10/20/23 for B12.

## 2023-09-27 ENCOUNTER — TELEPHONE (OUTPATIENT)
Dept: INTERNAL MEDICINE CLINIC | Age: 88
End: 2023-09-27

## 2023-09-27 NOTE — TELEPHONE ENCOUNTER
[facility] caring nurse called stated that Patient has a raised area on his bicep that is the size of a ping pong ball that has been there 3-4 weeks. Wonder what you recommend the patient to do. No redness, no pain when touching it.

## 2023-09-28 ENCOUNTER — OFFICE VISIT (OUTPATIENT)
Dept: INTERNAL MEDICINE CLINIC | Age: 88
End: 2023-09-28
Payer: MEDICARE

## 2023-09-28 VITALS
SYSTOLIC BLOOD PRESSURE: 130 MMHG | HEIGHT: 72 IN | HEART RATE: 79 BPM | OXYGEN SATURATION: 100 % | BODY MASS INDEX: 24.16 KG/M2 | DIASTOLIC BLOOD PRESSURE: 72 MMHG | WEIGHT: 178.4 LBS

## 2023-09-28 DIAGNOSIS — I50.22 CHRONIC SYSTOLIC (CONGESTIVE) HEART FAILURE (HCC): ICD-10-CM

## 2023-09-28 DIAGNOSIS — M79.89 SWELLING OF UPPER ARM: Primary | ICD-10-CM

## 2023-09-28 DIAGNOSIS — I10 PRIMARY HYPERTENSION: ICD-10-CM

## 2023-09-28 DIAGNOSIS — I48.91 ATRIAL FIBRILLATION, UNSPECIFIED TYPE (HCC): ICD-10-CM

## 2023-09-28 PROCEDURE — 1124F ACP DISCUSS-NO DSCNMKR DOCD: CPT | Performed by: INTERNAL MEDICINE

## 2023-09-28 PROCEDURE — G8427 DOCREV CUR MEDS BY ELIG CLIN: HCPCS | Performed by: INTERNAL MEDICINE

## 2023-09-28 PROCEDURE — 1036F TOBACCO NON-USER: CPT | Performed by: INTERNAL MEDICINE

## 2023-09-28 PROCEDURE — 99214 OFFICE O/P EST MOD 30 MIN: CPT | Performed by: INTERNAL MEDICINE

## 2023-09-28 PROCEDURE — G8420 CALC BMI NORM PARAMETERS: HCPCS | Performed by: INTERNAL MEDICINE

## 2023-09-28 RX ORDER — LIDOCAINE 4 G/G
1 PATCH TOPICAL DAILY
Qty: 30 PATCH | Refills: 0 | Status: SHIPPED | OUTPATIENT
Start: 2023-09-28 | End: 2023-10-28

## 2023-09-28 RX ORDER — BUMETANIDE 2 MG/1
2 TABLET ORAL DAILY
Qty: 90 TABLET | Refills: 3 | Status: SHIPPED | OUTPATIENT
Start: 2023-09-28 | End: 2024-09-22

## 2023-09-28 ASSESSMENT — ENCOUNTER SYMPTOMS
APNEA: 0
ABDOMINAL PAIN: 0
ABDOMINAL DISTENTION: 0
NAUSEA: 0
VOMITING: 0
SHORTNESS OF BREATH: 0
BACK PAIN: 0
CHEST TIGHTNESS: 0
CONSTIPATION: 0
DIARRHEA: 0
COUGH: 0
WHEEZING: 0
FACIAL SWELLING: 0
COLOR CHANGE: 0

## 2023-09-28 NOTE — PROGRESS NOTES
Subjective:      Patient ID: Pravin Bryant is a 80 y.o. male. HPI      HPI   1) Location/Symptom - Noticed Swelling in rt arm , Size of egg  2) Timing/Onset: 2 weeks   3) Context/Setting: No Injury/trauma/Fall   4) Quality: painful   5) Duration: continuous   6) Modifying Factors: has Difficulty in lifting from Rt arm , Patient started to lift weight 3-4 pounds recently   7) Severity: moderate       Review of Systems   Constitutional:  Positive for fatigue. Negative for activity change, appetite change, chills, diaphoresis and unexpected weight change. HENT:  Negative for congestion, dental problem, ear discharge, facial swelling and hearing loss. Respiratory:  Negative for apnea, cough, chest tightness, shortness of breath and wheezing. Cardiovascular:  Positive for leg swelling. Negative for chest pain. Gastrointestinal:  Negative for abdominal distention, abdominal pain, constipation, diarrhea, nausea and vomiting. Genitourinary:  Negative for difficulty urinating, dysuria, enuresis, flank pain and frequency. Musculoskeletal:  Positive for arthralgias (Rt arm with swelling). Negative for back pain, gait problem and joint swelling. Skin:  Negative for color change, pallor and rash. Neurological:  Negative for dizziness, seizures, facial asymmetry, light-headedness, numbness and headaches. Psychiatric/Behavioral:  Negative for agitation, behavioral problems, confusion, decreased concentration and dysphoric mood. Objective:   Physical Exam  Vitals and nursing note reviewed. Constitutional:       General: He is not in acute distress. Appearance: He is well-developed. He is not diaphoretic. HENT:      Head: Normocephalic and atraumatic. Mouth/Throat:      Pharynx: No oropharyngeal exudate. Eyes:      General: No scleral icterus. Right eye: No discharge. Left eye: No discharge.       Conjunctiva/sclera: Conjunctivae normal.      Pupils: Pupils are equal,

## 2023-09-29 ENCOUNTER — TELEPHONE (OUTPATIENT)
Dept: INTERNAL MEDICINE CLINIC | Age: 88
End: 2023-09-29

## 2023-09-29 DIAGNOSIS — M79.89 SWELLING OF UPPER ARM: Primary | ICD-10-CM

## 2023-09-29 NOTE — TELEPHONE ENCOUNTER
[facility] scheduling called to inform that she had attempted to schedule an MRI that had been ordered but patient informed that he has a pacemaker that enables him

## 2023-10-02 ENCOUNTER — HOSPITAL ENCOUNTER (OUTPATIENT)
Age: 88
Discharge: HOME | End: 2023-10-02
Payer: MEDICARE

## 2023-10-02 DIAGNOSIS — E53.8 VITAMIN B12 DEFICIENCY: ICD-10-CM

## 2023-10-02 LAB
FOLATE SERPL-MCNC: >20 NG/ML (ref 4.8–24.2)
VIT B12 SERPL-MCNC: 633 PG/ML (ref 232–1245)

## 2023-10-02 PROCEDURE — 36415 COLL VENOUS BLD VENIPUNCTURE: CPT

## 2023-10-02 PROCEDURE — 82746 ASSAY OF FOLIC ACID SERUM: CPT

## 2023-10-02 PROCEDURE — 82607 VITAMIN B-12: CPT

## 2023-10-04 ENCOUNTER — HOSPITAL ENCOUNTER (INPATIENT)
Age: 88
LOS: 2 days | Discharge: HOME HEALTH | DRG: 389 | End: 2023-10-07
Attending: STUDENT IN AN ORGANIZED HEALTH CARE EDUCATION/TRAINING PROGRAM | Admitting: INTERNAL MEDICINE
Payer: MEDICARE

## 2023-10-04 ENCOUNTER — APPOINTMENT (OUTPATIENT)
Dept: GENERAL RADIOLOGY | Age: 88
DRG: 389 | End: 2023-10-04
Payer: MEDICARE

## 2023-10-04 DIAGNOSIS — R10.84 GENERALIZED ABDOMINAL PAIN: ICD-10-CM

## 2023-10-04 DIAGNOSIS — R11.2 NAUSEA AND VOMITING, UNSPECIFIED VOMITING TYPE: Primary | ICD-10-CM

## 2023-10-04 LAB
ALBUMIN SERPL-MCNC: 3.7 G/DL (ref 3.5–5.2)
ALP SERPL-CCNC: 110 U/L (ref 40–129)
ALT SERPL-CCNC: 56 U/L (ref 5–41)
ANION GAP SERPL CALCULATED.3IONS-SCNC: 11 MMOL/L (ref 9–17)
AST SERPL-CCNC: 59 U/L
BASOPHILS # BLD: 0 K/UL (ref 0–0.2)
BASOPHILS NFR BLD: 0 % (ref 0–2)
BILIRUB SERPL-MCNC: 0.3 MG/DL (ref 0.3–1.2)
BUN SERPL-MCNC: 17 MG/DL (ref 8–23)
CALCIUM SERPL-MCNC: 9.2 MG/DL (ref 8.6–10.4)
CHLORIDE SERPL-SCNC: 101 MMOL/L (ref 98–107)
CO2 SERPL-SCNC: 26 MMOL/L (ref 20–31)
CREAT SERPL-MCNC: 0.9 MG/DL (ref 0.7–1.2)
EOSINOPHIL # BLD: 0.1 K/UL (ref 0–0.4)
EOSINOPHILS RELATIVE PERCENT: 1 % (ref 0–4)
ERYTHROCYTE [DISTWIDTH] IN BLOOD BY AUTOMATED COUNT: 14.5 % (ref 11.5–14.9)
GFR SERPL CREATININE-BSD FRML MDRD: >60 ML/MIN/1.73M2
GLUCOSE SERPL-MCNC: 139 MG/DL (ref 70–99)
HCT VFR BLD AUTO: 31.3 % (ref 41–53)
HGB BLD-MCNC: 10.1 G/DL (ref 13.5–17.5)
LACTATE BLDV-SCNC: 0.9 MMOL/L (ref 0.5–2.2)
LIPASE SERPL-CCNC: 41 U/L (ref 13–60)
LYMPHOCYTES NFR BLD: 0.7 K/UL (ref 1–4.8)
LYMPHOCYTES RELATIVE PERCENT: 9 % (ref 24–44)
MAGNESIUM SERPL-MCNC: 2 MG/DL (ref 1.6–2.6)
MCH RBC QN AUTO: 29.7 PG (ref 26–34)
MCHC RBC AUTO-ENTMCNC: 32.3 G/DL (ref 31–37)
MCV RBC AUTO: 92.1 FL (ref 80–100)
MONOCYTES NFR BLD: 0.4 K/UL (ref 0.1–1.3)
MONOCYTES NFR BLD: 6 % (ref 1–7)
NEUTROPHILS NFR BLD: 84 % (ref 36–66)
NEUTS SEG NFR BLD: 5.8 K/UL (ref 1.3–9.1)
PLATELET # BLD AUTO: 287 K/UL (ref 150–450)
PMV BLD AUTO: 6.8 FL (ref 6–12)
POTASSIUM SERPL-SCNC: 3.9 MMOL/L (ref 3.7–5.3)
PROT SERPL-MCNC: 7.3 G/DL (ref 6.4–8.3)
RBC # BLD AUTO: 3.4 M/UL (ref 4.5–5.9)
SODIUM SERPL-SCNC: 138 MMOL/L (ref 135–144)
TROPONIN I SERPL HS-MCNC: 58 NG/L (ref 0–22)
WBC OTHER # BLD: 6.9 K/UL (ref 3.5–11)

## 2023-10-04 PROCEDURE — 99285 EMERGENCY DEPT VISIT HI MDM: CPT

## 2023-10-04 PROCEDURE — 84484 ASSAY OF TROPONIN QUANT: CPT

## 2023-10-04 PROCEDURE — 71045 X-RAY EXAM CHEST 1 VIEW: CPT

## 2023-10-04 PROCEDURE — 6360000002 HC RX W HCPCS: Performed by: EMERGENCY MEDICINE

## 2023-10-04 PROCEDURE — 96376 TX/PRO/DX INJ SAME DRUG ADON: CPT

## 2023-10-04 PROCEDURE — 93005 ELECTROCARDIOGRAM TRACING: CPT | Performed by: EMERGENCY MEDICINE

## 2023-10-04 PROCEDURE — 83605 ASSAY OF LACTIC ACID: CPT

## 2023-10-04 PROCEDURE — 96374 THER/PROPH/DIAG INJ IV PUSH: CPT

## 2023-10-04 PROCEDURE — 36415 COLL VENOUS BLD VENIPUNCTURE: CPT

## 2023-10-04 PROCEDURE — 80053 COMPREHEN METABOLIC PANEL: CPT

## 2023-10-04 PROCEDURE — 83735 ASSAY OF MAGNESIUM: CPT

## 2023-10-04 PROCEDURE — 83690 ASSAY OF LIPASE: CPT

## 2023-10-04 PROCEDURE — 85025 COMPLETE CBC W/AUTO DIFF WBC: CPT

## 2023-10-04 RX ORDER — ONDANSETRON 2 MG/ML
4 INJECTION INTRAMUSCULAR; INTRAVENOUS ONCE
Status: COMPLETED | OUTPATIENT
Start: 2023-10-04 | End: 2023-10-04

## 2023-10-04 RX ADMIN — ONDANSETRON 4 MG: 2 INJECTION INTRAMUSCULAR; INTRAVENOUS at 23:07

## 2023-10-04 ASSESSMENT — PAIN - FUNCTIONAL ASSESSMENT: PAIN_FUNCTIONAL_ASSESSMENT: 0-10

## 2023-10-04 ASSESSMENT — PATIENT HEALTH QUESTIONNAIRE - PHQ9
2. FEELING DOWN, DEPRESSED OR HOPELESS: 0
SUM OF ALL RESPONSES TO PHQ QUESTIONS 1-9: 0
SUM OF ALL RESPONSES TO PHQ9 QUESTIONS 1 & 2: 0
SUM OF ALL RESPONSES TO PHQ QUESTIONS 1-9: 0
SUM OF ALL RESPONSES TO PHQ QUESTIONS 1-9: 0
1. LITTLE INTEREST OR PLEASURE IN DOING THINGS: 0
SUM OF ALL RESPONSES TO PHQ QUESTIONS 1-9: 0

## 2023-10-04 ASSESSMENT — ENCOUNTER SYMPTOMS
SHORTNESS OF BREATH: 0
NAUSEA: 1
ABDOMINAL PAIN: 1
VOMITING: 1

## 2023-10-04 ASSESSMENT — PAIN SCALES - GENERAL: PAINLEVEL_OUTOF10: 8

## 2023-10-04 ASSESSMENT — PAIN DESCRIPTION - LOCATION: LOCATION: ABDOMEN

## 2023-10-05 ENCOUNTER — TELEPHONE (OUTPATIENT)
Dept: ORTHOPEDIC SURGERY | Age: 88
End: 2023-10-05

## 2023-10-05 ENCOUNTER — APPOINTMENT (OUTPATIENT)
Dept: CT IMAGING | Age: 88
DRG: 389 | End: 2023-10-05
Payer: MEDICARE

## 2023-10-05 PROBLEM — I48.91 ATRIAL FIBRILLATION (HCC): Status: RESOLVED | Noted: 2021-05-25 | Resolved: 2023-10-05

## 2023-10-05 PROBLEM — R10.9 ABDOMINAL PAIN: Status: ACTIVE | Noted: 2023-10-05

## 2023-10-05 PROBLEM — K56.7 ILEUS (HCC): Status: ACTIVE | Noted: 2023-10-05

## 2023-10-05 LAB
ANION GAP SERPL CALCULATED.3IONS-SCNC: 14 MMOL/L (ref 9–17)
BASOPHILS # BLD: 0 K/UL (ref 0–0.2)
BASOPHILS NFR BLD: 0 % (ref 0–2)
BUN SERPL-MCNC: 14 MG/DL (ref 8–23)
CALCIUM SERPL-MCNC: 9.5 MG/DL (ref 8.6–10.4)
CHLORIDE SERPL-SCNC: 99 MMOL/L (ref 98–107)
CO2 SERPL-SCNC: 23 MMOL/L (ref 20–31)
CREAT SERPL-MCNC: 0.8 MG/DL (ref 0.7–1.2)
EOSINOPHIL # BLD: 0 K/UL (ref 0–0.4)
EOSINOPHILS RELATIVE PERCENT: 0 % (ref 0–4)
ERYTHROCYTE [DISTWIDTH] IN BLOOD BY AUTOMATED COUNT: 14.4 % (ref 11.5–14.9)
GFR SERPL CREATININE-BSD FRML MDRD: >60 ML/MIN/1.73M2
GLUCOSE SERPL-MCNC: 136 MG/DL (ref 70–99)
HCT VFR BLD AUTO: 35.1 % (ref 41–53)
HGB BLD-MCNC: 11.1 G/DL (ref 13.5–17.5)
LYMPHOCYTES NFR BLD: 0.5 K/UL (ref 1–4.8)
LYMPHOCYTES RELATIVE PERCENT: 8 % (ref 24–44)
MCH RBC QN AUTO: 29.6 PG (ref 26–34)
MCHC RBC AUTO-ENTMCNC: 31.6 G/DL (ref 31–37)
MCV RBC AUTO: 93.7 FL (ref 80–100)
MONOCYTES NFR BLD: 0.3 K/UL (ref 0.1–1.3)
MONOCYTES NFR BLD: 4 % (ref 1–7)
NEUTROPHILS NFR BLD: 88 % (ref 36–66)
NEUTS SEG NFR BLD: 6.4 K/UL (ref 1.3–9.1)
PLATELET # BLD AUTO: 277 K/UL (ref 150–450)
PMV BLD AUTO: 7.3 FL (ref 6–12)
POTASSIUM SERPL-SCNC: 4.1 MMOL/L (ref 3.7–5.3)
RBC # BLD AUTO: 3.75 M/UL (ref 4.5–5.9)
SODIUM SERPL-SCNC: 136 MMOL/L (ref 135–144)
TROPONIN I SERPL HS-MCNC: 55 NG/L (ref 0–22)
WBC OTHER # BLD: 7.3 K/UL (ref 3.5–11)

## 2023-10-05 PROCEDURE — 99223 1ST HOSP IP/OBS HIGH 75: CPT | Performed by: INTERNAL MEDICINE

## 2023-10-05 PROCEDURE — 2580000003 HC RX 258: Performed by: EMERGENCY MEDICINE

## 2023-10-05 PROCEDURE — 74177 CT ABD & PELVIS W/CONTRAST: CPT

## 2023-10-05 PROCEDURE — 6360000004 HC RX CONTRAST MEDICATION: Performed by: EMERGENCY MEDICINE

## 2023-10-05 PROCEDURE — 6360000002 HC RX W HCPCS: Performed by: NURSE PRACTITIONER

## 2023-10-05 PROCEDURE — 85025 COMPLETE CBC W/AUTO DIFF WBC: CPT

## 2023-10-05 PROCEDURE — 2580000003 HC RX 258: Performed by: NURSE PRACTITIONER

## 2023-10-05 PROCEDURE — 80048 BASIC METABOLIC PNL TOTAL CA: CPT

## 2023-10-05 PROCEDURE — 2500000003 HC RX 250 WO HCPCS: Performed by: NURSE PRACTITIONER

## 2023-10-05 PROCEDURE — 2060000000 HC ICU INTERMEDIATE R&B

## 2023-10-05 PROCEDURE — 6370000000 HC RX 637 (ALT 250 FOR IP): Performed by: EMERGENCY MEDICINE

## 2023-10-05 PROCEDURE — 6370000000 HC RX 637 (ALT 250 FOR IP): Performed by: NURSE PRACTITIONER

## 2023-10-05 PROCEDURE — 99213 OFFICE O/P EST LOW 20 MIN: CPT

## 2023-10-05 PROCEDURE — 36415 COLL VENOUS BLD VENIPUNCTURE: CPT

## 2023-10-05 PROCEDURE — 6360000002 HC RX W HCPCS: Performed by: EMERGENCY MEDICINE

## 2023-10-05 PROCEDURE — A4216 STERILE WATER/SALINE, 10 ML: HCPCS | Performed by: NURSE PRACTITIONER

## 2023-10-05 RX ORDER — ACETAMINOPHEN 325 MG/1
650 TABLET ORAL EVERY 6 HOURS PRN
Status: DISCONTINUED | OUTPATIENT
Start: 2023-10-05 | End: 2023-10-07 | Stop reason: HOSPADM

## 2023-10-05 RX ORDER — ACETAMINOPHEN 650 MG/1
650 SUPPOSITORY RECTAL EVERY 6 HOURS PRN
Status: DISCONTINUED | OUTPATIENT
Start: 2023-10-05 | End: 2023-10-07 | Stop reason: HOSPADM

## 2023-10-05 RX ORDER — SODIUM CHLORIDE 0.9 % (FLUSH) 0.9 %
10 SYRINGE (ML) INJECTION PRN
Status: DISCONTINUED | OUTPATIENT
Start: 2023-10-05 | End: 2023-10-07 | Stop reason: HOSPADM

## 2023-10-05 RX ORDER — LATANOPROST 50 UG/ML
1 SOLUTION/ DROPS OPHTHALMIC NIGHTLY
Status: DISCONTINUED | OUTPATIENT
Start: 2023-10-05 | End: 2023-10-07 | Stop reason: HOSPADM

## 2023-10-05 RX ORDER — AMLODIPINE BESYLATE 10 MG/1
10 TABLET ORAL DAILY
Status: DISCONTINUED | OUTPATIENT
Start: 2023-10-05 | End: 2023-10-07 | Stop reason: HOSPADM

## 2023-10-05 RX ORDER — BUMETANIDE 1 MG/1
2 TABLET ORAL DAILY
Status: DISCONTINUED | OUTPATIENT
Start: 2023-10-05 | End: 2023-10-07 | Stop reason: HOSPADM

## 2023-10-05 RX ORDER — ONDANSETRON 2 MG/ML
4 INJECTION INTRAMUSCULAR; INTRAVENOUS EVERY 6 HOURS PRN
Status: DISCONTINUED | OUTPATIENT
Start: 2023-10-05 | End: 2023-10-07 | Stop reason: HOSPADM

## 2023-10-05 RX ORDER — 0.9 % SODIUM CHLORIDE 0.9 %
100 INTRAVENOUS SOLUTION INTRAVENOUS ONCE
Status: COMPLETED | OUTPATIENT
Start: 2023-10-05 | End: 2023-10-05

## 2023-10-05 RX ORDER — ONDANSETRON 2 MG/ML
4 INJECTION INTRAMUSCULAR; INTRAVENOUS ONCE
Status: COMPLETED | OUTPATIENT
Start: 2023-10-05 | End: 2023-10-05

## 2023-10-05 RX ORDER — METOPROLOL SUCCINATE 100 MG/1
100 TABLET, EXTENDED RELEASE ORAL 2 TIMES DAILY
Status: DISCONTINUED | OUTPATIENT
Start: 2023-10-05 | End: 2023-10-07 | Stop reason: HOSPADM

## 2023-10-05 RX ORDER — ENOXAPARIN SODIUM 100 MG/ML
40 INJECTION SUBCUTANEOUS DAILY
Status: DISCONTINUED | OUTPATIENT
Start: 2023-10-05 | End: 2023-10-05

## 2023-10-05 RX ORDER — ACETAMINOPHEN 500 MG
1000 TABLET ORAL ONCE
Status: COMPLETED | OUTPATIENT
Start: 2023-10-05 | End: 2023-10-05

## 2023-10-05 RX ORDER — SODIUM CHLORIDE 9 MG/ML
INJECTION, SOLUTION INTRAVENOUS CONTINUOUS
Status: DISCONTINUED | OUTPATIENT
Start: 2023-10-05 | End: 2023-10-07 | Stop reason: HOSPADM

## 2023-10-05 RX ORDER — FINASTERIDE 5 MG/1
5 TABLET, FILM COATED ORAL DAILY
Status: DISCONTINUED | OUTPATIENT
Start: 2023-10-05 | End: 2023-10-07 | Stop reason: HOSPADM

## 2023-10-05 RX ORDER — SODIUM CHLORIDE 0.9 % (FLUSH) 0.9 %
5-40 SYRINGE (ML) INJECTION EVERY 12 HOURS SCHEDULED
Status: DISCONTINUED | OUTPATIENT
Start: 2023-10-05 | End: 2023-10-07 | Stop reason: HOSPADM

## 2023-10-05 RX ORDER — SODIUM CHLORIDE 9 MG/ML
INJECTION, SOLUTION INTRAVENOUS PRN
Status: DISCONTINUED | OUTPATIENT
Start: 2023-10-05 | End: 2023-10-07 | Stop reason: HOSPADM

## 2023-10-05 RX ORDER — ONDANSETRON 4 MG/1
4 TABLET, ORALLY DISINTEGRATING ORAL EVERY 8 HOURS PRN
Status: DISCONTINUED | OUTPATIENT
Start: 2023-10-05 | End: 2023-10-07 | Stop reason: HOSPADM

## 2023-10-05 RX ORDER — NITROGLYCERIN 0.4 MG/1
0.4 TABLET SUBLINGUAL EVERY 5 MIN PRN
Status: DISCONTINUED | OUTPATIENT
Start: 2023-10-05 | End: 2023-10-07 | Stop reason: HOSPADM

## 2023-10-05 RX ORDER — BUPRENORPHINE AND NALOXONE 8; 2 MG/1; MG/1
1 FILM, SOLUBLE BUCCAL; SUBLINGUAL 2 TIMES DAILY
Status: DISCONTINUED | OUTPATIENT
Start: 2023-10-05 | End: 2023-10-07 | Stop reason: HOSPADM

## 2023-10-05 RX ORDER — LIDOCAINE 4 G/G
1 PATCH TOPICAL DAILY
Status: DISCONTINUED | OUTPATIENT
Start: 2023-10-05 | End: 2023-10-07 | Stop reason: HOSPADM

## 2023-10-05 RX ADMIN — ONDANSETRON 4 MG: 2 INJECTION INTRAMUSCULAR; INTRAVENOUS at 01:20

## 2023-10-05 RX ADMIN — FAMOTIDINE 20 MG: 10 INJECTION INTRAVENOUS at 09:24

## 2023-10-05 RX ADMIN — SODIUM CHLORIDE 100 ML: 9 INJECTION, SOLUTION INTRAVENOUS at 00:43

## 2023-10-05 RX ADMIN — FINASTERIDE 5 MG: 5 TABLET, FILM COATED ORAL at 09:24

## 2023-10-05 RX ADMIN — AMLODIPINE BESYLATE 10 MG: 10 TABLET ORAL at 09:24

## 2023-10-05 RX ADMIN — SODIUM CHLORIDE: 9 INJECTION, SOLUTION INTRAVENOUS at 05:29

## 2023-10-05 RX ADMIN — BUPRENORPHINE AND NALOXONE 1 FILM: 8; 2 FILM, SOLUBLE BUCCAL; SUBLINGUAL at 09:22

## 2023-10-05 RX ADMIN — ONDANSETRON 4 MG: 2 INJECTION INTRAMUSCULAR; INTRAVENOUS at 07:35

## 2023-10-05 RX ADMIN — ACETAMINOPHEN 1000 MG: 500 TABLET ORAL at 01:21

## 2023-10-05 RX ADMIN — LATANOPROST 1 DROP: 50 SOLUTION OPHTHALMIC at 20:46

## 2023-10-05 RX ADMIN — IOPAMIDOL 75 ML: 755 INJECTION, SOLUTION INTRAVENOUS at 00:37

## 2023-10-05 RX ADMIN — RIVAROXABAN 15 MG: 15 TABLET, FILM COATED ORAL at 09:24

## 2023-10-05 RX ADMIN — METOPROLOL SUCCINATE 100 MG: 100 TABLET, EXTENDED RELEASE ORAL at 20:26

## 2023-10-05 RX ADMIN — METOPROLOL SUCCINATE 100 MG: 100 TABLET, EXTENDED RELEASE ORAL at 09:30

## 2023-10-05 RX ADMIN — FAMOTIDINE 20 MG: 10 INJECTION INTRAVENOUS at 20:28

## 2023-10-05 RX ADMIN — SODIUM CHLORIDE, PRESERVATIVE FREE 10 ML: 5 INJECTION INTRAVENOUS at 00:43

## 2023-10-05 RX ADMIN — BUPRENORPHINE AND NALOXONE 1 FILM: 8; 2 FILM, SOLUBLE BUCCAL; SUBLINGUAL at 20:26

## 2023-10-05 ASSESSMENT — LIFESTYLE VARIABLES
HOW MANY STANDARD DRINKS CONTAINING ALCOHOL DO YOU HAVE ON A TYPICAL DAY: PATIENT DOES NOT DRINK
HOW OFTEN DO YOU HAVE A DRINK CONTAINING ALCOHOL: NEVER

## 2023-10-05 NOTE — PROGRESS NOTES
RN informed Paula Hidalgo NP of patient's wound on LLE [place] that has drainage, reddened and its peeling off skin. RN covered the skin with ABD/ petroleum gel. Sona to put orders for wound care evaluation.

## 2023-10-05 NOTE — PROGRESS NOTES
Lucy Ball is a 80 y.o. Non- / non  male who presents with Abdominal Pain, Nausea, Emesis, and Fatigue   and is admitted to the hospital for the management of Abdominal pain. According to patient,he developed constant, dull pain in his left upper abdomen shortly after waking this morning. Reports that pain radiates up into his left chest and states that he has never had such severe pain. Symptoms are associated with nausea and vomiting which also started this am.  When questioned, he does admit to constipation, but states that his bowels moved this morning. Denies fever, chills, chest pain, cough, diarrhea, and urinary symptoms. There are no aggravating or alleviating factors. Symptoms are reported as constant and moderate; ongoing throughout the day.       Patient status inpatient in the Progressive Unit/Step down    Hospital Problems             Last Modified POA    * (Principal) Abdominal pain 10/5/2023 Yes    Ileus ([address]) 10/5/2023 Yes     Ileus  -presented with abd pain, nausea and vomiting since am  -CT abdomen/pelvis -no bowel herniation is identified  -- Postsurgical changes related to partial gastrectomy  -- Wall thickening of proximal stomach suggestive of acute gastritis  -- Dilated fluid-filled limb of duodenum remains s/p distal gastrectomy  --- More dilated now than on previous study  --- Additional dilated loops of small bowel -just over 3 cm in size  ----May be related to adynamic ileus or partial SBO  --- Mild scattered stool volume within the colon, without colonic obstruction  --- Atherosclerosis with coronary artery involvement  -Denies urinary symptoms  -Lipase  - 41  -ALT 56, AST 59  --Previously ALT 22 and AST 22 on 8/8  -NPO  -general surgery consulted in ED   --Will see in am    Elevated Troponin  -Troponin HS 58, then 55  --Similar readings on 7/31  --Heart score 7  -Had heart cath January 2022  --Non-obstructive CAD  ---Stress Test completed 8/1/23  ----Risk

## 2023-10-05 NOTE — CARE COORDINATION
Case Management Assessment  Initial Evaluation    Date/Time of Evaluation: 10/5/2023 11:11 AM  Assessment Completed by: Aria Bear RN    If patient is discharged prior to next notation, then this note serves as note for discharge by case management. Patient Name: Albino Anthony                   YOB: 1935  Diagnosis: Ileus ([address]) [K56.7]  Abdominal pain [R10.9]  Generalized abdominal pain [R10.84]  Nausea and vomiting, unspecified vomiting type [R11.2]                   Date / Time: 10/4/2023 10:24 PM    Patient Admission Status: Inpatient   Readmission Risk (Low < 19, Mod (19-27), High > 27): Readmission Risk Score: 21.5    Current PCP: Katherine Huynh MD  PCP verified by CM? Yes    Chart Reviewed: Yes      History Provided by: Patient  Patient Orientation: Alert and Oriented    Patient Cognition: Alert    Hospitalization in the last 30 days (Readmission):  No    If yes, Readmission Assessment in CM Navigator will be completed. Advance Directives:      Code Status: Full Code   Patient's Primary Decision Maker is: Legal Next of Kin    Primary Decision Maker: Binta Singleton  Child - [phone]    Discharge Planning:    Patient lives with: Children Type of Home: House  Primary Care Giver: Self  Patient Support Systems include: Family Members, Children   Current Financial resources: Medicare  Current community resources: ECF/Home Care  Current services prior to admission: Durable Medical Equipment, Home Care            Current DME: Angelina Garcia            Type of Home Care services:  None    ADLS  Prior functional level: Independent in ADLs/IADLs  Current functional level: Independent in ADLs/IADLs    PT AM-PAC:   /24  OT AM-PAC:   /24    Family can provide assistance at DC: Yes  Would you like Case Management to discuss the discharge plan with any other family members/significant others, and if so, who?  Yes (Son; Tenna Goldberg)  Plans to Return to Present Housing: Yes  Other Identified

## 2023-10-05 NOTE — DISCHARGE INSTR - COC
Continuity of Care Form    Patient Name: Jackie Barroso   :  1935  MRN:  [id number]    Admit date:  10/4/2023  Discharge date:  10/07/2023    Code Status Order: Full Code   Advance Directives:     Admitting Physician:  Flori Calhoun MD  PCP: Winslow Dubin, MD    Discharging Nurse: Cortez Duran Unit/Room#: 0376/9700-76  Discharging Unit Phone Number: [phone]    Emergency Contact:   Extended Emergency Contact Information  Primary Emergency Contact: Pebbles Steel, [address] [facility] Phone: [phone]  Mobile Phone: [phone]  Relation: Child    Past Surgical History:  Past Surgical History:   Procedure Laterality Date    ABDOMEN SURGERY      gastric resection    APPENDECTOMY      BONE MARROW BIOPSY      CARDIAC CATHETERIZATION      COLONOSCOPY      COLONOSCOPY N/A 8/3/2023    COLONOSCOPY WITH BIOPSY performed by Kellie Sanchez MD at [facility] [facility]  2022    CT BONE MARROW BIOPSY 2022 [facility] CT SCAN    EYE SURGERY      smiley cataracts    HERNIA REPAIR      inguinal smiley    JOINT REPLACEMENT      rt hip x 2, lt knee    PACEMAKER PLACEMENT      UPPER GASTROINTESTINAL ENDOSCOPY N/A 2023    EGD BIOPSY performed by Kellie Sanchez MD at [facility]       Immunization History:   Immunization History   Administered Date(s) Administered    COVID-19, PFIZER PURPLE top, DILUTE for use, (age 15 y+), 30mcg/0.3mL 2021, 2021, 2021    Influenza Virus Vaccine 10/30/2011, 10/09/2015, 2016    Influenza, FLUZONE (age 72 y+), High Dose, 0.7mL 2020, 10/13/2021, 2022    Influenza, High Dose (Fluzone 65 yrs and older) 10/20/2018, 10/01/2019    Pneumococcal, PCV-13, PREVNAR 15, (age 6w+), IM, 0.5mL 10/30/2019    Pneumococcal, PCV20, PREVNAR 21, (age 6w+), IM, 0.5mL 2022    Pneumococcal, PPSV23, PNEUMOVAX 23, (age 2y+), SC/IM, 0.5mL 10/30/2011, 2020       Active Problems:  Patient Active Problem List   Diagnosis Code    On continuous oral

## 2023-10-05 NOTE — CONSULTS
[facility] Continence Nurse  Consult Note       NAME:  Jared Fulton  MEDICAL RECORD NUMBER:  [id number]  AGE: 80 y.o. GENDER: male  : 1935  TODAY'S DATE:  10/5/2023    Subjective:      Jared Fulton is a 80 y.o. male with inpatient referral to Wound Ostomy Continence Specialty for:  Left leg wound      Wound Identification:  Wound Type: venous  Contributing Factors: venous stasis    Wound History: pt does not know how long wound has been present, but states that he does wear compression socks and has discussed seeing vascular surgery for venous insufficiency  Current Wound Care Treatment:  oil emulsion     Patient Goal of Care:  [x] Wound Healing  [] Odor Control  [] Palliative Care  [] Pain Control   [] Other:         PAST MEDICAL HISTORY        Diagnosis Date    Arthritis     Atrial fibrillation (HCC)     CAD (coronary artery disease)     CHF (congestive heart failure) (HCC)     Glaucoma     Hx of blood clots     with hernia surgery    Hyperlipidemia     Hypertension     MI (myocardial infarction) ([address] [facility])        PAST SURGICAL HISTORY    Past Surgical History:   Procedure Laterality Date    ABDOMEN SURGERY      gastric resection    APPENDECTOMY      BONE MARROW BIOPSY      CARDIAC CATHETERIZATION      COLONOSCOPY      COLONOSCOPY N/A 8/3/2023    COLONOSCOPY WITH BIOPSY performed by Ry Glasgow MD at [address] [facility]  2022    CT BONE MARROW BIOPSY 2022 [facility] CT SCAN    EYE SURGERY      smiley cataracts    HERNIA REPAIR      inguinal smiley    JOINT REPLACEMENT      rt hip x 2, lt knee    PACEMAKER PLACEMENT      UPPER GASTROINTESTINAL ENDOSCOPY N/A 2023    EGD BIOPSY performed by Ry Glasgow MD at [address]    History reviewed. No pertinent family history.     SOCIAL HISTORY    Social History     Tobacco Use    Smoking status: Never    Smokeless tobacco: Never   Vaping Use    Vaping Use: Never used   Substance Use Topics    Alcohol use: Never 6/2/23   Judie Castillo MD   metOLazone (ZAROXOLYN) 2.5 MG tablet Take 1 tab on Monday and Friday 3/7/23   Kimberly Carlson MD   finasteride (PROSCAR) 5 MG tablet Take 1 tablet by mouth daily 1/30/23   Liza Helms MD   amLODIPine (NORVASC) 5 MG tablet TAKE 2 TABLETS BY MOUTH DAILY 1/27/23   Judie Castillo MD   vitamin B-12 (CYANOCOBALAMIN) 1000 MCG tablet TAKE 1 TABLET BY MOUTH DAILY 7/12/22   ProviderMarciano MD   nitroGLYCERIN (NITROSTAT) 0.4 MG SL tablet up to max of 3 total doses. If no relief after 1 dose, call 911. 1/8/22   Ely Tapia MD   buprenorphine-naloxone (SUBOXONE) 8-2 MG FILM SL film Place 1 Film under the tongue 2 times daily.  Indications: pain    ProviderMarciano MD   latanoprost (XALATAN) 0.005 % ophthalmic solution Place 1 drop into both eyes nightly    Provider, MD Marciano       CURRENT MEDICATIONS:  Current Facility-Administered Medications   Medication Dose Route Frequency Provider Last Rate Last Admin    sodium chloride flush 0.9 % injection 10 mL  10 mL IntraVENous PRN Antwon Bolton MD   10 mL at 10/05/23 0043    sodium chloride flush 0.9 % injection 5-40 mL  5-40 mL IntraVENous 2 times per day DARCI Clemente - CNP        sodium chloride flush 0.9 % injection 10 mL  10 mL IntraVENous PRN Florentino Chandler APRN - CNP        0.9 % sodium chloride infusion   IntraVENous PRN DARCI Clemente - ISMAEL        ondansetron (ZOFRAN-ODT) disintegrating tablet 4 mg  4 mg Oral Q8H PRN DARCI Clemente - ISMAEL        Or    ondansetron [facility]) injection 4 mg  4 mg IntraVENous Q6H PRN Florentino Chandler, APRN - CNP   4 mg at 10/05/23 0735    magnesium hydroxide (MILK OF MAGNESIA) 400 MG/5ML suspension 30 mL  30 mL Oral Daily PRN Florentino Chandler APRN - CNP        acetaminophen (TYLENOL) tablet 650 mg  650 mg Oral Q6H PRN DARCI Clemente - CNP        Or    acetaminophen (TYLENOL) suppository 650 mg  650 mg Rectal Q6H PRN DARCI Clemente - CNP        0.9 % sodium

## 2023-10-05 NOTE — H&P
Arturo Albarado DO   ferrous sulfate (IRON 325) 325 (65 Fe) MG tablet Take 1 tablet by mouth every other day 8/4/23   Riley Vaca MD   acetaminophen (TYLENOL) 325 MG tablet Take 2 tablets by mouth every 6 hours as needed for Pain    Marciano White MD   docusate sodium (COLACE) 100 MG capsule Take 1 capsule by mouth 2 times daily    Marciano White MD   cyanocobalamin 1000 MCG/ML injection Inject 1 mL into the muscle every 30 days    Marciano White MD   XARELTO 15 MG TABS tablet TAKE 1 TABLET BY MOUTH DAILY WITH BREAKFAST 7/24/23   Fei Sarkar MD   ondansetron (ZOFRAN) 4 MG tablet Take 1 tablet by mouth 3 times daily as needed for Nausea or Vomiting 7/6/23   Sigrid Nichole MD   vitamin D (ERGOCALCIFEROL) 1.25 MG (43684 UT) CAPS capsule TAKE 1 CAPSULE BY MOUTH EVERY WEEK  Patient taking differently: Take 1 capsule by mouth once a week Mondays 6/19/23   Emmy Cardoza MD   famotidine (PEPCID) 20 MG tablet TAKE 1 TABLET BY MOUTH TWICE DAILY 6/2/23   Sigrid Nichole MD   metOLazone (ZAROXOLYN) 2.5 MG tablet Take 1 tab on Monday and Friday 3/7/23   Lex Encarnacion MD   finasteride (PROSCAR) 5 MG tablet Take 1 tablet by mouth daily 1/30/23   Korin Lund MD   amLODIPine (NORVASC) 5 MG tablet TAKE 2 TABLETS BY MOUTH DAILY 1/27/23   Sigrid Nichole MD   vitamin B-12 (CYANOCOBALAMIN) 1000 MCG tablet TAKE 1 TABLET BY MOUTH DAILY 7/12/22   Marciano White MD   nitroGLYCERIN (NITROSTAT) 0.4 MG SL tablet up to max of 3 total doses. If no relief after 1 dose, call 911. 1/8/22   Lucinda Sarkar MD   buprenorphine-naloxone (SUBOXONE) 8-2 MG FILM SL film Place 1 Film under the tongue 2 times daily.  Indications: pain    Marciano hWite MD   latanoprost (XALATAN) 0.005 % ophthalmic solution Place 1 drop into both eyes nightly    Marciano White MD        Allergies:     Aspirin, Cyclobenzaprine, Ibuprofen, Azithromycin, and Hydrocodone-acetaminophen    Social History: progressive care unit  He came in with a history of abdominal pain with some emesis  Ileus  -presented with abd pain, nausea and vomiting since am  -CT abdomen/pelvis -no bowel herniation is identified  -- Postsurgical changes related to partial gastrectomy  -- Wall thickening of proximal stomach suggestive of acute gastritis  -- Dilated fluid-filled limb of duodenum remains s/p distal gastrectomy  --- More dilated now than on previous study  --- Additional dilated loops of small bowel -just over 3 cm in size  ----May be related to adynamic ileus or partial SBO  --- Mild scattered stool volume within the colon, without colonic obstruction  --- Atherosclerosis with coronary artery involvement  -Denies urinary symptoms  -Lipase  - 41  -ALT 56, AST 59  --Previously ALT 22 and AST 22 on 8/8  -NPO  -general surgery consulted in ED        Vitals are stable other than mild hypertension and mild tachycardia  Mild epigastric tenderness  Renal function electrolytes are all right  Patient is known to have chronic congestive heart failure , htn  Paroxysmal atrial fibrillation-rhythm is regular at present  Has pacemaker  Will consult cardiology because of elevated troponin  Bumex on hold  Elevated Troponin  -Troponin HS 58, then 55  --Similar readings on 7/31  --Heart score 7  -Had heart cath January 2022  --Non-obstructive CAD  ---Stress Test completed 8/1/23  ----Risk stratification - Low      Postsurgical changes are seen related to partial gastrectomy, with wall thickening involving the remaining proximal stomach, within intact gastro jejunal anastomosis. Findings suggestive of acute gastritis. 3. Again identified is a dilated fluid-filled limb of duodenum which remains status post distal gastrectomy. This was also noted on the previous study, though is more dilated on today's study. There are additional dilated loops of small bowel which are just over 3 cm in size, though without significant wall thickening.      2.

## 2023-10-05 NOTE — ACP (ADVANCE CARE PLANNING)
Advance Care Planning     Advance Care Planning Activator (Inpatient)  Conversation Note      Date of ACP Conversation: 10/5/2023     Conversation Conducted with: Patient with Decision Making Capacity    ACP Activator: Carmen Newman RN    Health Care Decision Maker:     Current Designated Health Care Decision Maker:     Primary Decision Maker: Karishma Tracy - [phone]  Click here to complete Healthcare Decision Makers including section of the Healthcare Decision Maker Relationship (ie \"Primary\")  Today we documented Decision Maker(s) consistent with Legal Next of Kin hierarchy. Care Preferences    Ventilation: \"If you were in your present state of health and suddenly became very ill and were unable to breathe on your own, what would your preference be about the use of a ventilator (breathing machine) if it were available to you? \"      Would the patient desire the use of ventilator (breathing machine)?: yes    \"If your health worsens and it becomes clear that your chance of recovery is unlikely, what would your preference be about the use of a ventilator (breathing machine) if it were available to you? \"     Would the patient desire the use of ventilator (breathing machine)?: No      Resuscitation  \"CPR works best to restart the heart when there is a sudden event, like a heart attack, in someone who is otherwise healthy. Unfortunately, CPR does not typically restart the heart for people who have serious health conditions or who are very sick. \"    \"In the event your heart stopped as a result of an underlying serious health condition, would you want attempts to be made to restart your heart (answer \"yes\" for attempt to resuscitate) or would you prefer a natural death (answer \"no\" for do not attempt to resuscitate)? \" yes       [] Yes   [x] No   Educated Patient / Estephania [place] regarding differences between Advance Directives and portable DNR orders.     Length of ACP Conversation in minutes:

## 2023-10-05 NOTE — PROGRESS NOTES
Date:   10/5/2023  Patient name: Sergei Quiñones  Date of admission:  10/4/2023 10:24 PM  MRN:   [id number]  YOB: 1935  PCP: Marylen Dress, MD    Reason for Admission: Ileus [facility]) [K56.7]  Abdominal pain [R10.9]  Generalized abdominal pain [R10.84]  Nausea and vomiting, unspecified vomiting type [R11.2]    Cardiology consult: Abnormal troponin, paroxysmal A-fib, permanent pacemaker       Referring physician: Dr Ruthy Monte    Admission 10/4/2023 with abdominal pain, borderline abnormal high-sensitivity troponin  Nonobstructive CAD cath 1/13/2022  Normal LV systolic function 2D echo 8/1/2023 ejection fraction 55 to 60%  Paroxysmal A-fib  Permanent pacemaker placement, a sensed V paced rhythm  Hypertension  Hyperlipidemia    History of partial gastrectomy/peptic ulcer disease  Abdominal surgery for bowel obstruction  Back surgery 2011 lumbar fusion  Cataract extraction  Hand surgery, hernia repair left inguinal    Allergies :aspirin, Flexeril, ibuprofen, azithromycin also gets nausea      Investigation work-up    Chest x-ray 8/4/2023  Stable appearing chest without acute cardiopulmonary process    ECG 10/4/2023  A sensed V paced    ECG 8/8/2023 a sensed V paced heart rate 83, repolarization abnormalities, significant change from 7/31/2023    2D echo 8/1/2023  Normal LV size mild LVH ejection fraction 55 to 60% no obvious wall motion abnormality normal RV size and function RVSP 36 mmHg  No significant valvular abnormality  Mild to moderate tricuspid regurgitation  No significant pericardial effusion seen  IVC not well-visualized    Cardiac cath 1/13/2022  Left main normal, LAD diffuse irregularities 40 to 50%, circumflex diffuse irregularities 30 to 40%, RCA diffuse irregularities 30 to 40%, right dominant artery    CT abdomen and pelvis 10/5/2023  1. No bowel herniation is identified.   2. Postsurgical changes are seen related to partial gastrectomy, with wall  thickening involving the Pepcid IV fluids  Waiting for surgery assessment  IM notes reviewed    Patient has seen [facility] cardiology please refer to them          Electronically signed by Hernandez Olmos MD on 10/5/2023 at 4:26 PM

## 2023-10-05 NOTE — ED TRIAGE NOTES
Mode of arrival (squad #, walk in, police, etc) : walk in        Chief complaint(s): Abd pain, N/V        Arrival Note (brief scenario, treatment PTA, etc). : Pt reports abd pain with N/V since this morning. Pt unable to keep anything in. Pt has a hx of a hernia. Pt is A&Ox4.        C= \"Have you ever felt that you should Cut down on your drinking? \"  No  A= \"Have people Annoyed you by criticizing your drinking? \"  No  G= \"Have you ever felt bad or Guilty about your drinking? \"  No  E= \"Have you ever had a drink as an Eye-opener first thing in the morning to steady your nerves or to help a hangover? \"  No      Deferred []      Reason for deferring: N/A    *If yes to two or more: probable alcohol abuse. *

## 2023-10-05 NOTE — CONSULTS
General Surgery   Consultation        Pt Name: Lucy Ball  MRN: [id number]  YOB: 1935  Date of evaluation: 10/5/2023  Primary Care Physician: Domo Durant MD   Patient evaluated at the request of  Dr. Mariely Bush  Reason for evaluation: Abdominal pain    SUBJECTIVE:   History of Chief Complaint:    Lucy Ball is a 80 y.o. male who presents with abdominal pain nausea vomiting. Patient was just seen August 1, 2023. Patient with multiple abdominal surgeries including gastrectomy in the past.  History of bowel obstruction for which she required surgery. Right inguinal hernia repair appendectomy. Patient has had about 26 surgeries on his body over the last several years. ER work-up reviewed. Currently patient is hungry and wants to eat. No nausea vomiting no distention no fever chills no urinary symptoms. Symptom onset has been recurrent for a time period of few week(s). Severity is described as moderate. Course of his symptoms over time is recurrent. Past Medical History   has a past medical history of Arthritis, Atrial fibrillation ([address]), CAD (coronary artery disease), CHF (congestive heart failure) ([address]), Glaucoma, Hx of blood clots, Hyperlipidemia, Hypertension, and MI (myocardial infarction) ([address]). Past Surgical History   has a past surgical history that includes pacemaker placement; Abdomen surgery; Cardiac catheterization; Appendectomy; Colonoscopy; eye surgery; hernia repair; bone marrow biopsy; joint replacement; CT BIOPSY BONE MARROW (5/31/2022); Upper gastrointestinal endoscopy (N/A, 8/2/2023); and Colonoscopy (N/A, 8/3/2023).     Medications    Current Facility-Administered Medications:     sodium chloride flush 0.9 % injection 10 mL, 10 mL, IntraVENous, PRN, Gavi Sung MD, 10 mL at 10/05/23 0043    sodium chloride flush 0.9 % injection 5-40 mL, 5-40 mL, IntraVENous, 2 times per day, Blossom Goltz, APRN - CNP    sodium chloride flush 0.9 % injection 10 mL, 10 mL,

## 2023-10-05 NOTE — TELEPHONE ENCOUNTER
Called pt to clarify location of p! (Body part) for upcoming apt with Dr. Henny Tanner on 10/9/23. No answer. LVM.

## 2023-10-06 ENCOUNTER — TELEPHONE (OUTPATIENT)
Dept: INTERNAL MEDICINE CLINIC | Age: 88
End: 2023-10-06

## 2023-10-06 LAB
ANION GAP SERPL CALCULATED.3IONS-SCNC: 8 MMOL/L (ref 9–17)
BASOPHILS # BLD: 0 K/UL (ref 0–0.2)
BASOPHILS NFR BLD: 1 % (ref 0–2)
BUN SERPL-MCNC: 18 MG/DL (ref 8–23)
CALCIUM SERPL-MCNC: 8.4 MG/DL (ref 8.6–10.4)
CHLORIDE SERPL-SCNC: 105 MMOL/L (ref 98–107)
CO2 SERPL-SCNC: 24 MMOL/L (ref 20–31)
CREAT SERPL-MCNC: 1 MG/DL (ref 0.7–1.2)
EKG ATRIAL RATE: 78 BPM
EKG P AXIS: 69 DEGREES
EKG P-R INTERVAL: 204 MS
EKG Q-T INTERVAL: 444 MS
EKG QRS DURATION: 126 MS
EKG QTC CALCULATION (BAZETT): 499 MS
EKG R AXIS: -74 DEGREES
EKG T AXIS: 88 DEGREES
EKG VENTRICULAR RATE: 76 BPM
EOSINOPHIL # BLD: 0.2 K/UL (ref 0–0.4)
EOSINOPHILS RELATIVE PERCENT: 4 % (ref 0–4)
ERYTHROCYTE [DISTWIDTH] IN BLOOD BY AUTOMATED COUNT: 14.5 % (ref 11.5–14.9)
GFR SERPL CREATININE-BSD FRML MDRD: >60 ML/MIN/1.73M2
GLUCOSE SERPL-MCNC: 112 MG/DL (ref 70–99)
HCT VFR BLD AUTO: 29.4 % (ref 41–53)
HGB BLD-MCNC: 9.4 G/DL (ref 13.5–17.5)
LYMPHOCYTES NFR BLD: 0.9 K/UL (ref 1–4.8)
LYMPHOCYTES RELATIVE PERCENT: 25 % (ref 24–44)
MCH RBC QN AUTO: 29.8 PG (ref 26–34)
MCHC RBC AUTO-ENTMCNC: 32 G/DL (ref 31–37)
MCV RBC AUTO: 93.2 FL (ref 80–100)
MONOCYTES NFR BLD: 0.4 K/UL (ref 0.1–1.3)
MONOCYTES NFR BLD: 11 % (ref 1–7)
NEUTROPHILS NFR BLD: 59 % (ref 36–66)
NEUTS SEG NFR BLD: 2.1 K/UL (ref 1.3–9.1)
PLATELET # BLD AUTO: 254 K/UL (ref 150–450)
PMV BLD AUTO: 7.4 FL (ref 6–12)
POTASSIUM SERPL-SCNC: 4.1 MMOL/L (ref 3.7–5.3)
RBC # BLD AUTO: 3.15 M/UL (ref 4.5–5.9)
SODIUM SERPL-SCNC: 137 MMOL/L (ref 135–144)
WBC OTHER # BLD: 3.6 K/UL (ref 3.5–11)

## 2023-10-06 PROCEDURE — 85025 COMPLETE CBC W/AUTO DIFF WBC: CPT

## 2023-10-06 PROCEDURE — 6370000000 HC RX 637 (ALT 250 FOR IP): Performed by: NURSE PRACTITIONER

## 2023-10-06 PROCEDURE — A4216 STERILE WATER/SALINE, 10 ML: HCPCS | Performed by: NURSE PRACTITIONER

## 2023-10-06 PROCEDURE — 36415 COLL VENOUS BLD VENIPUNCTURE: CPT

## 2023-10-06 PROCEDURE — 2500000003 HC RX 250 WO HCPCS: Performed by: NURSE PRACTITIONER

## 2023-10-06 PROCEDURE — 93010 ELECTROCARDIOGRAM REPORT: CPT | Performed by: INTERNAL MEDICINE

## 2023-10-06 PROCEDURE — 2060000000 HC ICU INTERMEDIATE R&B

## 2023-10-06 PROCEDURE — 99232 SBSQ HOSP IP/OBS MODERATE 35: CPT | Performed by: INTERNAL MEDICINE

## 2023-10-06 PROCEDURE — 2580000003 HC RX 258: Performed by: NURSE PRACTITIONER

## 2023-10-06 PROCEDURE — 80048 BASIC METABOLIC PNL TOTAL CA: CPT

## 2023-10-06 RX ADMIN — METOPROLOL SUCCINATE 100 MG: 100 TABLET, EXTENDED RELEASE ORAL at 21:54

## 2023-10-06 RX ADMIN — FAMOTIDINE 20 MG: 10 INJECTION INTRAVENOUS at 09:13

## 2023-10-06 RX ADMIN — FAMOTIDINE 20 MG: 10 INJECTION INTRAVENOUS at 22:00

## 2023-10-06 RX ADMIN — METOPROLOL SUCCINATE 100 MG: 100 TABLET, EXTENDED RELEASE ORAL at 09:13

## 2023-10-06 RX ADMIN — SODIUM CHLORIDE: 9 INJECTION, SOLUTION INTRAVENOUS at 22:10

## 2023-10-06 RX ADMIN — RIVAROXABAN 15 MG: 15 TABLET, FILM COATED ORAL at 09:13

## 2023-10-06 RX ADMIN — LATANOPROST 1 DROP: 50 SOLUTION OPHTHALMIC at 22:06

## 2023-10-06 RX ADMIN — FINASTERIDE 5 MG: 5 TABLET, FILM COATED ORAL at 09:13

## 2023-10-06 RX ADMIN — BUPRENORPHINE AND NALOXONE 1 FILM: 8; 2 FILM, SOLUBLE BUCCAL; SUBLINGUAL at 09:13

## 2023-10-06 RX ADMIN — AMLODIPINE BESYLATE 10 MG: 10 TABLET ORAL at 09:31

## 2023-10-06 RX ADMIN — BUPRENORPHINE AND NALOXONE 1 FILM: 8; 2 FILM, SOLUBLE BUCCAL; SUBLINGUAL at 21:54

## 2023-10-06 RX ADMIN — SODIUM CHLORIDE: 9 INJECTION, SOLUTION INTRAVENOUS at 02:23

## 2023-10-06 NOTE — PROGRESS NOTES
[facility]  Ondina Thompson MD; Freddie Mckenna MD; Fady Mccarthy MD; MD Shannon Samano MD; Elina Mcdowell MD    [facility]   [address]    HISTORY AND PHYSICAL EXAMINATION            Date:   10/6/2023  Patient name:  Carola Sim  Date of admission:  10/4/2023 10:24 PM  MRN:   [id number]  Account:  [de-identified]  YOB: 1935  PCP:    Jocelyne Reed MD  Room:   2089/2089-01  Code Status:    Full Code    Chief Complaint:     Chief Complaint   Patient presents with    Abdominal Pain    Nausea    Emesis    Fatigue       History Obtained From:     patient, electronic medical record    History of Present Illness:     Carola Sim is a 80 y.o. Non- / non  male who presents with Abdominal Pain, Nausea, Emesis, and Fatigue   and is admitted to the hospital for the management of Abdominal pain. 59-year-old male  Admitted from ER  Presented with epigastric abdominal pain vomiting and generalized weakness  Patient has history of abdominal hernia  Noted some pain in the hernia and tenderness this morning  No hematemesis  Patient denies any chest pain or shortness of breath.       Past Medical History:     Past Medical History:   Diagnosis Date    Arthritis     Atrial fibrillation (HCC)     CAD (coronary artery disease)     CHF (congestive heart failure) (HCC)     Glaucoma     Hx of blood clots     with hernia surgery    Hyperlipidemia     Hypertension     MI (myocardial infarction) [facility])         Past Surgical History:     Past Surgical History:   Procedure Laterality Date    ABDOMEN SURGERY      gastric resection    APPENDECTOMY      BONE MARROW BIOPSY      CARDIAC CATHETERIZATION      COLONOSCOPY      COLONOSCOPY N/A 8/3/2023    COLONOSCOPY WITH BIOPSY performed by Sally Boo [facility]  5/31/2022    CT BONE MARROW BIOPSY 5/31/2022 [facility] CT

## 2023-10-06 NOTE — PLAN OF CARE
Problem: Discharge Planning  Goal: Discharge to home or other facility with appropriate resources  10/6/2023 0022 by Kayleigh Warner RN  Outcome: Progressing  10/5/2023 1433 by Donna Reilly RN  Outcome: Progressing  Flowsheets (Taken 10/5/2023 1433)  Discharge to home or other facility with appropriate resources:   Identify barriers to discharge with patient and caregiver   Arrange for needed discharge resources and transportation as appropriate   Refer to discharge planning if patient needs post-hospital services based on physician order or complex needs related to functional status, cognitive ability or social support system  Note: Patient reports feeling better this shift; 1 small episode of emesis early this morning and nothing after. Bowel sounds absent on initial assessment and hypoactive on reassessment. Patient verbalizes that he is to discharge home with son once medically cleared. Problem: Pain  Goal: Verbalizes/displays adequate comfort level or baseline comfort level  10/6/2023 0022 by Kayleigh Warner RN  Outcome: Progressing  Note: Denies pain. Positioning self comfortably. 10/5/2023 1433 by Donna Reilly RN  Outcome: Progressing  Flowsheets (Taken 10/5/2023 1433)  Verbalizes/displays adequate comfort level or baseline comfort level:   Encourage patient to monitor pain and request assistance   Assess pain using appropriate pain scale   Implement non-pharmacological measures as appropriate and evaluate response   Administer analgesics based on type and severity of pain and evaluate response     Problem: Safety - Adult  Goal: Free from fall injury  10/6/2023 0022 by Kayleigh Warner RN  Outcome: Progressing  Note: Patient alert and oriented. Bed alarm on. Assisted to bathroom with one assist and cane.   10/5/2023 1433 by Donna Reilly RN  Outcome: Progressing  Flowsheets (Taken 10/5/2023 1433)  Free From Fall Injury:   Instruct family/caregiver on patient safety

## 2023-10-06 NOTE — PROGRESS NOTES
Date:   10/6/2023  Patient name: Adwoa Russell  Date of admission:  10/4/2023 10:24 PM  MRN:   [id number]  YOB: 1935  PCP: Yasmine Gipson MD    Reason for Admission: Ileus [facility]) [K56.7]  Abdominal pain [R10.9]  Generalized abdominal pain [R10.84]  Nausea and vomiting, unspecified vomiting type [R11.2]    Cardiology consult: Abnormal troponin, paroxysmal A-fib, permanent pacemaker       Referring physician: Dr Salo Larose     Admission 10/4/2023 with abdominal pain, borderline abnormal high-sensitivity troponin  Nonobstructive CAD cath 1/13/2022  Normal LV systolic function 2D echo 8/1/2023 ejection fraction 55 to 60%  Paroxysmal A-fib  Permanent pacemaker placement, a sensed V paced rhythm  Hypertension  Hyperlipidemia     History of partial gastrectomy/peptic ulcer disease  Abdominal surgery for bowel obstruction  Back surgery 2011 lumbar fusion  Cataract extraction  Hand surgery, hernia repair left inguinal     Allergies :Aspirin, Flexeril, ibuprofen, azithromycin also gets nausea    Investigation work-up     Chest x-ray 8/4/2023  Stable appearing chest without acute cardiopulmonary process     ECG 10/4/2023  A sensed V paced     ECG 8/8/2023 a sensed V paced heart rate 83, repolarization abnormalities, significant change from 7/31/2023     2D echo 8/1/2023  Normal LV size mild LVH ejection fraction 55 to 60% no obvious wall motion abnormality normal RV size and function RVSP 36 mmHg  No significant valvular abnormality  Mild to moderate tricuspid regurgitation  No significant pericardial effusion seen  IVC not well-visualized     Cardiac cath 1/13/2022  Left main normal, LAD diffuse irregularities 40 to 50%, circumflex diffuse irregularities 30 to 40%, RCA diffuse irregularities 30 to 40%, right dominant artery    CT abdomen and pelvis 10/5/2023  1. No bowel herniation is identified.   2. Postsurgical changes are seen related to partial gastrectomy, with wall  thickening

## 2023-10-06 NOTE — PROGRESS NOTES
Patient was seen and examined. Awake alert in no acute distress. Feels great. Tolerating full liquids. Had a bowel movement. Abdomen is benign. Extremity nontender. Blood work reviewed. BMP unremarkable. WBC count is normal.  Hemoglobin 9.4. Platelet count adequate. Advance to soft diet. Surgically stable. Hopefully discharge to home tomorrow.

## 2023-10-06 NOTE — CARE COORDINATION
ONGOING DISCHARGE PLAN:    Patient is alert and oriented x4. Spoke with patient regarding discharge plan and patient confirms that plan is still to DC to home w/ Son & Family w/ VNS, [facility]. Pt. Denies needs. Pt. Remains on Cl liquid diet. Surgery following. Hospital F/U made w/ Dr. Jose Artis, for October 11, at 9:00 AM.     [facility] on board. Will continue to follow for additional discharge needs. If patient is discharged prior to next notation, then this note serves as note for discharge by case management.     Electronically signed by Sherman Charles RN on 10/6/2023 at 9:17 AM

## 2023-10-06 NOTE — PROGRESS NOTES
10/06/23 1536   Encounter Summary   Encounter Overview/Reason  Initial Encounter   Service Provided For: Patient   Referral/Consult From: Rounding   Complexity of Encounter Low   Spiritual/Emotional needs   Type Spiritual Support   Assessment/Intervention/Outcome   Assessment Unable to assess  (sleeping)   Intervention Prayer (assurance of)/[place]

## 2023-10-07 VITALS
RESPIRATION RATE: 16 BRPM | TEMPERATURE: 97.8 F | DIASTOLIC BLOOD PRESSURE: 67 MMHG | WEIGHT: 175 LBS | HEIGHT: 72 IN | OXYGEN SATURATION: 97 % | BODY MASS INDEX: 23.7 KG/M2 | HEART RATE: 60 BPM | SYSTOLIC BLOOD PRESSURE: 127 MMHG

## 2023-10-07 LAB
ANION GAP SERPL CALCULATED.3IONS-SCNC: 5 MMOL/L (ref 9–17)
BASOPHILS # BLD: 0 K/UL (ref 0–0.2)
BASOPHILS NFR BLD: 0 % (ref 0–2)
BUN SERPL-MCNC: 17 MG/DL (ref 8–23)
CALCIUM SERPL-MCNC: 8.3 MG/DL (ref 8.6–10.4)
CHLORIDE SERPL-SCNC: 109 MMOL/L (ref 98–107)
CO2 SERPL-SCNC: 25 MMOL/L (ref 20–31)
CREAT SERPL-MCNC: 0.9 MG/DL (ref 0.7–1.2)
EOSINOPHIL # BLD: 0.09 K/UL (ref 0–0.4)
EOSINOPHILS RELATIVE PERCENT: 3 % (ref 0–4)
ERYTHROCYTE [DISTWIDTH] IN BLOOD BY AUTOMATED COUNT: 14.8 % (ref 11.5–14.9)
GFR SERPL CREATININE-BSD FRML MDRD: >60 ML/MIN/1.73M2
GLUCOSE SERPL-MCNC: 99 MG/DL (ref 70–99)
HCT VFR BLD AUTO: 29.2 % (ref 41–53)
HGB BLD-MCNC: 9.3 G/DL (ref 13.5–17.5)
LYMPHOCYTES NFR BLD: 0.72 K/UL (ref 1–4.8)
LYMPHOCYTES RELATIVE PERCENT: 24 % (ref 24–44)
MCH RBC QN AUTO: 30.1 PG (ref 26–34)
MCHC RBC AUTO-ENTMCNC: 31.8 G/DL (ref 31–37)
MCV RBC AUTO: 94.7 FL (ref 80–100)
MONOCYTES NFR BLD: 0.33 K/UL (ref 0.1–1.3)
MONOCYTES NFR BLD: 11 % (ref 1–7)
MORPHOLOGY: ABNORMAL
MORPHOLOGY: ABNORMAL
NEUTROPHILS NFR BLD: 62 % (ref 36–66)
NEUTS SEG NFR BLD: 1.86 K/UL (ref 1.3–9.1)
PLATELET # BLD AUTO: 245 K/UL (ref 150–450)
PMV BLD AUTO: 7.1 FL (ref 6–12)
POTASSIUM SERPL-SCNC: 5.2 MMOL/L (ref 3.7–5.3)
RBC # BLD AUTO: 3.08 M/UL (ref 4.5–5.9)
SODIUM SERPL-SCNC: 139 MMOL/L (ref 135–144)
WBC OTHER # BLD: 3 K/UL (ref 3.5–11)

## 2023-10-07 PROCEDURE — A4216 STERILE WATER/SALINE, 10 ML: HCPCS | Performed by: NURSE PRACTITIONER

## 2023-10-07 PROCEDURE — 85025 COMPLETE CBC W/AUTO DIFF WBC: CPT

## 2023-10-07 PROCEDURE — 80048 BASIC METABOLIC PNL TOTAL CA: CPT

## 2023-10-07 PROCEDURE — 2500000003 HC RX 250 WO HCPCS: Performed by: NURSE PRACTITIONER

## 2023-10-07 PROCEDURE — 2580000003 HC RX 258: Performed by: NURSE PRACTITIONER

## 2023-10-07 PROCEDURE — 6370000000 HC RX 637 (ALT 250 FOR IP): Performed by: NURSE PRACTITIONER

## 2023-10-07 PROCEDURE — 36415 COLL VENOUS BLD VENIPUNCTURE: CPT

## 2023-10-07 RX ADMIN — FINASTERIDE 5 MG: 5 TABLET, FILM COATED ORAL at 09:28

## 2023-10-07 RX ADMIN — METOPROLOL SUCCINATE 100 MG: 100 TABLET, EXTENDED RELEASE ORAL at 09:28

## 2023-10-07 RX ADMIN — FAMOTIDINE 20 MG: 10 INJECTION INTRAVENOUS at 09:33

## 2023-10-07 RX ADMIN — BUPRENORPHINE AND NALOXONE 1 FILM: 8; 2 FILM, SOLUBLE BUCCAL; SUBLINGUAL at 09:28

## 2023-10-07 RX ADMIN — RIVAROXABAN 15 MG: 15 TABLET, FILM COATED ORAL at 09:28

## 2023-10-07 RX ADMIN — AMLODIPINE BESYLATE 10 MG: 10 TABLET ORAL at 09:28

## 2023-10-07 RX ADMIN — SODIUM CHLORIDE: 9 INJECTION, SOLUTION INTRAVENOUS at 05:46

## 2023-10-07 ASSESSMENT — PAIN SCALES - GENERAL
PAINLEVEL_OUTOF10: 3
PAINLEVEL_OUTOF10: 2
PAINLEVEL_OUTOF10: 3
PAINLEVEL_OUTOF10: 3

## 2023-10-07 ASSESSMENT — PAIN DESCRIPTION - LOCATION
LOCATION: LEG

## 2023-10-07 NOTE — PLAN OF CARE
Problem: Discharge Planning  Goal: Discharge to home or other facility with appropriate resources  Outcome: Progressing     Problem: Pain  Goal: Verbalizes/displays adequate comfort level or baseline comfort level  Outcome: Progressing  Note: Denies pain. Able to position self comfortably. Problem: Safety - Adult  Goal: Free from fall injury  Outcome: Progressing     Problem: ABCDS Injury Assessment  Goal: Absence of physical injury  Outcome: Progressing     Problem: Chronic Conditions and Co-morbidities  Goal: Patient's chronic conditions and co-morbidity symptoms are monitored and maintained or improved  Outcome: Progressing  Note: Patient tolerating diet. Denies nausea or abdomen pain.

## 2023-10-07 NOTE — CARE COORDINATION
Continuity of Care Form    Patient Name: Walt Salinas   :  1935  MRN:  [id number]    Admit date:  10/4/2023  Discharge date:  10/07/2023    Code Status Order: Full Code   Advance Directives:     Admitting Physician:  Bobbi Russell MD  PCP: Judie Castillo MD    Discharging Nurse: Liliana Gustafson Unit/Room#: 0685/5693-71  Discharging Unit Phone Number: [phone]    Emergency Contact:   Extended Emergency Contact Information  Primary Emergency Contact: Kt Worthington, [address] [facility] [address] Phone: [phone]  Mobile Phone: [phone]  Relation: Child    Past Surgical History:  Past Surgical History:   Procedure Laterality Date    ABDOMEN SURGERY      gastric resection    APPENDECTOMY      BONE MARROW BIOPSY      CARDIAC CATHETERIZATION      COLONOSCOPY      COLONOSCOPY N/A 8/3/2023    COLONOSCOPY WITH BIOPSY performed by Shady Ruiz MD at [facility] [facility]  2022    CT BONE MARROW BIOPSY 2022 [facility] CT SCAN    EYE SURGERY      smiley cataracts    HERNIA REPAIR      inguinal smiley    JOINT REPLACEMENT      rt hip x 2, lt knee    PACEMAKER PLACEMENT      UPPER GASTROINTESTINAL ENDOSCOPY N/A 2023    EGD BIOPSY performed [facility]ady [facility]iz MD at [facility]       Immunization History:   Immunization History   Administered Date(s) Administered    COVID-19, PFIZER PURPLE top, DILUTE for use, (age 15 y+), 30mcg/0.3mL 2021, 2021, 2021    Influenza Virus Vaccine 10/30/2011, 10/09/2015, 2016    Influenza, FLUZONE (age 72 y+), High Dose, 0.7mL 2020, 10/13/2021, 2022    Influenza, High Dose (Fluzone 65 yrs and older) 10/20/2018, 10/01/2019    Pneumococcal, PCV-13, PREVNAR 15, (age 6w+), IM, 0.5mL 10/30/2019    Pneumococcal, PCV20, PREVNAR 21, (age 6w+), IM, 0.5mL 2022    Pneumococcal, PPSV23, PNEUMOVAX 23, (age 2y+), SC/IM, 0.5mL 10/30/2011, 2020       Active Problems:  Patient Active Problem List   Diagnosis Code    On continuous oral

## 2023-10-07 NOTE — CARE COORDINATION
ONGOING DISCHARGE PLAN:    Patient is alert and oriented x4. Spoke with patient regarding discharge plan and patient confirms that plan is still home with JORDIN - Jay Sánchez. Pt will d/c home today. Faxed CARLOS and d/c med list to [facility] and notified Inder Gonzalez from [facility]. Will continue to follow for additional discharge needs. If patient is discharged prior to next notation, then this note serves as note for discharge by case management.     Electronically signed by Sydell Landau, RN on 10/7/2023 at 12:57 PM

## 2023-10-07 NOTE — PROGRESS NOTES
Date:   10/7/2023  Patient name: Lorenza Morales  Date of admission:  10/4/2023 10:24 PM  MRN:   [id number]  YOB: 1935  PCP: Linnea Valenzuela MD    Reason for Admission: Ileus [facility]) [K56.7]  Abdominal pain [R10.9]  Generalized abdominal pain [R10.84]  Nausea and vomiting, unspecified vomiting type [R11.2]    Cardiology consult: Abnormal troponin, paroxysmal A-fib, permanent pacemaker       Referring physician: Dr Anuja Shelby     Admission 10/4/2023 with abdominal pain, borderline abnormal high-sensitivity troponin  Nonobstructive CAD cath 1/13/2022  Normal LV systolic function 2D echo 8/1/2023 ejection fraction 55 to 60%  Paroxysmal A-fib  Permanent pacemaker placement, a sensed V paced rhythm  Hypertension  Hyperlipidemia  Rupture right biceps tendon       Past Surgical history  History of partial gastrectomy/peptic ulcer disease  Abdominal surgery for bowel obstruction  Back surgery 2011 lumbar fusion  Cataract extraction  Hand surgery, hernia repair left inguinal     Allergies :Aspirin, Flexeril, ibuprofen, azithromycin also gets nausea     Investigation work-up     Chest x-ray 8/4/2023  Stable appearing chest without acute cardiopulmonary process     ECG 10/4/2023  A sensed V paced    ECG 8/8/2023 A sensed V paced heart rate 83, repolarization abnormalities, significant change from 7/31/2023     2D echo 8/1/2023  Normal LV size mild LVH ejection fraction 55 to 60% no obvious wall motion abnormality normal RV size and function RVSP 36 mmHg  No significant valvular abnormality  Mild to moderate tricuspid regurgitation  No significant pericardial effusion seen  IVC not well-visualized     Cardiac cath 1/13/2022  Left main normal, LAD diffuse irregularities 40 to 50%, circumflex diffuse irregularities 30 to 40%, RCA diffuse irregularities 30 to 40%, right dominant artery    CT abdomen and pelvis 10/5/2023  1. No bowel herniation is identified.   2. Postsurgical changes are seen related

## 2023-10-07 NOTE — PROGRESS NOTES
Patient was seen and examined. Awake alert in no acute distress. Afebrile vital signs are stable. Tolerating diet. Bowels are moving. No abdominal pain. Abdomen is benign. Extremity nontender. Blood work reviewed. BMP unremarkable. WBC count normal.  Hemoglobin 9.3. Surgically stable for discharge. Outpatient follow-up in the office in the next 3 to 4 weeks.

## 2023-10-07 NOTE — PROGRESS NOTES
Discharge teaching and instructions for diagnosis/procedure of ileus completed with patient using teachback method. AVS reviewed. Escripted Rxs to home pharmacy. Patient voiced understanding regarding prescriptions, follow up appointments, and care of self at home. Denies questions. IV d/c'd with cath intact and drsg applied. Skin Pk/W/D. Easy respirations. Denies pain. D/c'd with all belongings. Discharged in a wheelchair to lobby in black and white cab.  Home with support per self and VNS

## 2023-10-09 ENCOUNTER — TELEPHONE (OUTPATIENT)
Dept: INTERNAL MEDICINE CLINIC | Age: 88
End: 2023-10-09

## 2023-10-09 ENCOUNTER — CARE COORDINATION (OUTPATIENT)
Dept: CASE MANAGEMENT | Age: 88
End: 2023-10-09

## 2023-10-09 ENCOUNTER — HOSPITAL ENCOUNTER (OUTPATIENT)
Dept: CT IMAGING | Facility: CLINIC | Age: 88
Discharge: HOME | End: 2023-10-11
Payer: MEDICARE

## 2023-10-09 DIAGNOSIS — M79.89 SWELLING OF UPPER ARM: ICD-10-CM

## 2023-10-09 DIAGNOSIS — R10.10 PAIN OF UPPER ABDOMEN: Primary | ICD-10-CM

## 2023-10-09 DIAGNOSIS — M79.89 SWELLING OF UPPER ARM: Primary | ICD-10-CM

## 2023-10-09 PROCEDURE — 2580000003 HC RX 258: Performed by: INTERNAL MEDICINE

## 2023-10-09 PROCEDURE — 73201 CT UPPER EXTREMITY W/DYE: CPT

## 2023-10-09 PROCEDURE — 6360000004 HC RX CONTRAST MEDICATION: Performed by: INTERNAL MEDICINE

## 2023-10-09 PROCEDURE — 1111F DSCHRG MED/CURRENT MED MERGE: CPT | Performed by: INTERNAL MEDICINE

## 2023-10-09 RX ORDER — SODIUM CHLORIDE 0.9 % (FLUSH) 0.9 %
10 SYRINGE (ML) INJECTION PRN
Status: COMPLETED | OUTPATIENT
Start: 2023-10-09 | End: 2023-10-09

## 2023-10-09 RX ORDER — 0.9 % SODIUM CHLORIDE 0.9 %
80 INTRAVENOUS SOLUTION INTRAVENOUS ONCE
Status: COMPLETED | OUTPATIENT
Start: 2023-10-09 | End: 2023-10-09

## 2023-10-09 RX ADMIN — SODIUM CHLORIDE, PRESERVATIVE FREE 10 ML: 5 INJECTION INTRAVENOUS at 11:27

## 2023-10-09 RX ADMIN — SODIUM CHLORIDE 80 ML: 9 INJECTION, SOLUTION INTRAVENOUS at 11:26

## 2023-10-09 RX ADMIN — IOPAMIDOL 75 ML: 755 INJECTION, SOLUTION INTRAVENOUS at 11:26

## 2023-10-09 NOTE — TELEPHONE ENCOUNTER
Patient informed and asked me to help him schedule his testing. Got patient scheduled on 10-13 at 9:45AM at [facility]. Attempted to call patient back no answer, will try back later.

## 2023-10-09 NOTE — CARE COORDINATION
Care Transitions Initial Follow Up Call    Call within 2 business days of discharge: Yes    Patient Current Location:  Home: [address] [place] [address] [address]    Care Transition Nurse contacted the patient by telephone to perform post hospital discharge assessment. Verified name and  with patient as identifiers. Provided introduction to self, and explanation of the Care Transition Nurse role. Patient: Pravin Bryant   Patient : 1935   MRN: [id number]    Reason for Admission: ABD pain, N/V  Discharge Date: 10/7/23   RARS: Readmission Risk Score: 22.8      Last Discharge Facility       Date Complaint Diagnosis Description Type Department Provider    10/4/23 Abdominal Pain; Nausea; Emesis; Fatigue Nausea and vomiting, unspecified vomiting type . .. ED to Hosp-Admission (Discharged) (ADMITTED) [facility] Melene Carrel, MD; Rudy Sanford. .. Was this an external facility discharge? No   Challenges to be reviewed by the provider   Additional needs identified to be addressed with provider: Yes    Patient concerned about stopping diuretics - bumex + zaroxolyn               Method of communication with provider:routing    Admission to [facility] 10/4-10/7 for ABD pain, N/V - Determined by Dr Blanca Leija not to be a surgical ABD - hx of several GI surgeries. Patient reports that all GI symptoms have resolved - no further pain, nausea or vomiting - able to tolerate solids & liquids - staying hydrated. Denies lightheadedness or signs of dehydration. Denies any leg swelling currently. Marylee Foy is concerned that diuretics are discontinued on AVS - he said he does not plan on stopping his bumex or zaroxolyn & will double check with Dr Nina Solis at [facility] appt on 10/11. Encouraged to do daily weights & discuss with PCP at appt. 180 # is most recent weight. Right upper arm swelling - testing today & vascular studies scheduled for later this week, 10/13.     [facility] - patient said he hasn't heard as of yet from them to

## 2023-10-10 ENCOUNTER — HOSPITAL ENCOUNTER (EMERGENCY)
Age: 88
Discharge: HOME | End: 2023-10-10
Attending: EMERGENCY MEDICINE
Payer: MEDICARE

## 2023-10-10 VITALS
BODY MASS INDEX: 24.52 KG/M2 | HEIGHT: 73 IN | WEIGHT: 185 LBS | HEART RATE: 88 BPM | SYSTOLIC BLOOD PRESSURE: 156 MMHG | TEMPERATURE: 98.7 F | RESPIRATION RATE: 15 BRPM | OXYGEN SATURATION: 98 % | DIASTOLIC BLOOD PRESSURE: 97 MMHG

## 2023-10-10 DIAGNOSIS — Z76.0 ENCOUNTER FOR MEDICATION REFILL: Primary | ICD-10-CM

## 2023-10-10 PROCEDURE — 6370000000 HC RX 637 (ALT 250 FOR IP): Performed by: EMERGENCY MEDICINE

## 2023-10-10 PROCEDURE — 99283 EMERGENCY DEPT VISIT LOW MDM: CPT

## 2023-10-10 RX ORDER — BUPRENORPHINE AND NALOXONE 8; 2 MG/1; MG/1
1 FILM, SOLUBLE BUCCAL; SUBLINGUAL DAILY
Qty: 2 FILM | Refills: 0 | Status: SHIPPED | OUTPATIENT
Start: 2023-10-10 | End: 2023-10-12

## 2023-10-10 RX ORDER — BUPRENORPHINE AND NALOXONE 8; 2 MG/1; MG/1
1 FILM, SOLUBLE BUCCAL; SUBLINGUAL ONCE
Status: COMPLETED | OUTPATIENT
Start: 2023-10-10 | End: 2023-10-10

## 2023-10-10 RX ADMIN — BUPRENORPHINE AND NALOXONE 1 FILM: 8; 2 FILM, SOLUBLE BUCCAL; SUBLINGUAL at 11:12

## 2023-10-10 ASSESSMENT — PAIN - FUNCTIONAL ASSESSMENT: PAIN_FUNCTIONAL_ASSESSMENT: 0-10

## 2023-10-10 ASSESSMENT — ENCOUNTER SYMPTOMS
EYE PAIN: 0
COLOR CHANGE: 0
ABDOMINAL PAIN: 0
BACK PAIN: 0
SHORTNESS OF BREATH: 0

## 2023-10-10 ASSESSMENT — PAIN SCALES - GENERAL: PAINLEVEL_OUTOF10: 6

## 2023-10-10 ASSESSMENT — PATIENT HEALTH QUESTIONNAIRE - PHQ9
SUM OF ALL RESPONSES TO PHQ QUESTIONS 1-9: 0
SUM OF ALL RESPONSES TO PHQ QUESTIONS 1-9: 0
2. FEELING DOWN, DEPRESSED OR HOPELESS: 0
SUM OF ALL RESPONSES TO PHQ QUESTIONS 1-9: 0
SUM OF ALL RESPONSES TO PHQ9 QUESTIONS 1 & 2: 0
SUM OF ALL RESPONSES TO PHQ QUESTIONS 1-9: 0
1. LITTLE INTEREST OR PLEASURE IN DOING THINGS: 0

## 2023-10-11 ENCOUNTER — CARE COORDINATION (OUTPATIENT)
Dept: CASE MANAGEMENT | Age: 88
End: 2023-10-11

## 2023-10-11 NOTE — CARE COORDINATION
Care Transitions Follow Up Call    Patient Current Location: [address] [place] [address] Transition Nurse contacted the patient by telephone to follow up after admission on 10/4, ED on 10/10. Verified name and  with patient as identifiers. Patient: Jesus Cerna  Patient : 1935   MRN: [id number]  Reason for Admission: Nausea, vomiting  Discharge Date: 10/10/23 RARS: Readmission Risk Score: 22.8      Needs to be reviewed by the provider   Additional needs identified to be addressed with provider: No  none             Method of communication with provider: none. Spoke to [place] for ED follow up. Pt went to ED on 10/10 for suboxone refill until able to get filled by his Dr. Rosiemarah Bills was provided a 2 day supply. Stated he is currently driving to Podiatry appt. Pt has PCP appt tomorrow, doppler of right upper arm on 10/13. Recent CT of arm negative. Pt has large lump on arm affecting ROM, stated it is painful to move. Pt had to end call d/t driving to appt. Will follow up for transitions. Addressed changes since last contact:  medications-has suboxone until next appt. Has PCP appt tomorrow, Doppler on 10/13  Discussed follow-up appointments. Follow Up  Future Appointments   Date Time Provider [address]   10/12/2023 10:15 AM Jonathon Flores MD [address] [place] [address]   10/13/2023 10:00 AM [facility] VASCULAR 3 STCZ VASCLAB [facility] Radiolog   10/16/2023  2:15 PM Nixon Burleson MD AFL RenalSrv AFL Renal Se   10/20/2023  2:30 PM STV PB MED ONC CHAIR 16 MHPB PB [facility]   10/23/2023 10:00 AM Erik Chaparro MD SC Ortho M[facility]OLPP   10/25/2023  1:00 PM Zenia Ortiz  [facility]   2023  8:45 AM Jonathon Flores MD [facility] Transition Nurse reviewed discharge instructions with patient and discussed any barriers to care and/or understanding of plan of care after discharge.  Discussed appropriate site of care based on symptoms and resources available to patient including:

## 2023-10-12 ENCOUNTER — TELEPHONE (OUTPATIENT)
Dept: INTERNAL MEDICINE CLINIC | Age: 88
End: 2023-10-12

## 2023-10-13 ENCOUNTER — HOSPITAL ENCOUNTER (OUTPATIENT)
Dept: VASCULAR LAB | Age: 88
End: 2023-10-13
Attending: INTERNAL MEDICINE
Payer: MEDICARE

## 2023-10-13 ENCOUNTER — CARE COORDINATION (OUTPATIENT)
Dept: CASE MANAGEMENT | Age: 88
End: 2023-10-13

## 2023-10-13 DIAGNOSIS — M79.89 SWELLING OF UPPER ARM: ICD-10-CM

## 2023-10-13 PROCEDURE — 93971 EXTREMITY STUDY: CPT | Performed by: SURGERY

## 2023-10-13 PROCEDURE — 93971 EXTREMITY STUDY: CPT

## 2023-10-13 NOTE — CARE COORDINATION
Care Transitions Follow Up Call    Patient Current Location:  Home: [address] [place] [address]  [address]    Care Transition Nurse contacted the patient by telephone to follow up after admission. Verified name and  with patient as identifiers. Patient: Karmen Johnson              Patient : 1935   MRN: [id number]             Reason for Admission: ABD pain, N/V  Discharge Date: 10/7/23         RARS: Readmission Risk Score: 22.8      Needs to be reviewed by the provider   Additional needs identified to be addressed with provider: rescheduling appts               Method of communication with provider: none. Spoke with patient - reports doing \"good\" - verbalizes no issues. Checking about upcoming appts - patirent is in process of scheduling with surgery + cardio - he says he will accept help on next call if he is unable to get scheduled. Jay PUCKETT had visit with patient today. Denies any further GI s/s. Eating, drinking without problems. Plan for next call: symptom management-check any GI s/s  follow-up appointment-check if he was able to get cardio + surgical appts which he was working on - if not, CTN needs to schedule      Follow Up  Future Appointments   Date Time Provider [address]   10/16/2023  2:15 PM Marika Burleson MD AFL RenalSrv AFL Renal Se   10/20/2023  2:30 PM STV PB MED ONC CHAIR 16 MHPB PB [facility]   10/23/2023 10:00 AM Malou Chaparro MD SC Ortho [facility]   10/25/2023  1:00 PM Jackson Hammans, MD [address] [place] [address]   2023  8:45 AM Julio C iHnds MD [address] [place] [address] Transition Nurse reviewed discharge instructions and medical action plan with patient and discussed any barriers to care and/or understanding of plan of care after discharge. Discussed appropriate site of care based on symptoms and resources available to patient including: PCP  Specialist  Home health  CTN .  The patient agrees to contact the PCP office for questions related to

## 2023-10-17 ENCOUNTER — CARE COORDINATION (OUTPATIENT)
Dept: CASE MANAGEMENT | Age: 88
End: 2023-10-17

## 2023-10-17 NOTE — CARE COORDINATION
Care Transitions Follow Up Call    Patient Current Location:  Home: [address] [place] [id number]    [facility] Care Coordinator contacted the patient by telephone to follow up after admission on 10/10/2023. Verified name and  with patient as identifiers. Patient: Hiwot Dc  Patient : 1935   MRN: [id number]  Reason for Admission: Nausea, vomiting  Discharge Date: 10/10/23 RARS: Readmission Risk Score: 22.8      Needs to be reviewed by the provider   Additional needs identified to be addressed with provider: No  none             Method of communication with provider: none. Writer spoke with Hesham January for a follow up care transitions call. He states he is doing good. Reports that home health nurse just left. Denies having any further GI symptoms. Is eating and drinking without issues. Patient still needs to schedule his GI appointment with Dr. Espinoza Duque. He did not want writer to schedule appointment for him. States he had it scheduled and had to cancel due to getting a CT done on his arm. Patient reports he will call to schedule soon as he is off the phone with writer. He has his cardio follow up scheduled for 10/31/2023 and will be seeing ortho on 10/23/23 for arm pain and to review his CT results. Addressed changes since last contact:   had nephrology appointment-no changes were made. Discussed follow-up appointments. If no appointment was previously scheduled, appointment scheduling offered: Yes. Is follow up appointment scheduled within 7 days of discharge?  No.    Follow Up  Future Appointments   Date Time Provider [address]   10/20/2023  2:30 PM STV PB MED ONC CHAIR 16 MHPB PB [facility]   10/23/2023 10:00 AM Lesia Chaparro MD SC Ortho [facility]   10/25/2023  1:00 PM Clifford Dong  [facility]   10/31/2023  9:30 AM DARCI Parker - CNP AFL TCC OREG AFL SANTIAGO C   2023  8:45 AM Amanda Farias MD [address] [facility]     External follow up

## 2023-10-19 ENCOUNTER — TELEPHONE (OUTPATIENT)
Dept: ORTHOPEDIC SURGERY | Age: 88
End: 2023-10-19

## 2023-10-19 NOTE — TELEPHONE ENCOUNTER
Called pt to clarify location of p! Pt reports that p! Is in between shoulder and elbow and he is not experiencing any jt p! At this time. Pt was given address to [facility] and reminded of his apt time for Monday.

## 2023-10-20 ENCOUNTER — CARE COORDINATION (OUTPATIENT)
Dept: CASE MANAGEMENT | Age: 88
End: 2023-10-20

## 2023-10-20 DIAGNOSIS — E61.1 IRON DEFICIENCY: Primary | ICD-10-CM

## 2023-10-20 NOTE — CARE COORDINATION
and/or understanding of plan of care after discharge. Discussed appropriate site of care based on symptoms and resources available to patient including: . The  agrees to contact the PCP office for questions related to their healthcare. Patients top risk factors for readmission: medical condition-CHF Paroxysmal Afib   Interventions to address risk factors: Obtained and reviewed discharge summary and/or continuity of care documents    Offered patient enrollment in the Remote Patient Monitoring (RPM) program for in-home monitoring:  did not discuss  . Care Transitions Subsequent and Final Call    Subsequent and Final Calls  Do you have any ongoing symptoms?: No  Have your medications changed?: No  Do you have any questions related to your medications?: No  Do you currently have any active services?: No  Are you currently active with any services?: Home Health  Do you have any needs or concerns that I can assist you with?: No  Identified Barriers: None  Care Transitions Interventions    Physical Therapy: Completed Other Services: Completed (Comment: Jay Sánchez)                Occupational Therapy: Completed           Other Interventions:             LPN Care Coordinator provided contact information for future needs. Plan for follow-up call in 3-5 days based on severity of symptoms and risk factors.   Plan for next call: symptom management-GI symptoms new or worsening symptoms   follow-up appointment-schedule appointment with PETAR  10/23 appt ortho 10/25 CC appt 10/31 cardio    Stephanie Son LPN

## 2023-10-23 ENCOUNTER — OFFICE VISIT (OUTPATIENT)
Dept: ORTHOPEDIC SURGERY | Age: 88
End: 2023-10-23
Payer: MEDICARE

## 2023-10-23 VITALS — BODY MASS INDEX: 24.52 KG/M2 | WEIGHT: 185 LBS | HEIGHT: 73 IN | RESPIRATION RATE: 14 BRPM

## 2023-10-23 DIAGNOSIS — S46.211A RUPTURE OF RIGHT PROXIMAL BICEPS TENDON, INITIAL ENCOUNTER: Primary | ICD-10-CM

## 2023-10-23 PROCEDURE — 1111F DSCHRG MED/CURRENT MED MERGE: CPT | Performed by: ORTHOPAEDIC SURGERY

## 2023-10-23 PROCEDURE — 1036F TOBACCO NON-USER: CPT | Performed by: ORTHOPAEDIC SURGERY

## 2023-10-23 PROCEDURE — G8420 CALC BMI NORM PARAMETERS: HCPCS | Performed by: ORTHOPAEDIC SURGERY

## 2023-10-23 PROCEDURE — 99203 OFFICE O/P NEW LOW 30 MIN: CPT | Performed by: ORTHOPAEDIC SURGERY

## 2023-10-23 PROCEDURE — G8427 DOCREV CUR MEDS BY ELIG CLIN: HCPCS | Performed by: ORTHOPAEDIC SURGERY

## 2023-10-23 PROCEDURE — G8484 FLU IMMUNIZE NO ADMIN: HCPCS | Performed by: ORTHOPAEDIC SURGERY

## 2023-10-23 PROCEDURE — 1123F ACP DISCUSS/DSCN MKR DOCD: CPT | Performed by: ORTHOPAEDIC SURGERY

## 2023-10-23 NOTE — PROGRESS NOTES
ORTHOPEDIC PATIENT EVALUATION      HPI / Chief Complaint  Skyler Garcia is a 80 y.o. male who presents for his right elbow and arm. 3 to 4 weeks ago he noticed a bulge in his biceps muscle associated with pain when he tries to lift with this arm. He denies any precipitating trauma or injury. He denies any swelling or ecchymosis. He also denies having any fevers chills sweats or constitutional symptoms. Past Medical History  Denae Devi  has a past medical history of Arthritis, Atrial fibrillation ([address]), CAD (coronary artery disease), CHF (congestive heart failure) ([address]), Glaucoma, Hx of blood clots, Hyperlipidemia, Hypertension, and MI (myocardial infarction) ([address]). Past Surgical History  Denae Devi  has a past surgical history that includes pacemaker placement; Abdomen surgery; Cardiac catheterization; Appendectomy; Colonoscopy; eye surgery; hernia repair; bone marrow biopsy; joint replacement; CT BIOPSY BONE MARROW (5/31/2022); Upper gastrointestinal endoscopy (N/A, 8/2/2023); and Colonoscopy (N/A, 8/3/2023).     Current Medications  Current Outpatient Medications   Medication Sig Dispense Refill    metOLazone (ZAROXOLYN) 2.5 MG tablet Take 1 tablet by mouth Twice a Week Take 1 tab on Monday and Friday 45 tablet 1    rivaroxaban (XARELTO) 15 MG TABS tablet Take 1 tablet by mouth daily 90 tablet 3    lidocaine 4 % external patch Place 1 patch onto the skin daily 30 patch 0    metoprolol succinate (TOPROL XL) 100 MG extended release tablet TAKE 1 TABLET BY MOUTH TWICE DAILY 180 tablet 3    dicyclomine (BENTYL) 20 MG tablet Take 1 tablet by mouth 4 times daily for 7 days 28 tablet 0    ferrous sulfate (IRON 325) 325 (65 Fe) MG tablet Take 1 tablet by mouth every other day 90 tablet 0    acetaminophen (TYLENOL) 325 MG tablet Take 2 tablets by mouth every 6 hours as needed for Pain      docusate sodium (COLACE) 100 MG capsule Take 1 capsule by mouth 2 times daily      cyanocobalamin 1000 MCG/ML injection Inject 1

## 2023-10-25 ENCOUNTER — CARE COORDINATION (OUTPATIENT)
Dept: CASE MANAGEMENT | Age: 88
End: 2023-10-25

## 2023-10-25 NOTE — CARE COORDINATION
Care Transitions Outreach Attempt # 1     Call within 2 business days of discharge: Yes   Attempted to reach patient for transitions of care follow up. Unable to reach patient. Left HIPPA compliant VM requesting a call back. Will attempt outreach another day. Patient: Alexandria Cisneros Patient : 1935 MRN: [id number]    Last Discharge Facility       Date Complaint Diagnosis Description Type Department Provider    10/10/23 Medication Refill Encounter for medication refill ED (DISCHARGE) [facility] ED Robelagustin Kaisertruong, DO              Was this an external facility discharge?  No Discharge Facility Name: [facility]      Future Appointments   Date Time Provider [address]  [address]   10/31/2023  9:30 AM Ramo Renee APRN - CNP AFL TCC OREG AFL SANTIAGO C   2023  8:45 AM Toro Tejeda MD [address] [place] [address]   2023  1:30 PM STV PB MED ONC CHAIR 19 MHPB PB [facility]

## 2023-10-30 DIAGNOSIS — K21.00 GASTROESOPHAGEAL REFLUX DISEASE WITH ESOPHAGITIS, UNSPECIFIED WHETHER HEMORRHAGE: ICD-10-CM

## 2023-10-30 RX ORDER — FAMOTIDINE 20 MG/1
TABLET, FILM COATED ORAL
Qty: 180 TABLET | Refills: 0 | Status: SHIPPED | OUTPATIENT
Start: 2023-10-30

## 2023-10-31 ENCOUNTER — CARE COORDINATION (OUTPATIENT)
Dept: CASE MANAGEMENT | Age: 88
End: 2023-10-31

## 2023-10-31 ENCOUNTER — HOSPITAL ENCOUNTER (OUTPATIENT)
Dept: PHYSICAL THERAPY | Age: 88
Setting detail: THERAPIES SERIES
Discharge: HOME | End: 2023-10-31
Payer: MEDICARE

## 2023-10-31 PROCEDURE — 97162 PT EVAL MOD COMPLEX 30 MIN: CPT

## 2023-10-31 NOTE — CARE COORDINATION
Care Transitions - noted cardio appt today + PT eval - plan to contact patient next day    Patient: Shane Green              Patient : 1935   MRN: [id number]             Reason for Admission: ABD pain, N/V  Discharge Date: 10/7/23         RARS: Readmission Risk Score: 22.8        Follow Up  Future Appointments   Date Time Provider [address] [place] [address]   11/3/2023  9:00 AM Ignacio Lo MD [address] [place] [address]   2023  1:30 PM STV PB MED ONC CHAIR 19 PB PB [facility] [address] [place] [address]     . Plan for next call: follow-up appointment-date of appointment with Dr. Kizzy Grimes? Any GI symptoms? Review cardio appt  Plan referral to Melvin Rincon RN

## 2023-11-03 ENCOUNTER — OFFICE VISIT (OUTPATIENT)
Dept: INTERNAL MEDICINE CLINIC | Age: 88
End: 2023-11-03

## 2023-11-03 ENCOUNTER — CARE COORDINATION (OUTPATIENT)
Dept: CASE MANAGEMENT | Age: 88
End: 2023-11-03

## 2023-11-03 VITALS
WEIGHT: 182.4 LBS | HEIGHT: 72 IN | SYSTOLIC BLOOD PRESSURE: 110 MMHG | DIASTOLIC BLOOD PRESSURE: 64 MMHG | BODY MASS INDEX: 24.71 KG/M2 | HEART RATE: 48 BPM

## 2023-11-03 DIAGNOSIS — Z00.00 MEDICARE ANNUAL WELLNESS VISIT, SUBSEQUENT: ICD-10-CM

## 2023-11-03 DIAGNOSIS — I50.9 CHRONIC CONGESTIVE HEART FAILURE, UNSPECIFIED HEART FAILURE TYPE (HCC): Primary | ICD-10-CM

## 2023-11-03 DIAGNOSIS — I48.0 PAROXYSMAL ATRIAL FIBRILLATION (HCC): ICD-10-CM

## 2023-11-03 DIAGNOSIS — N18.32 STAGE 3B CHRONIC KIDNEY DISEASE (HCC): ICD-10-CM

## 2023-11-03 ASSESSMENT — PATIENT HEALTH QUESTIONNAIRE - PHQ9
1. LITTLE INTEREST OR PLEASURE IN DOING THINGS: 0
SUM OF ALL RESPONSES TO PHQ QUESTIONS 1-9: 0
2. FEELING DOWN, DEPRESSED OR HOPELESS: 0
SUM OF ALL RESPONSES TO PHQ QUESTIONS 1-9: 0
SUM OF ALL RESPONSES TO PHQ QUESTIONS 1-9: 0
SUM OF ALL RESPONSES TO PHQ9 QUESTIONS 1 & 2: 0
SUM OF ALL RESPONSES TO PHQ QUESTIONS 1-9: 0

## 2023-11-03 NOTE — PROGRESS NOTES
Subjective:      Patient ID: Julieth Barahona is a 80 y.o. male. HPI    Review of Systems    Objective:   Physical Exam    Assessment / Plan:           Visit Information    Have you changed or started any medications since your last visit including any over-the-counter medicines, vitamins, or herbal medicines? no   Are you having any side effects from any of your medications? -  no  Have you stopped taking any of your medications? Is so, why? -  no    Have you seen any other physician or provider since your last visit? Yes - Records Obtained  Have you had any other diagnostic tests since your last visit? YES  Have you been seen in the emergency room and/or had an admission to a hospital since we last saw you? YES  Have you had your routine dental cleaning in the past 6 months? no    Have you activated your [email or web address] account? If not, what are your barriers?  No:      Patient Care Team:  Spike Serrano MD as PCP - General (Internal Medicine)  Spike Serrano MD as PCP - [facility] MD Iram as Consulting Physician (Hematology and Oncology)  Rere De La O RN as Care Transitions Nurse    Medical History Review  Past Medical, Family, and Social History reviewed and does contribute to the patient presenting condition    Health Maintenance   Topic Date Due    DTaP/Tdap/Td vaccine (1 - Tdap) Never done    Shingles vaccine (1 of 2) Never done    COVID-19 Vaccine (4 - Pfizer series) 01/06/2022    Annual Wellness Visit (AWV)  07/21/2023    Flu vaccine (1) 08/01/2023    Depression Screen  10/10/2024    Pneumococcal 65+ years Vaccine  Completed    Hepatitis A vaccine  Aged Out    Hepatitis B vaccine  Aged Out    Hib vaccine  Aged Out    Meningococcal (ACWY) vaccine  Aged Out    Lipids  Discontinued

## 2023-11-03 NOTE — PATIENT INSTRUCTIONS
copay.    Some of these benefits include a comprehensive review of your medical history including lifestyle, illnesses that may run in your family, and various assessments and screenings as appropriate. After reviewing your medical record and screening and assessments performed today your provider may have ordered immunizations, labs, imaging, and/or referrals for you. A list of these orders (if applicable) as well as your Preventive Care list are included within your After Visit Summary for your review. Other Preventive Recommendations:    A preventive eye exam performed by an eye specialist is recommended every 1-2 years to screen for glaucoma; cataracts, macular degeneration, and other eye disorders. A preventive dental visit is recommended every 6 months. Try to get at least 150 minutes of exercise per week or 10,000 steps per day on a pedometer . Order or download the FREE \"Exercise & Physical Activity: Your Everyday Guide\" from [employer]. Call 2-345.190.9793 or search [employer] online. You need 5307-9463 mg of calcium and 5756-2699 IU of vitamin D per day. It is possible to meet your calcium requirement with diet alone, but a vitamin D supplement is usually necessary to meet this goal.  When exposed to the sun, use a sunscreen that protects against both UVA and UVB radiation with an SPF of 30 or greater. Reapply every 2 to 3 hours or after sweating, drying off with a towel, or swimming. Always wear a seat belt when traveling in a car. Always wear a helmet when riding a bicycle or motorcycle.

## 2023-11-03 NOTE — PROGRESS NOTES
Medicare Annual Wellness Visit    Jared Fulton is here for Swelling (Right upper arm, Follow up. Patient us going to PT. )    Assessment & Plan   Chronic congestive heart failure, unspecified heart failure type (HCC)  Paroxysmal atrial fibrillation (HCC)  Stage 3b chronic kidney disease ([address])    Recommendations for Preventive Services Due: see orders and patient instructions/AVS.  Recommended screening schedule for the next 5-10 years is provided to the patient in written form: see Patient Instructions/AVS.     No follow-ups on file. Subjective   Patient is here for Annual wellness Exam   He has Ruptured his RT Bicep tendon   Seen Dr Laura Reagan Cardiologist on 10/31, His Norvasc was Discontinued   Feeling better   No Complains   Working with PT     Patient's complete Health Risk Assessment and screening values have been reviewed and are found in [address] The following problems were reviewed today and where indicated follow up appointments were made and/or referrals ordered. Positive Risk Factor Screenings with Interventions:               Opioid Risk: (High risk score ?55) Opioid risk score: The patient doesn't have any registry metric data available    Last PDMP Jemima [place] as Reviewed:  Review User Review Instant Review Result     @                Dentist Screen:  Have you seen the dentist within the past year?: (!) No    Intervention:  Patient has Dentures      Vision Screen:  Do you have difficulty driving, watching TV, or doing any of your daily activities because of your eyesight?: No  Have you had an eye exam within the past year?: (!) No  No results found.     Interventions:   Patient encouraged to make appointment with their eye specialist      Advanced Directives:  Do you have a Living Will?: (!) No    Intervention:  Advice to have a Living will                      Objective   Vitals:    11/03/23 0850   BP: 110/64   Pulse: (!) 48   Weight: 82.7 kg (182 lb 6.4 oz)   Height: 1.829 m (6' 0.01\")

## 2023-11-03 NOTE — CARE COORDINATION
Care Transitions Outreach Attempt #1      Attempt #1 to reach patient for transitions of care follow up. Unable to reach patient. Left VM requesting call back. Pt did have PCP AWV today. Per Cardiology notes, order to stop Norvasc, start Cardizem 120 MG daily. Patient: Adwoa Drew Patient : 1935 MRN: [id number]    Last Discharge Facility       Date Complaint Diagnosis Description Type Department Provider    10/10/23 Medication Refill Encounter for medication refill ED (DISCHARGE) [address] [facility] ED DO Adelaida Noble following upcoming appointments from discharge chart review:   [facility] follow up appointment(s):   Future Appointments   Date Time Provider [address]   2023  1:30 PM STV PB MED ONC CHAIR 19 MHPB PB MONTED Blake, RN BSN [facility]  Care Transitions Nurse  [phone]   [email or web address]

## 2023-11-07 ENCOUNTER — CARE COORDINATION (OUTPATIENT)
Dept: CASE MANAGEMENT | Age: 88
End: 2023-11-07

## 2023-11-07 NOTE — CARE COORDINATION
Care Transitions Outreach Attempt #2    Attempted to reach patient for transitions of care follow up. Unable to reach patient. HIPAA compliant message left on  requesting a return call. Will end care transitions due to second unsuccessful outreach attempt. Will refer to [address]. Patient: Shane Green Patient : 1935 MRN: [id number]    Last Discharge Facility       Date Complaint Diagnosis Description Type Department Provider    10/10/23 Medication Refill Encounter for medication refill ED (DISCHARGE) [address] [facility] ED Rosemary Degroot, DO              Was this an external facility discharge?  No Discharge Facility: [address] [facility]    Noted following upcoming appointments from discharge chart review:   [facility] appointment(s):   Future Appointments   Date Time Provider [address]   2023 10:15 AM ATUL Hidalgo MOB PT Mariia Sin   2023 10:15 AM ATUL HidalgoCZ MOB PT Peggydeangelo Sin   2023  1:30 PM STV PB MED ONC CHAIR 19 MHPB PB [address] LPN  Care Transition Nurse

## 2023-11-09 ENCOUNTER — CARE COORDINATION (OUTPATIENT)
Dept: CARE COORDINATION | Age: 88
End: 2023-11-09

## 2023-11-09 NOTE — CARE COORDINATION
ALMA hand off- Payer referral  Spoke with patient who asked if he could call back. He is in the store right now. I verified that he has my number and will wait for him to call back before enrolling.

## 2023-11-14 ENCOUNTER — CARE COORDINATION (OUTPATIENT)
Dept: CARE COORDINATION | Age: 88
End: 2023-11-14

## 2023-11-14 ENCOUNTER — HOSPITAL ENCOUNTER (OUTPATIENT)
Dept: PHYSICAL THERAPY | Age: 88
Setting detail: THERAPIES SERIES
Discharge: HOME | End: 2023-11-14
Payer: MEDICARE

## 2023-11-14 PROCEDURE — 97110 THERAPEUTIC EXERCISES: CPT

## 2023-11-14 NOTE — CARE COORDINATION
Payer referral- CTN hand off   Left VM for patient requesting a return call to discuss care coordination and DAVID Knott, RN ambulatory care manager [phone]. Will mail introduction letter.

## 2023-11-14 NOTE — FLOWSHEET NOTE
[x] [facility] ([facility]) [facility] & Therapy  [address]  [place]: [phone]   [place]: [phone]    Physical Therapy Daily Treatment Note      Date:  2023  Patient Name:  Shane Green    :  1935  MRN: [id number]  Physician: Dirk Monroe MD                                Insurance: [facility] (33$ copay, Auth through [employer]) Auth approved 10/31-23 10 visits  Medical Diagnosis: L69.195E (ICD-10-CM) - Rupture of right proximal biceps tendon, initial encounter            Rehab Codes: M79.602 right arm pain, M62.81 weakness  Onset Date: 10/2023                                 Next 's appt: TBD  Visit# / total visits: 2/10  Cancels/No Shows: 0/0    Subjective:    Pain:  [x] Yes  [] No Location: R bicep muscle belly  Pain Rating: (0-10 scale) 0/10 at rest, 6/10 when lifting object  Pain altered Tx:  [] No  [] Yes  Action:  Comments: Patient reports today that he has been working on HEP. Reported that he continues to get pain when lifting objects. Objective:  Modalities:   Precautions:  Exercises:  Exercise Reps/ Time Weight/ Level Completed Today Comments   PROM: Elbow Flexion/Extnesion, Shoulder Flexion 5 min total  x           Shoulder flex AAROM 10x2 1 set with cane, 1 set with 3# bar x     Shoulder abduction 10x2   x      Shoulder Press with Weighted Bar 10x2 3# x      Bicep Curls 20x ea position   x Supinated, neutral, pronated    Pronation/Supination with Hammer 10x2  x    Seated Tricep Press 10x2 Red x           Wall Push Ups 10x2  x                           Other:    Specific Instructions for next treatment: R UE strengthening as tolerated      Assessment: [x] Progressing toward goals. First session after eval. Reviewed HEP with patient demonstrating proper technique when prompted. Additional exercises for RUE strength performed per patient tolerance. Updated HEP to include additional exercises performed today. [] No change.      []

## 2023-11-16 ENCOUNTER — TELEPHONE (OUTPATIENT)
Dept: ONCOLOGY | Age: 88
End: 2023-11-16

## 2023-11-16 ENCOUNTER — HOSPITAL ENCOUNTER (OUTPATIENT)
Dept: PHYSICAL THERAPY | Age: 88
Setting detail: THERAPIES SERIES
Discharge: HOME | End: 2023-11-16
Payer: MEDICARE

## 2023-11-16 PROCEDURE — 97110 THERAPEUTIC EXERCISES: CPT

## 2023-11-16 NOTE — FLOWSHEET NOTE
[x] [facility] ([facility]) - [facility] & Therapy  [address]  [place]: [phone]   [place]: [phone]    Physical Therapy Daily Treatment Note      Date:  2023  Patient Name:  Hiwot Dc    :  1935  MRN: [id number]  Physician: Jga Collier MD                                Insurance: [facility] (64$ copay, Auth through [employer]) Auth approved 10/31-23 10 visits  Medical Diagnosis: V67.016L (ICD-10-CM) - Rupture of right proximal biceps tendon, initial encounter            Rehab Codes: M79.602 right arm pain, M62.81 weakness  Onset Date: 10/2023                                 Next 's appt: TBD  Visit# / total visits: 3/10  Cancels/No Shows: 0/0    Subjective:    Pain:  [] Yes  [x] No Location: R bicep muscle belly  Pain Rating: (0-10 scale) 0/10 at rest  Pain altered Tx:  [] No  [] Yes  Action:  Comments: Patient reports today that he feels JAYLA is getting stronger. Reported an instance yesterday where he was taking care of some trash and was lifting a heavier bag with RUE without realizing it as he was having no pain. Objective:  Modalities:   Precautions:  Exercises:  Exercise Reps/ Time Weight/ Level Completed Today Comments   PROM: Elbow Flexion/Extnesion, Shoulder Flexion 5 min total             Shoulder flex AAROM 10x2  3# bar x     Shoulder abduction 10x2   x      Shoulder Press with Weighted Bar 10x2 3# x      Bicep Curls 20x ea position  2# x Supinated, neutral, pronated    Pronation/Supination with Hammer 10x2  x    Seated Tricep Press 10x2 Red x           Wall Push Ups 10x2  x           Seated Single Arm Rows 15x2 Green x    Seated Single Arm Punches 15x2 Green x      Other:    Specific Instructions for next treatment: R UE strengthening as tolerated      Assessment: [x] Progressing toward goals. Continued to work on [employer] strength with exercises per chart. Added single arm rows and punches for additional strengthening.  Patient required consistent

## 2023-11-16 NOTE — CARE COORDINATION
CASE MANAGEMENT NOTE:    Admission Date:  11/17/2021 Ree Mi is a 80 y.o.  male    Admitted for : NSTEMI (non-ST elevated myocardial infarction) ([facility] [address] [I21.4]  Chest pain, unspecified type [R07.9]    Met with:  Patient    PCP:  Dr Jazmin Gallagher:  Medicare      Is patient alert and oriented at time of discussion:  Yes    Current Residence/ Living Arrangements:  independently at home with son Shan James PTA:  No, had referral to [facility] CHF clinic, But didn't have a ride    Does patient go to outpatient dialysis: No  If yes, location and chair time:     Is patient agreeable to VNS: No    Freedom of choice provided:  Yes    List of [address] provided: No    VNS chosen:  No    DME:  straight cane, Toilet riser, Grab bars in Bathroom and walk in shower    Home Oxygen: No    Nebulizer: No    CPAP/BIPAP: No    Supplier: N/A    Potential Assistance Needed: No    SNF needed: No    Freedom of choice and list provided: NA    Pharmacy:  [facility] in [facility]    Does Patient want to use MEDS to BEDS? No    Is patient currently receiving oral anticoagulation therapy? Yes, Xaraghu    Is the Patient an [facility] with Readmission Risk Score greater than 14%? No  If yes, pt needs a follow up appointment made within 7 days. Family Members/Caregivers that pt would like involved in their care:    Yes    If yes, list name here:  Son [address]    Transportation Provider:  Family             Discharge Plan:  11/18/21 - Medicare- Patient is from home with son., DME; Cane, Raised toilet seat, Grab bars, walk in shower. Denies need for VNS, Referral was sent to [facility] CHF clinic a few weeks ago and patient unable to go due to transportation issues. Remains on heparin gtt, Cardio consult. Plan is to return home with no needs. Will follow . //pf                 Electronically signed by: Edison Avery RN on 11/18/2021 at 12:43 PM PT EVAL AND TREAT

## 2023-11-21 ENCOUNTER — HOSPITAL ENCOUNTER (OUTPATIENT)
Dept: PHYSICAL THERAPY | Age: 88
Setting detail: THERAPIES SERIES
Discharge: HOME | End: 2023-11-21
Payer: MEDICARE

## 2023-11-21 NOTE — FLOWSHEET NOTE
[x] [facility]) - [facility] Therapy  [address]  [place]: [phone]   [place]: [phone]     Physical Therapy Cancel/No Show note    Date: 2023  Patient: Grey Simmons  : 1935  MRN: [id number]    Visit Count: 3/10  Cancels/No Shows to date:     For today's appointment patient:    [x]  Cancelled    [] Rescheduled appointment    [] No-show     Reason given by patient:    [x]  Patient ill: per     []  Conflicting appointment    [] No transportation      [] Conflict with work    [] No reason given    [] Weather related    [] [id number]    [] Other:      Comments:        [] Next appointment was confirmed    Electronically signed by: Shahrzad Gramajo PT

## 2023-11-25 DIAGNOSIS — I10 PRIMARY HYPERTENSION: ICD-10-CM

## 2023-11-27 RX ORDER — AMLODIPINE BESYLATE 5 MG/1
10 TABLET ORAL DAILY
Qty: 180 TABLET | Refills: 3 | OUTPATIENT
Start: 2023-11-27

## 2023-11-28 ENCOUNTER — HOSPITAL ENCOUNTER (OUTPATIENT)
Dept: PHYSICAL THERAPY | Age: 88
Setting detail: THERAPIES SERIES
Discharge: HOME | End: 2023-11-28
Payer: MEDICARE

## 2023-11-28 NOTE — FLOWSHEET NOTE
[x] [facility]) [facility] Therapy  [address]  [place]: [phone]   F: [phone]     Physical Therapy Cancel/No Show note    Date: 2023  Patient: Maty Ling  : 1935  MRN: [id number]    Visit Count: 3/10  Cancels/No Shows to date:     For today's appointment patient:    [x]  Cancelled    [] Rescheduled appointment    [] No-show     Reason given by patient:    [x]  Patient ill: per  pt called and reported he was throwing up, confirmed appt on thursday    []  Conflicting appointment    [] No transportation      [] Conflict with work    [] No reason given    [] Weather related    [] [id number]    [] Other:      Comments:        [x] Next appointment was confirmed    Electronically signed by: Ez Louis PT

## 2023-11-29 ENCOUNTER — CARE COORDINATION (OUTPATIENT)
Dept: CARE COORDINATION | Age: 88
End: 2023-11-29

## 2023-11-29 NOTE — CARE COORDINATION
Ambulatory Care Coordination Note  2023    Patient Current Location:  Home: [address] [place] [id number]    ACM contacted the patient by telephone. Verified name and  with patient as identifiers. Provided introduction to self, and explanation of the ACM role. ACM: Ricki Schwab, RN    Challenges to be reviewed by the provider   Additional needs identified to be addressed with provider: No  none               Method of communication with provider: none. Spoke with patient, explained care coordination. Patient is accepting of program.  Patient stated that he is doing OK, he is going to PT for shoulder pain but is experiencing hip pain with some swelling in his leg now that he relates to an old hip [place], [place]. He is established with ortho, I encouraged him to call and schedule an appointment to discuss his pain. I provided him with the number to call. Medications, patient denied any needs today. Upcoming appointments reviewed. Has ACP on file. Patient agreed to a call in 5-7 days to continue education and support. Offered patient enrollment in the Remote Patient Monitoring (RPM) program for in-home monitoring:  will discuss with next call, determine if patient is able to do this alone . Ambulatory Care Coordination Assessment    Care Coordination Protocol  Referral from Primary Care Provider: No  Week 1 - Initial Assessment     Do you have all of your prescriptions and are they filled?: Yes  How often do you have trouble taking your medications the way you have been told to take them?: I always take them as prescribed. Ability to seek help/take action for Emergent Urgent situations i.e. fire, crime, inclement weather or health crisis. : Independent  Ability to ambulate to restroom: Independent  Ability handle personal hygeine needs (bathing/dressing/grooming): Independent  Ability to manage Medications:  Independent  Ability to prepare Food

## 2023-11-30 ENCOUNTER — HOSPITAL ENCOUNTER (OUTPATIENT)
Dept: PHYSICAL THERAPY | Age: 88
Setting detail: THERAPIES SERIES
Discharge: HOME | End: 2023-11-30
Payer: MEDICARE

## 2023-11-30 PROCEDURE — 97110 THERAPEUTIC EXERCISES: CPT

## 2023-11-30 NOTE — FLOWSHEET NOTE
[x] [address] [facility]  [address]  [place] [facility] [place]: [phone]   F: [phone]    Physical Therapy Daily Treatment Note      Date:  2023  Patient Name:  Pravin Bryant    :  1935  MRN: [id number]  Physician: Bartolome Urbina MD                                Insurance: [facility] (19$ copay, Auth through [employer]) Auth approved 10/31-23 10 visits  Medical Diagnosis: S44.460A (ICD-10-CM) - Rupture of right proximal biceps tendon, initial encounter            Rehab Codes: M79.602 right arm pain, M62.81 weakness  Onset Date: 10/2023                                 Next 's appt: TBD  Visit# / total visits: 4/10  Cancels/No Shows: 2/0    Subjective:    Pain:  [] Yes  [x] No Location: R bicep muscle belly  Pain Rating: (0-10 scale) 0/10 at rest  Pain altered Tx:  [] No  [] Yes  Action:  Comments: Patient reports today that R are feels stronger. Reported that he is now able to lift objects pain free. Objective:  Modalities:   Precautions:  Exercises:  Exercise Reps/ Time Weight/ Level Completed Today Comments   PROM: Elbow Flexion/Extnesion, Shoulder Flexion 5 min total             Shoulder flex AAROM 10x2  4# bar x     Shoulder abduction 10x2   x      Shoulder Press with Weighted Bar 10x2 4# x      Bicep Curls 20x ea position  2# x Supinated, neutral, pronated    Pronation/Supination with Hammer 10x2  x    Seated Tricep Press 10x2 Red x    Seated Shoulder Flexion 10x2 2# x    B ER 10x2 Red x    B Horizontal Abduction 10x2 Red x                  Wall Push Ups 10x2  x           Seated Single Arm Rows 15x2 Green x    Seated Single Arm Punches 15x2 Green x      Other:    Specific Instructions for next treatment: R UE strengthening as tolerated      Assessment: [x] Progressing toward goals. Continued to work on [employer] strength with exercises per chart. Increased resistance with weighted bar and bicep curls for additional strengthening.  Incorporated more

## 2023-12-01 NOTE — FLOWSHEET NOTE
01/08/22 1452   Encounter Summary   Services provided to: Patient   Referral/Consult From: Rounding   Complexity of Encounter Low   Length of Encounter 15 minutes   Spiritual/Worship   Type Spiritual support   Assessment Sleeping   Intervention Prayer Ears: no ear pain and no hearing problems. Nose: no nasal congestion and no nasal drainage. Mouth/Throat: no dysphagia, no hoarseness and no throat pain. Neck: no lumps, no pain, no stiffness and no swollen glands.

## 2023-12-06 ENCOUNTER — CARE COORDINATION (OUTPATIENT)
Dept: CARE COORDINATION | Age: 88
End: 2023-12-06

## 2023-12-06 NOTE — CARE COORDINATION
Attempting to reach patient for a follow up care coordination call. Left detailed message for the patient to return my call with any questions or concerns. VASU DoshiN, RN ambulatory care manager [phone].

## 2023-12-08 ENCOUNTER — HOSPITAL ENCOUNTER (OUTPATIENT)
Dept: PHYSICAL THERAPY | Age: 88
Setting detail: THERAPIES SERIES
Discharge: HOME | End: 2023-12-08
Payer: MEDICARE

## 2023-12-08 ENCOUNTER — TELEPHONE (OUTPATIENT)
Dept: ONCOLOGY | Age: 88
End: 2023-12-08

## 2023-12-08 NOTE — FLOWSHEET NOTE
[x] [facility]) - [facility] & Therapy  [address]  [place]: [phone]   F: [phone]     Physical Therapy Cancel/No Show note    Date: 2023  Patient: Katie Greenwood  : 1935  MRN: [id number]    Visit Count: 4/10  Cancels/No Shows to date: 3/0    For today's appointment patient:    [x]  Cancelled    [] Rescheduled appointment    [] No-show     Reason given by patient:    []  Patient ill:     []  Conflicting appointment    [] No transportation      [] Conflict with work    [] No reason given    [] Weather related    [] COVID-19    [x] Other:      Comments:  per  pt wife passed away from [phone], confirmed next visit      [x] Next appointment was confirmed    Electronically signed by: Tamra Cameron PT

## 2023-12-08 NOTE — TELEPHONE ENCOUNTER
Patient called to schedule B12 injection if it was not already scheduled.  went over appointment time for next week.

## 2023-12-13 ENCOUNTER — HOSPITAL ENCOUNTER (OUTPATIENT)
Dept: PHYSICAL THERAPY | Age: 88
Setting detail: THERAPIES SERIES
Discharge: HOME | End: 2023-12-13
Payer: MEDICARE

## 2023-12-13 PROCEDURE — 97110 THERAPEUTIC EXERCISES: CPT

## 2023-12-14 ENCOUNTER — CARE COORDINATION (OUTPATIENT)
Dept: CARE COORDINATION | Age: 88
End: 2023-12-14

## 2023-12-14 NOTE — CARE COORDINATION
Attempting to reach patient for a follow up care coordination call. Left detailed message for the patient to return my call with any questions or concerns. VASU RamonN, RN ambulatory care manager [phone].

## 2023-12-28 ENCOUNTER — CARE COORDINATION (OUTPATIENT)
Dept: CARE COORDINATION | Age: 88
End: 2023-12-28

## 2023-12-28 NOTE — CARE COORDINATION
Attempting to reach patient for a follow up care coordination call. Left detailed message for the patient to return my call with any questions or concerns. VASU RamonN, RN ambulatory care manager [phone].

## 2024-01-04 ENCOUNTER — HOSPITAL ENCOUNTER (OUTPATIENT)
Facility: CLINIC | Age: 89
Discharge: HOME | End: 2024-01-06
Payer: MEDICARE

## 2024-01-04 ENCOUNTER — HOSPITAL ENCOUNTER (OUTPATIENT)
Dept: GENERAL RADIOLOGY | Facility: CLINIC | Age: 89
Discharge: HOME | End: 2024-01-06
Payer: MEDICARE

## 2024-01-04 ENCOUNTER — OFFICE VISIT (OUTPATIENT)
Dept: INTERNAL MEDICINE CLINIC | Age: 89
End: 2024-01-04
Payer: MEDICARE

## 2024-01-04 ENCOUNTER — HOSPITAL ENCOUNTER (OUTPATIENT)
Age: 89
Setting detail: SPECIMEN
Discharge: HOME | End: 2024-01-04

## 2024-01-04 VITALS
DIASTOLIC BLOOD PRESSURE: 72 MMHG | SYSTOLIC BLOOD PRESSURE: 136 MMHG | HEIGHT: 72 IN | WEIGHT: 186 LBS | BODY MASS INDEX: 25.19 KG/M2 | HEART RATE: 66 BPM | OXYGEN SATURATION: 98 %

## 2024-01-04 DIAGNOSIS — M25.532 PAIN IN BOTH WRISTS: ICD-10-CM

## 2024-01-04 DIAGNOSIS — I48.0 PAROXYSMAL ATRIAL FIBRILLATION (HCC): ICD-10-CM

## 2024-01-04 DIAGNOSIS — M25.532 PAIN IN BOTH WRISTS: Primary | ICD-10-CM

## 2024-01-04 DIAGNOSIS — N18.32 STAGE 3B CHRONIC KIDNEY DISEASE (HCC): ICD-10-CM

## 2024-01-04 DIAGNOSIS — M25.531 PAIN IN BOTH WRISTS: ICD-10-CM

## 2024-01-04 DIAGNOSIS — I50.9 CHRONIC CONGESTIVE HEART FAILURE, UNSPECIFIED HEART FAILURE TYPE (HCC): ICD-10-CM

## 2024-01-04 DIAGNOSIS — I10 PRIMARY HYPERTENSION: ICD-10-CM

## 2024-01-04 DIAGNOSIS — M25.531 PAIN IN BOTH WRISTS: Primary | ICD-10-CM

## 2024-01-04 PROCEDURE — 99214 OFFICE O/P EST MOD 30 MIN: CPT | Performed by: INTERNAL MEDICINE

## 2024-01-04 PROCEDURE — 1036F TOBACCO NON-USER: CPT | Performed by: INTERNAL MEDICINE

## 2024-01-04 PROCEDURE — G8484 FLU IMMUNIZE NO ADMIN: HCPCS | Performed by: INTERNAL MEDICINE

## 2024-01-04 PROCEDURE — 73130 X-RAY EXAM OF HAND: CPT

## 2024-01-04 PROCEDURE — G8427 DOCREV CUR MEDS BY ELIG CLIN: HCPCS | Performed by: INTERNAL MEDICINE

## 2024-01-04 PROCEDURE — G8417 CALC BMI ABV UP PARAM F/U: HCPCS | Performed by: INTERNAL MEDICINE

## 2024-01-04 PROCEDURE — 1123F ACP DISCUSS/DSCN MKR DOCD: CPT | Performed by: INTERNAL MEDICINE

## 2024-01-04 ASSESSMENT — PATIENT HEALTH QUESTIONNAIRE - PHQ9
SUM OF ALL RESPONSES TO PHQ QUESTIONS 1-9: 0
2. FEELING DOWN, DEPRESSED OR HOPELESS: 0
1. LITTLE INTEREST OR PLEASURE IN DOING THINGS: 0
SUM OF ALL RESPONSES TO PHQ9 QUESTIONS 1 & 2: 0

## 2024-01-04 ASSESSMENT — ENCOUNTER SYMPTOMS
ABDOMINAL DISTENTION: 0
COUGH: 0
ABDOMINAL PAIN: 0
BACK PAIN: 0
SHORTNESS OF BREATH: 0
COLOR CHANGE: 0
DIARRHEA: 0
CHEST TIGHTNESS: 0
APNEA: 0
FACIAL SWELLING: 0
CONSTIPATION: 0
WHEEZING: 0

## 2024-01-04 NOTE — PROGRESS NOTES
Subjective:      Patient ID: Hemanth Barrera is a 88 y.o. male.    HPI  Patient, is here for evaluation multiple medical problems.  Atrial fibrillation, on anticoagulation, CHF on Bumex , sick sinus syndrome status post AICD  He has chronic anemia, following with hematologist, had BM Biospy in past, on Vitamin B12 Replacement ( May have Low grade MDS per Dr Sharma ) , Had EGD and Colonoscopy , when he was in hospital in August   Has Anemia - on Iron   Has CKD , follows with nephrologist   On suboxone   He told me that his cardiologist stopped Cardizem   Complaining of pain and swelling in fingers of both hand and wrist , going for last 1 month , no new medication   Associated with pain in wrist , mainly     Review of Systems   Constitutional:  Negative for activity change, appetite change, chills and diaphoresis.   HENT:  Negative for congestion, dental problem, ear discharge, facial swelling and hearing loss.    Respiratory:  Negative for apnea, cough, chest tightness, shortness of breath and wheezing.    Cardiovascular:  Negative for chest pain and leg swelling.   Gastrointestinal:  Negative for abdominal distention, abdominal pain, constipation and diarrhea.   Genitourinary:  Negative for difficulty urinating, dysuria, enuresis, flank pain and frequency.   Musculoskeletal:  Positive for arthralgias (both hands , wrist). Negative for back pain, gait problem and joint swelling.   Skin:  Negative for color change, pallor and rash.   Neurological:  Negative for dizziness, seizures, facial asymmetry, light-headedness, numbness and headaches.   Psychiatric/Behavioral:  Negative for agitation, behavioral problems, confusion, decreased concentration and dysphoric mood.        Objective:   Physical Exam  Vitals and nursing note reviewed.   Constitutional:       General: He is not in acute distress.     Appearance: He is well-developed. He is not diaphoretic.   HENT:      Head: Normocephalic and atraumatic.

## 2024-01-05 ENCOUNTER — CARE COORDINATION (OUTPATIENT)
Dept: CARE COORDINATION | Age: 89
End: 2024-01-05

## 2024-01-05 LAB
RHEUMATOID FACT SER NEPH-ACNC: 10.8 IU/ML
URATE SERPL-MCNC: 4.4 MG/DL (ref 3.4–7)

## 2024-01-05 NOTE — CARE COORDINATION
Attempting to reach patient for a follow up care coordination call regarding any needs, questions or concerns.  Pretty Leal [facility] [phone]

## 2024-01-06 LAB — CCP AB SER IA-ACNC: 5.1 U/ML (ref 0–7)

## 2024-01-08 NOTE — TELEPHONE ENCOUNTER
Awaiting paperwork via fax.     Office does have samples of Xarelto 15mg tabs. Would you like to provide patient with samples until patient assistance forms are submitted?

## 2024-01-08 NOTE — TELEPHONE ENCOUNTER
----- Message from Kenzie Melchor sent at 1/8/2024 10:06 AM EST -----  Subject: Medication Problem     Medication: rivaroxaban (XARELTO) 15 MG TABS tablet  Dosage: 1 tablet once a day  Ordering Provider: Neftaly Contreras    Question/Problem: Pt is calling concerned due to the cost of this   medication. His insurance is now charging him over 200 for 15 pills.   [employer] is suppose to be faxing over a sheet for the provider to   fill out to help with the cost. They are faxing it today. If any questions   they gave him a number of [phone] for the office to caLL and give   them the fax number to send the paperwork over to. Pt is completely out of   this medication and has not had it since yesterday. Please call pt with   any further questions.      Pharmacy: [id number] [id number]   [place] - P [phone] - F [phone]    ---------------------------------------------------------------------------  --------------  CALL BACK INFO  [phone]; OK to leave message on voicemail  ---------------------------------------------------------------------------  --------------    SCRIPT ANSWERS  Relationship to Patient: Self

## 2024-01-09 ENCOUNTER — TELEPHONE (OUTPATIENT)
Dept: INTERNAL MEDICINE CLINIC | Age: 89
End: 2024-01-09

## 2024-01-09 NOTE — TELEPHONE ENCOUNTER
----- Message from Keo Shelton sent at 1/9/2024 11:16 AM EST -----  Subject: Message to Provider    QUESTIONS  Information for Provider? Patient states that paperwork was sent over to   provider to sign and to please give him a call back on the status on that.     ---------------------------------------------------------------------------  --------------  CALL BACK INFO  [phone]; OK to leave message on voicemail  ---------------------------------------------------------------------------  --------------  SCRIPT ANSWERS  Relationship to Patient? Self

## 2024-01-10 ENCOUNTER — CARE COORDINATION (OUTPATIENT)
Dept: CARE COORDINATION | Age: 89
End: 2024-01-10

## 2024-01-10 NOTE — CARE COORDINATION
Attempting to reach patient for a follow up care coordination call regarding any needs, questions or concerns.  Pretty Leal [facility] [phone]  No VM picked up

## 2024-01-12 ENCOUNTER — TELEPHONE (OUTPATIENT)
Dept: INTERNAL MEDICINE CLINIC | Age: 89
End: 2024-01-12

## 2024-01-12 ENCOUNTER — TELEPHONE (OUTPATIENT)
Dept: ONCOLOGY | Age: 89
End: 2024-01-12

## 2024-01-12 NOTE — TELEPHONE ENCOUNTER
Received phone call from pt. He said he lost the number to the individual who takes him to his appts. Pt unable to tell SW what transportation company he uses. SW went through notes and unable to find anything about transportation. Called and left a voicemail with , Pretty to see if she knows.     Inga Howard MSW, LSW

## 2024-01-12 NOTE — TELEPHONE ENCOUNTER
Osteoarthritis noted on both hand x-rays  Blood work negative for rheumatoid arthritis, uric acid level is normal.  Susan Asher MD

## 2024-01-17 ENCOUNTER — CARE COORDINATION (OUTPATIENT)
Dept: CARE COORDINATION | Age: 89
End: 2024-01-17

## 2024-01-17 NOTE — TELEPHONE ENCOUNTER
Patient notified and verbalized understanding.   Also agreed to stop into the office for Xarelto samples.

## 2024-01-17 NOTE — CARE COORDINATION
Attempting to reach patient for a follow up care coordination call regarding any needs, questions or concerns.    Unable to leave VM, patients phone called back but no one spoke, could hear background noise, I believe it was an unintentional dial.

## 2024-01-19 ENCOUNTER — TELEPHONE (OUTPATIENT)
Dept: INTERNAL MEDICINE CLINIC | Age: 89
End: 2024-01-19

## 2024-01-19 NOTE — TELEPHONE ENCOUNTER
Patient called the nurse triage and stated that he has a sharp pain in his rib going up to his shoulder when he breathes. Patient stated that the last time this happened to him he had pnemonia. I stated to the patient that he needs to be seen the urgent care or the ER

## 2024-01-25 ENCOUNTER — CARE COORDINATION (OUTPATIENT)
Dept: CARE COORDINATION | Age: 89
End: 2024-01-25

## 2024-01-25 NOTE — CARE COORDINATION
Multiple attempts to reach patient, unable to reach letter mailed to patient. Will sign off care team at this time but will be available if patient reaches out with any needs.

## 2024-02-09 DIAGNOSIS — I50.22 CHRONIC SYSTOLIC (CONGESTIVE) HEART FAILURE (HCC): ICD-10-CM

## 2024-02-09 RX ORDER — BUMETANIDE 2 MG/1
2 TABLET ORAL DAILY
Qty: 90 TABLET | Refills: 3 | Status: SHIPPED | OUTPATIENT
Start: 2024-02-09 | End: 2025-02-03

## 2024-02-09 NOTE — TELEPHONE ENCOUNTER
Patient states that when he was discharged back in October they had  taken him off of his water pill.  He did have some at home that he did take but now he is out of them and the pharmacy told him that he was taken off of it.    His legs are both swollen with the right leg a little red.    He would like to continue his water pill.    Please advise

## 2024-02-12 ENCOUNTER — TELEPHONE (OUTPATIENT)
Dept: INTERNAL MEDICINE CLINIC | Age: 89
End: 2024-02-12

## 2024-02-16 ENCOUNTER — TELEPHONE (OUTPATIENT)
Dept: ONCOLOGY | Age: 89
End: 2024-02-16

## 2024-02-16 NOTE — TELEPHONE ENCOUNTER
Received voicemail from pt. He said he can't remember who his transportation was through and he cannot get to his appts due to not knowing. Attempted to look through his chart and unable to find who pt used in the past. Attempted to contact pt to follow up but no answer. Voicemail box is full.

## 2024-02-23 PROBLEM — I21.19 ACUTE INFEROLATERAL MYOCARDIAL INFARCTION (HCC): Status: RESOLVED | Noted: 2021-11-18 | Resolved: 2024-02-23

## 2024-02-23 PROBLEM — I25.2 HISTORY OF ACUTE MYOCARDIAL INFARCTION: Status: ACTIVE | Noted: 2021-11-17

## 2024-02-26 ENCOUNTER — OFFICE VISIT (OUTPATIENT)
Dept: INTERNAL MEDICINE CLINIC | Age: 89
End: 2024-02-26
Payer: MEDICARE

## 2024-02-26 VITALS
BODY MASS INDEX: 24.65 KG/M2 | DIASTOLIC BLOOD PRESSURE: 72 MMHG | HEIGHT: 73 IN | WEIGHT: 186 LBS | SYSTOLIC BLOOD PRESSURE: 136 MMHG

## 2024-02-26 DIAGNOSIS — N18.32 STAGE 3B CHRONIC KIDNEY DISEASE (HCC): ICD-10-CM

## 2024-02-26 DIAGNOSIS — I50.22 CHRONIC SYSTOLIC (CONGESTIVE) HEART FAILURE (HCC): ICD-10-CM

## 2024-02-26 DIAGNOSIS — E53.8 VITAMIN B12 DEFICIENCY: ICD-10-CM

## 2024-02-26 DIAGNOSIS — L60.0 INGROWN TOENAIL: Primary | ICD-10-CM

## 2024-02-26 DIAGNOSIS — I10 PRIMARY HYPERTENSION: ICD-10-CM

## 2024-02-26 DIAGNOSIS — I48.0 PAROXYSMAL ATRIAL FIBRILLATION (HCC): ICD-10-CM

## 2024-02-26 PROCEDURE — G8420 CALC BMI NORM PARAMETERS: HCPCS | Performed by: INTERNAL MEDICINE

## 2024-02-26 PROCEDURE — G8427 DOCREV CUR MEDS BY ELIG CLIN: HCPCS | Performed by: INTERNAL MEDICINE

## 2024-02-26 PROCEDURE — G8484 FLU IMMUNIZE NO ADMIN: HCPCS | Performed by: INTERNAL MEDICINE

## 2024-02-26 PROCEDURE — 1036F TOBACCO NON-USER: CPT | Performed by: INTERNAL MEDICINE

## 2024-02-26 PROCEDURE — 1123F ACP DISCUSS/DSCN MKR DOCD: CPT | Performed by: INTERNAL MEDICINE

## 2024-02-26 PROCEDURE — 99214 OFFICE O/P EST MOD 30 MIN: CPT | Performed by: INTERNAL MEDICINE

## 2024-02-26 NOTE — PROGRESS NOTES
Subjective:      Patient ID: Hemanth Barrera is a 89 y.o. male.    [facility] multiple medical problems.  Atrial fibrillation, on anticoagulation, CHF on Bumex , sick sinus syndrome status post AICD, S/p Partial gastrectomy in past   He has chronic anemia, following with hematologist, had BM Biospy in past, on Vitamin B12 Replacement ( May have Low grade MDS per Dr Sharma ) , Had EGD and Colonoscopy , when he was in hospital in August   Has Anemia - on Iron   Has CKD , follows with nephrologist   On suboxone   His RA and CCP is negative , XRay - suggestive of OA   Requesting referral to podiatrist , to help with his Toenail  was following with podiatrist in [place]   Review of Systems   Constitutional:  Negative for activity change, appetite change, chills and diaphoresis.   HENT:  Negative for congestion, dental problem, ear discharge, facial swelling and hearing loss.    Respiratory:  Negative for apnea, cough, chest tightness, shortness of breath and wheezing.    Cardiovascular:  Negative for chest pain and leg swelling.   Gastrointestinal:  Negative for abdominal distention, abdominal pain, constipation and diarrhea.   Genitourinary:  Negative for difficulty urinating, dysuria, enuresis, flank pain and frequency.   Musculoskeletal:  Positive for arthralgias (both hands , wrist). Negative for back pain, gait problem and joint swelling.        Ingrown toenails.   Skin:  Negative for color change, pallor and rash.   Neurological:  Negative for dizziness, seizures, facial asymmetry, light-headedness, numbness and headaches.   Psychiatric/Behavioral:  Negative for agitation, behavioral problems, confusion, decreased concentration and dysphoric mood.        Objective:   Physical Exam  Vitals and nursing note reviewed.   Constitutional:       General: He is not in acute distress.     Appearance: He is well-developed. He is not diaphoretic.   HENT:      Head: Normocephalic and atraumatic.

## 2024-02-27 ENCOUNTER — HOSPITAL ENCOUNTER (OUTPATIENT)
Age: 89
Setting detail: SPECIMEN
Discharge: HOME | End: 2024-02-27

## 2024-02-27 DIAGNOSIS — E53.8 VITAMIN B12 DEFICIENCY: ICD-10-CM

## 2024-02-27 LAB
FOLATE SERPL-MCNC: >20 NG/ML (ref 4.8–24.2)
VIT B12 SERPL-MCNC: 328 PG/ML (ref 232–1245)

## 2024-02-28 ENCOUNTER — TELEPHONE (OUTPATIENT)
Dept: INTERNAL MEDICINE CLINIC | Age: 89
End: 2024-02-28

## 2024-02-28 NOTE — TELEPHONE ENCOUNTER
Patient had B12 level drawn yesterday and wanted to know if you needed to schedule him for a B12 shot?

## 2024-02-29 ASSESSMENT — ENCOUNTER SYMPTOMS
ABDOMINAL PAIN: 0
SHORTNESS OF BREATH: 0
COLOR CHANGE: 0
DIARRHEA: 0
FACIAL SWELLING: 0
BACK PAIN: 0
CHEST TIGHTNESS: 0
APNEA: 0
ABDOMINAL DISTENTION: 0
WHEEZING: 0
COUGH: 0
CONSTIPATION: 0

## 2024-03-06 DIAGNOSIS — N40.0 ENLARGED PROSTATE: ICD-10-CM

## 2024-03-06 RX ORDER — FINASTERIDE 5 MG/1
5 TABLET, FILM COATED ORAL DAILY
Qty: 90 TABLET | Refills: 3 | Status: SHIPPED | OUTPATIENT
Start: 2024-03-06

## 2024-03-24 DIAGNOSIS — K21.00 GASTROESOPHAGEAL REFLUX DISEASE WITH ESOPHAGITIS, UNSPECIFIED WHETHER HEMORRHAGE: ICD-10-CM

## 2024-03-25 RX ORDER — FAMOTIDINE 20 MG/1
TABLET, FILM COATED ORAL
Qty: 180 TABLET | Refills: 0 | Status: SHIPPED | OUTPATIENT
Start: 2024-03-25

## 2024-03-26 ENCOUNTER — TELEPHONE (OUTPATIENT)
Dept: ONCOLOGY | Age: 89
End: 2024-03-26

## 2024-03-26 NOTE — TELEPHONE ENCOUNTER
received referral from Medical Oncology stating patient has missed multiple appointments due to transportation.  called patient who states he had his grandson taking him to appointments but he has had a change in his work schedule. Patient unsure of who he previously had rides through. Per chart review patient had utilized [facility] [employer]. Patient took down number and said he would call.

## 2024-04-04 DIAGNOSIS — E61.1 IRON DEFICIENCY: Primary | ICD-10-CM

## 2024-04-05 PROBLEM — L97.811: Status: ACTIVE | Noted: 2023-01-01

## 2024-04-09 ENCOUNTER — TELEPHONE (OUTPATIENT)
Dept: ONCOLOGY | Age: 89
End: 2024-04-09

## 2024-04-09 NOTE — TELEPHONE ENCOUNTER
received referral from Medical Oncology stating patient had called requesting call back.  called patient who states the transportation provider he uses cannot accommodate his appointment time on Wednesday and no earlier appointments are available. Patient states he is waiting to talk to his grandson to see if he is able to transport him to appointment.  encouraged him to reschedule if ride is not available.

## 2024-04-11 ENCOUNTER — TELEPHONE (OUTPATIENT)
Dept: ONCOLOGY | Age: 89
End: 2024-04-11

## 2024-04-11 NOTE — TELEPHONE ENCOUNTER
Pt left message to reschedule f/u and injection appt. Writer tried to reach pt and reach pt's vm. LVM for pt to call and reschedule no show appt from 04/10/2024 with Dr Sharma.  Pt needs a f/u appt and b12 injection scheduled.    Electronically signed by Tran Harp on 4/11/2024 at 11:05 AM

## 2024-04-15 ENCOUNTER — TELEPHONE (OUTPATIENT)
Dept: INTERNAL MEDICINE CLINIC | Age: 89
End: 2024-04-15

## 2024-04-15 NOTE — TELEPHONE ENCOUNTER
Patient is having abdominal pain, worsens with moving, eating or bending.     Patient also reporting right leg pain and swelling, no discoloration, not hot to touch. Patient is taking Bumex. Elevating for long periods of time, slight improvement.     Scheduled appt for tomorrow. Advised patient to return call if symptoms worsen.

## 2024-04-16 NOTE — TELEPHONE ENCOUNTER
Patient calling office to inform he does not have transportation to appointment today and cannot reschedule at this time due to transportation issues. Stated he will go to the hospital if his pain does not improve. Informed patient to call office if he does not go to the hospital to reschedule appt. Patient agreed.

## 2024-04-18 ENCOUNTER — APPOINTMENT (OUTPATIENT)
Dept: CT IMAGING | Age: 89
DRG: 313 | End: 2024-04-18
Payer: MEDICARE

## 2024-04-18 ENCOUNTER — HOSPITAL ENCOUNTER (OUTPATIENT)
Age: 89
Setting detail: OBSERVATION
LOS: 1 days | Discharge: HOME | DRG: 313 | End: 2024-04-20
Attending: EMERGENCY MEDICINE | Admitting: INTERNAL MEDICINE
Payer: MEDICARE

## 2024-04-18 ENCOUNTER — APPOINTMENT (OUTPATIENT)
Dept: GENERAL RADIOLOGY | Age: 89
DRG: 313 | End: 2024-04-18
Payer: MEDICARE

## 2024-04-18 ENCOUNTER — APPOINTMENT (OUTPATIENT)
Dept: VASCULAR LAB | Age: 89
DRG: 313 | End: 2024-04-18
Attending: EMERGENCY MEDICINE
Payer: MEDICARE

## 2024-04-18 DIAGNOSIS — R10.13 ABDOMINAL PAIN, EPIGASTRIC: ICD-10-CM

## 2024-04-18 DIAGNOSIS — L97.811 NON-PRS CHR ULCER OTH PRT R LOW LEG LIMITED TO BRKDWN SKIN (HCC): ICD-10-CM

## 2024-04-18 DIAGNOSIS — M79.89 LEG SWELLING: ICD-10-CM
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
## 2024-04-18 DIAGNOSIS — I50.22 CHRONIC SYSTOLIC (CONGESTIVE) HEART FAILURE (HCC): ICD-10-CM
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
## 2024-04-18 DIAGNOSIS — M25.531 RIGHT WRIST PAIN: ICD-10-CM

## 2024-04-18 DIAGNOSIS — R07.9 CHEST PAIN, UNSPECIFIED TYPE: ICD-10-CM

## 2024-04-18 LAB
ALBUMIN SERPL-MCNC: 3.8 G/DL (ref 3.5–5.2)
ALP SERPL-CCNC: 114 U/L (ref 40–129)
ALT SERPL-CCNC: 26 U/L (ref 5–41)
ANION GAP SERPL CALCULATED.3IONS-SCNC: 8 MMOL/L (ref 9–17)
AST SERPL-CCNC: 29 U/L
BASOPHILS # BLD: 0 K/UL (ref 0–0.2)
BASOPHILS NFR BLD: 1 % (ref 0–2)
BILIRUB SERPL-MCNC: 0.6 MG/DL (ref 0.3–1.2)
BILIRUB UR QL STRIP: NEGATIVE
BNP SERPL-MCNC: 2541 PG/ML
BUN SERPL-MCNC: 19 MG/DL (ref 8–23)
CALCIUM SERPL-MCNC: 9 MG/DL (ref 8.6–10.4)
CHLORIDE SERPL-SCNC: 106 MMOL/L (ref 98–107)
CLARITY UR: CLEAR
CO2 SERPL-SCNC: 25 MMOL/L (ref 20–31)
COLOR UR: YELLOW
COMMENT: ABNORMAL
CREAT SERPL-MCNC: 1 MG/DL (ref 0.7–1.2)
ECHO BSA: 2.05 M2
EOSINOPHIL # BLD: 0.1 K/UL (ref 0–0.4)
EOSINOPHILS RELATIVE PERCENT: 1 % (ref 0–4)
ERYTHROCYTE [DISTWIDTH] IN BLOOD BY AUTOMATED COUNT: 14.2 % (ref 11.5–14.9)
GFR SERPL CREATININE-BSD FRML MDRD: 72 ML/MIN/1.73M2
GLUCOSE SERPL-MCNC: 112 MG/DL (ref 70–99)
GLUCOSE UR STRIP-MCNC: NEGATIVE MG/DL
HCT VFR BLD AUTO: 31.1 % (ref 41–53)
HGB BLD-MCNC: 10 G/DL (ref 13.5–17.5)
HGB UR QL STRIP.AUTO: NEGATIVE
INR PPP: 1.2
KETONES UR STRIP-MCNC: NEGATIVE MG/DL
LEUKOCYTE ESTERASE UR QL STRIP: NEGATIVE
LYMPHOCYTES NFR BLD: 0.6 K/UL (ref 1–4.8)
LYMPHOCYTES RELATIVE PERCENT: 15 % (ref 24–44)
MCH RBC QN AUTO: 30 PG (ref 26–34)
MCHC RBC AUTO-ENTMCNC: 32.1 G/DL (ref 31–37)
MCV RBC AUTO: 93.6 FL (ref 80–100)
MONOCYTES NFR BLD: 0.4 K/UL (ref 0.1–1.3)
MONOCYTES NFR BLD: 10 % (ref 1–7)
NEUTROPHILS NFR BLD: 73 % (ref 36–66)
NEUTS SEG NFR BLD: 3.1 K/UL (ref 1.3–9.1)
NITRITE UR QL STRIP: NEGATIVE
PH UR STRIP: 6.5 [PH] (ref 5–8)
PLATELET # BLD AUTO: 189 K/UL (ref 150–450)
PMV BLD AUTO: 7.7 FL (ref 6–12)
POTASSIUM SERPL-SCNC: 4.2 MMOL/L (ref 3.7–5.3)
PROT SERPL-MCNC: 7.3 G/DL (ref 6.4–8.3)
PROT UR STRIP-MCNC: NEGATIVE MG/DL
PROTHROMBIN TIME: 15.1 SEC (ref 11.8–14.6)
RBC # BLD AUTO: 3.32 M/UL (ref 4.5–5.9)
SODIUM SERPL-SCNC: 139 MMOL/L (ref 135–144)
SP GR UR STRIP: 1.03 (ref 1–1.03)
TROPONIN I SERPL HS-MCNC: 54 NG/L (ref 0–22)
TROPONIN I SERPL HS-MCNC: 55 NG/L (ref 0–22)
UROBILINOGEN UR STRIP-ACNC: NORMAL EU/DL (ref 0–1)
WBC OTHER # BLD: 4.1 K/UL (ref 3.5–11)

## 2024-04-18 PROCEDURE — 29125 APPL SHORT ARM SPLINT STATIC: CPT

## 2024-04-18 PROCEDURE — 81003 URINALYSIS AUTO W/O SCOPE: CPT

## 2024-04-18 PROCEDURE — 6370000000 HC RX 637 (ALT 250 FOR IP): Performed by: INTERNAL MEDICINE

## 2024-04-18 PROCEDURE — 71045 X-RAY EXAM CHEST 1 VIEW: CPT

## 2024-04-18 PROCEDURE — 70450 CT HEAD/BRAIN W/O DYE: CPT

## 2024-04-18 PROCEDURE — 84484 ASSAY OF TROPONIN QUANT: CPT

## 2024-04-18 PROCEDURE — 73110 X-RAY EXAM OF WRIST: CPT

## 2024-04-18 PROCEDURE — 93005 ELECTROCARDIOGRAM TRACING: CPT | Performed by: EMERGENCY MEDICINE

## 2024-04-18 PROCEDURE — 6370000000 HC RX 637 (ALT 250 FOR IP): Performed by: EMERGENCY MEDICINE

## 2024-04-18 PROCEDURE — 2580000003 HC RX 258: Performed by: INTERNAL MEDICINE

## 2024-04-18 PROCEDURE — 80053 COMPREHEN METABOLIC PANEL: CPT

## 2024-04-18 PROCEDURE — 99285 EMERGENCY DEPT VISIT HI MDM: CPT

## 2024-04-18 PROCEDURE — 2580000003 HC RX 258: Performed by: EMERGENCY MEDICINE

## 2024-04-18 PROCEDURE — 71260 CT THORAX DX C+: CPT

## 2024-04-18 PROCEDURE — 73130 X-RAY EXAM OF HAND: CPT

## 2024-04-18 PROCEDURE — 85025 COMPLETE CBC W/AUTO DIFF WBC: CPT

## 2024-04-18 PROCEDURE — 93971 EXTREMITY STUDY: CPT | Performed by: SURGERY

## 2024-04-18 PROCEDURE — 99223 1ST HOSP IP/OBS HIGH 75: CPT | Performed by: INTERNAL MEDICINE

## 2024-04-18 PROCEDURE — G0378 HOSPITAL OBSERVATION PER HR: HCPCS

## 2024-04-18 PROCEDURE — 36415 COLL VENOUS BLD VENIPUNCTURE: CPT

## 2024-04-18 PROCEDURE — 85610 PROTHROMBIN TIME: CPT

## 2024-04-18 PROCEDURE — 72125 CT NECK SPINE W/O DYE: CPT

## 2024-04-18 PROCEDURE — 29105 APPLICATION LONG ARM SPLINT: CPT

## 2024-04-18 PROCEDURE — 6360000004 HC RX CONTRAST MEDICATION: Performed by: EMERGENCY MEDICINE

## 2024-04-18 PROCEDURE — 93971 EXTREMITY STUDY: CPT

## 2024-04-18 PROCEDURE — 83880 ASSAY OF NATRIURETIC PEPTIDE: CPT

## 2024-04-18 PROCEDURE — 2060000000 HC ICU INTERMEDIATE R&B

## 2024-04-18 RX ORDER — POLYETHYLENE GLYCOL 3350 17 G/17G
17 POWDER, FOR SOLUTION ORAL DAILY
Status: DISCONTINUED | OUTPATIENT
Start: 2024-04-18 | End: 2024-04-20 | Stop reason: HOSPADM

## 2024-04-18 RX ORDER — SODIUM CHLORIDE 9 MG/ML
INJECTION, SOLUTION INTRAVENOUS ONCE
Status: COMPLETED | OUTPATIENT
Start: 2024-04-18 | End: 2024-04-18

## 2024-04-18 RX ORDER — METOPROLOL SUCCINATE 25 MG/1
25 TABLET, EXTENDED RELEASE ORAL ONCE
Status: COMPLETED | OUTPATIENT
Start: 2024-04-18 | End: 2024-04-18

## 2024-04-18 RX ORDER — LATANOPROST 50 UG/ML
1 SOLUTION/ DROPS OPHTHALMIC NIGHTLY
Status: DISCONTINUED | OUTPATIENT
Start: 2024-04-18 | End: 2024-04-20 | Stop reason: HOSPADM

## 2024-04-18 RX ORDER — ASPIRIN 81 MG/1
324 TABLET, CHEWABLE ORAL ONCE
Status: COMPLETED | OUTPATIENT
Start: 2024-04-18 | End: 2024-04-18

## 2024-04-18 RX ORDER — SODIUM CHLORIDE 9 MG/ML
INJECTION, SOLUTION INTRAVENOUS PRN
Status: DISCONTINUED | OUTPATIENT
Start: 2024-04-18 | End: 2024-04-20 | Stop reason: HOSPADM

## 2024-04-18 RX ORDER — FERROUS SULFATE 325(65) MG
325 TABLET ORAL EVERY OTHER DAY
Status: DISCONTINUED | OUTPATIENT
Start: 2024-04-18 | End: 2024-04-20 | Stop reason: HOSPADM

## 2024-04-18 RX ORDER — AMLODIPINE BESYLATE 5 MG/1
5 TABLET ORAL DAILY
Status: DISCONTINUED | OUTPATIENT
Start: 2024-04-18 | End: 2024-04-20 | Stop reason: HOSPADM

## 2024-04-18 RX ORDER — ONDANSETRON 4 MG/1
4 TABLET, ORALLY DISINTEGRATING ORAL EVERY 8 HOURS PRN
Status: DISCONTINUED | OUTPATIENT
Start: 2024-04-18 | End: 2024-04-20 | Stop reason: HOSPADM

## 2024-04-18 RX ORDER — FAMOTIDINE 20 MG/1
20 TABLET, FILM COATED ORAL 2 TIMES DAILY
Status: DISCONTINUED | OUTPATIENT
Start: 2024-04-18 | End: 2024-04-20 | Stop reason: HOSPADM

## 2024-04-18 RX ORDER — TAMSULOSIN HYDROCHLORIDE 0.4 MG/1
0.4 CAPSULE ORAL DAILY
Status: DISCONTINUED | OUTPATIENT
Start: 2024-04-18 | End: 2024-04-20 | Stop reason: HOSPADM

## 2024-04-18 RX ORDER — ONDANSETRON 2 MG/ML
4 INJECTION INTRAMUSCULAR; INTRAVENOUS EVERY 6 HOURS PRN
Status: DISCONTINUED | OUTPATIENT
Start: 2024-04-18 | End: 2024-04-20 | Stop reason: HOSPADM

## 2024-04-18 RX ORDER — NITROGLYCERIN 0.4 MG/1
0.4 TABLET SUBLINGUAL ONCE
Status: DISCONTINUED | OUTPATIENT
Start: 2024-04-18 | End: 2024-04-20 | Stop reason: HOSPADM

## 2024-04-18 RX ORDER — SODIUM CHLORIDE 0.9 % (FLUSH) 0.9 %
5-40 SYRINGE (ML) INJECTION PRN
Status: DISCONTINUED | OUTPATIENT
Start: 2024-04-18 | End: 2024-04-20 | Stop reason: HOSPADM

## 2024-04-18 RX ORDER — BUPRENORPHINE AND NALOXONE 8; 2 MG/1; MG/1
1 FILM, SOLUBLE BUCCAL; SUBLINGUAL 2 TIMES DAILY
Status: DISCONTINUED | OUTPATIENT
Start: 2024-04-18 | End: 2024-04-20 | Stop reason: HOSPADM

## 2024-04-18 RX ORDER — METOPROLOL SUCCINATE 100 MG/1
100 TABLET, EXTENDED RELEASE ORAL 2 TIMES DAILY
Status: DISCONTINUED | OUTPATIENT
Start: 2024-04-18 | End: 2024-04-20 | Stop reason: HOSPADM

## 2024-04-18 RX ORDER — ATORVASTATIN CALCIUM 40 MG/1
40 TABLET, FILM COATED ORAL NIGHTLY
Status: DISCONTINUED | OUTPATIENT
Start: 2024-04-18 | End: 2024-04-20 | Stop reason: HOSPADM

## 2024-04-18 RX ORDER — SODIUM CHLORIDE 0.9 % (FLUSH) 0.9 %
5-40 SYRINGE (ML) INJECTION EVERY 12 HOURS SCHEDULED
Status: DISCONTINUED | OUTPATIENT
Start: 2024-04-18 | End: 2024-04-20 | Stop reason: HOSPADM

## 2024-04-18 RX ORDER — POLYETHYLENE GLYCOL 3350 17 G/17G
17 POWDER, FOR SOLUTION ORAL DAILY PRN
Status: DISCONTINUED | OUTPATIENT
Start: 2024-04-18 | End: 2024-04-20 | Stop reason: HOSPADM

## 2024-04-18 RX ORDER — FINASTERIDE 5 MG/1
5 TABLET, FILM COATED ORAL DAILY
Status: DISCONTINUED | OUTPATIENT
Start: 2024-04-18 | End: 2024-04-20 | Stop reason: HOSPADM

## 2024-04-18 RX ORDER — ACETAMINOPHEN 325 MG/1
650 TABLET ORAL EVERY 6 HOURS PRN
Status: DISCONTINUED | OUTPATIENT
Start: 2024-04-18 | End: 2024-04-20 | Stop reason: HOSPADM

## 2024-04-18 RX ORDER — POTASSIUM CHLORIDE 7.45 MG/ML
10 INJECTION INTRAVENOUS PRN
Status: DISCONTINUED | OUTPATIENT
Start: 2024-04-18 | End: 2024-04-20 | Stop reason: HOSPADM

## 2024-04-18 RX ORDER — MAGNESIUM SULFATE HEPTAHYDRATE 40 MG/ML
2000 INJECTION, SOLUTION INTRAVENOUS PRN
Status: DISCONTINUED | OUTPATIENT
Start: 2024-04-18 | End: 2024-04-20 | Stop reason: HOSPADM

## 2024-04-18 RX ORDER — POTASSIUM CHLORIDE 20 MEQ/1
40 TABLET, EXTENDED RELEASE ORAL PRN
Status: DISCONTINUED | OUTPATIENT
Start: 2024-04-18 | End: 2024-04-20 | Stop reason: HOSPADM

## 2024-04-18 RX ORDER — SENNA AND DOCUSATE SODIUM 50; 8.6 MG/1; MG/1
2 TABLET, FILM COATED ORAL DAILY
Status: DISCONTINUED | OUTPATIENT
Start: 2024-04-18 | End: 2024-04-20 | Stop reason: HOSPADM

## 2024-04-18 RX ORDER — SODIUM CHLORIDE 0.9 % (FLUSH) 0.9 %
10 SYRINGE (ML) INJECTION PRN
Status: COMPLETED | OUTPATIENT
Start: 2024-04-18 | End: 2024-04-18

## 2024-04-18 RX ORDER — ENOXAPARIN SODIUM 100 MG/ML
40 INJECTION SUBCUTANEOUS DAILY
Status: DISCONTINUED | OUTPATIENT
Start: 2024-04-18 | End: 2024-04-18

## 2024-04-18 RX ORDER — CLONIDINE HYDROCHLORIDE 0.1 MG/1
0.1 TABLET ORAL ONCE
Status: COMPLETED | OUTPATIENT
Start: 2024-04-18 | End: 2024-04-18

## 2024-04-18 RX ORDER — ACETAMINOPHEN 650 MG/1
650 SUPPOSITORY RECTAL EVERY 6 HOURS PRN
Status: DISCONTINUED | OUTPATIENT
Start: 2024-04-18 | End: 2024-04-20 | Stop reason: HOSPADM

## 2024-04-18 RX ADMIN — SODIUM CHLORIDE, PRESERVATIVE FREE 10 ML: 5 INJECTION INTRAVENOUS at 20:51

## 2024-04-18 RX ADMIN — RIVAROXABAN 15 MG: 15 TABLET, FILM COATED ORAL at 16:47

## 2024-04-18 RX ADMIN — IOPAMIDOL 75 ML: 755 INJECTION, SOLUTION INTRAVENOUS at 13:32

## 2024-04-18 RX ADMIN — TAMSULOSIN HYDROCHLORIDE 0.4 MG: 0.4 CAPSULE ORAL at 20:50

## 2024-04-18 RX ADMIN — SODIUM CHLORIDE, PRESERVATIVE FREE 10 ML: 5 INJECTION INTRAVENOUS at 13:32

## 2024-04-18 RX ADMIN — SODIUM CHLORIDE: 9 INJECTION, SOLUTION INTRAVENOUS at 13:32

## 2024-04-18 RX ADMIN — METOPROLOL SUCCINATE 25 MG: 25 TABLET, EXTENDED RELEASE ORAL at 21:59

## 2024-04-18 RX ADMIN — LATANOPROST 1 DROP: 50 SOLUTION OPHTHALMIC at 20:50

## 2024-04-18 RX ADMIN — BUPRENORPHINE AND NALOXONE 1 FILM: 8; 2 FILM, SOLUBLE BUCCAL; SUBLINGUAL at 20:50

## 2024-04-18 RX ADMIN — ASPIRIN 81 MG 324 MG: 81 TABLET ORAL at 12:34

## 2024-04-18 RX ADMIN — DOCUSATE SODIUM 50MG AND SENNOSIDES 8.6MG 2 TABLET: 8.6; 5 TABLET, FILM COATED ORAL at 20:50

## 2024-04-18 RX ADMIN — CLONIDINE HYDROCHLORIDE 0.1 MG: 0.1 TABLET ORAL at 16:47

## 2024-04-18 RX ADMIN — ATORVASTATIN CALCIUM 40 MG: 40 TABLET, FILM COATED ORAL at 20:50

## 2024-04-18 RX ADMIN — FAMOTIDINE 20 MG: 20 TABLET, FILM COATED ORAL at 20:50

## 2024-04-18 RX ADMIN — AMLODIPINE BESYLATE 5 MG: 5 TABLET ORAL at 16:41

## 2024-04-18 ASSESSMENT — PAIN - FUNCTIONAL ASSESSMENT
PAIN_FUNCTIONAL_ASSESSMENT: PREVENTS OR INTERFERES SOME ACTIVE ACTIVITIES AND ADLS
PAIN_FUNCTIONAL_ASSESSMENT: 0-10

## 2024-04-18 ASSESSMENT — ENCOUNTER SYMPTOMS
ABDOMINAL PAIN: 1
RHINORRHEA: 0
EYE PAIN: 0
SORE THROAT: 0
EYE DISCHARGE: 0
COUGH: 0
VOMITING: 0
SHORTNESS OF BREATH: 0
NAUSEA: 0
WHEEZING: 0
EYE ITCHING: 0
BACK PAIN: 1

## 2024-04-18 ASSESSMENT — PAIN DESCRIPTION - PAIN TYPE: TYPE: ACUTE PAIN

## 2024-04-18 ASSESSMENT — PAIN DESCRIPTION - ONSET: ONSET: ON-GOING

## 2024-04-18 ASSESSMENT — PAIN DESCRIPTION - ORIENTATION: ORIENTATION: RIGHT

## 2024-04-18 ASSESSMENT — PAIN DESCRIPTION - FREQUENCY: FREQUENCY: CONTINUOUS

## 2024-04-18 ASSESSMENT — PAIN DESCRIPTION - LOCATION
LOCATION: ABDOMEN;LEG;ARM
LOCATION: LEG;WRIST

## 2024-04-18 ASSESSMENT — PAIN SCALES - GENERAL
PAINLEVEL_OUTOF10: 5
PAINLEVEL_OUTOF10: 6

## 2024-04-18 ASSESSMENT — PAIN DESCRIPTION - DESCRIPTORS: DESCRIPTORS: SHARP;BURNING

## 2024-04-18 NOTE — ED PROVIDER NOTES
EMERGENCY DEPARTMENT ENCOUNTER   ATTENDING ATTESTATION     Pt Name: Hemanth Barrera  MRN: [id number]  Birthdate 1935  Date of evaluation: 4/18/24   Hemanth Barrera is a 89 y.o. male with CC: Fall, Leg Pain, Arm Pain, and Abdominal Pain    Mechanical fall from standing 3 days ago   Pain in right hand and wrist    Right leg swelling has been chronic, on bumex.    Intermittent left sided chest pain. PMH of AF, CAD, CHF and SSS. Takes nitro as needed, last 4 days ago. Patient of Josh Ames     Epigastric abdominal pain.       Not taking xarelto 3 days.     MDM:   I performed a substantive part of the MDM during the patient's E/M visit. I personally evaluated and examined the patient. I personally made or approved the documented management plan and acknowledge its risk of complications.      ED Course as of 04/18/24 1427   Thu Apr 18, 2024   1157 EKG sinus rhythm with 1st degree AV block with occasional V paced complexes, HR 60, normal axis. T wave inversions noted in leads II, III, aVF, V4-V6; these appear new compared to previous EKGs.  [MK]      ED Course User Index  [MK] Jenny De La Fuente MD       Patients wrist XR revealed wrist avulsion fracture. Discussed with Dr Thorpe on call for ortho, plan to place patient in short arm splint and he will follow as inpatient.   CT imaging revealed no acute findings. There is a cervical compression fracture which appears chronic, on exam he does not have overlying point tenderness,  I do not believe this requires any emergent intervention.     Labs revealed troponin elevation consistent with previous values. Discussed with Dr Ames on call for cardiology. He agrees with admission for further observation and cardiac evaluation. Discussed with DR Jolley on call for IM who accepts patient for admission.     CRITICAL CARE:       EKG: All EKG's are interpreted by the Emergency Department Physician who either signs or Co-signs this chart in the absence of a

## 2024-04-18 NOTE — ED PROVIDER NOTES
EMERGENCY DEPARTMENT ENCOUNTER    Pt Name: Hemanth Barrera  MRN: [id number]  Birthdate 1935  Date of evaluation: 4/18/24  CHIEF COMPLAINT       Chief Complaint   Patient presents with    Fall    Leg Pain    Arm Pain    Abdominal Pain     HISTORY OF PRESENT ILLNESS   Patient is an 89-year-old male who presents with leg pain, arm pain abdominal pain and intermittent chest pain.  The patient states that he did fall 3 days ago, there was a elevated area on the curb and he tripped over it and fell backwards landing on his buttocks.  He denies any head strike.  He states that he did need assistance getting up.  Since then he has had increased pain in the right wrist and hand as well as the back and neck.  He states that he always has some pain in the right hand and wrist secondary to arthritis.  He has not had any numbness or tingling.  He denies any head strike.  The patient does report that he has been off of his Xarelto for the past 3 days as the pharmacy was out of it.  He is on medication for high blood pressure and does report that he took it this morning    Patient states that he has had intermittent chest pain to the left side of the chest.  He does have a pacemaker.  He also does take nitro.  The last time he took nitro was 4 days ago when he had a flutter sensation in the left side of the chest, he did end up taking 2 nitro and that pain went away.  He also has been having epigastric abdominal pain which is present especially when he is moving.  He reports that he has a small hernia to the area secondary to a partial gastrectomy.  He denies any nausea or vomiting.  Patient does report about a 10 pound weight loss    Patient also reports that he has had increased pain and swelling to the right leg.  He states that he does take Bumex for the swelling in his legs but it seems like his right leg has been more swollen and more painful than usual.             REVIEW OF SYSTEMS     Review of Systems  Coloration: Skin is not pale.      Findings: No rash (on exposed surfaces).   Neurological:      Mental Status: He is alert and oriented to person, place, and time.      Coordination: Coordination normal.   Psychiatric:         Behavior: Behavior normal.         MEDICAL DECISION MAKING / ED COURSE:   Summary of Patient Presentation:    Patient is an 89-year-old male who presents after a fall.  Patient has neck back pain right arm and wrist pain secondary to the fall.  He denies any head strike but is on Xarelto.  The patient also reports that he does have right leg pain and swelling, does take Bumex for this and has been having abdominal pain.  Patient reports that he has a hernia secondary to a partial gastrectomy and did have chest pain about 4 days ago.  Patient will require an extensive workup and will be signed out to attending physician for testing, further evaluation and disposition      DATA FOR LAB AND RADIOLOGY TESTS ORDERED BELOW ARE REVIEWED BY THE ED CLINICIAN:    RADIOLOGY: All x-rays, CT, MRI, and formal ultrasound images (except ED bedside ultrasound) are read by the radiologist, see reports below, unless otherwise noted in MDM or here.  Reports below are reviewed by myself.  XR CHEST PORTABLE    (Results Pending)   CT HEAD WO CONTRAST    (Results Pending)   CT CERVICAL SPINE WO CONTRAST    (Results Pending)   CT CHEST ABDOMEN PELVIS W CONTRAST Additional Contrast? None    (Results Pending)   XR WRIST RIGHT (MIN 3 VIEWS)    (Results Pending)   XR HAND RIGHT (MIN 3 VIEWS)    (Results Pending)   Vascular duplex lower extremity venous right    (Results Pending)       LABS: Lab orders shown below, the results are reviewed by myself, and all abnormals are listed below.  Labs Reviewed   COMPREHENSIVE METABOLIC PANEL   CBC WITH AUTO DIFFERENTIAL   TROPONIN   TROPONIN   BRAIN NATRIURETIC PEPTIDE   PROTIME-INR       Vitals Reviewed:    Vitals:    04/18/24 1135   BP: (!) 199/104   Pulse: 69   Resp: 12   Temp:

## 2024-04-18 NOTE — H&P
[facility]   IN-PATIENT SERVICE   [facility]    HISTORY AND PHYSICAL EXAMINATION            Date:   4/18/2024  Patient name:  Hemanth Barrera  Date of admission:  4/18/2024 11:24 AM  MRN:   [id number]  Account:  [id number]  YOB: 1935  PCP:    Neftaly Contreras MD  Room:   [address] [place] [address]  Code Status:    Full Code    Chief Complaint:     Chief Complaint   Patient presents with    Fall    Leg Pain    Arm Pain    Abdominal Pain       History Obtained From:     patient    History of Present Illness:     The patient is a 89 y.o.  Non- / non  male who presents with Fall, Leg Pain, Arm Pain, and Abdominal Pain   and he is admitted to the hospital for the management of right upper extremity pain    89-year-old male with past medical history significant for HFpEF, atrial fibrillation, sick sinus syndrome with pacemaker in 2019, hypertension, last echocardiogram 2020, CAD, moderate, CKD follows up with nephrology, presents to the hospital with left upper extremity pain.  Patient reported having a fall 3 days back, he tripped over something, this was a mechanical fall, did not lose consciousness.  He did not strike his head.  Since then he reports having increasing pain in the right upper extremity and hence presented to the hospital.    Patient also reported having intermittent chest pain to the left side of his chest for which she has been taking nitro.  The patient has a history of moderate CAD, last stress was 7/31/2023 as listed below, has a pacemaker in for sick sinus syndrome, reports having occasional epigastric discomfort especially with exertion.      Lexiscan 7/31/2023    Stress Combined Conclusion: Normal pharmacological myocardial perfusion study. Findings suggest a low risk of cardiac events.    ECG: Resting ECG demonstrates normal sinus rhythm and left bundle branch block.    ECG: The ECG was not diagnostic due to baseline ECG abnormality.    Stress Test: A  a significant cardiac history and hence recommendations were to admit to the hospital, EKG does show new changes with T wave inversions in the inferior lateral leads.  Cardiology was consulted in the emergency room, await recommendations.  Patient allergic to aspirin, will continue Xarelto, metoprolol, check lipid profile, initiate atorvastatin  Elevated BNP, however no volume noted on physical exam.  Follow-up  Chronic troponin elevation  Mechanical fall, reports difficulty with balance.  Will get PT OT.  Reports had right hip surgery x 2, since then has had right lower extremity swelling, pain, this makes it difficult for him to ambulate.  Consult PT OT, get pacemaker interrogated.  Chronic normocytic normochromic anemia, at baseline  Uncontrolled hypertension, reinitiate home medications.  GERD, stable  History of partial gastrectomy, now reports having an abdominal spot which protrudes when he strains.  He has seen his surgeon in the past however not felt to be a surgical candidate.  Unable to provide details  History of opioid use, on Suboxone  Constipation, likely secondary to opioids.  Pulmonary artery hypertension, doubt has a pulmonary embolism.  Patient on Eliquis at home, missed 2 doses  Right lower extremity swelling, DVT ruled out    Consultations:   IP CONSULT TO ORTHOPEDIC SURGERY  IP CONSULT TO CARDIOLOGY  IP CONSULT TO INTERNAL MEDICINE    Patient is admitted as inpatient status because of co-morbidities listed above, severity of signs and symptoms as outlined, requirement for current medical therapies and most importantly because of direct risk to patient if care not provided in a hospital setting.    Kaylynn Jolley MD  4/18/2024  4:35 PM    Copy sent to Neftaly Choudhary MD    Please note that this chart was generated using voice recognition Dragon dictation software.  Although every effort was made to ensure the accuracy of this automated transcription, some errors in transcription may have  occurred.

## 2024-04-18 NOTE — PROGRESS NOTES
Pharmacy Medication History Note      List of current medications patient is taking is complete.     Source of information: dispense report, [place]ARRS    Changes made to medication list:  Medications flagged for removal (include reason, ex. noncompliance):  Docusate - last filled December 2023 for 30 days  Cyanocobalamin tabs - last filled in 2022  Metolazone - last filled In October 2023 for 90 days     Medications removed (include reason, ex. therapy complete or physician discontinued):  Ondansetron - course complete   Duplicate Xarelto    Medications added/doses adjusted:  None     Other notes (ex. Recent course of antibiotics, Coumadin dosing):  Per [place]ARRS, last fill of Suboxone was on 3/27/24 with quantity 60 for 30 days.     Denies use of other OTC or herbal medications.      Allergies clarified    Medication list provided to the patient: no  Medication education provided to the patient: none    Prior to Admission Medications   Prescriptions Last Dose Informant Patient Reported? Taking?   acetaminophen (TYLENOL) 325 MG tablet   Yes No   Sig: Take 2 tablets by mouth every 6 hours as needed for Pain   bumetanide (BUMEX) 2 MG tablet   No No   Sig: Take 1 tablet by mouth daily   buprenorphine-naloxone (SUBOXONE) 8-2 MG FILM SL film   Yes No   Sig: Place 1 Film under the tongue 2 times daily. Indications: pain   cyanocobalamin 1000 MCG/ML injection   Yes No   Sig: Inject 1 mL into the muscle every 30 days   dilTIAZem (CARDIZEM CD) 120 MG extended release capsule   No No   Sig: TAKE 1 CAPSULE BY MOUTH DAILY   docusate sodium (COLACE) 100 MG capsule Not Taking  Yes No   Sig: Take 1 capsule by mouth 2 times daily   Patient not taking: Reported on 1/4/2024   famotidine (PEPCID) 20 MG tablet   No No   Sig: TAKE 1 TABLET BY MOUTH TWICE DAILY   ferrous sulfate (IRON 325) 325 (65 Fe) MG tablet   No No   Sig: Take 1 tablet by mouth every other day   finasteride (PROSCAR) 5 MG tablet   No No   Sig: Take 1 tablet by mouth daily    latanoprost (XALATAN) 0.005 % ophthalmic solution   Yes No   Sig: Place 1 drop into both eyes nightly   metOLazone (ZAROXOLYN) 2.5 MG tablet Not Taking  No No   Sig: Take 1 tablet by mouth Twice a Week Take 1 tab on Monday and Friday   Patient not taking: Reported on 4/18/2024   metoprolol succinate (TOPROL XL) 100 MG extended release tablet   No No   Sig: TAKE 1 TABLET BY MOUTH TWICE DAILY   nitroGLYCERIN (NITROSTAT) 0.4 MG SL tablet   No No   Sig: up to max of 3 total doses. If no relief after 1 dose, call 911.             rivaroxaban (XARELTO) 15 MG TABS tablet   No No   Sig: Take 1 tablet by mouth daily             vitamin B-12 (CYANOCOBALAMIN) 1000 MCG tablet Not Taking  Yes No   Sig: TAKE 1 TABLET BY MOUTH DAILY   Patient not taking: Reported on 1/4/2024   vitamin D (ERGOCALCIFEROL) 1.25 MG (33914 UT) CAPS capsule   No No   Sig: TAKE 1 CAPSULE BY MOUTH EVERY WEEK      Facility-Administered Medications: None           Electronically signed by Crystal Johnson RPH on 4/18/2024 at 3:47 PM

## 2024-04-18 NOTE — ED TRIAGE NOTES
Mode of arrival (squad #, walk in, police, etc) : walk-in        Chief complaint(s): fall, leg pain, arm pain, abdominal pain         Arrival Note (brief scenario, treatment PTA, etc).: patient fell 3 days ago and having right leg pain, right arm pain, and abdominal pain. Been off his blood thinners 3 days         C= \"Have you ever felt that you should Cut down on your drinking?\"  No  A= \"Have people Annoyed you by criticizing your drinking?\"  No  G= \"Have you ever felt bad or Guilty about your drinking?\"  No  E= \"Have you ever had a drink as an Eye-opener first thing in the morning to steady your nerves or to help a hangover?\"  No      Deferred []      Reason for deferring: N/A    *If yes to two or more: probable alcohol abuse.*

## 2024-04-18 NOTE — PROGRESS NOTES
Pt arrived to progressive. Heart monitor applied, vitals obtained, assessment completed. Pt appears stable at this time. Bp elevated 175/87, dr foster notified. Pain 5/10 in right wrist and right leg but pt stated that it is much better and a good pain level.     Per dr foster, restart home dose of toprol xl, start norvasc 5 mg daily, and hold home dose of cardizem.

## 2024-04-19 ENCOUNTER — TELEPHONE (OUTPATIENT)
Dept: INTERNAL MEDICINE CLINIC | Age: 89
End: 2024-04-19
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
## 2024-04-19 PROBLEM — M79.89 LEG SWELLING: Status: ACTIVE | Noted: 2024-04-19

## 2024-04-19 PROBLEM — I21.4 NSTEMI (NON-ST ELEVATED MYOCARDIAL INFARCTION) (HCC): Status: ACTIVE | Noted: 2024-04-19

## 2024-04-19 PROBLEM — Z95.0 PRESENCE OF PERMANENT CARDIAC PACEMAKER: Status: ACTIVE | Noted: 2024-04-19

## 2024-04-19 PROBLEM — M25.531 RIGHT WRIST PAIN: Status: ACTIVE | Noted: 2024-04-19

## 2024-04-19 PROBLEM — R10.13 ABDOMINAL PAIN, EPIGASTRIC: Status: ACTIVE | Noted: 2024-04-19

## 2024-04-19 LAB
ANION GAP SERPL CALCULATED.3IONS-SCNC: 8 MMOL/L (ref 9–17)
BASOPHILS # BLD: 0.03 K/UL (ref 0–0.2)
BASOPHILS NFR BLD: 1 % (ref 0–2)
BUN SERPL-MCNC: 15 MG/DL (ref 8–23)
CALCIUM SERPL-MCNC: 8.4 MG/DL (ref 8.6–10.4)
CHLORIDE SERPL-SCNC: 108 MMOL/L (ref 98–107)
CHOLEST SERPL-MCNC: 109 MG/DL
CHOLESTEROL/HDL RATIO: 2.4
CO2 SERPL-SCNC: 24 MMOL/L (ref 20–31)
CREAT SERPL-MCNC: 0.8 MG/DL (ref 0.7–1.2)
EOSINOPHIL # BLD: 0.22 K/UL (ref 0–0.4)
EOSINOPHILS RELATIVE PERCENT: 7 % (ref 0–4)
ERYTHROCYTE [DISTWIDTH] IN BLOOD BY AUTOMATED COUNT: 14.1 % (ref 11.5–14.9)
GFR SERPL CREATININE-BSD FRML MDRD: 85 ML/MIN/1.73M2
GLUCOSE SERPL-MCNC: 96 MG/DL (ref 70–99)
HCT VFR BLD AUTO: 28.7 % (ref 41–53)
HDLC SERPL-MCNC: 46 MG/DL
HGB BLD-MCNC: 9.2 G/DL (ref 13.5–17.5)
LDLC SERPL CALC-MCNC: 55 MG/DL (ref 0–130)
LYMPHOCYTES NFR BLD: 0.9 K/UL (ref 1–4.8)
LYMPHOCYTES RELATIVE PERCENT: 28 % (ref 24–44)
MCH RBC QN AUTO: 29.8 PG (ref 26–34)
MCHC RBC AUTO-ENTMCNC: 32 G/DL (ref 31–37)
MCV RBC AUTO: 93 FL (ref 80–100)
MONOCYTES NFR BLD: 0.35 K/UL (ref 0.1–1.3)
MONOCYTES NFR BLD: 11 % (ref 1–7)
MORPHOLOGY: ABNORMAL
NEUTROPHILS NFR BLD: 53 % (ref 36–66)
NEUTS SEG NFR BLD: 1.7 K/UL (ref 1.3–9.1)
PLATELET # BLD AUTO: 178 K/UL (ref 150–450)
PMV BLD AUTO: 8 FL (ref 6–12)
POTASSIUM SERPL-SCNC: 3.8 MMOL/L (ref 3.7–5.3)
RBC # BLD AUTO: 3.08 M/UL (ref 4.5–5.9)
SODIUM SERPL-SCNC: 140 MMOL/L (ref 135–144)
TRIGL SERPL-MCNC: 41 MG/DL
WBC OTHER # BLD: 3.2 K/UL (ref 3.5–11)

## 2024-04-19 PROCEDURE — G0378 HOSPITAL OBSERVATION PER HR: HCPCS

## 2024-04-19 PROCEDURE — 96374 THER/PROPH/DIAG INJ IV PUSH: CPT

## 2024-04-19 PROCEDURE — 6370000000 HC RX 637 (ALT 250 FOR IP): Performed by: INTERNAL MEDICINE

## 2024-04-19 PROCEDURE — 97162 PT EVAL MOD COMPLEX 30 MIN: CPT

## 2024-04-19 PROCEDURE — 1200000000 HC SEMI PRIVATE

## 2024-04-19 PROCEDURE — 97166 OT EVAL MOD COMPLEX 45 MIN: CPT

## 2024-04-19 PROCEDURE — 2580000003 HC RX 258: Performed by: INTERNAL MEDICINE

## 2024-04-19 PROCEDURE — 85025 COMPLETE CBC W/AUTO DIFF WBC: CPT

## 2024-04-19 PROCEDURE — 36415 COLL VENOUS BLD VENIPUNCTURE: CPT

## 2024-04-19 PROCEDURE — 80061 LIPID PANEL: CPT

## 2024-04-19 PROCEDURE — 97116 GAIT TRAINING THERAPY: CPT

## 2024-04-19 PROCEDURE — 6360000002 HC RX W HCPCS: Performed by: NURSE PRACTITIONER

## 2024-04-19 PROCEDURE — 80048 BASIC METABOLIC PNL TOTAL CA: CPT

## 2024-04-19 PROCEDURE — 99222 1ST HOSP IP/OBS MODERATE 55: CPT | Performed by: STUDENT IN AN ORGANIZED HEALTH CARE EDUCATION/TRAINING PROGRAM

## 2024-04-19 PROCEDURE — 2W38X1Z IMMOBILIZATION OF RIGHT UPPER EXTREMITY USING SPLINT: ICD-10-PCS | Performed by: STUDENT IN AN ORGANIZED HEALTH CARE EDUCATION/TRAINING PROGRAM

## 2024-04-19 PROCEDURE — 99223 1ST HOSP IP/OBS HIGH 75: CPT | Performed by: INTERNAL MEDICINE

## 2024-04-19 PROCEDURE — 99233 SBSQ HOSP IP/OBS HIGH 50: CPT | Performed by: INTERNAL MEDICINE

## 2024-04-19 PROCEDURE — 96376 TX/PRO/DX INJ SAME DRUG ADON: CPT

## 2024-04-19 PROCEDURE — 97530 THERAPEUTIC ACTIVITIES: CPT

## 2024-04-19 RX ORDER — HYDROCODONE BITARTRATE AND ACETAMINOPHEN 5; 325 MG/1; MG/1
1 TABLET ORAL EVERY 6 HOURS PRN
Qty: 16 TABLET | Refills: 0 | Status: SHIPPED | OUTPATIENT
Start: 2024-04-19 | End: 2024-04-23

## 2024-04-19 RX ORDER — MORPHINE SULFATE 2 MG/ML
2 INJECTION, SOLUTION INTRAMUSCULAR; INTRAVENOUS EVERY 4 HOURS PRN
Status: DISCONTINUED | OUTPATIENT
Start: 2024-04-19 | End: 2024-04-20 | Stop reason: HOSPADM

## 2024-04-19 RX ORDER — ATORVASTATIN CALCIUM 40 MG/1
40 TABLET, FILM COATED ORAL NIGHTLY
Qty: 30 TABLET | Refills: 3 | Status: SHIPPED | OUTPATIENT
Start: 2024-04-19

## 2024-04-19 RX ADMIN — ATORVASTATIN CALCIUM 40 MG: 40 TABLET, FILM COATED ORAL at 20:44

## 2024-04-19 RX ADMIN — TAMSULOSIN HYDROCHLORIDE 0.4 MG: 0.4 CAPSULE ORAL at 22:00

## 2024-04-19 RX ADMIN — SODIUM CHLORIDE, PRESERVATIVE FREE 10 ML: 5 INJECTION INTRAVENOUS at 20:45

## 2024-04-19 RX ADMIN — MORPHINE SULFATE 2 MG: 2 INJECTION, SOLUTION INTRAMUSCULAR; INTRAVENOUS at 10:09

## 2024-04-19 RX ADMIN — METOPROLOL SUCCINATE 100 MG: 100 TABLET, EXTENDED RELEASE ORAL at 21:59

## 2024-04-19 RX ADMIN — LATANOPROST 1 DROP: 50 SOLUTION OPHTHALMIC at 22:00

## 2024-04-19 RX ADMIN — MORPHINE SULFATE 2 MG: 2 INJECTION, SOLUTION INTRAMUSCULAR; INTRAVENOUS at 05:41

## 2024-04-19 RX ADMIN — RIVAROXABAN 15 MG: 15 TABLET, FILM COATED ORAL at 16:38

## 2024-04-19 RX ADMIN — AMLODIPINE BESYLATE 5 MG: 5 TABLET ORAL at 16:40

## 2024-04-19 RX ADMIN — MORPHINE SULFATE 2 MG: 2 INJECTION, SOLUTION INTRAMUSCULAR; INTRAVENOUS at 20:44

## 2024-04-19 RX ADMIN — FAMOTIDINE 20 MG: 20 TABLET, FILM COATED ORAL at 20:44

## 2024-04-19 RX ADMIN — FINASTERIDE 5 MG: 5 TABLET, FILM COATED ORAL at 16:38

## 2024-04-19 RX ADMIN — METOPROLOL SUCCINATE 100 MG: 100 TABLET, EXTENDED RELEASE ORAL at 16:38

## 2024-04-19 RX ADMIN — SODIUM CHLORIDE, PRESERVATIVE FREE 10 ML: 5 INJECTION INTRAVENOUS at 16:40

## 2024-04-19 RX ADMIN — MORPHINE SULFATE 2 MG: 2 INJECTION, SOLUTION INTRAMUSCULAR; INTRAVENOUS at 16:38

## 2024-04-19 ASSESSMENT — PAIN SCALES - GENERAL
PAINLEVEL_OUTOF10: 5
PAINLEVEL_OUTOF10: 6
PAINLEVEL_OUTOF10: 3
PAINLEVEL_OUTOF10: 8
PAINLEVEL_OUTOF10: 6
PAINLEVEL_OUTOF10: 5
PAINLEVEL_OUTOF10: 8
PAINLEVEL_OUTOF10: 8
PAINLEVEL_OUTOF10: 9
PAINLEVEL_OUTOF10: 6

## 2024-04-19 ASSESSMENT — PAIN DESCRIPTION - LOCATION
LOCATION: ARM
LOCATION: WRIST
LOCATION: WRIST
LOCATION: ARM
LOCATION: WRIST

## 2024-04-19 ASSESSMENT — PAIN DESCRIPTION - FREQUENCY: FREQUENCY: CONTINUOUS

## 2024-04-19 ASSESSMENT — PAIN DESCRIPTION - ORIENTATION
ORIENTATION: RIGHT

## 2024-04-19 ASSESSMENT — PAIN DESCRIPTION - PAIN TYPE: TYPE: ACUTE PAIN

## 2024-04-19 ASSESSMENT — PAIN DESCRIPTION - DESCRIPTORS
DESCRIPTORS: SHARP
DESCRIPTORS: SHOOTING
DESCRIPTORS: ACHING;THROBBING
DESCRIPTORS: SHARP

## 2024-04-19 ASSESSMENT — PAIN DESCRIPTION - ONSET: ONSET: ON-GOING

## 2024-04-19 ASSESSMENT — PAIN - FUNCTIONAL ASSESSMENT: PAIN_FUNCTIONAL_ASSESSMENT: PREVENTS OR INTERFERES SOME ACTIVE ACTIVITIES AND ADLS

## 2024-04-19 NOTE — PROGRESS NOTES
Writer perfect serve messaged Jennifer Carreon NP, regarding pt reporting 6/10 pain in R arm, and pt being NPO at this time. New order of PRN IV morphine 2 mg every 4 hours.

## 2024-04-19 NOTE — PLAN OF CARE
Problem: Discharge Planning  Goal: Discharge to home or other facility with appropriate resources  4/19/2024 0307 by More Stephenson RN  Outcome: Progressing     Problem: Pain  Goal: Verbalizes/displays adequate comfort level or baseline comfort level  4/19/2024 0307 by More Stephenson RN  Outcome: Progressing     Problem: Safety - Adult  Goal: Free from fall injury  4/19/2024 0307 by More Stephenson RN  Outcome: Progressing     Problem: ABCDS Injury Assessment  Goal: Absence of physical injury  4/19/2024 0307 by More Stephenson RN  Outcome: Progressing   Pt remains free of falls or injury this shift. Pt displays adequate comfort levels this shift. Pt remains free of new skin breakdown this shift.

## 2024-04-19 NOTE — DISCHARGE INSTRUCTIONS
Patient has appointment with pain management on 4/24  , will give [place] for severe pain for next 4 days, patient advised to hold Suboxone while taking Norco, since he is in considerable amount of pain because of the fracture, advised to follow-up with pain management for further care          Your  has arranged your appointments for follow up based on your preference of where you would like to continue your care.     If you are unable to attend appointments that have been arranged for you, it is your responsibility to call to reschedule at least 48 hours prior to the appointment date.     Your  has arranged additional care needs such as home health, infusion services, or durable medical equipment based on your preference and options available by your insurance and local agencies.    Your After Visit Summary (AVS) has provided you with instructions for your discharge. It is your responsibility to ask questions if you have any. Please contact your medical care team at the numbers listed if you should have any additional questions.

## 2024-04-19 NOTE — CARE COORDINATION
Case Management Assessment  Initial Evaluation    Date/Time of Evaluation: 4/19/2024 1:18 PM  Assessment Completed by: Negrita Lubin RN    If patient is discharged prior to next notation, then this note serves as note for discharge by case management.    Patient Name: Hemanth Barrera                   YOB: 1935  Diagnosis: Abdominal pain, epigastric [R10.13]  Chest pain [R07.9]  Leg swelling [M79.89]  Right wrist pain [M25.531]  Fall, initial encounter [W19.XXXA]  Chest pain, unspecified type [R07.9]                   Date / Time: 4/18/2024 11:24 AM    Patient Admission Status: Inpatient   Readmission Risk (Low < 19, Mod (19-27), High > 27): Readmission Risk Score: 17.7    Current PCP: Neftaly Contreras MD  PCP verified by ? Yes    Chart Reviewed: Yes      History Provided by: Patient  Patient Orientation: Alert and Oriented    Patient Cognition: Alert    Hospitalization in the last 30 days (Readmission):  No    If yes, Readmission Assessment in  Navigator will be completed.    Advance Directives:      Code Status: Full Code   Patient's Primary Decision Maker is: Legal Next of Kin    Primary Decision Maker: Dusty Barrera - Child - [phone]    Discharge Planning:    Patient lives with: Children Type of Home: House  Primary Care Giver: Self  Patient Support Systems include: Family Members, Children   Current Financial resources: Medicare  Current community resources: None  Current services prior to admission: Durable Medical Equipment            Current DME: Walker, Cane, Other (Comment) (toilet riser.  Follows at Pain Management for Suboxone.)            Type of Home Care services:  OT, PT, Nursing Services    ADLS  Prior functional level: Independent in ADLs/IADLs  Current functional level: Independent in ADLs/IADLs    PT AM-PAC:   /24  OT AM-PAC: 15 /24    Family can provide assistance at DC: Yes  Would you like Case Management to discuss the discharge plan with any other family  Agree with the Discharge Plan? Yes    Negrita Lubin RN  Case Management Department  Ph: [phone] Fax: [phone]                       Post Acute Facility/Agency List     Provided patient with the following list, the list includes the overall star ratings obtained from CMS per the Medicare Web site (www.Medicare.gov):     [] Long Term Acute Care Facilities  [] Acute Inpatient Rehabilitation Facilities  [] Skilled Nursing Facilities  [x] Home Care    Provided verbal instructions on how to utilize the QR Code to obtain additional detailed star ratings from www.Medicare.gov     offered to print and provide the detailed list:    []Accepted   [x]Declined    Pt declined list. States he has used [facility] in the past and was happy with their care. Roel from [facility] pt has been accepted for VNS.    Electronically signed by Negrita Lubin RN on 4/19/2024 at 1:40 PM

## 2024-04-19 NOTE — DISCHARGE INSTR - COC
Continuity of Care Form    Patient Name: Hemanth Barrera   :  1935  MRN:  [id number]    Admit date:  2024  Discharge date:  2024    Code Status Order: Full Code   Advance Directives:     Admitting Physician:  Neftaly Contreras MD  PCP: Neftaly Contreras MD      Discharging Hospital Unit/Room#: 2115/2115-01  Discharging Unit Phone Number: [phone]    Emergency Contact:   Extended Emergency Contact Information  Primary Emergency Contact: Dusty Barrera  Home Phone: [phone]  Mobile Phone: [phone]  Relation: Child    Past Surgical History:  Past Surgical History:   Procedure Laterality Date    ABDOMEN SURGERY      gastric resection    APPENDECTOMY      BONE MARROW BIOPSY      CARDIAC CATHETERIZATION      COLONOSCOPY      COLONOSCOPY N/A 8/3/2023    COLONOSCOPY WITH BIOPSY performed by Isauro Razo MD at [facility]    CT BONE MARROW BIOPSY  2022    CT BONE MARROW BIOPSY 2022 [facility] CT SCAN    EYE SURGERY      smiley cataracts    HERNIA REPAIR      inguinal smiley    JOINT REPLACEMENT      rt hip x 2, lt knee    PACEMAKER PLACEMENT      UPPER GASTROINTESTINAL ENDOSCOPY N/A 2023    EGD BIOPSY performed by Isauro Razo MD at [facility]       Immunization History:   Immunization History   Administered Date(s) Administered    COVID-19, PFIZER PURPLE top, DILUTE for use, (age 12 y+), 30mcg/0.3mL 2021, 2021, 2021    Influenza Virus Vaccine 10/30/2011, 10/09/2015, 2016    Influenza, FLUZONE (age 65 y+), High Dose, 0.7mL 2020, 10/13/2021, 2022    Influenza, High Dose (Fluzone 65 yrs and older) 10/20/2018, 10/01/2019    Pneumococcal, PCV-13, PREVNAR 13, (age 6w+), IM, 0.5mL 10/30/2019    Pneumococcal, PCV20, PREVNAR 20, (age 6w+), IM, 0.5mL 2022    Pneumococcal, PPSV23, PNEUMOVAX 23, (age 2y+), SC/IM, 0.5mL 10/30/2011, 2020       Active Problems:  Patient Active Problem List   Diagnosis Code    On continuous oral anticoagulation Z79.01    CHF, acute  Abdominal pain, epigastric R10.13    Leg swelling M79.89    NSTEMI (non-ST elevated myocardial infarction) ([facility]) I21.4    Presence of permanent cardiac pacemaker Z95.0       Isolation/Infection:   Isolation            No Isolation          Patient Infection Status       None to display                     Nurse Assessment:  Last Vital Signs: /77   Pulse 60   Temp 98.7 °F (37.1 °C) (Oral)   Resp 16   Ht 1.854 m (6' 1\")   Wt 85 kg (187 lb 6.3 oz)   SpO2 97%   BMI 24.72 kg/m²     Last documented pain score (0-10 scale): Pain Level: 5  Last Weight:   Wt Readings from Last 1 Encounters:   04/18/24 85 kg (187 lb 6.3 oz)     Mental Status:  oriented and alert    IV Access:  - None    Nursing Mobility/ADLs:  Walking   Assisted  Transfer  Assisted  Bathing  Assisted  Dressing  Assisted  Toileting  Assisted  Feeding  Independent  Med Admin  Independent  Med Delivery   whole    Wound Care Documentation and Therapy:  Wound 10/05/23 Leg Left;Lower (Active)   Number of days: 196        Elimination:  Continence:   Bowel: Yes  Bladder: Yes  Urinary Catheter: None   Colostomy/Ileostomy/Ileal Conduit: No         Intake/Output Summary (Last 24 hours) at 4/19/2024 1238  Last data filed at 4/19/2024 0641  Gross per 24 hour   Intake --   Output 1450 ml   Net -1450 ml     I/O last 3 completed shifts:  In: -   Out: 1450 [Urine:1450]    Safety Concerns:     At Risk for Falls    Impairments/Disabilities:      None    Nutrition Therapy:  Current Nutrition Therapy:   - Oral Diet:  General    Routes of Feeding: Oral  Liquids: Thin Liquids  Daily Fluid Restriction: no  Last Modified Barium Swallow with Video (Video Swallowing Test): not done    Treatments at the Time of Hospital Discharge:   Respiratory Treatments: see resp notes  Oxygen Therapy:  is not on home oxygen therapy.  Ventilator:    - No ventilator support    Rehab Therapies: Physical Therapy and Occupational Therapy  Weight Bearing Status/Restrictions: No weight bearing

## 2024-04-19 NOTE — PLAN OF CARE
Problem: Safety - Adult  Goal: Free from fall injury  Outcome: Progressing  Non slip socks on, fall signs posted, bed alarm on. Patient educated on fall risk.      Problem: ABCDS Injury Assessment  Goal: Absence of physical injury  Outcome: Progressing     Problem: Discharge Planning  Goal: Discharge to home or other facility with appropriate resources  Outcome: Progressing   Patient to be discharged tomorrow due to not being able to get into his house, Son as keys and will be back tomorrow.

## 2024-04-19 NOTE — PROGRESS NOTES
[facility]   IN-PATIENT SERVICE   [facility]    HISTORY AND PHYSICAL EXAMINATION            Date:   4/19/2024  Patient name:  Hemanth Barrera  Date of admission:  4/18/2024 11:24 AM  MRN:   [id number]  Account:  [id number]  YOB: 1935  PCP:    Neftaly Contreras MD  Room:   [address] [place] [address]  Code Status:    Full Code    Chief Complaint:     Chief Complaint   Patient presents with    Fall    Leg Pain    Arm Pain    Abdominal Pain       History Obtained From:     patient    History of Present Illness:     The patient is a 89 y.o.  Non- / non  male who presents with Fall, Leg Pain, Arm Pain, and Abdominal Pain   and he is admitted to the hospital for the management of right upper extremity pain    89-year-old male with past medical history significant for HFpEF, atrial fibrillation, sick sinus syndrome with pacemaker in 2019, hypertension, last echocardiogram 2020, CAD, moderate, CKD follows up with nephrology, presents to the hospital with left upper extremity pain.  Patient reported having a fall 3 days back, he tripped over something, this was a mechanical fall, did not lose consciousness.  He did not strike his head.  Since then he reports having increasing pain in the right upper extremity and hence presented to the hospital.    Patient also reported having intermittent chest pain to the left side of his chest for which she has been taking nitro.  The patient has a history of moderate CAD, last stress was 7/31/2023 as listed below, has a pacemaker in for sick sinus syndrome, reports having occasional epigastric discomfort especially with exertion.      Lexiscan 7/31/2023    Stress Combined Conclusion: Normal pharmacological myocardial perfusion study. Findings suggest a low risk of cardiac events.    ECG: Resting ECG demonstrates normal sinus rhythm and left bundle branch block.    ECG: The ECG was not diagnostic due to baseline ECG abnormality.    Stress Test: A  Panel w/ Reflex to MG    Collection Time: 04/19/24  5:27 AM   Result Value Ref Range    Sodium 140 135 - 144 mmol/L    Potassium 3.8 3.7 - 5.3 mmol/L    Chloride 108 (H) 98 - 107 mmol/L    CO2 24 20 - 31 mmol/L    Anion Gap 8 (L) 9 - 17 mmol/L    Glucose 96 70 - 99 mg/dL    BUN 15 8 - 23 mg/dL    Creatinine 0.8 0.7 - 1.2 mg/dL    Est, Glom Filt Rate 85 >60 mL/min/1.73m2    Calcium 8.4 (L) 8.6 - 10.4 mg/dL   CBC with Auto Differential    Collection Time: 04/19/24  5:27 AM   Result Value Ref Range    WBC 3.2 (L) 3.5 - 11.0 k/uL    RBC 3.08 (L) 4.5 - 5.9 m/uL    Hemoglobin 9.2 (L) 13.5 - 17.5 g/dL    Hematocrit 28.7 (L) 41 - 53 %    MCV 93.0 80 - 100 fL    MCH 29.8 26 - 34 pg    MCHC 32.0 31 - 37 g/dL    RDW 14.1 11.5 - 14.9 %    Platelets 178 150 - 450 k/uL    MPV 8.0 6.0 - 12.0 fL    Neutrophils % 53 36 - 66 %    Lymphocytes % 28 24 - 44 %    Monocytes % 11 (H) 1 - 7 %    Eosinophils % 7 (H) 0 - 4 %    Basophils % 1 0 - 2 %    Neutrophils Absolute 1.70 1.3 - 9.1 k/uL    Lymphocytes Absolute 0.90 (L) 1.0 - 4.8 k/uL    Monocytes Absolute 0.35 0.1 - 1.3 k/uL    Eosinophils Absolute 0.22 0.0 - 0.4 k/uL    Basophils Absolute 0.03 0.0 - 0.2 k/uL    Morphology ANISOCYTOSIS PRESENT    Lipid Panel    Collection Time: 04/19/24  5:27 AM   Result Value Ref Range    Cholesterol 109 <200 mg/dL    HDL 46 >40 mg/dL    LDL Cholesterol 55 0 - 130 mg/dL    Chol/HDL Ratio 2.4 <5    Triglycerides 41 <150 mg/dL       Imaging/Diagnostics:    Reviewed    Assessment :      Primary Problem  Chest pain    Active Hospital Problems    Diagnosis Date Noted    Fall [W19.XXXA] 04/19/2024    Right wrist pain [M25.531] 04/19/2024    Abdominal pain, epigastric [R10.13] 04/19/2024    Leg swelling [M79.89] 04/19/2024    NSTEMI (non-ST elevated myocardial infarction) (HCC) [I21.4] 04/19/2024    Presence of permanent cardiac pacemaker [Z95.0] 04/19/2024    Chest pain [R07.9] 05/25/2021       Plan:     Patient status Admit as inpatient in the  Progressive

## 2024-04-19 NOTE — PROGRESS NOTES
Writer spoke with Dr. Kaufman over the telephone regarding pt BP of 113/64 and HR 60 this evening. Metoprolol Xl 100 mg changed to 25 mg for tonight only.

## 2024-04-19 NOTE — PROGRESS NOTES
Occupational Therapy  [facility]   Occupational Therapy Evaluation  Date: 24  Patient Name: Hemanth Barrera       Room: /5-01  MRN: [id number]  Account: [id number]   : 1935  (89 y.o.) Gender: male     Discharge Recommendations:  Further Occupational Therapy is recommended upon facility discharge.    OT Equipment Recommendations  Other: TBD    Referring Practitioner: Neftaly Contreras MD  Diagnosis: Chest pain   Additional Pertinent Hx: Per chart: Patient is an 89-year-old male who presents with leg pain, arm pain abdominal pain and intermittent chest pain.  The patient states that he did fall 3 days ago, there was a elevated area on the curb and he tripped over it and fell backwards landing on his buttocks.  He denies any head strike.  He states that he did need assistance getting up.  Since then he has had increased pain in the right wrist and hand as well as the back and neck.  He states that he always has some pain in the right hand and wrist secondary to arthritis.  He has not had any numbness or tingling.  He denies any head strike.  The patient does report that he has been off of his Xarelto for the past 3 days as the pharmacy was out of it.  He is on medication for high blood pressure and does report that he took it this morning     Patient states that he has had intermittent chest pain to the left side of the chest.  He does have a pacemaker.  He also does take nitro.  The last time he took nitro was 4 days ago when he had a flutter sensation in the left side of the chest, he did end up taking 2 nitro and that pain went away.  He also has been having epigastric abdominal pain which is present especially when he is moving.  He reports that he has a small hernia to the area secondary to a partial gastrectomy.  He denies any nausea or vomiting.  Patient does report about a 10 pound weight loss     Patient also reports that he has had increased pain and swelling to the

## 2024-04-19 NOTE — PROGRESS NOTES
CLINICAL PHARMACY NOTE: MEDS TO BEDS    Total # of Prescriptions Filled: 2   The following medications were delivered to the patient:  Atorvastatin 40mg  Hydrocodone 5/325    Additional Documentation:  Delivered medications to patients room

## 2024-04-19 NOTE — CONSULTS
[facility]  In Patient Cardiology Consult             Date:   4/19/2024  Patient name: Hemanth Barrera  Date of admission:  4/18/2024 11:24 AM  MRN:   [id number]  YOB: 1935    Reason for Admission: Fall    CHIEF COMPLAINT: Fall     History Obtained From: The patient and chart    HISTORY OF PRESENT ILLNESS:    This is an 89-year-old male.  He had a fall.  Apparently he was walking out of the car, tripped on a curb, fell back.  He states he does have some leg weakness and some balance issues.  He denies any dizziness or lightheadedness.  He denies any orthopnea, PND, lower extremity edema.  Per the ER he had some chest pain and he took some nitro.  Per the patient he has no chest pains currently.  He states that he does have some falls probably 3 in the past year or so.  He is on Xarelto not only for atrial fibrillation but also for history of DVT/PE.  No shortness of breath, orthopnea, PND, palpitations.    Past Medical History:    Past Medical History:   Diagnosis Date    Acute inferolateral myocardial infarction (HCC) 11/18/2021    Arthritis     Atrial fibrillation (HCC)     CAD (coronary artery disease)     CHF (congestive heart failure) (HCC)     Glaucoma     Hx of blood clots     with hernia surgery    Hyperlipidemia     Hypertension     MI (myocardial infarction) (HCC)     NSTEMI (non-ST elevated myocardial infarction) ([facility]) 11/17/2021         Past Surgical History:    Past Surgical History:   Procedure Laterality Date    ABDOMEN SURGERY      gastric resection    APPENDECTOMY      BONE MARROW BIOPSY      CARDIAC CATHETERIZATION      COLONOSCOPY      COLONOSCOPY N/A 8/3/2023    COLONOSCOPY WITH BIOPSY performed by Isauro Razo MD at [facility]    CT BONE MARROW BIOPSY  5/31/2022    CT BONE MARROW BIOPSY 5/31/2022 [facility] CT SCAN    EYE SURGERY      smiley cataracts    HERNIA REPAIR      inguinal smiley    JOINT REPLACEMENT      rt hip x 2, lt knee    PACEMAKER PLACEMENT      UPPER

## 2024-04-19 NOTE — CARE COORDINATION
IMM letter provided to patient.  Patient offered four hours to make informed decision regarding appeal process; patient agreeable to discharge.     Electronically signed by Negrita Lubin RN on 4/19/2024 at 12:47 PM

## 2024-04-19 NOTE — PROGRESS NOTES
Physical Therapy  Facility/Department: [facility] PROGRESSIVE CARE  Physical Therapy Initial Assessment    Name: Hemanth Barrera  : 1935  MRN: [id number]  Date of Service: 2024    Discharge Recommendations:  Patient would benefit from continued therapy after discharge   PT Equipment Recommendations  Equipment Needed: Yes  Mobility Devices: Walker  Other: PT recommends use of RW for home/community mobility      Patient Diagnosis(es): The primary encounter diagnosis was Fall, initial encounter. Diagnoses of Leg swelling, Right wrist pain, Chest pain, unspecified type, Abdominal pain, epigastric, Chronic systolic (congestive) heart failure, Non-prs chr ulcer oth prt r low leg limited to brkdwn skin (HCC), and Fall, subsequent encounter were also pertinent to this visit.  Past Medical History:  has a past medical history of Acute inferolateral myocardial infarction ([facility]), Arthritis, Atrial fibrillation ([facility]), CAD (coronary artery disease), CHF (congestive heart failure) ([facility]), Glaucoma, Hx of blood clots, Hyperlipidemia, Hypertension, MI (myocardial infarction) ([facility]), and NSTEMI (non-ST elevated myocardial infarction) ([facility]).  Past Surgical History:  has a past surgical history that includes pacemaker placement; Abdomen surgery; Cardiac catheterization; Appendectomy; Colonoscopy; eye surgery; hernia repair; bone marrow biopsy; joint replacement; CT BIOPSY BONE MARROW (2022); Upper gastrointestinal endoscopy (N/A, 2023); and Colonoscopy (N/A, 8/3/2023).    Assessment   Assessment: Pt presents with mobility and balance impairments 2* mechanical fall and subsequent injury to R wrist/triquetral fracture. At baseline pt was Independently mobilizing with SPC in his dominant R hand. With ihs current limitations pt requries a RW and 1 person A to safely mobilize with cues for RUE precautions and overall safety. PT services warranted to progress function, safety, and overall independence.  Treatment Diagnosis:  posture for ~5 minutes of mobility activity performance.  Patient Goals   Patient Goals : return home; improve my balance     Education  Patient Education  Education Given To: Patient  Education Provided: Role of Therapy;Plan of Care;Precautions;Transfer Training;Equipment;Fall Prevention Strategies  Education Provided Comments: Extensive Edu on safety, RUE precautions, and PT adivse use of RW and continue balance training therapies  Education Method: Demonstration;Verbal  Barriers to Learning: Lack of Family Support  Education Outcome: Verbalized understanding;Demonstrated understanding;Continued education needed    Therapy Time   Individual Concurrent Group Co-treatment   Time In 1002         Time Out 1043         Minutes 41         Timed Code Treatment Minutes: 25 Minutes     Bettye Sarkar PT

## 2024-04-19 NOTE — TELEPHONE ENCOUNTER
Homa from [facility] called and I gave verbal that Dr. Contreras will be following for home care.

## 2024-04-19 NOTE — CONSULTS
Orthopedic Consult      CHIEF COMPLAINT: Right hand pain    HISTORY OF PRESENT ILLNESS:      The patient is a 89 y.o. male who presented to [facility] ED on 4/18/2024 with complaints of multiple falls, as well as right hand pain.  Patient has significant past medical history, with significant heart history.  Orthopedics was consulted, as patient had right hand pain ongoing for the past 3 days.  Patient states he fell while outside landing on his right hand.  Patient has a history of right total hip arthroplasty performed in 2008, which required revision surgery in 2011.  These were performed in [place].  Patient states over the past several years, he has had issues with ambulating to the right lower extremity as it feels weaker than the other side.  During this encounter, patient denies hitting his head or any loss of consciousness during the fall.  No other orthopedic complaints at this time except of right hand pain.  Patient was placed in a splint in the ED.    Past Medical History:    Past Medical History:   Diagnosis Date    Acute inferolateral myocardial infarction (HCC) 11/18/2021    Arthritis     Atrial fibrillation (HCC)     CAD (coronary artery disease)     CHF (congestive heart failure) (HCC)     Glaucoma     Hx of blood clots     with hernia surgery    Hyperlipidemia     Hypertension     MI (myocardial infarction) (HCC)     NSTEMI (non-ST elevated myocardial infarction) ([facility]) 11/17/2021       Past Surgical History:    Past Surgical History:   Procedure Laterality Date    ABDOMEN SURGERY      gastric resection    APPENDECTOMY      BONE MARROW BIOPSY      CARDIAC CATHETERIZATION      COLONOSCOPY      COLONOSCOPY N/A 8/3/2023    COLONOSCOPY WITH BIOPSY performed by Isauro Razo MD at [facility] ENDO    CT BONE MARROW BIOPSY  5/31/2022    CT BONE MARROW BIOPSY 5/31/2022 [facility] CT SCAN    EYE SURGERY      smiley cataracts    HERNIA REPAIR      inguinal smiley    JOINT REPLACEMENT      rt hip x 2, lt knee    PACEMAKER

## 2024-04-20 VITALS
HEIGHT: 73 IN | RESPIRATION RATE: 18 BRPM | OXYGEN SATURATION: 96 % | WEIGHT: 187.39 LBS | DIASTOLIC BLOOD PRESSURE: 87 MMHG | HEART RATE: 65 BPM | SYSTOLIC BLOOD PRESSURE: 167 MMHG | BODY MASS INDEX: 24.84 KG/M2 | TEMPERATURE: 98.8 F

## 2024-04-20 LAB
EKG ATRIAL RATE: 288 BPM
EKG P-R INTERVAL: 328 MS
EKG Q-T INTERVAL: 436 MS
EKG QRS DURATION: 82 MS
EKG QTC CALCULATION (BAZETT): 436 MS
EKG R AXIS: 28 DEGREES
EKG T AXIS: -81 DEGREES
EKG VENTRICULAR RATE: 60 BPM

## 2024-04-20 PROCEDURE — 6360000002 HC RX W HCPCS: Performed by: NURSE PRACTITIONER

## 2024-04-20 PROCEDURE — 96376 TX/PRO/DX INJ SAME DRUG ADON: CPT

## 2024-04-20 PROCEDURE — 2580000003 HC RX 258: Performed by: INTERNAL MEDICINE

## 2024-04-20 PROCEDURE — G0378 HOSPITAL OBSERVATION PER HR: HCPCS

## 2024-04-20 PROCEDURE — 6370000000 HC RX 637 (ALT 250 FOR IP): Performed by: INTERNAL MEDICINE

## 2024-04-20 PROCEDURE — 97116 GAIT TRAINING THERAPY: CPT

## 2024-04-20 PROCEDURE — 99239 HOSP IP/OBS DSCHRG MGMT >30: CPT | Performed by: INTERNAL MEDICINE

## 2024-04-20 PROCEDURE — 97110 THERAPEUTIC EXERCISES: CPT

## 2024-04-20 PROCEDURE — 93010 ELECTROCARDIOGRAM REPORT: CPT | Performed by: INTERNAL MEDICINE

## 2024-04-20 RX ADMIN — FAMOTIDINE 20 MG: 20 TABLET, FILM COATED ORAL at 07:03

## 2024-04-20 RX ADMIN — POLYETHYLENE GLYCOL 3350 17 G: 17 POWDER, FOR SOLUTION ORAL at 07:03

## 2024-04-20 RX ADMIN — METOPROLOL SUCCINATE 100 MG: 100 TABLET, EXTENDED RELEASE ORAL at 07:03

## 2024-04-20 RX ADMIN — RIVAROXABAN 15 MG: 15 TABLET, FILM COATED ORAL at 07:03

## 2024-04-20 RX ADMIN — FINASTERIDE 5 MG: 5 TABLET, FILM COATED ORAL at 07:03

## 2024-04-20 RX ADMIN — AMLODIPINE BESYLATE 5 MG: 5 TABLET ORAL at 07:03

## 2024-04-20 RX ADMIN — DOCUSATE SODIUM 50MG AND SENNOSIDES 8.6MG 2 TABLET: 8.6; 5 TABLET, FILM COATED ORAL at 07:03

## 2024-04-20 RX ADMIN — MORPHINE SULFATE 2 MG: 2 INJECTION, SOLUTION INTRAMUSCULAR; INTRAVENOUS at 07:02

## 2024-04-20 RX ADMIN — FERROUS SULFATE TAB 325 MG (65 MG ELEMENTAL FE) 325 MG: 325 (65 FE) TAB at 07:03

## 2024-04-20 RX ADMIN — SODIUM CHLORIDE, PRESERVATIVE FREE 10 ML: 5 INJECTION INTRAVENOUS at 07:04

## 2024-04-20 RX ADMIN — MORPHINE SULFATE 2 MG: 2 INJECTION, SOLUTION INTRAMUSCULAR; INTRAVENOUS at 01:47

## 2024-04-20 RX ADMIN — Medication 10 ML: at 01:48

## 2024-04-20 RX ADMIN — TAMSULOSIN HYDROCHLORIDE 0.4 MG: 0.4 CAPSULE ORAL at 07:03

## 2024-04-20 ASSESSMENT — PAIN DESCRIPTION - ORIENTATION
ORIENTATION: RIGHT
ORIENTATION: RIGHT

## 2024-04-20 ASSESSMENT — PAIN DESCRIPTION - DESCRIPTORS: DESCRIPTORS: PRESSURE;SHOOTING

## 2024-04-20 ASSESSMENT — PAIN - FUNCTIONAL ASSESSMENT: PAIN_FUNCTIONAL_ASSESSMENT: ACTIVITIES ARE NOT PREVENTED

## 2024-04-20 ASSESSMENT — PAIN DESCRIPTION - LOCATION
LOCATION: WRIST
LOCATION: WRIST

## 2024-04-20 ASSESSMENT — PAIN SCALES - GENERAL
PAINLEVEL_OUTOF10: 0
PAINLEVEL_OUTOF10: 8
PAINLEVEL_OUTOF10: 8

## 2024-04-20 NOTE — PROGRESS NOTES
Physical Therapy  Facility/Department: [facility] MED SURG  Daily Treatment Note  NAME: Hemanth Barrera  : 1935  MRN: [id number]    Date of Service: 2024    Discharge Recommendations:  Patient would benefit from continued therapy after discharge   PT Equipment Recommendations  Equipment Needed: Yes  Other: PT recommends use of RW for home/community mobility    Patient Diagnosis(es): The primary encounter diagnosis was Fall, initial encounter. Diagnoses of Leg swelling, Right wrist pain, Chest pain, unspecified type, Abdominal pain, epigastric, Chronic systolic (congestive) heart failure, Non-prs chr ulcer oth prt r low leg limited to brkdwn skin (HCC), and Fall, subsequent encounter were also pertinent to this visit.    Assessment   Activity Tolerance: Patient tolerated treatment well  Equipment Needed: Yes  Other: PT recommends use of RW for home/community mobility     Plan    Physical Therapy Plan  General Plan: 6-7 times per week  Specific Instructions for Next Treatment: Balance training, safety training, RW & curb step  Current Treatment Recommendations: Strengthening;Balance training;Functional mobility training;Transfer training;Gait training;Stair training;Home exercise program;Pain management;Patient/Caregiver education & training;Safety education & training;Therapeutic activities;Positioning     Restrictions  Restrictions/Precautions  Restrictions/Precautions: Weight Bearing, ROM Restrictions, Fall Risk, General Precautions, Up as Tolerated  Required Braces or Orthoses?: Yes  Implants present? : Metal implants, Pacemaker (L TKA, R AGUSTO)  Upper Extremity Weight Bearing Restrictions  Right Upper Extremity Weight Bearing:  (lb lifting/pushing/pulling restrictions)  Required Braces or Orthoses  Right Upper Extremity Brace/Splint:  (Splint)  Position Activity Restriction  Other position/activity restrictions: Per Dr. Thorpe on 24:  RUE 5 pound weight restriction, but patient can work on finger range  of motion, and uses hand to eat and drink.     Subjective    Subjective  Subjective: Pt and RN Rachel agreeable to PT. Pt eager to participate; cooperative throughout session  Pain: 8/10 pain RUE  Orientation  Overall Orientation Status: Within Functional Limits  Orientation Level: Oriented to person;Oriented to place;Oriented to situation;Oriented to time;Oriented X4     Objective        Bed Mobility Training  Bed Mobility Training: Yes  Rolling: Stand-by assistance  Supine to Sit: Stand-by assistance  Scooting: Stand-by assistance  Balance  Sitting: Intact (pt sat EOB x 7 minutes for core strengthening with SBA)  Standing: Impaired (pt utilized RW for stability; pt instructed to place fingertips of RUE on RW only for balance and to avoid pushing thru his RUE- good compliance noted)  Standing - Static: Fair (mild posterior lean initially upon standing)  Standing - Dynamic: Fair  Transfer Training  Transfer Training: Yes  Overall Level of Assistance: Assist X1;Minimum assistance  Sit to Stand: Minimum assistance;Assist X1  Stand to Sit: Minimum assistance;Assist X1 (cues for safe hand placement)  Bed to Chair: Contact-guard assistance;Assist X1  Gait  Gait Training: Yes  Distance (ft): 60 Feet  Assistive Device: Gait belt;Walker, rolling  Interventions: Verbal cues;Safety awareness training  Base of Support: Narrowed  Speed/Madeline: Slow;Shuffled  Step Length: Left lengthened;Right lengthened     PT Exercises  PROM Exercises: completed gentle stretching of digits on R hand, pt advised to complete AROM of fingers throughout day  Resistive Exercises: seated BLE exercises x 20 reps with 1.5 #  Static Standing Balance Exercises: static stand without UE support x 30'', CGA required due to posterior lean  Postural Correction Exercises: cues for postural awareness     Safety Devices  Type of Devices: Nurse notified;Left in chair;Call light within reach;Patient at risk for falls;Chair alarm in place;Gait belt

## 2024-04-20 NOTE — PLAN OF CARE
Problem: Discharge Planning  Goal: Discharge to home or other facility with appropriate resources  Outcome: Completed  Flowsheets (Taken 4/20/2024 0823)  Discharge to home or other facility with appropriate resources:   Identify barriers to discharge with patient and caregiver   Arrange for needed discharge resources and transportation as appropriate   Identify discharge learning needs (meds, wound care, etc)   Refer to discharge planning if patient needs post-hospital services based on physician order or complex needs related to functional status, cognitive ability or social support system     Problem: Pain  Goal: Verbalizes/displays adequate comfort level or baseline comfort level  4/20/2024 1406 by Rachel Hernandez RN  Outcome: Completed  4/20/2024 0310 by Helen Apodaca RN  Outcome: Progressing  Note: Patient is able to verbalize needs and concerns.  Patient has been receiving PRN IV pain medications as ordered.  Patient has refused his home dose of suboxone while receiving IV morphine.  Patient does have a cast on right wrist/hand area.       Problem: Safety - Adult  Goal: Free from fall injury  4/20/2024 1406 by Rachel Hernandez RN  Outcome: Completed  4/20/2024 0310 by Helen Apodaca RN  Outcome: Progressing  Note: Bed in lowest and locked position.  2 bedrails used for pt safety.  Bed alarm in use in pt's room.  Hourly rounds done by staff and RN.  Phone and call light within pt's reach.         Problem: ABCDS Injury Assessment  Goal: Absence of physical injury  Outcome: Completed     Problem: Chronic Conditions and Co-morbidities  Goal: Patient's chronic conditions and co-morbidity symptoms are monitored and maintained or improved  Outcome: Completed

## 2024-04-20 NOTE — PROGRESS NOTES
Pt educated on discharge planning and IV removed without complication, no questions at this time, pt waiting on grandson to be home so he can get inside, will cont to monitor and provide support

## 2024-04-20 NOTE — PLAN OF CARE
Problem: Pain  Goal: Verbalizes/displays adequate comfort level or baseline comfort level  4/20/2024 0310 by Helen Apodaca, RN  Outcome: Progressing  Note: Patient is able to verbalize needs and concerns.  Patient has been receiving PRN IV pain medications as ordered.  Patient has refused his home dose of suboxone while receiving IV morphine.  Patient does have a cast on right wrist/hand area.       Problem: Safety - Adult  Goal: Free from fall injury  4/20/2024 0310 by Helen Apodaca, RN  Outcome: Progressing  Note: Bed in lowest and locked position.  2 bedrails used for pt safety.  Bed alarm in use in pt's room.  Hourly rounds done by staff and RN.  Phone and call light within pt's reach.

## 2024-04-20 NOTE — PROGRESS NOTES
[facility]   IN-PATIENT SERVICE   [facility]    HISTORY AND PHYSICAL EXAMINATION            Date:   4/20/2024  Patient name:  Hemanth Barrera  Date of admission:  4/18/2024 11:24 AM  MRN:   [id number]  Account:  [id number]  YOB: 1935  PCP:    Neftaly Contreras MD  Room:   2063/2063[facility]  Code Status:    Full Code    Chief Complaint:     Chief Complaint   Patient presents with    Fall    Leg Pain    Arm Pain    Abdominal Pain       History Obtained From:     patient    History of Present Illness:     The patient is a 89 y.o.  Non- / non  male who presents with Fall, Leg Pain, Arm Pain, and Abdominal Pain   and he is admitted to the hospital for the management of right upper extremity pain    89-year-old male with past medical history significant for HFpEF, atrial fibrillation, sick sinus syndrome with pacemaker in 2019, hypertension, last echocardiogram 2020, CAD, moderate, CKD follows up with nephrology, presents to the hospital with left upper extremity pain.  Patient reported having a fall 3 days back, he tripped over something, this was a mechanical fall, did not lose consciousness.  He did not strike his head.  Since then he reports having increasing pain in the right upper extremity and hence presented to the hospital.    Patient also reported having intermittent chest pain to the left side of his chest for which she has been taking nitro.  The patient has a history of moderate CAD, last stress was 7/31/2023 as listed below, has a pacemaker in for sick sinus syndrome, reports having occasional epigastric discomfort especially with exertion.      Lexiscan 7/31/2023    Stress Combined Conclusion: Normal pharmacological myocardial perfusion study. Findings suggest a low risk of cardiac events.    ECG: Resting ECG demonstrates normal sinus rhythm and left bundle branch block.    ECG: The ECG was not diagnostic due to baseline ECG abnormality.    Stress Test: A  years ago. His smoking use included cigarettes. He has never used smokeless tobacco.  Alcohol:      reports no history of alcohol use.  Drug Use:  reports no history of drug use.    Family History:     Family History   Problem Relation Age of Onset    Heart Failure Mother     Heart Failure Father        Review of Systems:     Positive and Negative as described in HPI.    CONSTITUTIONAL:  negative for fevers, chills, sweats, fatigue, weight loss  HEENT:  negative for vision, hearing changes, runny nose, throat pain  RESPIRATORY:  negative for shortness of breath, cough, congestion, wheezing.  CARDIOVASCULAR: As reported above  GASTROINTESTINAL:  negative for nausea, vomiting, diarrhea, constipation, change in bowel habits, abdominal pain   GENITOURINARY:  negative for difficulty of urination, burning with urination, frequency   INTEGUMENT:  negative for rash, skin lesions, easy bruising   HEMATOLOGIC/LYMPHATIC:  negative for swelling/edema   ALLERGIC/IMMUNOLOGIC:  negative for urticaria , itching  ENDOCRINE:  negative increase in drinking, increase in urination, hot or cold intolerance  MUSCULOSKELETAL: As reported above  NEUROLOGICAL:  negative for headaches, dizziness, lightheadedness, numbness, pain, tingling extremities  BEHAVIOR/PSYCH:  negative for depression, anxiety    Physical Exam:   BP (!) 167/87   Pulse 65   Temp 98.8 °F (37.1 °C)   Resp 18   Ht 1.854 m (6' 1\")   Wt 85 kg (187 lb 6.3 oz)   SpO2 96%   BMI 24.72 kg/m²   Temp (24hrs), Av.7 °F (37.1 °C), Min:98.2 °F (36.8 °C), Max:99.1 °F (37.3 °C)    No results for input(s): \"POCGLU\" in the last 72 hours.    Intake/Output Summary (Last 24 hours) at 2024 1414  Last data filed at 2024 0150  Gross per 24 hour   Intake --   Output 900 ml   Net -900 ml       General Appearance:  alert, well appearing, and in no acute distress.  Currently chest pain-free  Mental status: oriented to person, place, and time with normal affect  Head:   allergic to aspirin, will continue Xarelto, metoprolol, check lipid profile, initiate atorvastatin  Elevated BNP, however no volume noted on physical exam.  Follow-up  Chronic troponin elevation  Mechanical fall, reports difficulty with balance.  Will get PT OT.  Reports had right hip surgery x 2, since then has had right lower extremity swelling, pain, this makes it difficult for him to ambulate.  Consult PT OT, get pacemaker interrogated.  Chronic normocytic normochromic anemia, at baseline  Uncontrolled hypertension, reinitiate home medications.  GERD, stable  History of partial gastrectomy, now reports having an abdominal spot which protrudes when he strains.  He has seen his surgeon in the past however not felt to be a surgical candidate.  Unable to provide details  History of opioid use, on Suboxone  Constipation, likely secondary to opioids.  Pulmonary artery hypertension, doubt has a pulmonary embolism.  Patient on Eliquis at home, missed 2 doses  Right lower extremity swelling, DVT ruled out      4/19  Patient seen and examined  His vitals have been stable, complaining of severe pain in, able to move the fingers, seen by cardiology and Ortho, no intervention currently, blood pressure is stabilized, patient on Suboxone, getting morphine around-the-clock for the plane, states that he has taken Norco in the past and does know that he has to hold Suboxone when he takes any narcotics, neck patient given pain medication for 4 days and advised to follow-up with pain management as outpatient on 24th  Voiced understanding  Patient has been cleared for disc  Doppler lower extremity negative     4/20  Patient seen and examined, patient was discharged yesterday but could not go due to some transportation issues  Intermittently hypertensive secondary to pain  Patient advised to follow-up with PCP and pain management as outpatient does have upcoming appointment in 4 days with pain management  No chest pain shortness of

## 2024-04-21 NOTE — DISCHARGE SUMMARY
HISTORY: ORDERING SYSTEM PROVIDED HISTORY: fall TECHNOLOGIST PROVIDED HISTORY: fall Reason for Exam: fall, right wrist and hand pain FINDINGS: There is no evidence of acute fracture.  There is normal alignment.  No acute joint abnormality.  No focal osseous lesion. No focal soft tissue abnormality.  Diffuse osteopenia.     No acute osseous abnormality.     XR CHEST PORTABLE    Result Date: 4/18/2024  EXAMINATION: ONE XRAY VIEW OF THE CHEST;   XRAY VIEWS OF THE RIGHT WRIST 4/18/2024 11:52 am COMPARISON: October 4, 2023 HISTORY: ORDERING SYSTEM PROVIDED HISTORY: chest pain TECHNOLOGIST PROVIDED HISTORY: chest pain Reason for Exam: chest pain; ORDERING SYSTEM PROVIDED HISTORY: fall TECHNOLOGIST PROVIDED HISTORY: fall Reason for Exam: fall, right wrist and hand pain FINDINGS: Right wrist: Narrowing of the radiocarpal joint space with probable bone on bone distal radius and scaphoid.  Mild subchondral sclerosis involving the proximal scaphoid.  Osteophyte formation subjacent to the distal radioulnar joint.  Osteophyte formation subjacent to the 1st CMC joint.  Remote fracture involving the 5th metacarpal.  Possible remote fracture involving the proximal 4th metacarpal.  Tiny heterotopic calcification of the dorsum of the wrist versus tiny triquetral avulsion fracture.  Soft tissue swelling of the wrist. Chest: Pacemaker overlying the chest. Atelectasis or scarring in the right lung base.  No confluent infiltrate, effusion, or pneumothorax identified.  Cardiac silhouette which is top limits of normal.  Mediastinal silhouette is within normal limits.  Atherosclerotic calcifications involving the thoracic aorta.  Calcified left hilar lymph node.     No acute pulmonary disease identified. Stable borderline enlargement of the heart. Tiny triquetral avulsion fracture versus heterotopic calcification in the dorsum of the wrist.  There is soft tissue swelling of the wrist. Osteoarthrosis and remote 5th metacarpal fracture.      XR WRIST RIGHT (MIN 3 VIEWS)    Result Date: 4/18/2024  EXAMINATION: ONE XRAY VIEW OF THE CHEST;   XRAY VIEWS OF THE RIGHT WRIST 4/18/2024 11:52 am COMPARISON: October 4, 2023 HISTORY: ORDERING SYSTEM PROVIDED HISTORY: chest pain TECHNOLOGIST PROVIDED HISTORY: chest pain Reason for Exam: chest pain; ORDERING SYSTEM PROVIDED HISTORY: fall TECHNOLOGIST PROVIDED HISTORY: fall Reason for Exam: fall, right wrist and hand pain FINDINGS: Right wrist: Narrowing of the radiocarpal joint space with probable bone on bone distal radius and scaphoid.  Mild subchondral sclerosis involving the proximal scaphoid.  Osteophyte formation subjacent to the distal radioulnar joint.  Osteophyte formation subjacent to the 1st CMC joint.  Remote fracture involving the 5th metacarpal.  Possible remote fracture involving the proximal 4th metacarpal.  Tiny heterotopic calcification of the dorsum of the wrist versus tiny triquetral avulsion fracture.  Soft tissue swelling of the wrist. Chest: Pacemaker overlying the chest. Atelectasis or scarring in the right lung base.  No confluent infiltrate, effusion, or pneumothorax identified.  Cardiac silhouette which is top limits of normal.  Mediastinal silhouette is within normal limits.  Atherosclerotic calcifications involving the thoracic aorta.  Calcified left hilar lymph node.     No acute pulmonary disease identified. Stable borderline enlargement of the heart. Tiny triquetral avulsion fracture versus heterotopic calcification in the dorsum of the wrist.  There is soft tissue swelling of the wrist. Osteoarthrosis and remote 5th metacarpal fracture.         Consultations:    Consults:     Final Specialist Recommendations/Findings:   IP CONSULT TO ORTHOPEDIC SURGERY  IP CONSULT TO CARDIOLOGY  IP CONSULT TO INTERNAL MEDICINE      The patient was seen and examined on day of discharge and this discharge summary is in conjunction with any daily progress note from day of discharge.    Discharge

## 2024-04-22 ENCOUNTER — CARE COORDINATION (OUTPATIENT)
Dept: CASE MANAGEMENT | Age: 89
End: 2024-04-22

## 2024-04-23 ENCOUNTER — CARE COORDINATION (OUTPATIENT)
Dept: CASE MANAGEMENT | Age: 89
End: 2024-04-23

## 2024-04-23 NOTE — CARE COORDINATION
Care Transitions Outreach Attempt    Call within 2 business days of discharge: Yes   Attempted to reach patient for transitions of care follow up. Unable to reach patient.    Patient: Hemanth Barrera Patient : 1935 MRN: [id number]    Last Discharge Facility       Date Complaint Diagnosis Description Type Department Provider    24 Fall; Leg Pain; Arm Pain; Abdominal Pain Fall, initial encounter ... ED to Hosp-Admission (Discharged) (ADMITTED) [facility] Sana Spaulding MD; Camilla De La Fuente...          # 2 attempt-Attempted initial 24 hour hospital follow up call. Left a Hip[employer] compliant message with name and call back information. Requested return call to [phone].     Was this an external facility discharge? No Discharge Facility Name: [facility]    Noted following upcoming appointments from discharge chart review:   [facility] follow up appointment(s):   Future Appointments   Date Time Provider Department Center   2024 12:00 PM Wolf Sharma MD [facility]   2024 12:30 PM ST[facility] PB MED ONC CHAIR 01 [facility] [place]   2024 10:00 AM Ashwin Serrano, PT [facility] PT [facility]   2024 12:00 PM Neftaly Contreras MD [facility]   2024 12:00 PM [facility] CLINIC RM 2 [facility] [place]   2024  1:30 PM STV PB MED ONC CHAIR 20 [facility] [place]   2024  9:30 AM Neftaly Contreras MD [facility]   2024 10:00 AM Colin Burleson MD [facility]     Non-BS  follow up appointment(s):

## 2024-04-25 ENCOUNTER — TELEPHONE (OUTPATIENT)
Dept: INTERNAL MEDICINE CLINIC | Age: 89
End: 2024-04-25

## 2024-04-25 NOTE — TELEPHONE ENCOUNTER
[facility] calling to inform patient will be having skilled nursing , PT and OT, confirmed Dr Contreras will be the  following physician

## 2024-04-26 ENCOUNTER — OFFICE VISIT (OUTPATIENT)
Dept: ONCOLOGY | Age: 89
End: 2024-04-26
Payer: MEDICARE

## 2024-04-26 ENCOUNTER — HOSPITAL ENCOUNTER (OUTPATIENT)
Dept: INFUSION THERAPY | Age: 89
Discharge: HOME | End: 2024-04-26
Payer: MEDICARE

## 2024-04-26 ENCOUNTER — TELEPHONE (OUTPATIENT)
Dept: ONCOLOGY | Age: 89
End: 2024-04-26

## 2024-04-26 VITALS
WEIGHT: 175.9 LBS | BODY MASS INDEX: 23.21 KG/M2 | HEART RATE: 70 BPM | TEMPERATURE: 97.8 F | DIASTOLIC BLOOD PRESSURE: 84 MMHG | SYSTOLIC BLOOD PRESSURE: 168 MMHG

## 2024-04-26 DIAGNOSIS — E61.1 IRON DEFICIENCY: Primary | ICD-10-CM

## 2024-04-26 DIAGNOSIS — E53.8 B12 DEFICIENCY: ICD-10-CM

## 2024-04-26 DIAGNOSIS — E53.8 B12 DEFICIENCY: Primary | ICD-10-CM

## 2024-04-26 DIAGNOSIS — D64.9 ANEMIA, UNSPECIFIED TYPE: ICD-10-CM

## 2024-04-26 DIAGNOSIS — E61.1 IRON DEFICIENCY: ICD-10-CM

## 2024-04-26 LAB
BASOPHILS # BLD: 0 K/UL (ref 0–0.2)
BASOPHILS NFR BLD: 1 % (ref 0–2)
EOSINOPHIL # BLD: 0.1 K/UL (ref 0–0.4)
EOSINOPHILS RELATIVE PERCENT: 5 % (ref 1–4)
ERYTHROCYTE [DISTWIDTH] IN BLOOD BY AUTOMATED COUNT: 14.1 % (ref 12.5–15.4)
FOLATE SERPL-MCNC: >20 NG/ML (ref 4.8–24.2)
HCT VFR BLD AUTO: 30.1 % (ref 41–53)
HGB BLD-MCNC: 9.8 G/DL (ref 13.5–17.5)
IRON SATN MFR SERPL: 35 % (ref 20–55)
IRON SERPL-MCNC: 85 UG/DL (ref 61–157)
LYMPHOCYTES NFR BLD: 0.9 K/UL (ref 1–4.8)
LYMPHOCYTES RELATIVE PERCENT: 28 % (ref 24–44)
MCH RBC QN AUTO: 29.8 PG (ref 26–34)
MCHC RBC AUTO-ENTMCNC: 32.6 G/DL (ref 31–37)
MCV RBC AUTO: 91.3 FL (ref 80–100)
MONOCYTES NFR BLD: 0.3 K/UL (ref 0.1–1.2)
MONOCYTES NFR BLD: 9 % (ref 2–11)
NEUTROPHILS NFR BLD: 57 % (ref 36–66)
NEUTS SEG NFR BLD: 1.8 K/UL (ref 1.8–7.7)
PLATELET # BLD AUTO: 231 K/UL (ref 140–450)
PMV BLD AUTO: 7.6 FL (ref 6–12)
RBC # BLD AUTO: 3.3 M/UL (ref 4.5–5.9)
TIBC SERPL-MCNC: 242 UG/DL (ref 250–450)
UNSATURATED IRON BINDING CAPACITY: 157 UG/DL (ref 112–347)
VIT B12 SERPL-MCNC: 293 PG/ML (ref 232–1245)
WBC OTHER # BLD: 3.1 K/UL (ref 3.5–11)

## 2024-04-26 PROCEDURE — 36415 COLL VENOUS BLD VENIPUNCTURE: CPT

## 2024-04-26 PROCEDURE — 99211 OFF/OP EST MAY X REQ PHY/QHP: CPT | Performed by: INTERNAL MEDICINE

## 2024-04-26 PROCEDURE — 83540 ASSAY OF IRON: CPT

## 2024-04-26 PROCEDURE — 82746 ASSAY OF FOLIC ACID SERUM: CPT

## 2024-04-26 PROCEDURE — 1123F ACP DISCUSS/DSCN MKR DOCD: CPT | Performed by: INTERNAL MEDICINE

## 2024-04-26 PROCEDURE — 99214 OFFICE O/P EST MOD 30 MIN: CPT | Performed by: INTERNAL MEDICINE

## 2024-04-26 PROCEDURE — 1036F TOBACCO NON-USER: CPT | Performed by: INTERNAL MEDICINE

## 2024-04-26 PROCEDURE — G8427 DOCREV CUR MEDS BY ELIG CLIN: HCPCS | Performed by: INTERNAL MEDICINE

## 2024-04-26 PROCEDURE — G8420 CALC BMI NORM PARAMETERS: HCPCS | Performed by: INTERNAL MEDICINE

## 2024-04-26 PROCEDURE — 85025 COMPLETE CBC W/AUTO DIFF WBC: CPT

## 2024-04-26 PROCEDURE — 96372 THER/PROPH/DIAG INJ SC/IM: CPT

## 2024-04-26 PROCEDURE — 6360000002 HC RX W HCPCS: Performed by: INTERNAL MEDICINE

## 2024-04-26 PROCEDURE — 83550 IRON BINDING TEST: CPT

## 2024-04-26 PROCEDURE — 82607 VITAMIN B-12: CPT

## 2024-04-26 PROCEDURE — 1111F DSCHRG MED/CURRENT MED MERGE: CPT | Performed by: INTERNAL MEDICINE

## 2024-04-26 RX ORDER — ACETAMINOPHEN 325 MG/1
650 TABLET ORAL
OUTPATIENT
Start: 2024-05-03

## 2024-04-26 RX ORDER — ALBUTEROL SULFATE 90 UG/1
4 AEROSOL, METERED RESPIRATORY (INHALATION) PRN
Status: CANCELLED | OUTPATIENT
Start: 2024-04-26

## 2024-04-26 RX ORDER — ALBUTEROL SULFATE 90 UG/1
4 AEROSOL, METERED RESPIRATORY (INHALATION) PRN
OUTPATIENT
Start: 2024-05-03

## 2024-04-26 RX ORDER — EPINEPHRINE 1 MG/ML
0.3 INJECTION, SOLUTION, CONCENTRATE INTRAVENOUS PRN
Status: CANCELLED | OUTPATIENT
Start: 2024-04-26

## 2024-04-26 RX ORDER — CYANOCOBALAMIN 1000 UG/ML
1000 INJECTION, SOLUTION INTRAMUSCULAR; SUBCUTANEOUS ONCE
Status: CANCELLED
Start: 2024-04-26

## 2024-04-26 RX ORDER — DIPHENHYDRAMINE HYDROCHLORIDE 50 MG/ML
50 INJECTION INTRAMUSCULAR; INTRAVENOUS
OUTPATIENT
Start: 2024-05-03

## 2024-04-26 RX ORDER — FAMOTIDINE 10 MG/ML
20 INJECTION, SOLUTION INTRAVENOUS
Status: CANCELLED | OUTPATIENT
Start: 2024-04-26

## 2024-04-26 RX ORDER — ACETAMINOPHEN 325 MG/1
650 TABLET ORAL
Status: CANCELLED | OUTPATIENT
Start: 2024-04-26

## 2024-04-26 RX ORDER — ACETAMINOPHEN 325 MG/1
325 TABLET ORAL
OUTPATIENT
Start: 2024-05-03

## 2024-04-26 RX ORDER — ACETAMINOPHEN 325 MG/1
325 TABLET ORAL
Status: CANCELLED | OUTPATIENT
Start: 2024-04-26

## 2024-04-26 RX ORDER — CYANOCOBALAMIN 1000 UG/ML
1000 INJECTION, SOLUTION INTRAMUSCULAR; SUBCUTANEOUS ONCE
Start: 2024-05-03

## 2024-04-26 RX ORDER — ONDANSETRON 2 MG/ML
8 INJECTION INTRAMUSCULAR; INTRAVENOUS
Status: CANCELLED | OUTPATIENT
Start: 2024-04-26

## 2024-04-26 RX ORDER — EPINEPHRINE 1 MG/ML
0.3 INJECTION, SOLUTION, CONCENTRATE INTRAVENOUS PRN
OUTPATIENT
Start: 2024-05-03

## 2024-04-26 RX ORDER — DIPHENHYDRAMINE HYDROCHLORIDE 50 MG/ML
50 INJECTION INTRAMUSCULAR; INTRAVENOUS
Status: CANCELLED | OUTPATIENT
Start: 2024-04-26

## 2024-04-26 RX ORDER — SODIUM CHLORIDE 9 MG/ML
INJECTION, SOLUTION INTRAVENOUS CONTINUOUS
OUTPATIENT
Start: 2024-05-03

## 2024-04-26 RX ORDER — CYANOCOBALAMIN 1000 UG/ML
1000 INJECTION, SOLUTION INTRAMUSCULAR; SUBCUTANEOUS ONCE
Status: COMPLETED
Start: 2024-04-26 | End: 2024-04-26

## 2024-04-26 RX ORDER — FAMOTIDINE 10 MG/ML
20 INJECTION, SOLUTION INTRAVENOUS
OUTPATIENT
Start: 2024-05-03

## 2024-04-26 RX ORDER — SODIUM CHLORIDE 9 MG/ML
INJECTION, SOLUTION INTRAVENOUS CONTINUOUS
Status: CANCELLED | OUTPATIENT
Start: 2024-04-26

## 2024-04-26 RX ORDER — ONDANSETRON 2 MG/ML
8 INJECTION INTRAMUSCULAR; INTRAVENOUS
OUTPATIENT
Start: 2024-05-03

## 2024-04-26 RX ADMIN — CYANOCOBALAMIN 1000 MCG: 1000 INJECTION, SOLUTION INTRAMUSCULAR; SUBCUTANEOUS at 13:44

## 2024-04-26 NOTE — TELEPHONE ENCOUNTER
B12 as planned  Rv in 6 months with labs prior     Continue injections    Rv scheduled for 10/25/24 @ 12:45pm    Pt will have labs drawn one week prior to RV      PT was given AVS and appt schedule    Electronically signed by Maddison Salas on 4/26/2024 at 1:39 PM

## 2024-04-26 NOTE — PROGRESS NOTES
Value Ref Range    Protime 15.1 (H) 11.8 - 14.6 sec    INR 1.2    Basic Metabolic Panel w/ Reflex to MG   Result Value Ref Range    Sodium 140 135 - 144 mmol/L    Potassium 3.8 3.7 - 5.3 mmol/L    Chloride 108 (H) 98 - 107 mmol/L    CO2 24 20 - 31 mmol/L    Anion Gap 8 (L) 9 - 17 mmol/L    Glucose 96 70 - 99 mg/dL    BUN 15 8 - 23 mg/dL    Creatinine 0.8 0.7 - 1.2 mg/dL    Est, Glom Filt Rate 85 >60 mL/min/1.73m2    Calcium 8.4 (L) 8.6 - 10.4 mg/dL   CBC with Auto Differential   Result Value Ref Range    WBC 3.2 (L) 3.5 - 11.0 k/uL    RBC 3.08 (L) 4.5 - 5.9 m/uL    Hemoglobin 9.2 (L) 13.5 - 17.5 g/dL    Hematocrit 28.7 (L) 41 - 53 %    MCV 93.0 80 - 100 fL    MCH 29.8 26 - 34 pg    MCHC 32.0 31 - 37 g/dL    RDW 14.1 11.5 - 14.9 %    Platelets 178 150 - 450 k/uL    MPV 8.0 6.0 - 12.0 fL    Neutrophils % 53 36 - 66 %    Lymphocytes % 28 24 - 44 %    Monocytes % 11 (H) 1 - 7 %    Eosinophils % 7 (H) 0 - 4 %    Basophils % 1 0 - 2 %    Neutrophils Absolute 1.70 1.3 - 9.1 k/uL    Lymphocytes Absolute 0.90 (L) 1.0 - 4.8 k/uL    Monocytes Absolute 0.35 0.1 - 1.3 k/uL    Eosinophils Absolute 0.22 0.0 - 0.4 k/uL    Basophils Absolute 0.03 0.0 - 0.2 k/uL    Morphology ANISOCYTOSIS PRESENT    Urinalysis with Reflex to Culture    Specimen: Urine   Result Value Ref Range    Color, UA Yellow Yellow    Turbidity UA Clear Clear    Glucose, Ur NEGATIVE NEGATIVE mg/dL    Bilirubin Urine NEGATIVE NEGATIVE    Ketones, Urine NEGATIVE NEGATIVE mg/dL    Specific Gravity, UA 1.031 (H) 1.000 - 1.030    Urine Hgb NEGATIVE NEGATIVE    pH, UA 6.5 5.0 - 8.0    Protein, UA NEGATIVE NEGATIVE mg/dL    Urobilinogen, Urine Normal 0.0 - 1.0 EU/dL    Nitrite, Urine NEGATIVE NEGATIVE    Leukocyte Esterase, Urine NEGATIVE NEGATIVE    Comment       Microscopic exam not performed based on chemical results unless requested in original order.   Lipid Panel   Result Value Ref Range    Cholesterol 109 <200 mg/dL    HDL 46 >40 mg/dL    LDL Cholesterol 55 0 -

## 2024-04-26 NOTE — PROGRESS NOTES
Pt here for B12.  Pt was seen by SUDHEER Silverio as planned.  Injection adminsitered without incident.  Pt to return 5/24/24.  Pt discharged.

## 2024-04-28 ENCOUNTER — APPOINTMENT (OUTPATIENT)
Dept: GENERAL RADIOLOGY | Age: 89
DRG: 313 | End: 2024-04-28
Payer: MEDICARE

## 2024-04-28 ENCOUNTER — HOSPITAL ENCOUNTER (INPATIENT)
Age: 89
LOS: 2 days | Discharge: HOME | DRG: 313 | End: 2024-04-30
Attending: EMERGENCY MEDICINE | Admitting: INTERNAL MEDICINE
Payer: MEDICARE

## 2024-04-28 DIAGNOSIS — R07.9 CHEST PAIN, UNSPECIFIED TYPE: Primary | ICD-10-CM

## 2024-04-28 LAB
ALBUMIN SERPL-MCNC: 3.8 G/DL (ref 3.5–5.2)
ALP SERPL-CCNC: 97 U/L (ref 40–129)
ALT SERPL-CCNC: 26 U/L (ref 5–41)
ANION GAP SERPL CALCULATED.3IONS-SCNC: 10 MMOL/L (ref 9–17)
AST SERPL-CCNC: 30 U/L
BASOPHILS # BLD: 0 K/UL (ref 0–0.2)
BASOPHILS NFR BLD: 0 % (ref 0–2)
BILIRUB DIRECT SERPL-MCNC: 0.1 MG/DL
BILIRUB INDIRECT SERPL-MCNC: 0.3 MG/DL (ref 0–1)
BILIRUB SERPL-MCNC: 0.4 MG/DL (ref 0.3–1.2)
BUN SERPL-MCNC: 21 MG/DL (ref 8–23)
CALCIUM SERPL-MCNC: 8.7 MG/DL (ref 8.6–10.4)
CHLORIDE SERPL-SCNC: 106 MMOL/L (ref 98–107)
CO2 SERPL-SCNC: 24 MMOL/L (ref 20–31)
CREAT SERPL-MCNC: 1.2 MG/DL (ref 0.7–1.2)
EOSINOPHIL # BLD: 0.14 K/UL (ref 0–0.4)
EOSINOPHILS RELATIVE PERCENT: 4 % (ref 0–4)
ERYTHROCYTE [DISTWIDTH] IN BLOOD BY AUTOMATED COUNT: 14.2 % (ref 11.5–14.9)
GFR SERPL CREATININE-BSD FRML MDRD: 58 ML/MIN/1.73M2
GLUCOSE SERPL-MCNC: 103 MG/DL (ref 70–99)
HCT VFR BLD AUTO: 29.8 % (ref 41–53)
HGB BLD-MCNC: 9.4 G/DL (ref 13.5–17.5)
LIPASE SERPL-CCNC: 20 U/L (ref 13–60)
LYMPHOCYTES NFR BLD: 1.15 K/UL (ref 1–4.8)
LYMPHOCYTES RELATIVE PERCENT: 32 % (ref 24–44)
MAGNESIUM SERPL-MCNC: 1.8 MG/DL (ref 1.6–2.6)
MCH RBC QN AUTO: 29.3 PG (ref 26–34)
MCHC RBC AUTO-ENTMCNC: 31.6 G/DL (ref 31–37)
MCV RBC AUTO: 92.6 FL (ref 80–100)
MONOCYTES NFR BLD: 0.29 K/UL (ref 0.1–1.3)
MONOCYTES NFR BLD: 8 % (ref 1–7)
MORPHOLOGY: NORMAL
NEUTROPHILS NFR BLD: 56 % (ref 36–66)
NEUTS SEG NFR BLD: 2.02 K/UL (ref 1.3–9.1)
PLATELET # BLD AUTO: 239 K/UL (ref 150–450)
PMV BLD AUTO: 6.8 FL (ref 6–12)
POTASSIUM SERPL-SCNC: 4.2 MMOL/L (ref 3.7–5.3)
PROT SERPL-MCNC: 7 G/DL (ref 6.4–8.3)
RBC # BLD AUTO: 3.21 M/UL (ref 4.5–5.9)
SODIUM SERPL-SCNC: 140 MMOL/L (ref 135–144)
TROPONIN I SERPL HS-MCNC: 46 NG/L (ref 0–22)
TROPONIN I SERPL HS-MCNC: 48 NG/L (ref 0–22)
WBC OTHER # BLD: 3.6 K/UL (ref 3.5–11)

## 2024-04-28 PROCEDURE — 6370000000 HC RX 637 (ALT 250 FOR IP): Performed by: STUDENT IN AN ORGANIZED HEALTH CARE EDUCATION/TRAINING PROGRAM

## 2024-04-28 PROCEDURE — 85025 COMPLETE CBC W/AUTO DIFF WBC: CPT

## 2024-04-28 PROCEDURE — 83735 ASSAY OF MAGNESIUM: CPT

## 2024-04-28 PROCEDURE — 93005 ELECTROCARDIOGRAM TRACING: CPT | Performed by: EMERGENCY MEDICINE

## 2024-04-28 PROCEDURE — 99285 EMERGENCY DEPT VISIT HI MDM: CPT

## 2024-04-28 PROCEDURE — 80076 HEPATIC FUNCTION PANEL: CPT

## 2024-04-28 PROCEDURE — 6370000000 HC RX 637 (ALT 250 FOR IP): Performed by: INTERNAL MEDICINE

## 2024-04-28 PROCEDURE — 99223 1ST HOSP IP/OBS HIGH 75: CPT | Performed by: INTERNAL MEDICINE

## 2024-04-28 PROCEDURE — 36415 COLL VENOUS BLD VENIPUNCTURE: CPT

## 2024-04-28 PROCEDURE — 2060000000 HC ICU INTERMEDIATE R&B

## 2024-04-28 PROCEDURE — 6370000000 HC RX 637 (ALT 250 FOR IP): Performed by: NURSE PRACTITIONER

## 2024-04-28 PROCEDURE — 84484 ASSAY OF TROPONIN QUANT: CPT

## 2024-04-28 PROCEDURE — 80048 BASIC METABOLIC PNL TOTAL CA: CPT

## 2024-04-28 PROCEDURE — 71046 X-RAY EXAM CHEST 2 VIEWS: CPT

## 2024-04-28 PROCEDURE — 83690 ASSAY OF LIPASE: CPT

## 2024-04-28 PROCEDURE — 2580000003 HC RX 258: Performed by: INTERNAL MEDICINE

## 2024-04-28 PROCEDURE — 93005 ELECTROCARDIOGRAM TRACING: CPT | Performed by: STUDENT IN AN ORGANIZED HEALTH CARE EDUCATION/TRAINING PROGRAM

## 2024-04-28 RX ORDER — SODIUM CHLORIDE 9 MG/ML
INJECTION, SOLUTION INTRAVENOUS PRN
Status: DISCONTINUED | OUTPATIENT
Start: 2024-04-28 | End: 2024-04-30 | Stop reason: HOSPADM

## 2024-04-28 RX ORDER — ENOXAPARIN SODIUM 100 MG/ML
40 INJECTION SUBCUTANEOUS DAILY
Status: DISCONTINUED | OUTPATIENT
Start: 2024-04-28 | End: 2024-04-28 | Stop reason: SDUPTHER

## 2024-04-28 RX ORDER — BUMETANIDE 1 MG/1
2 TABLET ORAL DAILY
Status: DISCONTINUED | OUTPATIENT
Start: 2024-04-28 | End: 2024-04-30 | Stop reason: HOSPADM

## 2024-04-28 RX ORDER — ACETAMINOPHEN 650 MG/1
650 SUPPOSITORY RECTAL EVERY 6 HOURS PRN
Status: DISCONTINUED | OUTPATIENT
Start: 2024-04-28 | End: 2024-04-30 | Stop reason: HOSPADM

## 2024-04-28 RX ORDER — ONDANSETRON 4 MG/1
4 TABLET, ORALLY DISINTEGRATING ORAL EVERY 8 HOURS PRN
Status: DISCONTINUED | OUTPATIENT
Start: 2024-04-28 | End: 2024-04-30 | Stop reason: HOSPADM

## 2024-04-28 RX ORDER — ACETAMINOPHEN 325 MG/1
650 TABLET ORAL EVERY 6 HOURS PRN
Status: DISCONTINUED | OUTPATIENT
Start: 2024-04-28 | End: 2024-04-30 | Stop reason: HOSPADM

## 2024-04-28 RX ORDER — NITROGLYCERIN 0.4 MG/1
0.4 TABLET SUBLINGUAL EVERY 5 MIN PRN
Status: DISCONTINUED | OUTPATIENT
Start: 2024-04-28 | End: 2024-04-30 | Stop reason: HOSPADM

## 2024-04-28 RX ORDER — METOPROLOL SUCCINATE 100 MG/1
100 TABLET, EXTENDED RELEASE ORAL 2 TIMES DAILY
Status: DISCONTINUED | OUTPATIENT
Start: 2024-04-28 | End: 2024-04-30 | Stop reason: HOSPADM

## 2024-04-28 RX ORDER — BUPRENORPHINE AND NALOXONE 8; 2 MG/1; MG/1
1 FILM, SOLUBLE BUCCAL; SUBLINGUAL 2 TIMES DAILY
Status: DISCONTINUED | OUTPATIENT
Start: 2024-04-28 | End: 2024-04-30 | Stop reason: HOSPADM

## 2024-04-28 RX ORDER — POTASSIUM CHLORIDE 7.45 MG/ML
10 INJECTION INTRAVENOUS PRN
Status: DISCONTINUED | OUTPATIENT
Start: 2024-04-28 | End: 2024-04-30 | Stop reason: HOSPADM

## 2024-04-28 RX ORDER — POTASSIUM CHLORIDE 20 MEQ/1
40 TABLET, EXTENDED RELEASE ORAL PRN
Status: DISCONTINUED | OUTPATIENT
Start: 2024-04-28 | End: 2024-04-30 | Stop reason: HOSPADM

## 2024-04-28 RX ORDER — ACETAMINOPHEN 500 MG
1000 TABLET ORAL ONCE
Status: COMPLETED | OUTPATIENT
Start: 2024-04-28 | End: 2024-04-28

## 2024-04-28 RX ORDER — ATORVASTATIN CALCIUM 40 MG/1
40 TABLET, FILM COATED ORAL NIGHTLY
Status: DISCONTINUED | OUTPATIENT
Start: 2024-04-28 | End: 2024-04-30 | Stop reason: HOSPADM

## 2024-04-28 RX ORDER — FAMOTIDINE 20 MG/1
20 TABLET, FILM COATED ORAL DAILY
Status: DISCONTINUED | OUTPATIENT
Start: 2024-04-28 | End: 2024-04-30 | Stop reason: HOSPADM

## 2024-04-28 RX ORDER — MAGNESIUM SULFATE HEPTAHYDRATE 40 MG/ML
2000 INJECTION, SOLUTION INTRAVENOUS PRN
Status: DISCONTINUED | OUTPATIENT
Start: 2024-04-28 | End: 2024-04-30 | Stop reason: HOSPADM

## 2024-04-28 RX ORDER — FINASTERIDE 5 MG/1
5 TABLET, FILM COATED ORAL DAILY
Status: DISCONTINUED | OUTPATIENT
Start: 2024-04-28 | End: 2024-04-30 | Stop reason: HOSPADM

## 2024-04-28 RX ORDER — SODIUM CHLORIDE 0.9 % (FLUSH) 0.9 %
5-40 SYRINGE (ML) INJECTION PRN
Status: DISCONTINUED | OUTPATIENT
Start: 2024-04-28 | End: 2024-04-30 | Stop reason: HOSPADM

## 2024-04-28 RX ORDER — ONDANSETRON 2 MG/ML
4 INJECTION INTRAMUSCULAR; INTRAVENOUS EVERY 6 HOURS PRN
Status: DISCONTINUED | OUTPATIENT
Start: 2024-04-28 | End: 2024-04-30 | Stop reason: HOSPADM

## 2024-04-28 RX ORDER — POLYETHYLENE GLYCOL 3350 17 G/17G
17 POWDER, FOR SOLUTION ORAL DAILY PRN
Status: DISCONTINUED | OUTPATIENT
Start: 2024-04-28 | End: 2024-04-30 | Stop reason: HOSPADM

## 2024-04-28 RX ORDER — LATANOPROST 50 UG/ML
1 SOLUTION/ DROPS OPHTHALMIC NIGHTLY
Status: DISCONTINUED | OUTPATIENT
Start: 2024-04-28 | End: 2024-04-30 | Stop reason: HOSPADM

## 2024-04-28 RX ORDER — DILTIAZEM HYDROCHLORIDE 120 MG/1
120 CAPSULE, COATED, EXTENDED RELEASE ORAL DAILY
Status: DISCONTINUED | OUTPATIENT
Start: 2024-04-28 | End: 2024-04-30 | Stop reason: HOSPADM

## 2024-04-28 RX ORDER — SODIUM CHLORIDE 0.9 % (FLUSH) 0.9 %
5-40 SYRINGE (ML) INJECTION EVERY 12 HOURS SCHEDULED
Status: DISCONTINUED | OUTPATIENT
Start: 2024-04-28 | End: 2024-04-30 | Stop reason: HOSPADM

## 2024-04-28 RX ADMIN — METOPROLOL SUCCINATE 100 MG: 100 TABLET, EXTENDED RELEASE ORAL at 21:49

## 2024-04-28 RX ADMIN — ATORVASTATIN CALCIUM 40 MG: 40 TABLET, FILM COATED ORAL at 21:49

## 2024-04-28 RX ADMIN — BUPRENORPHINE AND NALOXONE 1 FILM: 8; 2 FILM, SOLUBLE BUCCAL; SUBLINGUAL at 21:49

## 2024-04-28 RX ADMIN — ACETAMINOPHEN 1000 MG: 500 TABLET ORAL at 16:25

## 2024-04-28 RX ADMIN — LATANOPROST 1 DROP: 50 SOLUTION OPHTHALMIC at 21:49

## 2024-04-28 RX ADMIN — SODIUM CHLORIDE, PRESERVATIVE FREE 10 ML: 5 INJECTION INTRAVENOUS at 21:49

## 2024-04-28 ASSESSMENT — HEART SCORE
ECG: NORMAL
ECG: NON-SPECIFC REPOLARIZATION DISTURBANCE/LBTB/PM

## 2024-04-28 ASSESSMENT — PAIN SCALES - GENERAL
PAINLEVEL_OUTOF10: 5
PAINLEVEL_OUTOF10: 5

## 2024-04-28 ASSESSMENT — PAIN - FUNCTIONAL ASSESSMENT: PAIN_FUNCTIONAL_ASSESSMENT: NONE - DENIES PAIN

## 2024-04-28 ASSESSMENT — PAIN DESCRIPTION - ORIENTATION: ORIENTATION: RIGHT

## 2024-04-28 ASSESSMENT — PAIN DESCRIPTION - LOCATION: LOCATION: CHEST

## 2024-04-28 NOTE — H&P
fall TECHNOLOGIST PROVIDED HISTORY: fall Reason for Exam: fall, right wrist and hand pain FINDINGS: There is no evidence of acute fracture.  There is normal alignment.  No acute joint abnormality.  No focal osseous lesion. No focal soft tissue abnormality.  Diffuse osteopenia.     No acute osseous abnormality.     XR CHEST PORTABLE    Result Date: 4/18/2024  EXAMINATION: ONE XRAY VIEW OF THE CHEST;   XRAY VIEWS OF THE RIGHT WRIST 4/18/2024 11:52 am COMPARISON: October 4, 2023 HISTORY: ORDERING SYSTEM PROVIDED HISTORY: chest pain TECHNOLOGIST PROVIDED HISTORY: chest pain Reason for Exam: chest pain; ORDERING SYSTEM PROVIDED HISTORY: fall TECHNOLOGIST PROVIDED HISTORY: fall Reason for Exam: fall, right wrist and hand pain FINDINGS: Right wrist: Narrowing of the radiocarpal joint space with probable bone on bone distal radius and scaphoid.  Mild subchondral sclerosis involving the proximal scaphoid.  Osteophyte formation subjacent to the distal radioulnar joint.  Osteophyte formation subjacent to the 1st CMC joint.  Remote fracture involving the 5th metacarpal.  Possible remote fracture involving the proximal 4th metacarpal.  Tiny heterotopic calcification of the dorsum of the wrist versus tiny triquetral avulsion fracture.  Soft tissue swelling of the wrist. Chest: Pacemaker overlying the chest. Atelectasis or scarring in the right lung base.  No confluent infiltrate, effusion, or pneumothorax identified.  Cardiac silhouette which is top limits of normal.  Mediastinal silhouette is within normal limits.  Atherosclerotic calcifications involving the thoracic aorta.  Calcified left hilar lymph node.     No acute pulmonary disease identified. Stable borderline enlargement of the heart. Tiny triquetral avulsion fracture versus heterotopic calcification in the dorsum of the wrist.  There is soft tissue swelling of the wrist. Osteoarthrosis and remote 5th metacarpal fracture.     XR WRIST RIGHT (MIN 3 VIEWS)    Result Date:

## 2024-04-28 NOTE — ED NOTES
Report given to LEON cortez from U.   Report method by phone   The following was reviewed with receiving RN:   Current vital signs:  BP (!) 143/93   Pulse 61   Temp 98.4 °F (36.9 °C) (Oral)   Resp 13   Ht 1.854 m (6' 1\")   Wt 79.4 kg (175 lb)   SpO2 98%   BMI 23.09 kg/m²                MEWS Score: 0     Any medication or safety alerts were reviewed. Any pending diagnostics and notifications were also reviewed, as well as any safety concerns or issues, abnormal labs, abnormal imaging, and abnormal assessment findings. Questions were answered.

## 2024-04-28 NOTE — ED PROVIDER NOTES
[facility]  eMERGENCY dEPARTMENT eNCOUnter   Attending Attestation     Pt Name: Hemanth Barrera  MRN: [id number]  Birthdate 1935  Date of evaluation: 24       Hemanth Barrera is a 89 y.o. male who presents with Chest Pain and Shortness of Breath      History:   Patient presenting with chest pain and shortness of breath.    Exam: Vitals:   Vitals:    24 1616 24 1619 24 1754   BP:  (!) 168/93 (!) 143/93   Pulse:  68 61   Resp:  16 13   Temp:  98.7 °F (37.1 °C) 98.4 °F (36.9 °C)   TempSrc:  Oral Oral   SpO2:  99% 98%   Weight: 79.4 kg (175 lb)     Height: 1.854 m (6' 1\")       History: 0  EC  Patient Age: 2  Risk Factors: 2  Troponin: 1  Heart Score Total: 5  Cardiac workup in the ER did not display positive signs of acute cardiac ischemia.  EKG was reviewed and interpreted by myself, the ED physician.  Patient was admitted after speaking with the admitting team.  Patient understands and agrees with the plan.      I performed a history and physical examination of the patient and discussed management with the resident. I reviewed the resident’s note and agree with the documented findings and plan of care. Any areas of disagreement are noted on the chart. I was personally present for the key portions of any procedures. I have documented in the chart those procedures where I was not present during the key portions. I have personally reviewed all images and agree with the resident's interpretation. I have reviewed the emergency nurses triage note. I agree with the chief complaint, past medical history, past surgical history, allergies, medications, social and family history as documented unless otherwise noted below. Documentation of the HPI, Physical Exam and Medical Decision Making performed by medical students or scribes is based on my personal performance of the HPI, PE and MDM. I personally evaluated and examined the patient in conjunction with the APC and agree with the 
Cyclobenzaprine, Ibuprofen, Azithromycin, and Hydrocodone-acetaminophen    Home Medications:  Prior to Admission medications    Medication Sig Start Date End Date Taking? Authorizing Provider   atorvastatin (LIPITOR) 40 MG tablet Take 1 tablet by mouth nightly 4/19/24   Sana Haskins MD   dilTIAZem (CARDIZEM CD) 120 MG extended release capsule TAKE 1 CAPSULE BY MOUTH DAILY 3/30/24   Josh Ames DO   famotidine (PEPCID) 20 MG tablet TAKE 1 TABLET BY MOUTH TWICE DAILY 3/25/24   Neftaly Contreras MD   finasteride (PROSCAR) 5 MG tablet Take 1 tablet by mouth daily 3/6/24   Neftaly Contreras MD   bumetanide (BUMEX) 2 MG tablet Take 1 tablet by mouth daily 2/9/24 2/3/25  Neftaly Contreras MD   nitroGLYCERIN (NITROSTAT) 0.4 MG SL tablet up to max of 3 total doses. If no relief after 1 dose, call 911. 12/28/23   Susana Pearl, DARCI - CNP   vitamin D (ERGOCALCIFEROL) 1.25 MG (34449 UT) CAPS capsule TAKE 1 CAPSULE BY MOUTH EVERY WEEK 12/19/23   Wolf Sharma MD   rivaroxaban (XARELTO) 15 MG TABS tablet Take 1 tablet by mouth daily 10/11/23   Neftaly Contreras MD   metoprolol succinate (TOPROL XL) 100 MG extended release tablet TAKE 1 TABLET BY MOUTH TWICE DAILY 9/4/23   Nichole Noguera, APRN - NP   ferrous sulfate (IRON 325) 325 (65 Fe) MG tablet Take 1 tablet by mouth every other day 8/4/23   Gwen Chaparro MD   acetaminophen (TYLENOL) 325 MG tablet Take 2 tablets by mouth every 6 hours as needed for Pain    ProviderMarciano MD   buprenorphine-naloxone (SUBOXONE) 8-2 MG FILM SL film Place 1 Film under the tongue 2 times daily. Indications: pain    ProviderMarciano MD   latanoprost (XALATAN) 0.005 % ophthalmic solution Place 1 drop into both eyes nightly    ProviderMarciano MD         REVIEW OF SYSTEMS       Review of Systems   Cardiovascular:  Positive for chest pain.       PHYSICAL EXAM      INITIAL VITALS:   BP (!) 168/93   Pulse 68   Temp 98.7 °F (37.1 °C) (Oral)   Resp 16   Ht 1.854 m (6' 1\")   Wt

## 2024-04-28 NOTE — ED TRIAGE NOTES
Mode of arrival (squad #, walk in, police, etc) : walk in        Chief complaint(s): CP        Arrival Note (brief scenario, treatment PTA, etc).: pt c/o increased R CP x1 day. States he took SL Nitro x1 PTA with some relief. Rates pain at 5/10. Add'l symptoms included SOB. Cardiac Hx including PM with defib. Cast to R forearm noted d/t recent fall. Pt denies any new injury/trauma.        C= \"Have you ever felt that you should Cut down on your drinking?\"  No  A= \"Have people Annoyed you by criticizing your drinking?\"  No  G= \"Have you ever felt bad or Guilty about your drinking?\"  No  E= \"Have you ever had a drink as an Eye-opener first thing in the morning to steady your nerves or to help a hangover?\"  No      Deferred []      Reason for deferring: N/A    *If yes to two or more: probable alcohol abuse.*

## 2024-04-29 ENCOUNTER — APPOINTMENT (OUTPATIENT)
Dept: GENERAL RADIOLOGY | Age: 89
DRG: 313 | End: 2024-04-29
Payer: MEDICARE

## 2024-04-29 PROBLEM — E43 SEVERE MALNUTRITION (HCC): Status: ACTIVE | Noted: 2024-04-29

## 2024-04-29 PROBLEM — R94.31 ABNORMAL EKG: Status: ACTIVE | Noted: 2024-04-29

## 2024-04-29 LAB
ANION GAP SERPL CALCULATED.3IONS-SCNC: 6 MMOL/L (ref 9–17)
BASOPHILS # BLD: 0 K/UL (ref 0–0.2)
BASOPHILS NFR BLD: 0 % (ref 0–2)
BUN SERPL-MCNC: 21 MG/DL (ref 8–23)
CALCIUM SERPL-MCNC: 8.4 MG/DL (ref 8.6–10.4)
CHLORIDE SERPL-SCNC: 110 MMOL/L (ref 98–107)
CO2 SERPL-SCNC: 25 MMOL/L (ref 20–31)
CREAT SERPL-MCNC: 0.9 MG/DL (ref 0.7–1.2)
EOSINOPHIL # BLD: 0.13 K/UL (ref 0–0.4)
EOSINOPHILS RELATIVE PERCENT: 4 % (ref 0–4)
ERYTHROCYTE [DISTWIDTH] IN BLOOD BY AUTOMATED COUNT: 14.4 % (ref 11.5–14.9)
GFR SERPL CREATININE-BSD FRML MDRD: 82 ML/MIN/1.73M2
GLUCOSE SERPL-MCNC: 92 MG/DL (ref 70–99)
HCT VFR BLD AUTO: 27.9 % (ref 41–53)
HGB BLD-MCNC: 8.7 G/DL (ref 13.5–17.5)
LYMPHOCYTES NFR BLD: 0.77 K/UL (ref 1–4.8)
LYMPHOCYTES RELATIVE PERCENT: 24 % (ref 24–44)
MCH RBC QN AUTO: 29.3 PG (ref 26–34)
MCHC RBC AUTO-ENTMCNC: 31.1 G/DL (ref 31–37)
MCV RBC AUTO: 94.3 FL (ref 80–100)
MONOCYTES NFR BLD: 0.29 K/UL (ref 0.1–1.3)
MONOCYTES NFR BLD: 9 % (ref 1–7)
MORPHOLOGY: NORMAL
NEUTROPHILS NFR BLD: 63 % (ref 36–66)
NEUTS SEG NFR BLD: 2.01 K/UL (ref 1.3–9.1)
PLATELET # BLD AUTO: 205 K/UL (ref 150–450)
PMV BLD AUTO: 7.3 FL (ref 6–12)
POTASSIUM SERPL-SCNC: 4.5 MMOL/L (ref 3.7–5.3)
RBC # BLD AUTO: 2.96 M/UL (ref 4.5–5.9)
SODIUM SERPL-SCNC: 141 MMOL/L (ref 135–144)
WBC OTHER # BLD: 3.2 K/UL (ref 3.5–11)

## 2024-04-29 PROCEDURE — 80048 BASIC METABOLIC PNL TOTAL CA: CPT

## 2024-04-29 PROCEDURE — 71110 X-RAY EXAM RIBS BIL 3 VIEWS: CPT

## 2024-04-29 PROCEDURE — 6370000000 HC RX 637 (ALT 250 FOR IP): Performed by: INTERNAL MEDICINE

## 2024-04-29 PROCEDURE — 99222 1ST HOSP IP/OBS MODERATE 55: CPT | Performed by: INTERNAL MEDICINE

## 2024-04-29 PROCEDURE — 97162 PT EVAL MOD COMPLEX 30 MIN: CPT

## 2024-04-29 PROCEDURE — 6370000000 HC RX 637 (ALT 250 FOR IP): Performed by: NURSE PRACTITIONER

## 2024-04-29 PROCEDURE — 97166 OT EVAL MOD COMPLEX 45 MIN: CPT

## 2024-04-29 PROCEDURE — 2580000003 HC RX 258: Performed by: INTERNAL MEDICINE

## 2024-04-29 PROCEDURE — 36415 COLL VENOUS BLD VENIPUNCTURE: CPT

## 2024-04-29 PROCEDURE — 85025 COMPLETE CBC W/AUTO DIFF WBC: CPT

## 2024-04-29 PROCEDURE — 97116 GAIT TRAINING THERAPY: CPT

## 2024-04-29 PROCEDURE — 97530 THERAPEUTIC ACTIVITIES: CPT

## 2024-04-29 PROCEDURE — 2060000000 HC ICU INTERMEDIATE R&B

## 2024-04-29 PROCEDURE — 99233 SBSQ HOSP IP/OBS HIGH 50: CPT | Performed by: INTERNAL MEDICINE

## 2024-04-29 RX ORDER — ISOSORBIDE MONONITRATE 30 MG/1
30 TABLET, EXTENDED RELEASE ORAL DAILY
Status: DISCONTINUED | OUTPATIENT
Start: 2024-04-29 | End: 2024-04-30 | Stop reason: HOSPADM

## 2024-04-29 RX ADMIN — RIVAROXABAN 15 MG: 15 TABLET, FILM COATED ORAL at 09:42

## 2024-04-29 RX ADMIN — SODIUM CHLORIDE, PRESERVATIVE FREE 10 ML: 5 INJECTION INTRAVENOUS at 09:44

## 2024-04-29 RX ADMIN — SODIUM CHLORIDE, PRESERVATIVE FREE 10 ML: 5 INJECTION INTRAVENOUS at 20:02

## 2024-04-29 RX ADMIN — BUPRENORPHINE AND NALOXONE 1 FILM: 8; 2 FILM, SOLUBLE BUCCAL; SUBLINGUAL at 09:43

## 2024-04-29 RX ADMIN — LATANOPROST 1 DROP: 50 SOLUTION OPHTHALMIC at 20:03

## 2024-04-29 RX ADMIN — METOPROLOL SUCCINATE 100 MG: 100 TABLET, EXTENDED RELEASE ORAL at 20:02

## 2024-04-29 RX ADMIN — BUMETANIDE 2 MG: 1 TABLET ORAL at 09:42

## 2024-04-29 RX ADMIN — DILTIAZEM HYDROCHLORIDE 120 MG: 120 CAPSULE, EXTENDED RELEASE ORAL at 09:42

## 2024-04-29 RX ADMIN — ATORVASTATIN CALCIUM 40 MG: 40 TABLET, FILM COATED ORAL at 20:02

## 2024-04-29 RX ADMIN — FINASTERIDE 5 MG: 5 TABLET, FILM COATED ORAL at 09:43

## 2024-04-29 RX ADMIN — METOPROLOL SUCCINATE 100 MG: 100 TABLET, EXTENDED RELEASE ORAL at 09:43

## 2024-04-29 RX ADMIN — BUPRENORPHINE AND NALOXONE 1 FILM: 8; 2 FILM, SOLUBLE BUCCAL; SUBLINGUAL at 20:02

## 2024-04-29 RX ADMIN — FAMOTIDINE 20 MG: 20 TABLET, FILM COATED ORAL at 09:43

## 2024-04-29 RX ADMIN — ISOSORBIDE MONONITRATE 30 MG: 30 TABLET, EXTENDED RELEASE ORAL at 09:43

## 2024-04-29 ASSESSMENT — PAIN SCALES - GENERAL: PAINLEVEL_OUTOF10: 0

## 2024-04-29 NOTE — PLAN OF CARE
Problem: Discharge Planning  Goal: Discharge to home or other facility with appropriate resources  4/29/2024 0307 by Sally Alfaro RN  Outcome: Progressing  4/29/2024 0255 by Sally Alfaro RN  Outcome: Progressing  4/29/2024 0254 by Sally Alfaro RN  Outcome: Progressing     Problem: Safety - Adult  Goal: Free from fall injury  4/29/2024 0307 by Sally Alfaro RN  Outcome: Progressing  Note: Patient weak.  Bed alarm on.  Alert and oriented.  Using call light appropriately.  4/29/2024 0255 by Sally Alfaro RN  Outcome: Progressing  4/29/2024 0254 by Sally Alfaro RN  Outcome: Progressing     Problem: ABCDS Injury Assessment  Goal: Absence of physical injury  4/29/2024 0307 by Sally Alfaro RN  Outcome: Progressing  4/29/2024 0255 by Sally Alfaro RN  Outcome: Progressing  4/29/2024 0254 by Sally Alfaro RN  Outcome: Progressing  Flowsheets (Taken 4/28/2024 1916)  Absence of Physical Injury: Implement safety measures based on patient assessment     Problem: Skin/Tissue Integrity  Goal: Absence of new skin breakdown  Description: 1.  Monitor for areas of redness and/or skin breakdown  2.  Assess vascular access sites hourly  3.  Every 4-6 hours minimum:  Change oxygen saturation probe site  4.  Every 4-6 hours:  If on nasal continuous positive airway pressure, respiratory therapy assess nares and determine need for appliance change or resting period.  4/29/2024 0307 by Sally Alfaro RN  Outcome: Progressing  Note: Buttocks slightly reddened.  Able to turn self.    4/29/2024 0255 by Sally Alfaro RN  Outcome: Progressing  4/29/2024 0254 by Sally Alfaro RN  Outcome: Progressing     Problem: Cardiovascular - Adult  Goal: Maintains optimal cardiac output and hemodynamic stability  4/29/2024 0307 by Sally Alfaro RN  Outcome: Progressing  Note: Telemetry ventricular paced.  VS stable.  Denies chest pain.    4/29/2024 0255 by

## 2024-04-29 NOTE — PLAN OF CARE
Problem: Discharge Planning  Goal: Discharge to home or other facility with appropriate resources  4/29/2024 1526 by Kiara Rivera RN  Outcome: Progressing     Problem: Safety - Adult  Goal: Free from fall injury  4/29/2024 1526 by Kiaar Rivera RN  Outcome: Progressing     Problem: ABCDS Injury Assessment  Goal: Absence of physical injury  4/29/2024 1526 by Kiara Rivera RN  Outcome: Progressing     Problem: Skin/Tissue Integrity  Goal: Absence of new skin breakdown  Description: 1.  Monitor for areas of redness and/or skin breakdown  2.  Assess vascular access sites hourly  3.  Every 4-6 hours minimum:  Change oxygen saturation probe site  4.  Every 4-6 hours:  If on nasal continuous positive airway pressure, respiratory therapy assess nares and determine need for appliance change or resting period.  4/29/2024 1526 by Kiara Rivera RN  Outcome: Progressing

## 2024-04-29 NOTE — CONSULTS
of pacemaker is getting to be interrogated today  EKG changes normal sinus rhythm with first-degree heart block, T inversion in inferolateral lead almost same as before  Patient can follow in our office in 2 to 3 weeks    Discussed with patient and nursing.    Mac Ta MD, MD [facility]        [phone]

## 2024-04-30 ENCOUNTER — APPOINTMENT (OUTPATIENT)
Dept: ULTRASOUND IMAGING | Age: 89
DRG: 313 | End: 2024-04-30
Payer: MEDICARE

## 2024-04-30 VITALS
SYSTOLIC BLOOD PRESSURE: 126 MMHG | BODY MASS INDEX: 23.19 KG/M2 | WEIGHT: 175 LBS | TEMPERATURE: 97.4 F | OXYGEN SATURATION: 96 % | HEIGHT: 73 IN | HEART RATE: 67 BPM | DIASTOLIC BLOOD PRESSURE: 76 MMHG | RESPIRATION RATE: 18 BRPM

## 2024-04-30 LAB
ANION GAP SERPL CALCULATED.3IONS-SCNC: 8 MMOL/L (ref 9–17)
BASOPHILS # BLD: 0 K/UL (ref 0–0.2)
BASOPHILS NFR BLD: 1 % (ref 0–2)
BUN SERPL-MCNC: 26 MG/DL (ref 8–23)
CALCIUM SERPL-MCNC: 8.9 MG/DL (ref 8.6–10.4)
CHLORIDE SERPL-SCNC: 104 MMOL/L (ref 98–107)
CO2 SERPL-SCNC: 27 MMOL/L (ref 20–31)
CREAT SERPL-MCNC: 1.1 MG/DL (ref 0.7–1.2)
EKG ATRIAL RATE: 60 BPM
EKG ATRIAL RATE: 60 BPM
EKG P AXIS: -8 DEGREES
EKG P AXIS: 66 DEGREES
EKG P-R INTERVAL: 192 MS
EKG P-R INTERVAL: 194 MS
EKG Q-T INTERVAL: 482 MS
EKG Q-T INTERVAL: 486 MS
EKG QRS DURATION: 156 MS
EKG QRS DURATION: 156 MS
EKG QTC CALCULATION (BAZETT): 482 MS
EKG QTC CALCULATION (BAZETT): 486 MS
EKG R AXIS: -67 DEGREES
EKG R AXIS: -71 DEGREES
EKG T AXIS: 92 DEGREES
EKG T AXIS: 93 DEGREES
EKG VENTRICULAR RATE: 60 BPM
EKG VENTRICULAR RATE: 60 BPM
EOSINOPHIL # BLD: 0.3 K/UL (ref 0–0.4)
EOSINOPHILS RELATIVE PERCENT: 7 % (ref 0–4)
ERYTHROCYTE [DISTWIDTH] IN BLOOD BY AUTOMATED COUNT: 14.9 % (ref 11.5–14.9)
GFR SERPL CREATININE-BSD FRML MDRD: 64 ML/MIN/1.73M2
GLUCOSE SERPL-MCNC: 95 MG/DL (ref 70–99)
HCT VFR BLD AUTO: 31.1 % (ref 41–53)
HGB BLD-MCNC: 9.5 G/DL (ref 13.5–17.5)
LYMPHOCYTES NFR BLD: 1 K/UL (ref 1–4.8)
LYMPHOCYTES RELATIVE PERCENT: 27 % (ref 24–44)
MCH RBC QN AUTO: 29.2 PG (ref 26–34)
MCHC RBC AUTO-ENTMCNC: 30.7 G/DL (ref 31–37)
MCV RBC AUTO: 95.3 FL (ref 80–100)
MONOCYTES NFR BLD: 0.5 K/UL (ref 0.1–1.3)
MONOCYTES NFR BLD: 14 % (ref 1–7)
NEUTROPHILS NFR BLD: 51 % (ref 36–66)
NEUTS SEG NFR BLD: 2 K/UL (ref 1.3–9.1)
PLATELET # BLD AUTO: 207 K/UL (ref 150–450)
PMV BLD AUTO: 7.4 FL (ref 6–12)
POTASSIUM SERPL-SCNC: 4.5 MMOL/L (ref 3.7–5.3)
RBC # BLD AUTO: 3.26 M/UL (ref 4.5–5.9)
SODIUM SERPL-SCNC: 139 MMOL/L (ref 135–144)
WBC OTHER # BLD: 3.9 K/UL (ref 3.5–11)

## 2024-04-30 PROCEDURE — 93010 ELECTROCARDIOGRAM REPORT: CPT | Performed by: INTERNAL MEDICINE

## 2024-04-30 PROCEDURE — 97530 THERAPEUTIC ACTIVITIES: CPT

## 2024-04-30 PROCEDURE — 2580000003 HC RX 258: Performed by: INTERNAL MEDICINE

## 2024-04-30 PROCEDURE — 80048 BASIC METABOLIC PNL TOTAL CA: CPT

## 2024-04-30 PROCEDURE — 6370000000 HC RX 637 (ALT 250 FOR IP): Performed by: NURSE PRACTITIONER

## 2024-04-30 PROCEDURE — 97116 GAIT TRAINING THERAPY: CPT

## 2024-04-30 PROCEDURE — 97535 SELF CARE MNGMENT TRAINING: CPT

## 2024-04-30 PROCEDURE — 6370000000 HC RX 637 (ALT 250 FOR IP): Performed by: INTERNAL MEDICINE

## 2024-04-30 PROCEDURE — 99239 HOSP IP/OBS DSCHRG MGMT >30: CPT | Performed by: INTERNAL MEDICINE

## 2024-04-30 PROCEDURE — 36415 COLL VENOUS BLD VENIPUNCTURE: CPT

## 2024-04-30 PROCEDURE — 85025 COMPLETE CBC W/AUTO DIFF WBC: CPT

## 2024-04-30 PROCEDURE — 76705 ECHO EXAM OF ABDOMEN: CPT

## 2024-04-30 RX ORDER — ISOSORBIDE MONONITRATE 30 MG/1
30 TABLET, EXTENDED RELEASE ORAL DAILY
Qty: 90 TABLET | OUTPATIENT
Start: 2024-04-30

## 2024-04-30 RX ORDER — ISOSORBIDE MONONITRATE 30 MG/1
30 TABLET, EXTENDED RELEASE ORAL DAILY
Qty: 30 TABLET | Refills: 3 | Status: SHIPPED | OUTPATIENT
Start: 2024-05-01

## 2024-04-30 RX ADMIN — FAMOTIDINE 20 MG: 20 TABLET, FILM COATED ORAL at 09:05

## 2024-04-30 RX ADMIN — FINASTERIDE 5 MG: 5 TABLET, FILM COATED ORAL at 09:05

## 2024-04-30 RX ADMIN — DILTIAZEM HYDROCHLORIDE 120 MG: 120 CAPSULE, EXTENDED RELEASE ORAL at 09:04

## 2024-04-30 RX ADMIN — ACETAMINOPHEN 650 MG: 325 TABLET ORAL at 15:45

## 2024-04-30 RX ADMIN — SODIUM CHLORIDE, PRESERVATIVE FREE 10 ML: 5 INJECTION INTRAVENOUS at 09:08

## 2024-04-30 RX ADMIN — BUMETANIDE 2 MG: 1 TABLET ORAL at 09:05

## 2024-04-30 RX ADMIN — ACETAMINOPHEN 650 MG: 325 TABLET ORAL at 05:24

## 2024-04-30 RX ADMIN — BUPRENORPHINE AND NALOXONE 1 FILM: 8; 2 FILM, SOLUBLE BUCCAL; SUBLINGUAL at 09:05

## 2024-04-30 RX ADMIN — ISOSORBIDE MONONITRATE 30 MG: 30 TABLET, EXTENDED RELEASE ORAL at 09:04

## 2024-04-30 RX ADMIN — METOPROLOL SUCCINATE 100 MG: 100 TABLET, EXTENDED RELEASE ORAL at 09:04

## 2024-04-30 RX ADMIN — RIVAROXABAN 15 MG: 15 TABLET, FILM COATED ORAL at 09:05

## 2024-04-30 ASSESSMENT — PAIN DESCRIPTION - DESCRIPTORS
DESCRIPTORS: STABBING
DESCRIPTORS: ACHING

## 2024-04-30 ASSESSMENT — PAIN SCALES - GENERAL
PAINLEVEL_OUTOF10: 3
PAINLEVEL_OUTOF10: 3

## 2024-04-30 ASSESSMENT — PAIN DESCRIPTION - ORIENTATION
ORIENTATION: RIGHT
ORIENTATION: LEFT

## 2024-04-30 ASSESSMENT — PAIN DESCRIPTION - LOCATION
LOCATION: HAND
LOCATION: HEAD

## 2024-04-30 NOTE — PLAN OF CARE
Problem: Discharge Planning  Goal: Discharge to home or other facility with appropriate resources  4/30/2024 1642 by Tran Nelson RN  Outcome: Completed     Problem: Safety - Adult  Goal: Free from fall injury  4/30/2024 1642 by Tran Nelson RN  Outcome: Completed     Problem: ABCDS Injury Assessment  Goal: Absence of physical injury  4/30/2024 1642 by Tran Nelson RN  Outcome: Completed     Problem: Skin/Tissue Integrity  Goal: Absence of new skin breakdown  Description: 1.  Monitor for areas of redness and/or skin breakdown  2.  Assess vascular access sites hourly  3.  Every 4-6 hours minimum:  Change oxygen saturation probe site  4.  Every 4-6 hours:  If on nasal continuous positive airway pressure, respiratory therapy assess nares and determine need for appliance change or resting period.  4/30/2024 1642 by Tran Nelson RN  Outcome: Completed     Problem: Cardiovascular - Adult  Goal: Maintains optimal cardiac output and hemodynamic stability  4/30/2024 1642 by Tran Nelson RN  Outcome: Completed     Problem: Cardiovascular - Adult  Goal: Absence of cardiac dysrhythmias or at baseline  4/30/2024 1642 by Tran Nelson RN  Outcome: Completed     Problem: Chronic Conditions and Co-morbidities  Goal: Patient's chronic conditions and co-morbidity symptoms are monitored and maintained or improved  4/30/2024 1642 by Tran Nelson RN  Outcome: Completed     Problem: Nutrition Deficit:  Goal: Optimize nutritional status  4/30/2024 1642 by Tran Nelson RN  Outcome: Completed     Problem: Pain  Goal: Verbalizes/displays adequate comfort level or baseline comfort level  4/30/2024 1642 by Tran Nelson RN  Outcome: Completed

## 2024-04-30 NOTE — DISCHARGE SUMMARY
IN-PATIENT SERVICE   [facility] Internal Medicine    Discharge Summary     Patient ID: Hemanth Barrera  :  1935   MRN: [id number]     ACCOUNT:  [id number]   Patient's PCP: Neftaly Contreras MD  Admit Date: 2024   Discharge Date: 24   Length of Stay: 2  Code Status:  Full Code  Admitting Physician: Susan Asher MD  Discharge Physician: Susan Asher MD     Active Discharge Diagnoses:     Primary Problem  Chest pain      Hospital Problems  Active Hospital Problems    Diagnosis Date Noted    Abnormal EKG [R94.31] 2024     Priority: High    Severe malnutrition (HCC) [E43] 2024    Chest pain [R07.9] 2021       Admission Condition:  fair     Discharged Condition: fair    Hospital Stay:     Hospital Course:  Hemanth Barrera is a 89 y.o. male who was admitted for the management of Chest pain , presented to ER with Chest Pain and Shortness of Breath    This patient is a 89 y.o. Non- / non  malewho presents with  Chest pain, recent falls  History of HFpEF last EF 55%, A-fib and DVT/PE on Xarelto, SSS s/p pacemaker , HTN, CAD, CKD, multiple bowel surgeries and SBO obstruction in the past  Recent admission for falls and chest pain was evaluated by cardiology for elevated troponins and EKG changes  Stress test 2023 was low risk.  No intervention was recommended by cardiology patient was discharged on  plan was to follow-up outpatient in 4 weeks     Now presenting again with chest pain reports chest pain started few hours prior to presentation radiation up to the neck,        Persistent/recurrent chest pain last cardiac cath was 2022 with moderate nonobstructive CAD low risk stress test 2023.  Has chronic troponin elevation, EKG-T wave inversions persistent  compared to previous EKG. Cardiology consulted, do not think this is cardiac pain however Imdur was added.  Pacemaker was interrogated no acute events  Rib pain-rib x-rays no

## 2024-04-30 NOTE — DISCHARGE INSTR - COC
W19.XXXA    Right wrist pain M25.531    Abdominal pain, epigastric R10.13    Leg swelling M79.89    NSTEMI (non-ST elevated myocardial infarction) ([facility]) I21.4    Presence of permanent cardiac pacemaker Z95.0    Fall against object W18.00XA    Abnormal EKG R94.31    Severe malnutrition (HCC) E43       Isolation/Infection:   Isolation            No Isolation          Patient Infection Status       None to display                     Nurse Assessment:  Last Vital Signs: /80   Pulse 60   Temp 98 °F (36.7 °C) (Oral)   Resp 18   Ht 1.854 m (6' 1\")   Wt 79.4 kg (175 lb)   SpO2 100%   BMI 23.09 kg/m²     Last documented pain score (0-10 scale): Pain Level: 3  Last Weight:   Wt Readings from Last 1 Encounters:   04/28/24 79.4 kg (175 lb)     Mental Status:  oriented, alert, coherent, logical, thought processes intact, and able to concentrate and follow conversation    IV Access:  - None    Nursing Mobility/ADLs:  Walking   Independent  Transfer  Independent  Bathing  Independent  Dressing  Independent  Toileting  Independent  Feeding  Independent  Med Admin  Independent  Med Delivery   whole    Wound Care Documentation and Therapy:  Wound 10/05/23 Leg Left;Lower (Active)   Number of days: 207        Elimination:  Continence:   Bowel: Yes  Bladder: Yes  Urinary Catheter: None   Colostomy/Ileostomy/Ileal Conduit: No       Date of Last BM: 4/29/24    Intake/Output Summary (Last 24 hours) at 4/30/2024 0848  Last data filed at 4/30/2024 0419  Gross per 24 hour   Intake --   Output 2950 ml   Net -2950 ml     I/O last 3 completed shifts:  In: -   Out: 3400 [Urine:3400]    Safety Concerns:     At Risk for Falls    Impairments/Disabilities:      None    Nutrition Therapy:  Current Nutrition Therapy:   - Oral Diet:  General    Routes of Feeding: Oral  Liquids: Thin Liquids  Daily Fluid Restriction: no  Last Modified Barium Swallow with Video (Video Swallowing Test): not done    Treatments at the Time of Hospital

## 2024-04-30 NOTE — PLAN OF CARE
Problem: Discharge Planning  Goal: Discharge to home or other facility with appropriate resources  4/30/2024 0322 by Teressa Herrera RN  Outcome: Progressing  Flowsheets (Taken 4/29/2024 2001)  Discharge to home or other facility with appropriate resources:   Identify barriers to discharge with patient and caregiver   Arrange for needed discharge resources and transportation as appropriate   Arrange for interpreters to assist at discharge as needed   Identify discharge learning needs (meds, wound care, etc)   Refer to discharge planning if patient needs post-hospital services based on physician order or complex needs related to functional status, cognitive ability or social support system  4/29/2024 1526 by Kiara Rivera RN  Outcome: Progressing     Problem: Safety - Adult  Goal: Free from fall injury  4/30/2024 0322 by Teressa Herrera RN  Outcome: Progressing  Flowsheets (Taken 4/29/2024 2001)  Free From Fall Injury:   Instruct family/caregiver on patient safety   Based on caregiver fall risk screen, instruct family/caregiver to ask for assistance with transferring infant if caregiver noted to have fall risk factors  4/29/2024 1526 by Kiara Rivera RN  Outcome: Progressing     Problem: ABCDS Injury Assessment  Goal: Absence of physical injury  4/30/2024 0322 by Teressa Herrera RN  Outcome: Progressing  Flowsheets (Taken 4/29/2024 2001)  Absence of Physical Injury: Implement safety measures based on patient assessment  4/29/2024 1526 by Kiara Rivera RN  Outcome: Progressing     Problem: Skin/Tissue Integrity  Goal: Absence of new skin breakdown  Description: 1.  Monitor for areas of redness and/or skin breakdown  2.  Assess vascular access sites hourly  3.  Every 4-6 hours minimum:  Change oxygen saturation probe site  4.  Every 4-6 hours:  If on nasal continuous positive airway pressure, respiratory therapy assess nares and determine need for appliance change or resting period.  4/30/2024 0322 by Javier

## 2024-05-01 ENCOUNTER — CARE COORDINATION (OUTPATIENT)
Dept: CASE MANAGEMENT | Age: 89
End: 2024-05-01

## 2024-05-01 NOTE — CARE COORDINATION
Care Transitions Initial Follow Up Call Attempt #1 -Attempted initial 24 hour transitional call to patient.  Left VM to return call directly to CTN    Call within 2 business days of discharge: Yes    Readmission to [facility] - for chest pain, recent falls.    Patient: Hemanth Barrera   Patient : 1935   MRN: [id number]    Reason for Admission: chest pain, recent falls  Discharge Date: 24   RARS: Readmission Risk Score: 18.7      Last Discharge Facility       Date Complaint Diagnosis Description Type Department Provider    24 Chest Pain; Shortness of Breath Chest pain, unspecified type ED to Hosp-Admission (Discharged) (ADMITTED) Susan Bustos MD; Nate Freeman...            Was this an external facility discharge? No   Non-face-to-face services provided:  Scheduled appointment with PCP-  Scheduled appointment with [facility]ist[facility] clinic  - ortho - cardio  Obtained and reviewed discharge summary and/or continuity of care documents    Is follow up appointment scheduled within 7 days of discharge? Yes.    Follow Up  Future Appointments   Date Time Provider Department Center   2024 10:00 AM Ashwin Serrano, PT [facility]   2024 12:00 PM Neftaly Contreras MD [facility]   2024 12:00 PM [facility] RM 2 [facility] [place]   2024  1:30 PM [facility] ONC CHAIR 20 [facility] [place]   2024  9:30 AM Neftaly Contreras MD [facility]   2024 10:00 AM Colin Burleson MD [facility]   10/25/2024 12:45 PM Wolf Sharma MD [facility]       Care Transition Nurse provided contact information.      Kristin Fermin RN

## 2024-05-02 ENCOUNTER — CARE COORDINATION (OUTPATIENT)
Dept: CASE MANAGEMENT | Age: 89
End: 2024-05-02

## 2024-05-02 DIAGNOSIS — R07.9 CHEST PAIN, UNSPECIFIED TYPE: Primary | ICD-10-CM

## 2024-05-02 PROCEDURE — 1111F DSCHRG MED/CURRENT MED MERGE: CPT | Performed by: INTERNAL MEDICINE

## 2024-05-02 NOTE — PROGRESS NOTES
04/30/24 1712   Encounter Summary   Encounter Overview/Reason Spiritual/Emotional Needs   Service Provided For Patient   Referral/Consult From Rounding   Last Encounter  04/30/24   Complexity of Encounter Moderate   Spiritual/Emotional needs   Type Spiritual Support   Assessment/Intervention/Outcome   Assessment Calm;Hopeful;Peaceful   Intervention Active listening;Prayer (assurance of)/[place];Sustaining Presence/Ministry of presence;Explored/Affirmed feelings, thoughts, concerns   Outcome Acceptance;Encouraged;Peace;Receptive       
  Physician Progress Note      PATIENT:               DONNA DYSON  [facility] #:                  [id number]  :                       1935  ADMIT DATE:       2024 4:11 PM  DISCH DATE:        2024 6:55 PM  RESPONDING  PROVIDER #:        Susan Asher MD          QUERY TEXT:    Patient admitted with atypical chest pain, noted to have atrial fibrillation   and is maintained on Xarelto. If possible, please document in progress notes   and discharge summary if you are evaluating and/or treating any of the   following:    The medical record reflects the following:  Risk Factors: A fib, Xarelto  Clinical Indicators: H&P: A-fib currently rate controlled, continue Xarelto  Treatment: Xarelto, cardiology consulted      Thank you, Lucrecia CDS  Options provided:  -- Secondary hypercoagulable state in a patient with atrial fibrillation  -- Other - I will add my own diagnosis  -- Disagree - Not applicable / Not valid  -- Disagree - Clinically unable to determine / Unknown  -- Refer to Clinical Documentation Reviewer    PROVIDER RESPONSE TEXT:    This patient has secondary hypercoagulable state in a patient with atrial   fibrillation.    Query created by: Erin Walls on 2024 7:10 PM      QUERY TEXT:    Pt admitted with chest pain and Short of breath  and has severe  malnutrition   documented in nutrition note . Please document in the medical record if   you are in agreement    The medical record reflects the following:  Risk Factors: Acute illness, had been w mobile meals- not eating D/T taste   preferences.  Clinical Indicators: 75% or less of estimated energy requirements for 7 or   more days ,   Moderate body fat loss Orbital, Triceps, Buccal region, Moderate   muscle mass loss Temples (temporalis), Clavicles (pectoralis & deltoids)   Current BMI (kg/m2): 23.1  Treatment:  Ensure nutritional supplement. monitoring Food and Nutrient   Intake, Supplement Intake    ASPEN Criteria:  
[facility]   INPATIENT OCCUPATIONAL THERAPY  PROGRESS NOTE  Date: 2024  Patient Name: Hemanth Barrera       Room: 4-01  MRN: [id number]    : 1935  (89 y.o.)  Gender: male   Referring Practitioner: Susan Asher MD  Diagnosis: Chest pain      Discharge Recommendations:  Further Occupational Therapy is recommended upon facility discharge.    OT Equipment Recommendations  Other: TBD    Restrictions/Precautions  Restrictions/Precautions  Restrictions/Precautions: Fall Risk;Weight Bearing  Required Braces or Orthoses?: Yes (splint w/ ace wrap right wrist and forearm)  Implants present? : Metal implants;Pacemaker (low back surgery, left TKR, right THR)  Upper Extremity Weight Bearing Restrictions  Right Upper Extremity Weight Bearing:  (5 lb lifting/pushing/pulling restrictions)    O2 Device: None (Room air)    Subjective  Subjective  Subjective: \"See my son helps me get washed up usually.\" Pt is pleasant and motivated to participate in therapy  Subjective  Pain: Denied pain  Comments: Okay for OT PT treatment per RN    Objective  Cognition  Overall Cognitive Status: Exceptions  Arousal/Alertness: Appropriate responses to stimuli  Following Commands: Follows multistep commands with repitition;Follows multistep commands with increased time  Attention Span: Attends with cues to redirect  Memory: Appears intact  Safety Judgement: Decreased awareness of need for safety  Problem Solving: Assistance required to implement solutions;Assistance required to generate solutions  Insights: Fully aware of deficits  Initiation: Requires cues for some  Sequencing: Does not require cues    Activities of Daily Living  ADL  Feeding: Setup  Feeding Skilled Clinical Factors: pt requires assistance to open sugar packets and pour into coffee  Grooming: Setup  UE Bathing: Minimal assistance  LE Bathing: Contact guard assistance  UE Dressing: Stand by assistance  LE Dressing: Minimal assistance  LE 
Comprehensive Nutrition Assessment    Type and Reason for Visit:  Positive Nutrition Screen (weight loss and poor intake)    Nutrition Recommendations/Plan:   Continue diet as ordered.  Provide chocolate Ensure Enlive each meal     Malnutrition Assessment:  Malnutrition Status:  Severe malnutrition (04/29/24 1657)    Context:  Acute Illness     Findings of the 6 clinical characteristics of malnutrition:  Energy Intake:  75% or less of estimated energy requirements for 7 or more days    Body Fat Loss:  Moderate body fat loss Orbital, Triceps, Buccal region   Muscle Mass Loss:  Moderate muscle mass loss Temples (temporalis), Clavicles (pectoralis & deltoids)  Fluid Accumulation:  No significant fluid accumulation     Strength:  Not Performed    Nutrition Assessment:    Pt to ED with chest pain and shortness of breath. Pt had a recent fall and fractured his right wrist. Pt states he's lost 20# because was getting mobile meals and food had no seasoning so not eating it. Pt said at one time his son's wife would cook and bring him over food but then she got Covid. Pt said she will start bringing meals over to him so he feels he can gain some weight back. Writer asked if he was eating ok here, he preeti: I'm eating everything, its tasty and brought to me and laughed. Writer asked if he would like to get Ensure nutritional supplement. Pt said he would, prefers chocolate and would take one now with a chocolate ice-cream he never got.    Nutrition Related Findings:      Wound Type: None       Current Nutrition Intake & Therapies:    Average Meal Intake: %     ADULT DIET; Regular  ADULT ORAL NUTRITION SUPPLEMENT; Lunch, Breakfast, Dinner; Standard High Calorie/High Protein Oral Supplement    Anthropometric Measures:  Height: 185.4 cm (6' 1\")  Ideal Body Weight (IBW): 184 lbs (84 kg)    Admission Body Weight: 79.4 kg (175 lb)  Current Body Weight: 79.4 kg (175 lb), 95.1 % IBW. Weight Source: Stated  Current BMI (kg/m2): 
Dr. Thorpe ok with pt using platform wheeled walker.  
Occupational Therapy  [facility]   Occupational Therapy Evaluation  Date: 24  Patient Name: Hemanth Barrera       Room: 2124/2124-01  MRN: [id number]  Account: [id number]   : 1935  (89 y.o.) Gender: male     Discharge Recommendations:  Further Occupational Therapy is recommended upon facility discharge.    OT Equipment Recommendations  Other: TBD    Referring Practitioner: Susan Asher MD  Diagnosis: Chest pain   Additional Pertinent Hx: Per chart: Hemanth Barrera is a 89 y.o. male past medical history of A-fib on Xarelto, hypertension, NSTEMI, presenting for assessment of chest pain.  Onset of symptoms was this afternoon.  Occurred at rest.  It is substernal, radiating up his neck.  He also reports some mild right upper quadrant pain.  No associated weakness, shortness of breath, nausea or vomiting.  He took a nitroglycerin with limited relief of symptoms.     Of note, this patient was just recently admitted after suffering a mechanical fall.  He fractured his wrist.  Was noted to have new T wave inversions on ECG as well as elevated troponin.  Was admitted for further observation and assessed by cardiology.  Noted to have low risk stress test in 2023.  No additional studies or interventions were done.  The patient does report that he has continued to take his Xarelto as prescribed and has not missed any doses.    Treatment Diagnosis: impaired self care status    Past Medical History:  has a past medical history of Acute inferolateral myocardial infarction (HCC), Arthritis, Atrial fibrillation ([facility]), CAD (coronary artery disease), CHF (congestive heart failure) ([facility]), Glaucoma, Hx of blood clots, Hyperlipidemia, Hypertension, MI (myocardial infarction) ([facility]), and NSTEMI (non-ST elevated myocardial infarction) ([facility]).    Past Surgical History:   has a past surgical history that includes pacemaker placement; Abdomen surgery; Cardiac catheterization; Appendectomy; 
Patient was admitted on previous shift.  VS were taken and telemetry was applied.  Bed alarm on.  Bedside shift report done.  
Physical Therapy  [facility]    Date: 24  Patient Name: Hemanth Barrera       Room: 2124/2124-01  MRN: [id number]   Account: [id number]   : 1935  (89 y.o.) Gender: male     Referring Practitioner: Susan Asher MD  Diagnosis: Chest pain  Past Medical History:  has a past medical history of Acute inferolateral myocardial infarction (HCC), Arthritis, Atrial fibrillation (HCC), CAD (coronary artery disease), CHF (congestive heart failure) (HCC), Glaucoma, Hx of blood clots, Hyperlipidemia, Hypertension, MI (myocardial infarction) (HCC), and NSTEMI (non-ST elevated myocardial infarction) (HCC).   Past Surgical History:   has a past surgical history that includes pacemaker placement; Abdomen surgery; Cardiac catheterization; Appendectomy; Colonoscopy; eye surgery; hernia repair; bone marrow biopsy; joint replacement; CT BIOPSY BONE MARROW (2022); Upper gastrointestinal endoscopy (N/A, 2023); and Colonoscopy (N/A, 8/3/2023).  Additional Pertinent Hx: Per H & P note: This patient is a 89 y.o. Non- / non  malewho presents with  Chest pain, recent falls  History of HFpEF last EF 55%, A-fib and DVT/PE on Xarelto, SSS s/p pacemaker , HTN, CAD, CKD, multiple bowel surgeries and SBO obstruction in the past  Recent admission for falls and chest pain was evaluated by cardiology for elevated troponins and EKG changes  Stress test 2023 was low risk.  No intervention was recommended by cardiology patient was discharged on  plan was to follow-up outpatient in 4 weeks     Now presenting again with chest pain reports chest pain started few hours prior to presentation radiation up to the neck, squeezing in character, occurring at rest partial relief of symptoms with nitroglycerin no aggravating factor.  Associated with some shortness of breath denies nausea vomiting or sweating.  Moderate intensity pain constant in nature    Overall Orientation Status: Within 
Physical Therapy  Facility/Department: [facility] PROGRESSIVE CARE  Physical Therapy Initial Assessment    Name: Hemanth Barrera  : 1935  MRN: [id number]  Date of Service: 2024    Discharge Recommendations:  Patient would benefit from continued therapy after discharge, Therapy recommended at discharge   PT Equipment Recommendations  Equipment Needed:  (TBD)      Patient Diagnosis(es): The encounter diagnosis was Chest pain, unspecified type.  Past Medical History:  has a past medical history of Acute inferolateral myocardial infarction (HCC), Arthritis, Atrial fibrillation (HCC), CAD (coronary artery disease), CHF (congestive heart failure) (HCC), Glaucoma, Hx of blood clots, Hyperlipidemia, Hypertension, MI (myocardial infarction) ([facility]), and NSTEMI (non-ST elevated myocardial infarction) ([facility]).  Past Surgical History:  has a past surgical history that includes pacemaker placement; Abdomen surgery; Cardiac catheterization; Appendectomy; Colonoscopy; eye surgery; hernia repair; bone marrow biopsy; joint replacement; CT BIOPSY BONE MARROW (2022); Upper gastrointestinal endoscopy (N/A, 2023); and Colonoscopy (N/A, 8/3/2023).    Assessment   Body Structures, Functions, Activity Limitations Requiring Skilled Therapeutic Intervention: Decreased functional mobility ;Decreased balance;Decreased strength;Decreased ROM;Increased pain;Decreased endurance  Assessment: Pt needs initially mod x 2 progressing to min x 2 for gait using s cane. Pt remains a high fall risk. Nurse Kiara to ask for clarification order to request ortho to allow WB on right elbow on a platform wheeled walker to increase balance as s cane appears to not provide enough support. Kiara to ask Dr. Asher for ortho consult.  Treatment Diagnosis: impaired mobility due to right UE splint,  LE weakness and limited ROM bilateral knees  Specific Instructions for Next Treatment: advance gait distance- try hemicane otherwise platform wheeled walker 
Please have patient NPO after midnight for gallbladder ultrasound in the a.m. Any questions, please rosa [phone]  
Susana Pearl, NP   Patient:  DONNA DYSON   YOB: 1935  MRN:  [id number]  Location: [facility]    2124-01 4/29/24 8:30 AM   [phone] From: Tricia Shar[facility] RE: DONNA DYSON recurrent chest pain  Unrea   
  XRAY VIEWS OF THE RIGHT WRIST 4/18/2024 11:52 am COMPARISON: October 4, 2023 HISTORY: ORDERING SYSTEM PROVIDED HISTORY: chest pain TECHNOLOGIST PROVIDED HISTORY: chest pain Reason for Exam: chest pain; ORDERING SYSTEM PROVIDED HISTORY: fall TECHNOLOGIST PROVIDED HISTORY: fall Reason for Exam: fall, right wrist and hand pain FINDINGS: Right wrist: Narrowing of the radiocarpal joint space with probable bone on bone distal radius and scaphoid.  Mild subchondral sclerosis involving the proximal scaphoid.  Osteophyte formation subjacent to the distal radioulnar joint.  Osteophyte formation subjacent to the 1st CMC joint.  Remote fracture involving the 5th metacarpal.  Possible remote fracture involving the proximal 4th metacarpal.  Tiny heterotopic calcification of the dorsum of the wrist versus tiny triquetral avulsion fracture.  Soft tissue swelling of the wrist. Chest: Pacemaker overlying the chest. Atelectasis or scarring in the right lung base.  No confluent infiltrate, effusion, or pneumothorax identified.  Cardiac silhouette which is top limits of normal.  Mediastinal silhouette is within normal limits.  Atherosclerotic calcifications involving the thoracic aorta.  Calcified left hilar lymph node.     No acute pulmonary disease identified. Stable borderline enlargement of the heart. Tiny triquetral avulsion fracture versus heterotopic calcification in the dorsum of the wrist.  There is soft tissue swelling of the wrist. Osteoarthrosis and remote 5th metacarpal fracture.     XR WRIST RIGHT (MIN 3 VIEWS)    Result Date: 4/18/2024  EXAMINATION: ONE XRAY VIEW OF THE CHEST;   XRAY VIEWS OF THE RIGHT WRIST 4/18/2024 11:52 am COMPARISON: October 4, 2023 HISTORY: ORDERING SYSTEM PROVIDED HISTORY: chest pain TECHNOLOGIST PROVIDED HISTORY: chest pain Reason for Exam: chest pain; ORDERING SYSTEM PROVIDED HISTORY: fall TECHNOLOGIST PROVIDED HISTORY: fall Reason for Exam: fall, right wrist and hand pain FINDINGS: Right

## 2024-05-02 NOTE — CARE COORDINATION
Care Transitions Initial Follow Up Call    Call within 2 business days of discharge: Yes    Patient Current Location:  Home: [address] [place], [place] [id number]    Care Transition Nurse contacted the patient by telephone to perform post hospital discharge assessment. Verified name and  with patient as identifiers. Provided introduction to self, and explanation of the Care Transition Nurse role.     Patient: Hemanth Barrera              Patient : 1935   MRN: [id number]             Reason for Admission: chest pain, recent falls  Discharge Date: 24         RARS: Readmission Risk Score: 18.7      Last Discharge Facility       Date Complaint Diagnosis Description Type Department Provider    24 Chest Pain; Shortness of Breath Chest pain, unspecified type ED to Hosp-Admission (Discharged) (ADMITTED) Susan Bustos MD; Nate Freeman...            Was this an external facility discharge? No   Challenges to be reviewed by the provider   Additional needs identified to be addressed with provider:   Upcoming appts scheduled               Readmission to [facility] - for chest pain, recent falls. Jay Sánchez + PT + CHF Clinic      Hemanth reports \"doing well\" and is very aware of upcoming appts and plan of care.  Denies any further falls or chest pain.  Reviewed meds and confirms he is taking new statin dose at HS as well as new imdur.  1111F completed.    PT, CHF Clinic + HFU with PCP are all scheduled for next week.  Patient plans to schedule with cardio - usually sees Dr Ames.  Has f/u with Dr Thorpe - [facility] is active with patient.    Informed of Care Transition + CTN contact #.    Plan for next call: symptom management-check any chest pain or falls  follow-up appointment remind/review upcoming appts  medication management-check tolerance of new imdur      Care Transition Nurse reviewed discharge instructions and medical action plan with patient who verbalized understanding. The

## 2024-05-03 ENCOUNTER — TELEPHONE (OUTPATIENT)
Dept: INTERNAL MEDICINE CLINIC | Age: 89
End: 2024-05-03

## 2024-05-07 ENCOUNTER — TELEPHONE (OUTPATIENT)
Dept: INTERNAL MEDICINE CLINIC | Age: 89
End: 2024-05-07

## 2024-05-07 ENCOUNTER — CARE COORDINATION (OUTPATIENT)
Dept: CASE MANAGEMENT | Age: 89
End: 2024-05-07

## 2024-05-07 NOTE — TELEPHONE ENCOUNTER
Pieter Sánchez called to inform pt evaluation setting schedule for twice a week for three weeks and then once a week for three weeks for strength and balance.

## 2024-05-07 NOTE — CARE COORDINATION
Care Transitions Outreach Attempt #1       Attempted to reach patient for transitions of care follow up. Unable to reach patient. Unable to leave message due to VM being full. HIPAA compliant message left on VM of EC requesting a return call. Noted that patient no showed for PT eval today-however it appears [facility].     Patient: Hemanth Barrera Patient : 1935 MRN: [id number]    Last Discharge Facility       Date Complaint Diagnosis Description Type Department Provider    24 Chest Pain; Shortness of Breath Chest pain, unspecified type ED to Hosp-Admission (Discharged) (ADMITTED) Susan Bustos MD; Nate Freeman...              Was this an external facility discharge? No Discharge Facility: [facility]    Noted following upcoming appointments from discharge chart review:   [facility] follow up appointment(s):   Future Appointments   Date Time Provider Department Center   2024 12:00 PM [facility]   2024 12:00 PM [facility] CHF CLINIC RM 2 [facility] [place]   2024  1:30 PM STV PB MED ONC CHAIR 20 [facility]   2024  9:30 AM [facility]reras [facility]   2024 10:00 AM Colin Burleson MD [facility]   10/25/2024 12:45 PM Wolf Sharma MD [facility]        Angelina Islas LPN  Care Transition Nurse

## 2024-05-08 ENCOUNTER — OFFICE VISIT (OUTPATIENT)
Dept: INTERNAL MEDICINE CLINIC | Age: 89
End: 2024-05-08

## 2024-05-08 ENCOUNTER — TELEPHONE (OUTPATIENT)
Dept: ORTHOPEDIC SURGERY | Age: 89
End: 2024-05-08

## 2024-05-08 VITALS
HEIGHT: 73 IN | OXYGEN SATURATION: 98 % | HEART RATE: 84 BPM | DIASTOLIC BLOOD PRESSURE: 82 MMHG | BODY MASS INDEX: 22.53 KG/M2 | SYSTOLIC BLOOD PRESSURE: 140 MMHG | WEIGHT: 170 LBS

## 2024-05-08 DIAGNOSIS — D68.69 SECONDARY HYPERCOAGULABLE STATE (HCC): ICD-10-CM

## 2024-05-08 DIAGNOSIS — I48.0 PAROXYSMAL ATRIAL FIBRILLATION (HCC): ICD-10-CM

## 2024-05-08 DIAGNOSIS — I50.33 ACUTE ON CHRONIC DIASTOLIC CONGESTIVE HEART FAILURE (HCC): ICD-10-CM

## 2024-05-08 DIAGNOSIS — Z09 HOSPITAL DISCHARGE FOLLOW-UP: ICD-10-CM

## 2024-05-08 DIAGNOSIS — S62.101D CLOSED FRACTURE OF RIGHT WRIST WITH ROUTINE HEALING, SUBSEQUENT ENCOUNTER: ICD-10-CM

## 2024-05-08 DIAGNOSIS — I10 PRIMARY HYPERTENSION: Primary | ICD-10-CM

## 2024-05-08 SDOH — ECONOMIC STABILITY: FOOD INSECURITY: WITHIN THE PAST 12 MONTHS, THE FOOD YOU BOUGHT JUST DIDN'T LAST AND YOU DIDN'T HAVE MONEY TO GET MORE.: PATIENT DECLINED

## 2024-05-08 SDOH — ECONOMIC STABILITY: FOOD INSECURITY: WITHIN THE PAST 12 MONTHS, YOU WORRIED THAT YOUR FOOD WOULD RUN OUT BEFORE YOU GOT MONEY TO BUY MORE.: PATIENT DECLINED

## 2024-05-08 SDOH — ECONOMIC STABILITY: HOUSING INSECURITY
IN THE LAST 12 MONTHS, WAS THERE A TIME WHEN YOU DID NOT HAVE A STEADY PLACE TO SLEEP OR SLEPT IN A SHELTER (INCLUDING NOW)?: PATIENT DECLINED

## 2024-05-08 SDOH — ECONOMIC STABILITY: INCOME INSECURITY: HOW HARD IS IT FOR YOU TO PAY FOR THE VERY BASICS LIKE FOOD, HOUSING, MEDICAL CARE, AND HEATING?: PATIENT DECLINED

## 2024-05-08 ASSESSMENT — PATIENT HEALTH QUESTIONNAIRE - PHQ9
SUM OF ALL RESPONSES TO PHQ QUESTIONS 1-9: 0
1. LITTLE INTEREST OR PLEASURE IN DOING THINGS: NOT AT ALL
SUM OF ALL RESPONSES TO PHQ9 QUESTIONS 1 & 2: 0
SUM OF ALL RESPONSES TO PHQ QUESTIONS 1-9: 0
2. FEELING DOWN, DEPRESSED OR HOPELESS: NOT AT ALL

## 2024-05-08 NOTE — TELEPHONE ENCOUNTER
Patient called regarding concerns for his splint and also stated he has issues with transportation.      He was in [facility] 04/18/24-04/20/24. Dr. Thorpe consulted on 04/19/24. He has not had a follow up appointment with Dr. Thorpe.    Please return his call to [phone].  Thank you.

## 2024-05-08 NOTE — PROGRESS NOTES
Subjective     Patient ID: Hemanth Barrera is a 89 y.o. male.    HPI  atient, is here for evaluation multiple medical problems.  Atrial fibrillation, on anticoagulation, CHF on Bumex , sick sinus syndrome status post AICD, S/p Partial gastrectomy in past   He has chronic anemia, following with hematologist, had BM Biospy in past, on Vitamin B12 Replacement ( May have Low grade MDS per Dr Sharma ) , Had EGD and Colonoscopy , when he was in hospital in August   Has Anemia - on Iron   Has CKD , follows with nephrologist   On suboxone   Labs done recently is OK   Review of Systems       Objective   Physical Exam       Assessment & Plan            Visit Information    Have you changed or started any medications since your last visit including any over-the-counter medicines, vitamins, or herbal medicines? no   Are you having any side effects from any of your medications? -  no  Have you stopped taking any of your medications? Is so, why? -  no    Have you seen any other physician or provider since your last visit? Yes - Records Obtained  Have you had any other diagnostic tests since your last visit? No  Have you been seen in the emergency room and/or had an admission to a hospital since we last saw you? Yes - Records Obtained  Have you had your routine dental cleaning in the past 6 months? no    Have you activated your [employer] account? If not, what are your barriers? No     Patient Care Team:  Neftaly Contreras MD as PCP - General (Internal Medicine)  Neftaly Conterras MD as PCP - Empaneled Provider  Wolf Sharma MD as Consulting Physician (Hematology and Oncology)  Cal Chaparro MD as Consulting Physician (Orthopedic Surgery)  Kristin Fermin RN as Care Transitions Nurse    Medical History Review  Past Medical, Family, and Social History reviewed and does not contribute to the patient presenting condition    Health Maintenance   Topic Date Due    DTaP/Tdap/Td vaccine (1 - Tdap) Never done    Shingles vaccine (1

## 2024-05-08 NOTE — TELEPHONE ENCOUNTER
Spoke with patient and informed him that if he could get in today we would be able to schedule him at 3pm today. He stated that he would call office back.

## 2024-05-09 ENCOUNTER — HOSPITAL ENCOUNTER (OUTPATIENT)
Age: 89
Setting detail: SPECIMEN
Discharge: HOME | End: 2024-05-09

## 2024-05-09 ENCOUNTER — HOSPITAL ENCOUNTER (OUTPATIENT)
Dept: OTHER | Age: 89
Discharge: HOME | End: 2024-05-09

## 2024-05-09 PROBLEM — D68.69 SECONDARY HYPERCOAGULABLE STATE (HCC): Status: ACTIVE | Noted: 2024-05-09

## 2024-05-09 PROBLEM — I50.33 ACUTE ON CHRONIC DIASTOLIC CONGESTIVE HEART FAILURE (HCC): Status: ACTIVE | Noted: 2022-01-13

## 2024-05-09 NOTE — PROGRESS NOTES
P/C from pt, he needs to R/S his CHF appointment today. Pt is having transportation issues so is unable to make today's appointment. Appointment R/S with pt to 5/17/24 @ 11:30 am. Pt confirms.

## 2024-05-10 ENCOUNTER — CARE COORDINATION (OUTPATIENT)
Dept: CASE MANAGEMENT | Age: 89
End: 2024-05-10

## 2024-05-10 ENCOUNTER — TELEPHONE (OUTPATIENT)
Dept: INTERNAL MEDICINE CLINIC | Age: 89
End: 2024-05-10

## 2024-05-10 NOTE — CARE COORDINATION
Care Transitions Follow Up Call    Patient Current Location:  Home: [address] [place] [id number]    LPN Care Coordinator contacted the patient by telephone to follow up after admission on 2024.  Verified name and  with patient as identifiers.    Patient: Hemanth Barrera  Patient : 1935   MRN: [id number]  Reason for Admission: chest pain, recent falls   Discharge Date: 24 RARS: Readmission Risk Score: 18.7      Needs to be reviewed by the provider   Additional needs identified to be addressed with provider: No  none             Method of communication with provider: none.    Writer spoke with Hemanth for a follow up care transitions call. He states he has been feeling good. Denies chest pain,palpitations,SOB or dizziness. He states he has not had any recent falls. Denies pain in his wrist-still has cast on. He had his PCP follow up and no changes were made. He states he is having his PT eval through home health this afternoon. He does express concerns with transportation-many of his follow ups are in the [place] area and it is difficult for his grandson who usually takes him to get off work. Will refer to  to see if they are able to provide resources for him.     Addressed changes since last contact:   PCP follow up-no changes  Discussed follow-up appointments. If no appointment was previously scheduled, appointment scheduling offered: Yes.   Is follow up appointment scheduled within 7 days of discharge? No.    Follow Up  Future Appointments   Date Time Provider Department Center   2024 11:30 AM [facility] CHF CLINIC RM 2 [facility] [place]   2024  1:30 PM STV PB MED ONC CHAIR 20 [facility] [place]   2024  9:30 AM Ne[facility] Contreras MD [facility]   2024 10:00 AM Colin Burleson MD [facility]   10/25/2024 12:45 PM Wolf Sharma MD [facility]     External follow up appointment(s): N/A    LPN Care Coordinator

## 2024-05-13 ENCOUNTER — CARE COORDINATION (OUTPATIENT)
Dept: CARE COORDINATION | Age: 89
End: 2024-05-13

## 2024-05-14 ENCOUNTER — CARE COORDINATION (OUTPATIENT)
Dept: CASE MANAGEMENT | Age: 89
End: 2024-05-14

## 2024-05-14 NOTE — CARE COORDINATION
received referral for Hemanth to assist with transportation.      contacted Hemanth's [employer] who does not offer transportation services.     contacted Hemanth regarding referral for transportation.  Hemanth reports that he broke his arm and is unable to drive.  Hemanth reports that his grandson can take him sometimes but hard because he has to take off work.     Hemanth is unable to get Senior Transportation due to living in [facility]. Senior Transportation books out a month in advanced.      [facility]     On 5/17 p/u @ 10:30 am (11:30 am appt) going to  [place] [address]     On 5/24 p/u @ 12:30 PM (1:30 PM appt) going to [facility] [address] [facility]     On 5/31 p/u @ 8:30 AM (9:30 AM appt) going to [facility] [address]         spoke to Hemanth who reports that he needs to schedule an appointment to get his cast removed.   encouraged Hemanth to schedule appointment and  will follow up with Hemanth regarding new appointment.      Plan:   Hemanth will schedule removal of cast.     will work with Erin regarding transportation.

## 2024-05-14 NOTE — CARE COORDINATION
Care Transitions Follow Up Call    Patient Current Location:  Home: [address] [place] [id number]    [facility] Care Coordinator contacted the patient by telephone to follow up after admission on 24.  Verified name and  with patient as identifiers.    Patient: Hemanth Barrera  Patient : 1935   MRN: [id number]  Reason for Admission: chest pain recent falls   Discharge Date: 24 RARS: Readmission Risk Score: 18.7      Needs to be reviewed by the provider   Additional needs identified to be addressed with provider: No  none             Method of communication with provider: none.    Subsequent transitional call. Spoke to Hemanth today he reports that he is doing well no cp or palpitations. Denies HA dizziness sob. Pt had a nurse visit yesterday. He has another visit on Thursday Jay Sánchez. He has PT coming tomorrow using cane has not had any falls since last call. Swelling in legs down wearing compression hose.  Appetite good. B/B wnl .  has been assisted via  with appointment. Writer reviewed upcoming appointments with him. -CHF - oncology -  injection   pcp appointment . He stated he has appointment with [facility] later this month. He also stated that he has information to call and make appointment to get cast removed.     Addressed changes since last contact:   appointments sent up reviewed upcoming appointments need to make appointment to take cast off   Discussed follow-up appointments. If no appointment was previously scheduled, appointment scheduling offered: Yes.   Is follow up appointment scheduled within 7 days of discharge? No.    Follow Up  Future Appointments   Date Time Provider Department Center   2024 12:30 PM [facility] CHF CLINIC RM 2 [facility] CHFCLIN [place]   2024  1:30 PM STV PB MED ONC CHAIR 20 [facility] PB [facility]   2024  9:30 AM Neftaly Contreras MD [facility]   2024 10:00 AM Colin Burleson MD [facility]al[facility] AFL Renal Se

## 2024-05-17 ENCOUNTER — HOSPITAL ENCOUNTER (OUTPATIENT)
Dept: OTHER | Age: 89
Discharge: HOME | End: 2024-05-17
Payer: MEDICARE

## 2024-05-17 ENCOUNTER — HOSPITAL ENCOUNTER (OUTPATIENT)
Dept: OTHER | Age: 89
End: 2024-05-17
Payer: MEDICARE

## 2024-05-17 ENCOUNTER — HOSPITAL ENCOUNTER (OUTPATIENT)
Age: 89
Setting detail: SPECIMEN
Discharge: HOME | End: 2024-05-17
Payer: MEDICARE

## 2024-05-17 ENCOUNTER — CARE COORDINATION (OUTPATIENT)
Dept: CARE COORDINATION | Age: 89
End: 2024-05-17

## 2024-05-17 VITALS
OXYGEN SATURATION: 100 % | BODY MASS INDEX: 24.15 KG/M2 | WEIGHT: 182.2 LBS | RESPIRATION RATE: 18 BRPM | SYSTOLIC BLOOD PRESSURE: 132 MMHG | HEART RATE: 64 BPM | DIASTOLIC BLOOD PRESSURE: 62 MMHG | HEIGHT: 73 IN

## 2024-05-17 LAB
ANION GAP SERPL CALCULATED.3IONS-SCNC: 10 MMOL/L (ref 9–17)
BNP SERPL-MCNC: 1444 PG/ML
BUN SERPL-MCNC: 24 MG/DL (ref 8–23)
CHLORIDE SERPL-SCNC: 105 MMOL/L (ref 98–107)
CO2 SERPL-SCNC: 26 MMOL/L (ref 20–31)
CREAT SERPL-MCNC: 1.1 MG/DL (ref 0.7–1.2)
GFR, ESTIMATED: 64 ML/MIN/1.73M2
POTASSIUM SERPL-SCNC: 3.9 MMOL/L (ref 3.7–5.3)
SODIUM SERPL-SCNC: 141 MMOL/L (ref 135–144)

## 2024-05-17 PROCEDURE — 83880 ASSAY OF NATRIURETIC PEPTIDE: CPT

## 2024-05-17 PROCEDURE — 80051 ELECTROLYTE PANEL: CPT

## 2024-05-17 PROCEDURE — 82565 ASSAY OF CREATININE: CPT

## 2024-05-17 PROCEDURE — 36415 COLL VENOUS BLD VENIPUNCTURE: CPT

## 2024-05-17 PROCEDURE — 84520 ASSAY OF UREA NITROGEN: CPT

## 2024-05-17 PROCEDURE — 99202 OFFICE O/P NEW SF 15 MIN: CPT

## 2024-05-17 NOTE — PROGRESS NOTES
Tried to call pt to remind him about appt tomorrow; 5/17/2024 at 12:30pm; unable to reach pt by numbers listed. Person that answered stated no one here by that name. Tried contact number also.   
patient encouraged to keep all follow up appointments.  Patient verbalized understanding.    Other Education and/or comments:   Pt watches CHF educational video. Given and reviewed book, \"Living with Heart Failure\" and handouts, \"Heart Zones\" and additional materials on Cardiac, low sodium diet with  sodium tracker. Pt watched educational video, \"Eating well with Heart Disease\"    Further Assessment / Plan     Recommendations for patient this visit:  Call to schedule follow up apt with Cardiology asap.  1500mg Sodium and 2L fluid daily restrictions  Daily weight and BP, HR log, bring with you to appointments.  Bring all medications to next apt.     New Orders:   Pending    Follow-up plans:  Pt to repeat labs at next CHF Clinic apt 6/5/24

## 2024-05-17 NOTE — CARE COORDINATION
spoke with Hemanth regarding his appointments.  Hemanth reports that he scheduled is appointment with Ortho on 5/20 @ 1:30     left message for Erin with [facility].  Erin asked for specifics and  sent her information.     contacted Hemanth and informed him that Erin will schedule transportation for his Ortho appointment.     Plan:    will follow up on Monday to confirm everything is arranged.
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
## 2024-05-20 ENCOUNTER — CARE COORDINATION (OUTPATIENT)
Dept: CARE COORDINATION | Age: 89
End: 2024-05-20

## 2024-05-20 ENCOUNTER — OFFICE VISIT (OUTPATIENT)
Dept: ORTHOPEDIC SURGERY | Age: 89
End: 2024-05-20
Payer: MEDICARE

## 2024-05-20 ENCOUNTER — TELEPHONE (OUTPATIENT)
Dept: INTERNAL MEDICINE CLINIC | Age: 89
End: 2024-05-20

## 2024-05-20 VITALS — HEIGHT: 73 IN | BODY MASS INDEX: 24.12 KG/M2 | WEIGHT: 182 LBS

## 2024-05-20 DIAGNOSIS — R52 PAIN: Primary | ICD-10-CM

## 2024-05-20 DIAGNOSIS — S62.111A TRIQUETRAL CHIP FRACTURE, RIGHT, CLOSED, INITIAL ENCOUNTER: ICD-10-CM

## 2024-05-20 PROCEDURE — G8427 DOCREV CUR MEDS BY ELIG CLIN: HCPCS | Performed by: STUDENT IN AN ORGANIZED HEALTH CARE EDUCATION/TRAINING PROGRAM

## 2024-05-20 PROCEDURE — G8420 CALC BMI NORM PARAMETERS: HCPCS | Performed by: STUDENT IN AN ORGANIZED HEALTH CARE EDUCATION/TRAINING PROGRAM

## 2024-05-20 PROCEDURE — 1123F ACP DISCUSS/DSCN MKR DOCD: CPT | Performed by: RADIOLOGY

## 2024-05-20 PROCEDURE — 1036F TOBACCO NON-USER: CPT | Performed by: STUDENT IN AN ORGANIZED HEALTH CARE EDUCATION/TRAINING PROGRAM

## 2024-05-20 PROCEDURE — 99213 OFFICE O/P EST LOW 20 MIN: CPT | Performed by: CHIROPRACTOR

## 2024-05-20 PROCEDURE — 1111F DSCHRG MED/CURRENT MED MERGE: CPT | Performed by: STUDENT IN AN ORGANIZED HEALTH CARE EDUCATION/TRAINING PROGRAM

## 2024-05-20 NOTE — TELEPHONE ENCOUNTER
Received call from Yudelka asking if we have received the home care orders that was sent on 5/9 and 5/17. Was not able to confirm at that time.    Asked for her to fax them again to me and I would make sure they were put in the Physicians box.    Received forms and placed in physicians box.

## 2024-05-20 NOTE — PROGRESS NOTES
[facility]  [address]  [place] [address]  Dept: [phone]  Dept Fax: [phone]        Orthopaedic Trauma Clinic Follow Up      Subjective:   Date of Injury: 4/18/24  Right triquetral fracture    Hemanth Barrera is a 89 y.o. right hand dominant male who presents to the clinic today for routine followup regarding the above. He denies any new injury or falls since last presenting to our clinic on 4/19/24. He reports wearing his wrist splint without any issues. He endorses adhering to 5lb weight restriction. Endorses working on finger motion. Denies any pain elsewhere at this time, there are no other orthopedic complaints.    Review of Systems  Gen: no fever, chills, malaise  CV: no chest pain or palpitations  Resp: no cough or shortness of breath  GI: no nausea, vomiting, diarrhea, or constipation  Neuro: no numbness, tingling, or weakness  Msk: right wrist pain  10 remaining systems reviewed and negative    Objective :   There were no vitals filed for this visit.Body mass index is 24.02 kg/m².  General: No acute distress, resting comfortably in the clinic  Neuro: alert. oriented  Eyes: Extra-ocular muscles intact  Pulm: Respirations unlabored and regular.  Skin: warm, well perfused  Psych:   Patient has good fund of knowledge and displays understanging of exam, diagnosis, and plan.  MSK:    RUE  No ecchymoses, abrasion, deformity, or lacerations. Skin intact. Tender to palpation about the dorsal wrist. Compartments soft. Ulnar/Median/AIN/PIN/Radial motor intact. Axillary/Median/Radial/Ulnar nerve SILT. Hand warm and well perfused.  AROM wrist:   30 degrees flexion with pain to dorsum of wrist  40 degrees extension without significant pain    Radiology:  History:   88yo RHD male with right triquetral fracture    Comparison:   4/18/24    Findings:   3 radiographic views of the right wrist (AP, oblique, lateral) showing well

## 2024-05-20 NOTE — CARE COORDINATION
Erin has Hemanth set up with B/W p/u on 5/20 @12:30 pm (1:30pm appt) going to [address] [facility].    Vashti is aware.    Plan:    will contact Hemanth regarding having cast removed from arm in order to start driving.

## 2024-05-21 ENCOUNTER — TELEPHONE (OUTPATIENT)
Dept: INTERNAL MEDICINE CLINIC | Age: 89
End: 2024-05-21

## 2024-05-21 ENCOUNTER — TELEPHONE (OUTPATIENT)
Dept: OTHER | Age: 89
End: 2024-05-21

## 2024-05-21 NOTE — TELEPHONE ENCOUNTER
Received return call from nephrologist, Dr. DARY Villanueva. Discussed assessment and lab results. He gives new instructions to increase Bumex to 2mg in the am and 1 mg in the pm and repeat labs and reassess in 1 week.     Called pt and provided instructions. He denies further weight gain or swelling. Scheduled follow-up for 5/29/24.

## 2024-05-21 NOTE — TELEPHONE ENCOUNTER
[facility] calling in request to receive last face to face office note for start on home care    Fax #[phone]    Faxed KE

## 2024-05-24 ENCOUNTER — HOSPITAL ENCOUNTER (OUTPATIENT)
Dept: INFUSION THERAPY | Age: 89
Discharge: HOME | End: 2024-05-24
Payer: MEDICARE

## 2024-05-24 ENCOUNTER — CARE COORDINATION (OUTPATIENT)
Dept: CASE MANAGEMENT | Age: 89
End: 2024-05-24

## 2024-05-24 DIAGNOSIS — E53.8 B12 DEFICIENCY: Primary | ICD-10-CM

## 2024-05-24 PROCEDURE — 96372 THER/PROPH/DIAG INJ SC/IM: CPT

## 2024-05-24 PROCEDURE — 6360000002 HC RX W HCPCS: Performed by: INTERNAL MEDICINE

## 2024-05-24 RX ORDER — CYANOCOBALAMIN 1000 UG/ML
1000 INJECTION, SOLUTION INTRAMUSCULAR; SUBCUTANEOUS ONCE
Start: 2024-06-21

## 2024-05-24 RX ORDER — FAMOTIDINE 10 MG/ML
20 INJECTION, SOLUTION INTRAVENOUS
OUTPATIENT
Start: 2024-06-21

## 2024-05-24 RX ORDER — CYANOCOBALAMIN 1000 UG/ML
1000 INJECTION, SOLUTION INTRAMUSCULAR; SUBCUTANEOUS ONCE
Status: COMPLETED
Start: 2024-05-24 | End: 2024-05-24

## 2024-05-24 RX ORDER — ACETAMINOPHEN 325 MG/1
325 TABLET ORAL
OUTPATIENT
Start: 2024-06-21

## 2024-05-24 RX ORDER — EPINEPHRINE 1 MG/ML
0.3 INJECTION, SOLUTION, CONCENTRATE INTRAVENOUS PRN
OUTPATIENT
Start: 2024-06-21

## 2024-05-24 RX ORDER — SODIUM CHLORIDE 9 MG/ML
INJECTION, SOLUTION INTRAVENOUS CONTINUOUS
OUTPATIENT
Start: 2024-06-21

## 2024-05-24 RX ORDER — DIPHENHYDRAMINE HYDROCHLORIDE 50 MG/ML
50 INJECTION INTRAMUSCULAR; INTRAVENOUS
OUTPATIENT
Start: 2024-06-21

## 2024-05-24 RX ORDER — ONDANSETRON 2 MG/ML
8 INJECTION INTRAMUSCULAR; INTRAVENOUS
OUTPATIENT
Start: 2024-06-21

## 2024-05-24 RX ORDER — ACETAMINOPHEN 325 MG/1
650 TABLET ORAL
OUTPATIENT
Start: 2024-06-21

## 2024-05-24 RX ORDER — ALBUTEROL SULFATE 90 UG/1
4 AEROSOL, METERED RESPIRATORY (INHALATION) PRN
OUTPATIENT
Start: 2024-06-21

## 2024-05-24 RX ADMIN — CYANOCOBALAMIN 1000 MCG: 1000 INJECTION, SOLUTION INTRAMUSCULAR; SUBCUTANEOUS at 13:27

## 2024-05-24 NOTE — CARE COORDINATION
Care Transitions Follow Up Call    Patient Current Location:  Home: [address] [facility] [address]    [facility] Care Coordinator contacted the patient by telephone to follow up after admission on 24 .  Verified name and  with patient as identifiers.    Patient: Hemanth Barrera  Patient : 1935   MRN: [id number]  Reason for Admission: Chest pain   Discharge Date: 24 RARS: Readmission Risk Score: 18.7      Needs to be reviewed by the provider   Additional needs identified to be addressed with provider: No  none             Method of communication with provider: none.    Writer spoke to Hemanth today he stated that he went and go his infusion today. He reports that he's doing fine today no cp sob increased swelling his nephrologist Dr. Villanueva gave instructions for him to increase Bumex  to 2 mg in the am and 1 mg in the pm he stated he is taking as ordered. Writer discussed limiting foods with added protein canned foods, fast foods added table salt. Offered referral to dietician he accepted.     He attended ortho appointment on 24 notes reviewed with him. He has no issues today with Rt hand. He continues to wear brace was given referral for OT he stated that the michael has already been out to see him through HC. Pt has CHF clinic appointment 24 notes reviewed he stated that he learned a lot of new information he is aware of next appointment  24. He has office visit with pcp 24.     Writer did call Jay Sánchez spoke to Negrita she did confirm that services are current for patient however she transferred writer to speak to Michell. Michell stated that pt was seen by nursing on 24 next visit 24 Pt was seen for PT on 24 but he cancelled this week next PT visit 24. Pt also cancelled OT  next visit will be 24.     Addressed changes since last contact:   CHF appointment completed ortho appointment completed   Discussed follow-up appointments. If no appointment was 
corrective lenses/Partially impaired: cannot see medication labels or newsprint, but can see obstacles in path, and the surrounding layout; can count fingers at arm's length

## 2024-05-29 ENCOUNTER — HOSPITAL ENCOUNTER (OUTPATIENT)
Dept: OTHER | Age: 89
Discharge: HOME | End: 2024-05-29

## 2024-05-29 NOTE — PROGRESS NOTES
Called patient x 2 attempts to remind him of heart failure appointment tomorrow at 11:00, unable to leave message.

## 2024-05-30 ENCOUNTER — HOSPITAL ENCOUNTER (OUTPATIENT)
Dept: OTHER | Age: 89
Discharge: HOME | End: 2024-05-30

## 2024-05-30 ENCOUNTER — CARE COORDINATION (OUTPATIENT)
Dept: CARE COORDINATION | Age: 89
End: 2024-05-30

## 2024-05-30 ENCOUNTER — HOSPITAL ENCOUNTER (OUTPATIENT)
Age: 89
Setting detail: SPECIMEN
Discharge: HOME | End: 2024-05-30

## 2024-05-30 ENCOUNTER — CARE COORDINATION (OUTPATIENT)
Dept: CASE MANAGEMENT | Age: 89
End: 2024-05-30

## 2024-05-30 NOTE — PROGRESS NOTES
Pt called in wanting to know if transportation was set up for his CHF Clinic apt today. I did not set up transportation and was unaware of need. Will call pt back to reschedule apt. Called social work for assistance.

## 2024-05-30 NOTE — CARE COORDINATION
Deandrer received a message from  [facility]  [place] [facility] requesting assistance getting pt to his appointment today and HFU tomorrow. Writer will ask SW to assist .

## 2024-05-31 ENCOUNTER — CARE COORDINATION (OUTPATIENT)
Dept: CASE MANAGEMENT | Age: 89
End: 2024-05-31

## 2024-05-31 ENCOUNTER — OFFICE VISIT (OUTPATIENT)
Dept: INTERNAL MEDICINE CLINIC | Age: 89
End: 2024-05-31

## 2024-05-31 ENCOUNTER — TELEPHONE (OUTPATIENT)
Dept: OTHER | Age: 89
End: 2024-05-31

## 2024-05-31 VITALS
DIASTOLIC BLOOD PRESSURE: 72 MMHG | BODY MASS INDEX: 24.65 KG/M2 | HEIGHT: 72 IN | OXYGEN SATURATION: 97 % | WEIGHT: 182 LBS | SYSTOLIC BLOOD PRESSURE: 136 MMHG | HEART RATE: 67 BPM

## 2024-05-31 DIAGNOSIS — I48.0 PAROXYSMAL ATRIAL FIBRILLATION (HCC): ICD-10-CM

## 2024-05-31 DIAGNOSIS — D50.9 IRON DEFICIENCY ANEMIA, UNSPECIFIED IRON DEFICIENCY ANEMIA TYPE: ICD-10-CM

## 2024-05-31 DIAGNOSIS — I10 PRIMARY HYPERTENSION: Primary | ICD-10-CM

## 2024-05-31 DIAGNOSIS — K21.00 GASTROESOPHAGEAL REFLUX DISEASE WITH ESOPHAGITIS, UNSPECIFIED WHETHER HEMORRHAGE: ICD-10-CM

## 2024-05-31 DIAGNOSIS — I50.22 CHRONIC SYSTOLIC (CONGESTIVE) HEART FAILURE (HCC): ICD-10-CM

## 2024-05-31 DIAGNOSIS — N18.32 STAGE 3B CHRONIC KIDNEY DISEASE (HCC): ICD-10-CM

## 2024-05-31 RX ORDER — FERROUS SULFATE 325(65) MG
325 TABLET ORAL EVERY OTHER DAY
Qty: 90 TABLET | Refills: 0 | Status: SHIPPED | OUTPATIENT
Start: 2024-05-31

## 2024-05-31 RX ORDER — BUMETANIDE 2 MG/1
2 TABLET ORAL 2 TIMES DAILY
Qty: 60 TABLET | Refills: 2 | Status: SHIPPED | OUTPATIENT
Start: 2024-05-31 | End: 2024-08-29

## 2024-05-31 SDOH — ECONOMIC STABILITY: FOOD INSECURITY: WITHIN THE PAST 12 MONTHS, YOU WORRIED THAT YOUR FOOD WOULD RUN OUT BEFORE YOU GOT MONEY TO BUY MORE.: PATIENT DECLINED

## 2024-05-31 SDOH — ECONOMIC STABILITY: FOOD INSECURITY: WITHIN THE PAST 12 MONTHS, THE FOOD YOU BOUGHT JUST DIDN'T LAST AND YOU DIDN'T HAVE MONEY TO GET MORE.: PATIENT DECLINED

## 2024-05-31 SDOH — ECONOMIC STABILITY: INCOME INSECURITY: HOW HARD IS IT FOR YOU TO PAY FOR THE VERY BASICS LIKE FOOD, HOUSING, MEDICAL CARE, AND HEATING?: PATIENT DECLINED

## 2024-05-31 ASSESSMENT — PATIENT HEALTH QUESTIONNAIRE - PHQ9
SUM OF ALL RESPONSES TO PHQ QUESTIONS 1-9: 0
SUM OF ALL RESPONSES TO PHQ9 QUESTIONS 1 & 2: 0
2. FEELING DOWN, DEPRESSED OR HOPELESS: NOT AT ALL
SUM OF ALL RESPONSES TO PHQ QUESTIONS 1-9: 0
1. LITTLE INTEREST OR PLEASURE IN DOING THINGS: NOT AT ALL

## 2024-05-31 ASSESSMENT — ENCOUNTER SYMPTOMS
BACK PAIN: 0
CHEST TIGHTNESS: 0
DIARRHEA: 0
CONSTIPATION: 0
COLOR CHANGE: 0
COUGH: 0
SHORTNESS OF BREATH: 0
ABDOMINAL DISTENTION: 0
ABDOMINAL PAIN: 0
APNEA: 0
FACIAL SWELLING: 0
WHEEZING: 0

## 2024-05-31 NOTE — TELEPHONE ENCOUNTER
Called patient to reschedule missed CHF Clinic appointment. Pt reports that he does not have transportation to get to appointments and can not reschedule. He states he has a home care nurse that is monitoring his care. Advised to call if and when he is able to reschedule.

## 2024-05-31 NOTE — CARE COORDINATION
spoke with Hemanth regarding transportation.  Hemanth reports that he missed his Cardiology appointment due to no transportation.     reached out to Erin with [employer] requesting help with transportation.  Erin was agreeable to assist one more time for PCP appointment today on 5/31 @ 9:30.     contacted Hemanth and informed him that he will be picked up for appointment and will need to be ready by 8:30 am .  eHmanth was agreeable and very appreciative.     Plan:   Hemanth will attend PCP appointment today and transportation by black [employer] white.

## 2024-05-31 NOTE — CARE COORDINATION
Care Transitions - noted appt with PCP today - will defer transition call to early next week.    Noted that SW arranged transportation for Hemanth to PCP appt and that patient has been having transportation difficulty recently.  He was unable to get to his most recent CHF Clinic appt    Patient: Hemanth Barrera              Patient : 1935   MRN: [id number]             Reason for Admission: chest pain, recent falls, CHF  Discharge Date: 24         RARS: Readmission Risk Score: 18.7          Follow Up  Future Appointments   Date Time Provider Department Center   2024 10:00 AM Colin Burleson MD [facility] Renal[facility]rv AFL Renal Se   2024 11:30 AM Larry Quispe DPM [place] [facility]   2024  1:30 PM STV PB MED ONC CHAIR 09 [facility] [place]   7/15/2024 11:45 AM Mc Thorpe DO ORTHO SPECIA [facility]   2024  1:30 PM STV PB MED ONC CHAIR 09 [facility] [place]   2024  9:15 AM Neftaly Contreras MD [place] [facility]   10/25/2024 12:45 PM Wolf Sharma MD [facility]     Plan for next call: symptom management-check weight gain, CHF s/s  follow-up appointment-review  PCP appt   medication management-check if patient is taking bumex correctly  referrals-plan for ACM referral  Check re: transportation plan for future (SW following)    Kristin Fermin RN

## 2024-05-31 NOTE — PROGRESS NOTES
anemia type   s/p bone marrow biopsy   Need to be on iron tablet every other day per hematologist  Patient is on was not taking his iron tablets      More than 35 minutes spent with the patient explaining all his medications  Need to keep appointment with with heart failure clinic  He already follows with Ortho with fracture of right forearm      Return in about 3 months (around 8/31/2024).    Reviewed prior labs and health maintenance.      Discussed use, benefit, and side effects of prescribed medications.  Barriers to medication compliance addressed.  All patient questions answered.  Pt voiced understanding.         Neftaly Contreras MD  [facility]  5/31/2024, 9:50 AM    Please note that this chart was generated using voice recognition Dragon dictation software.  Although every effort was made to ensure the accuracy of this automated transcription, some errors in transcription may have occurred.       Visit Information    Have you changed or started any medications since your last visit including any over-the-counter medicines, vitamins, or herbal medicines? no   Are you having any side effects from any of your medications? -  no  Have you stopped taking any of your medications? Is so, why? -  no    Have you seen any other physician or provider since your last visit? Yes - Records Obtained  Have you had any other diagnostic tests since your last visit? No  Have you been seen in the emergency room and/or had an admission to a hospital since we last saw you? No  Have you had your routine dental cleaning in the past 6 months? no    Have you activated your [employer] account? If not, what are your barriers? No     Patient Care Team:  Neftaly Contreras MD as PCP - General (Internal Medicine)  Neftaly Contreras MD as PCP - Empaneled Provider  Wolf Sharma MD as Consulting Physician (Hematology and Oncology)  Cal Chaparro MD as Consulting Physician (Orthopedic Surgery)  Kristin Fermin RN as Care Transitions

## 2024-06-04 ENCOUNTER — CARE COORDINATION (OUTPATIENT)
Dept: CASE MANAGEMENT | Age: 89
End: 2024-06-04

## 2024-06-04 ENCOUNTER — CARE COORDINATION (OUTPATIENT)
Dept: CARE COORDINATION | Age: 89
End: 2024-06-04

## 2024-06-04 NOTE — CARE COORDINATION
received message from ALMA Hernandez requesting  to follow up with Hemanth.      contacted Hemanth to follow up on  needs.   Hemanth reports that he has appointments coming up and wanted help with transportation.  Hemanth reports that he has been able to start driving a little bit with his arm out of the cast.      noted that [facility] is no longer able to assist with transportation per Erin.  spoke with Hemanth about Medicaid.  Hemanth reports that he is over the income guidelines and does not qualify for Medicaid.      Hemanth reports that his son was agreeable to take off a day of work to take him to CHF clinic because he wants to hear what the cardiologist has to say about Hemanth.     gave Hemanth the [facility] number to see if they could help with other upcoming appointments.     Plan:   Hemanth will contact [facility].    Hemanth will speak with his son about upcoming appointments and needing transportation.

## 2024-06-04 NOTE — CARE COORDINATION
Care Transitions Note    Follow Up Call  # 1    Attempted to reach patient for transitions of care follow up. Unable to reach patient.Left HIPAA compliant VM requesting a return call. Writer called [facility] spoke to Michell and Lisseth .PT has PT Visit tomorrow 24. Nursing visit scheduled 6/10/24. Pt last nursing visit was 6/3/24. Last weight for pt on 6/3/24 was 170 24 173. Writer asked if nurses could keep eye on pt's weight. Michell stated she would let them know.     Outreach Attempts:   Multiple attempts to contact patient at phone numbers on file.   HIPAA compliant voicemail left for patient.     Patient: Hemanth Barrera    Patient : 1935   MRN: [id number]    Reason for Admission: chest pain shortness ob breath  Discharge Date: 24  RURS: Readmission Risk Score: 18.7    Last Discharge Facility       Date Complaint Diagnosis Description Type Department Provider    24 Chest Pain; Shortness of Breath Chest pain, unspecified type ED to Hosp-Admission (Discharged) (ADMITTED) Susan Bustos MD; Nate Freeman...            Was this an external facility discharge? No    Follow Up Appointment:   Patient has hospital follow up appointment scheduled within 14 days of discharge.    Future Appointments         Provider Specialty Dept Phone    2024 10:00 AM Colin Burleson MD Nephrology [phone]    2024 11:30 AM Larry Quispe DPM Podiatry [phone]    2024 1:30 PM STV PB MED ONC CHAIR 09 Infusion Therapy [phone]    7/15/2024 11:45 AM Mc Thorpe DO Orthopedic Surgery [phone]    2024 1:30 PM STV PB MED ONC CHAIR 09 Infusion Therapy [phone]    2024 9:15 AM Neftaly Contreras MD Internal Medicine [phone]    10/25/2024 12:45 PM Wolf Sharma MD Oncology [phone]            Plan for follow-up call in 2-5 days     Mary Saunders LPN

## 2024-06-05 ENCOUNTER — CARE COORDINATION (OUTPATIENT)
Dept: CASE MANAGEMENT | Age: 89
End: 2024-06-05

## 2024-06-05 NOTE — CARE COORDINATION
Care Transitions Note    Final Call      Attempted to reach patient for transitions of care follow up.  Unable to reach patient. Left HIPAA compliant VM requesting a return call on pt's VM and ER son ARELY. Will resolve episode if no return call. Pt will be referred to [facility].     Outreach Attempts:   Multiple attempts to contact patient, family, Son Dusty  at phone numbers on file.   HIPAA compliant voicemail left for patient.     Patient graduated from the Care Transitions program on 6/5/24.  Patient/family  referral to [facility]  .      Handoff:   Condition management for CHF.     Care Summary Note: Writer unable to reach pt for 2nd attempt today. Writer left pt a VM requesting a return call. Writer left VM on EC son Dusty VM requesting a return call. Writer also left phone number for cardiac rehab so that son can reschedule missed appointment. Writer will refer pt to [facility].    Assessments:  Care Transitions Subsequent and Final Call    Subsequent and Final Calls  Are you currently active with any services?: Home Health  Care Transitions Interventions    Physical Therapy: Completed Other Services: Completed (Comment: Jay Sánchez)    Occupational Therapy: Completed     Other Interventions:              Upcoming Appointments:    Future Appointments         Provider Specialty Dept Phone    6/11/2024 10:00 AM Colin Burleson MD Nephrology [phone]    6/20/2024 11:30 AM Larry Quispe DPM Podiatry [phone]    6/21/2024 1:30 PM STV PB MED ONC CHAIR 09 Infusion Therapy [phone]    7/15/2024 11:45 AM Mc Thorpe,  Orthopedic Surgery [phone]    7/19/2024 1:30 PM STV PB MED ONC CHAIR 09 Infusion Therapy [phone]    9/6/2024 9:15 AM Neftaly Contreras MD Internal Medicine [phone]    10/25/2024 12:45 PM Wolf Sharma MD Oncology [phone]            Mary Saunders LPN

## 2024-06-11 ENCOUNTER — HOSPITAL ENCOUNTER (OUTPATIENT)
Age: 89
Discharge: HOME | End: 2024-06-11
Payer: MEDICARE

## 2024-06-11 ENCOUNTER — CARE COORDINATION (OUTPATIENT)
Dept: CARE COORDINATION | Age: 89
End: 2024-06-11

## 2024-06-11 LAB
ANION GAP SERPL CALCULATED.3IONS-SCNC: 10 MMOL/L (ref 9–17)
BASOPHILS # BLD: 0 K/UL (ref 0–0.2)
BASOPHILS NFR BLD: 1 % (ref 0–2)
BUN SERPL-MCNC: 31 MG/DL (ref 8–23)
CALCIUM SERPL-MCNC: 9.3 MG/DL (ref 8.6–10.4)
CHLORIDE SERPL-SCNC: 105 MMOL/L (ref 98–107)
CO2 SERPL-SCNC: 28 MMOL/L (ref 20–31)
CREAT SERPL-MCNC: 1.4 MG/DL (ref 0.7–1.2)
EOSINOPHIL # BLD: 0.2 K/UL (ref 0–0.4)
EOSINOPHILS RELATIVE PERCENT: 6 % (ref 0–4)
ERYTHROCYTE [DISTWIDTH] IN BLOOD BY AUTOMATED COUNT: 14 % (ref 11.5–14.9)
GFR, ESTIMATED: 48 ML/MIN/1.73M2
GLUCOSE SERPL-MCNC: 125 MG/DL (ref 70–99)
HCT VFR BLD AUTO: 31.3 % (ref 41–53)
HGB BLD-MCNC: 9.8 G/DL (ref 13.5–17.5)
LYMPHOCYTES NFR BLD: 0.8 K/UL (ref 1–4.8)
LYMPHOCYTES RELATIVE PERCENT: 23 % (ref 24–44)
MAGNESIUM SERPL-MCNC: 1.9 MG/DL (ref 1.6–2.6)
MCH RBC QN AUTO: 29.5 PG (ref 26–34)
MCHC RBC AUTO-ENTMCNC: 31.5 G/DL (ref 31–37)
MCV RBC AUTO: 93.7 FL (ref 80–100)
MONOCYTES NFR BLD: 0.4 K/UL (ref 0.1–1.3)
MONOCYTES NFR BLD: 11 % (ref 1–7)
NEUTROPHILS NFR BLD: 59 % (ref 36–66)
NEUTS SEG NFR BLD: 2 K/UL (ref 1.3–9.1)
PHOSPHATE SERPL-MCNC: 4.2 MG/DL (ref 2.5–4.5)
PLATELET # BLD AUTO: 204 K/UL (ref 150–450)
PMV BLD AUTO: 7.7 FL (ref 6–12)
POTASSIUM SERPL-SCNC: 4.2 MMOL/L (ref 3.7–5.3)
RBC # BLD AUTO: 3.34 M/UL (ref 4.5–5.9)
SODIUM SERPL-SCNC: 143 MMOL/L (ref 135–144)
WBC OTHER # BLD: 3.4 K/UL (ref 3.5–11)

## 2024-06-11 PROCEDURE — 80048 BASIC METABOLIC PNL TOTAL CA: CPT

## 2024-06-11 PROCEDURE — 36415 COLL VENOUS BLD VENIPUNCTURE: CPT

## 2024-06-11 PROCEDURE — 84100 ASSAY OF PHOSPHORUS: CPT

## 2024-06-11 PROCEDURE — 85025 COMPLETE CBC W/AUTO DIFF WBC: CPT

## 2024-06-11 PROCEDURE — 83735 ASSAY OF MAGNESIUM: CPT

## 2024-06-11 NOTE — CARE COORDINATION
Ambulatory Care Coordination Note     6/11/2024 1:12 PM     patient outreach attempt by this ACM today to offer care management services. ACM was unable to reach the patient by telephone today; left voice message requesting a return phone call to this ACM.     ACM: KUSH MAY RN     Care Summary Note: N/A    PCP/Specialist follow up:   Future Appointments         Provider Specialty Dept Phone    6/20/2024 11:30 AM Laryr Quispe DPM Podiatry [phone]    6/21/2024 1:30 PM STV PB MED ONC CHAIR 09 Infusion Therapy [phone]    7/15/2024 11:45 AM Mc Thorpe, DO Orthopedic Surgery [phone]    7/19/2024 1:30 PM STV PB MED ONC CHAIR 09 Infusion Therapy [phone]    9/6/2024 9:15 AM Neftaly Contreras MD Internal Medicine [phone]    10/25/2024 12:45 PM Wolf Sharma MD Oncology [phone]            Follow Up:   Plan for next ACM outreach in approximately 1 week to complete:  - outreach attempt to offer care management services.

## 2024-06-12 ENCOUNTER — CARE COORDINATION (OUTPATIENT)
Dept: CARE COORDINATION | Age: 89
End: 2024-06-12

## 2024-06-12 NOTE — CARE COORDINATION
attempted to contact Hemanth.  No answer and VM full.      will attempt to follow up with Hemanth regarding transportation.    Plan:    will contact Hemanth in 1 week.

## 2024-06-17 NOTE — PLAN OF CARE
Problem: Falls - Risk of:  Goal: Will remain free from falls  Description: Will remain free from falls  Outcome: Ongoing  Goal: Absence of physical injury  Description: Absence of physical injury  Outcome: Ongoing     Problem: Cardiac:  Goal: Ability to maintain vital signs within normal range will improve  Description: Ability to maintain vital signs within normal range will improve  Outcome: Ongoing  Goal: Cardiovascular alteration will improve  Description: Cardiovascular alteration will improve  Outcome: Ongoing     Problem: Health Behavior:  Goal: Will modify at least one risk factor affecting health status  Description: Will modify at least one risk factor affecting health status  Outcome: Ongoing  Goal: Identification of resources available to assist in meeting health care needs will improve  Description: Identification of resources available to assist in meeting health care needs will improve  Outcome: Ongoing     Problem: Physical Regulation:  Goal: Complications related to the disease process, condition or treatment will be avoided or minimized  Description: Complications related to the disease process, condition or treatment will be avoided or minimized  Outcome: Ongoing What Type Of Note Output Would You Prefer (Optional)?: Standard Output What Is The Reason For Today's Visit?: Full Body Skin Examination What Is The Reason For Today's Visit? (Being Monitored For X): concerning skin lesions on an annual basis

## 2024-06-19 ENCOUNTER — CARE COORDINATION (OUTPATIENT)
Dept: CARE COORDINATION | Age: 89
End: 2024-06-19

## 2024-06-19 NOTE — CARE COORDINATION
Ambulatory Care Coordination Note     6/19/2024 1:45 PM     Patient outreach attempt by this ACM today to offer care management services. ACM was unable to reach the patient by telephone today;  no answer, no VM available     ACM: KUSH MAY RN     PCP/Specialist follow up:   Future Appointments         Provider Specialty Dept Phone    6/20/2024 9:45 AM Larry Quispe DPM Podiatry [phone]    6/21/2024 1:30 PM STV PB MED ONC CHAIR 09 Infusion Therapy [phone]    7/15/2024 11:45 AM Mc Thorpe, DO Orthopedic Surgery [phone]    7/19/2024 1:30 PM STV PB MED ONC CHAIR 09 Infusion Therapy [phone]    9/6/2024 9:15 AM Neftaly Contreras MD Internal Medicine [phone]    10/25/2024 12:45 PM Wolf Sharma MD Oncology [phone]            Follow Up:   Plan for next ACM outreach in approximately 1 week to complete:  - outreach attempt to offer care management services.

## 2024-06-19 NOTE — CARE COORDINATION
spoke with Hemanth.    Hemanth reports that he is doing well.     reminded Hemanth of upcoming appointments   6/20 Podiatry @ 9:45    6/21 injection @ 1:30    Hemanth reports that he forgot about his appointments.     Hemanth reports that he now has transportation however  was not able to understand who was transporting him.  Hemanth denied it was his son.    Hemanth reports that he will be at both appointments and thanked  for calling to remind him.     Plan:   Hemanth will be at his appointment on 6/20 and 6/21.

## 2024-06-21 ENCOUNTER — HOSPITAL ENCOUNTER (OUTPATIENT)
Dept: INFUSION THERAPY | Age: 89
Discharge: HOME | End: 2024-06-21
Payer: MEDICARE

## 2024-06-21 DIAGNOSIS — E53.8 B12 DEFICIENCY: Primary | ICD-10-CM

## 2024-06-21 PROCEDURE — 6360000002 HC RX W HCPCS: Performed by: INTERNAL MEDICINE

## 2024-06-21 PROCEDURE — 96372 THER/PROPH/DIAG INJ SC/IM: CPT

## 2024-06-21 RX ORDER — CYANOCOBALAMIN 1000 UG/ML
1000 INJECTION, SOLUTION INTRAMUSCULAR; SUBCUTANEOUS ONCE
Status: COMPLETED
Start: 2024-06-21 | End: 2024-06-21

## 2024-06-21 RX ORDER — CYANOCOBALAMIN 1000 UG/ML
1000 INJECTION, SOLUTION INTRAMUSCULAR; SUBCUTANEOUS ONCE
Start: 2024-07-19

## 2024-06-21 RX ORDER — ONDANSETRON 2 MG/ML
8 INJECTION INTRAMUSCULAR; INTRAVENOUS
OUTPATIENT
Start: 2024-07-19

## 2024-06-21 RX ORDER — DIPHENHYDRAMINE HYDROCHLORIDE 50 MG/ML
50 INJECTION INTRAMUSCULAR; INTRAVENOUS
OUTPATIENT
Start: 2024-07-19

## 2024-06-21 RX ORDER — ACETAMINOPHEN 325 MG/1
325 TABLET ORAL
OUTPATIENT
Start: 2024-07-19

## 2024-06-21 RX ORDER — FAMOTIDINE 10 MG/ML
20 INJECTION, SOLUTION INTRAVENOUS
OUTPATIENT
Start: 2024-07-19

## 2024-06-21 RX ORDER — ACETAMINOPHEN 325 MG/1
650 TABLET ORAL
OUTPATIENT
Start: 2024-07-19

## 2024-06-21 RX ORDER — EPINEPHRINE 1 MG/ML
0.3 INJECTION, SOLUTION, CONCENTRATE INTRAVENOUS PRN
OUTPATIENT
Start: 2024-07-19

## 2024-06-21 RX ORDER — SODIUM CHLORIDE 9 MG/ML
INJECTION, SOLUTION INTRAVENOUS CONTINUOUS
OUTPATIENT
Start: 2024-07-19

## 2024-06-21 RX ORDER — ALBUTEROL SULFATE 90 UG/1
4 AEROSOL, METERED RESPIRATORY (INHALATION) PRN
OUTPATIENT
Start: 2024-07-19

## 2024-06-21 RX ADMIN — CYANOCOBALAMIN 1000 MCG: 1000 INJECTION, SOLUTION INTRAMUSCULAR; SUBCUTANEOUS at 12:39

## 2024-06-26 ENCOUNTER — CARE COORDINATION (OUTPATIENT)
Dept: CARE COORDINATION | Age: 89
End: 2024-06-26

## 2024-06-26 NOTE — CARE COORDINATION
Ambulatory Care Coordination Note     2024 3:29 PM     Patient Current Location:  Home: [address] [place], [place] [id number]     This patient was received as a referral from Ambulatory Care Manager .    ACM contacted the patient by telephone. Verified name and  with patient as identifiers. Provided introduction to self, and explanation of the ACM role.   Patient declined care management services at this time.          ACM: KUSH MAY RN     Challenges to be reviewed by the provider   Additional needs identified to be addressed with provider No  none               Method of communication with provider: none.    Care Summary Note: Patient stated that he is doing ok at this time, declined enrollment into CC at this time. Does have ACM number for any future needs.       Offered patient enrollment in the Remote Patient Monitoring (RPM) program for in-home monitoring: Yes, but did not enroll at this time: patient declined .     Advance Care Planning:   Reviewed and current     Care Planning:   Not completed during this call    PCP/Specialist follow up:   Future Appointments         Provider Specialty Dept Phone    2024 9:30 AM Larry Quispe DPM Podiatry [phone]    7/15/2024 11:45 AM Mc Thorpe DO Orthopedic Surgery [phone]    2024 1:30 PM [facility] ONC CHAIR 09 Infusion Therapy [phone]    2024 9:15 AM Neftaly Contreras MD Internal Medicine [phone]    10/25/2024 12:45 PM Wolf Sharma MD Oncology [phone]            Follow Up:   Patient will call with any needs or concerns, declined enrollment at this time.

## 2024-07-01 NOTE — CARE COORDINATION
Rcvd fax scanned in media  No pa needed for budesonide  PA for dudesonide not required     Reason (screenshot if applicable)    Routing to GI office   medications    Medication Sig Start Date End Date Taking? Authorizing Provider   finasteride (PROSCAR) 5 MG tablet TAKE 1 TABLET BY MOUTH DAILY 3/21/22   Maxi Correa, MD   amLODIPine (NORVASC) 5 MG tablet TAKE 2 TABLETS BY MOUTH DAILY 3/21/22   Maxi Correa, MD   XARELTO 15 MG TABS tablet TAKE 1 TABLET BY MOUTH DAILY WITH BREAKFAST 3/11/22   Nona Cleveland MD   bumetanide (BUMEX) 2 MG tablet Take 1 tablet by mouth 2 times daily 3/8/22   Tad Gutiérrez MD   spironolactone (ALDACTONE) 25 MG tablet Take 1 tablet by mouth daily 3/8/22   Tad Gutiérrez MD   metoprolol succinate (TOPROL XL) 25 MG extended release tablet Take 2 tablets by mouth every morning  Patient taking differently: Take 100 mg by mouth every morning  2/9/22   Maxi Correa, MD   Ascorbic Acid (VITAMIN C) 250 MG tablet TAKE 1 TABLET BY MOUTH TWICE DAILY(TAKE WITH IRON) 2/7/22   Margie Law MD   nitroGLYCERIN (NITROSTAT) 0.4 MG SL tablet up to max of 3 total doses. If no relief after 1 dose, call 911. 1/8/22   Emaline MD Kerry   docusate sodium (COLACE) 100 MG capsule Take 100 mg by mouth 2 times daily as needed for Constipation    Historical Provider, MD   vitamin D (ERGOCALCIFEROL) 1.25 MG (73845 UT) CAPS capsule TAKE 1 [address]  Patient taking differently: once a week tuesdays 12/17/21   Margie Law MD   cyanocobalamin ([facility] VITAMIN B12) 1000 MCG tablet Take 1 tablet by mouth daily 11/3/21   Maxi Correa, MD   Coenzyme Q10 100 MG CHEW Take 1 tablet by mouth daily 10/26/21   Maxi Correa, MD   famotidine (PEPCID) 20 MG tablet Take 1 tablet by mouth daily 10/26/21   Maxi Correa, MD   ferrous sulfate (IRON 325) 325 (65 Fe) MG tablet Take 1 tablet by mouth 2 times daily 8/20/21   Margie Law MD   buprenorphine-naloxone (SUBOXONE) 8-2 MG FILM SL film Place 1 Film under the tongue 2 times daily.  Indications: pain     Historical Provider, MD   magnesium oxide (MAG-OX) 400 MG tablet Take 400 mg by mouth daily Historical Provider, MD   latanoprost (XALATAN) 0.005 % ophthalmic solution Place 1 drop into both eyes nightly    Historical Provider, MD       Future Appointments   Date Time Provider [place] Pitts   3/25/2022 10:30 AM Bobbi Plummer MD [address] [place] [address]   4/13/2022  2:15 PM [facility]     ,   Congestive Heart Failure Assessment       No patient-reported symptoms      Symptoms:     Salt intake watch compared to last visit: stable      and   General Assessment    Do you have any symptoms that are causing concern?: No

## 2024-07-02 ENCOUNTER — CARE COORDINATION (OUTPATIENT)
Dept: CARE COORDINATION | Age: 89
End: 2024-07-02

## 2024-07-02 NOTE — CARE COORDINATION
attempted to contact Hemanth.   left message with name and number.   noted that Hemanth had appointments scheduled and wanted to confirm that he has transportation arranged.     Plan:    will attempt to follow up with Hemanth in 7 days.

## 2024-07-09 ENCOUNTER — CARE COORDINATION (OUTPATIENT)
Dept: CARE COORDINATION | Age: 89
End: 2024-07-09

## 2024-07-09 NOTE — CARE COORDINATION
attempted to contact Hemanth.  No answer.    left message with name and number.   requested a call back to [phone].    Plan:    will attempt to follow up with Hemanth.

## 2024-07-22 RX ORDER — ERGOCALCIFEROL 1.25 MG/1
CAPSULE ORAL
Qty: 12 CAPSULE | Refills: 2 | Status: SHIPPED | OUTPATIENT
Start: 2024-07-22

## 2024-07-29 ENCOUNTER — CARE COORDINATION (OUTPATIENT)
Dept: CARE COORDINATION | Age: 89
End: 2024-07-29

## 2024-07-29 NOTE — CARE COORDINATION
attempted to contact Hemanth.  No answer.     assisted Hemanth with transportation while his arm was healing from surgery.   Since removal of cast,  has been unsuccessful making contact with Hemanth.     worker left contact number on .    Plan:    will sign off.

## 2024-08-16 ENCOUNTER — HOSPITAL ENCOUNTER (OUTPATIENT)
Dept: INFUSION THERAPY | Age: 89
Discharge: HOME | End: 2024-08-16
Payer: MEDICARE

## 2024-08-16 DIAGNOSIS — E53.8 B12 DEFICIENCY: Primary | ICD-10-CM

## 2024-08-16 PROCEDURE — 96372 THER/PROPH/DIAG INJ SC/IM: CPT

## 2024-08-16 PROCEDURE — 6360000002 HC RX W HCPCS: Performed by: INTERNAL MEDICINE

## 2024-08-16 RX ORDER — ONDANSETRON 2 MG/ML
8 INJECTION INTRAMUSCULAR; INTRAVENOUS
OUTPATIENT
Start: 2024-09-13

## 2024-08-16 RX ORDER — CYANOCOBALAMIN 1000 UG/ML
1000 INJECTION, SOLUTION INTRAMUSCULAR; SUBCUTANEOUS ONCE
Start: 2024-09-13

## 2024-08-16 RX ORDER — DIPHENHYDRAMINE HYDROCHLORIDE 50 MG/ML
50 INJECTION INTRAMUSCULAR; INTRAVENOUS
OUTPATIENT
Start: 2024-09-13

## 2024-08-16 RX ORDER — ACETAMINOPHEN 325 MG/1
650 TABLET ORAL
OUTPATIENT
Start: 2024-09-13

## 2024-08-16 RX ORDER — ALBUTEROL SULFATE 90 UG/1
4 AEROSOL, METERED RESPIRATORY (INHALATION) PRN
OUTPATIENT
Start: 2024-09-13

## 2024-08-16 RX ORDER — SODIUM CHLORIDE 9 MG/ML
INJECTION, SOLUTION INTRAVENOUS CONTINUOUS
OUTPATIENT
Start: 2024-09-13

## 2024-08-16 RX ORDER — FAMOTIDINE 10 MG/ML
20 INJECTION, SOLUTION INTRAVENOUS
OUTPATIENT
Start: 2024-09-13

## 2024-08-16 RX ORDER — ACETAMINOPHEN 325 MG/1
325 TABLET ORAL
OUTPATIENT
Start: 2024-09-13

## 2024-08-16 RX ORDER — CYANOCOBALAMIN 1000 UG/ML
1000 INJECTION, SOLUTION INTRAMUSCULAR; SUBCUTANEOUS ONCE
Status: COMPLETED
Start: 2024-08-16 | End: 2024-08-16

## 2024-08-16 RX ORDER — EPINEPHRINE 1 MG/ML
0.3 INJECTION, SOLUTION, CONCENTRATE INTRAVENOUS PRN
OUTPATIENT
Start: 2024-09-13

## 2024-08-16 RX ADMIN — CYANOCOBALAMIN 1000 MCG: 1000 INJECTION, SOLUTION INTRAMUSCULAR; SUBCUTANEOUS at 13:09

## 2024-08-16 NOTE — PROGRESS NOTES
Pt arrives ambulatory for B12 Injection  Pt denies complaints or concerns  Injection complete without incident and patient discharged in stable condition  Next appt  9/13  B12

## 2024-08-17 ENCOUNTER — HOSPITAL ENCOUNTER (INPATIENT)
Age: 89
LOS: 3 days | Discharge: HOME | DRG: 291 | End: 2024-08-20
Attending: EMERGENCY MEDICINE | Admitting: INTERNAL MEDICINE
Payer: MEDICARE

## 2024-08-17 ENCOUNTER — APPOINTMENT (OUTPATIENT)
Dept: GENERAL RADIOLOGY | Age: 89
DRG: 291 | End: 2024-08-17
Payer: MEDICARE

## 2024-08-17 DIAGNOSIS — I50.9 CONGESTIVE HEART FAILURE, UNSPECIFIED HF CHRONICITY, UNSPECIFIED HEART FAILURE TYPE (HCC): ICD-10-CM

## 2024-08-17 DIAGNOSIS — I50.9 ACUTE ON CHRONIC CONGESTIVE HEART FAILURE, UNSPECIFIED HEART FAILURE TYPE (HCC): Primary | ICD-10-CM

## 2024-08-17 DIAGNOSIS — I50.9 CHRONIC CONGESTIVE HEART FAILURE, UNSPECIFIED HEART FAILURE TYPE (HCC): ICD-10-CM

## 2024-08-17 LAB
ALBUMIN SERPL-MCNC: 3.8 G/DL (ref 3.5–5.2)
ALP SERPL-CCNC: 112 U/L (ref 40–129)
ALT SERPL-CCNC: 20 U/L (ref 5–41)
ANION GAP SERPL CALCULATED.3IONS-SCNC: 9 MMOL/L (ref 9–17)
AST SERPL-CCNC: 22 U/L
BASOPHILS # BLD: 0 K/UL (ref 0–0.2)
BASOPHILS NFR BLD: 1 % (ref 0–2)
BILIRUB SERPL-MCNC: 0.5 MG/DL (ref 0.3–1.2)
BNP SERPL-MCNC: 3954 PG/ML
BUN SERPL-MCNC: 22 MG/DL (ref 8–23)
CALCIUM SERPL-MCNC: 8.5 MG/DL (ref 8.6–10.4)
CHLORIDE SERPL-SCNC: 103 MMOL/L (ref 98–107)
CO2 SERPL-SCNC: 26 MMOL/L (ref 20–31)
CREAT SERPL-MCNC: 1.1 MG/DL (ref 0.7–1.2)
EOSINOPHIL # BLD: 0.2 K/UL (ref 0–0.4)
EOSINOPHILS RELATIVE PERCENT: 6 % (ref 0–4)
ERYTHROCYTE [DISTWIDTH] IN BLOOD BY AUTOMATED COUNT: 14.2 % (ref 11.5–14.9)
GFR, ESTIMATED: 64 ML/MIN/1.73M2
GLUCOSE SERPL-MCNC: 93 MG/DL (ref 70–99)
HCT VFR BLD AUTO: 27.7 % (ref 41–53)
HGB BLD-MCNC: 9.1 G/DL (ref 13.5–17.5)
IRON SATN MFR SERPL: 15 % (ref 20–55)
IRON SERPL-MCNC: 39 UG/DL (ref 61–157)
LYMPHOCYTES NFR BLD: 0.7 K/UL (ref 1–4.8)
LYMPHOCYTES RELATIVE PERCENT: 21 % (ref 24–44)
MAGNESIUM SERPL-MCNC: 2.2 MG/DL (ref 1.6–2.6)
MCH RBC QN AUTO: 30.9 PG (ref 26–34)
MCHC RBC AUTO-ENTMCNC: 32.8 G/DL (ref 31–37)
MCV RBC AUTO: 94.3 FL (ref 80–100)
MONOCYTES NFR BLD: 0.5 K/UL (ref 0.1–1.3)
MONOCYTES NFR BLD: 14 % (ref 1–7)
NEUTROPHILS NFR BLD: 58 % (ref 36–66)
NEUTS SEG NFR BLD: 2 K/UL (ref 1.3–9.1)
PLATELET # BLD AUTO: 166 K/UL (ref 150–450)
PMV BLD AUTO: 7.1 FL (ref 6–12)
POTASSIUM SERPL-SCNC: 3.8 MMOL/L (ref 3.7–5.3)
PROT SERPL-MCNC: 6.8 G/DL (ref 6.4–8.3)
RBC # BLD AUTO: 2.94 M/UL (ref 4.5–5.9)
SODIUM SERPL-SCNC: 138 MMOL/L (ref 135–144)
TIBC SERPL-MCNC: 257 UG/DL (ref 250–450)
TROPONIN I SERPL HS-MCNC: 53 NG/L (ref 0–22)
TROPONIN I SERPL HS-MCNC: 55 NG/L (ref 0–22)
UNSATURATED IRON BINDING CAPACITY: 218 UG/DL (ref 112–347)
WBC OTHER # BLD: 3.4 K/UL (ref 3.5–11)

## 2024-08-17 PROCEDURE — 6360000002 HC RX W HCPCS

## 2024-08-17 PROCEDURE — 93005 ELECTROCARDIOGRAM TRACING: CPT

## 2024-08-17 PROCEDURE — 36415 COLL VENOUS BLD VENIPUNCTURE: CPT

## 2024-08-17 PROCEDURE — 2580000003 HC RX 258: Performed by: NURSE PRACTITIONER

## 2024-08-17 PROCEDURE — 83735 ASSAY OF MAGNESIUM: CPT

## 2024-08-17 PROCEDURE — 1200000000 HC SEMI PRIVATE

## 2024-08-17 PROCEDURE — 99285 EMERGENCY DEPT VISIT HI MDM: CPT

## 2024-08-17 PROCEDURE — 96374 THER/PROPH/DIAG INJ IV PUSH: CPT

## 2024-08-17 PROCEDURE — 83540 ASSAY OF IRON: CPT

## 2024-08-17 PROCEDURE — 80053 COMPREHEN METABOLIC PANEL: CPT

## 2024-08-17 PROCEDURE — 84484 ASSAY OF TROPONIN QUANT: CPT

## 2024-08-17 PROCEDURE — 83550 IRON BINDING TEST: CPT

## 2024-08-17 PROCEDURE — 71046 X-RAY EXAM CHEST 2 VIEWS: CPT

## 2024-08-17 PROCEDURE — 83880 ASSAY OF NATRIURETIC PEPTIDE: CPT

## 2024-08-17 PROCEDURE — 85025 COMPLETE CBC W/AUTO DIFF WBC: CPT

## 2024-08-17 PROCEDURE — 6370000000 HC RX 637 (ALT 250 FOR IP): Performed by: NURSE PRACTITIONER

## 2024-08-17 RX ORDER — BUMETANIDE 0.25 MG/ML
1 INJECTION INTRAMUSCULAR; INTRAVENOUS ONCE
Status: COMPLETED | OUTPATIENT
Start: 2024-08-17 | End: 2024-08-17

## 2024-08-17 RX ORDER — FERROUS SULFATE 325(65) MG
325 TABLET ORAL EVERY OTHER DAY
Status: DISCONTINUED | OUTPATIENT
Start: 2024-08-18 | End: 2024-08-20 | Stop reason: HOSPADM

## 2024-08-17 RX ORDER — ATORVASTATIN CALCIUM 40 MG/1
40 TABLET, FILM COATED ORAL NIGHTLY
Status: DISCONTINUED | OUTPATIENT
Start: 2024-08-17 | End: 2024-08-20 | Stop reason: HOSPADM

## 2024-08-17 RX ORDER — SODIUM CHLORIDE 0.9 % (FLUSH) 0.9 %
10 SYRINGE (ML) INJECTION PRN
Status: DISCONTINUED | OUTPATIENT
Start: 2024-08-17 | End: 2024-08-20 | Stop reason: HOSPADM

## 2024-08-17 RX ORDER — METOPROLOL SUCCINATE 100 MG/1
100 TABLET, EXTENDED RELEASE ORAL 2 TIMES DAILY
Status: DISCONTINUED | OUTPATIENT
Start: 2024-08-17 | End: 2024-08-20 | Stop reason: HOSPADM

## 2024-08-17 RX ORDER — ENOXAPARIN SODIUM 100 MG/ML
40 INJECTION SUBCUTANEOUS DAILY
Status: DISCONTINUED | OUTPATIENT
Start: 2024-08-17 | End: 2024-08-17 | Stop reason: SDUPTHER

## 2024-08-17 RX ORDER — BUPRENORPHINE AND NALOXONE 8; 2 MG/1; MG/1
1 FILM, SOLUBLE BUCCAL; SUBLINGUAL 2 TIMES DAILY
Status: DISCONTINUED | OUTPATIENT
Start: 2024-08-17 | End: 2024-08-20 | Stop reason: HOSPADM

## 2024-08-17 RX ORDER — BUMETANIDE 0.25 MG/ML
2 INJECTION INTRAMUSCULAR; INTRAVENOUS DAILY
Status: DISCONTINUED | OUTPATIENT
Start: 2024-08-18 | End: 2024-08-20

## 2024-08-17 RX ORDER — DILTIAZEM HYDROCHLORIDE 120 MG/1
120 CAPSULE, COATED, EXTENDED RELEASE ORAL DAILY
Status: DISCONTINUED | OUTPATIENT
Start: 2024-08-17 | End: 2024-08-20 | Stop reason: HOSPADM

## 2024-08-17 RX ORDER — FINASTERIDE 5 MG/1
5 TABLET, FILM COATED ORAL DAILY
Status: DISCONTINUED | OUTPATIENT
Start: 2024-08-17 | End: 2024-08-20 | Stop reason: HOSPADM

## 2024-08-17 RX ORDER — ERGOCALCIFEROL 1.25 MG/1
50000 CAPSULE ORAL WEEKLY
Status: DISCONTINUED | OUTPATIENT
Start: 2024-08-18 | End: 2024-08-20 | Stop reason: HOSPADM

## 2024-08-17 RX ORDER — POLYETHYLENE GLYCOL 3350 17 G/17G
17 POWDER, FOR SOLUTION ORAL DAILY PRN
Status: DISCONTINUED | OUTPATIENT
Start: 2024-08-17 | End: 2024-08-20 | Stop reason: HOSPADM

## 2024-08-17 RX ORDER — ACETAMINOPHEN 650 MG/1
650 SUPPOSITORY RECTAL EVERY 6 HOURS PRN
Status: DISCONTINUED | OUTPATIENT
Start: 2024-08-17 | End: 2024-08-20 | Stop reason: HOSPADM

## 2024-08-17 RX ORDER — ONDANSETRON 2 MG/ML
4 INJECTION INTRAMUSCULAR; INTRAVENOUS EVERY 6 HOURS PRN
Status: DISCONTINUED | OUTPATIENT
Start: 2024-08-17 | End: 2024-08-20 | Stop reason: HOSPADM

## 2024-08-17 RX ORDER — POTASSIUM CHLORIDE 7.45 MG/ML
10 INJECTION INTRAVENOUS PRN
Status: DISCONTINUED | OUTPATIENT
Start: 2024-08-17 | End: 2024-08-20 | Stop reason: HOSPADM

## 2024-08-17 RX ORDER — ACETAMINOPHEN 325 MG/1
650 TABLET ORAL EVERY 6 HOURS PRN
Status: DISCONTINUED | OUTPATIENT
Start: 2024-08-17 | End: 2024-08-20 | Stop reason: HOSPADM

## 2024-08-17 RX ORDER — MAGNESIUM SULFATE HEPTAHYDRATE 40 MG/ML
2000 INJECTION, SOLUTION INTRAVENOUS PRN
Status: DISCONTINUED | OUTPATIENT
Start: 2024-08-17 | End: 2024-08-20 | Stop reason: HOSPADM

## 2024-08-17 RX ORDER — ISOSORBIDE MONONITRATE 30 MG/1
30 TABLET, EXTENDED RELEASE ORAL DAILY
Status: DISCONTINUED | OUTPATIENT
Start: 2024-08-17 | End: 2024-08-20 | Stop reason: HOSPADM

## 2024-08-17 RX ORDER — POTASSIUM CHLORIDE 20 MEQ/1
40 TABLET, EXTENDED RELEASE ORAL PRN
Status: DISCONTINUED | OUTPATIENT
Start: 2024-08-17 | End: 2024-08-20 | Stop reason: HOSPADM

## 2024-08-17 RX ORDER — LATANOPROST 50 UG/ML
1 SOLUTION/ DROPS OPHTHALMIC NIGHTLY
Status: DISCONTINUED | OUTPATIENT
Start: 2024-08-17 | End: 2024-08-20 | Stop reason: HOSPADM

## 2024-08-17 RX ORDER — SODIUM CHLORIDE 0.9 % (FLUSH) 0.9 %
5-40 SYRINGE (ML) INJECTION EVERY 12 HOURS SCHEDULED
Status: DISCONTINUED | OUTPATIENT
Start: 2024-08-17 | End: 2024-08-20 | Stop reason: HOSPADM

## 2024-08-17 RX ORDER — ONDANSETRON 4 MG/1
4 TABLET, ORALLY DISINTEGRATING ORAL EVERY 8 HOURS PRN
Status: DISCONTINUED | OUTPATIENT
Start: 2024-08-17 | End: 2024-08-20 | Stop reason: HOSPADM

## 2024-08-17 RX ORDER — FAMOTIDINE 20 MG/1
20 TABLET, FILM COATED ORAL 2 TIMES DAILY
Status: DISCONTINUED | OUTPATIENT
Start: 2024-08-17 | End: 2024-08-20 | Stop reason: HOSPADM

## 2024-08-17 RX ORDER — SODIUM CHLORIDE 9 MG/ML
INJECTION, SOLUTION INTRAVENOUS PRN
Status: DISCONTINUED | OUTPATIENT
Start: 2024-08-17 | End: 2024-08-20 | Stop reason: HOSPADM

## 2024-08-17 RX ADMIN — LATANOPROST 1 DROP: 50 SOLUTION OPHTHALMIC at 23:08

## 2024-08-17 RX ADMIN — SODIUM CHLORIDE, PRESERVATIVE FREE 10 ML: 5 INJECTION INTRAVENOUS at 22:10

## 2024-08-17 RX ADMIN — BUPRENORPHINE AND NALOXONE 1 FILM: 8; 2 FILM, SOLUBLE BUCCAL; SUBLINGUAL at 23:08

## 2024-08-17 RX ADMIN — BUMETANIDE 1 MG: 0.25 INJECTION INTRAMUSCULAR; INTRAVENOUS at 19:58

## 2024-08-17 RX ADMIN — ATORVASTATIN CALCIUM 40 MG: 40 TABLET, FILM COATED ORAL at 23:08

## 2024-08-17 RX ADMIN — FAMOTIDINE 20 MG: 20 TABLET, FILM COATED ORAL at 23:08

## 2024-08-17 RX ADMIN — FINASTERIDE 5 MG: 5 TABLET, FILM COATED ORAL at 23:08

## 2024-08-17 RX ADMIN — METOPROLOL SUCCINATE 100 MG: 100 TABLET, EXTENDED RELEASE ORAL at 23:07

## 2024-08-17 RX ADMIN — RIVAROXABAN 15 MG: 15 TABLET, FILM COATED ORAL at 23:01

## 2024-08-17 ASSESSMENT — PAIN SCALES - GENERAL
PAINLEVEL_OUTOF10: 0
PAINLEVEL_OUTOF10: 8

## 2024-08-17 ASSESSMENT — PAIN - FUNCTIONAL ASSESSMENT: PAIN_FUNCTIONAL_ASSESSMENT: 0-10

## 2024-08-17 ASSESSMENT — PAIN DESCRIPTION - PAIN TYPE: TYPE: ACUTE PAIN

## 2024-08-17 ASSESSMENT — PAIN DESCRIPTION - INTENSITY: RATING_2: 0

## 2024-08-17 ASSESSMENT — PAIN DESCRIPTION - ORIENTATION
ORIENTATION_2: LEFT
ORIENTATION: LEFT

## 2024-08-17 ASSESSMENT — PAIN DESCRIPTION - LOCATION
LOCATION: LEG;FOOT
LOCATION_2: CHEST

## 2024-08-17 NOTE — ED NOTES
Report given to LEON Welch from ED.   Report method in person   The following was reviewed with receiving RN:   Current vital signs:  BP (!) 188/89   Pulse 62   Temp 98.7 °F (37.1 °C) (Oral)   Resp 16   Ht 1.854 m (6' 1\")   Wt 79.4 kg (175 lb)   SpO2 99%   BMI 23.09 kg/m²                MEWS Score: 1     Any medication or safety alerts were reviewed. Any pending diagnostics and notifications were also reviewed, as well as any safety concerns or issues, abnormal labs, abnormal imaging, and abnormal assessment findings. Questions were answered.

## 2024-08-17 NOTE — ED PROVIDER NOTES
[facility] ED  eMERGENCY dEPARTMENT eNCOUnter   Attending Attestation     Pt Name: Hemanth Barrera  MRN: [id number]  Birthdate 1935  Date of evaluation: 8/17/24       Hemanth Barrera is a 89 y.o. male who presents with Chest Pain, Shortness of Breath, Leg Swelling (/), and Foot Pain (Left /)      History:   Patient is here complaining of chest pain that is been going on for the last couple days had some shortness of breath with it yesterday.  Patient states the pain kind of goes into his left axilla but not down his arm.  Patient has acid reflux but also had cardiac issues in the past.  Had a unremarkable stress test about a year ago.    Exam: Vitals:   Vitals:    08/17/24 1758   BP: (!) 188/89   Pulse: 62   Resp: 16   Temp: 98.7 °F (37.1 °C)   TempSrc: Oral   SpO2: 99%   Weight: 79.4 kg (175 lb)   Height: 1.854 m (6' 1\")     Heart regular rate rhythm no murmurs.  Lungs clear to auscultation bilaterally.    I performed a history and physical examination of the patient and discussed management with the resident. I reviewed the resident’s note and agree with the documented findings and plan of care. Any areas of disagreement are noted on the chart. I was personally present for the key portions of any procedures. I have documented in the chart those procedures where I was not present during the key portions. I have personally reviewed all images and agree with the resident's interpretation. I have reviewed the emergency nurses triage note. I agree with the chief complaint, past medical history, past surgical history, allergies, medications, social and family history as documented unless otherwise noted below. Documentation of the HPI, Physical Exam and Medical Decision Making performed by medical students or scribes is based on my personal performance of the HPI, PE and MDM. I personally evaluated and examined the patient in conjunction with the APC and agree with the assessment, treatment plan, and

## 2024-08-17 NOTE — ED PROVIDER NOTES
[facility] MED SURG  Emergency Department Encounter  Emergency Medicine Resident     Pt Name:Hemanth Barrera  MRN: [id number]  Birthdate 1935  Date of evaluation: 8/17/24  PCP:  Neftaly Contreras MD  Note Started: 6:02 PM EDT      CHIEF COMPLAINT       Chief Complaint   Patient presents with    Chest Pain    Shortness of Breath    Leg Swelling           Foot Pain     Left          HISTORY OF PRESENT ILLNESS  (Location/Symptom, Timing/Onset, Context/Setting, Quality, Duration, Modifying Factors, Severity.)      Hemanth Barrera is a 89 y.o. male who presents with chest pain, leg pain, shortness of breath.  Patient has significant cardiac history including A-fib on Xarelto, Bumex for CHF, pacer placement.  Patient did improve symptoms yesterday with 1X nitro.  He notes he has had symptoms for a few days but only told his family relatively recently.    PAST MEDICAL / SURGICAL / SOCIAL / FAMILY HISTORY      has a past medical history of Acute inferolateral myocardial infarction (HCC), Arthritis, Atrial fibrillation (HCC), CAD (coronary artery disease), CHF (congestive heart failure) ([facility]), Glaucoma, Hx of blood clots, Hyperlipidemia, Hypertension, MI (myocardial infarction) ([facility]), and NSTEMI (non-ST elevated myocardial infarction) ([facility]).       has a past surgical history that includes pacemaker placement; Abdomen surgery; Cardiac catheterization; Appendectomy; Colonoscopy; eye surgery; hernia repair; bone marrow biopsy; joint replacement; CT BIOPSY BONE MARROW (5/31/2022); Upper gastrointestinal endoscopy (N/A, 8/2/2023); and Colonoscopy (N/A, 8/3/2023).      Social History     Socioeconomic History    Marital status:      Spouse name: Not on file    Number of children: 4    Years of education: Not on file    Highest education level: Not on file   Occupational History    Not on file   Tobacco Use    Smoking status: Former     Current packs/day: 0.00     Types: Cigarettes     Quit date: 1990     Years since

## 2024-08-18 ENCOUNTER — APPOINTMENT (OUTPATIENT)
Dept: GENERAL RADIOLOGY | Age: 89
DRG: 291 | End: 2024-08-18
Payer: MEDICARE

## 2024-08-18 LAB
ANION GAP SERPL CALCULATED.3IONS-SCNC: 11 MMOL/L (ref 9–17)
BNP SERPL-MCNC: 3117 PG/ML
BUN SERPL-MCNC: 21 MG/DL (ref 8–23)
CALCIUM SERPL-MCNC: 8.3 MG/DL (ref 8.6–10.4)
CHLORIDE SERPL-SCNC: 102 MMOL/L (ref 98–107)
CHOLEST SERPL-MCNC: 102 MG/DL
CHOLESTEROL/HDL RATIO: 2.1
CO2 SERPL-SCNC: 27 MMOL/L (ref 20–31)
CREAT SERPL-MCNC: 1.1 MG/DL (ref 0.7–1.2)
GFR, ESTIMATED: 64 ML/MIN/1.73M2
GLUCOSE SERPL-MCNC: 114 MG/DL (ref 70–99)
HDLC SERPL-MCNC: 48 MG/DL
LDLC SERPL CALC-MCNC: 46 MG/DL (ref 0–130)
MAGNESIUM SERPL-MCNC: 2 MG/DL (ref 1.6–2.6)
MYOGLOBIN SERPL-MCNC: 120 NG/ML (ref 28–72)
MYOGLOBIN SERPL-MCNC: 126 NG/ML (ref 28–72)
MYOGLOBIN SERPL-MCNC: 77 NG/ML (ref 28–72)
MYOGLOBIN SERPL-MCNC: 96 NG/ML (ref 28–72)
POTASSIUM SERPL-SCNC: 3.3 MMOL/L (ref 3.7–5.3)
SODIUM SERPL-SCNC: 140 MMOL/L (ref 135–144)
TRIGL SERPL-MCNC: 40 MG/DL
TROPONIN I SERPL HS-MCNC: 38 NG/L (ref 0–22)
TROPONIN I SERPL HS-MCNC: 43 NG/L (ref 0–22)
TROPONIN I SERPL HS-MCNC: 45 NG/L (ref 0–22)
TROPONIN I SERPL HS-MCNC: 49 NG/L (ref 0–22)
TSH SERPL DL<=0.05 MIU/L-ACNC: 1.71 UIU/ML (ref 0.3–5)

## 2024-08-18 PROCEDURE — 83735 ASSAY OF MAGNESIUM: CPT

## 2024-08-18 PROCEDURE — 2580000003 HC RX 258: Performed by: NURSE PRACTITIONER

## 2024-08-18 PROCEDURE — 84443 ASSAY THYROID STIM HORMONE: CPT

## 2024-08-18 PROCEDURE — 83874 ASSAY OF MYOGLOBIN: CPT

## 2024-08-18 PROCEDURE — 83880 ASSAY OF NATRIURETIC PEPTIDE: CPT

## 2024-08-18 PROCEDURE — 71046 X-RAY EXAM CHEST 2 VIEWS: CPT

## 2024-08-18 PROCEDURE — 6360000002 HC RX W HCPCS: Performed by: NURSE PRACTITIONER

## 2024-08-18 PROCEDURE — 80048 BASIC METABOLIC PNL TOTAL CA: CPT

## 2024-08-18 PROCEDURE — 36415 COLL VENOUS BLD VENIPUNCTURE: CPT

## 2024-08-18 PROCEDURE — 84484 ASSAY OF TROPONIN QUANT: CPT

## 2024-08-18 PROCEDURE — 1200000000 HC SEMI PRIVATE

## 2024-08-18 PROCEDURE — 80061 LIPID PANEL: CPT

## 2024-08-18 PROCEDURE — 6370000000 HC RX 637 (ALT 250 FOR IP): Performed by: NURSE PRACTITIONER

## 2024-08-18 PROCEDURE — 99223 1ST HOSP IP/OBS HIGH 75: CPT | Performed by: INTERNAL MEDICINE

## 2024-08-18 RX ADMIN — DILTIAZEM HYDROCHLORIDE 120 MG: 120 CAPSULE, COATED, EXTENDED RELEASE ORAL at 08:25

## 2024-08-18 RX ADMIN — FAMOTIDINE 20 MG: 20 TABLET, FILM COATED ORAL at 08:25

## 2024-08-18 RX ADMIN — FINASTERIDE 5 MG: 5 TABLET, FILM COATED ORAL at 08:25

## 2024-08-18 RX ADMIN — METOPROLOL SUCCINATE 100 MG: 100 TABLET, EXTENDED RELEASE ORAL at 21:16

## 2024-08-18 RX ADMIN — ERGOCALCIFEROL 50000 UNITS: 1.25 CAPSULE ORAL at 17:20

## 2024-08-18 RX ADMIN — RIVAROXABAN 15 MG: 15 TABLET, FILM COATED ORAL at 08:24

## 2024-08-18 RX ADMIN — SODIUM CHLORIDE, PRESERVATIVE FREE 10 ML: 5 INJECTION INTRAVENOUS at 08:25

## 2024-08-18 RX ADMIN — ACETAMINOPHEN 650 MG: 325 TABLET ORAL at 11:04

## 2024-08-18 RX ADMIN — ISOSORBIDE MONONITRATE 30 MG: 30 TABLET, EXTENDED RELEASE ORAL at 08:24

## 2024-08-18 RX ADMIN — BUPRENORPHINE AND NALOXONE 1 FILM: 8; 2 FILM, SOLUBLE BUCCAL; SUBLINGUAL at 08:25

## 2024-08-18 RX ADMIN — BUPRENORPHINE AND NALOXONE 1 FILM: 8; 2 FILM, SOLUBLE BUCCAL; SUBLINGUAL at 21:16

## 2024-08-18 RX ADMIN — SODIUM CHLORIDE, PRESERVATIVE FREE 5 ML: 5 INJECTION INTRAVENOUS at 21:19

## 2024-08-18 RX ADMIN — ACETAMINOPHEN 650 MG: 325 TABLET ORAL at 21:16

## 2024-08-18 RX ADMIN — ONDANSETRON 4 MG: 2 INJECTION INTRAMUSCULAR; INTRAVENOUS at 10:59

## 2024-08-18 RX ADMIN — FERROUS SULFATE TAB 325 MG (65 MG ELEMENTAL FE) 325 MG: 325 (65 FE) TAB at 08:25

## 2024-08-18 RX ADMIN — ATORVASTATIN CALCIUM 40 MG: 40 TABLET, FILM COATED ORAL at 21:16

## 2024-08-18 RX ADMIN — METOPROLOL SUCCINATE 100 MG: 100 TABLET, EXTENDED RELEASE ORAL at 08:25

## 2024-08-18 RX ADMIN — BUMETANIDE 2 MG: 0.25 INJECTION INTRAMUSCULAR; INTRAVENOUS at 08:26

## 2024-08-18 RX ADMIN — POTASSIUM CHLORIDE 40 MEQ: 1500 TABLET, EXTENDED RELEASE ORAL at 08:24

## 2024-08-18 RX ADMIN — FAMOTIDINE 20 MG: 20 TABLET, FILM COATED ORAL at 21:16

## 2024-08-18 RX ADMIN — LATANOPROST 1 DROP: 50 SOLUTION OPHTHALMIC at 21:16

## 2024-08-18 ASSESSMENT — PAIN - FUNCTIONAL ASSESSMENT: PAIN_FUNCTIONAL_ASSESSMENT: ACTIVITIES ARE NOT PREVENTED

## 2024-08-18 ASSESSMENT — PAIN DESCRIPTION - DESCRIPTORS
DESCRIPTORS: ACHING
DESCRIPTORS: ACHING

## 2024-08-18 ASSESSMENT — PAIN SCALES - GENERAL
PAINLEVEL_OUTOF10: 3
PAINLEVEL_OUTOF10: 0
PAINLEVEL_OUTOF10: 0
PAINLEVEL_OUTOF10: 2

## 2024-08-18 ASSESSMENT — PAIN DESCRIPTION - PAIN TYPE: TYPE: ACUTE PAIN

## 2024-08-18 ASSESSMENT — PAIN DESCRIPTION - ONSET: ONSET: SUDDEN

## 2024-08-18 ASSESSMENT — PAIN DESCRIPTION - LOCATION: LOCATION: GENERALIZED

## 2024-08-18 ASSESSMENT — PAIN DESCRIPTION - FREQUENCY: FREQUENCY: CONTINUOUS

## 2024-08-18 ASSESSMENT — PAIN DESCRIPTION - ORIENTATION: ORIENTATION: POSTERIOR

## 2024-08-18 NOTE — ED NOTES
Admission Dx: chf    Pts Chief Complaints on Arrival: chest pain, sob    ADL's - Partial assistance    Pending Diagnostics:  n/a    Residence PTA: single story home    Special Considerations/Circumstances:  n/a    Vitals: Current vital signs:  BP (!) 164/94   Pulse 60   Temp 98.1 °F (36.7 °C) (Oral)   Resp 16   Ht 1.854 m (6' 1\")   Wt 79.4 kg (175 lb)   SpO2 99%   BMI 23.09 kg/m²                MEWS Score: 1

## 2024-08-18 NOTE — H&P
IN-PATIENT SERVICE  [facility] Internal Medicine  [facility] Internal Medicine   Telly Perez MD; Terrell De Los Santos MD; Raf Roque MD; Neftaly Contreras MD,   Susan Asher MD; Kaitlin Dsouaz MD; Sana Haskins MD; Kaylynn Jolley MD; Todd Tomlinson MD      HISTORY AND PHYSICAL EXAMINATION            Date:   8/18/2024  Patient name:  Hemanth Barrera  MRN:   [id number]  Account:  [id number]  YOB: 1935  PCP:    Neftaly Contreras MD  Code Status:    Full Code    Chief Complaint:     Chief Complaint   Patient presents with    Chest Pain    Shortness of Breath    Leg Swelling           Foot Pain     Left            History Obtained From:     Patient, EMR, nursing staff    HPI     This patient is a 89 y.o. Non- / non  malewho has A-fib on Xarelto, HFpEF on Bumex,  CAD, SSS s/p pacemaker CKD stage III, partial gastrectomy multiple abdominal surgeries presents with chest pain, associated with leg pain shortness of breath .  Partial relief with 1 dose of nitro.  Started few days ago persistent worsening no aggravating factors  Moderate intensity symptom      Review of Systems:     Denies cough, reports shortness of breath  Denies chest pain or palpitations  Denies abdominal pain, diarrhea vomiting  Denies any new numbness tremors or weakness.    A 10 point review of systems was performed and and negative except as mentioned in HPI  Positive and Negative as described in HPI.      Past Medical History:     Past Medical History:   Diagnosis Date    Acute inferolateral myocardial infarction (HCC) 11/18/2021    Arthritis     Atrial fibrillation (HCC)     CAD (coronary artery disease)     CHF (congestive heart failure) (HCC)     Glaucoma     Hx of blood clots     with hernia surgery    Hyperlipidemia     Hypertension     MI (myocardial infarction) (HCC)     NSTEMI (non-ST elevated myocardial infarction) (HCC) 11/17/2021        Past Surgical History:     Past Surgical

## 2024-08-18 NOTE — CARE COORDINATION
Case Management Assessment  Initial Evaluation    Date/Time of Evaluation: 8/18/2024 9:23 AM  Assessment Completed by: Jill Glover RN    If patient is discharged prior to next notation, then this note serves as note for discharge by case management.    Patient Name: Hemanth Barrera                   YOB: 1935  Diagnosis: Acute on chronic congestive heart failure, unspecified heart failure type (HCC) [I50.9]  Congestive heart failure, unspecified HF chronicity, unspecified heart failure type (HCC) [I50.9]                   Date / Time: 8/17/2024  5:58 PM    Patient Admission Status: Inpatient   Readmission Risk (Low < 19, Mod (19-27), High > 27): Readmission Risk Score: 18.5    Current PCP: Neftaly Contreras MD  PCP verified by CM? Yes    Chart Reviewed: Yes      History Provided by: Patient  Patient Orientation: Alert and Oriented    Patient Cognition: Alert    Hospitalization in the last 30 days (Readmission):  No    If yes, Readmission Assessment in  Navigator will be completed.    Advance Directives:      Code Status: Full Code   Patient's Primary Decision Maker is: Patient Declined (Legal Next of Kin Remains as Decision Maker)    Primary Decision Maker: Dusty Barrera - Child - [phone]    Discharge Planning:    Patient lives with: Other (Comment) (Son) Type of Home: House  Primary Care Giver: Self  Patient Support Systems include: Children   Current Financial resources: Medicare  Current community resources: None  Current services prior to admission: Durable Medical Equipment            Current DME: Cane, Walker, Shower Chair (GB around the toilet/SC)            Type of Home Care services:  None    ADLS  Prior functional level: Independent in ADLs/IADLs  Current functional level: Independent in ADLs/IADLs    PT AM-PAC:   /24  OT AM-PAC:   /24    Family can provide assistance at DC: Yes  Would you like Case Management to discuss the discharge plan with any other family

## 2024-08-18 NOTE — DISCHARGE INSTR - COC
Continuity of Care Form    Patient Name: Hemanth Barrera   :  1935  MRN:  [id number]    Admit date:  2024  Discharge date:  24    Code Status Order: Full Code   Advance Directives:   Advance Care Flowsheet Documentation             Admitting Physician:  Raf Roque MD  PCP: Neftaly Contreras MD    Discharging Nurse: LEON Hernandez  Discharging Hospital Unit/Room#: -  Discharging Unit Phone Number: [phone]    Emergency Contact:   Extended Emergency Contact Information  Primary Emergency Contact: Dusty Barrera  Home Phone: [phone]  Mobile Phone: [phone]  Relation: Child    Past Surgical History:  Past Surgical History:   Procedure Laterality Date    ABDOMEN SURGERY      gastric resection    APPENDECTOMY      BONE MARROW BIOPSY      CARDIAC CATHETERIZATION      COLONOSCOPY      COLONOSCOPY N/A 8/3/2023    COLONOSCOPY WITH BIOPSY performed by Isauro Razo MD at [facility] ENDO    CT BONE MARROW BIOPSY  2022    CT BONE MARROW BIOPSY 2022 ST CT SCAN    EYE SURGERY      smiley cataracts    HERNIA REPAIR      inguinal smiley    JOINT REPLACEMENT      rt hip x 2, lt knee    PACEMAKER PLACEMENT      UPPER GASTROINTESTINAL ENDOSCOPY N/A 2023    EGD BIOPSY performed by Isauro Razo MD at [facility] ENDO       Immunization History:   Immunization History   Administered Date(s) Administered    COVID-19, PFIZER PURPLE top, DILUTE for use, (age 12 y+), 30mcg/0.3mL 2021, 2021, 2021    Influenza Virus Vaccine 10/30/2011, 10/09/2015, 2016    Influenza, FLUZONE High Dose (age 65 y+), IM, Quadv, 0.7mL 2020, 10/13/2021, 2022    Influenza, FLUZONE High Dose, (age 65 y+), IM, Trivalent PF, 0.5mL 10/20/2018, 10/01/2019    Pneumococcal, PCV-13, PREVNAR 13, (age 6w+), IM, 0.5mL 10/30/2019    Pneumococcal, PCV20, PREVNAR 20, (age 6w+), IM, 0.5mL 2022    Pneumococcal, PPSV23, PNEUMOVAX 23, (age 2y+), SC/IM, 0.5mL 10/30/2011, 2020       Active

## 2024-08-19 ENCOUNTER — TELEPHONE (OUTPATIENT)
Dept: INTERNAL MEDICINE CLINIC | Age: 89
End: 2024-08-19

## 2024-08-19 LAB
ANION GAP SERPL CALCULATED.3IONS-SCNC: 8 MMOL/L (ref 9–17)
BUN SERPL-MCNC: 25 MG/DL (ref 8–23)
CALCIUM SERPL-MCNC: 8.3 MG/DL (ref 8.6–10.4)
CHLORIDE SERPL-SCNC: 104 MMOL/L (ref 98–107)
CO2 SERPL-SCNC: 27 MMOL/L (ref 20–31)
CREAT SERPL-MCNC: 1.1 MG/DL (ref 0.7–1.2)
EKG ATRIAL RATE: 61 BPM
EKG P AXIS: 77 DEGREES
EKG Q-T INTERVAL: 484 MS
EKG QRS DURATION: 156 MS
EKG QTC CALCULATION (BAZETT): 487 MS
EKG R AXIS: -77 DEGREES
EKG T AXIS: 78 DEGREES
EKG VENTRICULAR RATE: 61 BPM
GFR, ESTIMATED: 64 ML/MIN/1.73M2
GLUCOSE SERPL-MCNC: 108 MG/DL (ref 70–99)
MAGNESIUM SERPL-MCNC: 2 MG/DL (ref 1.6–2.6)
MYOGLOBIN SERPL-MCNC: 107 NG/ML (ref 28–72)
MYOGLOBIN SERPL-MCNC: 191 NG/ML (ref 28–72)
MYOGLOBIN SERPL-MCNC: 193 NG/ML (ref 28–72)
MYOGLOBIN SERPL-MCNC: 211 NG/ML (ref 28–72)
POTASSIUM SERPL-SCNC: 4.1 MMOL/L (ref 3.7–5.3)
SODIUM SERPL-SCNC: 139 MMOL/L (ref 135–144)
TROPONIN I SERPL HS-MCNC: 45 NG/L (ref 0–22)
TROPONIN I SERPL HS-MCNC: 46 NG/L (ref 0–22)
TROPONIN I SERPL HS-MCNC: 49 NG/L (ref 0–22)
TROPONIN I SERPL HS-MCNC: 53 NG/L (ref 0–22)

## 2024-08-19 PROCEDURE — 6360000002 HC RX W HCPCS: Performed by: NURSE PRACTITIONER

## 2024-08-19 PROCEDURE — 80048 BASIC METABOLIC PNL TOTAL CA: CPT

## 2024-08-19 PROCEDURE — 97530 THERAPEUTIC ACTIVITIES: CPT

## 2024-08-19 PROCEDURE — 83874 ASSAY OF MYOGLOBIN: CPT

## 2024-08-19 PROCEDURE — 1200000000 HC SEMI PRIVATE

## 2024-08-19 PROCEDURE — 83735 ASSAY OF MAGNESIUM: CPT

## 2024-08-19 PROCEDURE — 6370000000 HC RX 637 (ALT 250 FOR IP): Performed by: NURSE PRACTITIONER

## 2024-08-19 PROCEDURE — 36415 COLL VENOUS BLD VENIPUNCTURE: CPT

## 2024-08-19 PROCEDURE — 2580000003 HC RX 258: Performed by: NURSE PRACTITIONER

## 2024-08-19 PROCEDURE — 97162 PT EVAL MOD COMPLEX 30 MIN: CPT

## 2024-08-19 PROCEDURE — 84484 ASSAY OF TROPONIN QUANT: CPT

## 2024-08-19 PROCEDURE — 99223 1ST HOSP IP/OBS HIGH 75: CPT | Performed by: NURSE PRACTITIONER

## 2024-08-19 RX ADMIN — ISOSORBIDE MONONITRATE 30 MG: 30 TABLET, EXTENDED RELEASE ORAL at 07:32

## 2024-08-19 RX ADMIN — FINASTERIDE 5 MG: 5 TABLET, FILM COATED ORAL at 07:32

## 2024-08-19 RX ADMIN — DILTIAZEM HYDROCHLORIDE 120 MG: 120 CAPSULE, COATED, EXTENDED RELEASE ORAL at 07:32

## 2024-08-19 RX ADMIN — BUMETANIDE 2 MG: 0.25 INJECTION INTRAMUSCULAR; INTRAVENOUS at 07:32

## 2024-08-19 RX ADMIN — ATORVASTATIN CALCIUM 40 MG: 40 TABLET, FILM COATED ORAL at 22:04

## 2024-08-19 RX ADMIN — LATANOPROST 1 DROP: 50 SOLUTION OPHTHALMIC at 22:06

## 2024-08-19 RX ADMIN — SODIUM CHLORIDE, PRESERVATIVE FREE 10 ML: 5 INJECTION INTRAVENOUS at 07:34

## 2024-08-19 RX ADMIN — FAMOTIDINE 20 MG: 20 TABLET, FILM COATED ORAL at 07:32

## 2024-08-19 RX ADMIN — METOPROLOL SUCCINATE 100 MG: 100 TABLET, EXTENDED RELEASE ORAL at 22:03

## 2024-08-19 RX ADMIN — SODIUM CHLORIDE, PRESERVATIVE FREE 10 ML: 5 INJECTION INTRAVENOUS at 22:06

## 2024-08-19 RX ADMIN — BUPRENORPHINE AND NALOXONE 1 FILM: 8; 2 FILM, SOLUBLE BUCCAL; SUBLINGUAL at 22:04

## 2024-08-19 RX ADMIN — RIVAROXABAN 15 MG: 15 TABLET, FILM COATED ORAL at 07:55

## 2024-08-19 RX ADMIN — BUPRENORPHINE AND NALOXONE 1 FILM: 8; 2 FILM, SOLUBLE BUCCAL; SUBLINGUAL at 09:04

## 2024-08-19 RX ADMIN — FAMOTIDINE 20 MG: 20 TABLET, FILM COATED ORAL at 22:04

## 2024-08-19 RX ADMIN — METOPROLOL SUCCINATE 100 MG: 100 TABLET, EXTENDED RELEASE ORAL at 07:55

## 2024-08-19 NOTE — CARE COORDINATION
ONGOING DISCHARGE PLAN:    Patient is alert and oriented x4.    Spoke with patient regarding discharge plan and patient confirms that plan is still home. Pt states he would like [facility] as he has used them in the past. Referral sent and notified Roel from [facility] who states pt has been accepted.    OP CHF Clinic order placed.    Active order for IV Bumex 2mg daily.    F/U Dr. Contreras 8/29 @ 2:15pm. This is the soonest available appt.    Will continue to follow for additional discharge needs.    If patient is discharged prior to next notation, then this note serves as note for discharge by case management.    Electronically signed by Negrita Lubin RN on 8/19/2024 at 10:54 AM

## 2024-08-19 NOTE — CONSULTS
40 08/18/2024 03:00 AM     LIVER PROFILE:  Recent Labs     08/17/24  1850   AST 22   ALT 20       IMPRESSION:    Patient Active Problem List   Diagnosis    On continuous oral anticoagulation    CHF, acute on chronic (HCC)    Hyperlipidemia    Former smoker    Pacemaker    History of DVT of lower extremity    Enlarged prostate    Cataract of both eyes    GERD (gastroesophageal reflux disease)    History of inguinal hernia repair    Hypertension    Pneumonia    History of right hip replacement    History of gastric surgery    Low back pain    Chest pain    Fluid overload    VARSHA (acute kidney injury) (HCC)    History of acute myocardial infarction    Chronic CHF (congestive heart failure) (HCC)    Unstable angina (HCC)    Acute on chronic diastolic congestive heart failure (HCC)    SBO (small bowel obstruction) (HCC)    Stage 3b chronic kidney disease (HCC)    Iron deficiency    Chronic systolic (congestive) heart failure    Chronic anemia    Bradycardia    Class 1 obesity    Degeneration of intervertebral disc    Edema    Fatigue    Glaucoma suspect of both eyes    Sick sinus syndrome (HCC)    Venous insufficiency    Paroxysmal atrial fibrillation (HCC)    Coronary artery disease involving native coronary artery of native heart with angina pectoris (HCC)    B12 deficiency    Functional constipation    Slow transit constipation    Complex regional pain syndrome type 2 of lower extremity    Open dislocation of knee    Pain in limb    Psychosexual dysfunction associated with inhibited libido    Nausea and vomiting    Right upper quadrant abdominal pain    Altered bowel habits    Abnormal finding on GI tract imaging    Abnormal LFTs    Abdominal pain    Ileus (HCC)    Non-prs chr ulcer oth prt r low leg limited to brkdwn skin (HCC)    Right wrist pain    Abdominal pain, epigastric    Leg swelling    NSTEMI (non-ST elevated myocardial infarction) (HCC)    Presence of permanent cardiac pacemaker    Fall against object

## 2024-08-19 NOTE — TELEPHONE ENCOUNTER
----- Message from Carol Ann JEAN sent at 8/19/2024  9:55 AM EDT -----  Regarding: ECC Appointment Request  ECC Appointment Request    Patient needs appointment for ECC Appointment Type: Hospital Follow Up.    Patient Requested Dates(s):  Patient Requested Time:  Provider Name:Ben Higginbotham MD    Reason for Appointment Request: Other   For Hospital f/u , Patient will be discharged today 8/19/2024  --------------------------------------------------------------------------------------------------------------------------    Relationship to Patient: Third Party Nurse    Call Back Information: Do not leave any message, patient will call back for answer  Preferred Call Back Number: Phone  [phone] , [phone]

## 2024-08-20 VITALS
BODY MASS INDEX: 24.22 KG/M2 | DIASTOLIC BLOOD PRESSURE: 80 MMHG | HEART RATE: 60 BPM | TEMPERATURE: 98.2 F | SYSTOLIC BLOOD PRESSURE: 127 MMHG | RESPIRATION RATE: 16 BRPM | WEIGHT: 182.76 LBS | OXYGEN SATURATION: 97 % | HEIGHT: 73 IN

## 2024-08-20 LAB
ANION GAP SERPL CALCULATED.3IONS-SCNC: 8 MMOL/L (ref 9–17)
BNP SERPL-MCNC: 1028 PG/ML
BUN SERPL-MCNC: 23 MG/DL (ref 8–23)
CALCIUM SERPL-MCNC: 8.6 MG/DL (ref 8.6–10.4)
CHLORIDE SERPL-SCNC: 103 MMOL/L (ref 98–107)
CO2 SERPL-SCNC: 27 MMOL/L (ref 20–31)
CREAT SERPL-MCNC: 0.9 MG/DL (ref 0.7–1.2)
GFR, ESTIMATED: 82 ML/MIN/1.73M2
GLUCOSE SERPL-MCNC: 99 MG/DL (ref 70–99)
MAGNESIUM SERPL-MCNC: 2.1 MG/DL (ref 1.6–2.6)
POTASSIUM SERPL-SCNC: 4.5 MMOL/L (ref 3.7–5.3)
SODIUM SERPL-SCNC: 138 MMOL/L (ref 135–144)

## 2024-08-20 PROCEDURE — 2580000003 HC RX 258: Performed by: NURSE PRACTITIONER

## 2024-08-20 PROCEDURE — 83735 ASSAY OF MAGNESIUM: CPT

## 2024-08-20 PROCEDURE — 36415 COLL VENOUS BLD VENIPUNCTURE: CPT

## 2024-08-20 PROCEDURE — 97116 GAIT TRAINING THERAPY: CPT

## 2024-08-20 PROCEDURE — 83880 ASSAY OF NATRIURETIC PEPTIDE: CPT

## 2024-08-20 PROCEDURE — 6370000000 HC RX 637 (ALT 250 FOR IP): Performed by: NURSE PRACTITIONER

## 2024-08-20 PROCEDURE — 80048 BASIC METABOLIC PNL TOTAL CA: CPT

## 2024-08-20 PROCEDURE — 99233 SBSQ HOSP IP/OBS HIGH 50: CPT | Performed by: NURSE PRACTITIONER

## 2024-08-20 PROCEDURE — 97110 THERAPEUTIC EXERCISES: CPT

## 2024-08-20 RX ORDER — LIDOCAINE 4 G/G
1 PATCH TOPICAL DAILY
Qty: 30 PATCH | Refills: 0 | Status: SHIPPED | OUTPATIENT
Start: 2024-08-20 | End: 2024-09-19

## 2024-08-20 RX ORDER — BUMETANIDE 1 MG/1
2 TABLET ORAL 2 TIMES DAILY
Status: DISCONTINUED | OUTPATIENT
Start: 2024-08-20 | End: 2024-08-20 | Stop reason: HOSPADM

## 2024-08-20 RX ADMIN — METOPROLOL SUCCINATE 100 MG: 100 TABLET, EXTENDED RELEASE ORAL at 10:00

## 2024-08-20 RX ADMIN — ISOSORBIDE MONONITRATE 30 MG: 30 TABLET, EXTENDED RELEASE ORAL at 09:59

## 2024-08-20 RX ADMIN — FAMOTIDINE 20 MG: 20 TABLET, FILM COATED ORAL at 10:00

## 2024-08-20 RX ADMIN — FERROUS SULFATE TAB 325 MG (65 MG ELEMENTAL FE) 325 MG: 325 (65 FE) TAB at 10:00

## 2024-08-20 RX ADMIN — BUPRENORPHINE AND NALOXONE 1 FILM: 8; 2 FILM, SOLUBLE BUCCAL; SUBLINGUAL at 10:05

## 2024-08-20 RX ADMIN — FINASTERIDE 5 MG: 5 TABLET, FILM COATED ORAL at 10:00

## 2024-08-20 RX ADMIN — DILTIAZEM HYDROCHLORIDE 120 MG: 120 CAPSULE, COATED, EXTENDED RELEASE ORAL at 10:00

## 2024-08-20 RX ADMIN — RIVAROXABAN 15 MG: 15 TABLET, FILM COATED ORAL at 10:00

## 2024-08-20 RX ADMIN — SODIUM CHLORIDE, PRESERVATIVE FREE 10 ML: 5 INJECTION INTRAVENOUS at 10:01

## 2024-08-20 NOTE — CARE COORDINATION
ONGOING DISCHARGE PLAN:    Patient is alert and oriented x4.    Spoke with patient regarding discharge plan and patient confirms that plan is still home with [facility]. [facility]. He will pull [facility] and d/c med list from [facility].    OP CHF Clinic order placed.    IV Bumex changed to PO.    F/U Dr. Contreras 8/29 @ 2:15pm. This is the soonest available appt.     Will continue to follow for additional discharge needs.     If patient is discharged prior to next notation, then this note serves as note for discharge by case management.    Electronically signed by Negrita Lubin RN on 8/20/2024 at 11:10 AM

## 2024-08-20 NOTE — DISCHARGE SUMMARY
ferrous sulfate      Patient seen and examined on the day of discharge    Denies any shortness of breath or cough  Denies chest pain or palpitations  Denies abdominal pain, diarrhea vomiting  Denies any new numbness tremors or weakness.      General appearance:  alert, cooperative and no distress  Eyes: Anicteric sclera. Pupils are equally round and reactive to light.  Extraocular movements are intact.  Lungs:  clear to auscultation bilaterally, normal effort  Heart:  regular rate and rhythm, no murmur  Abdomen:  soft, nontender, nondistended, normal bowel sounds, no masses,   Extremities:  no edema, redness, tenderness in the calves  Skin:  no gross lesions, rashes, induration  Neuro:  Alert, oriented X 3,  Non-focal.        Significant therapeutic interventions: As above    Significant Diagnostic Studies:   Labs / Micro:    Lab Results   Component Value Date    WBC 3.4 (L) 08/17/2024    HGB 9.1 (L) 08/17/2024    HCT 27.7 (L) 08/17/2024    MCV 94.3 08/17/2024     08/17/2024          Lab Results   Component Value Date/Time     08/20/2024 06:20 AM    K 4.5 08/20/2024 06:20 AM     08/20/2024 06:20 AM    CO2 27 08/20/2024 06:20 AM    BUN 23 08/20/2024 06:20 AM    CREATININE 0.9 08/20/2024 06:20 AM    GLUCOSE 99 08/20/2024 06:20 AM    CALCIUM 8.6 08/20/2024 06:20 AM          Radiology:    XR CHEST (2 VW)    Result Date: 8/18/2024  EXAMINATION: TWO XRAY VIEWS OF THE CHEST 8/18/2024 10:26 am COMPARISON: 08/17/2024 HISTORY: ORDERING SYSTEM PROVIDED HISTORY: CHF TECHNOLOGIST PROVIDED HISTORY: CHF FINDINGS: Transvenous pacer remains in place.  SIRT stable chest     XR CHEST (2 VW)    Result Date: 8/17/2024  EXAMINATION: TWO XRAY VIEWS OF THE CHEST 8/17/2024 3:33 pm COMPARISON: 04/29/2024 HISTORY: ORDERING SYSTEM PROVIDED HISTORY: Chest Pain TECHNOLOGIST PROVIDED HISTORY: Chest Pain Reason for Exam: Chest pain, sob, bilateral leg and foot swelling FINDINGS: Stable pacemaker leads.  Right basal scarring. The

## 2024-08-20 NOTE — PROGRESS NOTES
IN-PATIENT SERVICE  [facility] Internal Medicine  [facility] Internal Medicine   Telly Perez MD; Terrell De Los Santos MD; Raf Roque MD; Neftaly Contreras MD,   Susan Asher MD; Kaitlin Dsouza MD; Sana Haskins MD; Kaylynn Jolley MD; Todd Tomlinson MD      HISTORY AND PHYSICAL EXAMINATION            Date:   8/19/2024  Patient name:  Hemanth Barrera  MRN:   [id number]  Account:  [id number]  YOB: 1935  PCP:    Neftaly Contreras MD  Code Status:    Full Code    Chief Complaint:     Chief Complaint   Patient presents with    Chest Pain    Shortness of Breath    Leg Swelling           Foot Pain     Left            History Obtained From:     Patient, EMR, nursing staff    HPI     This patient is a 89 y.o. Non- / non  malewho has A-fib on Xarelto, HFpEF on Bumex,  CAD, SSS s/p pacemaker CKD stage III, partial gastrectomy multiple abdominal surgeries presents with chest pain, associated with leg pain shortness of breath .  Partial relief with 1 dose of nitro.  Started few days ago persistent worsening no aggravating factors  Moderate intensity symptom      Review of Systems:     Denies cough, reports shortness of breath  Denies chest pain or palpitations  Denies abdominal pain, diarrhea vomiting  Denies any new numbness tremors or weakness.    A 10 point review of systems was performed and and negative except as mentioned in HPI  Positive and Negative as described in HPI.      Past Medical History:     Past Medical History:   Diagnosis Date    Acute inferolateral myocardial infarction (HCC) 11/18/2021    Arthritis     Atrial fibrillation (HCC)     CAD (coronary artery disease)     CHF (congestive heart failure) (HCC)     Glaucoma     Hx of blood clots     with hernia surgery    Hyperlipidemia     Hypertension     MI (myocardial infarction) (HCC)     NSTEMI (non-ST elevated myocardial infarction) (HCC) 11/17/2021        Past Surgical History:     Past Surgical 
CLINICAL PHARMACY NOTE: MEDS TO BEDS    Total # of Prescriptions Filled: 1   The following medications were delivered to the patient:  Lidocaine 4%    Additional Documentation:  Delivered medications to patients room  
Nutrition Education    Educated on Low Na diet  Learners: Patient  Readiness: Acceptance  Method: Explanation and Handout  Response: Verbalizes Understanding  Contact name and number provided.    Tessa Aiken RD, LD  Contact Number: [phone]    
On call attending notified of troponin and myoglobin levels   
Physical Therapy  Facility/Department: [facility] MED SURG  Physical Therapy Initial Assessment    Name: Hemanth Barrera  : 1935  MRN: [id number]  Date of Service: 2024    Discharge Recommendations:  Continue to assess pending progress   PT Equipment Recommendations  Equipment Needed:  (TBD)      Patient Diagnosis(es): The primary encounter diagnosis was Acute on chronic congestive heart failure, unspecified heart failure type (HCC). Diagnoses of Congestive heart failure, unspecified HF chronicity, unspecified heart failure type (HCC) and Chronic congestive heart failure, unspecified heart failure type (HCC) were also pertinent to this visit.  Past Medical History:  has a past medical history of Acute inferolateral myocardial infarction (HCC), Arthritis, Atrial fibrillation (HCC), CAD (coronary artery disease), CHF (congestive heart failure) (HCC), Glaucoma, Hx of blood clots, Hyperlipidemia, Hypertension, MI (myocardial infarction) (HCC), and NSTEMI (non-ST elevated myocardial infarction) (HCC).  Past Surgical History:  has a past surgical history that includes pacemaker placement; Abdomen surgery; Cardiac catheterization; Appendectomy; Colonoscopy; eye surgery; hernia repair; bone marrow biopsy; joint replacement; CT BIOPSY BONE MARROW (2022); Upper gastrointestinal endoscopy (N/A, 2023); and Colonoscopy (N/A, 8/3/2023).    Assessment   Body Structures, Functions, Activity Limitations Requiring Skilled Therapeutic Intervention: Decreased functional mobility ;Decreased ADL status;Decreased strength;Decreased safe awareness;Decreased endurance;Decreased balance  Assessment: Pt demonstrates impairments in functional mobilitly, strength, endurance, balance, and decreased safety awareness impairing safe participaiton in ADL activities  Treatment Diagnosis: Impaired functional mobility and strength secondary to current acute on chronic CHF management  Specific Instructions for Next Treatment: Increase 
Spiritual Health Assessment/Progress Note  [facility]    (P) Spiritual/Emotional Needs,  ,  ,      Name: Hemanth Barrera MRN: [id number]    Age: 89 y.o.     Sex: male   Language: English   Buddhism: Jew   Congestive heart failure, unspecified HF chronicity, unspecified heart failure type (HCC)     Date: 8/19/2024                           Spiritual Assessment began in [facility] MED SURG        Referral/Consult From: (P) Rounding   Encounter Overview/Reason: (P) Spiritual/Emotional Needs  Service Provided For: (P) Patient    PT was eating lunch sitting on his chair. Welcomed prayer.    Deepali, Belief, Meaning:   Patient identifies as spiritual  Family/Friends No family/friends present      Importance and Influence:  Patient has spiritual/personal beliefs that influence decisions regarding their health  Family/Friends no family/friends present    Community:  Patient Other: NA  Family/Friends Other: NA    Assessment and Plan of Care:     Patient Interventions include: Facilitated expression of thoughts and feelings, Explored spiritual coping/struggle/distress and theological reflection, and Affirmed coping skills/support systems  Family/Friends Interventions include: Other: NA    Patient Plan of Care: Spiritual Care available upon further referral  Family/Friends Plan of Care: Other: NA    Electronically signed by Chaplain Ave on 8/19/2024 at 11:59 AM    
1\")   Wt 82.9 kg (182 lb 12.2 oz)   SpO2 97%   BMI 24.11 kg/m²   General appearance: alert and cooperative with exam  HEENT: Head: Normocephalic, no lesions, without obvious abnormality.  Neck:no JVD, trachea midline, no adenopathy  Lungs: Clear to auscultation, dim bases. No distress  Heart: Regular rate and rhythm, s1/s2 auscultated, no murmurs  Abdomen: soft, non-tender, bowel sounds active  Extremities: no edema  Neurologic: not done    ECHO 8/1/23: reviewed.     Left Ventricle: Normal left ventricular systolic function with a visually estimated EF of 55 - 60%. Left ventricle size is normal. Mildly increased wall thickness. Normal wall motion. Normal diastolic function.    Aortic Valve: Mild to moderate regurgitation.    Mitral Valve: Mild regurgitation.    Tricuspid Valve: The estimated RVSP is 36 mmHg.    Left Atrium: Left atrium is mildly dilated.    Right Atrium: Right atrium is mildly dilated.     Stress Test 8/1/23: reviewed.    Stress Combined Conclusion: Normal pharmacological myocardial perfusion study. Findings suggest a low risk of cardiac events.    ECG: Resting ECG demonstrates normal sinus rhythm and left bundle branch block.    ECG: The ECG was not diagnostic due to baseline ECG abnormality.    Stress Test: A pharmacological stress test was performed using [employer]     Cardiac Angiography 1/13/22: reviewed.  Conclusions      Procedure Summary      Non-obstructive CAD.   LV was not done.      Recommendations      Medical therapy as needed.   Risk factor modification.    Assessment / Acute Cardiac Problems:   Nonobstructive CAD- s/p Cath 2022  Atrial fibrillation- on Xarelto  HLD  HTN  HFpEF-on Bumex at home. Pro-BNP 3,954  SSS- with PPM  CKD stage III  Chest pain- trop 53-28  Anemia    Patient Active Problem List:     On continuous oral anticoagulation     CHF, acute on chronic (HCC)     Hyperlipidemia     Former smoker     Pacemaker     History of DVT of lower extremity     Enlarged prostate

## 2024-08-20 NOTE — PLAN OF CARE
Problem: Discharge Planning  Goal: Discharge to home or other facility with appropriate resources  8/18/2024 1553 by Sally Quintana RN  Outcome: Progressing  8/18/2024 0424 by Karli Badillo RN  Outcome: Progressing  Flowsheets (Taken 8/17/2024 2113)  Discharge to home or other facility with appropriate resources:   Identify barriers to discharge with patient and caregiver   Arrange for needed discharge resources and transportation as appropriate   Identify discharge learning needs (meds, wound care, etc)   Refer to discharge planning if patient needs post-hospital services based on physician order or complex needs related to functional status, cognitive ability or social support system     Problem: Pain  Goal: Verbalizes/displays adequate comfort level or baseline comfort level  8/18/2024 1553 by Sally Quintana RN  Outcome: Progressing  8/18/2024 0424 by Karli Badillo RN  Outcome: Adequate for Discharge  Flowsheets (Taken 8/17/2024 2100)  Verbalizes/displays adequate comfort level or baseline comfort level:   Encourage patient to monitor pain and request assistance   Assess pain using appropriate pain scale   Administer analgesics based on type and severity of pain and evaluate response   Implement non-pharmacological measures as appropriate and evaluate response   Consider cultural and social influences on pain and pain management   Notify Licensed Independent Practitioner if interventions unsuccessful or patient reports new pain     Problem: Safety - Adult  Goal: Free from fall injury  8/18/2024 1553 by Sally Quintana RN  Outcome: Progressing  8/18/2024 0424 by Karli Badillo RN  Outcome: Progressing  Flowsheets (Taken 8/17/2024 2200)  Free From Fall Injury:   Instruct family/caregiver on patient safety   Based on caregiver fall risk screen, instruct family/caregiver to ask for assistance with transferring infant if caregiver noted to have fall risk factors     Problem: Chronic Conditions and 
  Problem: Discharge Planning  Goal: Discharge to home or other facility with appropriate resources  8/20/2024 0334 by Tran Corss, RN  Outcome: Progressing  Flowsheets (Taken 8/20/2024 0334)  Discharge to home or other facility with appropriate resources:   Identify barriers to discharge with patient and caregiver   Arrange for needed discharge resources and transportation as appropriate   Identify discharge learning needs (meds, wound care, etc)   Arrange for interpreters to assist at discharge as needed   Refer to discharge planning if patient needs post-hospital services based on physician order or complex needs related to functional status, cognitive ability or social support system  8/19/2024 1534 by Rcahel Hernandez RN  Outcome: Progressing  Flowsheets (Taken 8/19/2024 0755)  Discharge to home or other facility with appropriate resources:   Arrange for needed discharge resources and transportation as appropriate   Identify barriers to discharge with patient and caregiver   Identify discharge learning needs (meds, wound care, etc)   Refer to discharge planning if patient needs post-hospital services based on physician order or complex needs related to functional status, cognitive ability or social support system     Problem: Pain  Goal: Verbalizes/displays adequate comfort level or baseline comfort level  8/20/2024 0334 by Tran Cross, RN  Outcome: Progressing  Flowsheets (Taken 8/20/2024 0334)  Verbalizes/displays adequate comfort level or baseline comfort level:   Encourage patient to monitor pain and request assistance   Assess pain using appropriate pain scale   Administer analgesics based on type and severity of pain and evaluate response   Implement non-pharmacological measures as appropriate and evaluate response   Consider cultural and social influences on pain and pain management   Notify Licensed Independent Practitioner if interventions unsuccessful or patient reports new 
  Problem: Discharge Planning  Goal: Discharge to home or other facility with appropriate resources  Outcome: Progressing  Flowsheets (Taken 8/17/2024 2113)  Discharge to home or other facility with appropriate resources:   Identify barriers to discharge with patient and caregiver   Arrange for needed discharge resources and transportation as appropriate   Identify discharge learning needs (meds, wound care, etc)   Refer to discharge planning if patient needs post-hospital services based on physician order or complex needs related to functional status, cognitive ability or social support system     Problem: Safety - Adult  Goal: Free from fall injury  Outcome: Progressing  Flowsheets (Taken 8/17/2024 2200)  Free From Fall Injury:   Instruct family/caregiver on patient safety   Based on caregiver fall risk screen, instruct family/caregiver to ask for assistance with transferring infant if caregiver noted to have fall risk factors   Pt uses call light to ask for assistance as needed.     Problem: Chronic Conditions and Co-morbidities  Goal: Patient's chronic conditions and co-morbidity symptoms are monitored and maintained or improved  Outcome: Progressing  Flowsheets (Taken 8/17/2024 2113)  Care Plan - Patient's Chronic Conditions and Co-Morbidity Symptoms are Monitored and Maintained or Improved:   Monitor and assess patient's chronic conditions and comorbid symptoms for stability, deterioration, or improvement   Collaborate with multidisciplinary team to address chronic and comorbid conditions and prevent exacerbation or deterioration   Update acute care plan with appropriate goals if chronic or comorbid symptoms are exacerbated and prevent overall improvement and discharge     Problem: Cardiovascular - Adult  Goal: Maintains optimal cardiac output and hemodynamic stability  Outcome: Progressing     
  Problem: Discharge Planning  Goal: Discharge to home or other facility with appropriate resources  Outcome: Progressing  Flowsheets (Taken 8/19/2024 7876)  Discharge to home or other facility with appropriate resources:   Arrange for needed discharge resources and transportation as appropriate   Identify barriers to discharge with patient and caregiver   Identify discharge learning needs (meds, wound care, etc)   Refer to discharge planning if patient needs post-hospital services based on physician order or complex needs related to functional status, cognitive ability or social support system     Problem: Pain  Goal: Verbalizes/displays adequate comfort level or baseline comfort level  Outcome: Progressing     Problem: Safety - Adult  Goal: Free from fall injury  Outcome: Progressing     Problem: Chronic Conditions and Co-morbidities  Goal: Patient's chronic conditions and co-morbidity symptoms are monitored and maintained or improved  Outcome: Progressing     Problem: Cardiovascular - Adult  Goal: Maintains optimal cardiac output and hemodynamic stability  Outcome: Progressing     Problem: ABCDS Injury Assessment  Goal: Absence of physical injury  Outcome: Progressing     
RN  Outcome: Progressing

## 2024-08-21 ENCOUNTER — CARE COORDINATION (OUTPATIENT)
Dept: CARE COORDINATION | Age: 89
End: 2024-08-21

## 2024-08-21 ENCOUNTER — ENROLLMENT (OUTPATIENT)
Dept: CARE COORDINATION | Age: 89
End: 2024-08-21

## 2024-08-21 NOTE — ED NOTES
20g peripheral IV removed from right forearm in the ER when pt came in after being discharged from [facility]. Pt reports the IV was left in at his time of DC. Pt noticed it when he got home. IV cath intact. Dressing applied. Denies any further needs.

## 2024-08-21 NOTE — CARE COORDINATION
Care Transitions Note    Initial Call - Call within 2 business days of discharge: Yes    Attempted to reach patient for transitions of care follow up. Unable to reach patient.    Outreach Attempts:   HIPAA compliant voicemail left for patient.     Patient: Hemanth Barrera    Patient : 1935   MRN: [id number]    Reason for Admission: cp  Discharge Date: 24  RURS: Readmission Risk Score: 19.7    Last Discharge Facility       Date Complaint Diagnosis Description Type Department Provider    24 Chest Pain; Shortness of Breath; Leg Swelling; Foot Pain Acute on chronic congestive heart failure, unspecified heart failure type (HCC) ... ED to Hosp-Admission (Discharged) (ADMITTED) [facility] Raf Roque MD; Dedra Slater..            Was this an external facility discharge? No    Follow Up Appointment:   Patient has hospital follow up appointment scheduled within 7 days of discharge.    Future Appointments         Provider Specialty Dept Phone    2024 9:30 AM Mc Thorpe DO Orthopedic Surgery [phone]    2024 2:15 PM Neftaly Contreras MD Internal Medicine [phone]    9/3/2024 11:15 AM Larry Quispe, DPM Podiatry [phone]    2024 9:15 AM Neftaly Contreras MD Internal Medicine [phone]    2024 1:30 PM [facility] MED ONC CHAIR 09 Infusion Therapy [phone]    2024 1:30 PM Susana Pearl, APRN - [facility] Cardiology [phone]    2024 1:30 PM Wolf Sharma MD Oncology [phone]            Plan for follow-up on next business day.  2nd attempt 24 hr HFU call     Franny Hutchison RN

## 2024-08-22 ENCOUNTER — CARE COORDINATION (OUTPATIENT)
Dept: CARE COORDINATION | Age: 89
End: 2024-08-22

## 2024-08-22 NOTE — CARE COORDINATION
Care Transitions Note    Initial Call - Call within 2 business days of discharge: Yes    Attempted to reach patient for transitions of care follow up. Unable to reach patient.    Outreach Attempts:   Multiple attempts to contact patient, family,   at phone numbers on file.     Patient: Hemanth Barrera    Patient : 1935   MRN: [id number]    Reason for Admission: cp  Discharge Date: 24  RURS: Readmission Risk Score: 19.7    Last Discharge Facility       Date Complaint Diagnosis Description Type Department Provider    24 Chest Pain; Shortness of Breath; Leg Swelling; Foot Pain Acute on chronic congestive heart failure, unspecified heart failure type (HCC) ... ED to Hosp-Admission (Discharged) (ADMITTED) [facility] Raf Delcid MD; Dedra Slater..          # 2 attempt-Attempted initial 24 hour hospital follow up call. Left a Hipaa compliant message with name and call back information. Requested return call to [phone].     2 unsuccessful attempt to reach patient, care transitions episode resolved    Care Transition Nurse identified needs for Ambulatory Care Management.   The following challenges have been identified  unable to reach patient  Needs addressed: Assessment and support for treatment adherence and medication management-   Patients top risk factors for readmission: functional physical ability  medication management  utilization of services     Writer spoke to Roel from [facility], patient is being followed for VNS    Was this an external facility discharge? No    Follow Up Appointment:   Patient has hospital follow up appointment scheduled within 7 days of discharge.    Future Appointments         Provider Specialty Dept Phone    2024 9:30 AM Mc Thorpe DO Orthopedic Surgery [phone]    2024 2:15 PM Neftaly Contreras MD Internal Medicine [phone]    9/3/2024 11:15 AM Larry Quispe DPM Podiatry [phone]    2024 9:15 AM Neftaly Contreras MD

## 2024-08-23 ENCOUNTER — TELEPHONE (OUTPATIENT)
Dept: INTERNAL MEDICINE CLINIC | Age: 89
End: 2024-08-23

## 2024-08-23 ENCOUNTER — CARE COORDINATION (OUTPATIENT)
Dept: CARE COORDINATION | Age: 89
End: 2024-08-23

## 2024-08-23 NOTE — CARE COORDINATION
Ambulatory Care Coordination Note     8/23/2024 11:57 AM     ACM outreach attempt by this ACM today to offer care management services. ACM was unable to reach the patient by telephone today; left voice message requesting a return phone call to this ACM.     ACM: KUSH MAY RN     Care Summary Note: CTN hand off    PCP/Specialist follow up:   Future Appointments         Provider Specialty Dept Phone    8/29/2024 2:15 PM Neftaly Contreras MD Internal Medicine [phone]    9/3/2024 11:15 AM Larry Quispe DPM Podiatry [phone]    9/6/2024 9:15 AM Neftaly Contreras MD Internal Medicine [phone]    9/13/2024 1:30 PM [facility] MED ONC CHAIR 09 Infusion Therapy [phone]    9/24/2024 1:30 PM Susana Pearl, APRN - CNP Cardiology [phone]    10/2/2024 2:45 PM Mc Thorpe, DO Orthopedic Surgery [phone]    11/6/2024 1:30 PM Wolf Sharma MD Oncology [phone]            Follow Up:   Plan for next ACM outreach in approximately 1 week to complete:  - outreach attempt to offer care management services.

## 2024-08-23 NOTE — TELEPHONE ENCOUNTER
Care Transitions Initial Follow Up Call    Outreach made within 2 business days of discharge: Yes    Patient: Hemanth Barrera Patient : 1935   MRN: [id number]  Reason for Admission: Congestive heart failure, unspecified HF chronicity, unspecified heart failure type (HCC)   Discharge Date: 24       Spoke with: Hemanth    Discharge department/facility: [facility] Interactive Patient Contact:  Was patient able to fill all prescriptions: Yes  Was patient instructed to bring all medications to the follow-up visit: Yes  Is patient taking all medications as directed in the discharge summary? Yes  Does patient understand their discharge instructions: Yes  Does patient have questions or concerns that need addressed prior to 7-14 day follow up office visit: no    Additional needs identified to be addressed with provider  No needs identified             Scheduled appointment with PCP within 7-14 days    Follow Up  Future Appointments   Date Time Provider Department Center   2024  9:30 AM Mc Thorpe, DO ORTHO SPECIA [facility]   2024  2:15 PM Neftaly Contreras MD [facility] DEP   9/3/2024 11:15 AM Larry Quispe DPM [place] [facility]   2024  9:15 AM Neftaly Contreras MD [facility] BS ECC DEP   2024  1:30 PM [facility] MED ONC CHAIR 09 PB PB MON [place]   2024  1:30 PM Susana Pearl, APRN - CNP AFL [facility]   10/2/2024  2:45 PM Mc Thorpe, DO ORTHO SPECIA [facility]   2024  1:30 PM Wolf Sharma MD [facility] CANCER [facility]       Kasandra Dodson

## 2024-08-27 ENCOUNTER — CARE COORDINATION (OUTPATIENT)
Dept: CARE COORDINATION | Age: 89
End: 2024-08-27

## 2024-08-27 NOTE — CARE COORDINATION
Ambulatory Care Coordination Note     8/27/2024 3:27 PM     ACM outreach attempt by this ACM today to offer care management services. ACM was unable to reach the patient by telephone today; left voice message requesting a return phone call to this ACM.     ACM: KUSH MAY RN     Care Summary Note: Included hospital follow up appointment reminder.     PCP/Specialist follow up:   Future Appointments         Provider Specialty Dept Phone    8/29/2024 2:15 PM eNftaly Contreras MD Internal Medicine [phone]    9/3/2024 11:15 AM Larry Quispe DPM Podiatry [phone]    9/6/2024 9:15 AM Neftaly Contreras MD Internal Medicine [phone]    9/13/2024 1:30 PM [facility] MED ONC CHAIR 09 Infusion Therapy [phone]    9/24/2024 1:30 PM Susana Pearl, APRN - CNP Cardiology [phone]    10/2/2024 2:45 PM Mc Thorpe, DO Orthopedic Surgery [phone]    11/6/2024 1:30 PM Wolf Sharma MD Oncology [phone]            Follow Up:   Plan for next ACM outreach in approximately 1 week to complete:  - outreach attempt to offer care management services.

## 2024-08-29 ENCOUNTER — TELEPHONE (OUTPATIENT)
Dept: INTERNAL MEDICINE CLINIC | Age: 89
End: 2024-08-29

## 2024-08-29 NOTE — TELEPHONE ENCOUNTER
Gwen with [facility] calling to inform office they did a SOC today and will be having nursing, PT and OT working with patient. She will be sending orders for Dr. Contreras to review and sign.

## 2024-09-04 ENCOUNTER — CARE COORDINATION (OUTPATIENT)
Dept: CARE COORDINATION | Age: 89
End: 2024-09-04

## 2024-09-04 ENCOUNTER — TELEPHONE (OUTPATIENT)
Dept: INTERNAL MEDICINE CLINIC | Age: 89
End: 2024-09-04

## 2024-09-04 NOTE — CARE COORDINATION
Ambulatory Care Coordination Note     9/4/2024 3:06 PM     patient outreach attempt by this ACM today to offer care management services. ACM was unable to reach the patient by telephone today; left voice message requesting a return phone call to this ACM.     Patient closed (unable to reach patient) from the High Risk Care Management program on 9/4/2024.   No further Ambulatory Care Manager follow up scheduled.

## 2024-09-04 NOTE — TELEPHONE ENCOUNTER
Kristopher Sánchez called to inform that patient had a missed visit today due to no answer the door.

## 2024-09-06 ENCOUNTER — TELEPHONE (OUTPATIENT)
Dept: INTERNAL MEDICINE CLINIC | Age: 89
End: 2024-09-06

## 2024-09-09 ENCOUNTER — TELEPHONE (OUTPATIENT)
Dept: INTERNAL MEDICINE CLINIC | Age: 89
End: 2024-09-09

## 2024-09-13 ENCOUNTER — HOSPITAL ENCOUNTER (OUTPATIENT)
Dept: INFUSION THERAPY | Age: 89
Discharge: HOME | End: 2024-09-13
Payer: MEDICARE

## 2024-09-13 DIAGNOSIS — E53.8 B12 DEFICIENCY: Primary | ICD-10-CM

## 2024-09-13 PROCEDURE — 6360000002 HC RX W HCPCS: Performed by: INTERNAL MEDICINE

## 2024-09-13 PROCEDURE — 96372 THER/PROPH/DIAG INJ SC/IM: CPT

## 2024-09-13 RX ORDER — CYANOCOBALAMIN 1000 UG/ML
1000 INJECTION, SOLUTION INTRAMUSCULAR; SUBCUTANEOUS ONCE
Status: COMPLETED
Start: 2024-09-13 | End: 2024-09-13

## 2024-09-13 RX ORDER — DIPHENHYDRAMINE HYDROCHLORIDE 50 MG/ML
50 INJECTION INTRAMUSCULAR; INTRAVENOUS
OUTPATIENT
Start: 2024-10-11

## 2024-09-13 RX ORDER — CYANOCOBALAMIN 1000 UG/ML
1000 INJECTION, SOLUTION INTRAMUSCULAR; SUBCUTANEOUS ONCE
Start: 2024-10-11

## 2024-09-13 RX ORDER — ACETAMINOPHEN 325 MG/1
650 TABLET ORAL
OUTPATIENT
Start: 2024-10-11

## 2024-09-13 RX ORDER — ALBUTEROL SULFATE 90 UG/1
4 INHALANT RESPIRATORY (INHALATION) PRN
OUTPATIENT
Start: 2024-10-11

## 2024-09-13 RX ORDER — ONDANSETRON 2 MG/ML
8 INJECTION INTRAMUSCULAR; INTRAVENOUS
OUTPATIENT
Start: 2024-10-11

## 2024-09-13 RX ORDER — EPINEPHRINE 1 MG/ML
0.3 INJECTION, SOLUTION, CONCENTRATE INTRAVENOUS PRN
OUTPATIENT
Start: 2024-10-11

## 2024-09-13 RX ORDER — FAMOTIDINE 10 MG/ML
20 INJECTION, SOLUTION INTRAVENOUS
OUTPATIENT
Start: 2024-10-11

## 2024-09-13 RX ORDER — SODIUM CHLORIDE 9 MG/ML
INJECTION, SOLUTION INTRAVENOUS CONTINUOUS
OUTPATIENT
Start: 2024-10-11

## 2024-09-13 RX ORDER — ACETAMINOPHEN 325 MG/1
325 TABLET ORAL
OUTPATIENT
Start: 2024-10-11

## 2024-09-13 RX ADMIN — CYANOCOBALAMIN 1000 MCG: 1000 INJECTION, SOLUTION INTRAMUSCULAR; SUBCUTANEOUS at 13:13

## 2024-09-16 ENCOUNTER — HOSPITAL ENCOUNTER (EMERGENCY)
Age: 89
Discharge: ANOTHER HOSPITAL | End: 2024-09-16
Attending: EMERGENCY MEDICINE
Payer: MEDICARE

## 2024-09-16 ENCOUNTER — APPOINTMENT (OUTPATIENT)
Dept: GENERAL RADIOLOGY | Age: 89
End: 2024-09-16
Payer: MEDICARE

## 2024-09-16 ENCOUNTER — HOSPITAL ENCOUNTER (INPATIENT)
Age: 89
LOS: 3 days | Discharge: HOME HEALTH | End: 2024-09-19
Attending: INTERNAL MEDICINE
Payer: MEDICARE

## 2024-09-16 ENCOUNTER — APPOINTMENT (OUTPATIENT)
Dept: GENERAL RADIOLOGY | Age: 89
End: 2024-09-16
Attending: INTERNAL MEDICINE
Payer: MEDICARE

## 2024-09-16 VITALS
SYSTOLIC BLOOD PRESSURE: 144 MMHG | HEART RATE: 64 BPM | RESPIRATION RATE: 15 BRPM | BODY MASS INDEX: 24.12 KG/M2 | WEIGHT: 182 LBS | OXYGEN SATURATION: 100 % | DIASTOLIC BLOOD PRESSURE: 83 MMHG | HEIGHT: 73 IN | TEMPERATURE: 98.5 F

## 2024-09-16 DIAGNOSIS — I20.0 UNSTABLE ANGINA (HCC): ICD-10-CM

## 2024-09-16 DIAGNOSIS — I20.0 UNSTABLE ANGINA (HCC): Primary | ICD-10-CM

## 2024-09-16 DIAGNOSIS — R07.9 CHEST PAIN, UNSPECIFIED TYPE: Primary | ICD-10-CM

## 2024-09-16 DIAGNOSIS — Z95.5 S/P CORONARY ARTERY STENT PLACEMENT: ICD-10-CM

## 2024-09-16 LAB
ANION GAP SERPL CALCULATED.3IONS-SCNC: 11 MMOL/L (ref 9–17)
ANTI-XA UNFRAC HEPARIN: >2 IU/L
BASOPHILS # BLD: 0 K/UL (ref 0–0.2)
BASOPHILS NFR BLD: 1 % (ref 0–2)
BUN SERPL-MCNC: 21 MG/DL (ref 8–23)
CALCIUM SERPL-MCNC: 9.2 MG/DL (ref 8.6–10.4)
CHLORIDE SERPL-SCNC: 100 MMOL/L (ref 98–107)
CHOLEST SERPL-MCNC: 111 MG/DL (ref 0–199)
CHOLESTEROL/HDL RATIO: 2
CO2 SERPL-SCNC: 28 MMOL/L (ref 20–31)
CREAT SERPL-MCNC: 1.1 MG/DL (ref 0.7–1.2)
D DIMER PPP FEU-MCNC: 0.95 UG/ML FEU (ref 0–0.59)
EOSINOPHIL # BLD: 0.1 K/UL (ref 0–0.4)
EOSINOPHILS RELATIVE PERCENT: 3 % (ref 0–4)
ERYTHROCYTE [DISTWIDTH] IN BLOOD BY AUTOMATED COUNT: 13.6 % (ref 11.5–14.9)
ERYTHROCYTE [DISTWIDTH] IN BLOOD BY AUTOMATED COUNT: 13.6 % (ref 11.8–14.4)
GFR, ESTIMATED: 64 ML/MIN/1.73M2
GLUCOSE SERPL-MCNC: 121 MG/DL (ref 70–99)
HCT VFR BLD AUTO: 28.3 % (ref 40.7–50.3)
HCT VFR BLD AUTO: 28.5 % (ref 41–53)
HDLC SERPL-MCNC: 50 MG/DL
HGB BLD-MCNC: 8.5 G/DL (ref 13–17)
HGB BLD-MCNC: 9.6 G/DL (ref 13.5–17.5)
INR PPP: 1.9
INR PPP: 2.2
LDLC SERPL CALC-MCNC: 52 MG/DL (ref 0–100)
LYMPHOCYTES NFR BLD: 0.8 K/UL (ref 1–4.8)
LYMPHOCYTES RELATIVE PERCENT: 18 % (ref 24–44)
MCH RBC QN AUTO: 29.9 PG (ref 25.2–33.5)
MCH RBC QN AUTO: 31.2 PG (ref 26–34)
MCHC RBC AUTO-ENTMCNC: 30 G/DL (ref 28.4–34.8)
MCHC RBC AUTO-ENTMCNC: 33.5 G/DL (ref 31–37)
MCV RBC AUTO: 93.2 FL (ref 80–100)
MCV RBC AUTO: 99.6 FL (ref 82.6–102.9)
MONOCYTES NFR BLD: 0.4 K/UL (ref 0.1–1.3)
MONOCYTES NFR BLD: 9 % (ref 1–7)
MYOGLOBIN SERPL-MCNC: 532 NG/ML (ref 28–72)
MYOGLOBIN SERPL-MCNC: 573 NG/ML (ref 28–72)
NEUTROPHILS NFR BLD: 69 % (ref 36–66)
NEUTS SEG NFR BLD: 3 K/UL (ref 1.3–9.1)
NRBC BLD-RTO: 0 PER 100 WBC
PARTIAL THROMBOPLASTIN TIME: 34.1 SEC (ref 24–36)
PARTIAL THROMBOPLASTIN TIME: 34.6 SEC (ref 23–36.5)
PLATELET # BLD AUTO: 165 K/UL (ref 138–453)
PLATELET # BLD AUTO: 190 K/UL (ref 150–450)
PMV BLD AUTO: 7.3 FL (ref 6–12)
PMV BLD AUTO: 9.7 FL (ref 8.1–13.5)
POTASSIUM SERPL-SCNC: 3.9 MMOL/L (ref 3.7–5.3)
PROTHROMBIN TIME: 21.1 SEC (ref 11.7–14.9)
PROTHROMBIN TIME: 24.4 SEC (ref 11.8–14.6)
RBC # BLD AUTO: 2.84 M/UL (ref 4.21–5.77)
RBC # BLD AUTO: 3.06 M/UL (ref 4.5–5.9)
SODIUM SERPL-SCNC: 139 MMOL/L (ref 135–144)
TRIGL SERPL-MCNC: 41 MG/DL
TROPONIN I SERPL HS-MCNC: 62 NG/L (ref 0–22)
TROPONIN I SERPL HS-MCNC: 77 NG/L (ref 0–22)
TROPONIN I SERPL HS-MCNC: 87 NG/L (ref 0–22)
VLDLC SERPL CALC-MCNC: 8 MG/DL
WBC OTHER # BLD: 4.1 K/UL (ref 3.5–11.3)
WBC OTHER # BLD: 4.4 K/UL (ref 3.5–11)

## 2024-09-16 PROCEDURE — 85027 COMPLETE CBC AUTOMATED: CPT

## 2024-09-16 PROCEDURE — 99222 1ST HOSP IP/OBS MODERATE 55: CPT | Performed by: NURSE PRACTITIONER

## 2024-09-16 PROCEDURE — 6370000000 HC RX 637 (ALT 250 FOR IP): Performed by: EMERGENCY MEDICINE

## 2024-09-16 PROCEDURE — 6370000000 HC RX 637 (ALT 250 FOR IP): Performed by: NURSE PRACTITIONER

## 2024-09-16 PROCEDURE — 85730 THROMBOPLASTIN TIME PARTIAL: CPT

## 2024-09-16 PROCEDURE — 85610 PROTHROMBIN TIME: CPT

## 2024-09-16 PROCEDURE — 85379 FIBRIN DEGRADATION QUANT: CPT

## 2024-09-16 PROCEDURE — 99285 EMERGENCY DEPT VISIT HI MDM: CPT

## 2024-09-16 PROCEDURE — 80048 BASIC METABOLIC PNL TOTAL CA: CPT

## 2024-09-16 PROCEDURE — 84484 ASSAY OF TROPONIN QUANT: CPT

## 2024-09-16 PROCEDURE — 36415 COLL VENOUS BLD VENIPUNCTURE: CPT

## 2024-09-16 PROCEDURE — 85520 HEPARIN ASSAY: CPT

## 2024-09-16 PROCEDURE — 73130 X-RAY EXAM OF HAND: CPT

## 2024-09-16 PROCEDURE — 80061 LIPID PANEL: CPT

## 2024-09-16 PROCEDURE — 71045 X-RAY EXAM CHEST 1 VIEW: CPT

## 2024-09-16 PROCEDURE — 83874 ASSAY OF MYOGLOBIN: CPT

## 2024-09-16 PROCEDURE — 2060000000 HC ICU INTERMEDIATE R&B

## 2024-09-16 PROCEDURE — 93005 ELECTROCARDIOGRAM TRACING: CPT | Performed by: EMERGENCY MEDICINE

## 2024-09-16 PROCEDURE — 85025 COMPLETE CBC W/AUTO DIFF WBC: CPT

## 2024-09-16 RX ORDER — ISOSORBIDE MONONITRATE 30 MG/1
30 TABLET, EXTENDED RELEASE ORAL DAILY
Status: DISCONTINUED | OUTPATIENT
Start: 2024-09-16 | End: 2024-09-19 | Stop reason: HOSPADM

## 2024-09-16 RX ORDER — ACETAMINOPHEN 325 MG/1
650 TABLET ORAL EVERY 6 HOURS PRN
Status: DISCONTINUED | OUTPATIENT
Start: 2024-09-16 | End: 2024-09-19 | Stop reason: HOSPADM

## 2024-09-16 RX ORDER — HEPARIN SODIUM 1000 [USP'U]/ML
60 INJECTION, SOLUTION INTRAVENOUS; SUBCUTANEOUS ONCE
Status: DISCONTINUED | OUTPATIENT
Start: 2024-09-16 | End: 2024-09-16 | Stop reason: SDUPTHER

## 2024-09-16 RX ORDER — SODIUM CHLORIDE 0.9 % (FLUSH) 0.9 %
10 SYRINGE (ML) INJECTION PRN
Status: DISCONTINUED | OUTPATIENT
Start: 2024-09-16 | End: 2024-09-19 | Stop reason: HOSPADM

## 2024-09-16 RX ORDER — NITROGLYCERIN 0.4 MG/1
0.4 TABLET SUBLINGUAL EVERY 5 MIN PRN
Status: DISCONTINUED | OUTPATIENT
Start: 2024-09-16 | End: 2024-09-19 | Stop reason: HOSPADM

## 2024-09-16 RX ORDER — ACETAMINOPHEN 500 MG
1000 TABLET ORAL ONCE
Status: COMPLETED | OUTPATIENT
Start: 2024-09-16 | End: 2024-09-16

## 2024-09-16 RX ORDER — ONDANSETRON 4 MG/1
4 TABLET, ORALLY DISINTEGRATING ORAL EVERY 8 HOURS PRN
Status: DISCONTINUED | OUTPATIENT
Start: 2024-09-16 | End: 2024-09-19 | Stop reason: HOSPADM

## 2024-09-16 RX ORDER — HEPARIN SODIUM 1000 [USP'U]/ML
4000 INJECTION, SOLUTION INTRAVENOUS; SUBCUTANEOUS ONCE
Status: DISCONTINUED | OUTPATIENT
Start: 2024-09-17 | End: 2024-09-16 | Stop reason: HOSPADM

## 2024-09-16 RX ORDER — SODIUM CHLORIDE 9 MG/ML
INJECTION, SOLUTION INTRAVENOUS PRN
Status: DISCONTINUED | OUTPATIENT
Start: 2024-09-16 | End: 2024-09-19 | Stop reason: HOSPADM

## 2024-09-16 RX ORDER — HEPARIN SODIUM 10000 [USP'U]/100ML
5-30 INJECTION, SOLUTION INTRAVENOUS CONTINUOUS
Status: DISCONTINUED | OUTPATIENT
Start: 2024-09-16 | End: 2024-09-16 | Stop reason: SDUPTHER

## 2024-09-16 RX ORDER — HEPARIN SODIUM 1000 [USP'U]/ML
4000 INJECTION, SOLUTION INTRAVENOUS; SUBCUTANEOUS PRN
Status: DISCONTINUED | OUTPATIENT
Start: 2024-09-17 | End: 2024-09-16 | Stop reason: HOSPADM

## 2024-09-16 RX ORDER — HEPARIN SODIUM 1000 [USP'U]/ML
2000 INJECTION, SOLUTION INTRAVENOUS; SUBCUTANEOUS PRN
Status: DISCONTINUED | OUTPATIENT
Start: 2024-09-16 | End: 2024-09-18

## 2024-09-16 RX ORDER — POTASSIUM CHLORIDE 7.45 MG/ML
10 INJECTION INTRAVENOUS PRN
Status: DISCONTINUED | OUTPATIENT
Start: 2024-09-16 | End: 2024-09-19 | Stop reason: HOSPADM

## 2024-09-16 RX ORDER — BUMETANIDE 1 MG/1
2 TABLET ORAL 2 TIMES DAILY
Status: DISCONTINUED | OUTPATIENT
Start: 2024-09-16 | End: 2024-09-18

## 2024-09-16 RX ORDER — MAGNESIUM SULFATE 1 G/100ML
1000 INJECTION INTRAVENOUS PRN
Status: DISCONTINUED | OUTPATIENT
Start: 2024-09-16 | End: 2024-09-19 | Stop reason: HOSPADM

## 2024-09-16 RX ORDER — ATORVASTATIN CALCIUM 40 MG/1
40 TABLET, FILM COATED ORAL NIGHTLY
Status: DISCONTINUED | OUTPATIENT
Start: 2024-09-16 | End: 2024-09-17

## 2024-09-16 RX ORDER — ONDANSETRON 2 MG/ML
4 INJECTION INTRAMUSCULAR; INTRAVENOUS EVERY 6 HOURS PRN
Status: DISCONTINUED | OUTPATIENT
Start: 2024-09-16 | End: 2024-09-19 | Stop reason: HOSPADM

## 2024-09-16 RX ORDER — FERROUS SULFATE 325(65) MG
325 TABLET, DELAYED RELEASE (ENTERIC COATED) ORAL EVERY OTHER DAY
Status: DISCONTINUED | OUTPATIENT
Start: 2024-09-17 | End: 2024-09-19 | Stop reason: HOSPADM

## 2024-09-16 RX ORDER — METOPROLOL SUCCINATE 25 MG/1
100 TABLET, EXTENDED RELEASE ORAL 2 TIMES DAILY
Status: DISCONTINUED | OUTPATIENT
Start: 2024-09-16 | End: 2024-09-18

## 2024-09-16 RX ORDER — HEPARIN SODIUM 10000 [USP'U]/100ML
5-30 INJECTION, SOLUTION INTRAVENOUS CONTINUOUS
Status: DISCONTINUED | OUTPATIENT
Start: 2024-09-17 | End: 2024-09-16 | Stop reason: HOSPADM

## 2024-09-16 RX ORDER — LIDOCAINE 4 G/G
1 PATCH TOPICAL DAILY
Status: DISCONTINUED | OUTPATIENT
Start: 2024-09-16 | End: 2024-09-19 | Stop reason: HOSPADM

## 2024-09-16 RX ORDER — LATANOPROST 50 UG/ML
1 SOLUTION/ DROPS OPHTHALMIC NIGHTLY
Status: DISCONTINUED | OUTPATIENT
Start: 2024-09-16 | End: 2024-09-19 | Stop reason: HOSPADM

## 2024-09-16 RX ORDER — HEPARIN SODIUM 10000 [USP'U]/100ML
5-30 INJECTION, SOLUTION INTRAVENOUS CONTINUOUS
Status: DISCONTINUED | OUTPATIENT
Start: 2024-09-17 | End: 2024-09-18

## 2024-09-16 RX ORDER — FAMOTIDINE 20 MG/1
20 TABLET, FILM COATED ORAL 2 TIMES DAILY
Status: DISCONTINUED | OUTPATIENT
Start: 2024-09-16 | End: 2024-09-19 | Stop reason: HOSPADM

## 2024-09-16 RX ORDER — ACETAMINOPHEN 650 MG/1
650 SUPPOSITORY RECTAL EVERY 6 HOURS PRN
Status: DISCONTINUED | OUTPATIENT
Start: 2024-09-16 | End: 2024-09-19 | Stop reason: HOSPADM

## 2024-09-16 RX ORDER — SODIUM CHLORIDE 0.9 % (FLUSH) 0.9 %
5-40 SYRINGE (ML) INJECTION EVERY 12 HOURS SCHEDULED
Status: DISCONTINUED | OUTPATIENT
Start: 2024-09-16 | End: 2024-09-19 | Stop reason: HOSPADM

## 2024-09-16 RX ORDER — POTASSIUM CHLORIDE 1500 MG/1
40 TABLET, EXTENDED RELEASE ORAL PRN
Status: DISCONTINUED | OUTPATIENT
Start: 2024-09-16 | End: 2024-09-19 | Stop reason: HOSPADM

## 2024-09-16 RX ORDER — DILTIAZEM HYDROCHLORIDE 120 MG/1
120 CAPSULE, COATED, EXTENDED RELEASE ORAL DAILY
Status: DISCONTINUED | OUTPATIENT
Start: 2024-09-16 | End: 2024-09-19 | Stop reason: HOSPADM

## 2024-09-16 RX ORDER — FINASTERIDE 5 MG/1
5 TABLET, FILM COATED ORAL DAILY
Status: DISCONTINUED | OUTPATIENT
Start: 2024-09-16 | End: 2024-09-19 | Stop reason: HOSPADM

## 2024-09-16 RX ORDER — HEPARIN SODIUM 1000 [USP'U]/ML
4000 INJECTION, SOLUTION INTRAVENOUS; SUBCUTANEOUS PRN
Status: DISCONTINUED | OUTPATIENT
Start: 2024-09-16 | End: 2024-09-18

## 2024-09-16 RX ORDER — NITROGLYCERIN 0.4 MG/1
0.4 TABLET SUBLINGUAL EVERY 5 MIN PRN
Status: COMPLETED | OUTPATIENT
Start: 2024-09-16 | End: 2024-09-16

## 2024-09-16 RX ORDER — BUPRENORPHINE AND NALOXONE 4; 1 MG/1; MG/1
2 FILM, SOLUBLE BUCCAL; SUBLINGUAL 2 TIMES DAILY
Status: DISCONTINUED | OUTPATIENT
Start: 2024-09-16 | End: 2024-09-19 | Stop reason: HOSPADM

## 2024-09-16 RX ORDER — HEPARIN SODIUM 1000 [USP'U]/ML
2000 INJECTION, SOLUTION INTRAVENOUS; SUBCUTANEOUS PRN
Status: DISCONTINUED | OUTPATIENT
Start: 2024-09-17 | End: 2024-09-16 | Stop reason: HOSPADM

## 2024-09-16 RX ADMIN — BUMETANIDE 2 MG: 1 TABLET ORAL at 22:22

## 2024-09-16 RX ADMIN — NITROGLYCERIN 0.4 MG: 0.4 TABLET SUBLINGUAL at 11:58

## 2024-09-16 RX ADMIN — LATANOPROST 1 DROP: 50 SOLUTION OPHTHALMIC at 22:25

## 2024-09-16 RX ADMIN — METOPROLOL SUCCINATE 100 MG: 25 TABLET, EXTENDED RELEASE ORAL at 22:23

## 2024-09-16 RX ADMIN — ATORVASTATIN CALCIUM 40 MG: 40 TABLET, FILM COATED ORAL at 22:23

## 2024-09-16 RX ADMIN — FAMOTIDINE 20 MG: 20 TABLET, FILM COATED ORAL at 22:23

## 2024-09-16 RX ADMIN — NITROGLYCERIN 0.4 MG: 0.4 TABLET SUBLINGUAL at 09:47

## 2024-09-16 RX ADMIN — ACETAMINOPHEN 1000 MG: 500 TABLET ORAL at 12:38

## 2024-09-16 RX ADMIN — NITROGLYCERIN 0.4 MG: 0.4 TABLET SUBLINGUAL at 12:04

## 2024-09-16 RX ADMIN — BUPRENORPHINE AND NALOXONE 1 FILM: 4; 1 FILM, SOLUBLE BUCCAL; SUBLINGUAL at 23:50

## 2024-09-16 ASSESSMENT — ENCOUNTER SYMPTOMS
FACIAL SWELLING: 0
EYE PAIN: 0
CHEST TIGHTNESS: 0
EYE REDNESS: 0
WHEEZING: 0
TROUBLE SWALLOWING: 0
ABDOMINAL PAIN: 0
NAUSEA: 0
EYE DISCHARGE: 0
SINUS PRESSURE: 0
CONSTIPATION: 0
COUGH: 0
BACK PAIN: 0
RHINORRHEA: 0
DIARRHEA: 0
SHORTNESS OF BREATH: 0
COLOR CHANGE: 0
VOMITING: 0
SORE THROAT: 0

## 2024-09-16 ASSESSMENT — PAIN DESCRIPTION - LOCATION
LOCATION: CHEST

## 2024-09-16 ASSESSMENT — PAIN DESCRIPTION - DESCRIPTORS: DESCRIPTORS: SHARP

## 2024-09-16 ASSESSMENT — PAIN SCALES - GENERAL
PAINLEVEL_OUTOF10: 9
PAINLEVEL_OUTOF10: 5
PAINLEVEL_OUTOF10: 0

## 2024-09-16 ASSESSMENT — PAIN DESCRIPTION - ORIENTATION
ORIENTATION: LEFT
ORIENTATION: LEFT

## 2024-09-17 LAB
ANTI-XA UNFRAC HEPARIN: 0.41 IU/L
ECHO BSA: 2.1 M2
ERYTHROCYTE [DISTWIDTH] IN BLOOD BY AUTOMATED COUNT: 13.7 % (ref 11.8–14.4)
GLUCOSE BLD-MCNC: 142 MG/DL (ref 75–110)
HCT VFR BLD AUTO: 28.5 % (ref 40.7–50.3)
HGB BLD-MCNC: 8.9 G/DL (ref 13–17)
MAGNESIUM SERPL-MCNC: 1.9 MG/DL (ref 1.6–2.4)
MCH RBC QN AUTO: 29.7 PG (ref 25.2–33.5)
MCHC RBC AUTO-ENTMCNC: 31.2 G/DL (ref 28.4–34.8)
MCV RBC AUTO: 95 FL (ref 82.6–102.9)
NRBC BLD-RTO: 0 PER 100 WBC
PARTIAL THROMBOPLASTIN TIME: 125.3 SEC (ref 23–36.5)
PARTIAL THROMBOPLASTIN TIME: 28.8 SEC (ref 23–36.5)
PLATELET # BLD AUTO: 184 K/UL (ref 138–453)
PMV BLD AUTO: 9.8 FL (ref 8.1–13.5)
POC INR: 1.3
PROTHROMBIN TIME, POC: 15.5 SEC (ref 10.4–14.2)
RBC # BLD AUTO: 3 M/UL (ref 4.21–5.77)
WBC OTHER # BLD: 3.4 K/UL (ref 3.5–11.3)

## 2024-09-17 PROCEDURE — 2580000003 HC RX 258: Performed by: NURSE PRACTITIONER

## 2024-09-17 PROCEDURE — C1769 GUIDE WIRE: HCPCS | Performed by: INTERNAL MEDICINE

## 2024-09-17 PROCEDURE — C1760 CLOSURE DEV, VASC: HCPCS | Performed by: INTERNAL MEDICINE

## 2024-09-17 PROCEDURE — 92928 PRQ TCAT PLMT NTRAC ST 1 LES: CPT | Performed by: INTERNAL MEDICINE

## 2024-09-17 PROCEDURE — C1725 CATH, TRANSLUMIN NON-LASER: HCPCS | Performed by: INTERNAL MEDICINE

## 2024-09-17 PROCEDURE — 99232 SBSQ HOSP IP/OBS MODERATE 35: CPT | Performed by: NURSE PRACTITIONER

## 2024-09-17 PROCEDURE — 85610 PROTHROMBIN TIME: CPT

## 2024-09-17 PROCEDURE — 2060000000 HC ICU INTERMEDIATE R&B

## 2024-09-17 PROCEDURE — C1874 STENT, COATED/COV W/DEL SYS: HCPCS | Performed by: INTERNAL MEDICINE

## 2024-09-17 PROCEDURE — 027034Z DILATION OF CORONARY ARTERY, ONE ARTERY WITH DRUG-ELUTING INTRALUMINAL DEVICE, PERCUTANEOUS APPROACH: ICD-10-PCS | Performed by: INTERNAL MEDICINE

## 2024-09-17 PROCEDURE — C1761 HC CATH TRANSLUM INTRAVASCULAR LITHOTRIPSY CORONARY: HCPCS | Performed by: INTERNAL MEDICINE

## 2024-09-17 PROCEDURE — 92920 PRQ TRLUML C ANGIOP 1ART&/BR: CPT | Performed by: INTERNAL MEDICINE

## 2024-09-17 PROCEDURE — 99222 1ST HOSP IP/OBS MODERATE 55: CPT | Performed by: INTERNAL MEDICINE

## 2024-09-17 PROCEDURE — 6370000000 HC RX 637 (ALT 250 FOR IP): Performed by: INTERNAL MEDICINE

## 2024-09-17 PROCEDURE — 02F03ZZ FRAGMENTATION IN CORONARY ARTERY, ONE ARTERY, PERCUTANEOUS APPROACH: ICD-10-PCS | Performed by: INTERNAL MEDICINE

## 2024-09-17 PROCEDURE — 6360000004 HC RX CONTRAST MEDICATION: Performed by: INTERNAL MEDICINE

## 2024-09-17 PROCEDURE — C1887 CATHETER, GUIDING: HCPCS | Performed by: INTERNAL MEDICINE

## 2024-09-17 PROCEDURE — 85520 HEPARIN ASSAY: CPT

## 2024-09-17 PROCEDURE — 93454 CORONARY ARTERY ANGIO S&I: CPT | Performed by: INTERNAL MEDICINE

## 2024-09-17 PROCEDURE — 2709999900 HC NON-CHARGEABLE SUPPLY: Performed by: INTERNAL MEDICINE

## 2024-09-17 PROCEDURE — 2500000003 HC RX 250 WO HCPCS: Performed by: INTERNAL MEDICINE

## 2024-09-17 PROCEDURE — 82947 ASSAY GLUCOSE BLOOD QUANT: CPT

## 2024-09-17 PROCEDURE — 36415 COLL VENOUS BLD VENIPUNCTURE: CPT

## 2024-09-17 PROCEDURE — 83735 ASSAY OF MAGNESIUM: CPT

## 2024-09-17 PROCEDURE — 6360000002 HC RX W HCPCS: Performed by: NURSE PRACTITIONER

## 2024-09-17 PROCEDURE — C1892 INTRO/SHEATH,FIXED,PEEL-AWAY: HCPCS | Performed by: INTERNAL MEDICINE

## 2024-09-17 PROCEDURE — 6360000002 HC RX W HCPCS: Performed by: INTERNAL MEDICINE

## 2024-09-17 PROCEDURE — 99152 MOD SED SAME PHYS/QHP 5/>YRS: CPT | Performed by: INTERNAL MEDICINE

## 2024-09-17 PROCEDURE — 2580000003 HC RX 258: Performed by: INTERNAL MEDICINE

## 2024-09-17 PROCEDURE — 6370000000 HC RX 637 (ALT 250 FOR IP): Performed by: STUDENT IN AN ORGANIZED HEALTH CARE EDUCATION/TRAINING PROGRAM

## 2024-09-17 PROCEDURE — 85730 THROMBOPLASTIN TIME PARTIAL: CPT

## 2024-09-17 PROCEDURE — C1894 INTRO/SHEATH, NON-LASER: HCPCS | Performed by: INTERNAL MEDICINE

## 2024-09-17 PROCEDURE — 6370000000 HC RX 637 (ALT 250 FOR IP): Performed by: NURSE PRACTITIONER

## 2024-09-17 PROCEDURE — 85027 COMPLETE CBC AUTOMATED: CPT

## 2024-09-17 PROCEDURE — 4A023N7 MEASUREMENT OF CARDIAC SAMPLING AND PRESSURE, LEFT HEART, PERCUTANEOUS APPROACH: ICD-10-PCS | Performed by: INTERNAL MEDICINE

## 2024-09-17 PROCEDURE — 99153 MOD SED SAME PHYS/QHP EA: CPT | Performed by: INTERNAL MEDICINE

## 2024-09-17 PROCEDURE — C9600 PERC DRUG-EL COR STENT SING: HCPCS | Performed by: INTERNAL MEDICINE

## 2024-09-17 PROCEDURE — 93458 L HRT ARTERY/VENTRICLE ANGIO: CPT | Performed by: INTERNAL MEDICINE

## 2024-09-17 DEVICE — STENT ONYXNG45018UX ONYX 4.50X18RX
Type: IMPLANTABLE DEVICE | Status: FUNCTIONAL
Brand: ONYX FRONTIER™

## 2024-09-17 RX ORDER — ATORVASTATIN CALCIUM 80 MG/1
80 TABLET, FILM COATED ORAL NIGHTLY
Status: DISCONTINUED | OUTPATIENT
Start: 2024-09-17 | End: 2024-09-19 | Stop reason: HOSPADM

## 2024-09-17 RX ORDER — SODIUM CHLORIDE 0.9 % (FLUSH) 0.9 %
5-40 SYRINGE (ML) INJECTION EVERY 12 HOURS SCHEDULED
Status: CANCELLED | OUTPATIENT
Start: 2024-09-17

## 2024-09-17 RX ORDER — CLOPIDOGREL 300 MG/1
TABLET, FILM COATED ORAL PRN
Status: DISCONTINUED | OUTPATIENT
Start: 2024-09-17 | End: 2024-09-17 | Stop reason: HOSPADM

## 2024-09-17 RX ORDER — ACETAMINOPHEN 325 MG/1
650 TABLET ORAL EVERY 4 HOURS PRN
Status: CANCELLED | OUTPATIENT
Start: 2024-09-17

## 2024-09-17 RX ORDER — ASPIRIN 81 MG/1
81 TABLET, CHEWABLE ORAL DAILY
Status: DISCONTINUED | OUTPATIENT
Start: 2024-09-18 | End: 2024-09-19 | Stop reason: HOSPADM

## 2024-09-17 RX ORDER — ASPIRIN 325 MG
TABLET ORAL PRN
Status: DISCONTINUED | OUTPATIENT
Start: 2024-09-17 | End: 2024-09-17 | Stop reason: HOSPADM

## 2024-09-17 RX ORDER — NITROGLYCERIN 20 MG/100ML
INJECTION INTRAVENOUS PRN
Status: DISCONTINUED | OUTPATIENT
Start: 2024-09-17 | End: 2024-09-17 | Stop reason: HOSPADM

## 2024-09-17 RX ORDER — MIDAZOLAM HYDROCHLORIDE 1 MG/ML
INJECTION INTRAMUSCULAR; INTRAVENOUS PRN
Status: DISCONTINUED | OUTPATIENT
Start: 2024-09-17 | End: 2024-09-17 | Stop reason: HOSPADM

## 2024-09-17 RX ORDER — BIVALIRUDIN 250 MG/5ML
INJECTION, POWDER, LYOPHILIZED, FOR SOLUTION INTRAVENOUS PRN
Status: DISCONTINUED | OUTPATIENT
Start: 2024-09-17 | End: 2024-09-17 | Stop reason: HOSPADM

## 2024-09-17 RX ORDER — SODIUM CHLORIDE 9 MG/ML
INJECTION, SOLUTION INTRAVENOUS PRN
Status: CANCELLED | OUTPATIENT
Start: 2024-09-17

## 2024-09-17 RX ORDER — SODIUM CHLORIDE 0.9 % (FLUSH) 0.9 %
5-40 SYRINGE (ML) INJECTION PRN
Status: CANCELLED | OUTPATIENT
Start: 2024-09-17

## 2024-09-17 RX ORDER — HEPARIN SODIUM 1000 [USP'U]/ML
INJECTION, SOLUTION INTRAVENOUS; SUBCUTANEOUS PRN
Status: DISCONTINUED | OUTPATIENT
Start: 2024-09-17 | End: 2024-09-17 | Stop reason: HOSPADM

## 2024-09-17 RX ORDER — IOPAMIDOL 755 MG/ML
INJECTION, SOLUTION INTRAVASCULAR PRN
Status: DISCONTINUED | OUTPATIENT
Start: 2024-09-17 | End: 2024-09-17 | Stop reason: HOSPADM

## 2024-09-17 RX ORDER — CLOPIDOGREL BISULFATE 75 MG/1
75 TABLET ORAL DAILY
Status: DISCONTINUED | OUTPATIENT
Start: 2024-09-18 | End: 2024-09-19 | Stop reason: HOSPADM

## 2024-09-17 RX ORDER — PANTOPRAZOLE SODIUM 40 MG/1
40 TABLET, DELAYED RELEASE ORAL
Status: DISCONTINUED | OUTPATIENT
Start: 2024-09-18 | End: 2024-09-19 | Stop reason: HOSPADM

## 2024-09-17 RX ADMIN — BUMETANIDE 2 MG: 1 TABLET ORAL at 11:32

## 2024-09-17 RX ADMIN — HEPARIN SODIUM 16 UNITS/KG/HR: 10000 INJECTION, SOLUTION INTRAVENOUS at 11:25

## 2024-09-17 RX ADMIN — SODIUM CHLORIDE, PRESERVATIVE FREE 10 ML: 5 INJECTION INTRAVENOUS at 11:46

## 2024-09-17 RX ADMIN — ATORVASTATIN CALCIUM 80 MG: 80 TABLET, FILM COATED ORAL at 20:43

## 2024-09-17 RX ADMIN — BUPRENORPHINE AND NALOXONE 2 FILM: 4; 1 FILM, SOLUBLE BUCCAL; SUBLINGUAL at 20:41

## 2024-09-17 RX ADMIN — SODIUM CHLORIDE, PRESERVATIVE FREE 10 ML: 5 INJECTION INTRAVENOUS at 20:50

## 2024-09-17 RX ADMIN — FINASTERIDE 5 MG: 5 TABLET, FILM COATED ORAL at 11:31

## 2024-09-17 RX ADMIN — LATANOPROST 1 DROP: 50 SOLUTION OPHTHALMIC at 20:48

## 2024-09-17 RX ADMIN — BUPRENORPHINE AND NALOXONE 2 FILM: 4; 1 FILM, SOLUBLE BUCCAL; SUBLINGUAL at 11:33

## 2024-09-17 RX ADMIN — METOPROLOL SUCCINATE 100 MG: 25 TABLET, EXTENDED RELEASE ORAL at 20:48

## 2024-09-17 RX ADMIN — METOPROLOL SUCCINATE 100 MG: 25 TABLET, EXTENDED RELEASE ORAL at 11:31

## 2024-09-17 RX ADMIN — HEPARIN SODIUM 4000 UNITS: 1000 INJECTION INTRAVENOUS; SUBCUTANEOUS at 11:22

## 2024-09-17 RX ADMIN — FAMOTIDINE 20 MG: 20 TABLET, FILM COATED ORAL at 11:32

## 2024-09-17 RX ADMIN — FAMOTIDINE 20 MG: 20 TABLET, FILM COATED ORAL at 20:43

## 2024-09-17 RX ADMIN — DILTIAZEM HYDROCHLORIDE 120 MG: 120 CAPSULE, COATED, EXTENDED RELEASE ORAL at 11:32

## 2024-09-17 RX ADMIN — BUMETANIDE 2 MG: 1 TABLET ORAL at 20:42

## 2024-09-17 RX ADMIN — ISOSORBIDE MONONITRATE 30 MG: 30 TABLET, EXTENDED RELEASE ORAL at 11:32

## 2024-09-17 RX ADMIN — FERROUS SULFATE TAB EC 325 MG (65 MG FE EQUIVALENT) 325 MG: 325 (65 FE) TABLET DELAYED RESPONSE at 11:34

## 2024-09-17 RX ADMIN — HEPARIN SODIUM 16 UNITS/KG/HR: 10000 INJECTION, SOLUTION INTRAVENOUS at 19:23

## 2024-09-17 ASSESSMENT — PAIN DESCRIPTION - FREQUENCY: FREQUENCY: CONTINUOUS

## 2024-09-17 ASSESSMENT — PAIN DESCRIPTION - DESCRIPTORS: DESCRIPTORS: CRAMPING

## 2024-09-17 ASSESSMENT — PAIN - FUNCTIONAL ASSESSMENT: PAIN_FUNCTIONAL_ASSESSMENT: ACTIVITIES ARE NOT PREVENTED

## 2024-09-17 ASSESSMENT — PAIN SCALES - GENERAL: PAINLEVEL_OUTOF10: 4

## 2024-09-17 ASSESSMENT — PAIN DESCRIPTION - ONSET: ONSET: SUDDEN

## 2024-09-17 ASSESSMENT — PAIN DESCRIPTION - PAIN TYPE: TYPE: ACUTE PAIN

## 2024-09-17 ASSESSMENT — PAIN DESCRIPTION - LOCATION: LOCATION: ABDOMEN;ARM

## 2024-09-17 ASSESSMENT — PAIN DESCRIPTION - ORIENTATION: ORIENTATION: LEFT

## 2024-09-18 PROBLEM — Z95.5 S/P CORONARY ARTERY STENT PLACEMENT: Status: ACTIVE | Noted: 2024-09-18

## 2024-09-18 LAB
ANION GAP SERPL CALCULATED.3IONS-SCNC: 12 MMOL/L (ref 9–16)
BUN SERPL-MCNC: 23 MG/DL (ref 8–23)
CALCIUM SERPL-MCNC: 8.5 MG/DL (ref 8.6–10.4)
CHLORIDE SERPL-SCNC: 102 MMOL/L (ref 98–107)
CO2 SERPL-SCNC: 24 MMOL/L (ref 20–31)
CREAT SERPL-MCNC: 1.2 MG/DL (ref 0.7–1.2)
EKG ATRIAL RATE: 61 BPM
EKG ATRIAL RATE: 69 BPM
EKG P AXIS: 38 DEGREES
EKG P AXIS: 50 DEGREES
EKG P-R INTERVAL: 306 MS
EKG Q-T INTERVAL: 400 MS
EKG Q-T INTERVAL: 480 MS
EKG QRS DURATION: 150 MS
EKG QRS DURATION: 84 MS
EKG QTC CALCULATION (BAZETT): 428 MS
EKG QTC CALCULATION (BAZETT): 487 MS
EKG R AXIS: 23 DEGREES
EKG R AXIS: 58 DEGREES
EKG T AXIS: 1 DEGREES
EKG T AXIS: 39 DEGREES
EKG VENTRICULAR RATE: 62 BPM
EKG VENTRICULAR RATE: 69 BPM
ERYTHROCYTE [DISTWIDTH] IN BLOOD BY AUTOMATED COUNT: 13.7 % (ref 11.8–14.4)
GFR, ESTIMATED: 58 ML/MIN/1.73M2
GLUCOSE SERPL-MCNC: 121 MG/DL (ref 74–99)
HCT VFR BLD AUTO: 28.7 % (ref 40.7–50.3)
HGB BLD-MCNC: 8.9 G/DL (ref 13–17)
MCH RBC QN AUTO: 29.9 PG (ref 25.2–33.5)
MCHC RBC AUTO-ENTMCNC: 31 G/DL (ref 28.4–34.8)
MCV RBC AUTO: 96.3 FL (ref 82.6–102.9)
NRBC BLD-RTO: 0 PER 100 WBC
PARTIAL THROMBOPLASTIN TIME: 43.8 SEC (ref 23–36.5)
PARTIAL THROMBOPLASTIN TIME: 64.5 SEC (ref 23–36.5)
PARTIAL THROMBOPLASTIN TIME: 79.6 SEC (ref 23–36.5)
PLATELET # BLD AUTO: 181 K/UL (ref 138–453)
PMV BLD AUTO: 9.9 FL (ref 8.1–13.5)
POTASSIUM SERPL-SCNC: 3.9 MMOL/L (ref 3.7–5.3)
RBC # BLD AUTO: 2.98 M/UL (ref 4.21–5.77)
SODIUM SERPL-SCNC: 138 MMOL/L (ref 136–145)
WBC OTHER # BLD: 3.8 K/UL (ref 3.5–11.3)

## 2024-09-18 PROCEDURE — 2060000000 HC ICU INTERMEDIATE R&B

## 2024-09-18 PROCEDURE — 97116 GAIT TRAINING THERAPY: CPT

## 2024-09-18 PROCEDURE — 80048 BASIC METABOLIC PNL TOTAL CA: CPT

## 2024-09-18 PROCEDURE — 2580000003 HC RX 258: Performed by: NURSE PRACTITIONER

## 2024-09-18 PROCEDURE — 97535 SELF CARE MNGMENT TRAINING: CPT

## 2024-09-18 PROCEDURE — 85730 THROMBOPLASTIN TIME PARTIAL: CPT

## 2024-09-18 PROCEDURE — 97530 THERAPEUTIC ACTIVITIES: CPT

## 2024-09-18 PROCEDURE — 97166 OT EVAL MOD COMPLEX 45 MIN: CPT

## 2024-09-18 PROCEDURE — 6370000000 HC RX 637 (ALT 250 FOR IP): Performed by: NURSE PRACTITIONER

## 2024-09-18 PROCEDURE — 6360000002 HC RX W HCPCS: Performed by: NURSE PRACTITIONER

## 2024-09-18 PROCEDURE — 6370000000 HC RX 637 (ALT 250 FOR IP): Performed by: STUDENT IN AN ORGANIZED HEALTH CARE EDUCATION/TRAINING PROGRAM

## 2024-09-18 PROCEDURE — 85027 COMPLETE CBC AUTOMATED: CPT

## 2024-09-18 PROCEDURE — 97162 PT EVAL MOD COMPLEX 30 MIN: CPT

## 2024-09-18 PROCEDURE — 99232 SBSQ HOSP IP/OBS MODERATE 35: CPT | Performed by: STUDENT IN AN ORGANIZED HEALTH CARE EDUCATION/TRAINING PROGRAM

## 2024-09-18 PROCEDURE — 36415 COLL VENOUS BLD VENIPUNCTURE: CPT

## 2024-09-18 PROCEDURE — 99233 SBSQ HOSP IP/OBS HIGH 50: CPT | Performed by: NURSE PRACTITIONER

## 2024-09-18 RX ORDER — METOPROLOL SUCCINATE 25 MG/1
100 TABLET, EXTENDED RELEASE ORAL DAILY
Status: DISCONTINUED | OUTPATIENT
Start: 2024-09-19 | End: 2024-09-19 | Stop reason: HOSPADM

## 2024-09-18 RX ORDER — BUMETANIDE 1 MG/1
2 TABLET ORAL DAILY
Status: DISCONTINUED | OUTPATIENT
Start: 2024-09-19 | End: 2024-09-19 | Stop reason: HOSPADM

## 2024-09-18 RX ORDER — ASPIRIN 81 MG/1
81 TABLET, CHEWABLE ORAL DAILY
Qty: 14 TABLET | Refills: 0 | Status: SHIPPED | OUTPATIENT
Start: 2024-09-18

## 2024-09-18 RX ORDER — PANTOPRAZOLE SODIUM 40 MG/1
40 TABLET, DELAYED RELEASE ORAL
Qty: 30 TABLET | Refills: 3 | Status: SHIPPED | OUTPATIENT
Start: 2024-09-19

## 2024-09-18 RX ORDER — CLOPIDOGREL BISULFATE 75 MG/1
75 TABLET ORAL DAILY
Qty: 30 TABLET | Refills: 11 | Status: SHIPPED | OUTPATIENT
Start: 2024-09-19

## 2024-09-18 RX ADMIN — ASPIRIN 81 MG 81 MG: 81 TABLET ORAL at 10:30

## 2024-09-18 RX ADMIN — FAMOTIDINE 20 MG: 20 TABLET, FILM COATED ORAL at 20:18

## 2024-09-18 RX ADMIN — METOPROLOL SUCCINATE 100 MG: 25 TABLET, EXTENDED RELEASE ORAL at 10:29

## 2024-09-18 RX ADMIN — DILTIAZEM HYDROCHLORIDE 120 MG: 120 CAPSULE, COATED, EXTENDED RELEASE ORAL at 10:29

## 2024-09-18 RX ADMIN — SODIUM CHLORIDE, PRESERVATIVE FREE 10 ML: 5 INJECTION INTRAVENOUS at 20:18

## 2024-09-18 RX ADMIN — BUMETANIDE 2 MG: 1 TABLET ORAL at 10:29

## 2024-09-18 RX ADMIN — ATORVASTATIN CALCIUM 80 MG: 80 TABLET, FILM COATED ORAL at 20:18

## 2024-09-18 RX ADMIN — LATANOPROST 1 DROP: 50 SOLUTION OPHTHALMIC at 20:18

## 2024-09-18 RX ADMIN — RIVAROXABAN 15 MG: 15 TABLET, FILM COATED ORAL at 12:10

## 2024-09-18 RX ADMIN — FINASTERIDE 5 MG: 5 TABLET, FILM COATED ORAL at 10:29

## 2024-09-18 RX ADMIN — SODIUM CHLORIDE, PRESERVATIVE FREE 10 ML: 5 INJECTION INTRAVENOUS at 10:36

## 2024-09-18 RX ADMIN — HEPARIN SODIUM 30 UNITS: 1000 INJECTION INTRAVENOUS; SUBCUTANEOUS at 06:15

## 2024-09-18 RX ADMIN — FAMOTIDINE 20 MG: 20 TABLET, FILM COATED ORAL at 10:29

## 2024-09-18 RX ADMIN — ISOSORBIDE MONONITRATE 30 MG: 30 TABLET, EXTENDED RELEASE ORAL at 10:29

## 2024-09-18 RX ADMIN — BUPRENORPHINE AND NALOXONE 2 FILM: 4; 1 FILM, SOLUBLE BUCCAL; SUBLINGUAL at 20:18

## 2024-09-18 RX ADMIN — PANTOPRAZOLE SODIUM 40 MG: 40 TABLET, DELAYED RELEASE ORAL at 06:13

## 2024-09-18 RX ADMIN — ONDANSETRON 4 MG: 2 INJECTION INTRAMUSCULAR; INTRAVENOUS at 15:23

## 2024-09-18 RX ADMIN — BUPRENORPHINE AND NALOXONE 2 FILM: 4; 1 FILM, SOLUBLE BUCCAL; SUBLINGUAL at 10:30

## 2024-09-18 RX ADMIN — CLOPIDOGREL BISULFATE 75 MG: 75 TABLET ORAL at 10:31

## 2024-09-19 ENCOUNTER — APPOINTMENT (OUTPATIENT)
Age: 89
End: 2024-09-19
Attending: INTERNAL MEDICINE
Payer: MEDICARE

## 2024-09-19 VITALS
DIASTOLIC BLOOD PRESSURE: 69 MMHG | RESPIRATION RATE: 14 BRPM | HEIGHT: 73 IN | HEART RATE: 69 BPM | TEMPERATURE: 98.3 F | SYSTOLIC BLOOD PRESSURE: 99 MMHG | BODY MASS INDEX: 23.23 KG/M2 | OXYGEN SATURATION: 94 % | WEIGHT: 175.27 LBS

## 2024-09-19 LAB
ANION GAP SERPL CALCULATED.3IONS-SCNC: 8 MMOL/L (ref 9–16)
BUN SERPL-MCNC: 26 MG/DL (ref 8–23)
CALCIUM SERPL-MCNC: 8.4 MG/DL (ref 8.6–10.4)
CHLORIDE SERPL-SCNC: 101 MMOL/L (ref 98–107)
CO2 SERPL-SCNC: 28 MMOL/L (ref 20–31)
CREAT SERPL-MCNC: 1.3 MG/DL (ref 0.7–1.2)
ECHO AO ASC DIAM: 3.6 CM
ECHO AO ASCENDING AORTA INDEX: 1.77 CM/M2
ECHO AO ROOT DIAM: 2.9 CM
ECHO AO ROOT INDEX: 1.43 CM/M2
ECHO AR MAX VEL PISA: 4.4 M/S
ECHO AV AREA PEAK VELOCITY: 1.3 CM2
ECHO AV AREA VTI: 1.2 CM2
ECHO AV AREA/BSA PEAK VELOCITY: 0.6 CM2/M2
ECHO AV AREA/BSA VTI: 0.6 CM2/M2
ECHO AV MEAN GRADIENT: 11 MMHG
ECHO AV MEAN VELOCITY: 1.5 M/S
ECHO AV PEAK GRADIENT: 17 MMHG
ECHO AV PEAK VELOCITY: 2.1 M/S
ECHO AV REGURGITANT PHT: 390 MS
ECHO AV VELOCITY RATIO: 0.57
ECHO AV VTI: 47.6 CM
ECHO BSA: 2.1 M2
ECHO EST RA PRESSURE: 3 MMHG
ECHO LA AREA 2C: 24.6 CM2
ECHO LA AREA 4C: 24.9 CM2
ECHO LA DIAMETER INDEX: 2.27 CM/M2
ECHO LA DIAMETER: 4.6 CM
ECHO LA MAJOR AXIS: 6.3 CM
ECHO LA MINOR AXIS: 7 CM
ECHO LA TO AORTIC ROOT RATIO: 1.59
ECHO LA VOL BP: 76 ML (ref 18–58)
ECHO LA VOL MOD A2C: 69 ML (ref 18–58)
ECHO LA VOL MOD A4C: 77 ML (ref 18–58)
ECHO LA VOL/BSA BIPLANE: 37 ML/M2 (ref 16–34)
ECHO LA VOLUME INDEX MOD A2C: 34 ML/M2 (ref 16–34)
ECHO LA VOLUME INDEX MOD A4C: 38 ML/M2 (ref 16–34)
ECHO LV E' LATERAL VELOCITY: 5 CM/S
ECHO LV E' SEPTAL VELOCITY: 6 CM/S
ECHO LV EDV A2C: 79 ML
ECHO LV EDV A4C: 82 ML
ECHO LV EDV INDEX A4C: 40 ML/M2
ECHO LV EDV NDEX A2C: 39 ML/M2
ECHO LV EJECTION FRACTION A2C: 62 %
ECHO LV EJECTION FRACTION A4C: 66 %
ECHO LV EJECTION FRACTION BIPLANE: 63 % (ref 55–100)
ECHO LV ESV A2C: 30 ML
ECHO LV ESV A4C: 28 ML
ECHO LV ESV INDEX A2C: 15 ML/M2
ECHO LV ESV INDEX A4C: 14 ML/M2
ECHO LV FRACTIONAL SHORTENING: 27 % (ref 28–44)
ECHO LV INTERNAL DIMENSION DIASTOLE INDEX: 2.41 CM/M2
ECHO LV INTERNAL DIMENSION DIASTOLIC: 4.9 CM (ref 4.2–5.9)
ECHO LV INTERNAL DIMENSION SYSTOLIC INDEX: 1.77 CM/M2
ECHO LV INTERNAL DIMENSION SYSTOLIC: 3.6 CM
ECHO LV IVSD: 1.2 CM (ref 0.6–1)
ECHO LV MASS 2D: 226.4 G (ref 88–224)
ECHO LV MASS INDEX 2D: 111.5 G/M2 (ref 49–115)
ECHO LV POSTERIOR WALL DIASTOLIC: 1.2 CM (ref 0.6–1)
ECHO LV RELATIVE WALL THICKNESS RATIO: 0.49
ECHO LVOT AREA: 2.3 CM2
ECHO LVOT AV VTI INDEX: 0.53
ECHO LVOT DIAM: 1.7 CM
ECHO LVOT MEAN GRADIENT: 3 MMHG
ECHO LVOT PEAK GRADIENT: 5 MMHG
ECHO LVOT PEAK VELOCITY: 1.2 M/S
ECHO LVOT STROKE VOLUME INDEX: 28.2 ML/M2
ECHO LVOT SV: 57.2 ML
ECHO LVOT VTI: 25.2 CM
ECHO MV A VELOCITY: 0.56 M/S
ECHO MV AREA VTI: 2 CM2
ECHO MV E DECELERATION TIME (DT): 267 MS
ECHO MV E VELOCITY: 0.91 M/S
ECHO MV E/A RATIO: 1.63
ECHO MV E/E' LATERAL: 18.2
ECHO MV E/E' RATIO (AVERAGED): 16.68
ECHO MV E/E' SEPTAL: 15.17
ECHO MV LVOT VTI INDEX: 1.13
ECHO MV MAX VELOCITY: 1.1 M/S
ECHO MV MEAN GRADIENT: 3 MMHG
ECHO MV MEAN VELOCITY: 0.8 M/S
ECHO MV PEAK GRADIENT: 5 MMHG
ECHO MV VTI: 28.4 CM
ECHO PV MAX VELOCITY: 1.4 M/S
ECHO PV PEAK GRADIENT: 7 MMHG
ECHO RIGHT VENTRICULAR SYSTOLIC PRESSURE (RVSP): 32 MMHG
ECHO RV FREE WALL PEAK S': 12 CM/S
ECHO RV INTERNAL DIMENSION: 3.5 CM
ECHO RV TAPSE: 1.8 CM (ref 1.7–?)
ECHO TV REGURGITANT MAX VELOCITY: 2.71 M/S
ECHO TV REGURGITANT PEAK GRADIENT: 29 MMHG
ERYTHROCYTE [DISTWIDTH] IN BLOOD BY AUTOMATED COUNT: 13.5 % (ref 11.8–14.4)
GFR, ESTIMATED: 54 ML/MIN/1.73M2
GLUCOSE SERPL-MCNC: 106 MG/DL (ref 74–99)
HCT VFR BLD AUTO: 27.9 % (ref 40.7–50.3)
HGB BLD-MCNC: 8.6 G/DL (ref 13–17)
MCH RBC QN AUTO: 30 PG (ref 25.2–33.5)
MCHC RBC AUTO-ENTMCNC: 30.8 G/DL (ref 28.4–34.8)
MCV RBC AUTO: 97.2 FL (ref 82.6–102.9)
NRBC BLD-RTO: 0 PER 100 WBC
PARTIAL THROMBOPLASTIN TIME: 26.9 SEC (ref 23–36.5)
PARTIAL THROMBOPLASTIN TIME: 28.8 SEC (ref 23–36.5)
PLATELET # BLD AUTO: 190 K/UL (ref 138–453)
PMV BLD AUTO: 9.6 FL (ref 8.1–13.5)
POTASSIUM SERPL-SCNC: 4.2 MMOL/L (ref 3.7–5.3)
RBC # BLD AUTO: 2.87 M/UL (ref 4.21–5.77)
SODIUM SERPL-SCNC: 137 MMOL/L (ref 136–145)
WBC OTHER # BLD: 3.9 K/UL (ref 3.5–11.3)

## 2024-09-19 PROCEDURE — 85730 THROMBOPLASTIN TIME PARTIAL: CPT

## 2024-09-19 PROCEDURE — 97110 THERAPEUTIC EXERCISES: CPT

## 2024-09-19 PROCEDURE — 6370000000 HC RX 637 (ALT 250 FOR IP): Performed by: NURSE PRACTITIONER

## 2024-09-19 PROCEDURE — 2580000003 HC RX 258: Performed by: NURSE PRACTITIONER

## 2024-09-19 PROCEDURE — 6370000000 HC RX 637 (ALT 250 FOR IP): Performed by: STUDENT IN AN ORGANIZED HEALTH CARE EDUCATION/TRAINING PROGRAM

## 2024-09-19 PROCEDURE — 97116 GAIT TRAINING THERAPY: CPT

## 2024-09-19 PROCEDURE — 99239 HOSP IP/OBS DSCHRG MGMT >30: CPT | Performed by: STUDENT IN AN ORGANIZED HEALTH CARE EDUCATION/TRAINING PROGRAM

## 2024-09-19 PROCEDURE — 80048 BASIC METABOLIC PNL TOTAL CA: CPT

## 2024-09-19 PROCEDURE — 97530 THERAPEUTIC ACTIVITIES: CPT

## 2024-09-19 PROCEDURE — 93306 TTE W/DOPPLER COMPLETE: CPT

## 2024-09-19 PROCEDURE — 93306 TTE W/DOPPLER COMPLETE: CPT | Performed by: INTERNAL MEDICINE

## 2024-09-19 PROCEDURE — 36415 COLL VENOUS BLD VENIPUNCTURE: CPT

## 2024-09-19 PROCEDURE — 85027 COMPLETE CBC AUTOMATED: CPT

## 2024-09-19 RX ORDER — BUMETANIDE 2 MG/1
2 TABLET ORAL DAILY
Qty: 30 TABLET | Refills: 3 | Status: SHIPPED | OUTPATIENT
Start: 2024-09-20

## 2024-09-19 RX ORDER — METOPROLOL SUCCINATE 100 MG/1
100 TABLET, EXTENDED RELEASE ORAL DAILY
Qty: 30 TABLET | Refills: 3 | Status: SHIPPED | OUTPATIENT
Start: 2024-09-20

## 2024-09-19 RX ADMIN — ASPIRIN 81 MG 81 MG: 81 TABLET ORAL at 10:02

## 2024-09-19 RX ADMIN — BUPRENORPHINE AND NALOXONE 2 FILM: 4; 1 FILM, SOLUBLE BUCCAL; SUBLINGUAL at 10:01

## 2024-09-19 RX ADMIN — ACETAMINOPHEN 650 MG: 325 TABLET ORAL at 14:09

## 2024-09-19 RX ADMIN — BUPRENORPHINE AND NALOXONE 2 FILM: 4; 1 FILM, SOLUBLE BUCCAL; SUBLINGUAL at 17:59

## 2024-09-19 RX ADMIN — ISOSORBIDE MONONITRATE 30 MG: 30 TABLET, EXTENDED RELEASE ORAL at 10:01

## 2024-09-19 RX ADMIN — RIVAROXABAN 15 MG: 15 TABLET, FILM COATED ORAL at 17:59

## 2024-09-19 RX ADMIN — DILTIAZEM HYDROCHLORIDE 120 MG: 120 CAPSULE, COATED, EXTENDED RELEASE ORAL at 10:01

## 2024-09-19 RX ADMIN — METOPROLOL SUCCINATE 100 MG: 25 TABLET, EXTENDED RELEASE ORAL at 10:01

## 2024-09-19 RX ADMIN — BUMETANIDE 2 MG: 1 TABLET ORAL at 10:02

## 2024-09-19 RX ADMIN — PANTOPRAZOLE SODIUM 40 MG: 40 TABLET, DELAYED RELEASE ORAL at 06:18

## 2024-09-19 RX ADMIN — CLOPIDOGREL BISULFATE 75 MG: 75 TABLET ORAL at 10:01

## 2024-09-19 RX ADMIN — FERROUS SULFATE TAB EC 325 MG (65 MG FE EQUIVALENT) 325 MG: 325 (65 FE) TABLET DELAYED RESPONSE at 10:01

## 2024-09-19 RX ADMIN — FINASTERIDE 5 MG: 5 TABLET, FILM COATED ORAL at 10:01

## 2024-09-19 RX ADMIN — FAMOTIDINE 20 MG: 20 TABLET, FILM COATED ORAL at 10:01

## 2024-09-19 RX ADMIN — SODIUM CHLORIDE, PRESERVATIVE FREE 10 ML: 5 INJECTION INTRAVENOUS at 10:07

## 2024-09-19 ASSESSMENT — PAIN SCALES - GENERAL
PAINLEVEL_OUTOF10: 1
PAINLEVEL_OUTOF10: 3

## 2024-09-19 ASSESSMENT — PAIN DESCRIPTION - LOCATION: LOCATION: GROIN

## 2024-09-19 ASSESSMENT — PAIN DESCRIPTION - DESCRIPTORS: DESCRIPTORS: ACHING;DISCOMFORT

## 2024-09-19 ASSESSMENT — PAIN DESCRIPTION - ORIENTATION: ORIENTATION: RIGHT

## 2024-09-20 ENCOUNTER — CARE COORDINATION (OUTPATIENT)
Dept: CARE COORDINATION | Age: 89
End: 2024-09-20

## 2024-09-20 ENCOUNTER — ENROLLMENT (OUTPATIENT)
Dept: CARE COORDINATION | Age: 89
End: 2024-09-20

## 2024-09-23 ENCOUNTER — CARE COORDINATION (OUTPATIENT)
Dept: CARE COORDINATION | Age: 89
End: 2024-09-23

## 2024-09-24 ENCOUNTER — TELEPHONE (OUTPATIENT)
Dept: INTERNAL MEDICINE CLINIC | Age: 89
End: 2024-09-24

## 2024-09-25 ENCOUNTER — TELEPHONE (OUTPATIENT)
Dept: INTERNAL MEDICINE CLINIC | Age: 89
End: 2024-09-25

## 2024-09-27 ENCOUNTER — TELEPHONE (OUTPATIENT)
Dept: INTERNAL MEDICINE CLINIC | Age: 89
End: 2024-09-27

## 2024-10-02 ENCOUNTER — TELEPHONE (OUTPATIENT)
Dept: INTERNAL MEDICINE CLINIC | Age: 89
End: 2024-10-02

## 2024-10-02 NOTE — TELEPHONE ENCOUNTER
I informed graciela we do not have samples currently but that I can pend a refill request for this and let her know when it's done and also see when and if the Rep will bring more samples. Graciela verbalized understanding.

## 2024-10-02 NOTE — TELEPHONE ENCOUNTER
Graciela from [facility] called and statedt hat the patient is out of Xarelto 50 MG daily. Would like to know if the office has any samples we could provide him with. Please advise.

## 2024-10-24 ENCOUNTER — TELEPHONE (OUTPATIENT)
Dept: INTERNAL MEDICINE CLINIC | Age: 89
End: 2024-10-24

## 2024-10-24 NOTE — TELEPHONE ENCOUNTER
Natalia from [facility] called to get physical therapy once a week for eight weeks. Confirmed.

## 2024-10-25 ENCOUNTER — TELEPHONE (OUTPATIENT)
Dept: INTERNAL MEDICINE CLINIC | Age: 89
End: 2024-10-25

## 2024-10-25 NOTE — TELEPHONE ENCOUNTER
Ok   Do they needs any thing from my end    You are scheduled for a COLONOSCOPY WITH EMR with: Alvarado Salazar MD    Appointment Date: Monday October 14, 2024      Arrival Time:  Pre-Admissions will call you 3 to 5 days ahead of your procedure date with the final arrival time.    To verify your time or if you have not received a call contact Pre Admissions at [phone]    Location:  [address] [place] [address]    Important Phone Numbers:  Call [phone] if you have any questions/concerns regarding the prep or the procedure.  Call [phone] after 5pm for Emergency calls and on weekends.   Call [phone] to cancel/reschedule your procedure.  Please call 48-hours prior to your procedure.      MiraLax (Polyethylene Glycol) Bowel Prep Instructions     To ensure your prep is successful, please read through these instructions 10 days prior to your procedure    Prior to procedure:    If you are a diabetic or taking cardiac medications:  Ask your prescribing physician for diet and/or medication restrictions.  The morning of your procedure DO NOT take diabetic medication, but bring those medications with you to the procedure.  The morning of your procedure take your cardiac medications as recommended by the prescribing physician.   We highly recommend you call your insurance company to verify benefit coverage for the procedure.    For your procedure:  If you use a C-Pap or a Bi-Pap machine you must bring it with you on the day of the procedure.  You must bring a photo ID with you on the day of procedure.  Bring a list of all current medications; including over the counter.  Bring a list of allergies.  Bring any inhalers you use.  If you are diabetic, please bring your diabetic medication/insulin with you.  A responsible adult (over 18) must accompany you as a .  NOTE: without an adult , your procedure will be cancelled.  Leave all other valuables at home.      Pharmacy/over the counter  medications and supplies needed:  MiraLax (Polyethylene Glycol) 527 g powder  3 Dulcolax (Bisacodyl) 5 mg tablets  1 Simethicone (Gas-X) 124 mg tablet  Other supplies:  64 oz  of preferred clear liquid to drink (NO red/purple liquids).  We recommend using a sports bottle drink like Powderade or Gatorade. If diabetic: G2 is lower in sugar.  Optional: Alcohol free baby wipes or Tucks pads.       7 days prior to your procedure STOP the following diabetic/weight loss medications:        -Semaglutide: Wegovy (SubQ Weekly), Ozempic (SubQ weekly), Rybelsus (Oral tablet daily)   -Dulaglutide-Trulicity (SubQ weekly)   -Exenatide: Byetta (SubQ twice daily), Bydureon BCise (SubQ weekly)  -Liraglutide- Saxenda or Victoza (SubQ daily)  -Tirzepatide - Mounjaro (SubQ weekly)  -Combination Products (Contact Prescriber for bridge insulin):   -Liraglutide and insulin degludec: Xultophy (SubQ daily)  -Lixisenatide and insulin glargine: Soliqua (SubQ daily)    5 days prior to your procedure:  Stop all vitamins and mineral supplements (ex: fish oil, iron, vitamin E, multivitamins, etc.)  If you are on blood thinners/anticoagulations stop taking those medications per your prescribing provider's recommendations.  See Table 1 for medications and our recommended holding times.  DO NOT hold these medications, unless approved by prescribing provider.  If you are on diabetic medications, please take those medications as your prescribing provider has recommended.    4 days prior to procedure:  Before bed take 1-capful of MiraLax (Polyethylene Gylcol) in 8 oz. Of your preferred liquid.    3 days prior to procedure:  Upon waking up start following a low-fiber, low-residue diet. See Table 2 for recommendations on what this diet looks like. Stop eating raw fruits, vegetables containing seeds, corn, popcorn, whole wheat, multigrain foods, nuts, and seeds.  Stop taking any fiber supplements, bran or bulking agents.  Before bed take 1-capful of  MiraLax (Polyethylene Glycol) in 8 oz. Of your preferred liquid.  Do not consume any alcoholic beverages until after procedure is completed.    2 days prior to procedure:  5:00 PM take 1 Dulcolax (Bisacodyl) 5 mg tablet.  Before bed take 1-capful of MiraLax (Polyethylene Gylcol) in 8 oz. Of your preferred liquid.  DO NOT eat any solid food after midnight.    1 day prior to procedure:   DO NOT eat any solid foods.  Clear liquids only, see Table 3 for examples of approved liquids. Stay well hydrated to avoid dehydration.  9:00 AM mix 14-capfuls (or 1 1/4 cups) of MiraLax (Polythylene Glycol) with 64-oz. Of clear liquid (No Ice).  Store the solution in the refrigerator until later.  3:00 PM take 2 Dulcolax (Bisacodyl) 5 mg tablets.  5:00 PM drink 32 oz of prepared MiraLax (Polyethylene Glycol).  9:00 PM drink 32 oz of prepared MiraLax (Polyethylene Glycol).  Stay near a toilet, you will be uncomfortable until the stool has flushed from your bowels in about 2-4 hours.    Day of procedure:  4 hours prior to procedure:  If diabetic: DO NOT take diabetic medications but bring them to procedure.  Take approved morning medications with a small sip of water.  Take 1 Simethicone (Gas-X) 125 mg tablet with a small sip of water.  After medication NO liquids/solids (even gum or hard candy) until after your procedure.  Your stools should be clear/yellow liquids.  If they are not, call the office to discuss further if action is needed.      Table 1: Medication Restriction Information    Important:  you must consult with your prescribing physician regarding holding medications prior to your procedure.    Aspirin (ASA) Continue   NSAIDs (ex: Ibuprofen) Continue in low doses   Phentermine Stop 10 days prior   Dipyridamole (Persantine) Stop 2 days prior   Aggrenox Stop 2 days prior   Clopidogrel (Plavix) Stop 5 days prior   Prasugrel (Effient) Stop 5 days prior   Ticlopidine (Ticlid) Stop 10 days prior   Ticagrelor (Brilinta) Stop 3  days prior   Cilostazol (Pletal) Stop 2 days prior   Warfarin (Coumadin) Stop 5 days prior   Heparin Per Prescribing MD   Enoxaparin (Lovenox) Stop 1 day prior   Dalteparin (Fragmin) Stop 1 day prior   Fondaparinux (Arixtra) Stop 2 days prior   Rivaroxaban (Xarelto) Stop 2 days prior   Apixaban (Eliquis) Stop 2 days prior   Edoxaban (Savaysa) Stop 2 days prior   Dabigatran (Pradaxa) Stop 1 day prior   Voltaren / Diclofenac Stop 5 days prior   Mobic / Meloxicam Stop 5 days prior   Cialis Stop 48-hours prio   Viagra Stop 48-hours prior   Selegiline patches Remove 10 days prior   Verdenafil, sildenafil Stop 48-hours prior   All Supplements  Ex: Multivitamins, Iron, Fish oil, Herbals etc. Stop 5 days prior                  Table 2: Low-fiber, Low-Residue Diet    Start 3 Days Prior to Procedure    Food Group  YES - Ok to eat these foods No - Avoid these foods   Drinks or Beverages Coffee, tea, hot chocolate, clear fruit drinks, soda/carbonated beverages, Ensure, Boost, Enlive without added fiber.  Fruit/vegetable juice with pulp, beverages with red/purple dye.   Milk & Dairy  Milk, cream, hot chocolate, buttermilk, cheese, cottage cheese, yogurt, sour cream.  Milk (no more than 2 cups) since it can leave a residue in your bowel. Yogurt with added fruits or seeds.    Breads & Grains Breads and grains made with refined white flour (rolls, muffins, bagels, pasta). White rice, plain crackers, low-fiber cereal (puffed rice, cream of wheat, corn flakes) NO whole grains or high-fiber: brown or wild rice, whole grain bread, rolls, pasta, crackers. Whole grain cereals. Bread or cereal with nuts or seeds.     Meat Chicken, turkey, lamb, lean pork, veal, fish/seafood, eggs, tofu. NO tough meat with gristle.    Potatoes and Starches  White potatoes, sweet potatoes, yams without the skin, macaroni, spaghetti, noodles, white rice.  Brown rice, wild rice, fried potatoes, potato skins, whole wheat pasta.    Fats and Oils Moderate  amounts - butter, cream, burnett, vegetable oil, shortening.  Coconut, salad dressing made with seeds or nuts.   Fruits  Fruit juice without pulp, applesauce, cantaloupe, honeydew, peeled apricots/peaches, canned/cooked fruit without seeds/skin Raw fruit with seeds, skin, or membranes (berries, pineapple, apples, oranges, watermelon) Raisins or other dried fruit.    Nuts, Nut Butter, Seeds Creamy (smooth) peanut or almond butter Nuts including peanuts, almonds, walnuts, chunky nut butter, seeds of any kind.    Soups  Cream soups made with milk under 2 cups and allowed vegetables, broth soups.  Highly seasoned soups, chili, lentil soup, dried bean soup, corn soup, pea soup.    Vegetables  All well-cooked: asparagus, carrots, green and wax beans, cooked pumpkin and squash without seeds, potatoes without skin, lettuce, spinach, eggplant, tomato sauce, and vegetable juice.  Avoid all vegetables not listed. Avoid all raw vegetables.    Miscellaneous  White sauce (made from milk), meat gravy, ketchup, mustard, cocoa, chocolate, salt, vinegar, lemon juice, ground spices, herbs in moderation.  All nuts and seeds!   Olives, pickles, popcorn, cayenne, chili powder, garlic, horseradish, coconut, jam, relish.             Table 3: Approved Clear Liquids; The day prior to procedure    Important: NO RED OR PURPLE COLORED BEVERAGES, NO PULP    Water Flavored, carbonated, no red/purple   Clear broth or bouillon  Chicken, vegetable, beef broth    Fruit Juice  Apple, white grape, white cranberry    Sports drinks  Powerade, Gatorade, vitamin water    Coffee or tea  No dairy! Sugar/sweeteners are ok    Jell-O  No added fruit pieces, no red/purple    Soda/carbonated beverages  No red/purple, dark sodas ok.    Popsicles  No pulp, no red/purple    Gummy bears  No red/purple      *If you are not diabetic skip to the following page for frequently asked questions.    Diabetic Special Instructions:    Please have a combination of low sugar and  regular (with sugar) approved clear liquids.  It is important to monitor your blood sugar:  With every meal, even clear liquid  At bedtime  The morning of procedure   Know your signs and symptoms of low or high blood sugar:  Signs of high blood sugar: blurry vision, sleepiness dizziness, increased thirst, frequent urination, nausea/vomiting, fruity breath.  Signs of low blood sugar: headache, sweating, blurred vision, dizziness, nervous, feeling weak/tired, irritable, shaking, hungry, elevated heart rate.  Aim for 45-grams of carbohydrates at meals, 15-30 grams for snacks.  See Table 4 for examples.  Liquids without carbohydrates: Fat-free broth, bouillon, consomme', clear diet soda, coffee, diet/unsweetened tea, seltzer, flavored water.  If you are unsure about how to adjust dosing, please call your diabetic medicaton prescribing physician so they can advise you.      Table 4:  Diabetic Carbohydrate Examples    Liquid  Ounces  Carbohydrate value    Apple Juice  4 Ounces  15 grams    White Grape Juice  4 Ounces  15 grams    Sports drink (Gatorade)  8 Ounces  14 grams    Gelatin (Jell-O)  ½ Cup  15 grams    Orange popsicles/Ice pops  2  15 grams    Italian Ice (not Sherbert)  Per label  30 grams              Frequent Questions:    Will the single nightly dose of MiraLax ahead of prep day cause diarrhea?   The purpose of the evening dose is to ensure that your bowels are moving ahead of your prep.  In that small of an amount the dose should not cause diarrhea.  What is a clear stool?  A clear stool can have a slight tint of yellow or brown.  It will be completely transparent, and will not contain any solid matter.  I am not having bowel movements, what should I do?  Bowel movements can take up to 5-6 hours after beginning the prep.  Be patient and continue to drink liquids.  Warm liquids can also help.  Activity such as stretching, walking and moving about your space aids in bowel motility. If you have not had a bowel  movement by midnight the night prior to your procedure, you will need to reach out to the on-call physician's office for further instructions.  The prep is causing nausea, what should I do?  If you develop nausea or vomiting, rinse your mouth with water, take a 15 to 30-minute break and then continue drinking the solution. Drinking hot liquids also calms the stomach. Please attempt to drink all of the laxative solution even if it takes you longer. If vomiting persist, or you are not able to finish the preparation, stop the preparation and call your physician's office for further instructions.  If I eat popcorn or seeds 3 days before my procedure, do I need to reschedule?  You will not need to reschedule your procedure; however, the seeds or nuts may cause a difficulty in screening and require a need for rescreening.  If you have eaten a large number of seeds or nuts, you may want to contact the nurse or physician.  Can I drink ALCOHOL on the liquid diet?  Alcohol is not allowed as part of the liquid diet.  When do I have to stop drinking liquids?  You may continue the liquid diet until 4 hours prior to leaving for your procedure.

## 2024-10-29 ENCOUNTER — OFFICE VISIT (OUTPATIENT)
Dept: INTERNAL MEDICINE CLINIC | Age: 89
End: 2024-10-29

## 2024-10-29 VITALS
HEART RATE: 81 BPM | WEIGHT: 154 LBS | HEIGHT: 73 IN | OXYGEN SATURATION: 96 % | SYSTOLIC BLOOD PRESSURE: 120 MMHG | BODY MASS INDEX: 20.41 KG/M2 | DIASTOLIC BLOOD PRESSURE: 70 MMHG

## 2024-10-29 DIAGNOSIS — K21.9 GASTROESOPHAGEAL REFLUX DISEASE, UNSPECIFIED WHETHER ESOPHAGITIS PRESENT: ICD-10-CM

## 2024-10-29 DIAGNOSIS — I50.22 CHRONIC SYSTOLIC (CONGESTIVE) HEART FAILURE (HCC): ICD-10-CM

## 2024-10-29 DIAGNOSIS — I48.0 PAROXYSMAL ATRIAL FIBRILLATION (HCC): ICD-10-CM

## 2024-10-29 DIAGNOSIS — Z00.00 MEDICARE ANNUAL WELLNESS VISIT, SUBSEQUENT: Primary | ICD-10-CM

## 2024-10-29 DIAGNOSIS — Z95.5 S/P CORONARY ARTERY STENT PLACEMENT: ICD-10-CM

## 2024-10-29 ASSESSMENT — ENCOUNTER SYMPTOMS
ALLERGIC/IMMUNOLOGIC NEGATIVE: 1
BACK PAIN: 0
CONSTIPATION: 0
NAUSEA: 0
ABDOMINAL PAIN: 0
WHEEZING: 0
VOMITING: 0
COUGH: 0
SHORTNESS OF BREATH: 0
DIARRHEA: 0

## 2024-10-29 ASSESSMENT — PATIENT HEALTH QUESTIONNAIRE - PHQ9
2. FEELING DOWN, DEPRESSED OR HOPELESS: NOT AT ALL
SUM OF ALL RESPONSES TO PHQ QUESTIONS 1-9: 0
1. LITTLE INTEREST OR PLEASURE IN DOING THINGS: NOT AT ALL
SUM OF ALL RESPONSES TO PHQ QUESTIONS 1-9: 0
SUM OF ALL RESPONSES TO PHQ9 QUESTIONS 1 & 2: 0

## 2024-10-29 NOTE — PROGRESS NOTES
Medicare Annual Wellness Visit    Hemanth Barrera is here for Medicare AWV    Assessment & Plan   Medicare annual wellness visit, subsequent  Chronic systolic (congestive) heart failure  Paroxysmal atrial fibrillation (HCC)  Gastroesophageal reflux disease, unspecified whether esophagitis present  S/P coronary artery stent placement    Recommendations for Preventive Services Due: see orders and patient instructions/AVS.  Recommended screening schedule for the next 5-10 years is provided to the patient in written form: see Patient Instructions/AVS.     Return in 3 months (on 1/29/2025).     Subjective   Recently admitted at [facility] [place] from 9/16 to 9/19/2024 for angina.  Stent was placed.  Plan was to continue aspirin, Plavix and Xarelto for 2 weeks and stop aspirin.  Has stopped taking aspirin.  Has an upcoming appointment with cardiology.  Works with physical therapy at home, feeling much better.  Leg swelling getting better, on Bumex  Follows with cardiac rehab as well.  Monitors blood pressure at home as well, runs around 120/80.    Patient's complete Health Risk Assessment and screening values have been reviewed and are found in Flowsheets. The following problems were reviewed today and where indicated follow up appointments were made and/or referrals ordered.    Positive Risk Factor Screenings with Interventions:    Fall Risk:  Do you feel unsteady or are you worried about falling? : (!) yes  2 or more falls in past year?: (!) yes  Fall with injury in past year?: (!) yes     Interventions:    Patient comments: stability was the problem before as per patient, working with physical therapy at home and has been helping with steadiness. Getting better, uses a cane, and has a walker at home as well.        Controlled Medication Review:      Today's Pain Level: No data recorded     Opioid Risk: (High risk score >=55) Opioid risk score: The patient doesn't have any registry metric data available      Last

## 2024-10-29 NOTE — PROGRESS NOTES
\"Have you been to the ER, urgent care clinic since your last visit?  Hospitalized since your last visit?\"    YES - When: approximately 1 months ago.  Where and Why: ED, heart cath.    “Have you seen or consulted any other health care providers outside our system since your last visit?”    NO

## 2024-10-29 NOTE — PATIENT INSTRUCTIONS
review.    Other Preventive Recommendations:    A preventive eye exam performed by an eye specialist is recommended every 1-2 years to screen for glaucoma; cataracts, macular degeneration, and other eye disorders.  A preventive dental visit is recommended every 6 months.  Try to get at least 150 minutes of exercise per week or 10,000 steps per day on a pedometer .  Order or download the FREE \"Exercise & Physical Activity: Your Everyday Guide\" from [employer]. Call 1-894.585.3845 or search [employer] online.  You need 5169-4650 mg of calcium and 0639-1994 IU of vitamin D per day. It is possible to meet your calcium requirement with diet alone, but a vitamin D supplement is usually necessary to meet this goal.  When exposed to the sun, use a sunscreen that protects against both UVA and UVB radiation with an SPF of 30 or greater. Reapply every 2 to 3 hours or after sweating, drying off with a towel, or swimming.  Always wear a seat belt when traveling in a car. Always wear a helmet when riding a bicycle or motorcycle.

## 2024-10-31 DIAGNOSIS — D64.9 ANEMIA, UNSPECIFIED TYPE: ICD-10-CM

## 2024-10-31 DIAGNOSIS — E61.1 IRON DEFICIENCY: Primary | ICD-10-CM

## 2024-10-31 DIAGNOSIS — E53.8 B12 DEFICIENCY: ICD-10-CM

## 2024-11-06 ENCOUNTER — HOSPITAL ENCOUNTER (OUTPATIENT)
Age: 89
Discharge: HOME | End: 2024-11-06
Payer: MEDICARE

## 2024-11-06 DIAGNOSIS — E61.1 IRON DEFICIENCY: ICD-10-CM

## 2024-11-06 DIAGNOSIS — D64.9 ANEMIA, UNSPECIFIED TYPE: ICD-10-CM

## 2024-11-06 DIAGNOSIS — E53.8 B12 DEFICIENCY: ICD-10-CM

## 2024-11-06 LAB
BASOPHILS # BLD: 0 K/UL (ref 0–0.2)
BASOPHILS NFR BLD: 1 % (ref 0–2)
EOSINOPHIL # BLD: 0.1 K/UL (ref 0–0.4)
EOSINOPHILS RELATIVE PERCENT: 3 % (ref 1–4)
ERYTHROCYTE [DISTWIDTH] IN BLOOD BY AUTOMATED COUNT: 14 % (ref 12.5–15.4)
FERRITIN SERPL-MCNC: 60 NG/ML
HCT VFR BLD AUTO: 25.7 % (ref 41–53)
HGB BLD-MCNC: 8.5 G/DL (ref 13.5–17.5)
IRON SATN MFR SERPL: 21 % (ref 20–55)
IRON SERPL-MCNC: 58 UG/DL (ref 61–157)
LYMPHOCYTES NFR BLD: 0.8 K/UL (ref 1–4.8)
LYMPHOCYTES RELATIVE PERCENT: 20 % (ref 24–44)
MCH RBC QN AUTO: 30.4 PG (ref 26–34)
MCHC RBC AUTO-ENTMCNC: 32.9 G/DL (ref 31–37)
MCV RBC AUTO: 92.4 FL (ref 80–100)
MONOCYTES NFR BLD: 0.4 K/UL (ref 0.1–1.2)
MONOCYTES NFR BLD: 9 % (ref 2–11)
NEUTROPHILS NFR BLD: 67 % (ref 36–66)
NEUTS SEG NFR BLD: 2.7 K/UL (ref 1.8–7.7)
PLATELET # BLD AUTO: 211 K/UL (ref 140–450)
PMV BLD AUTO: 7.1 FL (ref 6–12)
RBC # BLD AUTO: 2.78 M/UL (ref 4.5–5.9)
TIBC SERPL-MCNC: 272 UG/DL (ref 250–450)
UNSATURATED IRON BINDING CAPACITY: 214 UG/DL (ref 112–347)
WBC OTHER # BLD: 4 K/UL (ref 3.5–11)

## 2024-11-06 PROCEDURE — 85025 COMPLETE CBC W/AUTO DIFF WBC: CPT

## 2024-11-06 PROCEDURE — 83550 IRON BINDING TEST: CPT

## 2024-11-06 PROCEDURE — 82728 ASSAY OF FERRITIN: CPT

## 2024-11-06 PROCEDURE — 36415 COLL VENOUS BLD VENIPUNCTURE: CPT

## 2024-11-06 PROCEDURE — 83540 ASSAY OF IRON: CPT

## 2024-11-07 ENCOUNTER — TELEPHONE (OUTPATIENT)
Dept: INTERNAL MEDICINE CLINIC | Age: 89
End: 2024-11-07

## 2024-11-07 NOTE — TELEPHONE ENCOUNTER
Gwen from [facility] called stating that patient's insurance will no longer cover their services so they have to terminate care immediately

## 2024-11-08 ENCOUNTER — HOSPITAL ENCOUNTER (OUTPATIENT)
Dept: INFUSION THERAPY | Age: 89
End: 2024-11-08

## 2024-12-31 NOTE — CARDIO/PULMONARY
VOICEMAIL LEFT FOR PATIENT REMINDING OF INITIAL CARDIAC REHAB APPOINTMENT ON 1/2/20025 AT 9:30. PT INSTRUCTED TO BRING UPDATED MEDICATION LIST, AND TO WEAR EXERCISE APPROPRIATE CLOTHING.

## 2025-01-02 ENCOUNTER — HOSPITAL ENCOUNTER (OUTPATIENT)
Dept: CARDIAC REHAB | Age: 89
Setting detail: THERAPIES SERIES
Discharge: HOME | End: 2025-01-02

## 2025-01-02 NOTE — CARDIO/PULMONARY
Received phone call from patient stating he is confused as to when his initial visit is. Explained to patient that he was supposed to be in at the present time 1/2/25 at 9:30. Pt states he did receive our call on 12/31/24 reminding him of todays appointment. Pt states he was called and told he was to call us today to set up appointment, and he would be unable to come in due to transportation issues. Pt was confused as to who called him and canceled the appointment. Assured patient that we called him on 12/31/25, but had no further contact with patient until current phone call. Pt wants to reschedule. Advised patient that it would probably not be until 1/6/2025 that we will call him to reschedule. Pt verbalized understanding.

## 2025-01-06 ENCOUNTER — HOSPITAL ENCOUNTER (EMERGENCY)
Age: 89
Discharge: ANOTHER HOSPITAL | DRG: 281 | End: 2025-01-07
Attending: EMERGENCY MEDICINE
Payer: MEDICARE

## 2025-01-06 ENCOUNTER — APPOINTMENT (OUTPATIENT)
Dept: GENERAL RADIOLOGY | Age: 89
DRG: 281 | End: 2025-01-06
Payer: MEDICARE

## 2025-01-06 DIAGNOSIS — I21.4 NSTEMI (NON-ST ELEVATED MYOCARDIAL INFARCTION) (HCC): Primary | ICD-10-CM

## 2025-01-06 LAB
ALBUMIN SERPL-MCNC: 4 G/DL (ref 3.5–5.2)
ALP SERPL-CCNC: 90 U/L (ref 40–129)
ALT SERPL-CCNC: 48 U/L (ref 10–50)
ANION GAP SERPL CALCULATED.3IONS-SCNC: 17 MMOL/L (ref 9–16)
AST SERPL-CCNC: 39 U/L (ref 10–50)
BASOPHILS # BLD: 0 K/UL (ref 0–0.2)
BASOPHILS NFR BLD: 1 % (ref 0–2)
BILIRUB SERPL-MCNC: 0.6 MG/DL (ref 0–1.2)
BUN SERPL-MCNC: 38 MG/DL (ref 8–23)
CALCIUM SERPL-MCNC: 9.4 MG/DL (ref 8.6–10.4)
CHLORIDE SERPL-SCNC: 101 MMOL/L (ref 98–107)
CO2 SERPL-SCNC: 20 MMOL/L (ref 20–31)
CREAT SERPL-MCNC: 1.9 MG/DL (ref 0.7–1.2)
EOSINOPHIL # BLD: 0 K/UL (ref 0–0.4)
EOSINOPHILS RELATIVE PERCENT: 1 % (ref 0–4)
ERYTHROCYTE [DISTWIDTH] IN BLOOD BY AUTOMATED COUNT: 14.5 % (ref 11.5–14.9)
GFR, ESTIMATED: 33 ML/MIN/1.73M2
GLUCOSE SERPL-MCNC: 113 MG/DL (ref 74–99)
HCT VFR BLD AUTO: 30.5 % (ref 41–53)
HGB BLD-MCNC: 9.8 G/DL (ref 13.5–17.5)
INR PPP: 3.2
LIPASE SERPL-CCNC: 16 U/L (ref 13–60)
LYMPHOCYTES NFR BLD: 0.5 K/UL (ref 1–4.8)
LYMPHOCYTES RELATIVE PERCENT: 13 % (ref 24–44)
MAGNESIUM SERPL-MCNC: 1.8 MG/DL (ref 1.6–2.4)
MCH RBC QN AUTO: 29.7 PG (ref 26–34)
MCHC RBC AUTO-ENTMCNC: 32.2 G/DL (ref 31–37)
MCV RBC AUTO: 92.1 FL (ref 80–100)
MONOCYTES NFR BLD: 0.2 K/UL (ref 0.1–1.3)
MONOCYTES NFR BLD: 7 % (ref 1–7)
NEUTROPHILS NFR BLD: 78 % (ref 36–66)
NEUTS SEG NFR BLD: 2.9 K/UL (ref 1.3–9.1)
PARTIAL THROMBOPLASTIN TIME: 36.7 SEC (ref 24–36)
PARTIAL THROMBOPLASTIN TIME: 71.4 SEC (ref 24–36)
PLATELET # BLD AUTO: 231 K/UL (ref 150–450)
PMV BLD AUTO: 7.9 FL (ref 6–12)
POTASSIUM SERPL-SCNC: 3.4 MMOL/L (ref 3.7–5.3)
PROT SERPL-MCNC: 7.1 G/DL (ref 6.6–8.7)
PROTHROMBIN TIME: 34 SEC (ref 11.8–14.6)
RBC # BLD AUTO: 3.31 M/UL (ref 4.5–5.9)
SODIUM SERPL-SCNC: 138 MMOL/L (ref 136–145)
TROPONIN I SERPL HS-MCNC: 111 NG/L (ref 0–22)
TROPONIN I SERPL HS-MCNC: 94 NG/L (ref 0–22)
WBC OTHER # BLD: 3.7 K/UL (ref 3.5–11)

## 2025-01-06 PROCEDURE — 80053 COMPREHEN METABOLIC PANEL: CPT

## 2025-01-06 PROCEDURE — 84484 ASSAY OF TROPONIN QUANT: CPT

## 2025-01-06 PROCEDURE — 6360000002 HC RX W HCPCS: Performed by: EMERGENCY MEDICINE

## 2025-01-06 PROCEDURE — 93005 ELECTROCARDIOGRAM TRACING: CPT | Performed by: EMERGENCY MEDICINE

## 2025-01-06 PROCEDURE — 6370000000 HC RX 637 (ALT 250 FOR IP): Performed by: EMERGENCY MEDICINE

## 2025-01-06 PROCEDURE — 85730 THROMBOPLASTIN TIME PARTIAL: CPT

## 2025-01-06 PROCEDURE — 36415 COLL VENOUS BLD VENIPUNCTURE: CPT

## 2025-01-06 PROCEDURE — 83690 ASSAY OF LIPASE: CPT

## 2025-01-06 PROCEDURE — 71045 X-RAY EXAM CHEST 1 VIEW: CPT

## 2025-01-06 PROCEDURE — 85610 PROTHROMBIN TIME: CPT

## 2025-01-06 PROCEDURE — 83735 ASSAY OF MAGNESIUM: CPT

## 2025-01-06 PROCEDURE — 85025 COMPLETE CBC W/AUTO DIFF WBC: CPT

## 2025-01-06 RX ORDER — HEPARIN SODIUM 1000 [USP'U]/ML
60 INJECTION, SOLUTION INTRAVENOUS; SUBCUTANEOUS ONCE
Status: DISCONTINUED | OUTPATIENT
Start: 2025-01-06 | End: 2025-01-06

## 2025-01-06 RX ORDER — ONDANSETRON 2 MG/ML
4 INJECTION INTRAMUSCULAR; INTRAVENOUS ONCE
Status: COMPLETED | OUTPATIENT
Start: 2025-01-06 | End: 2025-01-06

## 2025-01-06 RX ORDER — HEPARIN SODIUM 1000 [USP'U]/ML
60 INJECTION, SOLUTION INTRAVENOUS; SUBCUTANEOUS PRN
Status: DISCONTINUED | OUTPATIENT
Start: 2025-01-06 | End: 2025-01-06

## 2025-01-06 RX ORDER — HEPARIN SODIUM 1000 [USP'U]/ML
60 INJECTION, SOLUTION INTRAVENOUS; SUBCUTANEOUS PRN
Status: DISCONTINUED | OUTPATIENT
Start: 2025-01-07 | End: 2025-01-07 | Stop reason: HOSPADM

## 2025-01-06 RX ORDER — HEPARIN SODIUM 1000 [USP'U]/ML
30 INJECTION, SOLUTION INTRAVENOUS; SUBCUTANEOUS PRN
Status: DISCONTINUED | OUTPATIENT
Start: 2025-01-07 | End: 2025-01-07 | Stop reason: HOSPADM

## 2025-01-06 RX ORDER — BUPRENORPHINE AND NALOXONE 8; 2 MG/1; MG/1
1 FILM, SOLUBLE BUCCAL; SUBLINGUAL ONCE
Status: COMPLETED | OUTPATIENT
Start: 2025-01-06 | End: 2025-01-06

## 2025-01-06 RX ORDER — HEPARIN SODIUM 1000 [USP'U]/ML
30 INJECTION, SOLUTION INTRAVENOUS; SUBCUTANEOUS PRN
Status: DISCONTINUED | OUTPATIENT
Start: 2025-01-06 | End: 2025-01-06

## 2025-01-06 RX ORDER — HEPARIN SODIUM 10000 [USP'U]/100ML
5-30 INJECTION, SOLUTION INTRAVENOUS CONTINUOUS
Status: DISCONTINUED | OUTPATIENT
Start: 2025-01-06 | End: 2025-01-07 | Stop reason: HOSPADM

## 2025-01-06 RX ORDER — HEPARIN SODIUM 10000 [USP'U]/100ML
5-30 INJECTION, SOLUTION INTRAVENOUS CONTINUOUS
Status: DISCONTINUED | OUTPATIENT
Start: 2025-01-06 | End: 2025-01-06

## 2025-01-06 RX ORDER — HEPARIN SODIUM 10000 [USP'U]/100ML
5-30 INJECTION, SOLUTION INTRAVENOUS CONTINUOUS
Status: DISCONTINUED | OUTPATIENT
Start: 2025-01-07 | End: 2025-01-06

## 2025-01-06 RX ADMIN — BUPRENORPHINE AND NALOXONE 1 FILM: 8; 2 FILM, SOLUBLE BUCCAL; SUBLINGUAL at 21:24

## 2025-01-06 RX ADMIN — HEPARIN SODIUM 12 UNITS/KG/HR: 10000 INJECTION, SOLUTION INTRAVENOUS at 15:25

## 2025-01-06 RX ADMIN — POTASSIUM BICARBONATE 40 MEQ: 782 TABLET, EFFERVESCENT ORAL at 13:37

## 2025-01-06 RX ADMIN — ONDANSETRON 4 MG: 2 INJECTION, SOLUTION INTRAMUSCULAR; INTRAVENOUS at 12:46

## 2025-01-06 ASSESSMENT — ENCOUNTER SYMPTOMS
EYE PAIN: 0
SHORTNESS OF BREATH: 1
NAUSEA: 1
ABDOMINAL PAIN: 0
BACK PAIN: 0
COLOR CHANGE: 0

## 2025-01-06 NOTE — ED PROVIDER NOTES
EMERGENCY DEPARTMENT ENCOUNTER    Pt Name: Hemanth Barrera  MRN: [id number]  Birthdate 1935  Date of evaluation: 1/6/25  CHIEF COMPLAINT       Chief Complaint   Patient presents with    Abdominal Pain    Chest Pain    Nausea & Vomiting     HISTORY OF PRESENT ILLNESS   89-year-old male presents for left-sided chest pain.  States he has been having pain for about the last 3 or 4 days.  He states that has been intermittent.  He denies any known he has noted that brings it on.  He states that he been taking home nitro which occasionally does help his pain.  He does report some intermittent shortness of breath and nausea.  Report significant cardiac history of multiple stents in the past.    The history is provided by the patient.           REVIEW OF SYSTEMS     Review of Systems   Constitutional:  Negative for chills and fever.   HENT:  Negative for congestion and ear pain.    Eyes:  Negative for pain.   Respiratory:  Positive for shortness of breath.    Cardiovascular:  Positive for chest pain. Negative for palpitations and leg swelling.   Gastrointestinal:  Positive for nausea. Negative for abdominal pain.   Genitourinary:  Negative for dysuria and flank pain.   Musculoskeletal:  Negative for back pain.   Skin:  Negative for color change.   Neurological:  Negative for numbness and headaches.   Psychiatric/Behavioral:  Negative for confusion.    All other systems reviewed and are negative.    PASTMEDICAL HISTORY     Past Medical History:   Diagnosis Date    Acute inferolateral myocardial infarction (HCC) 11/18/2021    Arthritis     Atrial fibrillation (HCC)     CAD (coronary artery disease)     CHF (congestive heart failure) (HCC)     Glaucoma     Hx of blood clots     with hernia surgery    Hyperlipidemia     Hypertension     MI (myocardial infarction) ([facility])     NSTEMI (non-ST elevated myocardial infarction) ([facility]) 11/17/2021     Past Problem List  Patient Active Problem List   Diagnosis Code    On continuous oral  x-ray showing no acute abnormality    Discussed with cardiology Dr. Ames, would like patient started on heparin drip recommending transfer to [place] given his significant cardiac history and concern for NSTEMI    Patient has allergy listed to aspirin    Results discussed with patient and family discussed need for transfer they are agreeable    Discussed with NP for Dr. Meade who accepts transfer    Vital signs stable at time of transfer    CRITICAL CARE:       PROCEDURES:    Procedures      DATA FOR LAB AND RADIOLOGY TESTS ORDERED BELOW ARE REVIEWED BY THE ED CLINICIAN:    RADIOLOGY: All x-rays, CT, MRI, and formal ultrasound images (except ED bedside ultrasound) are read by the radiologist, see reports below, unless otherwise noted in MDM or here.  Reports below are reviewed by myself.  XR CHEST PORTABLE   Final Result   No acute cardiopulmonary process.             LABS: Lab orders shown below, the results are reviewed by myself, and all abnormals are listed below.  Labs Reviewed   CBC WITH AUTO DIFFERENTIAL - Abnormal; Notable for the following components:       Result Value    RBC 3.31 (*)     Hemoglobin 9.8 (*)     Hematocrit 30.5 (*)     Neutrophils % 78 (*)     Lymphocytes % 13 (*)     Lymphocytes Absolute 0.50 (*)     All other components within normal limits   COMPREHENSIVE METABOLIC PANEL - Abnormal; Notable for the following components:    Potassium 3.4 (*)     Anion Gap 17 (*)     Glucose 113 (*)     BUN 38 (*)     Creatinine 1.9 (*)     Est, Glom Filt Rate 33 (*)     All other components within normal limits   PROTIME-INR - Abnormal; Notable for the following components:    Protime 34.0 (*)     All other components within normal limits   TROPONIN - Abnormal; Notable for the following components:    Troponin, High Sensitivity 111 (*)     All other components within normal limits   TROPONIN - Abnormal; Notable for the following components:    Troponin, High Sensitivity 94 (*)     All other components

## 2025-01-06 NOTE — PROGRESS NOTES
Pharmacy monitoring of Heparin infusion  Heparin Infusion New Order    Patient on heparin for:   Cardio indication    Was patient on previous oral anticoagulant/dose?:  yes , Confirmed with RN/Pt/Pts family/Surescripts?: confirmed with nurse in ER, last dose of xarelto this morning.    Factor Xa inhibitor/LMWH use within the past 72 hours? yes  If yes, date of last administration: 1/6/25 AM    Recent Labs     01/06/25  1040   HGB 9.8*   INR 3.2          Heparin to be monitored using aPTT until 72h following last factor Xa inhibitor/LMWH administration .(aPTT should be used for patients who have received a DOAC in the past 72 hours)?      Have admin instructions been updated to reflect appropriate protocol? YES    Have appropriate labs been ordered for corresponding protocol? YES   *Discontinue anti-Xa lab monitoring if using aPTT protocol    Is the starting rate/last rate adjustment appropriate? yes    Concurrent anticoagulants: no  (Note it is not appropriate to be on most anticoagulants while on a heparin drip)    Review platelets from the past few days.  Any concerns?: No    Please record any appropriate additional notes here:    Spoke with Dr. Lowe in ER. Patient's last dose of xarelto was this morning. Okay to start heparin drip when next xarelto dose would have been due.    RANCHO LEUNG [facility]

## 2025-01-07 ENCOUNTER — HOSPITAL ENCOUNTER (INPATIENT)
Age: 89
LOS: 3 days | Discharge: HOME | DRG: 281 | End: 2025-01-10
Attending: INTERNAL MEDICINE | Admitting: STUDENT IN AN ORGANIZED HEALTH CARE EDUCATION/TRAINING PROGRAM
Payer: MEDICARE

## 2025-01-07 VITALS
HEART RATE: 70 BPM | OXYGEN SATURATION: 98 % | SYSTOLIC BLOOD PRESSURE: 115 MMHG | RESPIRATION RATE: 16 BRPM | HEIGHT: 73 IN | DIASTOLIC BLOOD PRESSURE: 75 MMHG | WEIGHT: 140 LBS | TEMPERATURE: 97.9 F | BODY MASS INDEX: 18.55 KG/M2

## 2025-01-07 DIAGNOSIS — I25.10 CORONARY ARTERY DISEASE: ICD-10-CM

## 2025-01-07 LAB
ANION GAP SERPL CALCULATED.3IONS-SCNC: 10 MMOL/L (ref 9–16)
ANION GAP SERPL CALCULATED.3IONS-SCNC: 9 MMOL/L (ref 9–16)
B PARAP IS1001 DNA NPH QL NAA+NON-PROBE: NOT DETECTED
B PERT DNA SPEC QL NAA+PROBE: NOT DETECTED
BASOPHILS # BLD: 0 K/UL (ref 0–0.2)
BASOPHILS NFR BLD: 0 % (ref 0–2)
BUN SERPL-MCNC: 27 MG/DL (ref 8–23)
BUN SERPL-MCNC: 31 MG/DL (ref 8–23)
C PNEUM DNA NPH QL NAA+NON-PROBE: NOT DETECTED
CALCIUM SERPL-MCNC: 8.6 MG/DL (ref 8.6–10.4)
CALCIUM SERPL-MCNC: 8.8 MG/DL (ref 8.6–10.4)
CHLORIDE SERPL-SCNC: 105 MMOL/L (ref 98–107)
CHLORIDE SERPL-SCNC: 106 MMOL/L (ref 98–107)
CO2 SERPL-SCNC: 25 MMOL/L (ref 20–31)
CO2 SERPL-SCNC: 26 MMOL/L (ref 20–31)
CREAT SERPL-MCNC: 1.3 MG/DL (ref 0.7–1.2)
CREAT SERPL-MCNC: 1.5 MG/DL (ref 0.7–1.2)
EKG ATRIAL RATE: 468 BPM
EKG ATRIAL RATE: 68 BPM
EKG Q-T INTERVAL: 452 MS
EKG Q-T INTERVAL: 482 MS
EKG QRS DURATION: 144 MS
EKG QRS DURATION: 168 MS
EKG QTC CALCULATION (BAZETT): 488 MS
EKG QTC CALCULATION (BAZETT): 520 MS
EKG R AXIS: -81 DEGREES
EKG R AXIS: -85 DEGREES
EKG T AXIS: 91 DEGREES
EKG T AXIS: 95 DEGREES
EKG VENTRICULAR RATE: 70 BPM
EKG VENTRICULAR RATE: 70 BPM
EOSINOPHIL # BLD: 0.12 K/UL (ref 0–0.44)
EOSINOPHILS RELATIVE PERCENT: 4 % (ref 1–4)
ERYTHROCYTE [DISTWIDTH] IN BLOOD BY AUTOMATED COUNT: 14.3 % (ref 11.8–14.4)
FLUAV RNA NPH QL NAA+NON-PROBE: NOT DETECTED
FLUBV RNA NPH QL NAA+NON-PROBE: NOT DETECTED
GFR, ESTIMATED: 44 ML/MIN/1.73M2
GFR, ESTIMATED: 53 ML/MIN/1.73M2
GLUCOSE SERPL-MCNC: 119 MG/DL (ref 74–99)
GLUCOSE SERPL-MCNC: 95 MG/DL (ref 74–99)
HADV DNA NPH QL NAA+NON-PROBE: NOT DETECTED
HCOV 229E RNA NPH QL NAA+NON-PROBE: NOT DETECTED
HCOV HKU1 RNA NPH QL NAA+NON-PROBE: NOT DETECTED
HCOV NL63 RNA NPH QL NAA+NON-PROBE: NOT DETECTED
HCOV OC43 RNA NPH QL NAA+NON-PROBE: NOT DETECTED
HCT VFR BLD AUTO: 28.5 % (ref 40.7–50.3)
HGB BLD-MCNC: 8.9 G/DL (ref 13–17)
HMPV RNA NPH QL NAA+NON-PROBE: NOT DETECTED
HPIV1 RNA NPH QL NAA+NON-PROBE: NOT DETECTED
HPIV2 RNA NPH QL NAA+NON-PROBE: NOT DETECTED
HPIV3 RNA NPH QL NAA+NON-PROBE: NOT DETECTED
HPIV4 RNA NPH QL NAA+NON-PROBE: NOT DETECTED
IMM GRANULOCYTES # BLD AUTO: 0 K/UL (ref 0–0.3)
IMM GRANULOCYTES NFR BLD: 0 %
LYMPHOCYTES NFR BLD: 0.61 K/UL (ref 1.1–3.7)
LYMPHOCYTES RELATIVE PERCENT: 21 % (ref 24–43)
M PNEUMO DNA NPH QL NAA+NON-PROBE: NOT DETECTED
MCH RBC QN AUTO: 29.1 PG (ref 25.2–33.5)
MCHC RBC AUTO-ENTMCNC: 31.2 G/DL (ref 28.4–34.8)
MCV RBC AUTO: 93.1 FL (ref 82.6–102.9)
MONOCYTES NFR BLD: 0.26 K/UL (ref 0.1–1.2)
MONOCYTES NFR BLD: 9 % (ref 3–12)
MORPHOLOGY: NORMAL
NEUTROPHILS NFR BLD: 66 % (ref 36–65)
NEUTS SEG NFR BLD: 1.91 K/UL (ref 1.5–8.1)
NRBC BLD-RTO: 0 PER 100 WBC
PARTIAL THROMBOPLASTIN TIME: 67.8 SEC (ref 23–36.5)
PARTIAL THROMBOPLASTIN TIME: 91.3 SEC (ref 24–36)
PLATELET # BLD AUTO: 198 K/UL (ref 138–453)
PMV BLD AUTO: 9.6 FL (ref 8.1–13.5)
POTASSIUM SERPL-SCNC: 4.2 MMOL/L (ref 3.7–5.3)
POTASSIUM SERPL-SCNC: 4.4 MMOL/L (ref 3.7–5.3)
RBC # BLD AUTO: 3.06 M/UL (ref 4.21–5.77)
RSV RNA NPH QL NAA+NON-PROBE: NOT DETECTED
RV+EV RNA NPH QL NAA+NON-PROBE: NOT DETECTED
SARS-COV-2 RNA NPH QL NAA+NON-PROBE: NOT DETECTED
SODIUM SERPL-SCNC: 139 MMOL/L (ref 136–145)
SODIUM SERPL-SCNC: 142 MMOL/L (ref 136–145)
SPECIMEN DESCRIPTION: NORMAL
TROPONIN I SERPL HS-MCNC: 81 NG/L (ref 0–22)
WBC OTHER # BLD: 2.9 K/UL (ref 3.5–11.3)

## 2025-01-07 PROCEDURE — 2580000003 HC RX 258

## 2025-01-07 PROCEDURE — 93010 ELECTROCARDIOGRAM REPORT: CPT | Performed by: INTERNAL MEDICINE

## 2025-01-07 PROCEDURE — 0202U NFCT DS 22 TRGT SARS-COV-2: CPT

## 2025-01-07 PROCEDURE — 99223 1ST HOSP IP/OBS HIGH 75: CPT | Performed by: INTERNAL MEDICINE

## 2025-01-07 PROCEDURE — 2500000003 HC RX 250 WO HCPCS: Performed by: NURSE PRACTITIONER

## 2025-01-07 PROCEDURE — 6370000000 HC RX 637 (ALT 250 FOR IP): Performed by: STUDENT IN AN ORGANIZED HEALTH CARE EDUCATION/TRAINING PROGRAM

## 2025-01-07 PROCEDURE — 6360000002 HC RX W HCPCS: Performed by: NURSE PRACTITIONER

## 2025-01-07 PROCEDURE — 6370000000 HC RX 637 (ALT 250 FOR IP): Performed by: INTERNAL MEDICINE

## 2025-01-07 PROCEDURE — 84484 ASSAY OF TROPONIN QUANT: CPT

## 2025-01-07 PROCEDURE — 99223 1ST HOSP IP/OBS HIGH 75: CPT | Performed by: STUDENT IN AN ORGANIZED HEALTH CARE EDUCATION/TRAINING PROGRAM

## 2025-01-07 PROCEDURE — 36415 COLL VENOUS BLD VENIPUNCTURE: CPT

## 2025-01-07 PROCEDURE — 85730 THROMBOPLASTIN TIME PARTIAL: CPT

## 2025-01-07 PROCEDURE — 99222 1ST HOSP IP/OBS MODERATE 55: CPT | Performed by: INTERNAL MEDICINE

## 2025-01-07 PROCEDURE — 85025 COMPLETE CBC W/AUTO DIFF WBC: CPT

## 2025-01-07 PROCEDURE — 6360000002 HC RX W HCPCS

## 2025-01-07 PROCEDURE — 6360000002 HC RX W HCPCS: Performed by: STUDENT IN AN ORGANIZED HEALTH CARE EDUCATION/TRAINING PROGRAM

## 2025-01-07 PROCEDURE — 80048 BASIC METABOLIC PNL TOTAL CA: CPT

## 2025-01-07 PROCEDURE — 2060000000 HC ICU INTERMEDIATE R&B

## 2025-01-07 RX ORDER — HEPARIN SODIUM 1000 [USP'U]/ML
60 INJECTION, SOLUTION INTRAVENOUS; SUBCUTANEOUS PRN
Status: DISCONTINUED | OUTPATIENT
Start: 2025-01-07 | End: 2025-01-10

## 2025-01-07 RX ORDER — HEPARIN SODIUM 10000 [USP'U]/100ML
5-30 INJECTION, SOLUTION INTRAVENOUS CONTINUOUS
Status: DISCONTINUED | OUTPATIENT
Start: 2025-01-07 | End: 2025-01-10

## 2025-01-07 RX ORDER — SODIUM CHLORIDE 9 MG/ML
INJECTION, SOLUTION INTRAVENOUS PRN
Status: DISCONTINUED | OUTPATIENT
Start: 2025-01-07 | End: 2025-01-10 | Stop reason: HOSPADM

## 2025-01-07 RX ORDER — HEPARIN SODIUM 1000 [USP'U]/ML
30 INJECTION, SOLUTION INTRAVENOUS; SUBCUTANEOUS PRN
Status: DISCONTINUED | OUTPATIENT
Start: 2025-01-07 | End: 2025-01-07 | Stop reason: DRUGHIGH

## 2025-01-07 RX ORDER — ISOSORBIDE MONONITRATE 30 MG/1
30 TABLET, EXTENDED RELEASE ORAL DAILY
Status: DISCONTINUED | OUTPATIENT
Start: 2025-01-07 | End: 2025-01-10 | Stop reason: HOSPADM

## 2025-01-07 RX ORDER — HEPARIN SODIUM 1000 [USP'U]/ML
60 INJECTION, SOLUTION INTRAVENOUS; SUBCUTANEOUS ONCE
Status: DISCONTINUED | OUTPATIENT
Start: 2025-01-07 | End: 2025-01-07 | Stop reason: DRUGHIGH

## 2025-01-07 RX ORDER — BUPRENORPHINE HYDROCHLORIDE AND NALOXONE HYDROCHLORIDE DIHYDRATE 8; 2 MG/1; MG/1
1 TABLET SUBLINGUAL 2 TIMES DAILY
Status: DISCONTINUED | OUTPATIENT
Start: 2025-01-07 | End: 2025-01-10 | Stop reason: HOSPADM

## 2025-01-07 RX ORDER — ONDANSETRON 4 MG/1
4 TABLET, ORALLY DISINTEGRATING ORAL EVERY 8 HOURS PRN
Status: DISCONTINUED | OUTPATIENT
Start: 2025-01-07 | End: 2025-01-10 | Stop reason: HOSPADM

## 2025-01-07 RX ORDER — SODIUM CHLORIDE 0.9 % (FLUSH) 0.9 %
10 SYRINGE (ML) INJECTION PRN
Status: DISCONTINUED | OUTPATIENT
Start: 2025-01-07 | End: 2025-01-10 | Stop reason: HOSPADM

## 2025-01-07 RX ORDER — METOPROLOL SUCCINATE 50 MG/1
100 TABLET, EXTENDED RELEASE ORAL DAILY
Status: DISCONTINUED | OUTPATIENT
Start: 2025-01-07 | End: 2025-01-10 | Stop reason: HOSPADM

## 2025-01-07 RX ORDER — HEPARIN SODIUM 1000 [USP'U]/ML
30 INJECTION, SOLUTION INTRAVENOUS; SUBCUTANEOUS PRN
Status: DISCONTINUED | OUTPATIENT
Start: 2025-01-07 | End: 2025-01-10

## 2025-01-07 RX ORDER — ONDANSETRON 2 MG/ML
4 INJECTION INTRAMUSCULAR; INTRAVENOUS EVERY 6 HOURS PRN
Status: DISCONTINUED | OUTPATIENT
Start: 2025-01-07 | End: 2025-01-10 | Stop reason: HOSPADM

## 2025-01-07 RX ORDER — FINASTERIDE 5 MG/1
5 TABLET, FILM COATED ORAL DAILY
Status: DISCONTINUED | OUTPATIENT
Start: 2025-01-07 | End: 2025-01-10 | Stop reason: HOSPADM

## 2025-01-07 RX ORDER — POLYETHYLENE GLYCOL 3350 17 G/17G
17 POWDER, FOR SOLUTION ORAL DAILY PRN
Status: DISCONTINUED | OUTPATIENT
Start: 2025-01-07 | End: 2025-01-10 | Stop reason: HOSPADM

## 2025-01-07 RX ORDER — SODIUM CHLORIDE 0.9 % (FLUSH) 0.9 %
5-40 SYRINGE (ML) INJECTION EVERY 12 HOURS SCHEDULED
Status: DISCONTINUED | OUTPATIENT
Start: 2025-01-07 | End: 2025-01-10 | Stop reason: HOSPADM

## 2025-01-07 RX ORDER — ASPIRIN 81 MG/1
81 TABLET, CHEWABLE ORAL DAILY
Status: DISCONTINUED | OUTPATIENT
Start: 2025-01-07 | End: 2025-01-07

## 2025-01-07 RX ORDER — ACETYLCYSTEINE 200 MG/ML
600 SOLUTION ORAL; RESPIRATORY (INHALATION) 2 TIMES DAILY
Status: DISCONTINUED | OUTPATIENT
Start: 2025-01-07 | End: 2025-01-08

## 2025-01-07 RX ORDER — HEPARIN SODIUM 1000 [USP'U]/ML
60 INJECTION, SOLUTION INTRAVENOUS; SUBCUTANEOUS ONCE
Status: DISCONTINUED | OUTPATIENT
Start: 2025-01-07 | End: 2025-01-10

## 2025-01-07 RX ORDER — CLOPIDOGREL BISULFATE 75 MG/1
75 TABLET ORAL DAILY
Status: DISCONTINUED | OUTPATIENT
Start: 2025-01-07 | End: 2025-01-10 | Stop reason: HOSPADM

## 2025-01-07 RX ORDER — SODIUM CHLORIDE 9 MG/ML
INJECTION, SOLUTION INTRAVENOUS CONTINUOUS
Status: DISCONTINUED | OUTPATIENT
Start: 2025-01-07 | End: 2025-01-10

## 2025-01-07 RX ORDER — ACETAMINOPHEN 650 MG/1
650 SUPPOSITORY RECTAL EVERY 6 HOURS PRN
Status: DISCONTINUED | OUTPATIENT
Start: 2025-01-07 | End: 2025-01-10 | Stop reason: HOSPADM

## 2025-01-07 RX ORDER — PANTOPRAZOLE SODIUM 40 MG/1
40 TABLET, DELAYED RELEASE ORAL
Status: DISCONTINUED | OUTPATIENT
Start: 2025-01-08 | End: 2025-01-10 | Stop reason: HOSPADM

## 2025-01-07 RX ORDER — DILTIAZEM HYDROCHLORIDE 120 MG/1
120 CAPSULE, COATED, EXTENDED RELEASE ORAL DAILY
Status: DISCONTINUED | OUTPATIENT
Start: 2025-01-07 | End: 2025-01-10 | Stop reason: HOSPADM

## 2025-01-07 RX ORDER — HEPARIN SODIUM 10000 [USP'U]/100ML
5-30 INJECTION, SOLUTION INTRAVENOUS CONTINUOUS
Status: DISCONTINUED | OUTPATIENT
Start: 2025-01-07 | End: 2025-01-07 | Stop reason: DRUGHIGH

## 2025-01-07 RX ORDER — NITROGLYCERIN 0.4 MG/1
0.4 TABLET SUBLINGUAL EVERY 5 MIN PRN
Status: DISCONTINUED | OUTPATIENT
Start: 2025-01-07 | End: 2025-01-10 | Stop reason: HOSPADM

## 2025-01-07 RX ORDER — ACETAMINOPHEN 325 MG/1
650 TABLET ORAL EVERY 6 HOURS PRN
Status: DISCONTINUED | OUTPATIENT
Start: 2025-01-07 | End: 2025-01-10 | Stop reason: HOSPADM

## 2025-01-07 RX ORDER — ATORVASTATIN CALCIUM 40 MG/1
40 TABLET, FILM COATED ORAL NIGHTLY
Status: DISCONTINUED | OUTPATIENT
Start: 2025-01-07 | End: 2025-01-10 | Stop reason: HOSPADM

## 2025-01-07 RX ORDER — HEPARIN SODIUM 1000 [USP'U]/ML
60 INJECTION, SOLUTION INTRAVENOUS; SUBCUTANEOUS PRN
Status: DISCONTINUED | OUTPATIENT
Start: 2025-01-07 | End: 2025-01-07 | Stop reason: DRUGHIGH

## 2025-01-07 RX ADMIN — ISOSORBIDE MONONITRATE 30 MG: 30 TABLET, EXTENDED RELEASE ORAL at 11:49

## 2025-01-07 RX ADMIN — Medication 600 MG: at 13:45

## 2025-01-07 RX ADMIN — METOPROLOL SUCCINATE 100 MG: 50 TABLET, EXTENDED RELEASE ORAL at 11:49

## 2025-01-07 RX ADMIN — ATORVASTATIN CALCIUM 40 MG: 40 TABLET, FILM COATED ORAL at 21:23

## 2025-01-07 RX ADMIN — SODIUM CHLORIDE, PRESERVATIVE FREE 10 ML: 5 INJECTION INTRAVENOUS at 21:14

## 2025-01-07 RX ADMIN — SODIUM CHLORIDE: 9 INJECTION, SOLUTION INTRAVENOUS at 12:29

## 2025-01-07 RX ADMIN — FINASTERIDE 5 MG: 5 TABLET, FILM COATED ORAL at 11:57

## 2025-01-07 RX ADMIN — SODIUM CHLORIDE, PRESERVATIVE FREE 10 ML: 5 INJECTION INTRAVENOUS at 09:20

## 2025-01-07 RX ADMIN — HEPARIN SODIUM 12 UNITS/KG/HR: 10000 INJECTION, SOLUTION INTRAVENOUS at 19:36

## 2025-01-07 RX ADMIN — BUPRENORPHINE HYDROCHLORIDE AND NALOXONE HYDROCHLORIDE DIHYDRATE 1 TABLET: 8; 2 TABLET SUBLINGUAL at 21:23

## 2025-01-07 RX ADMIN — BUPRENORPHINE HYDROCHLORIDE AND NALOXONE HYDROCHLORIDE DIHYDRATE 1 TABLET: 8; 2 TABLET SUBLINGUAL at 11:48

## 2025-01-07 RX ADMIN — CLOPIDOGREL BISULFATE 75 MG: 75 TABLET ORAL at 11:49

## 2025-01-07 RX ADMIN — HEPARIN SODIUM 12.4 UNITS/KG/HR: 10000 INJECTION, SOLUTION INTRAVENOUS at 09:17

## 2025-01-07 RX ADMIN — Medication 600 MG: at 21:22

## 2025-01-07 RX ADMIN — DILTIAZEM HYDROCHLORIDE 120 MG: 120 CAPSULE, COATED, EXTENDED RELEASE ORAL at 11:48

## 2025-01-07 ASSESSMENT — ENCOUNTER SYMPTOMS
NAUSEA: 1
ABDOMINAL DISTENTION: 1
CHEST TIGHTNESS: 1
EYE DISCHARGE: 0
BACK PAIN: 0
WHEEZING: 0
RHINORRHEA: 0
EYE ITCHING: 0
COUGH: 0
DIARRHEA: 1
COLOR CHANGE: 0
VOMITING: 1
SHORTNESS OF BREATH: 0
APNEA: 0
SINUS PAIN: 0

## 2025-01-07 NOTE — CARE COORDINATION
Case Management Assessment  Initial Evaluation    Date/Time of Evaluation: 1/7/2025 8:55AM  Assessment Completed by: Erin Partida RN    If patient is discharged prior to next notation, then this note serves as note for discharge by case management.    Patient Name: Hemanth Barrera                   YOB: 1935  Diagnosis: Chest pain [R07.9]                   Date / Time: 1/7/2025  8:07 AM    Patient Admission Status: Observation   Readmission Risk (Low < 19, Mod (19-27), High > 27): Readmission Risk Score: 23.1    Current PCP: Neftaly Contreras MD  PCP verified by CM? (P) Yes (Neftaly Contreras MD)    Chart Reviewed: Yes      History Provided by: Patient  Patient Orientation: Alert and Oriented, Person, Place    Patient Cognition: Alert    Hospitalization in the last 30 days (Readmission):  No    If yes, Readmission Assessment in CM Navigator will be completed.    Advance Directives:      Code Status: Full Code   Patient's Primary Decision Maker is: (P) Named in Scanned ACP Document    Primary Decision Maker: Dusty Barrera - Child - [phone]    Discharge Planning:    Patient lives with: (P) Children Type of Home: (P) House  Primary Care Giver: Self  Patient Support Systems include: Children, Family Members   Current Financial resources: (P) Medicare  Current community resources:    Current services prior to admission: (P) Durable Medical Equipment            Current DME: (P) Cane, Walker            Type of Home Care services:  (P) None    ADLS  Prior functional level: (P) Independent in ADLs/IADLs  Current functional level: (P) Independent in ADLs/IADLs    PT AM-PAC:   /24  OT AM-PAC:   /24    Family can provide assistance at DC: (P) Yes  Would you like Case Management to discuss the discharge plan with any other family members/significant others, and if so, who? (P) Yes (son Elias)  Plans to Return to Present Housing: (P) Yes  Other Identified Issues/Barriers to RETURNING to current housing:

## 2025-01-07 NOTE — CONSULTS
Renal Consult Note    Patient :  Hemanth Barrera; 89 y.o. MRN# [id number]  Location:  [facility]/0541-01  Attending:  Goivanna Louie MD  Admit Date:  1/7/2025   Hospital Day: 0    Reason for Consult:     Asked by Giovanna Shah MD to see for VARSHA/Elevated Creatinine.    History Obtained From:     patient, electronic medical record    History of Present Illness:     Hemanth Barrera; 89 y.o. male with medical history of CAD s/p PCI, CKD stage III  presented to the hospital with the chief complaint of nausea, vomiting and diarrhea ongoing for the past 3 to 4 days.  He also reported left-sided chest pain radiating to back.  His symptoms were intermittent. denies any shortness of breath, epigastric pain, constipation or diarrhea.  Labs at admission were significant for elevated troponin 111--94.  He was thus transferred from [place] to [place] for concern of NSTEMI    Nephrology was consulted for VARSHA on CKD and clearance for cardiac cath.  His baseline creatinine is 1.1-1.2.  His creatinine at admission was 1.9.     No history of recent contrast exposure, No h/o prolonged NSAIDs use in the past, No h/o nephrolithiasis, No recent skin rashes or arthralgias, No hematuria or pyuria noticed in the recent past. Doesn't report any reduction in the urine output recently. Non report of any obstructive urinary symptoms (urgency, frequency, weak stream, straining while urination). No h/o recurrent UTIs in the past.    Past History/Allergies?Social History:     Past Medical History:   Diagnosis Date    Acute inferolateral myocardial infarction (HCC) 11/18/2021    Arthritis     Atrial fibrillation (HCC)     CAD (coronary artery disease)     CHF (congestive heart failure) (HCC)     Glaucoma     Hx of blood clots     with hernia surgery    Hyperlipidemia     Hypertension     MI (myocardial infarction) ([facility])     NSTEMI (non-ST elevated myocardial infarction) ([facility]) 11/17/2021       Past Surgical History:   Procedure  A1PCT 2 08/20/2021 10:58 AM    A2PCT 9 08/20/2021 10:58 AM    BETAPCT 11 08/20/2021 10:58 AM    GAMGLOB 1.3 08/20/2021 10:58 AM    GGPCT 19 08/20/2021 10:58 AM    PATH ELECTRONICALLY SIGNED. COURTNEY MORRIS MD 08/20/2021 10:58 AM    PATH ELECTRONICALLY SIGNED. COURTNEY MORRIS MD 08/20/2021 10:58 AM     UPEP:   No results found for: \"LABPE\"  C3:   No results found for: \"C3\"  C4:   No results found for: \"C4\"  MPO ANCA:   No results found for: \"MPO\"  PR3 ANCA:   No results found for: \"PR3\"  Anti-GBM:   No results found for: \"GBMABIGG\"  Hep BsAg:         Lab Results   Component Value Date/Time    HEPBSAG NONREACTIVE 08/02/2023 03:20 PM     Hep C AB:          Lab Results   Component Value Date/Time    HEPCAB NONREACTIVE 08/02/2023 03:20 PM       Urinalysis/Chemistries:      Lab Results   Component Value Date/Time    NITRU NEGATIVE 04/18/2024 10:59 PM    COLORU Yellow 04/18/2024 10:59 PM    PHUR 6.5 04/18/2024 10:59 PM    WBCUA 0 TO 2 07/31/2023 01:38 PM    RBCUA 3 to 5 07/31/2023 01:38 PM    MUCUS NOT REPORTED 09/25/2021 01:48 AM    TRICHOMONAS NOT REPORTED 09/25/2021 01:48 AM    YEAST NOT REPORTED 09/25/2021 01:48 AM    BACTERIA None 07/31/2023 01:38 PM    LEUKOCYTESUR NEGATIVE 04/18/2024 10:59 PM    UROBILINOGEN Normal 04/18/2024 10:59 PM    BILIRUBINUR NEGATIVE 04/18/2024 10:59 PM    GLUCOSEU NEGATIVE 04/18/2024 10:59 PM    KETUA NEGATIVE 04/18/2024 10:59 PM    AMORPHOUS NOT REPORTED 09/25/2021 01:48 AM     Urine Sodium:   No components found for: \"ELISSA\"  Urine Potassium:  No results found for: \"KUR\"  Urine Chloride:  No results found for: \"CLUR\"  Urine Osmolarity: No results found for: \"OSMOU\"  Urine Protein:   No results found for: \"TPU\"  Urine Creatinine:   No results found for: \"LABCREA\"  UPC:     Urine Eosinophils:  No components found for: \"UEOS\"    Radiology:     Reviewed.      Assessment:     Acute Kidney Injury secondary to intravascular volume depletion from  vomiting and hypoperfusion from NSTEMI  Acute

## 2025-01-07 NOTE — H&P
[facility]  Office: [phone]  Antwon Bernardo DO, Fernando Vyas DO, Drake Haynes DO, Geovani Honeycutt DO, Yani Pritchard MD, Isabella Meade MD, Rcihard Hidalgo MD, Miriam Michael MD,  Keo Pope MD, Eli Barbour MD, Giovanna Louie MD,  Yee Kerr DO, Dion Gilliland MD, Jonny Basurto MD, Alex Bernardo DO, Nafisa Lyles MD,  Jarred Johnson DO, Camila Mabry MD, Karen Shipley MD, Jessika Ravi MD, Carlos De León MD,  Bola Callaway MD, Roly Brunson MD, Rojas Barfield MD, Francisco J Monzon MD, Jerry Maher MD, Jelani Jameson MD, Rudy Webb DO, Teodoro Casas MD, Yee Meehan MD, Mohsin Reza, MD, Shirley Waterhouse, CNP,  Isidra Bianchi CNP, Rudy Castaneda, ISMAEL,  Magda Cannon, DAVID, Jaleesa Meeks, CNP, Rakel Purdy, CNP, Tatiana Walton, CNP, Stephanie Cyr, CNP, Luz Maria Castellanos, PA-C, Teresita Almanzar PA-C, Joie Song, CNP, Evelio Bear, CNP,  Venecia Shanks, CNP, Erin Alaniz, CNP, Rachel Ling, CNP,  Sarah Ramirez CNP, Graciela Blue, CNP         [facility]   IN-PATIENT SERVICE   [facility]    HISTORY AND PHYSICAL EXAMINATION            Date:   1/7/2025  Patient name:  Hemanth Barrera  Date of admission:  1/7/2025  8:07 AM  MRN:   [id number]  Account:  [id number]  YOB: 1935  PCP:    Neftaly Contreras MD  Room:   [address] [place] [address]  Code Status:    Full Code    Chief Complaint:     Chest pain, nausea.     History Obtained From:     patient, electronic medical record    History of Present Illness:     Hemanth Barrera is a 89 y.o. Non- / non  male who presents with No chief complaint on file.   and is admitted to the hospital for the management of Chest pain.    89-year-old male past medical history of coronary artery disease, CKD stage IIIb, hernia status post repair, prior cardiac catheterization 9/14/2024 presents with nausea emesis and diarrhea.  Patient states that he has been having early satiety worsening with      Patient status inpatient in the Progressive Unit/Step down  Vladimir Score for Post-PCI Contrast Nephropathy  RESULT SUMMARY:  17 points  PADMINI Risk Score    57.3 %  Risk of any post-PCI PADMINI    12.6 %  Risk of post-PCI PADMINI requiring dialysis      INPUTS:  Hypotension --> 0 = No  Intra-aortic balloon pump --> 0 = No  Congestive heart failure --> 5 = Yes  Age >75 years --> 4 = Yes  Anemia --> 3 = Yes  Diabetes --> 0 = No  Contrast media volume --> 100 mL  eGFR, mL/min/1.73 m² --> 4 = 20 to <40        Chest pain concern for NSTEMI. Heparin drip. Appreciate cardiology recommendaitons. Follow up echo. Heparin, plavix, not on asa due to intolerance, cardizem toprol XL  GERD. protonix  Diarreha, follow up GI panel, check for c diff  VARSHA on CKD. Encourage PO intake.   Chronic suboxone therapy. Continue home dose  Appreciate nephrology recommendations  Discussed about ACP. Son is designated HPOA. Wants to be full code.     Consultations:   IP CONSULT TO CARDIOLOGY  IP CONSULT TO NEPHROLOGY     Patient is admitted as inpatient status because of co-morbidities listed above, severity of signs and symptoms as outlined, requirement for current medical therapies and most importantly because of direct risk to patient if care not provided in a hospital setting.  Expected length of stay > 48 hours.    Giovanna Louie MD  1/7/2025  11:08 AM    Copy sent to Neftaly Choudhary MD

## 2025-01-07 NOTE — PLAN OF CARE
Problem: Chronic Conditions and Co-morbidities  Goal: Patient's chronic conditions and co-morbidity symptoms are monitored and maintained or improved  1/7/2025 1844 by Adriana Mcelroy RN  Outcome: Progressing  1/7/2025 1838 by Adriana Mcelroy RN  Outcome: Progressing  1/7/2025 1837 by Adriana Mcelroy, RN  Outcome: Progressing     Problem: Discharge Planning  Goal: Discharge to home or other facility with appropriate resources  1/7/2025 1844 by Ardiana Mcelroy RN  Outcome: Progressing  1/7/2025 1838 by Adriana Mcelroy, RN  Outcome: Progressing  1/7/2025 1837 by Adriana Mcelroy RN  Outcome: Progressing     Problem: ABCDS Injury Assessment  Goal: Absence of physical injury  1/7/2025 1844 by Adriana Mcelroy, RN  Outcome: Progressing  1/7/2025 1838 by Adriana Mcelroy, RN  Outcome: Progressing  1/7/2025 1837 by Adriana Mcelroy RN  Outcome: Progressing     Problem: Safety - Adult  Goal: Free from fall injury  1/7/2025 1844 by Adriana Mcelroy RN  Outcome: Progressing  1/7/2025 1838 by Adriana Mcelroy RN  Outcome: Progressing  1/7/2025 1837 by Adriana Mcelroy RN  Outcome: Progressing

## 2025-01-07 NOTE — PLAN OF CARE
Problem: Chronic Conditions and Co-morbidities  Goal: Patient's chronic conditions and co-morbidity symptoms are monitored and maintained or improved  1/7/2025 1844 by Adriana Mcelroy, RN  Outcome: Progressing  1/7/2025 1844 by Adriana Mcelroy, RN  Outcome: Progressing  1/7/2025 1838 by Adriana Mcelroy, RN  Outcome: Progressing  1/7/2025 1837 by Adriana Mcelroy, RN  Outcome: Progressing     Problem: Discharge Planning  Goal: Discharge to home or other facility with appropriate resources  1/7/2025 1844 by Adriana Mcelroy, RN  Outcome: Progressing  1/7/2025 1844 by Adriana Mcelroy, RN  Outcome: Progressing  1/7/2025 1838 by Adriana Mcelroy, RN  Outcome: Progressing  1/7/2025 1837 by Adriana Mcelroy, RN  Outcome: Progressing     Problem: ABCDS Injury Assessment  Goal: Absence of physical injury  1/7/2025 1844 by Adriana Mcelroy, RN  Outcome: Progressing  1/7/2025 1844 by Adriana Mcelroy, RN  Outcome: Progressing  1/7/2025 1838 by Adriana Mcelroy, RN  Outcome: Progressing  1/7/2025 1837 by Adriana Mcelroy, RN  Outcome: Progressing     Problem: Safety - Adult  Goal: Free from fall injury  1/7/2025 1844 by Adriana Mcelroy, RN  Outcome: Progressing  1/7/2025 1844 by Adriana Mcelroy, RN  Outcome: Progressing  1/7/2025 1838 by Adriana Mcelroy, RN  Outcome: Progressing  1/7/2025 1837 by Adriana Mcelroy, RN  Outcome: Progressing

## 2025-01-07 NOTE — CONSULTS
[facility]    Consult / H&P               Today's Date: 1/7/2025  Patient Name: Hemanth Barrera  Date of admission: 1/7/2025  8:07 AM  Patient's age: 89 y.o., 1935  Admission Dx: Chest pain [R07.9]    Requesting Physician: Isabella Meade MD    Cardiac Evaluation Reason:  chest pain, elevated troponin    History Obtained From: patient and chart review     History of Present Illness:    This patient 89 y.o. years old with PMH significant for CAD s/p shockwave atherectomy and RAHUL to mid LAD 9/2024, mid LCx with 70% stenosis considering staged PCI if pt is symptomatic, CKD stage IIIb SSS s/p PPM, paroxysmal A-fib, Hx of LLE DVT, on Plavix and Xarelto, CHF/HFpEF    Presented to [facility] [place] ED with chief complaint of nausea, vomiting, chest pain and abdominal pain, left-sided chest pain has been going for about 3-4 days, intermittent, taking nitro occasionally helps, associated with intermittent SOB and nausea, Troponin elevation 111-94, discussed with Cardiology at [place] who recommended starting heparin drip and Tx for concern of NSTEMI.    On my eval patient is AOx4, VSS.  Continue to plan of left-sided chest pain wrapping around his left chest as well as midsternal chest pain.  CP has been going on for 3-4 days, constant, improves with nitro but then comes back on.  Also has been having some nausea and diarrhea over the past week, poor p.o. intake    Past Medical History:   has a past medical history of Acute inferolateral myocardial infarction ([facility]), Arthritis, Atrial fibrillation ([facility]), CAD (coronary artery disease), CHF (congestive heart failure) ([facility]), Glaucoma, Hx of blood clots, Hyperlipidemia, Hypertension, MI (myocardial infarction) ([facility]), and NSTEMI (non-ST elevated myocardial infarction) ([facility]).    Past Surgical History:   has a past surgical history that includes pacemaker placement; Abdomen surgery; Cardiac catheterization; Appendectomy; Colonoscopy; eye surgery; hernia

## 2025-01-07 NOTE — PLAN OF CARE
Problem: Chronic Conditions and Co-morbidities  Goal: Patient's chronic conditions and co-morbidity symptoms are monitored and maintained or improved  1/7/2025 1838 by Adriana Mcelroy RN  Outcome: Progressing  1/7/2025 1837 by Adriana Mcelroy RN  Outcome: Progressing     Problem: Discharge Planning  Goal: Discharge to home or other facility with appropriate resources  1/7/2025 1838 by Adriana Mcelroy RN  Outcome: Progressing  1/7/2025 1837 by Adriana Mcelroy RN  Outcome: Progressing     Problem: ABCDS Injury Assessment  Goal: Absence of physical injury  1/7/2025 1838 by Adriana Mcelroy RN  Outcome: Progressing  1/7/2025 1837 by Adriana Mcelroy RN  Outcome: Progressing     Problem: Safety - Adult  Goal: Free from fall injury  1/7/2025 1838 by Adriana Mcelroy RN  Outcome: Progressing  1/7/2025 1837 by Adriana Mcelroy RN  Outcome: Progressing

## 2025-01-08 LAB
ANION GAP SERPL CALCULATED.3IONS-SCNC: 8 MMOL/L (ref 9–16)
BASOPHILS # BLD: <0.03 K/UL (ref 0–0.2)
BASOPHILS NFR BLD: 1 % (ref 0–2)
BUN SERPL-MCNC: 23 MG/DL (ref 8–23)
CALCIUM SERPL-MCNC: 8.3 MG/DL (ref 8.6–10.4)
CHLORIDE SERPL-SCNC: 107 MMOL/L (ref 98–107)
CO2 SERPL-SCNC: 26 MMOL/L (ref 20–31)
CREAT SERPL-MCNC: 1.1 MG/DL (ref 0.7–1.2)
EOSINOPHIL # BLD: 0.1 K/UL (ref 0–0.44)
EOSINOPHILS RELATIVE PERCENT: 3 % (ref 1–4)
ERYTHROCYTE [DISTWIDTH] IN BLOOD BY AUTOMATED COUNT: 14.3 % (ref 11.8–14.4)
GFR, ESTIMATED: 64 ML/MIN/1.73M2
GLUCOSE SERPL-MCNC: 88 MG/DL (ref 74–99)
HCT VFR BLD AUTO: 29.7 % (ref 40.7–50.3)
HGB BLD-MCNC: 8.9 G/DL (ref 13–17)
IMM GRANULOCYTES # BLD AUTO: <0.03 K/UL (ref 0–0.3)
IMM GRANULOCYTES NFR BLD: 0 %
INR PPP: 1.3
LYMPHOCYTES NFR BLD: 0.81 K/UL (ref 1.1–3.7)
LYMPHOCYTES RELATIVE PERCENT: 28 % (ref 24–43)
MCH RBC QN AUTO: 28.8 PG (ref 25.2–33.5)
MCHC RBC AUTO-ENTMCNC: 30 G/DL (ref 28.4–34.8)
MCV RBC AUTO: 96.1 FL (ref 82.6–102.9)
MONOCYTES NFR BLD: 0.28 K/UL (ref 0.1–1.2)
MONOCYTES NFR BLD: 10 % (ref 3–12)
NEUTROPHILS NFR BLD: 58 % (ref 36–65)
NEUTS SEG NFR BLD: 1.71 K/UL (ref 1.5–8.1)
NRBC BLD-RTO: 0 PER 100 WBC
PARTIAL THROMBOPLASTIN TIME: 75 SEC (ref 23–36.5)
PLATELET # BLD AUTO: 210 K/UL (ref 138–453)
PMV BLD AUTO: 9.5 FL (ref 8.1–13.5)
POTASSIUM SERPL-SCNC: 4.5 MMOL/L (ref 3.7–5.3)
PROTHROMBIN TIME: 16 SEC (ref 11.7–14.9)
RBC # BLD AUTO: 3.09 M/UL (ref 4.21–5.77)
SODIUM SERPL-SCNC: 141 MMOL/L (ref 136–145)
WBC OTHER # BLD: 2.9 K/UL (ref 3.5–11.3)

## 2025-01-08 PROCEDURE — 2060000000 HC ICU INTERMEDIATE R&B

## 2025-01-08 PROCEDURE — 80048 BASIC METABOLIC PNL TOTAL CA: CPT

## 2025-01-08 PROCEDURE — 6360000002 HC RX W HCPCS: Performed by: STUDENT IN AN ORGANIZED HEALTH CARE EDUCATION/TRAINING PROGRAM

## 2025-01-08 PROCEDURE — 97535 SELF CARE MNGMENT TRAINING: CPT

## 2025-01-08 PROCEDURE — 99232 SBSQ HOSP IP/OBS MODERATE 35: CPT | Performed by: STUDENT IN AN ORGANIZED HEALTH CARE EDUCATION/TRAINING PROGRAM

## 2025-01-08 PROCEDURE — 85610 PROTHROMBIN TIME: CPT

## 2025-01-08 PROCEDURE — 36415 COLL VENOUS BLD VENIPUNCTURE: CPT

## 2025-01-08 PROCEDURE — 99233 SBSQ HOSP IP/OBS HIGH 50: CPT | Performed by: NURSE PRACTITIONER

## 2025-01-08 PROCEDURE — 97166 OT EVAL MOD COMPLEX 45 MIN: CPT

## 2025-01-08 PROCEDURE — 6360000002 HC RX W HCPCS

## 2025-01-08 PROCEDURE — 2500000003 HC RX 250 WO HCPCS: Performed by: NURSE PRACTITIONER

## 2025-01-08 PROCEDURE — 85025 COMPLETE CBC W/AUTO DIFF WBC: CPT

## 2025-01-08 PROCEDURE — 6370000000 HC RX 637 (ALT 250 FOR IP): Performed by: INTERNAL MEDICINE

## 2025-01-08 PROCEDURE — 2580000003 HC RX 258

## 2025-01-08 PROCEDURE — 97530 THERAPEUTIC ACTIVITIES: CPT

## 2025-01-08 PROCEDURE — 94761 N-INVAS EAR/PLS OXIMETRY MLT: CPT

## 2025-01-08 PROCEDURE — 6370000000 HC RX 637 (ALT 250 FOR IP): Performed by: STUDENT IN AN ORGANIZED HEALTH CARE EDUCATION/TRAINING PROGRAM

## 2025-01-08 PROCEDURE — 85730 THROMBOPLASTIN TIME PARTIAL: CPT

## 2025-01-08 RX ORDER — ACETYLCYSTEINE 200 MG/ML
600 SOLUTION ORAL; RESPIRATORY (INHALATION) 2 TIMES DAILY
Status: DISPENSED | OUTPATIENT
Start: 2025-01-08 | End: 2025-01-10

## 2025-01-08 RX ADMIN — BUPRENORPHINE HYDROCHLORIDE AND NALOXONE HYDROCHLORIDE DIHYDRATE 1 TABLET: 8; 2 TABLET SUBLINGUAL at 21:59

## 2025-01-08 RX ADMIN — SODIUM CHLORIDE: 9 INJECTION, SOLUTION INTRAVENOUS at 05:16

## 2025-01-08 RX ADMIN — BUPRENORPHINE HYDROCHLORIDE AND NALOXONE HYDROCHLORIDE DIHYDRATE 1 TABLET: 8; 2 TABLET SUBLINGUAL at 08:06

## 2025-01-08 RX ADMIN — CLOPIDOGREL BISULFATE 75 MG: 75 TABLET ORAL at 08:06

## 2025-01-08 RX ADMIN — Medication 600 MG: at 21:59

## 2025-01-08 RX ADMIN — ATORVASTATIN CALCIUM 40 MG: 40 TABLET, FILM COATED ORAL at 21:59

## 2025-01-08 RX ADMIN — PANTOPRAZOLE SODIUM 40 MG: 40 TABLET, DELAYED RELEASE ORAL at 08:06

## 2025-01-08 RX ADMIN — SODIUM CHLORIDE, PRESERVATIVE FREE 10 ML: 5 INJECTION INTRAVENOUS at 21:59

## 2025-01-08 RX ADMIN — FINASTERIDE 5 MG: 5 TABLET, FILM COATED ORAL at 08:08

## 2025-01-08 RX ADMIN — METOPROLOL SUCCINATE 100 MG: 50 TABLET, EXTENDED RELEASE ORAL at 08:07

## 2025-01-08 RX ADMIN — DILTIAZEM HYDROCHLORIDE 120 MG: 120 CAPSULE, COATED, EXTENDED RELEASE ORAL at 08:08

## 2025-01-08 RX ADMIN — ISOSORBIDE MONONITRATE 30 MG: 30 TABLET, EXTENDED RELEASE ORAL at 08:06

## 2025-01-08 ASSESSMENT — ENCOUNTER SYMPTOMS: GASTROINTESTINAL NEGATIVE: 1

## 2025-01-08 NOTE — PROGRESS NOTES
Renal Progress Note    Patient :  Hemanth Barrera; 89 y.o. MRN# [id number]  Location:  0541/0541-01  Attending:  Francisco J Monzon*  Admit Date:  1/7/2025   Hospital Day: 1    Subjective:     Patient seen and examined at bedside, sitting up in chair at time of exam, denies shortness of breath or chest pain.  Patient reports cardiac cath has been postponed until tomorrow.    Urine output documented at 560 mL over 24 hours.    Labs reviewed.  Recent Labs     01/07/25  1041 01/07/25  1646 01/08/25  0717    139 141   K 4.4 4.2 4.5    105 107   CO2 26 25 26   BUN 31* 27* 23   CREATININE 1.5* 1.3* 1.1   GLUCOSE 119* 95 88   CALCIUM 8.8 8.6 8.3*     Hemoglobin 8.9.    Brief History reviewed.  Hemanth Barrera; 89 y.o. male with medical history of CAD s/p PCI, CKD stage III  presented to the hospital with the chief complaint of nausea, vomiting and diarrhea ongoing for the past 3 to 4 days.  He also reported left-sided chest pain radiating to back.  His symptoms were intermittent. denies any shortness of breath, epigastric pain, constipation or diarrhea.  Labs at admission were significant for elevated troponin 111--94.  He was thus transferred from [place] to [place] for concern of NSTEMI     Nephrology was consulted for VARSHA on CKD and clearance for cardiac cath.  His baseline creatinine is 1.1-1.2.  His creatinine at admission was 1.9.   Outpatient Medications:     Medications Prior to Admission: nitroGLYCERIN (NITROSTAT) 0.4 MG SL tablet, up to max of 3 total doses. If no relief after 1 dose, call 911.  acetaminophen (TYLENOL) 500 MG tablet, 2 capsule EVERY 6 HOURS (route: oral)  isosorbide mononitrate (IMDUR) 30 MG extended release tablet, Take 1 tablet by mouth daily  rivaroxaban (XARELTO) 15 MG TABS tablet, Take 1 tablet by mouth daily  metoprolol succinate (TOPROL XL) 100 MG extended release tablet, Take 1 tablet by mouth daily  bumetanide (BUMEX) 2 MG tablet, Take 1 tablet by mouth

## 2025-01-08 NOTE — PROGRESS NOTES
[facility]  Office: [phone]  Antwon Bernardo DO, Fernando Vyas DO, Drake Haynes DO, Geovani Honeycutt DO, Yani Pritchard MD, Isabella Meade MD, Richard Hidalgo MD, Miriam Michael MD,  Keo Pope MD, Eli Barbour MD, Giovanna Louie MD,  Yee Kerr DO, Dion Gilliland MD, Jonny Basurto MD, Alex Bernardo DO, Nafisa Lyles MD,  Jarred Johnson DO, Camila Mabry MD, Karen Shipley MD, Jessika Ravi MD, Carlos De León MD,  Bola Callaway MD, Roly Brunson MD, Rojas Barfield MD, Francisco J Monzon MD, Jerry Maher MD, Jelani Jameson MD, Rudy Webb DO, Teodoro Casas MD, Yee Meehan MD, Mohsin Reza, MD, Shirley Waterhouse, CNP,  Isidra Bianchi CNP, Rudy Castaneda, CNP,  Magda Cannon, DAVID, Jaleesa Meeks, CNP, Rakel Purdy, CNP, Tatiana Walton, CNP, Stephanie Cyr, CNP, Luz Maria Castellanos, PA-C, Teresita Almanzar PA-C, Joie Song, CNP, Evelio Bear, CNP,  Venecia Shanks, CNP, Erin Alaniz, CNP, Rachel Ling, CNP,  Sarah Ramirez CNP, Graciela Blue, CNP         [facility]   IN-PATIENT SERVICE   [facility]    Progress Note    1/8/2025    10:27 AM    Name:   Hemanth Barrera  MRN:     [id number]     Acct:      [id number]   Room:   0541/0541-01   Day:  1  Admit Date:  1/7/2025  8:07 AM    PCP:   Neftaly Contreras MD  Code Status:  Full Code    Subjective:     C/C: chest pain. Elevated troponin    Interval History Status: not changed.     Seen and examined at bedside this morning. No complaints or apparent distress. Cardiac cath scheduled for today cancelled and rescheduled for tomorrow.     Brief History:     89-year-old male past medical history of coronary artery disease, CKD stage IIIb, hernia status post repair, prior cardiac catheterization 9/14/2024 presents with nausea emesis and diarrhea.  Patient states that he has been having early satiety worsening with nausea and left-sided chest pain.  Has had multiple bouts of emesis. Had diarrhea

## 2025-01-08 NOTE — PROGRESS NOTES
myocardial infarction) (HCC) 1/7/2025 Yes    Secondary hypercoagulable state (HCC) 1/7/2025 Yes    S/P coronary artery stent placement 1/7/2025 Yes       Plan:        Non-STEMI present on admission, on heparin drip was started on a Plavix statin and beta-blocker, cardiology is following and planning for cardiac cath  Acute kidney injury present on admission likely due to volume depletion likely due to diarrhea, patient mentioned that he had episode of loose stool prior admission however it resolved now, was started on IV fluid with improvement of his kidney function  GERD continue PPI  Pancytopenia present on admission, continue to monitor for now May need outpatient follow-up for further workup  Chronic Suboxone therapy continue home dose  DVT prophylaxis on heparin drip    Francisco J Monzon MD  1/8/2025  12:07 PM

## 2025-01-08 NOTE — CARE COORDINATION
Transitional planning-PS Rakel Purdy for order and face to face for rolling walker with seat.    1700 faxed order and face to face for rollator to [employer]-follow up tomorrow.

## 2025-01-08 NOTE — PROGRESS NOTES
Occupational Therapy Initial Evaluation  Facility/Department: [facility] 5C STEPDOWN   Patient Name: Hemanth Barrera        MRN: [id number]    : 1935    Date of Service: 2025    No chief complaint on file.    Past Medical History:  has a past medical history of Acute inferolateral myocardial infarction (HCC), Arthritis, Atrial fibrillation (HCC), CAD (coronary artery disease), CHF (congestive heart failure) (HCC), Glaucoma, Hx of blood clots, Hyperlipidemia, Hypertension, MI (myocardial infarction) (HCC), and NSTEMI (non-ST elevated myocardial infarction) (HCC).  Past Surgical History:  has a past surgical history that includes pacemaker placement; Abdomen surgery; Cardiac catheterization; Appendectomy; Colonoscopy; eye surgery; hernia repair; bone marrow biopsy; joint replacement; CT BIOPSY BONE MARROW (2022); Upper gastrointestinal endoscopy (N/A, 2023); Colonoscopy (N/A, 8/3/2023); Cardiac procedure (N/A, 2024); and Cardiac procedure (N/A, 2024).    Discharge Recommendations   Further therapy recommended at discharge.         Assessment  Performance deficits / Impairments: Decreased functional mobility ;Decreased ADL status;Decreased endurance;Decreased high-level IADLs;Decreased balance;Decreased safe awareness;Decreased strength;Decreased coordination  Assessment: pt demonstrated above deficits impacting occupational performance. pt would benefit from continued acute OT, as well as at discharge in order to maximize safety and independence.  Prognosis: Good  Decision Making: Medium Complexity  REQUIRES OT FOLLOW-UP: Yes  Activity Tolerance  Activity Tolerance: Patient Tolerated treatment well;Patient limited by fatigue  Safety Devices  Type of Devices: Call light within reach;Left in chair;Gait belt;Nurse notified;Patient at risk for falls  Restraints  Restraints Initially in Place: No    AM-PAC  AM-PAC Daily Activity - Inpatient   How much help is needed for putting on and taking off  CGA while sitting EOB d/t posterior lean. pt required verbal cues to bring awareness of posterior lean d/t pt unaware and unable to correct without tactile cues)  Standing: High guard (~2 minutes total. pt completed static/dynamic standing at bedside 2x for ADL completion and in preparation for functional mobility with CGA and cane. pt exhibited forward flexed posture)    Transfers/Mobility  Bed mobility  Supine to Sit: Stand by assistance  Sit to Supine:  (pt retired to chair at end of session)  Scooting: Stand by assistance          Transfers  Sit to stand: Contact guard assistance  Stand to sit: Contact guard assistance  Transfer Comments: pt completed 2x from EOB with and without cane. pt exhibited slightly posterior lean upon standing initially but no LOB         Functional Mobility: Contact guard assistance  Functional Mobility Skilled Clinical Factors: pt completed functional mobility to chair from bed with CGA and cane. pt unsteady on feet and held onto furniture as well as cane d/t unsteadiness. pt reported increased SOB upon completion, which is not baseline per pt       Patient Education  Patient Education  Education Given To: Patient  Education Provided: Role of Therapy;Plan of Care;Energy Conservation;Transfer Training;ADL Adaptive Strategies  Education Provided Comments: benefits of being OOB  Education Method: Verbal  Barriers to Learning: None  Education Outcome: Verbalized understanding;Continued education needed    Goals  Short Term Goals  Time Frame for Short Term Goals: pt will, by discharge  Short Term Goal 1: complete LB ADLs and toileting tasks with SBA and AE, as needed  Short Term Goal 2: complete UB ADLS with mod I  Short Term Goal 3: increase activity tolerance to 25+ minutes in order to participate in daily tasks  Short Term Goal 4: dem SBA during functional transfers/functional mobility with LRAD, as needed  Short Term Goal 5: dem ~6 minutes dynamic standing tolerance with SBA in order

## 2025-01-08 NOTE — PROGRESS NOTES
both eyes     Sick sinus syndrome (HCC)     Venous insufficiency     Paroxysmal atrial fibrillation (HCC)     Coronary artery disease involving native coronary artery of native heart with angina pectoris ([facility])     B12 deficiency     Functional constipation     Slow transit constipation     Complex regional pain syndrome type 2 of lower extremity     Open dislocation of knee     Pain in limb     Psychosexual dysfunction associated with inhibited libido     Nausea and vomiting     Right upper quadrant abdominal pain     Altered bowel habits     Abnormal finding on GI tract imaging     Abnormal LFTs     Abdominal pain     Ileus ([facility])     Non-prs chr ulcer oth prt r low leg limited to brkdwn skin ([facility])     Right wrist pain     Abdominal pain, epigastric     Leg swelling     NSTEMI (non-ST elevated myocardial infarction) ([facility])     Presence of permanent cardiac pacemaker     Fall against object     Abnormal EKG     Severe malnutrition ([facility])     Secondary hypercoagulable state ([facility])     Congestive heart failure, unspecified HF chronicity, unspecified heart failure type ([facility])     S/P coronary artery stent placement    ASSESSMENT:  NSTEMI   Hx of CAD s/p [[facility] 9/2024 shock with arthrectomy and RAHUL to mid LAD, LCx with 70% stenosis previously recommended medical management versus staged PCI if symptomatic]  Sick sinus syndrome s/p PPM  HTN, HLD  Paroxysmal A-fib  VARSHA   Plan of Treatment:   Continue Plavix, Statin,Toprol-XL, Imdur, Cardizem  Continue IV heparin drip.  Will plan for [facility]/staged PCI given patient is symptomatic with elevated troponin with known LCx disease. Patient cleared for cath per nephrology. VARSHA has improved. I have discussed risks (including but not limited to vascular injury, infection, hematoma, contrast induced kidney dysfunction, CVA and MI), benefits, alternatives in detail. All questions answered. Patient agrees to proceed.  keep NPO after midnight for scheduled cath tomorrow   Keep K > 4, Mg >  2  Rest of care per primary team    Electronically signed by DARCI Dickey NP on 1/8/2025 at 9:51 AM  [facility]  [phone]

## 2025-01-08 NOTE — CARE COORDINATION
Hemanth Barrera was evaluated today and a DME order was entered for a wheeled walker because he requires this to successfully complete daily living tasks of personal cares, ambulating, and hygiene.  A wheeled walker is necessary due to the patient's unsteady gait, upper body weakness, and inability to  an ambulation device; and he can ambulate only by pushing a walker instead of a lesser assistive device such as a cane, crutch, or standard walker.  The need for this equipment was discussed with the patient and he understands and is in agreement.

## 2025-01-09 PROBLEM — I25.10 CORONARY ARTERY DISEASE: Status: ACTIVE | Noted: 2025-01-06

## 2025-01-09 LAB
ANION GAP SERPL CALCULATED.3IONS-SCNC: 8 MMOL/L (ref 9–16)
BUN SERPL-MCNC: 22 MG/DL (ref 8–23)
CALCIUM SERPL-MCNC: 8.7 MG/DL (ref 8.6–10.4)
CHLORIDE SERPL-SCNC: 106 MMOL/L (ref 98–107)
CO2 SERPL-SCNC: 25 MMOL/L (ref 20–31)
CREAT SERPL-MCNC: 0.9 MG/DL (ref 0.7–1.2)
ECHO BSA: 1.96 M2
ERYTHROCYTE [DISTWIDTH] IN BLOOD BY AUTOMATED COUNT: 14.4 % (ref 11.8–14.4)
GFR, ESTIMATED: 82 ML/MIN/1.73M2
GLUCOSE SERPL-MCNC: 99 MG/DL (ref 74–99)
HCT VFR BLD AUTO: 29.9 % (ref 40.7–50.3)
HGB BLD-MCNC: 8.8 G/DL (ref 13–17)
MCH RBC QN AUTO: 28.9 PG (ref 25.2–33.5)
MCHC RBC AUTO-ENTMCNC: 29.4 G/DL (ref 28.4–34.8)
MCV RBC AUTO: 98 FL (ref 82.6–102.9)
NRBC BLD-RTO: 0 PER 100 WBC
PARTIAL THROMBOPLASTIN TIME: 68.3 SEC (ref 23–36.5)
PLATELET # BLD AUTO: 199 K/UL (ref 138–453)
PMV BLD AUTO: 9.9 FL (ref 8.1–13.5)
POTASSIUM SERPL-SCNC: 4.5 MMOL/L (ref 3.7–5.3)
RBC # BLD AUTO: 3.05 M/UL (ref 4.21–5.77)
SODIUM SERPL-SCNC: 139 MMOL/L (ref 136–145)
WBC OTHER # BLD: 3.8 K/UL (ref 3.5–11.3)

## 2025-01-09 PROCEDURE — 2500000003 HC RX 250 WO HCPCS: Performed by: STUDENT IN AN ORGANIZED HEALTH CARE EDUCATION/TRAINING PROGRAM

## 2025-01-09 PROCEDURE — 6360000002 HC RX W HCPCS: Performed by: STUDENT IN AN ORGANIZED HEALTH CARE EDUCATION/TRAINING PROGRAM

## 2025-01-09 PROCEDURE — 6370000000 HC RX 637 (ALT 250 FOR IP): Performed by: STUDENT IN AN ORGANIZED HEALTH CARE EDUCATION/TRAINING PROGRAM

## 2025-01-09 PROCEDURE — 2500000003 HC RX 250 WO HCPCS: Performed by: NURSE PRACTITIONER

## 2025-01-09 PROCEDURE — 6370000000 HC RX 637 (ALT 250 FOR IP): Performed by: INTERNAL MEDICINE

## 2025-01-09 PROCEDURE — C1887 CATHETER, GUIDING: HCPCS | Performed by: INTERNAL MEDICINE

## 2025-01-09 PROCEDURE — 6360000002 HC RX W HCPCS

## 2025-01-09 PROCEDURE — 97162 PT EVAL MOD COMPLEX 30 MIN: CPT

## 2025-01-09 PROCEDURE — 6360000002 HC RX W HCPCS: Performed by: NURSE PRACTITIONER

## 2025-01-09 PROCEDURE — 85027 COMPLETE CBC AUTOMATED: CPT

## 2025-01-09 PROCEDURE — C1760 CLOSURE DEV, VASC: HCPCS | Performed by: INTERNAL MEDICINE

## 2025-01-09 PROCEDURE — 99232 SBSQ HOSP IP/OBS MODERATE 35: CPT | Performed by: NURSE PRACTITIONER

## 2025-01-09 PROCEDURE — 2709999900 HC NON-CHARGEABLE SUPPLY: Performed by: INTERNAL MEDICINE

## 2025-01-09 PROCEDURE — 6360000002 HC RX W HCPCS: Performed by: INTERNAL MEDICINE

## 2025-01-09 PROCEDURE — C1892 INTRO/SHEATH,FIXED,PEEL-AWAY: HCPCS | Performed by: INTERNAL MEDICINE

## 2025-01-09 PROCEDURE — C1894 INTRO/SHEATH, NON-LASER: HCPCS | Performed by: INTERNAL MEDICINE

## 2025-01-09 PROCEDURE — 99152 MOD SED SAME PHYS/QHP 5/>YRS: CPT | Performed by: INTERNAL MEDICINE

## 2025-01-09 PROCEDURE — 93454 CORONARY ARTERY ANGIO S&I: CPT | Performed by: INTERNAL MEDICINE

## 2025-01-09 PROCEDURE — C1769 GUIDE WIRE: HCPCS | Performed by: INTERNAL MEDICINE

## 2025-01-09 PROCEDURE — 36415 COLL VENOUS BLD VENIPUNCTURE: CPT

## 2025-01-09 PROCEDURE — 99151 MOD SED SAME PHYS/QHP <5 YRS: CPT | Performed by: INTERNAL MEDICINE

## 2025-01-09 PROCEDURE — 85730 THROMBOPLASTIN TIME PARTIAL: CPT

## 2025-01-09 PROCEDURE — B2111ZZ FLUOROSCOPY OF MULTIPLE CORONARY ARTERIES USING LOW OSMOLAR CONTRAST: ICD-10-PCS | Performed by: INTERNAL MEDICINE

## 2025-01-09 PROCEDURE — 97530 THERAPEUTIC ACTIVITIES: CPT

## 2025-01-09 PROCEDURE — 2060000000 HC ICU INTERMEDIATE R&B

## 2025-01-09 PROCEDURE — 6370000000 HC RX 637 (ALT 250 FOR IP): Performed by: NURSE PRACTITIONER

## 2025-01-09 PROCEDURE — 2580000003 HC RX 258: Performed by: NURSE PRACTITIONER

## 2025-01-09 PROCEDURE — 6360000004 HC RX CONTRAST MEDICATION: Performed by: INTERNAL MEDICINE

## 2025-01-09 PROCEDURE — 80048 BASIC METABOLIC PNL TOTAL CA: CPT

## 2025-01-09 PROCEDURE — 99233 SBSQ HOSP IP/OBS HIGH 50: CPT | Performed by: NURSE PRACTITIONER

## 2025-01-09 PROCEDURE — 99232 SBSQ HOSP IP/OBS MODERATE 35: CPT | Performed by: INTERNAL MEDICINE

## 2025-01-09 PROCEDURE — 4A023N7 MEASUREMENT OF CARDIAC SAMPLING AND PRESSURE, LEFT HEART, PERCUTANEOUS APPROACH: ICD-10-PCS | Performed by: INTERNAL MEDICINE

## 2025-01-09 RX ORDER — SODIUM CHLORIDE 0.9 % (FLUSH) 0.9 %
5-40 SYRINGE (ML) INJECTION PRN
Status: DISCONTINUED | OUTPATIENT
Start: 2025-01-09 | End: 2025-01-10 | Stop reason: HOSPADM

## 2025-01-09 RX ORDER — MIDAZOLAM HYDROCHLORIDE 1 MG/ML
INJECTION, SOLUTION INTRAMUSCULAR; INTRAVENOUS PRN
Status: DISCONTINUED | OUTPATIENT
Start: 2025-01-09 | End: 2025-01-09 | Stop reason: HOSPADM

## 2025-01-09 RX ORDER — SODIUM CHLORIDE 0.9 % (FLUSH) 0.9 %
5-40 SYRINGE (ML) INJECTION EVERY 12 HOURS SCHEDULED
Status: DISCONTINUED | OUTPATIENT
Start: 2025-01-09 | End: 2025-01-10 | Stop reason: HOSPADM

## 2025-01-09 RX ORDER — FENTANYL CITRATE 50 UG/ML
INJECTION, SOLUTION INTRAMUSCULAR; INTRAVENOUS PRN
Status: DISCONTINUED | OUTPATIENT
Start: 2025-01-09 | End: 2025-01-09 | Stop reason: HOSPADM

## 2025-01-09 RX ORDER — IOPAMIDOL 755 MG/ML
INJECTION, SOLUTION INTRAVASCULAR PRN
Status: DISCONTINUED | OUTPATIENT
Start: 2025-01-09 | End: 2025-01-09 | Stop reason: HOSPADM

## 2025-01-09 RX ORDER — SODIUM CHLORIDE 9 MG/ML
INJECTION, SOLUTION INTRAVENOUS PRN
Status: DISCONTINUED | OUTPATIENT
Start: 2025-01-09 | End: 2025-01-10 | Stop reason: HOSPADM

## 2025-01-09 RX ADMIN — SODIUM CHLORIDE: 9 INJECTION, SOLUTION INTRAVENOUS at 20:45

## 2025-01-09 RX ADMIN — PANTOPRAZOLE SODIUM 40 MG: 40 TABLET, DELAYED RELEASE ORAL at 05:04

## 2025-01-09 RX ADMIN — BUPRENORPHINE HYDROCHLORIDE AND NALOXONE HYDROCHLORIDE DIHYDRATE 1 TABLET: 8; 2 TABLET SUBLINGUAL at 21:57

## 2025-01-09 RX ADMIN — BUPRENORPHINE HYDROCHLORIDE AND NALOXONE HYDROCHLORIDE DIHYDRATE 1 TABLET: 8; 2 TABLET SUBLINGUAL at 08:33

## 2025-01-09 RX ADMIN — HEPARIN SODIUM 12 UNITS/KG/HR: 10000 INJECTION, SOLUTION INTRAVENOUS at 04:11

## 2025-01-09 RX ADMIN — ATORVASTATIN CALCIUM 40 MG: 40 TABLET, FILM COATED ORAL at 21:57

## 2025-01-09 RX ADMIN — SODIUM CHLORIDE, PRESERVATIVE FREE 10 ML: 5 INJECTION INTRAVENOUS at 21:58

## 2025-01-09 RX ADMIN — METOPROLOL SUCCINATE 100 MG: 50 TABLET, EXTENDED RELEASE ORAL at 08:34

## 2025-01-09 RX ADMIN — ISOSORBIDE MONONITRATE 30 MG: 30 TABLET, EXTENDED RELEASE ORAL at 08:33

## 2025-01-09 RX ADMIN — FINASTERIDE 5 MG: 5 TABLET, FILM COATED ORAL at 08:36

## 2025-01-09 RX ADMIN — Medication 600 MG: at 08:37

## 2025-01-09 RX ADMIN — CLOPIDOGREL BISULFATE 75 MG: 75 TABLET ORAL at 08:33

## 2025-01-09 RX ADMIN — ACETAMINOPHEN 650 MG: 325 TABLET ORAL at 06:43

## 2025-01-09 RX ADMIN — DILTIAZEM HYDROCHLORIDE 120 MG: 120 CAPSULE, COATED, EXTENDED RELEASE ORAL at 08:36

## 2025-01-09 ASSESSMENT — PAIN SCALES - GENERAL: PAINLEVEL_OUTOF10: 3

## 2025-01-09 ASSESSMENT — PAIN DESCRIPTION - ORIENTATION: ORIENTATION: LEFT

## 2025-01-09 ASSESSMENT — PAIN DESCRIPTION - DESCRIPTORS: DESCRIPTORS: SHARP

## 2025-01-09 ASSESSMENT — PAIN - FUNCTIONAL ASSESSMENT: PAIN_FUNCTIONAL_ASSESSMENT: ACTIVITIES ARE NOT PREVENTED

## 2025-01-09 ASSESSMENT — PAIN DESCRIPTION - LOCATION: LOCATION: GROIN

## 2025-01-09 NOTE — PLAN OF CARE
Problem: Chronic Conditions and Co-morbidities  Goal: Patient's chronic conditions and co-morbidity symptoms are monitored and maintained or improved  1/9/2025 0516 by Teressa Kirkpatrick RN  Outcome: Progressing  Flowsheets (Taken 1/8/2025 1915)  Care Plan - Patient's Chronic Conditions and Co-Morbidity Symptoms are Monitored and Maintained or Improved:   Monitor and assess patient's chronic conditions and comorbid symptoms for stability, deterioration, or improvement   Collaborate with multidisciplinary team to address chronic and comorbid conditions and prevent exacerbation or deterioration   Update acute care plan with appropriate goals if chronic or comorbid symptoms are exacerbated and prevent overall improvement and discharge     Problem: Discharge Planning  Goal: Discharge to home or other facility with appropriate resources  1/9/2025 0516 by Teressa Kirkpatrick RN  Outcome: Progressing  Flowsheets (Taken 1/8/2025 1915)  Discharge to home or other facility with appropriate resources:   Identify barriers to discharge with patient and caregiver   Arrange for needed discharge resources and transportation as appropriate   Identify discharge learning needs (meds, wound care, etc)   Refer to discharge planning if patient needs post-hospital services based on physician order or complex needs related to functional status, cognitive ability or social support system     Problem: ABCDS Injury Assessment  Goal: Absence of physical injury  1/9/2025 0516 by Teressa Kirkpatrick, RN  Outcome: Progressing  Flowsheets (Taken 1/8/2025 1915)  Absence of Physical Injury: Implement safety measures based on patient assessment     Problem: Safety - Adult  Goal: Free from fall injury  1/9/2025 0516 by Teressa Kirkpatrick, RN  Outcome: Progressing  Flowsheets (Taken 1/8/2025 1915)  Free From Fall Injury:   Instruct family/caregiver on patient safety   Based on caregiver fall risk screen, instruct family/caregiver to ask for assistance with

## 2025-01-09 NOTE — PROGRESS NOTES
[facility]  Office: [phone]  Antwon Bernardo DO, Fernando Vyas DO, Drake Haynes DO, Geovani Honeycutt DO, Yani Pritchard MD, Isabella Meade MD, Richard Hidalgo MD, Miiram Michael MD,  Keo Pope MD, Eli Barbour MD, Giovanna Louie MD,  Yee Kerr DO, Dion Gilliland MD, Jonny Basurto MD, Alex Bernardo DO, Nafisa Lyles MD,  Jarred Johnson DO, Camila Mabry MD, Karen Shipley MD, Jessika aRvi MD, Carlos De León MD,  Bola Callaway MD, Roly Brunson MD, Rojas Barfield MD, Francisco J Monzon MD, Jerry Maher MD, Jelani Jameson MD, Rudy Webb DO, Teodoro Casas MD, Yee Meehan MD, Mohsin Reza, MD, Shirley Waterhouse, CNP,  Isidra Bianchi CNP, Rudy Castaneda, CNP,  Magda Cannon, DAVID, Jaleesa Meeks, CNP, Rakel Purdy, CNP, Tatiana Walton, CNP, Stephanie Cyr, CNP, Lu zMaria Castellanos, PA-C, Teresita Almanzar PA-C, Joie Snog, CNP, Evelio Bear, CNP,  Venecia Shanks, CNP, Erin Alaniz, CNP, Rachel Ling, CNP,  Sarah Ramirez CNP, Graciela Blue, CNP         [facility]   IN-PATIENT SERVICE   [facility]    Progress Note    1/9/2025    8:35 AM    Name:   Hemanth Barrera  MRN:     [id number]     Acct:      [id number]   Room:   [facility]/0541-01   Day:  2  Admit Date:  1/7/2025  8:07 AM    PCP:   Neftaly Contreras MD  Code Status:  Full Code    Subjective:     C/C: Chest pain. Elevated troponin    Interval History Status: not changed.     Awake, alert and oriented. No complaints this morning. VSS. Awaiting heart cath today. Remains NPO.     Left femoral approach heart cath:  Conclusions:  Nonobstructive LCx disease and no intervention was performed  Patent prior LAD stent    Brief History:     89-year-old male past medical history of coronary artery disease, CKD stage IIIb, hernia status post repair, prior cardiac catheterization 9/14/2024 presents with nausea emesis and diarrhea. Patient states that he has been having early satiety worsening with

## 2025-01-09 NOTE — PROCEDURES
[facility]        Date:   1/9/2025  Patient name: Hemanth Barrera  Date of admission:  1/7/2025  8:07 AM  MRN:   [id number]  YOB: 1935    CARDIAC CATHETERIZATION    Operators:  Primary: Guy Paz MD.  Assistant:     Indications for cath: [place], known CAD with prior stenting to the LAD and moderate LCx disease    Procedure performed: Cardiac cath.    Access: Left femoral artery      Procedure: After informed consent was obtained with explanation of the risks and benefits, patient was brought to the cath lab. The right groin were prepped and draped in sterile fashion. 1% lidocaine was used for local block. The Femoral artery was cannulated using ultrasound guidance with 6  Fr sheath with brisk arterial blood return. The side port was frequently flushed and aspirated with normal saline.    EBL is 5 mL    Dominance is right from prior cardiac cath    Findings:    Left main: Normal with 0% stenosis    LAD: Patent proximal stent with mid and distal luminal irregularities of 20 to 30%    LCX: 50% mid stenosis with RADHA-3 flow      Conclusions:  Nonobstructive LCx disease and no intervention was performed  Patent prior LAD stent      Recommendation:  Medical treatments.  Risk factors modifications.        Electronically signed by Guy Paz MD on 1/9/2025 at 3:54 PM      [facility]  [phone]

## 2025-01-09 NOTE — PROGRESS NOTES
Renal Progress Note    Patient :  Hemanth Barrera; 89 y.o. MRN# [id number]  Location:  0541/0541-01  Attending:  Francisco J Monzon*  Admit Date:  1/7/2025   Hospital Day: 2    Subjective:     -Patient seen and examined bedside this morning no overnight events.  -He denies any acute complaints.  No chest pain  -Started on IV hydration this morning.  On heparin infusion  -He is tentatively scheduled for cardiac cath today.  Cardiology NP at bedside,, there was a concern for drop in hemoglobin.  His hemoglobin 9.8--8.9--8.8  -He denies any signs of active bleeding  -BMP this morning pending    Brief History reviewed.  Hemanth Barrera; 89 y.o. male with medical history of CAD s/p PCI, CKD stage III  presented to the hospital with the chief complaint of nausea, vomiting and diarrhea ongoing for the past 3 to 4 days.  He also reported left-sided chest pain radiating to back.  His symptoms were intermittent. denies any shortness of breath, epigastric pain, constipation or diarrhea.  Labs at admission were significant for elevated troponin 111--94.  He was thus transferred from [place] to [place] for concern of NSTEMI     Nephrology was consulted for VARSHA on CKD and clearance for cardiac cath.  His baseline creatinine is 1.1-1.2.  His creatinine at admission was 1.9.   Outpatient Medications:     Medications Prior to Admission: nitroGLYCERIN (NITROSTAT) 0.4 MG SL tablet, up to max of 3 total doses. If no relief after 1 dose, call 911.  acetaminophen (TYLENOL) 500 MG tablet, 2 capsule EVERY 6 HOURS (route: oral)  isosorbide mononitrate (IMDUR) 30 MG extended release tablet, Take 1 tablet by mouth daily  rivaroxaban (XARELTO) 15 MG TABS tablet, Take 1 tablet by mouth daily  metoprolol succinate (TOPROL XL) 100 MG extended release tablet, Take 1 tablet by mouth daily  bumetanide (BUMEX) 2 MG tablet, Take 1 tablet by mouth daily  aspirin 81 MG chewable tablet, Take 1 tablet by mouth daily  pantoprazole  and orders as documented by the resident.    Cecilio Guzman MD MD, MRCP ([place]), FACP   1/9/2025 1:23 PM    [facility]

## 2025-01-09 NOTE — PROGRESS NOTES
[facility]   Progress Note                   Date:   1/9/2025  Patient name: Hemanth Barrera  Date of admission:  1/7/2025  8:07 AM  MRN:   [id number]  YOB: 1935  PCP: Neftaly Contreras MD    Reason for Admission: Chest pain [R07.9]  NSTEMI (non-ST elevated myocardial infarction) ([facility]) [I21.4]    Subjective:       Clinical Changes / Abnormalities:Pt seen and examined in the room.  Patient resting in bed. Pt denies any CP or sob.  Labs, vitals and tele reviewed- paced, 70    Medications:   Scheduled Meds:   acetylcysteine  600 mg Oral BID    sodium chloride flush  5-40 mL IntraVENous 2 times per day    heparin (porcine)  60 Units/kg IntraVENous Once    atorvastatin  40 mg Oral Nightly    clopidogrel  75 mg Oral Daily    dilTIAZem  120 mg Oral Daily    isosorbide mononitrate  30 mg Oral Daily    metoprolol succinate  100 mg Oral Daily    buprenorphine-naloxone  1 tablet SubLINGual BID    pantoprazole  40 mg Oral QAM AC    finasteride  5 mg Oral Daily     Continuous Infusions:   sodium chloride      heparin (PORCINE) Infusion 12 Units/kg/hr (01/09/25 0602)    sodium chloride 50 mL/hr at 01/09/25 0422     CBC:   Recent Labs     01/07/25  1041 01/08/25  0717 01/09/25  0459   WBC 2.9* 2.9* 3.8   HGB 8.9* 8.9* 8.8*    210 199     BMP:    Recent Labs     01/07/25  1646 01/08/25  0717 01/09/25  0906    141 139   K 4.2 4.5 4.5    107 106   CO2 25 26 25   BUN 27* 23 22   CREATININE 1.3* 1.1 0.9   GLUCOSE 95 88 99     Hepatic:   No results for input(s): \"AST\", \"ALT\", \"BILITOT\", \"ALKPHOS\" in the last 72 hours.    Invalid input(s): \"ALB\"    Troponin:   Recent Labs     01/06/25  1145 01/07/25  1041   TROPHS 94* 81*     BNP: No results for input(s): \"BNP\" in the last 72 hours.  Lipids: No results for input(s): \"CHOL\", \"HDL\" in the last 72 hours.    Invalid input(s): \"LDLCALCU\"  INR:   Recent Labs     01/08/25  0717   INR 1.3     EKG:   Normal sinus rhythm, no acute ST-T wave  eyes     Sick sinus syndrome (HCC)     Venous insufficiency     Paroxysmal atrial fibrillation (HCC)     Coronary artery disease involving native coronary artery of native heart with angina pectoris ([facility])     B12 deficiency     Functional constipation     Slow transit constipation     Complex regional pain syndrome type 2 of lower extremity     Open dislocation of knee     Pain in limb     Psychosexual dysfunction associated with inhibited libido     Nausea and vomiting     Right upper quadrant abdominal pain     Altered bowel habits     Abnormal finding on GI tract imaging     Abnormal LFTs     Abdominal pain     Ileus ([facility])     Non-prs chr ulcer oth prt r low leg limited to brkdwn skin ([facility])     Right wrist pain     Abdominal pain, epigastric     Leg swelling     NSTEMI (non-ST elevated myocardial infarction) ([facility])     Presence of permanent cardiac pacemaker     Fall against object     Abnormal EKG     Severe malnutrition ([facility])     Secondary hypercoagulable state ([facility])     Congestive heart failure, unspecified HF chronicity, unspecified heart failure type ([facility])     S/P coronary artery stent placement    ASSESSMENT:  NSTEMI   Hx of CAD s/p [[facility] 9/2024 shock with arthrectomy and RAHUL to mid LAD, LCx with 70% stenosis previously recommended medical management versus staged PCI if symptomatic]  Sick sinus syndrome s/p PPM  HTN, HLD  Paroxysmal A-fib  VARSHA   Plan of Treatment:   Continue Plavix, Statin,Toprol-XL, Imdur, Cardizem  Continue IV heparin drip.  Slight drop in HGB overnight, but is still pts normal.  Pt has chronic anemia.  No bleeding.  Takes xarelto and plavix both at home with no issues.    Will plan for [facility]/staged PCI given patient is symptomatic with elevated troponin with known LCx disease. Patient cleared for cath per nephrology. VARSHA has improved. I have discussed risks (including but not limited to vascular injury, infection, hematoma, contrast induced kidney dysfunction, CVA and MI), benefits,

## 2025-01-09 NOTE — PROGRESS NOTES
Shower/Tub: Walk-in shower  Bathroom Toilet: Handicap height  Bathroom Equipment: Grab bars in shower, Shower chair, Grab bars around toilet  Home Equipment: Cane - Quad, Walker - Rolling  Receives Help From: Family  Prior Level of Assist for ADLs: Independent  Prior Level of Assist for Homemaking: Independent  Homemaking Responsibilities: Yes  Meal Prep Responsibility: Primary  Laundry Responsibility: Primary  Cleaning Responsibility: Primary  Prior Level of Assist for Ambulation: Independent community ambulator, with or without device, Independent household ambulator, with or without device (uses quad cane and rwalker prn)  Prior Level of Assist for Transfers: Independent  Active : Yes  Mode of Transportation: [employer]  Occupation: Retired  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  Leisure & Hobbies: puzzles, reading bible  Additional Comments: pt reported son able to assist PRN but son and family work during the day  Vision/Hearing  Vision  Vision: Within Functional Limits  Hearing  Hearing: Within functional limits    Cognition   Orientation  Overall Orientation Status: Within Functional Limits  Orientation Level: Oriented X4  Cognition  Overall Cognitive Status: WFL  Arousal/Alertness: Appears intact  Following Commands: Appears intact  Attention Span: Attends with cues to redirect  Memory: Appears intact  Safety Judgement: Decreased awareness of need for safety;Decreased awareness of need for assistance  Problem Solving: Appears intact  Insights: Decreased awareness of deficits  Initiation: Requires cues for some  Sequencing: Does not require cues    Objective  Temp: 98.2 °F (36.8 °C)  Pulse: 70  Heart Rate Source: Monitor  Respirations: 17  SpO2: 100 %  O2 Device: None (Room air)  BP: 131/80  MAP (Calculated): 97  BP Location: Left upper arm  BP Method: Automatic  Patient Position: Semi fowlers     Observation/Palpation  Posture: Fair  Observation: kyphotic and flexed posture       PROM RLE (degrees)  RLE  Raw Score : 21  AM-PAC Inpatient T-Scale Score : 50.25  Mobility Inpatient CMS 0-100% Score: 28.97  Mobility Inpatient CMS G-Code Modifier : CJ         Goals  Short Term Goals  Time Frame for Short Term Goals: 8 visits  Short Term Goal 1: independent transfers  Short Term Goal 2: independent gait with appropriate device x 75'  Short Term Goal 3: independent stair ambulation x 1 step w/o HR, use of appropriate AD  Patient Goals   Patient Goals : get cardiac cath and go home       Education  Patient Education  Education Given To: Patient  Education Provided: Role of Therapy;Plan of Care;Home Exercise Program;Precautions;Transfer Training;Mobility Training;Energy Conservation;Equipment;Fall Prevention Strategies  Education Method: Verbal  Barriers to Learning: None  Education Outcome: Verbalized understanding;Demonstrated understanding;Continued education needed      Therapy Time   Individual Concurrent Group Co-treatment   Time In 1152         Time Out 1230         Minutes 38         Timed Code Treatment Minutes: 24 Minutes       Lencho Adair, PT

## 2025-01-10 ENCOUNTER — TELEPHONE (OUTPATIENT)
Dept: INTERNAL MEDICINE CLINIC | Age: 89
End: 2025-01-10

## 2025-01-10 VITALS
RESPIRATION RATE: 13 BRPM | OXYGEN SATURATION: 98 % | DIASTOLIC BLOOD PRESSURE: 73 MMHG | HEIGHT: 73 IN | HEART RATE: 70 BPM | WEIGHT: 161.16 LBS | TEMPERATURE: 98.2 F | BODY MASS INDEX: 21.36 KG/M2 | SYSTOLIC BLOOD PRESSURE: 112 MMHG

## 2025-01-10 LAB
ANION GAP SERPL CALCULATED.3IONS-SCNC: 8 MMOL/L (ref 9–16)
BUN SERPL-MCNC: 20 MG/DL (ref 8–23)
CALCIUM SERPL-MCNC: 8.4 MG/DL (ref 8.6–10.4)
CHLORIDE SERPL-SCNC: 105 MMOL/L (ref 98–107)
CO2 SERPL-SCNC: 24 MMOL/L (ref 20–31)
CREAT SERPL-MCNC: 0.9 MG/DL (ref 0.7–1.2)
GFR, ESTIMATED: 82 ML/MIN/1.73M2
GLUCOSE SERPL-MCNC: 113 MG/DL (ref 74–99)
PARTIAL THROMBOPLASTIN TIME: 29.8 SEC (ref 23–36.5)
POTASSIUM SERPL-SCNC: 4.4 MMOL/L (ref 3.7–5.3)
SODIUM SERPL-SCNC: 137 MMOL/L (ref 136–145)

## 2025-01-10 PROCEDURE — 80048 BASIC METABOLIC PNL TOTAL CA: CPT

## 2025-01-10 PROCEDURE — 99232 SBSQ HOSP IP/OBS MODERATE 35: CPT | Performed by: NURSE PRACTITIONER

## 2025-01-10 PROCEDURE — 99233 SBSQ HOSP IP/OBS HIGH 50: CPT | Performed by: NURSE PRACTITIONER

## 2025-01-10 PROCEDURE — 36415 COLL VENOUS BLD VENIPUNCTURE: CPT

## 2025-01-10 PROCEDURE — 85730 THROMBOPLASTIN TIME PARTIAL: CPT

## 2025-01-10 PROCEDURE — 6370000000 HC RX 637 (ALT 250 FOR IP): Performed by: NURSE PRACTITIONER

## 2025-01-10 PROCEDURE — 6370000000 HC RX 637 (ALT 250 FOR IP): Performed by: STUDENT IN AN ORGANIZED HEALTH CARE EDUCATION/TRAINING PROGRAM

## 2025-01-10 PROCEDURE — 6360000002 HC RX W HCPCS: Performed by: STUDENT IN AN ORGANIZED HEALTH CARE EDUCATION/TRAINING PROGRAM

## 2025-01-10 PROCEDURE — 6370000000 HC RX 637 (ALT 250 FOR IP): Performed by: INTERNAL MEDICINE

## 2025-01-10 RX ADMIN — BUPRENORPHINE HYDROCHLORIDE AND NALOXONE HYDROCHLORIDE DIHYDRATE 1 TABLET: 8; 2 TABLET SUBLINGUAL at 09:19

## 2025-01-10 RX ADMIN — PANTOPRAZOLE SODIUM 40 MG: 40 TABLET, DELAYED RELEASE ORAL at 05:27

## 2025-01-10 RX ADMIN — DILTIAZEM HYDROCHLORIDE 120 MG: 120 CAPSULE, COATED, EXTENDED RELEASE ORAL at 09:15

## 2025-01-10 RX ADMIN — ISOSORBIDE MONONITRATE 30 MG: 30 TABLET, EXTENDED RELEASE ORAL at 09:16

## 2025-01-10 RX ADMIN — CLOPIDOGREL BISULFATE 75 MG: 75 TABLET ORAL at 09:20

## 2025-01-10 RX ADMIN — FINASTERIDE 5 MG: 5 TABLET, FILM COATED ORAL at 09:19

## 2025-01-10 RX ADMIN — RIVAROXABAN 15 MG: 15 TABLET, FILM COATED ORAL at 11:51

## 2025-01-10 RX ADMIN — METOPROLOL SUCCINATE 100 MG: 50 TABLET, EXTENDED RELEASE ORAL at 09:15

## 2025-01-10 NOTE — PROGRESS NOTES
[facility]   Progress Note                   Date:   1/10/2025  Patient name: Hemanth Barrera  Date of admission:  1/7/2025  8:07 AM  MRN:   [id number]  YOB: 1935  PCP: Neftaly Contreras MD    Reason for Admission: Chest pain [R07.9]  NSTEMI (non-ST elevated myocardial infarction) ([facility]) [I21.4]    Subjective:       Clinical Changes / Abnormalities:Pt seen and examined in the room.  Patient resting in bed. Pt denies any CP or sob.  Labs, vitals and tele reviewed- paced, 70.   S/p cardiac cath yesterday.      Medications:   Scheduled Meds:   rivaroxaban  15 mg Oral Daily    sodium chloride flush  5-40 mL IntraVENous 2 times per day    sodium chloride flush  5-40 mL IntraVENous 2 times per day    atorvastatin  40 mg Oral Nightly    clopidogrel  75 mg Oral Daily    dilTIAZem  120 mg Oral Daily    isosorbide mononitrate  30 mg Oral Daily    metoprolol succinate  100 mg Oral Daily    buprenorphine-naloxone  1 tablet SubLINGual BID    pantoprazole  40 mg Oral QAM AC    finasteride  5 mg Oral Daily     Continuous Infusions:   sodium chloride      sodium chloride       CBC:   Recent Labs     01/07/25  1041 01/08/25  0717 01/09/25  0459   WBC 2.9* 2.9* 3.8   HGB 8.9* 8.9* 8.8*    210 199     BMP:    Recent Labs     01/08/25  0717 01/09/25  0906 01/10/25  0728    139 137   K 4.5 4.5 4.4    106 105   CO2 26 25 24   BUN 23 22 20   CREATININE 1.1 0.9 0.9   GLUCOSE 88 99 113*     Hepatic:   No results for input(s): \"AST\", \"ALT\", \"BILITOT\", \"ALKPHOS\" in the last 72 hours.    Invalid input(s): \"ALB\"    Troponin:   Recent Labs     01/07/25  1041   TROPHS 81*     BNP: No results for input(s): \"BNP\" in the last 72 hours.  Lipids: No results for input(s): \"CHOL\", \"HDL\" in the last 72 hours.    Invalid input(s): \"LDLCALCU\"  INR:   Recent Labs     01/08/25  0717   INR 1.3     EKG:   Normal sinus rhythm, no acute ST-T wave changes      ECHO:   Echo 9/2024    Left Ventricle: Normal left  Inc.  [phone]

## 2025-01-10 NOTE — PLAN OF CARE
Problem: Chronic Conditions and Co-morbidities  Goal: Patient's chronic conditions and co-morbidity symptoms are monitored and maintained or improved  1/9/2025 2348 by Teressa Kirkpatrick RN  Outcome: Progressing     Problem: Discharge Planning  Goal: Discharge to home or other facility with appropriate resources  1/9/2025 2348 by Teressa Kirkpatrick RN  Outcome: Progressing     Problem: ABCDS Injury Assessment  Goal: Absence of physical injury  1/9/2025 2348 by Teressa Kirkpatrick RN  Outcome: Progressing     Problem: Safety - Adult  Goal: Free from fall injury  1/9/2025 2348 by Teressa Kirkpatrick RN  Outcome: Progressing     Problem: Pain  Goal: Verbalizes/displays adequate comfort level or baseline comfort level  1/9/2025 2348 by Teressa Kirkpatrick RN  Outcome: Progressing     Problem: Cardiovascular - Adult  Goal: Maintains optimal cardiac output and hemodynamic stability  1/9/2025 2348 by Teressa Kirkpatrick RN  Outcome: Progressing     Problem: Cardiovascular - Adult  Goal: Absence of cardiac dysrhythmias or at baseline  1/9/2025 2348 by Teressa Kirkpatrick RN  Outcome: Progressing     Problem: Skin/Tissue Integrity - Adult  Goal: Skin integrity remains intact  1/9/2025 2348 by Teressa Kirkpatrick RN  Outcome: Progressing     Problem: Skin/Tissue Integrity - Adult  Goal: Incisions, wounds, or drain sites healing without S/S of infection  1/9/2025 2348 by Teressa Kirkpatrick RN  Outcome: Progressing     Problem: Skin/Tissue Integrity - Adult  Goal: Oral mucous membranes remain intact  1/9/2025 2348 by Teressa Kirkpatrick RN  Outcome: Progressing     Problem: Musculoskeletal - Adult  Goal: Return mobility to safest level of function  1/9/2025 2348 by Teressa Kirkpatrick RN  Outcome: Progressing     Problem: Musculoskeletal - Adult  Goal: Maintain proper alignment of affected body part  1/9/2025 2348 by Teressa Kirkpatrick RN  Outcome: Progressing     Problem: Musculoskeletal - Adult  Goal: Return ADL status to a safe

## 2025-01-10 NOTE — PROGRESS NOTES
Renal Progress Note    Patient :  Hemanth Barrera; 89 y.o. MRN# [id number]  Location:  0541/0541-01  Attending:  Francisco J Monzon*  Admit Date:  1/7/2025   Hospital Day: 3    Subjective:     -Patient seen and examined bedside this morning no overnight events.  -He underwent cardiac cath yesterday-nonobstructive CAD with patent LAD stent.  No intervention performed  -He denies any acute complaints this morning  -Urine output in the last 24 hours 775 mL  -BMP this morning showed sodium 137, potassium 4.4, chloride 105, bicarb 24, BUN 20, creatinine 0.9  -Cath site looks clean.  No hematoma    Brief History reviewed.  Hematnh Barrera; 89 y.o. male with medical history of CAD s/p PCI, CKD stage III  presented to the hospital with the chief complaint of nausea, vomiting and diarrhea ongoing for the past 3 to 4 days.  He also reported left-sided chest pain radiating to back.  His symptoms were intermittent. denies any shortness of breath, epigastric pain, constipation or diarrhea.  Labs at admission were significant for elevated troponin 111--94.  He was thus transferred from [place] to [place] for concern of NSTEMI     Nephrology was consulted for VARSHA on CKD and clearance for cardiac cath.  His baseline creatinine is 1.1-1.2.  His creatinine at admission was 1.9.   Outpatient Medications:     Medications Prior to Admission: nitroGLYCERIN (NITROSTAT) 0.4 MG SL tablet, up to max of 3 total doses. If no relief after 1 dose, call 911.  isosorbide mononitrate (IMDUR) 30 MG extended release tablet, Take 1 tablet by mouth daily  [DISCONTINUED] acetaminophen (TYLENOL) 500 MG tablet, 2 capsule EVERY 6 HOURS (route: oral)  rivaroxaban (XARELTO) 15 MG TABS tablet, Take 1 tablet by mouth daily  metoprolol succinate (TOPROL XL) 100 MG extended release tablet, Take 1 tablet by mouth daily  bumetanide (BUMEX) 2 MG tablet, Take 1 tablet by mouth daily  aspirin 81 MG chewable tablet, Take 1 tablet by mouth

## 2025-01-10 NOTE — PROGRESS NOTES
[facility]  Office: [phone]  Antwon Bernardo DO, Fernando Vyas DO, Drake Haynes DO, Geovani Honeycutt DO, Yain Pritchard MD, Isabella Meade MD, Richard Hidalgo MD, Miriam Michael MD,  Keo Pope MD, Eli Barbour MD, Giovanna Louie MD,  Yee Kerr DO, Dion Gilliland MD, Jonny Basurto MD, Alex Bernardo DO, Nafisa Lyles MD,  Jarred Johnson DO, Camila Mabry MD, Karen Shipley MD, Jessika Ravi MD, Carlos De León MD,  Bola Callaway MD, Roly Brunson MD, Rojas Barfield MD, Francisco J Monzon MD, Jerry Maher MD, Jelani Jameson MD, Rudy Webb DO, Teodoro Casas MD, Yee Meehan MD, Mohsin Reza, MD, Shirley Waterhouse, CNP,  Isidra Bianchi CNP, Rudy Castaneda, CNP,  Magda Cannon, DAVID, Jaleesa Meeks, CNP, Rakel Purdy, CNP, Tatiana Walton, CNP, Stephanie Cyr, CNP, Luz Maria Castellanos, PA-C, Teresita Almanzar PA-C, Joie Song, CNP, Evelio Bear, CNP,  Venecia Shanks, CNP, Erin Alaniz, CNP, Rachel Ling, CNP,  Sarah Ramirez CNP, Graciela Blue, CNP         [facility]   IN-PATIENT SERVICE   [facility]    Progress Note    1/10/2025    8:34 AM    Name:   Hemanth Barrera  MRN:     [id number]     Acct:      [id number]   Room:   [facility]/0541-01   Day:  3  Admit Date:  1/7/2025  8:07 AM    PCP:   Neftaly Contreras MD  Code Status:  Full Code    Subjective:     C/C: Chest pain. Elevated troponin     Interval History Status: improved.     Awake, alert and oriented. In good spirits. No complaints today. Plans for discharge discussed--he is agreeable.     VS and available labs reviewed.     Heparin drip off. Resume Eliquis.     Brief History:     89-year-old male past medical history of coronary artery disease, CKD stage IIIb, hernia status post repair, prior cardiac catheterization 9/14/2024 presents with nausea emesis and diarrhea. Patient states that he has been having early satiety worsening with nausea and left-sided chest pain. Has had multiple  state ([facility]) 1/7/2025 Yes    S/P coronary artery stent placement 1/7/2025 Yes    Coronary artery disease 1/9/2025 Unknown       Plan:        NSTEMI: High sensitivity troponins trended down. No chest pain. Remains on heparin drip. Cardiology following. Cardiac cath 1/9/2025 with nonobstructive LCx disease.  Continue ASA, bb and statin.  Home medications as selected.   PT/OT eval and treat.   Discharge planning.Home today with [facility].     ARIAS DESIR, DARCI - NP  1/10/2025  8:34 AM

## 2025-01-10 NOTE — CARE COORDINATION
Discharge Report    [facility]  Clinical Case Management Department  Written by: Erin Partida RN    Patient Name: Hemanth Barrera  Attending Provider: Francisco J Monzon*  Admit Date: 2025  8:07 AM  MRN: [id number]  Account: [id number]                     : 1935  Discharge Date: 1-      Disposition: home with [facility]    Erin Partida RN

## 2025-01-10 NOTE — DISCHARGE INSTR - DIET

## 2025-01-10 NOTE — PLAN OF CARE
Problem: Chronic Conditions and Co-morbidities  Goal: Patient's chronic conditions and co-morbidity symptoms are monitored and maintained or improved  Outcome: Progressing     Problem: Discharge Planning  Goal: Discharge to home or other facility with appropriate resources  Outcome: Progressing     Problem: ABCDS Injury Assessment  Goal: Absence of physical injury  Outcome: Progressing     Problem: Safety - Adult  Goal: Free from fall injury  Outcome: Progressing     Problem: Pain  Goal: Verbalizes/displays adequate comfort level or baseline comfort level  Outcome: Progressing     Problem: Cardiovascular - Adult  Goal: Maintains optimal cardiac output and hemodynamic stability  Outcome: Progressing  Goal: Absence of cardiac dysrhythmias or at baseline  Outcome: Progressing     Problem: Skin/Tissue Integrity - Adult  Goal: Skin integrity remains intact  Outcome: Progressing  Goal: Incisions, wounds, or drain sites healing without S/S of infection  Outcome: Progressing  Goal: Oral mucous membranes remain intact  Outcome: Progressing     Problem: Musculoskeletal - Adult  Goal: Return mobility to safest level of function  Outcome: Progressing  Goal: Maintain proper alignment of affected body part  Outcome: Progressing  Goal: Return ADL status to a safe level of function  Outcome: Progressing     Problem: Gastrointestinal - Adult  Goal: Minimal or absence of nausea and vomiting  Outcome: Progressing  Goal: Maintains or returns to baseline bowel function  Outcome: Progressing  Goal: Maintains adequate nutritional intake  Outcome: Progressing     Problem: Genitourinary - Adult  Goal: Absence of urinary retention  Outcome: Progressing     Problem: Metabolic/Fluid and Electrolytes - Adult  Goal: Electrolytes maintained within normal limits  Outcome: Progressing  Goal: Hemodynamic stability and optimal renal function maintained  Outcome: Progressing  Goal: Glucose maintained within prescribed range  Outcome: Progressing

## 2025-01-10 NOTE — PLAN OF CARE
Problem: Chronic Conditions and Co-morbidities  Goal: Patient's chronic conditions and co-morbidity symptoms are monitored and maintained or improved  Outcome: Progressing     Problem: Discharge Planning  Goal: Discharge to home or other facility with appropriate resources  Outcome: Progressing     Problem: ABCDS Injury Assessment  Goal: Absence of physical injury  Outcome: Progressing  Flowsheets (Taken 1/10/2025 1000)  Absence of Physical Injury: Implement safety measures based on patient assessment     Problem: Safety - Adult  Goal: Free from fall injury  Outcome: Progressing  Flowsheets (Taken 1/10/2025 1000)  Free From Fall Injury:   Instruct family/caregiver on patient safety   Based on caregiver fall risk screen, instruct family/caregiver to ask for assistance with transferring infant if caregiver noted to have fall risk factors     Problem: Pain  Goal: Verbalizes/displays adequate comfort level or baseline comfort level  Outcome: Progressing     Problem: Cardiovascular - Adult  Goal: Maintains optimal cardiac output and hemodynamic stability  Outcome: Progressing  Goal: Absence of cardiac dysrhythmias or at baseline  Outcome: Progressing     Problem: Skin/Tissue Integrity - Adult  Goal: Skin integrity remains intact  Outcome: Progressing  Flowsheets (Taken 1/10/2025 1000)  Skin Integrity Remains Intact: Monitor for areas of redness and/or skin breakdown  Goal: Incisions, wounds, or drain sites healing without S/S of infection  Outcome: Progressing  Flowsheets (Taken 1/10/2025 1000)  Incisions, Wounds, or Drain Sites Healing Without Sign and Symptoms of Infection: ADMISSION and DAILY: Assess and document risk factors for pressure ulcer development  Goal: Oral mucous membranes remain intact  Outcome: Progressing  Flowsheets (Taken 1/10/2025 1000)  Oral Mucous Membranes Remain Intact: Assess oral mucosa and hygiene practices     Problem: Musculoskeletal - Adult  Goal: Return mobility to safest level of

## 2025-01-10 NOTE — DISCHARGE INSTR - COC
Continuity of Care Form    Patient Name: Hemanth Barrera   :  1935  MRN:  [id number]    Admit date:  2025  Discharge date:  1/10/2025    Code Status Order: Full Code   Advance Directives:   Advance Care Flowsheet Documentation             Admitting Physician:  Francisco J Monzon MD  PCP: Neftaly Contreras MD    Discharging Nurse: Dung Pope  Discharging Hospital Unit/Room#: 0541/0541-01  Discharging Unit Phone Number: [phone]    Emergency Contact:   Extended Emergency Contact Information  Primary Emergency Contact: Dusty Barrera  Home Phone: [phone]  Mobile Phone: [phone]  Relation: Child    Past Surgical History:  Past Surgical History:   Procedure Laterality Date    ABDOMEN SURGERY      gastric resection    APPENDECTOMY      BONE MARROW BIOPSY      CARDIAC CATHETERIZATION      CARDIAC PROCEDURE N/A 2024    julio cesar / Left heart cath / coronary angiography / rm 512 performed by Cathie Hastings MD at [facility] CARDIAC CATH LAB    CARDIAC PROCEDURE N/A 2024    Percutaneous coronary intervention performed by Cathie Hastings MD at [facility] CARDIAC CATH LAB    COLONOSCOPY      COLONOSCOPY N/A 8/3/2023    [facility] BIOPSY BONE MARROW  2022    CT BONE MARROW BIOPSY 2022 [facility] CT SCAN    EYE SURGERY      smiley cataracts    HERNIA REPAIR      inguinal smiley    JOINT REPLACEMENT      rt hip x 2, lt knee    PACEMAKER PLACEMENT      UPPER GASTROINTESTINAL ENDOSCOPY N/A 2023    EGD BIOPSY performed by Isauro Razo MD at [facility] ENDO       Immunization History:   Immunization History   Administered Date(s) Administered    COVID-19, PFIZER PURPLE top, DILUTE for use, (age 12 y+), 30mcg/0.3mL 2021, 2021, 2021    Influenza Virus Vaccine 10/30/2011, 10/09/2015, 2016    Influenza, FLUZONE High Dose (age 65 y+), IM, Quadv, 0.7mL 2020, 10/13/2021, 2022    Influenza, FLUZONE High Dose, (age 65  M79.609    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    Nausea and vomiting R11.2    Right upper quadrant abdominal pain R10.11    Altered bowel habits R19.4    Abnormal finding on GI tract imaging R93.3    Abnormal LFTs R79.89    Abdominal pain R10.9    Ileus ([facility]) K56.7    Non-prs chr ulcer oth prt r low leg limited to brkdwn skin ([facility]) L97.811    Right wrist pain M25.531    Abdominal pain, epigastric R10.13    Leg swelling M79.89    NSTEMI (non-ST elevated myocardial infarction) ([facility]) I21.4    Presence of permanent cardiac pacemaker Z95.0    Fall against object W18.00XA    Abnormal EKG R94.31    Severe malnutrition ([facility]) E43    Secondary hypercoagulable state ([facility]) D68.69    Congestive heart failure, unspecified HF chronicity, unspecified heart failure type ([facility]) I50.9    S/P coronary artery stent placement Z95.5    Coronary artery disease I25.10       Isolation/Infection:   Isolation            No Isolation          Patient Infection Status       None to display                     Nurse Assessment:  Last Vital Signs: /73   Pulse 70   Temp 98.2 °F (36.8 °C) (Axillary)   Resp 13   Ht 1.854 m (6' 1\")   Wt 73.1 kg (161 lb 2.5 oz)   SpO2 98%   BMI 21.26 kg/m²     Last documented pain score (0-10 scale): Pain Level: 3  Last Weight:   Wt Readings from Last 1 Encounters:   01/09/25 73.1 kg (161 lb 2.5 oz)     Mental Status:  oriented and alert    IV Access:  - None    Nursing Mobility/ADLs:  Walking   Assisted  Transfer  Assisted  Bathing  Assisted  Dressing  Assisted  Toileting  Assisted  Feeding  Independent  Med Admin  Assisted  Med Delivery   whole    Wound Care Documentation and Therapy:  Puncture 01/09/25 Femoral (Active)   Wound Assessment Other (Comment) 01/10/25 0400   Shannon-wound Assessment Intact 01/10/25 0400   Drainage Amount None 01/10/25 0400   Dressing/Treatment Gauze dressing/dressing sponge;Tegaderm/transparent film dressing 01/10/25 0400   Dressing Status Clean, dry

## 2025-01-13 ENCOUNTER — CARE COORDINATION (OUTPATIENT)
Dept: CARE COORDINATION | Age: 89
End: 2025-01-13

## 2025-01-13 NOTE — CARE COORDINATION
Care Transitions Note    Initial Call - Call within 2 business days of discharge: Yes    Attempted to reach patient for transitions of care follow up. Unable to reach patient.    Outreach Attempts:   HIPAA compliant voicemail left for patient.     Patient: Hemanth Barrera    Patient : 1935   MRN: [id number]    Reason for Admission: chest pain  Discharge Date: 1/10/25  RURS: Readmission Risk Score: 20.1    Last Discharge Facility       Date Complaint Diagnosis Description Type Department Provider    25  Coronary artery disease Admission (Discharged) Francisco J Garza MD            Was this an external facility discharge? No    Follow Up Appointment:   Patient does not have a follow up appointment scheduled at time of call.  Chart routed to PCP ofc  Future Appointments         Provider Specialty Dept Phone    2025 11:00 AM Susana Pearl APRN - [facility] Cardiology [phone]    2025 10:00 AM Raisa Tomlisnon MD Internal Medicine [phone]    2025 11:30 AM [facility] FAST TRACK CHAIR 1 Infusion Therapy [phone]    2025 10:30 AM Cathie Hastings MD Cardiology [phone]            Plan for follow-up on next business day.      Najma Sandhu RN

## 2025-01-14 ENCOUNTER — CARE COORDINATION (OUTPATIENT)
Dept: CARE COORDINATION | Age: 89
End: 2025-01-14

## 2025-01-14 NOTE — CARE COORDINATION
Care Transitions Note    Initial Call - Call within 2 business days of discharge: Yes    Attempted to reach patient for transitions of care follow up. Unable to reach patient. Second attempt, MB full, unable to leave message.    Outreach Attempts:   Multiple attempts to contact patient at phone numbers on file.   Unable to leave message.     Patient: Hemanth Barrera    Patient : 1935   MRN: [id number]    Reason for Admission: CAD  Discharge Date: 1/10/25  RURS: Readmission Risk Score: 20.1    Last Discharge Facility       Date Complaint Diagnosis Description Type Department Provider    25  Coronary artery disease Admission (Discharged) Francisco J Garza MD            Was this an external facility discharge? No    Follow Up Appointment:   Patient does not have a follow up appointment scheduled at time of call.  PCP ofc notified at last attempt  Future Appointments         Provider Specialty Dept Phone    2025 11:00 AM Susana Pearl, APRN - [facility] Cardiology [phone]    2025 10:00 AM Raisa Tomlinson MD Internal Medicine [phone]    2025 11:30 AM [facility] FAST TRACK CHAIR 1 Infusion Therapy [phone]    2025 10:30 AM Cathie Hastings MD Cardiology [phone]            No further follow-up call indicated     Najma Sandhu RN

## 2025-01-14 NOTE — DISCHARGE SUMMARY
Chief Complaint  11 year [facility]      History of Present Illness  , 9-12 years, Female [facility]: The patient comes in today for routine health maintenance with her mother  The last health maintenance visit was 2 years ago  General health since the last visit is described as good  Dental care includes brushing 1 time(s) daily and regular dental visits  Immunizations are needed  No sensory or development concerns are expressed  The patient's menstrual status is premenarcheal  Current diet includes a normal healthy diet  The patient does not use dietary supplements  No nutritional concerns are expressed  No elimination concerns are expressed  She sleeps for 8-9 hours at night  The child's temperament is described as happy  Household risk factors:  exposure to pets, but no passive smoking exposure  Safety elements used:  seat belt, safety helmet, smoke detectors and carbon monoxide detectors  She is in grade 6  School performance has been good  Review of Systems    Constitutional: no fever  Eyes: no purulent discharge from the eyes and no itching of the eyes  ENT: no earache and no sore throat  Respiratory: no cough  Gastrointestinal: no vomiting and no diarrhea  Active Problems    1  Cardiac murmur (785 2) (R01 1)   2  Need for prophylactic vaccination and inoculation against influenza (V04 81) (Z23)   3  Scoliosis (737 30) (M41 9)    Past Medical History    · Need for prophylactic vaccination and inoculation against influenza (V04 81) (Z23)    Surgical History    · History of Umbilical Hernia Repair    Family History    · Family history of No Significant Family History    · Family history of No Significant Family History    Social History    · Activities: Soccer   · Activities: Softball   · Activities: Swimming   · Currently in 6th grade    Current Meds   1  No Reported Medications Recorded    Allergies    1   No Known Drug Allergies    Vitals   Recorded: 46MZL4479 05:58DS   Systolic 90   Diastolic 60 [facility]  Office: [phone]  Antwon Bernardo DO, Fernando Vyas DO, Drake Haynes DO, Geovani Honeycutt DO, Yani Pritchard MD, Isabella Meade MD, Richard Hidalgo MD, Miriam Michael MD,  Keo Pope MD, Eli Barbour MD, Giovanna Louie MD,  Yee Kerr DO, Dion Gilliland MD, Jonny Basurto MD, Alex Bernardo DO, Nafisa Lyles MD,  Jarred Johnson DO, Camila Mabry MD, Karen Shipley MD, Jessika Ravi MD, Carlos De León MD,  Bola Callaway MD, Roly Brunson MD, Rojas Barfield MD, Francisco J Monzon MD, Jerry Maher MD, Jelani Jameson MD, Rudy Webb DO, Teodoro Casas MD, Yee Meehan MD, Mohsin Reza, MD, Shirley Waterhouse, CNP,  Isidra Bianchi CNP, Rudy Castaneda, CNP,  Magda Cannon, DAVID, Jaleesa Meeks, CNP, Arias Purdy, CNP, Tatiana Walton, CNP, Stephanie Cyr, CNP, Luz Maria Castellanos, PA-C, Teresita Almanzar PA-C, Joie Song, CNP, Evelio Bear, CNP,  Venecia Shanks, CNP, Erin Alaniz, CNP, Rachel Ling, CNP,  Maura Raman, CNS, Vaishali Lucio, CNP, Sarah Ramirez, CNP,   Graciela Blue, CNP         [facility]   IN-PATIENT SERVICE   [facility]    Discharge Summary     Patient ID: Hemanth Barrera  :  1935   MRN: [id number]     ACCOUNT:  [id number]   Patient's PCP: Neftaly Contreras MD  Admit Date: 2025   Discharge Date: 1/10/2025  Length of Stay: 3  Code Status:  Prior  Admitting Physician: Francisco J Monzon MD  Discharge Physician: ARIAS R PURDY, APRN - NP     Active Discharge Diagnoses:     Hospital Problem Lists:  Principal Problem:    Chest pain  Active Problems:    Stage 3b chronic kidney disease (HCC)    Chronic anemia    Fatigue    Paroxysmal atrial fibrillation (HCC)    B12 deficiency    Pacemaker    VARSHA (acute kidney injury) (HCC)    Chronic CHF (congestive heart failure) (HCC)    Nausea and vomiting    Altered bowel habits    Abdominal pain    NSTEMI (non-ST elevated myocardial infarction) (HCC)    Secondary  Heart Rate 84   Respiration 20   Temperature 98 9 F   Height 4 ft 8 5 in   Weight 88 lb 8 oz   BMI Calculated 19 49   BSA Calculated 1 26   BMI Percentile 75 %   2-20 Stature Percentile 45 %   2-20 Weight Percentile 63 %     Physical Exam    Constitutional - General Appearance: well appearing with no visible distress; no dysmorphic features  Head and Face - Head and face: Normocephalic atraumatic  Eyes - Conjunctiva and lids: Conjunctiva noninjected, no eye discharge and no swelling  Pupils and irises: Equal, round, reactive to light and accommodation bilaterally; Extraocular muscles intact; Sclera anicteric  Ophthalmoscopic examination normal    Ears, Nose, Mouth, and Throat - External inspection of ears and nose: Normal without deformities or discharge; No pinna or tragal tenderness  Otoscopic examination: Tympanic membrane is pearly gray and nonbulging without discharge  Hearing: Normal  Nasal mucosa, septum, and turbinates: Normal, no edema, no nasal discharge, nares not pale or boggy  Lips, teeth, and gums: Normal, good dentition  Oropharynx: Oropharynx without ulcer, exudate or erythema, moist mucous membranes  Neck - Neck: Supple  Thyroid: No thyromegaly  Pulmonary - Respiratory effort: Normal respiratory rate and rhythm, no stridor, no tachypnea, grunting, flaring or retractions  Auscultation of lungs: Clear to auscultation bilaterally without wheeze, rales, or rhonchi  Cardiovascular - Auscultation of heart: Regular rate and rhythm, no murmur  Femoral pulses: Normal, 2+ bilaterally  Chest - Breasts: Normal  Christos 1  Abdomen - Abdomen: Normal bowel sounds, soft, nondistended, nontender, no organomegaly  Genitourinary - External genitalia: Normal external female genitalia  Christos 1  Lymphatic - Palpation of lymph nodes in neck: No anterior or posterior cervical lymphadenopathy  Palpation of lymph nodes in groin: No lymphadenopathy  Musculoskeletal - Gait and station: Normal gait  Evaluation for scoliosis: No scoliosis on exam  Full range of motion in all extremities  Stability: No joint instability  Muscle strength/tone: No hypertonia or hypotonia  Skin - Skin and subcutaneous tissue: No rash , no bruising, no pallor, cyanosis, or icterus  Neurologic - Reflexes:  Deep tendon reflexes: 2+ right biceps, 2+ left biceps, 2+ right patella and 2+ left patella  Cranial nerves: Cranial nerves II-XII intact  Procedure    Procedure: Visual Acuity Test    Indication: routine screening  Inforrmation supplied by a Snellen chart  Results: 20/20 in both eyes without corrective device, 20/20 in the right eye without corrective device, 20/20 in the left eye without corrective device normal in both eyes  The patient tolerated the procedure well  There were no complications  Procedure: Hearing Acuity Test    Indication: Routine screeing  Audiometry: Normal bilaterally  The patient Tolerated the procedure well  There were no complications  Assessment    1  Well child visit (V20 2) (Z00 129)    Plan  Health Maintenance    · (1) CBC/PLT/DIFF; Status:Active; Requested for:56Amz2970;    Perform:[facility]; Due:73Lui7736;Ordered;  For:Health Maintenance; Ordered By:Guillermo Fontaine;   · (1) COMPREHENSIVE METABOLIC PANEL; Status:Active; Requested for:11Irs8179;    Perform:[facility]; Due:55Mme4650;Ordered;  For:Health Maintenance; Ordered By:Guillermo Fontaine;   · (1) LIPID PANEL, FASTING; Status:Active; Requested [facility]:33SSF7408;    Perform:[facility]; Due:21Cat0025;Ordered;  For:Health Maintenance; Ordered By:Guillermo Fontaine;   · [facility]; Status:Active - Perform Order; Requested  UQR:[id number];    Performed: In Office; Due:48Xsg5016;Ordered;  For:Health Maintenance; Ordered By:Guillermo Fontaine;   · SNELLEN VISION- POC; Status:Active - Perform Order; Requested VBZ:[id number];    Performed: In Office; OJQ:24LTU6010;AQYHAVY;  Today;  For:Health Maintenance; Ordered By:Guillermo Fontaine;   · Fluarix 0 5 ML Intramuscular Suspension Prefilled Syringe; 0 5 ml IM; To Be  Done: 53QDG1254   For: Health Maintenance; Ordered By:Guillermo Fontaine; Effective Date:30Sep2016   · Menveo Intramuscular Solution Reconstituted; INJECT 0 5  ML  Intramuscular; To Be Done: 75QXO7261   For: Health Maintenance; Ordered By:Guillermo Fontaine; Effective Date:30Sep2016   · Tdap (Boostrix); INJECT 0 5  ML Intramuscular; To Be Done: 85NBE9924   For: Health Maintenance; Ordered By:Guillermo Fontaine; Effective Date:30Sep2016    Discussion/Summary    Impression:   No growth, development, elimination, feeding, skin and sleep concerns  Anticipatory guidance addressed as per the history of present illness section  Vaccinations to be administered include influenza, meningococcal conjugate vaccine and diptheria, tetanus and pertussis  She is not on any medications  Information discussed with mother  Doing well  No scoliosis appreciated  No murmur  Follow up yearly  The patient's family was counseled regarding instructions for management, patient and family education        Signatures   Electronically signed by : NELLIE Delgado ; Sep 30 2016 11:09AM EST                       (Author)

## 2025-01-20 ENCOUNTER — APPOINTMENT (OUTPATIENT)
Dept: CT IMAGING | Age: 89
DRG: 199 | End: 2025-01-20
Payer: MEDICARE

## 2025-01-20 ENCOUNTER — HOSPITAL ENCOUNTER (INPATIENT)
Age: 89
LOS: 16 days | Discharge: NURSING, REHAB OR LONG-TERM CARE | DRG: 199 | End: 2025-02-05
Attending: EMERGENCY MEDICINE | Admitting: INTERNAL MEDICINE
Payer: MEDICARE

## 2025-01-20 DIAGNOSIS — I82.A12 ACUTE DEEP VEIN THROMBOSIS (DVT) OF AXILLARY VEIN OF LEFT UPPER EXTREMITY (HCC): ICD-10-CM

## 2025-01-20 DIAGNOSIS — R89.9 ABNORMAL PLEURAL FLUID: ICD-10-CM

## 2025-01-20 DIAGNOSIS — S22.42XA CLOSED FRACTURE OF MULTIPLE RIBS OF LEFT SIDE, INITIAL ENCOUNTER: ICD-10-CM
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
## 2025-01-20 DIAGNOSIS — J90 PLEURAL EFFUSION: ICD-10-CM

## 2025-01-20 DIAGNOSIS — D64.9 ANEMIA, UNSPECIFIED TYPE: ICD-10-CM

## 2025-01-20 DIAGNOSIS — K56.7 ILEUS (HCC): ICD-10-CM

## 2025-01-20 DIAGNOSIS — E44.0 MODERATE MALNUTRITION: ICD-10-CM

## 2025-01-20 PROBLEM — S22.32XA: Status: ACTIVE | Noted: 2025-01-20

## 2025-01-20 LAB
ALBUMIN SERPL-MCNC: 3.6 G/DL (ref 3.5–5.2)
ALP SERPL-CCNC: 73 U/L (ref 40–129)
ALT SERPL-CCNC: 42 U/L (ref 10–50)
ANION GAP SERPL CALCULATED.3IONS-SCNC: 10 MMOL/L (ref 9–16)
AST SERPL-CCNC: 32 U/L (ref 10–50)
BASOPHILS # BLD: 0 K/UL (ref 0–0.2)
BASOPHILS NFR BLD: 0 % (ref 0–2)
BILIRUB SERPL-MCNC: 0.6 MG/DL (ref 0–1.2)
BUN SERPL-MCNC: 33 MG/DL (ref 8–23)
CALCIUM SERPL-MCNC: 8.8 MG/DL (ref 8.6–10.4)
CHLORIDE SERPL-SCNC: 107 MMOL/L (ref 98–107)
CO2 SERPL-SCNC: 22 MMOL/L (ref 20–31)
CREAT SERPL-MCNC: 1.4 MG/DL (ref 0.7–1.2)
EOSINOPHIL # BLD: 0 K/UL (ref 0–0.4)
EOSINOPHILS RELATIVE PERCENT: 0 % (ref 0–4)
ERYTHROCYTE [DISTWIDTH] IN BLOOD BY AUTOMATED COUNT: 14.9 % (ref 11.5–14.9)
GFR, ESTIMATED: 48 ML/MIN/1.73M2
GLUCOSE SERPL-MCNC: 155 MG/DL (ref 74–99)
HCT VFR BLD AUTO: 18.6 % (ref 41–53)
HCT VFR BLD AUTO: 19.8 % (ref 41–53)
HCT VFR BLD AUTO: 22.8 % (ref 41–53)
HGB BLD-MCNC: 6.3 G/DL (ref 13.5–17.5)
HGB BLD-MCNC: 6.6 G/DL (ref 13.5–17.5)
HGB BLD-MCNC: 7.8 G/DL (ref 13.5–17.5)
INR PPP: 1.5
LYMPHOCYTES NFR BLD: 0.32 K/UL (ref 1–4.8)
LYMPHOCYTES RELATIVE PERCENT: 6 % (ref 24–44)
MCH RBC QN AUTO: 30.6 PG (ref 26–34)
MCHC RBC AUTO-ENTMCNC: 33.1 G/DL (ref 31–37)
MCV RBC AUTO: 92.3 FL (ref 80–100)
MONOCYTES NFR BLD: 0.27 K/UL (ref 0.1–1.3)
MONOCYTES NFR BLD: 5 % (ref 1–7)
MORPHOLOGY: ABNORMAL
NEUTROPHILS NFR BLD: 89 % (ref 36–66)
NEUTS SEG NFR BLD: 4.71 K/UL (ref 1.3–9.1)
PARTIAL THROMBOPLASTIN TIME: 28.9 SEC (ref 24–36)
PLATELET # BLD AUTO: 240 K/UL (ref 150–450)
PMV BLD AUTO: 6.7 FL (ref 6–12)
POTASSIUM SERPL-SCNC: 4.5 MMOL/L (ref 3.7–5.3)
PROT SERPL-MCNC: 6.3 G/DL (ref 6.6–8.7)
PROTHROMBIN TIME: 19.7 SEC (ref 11.8–14.6)
RBC # BLD AUTO: 2.14 M/UL (ref 4.5–5.9)
SODIUM SERPL-SCNC: 139 MMOL/L (ref 136–145)
WBC OTHER # BLD: 5.3 K/UL (ref 3.5–11)

## 2025-01-20 PROCEDURE — 99285 EMERGENCY DEPT VISIT HI MDM: CPT

## 2025-01-20 PROCEDURE — 2580000003 HC RX 258

## 2025-01-20 PROCEDURE — 6370000000 HC RX 637 (ALT 250 FOR IP)

## 2025-01-20 PROCEDURE — 93005 ELECTROCARDIOGRAM TRACING: CPT

## 2025-01-20 PROCEDURE — 85014 HEMATOCRIT: CPT

## 2025-01-20 PROCEDURE — 96361 HYDRATE IV INFUSION ADD-ON: CPT

## 2025-01-20 PROCEDURE — 86920 COMPATIBILITY TEST SPIN: CPT

## 2025-01-20 PROCEDURE — 80053 COMPREHEN METABOLIC PANEL: CPT

## 2025-01-20 PROCEDURE — 2500000003 HC RX 250 WO HCPCS: Performed by: EMERGENCY MEDICINE

## 2025-01-20 PROCEDURE — 85730 THROMBOPLASTIN TIME PARTIAL: CPT

## 2025-01-20 PROCEDURE — 96374 THER/PROPH/DIAG INJ IV PUSH: CPT

## 2025-01-20 PROCEDURE — 2060000000 HC ICU INTERMEDIATE R&B

## 2025-01-20 PROCEDURE — 86901 BLOOD TYPING SEROLOGIC RH(D): CPT

## 2025-01-20 PROCEDURE — 86900 BLOOD TYPING SEROLOGIC ABO: CPT

## 2025-01-20 PROCEDURE — 6360000004 HC RX CONTRAST MEDICATION: Performed by: EMERGENCY MEDICINE

## 2025-01-20 PROCEDURE — 99223 1ST HOSP IP/OBS HIGH 75: CPT | Performed by: SURGERY

## 2025-01-20 PROCEDURE — 99223 1ST HOSP IP/OBS HIGH 75: CPT | Performed by: INTERNAL MEDICINE

## 2025-01-20 PROCEDURE — 36415 COLL VENOUS BLD VENIPUNCTURE: CPT

## 2025-01-20 PROCEDURE — 85025 COMPLETE CBC W/AUTO DIFF WBC: CPT

## 2025-01-20 PROCEDURE — 85018 HEMOGLOBIN: CPT

## 2025-01-20 PROCEDURE — 70450 CT HEAD/BRAIN W/O DYE: CPT

## 2025-01-20 PROCEDURE — 36430 TRANSFUSION BLD/BLD COMPNT: CPT

## 2025-01-20 PROCEDURE — 85610 PROTHROMBIN TIME: CPT

## 2025-01-20 PROCEDURE — 2580000003 HC RX 258: Performed by: EMERGENCY MEDICINE

## 2025-01-20 PROCEDURE — 6360000002 HC RX W HCPCS

## 2025-01-20 PROCEDURE — P9016 RBC LEUKOCYTES REDUCED: HCPCS

## 2025-01-20 PROCEDURE — 86850 RBC ANTIBODY SCREEN: CPT

## 2025-01-20 PROCEDURE — 71260 CT THORAX DX C+: CPT

## 2025-01-20 PROCEDURE — 6360000002 HC RX W HCPCS: Performed by: EMERGENCY MEDICINE

## 2025-01-20 PROCEDURE — 72125 CT NECK SPINE W/O DYE: CPT

## 2025-01-20 PROCEDURE — 2500000003 HC RX 250 WO HCPCS

## 2025-01-20 RX ORDER — SODIUM CHLORIDE 9 MG/ML
INJECTION, SOLUTION INTRAVENOUS PRN
Status: DISCONTINUED | OUTPATIENT
Start: 2025-01-20 | End: 2025-02-02

## 2025-01-20 RX ORDER — POTASSIUM CHLORIDE 1500 MG/1
40 TABLET, EXTENDED RELEASE ORAL PRN
Status: DISCONTINUED | OUTPATIENT
Start: 2025-01-20 | End: 2025-02-05 | Stop reason: HOSPADM

## 2025-01-20 RX ORDER — SODIUM CHLORIDE 9 MG/ML
INJECTION, SOLUTION INTRAVENOUS PRN
Status: DISCONTINUED | OUTPATIENT
Start: 2025-01-20 | End: 2025-02-05 | Stop reason: HOSPADM

## 2025-01-20 RX ORDER — ISOSORBIDE MONONITRATE 30 MG/1
30 TABLET, EXTENDED RELEASE ORAL DAILY
Status: DISCONTINUED | OUTPATIENT
Start: 2025-01-20 | End: 2025-02-05 | Stop reason: HOSPADM

## 2025-01-20 RX ORDER — SODIUM CHLORIDE 0.9 % (FLUSH) 0.9 %
10 SYRINGE (ML) INJECTION PRN
Status: DISCONTINUED | OUTPATIENT
Start: 2025-01-20 | End: 2025-02-05 | Stop reason: HOSPADM

## 2025-01-20 RX ORDER — POTASSIUM CHLORIDE 7.45 MG/ML
10 INJECTION INTRAVENOUS PRN
Status: DISCONTINUED | OUTPATIENT
Start: 2025-01-20 | End: 2025-02-05 | Stop reason: HOSPADM

## 2025-01-20 RX ORDER — CLOPIDOGREL BISULFATE 75 MG/1
75 TABLET ORAL DAILY
Status: DISCONTINUED | OUTPATIENT
Start: 2025-01-20 | End: 2025-02-05 | Stop reason: HOSPADM

## 2025-01-20 RX ORDER — ASPIRIN 81 MG/1
81 TABLET, CHEWABLE ORAL DAILY
Status: DISCONTINUED | OUTPATIENT
Start: 2025-01-20 | End: 2025-02-01

## 2025-01-20 RX ORDER — PANTOPRAZOLE SODIUM 40 MG/1
40 TABLET, DELAYED RELEASE ORAL
Status: DISCONTINUED | OUTPATIENT
Start: 2025-01-21 | End: 2025-02-05 | Stop reason: HOSPADM

## 2025-01-20 RX ORDER — MAGNESIUM SULFATE HEPTAHYDRATE 40 MG/ML
2000 INJECTION, SOLUTION INTRAVENOUS PRN
Status: DISCONTINUED | OUTPATIENT
Start: 2025-01-20 | End: 2025-02-05 | Stop reason: HOSPADM

## 2025-01-20 RX ORDER — FINASTERIDE 5 MG/1
5 TABLET, FILM COATED ORAL DAILY
Status: DISCONTINUED | OUTPATIENT
Start: 2025-01-20 | End: 2025-02-05 | Stop reason: HOSPADM

## 2025-01-20 RX ORDER — SODIUM CHLORIDE 9 MG/ML
INJECTION, SOLUTION INTRAVENOUS PRN
Status: DISCONTINUED | OUTPATIENT
Start: 2025-01-20 | End: 2025-01-21 | Stop reason: SDUPTHER

## 2025-01-20 RX ORDER — ERGOCALCIFEROL 1.25 MG/1
50000 CAPSULE, LIQUID FILLED ORAL WEEKLY
Status: DISCONTINUED | OUTPATIENT
Start: 2025-01-20 | End: 2025-02-05 | Stop reason: HOSPADM

## 2025-01-20 RX ORDER — IOPAMIDOL 755 MG/ML
100 INJECTION, SOLUTION INTRAVASCULAR
Status: COMPLETED | OUTPATIENT
Start: 2025-01-20 | End: 2025-01-20

## 2025-01-20 RX ORDER — ATORVASTATIN CALCIUM 40 MG/1
40 TABLET, FILM COATED ORAL NIGHTLY
Status: DISCONTINUED | OUTPATIENT
Start: 2025-01-20 | End: 2025-02-05 | Stop reason: HOSPADM

## 2025-01-20 RX ORDER — ACETAMINOPHEN 325 MG/1
650 TABLET ORAL EVERY 6 HOURS PRN
Status: DISCONTINUED | OUTPATIENT
Start: 2025-01-20 | End: 2025-01-25

## 2025-01-20 RX ORDER — METOPROLOL SUCCINATE 100 MG/1
100 TABLET, EXTENDED RELEASE ORAL DAILY
Status: DISCONTINUED | OUTPATIENT
Start: 2025-01-20 | End: 2025-02-05 | Stop reason: HOSPADM

## 2025-01-20 RX ORDER — SODIUM CHLORIDE 0.9 % (FLUSH) 0.9 %
5-40 SYRINGE (ML) INJECTION PRN
Status: DISCONTINUED | OUTPATIENT
Start: 2025-01-20 | End: 2025-02-05 | Stop reason: HOSPADM

## 2025-01-20 RX ORDER — POLYETHYLENE GLYCOL 3350 17 G/17G
17 POWDER, FOR SOLUTION ORAL DAILY PRN
Status: DISCONTINUED | OUTPATIENT
Start: 2025-01-20 | End: 2025-02-05 | Stop reason: HOSPADM

## 2025-01-20 RX ORDER — FENTANYL CITRATE 0.05 MG/ML
25 INJECTION, SOLUTION INTRAMUSCULAR; INTRAVENOUS ONCE
Status: COMPLETED | OUTPATIENT
Start: 2025-01-20 | End: 2025-01-20

## 2025-01-20 RX ORDER — BUMETANIDE 1 MG/1
2 TABLET ORAL DAILY
Status: DISCONTINUED | OUTPATIENT
Start: 2025-01-20 | End: 2025-02-05 | Stop reason: HOSPADM

## 2025-01-20 RX ORDER — HYDROCODONE BITARTRATE AND ACETAMINOPHEN 5; 325 MG/1; MG/1
1 TABLET ORAL EVERY 4 HOURS PRN
Status: DISCONTINUED | OUTPATIENT
Start: 2025-01-20 | End: 2025-01-23

## 2025-01-20 RX ORDER — DILTIAZEM HYDROCHLORIDE 120 MG/1
120 CAPSULE, COATED, EXTENDED RELEASE ORAL DAILY
Status: DISCONTINUED | OUTPATIENT
Start: 2025-01-20 | End: 2025-02-05 | Stop reason: HOSPADM

## 2025-01-20 RX ORDER — ACETAMINOPHEN 650 MG/1
650 SUPPOSITORY RECTAL EVERY 6 HOURS PRN
Status: DISCONTINUED | OUTPATIENT
Start: 2025-01-20 | End: 2025-01-25

## 2025-01-20 RX ORDER — NITROGLYCERIN 0.4 MG/1
0.4 TABLET SUBLINGUAL EVERY 5 MIN PRN
Status: DISCONTINUED | OUTPATIENT
Start: 2025-01-20 | End: 2025-02-05 | Stop reason: HOSPADM

## 2025-01-20 RX ORDER — SODIUM CHLORIDE 0.9 % (FLUSH) 0.9 %
5-40 SYRINGE (ML) INJECTION EVERY 12 HOURS SCHEDULED
Status: DISCONTINUED | OUTPATIENT
Start: 2025-01-20 | End: 2025-02-05 | Stop reason: HOSPADM

## 2025-01-20 RX ORDER — 0.9 % SODIUM CHLORIDE 0.9 %
100 INTRAVENOUS SOLUTION INTRAVENOUS ONCE
Status: COMPLETED | OUTPATIENT
Start: 2025-01-20 | End: 2025-01-20

## 2025-01-20 RX ADMIN — BUMETANIDE 2 MG: 1 TABLET ORAL at 15:29

## 2025-01-20 RX ADMIN — ERGOCALCIFEROL 50000 UNITS: 1.25 CAPSULE ORAL at 21:10

## 2025-01-20 RX ADMIN — HYDROCODONE BITARTRATE AND ACETAMINOPHEN 1 TABLET: 5; 325 TABLET ORAL at 22:24

## 2025-01-20 RX ADMIN — FENTANYL CITRATE 25 MCG: 0.05 INJECTION, SOLUTION INTRAMUSCULAR; INTRAVENOUS at 09:20

## 2025-01-20 RX ADMIN — SODIUM CHLORIDE 100 ML: 9 INJECTION, SOLUTION INTRAVENOUS at 10:19

## 2025-01-20 RX ADMIN — SODIUM CHLORIDE, PRESERVATIVE FREE 10 ML: 5 INJECTION INTRAVENOUS at 10:19

## 2025-01-20 RX ADMIN — SODIUM CHLORIDE 125 MG: 9 INJECTION, SOLUTION INTRAVENOUS at 18:26

## 2025-01-20 RX ADMIN — SODIUM CHLORIDE, PRESERVATIVE FREE 10 ML: 5 INJECTION INTRAVENOUS at 21:09

## 2025-01-20 RX ADMIN — HYDROCODONE BITARTRATE AND ACETAMINOPHEN 1 TABLET: 5; 325 TABLET ORAL at 16:31

## 2025-01-20 RX ADMIN — ATORVASTATIN CALCIUM 40 MG: 40 TABLET, FILM COATED ORAL at 21:08

## 2025-01-20 RX ADMIN — IOPAMIDOL 100 ML: 755 INJECTION, SOLUTION INTRAVENOUS at 10:19

## 2025-01-20 ASSESSMENT — PAIN SCALES - GENERAL
PAINLEVEL_OUTOF10: 8
PAINLEVEL_OUTOF10: 8
PAINLEVEL_OUTOF10: 7
PAINLEVEL_OUTOF10: 8
PAINLEVEL_OUTOF10: 8
PAINLEVEL_OUTOF10: 6

## 2025-01-20 ASSESSMENT — PAIN - FUNCTIONAL ASSESSMENT
PAIN_FUNCTIONAL_ASSESSMENT: ACTIVITIES ARE NOT PREVENTED
PAIN_FUNCTIONAL_ASSESSMENT: PREVENTS OR INTERFERES SOME ACTIVE ACTIVITIES AND ADLS
PAIN_FUNCTIONAL_ASSESSMENT: PREVENTS OR INTERFERES SOME ACTIVE ACTIVITIES AND ADLS

## 2025-01-20 ASSESSMENT — PAIN DESCRIPTION - LOCATION
LOCATION: RIB CAGE
LOCATION: CHEST

## 2025-01-20 ASSESSMENT — PAIN DESCRIPTION - DESCRIPTORS
DESCRIPTORS: ACHING;SQUEEZING
DESCRIPTORS: CRUSHING
DESCRIPTORS: ACHING;SQUEEZING

## 2025-01-20 ASSESSMENT — PAIN DESCRIPTION - FREQUENCY
FREQUENCY: CONTINUOUS
FREQUENCY: CONTINUOUS

## 2025-01-20 ASSESSMENT — PAIN DESCRIPTION - ORIENTATION
ORIENTATION: LEFT

## 2025-01-20 ASSESSMENT — PAIN DESCRIPTION - ONSET
ONSET: ON-GOING
ONSET: ON-GOING

## 2025-01-20 NOTE — H&P
[facility]  IN-PATIENT SERVICE  [facility]  IN-PATIENT SERVICE   [facility]     HISTORY AND PHYSICAL EXAMINATION            Date:   1/20/2025  Patient name:  Hemanth Barrera  Date of admission:  1/20/2025  9:06 AM  MRN:   [id number]  Account:  [id number]  YOB: 1935  PCP:    Neftaly Contreras MD  Room:   12/12  Code Status:    Prior    Chief Complaint:     Chief Complaint   Patient presents with    Fall    Rib Pain (injury)     History Obtained From:     patient, electronic medical record    History of Present Illness:     The patient is a 89 y.o. male who slipped and fell 01/19/2025 and landed on his left side, after which severe pain in his left side started to bother him. Denies loss of consciousness, numbness, weakness, headache, neck pain, back pain.    PMH: coronary artery disease with stent placement, afib, HFpEF with pacemaker, CKD stage IIIb, chronic anemia, abdominal hernia status post repair.    In the ER, he was stable. Hb 6.6 -- received RBC transfusion. CT head and C-spine showed no critical findings. CT chest whowed a large left pleural effusion with compressive atelectasis, segmented fractures of the left post erior 10th and 11th ribs. CT abdomen and pelvis revealed findings suggestive of ileus/obstruction.    Admitted for management of his multiple health issues.    Past Medical History:     Past Medical History:   Diagnosis Date    Acute inferolateral myocardial infarction (HCC) 11/18/2021    Arthritis     Atrial fibrillation (HCC)     CAD (coronary artery disease)     CHF (congestive heart failure) (HCC)     Glaucoma     Hx of blood clots     with hernia surgery    Hyperlipidemia     Hypertension     MI (myocardial infarction) (HCC)     NSTEMI (non-ST elevated myocardial infarction) (HCC) 11/17/2021        Past SurgicalHistory:     Past Surgical History:   Procedure Laterality Date     Findings:  Left main: Normal with 0% stenosis  LAD: Patent proximal stent with mid and distal luminal irregularities of 20 to 30%  LCX: 50% mid stenosis with RADHA-3 flow   Conclusions: Nonobstructive LCx disease and no intervention was performed Patent prior LAD stent   Recommendation: Medical treatments. Risk factors modifications.    Electronically signed by Guy Paz MD on 1/9/2025 at 3:54 PM  [facility] [phone]       XR CHEST PORTABLE    Result Date: 1/6/2025  EXAMINATION: ONE XRAY VIEW OF THE CHEST 1/6/2025 11:18 am COMPARISON: 04/29/2024 HISTORY: ORDERING SYSTEM PROVIDED HISTORY: chest pain TECHNOLOGIST PROVIDED HISTORY: chest pain Reason for Exam: chest pain FINDINGS: Cardiac device is stable in positioning.  No focal consolidation, pleural effusion or pneumothorax.  The cardiomediastinal silhouette is stable.  No overt pulmonary edema.  The osseous structures are stable.     No acute cardiopulmonary process.       Assessment :      Primary Problem  Traumatic closed displaced fracture of rib on left side, initial encounter    Active Hospital Problems    Diagnosis Date Noted    Traumatic closed displaced fracture of rib on left side, initial encounter [S22.32XA] 01/20/2025     Acute on chronic anemia, Hb 6.6; drop from 8.8. Possibly posthemorrhagic.  Mechanical fall from standing height.  Left 10th and 11th ribs fracture with left moderate-to-large hydro- (possibly hemo) thorax.  Coronary artery disease with stent placement  Afib  HFpEF with pacemaker  CKD stage IIIb  CT abdomen and pelvis revealed findings suggestive of ileus/obstruction    Plan:     Acute on chronic anemia, Hb 6.6; drop from 8.8. Possibly posthemorrhagic.  -RBC transfused.  -H7H q6h.  -Anticoagulation and antiplatelets held.    Mechanical fall from standing height.  Left 10th and 11th ribs fracture with left moderate-to-large hydro- (possibly hemo) thorax.  -General surgery consulted    Coronary artery disease

## 2025-01-20 NOTE — ED NOTES
Report given to LEON Hernandez from [facility].   Report method by phone   The following was reviewed with receiving RN:   Current vital signs:  /76   Pulse 72   Temp 97.6 °F (36.4 °C) (Axillary)   Resp 14   Ht 1.854 m (6' 1\")   Wt 73.5 kg (162 lb)   SpO2 98%   BMI 21.37 kg/m²                MEWS Score: 2     Any medication or safety alerts were reviewed. Any pending diagnostics and notifications were also reviewed, as well as any safety concerns or issues, abnormal labs, abnormal imaging, and abnormal assessment findings. Questions were answered.

## 2025-01-20 NOTE — CONSULTS
utilized to reduce the radiation dose to as low as reasonably achievable. COMPARISON: CT cervical spine performed 04/18/2024. HISTORY: ORDERING SYSTEM PROVIDED HISTORY: Fall, pain TECHNOLOGIST PROVIDED HISTORY: Fall, pain Decision Support Exception - unselect if not a suspected or confirmed emergency medical condition->Emergency Medical Condition (MA) Reason for Exam: pt fell FINDINGS: BONES/ALIGNMENT: The vertebral body heights are maintained.  The facet joints are aligned.  There is no acute fracture or malalignment.  There is no spondylolisthesis. DEGENERATIVE CHANGES: There is multilevel degenerative disc disease with uncovertebral and facet hypertrophy.  There is diffuse disc space narrowing. There is no significant bony canal stenosis.  There is bilateral bony foraminal narrowing. SOFT TISSUES: There is no prevertebral soft tissue swelling.     No acute fracture or malalignment of the cervical spine. Multilevel degenerative change with bilateral bony foraminal narrowing.     CT HEAD WO CONTRAST    Result Date: 1/20/2025  EXAMINATION: CT OF THE HEAD WITHOUT CONTRAST  1/20/2025 10:02 am TECHNIQUE: CT of the head was performed without the administration of intravenous contrast. Automated exposure control, iterative reconstruction, and/or weight based adjustment of the mA/kV was utilized to reduce the radiation dose to as low as reasonably achievable. COMPARISON: 04/18/2024 HISTORY: ORDERING SYSTEM PROVIDED HISTORY: Trauma TECHNOLOGIST PROVIDED HISTORY: Trauma Decision Support Exception - unselect if not a suspected or confirmed emergency medical condition->Emergency Medical Condition (MA) Reason for Exam: pt fell FINDINGS: BRAIN/VENTRICLES: There is no acute intracranial hemorrhage, mass effect or midline shift.  No abnormal extra-axial fluid collection.  Cortical atrophy and chronic white matter changes in the brain and associated ventricular enlargement are again demonstrated. ORBITS: The visualized portion of

## 2025-01-20 NOTE — CONSULTS
[facility] General Surgery   Lebron Roman MD, FACS  Molly MILNERFany Ranjan, APRN-CNP  [address], [address]  [place] [address]  P: [phone], F: [phone]    General and Robotic Surgery  Consult Note         PATIENT NAME: Hemanth Barrera   :  1935   MRN: [id number]   PCP:  Neftaly Contreras MD   Referring Physician: Dr. GARCIA  Reason for Consult: History of fall    TODAY'S DATE: 2025    HISTORY OF PRESENT ILLNESS: 89 y.o. male presents with history of fall 2 days ago.  This happened at home.  Patient was transferring his belongings to another room when he fell backwards on the dresser.  Denied losing consciousness.  He was at home.  His son insisted that he should get checked in the emergency department.  Patient started complaining of increasing shortness of breath and some tenderness on the left posterior chest.  Patient is on blood thinners.  No abdominal pain.  Having diarrhea.  No nausea vomiting.  No fever or chills.  ER workup reviewed.  Medical surgical history noted.  Patient was seen by me back in 2023 for abdominal pain.  Patient with history of multiple abdominal surgeries including gastrectomy in the past.  History of bowel obstruction which required surgery.  Right inguinal hernia repair appendectomy.  Patient has had about 26 surgeries on his body.    PAST MEDICAL HISTORY     Past Medical History:   Diagnosis Date    Acute inferolateral myocardial infarction (HCC) 2021    Arthritis     Atrial fibrillation (HCC)     CAD (coronary artery disease)     CHF (congestive heart failure) (HCC)     Glaucoma     Hx of blood clots     with hernia surgery    Hyperlipidemia     Hypertension     MI (myocardial infarction) (HCC)     NSTEMI (non-ST elevated myocardial infarction) (HCC) 2021       PROBLEM LIST     Patient Active Problem List   Diagnosis    On continuous oral anticoagulation    CHF, acute on chronic (HCC)    Hyperlipidemia    Former smoker     Large left pleural effusion with compressive atelectasis  Abdominal findings noted possible ileus.  Degenerative changes in the thoracic and the lumbar spine.  Imaging of the head and the cervical spine was negative.  Patient is awake alert in no acute distress.  Answering all questions appropriately.  No acute shortness of breath.  Blood work reviewed.  Sodium potassium normal.  Creatinine is mildly elevated.  LFTs unremarkable.  WBC count normal.  Hemoglobin was low at 6.3 although he runs chronically low.  Patient is receiving second unit of blood transfusion.  Hemoglobin on 9 January was 8.8.  Platelet count adequate.  Chronic anticoagulation on Xarelto on hold    PLAN  I have reviewed patient's symptomatology physical findings imaging studies and blood work.  Discussed with nurse.  Clear liquid diet  Pulmonary service on the case.  Thoracentesis planned by pulmonary service tomorrow.  Pulmonary input noted.  Appreciated.  Patient is on room air.  Continuous pulse ox.  Incentive spirometer.  Pain control.  Lidoderm patch.  Deep breathing and coughing.  Monitor hemoglobin after transfusion.  Recheck labs in the morning.  Continue to hold Xarelto.  Chest x-ray in the morning.  Gradually advance diet.  Continue conservative management given his age.  Explained to the patient at length.  He expresses understanding and agrees with the current management plan.  There is no family here to discuss the findings.    Thank you for this interesting consult and for allowing us to participate in the care of this patient. If you have any questions please don't hesitate to call.    The patient, Hemanth Barrera is a 89 y.o. male, was seen with a total time spent of 75 minutes for the visit on this date of service by the E/M provider. The time component had both face to face and non face to face time spent in determining the total time component.  Counseling and education regarding the patient's diagnosis listed below and the

## 2025-01-20 NOTE — CONSENT
Informed Consent for Blood Component Transfusion Note    I have discussed with the patient the rationale for blood component transfusion; its benefits in treating or preventing fatigue, organ damage, or death; and its risk which includes mild transfusion reactions, rare risk of blood borne infection, or more serious but rare reactions. I have discussed the alternatives to transfusion, including the risk and consequences of not receiving transfusion. The patient had an opportunity to ask questions and had agreed to proceed with transfusion of blood components.    Electronically signed by Jorge Slater MD on 1/20/25 at 9:32 AM EST

## 2025-01-20 NOTE — ED PROVIDER NOTES
eMERGENCY dEPARTMENT eNCOUnter      Pt Name: Hemanth Barrera  MRN: [id number]  Birthdate 1935  Date of evaluation: 1/20/25      CHIEF COMPLAINT       Chief Complaint   Patient presents with    Fall    Rib Pain (injury)         HISTORY OF PRESENT ILLNESS    Hemanth Barrera is a 89 y.o. male who presents complaining of fall.  Patient states he had mechanical slip and fall yesterday evening and landed on his left side.  Patient was able to get up and was able to sleep in bed this morning the pain was worse.  Patient is having complaints of pain mostly to the left side of his chest wall and into his abdomen.  Patient is on blood thinners due to atrial fibrillation.  Patient states he did not get knocked out and has no headache neck pain or back pain.  Patient has no numbness tingling or weakness anywhere.      REVIEW OF SYSTEMS       Review of Systems   Constitutional:  Negative for activity change, appetite change, chills, diaphoresis and fever.   HENT:  Negative for congestion, ear pain, facial swelling, nosebleeds, rhinorrhea, sinus pressure, sore throat and trouble swallowing.    Eyes:  Negative for pain, discharge and redness.   Respiratory:  Negative for cough, chest tightness, shortness of breath and wheezing.    Cardiovascular:  Positive for chest pain. Negative for palpitations and leg swelling.   Gastrointestinal:  Negative for abdominal pain, blood in stool, constipation, diarrhea, nausea and vomiting.   Genitourinary:  Negative for difficulty urinating, dysuria, flank pain, frequency, genital sores and hematuria.   Musculoskeletal:  Negative for arthralgias, back pain, gait problem, joint swelling, myalgias and neck pain.        Fall with chest pain   Skin:  Negative for color change, pallor, rash and wound.   Neurological:  Negative for dizziness, tremors, seizures, syncope, speech difficulty, weakness, numbness and headaches.   Psychiatric/Behavioral:  Negative for confusion, decreased   Right eye: No discharge.         Left eye: No discharge.      Conjunctiva/sclera: Conjunctivae normal.      Pupils: Pupils are equal, round, and reactive to light.   Cardiovascular:      Rate and Rhythm: Normal rate and regular rhythm.      Heart sounds: Normal heart sounds. No murmur heard.     No friction rub. No gallop.   Pulmonary:      Effort: Pulmonary effort is normal. No respiratory distress.      Breath sounds: Normal breath sounds. No wheezing or rales.   Chest:      Chest wall: Tenderness present.   Abdominal:      General: Bowel sounds are normal. There is no distension.      Palpations: Abdomen is soft. There is no mass.      Tenderness: There is abdominal tenderness. There is no guarding or rebound.   Musculoskeletal:         General: No tenderness.      Cervical back: Signs of trauma present. No swelling, edema, deformity, erythema, lacerations, rigidity, spasms, torticollis, tenderness or bony tenderness. Decreased range of motion (C-collar in place).   Skin:     General: Skin is warm and dry.      Coloration: Skin is not pale.      Findings: No erythema or rash.   Neurological:      Mental Status: He is alert and oriented to person, place, and time.      Cranial Nerves: No cranial nerve deficit.      Sensory: No sensory deficit.      Motor: No weakness or abnormal muscle tone.   Psychiatric:         Behavior: Behavior normal.         Thought Content: Thought content normal.         Judgment: Judgment normal.           MEDICAL DECISION MAKIN)  Number and Complexity of Problems  Problem List This Visit:  Fall with chest pain    Differential Diagnosis:  Chest wall contusion, fractured ribs, internal bleeding    Diagnoses Considered but Do Not Suspect:  None    Pertinent Comorbid Conditions:  Atrial fibrillation on anticoagulation    2)  Data Reviewed  Decision Rules/Scores/MIPS utilized:  None    EKG Interpretation:  None    External Documents Reviewed:  None    Imaging that is independently

## 2025-01-21 ENCOUNTER — APPOINTMENT (OUTPATIENT)
Dept: GENERAL RADIOLOGY | Age: 89
DRG: 199 | End: 2025-01-21
Payer: MEDICARE

## 2025-01-21 ENCOUNTER — APPOINTMENT (OUTPATIENT)
Dept: ULTRASOUND IMAGING | Age: 89
DRG: 199 | End: 2025-01-21
Payer: MEDICARE

## 2025-01-21 PROBLEM — E44.0 MODERATE MALNUTRITION (HCC): Status: ACTIVE | Noted: 2025-01-21

## 2025-01-21 LAB
ANION GAP SERPL CALCULATED.3IONS-SCNC: 8 MMOL/L (ref 9–16)
APPEARANCE FLD: NORMAL
BASOPHILS # BLD: 0 K/UL (ref 0–0.2)
BASOPHILS NFR BLD: 1 % (ref 0–2)
BLASTS NFR FLD: ABNORMAL %
BODY FLD TYPE: NORMAL
BUN SERPL-MCNC: 24 MG/DL (ref 8–23)
CALCIUM SERPL-MCNC: 8.2 MG/DL (ref 8.6–10.4)
CHLORIDE SERPL-SCNC: 108 MMOL/L (ref 98–107)
CO2 SERPL-SCNC: 24 MMOL/L (ref 20–31)
COLOR FLD: NORMAL
CREAT SERPL-MCNC: 1.2 MG/DL (ref 0.7–1.2)
EKG ATRIAL RATE: 62 BPM
EKG P AXIS: 75 DEGREES
EKG P-R INTERVAL: 316 MS
EKG Q-T INTERVAL: 430 MS
EKG QRS DURATION: 78 MS
EKG QTC CALCULATION (BAZETT): 436 MS
EKG R AXIS: 4 DEGREES
EKG T AXIS: -91 DEGREES
EKG VENTRICULAR RATE: 62 BPM
EOSINOPHIL # BLD: 0.1 K/UL (ref 0–0.4)
EOSINOPHIL NFR FLD: ABNORMAL %
EOSINOPHILS RELATIVE PERCENT: 1 % (ref 0–4)
ERYTHROCYTE [DISTWIDTH] IN BLOOD BY AUTOMATED COUNT: 14.9 % (ref 11.5–14.9)
GFR, ESTIMATED: 57 ML/MIN/1.73M2
GLUCOSE FLD-MCNC: 93 MG/DL
GLUCOSE SERPL-MCNC: 97 MG/DL (ref 74–99)
HCT VFR BLD AUTO: 21.4 % (ref 41–53)
HCT VFR BLD AUTO: 22.7 % (ref 41–53)
HCT VFR BLD AUTO: 23.6 % (ref 41–53)
HCT VFR BLD AUTO: 23.7 % (ref 41–53)
HCT VFR BLD AUTO: 24 % (ref 41–53)
HGB BLD-MCNC: 7.3 G/DL (ref 13.5–17.5)
HGB BLD-MCNC: 7.8 G/DL (ref 13.5–17.5)
HGB BLD-MCNC: 7.8 G/DL (ref 13.5–17.5)
HGB BLD-MCNC: 7.9 G/DL (ref 13.5–17.5)
HGB BLD-MCNC: 8.2 G/DL (ref 13.5–17.5)
LDH FLD L TO P-CCNC: 414 U/L
LYMPHOCYTES NFR BLD: 0.6 K/UL (ref 1–4.8)
LYMPHOCYTES NFR FLD: 6 %
LYMPHOCYTES RELATIVE PERCENT: 14 % (ref 24–44)
MCH RBC QN AUTO: 31.7 PG (ref 26–34)
MCHC RBC AUTO-ENTMCNC: 34.4 G/DL (ref 31–37)
MCV RBC AUTO: 92 FL (ref 80–100)
MONOCYTES NFR BLD: 0.4 K/UL (ref 0.1–1.3)
MONOCYTES NFR BLD: 8 % (ref 1–7)
MONOCYTES NFR FLD: 14 %
NEUTROPHILS NFR BLD: 76 % (ref 36–66)
NEUTROPHILS NFR FLD: 80 %
NEUTS SEG NFR BLD: 3.7 K/UL (ref 1.3–9.1)
PH FLUID: 7.5
PLATELET # BLD AUTO: 192 K/UL (ref 150–450)
PMV BLD AUTO: 6.8 FL (ref 6–12)
POTASSIUM SERPL-SCNC: 4 MMOL/L (ref 3.7–5.3)
PROT FLD-MCNC: 4.3 G/DL
RBC # BLD AUTO: 2.47 M/UL (ref 4.5–5.9)
RBC # FLD: NORMAL CELLS/UL
SODIUM SERPL-SCNC: 140 MMOL/L (ref 136–145)
SPECIMEN TYPE: NORMAL
UNIDENT CELLS NFR FLD: ABNORMAL %
WBC # FLD: 1873 CELLS/UL
WBC OTHER # BLD: 4.8 K/UL (ref 3.5–11)

## 2025-01-21 PROCEDURE — 87070 CULTURE OTHR SPECIMN AEROBIC: CPT

## 2025-01-21 PROCEDURE — 83986 ASSAY PH BODY FLUID NOS: CPT

## 2025-01-21 PROCEDURE — 85025 COMPLETE CBC W/AUTO DIFF WBC: CPT

## 2025-01-21 PROCEDURE — 84157 ASSAY OF PROTEIN OTHER: CPT

## 2025-01-21 PROCEDURE — 97535 SELF CARE MNGMENT TRAINING: CPT

## 2025-01-21 PROCEDURE — 88305 TISSUE EXAM BY PATHOLOGIST: CPT

## 2025-01-21 PROCEDURE — 2060000000 HC ICU INTERMEDIATE R&B

## 2025-01-21 PROCEDURE — 0W9B3ZZ DRAINAGE OF LEFT PLEURAL CAVITY, PERCUTANEOUS APPROACH: ICD-10-PCS | Performed by: RADIOLOGY

## 2025-01-21 PROCEDURE — 97166 OT EVAL MOD COMPLEX 45 MIN: CPT

## 2025-01-21 PROCEDURE — 6370000000 HC RX 637 (ALT 250 FOR IP)

## 2025-01-21 PROCEDURE — 82945 GLUCOSE OTHER FLUID: CPT

## 2025-01-21 PROCEDURE — 99232 SBSQ HOSP IP/OBS MODERATE 35: CPT | Performed by: NURSE PRACTITIONER

## 2025-01-21 PROCEDURE — 87186 SC STD MICRODIL/AGAR DIL: CPT

## 2025-01-21 PROCEDURE — 87077 CULTURE AEROBIC IDENTIFY: CPT

## 2025-01-21 PROCEDURE — 85018 HEMOGLOBIN: CPT

## 2025-01-21 PROCEDURE — 36415 COLL VENOUS BLD VENIPUNCTURE: CPT

## 2025-01-21 PROCEDURE — 32555 ASPIRATE PLEURA W/ IMAGING: CPT

## 2025-01-21 PROCEDURE — 87205 SMEAR GRAM STAIN: CPT

## 2025-01-21 PROCEDURE — 83615 LACTATE (LD) (LDH) ENZYME: CPT

## 2025-01-21 PROCEDURE — 97162 PT EVAL MOD COMPLEX 30 MIN: CPT

## 2025-01-21 PROCEDURE — 93010 ELECTROCARDIOGRAM REPORT: CPT | Performed by: INTERNAL MEDICINE

## 2025-01-21 PROCEDURE — 2500000003 HC RX 250 WO HCPCS

## 2025-01-21 PROCEDURE — 87075 CULTR BACTERIA EXCEPT BLOOD: CPT

## 2025-01-21 PROCEDURE — 85014 HEMATOCRIT: CPT

## 2025-01-21 PROCEDURE — 99233 SBSQ HOSP IP/OBS HIGH 50: CPT | Performed by: INTERNAL MEDICINE

## 2025-01-21 PROCEDURE — 80048 BASIC METABOLIC PNL TOTAL CA: CPT

## 2025-01-21 PROCEDURE — 88112 CYTOPATH CELL ENHANCE TECH: CPT

## 2025-01-21 PROCEDURE — 97530 THERAPEUTIC ACTIVITIES: CPT

## 2025-01-21 PROCEDURE — 71045 X-RAY EXAM CHEST 1 VIEW: CPT

## 2025-01-21 RX ORDER — LIDOCAINE 4 G/G
1 PATCH TOPICAL DAILY
Status: DISCONTINUED | OUTPATIENT
Start: 2025-01-21 | End: 2025-02-05 | Stop reason: HOSPADM

## 2025-01-21 RX ADMIN — SODIUM CHLORIDE, PRESERVATIVE FREE 10 ML: 5 INJECTION INTRAVENOUS at 07:25

## 2025-01-21 RX ADMIN — HYDROCODONE BITARTRATE AND ACETAMINOPHEN 1 TABLET: 5; 325 TABLET ORAL at 07:25

## 2025-01-21 RX ADMIN — HYDROCODONE BITARTRATE AND ACETAMINOPHEN 1 TABLET: 5; 325 TABLET ORAL at 11:34

## 2025-01-21 RX ADMIN — HYDROCODONE BITARTRATE AND ACETAMINOPHEN 1 TABLET: 5; 325 TABLET ORAL at 02:53

## 2025-01-21 RX ADMIN — METOPROLOL SUCCINATE 100 MG: 50 TABLET, EXTENDED RELEASE ORAL at 07:25

## 2025-01-21 RX ADMIN — POLYETHYLENE GLYCOL 3350 17 G: 17 POWDER, FOR SOLUTION ORAL at 09:01

## 2025-01-21 RX ADMIN — ISOSORBIDE MONONITRATE 30 MG: 30 TABLET, EXTENDED RELEASE ORAL at 07:25

## 2025-01-21 RX ADMIN — HYDROCODONE BITARTRATE AND ACETAMINOPHEN 1 TABLET: 5; 325 TABLET ORAL at 21:00

## 2025-01-21 RX ADMIN — DILTIAZEM HYDROCHLORIDE 120 MG: 120 CAPSULE, COATED, EXTENDED RELEASE ORAL at 07:25

## 2025-01-21 RX ADMIN — SODIUM CHLORIDE, PRESERVATIVE FREE 10 ML: 5 INJECTION INTRAVENOUS at 21:01

## 2025-01-21 RX ADMIN — PANTOPRAZOLE SODIUM 40 MG: 40 TABLET, DELAYED RELEASE ORAL at 05:59

## 2025-01-21 RX ADMIN — FINASTERIDE 5 MG: 5 TABLET, FILM COATED ORAL at 07:25

## 2025-01-21 RX ADMIN — ATORVASTATIN CALCIUM 40 MG: 40 TABLET, FILM COATED ORAL at 21:01

## 2025-01-21 ASSESSMENT — PAIN - FUNCTIONAL ASSESSMENT
PAIN_FUNCTIONAL_ASSESSMENT: PREVENTS OR INTERFERES SOME ACTIVE ACTIVITIES AND ADLS

## 2025-01-21 ASSESSMENT — PAIN SCALES - GENERAL
PAINLEVEL_OUTOF10: 5
PAINLEVEL_OUTOF10: 5
PAINLEVEL_OUTOF10: 7
PAINLEVEL_OUTOF10: 6
PAINLEVEL_OUTOF10: 3
PAINLEVEL_OUTOF10: 4
PAINLEVEL_OUTOF10: 7
PAINLEVEL_OUTOF10: 6
PAINLEVEL_OUTOF10: 8
PAINLEVEL_OUTOF10: 7
PAINLEVEL_OUTOF10: 5

## 2025-01-21 ASSESSMENT — PAIN DESCRIPTION - DESCRIPTORS
DESCRIPTORS: ACHING;SQUEEZING
DESCRIPTORS: ACHING
DESCRIPTORS: ACHING;SQUEEZING
DESCRIPTORS: ACHING
DESCRIPTORS: ACHING
DESCRIPTORS: ACHING;SQUEEZING
DESCRIPTORS: ACHING

## 2025-01-21 ASSESSMENT — PAIN DESCRIPTION - LOCATION
LOCATION: BACK
LOCATION: CHEST
LOCATION: BACK
LOCATION: CHEST
LOCATION: BACK;CHEST
LOCATION: BACK;CHEST
LOCATION: BACK
LOCATION: CHEST;BACK
LOCATION: BACK;CHEST
LOCATION: BACK
LOCATION: CHEST

## 2025-01-21 ASSESSMENT — PAIN DESCRIPTION - ORIENTATION
ORIENTATION: LEFT
ORIENTATION: RIGHT;LEFT;UPPER
ORIENTATION: RIGHT;LEFT;UPPER
ORIENTATION: LEFT
ORIENTATION: RIGHT;LEFT;UPPER
ORIENTATION: LEFT
ORIENTATION: LEFT
ORIENTATION: RIGHT;LEFT;UPPER
ORIENTATION: LEFT

## 2025-01-21 ASSESSMENT — PAIN DESCRIPTION - FREQUENCY
FREQUENCY: CONTINUOUS

## 2025-01-21 ASSESSMENT — PAIN DESCRIPTION - ONSET
ONSET: ON-GOING

## 2025-01-21 NOTE — PROGRESS NOTES
Comprehensive Nutrition Assessment    Type and Reason for Visit:  Positive nutrition screen (wt loss)    Nutrition Recommendations/Plan:   Will add Ensure Clear to Clear Liquid diet  Will monitor for advance of diet     Malnutrition Assessment:  Malnutrition Status:  Moderate malnutrition (01/21/25 1409)    Context:  Acute Illness     Findings of the 6 clinical characteristics of malnutrition:  Energy Intake:  Unable to assess  Weight Loss:   (7.5% wt loss over 4 months)     Body Fat Loss:  Mild body fat loss Orbital   Muscle Mass Loss:  Mild muscle mass loss Hand (interosseous)  Fluid Accumulation:  Mild Extremities   Strength:  Not Performed    Nutrition Assessment:    Pt admitted due to Ileus/Pleural effusion/Fall with rib Fx. Pt is having a thoracentesis today. He is consuming Clear Liquids today.    Nutrition Related Findings:    mild BLE edema, Labs/Meds: Reviewed, PMH: CKD, CHF Wound Type: Multiple, Pressure Injury       Current Nutrition Intake & Therapies:    Average Meal Intake: 26-50%     ADULT DIET; Clear Liquid  ADULT ORAL NUTRITION SUPPLEMENT; Breakfast, Lunch, Dinner; Clear Liquid Oral Supplement    Anthropometric Measures:  Height: 185.4 cm (6' 1\")  Ideal Body Weight (IBW): 184 lbs (84 kg)    Admission Body Weight: 73.5 kg (162 lb) (stated)  Current Body Weight: 78.5 kg (173 lb 1 oz) (obtained by RD), 94.1 % IBW. Weight Source: Bed scale  Current BMI (kg/m2): 22.8  Usual Body Weight: 84.8 kg (187 lb) (9/24 bedscale)     % Weight Change (Calculated): -7.5                    BMI Categories: Normal Weight (BMI 22.0 to 24.9) age over 65    Estimated Daily Nutrient Needs:  Energy Requirements Based On: Formula  Weight Used for Energy Requirements: Current  Energy (kcal/day): George x 1.2-= 1800 kcal  Weight Used for Protein Requirements: Current  Protein (g/day): 1.5g/kg= 115-120 g  Method Used for Fluid Requirements: Defer to provider  Fluid (ml/day): at least 1500 ml    Nutrition Diagnosis:

## 2025-01-21 NOTE — PROGRESS NOTES
[facility] PULMONARY,CRITICAL CARE & SLEEP   Hi Morocho MD/Choco Cohen APRN AGABRADLEYP-BC, NP-C       Janet BEJARANO NP-C      Hemanth BEJARANO NP-C                                  Pulmonary Critical Care Progress Note    Patient - Hemanth Barrera   Age - 89 y.o.   - 1935  MRN - [id number]  Acct # - [id number]  Date of Admission - 2025  9:06 AM    Consulting Service/Physician:       Primary Care Physician: Neftaly Contreras MD    SUBJECTIVE:     Chief Complaint:   Chief Complaint   Patient presents with    Fall    Rib Pain (injury)   Pleural effusion  Subjective:    Patient still feeling short of breath.  He is having some fall from the ribs.  He is otherwise in good spirits in no distress    VITALS  BP (!) 106/58   Pulse 62   Temp 97.5 °F (36.4 °C) (Oral)   Resp 16   Ht 1.854 m (6' 1\")   Wt 73.5 kg (162 lb)   SpO2 92%   BMI 21.37 kg/m²   Wt Readings from Last 3 Encounters:   25 73.5 kg (162 lb)   25 73.1 kg (161 lb 2.5 oz)   25 63.5 kg (140 lb)     I/O (24 Hours)    Intake/Output Summary (Last 24 hours) at 2025 0724  Last data filed at 2025 0603  Gross per 24 hour   Intake 721 ml   Output 2775 ml   Net -2054 ml     Ventilator:      Exam:   Physical Exam   Constitutional: Alert and cooperative no distress  HENT: Unremarkable  Neck: Neck supple.  No JVD, trachea midline  Cardiovascular: Distant heart tones  Pulmonary/Chest: Absent breath sounds approximately two thirds of the left chest, right chest clear, some bruising and bandaging over the left rib cage  Abdominal: Soft. Bowel sounds are normal. There is no tenderness.    Extremities: Chronic venous stasis discoloration and chronic leg edema  Infusions:      sodium chloride      sodium chloride      sodium chloride      sodium chloride       Meds:     Current Facility-Administered Medications:     0.9 % sodium chloride infusion, , IntraVENous, PRN, Jorge Slater MD     magnesium sulfate 2000 mg in water 50 mL IVPB, 2,000 mg, IntraVENous, PRN, Jose Ring MD    polyethylene glycol (GLYCOLAX) packet 17 g, 17 g, Oral, Daily PRN, Jose Ring MD    acetaminophen (TYLENOL) tablet 650 mg, 650 mg, Oral, Q6H PRN **OR** acetaminophen (TYLENOL) suppository 650 mg, 650 mg, Rectal, Q6H PRN, Jose Ring MD    0.9 % sodium chloride infusion, , IntraVENous, PRN, Jimmy Maldonado MD    0.9 % sodium chloride infusion, , IntraVENous, PRN, Kaitlin Dsouza MD    HYDROcodone-acetaminophen (NORCO) 5-325 MG per tablet 1 tablet, 1 tablet, Oral, Q4H PRN, Jimmy Maldonado MD, 1 tablet at 01/21/25 0253    Lab Results:     Lab Results   Component Value Date    WBC 4.8 01/21/2025    HGB 8.2 (L) 01/21/2025    HCT 24.0 (L) 01/21/2025    MCV 92.0 01/21/2025     01/21/2025     Lab Results   Component Value Date    CALCIUM 8.2 (L) 01/21/2025     01/21/2025    K 4.0 01/21/2025    CO2 24 01/21/2025     (H) 01/21/2025    BUN 24 (H) 01/21/2025    CREATININE 1.2 01/21/2025     No components found for: \"ABGSAMPLETYP\", \"ABGBODYTEMP\", \"ABGPHCORRFOR\", \"PFTZSC3EWILVP\", \"ABGPHCORRFOOR\", \"ABGPH\", \"ABGPCO2\", \"ABGPO2\", \"ABGBASEEXCES\", \"ABGBASEDEFIC\", \"ABGHCO3\", \"WLGV1XPS\", \"ABGENDTIDALC\", \"ABGALLENSTES\", \"ABGSPO2\", \"ABGSAMEPLESIT\", \"JVQRXIA49RYH\", \"ABGOXYGENSOU\"  Lab Results   Component Value Date    INR 1.5 01/20/2025    PROTIME 19.7 (H) 01/20/2025       Radiology:   [unfilled]  My reading of film: CT imaging consistent with hemothorax.    ASSESSMENT:     S/p fall at home  Left rib fractures posterior 10 and 11 rib with hematoma  Acute on chronic normocytic anemia-baseline 8-9>>6.6 on arrival this admission  New large left effusion in the setting of rib fractures and fall with worsened anemia suggests hemothorax  Ileus vs constipation on imaging  CKDz IIIb  Afib on Xarelto (held due to fall and anemia)  CAD-stent LAD-Plavix/ASA  Chronic HFpEF  HTN  Dyslipidemia  Pacemaker  Full code    PLAN:

## 2025-01-21 NOTE — PLAN OF CARE
Problem: Chronic Conditions and Co-morbidities  Goal: Patient's chronic conditions and co-morbidity symptoms are monitored and maintained or improved  1/21/2025 1611 by Clinton Menendez RN  Outcome: Progressing     Problem: Discharge Planning  Goal: Discharge to home or other facility with appropriate resources  Outcome: Progressing     Problem: Pain  Goal: Verbalizes/displays adequate comfort level or baseline comfort level  1/21/2025 1611 by Clinton Menendez RN  Outcome: Progressing     Problem: Safety - Adult  Goal: Free from fall injury  1/21/2025 1611 by Clinton Menendez, RN  Outcome: Progressing     Problem: ABCDS Injury Assessment  Goal: Absence of physical injury  1/21/2025 1611 by Clinton Menendez, RN  Outcome: Progressing     Problem: Nutrition Deficit:  Goal: Optimize nutritional status  Outcome: Progressing

## 2025-01-21 NOTE — PROGRESS NOTES
Occupational Therapy Initial Evaluation  Facility/Department: [facility]   Patient Name: Hemanth Barrera        MRN: [id number]    : 1935    Date of Service: 2025    Chief Complaint   Patient presents with    Fall    Rib Pain (injury)     Past Medical History:  has a past medical history of Acute inferolateral myocardial infarction (HCC), Arthritis, Atrial fibrillation (HCC), CAD (coronary artery disease), CHF (congestive heart failure) (HCC), Glaucoma, Hx of blood clots, Hyperlipidemia, Hypertension, MI (myocardial infarction) (HCC), and NSTEMI (non-ST elevated myocardial infarction) ([facility]).  Past Surgical History:  has a past surgical history that includes pacemaker placement; Abdomen surgery; Cardiac catheterization; Appendectomy; Colonoscopy; eye surgery; hernia repair; bone marrow biopsy; joint replacement; CT BIOPSY BONE MARROW (2022); Upper gastrointestinal endoscopy (N/A, 2023); Colonoscopy (N/A, 8/3/2023); Cardiac procedure (N/A, 2024); Cardiac procedure (N/A, 2024); Cardiac procedure (Right, 2025); and invasive vascular (N/A, 2025).    Discharge Recommendations  Discharge Recommendations: Patient would benefit from continued therapy after discharge  OT Equipment Recommendations  Mobility Devices: ADL Assistive Devices  ADL Assistive Devices: Sock-Aid Hard, Reacher    Assessment  Performance deficits / Impairments: Decreased functional mobility ;Decreased ADL status;Decreased strength;Decreased endurance;Decreased sensation;Decreased balance;Decreased high-level IADLs  Assessment: Pt presents with decreased ADL IND secondary to deficits noted above, most significantly increased pain d/t  L posterior rib fractures. At baseline, pt is IND with ADLs, some IADLs, and functional mobility. Currently, pt is requiring CGA-MIN A for transfers and functional mobility with use of RW, and MOD A for LB ADLs. Continued OT services are warranted while pt is hospitalized and  activities in order to increase activity tolerance for ADLs.  Short Term Goal 5: verbalize 2-3 non-pharmaceutical pain mgmt techniques to promote increased participation and tolerance during functional tasks.    Plan  Occupational Therapy Plan  Times Per Week: 4-6x  Current Treatment Recommendations: Strengthening, Balance training, Functional mobility training, Endurance training, Pain management, Safety education & training, Patient/Caregiver education & training, Equipment evaluation, education, & procurement, Self-Care / ADL, Home management training    Minutes  OT Individual Minutes  Time In: 0818  Time Out: 0856  Minutes: 38  Time Code Minutes   Timed Code Treatment Minutes: 8 Minutes    Co-eval with PT    Electronically signed by Luz Maria Castro OT on 1/21/25 at 10:44 AM EST

## 2025-01-21 NOTE — PLAN OF CARE
Problem: Chronic Conditions and Co-morbidities  Goal: Patient's chronic conditions and co-morbidity symptoms are monitored and maintained or improved  1/21/2025 0304 by Macario Mi RN  Outcome: Progressing     Problem: Pain  Goal: Verbalizes/displays adequate comfort level or baseline comfort level  1/21/2025 0304 by Macario Mi RN  Outcome: Progressing  Note: Pt medicated with pain medication prn.  Assessed all pain characteristics including level, type, location, frequency, and onset.  Non-pharmacologic interventions offered to pt as well.       Problem: Safety - Adult  Goal: Free from fall injury  1/21/2025 0304 by Macario Mi RN  Outcome: Progressing  Note: Pt assessed as a fall risk this shift. Remains free from falls and accidental injury at this time. Fall precautions in place. Bed is locked and in lowest position. Adequate lighting provided.  Pt encouraged to call before getting OOB for any need.  Bed alarm activated. Will continue to monitor needs during hourly rounding, and reinforce education on use of call light.      Problem: ABCDS Injury Assessment  Goal: Absence of physical injury  1/21/2025 0304 by Macario Mi RN  Outcome: Progressing  Note: Pt is free from injury.

## 2025-01-21 NOTE — PROGRESS NOTES
Physical Therapy  [facility]   Physical Therapy Evaluation  Date: 25  Patient Name: Hemanth Barrera       Room: -  MRN: [id number]  Account: [id number]   : 1935  (89 y.o.) Gender: male     Discharge Recommendations:  Discharge Recommendations: Patient would benefit from continued therapy after discharge, Therapy recommended at discharge           Past Medical History:  has a past medical history of Acute inferolateral myocardial infarction (HCC), Arthritis, Atrial fibrillation ([facility]), CAD (coronary artery disease), CHF (congestive heart failure) ([facility]), Glaucoma, Hx of blood clots, Hyperlipidemia, Hypertension, MI (myocardial infarction) ([facility]), and NSTEMI (non-ST elevated myocardial infarction) ([facility]).  Past Surgical History:   has a past surgical history that includes pacemaker placement; Abdomen surgery; Cardiac catheterization; Appendectomy; Colonoscopy; eye surgery; hernia repair; bone marrow biopsy; joint replacement; CT BIOPSY BONE MARROW (2022); Upper gastrointestinal endoscopy (N/A, 2023); Colonoscopy (N/A, 8/3/2023); Cardiac procedure (N/A, 2024); Cardiac procedure (N/A, 2024); Cardiac procedure (Right, 2025); and invasive vascular (N/A, 2025).    Subjective  Subjective  Subjective: pt pleasant and cooperativ     General  Family/Caregiver Present: No  Follows Commands: Within Functional Limits           Social/Functional History  Social/Functional History  Lives With: Son  Type of Home: House  Home Layout: One level  Home Access: Stairs to enter with rails, Stairs to enter without rails  Entrance Stairs - Number of Steps: 2 (reports 2 small thresholds) - son always helps with stair negotiation  Entrance Stairs - Rails: None  Bathroom Shower/Tub: Walk-in shower  Bathroom Toilet: Standard  Bathroom Equipment: Grab bars in shower, Toilet raiser, Grab bars around toilet  Bathroom Accessibility: Accessible  Home Equipment: Walker - Rolling,

## 2025-01-21 NOTE — CARE COORDINATION
Case Management Assessment  Initial Evaluation    Date/Time of Evaluation: 1/21/2025 9:42AM  Assessment Completed by: Negrita Gama RN    If patient is discharged prior to next notation, then this note serves as note for discharge by case management.    Patient Name: Hemanth Barrera                   YOB: 1935  Diagnosis: Ileus (HCC) [K56.7]  Pleural effusion [J90]  Fall, initial encounter [W19.XXXA]  Traumatic closed displaced fracture of rib on left side, initial encounter [S22.32XA]  Closed fracture of multiple ribs of left side, initial encounter [S22.42XA]  Anemia, unspecified type [D64.9]                   Date / Time: 1/20/2025  9:06 AM    Patient Admission Status: Inpatient   Readmission Risk (Low < 19, Mod (19-27), High > 27): Readmission Risk Score: 25.6    Current PCP: Neftaly Contreras MD  PCP verified by CM? Yes    Chart Reviewed: Yes      History Provided by: Patient  Patient Orientation: Alert and Oriented    Patient Cognition: Alert    Hospitalization in the last 30 days (Readmission):  Yes    If yes, Readmission Assessment in CM Navigator will be completed.    Advance Directives:      Code Status: Full Code   Patient's Primary Decision Maker is: Named in Scanned ACP Document    Primary Decision Maker: Dutsy Barrera - Child - [phone]    Discharge Planning:    Patient lives with: Children Type of Home: House  Primary Care Giver: Self  Patient Support Systems include: Children, Family Members   Current Financial resources: Medicare  Current community resources: ECF/Home Care  Current services prior to admission: Durable Medical Equipment            Current DME: Walker, Cane, Shower Chair            Type of Home Care services:  OT, PT, Nursing Services    ADLS  Prior functional level: Assistance with the following:, Mobility, Housework  Current functional level: Assistance with the following:, Mobility, Housework    PT AM-PAC: 16 /24  OT AM-PAC: 19 /24    Family can provide

## 2025-01-21 NOTE — PROCEDURES
PROCEDURE NOTE  Date: 1/21/2025   Name: Hemanth Barrera  YOB: 1935    Procedures  Left ultrasound-guided thoracentesis    Indication: Suspected hemothorax    Physician: MERLINE Pinedo    Informed consent signed and timeout performed, nursing and ultrasound staff at bedside with me    Description:  Patient sat in her upper position.  Large left pleural effusion marked.  Area prepped and draped in sterile fashion.  Anesthesia with 1 mL 1% lidocaine.  Pleural space accessed easily on first attempt using 5 Luxem[place]ish Angiocath evacuating 1.6 L of bloody pleural fluid.  Patient did have a little bit of reexpansion pain during the procedure at the end but tolerated it well otherwise    Fluid be sent for full analysis    No blood loss as a result of the procedure itself    No immediate complications but chest x-ray is pending    Family updated on the phone.  I also spoke with the Dr Dsouza.    Bob Pinedo MD  Pulmonary and Critical Care  [phone] [employer]  [phone] Cell

## 2025-01-21 NOTE — PROGRESS NOTES
[facility]  IN-PATIENT SERVICE  [facility]    PROGRESS NOTE             1/21/2025    7:42 AM    Name:   Hemanth Barrera  MRN:     [id number]     Acct:      [id number]   Room:   2103/2103-01   Day:  1  Admit Date:  1/20/2025  9:06 AM    PCP:  Neftaly Contreras MD  Code Status:  Full Code    Subjective:     C/C:   Chief Complaint   Patient presents with    Fall    Rib Pain (injury)     Interval History:    -Vitals stable.  -Hb stabilized after the 2nd unit if RBCs: 8.2 this morning. BUN:creatinine ratio 24:1.2 (improved).  -Evaluated by general surgery and pulmonology. Thoracocentesis planned for today.  -Feels better. Skin turgor improved.       Brief History:     The patient is a 89 y.o. male who slipped and fell 01/19/2025 and landed on his left side, after which severe pain in his left side started to bother him. Denies loss of consciousness, numbness, weakness, headache, neck pain, back pain.     PMH: coronary artery disease with stent placement, afib, HFpEF with pacemaker, CKD stage IIIb, chronic anemia, abdominal hernia status post repair.     In the ER, he was stable. Hb 6.6 -- received RBC transfusion. CT head and C-spine showed no critical findings. CT chest whowed a large left pleural effusion with compressive atelectasis, segmented fractures of the left post erior 10th and 11th ribs. CT abdomen and pelvis revealed findings suggestive of ileus/obstruction.     Admitted for management of his multiple health issues.    2 units of RBCs transfused, Hb stabilized: 8.2. Evaluated by general surgery and pulmonology.     Medications:     Allergies:    Allergies   Allergen Reactions    Aspirin Other (See Comments)     Pt told not to take since he has only a partial stomach    Cyclobenzaprine Other (See Comments)     Pt stated heart palpitations and he felt funny    Ibuprofen Nausea Only    Azithromycin Nausea Only    Hydrocodone-Acetaminophen Nausea Only     per pt,  Cortical atrophy and chronic white matter changes in the brain and associated ventricular enlargement are again demonstrated. ORBITS: The visualized portion of the orbits demonstrate no acute abnormality. SINUSES: The visualized paranasal sinuses and mastoid air cells demonstrate no acute abnormality. SOFT TISSUES/SKULL:  No acute abnormality of the visualized skull or soft tissues.     Chronic findings in the brain without acute CT abnormality identified.     Cardiac procedure    Result Date: 1/9/2025  Images from the original result were not included. [facility]   Date:                            1/9/2025 Patient name:  Hemanth Barrera Date of admission:      1/7/2025  8:07 AM MRN:                           [id number] YOB: 1935  CARDIAC CATHETERIZATION  Operators: Primary: Guy Paz MD. Assistant:  Indications for cath: [place], known CAD with prior stenting to the LAD and moderate LCx disease  Procedure performed: Cardiac cath.  Access: Left femoral artery   Procedure: After informed consent was obtained with explanation of the risks and benefits, patient was brought to the cath lab. The right groin were prepped and draped in sterile fashion. 1% lidocaine was used for local block. The Femoral artery was cannulated using ultrasound guidance with 6  Fr sheath with brisk arterial blood return. The side port was frequently flushed and aspirated with normal saline.  EBL is 5 mL  Dominance is right from prior cardiac cath  Findings:  Left main: Normal with 0% stenosis  LAD: Patent proximal stent with mid and distal luminal irregularities of 20 to 30%  LCX: 50% mid stenosis with RADHA-3 flow   Conclusions: Nonobstructive LCx disease and no intervention was performed Patent prior LAD stent   Recommendation: Medical treatments. Risk factors modifications.    Electronically signed by Guy Paz MD on 1/9/2025 at 3:54 PM  [facility] [phone]       XR

## 2025-01-21 NOTE — ACP (ADVANCE CARE PLANNING)
Advance Care Planning     Advance Care Planning Activator (Inpatient)  Conversation Note      Date of ACP Conversation: 1/21/2025     Conversation Conducted with: Patient with Decision Making Capacity    ACP Activator: Negrita Gama RN    Health Care Decision Maker:     Current Designated Health Care Decision Maker:     Primary Decision Maker: Dusty Barrera - Demarcus - [phone]  Click here to complete Healthcare Decision Makers including section of the Healthcare Decision Maker Relationship (ie \"Primary\")  Today we documented Decision Maker(s) consistent with ACP documents on file.    Care Preferences    Ventilation:  \"If you were in your present state of health and suddenly became very ill and were unable to breathe on your own, what would your preference be about the use of a ventilator (breathing machine) if it were available to you?\"      Would the patient desire the use of ventilator (breathing machine)?: yes    \"If your health worsens and it becomes clear that your chance of recovery is unlikely, what would your preference be about the use of a ventilator (breathing machine) if it were available to you?\"     Would the patient desire the use of ventilator (breathing machine)?: No      Resuscitation  \"CPR works best to restart the heart when there is a sudden event, like a heart attack, in someone who is otherwise healthy. Unfortunately, CPR does not typically restart the heart for people who have serious health conditions or who are very sick.\"    \"In the event your heart stopped as a result of an underlying serious health condition, would you want attempts to be made to restart your heart (answer \"yes\" for attempt to resuscitate) or would you prefer a natural death (answer \"no\" for do not attempt to resuscitate)?\" yes       [] Yes   [] No   Educated Patient / Decision Maker regarding differences between Advance Directives and portable DNR orders.    Length of ACP Conversation in minutes:

## 2025-01-22 ENCOUNTER — APPOINTMENT (OUTPATIENT)
Dept: GENERAL RADIOLOGY | Age: 89
DRG: 199 | End: 2025-01-22
Payer: MEDICARE

## 2025-01-22 LAB
ANION GAP SERPL CALCULATED.3IONS-SCNC: 5 MMOL/L (ref 9–16)
BASOPHILS # BLD: 0 K/UL (ref 0–0.2)
BASOPHILS NFR BLD: 0 % (ref 0–2)
BUN SERPL-MCNC: 16 MG/DL (ref 8–23)
CALCIUM SERPL-MCNC: 8.1 MG/DL (ref 8.6–10.4)
CASE NUMBER:: NORMAL
CHLORIDE SERPL-SCNC: 108 MMOL/L (ref 98–107)
CO2 SERPL-SCNC: 24 MMOL/L (ref 20–31)
CREAT SERPL-MCNC: 0.9 MG/DL (ref 0.7–1.2)
CRP SERPL HS-MCNC: 20.4 MG/L (ref 0–5)
EOSINOPHIL # BLD: 0.1 K/UL (ref 0–0.4)
EOSINOPHILS RELATIVE PERCENT: 1 % (ref 0–4)
ERYTHROCYTE [DISTWIDTH] IN BLOOD BY AUTOMATED COUNT: 15.2 % (ref 11.5–14.9)
GFR, ESTIMATED: 81 ML/MIN/1.73M2
GLUCOSE SERPL-MCNC: 101 MG/DL (ref 74–99)
HCT VFR BLD AUTO: 21.8 % (ref 41–53)
HCT VFR BLD AUTO: 22.1 % (ref 41–53)
HCT VFR BLD AUTO: 22.2 % (ref 41–53)
HCT VFR BLD AUTO: 22.3 % (ref 41–53)
HGB BLD-MCNC: 7.2 G/DL (ref 13.5–17.5)
HGB BLD-MCNC: 7.3 G/DL (ref 13.5–17.5)
HGB BLD-MCNC: 7.5 G/DL (ref 13.5–17.5)
HGB BLD-MCNC: 7.6 G/DL (ref 13.5–17.5)
LYMPHOCYTES NFR BLD: 0.5 K/UL (ref 1–4.8)
LYMPHOCYTES RELATIVE PERCENT: 9 % (ref 24–44)
MCH RBC QN AUTO: 30.5 PG (ref 26–34)
MCHC RBC AUTO-ENTMCNC: 33.5 G/DL (ref 31–37)
MCV RBC AUTO: 91.2 FL (ref 80–100)
MONOCYTES NFR BLD: 0.4 K/UL (ref 0.1–1.3)
MONOCYTES NFR BLD: 7 % (ref 1–7)
NEUTROPHILS NFR BLD: 83 % (ref 36–66)
NEUTS SEG NFR BLD: 4.5 K/UL (ref 1.3–9.1)
PLATELET # BLD AUTO: 210 K/UL (ref 150–450)
PMV BLD AUTO: 6.8 FL (ref 6–12)
POTASSIUM SERPL-SCNC: 4.2 MMOL/L (ref 3.7–5.3)
PROCALCITONIN SERPL-MCNC: 0.08 NG/ML (ref 0–0.09)
RBC # BLD AUTO: 2.45 M/UL (ref 4.5–5.9)
SODIUM SERPL-SCNC: 137 MMOL/L (ref 136–145)
SPECIMEN DESCRIPTION: NORMAL
WBC OTHER # BLD: 5.5 K/UL (ref 3.5–11)

## 2025-01-22 PROCEDURE — 6370000000 HC RX 637 (ALT 250 FOR IP)

## 2025-01-22 PROCEDURE — 85018 HEMOGLOBIN: CPT

## 2025-01-22 PROCEDURE — 2500000003 HC RX 250 WO HCPCS

## 2025-01-22 PROCEDURE — 85014 HEMATOCRIT: CPT

## 2025-01-22 PROCEDURE — 99232 SBSQ HOSP IP/OBS MODERATE 35: CPT | Performed by: NURSE PRACTITIONER

## 2025-01-22 PROCEDURE — 97535 SELF CARE MNGMENT TRAINING: CPT

## 2025-01-22 PROCEDURE — 71045 X-RAY EXAM CHEST 1 VIEW: CPT

## 2025-01-22 PROCEDURE — 99222 1ST HOSP IP/OBS MODERATE 55: CPT | Performed by: PHYSICAL MEDICINE & REHABILITATION

## 2025-01-22 PROCEDURE — 2060000000 HC ICU INTERMEDIATE R&B

## 2025-01-22 PROCEDURE — 80048 BASIC METABOLIC PNL TOTAL CA: CPT

## 2025-01-22 PROCEDURE — 97530 THERAPEUTIC ACTIVITIES: CPT

## 2025-01-22 PROCEDURE — 6360000002 HC RX W HCPCS: Performed by: INTERNAL MEDICINE

## 2025-01-22 PROCEDURE — 86140 C-REACTIVE PROTEIN: CPT

## 2025-01-22 PROCEDURE — 36415 COLL VENOUS BLD VENIPUNCTURE: CPT

## 2025-01-22 PROCEDURE — 85025 COMPLETE CBC W/AUTO DIFF WBC: CPT

## 2025-01-22 PROCEDURE — 2580000003 HC RX 258: Performed by: INTERNAL MEDICINE

## 2025-01-22 PROCEDURE — 84145 PROCALCITONIN (PCT): CPT

## 2025-01-22 PROCEDURE — 99233 SBSQ HOSP IP/OBS HIGH 50: CPT | Performed by: INTERNAL MEDICINE

## 2025-01-22 RX ADMIN — HYDROCODONE BITARTRATE AND ACETAMINOPHEN 1 TABLET: 5; 325 TABLET ORAL at 07:34

## 2025-01-22 RX ADMIN — PANTOPRAZOLE SODIUM 40 MG: 40 TABLET, DELAYED RELEASE ORAL at 05:55

## 2025-01-22 RX ADMIN — VANCOMYCIN HYDROCHLORIDE 1000 MG: 1 INJECTION, POWDER, LYOPHILIZED, FOR SOLUTION INTRAVENOUS at 15:36

## 2025-01-22 RX ADMIN — ISOSORBIDE MONONITRATE 30 MG: 30 TABLET, EXTENDED RELEASE ORAL at 07:34

## 2025-01-22 RX ADMIN — FINASTERIDE 5 MG: 5 TABLET, FILM COATED ORAL at 07:34

## 2025-01-22 RX ADMIN — METOPROLOL SUCCINATE 100 MG: 50 TABLET, EXTENDED RELEASE ORAL at 07:34

## 2025-01-22 RX ADMIN — SODIUM CHLORIDE, PRESERVATIVE FREE 10 ML: 5 INJECTION INTRAVENOUS at 20:50

## 2025-01-22 RX ADMIN — HYDROCODONE BITARTRATE AND ACETAMINOPHEN 1 TABLET: 5; 325 TABLET ORAL at 02:06

## 2025-01-22 RX ADMIN — DILTIAZEM HYDROCHLORIDE 120 MG: 120 CAPSULE, COATED, EXTENDED RELEASE ORAL at 07:34

## 2025-01-22 RX ADMIN — HYDROCODONE BITARTRATE AND ACETAMINOPHEN 1 TABLET: 5; 325 TABLET ORAL at 20:49

## 2025-01-22 RX ADMIN — HYDROCODONE BITARTRATE AND ACETAMINOPHEN 1 TABLET: 5; 325 TABLET ORAL at 11:22

## 2025-01-22 RX ADMIN — SODIUM CHLORIDE, PRESERVATIVE FREE 10 ML: 5 INJECTION INTRAVENOUS at 07:34

## 2025-01-22 RX ADMIN — ATORVASTATIN CALCIUM 40 MG: 40 TABLET, FILM COATED ORAL at 20:49

## 2025-01-22 ASSESSMENT — PAIN DESCRIPTION - LOCATION
LOCATION: BACK;CHEST
LOCATION: BACK
LOCATION: BACK;CHEST
LOCATION: BACK
LOCATION: BACK
LOCATION: BACK;CHEST
LOCATION: BACK;CHEST
LOCATION: RIB CAGE
LOCATION: BACK
LOCATION: RIB CAGE
LOCATION: BACK
LOCATION: CHEST

## 2025-01-22 ASSESSMENT — PAIN DESCRIPTION - ORIENTATION
ORIENTATION: LEFT

## 2025-01-22 ASSESSMENT — PAIN DESCRIPTION - DESCRIPTORS
DESCRIPTORS: ACHING

## 2025-01-22 ASSESSMENT — PAIN DESCRIPTION - FREQUENCY
FREQUENCY: CONTINUOUS

## 2025-01-22 ASSESSMENT — PAIN - FUNCTIONAL ASSESSMENT
PAIN_FUNCTIONAL_ASSESSMENT: PREVENTS OR INTERFERES SOME ACTIVE ACTIVITIES AND ADLS

## 2025-01-22 ASSESSMENT — PAIN DESCRIPTION - ONSET
ONSET: ON-GOING

## 2025-01-22 ASSESSMENT — PAIN SCALES - GENERAL
PAINLEVEL_OUTOF10: 8
PAINLEVEL_OUTOF10: 4
PAINLEVEL_OUTOF10: 5
PAINLEVEL_OUTOF10: 8
PAINLEVEL_OUTOF10: 6
PAINLEVEL_OUTOF10: 4
PAINLEVEL_OUTOF10: 8
PAINLEVEL_OUTOF10: 7
PAINLEVEL_OUTOF10: 4
PAINLEVEL_OUTOF10: 7
PAINLEVEL_OUTOF10: 8
PAINLEVEL_OUTOF10: 7
PAINLEVEL_OUTOF10: 5

## 2025-01-22 NOTE — PROGRESS NOTES
[facility]   INPATIENT OCCUPATIONAL THERAPY  PROGRESS NOTE  Date: 2025  Patient Name: Hemanth Barrera       Room:   MRN: [id number]    : 1935  (89 y.o.)  Gender: male             Discharge Recommendations:  Further Occupational Therapy is recommended upon facility discharge.    OT Equipment Recommendations  Mobility Devices: ADL Assistive Devices  ADL Assistive Devices: Sock-Aid Hard, Reacher    Restrictions/Precautions       SpO2: 96 % (pt does demo exertion with activity due to rib pain, o2 94-96%)  O2 Device: None (Room air)    Subjective  Subjective  Subjective: pt supine in bed when UV arrived.  Pain  Pre-Pain: 4  Pain Location: Left (ribs)  Comments: LEON thomased skilled services this date.    Objective  Orientation  Overall Orientation Status: Within Functional Limits  Orientation Level: Oriented X4  Cognition  Overall Cognitive Status: Exceptions  Arousal/Alertness: Appears intact  Following Commands: Follows multistep commands with increased time;Follows multistep commands with repitition  Attention Span: Appears intact  Memory: Appears intact  Safety Judgement: Appears intact  Problem Solving: Assistance required to implement solutions  Insights: Appears intact  Sequencing: Requires cues for some    Activities of Daily Living  LE Dressing: Moderate assistance  LE Dressing Skilled Clinical Factors: Pt to thead LLE and unable to thread RLE VU provided pt with education on compensatory techiques for donning RLE first.  Functional Mobility: Contact guard assistance  Functional Mobility Skilled Clinical Factors: Pt completed functional mobility throughout room and restroom with RW and CGA. Min cues to increase upright posture and safety when accessing toilet. No LOB noted.  Additional Comments: Pt limited dt fatigue and pain    Balance  Balance  Sitting Balance: Stand by assistance (with education on positioning for stability.)  Standing Balance: Contact

## 2025-01-22 NOTE — CARE COORDINATION
ACUTE INPATIENT REHABILITATION  Financial Disclosure Statement: Eligibility and Benefit Verification    Patient Name: Hemanth Barrera MRN: [id number]     Disclosure Statement  [facility] Acute Inpatient Rehabilitation at [facility] provides 24 hour individualized service to patients with functional limitations due to but not limited to stroke, brain injury, spinal cord injury, major multiple trauma, hip fracture, amputation, and neurological disorders.  Acute Inpatient Rehabilitation provides rehabilitative nursing, physician coverage, as well as physical therapy, occupational therapy, speech therapy, recreational therapy and other services as deemed necessary by our Board Certified Physical Medicine and Rehabilitation Physicians Dr. Lawrence Jamison, Dr. Roseanne Welch, Dr. Miroslava Mcgovern and Dr. Cordell Rangel.    [facility] Acute Inpatient Rehabilitation at [facility] is fully accredited by the Commission on Accreditation of Rehabilitation Facilities (CARF) and The Joint Commission (TJC).    At a minimum, you will receive 5 days of therapy services totaling at least 15 hours per week. Your treatment program will consist of physical therapy at least 7.5 hours per week; occupational therapy 7.5 hours per week; and speech therapy 1.5 hours per week, as applicable.    Your estimated length of stay is currently:  Approximately 2 Weeks  Please Note: Estimated length of stay is based on individual condition and Acute Inpatient Rehabilitation specific needs. Length of stay may vary based upon interdisciplinary team assessment, insurance approval, and patient progression.    Your insurance coverage has been verified as follows:   Date Verified: 1/22/2025   Method:  Phone Call: Call Ref# [id number], Representative: Danica     Primary Insurance: [employer] Medicare  Member/Subscriber ID: [id number]  Coverage: Per Benefit Period  Plan Effective Date: 05/01/2023 Status: Active  Deductible: $260.00 (Amount

## 2025-01-22 NOTE — DISCHARGE INSTR - COC
Continuity of Care Form    Patient Name: Hemanth Barrera   :  1935  MRN:  [id number]    Admit date:  2025  Discharge date:  2025    Code Status Order: Full Code   Advance Directives:   Advance Care Flowsheet Documentation             Admitting Physician:  Kaitlin Dsouza MD  PCP: Neftaly Contreras MD    Discharging Nurse: Ayse Ricci RN  Discharging Hospital Unit/Room#: 2103/2103-01  Discharging Unit Phone Number: [phone]    Emergency Contact:   Extended Emergency Contact Information  Primary Emergency Contact: Dusty Barrera  Home Phone: [phone]  Mobile Phone: [phone]  Relation: Child    Past Surgical History:  Past Surgical History:   Procedure Laterality Date    ABDOMEN SURGERY      gastric resection    APPENDECTOMY      BONE MARROW BIOPSY      CARDIAC CATHETERIZATION      CARDIAC PROCEDURE N/A 2024    julio cesar / Left heart cath / coronary angiography / rm 512 performed by Cathie Hastings MD at [facility] CARDIAC CATH LAB    CARDIAC PROCEDURE N/A 2024    Percutaneous coronary intervention performed by Cathie Hastings MD at [facility] CARDIAC CATH LAB    CARDIAC PROCEDURE Right 2025    Left heart cath / coronary angiography performed by Guy Paz MD at [facility] CARDIAC CATH LAB    COLONOSCOPY      COLONOSCOPY N/A 8/3/2023    COLONOSCOPY WITH BIOPSY performed by Isauro Razo MD at [facility]    CT BIOPSY BONE MARROW  2022    CT BONE MARROW BIOPSY 2022 [facility] CT SCAN    EYE SURGERY      smiley cataracts    HERNIA REPAIR      inguinal smiley    INVASIVE VASCULAR N/A 2025    Ultrasound guided vascular access performed by Guy Paz MD at [facility] CARDIAC CATH LAB    JOINT REPLACEMENT      rt hip x 2, lt knee    PACEMAKER PLACEMENT      UPPER GASTROINTESTINAL ENDOSCOPY N/A 2023    EGD BIOPSY performed by Isauro Razo MD at [facility]       Immunization History:   Immunization History   Administered Date(s) Administered    COVID-19, PFIZER PURPLE top, DILUTE for use,

## 2025-01-22 NOTE — PROGRESS NOTES
[facility] PULMONARY,CRITICAL CARE & SLEEP   Hi Morocho MD/Choco BEJARANO AGABRADLEYP-BC, NP-C      Janet BEJARANO NP-C     Hemanth BEJARANO NP-C                                          Pulmonary Progress Note    Patient - Hemanth Barrera   Age - 89 y.o.   - 1935  MRN - [id number]  Acct # - [id number]  Date of Admission - 2025  9:06 AM    Consulting Service/Physician:       Primary Care Physician: Neftaly Contreras MD    SUBJECTIVE:     Chief Complaint:   Chief Complaint   Patient presents with    Fall    Rib Pain (injury)     Subjective:    Hemanth is seen lying in bed.  He underwent left-sided thoracentesis yesterday for 1.6 L bloody fluid.  Cytology pending.  Pleural fluid culture so far with no growth.  LD was 414 with protein of 4.3 consistent with exudative/hemothorax.  Chest x-ray today stable from yesterday with persistent small left effusion.  Tmax for 24 hours 100.  Hemoglobin has been stable in the low to mid 7 range.  He is still having some pleuritic pain but states things are manageable.  He denies any shortness of breath.    VITALS  /67   Pulse 68   Temp 100 °F (37.8 °C) (Oral)   Resp 17   Ht 1.854 m (6' 1\")   Wt 73.5 kg (162 lb)   SpO2 100%   BMI 21.37 kg/m²   Wt Readings from Last 3 Encounters:   25 73.5 kg (162 lb)   25 73.1 kg (161 lb 2.5 oz)   25 63.5 kg (140 lb)     I/O (24 Hours)    Intake/Output Summary (Last 24 hours) at 2025 1339  Last data filed at 2025 1338  Gross per 24 hour   Intake 1080 ml   Output 1720 ml   Net -640 ml     Ventilator:      Exam:   Physical Exam   Constitutional: Thin elderly man sitting up in bed on room air no distress  HENT: Unremarkable  Head: Normocephalic and atraumatic.   Eyes: EOM are normal. Pupils are equal, round, and reactive to light.   Neck: Neck supple.   Cardiovascular:  Regular rate and rhythm.  Normal heart tones.  No JVD.    Pulmonary/Chest:

## 2025-01-22 NOTE — PROGRESS NOTES
Physician Progress Note      PATIENT:               DONNA DYSON  CSN #:                  [id number]  :                       1935  ADMIT DATE:       2025 9:06 AM  DISCH DATE:  RESPONDING  PROVIDER #:        Kaitlin Dsouza MD          QUERY TEXT:    [facility]    Pt admitted with Traumatic hemothorax with rib fractures and has anemia   documented. HBG 6.6, HCT 19.8. PRBC infused. If possible, please document in   progress notes and discharge summary further specificity regarding the acuity   and type of anemia:    The medical record reflects the following:  Risk Factors: Traumatic hemothorax with rib fractures  Clinical Indicators: Anemia, HBG 6.6, HCT 19.8  Treatment: Thoracentesis & PRBC  Options provided:  -- Anemia due to acute blood loss  -- Anemia due to chronic blood loss  -- Anemia due to acute on chronic blood loss  -- Anemia due to chronic anticoagulant use  -- Other - I will add my own diagnosis  -- Disagree - Not applicable / Not valid  -- Disagree - Clinically unable to determine / Unknown  -- Refer to Clinical Documentation Reviewer    PROVIDER RESPONSE TEXT:    This patient has acute blood loss anemia.    Query created by: Pretty Philip on 2025 7:06 AM      QUERY TEXT:    [facility]    Pt admitted with Traumatic hemothorax with rib fractures and meets Moderate   Malnutrition per dietician ASPEN criteria. Please further specify type of   malnutrition with documentation in the medical record.    The medical record reflects the following:  Risk Factors: Traumatic hemothorax with rib fractures  Clinical Indicators: ASPEN: Weight Loss:   (7.5% wt loss over 4 months)  &   Body Fat Loss:  Mild body fat loss Orbital & Muscle Mass Loss:  Mild muscle   mass loss Hand (interosseous) & Fluid Accumulation:  Mild Extremities  Treatment: Ensure Clear to Clear Liquid diet    ASPEN Criteria:

## 2025-01-22 NOTE — PROGRESS NOTES
Culture, body fluid: gram positive cocci in clusters.       Dr. Dsouza updated on unit.     1424 - see orders

## 2025-01-22 NOTE — CONSULTS
Physical Medicine & Rehabilitation  Consult Note      Admitting Physician: Kaitlin Dsouza MD    Primary Care Provider: Neftaly Contreras MD     Reason for Consult:  Acute Inpatient Rehabilitation    Chief Complaint: Fall, rib pain    History of Present Illness:  Referring Provider is requesting an evaluation for appropriate placement upon discharge from acute care.     Mr. Hemanth Barrera is a 89 y.o.  male who was admitted to [facility] on 1/20/2025 with Fall and Rib Pain (injury)    89-year-old male status post fall 1/19/2025 landed on his left side had severe pain.  Patient has history of coronary artery disease with stent, A-fib, heart failure with preserved ejection fracture with pacemaker, CKD stage IIIb, chronic anemia abdominal hernia status post repair.  On admission he had hemoglobin 6.6 required transfusion.  CT chest showed a large left pleural effusion with compression atelectasis, segmented fracture left posterior 10th and 11th ribs, CT abdomen and pelvis reveal findings suggestive of ileus/obstruction    General Surgery left posterior 10th and 11th rib fractures, large left pleural effusion, possible ileus chronic anticoagulation on Xarelto-continue clear liquid diet, thoracentesis planned by pulmonary service for 1/21 continue pulse ox, advance diet conservative management    Internal medicine acute on chronic anemia posthemorrhagic status post transfusion also anticoagulation and antiplatelets on hold, mechanical fall with rib fractures with hide will possible hemothorax, coronary disease with stent and A-fib as well as heart failure preserved ejection rhythm with pacemaker continue home meds anticoagulation on hold, possible ileus/obstruction see above.  No DVT prophylaxis due to bleed    Nephrology VARSHA secondary intravascular volume depletion from vomiting hypoperfusion from NSTEMI improved, acute gastroenteritis, CKD stage III, sick sinus syndrome with pacemaker, monitor I's and O's BMP

## 2025-01-22 NOTE — PROGRESS NOTES
There is fluid throughout the rest of the  small bowel but no significant dilatation.  The colon is mildly dilated with  stool throughout.  There is stool in the rectum also mildly dilated.    Pelvis: The bladder is distended.  Prostate gland is partially obscured by  beam hardening artifact from right hip prosthesis.    Peritoneum/Retroperitoneum: Patient is fairly cachectic with minimal  intraabdominal fat limiting evaluation.  No obvious free fluid or free air.  No adenopathy.    Bones/Soft Tissues: Extensive degenerative changes of the thoracic and lumbar  spine.  Postoperative changes of the lumbar spine without complication.  Right hip prosthesis.  No obvious acute fracture of the vertebral bodies..    Impression  1. Large left pleural effusion with compressive atelectasis.  2. Segmented fractures of the left posterior 10th and 11th ribs with a  moderate subcutaneous hematoma and equivocal tiny amount of subcutaneous gas.  3. Fluid-filled dilated small bowel and colon with stool throughout the colon  and rectum. Findings may represent ileus/obstruction or constipation.  4. Extensive degenerative changes of the thoracic and lumbar spine and  postoperative changes of the lumbar spine and right hip.      Hospital Problems             Last Modified POA    * (Principal) Traumatic closed displaced fracture of rib on left side, initial encounter 1/20/2025 Yes    Multiple closed fractures of ribs of left side 1/20/2025 Yes    Pleural effusion 1/20/2025 Yes    Anemia 1/20/2025 Yes    Fall 1/20/2025 Yes    Moderate malnutrition (HCC) 1/21/2025 Yes       ASSESSMENT   89 y.o. male with history of fall 3 days ago  Fracture of the left posterior 10th and 11th ribs.  Moderate subcutaneous hematoma.  Large left pleural effusion with compressive atelectasis  Abdominal findings noted possible ileus.  Degenerative changes in the thoracic and the lumbar spine.  Imaging of the head and the cervical spine was negative.  Patient is  awake alert in no acute distress.  Answering all questions appropriately.  No acute shortness of breath, more with exertion.  Patient feeling better than yesterday overall.  Thoracentesis yesterday with 1.6 L bloody fluid removed.  Pleural fluid growing gram-positive cocci in clusters.  Blood work reviewed.  Most recent hemoglobin 7.2.  White blood cell count 5.5.  Platelets 210.  Sodium 137.  Potassium 4.2.  Creatinine 0.9.  Chronic anticoagulation on Xarelto and Plavix on hold  Chest x-ray today revealed stable exam demonstrating small residual left pleural fluid and left retrocardiac airspace disease  Vital signs stable, afebrile    PLAN  Continue regular diet as tolerated  Continue continuous pulse ox.  Incentive spirometer.  Pain control.  Lidoderm patch.  Deep breathing and coughing.  Continue to hold anticoagulation.  Infectious disease consulted  Continue conservative management given his age.  Continue medical management per admitting service, consultants    The patient, Hemanth Barrera is a 89 y.o. male, was seen with a total time spent of 35 minutes for the visit on this date of service by the E/M provider. The time component had both face to face and non face to face time spent in determining the total time component.  Counseling and education regarding the patient's diagnosis listed below and the patient's options regarding those diagnoses were also included in determining the time component.    Please note that portions of this note were completed with Dragon dictation, the computer voice recognition software.  Quite often unanticipated grammatical, syntax, homophones, and other interpretive errors are inadvertently transcribed by the computer software.  Please disregard these errors and any other errors that may have escaped final proofreading.     Electronically signed by DARCI Sanders CNP  on 1/22/2025 at 6:14 PM

## 2025-01-22 NOTE — PROGRESS NOTES
[facility]  IN-PATIENT SERVICE  [facility]    PROGRESS NOTE             1/22/2025    11:50 AM    Name:   Hemanth Barrera  MRN:     [id number]     Acct:      [id number]   Room:   2103/2103-01   Day:  2  Admit Date:  1/20/2025  9:06 AM    PCP:  Neftaly Contreras MD  Code Status:  Full Code    Subjective:     C/C:   Chief Complaint   Patient presents with    Fall    Rib Pain (injury)     Interval History:    -Vitals stable.  -Hb stabilized after the 2nd unit if RBCs: 8.2 this morning. BUN:creatinine ratio 24:1.2 (improved).  -Evaluated by general surgery and pulmonology. Thoracocentesis planned for today.  -Feels better. Skin turgor improved.       Brief History:     The patient is a 89 y.o. male who slipped and fell 01/19/2025 and landed on his left side, after which severe pain in his left side started to bother him. Denies loss of consciousness, numbness, weakness, headache, neck pain, back pain.     PMH: coronary artery disease with stent placement, afib, HFpEF with pacemaker, CKD stage IIIb, chronic anemia, abdominal hernia status post repair.     In the ER, he was stable. Hb 6.6 -- received RBC transfusion. CT head and C-spine showed no critical findings. CT chest whowed a large left pleural effusion with compressive atelectasis, segmented fractures of the left post erior 10th and 11th ribs. CT abdomen and pelvis revealed findings suggestive of ileus/obstruction.     Admitted for management of his multiple health issues.    2 units of RBCs transfused, Hb stabilized: 8.2. Evaluated by general surgery and pulmonology.     Medications:     Allergies:    Allergies   Allergen Reactions    Aspirin Other (See Comments)     Pt told not to take since he has only a partial stomach    Cyclobenzaprine Other (See Comments)     Pt stated heart palpitations and he felt funny    Ibuprofen Nausea Only    Azithromycin Nausea Only    Hydrocodone-Acetaminophen Nausea Only     per pt,

## 2025-01-22 NOTE — PLAN OF CARE
Problem: Chronic Conditions and Co-morbidities  Goal: Patient's chronic conditions and co-morbidity symptoms are monitored and maintained or improved  1/22/2025 0318 by Macario Mi RN  Outcome: Progressing     Problem: Discharge Planning  Goal: Discharge to home or other facility with appropriate resources  1/22/2025 0318 by Macario Mi RN  Outcome: Progressing     Problem: Pain  Goal: Verbalizes/displays adequate comfort level or baseline comfort level  1/22/2025 0318 by Macario Mi RN  Outcome: Progressing  Note: Pt medicated with pain medication prn.  Assessed all pain characteristics including level, type, location, frequency, and onset.  Non-pharmacologic interventions offered to pt as well.       Problem: Safety - Adult  Goal: Free from fall injury  1/22/2025 0318 by Macario Mi RN  Outcome: Progressing  Note: Pt assessed as a fall risk this shift. Remains free from falls and accidental injury at this time. Fall precautions in place. Floor free from obstacles and bed is locked and in lowest position. Adequate lighting provided.  Pt encouraged to call before getting OOB for any need.  Bed alarm activated. Will continue to monitor needs during hourly rounding, and reinforce education on use of call light.      Problem: ABCDS Injury Assessment  Goal: Absence of physical injury  1/22/2025 0318 by Macario Mi RN  Outcome: Progressing  Note: Pt is free from injury.

## 2025-01-22 NOTE — CARE COORDINATION
ONGOING DISCHARGE PLAN:    Patient is alert and oriented x4.    Spoke with patient regarding discharge plan and patient confirms that plan is agreeable to rehab.     Writer spoke with son about rehab facilities. He stated [facility]-1st choice and [facility]-2nd choice.     PM&R consult    1/21 Left thoracentesis 1.6L  +culture  IV vanco  New ID consult      Surgery-trauma  Rib fractures     Hgb 7.5    PT/OT    Will continue to follow for additional discharge needs.    If patient is discharged prior to next notation, then this note serves as note for discharge by case management.    Electronically signed by Yudelka Damian RN on 1/22/2025 at 2:41 PM

## 2025-01-22 NOTE — CARE COORDINATION
Per PM&R physiatrist Dr. Lawrence Jamison, patient appropriate for Acute Inpatient Rehabilitation level of care.    Eligibility & Benefits Verified - See Note    Prior authorization request for admission attempted with primary insurance [employer] via: Phone Call: Call Ref# Not available, Representative: Lina    Per Lina, even though eligibility and benefits have been verified per [employer], the patient is \"showing that their eligibility is termed 12/31/2024 through [facility].\" When this RN informed the representative that the eligibility has already been verified as active through primary insurance, Lina stated that it was not applicable to [facility]. Upon further inquiry, no clarification as to what the difference is between the two entities was made. Lina initiated a ticket for eligibility verification.    Pending Case Reference/Authorization for verification of eligibility through [facility] [employer] [facility]: [id number]    THIS IS NOT AN AUTH INITIATION. Rep stated that Admissions coordinator will have to call back tomorrow at [phone] op 7, op1 to check state of verification case. No direct number to Lina was provided.    Updated Yudelka CM:  Via [employer]  at this time.

## 2025-01-22 NOTE — CARE COORDINATION
Writer received a call from Yudelka  in ARU admissions. She informed me that Dr Jamison stated patient is appropriate. She will  start auth today.      Electronically signed by Yudelka Damian RN on 1/22/2025 at 4:04 PM

## 2025-01-22 NOTE — FLOWSHEET NOTE
01/22/25 1514   Treatment Team Notification   Reason for Communication Evaluate   Name of Team Member Notified Dr. James   Treatment Team Role Consulting Provider   Method of Communication Secure Message   Response Waiting for response   Notification Time 1514     gram positive cocci in pleural fluid

## 2025-01-23 ENCOUNTER — APPOINTMENT (OUTPATIENT)
Dept: GENERAL RADIOLOGY | Age: 89
DRG: 199 | End: 2025-01-23
Payer: MEDICARE

## 2025-01-23 LAB
ABO/RH: NORMAL
ANION GAP SERPL CALCULATED.3IONS-SCNC: 6 MMOL/L (ref 9–16)
ANTIBODY SCREEN: NEGATIVE
ARM BAND NUMBER: NORMAL
BASOPHILS # BLD: 0 K/UL (ref 0–0.2)
BASOPHILS NFR BLD: 0 % (ref 0–2)
BLOOD BANK BLOOD PRODUCT EXPIRATION DATE: NORMAL
BLOOD BANK BLOOD PRODUCT EXPIRATION DATE: NORMAL
BLOOD BANK DISPENSE STATUS: NORMAL
BLOOD BANK DISPENSE STATUS: NORMAL
BLOOD BANK ISBT PRODUCT BLOOD TYPE: 9500
BLOOD BANK ISBT PRODUCT BLOOD TYPE: 9500
BLOOD BANK PRODUCT CODE: NORMAL
BLOOD BANK PRODUCT CODE: NORMAL
BLOOD BANK SAMPLE EXPIRATION: NORMAL
BLOOD BANK UNIT TYPE AND RH: NORMAL
BLOOD BANK UNIT TYPE AND RH: NORMAL
BPU ID: NORMAL
BPU ID: NORMAL
BUN SERPL-MCNC: 14 MG/DL (ref 8–23)
CALCIUM SERPL-MCNC: 8 MG/DL (ref 8.6–10.4)
CHLORIDE SERPL-SCNC: 107 MMOL/L (ref 98–107)
CO2 SERPL-SCNC: 24 MMOL/L (ref 20–31)
COMPONENT: NORMAL
COMPONENT: NORMAL
CREAT SERPL-MCNC: 0.9 MG/DL (ref 0.7–1.2)
CROSSMATCH RESULT: NORMAL
CROSSMATCH RESULT: NORMAL
DATE LAST DOSE: ABNORMAL
EOSINOPHIL # BLD: 0.1 K/UL (ref 0–0.4)
EOSINOPHILS RELATIVE PERCENT: 2 % (ref 0–4)
ERYTHROCYTE [DISTWIDTH] IN BLOOD BY AUTOMATED COUNT: 15.8 % (ref 11.5–14.9)
FERRITIN SERPL-MCNC: 262 NG/ML
FOLATE SERPL-MCNC: 13 NG/ML (ref 4.8–24.2)
GFR, ESTIMATED: 81 ML/MIN/1.73M2
GLUCOSE SERPL-MCNC: 103 MG/DL (ref 74–99)
HAPTOGLOB SERPL-MCNC: 84 MG/DL (ref 30–200)
HCT VFR BLD AUTO: 21.3 % (ref 41–53)
HCT VFR BLD AUTO: 21.5 % (ref 41–53)
HCT VFR BLD AUTO: 24.2 % (ref 41–53)
HGB BLD-MCNC: 7 G/DL (ref 13.5–17.5)
HGB BLD-MCNC: 7.1 G/DL (ref 13.5–17.5)
HGB BLD-MCNC: 8.1 G/DL (ref 13.5–17.5)
IRON SATN MFR SERPL: 7 % (ref 20–55)
IRON SERPL-MCNC: 15 UG/DL (ref 61–157)
LDH SERPL-CCNC: 312 U/L (ref 135–225)
LYMPHOCYTES NFR BLD: 0.5 K/UL (ref 1–4.8)
LYMPHOCYTES RELATIVE PERCENT: 11 % (ref 24–44)
MCH RBC QN AUTO: 30.8 PG (ref 26–34)
MCHC RBC AUTO-ENTMCNC: 33.1 G/DL (ref 31–37)
MCV RBC AUTO: 92.9 FL (ref 80–100)
MONOCYTES NFR BLD: 0.3 K/UL (ref 0.1–1.3)
MONOCYTES NFR BLD: 8 % (ref 1–7)
NEUTROPHILS NFR BLD: 79 % (ref 36–66)
NEUTS SEG NFR BLD: 3.4 K/UL (ref 1.3–9.1)
PLATELET # BLD AUTO: 214 K/UL (ref 150–450)
PMV BLD AUTO: 6.9 FL (ref 6–12)
POTASSIUM SERPL-SCNC: 4.4 MMOL/L (ref 3.7–5.3)
RBC # BLD AUTO: 2.31 M/UL (ref 4.5–5.9)
RETICS # AUTO: 0.05 M/UL (ref 0.02–0.1)
RETICS/RBC NFR AUTO: 2.1 % (ref 0.5–2)
SODIUM SERPL-SCNC: 137 MMOL/L (ref 136–145)
SURGICAL PATHOLOGY REPORT: NORMAL
TIBC SERPL-MCNC: 207 UG/DL (ref 250–450)
TME LAST DOSE: 1536 H
TRANSFUSION STATUS: NORMAL
TRANSFUSION STATUS: NORMAL
UNIT DIVISION: 0
UNIT DIVISION: 0
UNIT ISSUE DATE/TIME: NORMAL
UNIT ISSUE DATE/TIME: NORMAL
UNSATURATED IRON BINDING CAPACITY: 192 UG/DL (ref 112–347)
VANCOMYCIN DOSE: ABNORMAL MG
VANCOMYCIN SERPL-MCNC: <4 UG/ML (ref 5–40)
VIT B12 SERPL-MCNC: 408 PG/ML (ref 232–1245)
WBC OTHER # BLD: 4.3 K/UL (ref 3.5–11)

## 2025-01-23 PROCEDURE — 99232 SBSQ HOSP IP/OBS MODERATE 35: CPT | Performed by: PHYSICAL MEDICINE & REHABILITATION

## 2025-01-23 PROCEDURE — 99232 SBSQ HOSP IP/OBS MODERATE 35: CPT | Performed by: INTERNAL MEDICINE

## 2025-01-23 PROCEDURE — 6370000000 HC RX 637 (ALT 250 FOR IP)

## 2025-01-23 PROCEDURE — 82728 ASSAY OF FERRITIN: CPT

## 2025-01-23 PROCEDURE — 99223 1ST HOSP IP/OBS HIGH 75: CPT | Performed by: INTERNAL MEDICINE

## 2025-01-23 PROCEDURE — 83540 ASSAY OF IRON: CPT

## 2025-01-23 PROCEDURE — 85045 AUTOMATED RETICULOCYTE COUNT: CPT

## 2025-01-23 PROCEDURE — 2580000003 HC RX 258

## 2025-01-23 PROCEDURE — 2500000003 HC RX 250 WO HCPCS

## 2025-01-23 PROCEDURE — 99232 SBSQ HOSP IP/OBS MODERATE 35: CPT | Performed by: SURGERY

## 2025-01-23 PROCEDURE — 80202 ASSAY OF VANCOMYCIN: CPT

## 2025-01-23 PROCEDURE — 97110 THERAPEUTIC EXERCISES: CPT

## 2025-01-23 PROCEDURE — 82746 ASSAY OF FOLIC ACID SERUM: CPT

## 2025-01-23 PROCEDURE — 85014 HEMATOCRIT: CPT

## 2025-01-23 PROCEDURE — 83550 IRON BINDING TEST: CPT

## 2025-01-23 PROCEDURE — 97530 THERAPEUTIC ACTIVITIES: CPT

## 2025-01-23 PROCEDURE — 6360000002 HC RX W HCPCS

## 2025-01-23 PROCEDURE — 83615 LACTATE (LD) (LDH) ENZYME: CPT

## 2025-01-23 PROCEDURE — 36415 COLL VENOUS BLD VENIPUNCTURE: CPT

## 2025-01-23 PROCEDURE — G0545 PR INHERENT VISIT TO INPT: HCPCS | Performed by: INTERNAL MEDICINE

## 2025-01-23 PROCEDURE — 85018 HEMOGLOBIN: CPT

## 2025-01-23 PROCEDURE — 80048 BASIC METABOLIC PNL TOTAL CA: CPT

## 2025-01-23 PROCEDURE — 2060000000 HC ICU INTERMEDIATE R&B

## 2025-01-23 PROCEDURE — 83010 ASSAY OF HAPTOGLOBIN QUANT: CPT

## 2025-01-23 PROCEDURE — 97116 GAIT TRAINING THERAPY: CPT

## 2025-01-23 PROCEDURE — 85025 COMPLETE CBC W/AUTO DIFF WBC: CPT

## 2025-01-23 PROCEDURE — 71045 X-RAY EXAM CHEST 1 VIEW: CPT

## 2025-01-23 PROCEDURE — 82607 VITAMIN B-12: CPT

## 2025-01-23 RX ORDER — VANCOMYCIN 1.75 G/350ML
1250 INJECTION, SOLUTION INTRAVENOUS EVERY 12 HOURS
Status: DISCONTINUED | OUTPATIENT
Start: 2025-01-24 | End: 2025-01-24

## 2025-01-23 RX ORDER — OXYCODONE AND ACETAMINOPHEN 5; 325 MG/1; MG/1
1 TABLET ORAL EVERY 4 HOURS PRN
Status: DISCONTINUED | OUTPATIENT
Start: 2025-01-23 | End: 2025-01-25

## 2025-01-23 RX ADMIN — FINASTERIDE 5 MG: 5 TABLET, FILM COATED ORAL at 10:00

## 2025-01-23 RX ADMIN — OXYCODONE HYDROCHLORIDE AND ACETAMINOPHEN 1 TABLET: 5; 325 TABLET ORAL at 23:57

## 2025-01-23 RX ADMIN — OXYCODONE HYDROCHLORIDE AND ACETAMINOPHEN 1 TABLET: 5; 325 TABLET ORAL at 12:31

## 2025-01-23 RX ADMIN — OXYCODONE HYDROCHLORIDE AND ACETAMINOPHEN 1 TABLET: 5; 325 TABLET ORAL at 19:21

## 2025-01-23 RX ADMIN — SODIUM CHLORIDE, PRESERVATIVE FREE 10 ML: 5 INJECTION INTRAVENOUS at 10:32

## 2025-01-23 RX ADMIN — HYDROCODONE BITARTRATE AND ACETAMINOPHEN 1 TABLET: 5; 325 TABLET ORAL at 10:00

## 2025-01-23 RX ADMIN — VANCOMYCIN HYDROCHLORIDE 1000 MG: 1 INJECTION, POWDER, LYOPHILIZED, FOR SOLUTION INTRAVENOUS at 15:27

## 2025-01-23 RX ADMIN — HYDROCODONE BITARTRATE AND ACETAMINOPHEN 1 TABLET: 5; 325 TABLET ORAL at 05:26

## 2025-01-23 RX ADMIN — SODIUM CHLORIDE, PRESERVATIVE FREE 10 ML: 5 INJECTION INTRAVENOUS at 21:54

## 2025-01-23 RX ADMIN — ATORVASTATIN CALCIUM 40 MG: 40 TABLET, FILM COATED ORAL at 20:23

## 2025-01-23 RX ADMIN — PANTOPRAZOLE SODIUM 40 MG: 40 TABLET, DELAYED RELEASE ORAL at 05:27

## 2025-01-23 RX ADMIN — METOPROLOL SUCCINATE 100 MG: 50 TABLET, EXTENDED RELEASE ORAL at 10:00

## 2025-01-23 RX ADMIN — ISOSORBIDE MONONITRATE 30 MG: 30 TABLET, EXTENDED RELEASE ORAL at 10:00

## 2025-01-23 RX ADMIN — DILTIAZEM HYDROCHLORIDE 120 MG: 120 CAPSULE, COATED, EXTENDED RELEASE ORAL at 10:00

## 2025-01-23 ASSESSMENT — PAIN SCALES - GENERAL
PAINLEVEL_OUTOF10: 4
PAINLEVEL_OUTOF10: 9
PAINLEVEL_OUTOF10: 8
PAINLEVEL_OUTOF10: 9
PAINLEVEL_OUTOF10: 3
PAINLEVEL_OUTOF10: 8

## 2025-01-23 ASSESSMENT — PAIN DESCRIPTION - LOCATION
LOCATION: RIB CAGE
LOCATION: BACK;CHEST
LOCATION: BACK;CHEST
LOCATION: RIB CAGE
LOCATION: ABDOMEN;RECTUM
LOCATION: BACK;CHEST

## 2025-01-23 ASSESSMENT — PAIN DESCRIPTION - DESCRIPTORS
DESCRIPTORS: ACHING
DESCRIPTORS: DISCOMFORT
DESCRIPTORS: DISCOMFORT
DESCRIPTORS: SHARP
DESCRIPTORS: DISCOMFORT;SHARP
DESCRIPTORS: ACHING
DESCRIPTORS: ACHING
DESCRIPTORS: DISCOMFORT

## 2025-01-23 ASSESSMENT — PAIN - FUNCTIONAL ASSESSMENT
PAIN_FUNCTIONAL_ASSESSMENT: PREVENTS OR INTERFERES SOME ACTIVE ACTIVITIES AND ADLS
PAIN_FUNCTIONAL_ASSESSMENT: ACTIVITIES ARE NOT PREVENTED
PAIN_FUNCTIONAL_ASSESSMENT: ACTIVITIES ARE NOT PREVENTED

## 2025-01-23 ASSESSMENT — PAIN DESCRIPTION - ORIENTATION
ORIENTATION: LEFT

## 2025-01-23 ASSESSMENT — PAIN DESCRIPTION - ONSET
ONSET: ON-GOING

## 2025-01-23 NOTE — PROGRESS NOTES
prosthesis.  No obvious acute fracture of the vertebral bodies..    Impression  1. Large left pleural effusion with compressive atelectasis.  2. Segmented fractures of the left posterior 10th and 11th ribs with a  moderate subcutaneous hematoma and equivocal tiny amount of subcutaneous gas.  3. Fluid-filled dilated small bowel and colon with stool throughout the colon  and rectum. Findings may represent ileus/obstruction or constipation.  4. Extensive degenerative changes of the thoracic and lumbar spine and  postoperative changes of the lumbar spine and right hip.      Hospital Problems             Last Modified POA    * (Principal) Traumatic closed displaced fracture of rib on left side, initial encounter 1/20/2025 Yes    Multiple closed fractures of ribs of left side 1/20/2025 Yes    Pleural effusion 1/20/2025 Yes    Anemia 1/20/2025 Yes    Fall 1/20/2025 Yes    Moderate malnutrition (HCC) 1/21/2025 Yes         ASSESSMENT   89 y.o. male with history of fall.  Left posterior 10th and 11th rib fracture.  Hemothorax left status post thoracentesis by pulmonologist  Patient presented to the hospital few days after the fall.  No acute general surgical issue at this time.  Pain is controlled.    PLAN  Continue pain control.  Pulmonary toilet.  Incentive spirometer.  Lidoderm patch.  Deep breathing and coughing.  Case discussed with the pulmonologist.  Hold blood thinners for the next several days.  Chest x-ray discussed.  Blood work reviewed.  BMP unremarkable.  Hemoglobin has been stable.  WBC count normal.  Platelet count adequate.  Discharge planning to inpatient rehab is in progress.  Will follow with you    The patient, Hemanth Barrera is a 89 y.o. male, was seen with a total time spent of 35 minutes for the visit on this date of service by the E/M provider. The time component had both face to face and non face to face time spent in determining the total time component.  Counseling and education regarding the  patient's diagnosis listed below and the patient's options regarding those diagnoses were also included in determining the time component.      Please note that portions of this note were completed with Dragon dictation, the computer voice recognition software.  Quite often unanticipated grammatical, syntax, homophones, and other interpretive errors are inadvertently transcribed by the computer software.  Please disregard these errors and any other errors that may have escaped final proofreading.     Electronically signed by Lebron Roman MD  on 1/23/2025 at 5:19 PM

## 2025-01-23 NOTE — CARE COORDINATION
[employer] and [employer] portals checked, no authorization noted. Called [facility] [phone] opt 7 and opt 1 spoke with Kierra.  Verification of benefits is still pending.  Update provided to Yudelka via Perfect [employer].     Per [employer] portal patients insurance with [employer] Medicare is active since 5/1/2023, although no ARU benefits provided.  States Rehabilitation inpatient is non covered.   Per insurance card   Insurance Type: Health Maintenance Organization (HMO) - Medicare Risk  Plan / Product: [employer] Gold Pl-Diab/Hrt

## 2025-01-23 NOTE — CONSULTS
[facility] -   Infectious diseases evaluation  admission date 1/20/2025    reason for consultation:   Gram-positive cocci in clusters on pleural fluid culture    Impression :   Current:  Rib fractures with hemothorax to the left side  Status post thoracentesis 1/21/2025 gram-positive cocci in clusters on pleural fluid culture clinical significance unclear  Anemia  Mechanical fall  Coronary artery disease status post stent placement  Pacemaker  Chronic kidney disease stage IIIb  Chronic anemia  Abdominal hernia s/p repair  A-fib    HENCE:   IV Vancomycin  Follow cultures and adjust antibiotics as needed  Follow CBC and renal function  Supportive care    Infection Control Recommendations   Russell Precautions        Antimicrobial Stewardship Recommendations   Simplification of therapy  Targeted therapy      History of Present Illness:   Initial history:  Hemanth Barrera is a 89 y.o.-year-old male presented to the hospital after he fell on 1/19/2025, landed on his left side, complaining of pain to the left side chest pain.  The patient was on aspirin, Plavix and Xarelto.  At the ER hemoglobin was 6.6 received blood transfusion  CT head and C-spine unremarkable.  CT chest showed large left pleural effusion suspected hemothorax with atelectasis, fractures of the 10th and 11th ribs with moderate subcutaneous hematoma and tiny amount of subcutaneous gas .  CT abdomen and pelvis suggestive of constipation, ileus versus obstruction  Status post thoracentesis 1/21/2025 showed 1, 875 WBC, 1, 386, 211 RBCs, 4.3 of protein, 414 LD, gram-positive cocci in clusters on culture  Interval changes  1/23/2025   Patient Vitals for the past 8 hrs:   BP Temp Temp src Pulse Resp SpO2   01/23/25 1000 -- -- -- -- 18 --   01/23/25 0752 121/70 98.6 °F (37 °C) Oral 63 16 99 %   01/23/25 0526 -- -- -- -- 18 --   01/23/25 0415 116/65 99.3 °F (37.4 °C) Oral 65 18 97 %

## 2025-01-23 NOTE — PROGRESS NOTES
[facility]   Pharmacy Pharmacokinetic Monitoring Service - Vancomycin     Hemanth Barrera is a 89 y.o. male starting on vancomycin therapy for   Pleural fluid growing gram-positive cocci in clusters. Pharmacy consulted by Dr. Wood monitoring and adjustment.    Target Concentration: Goal AUC/ASIF 400-600 mg*hr/L    Additional Antimicrobials: none    Pertinent Laboratory Values:   Wt Readings from Last 1 Encounters:   01/20/25 73.5 kg (162 lb)     Temp Readings from Last 1 Encounters:   01/23/25 99 °F (37.2 °C) (Oral)     Estimated Creatinine Clearance: 57 mL/min (based on SCr of 0.9 mg/dL).  Recent Labs     01/22/25  0609 01/23/25  0646   CREATININE 0.9 0.9   BUN 16 14   WBC 5.5 4.3         Pertinent Cultures: see micro  MRSA Nasal Swab: N/A. Non-respiratory infection. See below    Plan:  Dosing recommendations based on Bayesian software  Patient had 2 doses of Vanco 1000 mg Iv prior to pharmacy consult. Will increase dose to 1250 mg IV q12h   Anticipated AUC of 400-600  Renal labs as indicated   Vancomycin concentration ordered for 1/24 @ 1000   Pharmacy will continue to monitor patient and adjust therapy as indicated    Loading dose: N/A  Regimen: 1250 mg IV every 12 hours.  Start time: 15:38 on 01/23/2025  Exposure target: AUC24 (range)400-600 mg/L.hr   VNO65-86: 414 mg/L.hr  AUC24,ss: 564 mg/L.hr  Probability of AUC24 > 400: 98 %  Ctrough,ss: 18 mg/L  Probability of Ctrough,ss > 20: 30 %    Thank you for the consult,  Lilliana Avery RPH  1/23/2025 3:37 PM

## 2025-01-23 NOTE — PLAN OF CARE
Problem: Chronic Conditions and Co-morbidities  Goal: Patient's chronic conditions and co-morbidity symptoms are monitored and maintained or improved  1/23/2025 1547 by Elizabeth Eckert RN  Outcome: Progressing  Flowsheets (Taken 1/23/2025 1000)  Care Plan - Patient's Chronic Conditions and Co-Morbidity Symptoms are Monitored and Maintained or Improved:   Monitor and assess patient's chronic conditions and comorbid symptoms for stability, deterioration, or improvement   Collaborate with multidisciplinary team to address chronic and comorbid conditions and prevent exacerbation or deterioration   Update acute care plan with appropriate goals if chronic or comorbid symptoms are exacerbated and prevent overall improvement and discharge     Problem: Discharge Planning  Goal: Discharge to home or other facility with appropriate resources  Outcome: Progressing  Flowsheets (Taken 1/23/2025 1000)  Discharge to home or other facility with appropriate resources:   Identify barriers to discharge with patient and caregiver   Identify discharge learning needs (meds, wound care, etc)   Arrange for needed discharge resources and transportation as appropriate   Refer to discharge planning if patient needs post-hospital services based on physician order or complex needs related to functional status, cognitive ability or social support system     Problem: Pain  Goal: Verbalizes/displays adequate comfort level or baseline comfort level  1/23/2025 1547 by Elizabeth Eckert RN  Outcome: Progressing     Problem: Safety - Adult  Goal: Free from fall injury  1/23/2025 1547 by Elizabeth Eckert RN  Outcome: Progressing     Problem: ABCDS Injury Assessment  Goal: Absence of physical injury  1/23/2025 1547 by Elizabeth Eckert RN  Outcome: Progressing     Problem: Nutrition Deficit:  Goal: Optimize nutritional status  Outcome: Progressing  Flowsheets (Taken 1/23/2025 1332 by Tessa Aiken, RD, LD)  Nutrient intake appropriate for improving,

## 2025-01-23 NOTE — PROGRESS NOTES
Comprehensive Nutrition Assessment    Type and Reason for Visit:  Reassess    Nutrition Recommendations/Plan:   Will provide Regular diet cut into pieces for pt  Will provide chocolate Ensure Plus twice daily     Malnutrition Assessment:  Malnutrition Status:  Moderate malnutrition (01/21/25 1409)    Context:  Acute Illness     Findings of the 6 clinical characteristics of malnutrition:  Energy Intake:  Unable to assess  Weight Loss:   (7.5% wt loss over 4 months)     Body Fat Loss:  Mild body fat loss Orbital   Muscle Mass Loss:  Mild muscle mass loss Hand (interosseous)  Fluid Accumulation:  Mild Extremities   Strength:  Not Performed    Nutrition Assessment:    Pt states he ate most of breakfast provided. He states he tried Ensure Clear but didn't like it.    Nutrition Related Findings:    mild BLE edema, Labs: Glu 103, Meds: Reviewed, BM 1/23 Wound Type: Multiple, Pressure Injury       Current Nutrition Intake & Therapies:    Average Meal Intake: 51-75%     ADULT DIET; Regular  ADULT ORAL NUTRITION SUPPLEMENT; Breakfast, Dinner; Standard High Calorie/High Protein Oral Supplement    Anthropometric Measures:  Height: 185.4 cm (6' 1\")  Ideal Body Weight (IBW): 184 lbs (84 kg)    Admission Body Weight: 73.5 kg (162 lb) (stated)  Current Body Weight: 78.5 kg (173 lb 1 oz) (obtained by RD), 94.1 % IBW. Weight Source: Bed scale  Current BMI (kg/m2): 22.8  Usual Body Weight: 84.8 kg (187 lb) (9/24 bedscale)     % Weight Change (Calculated): -7.5                    BMI Categories: Normal Weight (BMI 22.0 to 24.9) age over 65    Estimated Daily Nutrient Needs:  Energy Requirements Based On: Formula  Weight Used for Energy Requirements: Current  Energy (kcal/day): [place] x 1.2-= 1800 kcal  Weight Used for Protein Requirements: Current  Protein (g/day): 1.5g/kg= 115-120 g  Method Used for Fluid Requirements: Defer to provider  Fluid (ml/day): at least 1500 ml    Nutrition Diagnosis:   Moderate malnutrition related to

## 2025-01-23 NOTE — PLAN OF CARE
Problem: Chronic Conditions and Co-morbidities  Goal: Patient's chronic conditions and co-morbidity symptoms are monitored and maintained or improved  Outcome: Progressing     Problem: Pain  Goal: Verbalizes/displays adequate comfort level or baseline comfort level  Outcome: Progressing  Note: Pt medicated with pain medication prn.  Assessed all pain characteristics including level, type, location, frequency, and onset.  Non-pharmacologic interventions offered to pt as well.  Pt states pain is tolerable at this time.       Problem: Safety - Adult  Goal: Free from fall injury  Outcome: Progressing  Note: Pt assessed as a fall risk this shift. Remains free from falls and accidental injury at this time. Floor free from obstacles and bed is locked and in lowest position. Adequate lighting provided.  Pt encouraged to call before getting OOB for any need.  Bed alarm activated. Will continue to monitor needs during hourly rounding, and reinforce education on use of call light.      Problem: ABCDS Injury Assessment  Goal: Absence of physical injury  Outcome: Progressing

## 2025-01-23 NOTE — CARE COORDINATION
ONGOING DISCHARGE PLAN:    Patient is alert and oriented x4.    Spoke with patient regarding discharge plan and patient confirms that plan is agreeable to rehab.     Writer spoke with Janice QUINTERO in admissions.   [facility]-accepted, they will start auth once available on insurance portal.      PM&R consult-appropriate    1/21 Left thoracentesis 1.6L  +culture  IV vanco  New ID consult    99% on room air    Surgery-trauma  Rib fractures     Hgb 7.1    PT/OT    Will continue to follow for additional discharge needs.    If patient is discharged prior to next notation, then this note serves as note for discharge by case management.    Electronically signed by Yudelka Damian RN on 1/23/2025 at 9:31 AM

## 2025-01-23 NOTE — PROGRESS NOTES
[facility]  IN-PATIENT SERVICE  [facility]    PROGRESS NOTE             1/23/2025    8:52 AM    Name:   Hemanth Barrera  MRN:     [id number]     Acct:      [id number]   Room:   2103/2103-01   Day:  3  Admit Date:  1/20/2025  9:06 AM    PCP:  Neftaly Contreras MD  Code Status:  Full Code    Subjective:     C/C:   Chief Complaint   Patient presents with    Fall    Rib Pain (injury)     Interval History:    Vitally stable.  On room air  Hemoglobin is 7 and 7.1 today  Chest x-ray no significant interval change.   Patient had severe chest pain 9/10  Kidney function WNL  CRP 20.4  Pleural fluid positive for gram-positive cocci in clusters; had a dose of vancomycin; ID on board and recommendation  Neutrophils count 80%, lymphocyte count 6%, monocyte 14%  Per PM&R physiatrist Dr. Lawrence Jamison, patient appropriate for Acute Inpatient Rehabilitation level of care. Waiting authorization      Brief History:     The patient is a 89 y.o. male who slipped and fell 01/19/2025 and landed on his left side, after which severe pain in his left side started to bother him. Denies loss of consciousness, numbness, weakness, headache, neck pain, back pain.     PMH: coronary artery disease with stent placement, afib, HFpEF with pacemaker, CKD stage IIIb, chronic anemia, abdominal hernia status post repair.     In the ER, he was stable. Hb 6.6 -- received RBC transfusion. CT head and C-spine showed no critical findings. CT chest whowed a large left pleural effusion with compressive atelectasis, segmented fractures of the left post erior 10th and 11th ribs. CT abdomen and pelvis revealed findings suggestive of ileus/obstruction.     Admitted for management of his multiple health issues.    2 units of RBCs transfused, Hb stabilized: 8.2. Evaluated by general surgery and pulmonology.     Medications:     Allergies:    Allergies   Allergen Reactions    Aspirin Other (See Comments)     Pt told not

## 2025-01-23 NOTE — PROGRESS NOTES
[facility] PULMONARY,CRITICAL CARE & SLEEP   Hitracy Morocho MD/Choco BEJARANO AGACNP-BC, NP-C       Janet BEJARANO NP-C      Hemanth BEJARANO NP-C                                  Pulmonary Critical Care Progress Note    Patient - Hemanth Barrera   Age - 89 y.o.   - 1935  MRN - [id number]  Acct # - [id number]  Date of Admission - 2025  9:06 AM    Consulting Service/Physician:       Primary Care Physician: Neftaly Contreras MD    SUBJECTIVE:     Chief Complaint:   Chief Complaint   Patient presents with    Fall    Rib Pain (injury)   Pleural effusion  Subjective:    Patient seems to be doing well.  He still complaining of a lot of pain and feels like he is not well-controlled from that aspect but as far as his breathing goes he feels pretty well.  Hemoglobin holding stable around 7.  Chest x-ray shows no meaningful reaccumulation of blood.  He is on room air.  He is planning to go to rehab at time of discharge.    VITALS  /77   Pulse 70   Temp 99 °F (37.2 °C) (Oral)   Resp 16   Ht 1.854 m (6' 1\")   Wt 73.5 kg (162 lb)   SpO2 99%   BMI 21.37 kg/m²   Wt Readings from Last 3 Encounters:   25 73.5 kg (162 lb)   25 73.1 kg (161 lb 2.5 oz)   25 63.5 kg (140 lb)     I/O (24 Hours)    Intake/Output Summary (Last 24 hours) at 2025 1149  Last data filed at 2025 1024  Gross per 24 hour   Intake 308 ml   Output 1720 ml   Net -1412 ml     Ventilator:      Exam:   Physical Exam   Constitutional: Alert and cooperative no distress  HENT: Unremarkable  Neck: Neck supple.  No JVD, trachea midline  Cardiovascular: Distant heart tones  Pulmonary/Chest: Decreased breath sounds left lung base only but reasonable air exchange over the left lower lobe as a whole, bandage removed from his left posterior back   abdominal: Soft. Bowel sounds are normal. There is no tenderness.    Extremities: Chronic venous stasis discoloration and chronic leg

## 2025-01-23 NOTE — PROGRESS NOTES
Physician Progress Note      PATIENT:               DONNA DYSON  CSN #:                  [id number]  :                       1935  ADMIT DATE:       2025 9:06 AM  DISCH DATE:  RESPONDING  PROVIDER #:        Kaitlin Dsouza MD          QUERY TEXT:    [facility]    Patient admitted with Acute blood loss anemia. Per  Physical Medicine and   Rehabilitation consult: Nephrology VARSHA secondary intravascular volume   depletion from vomiting hypoperfusion from NSTEMI improved. In order to   support the diagnosis of NSTEMI, please include additional clinical indicators   in your documentation.  Or please document if the diagnosis of NSTEIM has   been ruled out after study.    The medical record reflects the following:  Risk Factors: Acute blood loss anemia.  Clinical Indicators:  Per  Physical Medicine and Rehabilitation consult:   Nephrology VARHSA secondary intravascular volume depletion from vomiting   hypoperfusion from NSTEMI improved.  Treatment: antiplatelet on hold    Fourth Universal Definition of Myocardial Infarction:  Clearly separates MI   from myocardial injury. Patients with elevated blood troponin levels but   without clinical evidence of ischemia are said to have had a myocardial   injury.? To have a myocardial infarction requires both an elevated troponin   blood test along with at least one of the following:  - Symptoms of acute myocardial ischemia (Types 1 - 5 MI)  - Clinical evidence of ischemia, as evidenced in an electrocardiogram (EKG)   showing new ischemic changes (Type 1, Type 2, Type 3, or Type 4a MI)  - Development of pathological Q waves (Types 1 - 5 MI)  - Imaging evidence of new loss of viable myocardium or new regional wall   motion abnormality in a pattern consistent with an ischemic etiology (Types 1   - 5 MI)  Options provided:  -- NSTEMI ruled out after study  -- NSTEMI present as evidenced by, Please document indicators of myocardial   infarction.  -- Other

## 2025-01-23 NOTE — FLOWSHEET NOTE
01/23/25 0144   Treatment Team Notification   Reason for Communication Evaluate   Type of Critical Result Laboratory   Critical Lab Information hemoglobin of 7.0   Person Result Received From lab   Critical Lab Result Type Hemoglobin and hematocrit   Name of Team Member Notified Jennifer Carreon NP   Treatment Team Role Advanced Practice Nurse   Method of Communication Secure Message   Response No new orders     Jennifer Carreon NP was notified of patient's hemoglobin of 7. No new orders made. Jennifer Carreon NP states \" we'll wait for repeat with am labs. We transfuse for less than 7.0 unless they are significantly symptomatic.\"

## 2025-01-23 NOTE — CARE COORDINATION
Writer verifying insurance with [employer]. Called [employer] and they are saying patients insurance is .  Waiting on a response from Utilization review.    Electronically signed by Britni Goncalves RN on 2025 at 11:10 AM

## 2025-01-23 NOTE — PROGRESS NOTES
Physical Medicine & Rehabilitation  Progress Note    1/23/2025 4:27 PM     CC: Ambulatory and ADL dysfunction due to fall with rib fractures and hemopneumothorax    Internal medicine continue to monitor hemoglobin every 6 hours    Pulmonary repeat hemoglobin and chest x-ray in 1 week recommend holding antiplatelet anticoagulants for at least 1 more week before resuming    Subjective:   No complaints.  Feels well    ROS:  Denies fevers, chills, sweats.  No chest pain, palpitations, lightheadedness.  Denies coughing, wheezing or shortness of breath.  Denies abdominal pain, nausea, diarrhea or constipation.  No new areas of joint pain.  Denies new areas of numbness or weakness.  Denies new anxiety or depression issues.  No new skin problems.    Rehabilitation:   PT:    Bed mobility  Supine to Sit: Stand by assistance  Sit to Supine: Stand by assistance  Scooting: Stand by assistance (to edge of chair)  Bed Mobility Comments: HOB slightly elevated while returning to bed    Transfers  Sit to Stand: Minimal Assistance (cues for safe handplacement to stand. Marely from bedside chair, CGA from EOB)  Stand to Sit: Contact guard assistance (demos safe hand placement when going to sit)  Comment: CGA to stand from bed and Marely to stand from toilet    Ambulation  Surface: Level tile  Device: Rolling Walker  Assistance: Contact guard assistance  Quality of Gait: Slow paced, slightly unsteady, antalgic gait, good upright posture but has downward gaze- cues for forward gaze  Gait Deviations: Decreased step height, Decreased step length, Slow Madeline  Distance: 12ft                OT:  ADL  Feeding: Independent  Feeding Skilled Clinical Factors: Pt finished eating breakfast upon therapist arrival IND  Grooming: Modified independent   UE Bathing: Stand by assistance, Based on clinical judgement  LE Bathing: Minimal assistance, Based on clinical judgement  UE Dressing: Setup, Based on clinical judgement  LE Dressing: Moderate  in the last 72 hours.  CARDIAC ENZYMES: No results for input(s): \"CKMB\", \"CKMBINDEX\", \"TROPONINT\" in the last 72 hours.    Invalid input(s): \"CKTOTAL;3\"  FASTING LIPID PANEL:  Lab Results   Component Value Date    CHOL 111 09/16/2024    HDL 50 09/16/2024    TRIG 41 09/16/2024     LIVER PROFILE: No results for input(s): \"AST\", \"ALT\", \"BILIDIR\", \"BILITOT\", \"ALKPHOS\" in the last 72 hours.    Invalid input(s): \"ALB\"     I/O (24Hr):    Intake/Output Summary (Last 24 hours) at 1/23/2025 1627  Last data filed at 1/23/2025 1024  Gross per 24 hour   Intake 308 ml   Output 1300 ml   Net -992 ml       Glu last 24 hour  No results for input(s): \"POCGLU\" in the last 72 hours.    No results for input(s): \"CLARITYU\", \"COLORU\", \"PHUR\", \"SPECGRAV\", \"PROTEINU\", \"RBCUA\", \"BLOODU\", \"BACTERIA\", \"NITRU\", \"WBCUA\", \"LEUKOCYTESUR\", \"YEAST\", \"GLUCOSEU\", \"BILIRUBINUR\" in the last 72 hours.    .risr    Impression: Mr. Hemanth aBrrera is a 89 y.o. male with a history of Traumatic closed displaced fracture of rib on left side, initial encounter     Status post fall with left-sided rib fractures left 10th and 11th  Hemothorax status postthoracentesis 1/21-questional Vanco  Acute on chronic anemia status post transfusion-hemoglobin 7.2 monitor trend  Coronary artery disease with stent, [id number]-[id number]-[id number] on hold  heart failure preserved ejection fraction, pacemaker, hypertension, hyperlipidemia-Lipitor, Cardizem, Imdur, metoprolol  Acute on chronic renal failure, CKD stage IIIb,   Abdominal hernia status post repair  Ileus/obstruction/constipation General Surgery following advancing diet  Pain-Norco, Lidoderm patch  GERD-Protonix  BPH-Proscar  History of total knee arthroplasty and total hip arthroplasty  Temp 99.5-patient started on vancomycin for gram-positive cocci in pleural fluid        Recommendations:  1. Diagnosis: Debility status post fall with left rib fractures and hemothorax  2. Therapy: Ambulate short distance contact-guard

## 2025-01-23 NOTE — PROGRESS NOTES
[facility]   INPATIENT OCCUPATIONAL THERAPY  PROGRESS NOTE  Date: 2025  Patient Name: Hemanth Barrera       Room:   MRN: [id number]    : 1935  (89 y.o.)  Gender: male             Discharge Recommendations:  Further Occupational Therapy is recommended upon facility discharge.    OT Equipment Recommendations  Mobility Devices: ADL Assistive Devices  ADL Assistive Devices: Sock-Aid Hard, Reacher    Restrictions/Precautions       SpO2: 96 % (pt does demo exertion with activity due to rib pain, o2 94-96%)  O2 Device: None (Room air)    Subjective  Pain  Pre-Pain: 4  Post-Pain: 5  Pain Location: Left  Comments: Pt supine in EOB with VU providing pt with education.    Objective  Orientation  Overall Orientation Status: Within Functional Limits  Orientation Level: Oriented X4  Cognition  Overall Cognitive Status: Exceptions  Arousal/Alertness: Appears intact  Following Commands: Follows multistep commands with increased time;Follows multistep commands with repitition  Attention Span: Appears intact  Memory: Appears intact  Safety Judgement: Appears intact  Problem Solving: Assistance required to implement solutions  Insights: Appears intact  Initiation: Requires cues for some  Sequencing: Requires cues for some    Activities of Daily Living       Balance  Balance  Sitting Balance: Stand by assistance  Standing Balance: Contact guard assistance  Standing Balance  Time: <1 minute  Activity: fxl mobility within room.  Comment: Pt will benfit from more particiaption with skilled services to increase I with mobility.    Transfers/Mobility  Bed mobility  Supine to Sit: Stand by assistance  Sit to Supine: Unable to assess  Scooting: Stand by assistance  Bed Mobility Comments: HOB elevated, use of L bed rail, extra time and effort due to L rib pain with movement to transition to EOB  Transfers  Sit to stand: Contact guard assistance  Stand to sit: Contact guard assistance  Transfer

## 2025-01-23 NOTE — PROGRESS NOTES
Physical Therapy  [facility]   Physical Therapy Treatment  Date: 25  Patient Name: eHmanth Barrera       Room: -  MRN: [id number]  Account: [id number]   : 1935  (89 y.o.) Gender: male     Discharge Recommendations:  Discharge Recommendations: Patient would benefit from continued therapy after discharge, Therapy recommended at discharge     PT Equipment Recommendations  Equipment Needed: No    General  Family/Caregiver Present: No  Follows Commands: Within Functional Limits    Past Medical History:  has a past medical history of Acute inferolateral myocardial infarction (HCC), Arthritis, Atrial fibrillation ([facility]), CAD (coronary artery disease), CHF (congestive heart failure) ([facility]), Glaucoma, Hx of blood clots, Hyperlipidemia, Hypertension, MI (myocardial infarction) ([facility]), and NSTEMI (non-ST elevated myocardial infarction) ([facility]).  Past Surgical History:   has a past surgical history that includes pacemaker placement; Abdomen surgery; Cardiac catheterization; Appendectomy; Colonoscopy; eye surgery; hernia repair; bone marrow biopsy; joint replacement; CT BIOPSY BONE MARROW (2022); Upper gastrointestinal endoscopy (N/A, 2023); Colonoscopy (N/A, 8/3/2023); Cardiac procedure (N/A, 2024); Cardiac procedure (N/A, 2024); Cardiac procedure (Right, 2025); and invasive vascular (N/A, 2025).    Restrictions  Restrictions/Precautions  Restrictions/Precautions: General Precautions, Fall Risk  Required Braces or Orthoses?: No  Implants Present? :  (R AGUSTO, L TKA, hx of multiple surgeries)  Position Activity Restriction  Other Position/Activity Restrictions: L posterior 10th-11th rib fx s/p fall     Subjective  Subjective  Subjective: Pt sitting up in bedside chair on arrival. Pt is pleasant and agreeable to therapy with gentle encouragement. Pt reporting he wants to return to bed at end of session         Pain  Pre-Pain: 5  Pain Location: Left (rib cage)

## 2025-01-24 LAB
BASOPHILS # BLD: 0 K/UL (ref 0–0.2)
BASOPHILS NFR BLD: 0 % (ref 0–2)
DATE LAST DOSE: NORMAL
EOSINOPHIL # BLD: 0 K/UL (ref 0–0.4)
EOSINOPHILS RELATIVE PERCENT: 1 % (ref 0–4)
ERYTHROCYTE [DISTWIDTH] IN BLOOD BY AUTOMATED COUNT: 16.5 % (ref 11.5–14.9)
HCT VFR BLD AUTO: 25.5 % (ref 41–53)
HGB BLD-MCNC: 8.5 G/DL (ref 13.5–17.5)
LYMPHOCYTES NFR BLD: 0.4 K/UL (ref 1–4.8)
LYMPHOCYTES RELATIVE PERCENT: 8 % (ref 24–44)
MCH RBC QN AUTO: 31.2 PG (ref 26–34)
MCHC RBC AUTO-ENTMCNC: 33.1 G/DL (ref 31–37)
MCV RBC AUTO: 94.3 FL (ref 80–100)
MONOCYTES NFR BLD: 0.2 K/UL (ref 0.1–1.3)
MONOCYTES NFR BLD: 5 % (ref 1–7)
NEUTROPHILS NFR BLD: 86 % (ref 36–66)
NEUTS SEG NFR BLD: 4 K/UL (ref 1.3–9.1)
PLATELET # BLD AUTO: 258 K/UL (ref 150–450)
PMV BLD AUTO: 7.2 FL (ref 6–12)
RBC # BLD AUTO: 2.71 M/UL (ref 4.5–5.9)
TME LAST DOSE: 302 H
VANCOMYCIN DOSE: NORMAL MG
VANCOMYCIN SERPL-MCNC: 13.9 UG/ML (ref 5–40)
WBC OTHER # BLD: 4.7 K/UL (ref 3.5–11)

## 2025-01-24 PROCEDURE — 6370000000 HC RX 637 (ALT 250 FOR IP)

## 2025-01-24 PROCEDURE — 99232 SBSQ HOSP IP/OBS MODERATE 35: CPT | Performed by: NURSE PRACTITIONER

## 2025-01-24 PROCEDURE — G0545 PR INHERENT VISIT TO INPT: HCPCS | Performed by: INTERNAL MEDICINE

## 2025-01-24 PROCEDURE — 6370000000 HC RX 637 (ALT 250 FOR IP): Performed by: INTERNAL MEDICINE

## 2025-01-24 PROCEDURE — 99232 SBSQ HOSP IP/OBS MODERATE 35: CPT | Performed by: PHYSICAL MEDICINE & REHABILITATION

## 2025-01-24 PROCEDURE — 99232 SBSQ HOSP IP/OBS MODERATE 35: CPT | Performed by: INTERNAL MEDICINE

## 2025-01-24 PROCEDURE — 6360000002 HC RX W HCPCS

## 2025-01-24 PROCEDURE — 97110 THERAPEUTIC EXERCISES: CPT

## 2025-01-24 PROCEDURE — 97116 GAIT TRAINING THERAPY: CPT

## 2025-01-24 PROCEDURE — 99233 SBSQ HOSP IP/OBS HIGH 50: CPT | Performed by: INTERNAL MEDICINE

## 2025-01-24 PROCEDURE — 2500000003 HC RX 250 WO HCPCS

## 2025-01-24 PROCEDURE — 80202 ASSAY OF VANCOMYCIN: CPT

## 2025-01-24 PROCEDURE — 85025 COMPLETE CBC W/AUTO DIFF WBC: CPT

## 2025-01-24 PROCEDURE — 2060000000 HC ICU INTERMEDIATE R&B

## 2025-01-24 PROCEDURE — 36415 COLL VENOUS BLD VENIPUNCTURE: CPT

## 2025-01-24 RX ORDER — FERROUS SULFATE 325(65) MG
325 TABLET ORAL 2 TIMES DAILY WITH MEALS
Status: DISCONTINUED | OUTPATIENT
Start: 2025-01-24 | End: 2025-02-05 | Stop reason: HOSPADM

## 2025-01-24 RX ORDER — LIDOCAINE 4 G/G
1 PATCH TOPICAL DAILY
Status: DISCONTINUED | OUTPATIENT
Start: 2025-01-24 | End: 2025-01-24 | Stop reason: SDUPTHER

## 2025-01-24 RX ADMIN — PANTOPRAZOLE SODIUM 40 MG: 40 TABLET, DELAYED RELEASE ORAL at 05:26

## 2025-01-24 RX ADMIN — SODIUM CHLORIDE, PRESERVATIVE FREE 10 ML: 5 INJECTION INTRAVENOUS at 20:58

## 2025-01-24 RX ADMIN — OXYCODONE HYDROCHLORIDE AND ACETAMINOPHEN 1 TABLET: 5; 325 TABLET ORAL at 08:08

## 2025-01-24 RX ADMIN — SODIUM CHLORIDE, PRESERVATIVE FREE 10 ML: 5 INJECTION INTRAVENOUS at 11:06

## 2025-01-24 RX ADMIN — OXYCODONE HYDROCHLORIDE AND ACETAMINOPHEN 1 TABLET: 5; 325 TABLET ORAL at 17:16

## 2025-01-24 RX ADMIN — OXYCODONE HYDROCHLORIDE AND ACETAMINOPHEN 1 TABLET: 5; 325 TABLET ORAL at 12:00

## 2025-01-24 RX ADMIN — DILTIAZEM HYDROCHLORIDE 120 MG: 120 CAPSULE, COATED, EXTENDED RELEASE ORAL at 08:08

## 2025-01-24 RX ADMIN — ATORVASTATIN CALCIUM 40 MG: 40 TABLET, FILM COATED ORAL at 20:57

## 2025-01-24 RX ADMIN — METOPROLOL SUCCINATE 100 MG: 50 TABLET, EXTENDED RELEASE ORAL at 08:08

## 2025-01-24 RX ADMIN — OXYCODONE HYDROCHLORIDE AND ACETAMINOPHEN 1 TABLET: 5; 325 TABLET ORAL at 20:57

## 2025-01-24 RX ADMIN — VANCOMYCIN 1250 MG: 1.75 INJECTION, SOLUTION INTRAVENOUS at 03:02

## 2025-01-24 RX ADMIN — OXYCODONE HYDROCHLORIDE AND ACETAMINOPHEN 1 TABLET: 5; 325 TABLET ORAL at 04:16

## 2025-01-24 RX ADMIN — FERROUS SULFATE TAB 325 MG (65 MG ELEMENTAL FE) 325 MG: 325 (65 FE) TAB at 17:17

## 2025-01-24 RX ADMIN — FINASTERIDE 5 MG: 5 TABLET, FILM COATED ORAL at 08:08

## 2025-01-24 RX ADMIN — ISOSORBIDE MONONITRATE 30 MG: 30 TABLET, EXTENDED RELEASE ORAL at 08:08

## 2025-01-24 ASSESSMENT — PAIN - FUNCTIONAL ASSESSMENT
PAIN_FUNCTIONAL_ASSESSMENT: ACTIVITIES ARE NOT PREVENTED
PAIN_FUNCTIONAL_ASSESSMENT: PREVENTS OR INTERFERES WITH MANY ACTIVE NOT PASSIVE ACTIVITIES
PAIN_FUNCTIONAL_ASSESSMENT: PREVENTS OR INTERFERES SOME ACTIVE ACTIVITIES AND ADLS

## 2025-01-24 ASSESSMENT — PAIN DESCRIPTION - DESCRIPTORS
DESCRIPTORS: ACHING
DESCRIPTORS: DISCOMFORT;SORE
DESCRIPTORS: ACHING;DISCOMFORT

## 2025-01-24 ASSESSMENT — PAIN DESCRIPTION - LOCATION
LOCATION: RECTUM;ABDOMEN
LOCATION: CHEST
LOCATION: RECTUM;ABDOMEN

## 2025-01-24 ASSESSMENT — PAIN DESCRIPTION - ORIENTATION
ORIENTATION: LEFT

## 2025-01-24 ASSESSMENT — PAIN SCALES - GENERAL
PAINLEVEL_OUTOF10: 6
PAINLEVEL_OUTOF10: 9
PAINLEVEL_OUTOF10: 8
PAINLEVEL_OUTOF10: 8
PAINLEVEL_OUTOF10: 9
PAINLEVEL_OUTOF10: 5
PAINLEVEL_OUTOF10: 6
PAINLEVEL_OUTOF10: 9

## 2025-01-24 ASSESSMENT — PAIN SCALES - WONG BAKER
WONGBAKER_NUMERICALRESPONSE: NO HURT
WONGBAKER_NUMERICALRESPONSE: HURTS A LITTLE BIT

## 2025-01-24 NOTE — PROGRESS NOTES
[facility] -   Infectious diseases evaluation  admission date 1/20/2025    reason for consultation:   Gram-positive cocci in clusters on pleural fluid culture    Impression :   Current:  Rib fractures with hemothorax to the left side  Status post thoracentesis 1/21/2025 Staph epi growth on culture likely contamination  Anemia  Mechanical fall  Coronary artery disease status post stent placement  Pacemaker  Chronic kidney disease stage IIIb  Chronic anemia  Abdominal hernia s/p repair  A-fib    HENCE:   Discontinue IV Vancomycin  Follow CBC and renal function  Supportive care    Infection Control Recommendations   Norco Precautions        Antimicrobial Stewardship Recommendations   Simplification of therapy  Targeted therapy      History of Present Illness:   Initial history:  Hemanth Barrera is a 89 y.o.-year-old male presented to the hospital after he fell on 1/19/2025, landed on his left side, complaining of pain to the left side chest pain.  The patient was on aspirin, Plavix and Xarelto.  At the ER hemoglobin was 6.6 received blood transfusion  CT head and C-spine unremarkable.  CT chest showed large left pleural effusion suspected hemothorax with atelectasis, fractures of the 10th and 11th ribs with moderate subcutaneous hematoma and tiny amount of subcutaneous gas .  CT abdomen and pelvis suggestive of constipation, ileus versus obstruction  Status post thoracentesis 1/21/2025 showed 1, 875 WBC, 1, 386, 211 RBCs, 4.3 of protein, 414 LD, gram-positive cocci in clusters staph epi on culture  Interval changes  1/24/2025  He was seen and examined earlier today, up to the chair, still complaining of left-sided pleuritic pain, worse with deep breath and movement, afebrile, no new complaints.  Patient Vitals for the past 8 hrs:   BP Temp Temp src Pulse Resp SpO2   01/24/25 0808 -- -- -- -- 16 --   01/24/25 0730 128/69 97.7 °F (36.5 °C) Oral 64 16 99 %   01/24/25

## 2025-01-24 NOTE — TRANSITION OF CARE
Transfer of Care     HPI:  The patient is a 89 y.o. male who slipped and fell 01/19/2025 and landed on his left side, after which severe pain in his left side started to bother him. Denies loss of consciousness, numbness, weakness, headache, neck pain, back pain.     PMH: coronary artery disease with stent placement, afib, HFpEF with pacemaker, CKD stage IIIb, chronic anemia, abdominal hernia status post repair.     In the ER, he was stable. Hb 6.6 -- received RBC transfusion. CT head and C-spine showed no critical findings. CT chest whowed a large left pleural effusion with compressive atelectasis, segmented fractures of the left post erior 10th and 11th ribs. CT abdomen and pelvis revealed findings suggestive of ileus/obstruction.     Admitted for management of his multiple health issues.     2 units of RBCs transfused, Hb stabilized: 8.2. Evaluated by general surgery and pulmonology.     Hospital course:  Left hemothorax evacuated with thoracocentesis. 1.6L of bloody fluid removed. Culture showed Staphylococcus epidermidis. On vanco per ID. Clinically stable. Awaiting placement.    The care of this patient is being transferred to the private service.    Electronically signed by Jose Ring MD on 1/24/2025 at 7:18 AM

## 2025-01-24 NOTE — PLAN OF CARE
Problem: Chronic Conditions and Co-morbidities  Goal: Patient's chronic conditions and co-morbidity symptoms are monitored and maintained or improved  1/24/2025 1621 by Joie Sanchez RN  Outcome: Progressing  Flowsheets (Taken 1/24/2025 1620)  Care Plan - Patient's Chronic Conditions and Co-Morbidity Symptoms are Monitored and Maintained or Improved: Monitor and assess patient's chronic conditions and comorbid symptoms for stability, deterioration, or improvement  1/24/2025 1620 by Joie Sanchez RN  Outcome: Progressing  Flowsheets (Taken 1/24/2025 1620)  Care Plan - Patient's Chronic Conditions and Co-Morbidity Symptoms are Monitored and Maintained or Improved: Monitor and assess patient's chronic conditions and comorbid symptoms for stability, deterioration, or improvement  1/24/2025 0434 by Jarrod Reyes RN  Outcome: Progressing     Problem: Discharge Planning  Goal: Discharge to home or other facility with appropriate resources  1/24/2025 0434 by Jarrod Reyes RN  Outcome: Progressing     Problem: Pain  Goal: Verbalizes/displays adequate comfort level or baseline comfort level  1/24/2025 1621 by Joie Sanchez RN  Flowsheets (Taken 1/24/2025 1621)  Verbalizes/displays adequate comfort level or baseline comfort level:   Assess pain using appropriate pain scale   Encourage patient to monitor pain and request assistance  1/24/2025 1620 by Joie Sanchez RN  Outcome: Progressing  Flowsheets (Taken 1/24/2025 1620)  Verbalizes/displays adequate comfort level or baseline comfort level:   Encourage patient to monitor pain and request assistance   Assess pain using appropriate pain scale  1/24/2025 0434 by Jarrod Reyes RN  Outcome: Progressing  Note: Pt medicated with pain medication prn.  Assessed all pain characteristics including level, type, location, frequency, and onset.  Non-pharmacologic interventions offered to pt as well.  Pt states pain is tolerable at this time.       Problem: Safety - Adult  Goal: Free from fall  injury  1/24/2025 0434 by Jarrod Reyes RN  Outcome: Progressing  Note: Pt assessed as a fall risk this shift. Remains free from falls and accidental injury at this time. Fall precautions in place, including falling star sign and fall risk band on pt. Floor free from obstacles, and bed is locked and in lowest position. Adequate lighting provided.  Pt encouraged to call before getting OOB for any need.  Bed alarm activated. Will continue to monitor needs during hourly rounding, and reinforce education on use of call light.      Problem: ABCDS Injury Assessment  Goal: Absence of physical injury  Recent Flowsheet Documentation  Taken 1/24/2025 0800 by Joie Sanchez RN  Absence of Physical Injury: Implement safety measures based on patient assessment  1/24/2025 0434 by Jarrod Reyes RN  Outcome: Progressing     Problem: Nutrition Deficit:  Goal: Optimize nutritional status  1/24/2025 0434 by Jarrod Reyes RN  Outcome: Progressing     Problem: Skin/Tissue Integrity  Goal: Absence of new skin breakdown  Description: 1.  Monitor for areas of redness and/or skin breakdown  2.  Assess vascular access sites hourly  3.  Every 4-6 hours minimum:  Change oxygen saturation probe site  4.  Every 4-6 hours:  If on nasal continuous positive airway pressure, respiratory therapy assess nares and determine need for appliance change or resting period.  1/24/2025 0434 by Jarrod Reyes RN  Outcome: Progressing

## 2025-01-24 NOTE — PROGRESS NOTES
[facility] -   Infectious diseases evaluation  admission date 1/20/2025    reason for consultation:   Gram-positive cocci in clusters on pleural fluid culture    Impression :   Current:  Rib fractures with hemothorax to the left side  Status post thoracentesis 1/21/2025 Staph epi/staph capitis growth on culture likely contamination.  Anemia  Mechanical fall  Coronary artery disease status post stent placement  Pacemaker  Chronic kidney disease stage IIIb  Chronic anemia  Abdominal hernia s/p repair  A-fib    HENCE:   Monitor off antibiotics.  Follow CBC and renal function  Supportive care  Discussed with patient.    Infection Control Recommendations   [place] Precautions      Antimicrobial Stewardship Recommendations   Simplification of therapy  Targeted therapy      History of Present Illness:   Initial history:  Hemanth Barrera is a 89 y.o.-year-old male presented to the hospital after he fell on 1/19/2025, landed on his left side, complaining of pain to the left side chest pain.  The patient was on aspirin, Plavix and Xarelto.  At the ER hemoglobin was 6.6 received blood transfusion  CT head and C-spine unremarkable.  CT chest showed large left pleural effusion suspected hemothorax with atelectasis, fractures of the 10th and 11th ribs with moderate subcutaneous hematoma and tiny amount of subcutaneous gas .  CT abdomen and pelvis suggestive of constipation, ileus versus obstruction  Status post thoracentesis 1/21/2025 showed 1, 875 WBC, 1, 386, 211 RBCs, 4.3 of protein, 414 LD, gram-positive cocci in clusters staph epi on culture      Interval changes  1/25/2025  Patient Vitals for the past 8 hrs:   BP Temp Temp src Pulse Resp SpO2   01/25/25 1358 111/75 -- -- 71 -- 98 %   01/25/25 1220 104/88 98.8 °F (37.1 °C) Oral 78 22 99 %   C/o left sided rib pain from rib fractures and thoracentesis.  Denies any fever, chills, n/v/d, dysuria.            Imaging:  CT chest       Tenderness: There is no abdominal tenderness.   Genitourinary:     Comments: No esparza.  Musculoskeletal:      Cervical back: Neck supple. No rigidity.      Right lower leg: Edema present.      Left lower leg: Edema present.   Skin:     General: Skin is warm.      Capillary Refill: Capillary refill takes less than 2 seconds.      Coloration: Skin is not jaundiced.      Comments: Bilateral lower extremity venous stasis dermatitis   Neurological:      Mental Status: He is alert and oriented to person, place, and time.   Psychiatric:         Mood and Affect: Mood normal.         Behavior: Behavior normal.         Past Medical History:     Past Medical History:   Diagnosis Date    Acute inferolateral myocardial infarction (HCC) 11/18/2021    Arthritis     Atrial fibrillation (HCC)     CAD (coronary artery disease)     CHF (congestive heart failure) (HCC)     Glaucoma     Hx of blood clots     with hernia surgery    Hyperlipidemia     Hypertension     MI (myocardial infarction) (HCC)     NSTEMI (non-ST elevated myocardial infarction) ([facility]) 11/17/2021       Past Surgical  History:     Past Surgical History:   Procedure Laterality Date    ABDOMEN SURGERY      gastric resection    APPENDECTOMY      BONE MARROW BIOPSY      CARDIAC CATHETERIZATION      CARDIAC PROCEDURE N/A 9/17/2024    julio cesar / Left heart cath / coronary angiography / rm 512 performed by Cathie Hastings MD at [facility] CARDIAC CATH LAB    CARDIAC PROCEDURE N/A 9/17/2024    Percutaneous coronary intervention performed by Cathie Hastings MD at [facility] CARDIAC CATH LAB    CARDIAC PROCEDURE Right 1/9/2025    Left heart cath / coronary angiography performed by Guy Paz MD at [facility] CARDIAC CATH LAB    COLONOSCOPY      COLONOSCOPY N/A 8/3/2023    COLONOSCOPY WITH BIOPSY performed by Isauro Razo MD at [facility] ENDO    CT BIOPSY BONE MARROW  5/31/2022    CT BONE MARROW BIOPSY 5/31/2022 [facility] CT SCAN    EYE SURGERY      smiley cataracts    HERNIA REPAIR      inguinal

## 2025-01-24 NOTE — PLAN OF CARE
Problem: Chronic Conditions and Co-morbidities  Goal: Patient's chronic conditions and co-morbidity symptoms are monitored and maintained or improved  1/24/2025 0434 by Jarrod Reyes RN  Outcome: Progressing     Problem: Discharge Planning  Goal: Discharge to home or other facility with appropriate resources  1/24/2025 0434 by Jarrod Reyes RN  Outcome: Progressing     Problem: Pain  Goal: Verbalizes/displays adequate comfort level or baseline comfort level  1/24/2025 0434 by Jarrod Reyes RN  Outcome: Progressing  Note: Pt medicated with pain medication prn.  Assessed all pain characteristics including level, type, location, frequency, and onset.  Non-pharmacologic interventions offered to pt as well.  Pt states pain is tolerable at this time.       Problem: Safety - Adult  Goal: Free from fall injury  1/24/2025 0434 by Jarrod Reyes RN  Outcome: Progressing  Note: Pt assessed as a fall risk this shift. Remains free from falls and accidental injury at this time. Fall precautions in place, including falling star sign and fall risk band on pt. Floor free from obstacles, and bed is locked and in lowest position. Adequate lighting provided.  Pt encouraged to call before getting OOB for any need.  Bed alarm activated. Will continue to monitor needs during hourly rounding, and reinforce education on use of call light.      Problem: ABCDS Injury Assessment  Goal: Absence of physical injury  1/24/2025 0434 by Jarrod Reyes RN  Outcome: Progressing     Problem: Nutrition Deficit:  Goal: Optimize nutritional status  1/24/2025 0434 by Jarrod Reyes RN  Outcome: Progressing     Problem: Skin/Tissue Integrity  Goal: Absence of new skin breakdown  Description: 1.  Monitor for areas of redness and/or skin breakdown  2.  Assess vascular access sites hourly  3.  Every 4-6 hours minimum:  Change oxygen saturation probe site  4.  Every 4-6 hours:  If on nasal continuous positive airway pressure, respiratory therapy assess nares and determine need

## 2025-01-24 NOTE — CARE COORDINATION
[facility]ed [facility] [phone] opt 7 opt 1.  Spoke with Tori  [facility] is notlonger handling authorizations effective 2024. [facility] is handling auths directly. Called [facility] [phone] spoke with Missy yousif initiated.     Per PM&R physiatrist Dr. Lawrence Jamison, patient appropriate for Acute Inpatient Rehabilitation level of care.    Eligibility & Benefits Verified - See Note    Prior authorization request for admission initiated with primary insurance [employer] via: Phone Call: Call Ref# [id number] , Representative: Missy    Pending Case Reference/Authorization # [id number]    Clinical documentation submitted via  Nurse will contact admissions coordinator with request clinical information and information     Updated Gayle MAZARIEGOS:  Perfect Serve  at this time.    1009 am Call from Glen at [employer] called, clinicals faxed to [phone], telephone number [phone] ext. [phone].  Prescreen and requested information faxed.      200 pm VMM received from Magda at [facility] stating all clinical information was received and forwarded to medical director.  Rojas direct number is [phone] ext [phone].  Update provided to Carolina MAZARIEGOS.     Received Telephone Call from payor [employer] representative Salma. Call Ref#[id number]    Intent to deny admission to Acute Inpatient Rehabilitation Stay under Auth/CaseRef#        Rationale to deny Acute Inpatient Rehabilitation level of care: Clinical information does not support need for ARU level of care.     Peer to Peer Deadline: 2025 at 245 pm EST     Peer to Peer Instructions: Call [phone]. P2ZP scheduled for 2025 at 1130 am with Davey Espino MD.   Per PM&R physiatrist Dr. Lawrence Jamison,    Updated Carolina MAZARIEGOS:  Perfect serve   at this time.    Hematnh Barrera  -1935  Member ID [id number]  Reference number [id number]    400 pm Call from Dr. Jamison stating P2P was completed and denial has been upheld.  Update provided to

## 2025-01-24 NOTE — CARE COORDINATION
DISCHARGE PLANNING NOTE:    LSW following for potential discharge to ARU. PM&R completed. Patient is appropriate for inpatient rehab per Dr. Jamison. Message from Janice in admissions, insurance authorization was able to be submitted this morning with [employer].     Insurance has intent to deny. P2P completed. Denial upheld.    Writer spoke to son to discuss SNF options, he will be up to visit tomorrow. FOC list left in room.

## 2025-01-24 NOTE — PROGRESS NOTES
[facility]  IN-PATIENT SERVICE  [facility]    PROGRESS NOTE             1/24/2025    12:20 PM    Name:   Hemanth Barrera  MRN:     [id number]     Acct:      [id number]   Room:   2103/2103-01   Day:  4  Admit Date:  1/20/2025  9:06 AM    PCP:  Neftaly Contreras MD  Code Status:  Full Code    Subjective:     C/C:   Chief Complaint   Patient presents with    Fall    Rib Pain (injury)     Interval History:    Vitally stable.  On room air  Hemoglobin is 7 and 7.1 today  Chest x-ray no significant interval change.   Patient had severe chest pain 9/10  Kidney function WNL  CRP 20.4  Pleural fluid positive for gram-positive cocci in clusters; had a dose of vancomycin; ID on board and recommendation  Neutrophils count 80%, lymphocyte count 6%, monocyte 14%  Per PM&R physiatrist Dr. Lawrence Jamison, patient appropriate for Acute Inpatient Rehabilitation level of care. Waiting authorization      Brief History:     The patient is a 89 y.o. male who slipped and fell 01/19/2025 and landed on his left side, after which severe pain in his left side started to bother him. Denies loss of consciousness, numbness, weakness, headache, neck pain, back pain.     PMH: coronary artery disease with stent placement, afib, HFpEF with pacemaker, CKD stage IIIb, chronic anemia, abdominal hernia status post repair.     In the ER, he was stable. Hb 6.6 -- received RBC transfusion. CT head and C-spine showed no critical findings. CT chest whowed a large left pleural effusion with compressive atelectasis, segmented fractures of the left post erior 10th and 11th ribs. CT abdomen and pelvis revealed findings suggestive of ileus/obstruction.     Admitted for management of his multiple health issues.    2 units of RBCs transfused, Hb stabilized: 8.2. Evaluated by general surgery and pulmonology.     Medications:     Allergies:    Allergies   Allergen Reactions    Aspirin Other (See Comments)     Pt told not

## 2025-01-24 NOTE — PROGRESS NOTES
Physical Medicine & Rehabilitation  Progress Note    1/24/2025 3:14 PM     CC: Ambulatory and ADL dysfunction due to fall with rib fractures and hemopneumothorax    Internal medicine continue to monitor hemoglobin every 6 hours    Pulmonary monitor hemoglobin and chest x-ray in 1 week recommend holding antiplatelet anticoagulants for at least 1 more week before resuming as long as chest x-ray and hemoglobin stable from pulmonary standpoint    ID-off IV Vanco monitor CBC and renal function    Subjective:   No complaints.  Feels well.  Notes some rib pain today    ROS:  Denies fevers, chills, sweats.  No chest pain, palpitations, lightheadedness.  Denies coughing, wheezing or shortness of breath.  Denies abdominal pain, nausea, diarrhea or constipation.  No new areas of joint pain.  Denies new areas of numbness or weakness.  Denies new anxiety or depression issues.  No new skin problems.    Rehabilitation:   PT:    Bed mobility  Supine to Sit: Stand by assistance (Does not require cues for sequencing. Utlizes bed rail to sit up EOB)  Sit to Supine: Stand by assistance  Scooting: Stand by assistance (to EOB)  Bed Mobility Comments: HOB slightly elevated while returning to bed    Transfers  Sit to Stand: Contact guard assistance (Demonstrates safe hand placement this date)  Stand to Sit: Contact guard assistance (demos safe hand placement when going to sit)  Comment: CGA to stand from bed and Marely to stand from toilet    Ambulation  Surface: Level tile  Device: Rolling Walker  Assistance: Contact guard assistance  Quality of Gait: Slow paced, slightly unsteady, antalgic gait, good upright posture but has downward gaze- cues for forward gaze  Gait Deviations: Decreased step height, Decreased step length, Slow Madeline  Distance: 38ft  Comments: Distance limited due to increase in pain.                OT:  ADL  Feeding: Independent  Feeding Skilled Clinical Factors: Pt finished eating breakfast upon therapist arrival  continue ADL needs, patient unsteady  3. Medical  Necessity: As above  4. Support: Clarify  5. Rehab recommendation:  would benefit acute inpatient rehab to improve ADLs, steps, ambulation to return to modified independent level and monitor medical status above.       In my opinion the patient will require acute inpatient rehabilitation and meets criteria for IRF level care once medically stable per primary and consulting services. Anticipate he/she will be able to tolerate 3 hours of therapy per day or 900 minutes per week in rehabilitation. The patient requires multidisciplinary rehabilitation treatment including medical management by a PM&R physician, 24 hour rehabilitation nursing, Physical/Occupational therapy, rehabilitation social work, and nutrition services. Patient and family also require education in post-hospital precautions and home exercise routine, adaptive techniques and deficit compensation strategies, strengthening and conditioning, equipment prescription and instructions in use. Provision of services in a less intensive environment risks significant complications and more limited functional outcomes.     -Monitor stability H&H   - Continues low-grade temperature    Peer to peer to be scheduled for Monday morning        6. DVT proph: No DVT prophylaxis due to bleed, will need Doppler screen 24 to 48 hours prior to rehab if contraindicated clarify when can resume anticoagulation-aspirin/Plavix/Xarelto per pulmonary hold antiplatelet anticoagulation for 1 more week before resuming if hemoglobin stable and chest x-ray okay from pulmonary standpoint-question resume all 3     It was my pleasure to evaluate Hemanth Barrera today.  Please call with questions.      Lawrence Calixto MD     Electronically signed by Lawrence Calixto MD on 1/24/2025 at 3:14 PM     This note is created with the assistance of a speech recognition program.  While intending to generate a document that actually reflects the content

## 2025-01-24 NOTE — CARE COORDINATION
THIS PRESCREEN WAS COMPLETED FOR INSURANCE PURPOSES ONLY, NOT FOR ADMISSION TO [facility].       ACUTE INPATIENT REHABILITATION  [facility]  PRE-ADMISSION ASSESSMENT    Patient Name: Hemanth Barrera        MRN: [id number]    : 1935  (89 y.o.)  Gender: male     Admitted from:  [facility]     Type of Admission:  New Admission     Date of Onset / Admission to the Acute Hospital:  2025    Inpatient Rehabilitation Admitting Diagnosis:  Debility status post fall with left rib fractures and hemothorax     Did patient have surgery/procedures?  Yes, As Listed Below   : Left ultrasound-guided thoracentesis     Physicians:   Lebron Roman MD  Physician  General Surgery    Chito James MD  Physician  Specialty: Infectious Diseases    Kaitlin Dsouza MD  Physician  Specialty: Internal Medicine    Bob Pinedo MD  Physician  Pulmonology    Risk for Clinical Complications:  High     Co-morbidities:    Status post fall with left-sided rib fractures left 10th and 11th  Hemothorax status postthoracentesis -questional Vanco  Acute on chronic anemia status post transfusion-hemoglobin 7.2 monitor trend  Coronary artery disease with stent, [id number] on hold  heart failure preserved ejection fraction, pacemaker, hypertension, hyperlipidemia-Lipitor, Cardizem, Imdur, metoprolol  Acute on chronic renal failure, CKD stage IIIb,   Abdominal hernia status post repair  Ileus/obstruction/constipation General Surgery following advancing diet  Pain-Norco, Lidoderm patch  GERD-Protonix  BPH-Proscar  History of total knee arthroplasty and total hip arthroplasty  Temp 99.5-patient started on vancomycin for gram-positive cocci in pleural fluid    Financial Information  Primary insurance: Medicare HMO: [employer]       Secondary Insurance: None     Precautions:   []Cardiac Precautions: No Cardiac Precautions  []Total hip precautions: No Total Hip Precautions  []Weight  to achieve level of improvement: Approximately 2 Weeks  Please Note: Estimated length of stay is based on individual condition and Acute Inpatient Rehabilitation specific needs. Length of stay may vary based upon interdisciplinary team assessment, insurance approval, and patient progression.    Expected Post Discharge Treatments: Home with possible Home Care    Acute Inpatient Rehabilitation Disclosure Statement will be provided to patient upon admission to ARU with patient's verbalization of understanding.      I have reviewed and concur with the findings and results of the pre-admission screening assessment completed by the Inpatient Rehabilitation Admissions Coordinator.    Smart Phrase Template Revision: 12/19/2024

## 2025-01-24 NOTE — PROGRESS NOTES
[facility] PULMONARY,CRITICAL CARE & SLEEP   Hi Morocho MD/Choco BEJARANO AGABRADLEYP-BC, NP-C      Janet BEJARANO NP-C     Hemanth BEJARANO NP-C                                          Pulmonary Progress Note    Patient - Hemanth Barrera   Age - 89 y.o.   - 1935  MRN - [id number]  Acct # - [id number]  Date of Admission - 2025  9:06 AM    Consulting Service/Physician:       Primary Care Physician: Neftaly Contreras MD    SUBJECTIVE:     Chief Complaint:   Chief Complaint   Patient presents with    Fall    Rib Pain (injury)     Subjective:    Hemanth is seen sitting up in bed.  He states overall he is feeling good.  He still has some mild pleuritic pain but it is being controlled with medication.  Hemoglobin 8.5.  Cytology was negative.  He has been on room air.  Pleural fluid culture with staph epi and Staph capitis felt to be contamination per ID.  Plan for inpatient rehab.      VITALS  BP 98/66   Pulse 70   Temp 99.9 °F (37.7 °C) (Oral)   Resp 16   Ht 1.854 m (6' 1\")   Wt 73.5 kg (162 lb)   SpO2 98%   BMI 21.37 kg/m²   Wt Readings from Last 3 Encounters:   25 73.5 kg (162 lb)   25 73.1 kg (161 lb 2.5 oz)   25 63.5 kg (140 lb)     I/O (24 Hours)    Intake/Output Summary (Last 24 hours) at 2025 1306  Last data filed at 2025 0529  Gross per 24 hour   Intake --   Output 550 ml   Net -550 ml     Ventilator:      Exam:   Physical Exam   Constitutional: Thin elderly man sitting up in bed on room air no distress  HENT: Unremarkable  Head: Normocephalic and atraumatic.   Eyes: EOM are normal. Pupils are equal, round, and reactive to light.   Neck: Neck supple.   Cardiovascular:  Regular rate and rhythm.  Normal heart tones.  No JVD.    Pulmonary/Chest: Respirations even and nonlabored, slightly diminished in the left base, on room air with pulse ox 98%  Abdominal: Soft. Bowel sounds are normal.    Musculoskeletal: Normal range of  motion.   Neurological: Patient is alert and oriented to person, place, and time.   Skin: Skin is warm and dry. No rash noted.   Extremities: No edema or discoloration  Infusions:      sodium chloride      sodium chloride       Meds:     Current Facility-Administered Medications:     ferrous sulfate (IRON 325) tablet 325 mg, 325 mg, Oral, BID SWETHA, Sana Haskins MD    oxyCODONE-acetaminophen (PERCOCET) 5-325 MG per tablet 1 tablet, 1 tablet, Oral, Q4H PRN, Santhosh King MD, 1 tablet at 01/24/25 1200    lidocaine 4 % external patch 1 patch, 1 patch, TransDERmal, Daily, Jose Ring MD, 1 patch at 01/24/25 0808    sodium chloride flush 0.9 % injection 10 mL, 10 mL, IntraVENous, PRN, Jorge Slater MD, 10 mL at 01/20/25 1019    [Held by provider] aspirin chewable tablet 81 mg, 81 mg, Oral, Daily, Jose Ring MD    atorvastatin (LIPITOR) tablet 40 mg, 40 mg, Oral, Nightly, Jose Ring MD, 40 mg at 01/23/25 2023    [Held by provider] bumetanide (BUMEX) tablet 2 mg, 2 mg, Oral, Daily, Jose Ring MD, 2 mg at 01/20/25 1529    [Held by provider] clopidogrel (PLAVIX) tablet 75 mg, 75 mg, Oral, Daily, Jose Ring MD    dilTIAZem (CARDIZEM CD) extended release capsule 120 mg, 120 mg, Oral, Daily, Jose Ring MD, 120 mg at 01/24/25 0808    finasteride (PROSCAR) tablet 5 mg, 5 mg, Oral, Daily, Jose Ring MD, 5 mg at 01/24/25 0808    isosorbide mononitrate (IMDUR) extended release tablet 30 mg, 30 mg, Oral, Daily, Jose Ring MD, 30 mg at 01/24/25 0808    metoprolol succinate (TOPROL XL) extended release tablet 100 mg, 100 mg, Oral, Daily, Jose Ring MD, 100 mg at 01/24/25 0808    nitroGLYCERIN (NITROSTAT) SL tablet 0.4 mg, 0.4 mg, SubLINGual, Q5 Min PRN, Jose Ring MD    pantoprazole (PROTONIX) tablet 40 mg, 40 mg, Oral, QAM AC, Jose Ring MD, 40 mg at 01/24/25 0526    [Held by provider] rivaroxaban (XARELTO) tablet 15 mg, 15 mg, Oral, Daily, Jose Ring MD

## 2025-01-24 NOTE — PROGRESS NOTES
PeerTo peer completed 4 PM today with Dr. Coyle-reviewed PT and OT needs as well as medical necessity for ARU      Was informed patient does not meet criteria for acute inpatient rehabilitation- questional medical needs.  Patient denied ARU.  Notes has option to appeal or subacute rehab.

## 2025-01-24 NOTE — PROGRESS NOTES
Physical Therapy  [facility]   Physical Therapy Treatment  Date: 25  Patient Name: Hemanth Barrera       Room:   MRN: [id number]  Account: [id number]   : 1935  (89 y.o.) Gender: male     Discharge Recommendations:  Discharge Recommendations: Patient would benefit from continued therapy after discharge, Therapy recommended at discharge     PT Equipment Recommendations  Equipment Needed: No    General  Family/Caregiver Present: No  Follows Commands: Within Functional Limits    Past Medical History:  has a past medical history of Acute inferolateral myocardial infarction (HCC), Arthritis, Atrial fibrillation (HCC), CAD (coronary artery disease), CHF (congestive heart failure) ([facility]), Glaucoma, Hx of blood clots, Hyperlipidemia, Hypertension, MI (myocardial infarction) ([facility]), and NSTEMI (non-ST elevated myocardial infarction) ([facility]).  Past Surgical History:   has a past surgical history that includes pacemaker placement; Abdomen surgery; Cardiac catheterization; Appendectomy; Colonoscopy; eye surgery; hernia repair; bone marrow biopsy; joint replacement; CT BIOPSY BONE MARROW (2022); Upper gastrointestinal endoscopy (N/A, 2023); Colonoscopy (N/A, 8/3/2023); Cardiac procedure (N/A, 2024); Cardiac procedure (N/A, 2024); Cardiac procedure (Right, 2025); and invasive vascular (N/A, 2025).    Restrictions  Restrictions/Precautions  Restrictions/Precautions: General Precautions, Fall Risk  Required Braces or Orthoses?: No  Implants Present? :  (R AGUSTO, L TKA, hx of multiple surgeries)  Position Activity Restriction  Other Position/Activity Restrictions: L posterior 10th-11th rib fx s/p fall     Subjective  Subjective  Subjective: Pt resting in bed on arrival. Pt is pleasant and cooperative with therapy.   General  General Comments: First attempt with pt made at 0810-0893. Pt initially stating agreeable to therapy but wanted to have coffee warmed up

## 2025-01-25 PROBLEM — R89.9 ABNORMAL PLEURAL FLUID: Status: ACTIVE | Noted: 2025-01-25

## 2025-01-25 LAB
ANION GAP SERPL CALCULATED.3IONS-SCNC: 7 MMOL/L (ref 9–16)
BASOPHILS # BLD: 0 K/UL (ref 0–0.2)
BASOPHILS NFR BLD: 1 % (ref 0–2)
BUN SERPL-MCNC: 14 MG/DL (ref 8–23)
CALCIUM SERPL-MCNC: 7.6 MG/DL (ref 8.6–10.4)
CHLORIDE SERPL-SCNC: 104 MMOL/L (ref 98–107)
CO2 SERPL-SCNC: 23 MMOL/L (ref 20–31)
CREAT SERPL-MCNC: 0.9 MG/DL (ref 0.7–1.2)
EOSINOPHIL # BLD: 0.1 K/UL (ref 0–0.4)
EOSINOPHILS RELATIVE PERCENT: 1 % (ref 0–4)
ERYTHROCYTE [DISTWIDTH] IN BLOOD BY AUTOMATED COUNT: 16.4 % (ref 11.5–14.9)
GFR, ESTIMATED: 81 ML/MIN/1.73M2
GLUCOSE SERPL-MCNC: 158 MG/DL (ref 74–99)
HCT VFR BLD AUTO: 27.9 % (ref 41–53)
HGB BLD-MCNC: 9 G/DL (ref 13.5–17.5)
LYMPHOCYTES NFR BLD: 0.5 K/UL (ref 1–4.8)
LYMPHOCYTES RELATIVE PERCENT: 9 % (ref 24–44)
MCH RBC QN AUTO: 30.2 PG (ref 26–34)
MCHC RBC AUTO-ENTMCNC: 32.3 G/DL (ref 31–37)
MCV RBC AUTO: 93.6 FL (ref 80–100)
MICROORGANISM SPEC CULT: ABNORMAL
MICROORGANISM/AGENT SPEC: ABNORMAL
MONOCYTES NFR BLD: 0.2 K/UL (ref 0.1–1.3)
MONOCYTES NFR BLD: 4 % (ref 1–7)
NEUTROPHILS NFR BLD: 85 % (ref 36–66)
NEUTS SEG NFR BLD: 4.2 K/UL (ref 1.3–9.1)
PLATELET # BLD AUTO: 280 K/UL (ref 150–450)
PMV BLD AUTO: 6.5 FL (ref 6–12)
POTASSIUM SERPL-SCNC: 4.3 MMOL/L (ref 3.7–5.3)
RBC # BLD AUTO: 2.98 M/UL (ref 4.5–5.9)
SERVICE CMNT-IMP: ABNORMAL
SODIUM SERPL-SCNC: 134 MMOL/L (ref 136–145)
SPECIMEN DESCRIPTION: ABNORMAL
WBC OTHER # BLD: 5 K/UL (ref 3.5–11)

## 2025-01-25 PROCEDURE — 99233 SBSQ HOSP IP/OBS HIGH 50: CPT | Performed by: INTERNAL MEDICINE

## 2025-01-25 PROCEDURE — 97110 THERAPEUTIC EXERCISES: CPT

## 2025-01-25 PROCEDURE — 2500000003 HC RX 250 WO HCPCS

## 2025-01-25 PROCEDURE — 85025 COMPLETE CBC W/AUTO DIFF WBC: CPT

## 2025-01-25 PROCEDURE — 99232 SBSQ HOSP IP/OBS MODERATE 35: CPT | Performed by: NURSE PRACTITIONER

## 2025-01-25 PROCEDURE — 6370000000 HC RX 637 (ALT 250 FOR IP): Performed by: INTERNAL MEDICINE

## 2025-01-25 PROCEDURE — 97530 THERAPEUTIC ACTIVITIES: CPT

## 2025-01-25 PROCEDURE — 80048 BASIC METABOLIC PNL TOTAL CA: CPT

## 2025-01-25 PROCEDURE — 6370000000 HC RX 637 (ALT 250 FOR IP)

## 2025-01-25 PROCEDURE — 99232 SBSQ HOSP IP/OBS MODERATE 35: CPT | Performed by: SURGERY

## 2025-01-25 PROCEDURE — 36415 COLL VENOUS BLD VENIPUNCTURE: CPT

## 2025-01-25 PROCEDURE — 97535 SELF CARE MNGMENT TRAINING: CPT

## 2025-01-25 PROCEDURE — 2060000000 HC ICU INTERMEDIATE R&B

## 2025-01-25 RX ORDER — ACETAMINOPHEN 500 MG
1000 TABLET ORAL EVERY 8 HOURS SCHEDULED
Status: DISCONTINUED | OUTPATIENT
Start: 2025-01-25 | End: 2025-02-05 | Stop reason: HOSPADM

## 2025-01-25 RX ORDER — OXYCODONE HYDROCHLORIDE 5 MG/1
7.5 TABLET ORAL EVERY 4 HOURS PRN
Status: DISCONTINUED | OUTPATIENT
Start: 2025-01-25 | End: 2025-02-05 | Stop reason: HOSPADM

## 2025-01-25 RX ADMIN — ACETAMINOPHEN 1000 MG: 500 TABLET, FILM COATED ORAL at 23:48

## 2025-01-25 RX ADMIN — METOPROLOL SUCCINATE 100 MG: 50 TABLET, EXTENDED RELEASE ORAL at 08:54

## 2025-01-25 RX ADMIN — ISOSORBIDE MONONITRATE 30 MG: 30 TABLET, EXTENDED RELEASE ORAL at 08:52

## 2025-01-25 RX ADMIN — ACETAMINOPHEN 1000 MG: 500 TABLET, FILM COATED ORAL at 13:00

## 2025-01-25 RX ADMIN — SODIUM CHLORIDE, PRESERVATIVE FREE 10 ML: 5 INJECTION INTRAVENOUS at 20:59

## 2025-01-25 RX ADMIN — FINASTERIDE 5 MG: 5 TABLET, FILM COATED ORAL at 08:52

## 2025-01-25 RX ADMIN — OXYCODONE 7.5 MG: 5 TABLET ORAL at 16:48

## 2025-01-25 RX ADMIN — SODIUM CHLORIDE, PRESERVATIVE FREE 10 ML: 5 INJECTION INTRAVENOUS at 08:54

## 2025-01-25 RX ADMIN — OXYCODONE 7.5 MG: 5 TABLET ORAL at 20:58

## 2025-01-25 RX ADMIN — OXYCODONE HYDROCHLORIDE AND ACETAMINOPHEN 1 TABLET: 5; 325 TABLET ORAL at 08:52

## 2025-01-25 RX ADMIN — OXYCODONE HYDROCHLORIDE AND ACETAMINOPHEN 1 TABLET: 5; 325 TABLET ORAL at 05:20

## 2025-01-25 RX ADMIN — OXYCODONE 7.5 MG: 5 TABLET ORAL at 13:00

## 2025-01-25 RX ADMIN — ATORVASTATIN CALCIUM 40 MG: 40 TABLET, FILM COATED ORAL at 20:58

## 2025-01-25 RX ADMIN — OXYCODONE HYDROCHLORIDE AND ACETAMINOPHEN 1 TABLET: 5; 325 TABLET ORAL at 01:17

## 2025-01-25 RX ADMIN — FERROUS SULFATE TAB 325 MG (65 MG ELEMENTAL FE) 325 MG: 325 (65 FE) TAB at 18:23

## 2025-01-25 RX ADMIN — PANTOPRAZOLE SODIUM 40 MG: 40 TABLET, DELAYED RELEASE ORAL at 05:20

## 2025-01-25 RX ADMIN — FERROUS SULFATE TAB 325 MG (65 MG ELEMENTAL FE) 325 MG: 325 (65 FE) TAB at 08:52

## 2025-01-25 RX ADMIN — DILTIAZEM HYDROCHLORIDE 120 MG: 120 CAPSULE, COATED, EXTENDED RELEASE ORAL at 08:52

## 2025-01-25 ASSESSMENT — PAIN DESCRIPTION - LOCATION
LOCATION: BACK
LOCATION: ABDOMEN
LOCATION: BACK
LOCATION: ABDOMEN

## 2025-01-25 ASSESSMENT — PAIN DESCRIPTION - DESCRIPTORS
DESCRIPTORS: SHARP;SHOOTING;STABBING
DESCRIPTORS: SORE;DISCOMFORT
DESCRIPTORS: SHARP;SHOOTING;STABBING
DESCRIPTORS: DISCOMFORT;SORE

## 2025-01-25 ASSESSMENT — PAIN SCALES - GENERAL
PAINLEVEL_OUTOF10: 0
PAINLEVEL_OUTOF10: 8
PAINLEVEL_OUTOF10: 9
PAINLEVEL_OUTOF10: 6
PAINLEVEL_OUTOF10: 9

## 2025-01-25 ASSESSMENT — PAIN DESCRIPTION - ORIENTATION
ORIENTATION: LEFT
ORIENTATION: LEFT;LOWER;MID
ORIENTATION: LEFT
ORIENTATION: LEFT;MID;LOWER
ORIENTATION: LEFT
ORIENTATION: LEFT

## 2025-01-25 ASSESSMENT — PAIN - FUNCTIONAL ASSESSMENT
PAIN_FUNCTIONAL_ASSESSMENT: ACTIVITIES ARE NOT PREVENTED
PAIN_FUNCTIONAL_ASSESSMENT: PREVENTS OR INTERFERES SOME ACTIVE ACTIVITIES AND ADLS
PAIN_FUNCTIONAL_ASSESSMENT: ACTIVITIES ARE NOT PREVENTED

## 2025-01-25 ASSESSMENT — PAIN SCALES - WONG BAKER: WONGBAKER_NUMERICALRESPONSE: HURTS A LITTLE BIT

## 2025-01-25 NOTE — PROGRESS NOTES
[facility]   INPATIENT OCCUPATIONAL THERAPY  PROGRESS NOTE  Date: 2025  Patient Name: Hemanth Barrera       Room:   MRN: [id number]    : 1935  (89 y.o.)  Gender: male           Discharge Recommendations:  Further Occupational Therapy is recommended upon facility discharge.      OT Equipment Recommendations  Mobility Devices: ADL Assistive Devices  ADL Assistive Devices: Sock-Aid Hard, Reacher    Restrictions/Precautions  Restrictions/Precautions  Restrictions/Precautions: General Precautions;Fall Risk  Required Braces or Orthoses?: No  Implants Present? :  (R AGUSTO, L TKA)  Position Activity Restriction  Other Position/Activity Restrictions: L posterior 10th-11th rib fx s/p fall    Pulse: 71  SpO2: 98 %  O2 Device: None (Room air)  BP: 111/75  MAP (Calculated): 87  Comment: Pt reports \"feeling like I can't breathe\" but vitals remain stable    Subjective  Subjective  Subjective: \"I feel like I can't breathe so I don't think I can get up right now\" pt still agreeable to modified treatment session  Pain  Pre-Pain: 9  Post-Pain: 9  Pain Location: Left;Other (Comment) (Ribs)  Pain Interventions: Nurse notified;Repositioning  Comments: RN Nancy ok'd OT tx. RN alerted that pt felt he was unable to stand because he was too short of breath.    Objective  Cognition  Overall Cognitive Status: Exceptions  Arousal/Alertness: Appears intact  Following Commands: Follows multistep commands with increased time;Follows multistep commands with repitition  Attention Span: Appears intact  Memory: Appears intact  Safety Judgement: Appears intact  Problem Solving: Assistance required to implement solutions  Insights: Appears intact  Initiation: Requires cues for some  Sequencing: Requires cues for some    Activities of Daily Living  Additional Comments: Pt initially states that he would like to complete oral care then declines once he is sitting EOB. Pt continues to be limited due to fatigue

## 2025-01-25 NOTE — PROGRESS NOTES
obesity    Degeneration of intervertebral disc    Edema    Fatigue    Glaucoma suspect of both eyes    Sick sinus syndrome (HCC)    Venous insufficiency    Paroxysmal atrial fibrillation (HCC)    Coronary artery disease involving native coronary artery of native heart with angina pectoris ([facility])    B12 deficiency    Functional constipation    Slow transit constipation    Complex regional pain syndrome type 2 of lower extremity    Open dislocation of knee    Pain in limb    Psychosexual dysfunction associated with inhibited libido    Nausea and vomiting    Right upper quadrant abdominal pain    Altered bowel habits    Abnormal finding on GI tract imaging    Abnormal LFTs    Abdominal pain    Ileus ([facility])    Non-prs chr ulcer oth prt r low leg limited to brkdwn skin ([facility])    Right wrist pain    Abdominal pain, epigastric    Leg swelling    NSTEMI (non-ST elevated myocardial infarction) ([facility])    Presence of permanent cardiac pacemaker    Fall against object    Abnormal EKG    Severe malnutrition ([facility])    Secondary hypercoagulable state ([facility])    Congestive heart failure, unspecified HF chronicity, unspecified heart failure type ([facility])    S/P coronary artery stent placement    Coronary artery disease    Traumatic closed displaced fracture of rib on left side, initial encounter    Multiple closed fractures of ribs of left side    Pleural effusion    Anemia    Fall    Moderate malnutrition ([facility])       SURGICAL HISTORY       Past Surgical History:   Procedure Laterality Date    ABDOMEN SURGERY      gastric resection    APPENDECTOMY      BONE MARROW BIOPSY      CARDIAC CATHETERIZATION      CARDIAC PROCEDURE N/A 9/17/2024    julio cesar / Left heart cath / coronary angiography / rm 512 performed by Cathie Hastings MD at [facility] CARDIAC CATH LAB    CARDIAC PROCEDURE N/A 9/17/2024    Percutaneous coronary intervention performed by Cathie Hastings MD at [facility] CARDIAC CATH LAB    CARDIAC PROCEDURE Right 1/9/2025    Left heart cath /  performed with the administration of  intravenous contrast. Multiplanar reformatted images are provided for review.  Automated exposure control, iterative reconstruction, and/or weight based  adjustment of the mA/kV was utilized to reduce the radiation dose to as low  as reasonably achievable.    COMPARISON:  18 April 2024    HISTORY:  ORDERING SYSTEM PROVIDED HISTORY: Fall, pain  TECHNOLOGIST PROVIDED HISTORY:  Fall, pain    Decision Support Exception - unselect if not a suspected or confirmed  emergency medical condition->Emergency Medical Condition (MA)  Reason for Exam: Fall; Rib Pain (injury)  Additional signs and symptoms: pt states he fell lst night c/o pain lt side  chest states he did not hit head, c collar in pace for imaging  Relevant Medical/Surgical History: appy, hernia repair    FINDINGS:    Chest:    Mediastinum: The heart is normal in size and configuration.  Mild coronary  artery calcifications and valvular calcifications.  Pacer on.  No pericardial  effusion.  No significant adenopathy.    Lungs/pleura: Large left pleural effusion no pneumothorax.  Compressive  atelectasis in the left mid and lower lung zone.  The right hemithorax is  well aerated.    Soft Tissues/Bones: There is segmented fractures of the left posterior 10th  and 11th ribs.  There is a moderate subcutaneous hematoma and equivocal tiny  amount of subcutaneous gas.      Abdomen/Pelvis:    Organs:    Liver: Normal    Gallbladder: Normal    Pancreas: Normal    Adrenal glands: There appears to be a stable right adrenal lesion.    Kidneys: Bilateral symmetrical perfusion.  Stable right renal cyst.  No  obvious worrisome mass or obstruction.    Spleen: Normal    GI/Bowel: There is fluid within the stomach and dilated duodenum and proximal  small bowel which is fluid-filled.  There is fluid throughout the rest of the  small bowel but no significant dilatation.  The colon is mildly dilated with  stool throughout.  There is stool in the

## 2025-01-25 NOTE — PROGRESS NOTES
[facility] General Surgery   Lebron Roman MD, FACS  Molly Woodruff, APRN-CNP  [address], [address]  [place] [address]  P: [phone], F: [phone]    General and Robotic Surgery  Progress Note             PATIENT NAME: Hemanth Barrera   :  1935   MRN: [id number]   PCP:  Neftaly Contreras MD     TODAY'S DATE: 2025    89 y.o. male seen and examined.  Resting comfortably.  Had a large bowel movement.  Tolerating diet.  Still complaining of rib pain on the left posterior chest but no acute shortness of breath.    PAST MEDICAL HISTORY     Past Medical History:   Diagnosis Date    Acute inferolateral myocardial infarction (HCC) 2021    Arthritis     Atrial fibrillation (HCC)     CAD (coronary artery disease)     CHF (congestive heart failure) (HCC)     Glaucoma     Hx of blood clots     with hernia surgery    Hyperlipidemia     Hypertension     MI (myocardial infarction) (HCC)     NSTEMI (non-ST elevated myocardial infarction) ([facility]) 2021       PROBLEM LIST     Patient Active Problem List   Diagnosis    On continuous oral anticoagulation    CHF, acute on chronic (HCC)    Hyperlipidemia    Former smoker    Pacemaker    History of DVT of lower extremity    Enlarged prostate    Cataract of both eyes    GERD (gastroesophageal reflux disease)    History of inguinal hernia repair    Hypertension    Pneumonia    History of right hip replacement    History of gastric surgery    Low back pain    Chest pain    Fluid overload    VARSHA (acute kidney injury) (HCC)    History of acute myocardial infarction    Chronic CHF (congestive heart failure) (HCC)    Unstable angina (HCC)    Acute on chronic diastolic congestive heart failure (HCC)    SBO (small bowel obstruction) ([facility])    Stage 3b chronic kidney disease (HCC)    Iron deficiency    Chronic systolic (congestive) heart failure    Chronic anemia    Bradycardia    Class 1 obesity    Degeneration of intervertebral disc    Edema     CARDIAC CATH LAB    COLONOSCOPY      COLONOSCOPY N/A 8/3/2023    COLONOSCOPY WITH BIOPSY performed by Isauro Razo MD at [facility] ENDO    CT BIOPSY BONE MARROW  5/31/2022    CT BONE MARROW BIOPSY 5/31/2022 [facility] CT SCAN    EYE SURGERY      smiley cataracts    HERNIA REPAIR      inguinal smiley    INVASIVE VASCULAR N/A 1/9/2025    Ultrasound guided vascular access performed by Guy Paz MD at [facility] CARDIAC CATH LAB    JOINT REPLACEMENT      rt hip x 2, lt knee    PACEMAKER PLACEMENT      UPPER GASTROINTESTINAL ENDOSCOPY N/A 8/2/2023    EGD BIOPSY performed by Isauro Razo MD at [facility]         Review of Systems   Constitutional:  Negative for chills and fever.   HENT:  Negative for congestion, rhinorrhea and sore throat.    Respiratory:  Negative for cough and shortness of breath.    Cardiovascular:  Negative for chest pain.   Gastrointestinal:         See HPI.   Genitourinary: Negative.    Skin: Negative.        VITALS:  /73   Pulse 75   Temp 97.5 °F (36.4 °C) (Oral)   Resp 20   Ht 1.854 m (6' 1\")   Wt 77.7 kg (171 lb 4.8 oz)   SpO2 98%   BMI 22.60 kg/m²       Physical Exam  Vitals reviewed.   Constitutional:       General: He is not in acute distress.     Appearance: Normal appearance. He is not ill-appearing or toxic-appearing.   HENT:      Head: Normocephalic and atraumatic.      Right Ear: External ear normal.      Left Ear: External ear normal.      Nose: Nose normal.   Eyes:      General: No scleral icterus.     Conjunctiva/sclera: Conjunctivae normal.   Neck:      Trachea: Trachea normal.   Cardiovascular:      Rate and Rhythm: Normal rate.   Pulmonary:      Effort: Pulmonary effort is normal. No accessory muscle usage or respiratory distress.      Comments: Decreased air entry at bases  Abdominal:      General: Bowel sounds are normal. There is no distension.      Palpations: Abdomen is soft.      Tenderness: There is no abdominal tenderness.   Musculoskeletal:         General: No signs of

## 2025-01-25 NOTE — PLAN OF CARE
Problem: Chronic Conditions and Co-morbidities  Goal: Patient's chronic conditions and co-morbidity symptoms are monitored and maintained or improved  1/25/2025 0304 by Jarrod Reyes RN  Outcome: Progressing     Problem: Discharge Planning  Goal: Discharge to home or other facility with appropriate resources  Outcome: Progressing     Problem: Pain  Goal: Verbalizes/displays adequate comfort level or baseline comfort level  1/25/2025 0304 by Jarrod Reyes RN  Outcome: Progressing  Note: Pt medicated with pain medication prn.  Assessed all pain characteristics including level, type, location, frequency, and onset.  Non-pharmacologic interventions offered to pt as well.  Pt states pain is tolerable at this time.        Problem: Safety - Adult  Goal: Free from fall injury  Outcome: Progressing  Note: Pt assessed as a fall risk this shift. Remains free from falls and accidental injury at this time. Fall precautions in place, including falling star sign and fall risk band on pt. Floor free from obstacles, and bed is locked and in lowest position. Adequate lighting provided.  Pt encouraged to call before getting OOB for any need.  Bed alarm activated. Will continue to monitor needs during hourly rounding, and reinforce education on use of call light.       Problem: ABCDS Injury Assessment  Goal: Absence of physical injury  Outcome: Progressing     Problem: Nutrition Deficit:  Goal: Optimize nutritional status  Outcome: Progressing     Problem: Skin/Tissue Integrity  Goal: Absence of new skin breakdown  Description: 1.  Monitor for areas of redness and/or skin breakdown  2.  Assess vascular access sites hourly  3.  Every 4-6 hours minimum:  Change oxygen saturation probe site  4.  Every 4-6 hours:  If on nasal continuous positive airway pressure, respiratory therapy assess nares and determine need for appliance change or resting period.  Outcome: Progressing

## 2025-01-25 NOTE — CARE COORDINATION
ONGOING DISCHARGE PLANNING NOTE:    Writer reviewed LSW notes, and discharge plan is to a facility at this time. FOC list was provided to patient and son. Awaiting choice.    Patient's son, not at bedside during rounding, FOC list still at bedside, no choices marked. Will attempt to get choices and send referrals.    Will continue to follow for additional discharge needs.    Electronically signed by Janice Hernandez RN on 1/25/2025 at 11:40 AM

## 2025-01-25 NOTE — PROGRESS NOTES
[facility]  IN-PATIENT SERVICE  [facility]    PROGRESS NOTE             1/25/2025    11:35 AM    Name:   Hemanth Barrera  MRN:     [id number]     Acct:      [id number]   Room:   2103/2103-01   Day:  5  Admit Date:  1/20/2025  9:06 AM    PCP:  Neftaly Contreras MD  Code Status:  Full Code    Subjective:     C/C:   Chief Complaint   Patient presents with    Fall    Rib Pain (injury)     Interval History:    Vitally stable.  On room air  Hemoglobin is 7 and 7.1 today  Chest x-ray no significant interval change.   Patient had severe chest pain 9/10  Kidney function WNL  CRP 20.4  Pleural fluid positive for gram-positive cocci in clusters; had a dose of vancomycin; ID on board and recommendation  Neutrophils count 80%, lymphocyte count 6%, monocyte 14%  Per PM&R physiatrist Dr. Lawrence Jamison, patient appropriate for Acute Inpatient Rehabilitation level of care. Waiting authorization      Brief History:     The patient is a 89 y.o. male who slipped and fell 01/19/2025 and landed on his left side, after which severe pain in his left side started to bother him. Denies loss of consciousness, numbness, weakness, headache, neck pain, back pain.     PMH: coronary artery disease with stent placement, afib, HFpEF with pacemaker, CKD stage IIIb, chronic anemia, abdominal hernia status post repair.     In the ER, he was stable. Hb 6.6 -- received RBC transfusion. CT head and C-spine showed no critical findings. CT chest whowed a large left pleural effusion with compressive atelectasis, segmented fractures of the left post erior 10th and 11th ribs. CT abdomen and pelvis revealed findings suggestive of ileus/obstruction.     Admitted for management of his multiple health issues.    2 units of RBCs transfused, Hb stabilized: 8.2. Evaluated by general surgery and pulmonology.     Medications:     Allergies:    Allergies   Allergen Reactions    Aspirin Other (See Comments)     Pt told not

## 2025-01-26 LAB
ANION GAP SERPL CALCULATED.3IONS-SCNC: 7 MMOL/L (ref 9–16)
BASOPHILS # BLD: 0 K/UL (ref 0–0.2)
BASOPHILS NFR BLD: 1 % (ref 0–2)
BUN SERPL-MCNC: 18 MG/DL (ref 8–23)
CALCIUM SERPL-MCNC: 8 MG/DL (ref 8.6–10.4)
CHLORIDE SERPL-SCNC: 104 MMOL/L (ref 98–107)
CO2 SERPL-SCNC: 23 MMOL/L (ref 20–31)
CREAT SERPL-MCNC: 0.9 MG/DL (ref 0.7–1.2)
EOSINOPHIL # BLD: 0.1 K/UL (ref 0–0.4)
EOSINOPHILS RELATIVE PERCENT: 1 % (ref 0–4)
ERYTHROCYTE [DISTWIDTH] IN BLOOD BY AUTOMATED COUNT: 16.3 % (ref 11.5–14.9)
GFR, ESTIMATED: 81 ML/MIN/1.73M2
GLUCOSE SERPL-MCNC: 131 MG/DL (ref 74–99)
HCT VFR BLD AUTO: 26 % (ref 41–53)
HGB BLD-MCNC: 8.5 G/DL (ref 13.5–17.5)
LYMPHOCYTES NFR BLD: 0.5 K/UL (ref 1–4.8)
LYMPHOCYTES RELATIVE PERCENT: 10 % (ref 24–44)
MCH RBC QN AUTO: 30.3 PG (ref 26–34)
MCHC RBC AUTO-ENTMCNC: 32.7 G/DL (ref 31–37)
MCV RBC AUTO: 92.8 FL (ref 80–100)
MONOCYTES NFR BLD: 0.3 K/UL (ref 0.1–1.3)
MONOCYTES NFR BLD: 6 % (ref 1–7)
NEUTROPHILS NFR BLD: 82 % (ref 36–66)
NEUTS SEG NFR BLD: 4 K/UL (ref 1.3–9.1)
PLATELET # BLD AUTO: 251 K/UL (ref 150–450)
PMV BLD AUTO: 6.4 FL (ref 6–12)
POTASSIUM SERPL-SCNC: 4.5 MMOL/L (ref 3.7–5.3)
RBC # BLD AUTO: 2.8 M/UL (ref 4.5–5.9)
SODIUM SERPL-SCNC: 134 MMOL/L (ref 136–145)
WBC OTHER # BLD: 4.9 K/UL (ref 3.5–11)

## 2025-01-26 PROCEDURE — 85025 COMPLETE CBC W/AUTO DIFF WBC: CPT

## 2025-01-26 PROCEDURE — 2500000003 HC RX 250 WO HCPCS

## 2025-01-26 PROCEDURE — 6370000000 HC RX 637 (ALT 250 FOR IP): Performed by: INTERNAL MEDICINE

## 2025-01-26 PROCEDURE — 99233 SBSQ HOSP IP/OBS HIGH 50: CPT | Performed by: INTERNAL MEDICINE

## 2025-01-26 PROCEDURE — 36415 COLL VENOUS BLD VENIPUNCTURE: CPT

## 2025-01-26 PROCEDURE — 80048 BASIC METABOLIC PNL TOTAL CA: CPT

## 2025-01-26 PROCEDURE — 2060000000 HC ICU INTERMEDIATE R&B

## 2025-01-26 PROCEDURE — 97530 THERAPEUTIC ACTIVITIES: CPT

## 2025-01-26 PROCEDURE — 99232 SBSQ HOSP IP/OBS MODERATE 35: CPT | Performed by: SURGERY

## 2025-01-26 PROCEDURE — 6370000000 HC RX 637 (ALT 250 FOR IP)

## 2025-01-26 RX ADMIN — OXYCODONE 7.5 MG: 5 TABLET ORAL at 19:44

## 2025-01-26 RX ADMIN — ACETAMINOPHEN 1000 MG: 500 TABLET, FILM COATED ORAL at 15:34

## 2025-01-26 RX ADMIN — ACETAMINOPHEN 1000 MG: 500 TABLET, FILM COATED ORAL at 22:30

## 2025-01-26 RX ADMIN — OXYCODONE 7.5 MG: 5 TABLET ORAL at 15:33

## 2025-01-26 RX ADMIN — SODIUM CHLORIDE, PRESERVATIVE FREE 10 ML: 5 INJECTION INTRAVENOUS at 08:31

## 2025-01-26 RX ADMIN — FERROUS SULFATE TAB 325 MG (65 MG ELEMENTAL FE) 325 MG: 325 (65 FE) TAB at 08:31

## 2025-01-26 RX ADMIN — OXYCODONE 7.5 MG: 5 TABLET ORAL at 10:26

## 2025-01-26 RX ADMIN — SODIUM CHLORIDE, PRESERVATIVE FREE 10 ML: 5 INJECTION INTRAVENOUS at 22:31

## 2025-01-26 RX ADMIN — PANTOPRAZOLE SODIUM 40 MG: 40 TABLET, DELAYED RELEASE ORAL at 06:55

## 2025-01-26 RX ADMIN — DILTIAZEM HYDROCHLORIDE 120 MG: 120 CAPSULE, COATED, EXTENDED RELEASE ORAL at 08:31

## 2025-01-26 RX ADMIN — OXYCODONE 7.5 MG: 5 TABLET ORAL at 05:47

## 2025-01-26 RX ADMIN — ATORVASTATIN CALCIUM 40 MG: 40 TABLET, FILM COATED ORAL at 19:44

## 2025-01-26 RX ADMIN — FINASTERIDE 5 MG: 5 TABLET, FILM COATED ORAL at 08:31

## 2025-01-26 RX ADMIN — ACETAMINOPHEN 1000 MG: 500 TABLET, FILM COATED ORAL at 06:55

## 2025-01-26 RX ADMIN — FERROUS SULFATE TAB 325 MG (65 MG ELEMENTAL FE) 325 MG: 325 (65 FE) TAB at 15:34

## 2025-01-26 RX ADMIN — OXYCODONE 7.5 MG: 5 TABLET ORAL at 01:26

## 2025-01-26 RX ADMIN — METOPROLOL SUCCINATE 100 MG: 50 TABLET, EXTENDED RELEASE ORAL at 08:31

## 2025-01-26 RX ADMIN — ISOSORBIDE MONONITRATE 30 MG: 30 TABLET, EXTENDED RELEASE ORAL at 08:31

## 2025-01-26 ASSESSMENT — PAIN SCALES - GENERAL
PAINLEVEL_OUTOF10: 9
PAINLEVEL_OUTOF10: 9
PAINLEVEL_OUTOF10: 8
PAINLEVEL_OUTOF10: 8
PAINLEVEL_OUTOF10: 0
PAINLEVEL_OUTOF10: 9
PAINLEVEL_OUTOF10: 10
PAINLEVEL_OUTOF10: 9
PAINLEVEL_OUTOF10: 0

## 2025-01-26 ASSESSMENT — PAIN DESCRIPTION - DESCRIPTORS
DESCRIPTORS: ACHING;DISCOMFORT;TENDER;NAGGING
DESCRIPTORS: STABBING;DISCOMFORT
DESCRIPTORS: STABBING;SHOOTING;SHARP
DESCRIPTORS: SHARP;STABBING;SHOOTING
DESCRIPTORS: SHARP;STABBING;SHOOTING

## 2025-01-26 ASSESSMENT — PAIN DESCRIPTION - LOCATION
LOCATION: BACK
LOCATION: BACK;RIB CAGE
LOCATION: HIP;BACK
LOCATION: BACK;HIP
LOCATION: BACK

## 2025-01-26 ASSESSMENT — PAIN DESCRIPTION - ORIENTATION
ORIENTATION: LEFT
ORIENTATION: LEFT;MID;LOWER
ORIENTATION: LEFT

## 2025-01-26 ASSESSMENT — PAIN - FUNCTIONAL ASSESSMENT
PAIN_FUNCTIONAL_ASSESSMENT: PREVENTS OR INTERFERES SOME ACTIVE ACTIVITIES AND ADLS
PAIN_FUNCTIONAL_ASSESSMENT: PREVENTS OR INTERFERES SOME ACTIVE ACTIVITIES AND ADLS

## 2025-01-26 NOTE — PROGRESS NOTES
[facility] PULMONARY,CRITICAL CARE & SLEEP   Hi Morocho MD/Choco BEJARANO AGABRADLEYP-BC, NP-C      Janet BEJARANO NP-C     Hemanth BEJARANO NP-C                                          Pulmonary Progress Note    Patient - Hemanth Barrera   Age - 89 y.o.   - 1935  MRN - [id number]  Acct # - [id number]  Date of Admission - 2025  9:06 AM    Consulting Service/Physician:       Primary Care Physician: Neftaly Contreras MD    SUBJECTIVE:     Chief Complaint:   Chief Complaint   Patient presents with    Fall    Rib Pain (injury)     Subjective:    Patient is sitting in bed  Tells me that his rib pain is improving but still bothersome with inspiration  He is using his incentive spirometer  He continues to be on room air  He states there are plans for inpatient rehab, on knob not really aware if he is sure that it looks like per case management that acute rehab has been denied, they will need to look into SNF with his son    Otherwise denies any worsening dyspnea, cough, wheeze      VITALS  /68   Pulse 70   Temp 98.4 °F (36.9 °C) (Oral)   Resp 20   Ht 1.854 m (6' 1\")   Wt 77.7 kg (171 lb 4.8 oz)   SpO2 97%   BMI 22.60 kg/m²   Wt Readings from Last 3 Encounters:   25 77.7 kg (171 lb 4.8 oz)   25 73.1 kg (161 lb 2.5 oz)   25 63.5 kg (140 lb)     I/O (24 Hours)    Intake/Output Summary (Last 24 hours) at 2025 1020  Last data filed at 2025 0833  Gross per 24 hour   Intake 360 ml   Output 1350 ml   Net -990 ml     Ventilator:      Exam:   Physical Exam   Constitutional: Thin, frail, no acute distress, room air  HENT: Unremarkable  Head: Normocephalic and atraumatic.   Eyes: EOM are normal. Pupils are equal, round, and reactive to light.   Neck: Neck supple.   Cardiovascular:  Regular rate and rhythm.  Normal heart tones.  No JVD.    Pulmonary/Chest: Even and unlabored breathing, diminished bilaterally more so on the left base

## 2025-01-26 NOTE — CARE COORDINATION
ONGOING DISCHARGE PLAN:    Patient is alert and oriented x4.    Spoke with patient regarding discharge plan and patient confirms that plan is still to discharge to rehab. This writer updated the patient that although inpatient rehab had accepted, insurance denied. FOC list remains at bedside, however no choices listed.    Per the patient his son did not visit yesterday. This writer will attempt to contact for choices. Patient agreeable to this plan.    Will continue to follow for additional discharge needs.    If patient is discharged prior to next notation, then this note serves as note for discharge by case management.    Electronically signed by Janice Hernandez RN on 1/26/2025 at 1:02 PM    ADDENDUM:    Attempted to contact the patient's son, Dusty, via telephone, however no answer. Per patient it his, his son's birthday today, therefore he may be extremely busy. Patient does not wish to pick facility without family. LSW to follow up in the morning.     Electronically signed by Janice Hernandez RN on 1/26/2025 at 4:06 PM

## 2025-01-26 NOTE — PROGRESS NOTES
[facility] [place] General Surgery   Lebron Roman MD, FACS  Molly Woodruff, APRN-CNP  [address], [address]  [place] [address]  P: [phone], F: [phone]    General and Robotic Surgery  Progress Note             PATIENT NAME: Hemanth Barrera   :  1935   MRN: [id number]   PCP:  Neftaly Contreras MD     TODAY'S DATE: 2025    89 y.o. male seen and examined.  Some pain related to rib fractures otherwise doing better.  Had a large bowel movement.  Tolerating diet.  No acute shortness of breath    PAST MEDICAL HISTORY     Past Medical History:   Diagnosis Date    Acute inferolateral myocardial infarction (HCC) 2021    Arthritis     Atrial fibrillation (HCC)     CAD (coronary artery disease)     CHF (congestive heart failure) (HCC)     Glaucoma     Hx of blood clots     with hernia surgery    Hyperlipidemia     Hypertension     MI (myocardial infarction) (HCC)     NSTEMI (non-ST elevated myocardial infarction) (HCC) 2021       PROBLEM LIST     Patient Active Problem List   Diagnosis    On continuous oral anticoagulation    CHF, acute on chronic (HCC)    Hyperlipidemia    Former smoker    Pacemaker    History of DVT of lower extremity    Enlarged prostate    Cataract of both eyes    GERD (gastroesophageal reflux disease)    History of inguinal hernia repair    Hypertension    Pneumonia    History of right hip replacement    History of gastric surgery    Low back pain    Chest pain    Fluid overload    VARSHA (acute kidney injury) (HCC)    History of acute myocardial infarction    Chronic CHF (congestive heart failure) (HCC)    Unstable angina (HCC)    Acute on chronic diastolic congestive heart failure (HCC)    SBO (small bowel obstruction) (HCC)    Stage 3b chronic kidney disease (HCC)    Iron deficiency    Chronic systolic (congestive) heart failure    Chronic anemia    Bradycardia    Class 1 obesity    Degeneration of intervertebral disc    Edema    Fatigue    Glaucoma

## 2025-01-26 NOTE — PROGRESS NOTES
Physical Therapy  Facility/Department: [facility]  Physical Therapy Progress Note     Name: Hemanth Barrera  : 1935  MRN: [id number]  Date of Service: 2025    Discharge Recommendations:  Patient would benefit from continued therapy after discharge, Therapy recommended at discharge   PT Equipment Recommendations  Equipment Needed: No      Patient Diagnosis(es): The primary encounter diagnosis was Fall, initial encounter. Diagnoses of Closed fracture of multiple ribs of left side, initial encounter, Pleural effusion, Anemia, unspecified type, and Ileus (HCC) were also pertinent to this visit.  Past Medical History:  has a past medical history of Acute inferolateral myocardial infarction (HCC), Arthritis, Atrial fibrillation (HCC), CAD (coronary artery disease), CHF (congestive heart failure) (HCC), Glaucoma, Hx of blood clots, Hyperlipidemia, Hypertension, MI (myocardial infarction) (HCC), and NSTEMI (non-ST elevated myocardial infarction) ([facility]ly [facility]).  Past Surgical History:  has a past surgical history that includes pacemaker placement; Abdomen surgery; Cardiac catheterization; Appendectomy; Colonoscopy; eye surgery; hernia repair; bone marrow biopsy; joint replacement; CT BIOPSY BONE MARROW (2022); Upper gastrointestinal endoscopy (N/A, 2023); Colonoscopy (N/A, 8/3/2023); Cardiac procedure (N/A, 2024); Cardiac procedure (N/A, 2024); Cardiac procedure (Right, 2025); and invasive vascular (N/A, 2025).    Assessment  Decision Making: Medium Complexity  History: Fall with Rib Fxs  Clinical Presentation: evolving  Requires PT Follow-Up: Yes  Activity Tolerance  Activity Tolerance: Patient limited by pain    Plan  Physical Therapy Plan  General Plan: 5-7 times per week  Current Treatment Recommendations: Balance training, Gait training, Stair training, Functional mobility training, Therapeutic activities, Endurance training, Safety education & training  Safety Devices  Type of

## 2025-01-26 NOTE — PROGRESS NOTES
[facility]  IN-PATIENT SERVICE  [facility]    PROGRESS NOTE             1/26/2025    11:52 AM    Name:   Hemanth Barrera  MRN:     [id number]     Acct:      [id number]   Room:   2103/2103-01  IP Day:  6  Admit Date:  1/20/2025  9:06 AM    PCP:  Neftaly Contreras MD  Code Status:  Full Code    Subjective:     C/C:   Chief Complaint   Patient presents with    Fall    Rib Pain (injury)     Interval History:    Vitally stable.  On room air  Hemoglobin is 7 and 7.1 today  Chest x-ray no significant interval change.   Patient had severe chest pain 9/10  Kidney function WNL  CRP 20.4  Pleural fluid positive for gram-positive cocci in clusters; had a dose of vancomycin; ID on board and recommendation  Neutrophils count 80%, lymphocyte count 6%, monocyte 14%  Per PM&R physiatrist Dr. Lawrence Jamison, patient appropriate for Acute Inpatient Rehabilitation level of care. Waiting authorization      Brief History:     The patient is a 89 y.o. male who slipped and fell 01/19/2025 and landed on his left side, after which severe pain in his left side started to bother him. Denies loss of consciousness, numbness, weakness, headache, neck pain, back pain.     PMH: coronary artery disease with stent placement, afib, HFpEF with pacemaker, CKD stage IIIb, chronic anemia, abdominal hernia status post repair.     In the ER, he was stable. Hb 6.6 -- received RBC transfusion. CT head and C-spine showed no critical findings. CT chest whowed a large left pleural effusion with compressive atelectasis, segmented fractures of the left post erior 10th and 11th ribs. CT abdomen and pelvis revealed findings suggestive of ileus/obstruction.     Admitted for management of his multiple health issues.    2 units of RBCs transfused, Hb stabilized: 8.2. Evaluated by general surgery and pulmonology.     Medications:     Allergies:    Allergies   Allergen Reactions    Aspirin Other (See Comments)     Pt told not  head was performed without the administration of intravenous contrast. Automated exposure control, iterative reconstruction, and/or weight based adjustment of the mA/kV was utilized to reduce the radiation dose to as low as reasonably achievable. COMPARISON: 04/18/2024 HISTORY: ORDERING SYSTEM PROVIDED HISTORY: Trauma TECHNOLOGIST PROVIDED HISTORY: Trauma Decision Support Exception - unselect if not a suspected or confirmed emergency medical condition->Emergency Medical Condition (MA) Reason for Exam: pt fell FINDINGS: BRAIN/VENTRICLES: There is no acute intracranial hemorrhage, mass effect or midline shift.  No abnormal extra-axial fluid collection.  Cortical atrophy and chronic white matter changes in the brain and associated ventricular enlargement are again demonstrated. ORBITS: The visualized portion of the orbits demonstrate no acute abnormality. SINUSES: The visualized paranasal sinuses and mastoid air cells demonstrate no acute abnormality. SOFT TISSUES/SKULL:  No acute abnormality of the visualized skull or soft tissues.     Chronic findings in the brain without acute CT abnormality identified.     Cardiac procedure    Result Date: 1/9/2025  Images from the original result were not included. [facility]   Date:                            1/9/2025 Patient name:  Hemanth Barrera Date of admission:      1/7/2025  8:07 AM MRN:                           [id number] YOB: 1935  CARDIAC CATHETERIZATION  Operators: Primary: Guy Paz MD. Assistant:  Indications for cath: [place], known CAD with prior stenting to the LAD and moderate LCx disease  Procedure performed: Cardiac cath.  Access: Left femoral artery   Procedure: After informed consent was obtained with explanation of the risks and benefits, patient was brought to the cath lab. The right groin were prepped and draped in sterile fashion. 1% lidocaine was used for local block. The Femoral artery was cannulated using

## 2025-01-26 NOTE — PROGRESS NOTES
Never true     Ran Out of Food in the Last Year: Never true   Transportation Needs: Unmet Transportation Needs (1/20/2025)    PRAPARE - Transportation     Lack of Transportation (Medical): Yes     Lack of Transportation (Non-Medical): Yes   Physical Activity: Inactive (10/29/2024)    Exercise Vital Sign     Days of Exercise per Week: 0 days     Minutes of Exercise per Session: 0 min   Stress: No Stress Concern Present (3/31/2022)    [employer] - Occupational Stress Questionnaire     Feeling of Stress : Only a little   Social Connections: Not on file   Intimate Partner Violence: Not on file   Housing Stability: Low Risk  (1/20/2025)    Housing Stability Vital Sign     Unable to Pay for Housing in the Last Year: No     Number of Times Moved in the Last Year: 1     Homeless in the Last Year: No       Family History:     Family History   Problem Relation Age of Onset    Heart Failure Mother     Heart Failure Father       Medical Decision Making:   I have independently reviewed/ordered the following labs:    CBC with Differential:   Recent Labs     01/25/25  0845 01/26/25  0816   WBC 5.0 4.9   HGB 9.0* 8.5*   HCT 27.9* 26.0*    251   LYMPHOPCT 9* 10*   MONOPCT 4 6   EOSPCT 1 1     BMP:  Recent Labs     01/25/25  0845 01/26/25  0816   * 134*   K 4.3 4.5    104   CO2 23 23   BUN 14 18   CREATININE 0.9 0.9     Hepatic Function Panel: No results for input(s): \"LABALBU\", \"BILIDIR\", \"IBILI\", \"BILITOT\", \"ALKPHOS\", \"ALT\", \"AST\" in the last 72 hours.    Invalid input(s): \"PROT\"  No results for input(s): \"RPR\" in the last 72 hours.  No results for input(s): \"HIV\" in the last 72 hours.  No results for input(s): \"BC\" in the last 72 hours.  Lab Results   Component Value Date/Time    CREATININE 0.9 01/26/2025 08:16 AM    GLUCOSE 131 01/26/2025 08:16 AM       Detailed results:        Thank you for allowing us to participate in the care of this patient.Please call with questions.    This  note is created with the assistance of a speech recognition program.  While intending to generate adocument that actually reflects the content of the visit, the document can still have some errors including those of syntax and sound a like substitutions which may escape proof reading.  It such instances, actual meaningcan be extrapolated by contextual diversion.    DARCI CASTRO - CNP  Office: [phone]  Perfect serve / office [phone]

## 2025-01-27 ENCOUNTER — APPOINTMENT (OUTPATIENT)
Dept: GENERAL RADIOLOGY | Age: 89
DRG: 199 | End: 2025-01-27
Payer: MEDICARE

## 2025-01-27 ENCOUNTER — APPOINTMENT (OUTPATIENT)
Dept: CT IMAGING | Age: 89
DRG: 199 | End: 2025-01-27
Payer: MEDICARE

## 2025-01-27 LAB
ANION GAP SERPL CALCULATED.3IONS-SCNC: 6 MMOL/L (ref 9–16)
BASOPHILS # BLD: 0 K/UL (ref 0–0.2)
BASOPHILS NFR BLD: 1 % (ref 0–2)
BUN SERPL-MCNC: 17 MG/DL (ref 8–23)
CALCIUM SERPL-MCNC: 8 MG/DL (ref 8.6–10.4)
CHLORIDE SERPL-SCNC: 105 MMOL/L (ref 98–107)
CO2 SERPL-SCNC: 24 MMOL/L (ref 20–31)
CREAT SERPL-MCNC: 1 MG/DL (ref 0.7–1.2)
EOSINOPHIL # BLD: 0.1 K/UL (ref 0–0.4)
EOSINOPHILS RELATIVE PERCENT: 2 % (ref 0–4)
ERYTHROCYTE [DISTWIDTH] IN BLOOD BY AUTOMATED COUNT: 16.6 % (ref 11.5–14.9)
GFR, ESTIMATED: 71 ML/MIN/1.73M2
GLUCOSE SERPL-MCNC: 110 MG/DL (ref 74–99)
HCT VFR BLD AUTO: 24.8 % (ref 41–53)
HGB BLD-MCNC: 8.1 G/DL (ref 13.5–17.5)
LYMPHOCYTES NFR BLD: 0.4 K/UL (ref 1–4.8)
LYMPHOCYTES RELATIVE PERCENT: 8 % (ref 24–44)
MCH RBC QN AUTO: 30.8 PG (ref 26–34)
MCHC RBC AUTO-ENTMCNC: 32.9 G/DL (ref 31–37)
MCV RBC AUTO: 93.7 FL (ref 80–100)
MONOCYTES NFR BLD: 0.3 K/UL (ref 0.1–1.3)
MONOCYTES NFR BLD: 8 % (ref 1–7)
NEUTROPHILS NFR BLD: 81 % (ref 36–66)
NEUTS SEG NFR BLD: 3.6 K/UL (ref 1.3–9.1)
PLATELET # BLD AUTO: 250 K/UL (ref 150–450)
PMV BLD AUTO: 6.6 FL (ref 6–12)
POTASSIUM SERPL-SCNC: 4.4 MMOL/L (ref 3.7–5.3)
RBC # BLD AUTO: 2.65 M/UL (ref 4.5–5.9)
SODIUM SERPL-SCNC: 135 MMOL/L (ref 136–145)
WBC OTHER # BLD: 4.4 K/UL (ref 3.5–11)

## 2025-01-27 PROCEDURE — 99231 SBSQ HOSP IP/OBS SF/LOW 25: CPT | Performed by: INTERNAL MEDICINE

## 2025-01-27 PROCEDURE — 6370000000 HC RX 637 (ALT 250 FOR IP)

## 2025-01-27 PROCEDURE — 80048 BASIC METABOLIC PNL TOTAL CA: CPT

## 2025-01-27 PROCEDURE — 85025 COMPLETE CBC W/AUTO DIFF WBC: CPT

## 2025-01-27 PROCEDURE — 71250 CT THORAX DX C-: CPT

## 2025-01-27 PROCEDURE — 2500000003 HC RX 250 WO HCPCS

## 2025-01-27 PROCEDURE — 2060000000 HC ICU INTERMEDIATE R&B

## 2025-01-27 PROCEDURE — 36415 COLL VENOUS BLD VENIPUNCTURE: CPT

## 2025-01-27 PROCEDURE — 6370000000 HC RX 637 (ALT 250 FOR IP): Performed by: INTERNAL MEDICINE

## 2025-01-27 PROCEDURE — 97530 THERAPEUTIC ACTIVITIES: CPT

## 2025-01-27 PROCEDURE — 99232 SBSQ HOSP IP/OBS MODERATE 35: CPT | Performed by: NURSE PRACTITIONER

## 2025-01-27 PROCEDURE — 99232 SBSQ HOSP IP/OBS MODERATE 35: CPT | Performed by: INTERNAL MEDICINE

## 2025-01-27 PROCEDURE — G0545 PR INHERENT VISIT TO INPT: HCPCS | Performed by: INTERNAL MEDICINE

## 2025-01-27 PROCEDURE — 97535 SELF CARE MNGMENT TRAINING: CPT

## 2025-01-27 PROCEDURE — 71045 X-RAY EXAM CHEST 1 VIEW: CPT

## 2025-01-27 RX ADMIN — ISOSORBIDE MONONITRATE 30 MG: 30 TABLET, EXTENDED RELEASE ORAL at 09:14

## 2025-01-27 RX ADMIN — ACETAMINOPHEN 1000 MG: 500 TABLET, FILM COATED ORAL at 23:28

## 2025-01-27 RX ADMIN — OXYCODONE 7.5 MG: 5 TABLET ORAL at 13:15

## 2025-01-27 RX ADMIN — ATORVASTATIN CALCIUM 40 MG: 40 TABLET, FILM COATED ORAL at 21:31

## 2025-01-27 RX ADMIN — SODIUM CHLORIDE, PRESERVATIVE FREE 10 ML: 5 INJECTION INTRAVENOUS at 21:32

## 2025-01-27 RX ADMIN — OXYCODONE 7.5 MG: 5 TABLET ORAL at 17:12

## 2025-01-27 RX ADMIN — ACETAMINOPHEN 1000 MG: 500 TABLET, FILM COATED ORAL at 06:39

## 2025-01-27 RX ADMIN — ACETAMINOPHEN 1000 MG: 500 TABLET, FILM COATED ORAL at 13:16

## 2025-01-27 RX ADMIN — FERROUS SULFATE TAB 325 MG (65 MG ELEMENTAL FE) 325 MG: 325 (65 FE) TAB at 09:13

## 2025-01-27 RX ADMIN — PANTOPRAZOLE SODIUM 40 MG: 40 TABLET, DELAYED RELEASE ORAL at 06:39

## 2025-01-27 RX ADMIN — OXYCODONE 7.5 MG: 5 TABLET ORAL at 21:35

## 2025-01-27 RX ADMIN — OXYCODONE 7.5 MG: 5 TABLET ORAL at 04:44

## 2025-01-27 RX ADMIN — DILTIAZEM HYDROCHLORIDE 120 MG: 120 CAPSULE, COATED, EXTENDED RELEASE ORAL at 09:14

## 2025-01-27 RX ADMIN — METOPROLOL SUCCINATE 100 MG: 50 TABLET, EXTENDED RELEASE ORAL at 09:13

## 2025-01-27 RX ADMIN — ERGOCALCIFEROL 50000 UNITS: 1.25 CAPSULE ORAL at 17:12

## 2025-01-27 RX ADMIN — SODIUM CHLORIDE, PRESERVATIVE FREE 10 ML: 5 INJECTION INTRAVENOUS at 09:20

## 2025-01-27 RX ADMIN — OXYCODONE 7.5 MG: 5 TABLET ORAL at 09:13

## 2025-01-27 RX ADMIN — FERROUS SULFATE TAB 325 MG (65 MG ELEMENTAL FE) 325 MG: 325 (65 FE) TAB at 17:12

## 2025-01-27 RX ADMIN — FINASTERIDE 5 MG: 5 TABLET, FILM COATED ORAL at 09:14

## 2025-01-27 RX ADMIN — OXYCODONE 7.5 MG: 5 TABLET ORAL at 00:41

## 2025-01-27 RX ADMIN — POLYETHYLENE GLYCOL 3350 17 G: 17 POWDER, FOR SOLUTION ORAL at 17:12

## 2025-01-27 ASSESSMENT — PAIN DESCRIPTION - DESCRIPTORS
DESCRIPTORS: SHARP;STABBING
DESCRIPTORS: SHARP;STABBING;SHOOTING
DESCRIPTORS: SHARP
DESCRIPTORS: JABBING
DESCRIPTORS: SHARP;SHOOTING;STABBING
DESCRIPTORS: SHARP;SHOOTING;STABBING
DESCRIPTORS: SHARP;JABBING

## 2025-01-27 ASSESSMENT — PAIN SCALES - GENERAL
PAINLEVEL_OUTOF10: 0
PAINLEVEL_OUTOF10: 10
PAINLEVEL_OUTOF10: 9
PAINLEVEL_OUTOF10: 7
PAINLEVEL_OUTOF10: 6
PAINLEVEL_OUTOF10: 9
PAINLEVEL_OUTOF10: 0
PAINLEVEL_OUTOF10: 10
PAINLEVEL_OUTOF10: 7
PAINLEVEL_OUTOF10: 9

## 2025-01-27 ASSESSMENT — PAIN DESCRIPTION - ORIENTATION
ORIENTATION: LEFT

## 2025-01-27 ASSESSMENT — PAIN DESCRIPTION - LOCATION
LOCATION: RIB CAGE
LOCATION: HIP;BACK
LOCATION: ABDOMEN;RIB CAGE
LOCATION: BACK;HIP
LOCATION: BACK;RIB CAGE
LOCATION: BACK;HIP
LOCATION: RIB CAGE

## 2025-01-27 ASSESSMENT — PAIN SCALES - WONG BAKER: WONGBAKER_NUMERICALRESPONSE: HURTS A LITTLE BIT

## 2025-01-27 ASSESSMENT — PAIN - FUNCTIONAL ASSESSMENT: PAIN_FUNCTIONAL_ASSESSMENT: PREVENTS OR INTERFERES SOME ACTIVE ACTIVITIES AND ADLS

## 2025-01-27 ASSESSMENT — PAIN DESCRIPTION - ONSET: ONSET: ON-GOING

## 2025-01-27 ASSESSMENT — PAIN DESCRIPTION - PAIN TYPE: TYPE: ACUTE PAIN

## 2025-01-27 ASSESSMENT — PAIN DESCRIPTION - FREQUENCY: FREQUENCY: CONTINUOUS

## 2025-01-27 NOTE — PROGRESS NOTES
[facility]  IN-PATIENT SERVICE  [facility]    PROGRESS NOTE             1/27/2025    12:40 PM    Name:   Hemanth Barrera  MRN:     [id number]     Acct:      [id number]   Room:   2103/2103-01  IP Day:  7  Admit Date:  1/20/2025  9:06 AM    PCP:  Neftaly Contreras MD  Code Status:  Full Code    Subjective:     C/C:   Chief Complaint   Patient presents with    Fall    Rib Pain (injury)     Interval History:    Vitally stable.  On room air  Hemoglobin is 7 and 7.1 today  Chest x-ray no significant interval change.   Patient had severe chest pain 9/10  Kidney function WNL  CRP 20.4  Pleural fluid positive for gram-positive cocci in clusters; had a dose of vancomycin; ID on board and recommendation  Neutrophils count 80%, lymphocyte count 6%, monocyte 14%  Per PM&R physiatrist Dr. Lawrence Jamison, patient appropriate for Acute Inpatient Rehabilitation level of care. Waiting authorization      Brief History:     The patient is a 89 y.o. male who slipped and fell 01/19/2025 and landed on his left side, after which severe pain in his left side started to bother him. Denies loss of consciousness, numbness, weakness, headache, neck pain, back pain.     PMH: coronary artery disease with stent placement, afib, HFpEF with pacemaker, CKD stage IIIb, chronic anemia, abdominal hernia status post repair.     In the ER, he was stable. Hb 6.6 -- received RBC transfusion. CT head and C-spine showed no critical findings. CT chest whowed a large left pleural effusion with compressive atelectasis, segmented fractures of the left post erior 10th and 11th ribs. CT abdomen and pelvis revealed findings suggestive of ileus/obstruction.     Admitted for management of his multiple health issues.    2 units of RBCs transfused, Hb stabilized: 8.2. Evaluated by general surgery and pulmonology.     Medications:     Allergies:    Allergies   Allergen Reactions    Aspirin Other (See Comments)     Pt told not  left side, initial encounter [S22.32XA] 01/20/2025    Multiple closed fractures of ribs of left side [S22.42XA] 01/20/2025    Pleural effusion [J90] 01/20/2025    Anemia [D64.9] 01/20/2025    Fall [W19.XXXA] 01/20/2025       Plan:        Acute on chronic anemia, Hb 6.6; drop from 8.8. Possibly posthemorrhagic.  -RBC transfused.  -H7H q6h.  Hemoglobin is 7 and 7.1 on 1/23  -Anticoagulation and antiplatelets held.       Mechanical fall from standing height.  Left 10th and 11th ribs fracture with left moderate-to-large hydro- (possibly hemo) thorax.  -General surgery consulted  -Per PM&R physiatrist Dr. Lawrence Jamison, patient appropriate for Acute Inpatient Rehabilitation level of care. Waiting authorization     Coronary artery disease with stent placement  -Anticoagulation and antiplatelets held.     Afib  -Continue home meds     HFpEF with pacemaker  -Continue home meds  -EKG     CKD stage IIIb  -Monitor  -Continue home meds     CT abdomen and pelvis revealed findings suggestive of ileus/obstruction  -General surgery consulted      DVT prophylaxis: reason for no prophylaxis: bleeding  GI prophylaxis: Protonix 40 mg daily      1/24  Patient seen and examined, continues to complain of chest pain secondary to rib fractures,  His vitals have been stable saturating well on room air  Give lidocaine patch  Patient advised to do incentive spirometry  Aspirin and Plavix currently held, resume when okay with pulm  Get CBC today  Had history of CAD, last cath was done in September showed patent stent  Vancomycin DC'd by ID  Will start iron supplements  DVT prophylaxis SCD  Charge pending placement at rehab electronically signed by Sana Haskins MD     1/25  Patient seen and examined    Vitals have been stable continues to complain of severe pain at the fracture site  Will schedule Tylenol 1000 3 times daily and put [place] 7.5 every 4 as needed for severe pain  Labs reviewed satisfactory  Discharge pending

## 2025-01-27 NOTE — PROGRESS NOTES
Physical Therapy  DATE: 2025    NAME: Hemanth Barrera  MRN: [id number]   : 1935    Patient not seen this date for Physical Therapy due to:      [] Cancel by RN or physician due to:    [] Hemodialysis    [] Critical Lab Value Level     [] Blood transfusion in progress    [] Acute or unstable cardiovascular status   _MAP < 55 or more than >115  _HR < 40 or > 130    [] Acute or unstable pulmonary status   -FiO2 > 60%   _RR < 5 or >40    _O2 sats < 85%    [] Strict Bedrest    [] Off Unit for surgery or procedure    [] Off Unit for testing       [] Pending imaging to R/O fracture    [x] Refusal by Patient; checked on pt @ 1016. Pt lying in bed stating he just got done working with OT and would like to rest. Will try and check back time permitting.       [] Other      [] PT being discontinued at this time. Patient independent. No further needs.     [] PT being discontinued at this time as the patient has been transferred to hospice care. No further needs.      Electronically signed by Mabel Chirinos PTA on 25 at 2:03 PM EST

## 2025-01-27 NOTE — PROGRESS NOTES
[facility] -   Infectious diseases evaluation  admission date 1/20/2025    reason for consultation:   Gram-positive cocci in clusters on pleural fluid culture    Impression :   Current:  Rib fractures with hemothorax to the left side  Status post thoracentesis 1/21/2025 Staph epi/staph capitis growth on culture likely contamination.  Anemia  Mechanical fall  Coronary artery disease status post stent placement  Pacemaker  Chronic kidney disease stage IIIb  Chronic anemia  Abdominal hernia s/p repair  A-fib    HENCE:   Off antibiotics.  Follow CBC   Supportive care  Discussed with patient.    Infection Control Recommendations   [place] Precautions      Antimicrobial Stewardship Recommendations   Simplification of therapy  Targeted therapy      History of Present Illness:   Initial history:  Hemanth Barrera is a 89 y.o.-year-old male presented to the hospital after he fell on 1/19/2025, landed on his left side, complaining of pain to the left side chest pain.  The patient was on aspirin, Plavix and Xarelto.  At the ER hemoglobin was 6.6 received blood transfusion  CT head and C-spine unremarkable.  CT chest showed large left pleural effusion suspected hemothorax with atelectasis, fractures of the 10th and 11th ribs with moderate subcutaneous hematoma and tiny amount of subcutaneous gas .  CT abdomen and pelvis suggestive of constipation, ileus versus obstruction  Status post thoracentesis 1/21/2025 showed 1, 875 WBC, 1, 386, 211 RBCs, 4.3 of protein, 414 LD, gram-positive cocci in clusters staph epi on culture      Interval changes  1/27/2025  He is still complaining left sided rib pain ,no new complaints.  No leukocytosis  1/21 Pleural fluid Cx - staph epi  and staph capitis contaminants      Patient Vitals for the past 8 hrs:   BP Temp Temp src Pulse Resp SpO2   01/27/25 1315 -- -- -- -- 26 --   01/27/25 1115 97/66 98.3 °F (36.8 °C) Axillary 71 22 98 %   01/27/25

## 2025-01-27 NOTE — CARE COORDINATION
DISCHARGE PLANNING NOTE:    LSW following for potential discharge to SNF. Writer contacted patients yovani Montano, he was unable to make it to visit this weekend due to being sick. Requests list be emailed to him. FOC list sent to [email or web address].  Will need insurance authorization submitted once accepting facility has been determined.

## 2025-01-27 NOTE — PROGRESS NOTES
[facility] PULMONARY,CRITICAL CARE & SLEEP   Hitracy Morocho MD/Choco BEJARANO AGABRADLEYP-BC, NP-C      Janet BEJARANO NP-C    Heamnth BEJARANO NP-C                                         Pulmonary Progress Note    Patient - Hemanth Barrera   Age - 89 y.o.   - 1935  MRN - [id number]  [facility] LLCt # - [id number]  Date of Admission - 2025  9:06 AM    Consulting Service/Physician:       Primary Care Physician: Neftaly Contreras MD    SUBJECTIVE:     Chief Complaint:   Chief Complaint   Patient presents with    Fall    Rib Pain (injury)     Subjective:    He is having consider amount of pain today, he feels like his breathing is worse today.  He denies any significant cough.  No hemoptysis.  He has been more active today, up to the chair.  I did review his chest x-ray does show worsening of the hemothorax from prior images.    VITALS  BP 97/66   Pulse 71   Temp 98.3 °F (36.8 °C) (Axillary)   Resp 26   Ht 1.854 m (6' 1\")   Wt 77.7 kg (171 lb 4.8 oz)   SpO2 98%   BMI 22.60 kg/m²   Wt Readings from Last 3 Encounters:   25 77.7 kg (171 lb 4.8 oz)   25 73.1 kg (161 lb 2.5 oz)   25 63.5 kg (140 lb)     I/O (24 Hours)    Intake/Output Summary (Last 24 hours) at 2025 1414  Last data filed at 2025 0925  Gross per 24 hour   Intake --   Output 625 ml   Net -625 ml     Ventilator:      Exam:   Physical Exam   Constitutional: Elderly thin male, looks uncomfortable in bed, just was in the chair  HENT: Unremarkable,  Head: Normocephalic and atraumatic.   Eyes: EOM are normal. Pupils are equal, round, and reactive to light.   Neck: Neck supple.   Cardiovascular:  Regular rate and rhythm.  Normal heart tones.  No JVD.    Pulmonary/Chest: Lung sounds clear anterior, diminished to the left base, respirations easy at rest on room air, very uncomfortable with movement  Abdominal: Soft. Bowel sounds are normal. There is no tenderness.   Musculoskeletal:

## 2025-01-27 NOTE — PROGRESS NOTES
Comprehensive Nutrition Assessment    Type and Reason for Visit:  Reassess    Nutrition Recommendations/Plan:   Will continue Regular diet with Ensure twice daily     Malnutrition Assessment:  Malnutrition Status:  Moderate malnutrition (01/21/25 1409)    Context:  Acute Illness     Findings of the 6 clinical characteristics of malnutrition:  Energy Intake:  Unable to assess  Weight Loss:   (7.5% wt loss over 4 months)     Body Fat Loss:  Mild body fat loss Orbital   Muscle Mass Loss:  Mild muscle mass loss Hand (interosseous)  Fluid Accumulation:  Mild Extremities   Strength:  Not Performed    Nutrition Assessment:    Pt is consuming more than 50% of food provided and likes Chocolate Ensure.    Nutrition Related Findings:    no edema, Labs/Meds: Reviewed, BM 1/26 Wound Type: Multiple, Pressure Injury       Current Nutrition Intake & Therapies:    Average Meal Intake: 51-75%, %  Average Supplements Intake: 51-75%, %  ADULT DIET; Regular  ADULT ORAL NUTRITION SUPPLEMENT; Breakfast, Dinner; Standard High Calorie/High Protein Oral Supplement    Anthropometric Measures:  Height: 185.4 cm (6' 1\")  Ideal Body Weight (IBW): 184 lbs (84 kg)    Admission Body Weight: 73.5 kg (162 lb) (stated)  Current Body Weight: 78.5 kg (173 lb 1 oz) (obtained by RD), 94.1 % IBW. Weight Source: Bed scale  Current BMI (kg/m2): 22.8  Usual Body Weight: 84.8 kg (187 lb) (9/24 bedscale)     % Weight Change (Calculated): -7.5                    BMI Categories: Normal Weight (BMI 22.0 to 24.9) age over 65    Estimated Daily Nutrient Needs:  Energy Requirements Based On: Formula  Weight Used for Energy Requirements: Current  Energy (kcal/day): [place] x 1.2-= 1800 kcal  Weight Used for Protein Requirements: Current  Protein (g/day): 1.5g/kg= 115-120 g  Method Used for Fluid Requirements: Defer to provider  Fluid (ml/day): at least 1500 ml    Nutrition Diagnosis:   Moderate malnutrition related to inadequate protein-energy intake

## 2025-01-27 NOTE — PROGRESS NOTES
DATE: 2025    NAME: Hemanth Barrera  MRN: [id number]   : 1935    Patient not seen this date for Physical Therapy due to:      [] Cancel by RN or physician due to:    [] Hemodialysis    [] Critical Lab Value Level     [] Blood transfusion in progress    [] Acute or unstable cardiovascular status   _MAP < 55 or more than >115  _HR < 40 or > 130    [] Acute or unstable pulmonary status   -FiO2 > 60%   _RR < 5 or >40    _O2 sats < 85%    [] Strict Bedrest    [] Off Unit for surgery or procedure    [] Off Unit for testing       [] Pending imaging to R/O fracture    [] Refusal by Patient      [x] Other: patient unavailable with nursing staff, CT of chest ordered for patient       [] PT being discontinued at this time. Patient independent. No further needs.     [] PT being discontinued at this time as the patient has been transferred to hospice care. No further needs.      Cindy Wilkerson, PTA

## 2025-01-27 NOTE — PLAN OF CARE
Problem: Chronic Conditions and Co-morbidities  Goal: Patient's chronic conditions and co-morbidity symptoms are monitored and maintained or improved  1/27/2025 1526 by Tamika Haynes RN  Outcome: Progressing     Problem: Discharge Planning  Goal: Discharge to home or other facility with appropriate resources  1/27/2025 1526 by Tamika Haynes RN  Outcome: Progressing     Problem: Pain  Goal: Verbalizes/displays adequate comfort level or baseline comfort level  1/27/2025 1526 by Tamika Haynes RN  Outcome: Progressing     Problem: Safety - Adult  Goal: Free from fall injury  1/27/2025 1526 by Tamika Haynes RN  Outcome: Progressing     Problem: Nutrition Deficit:  Goal: Optimize nutritional status  1/27/2025 1526 by Tamika Haynes RN  Outcome: Progressing     Problem: Skin/Tissue Integrity  Goal: Absence of new skin breakdown  Description: 1.  Monitor for areas of redness and/or skin breakdown  2.  Assess vascular access sites hourly  3.  Every 4-6 hours minimum:  Change oxygen saturation probe site  4.  Every 4-6 hours:  If on nasal continuous positive airway pressure, respiratory therapy assess nares and determine need for appliance change or resting period.  1/27/2025 1526 by Tamika Haynes RN  Outcome: Progressing

## 2025-01-27 NOTE — PROGRESS NOTES
[facility]   INPATIENT OCCUPATIONAL THERAPY  PROGRESS NOTE  Date: 2025  Patient Name: Hemanth Barrera       Room:   MRN: [id number]    : 1935  (89 y.o.)  Gender: male             Discharge Recommendations:  Further Occupational Therapy is recommended upon facility discharge.    OT Equipment Recommendations  Mobility Devices: ADL Assistive Devices  ADL Assistive Devices: Sock-Aid Hard, Reacher    Restrictions/Precautions       Pulse: 71  SpO2: 98 %  O2 Device: None (Room air)  BP: 111/75  MAP (Calculated): 87  Comment: Pt reports \"feeling like I can't breathe\" but vitals remain stable    Subjective  Subjective  Subjective: Pt supine in bed when VU arrived.  Pain  Pre-Pain: 9  Post-Pain: 9  Pain Location: Left;Other (Comment) (ribs)  Pain Descriptor: Sharp  Pain Interventions: Nurse notified;Ice  Comments: Skilled services okayed by LEON Lundberg.    Objective  Cognition  Overall Cognitive Status: Exceptions  Arousal/Alertness: Appears intact  Following Commands: Follows multistep commands with increased time;Follows multistep commands with repitition  Attention Span: Appears intact  Memory: Appears intact  Safety Judgement: Appears intact  Problem Solving: Assistance required to implement solutions  Insights: Appears intact  Initiation: Requires cues for some  Sequencing: Requires cues for some    Activities of Daily Living  LE Dressing: Moderate assistance  LE Dressing Skilled Clinical Factors: Pt educated on ccompensatory techniques for threading RLE first to promote ease with LB dressing task during ADLs.  Toileting: Minimal assistance  Toileting Skilled Clinical Factors: VU facilitated functional mobility by pt to and from rest room with the use of RW with VU providing pt with min A with education on RW safety and sequencing of t/f. VU provided pt with min A for LB clothing management with education on safety for alteranting between supported/unsupported stands

## 2025-01-27 NOTE — PROGRESS NOTES
[facility] General Surgery   Lebron Roman MD, FACS  Molly Woodruff, APRN-CNP  [address], [address]  [place] [address]  P: [phone], F: [phone]    General and Robotic Surgery  Progress Note             PATIENT NAME: Hemanth Barrera   :  1935   MRN: [id number]   PCP:  Neftaly Contreras MD     TODAY'S DATE: 2025    89 y.o. male seen and examined earlier this evening.  Complaining of left-sided chest/rib pain.  Tolerating diet.  SpO2 has been stable on room air.  CT chest pending.    PAST MEDICAL HISTORY     Past Medical History:   Diagnosis Date    Acute inferolateral myocardial infarction (HCC) 2021    Arthritis     Atrial fibrillation (HCC)     CAD (coronary artery disease)     CHF (congestive heart failure) (HCC)     Glaucoma     Hx of blood clots     with hernia surgery    Hyperlipidemia     Hypertension     MI (myocardial infarction) (HCC)     NSTEMI (non-ST elevated myocardial infarction) (HCC) 2021       PROBLEM LIST     Patient Active Problem List   Diagnosis    On continuous oral anticoagulation    CHF, acute on chronic (HCC)    Hyperlipidemia    Former smoker    Pacemaker    History of DVT of lower extremity    Enlarged prostate    Cataract of both eyes    GERD (gastroesophageal reflux disease)    History of inguinal hernia repair    Hypertension    Pneumonia    History of right hip replacement    History of gastric surgery    Low back pain    Chest pain    Fluid overload    VARSHA (acute kidney injury) (HCC)    History of acute myocardial infarction    Chronic CHF (congestive heart failure) (HCC)    Unstable angina (HCC)    Acute on chronic diastolic congestive heart failure (HCC)    SBO (small bowel obstruction) (HCC)    Stage 3b chronic kidney disease (HCC)    Iron deficiency    Chronic systolic (congestive) heart failure    Chronic anemia    Bradycardia    Class 1 obesity    Degeneration of intervertebral disc    Edema    Fatigue    Glaucoma suspect       General: No signs of injury.      Cervical back: Neck supple.   Skin:     General: Skin is warm and dry.   Neurological:      General: No focal deficit present.      Mental Status: He is alert.   Psychiatric:         Mood and Affect: Mood normal.                Data  Lab Results   Component Value Date    WBC 4.4 01/27/2025    HGB 8.1 (L) 01/27/2025    HCT 24.8 (L) 01/27/2025    MCV 93.7 01/27/2025     01/27/2025     Lab Results   Component Value Date     (L) 01/27/2025    K 4.4 01/27/2025     01/27/2025    CO2 24 01/27/2025    BUN 17 01/27/2025    CREATININE 1.0 01/27/2025    GLUCOSE 110 (H) 01/27/2025    CALCIUM 8.0 (L) 01/27/2025    BILITOT 0.6 01/20/2025    ALKPHOS 73 01/20/2025    AST 32 01/20/2025    ALT 42 01/20/2025    LABGLOM 71 01/27/2025    GFRAA 59 (L) 08/22/2022           Radiology Review:    CT Result (most recent):  CT CHEST ABDOMEN PELVIS W CONTRAST 01/20/2025    Narrative  EXAMINATION:  CT OF THE CHEST, ABDOMEN, AND PELVIS WITH CONTRAST 1/20/2025 10:12 am    TECHNIQUE:  CT of the chest, abdomen and pelvis was performed with the administration of  intravenous contrast. Multiplanar reformatted images are provided for review.  Automated exposure control, iterative reconstruction, and/or weight based  adjustment of the mA/kV was utilized to reduce the radiation dose to as low  as reasonably achievable.    COMPARISON:  18 April 2024    HISTORY:  ORDERING SYSTEM PROVIDED HISTORY: Fall, pain  TECHNOLOGIST PROVIDED HISTORY:  Fall, pain    Decision Support Exception - unselect if not a suspected or confirmed  emergency medical condition->Emergency Medical Condition (MA)  Reason for Exam: Fall; Rib Pain (injury)  Additional signs and symptoms: pt states he fell lst night c/o pain lt side  chest states he did not hit head, c collar in pace for imaging  Relevant Medical/Surgical History: appy, hernia repair    FINDINGS:    Chest:    Mediastinum: The heart is normal in size and

## 2025-01-28 ENCOUNTER — APPOINTMENT (OUTPATIENT)
Dept: GENERAL RADIOLOGY | Age: 89
DRG: 199 | End: 2025-01-28
Attending: INTERNAL MEDICINE
Payer: MEDICARE

## 2025-01-28 ENCOUNTER — APPOINTMENT (OUTPATIENT)
Dept: GENERAL RADIOLOGY | Age: 89
DRG: 199 | End: 2025-01-28
Payer: MEDICARE

## 2025-01-28 ENCOUNTER — APPOINTMENT (OUTPATIENT)
Dept: ULTRASOUND IMAGING | Age: 89
DRG: 199 | End: 2025-01-28
Payer: MEDICARE

## 2025-01-28 LAB
ANION GAP SERPL CALCULATED.3IONS-SCNC: 7 MMOL/L (ref 9–16)
BASOPHILS # BLD: 0 K/UL (ref 0–0.2)
BASOPHILS NFR BLD: 0 % (ref 0–2)
BUN SERPL-MCNC: 18 MG/DL (ref 8–23)
CALCIUM SERPL-MCNC: 8.1 MG/DL (ref 8.6–10.4)
CHLORIDE SERPL-SCNC: 104 MMOL/L (ref 98–107)
CO2 SERPL-SCNC: 24 MMOL/L (ref 20–31)
CREAT SERPL-MCNC: 1 MG/DL (ref 0.7–1.2)
EOSINOPHIL # BLD: 0 K/UL (ref 0–0.4)
EOSINOPHILS RELATIVE PERCENT: 0 % (ref 0–4)
ERYTHROCYTE [DISTWIDTH] IN BLOOD BY AUTOMATED COUNT: 16.4 % (ref 11.5–14.9)
GFR, ESTIMATED: 71 ML/MIN/1.73M2
GLUCOSE SERPL-MCNC: 112 MG/DL (ref 74–99)
HCT VFR BLD AUTO: 26.1 % (ref 41–53)
HCT VFR BLD AUTO: 27.2 % (ref 41–53)
HGB BLD-MCNC: 8.7 G/DL (ref 13.5–17.5)
HGB BLD-MCNC: 8.7 G/DL (ref 13.5–17.5)
INR PPP: 1.1
LYMPHOCYTES NFR BLD: 0.29 K/UL (ref 1–4.8)
LYMPHOCYTES RELATIVE PERCENT: 7 % (ref 24–44)
MCH RBC QN AUTO: 31 PG (ref 26–34)
MCHC RBC AUTO-ENTMCNC: 33.3 G/DL (ref 31–37)
MCV RBC AUTO: 93.2 FL (ref 80–100)
MONOCYTES NFR BLD: 0.25 K/UL (ref 0.1–1.3)
MONOCYTES NFR BLD: 6 % (ref 1–7)
MORPHOLOGY: ABNORMAL
NEUTROPHILS NFR BLD: 87 % (ref 36–66)
NEUTS SEG NFR BLD: 3.66 K/UL (ref 1.3–9.1)
PLATELET # BLD AUTO: 242 K/UL (ref 150–450)
PMV BLD AUTO: 6.7 FL (ref 6–12)
POTASSIUM SERPL-SCNC: 4.8 MMOL/L (ref 3.7–5.3)
PROTHROMBIN TIME: 14.8 SEC (ref 11.8–14.6)
RBC # BLD AUTO: 2.8 M/UL (ref 4.5–5.9)
SODIUM SERPL-SCNC: 135 MMOL/L (ref 136–145)
WBC OTHER # BLD: 4.2 K/UL (ref 3.5–11)

## 2025-01-28 PROCEDURE — 6370000000 HC RX 637 (ALT 250 FOR IP)

## 2025-01-28 PROCEDURE — 99231 SBSQ HOSP IP/OBS SF/LOW 25: CPT | Performed by: INTERNAL MEDICINE

## 2025-01-28 PROCEDURE — 99233 SBSQ HOSP IP/OBS HIGH 50: CPT | Performed by: INTERNAL MEDICINE

## 2025-01-28 PROCEDURE — 36415 COLL VENOUS BLD VENIPUNCTURE: CPT

## 2025-01-28 PROCEDURE — 85014 HEMATOCRIT: CPT

## 2025-01-28 PROCEDURE — 2500000003 HC RX 250 WO HCPCS

## 2025-01-28 PROCEDURE — 6370000000 HC RX 637 (ALT 250 FOR IP): Performed by: INTERNAL MEDICINE

## 2025-01-28 PROCEDURE — 85025 COMPLETE CBC W/AUTO DIFF WBC: CPT

## 2025-01-28 PROCEDURE — 71045 X-RAY EXAM CHEST 1 VIEW: CPT

## 2025-01-28 PROCEDURE — 6360000002 HC RX W HCPCS: Performed by: NURSE PRACTITIONER

## 2025-01-28 PROCEDURE — 85018 HEMOGLOBIN: CPT

## 2025-01-28 PROCEDURE — G0545 PR INHERENT VISIT TO INPT: HCPCS | Performed by: INTERNAL MEDICINE

## 2025-01-28 PROCEDURE — 0W9B3ZZ DRAINAGE OF LEFT PLEURAL CAVITY, PERCUTANEOUS APPROACH: ICD-10-PCS | Performed by: RADIOLOGY

## 2025-01-28 PROCEDURE — 30233N1 TRANSFUSION OF NONAUTOLOGOUS RED BLOOD CELLS INTO PERIPHERAL VEIN, PERCUTANEOUS APPROACH: ICD-10-PCS | Performed by: RADIOLOGY

## 2025-01-28 PROCEDURE — 2060000000 HC ICU INTERMEDIATE R&B

## 2025-01-28 PROCEDURE — 80048 BASIC METABOLIC PNL TOTAL CA: CPT

## 2025-01-28 PROCEDURE — 32555 ASPIRATE PLEURA W/ IMAGING: CPT

## 2025-01-28 PROCEDURE — 99232 SBSQ HOSP IP/OBS MODERATE 35: CPT | Performed by: NURSE PRACTITIONER

## 2025-01-28 PROCEDURE — 85610 PROTHROMBIN TIME: CPT

## 2025-01-28 RX ADMIN — DILTIAZEM HYDROCHLORIDE 120 MG: 120 CAPSULE, COATED, EXTENDED RELEASE ORAL at 09:28

## 2025-01-28 RX ADMIN — ACETAMINOPHEN 1000 MG: 500 TABLET, FILM COATED ORAL at 21:36

## 2025-01-28 RX ADMIN — FERROUS SULFATE TAB 325 MG (65 MG ELEMENTAL FE) 325 MG: 325 (65 FE) TAB at 15:16

## 2025-01-28 RX ADMIN — OXYCODONE 7.5 MG: 5 TABLET ORAL at 06:24

## 2025-01-28 RX ADMIN — ISOSORBIDE MONONITRATE 30 MG: 30 TABLET, EXTENDED RELEASE ORAL at 09:28

## 2025-01-28 RX ADMIN — FERROUS SULFATE TAB 325 MG (65 MG ELEMENTAL FE) 325 MG: 325 (65 FE) TAB at 09:28

## 2025-01-28 RX ADMIN — ATORVASTATIN CALCIUM 40 MG: 40 TABLET, FILM COATED ORAL at 21:36

## 2025-01-28 RX ADMIN — POLYETHYLENE GLYCOL 3350 17 G: 17 POWDER, FOR SOLUTION ORAL at 11:14

## 2025-01-28 RX ADMIN — ACETAMINOPHEN 1000 MG: 500 TABLET, FILM COATED ORAL at 06:24

## 2025-01-28 RX ADMIN — OXYCODONE 7.5 MG: 5 TABLET ORAL at 11:13

## 2025-01-28 RX ADMIN — OXYCODONE 7.5 MG: 5 TABLET ORAL at 02:36

## 2025-01-28 RX ADMIN — METOPROLOL SUCCINATE 100 MG: 50 TABLET, EXTENDED RELEASE ORAL at 09:27

## 2025-01-28 RX ADMIN — SODIUM CHLORIDE, PRESERVATIVE FREE 10 ML: 5 INJECTION INTRAVENOUS at 21:38

## 2025-01-28 RX ADMIN — OXYCODONE 7.5 MG: 5 TABLET ORAL at 15:16

## 2025-01-28 RX ADMIN — FINASTERIDE 5 MG: 5 TABLET, FILM COATED ORAL at 09:27

## 2025-01-28 RX ADMIN — PANTOPRAZOLE SODIUM 40 MG: 40 TABLET, DELAYED RELEASE ORAL at 06:24

## 2025-01-28 RX ADMIN — ACETAMINOPHEN 1000 MG: 500 TABLET, FILM COATED ORAL at 15:15

## 2025-01-28 RX ADMIN — SODIUM CHLORIDE, PRESERVATIVE FREE 10 ML: 5 INJECTION INTRAVENOUS at 09:28

## 2025-01-28 RX ADMIN — Medication 2000 MG: at 21:36

## 2025-01-28 ASSESSMENT — PAIN DESCRIPTION - ORIENTATION
ORIENTATION: LEFT

## 2025-01-28 ASSESSMENT — PAIN SCALES - WONG BAKER: WONGBAKER_NUMERICALRESPONSE: HURTS A LITTLE BIT

## 2025-01-28 ASSESSMENT — PAIN DESCRIPTION - LOCATION
LOCATION: RIB CAGE
LOCATION: BACK;RIB CAGE
LOCATION: BACK;RIB CAGE
LOCATION: RIB CAGE

## 2025-01-28 ASSESSMENT — PAIN DESCRIPTION - DESCRIPTORS
DESCRIPTORS: SHARP;TEARING
DESCRIPTORS: STABBING

## 2025-01-28 ASSESSMENT — PAIN DESCRIPTION - FREQUENCY
FREQUENCY: CONTINUOUS
FREQUENCY: CONTINUOUS

## 2025-01-28 ASSESSMENT — PAIN DESCRIPTION - PAIN TYPE
TYPE: ACUTE PAIN
TYPE: ACUTE PAIN

## 2025-01-28 ASSESSMENT — PAIN SCALES - GENERAL
PAINLEVEL_OUTOF10: 8
PAINLEVEL_OUTOF10: 9
PAINLEVEL_OUTOF10: 5

## 2025-01-28 ASSESSMENT — PAIN DESCRIPTION - ONSET
ONSET: ON-GOING
ONSET: ON-GOING

## 2025-01-28 NOTE — PLAN OF CARE
Problem: Chronic Conditions and Co-morbidities  Goal: Patient's chronic conditions and co-morbidity symptoms are monitored and maintained or improved  Outcome: Progressing     Problem: Discharge Planning  Goal: Discharge to home or other facility with appropriate resources  1/28/2025 1543 by Tamika Haynes RN  Outcome: Progressing     Problem: Pain  Goal: Verbalizes/displays adequate comfort level or baseline comfort level  1/28/2025 1543 by Tamika Haynes RN  Outcome: Progressing     Problem: Safety - Adult  Goal: Free from fall injury  1/28/2025 1543 by Tamika Haynes RN  Outcome: Progressing     Problem: ABCDS Injury Assessment  Goal: Absence of physical injury  Outcome: Progressing     Problem: Skin/Tissue Integrity  Goal: Absence of new skin breakdown  Description: 1.  Monitor for areas of redness and/or skin breakdown  2.  Assess vascular access sites hourly  3.  Every 4-6 hours minimum:  Change oxygen saturation probe site  4.  Every 4-6 hours:  If on nasal continuous positive airway pressure, respiratory therapy assess nares and determine need for appliance change or resting period.  1/28/2025 1543 by Tamika Haynes RN  Outcome: Progressing

## 2025-01-28 NOTE — FLOWSHEET NOTE
01/27/25 2039   Treatment Team Notification   Reason for Communication Evaluate   Name of Team Member Notified Dr. Vidales   Treatment Team Role Consulting Provider   Method of Communication Secure Message   Response Waiting for response   Notification Time 2039     Notified Dr. Vidales of CT chest results    2043: Acknowledged, no new orders.

## 2025-01-28 NOTE — PROGRESS NOTES
[facility] General Surgery   Lebron Roman MD, FACS  Molly Woodruff, APRN-CNP  [address], [address]  [place] [address]  P: [phone], F: [phone]    General and Robotic Surgery  Progress Note             PATIENT NAME: Hemanth Barrera   :  1935   MRN: [id number]   PCP:  Neftaly Contreras MD     TODAY'S DATE: 2025    89 y.o. male seen and examined earlier this evening.  Patient has been much more lethargic throughout the day.  Has not eaten much.  Was complaining of some left-sided chest pain earlier and had received pain medication.  Low-grade fevers noted.    PAST MEDICAL HISTORY     Past Medical History:   Diagnosis Date    Acute inferolateral myocardial infarction (HCC) 2021    Arthritis     Atrial fibrillation (HCC)     CAD (coronary artery disease)     CHF (congestive heart failure) (HCC)     Glaucoma     Hx of blood clots     with hernia surgery    Hyperlipidemia     Hypertension     MI (myocardial infarction) (HCC)     NSTEMI (non-ST elevated myocardial infarction) (HCC) 2021       PROBLEM LIST     Patient Active Problem List   Diagnosis    On continuous oral anticoagulation    CHF, acute on chronic (HCC)    Hyperlipidemia    Former smoker    Pacemaker    History of DVT of lower extremity    Enlarged prostate    Cataract of both eyes    GERD (gastroesophageal reflux disease)    History of inguinal hernia repair    Hypertension    Pneumonia    History of right hip replacement    History of gastric surgery    Low back pain    Chest pain    Fluid overload    VARSHA (acute kidney injury) (HCC)    History of acute myocardial infarction    Chronic CHF (congestive heart failure) (HCC)    Unstable angina (HCC)    Acute on chronic diastolic congestive heart failure (HCC)    SBO (small bowel obstruction) (HCC)    Stage 3b chronic kidney disease (HCC)    Iron deficiency    Chronic systolic (congestive) heart failure    Chronic anemia    Bradycardia    Class 1 obesity  Significant atelectasis of the left lower lobe.  Mild dependent  atelectasis of the right lower lobe.  Trace right effusion.  Moderate left  effusion measuring approximately 26 Hounsfield units.  No pneumothorax.    Upper Abdomen: No acute abnormality identified.    Soft Tissues/Bones: No acute soft tissue abnormality identified.  Left 10th  and 11th rib fractures redemonstrated.  No aggressive osseous lesions..    Impression  1. Moderate left pleural effusion with density suggesting serosanguineous  fluid.  Significant atelectasis of the left lower lobe.  Pneumonia can not be  excluded.  2. Trace right effusion with mild dependent atelectasis of the right lower  lobe.  3. Mild cardiomegaly with three-vessel coronary atherosclerotic  calcifications.  4.  Dilated pulmonary trunk suggesting elevated pulmonary artery pressures.      Hospital Problems             Last Modified POA    * (Principal) Traumatic closed displaced fracture of rib on left side, initial encounter 1/20/2025 Yes    Multiple closed fractures of ribs of left side 1/20/2025 Yes    Pleural effusion 1/20/2025 Yes    Anemia 1/20/2025 Yes    Fall 1/20/2025 Yes    Moderate malnutrition (HCC) 1/21/2025 Yes    Abnormal pleural fluid 1/25/2025 Yes         ASSESSMENT   89 y.o. male with history of fall left-sided rib fractures  Constipation resolved  Hemothorax status post thoracentesis x2  Patient underwent left-sided thoracentesis under ultrasound guidance earlier today, between 850-900 mL of dark bloody fluid was removed, cultures pending  Continues to have some left-sided rib/chest pain  Patient with increasing lethargy today, decreased appetite  CT chest completed yesterday revealed moderate left pleural effusion with density suggesting serosanguineous fluid.  Significant atelectasis of the left lower lobe.  Pneumonia cannot be excluded.  Trace right effusion with mild dependent atelectasis in the right lower lobe.  Mild cardiomegaly with three-vessel

## 2025-01-28 NOTE — PROGRESS NOTES
[facility]   OCCUPATIONAL THERAPY MISSED TREATMENT NOTE   INPATIENT   Date: 25  Patient Name: Hemanth Barrera       Room:   MRN: [id number]   Account #: [id number]    : 1935  (89 y.o.)  Gender: male                 REASON FOR MISSED TREATMENT:  Patient unable to participate   -    Per LEON Lundberg, patient with increased pain and not appropriate for OT treatment at this time. OT will check back as time permits and continue to follow as appropriate. 1055      Electronically signed by SHEELA Kitchen on 25 at 11:22 AM EST

## 2025-01-28 NOTE — PROGRESS NOTES
Procedure:     Procedure: Left-sided thoracentesis under ultrasound guidance    Indication prediagnosis: Residual left pleural effusion and patient with fever 100.4    Post diagnosis:  Same  Between 8 5900 mL of very bloody pleural effusion on the left    Estimated blood loss: 0 mL    After informed consent was obtained, patient was placed on sitting position.  Ultrasound was able to locate a large pleural effusion on the left.  The posterior back was then prepped with ChloraPrep and draped in usual sterile fashion.    Using 1% Xylocaine local anesthetic, under aseptic conditions, thoracentesis was performed at approximately eighth intercostal space left posterior back.  Between 850 and 900 mL of dark bloody fluid was removed    I requested the fluid to be sent for studies which included pH, LDH, total protein, Gram stain and culture, cytology, cell count, and pleural fluid hematocrit    Patient had no discomfort during the procedure.--- No obvious clinical complications post procedure    Chest x-ray pending.        Patient's son updated at bedside.

## 2025-01-28 NOTE — PROGRESS NOTES
Pulmonary Progress Note  Pulmonary and Critical Care Specialists      Patient - Hemanth Barrera,  Age - 89 y.o.    - 1935      Room Number - 2103/2103-01   N -  [id number]   [facility] # - [id number]  Date of Admission -  2025  9:06 AM        Consulting Service/Physician   Consulting - Kaitlin Dsouza MD  Primary Care Physician - Neftaly Contreras MD     SUBJECTIVE   Patient able to understand things.  He looks comfortable but will reportedly had some increased dyspnea last night.      OBJECTIVE   VITALS    height is 1.854 m (6' 1\") and weight is 77.7 kg (171 lb 4.8 oz). His oral temperature is 100.4 °F (38 °C). His blood pressure is 110/73 and his pulse is 71. His respiration is 26 and oxygen saturation is 94%.     Body mass index is 22.6 kg/m².  Temperature Range: Temp: 100.4 °F (38 °C) Temp  Av °F (37.2 °C)  Min: 98.2 °F (36.8 °C)  Max: 100.4 °F (38 °C)  BP Range:  Systolic (24hrs), Av , Min:86 , Max:114     Diastolic (24hrs), Av, Min:55, Max:73    Pulse Range: Pulse  Av.5  Min: 70  Max: 71  Respiration Range: Resp  Av.9  Min: 18  Max: 30  Current Pulse Ox::  SpO2: 94 %  24HR Pulse Ox Range:  SpO2  Av.5 %  Min: 93 %  Max: 98 %  Oxygen Amount and Delivery:      Wt Readings from Last 3 Encounters:   25 77.7 kg (171 lb 4.8 oz)   25 73.1 kg (161 lb 2.5 oz)   25 63.5 kg (140 lb)       I/O (24 Hours)    Intake/Output Summary (Last 24 hours) at 2025 0968  Last data filed at 2025 0245  Gross per 24 hour   Intake 240 ml   Output 550 ml   Net -310 ml       EXAM   Temperature max is 100.4 O2 saturation is 94% on room air  General Appearance  Awake, alert, oriented, in no acute distress  HEENT - normocephalic, atraumatic.  Neck - Supple,  trachea midline   Lungs -decreased breath sounds at left base  Heart Exam:PMI normal. No lifts, heaves, or thrills. RRR. No murmurs, clicks, gallops, or rubs  Abdomen Exam: Abdomen soft, non-tender. BS normal      MEDS

## 2025-01-28 NOTE — PLAN OF CARE
Problem: Discharge Planning  Goal: Discharge to home or other facility with appropriate resources  1/28/2025 0246 by Sangita Urban, RN  Outcome: Progressing     Problem: Pain  Goal: Verbalizes/displays adequate comfort level or baseline comfort level  1/28/2025 0246 by Sangita Urban, RN  Outcome: Progressing  Pt calling out appropriately when needing pharmacological interventions.  Problem: Safety - Adult  Goal: Free from fall injury  1/28/2025 0246 by Sangita Urban, RN  Outcome: Progressing     Problem: Skin/Tissue Integrity  Goal: Absence of new skin breakdown  Description: 1.  Monitor for areas of redness and/or skin breakdown  2.  Assess vascular access sites hourly  3.  Every 4-6 hours minimum:  Change oxygen saturation probe site  4.  Every 4-6 hours:  If on nasal continuous positive airway pressure, respiratory therapy assess nares and determine need for appliance change or resting period.  1/28/2025 0246 by Sangita Urban, RN  Outcome: Progressing

## 2025-01-28 NOTE — FLOWSHEET NOTE
01/28/25 1526   Treatment Team Notification   Reason for Communication Review case   Type of Critical Result Radiology   Critical Radiology Result Type X-ray   Name of Team Member Notified Dr. Morocho   Treatment Team Role Consulting Provider   Method of Communication Secure Message   Response Waiting for response   Notification Time 1527     Notified of PT's most recent CXR s/p thoracentesis 1/28.

## 2025-01-28 NOTE — PROGRESS NOTES
Physical Therapy  DATE: 2025    NAME: Hemanth Barrera  MRN: [id number]   : 1935    Patient not seen this date for Physical Therapy due to:      [x] Cancel by RN or physician due to: checked with RN Tamika @5413. RN states to hold pt this date d/t extreme pain and not feeling well. Will continue to follow.     [] Hemodialysis    [] Critical Lab Value Level     [] Blood transfusion in progress    [] Acute or unstable cardiovascular status   _MAP < 55 or more than >115  _HR < 40 or > 130    [] Acute or unstable pulmonary status   -FiO2 > 60%   _RR < 5 or >40    _O2 sats < 85%    [] Strict Bedrest    [] Off Unit for surgery or procedure    [] Off Unit for testing       [] Pending imaging to R/O fracture    [] Refusal by Patient      [] Other      [] PT being discontinued at this time. Patient independent. No further needs.     [] PT being discontinued at this time as the patient has been transferred to hospice care. No further needs.      Electronically signed by Mabel Chirinos PTA on 25 at 2:47 PM EST

## 2025-01-28 NOTE — PROGRESS NOTES
[facility] -   Infectious diseases evaluation  admission date 1/20/2025    reason for consultation:   Gram-positive cocci in clusters on pleural fluid culture    Impression :   Current:  Rib fractures with hemothorax to the left side  Status post thoracentesis 1/21/2025 Staph epi/staph capitis growth on culture likely contamination.  Anemia  Mechanical fall  Coronary artery disease status post stent placement  Pacemaker  Chronic kidney disease stage IIIb  Chronic anemia  Abdominal hernia s/p repair  A-fib    HENCE:   Monitor off antibiotics.  Follow CBC   Supportive care  Discussed with patient.    Infection Control Recommendations   [place] Precautions      Antimicrobial Stewardship Recommendations   Simplification of therapy  Targeted therapy      History of Present Illness:   Initial history:  Hemanth Barrera is a 89 y.o.-year-old male presented to the hospital after he fell on 1/19/2025, landed on his left side, complaining of pain to the left side chest pain.  The patient was on aspirin, Plavix and Xarelto.  At the ER hemoglobin was 6.6 received blood transfusion  CT head and C-spine unremarkable.  CT chest showed large left pleural effusion suspected hemothorax with atelectasis, fractures of the 10th and 11th ribs with moderate subcutaneous hematoma and tiny amount of subcutaneous gas .  CT abdomen and pelvis suggestive of constipation, ileus versus obstruction  Status post thoracentesis 1/21/2025 showed 1, 875 WBC, 1, 386, 211 RBCs, 4.3 of protein, 414 LD, gram-positive cocci in clusters staph epi on culture      Interval changes  1/28/2025  He was seen and examined earlier today, still complaining of left pleuritic pain, denied cough, no other complaints.  No leukocytosis  1/21 Pleural fluid Cx - staph epi  and staph capitis contaminants      Patient Vitals for the past 8 hrs:   BP Temp Temp src Pulse Resp SpO2   01/28/25 1600 112/67 99.7 °F (37.6 °C) Oral

## 2025-01-28 NOTE — PROGRESS NOTES
[facility]  IN-PATIENT SERVICE  [facility]    PROGRESS NOTE             1/28/2025    3:10 PM    Name:   Hemanth Barrera  MRN:     [id number]     Acct:      [id number]   Room:   2103/2103-01   Day:  8  Admit Date:  1/20/2025  9:06 AM    PCP:  Neftaly Contreras MD  Code Status:  Full Code    Subjective:     C/C:   Chief Complaint   Patient presents with    Fall    Rib Pain (injury)     Interval History:    Vitally stable.  On room air  Hemoglobin is 7 and 7.1 today  Chest x-ray no significant interval change.   Patient had severe chest pain 9/10  Kidney function WNL  CRP 20.4  Pleural fluid positive for gram-positive cocci in clusters; had a dose of vancomycin; ID on board and recommendation  Neutrophils count 80%, lymphocyte count 6%, monocyte 14%  Per PM&R physiatrist Dr. Lawrence Jamison, patient appropriate for Acute Inpatient Rehabilitation level of care. Waiting authorization      Brief History:     The patient is a 89 y.o. male who slipped and fell 01/19/2025 and landed on his left side, after which severe pain in his left side started to bother him. Denies loss of consciousness, numbness, weakness, headache, neck pain, back pain.     PMH: coronary artery disease with stent placement, afib, HFpEF with pacemaker, CKD stage IIIb, chronic anemia, abdominal hernia status post repair.     In the ER, he was stable. Hb 6.6 -- received RBC transfusion. CT head and C-spine showed no critical findings. CT chest whowed a large left pleural effusion with compressive atelectasis, segmented fractures of the left post erior 10th and 11th ribs. CT abdomen and pelvis revealed findings suggestive of ileus/obstruction.     Admitted for management of his multiple health issues.    2 units of RBCs transfused, Hb stabilized: 8.2. Evaluated by general surgery and pulmonology.     Medications:     Allergies:    Allergies   Allergen Reactions    Aspirin Other (See Comments)     Pt told not

## 2025-01-28 NOTE — FLOWSHEET NOTE
01/28/25 0739   Treatment Team Notification   Reason for Communication Evaluate;Change in status   Name of Team Member Notified Dr. Vidales   Treatment Team Role Consulting Provider   Method of Communication Secure Message   Response Waiting for response   Notification Time 0740     RN notified Dr. Vidales of PT's change in condition regarding increase pain in left side/ribs and his respiratory status.     Dr. Vidales to order STAT CXR.

## 2025-01-28 NOTE — CARE COORDINATION
DISCHARGE PLANNING NOTE:    LSW following for potential SNF placement. Email with choices received from yovani Montano. Referrals sent to [facility], [facility] and [facility]. Patient will need insurance authorization once accepting facility has been determined.

## 2025-01-29 ENCOUNTER — APPOINTMENT (OUTPATIENT)
Dept: VASCULAR LAB | Age: 89
DRG: 199 | End: 2025-01-29
Attending: INTERNAL MEDICINE
Payer: MEDICARE

## 2025-01-29 ENCOUNTER — APPOINTMENT (OUTPATIENT)
Dept: GENERAL RADIOLOGY | Age: 89
DRG: 199 | End: 2025-01-29
Payer: MEDICARE

## 2025-01-29 ENCOUNTER — APPOINTMENT (OUTPATIENT)
Dept: CT IMAGING | Age: 89
DRG: 199 | End: 2025-01-29
Payer: MEDICARE

## 2025-01-29 PROBLEM — I82.A12 ACUTE DEEP VEIN THROMBOSIS (DVT) OF AXILLARY VEIN OF LEFT UPPER EXTREMITY (HCC): Status: ACTIVE | Noted: 2025-01-29

## 2025-01-29 LAB
ANION GAP SERPL CALCULATED.3IONS-SCNC: 8 MMOL/L (ref 9–16)
ANTI-XA UNFRAC HEPARIN: <0.1 IU/L (ref 0.3–0.7)
ANTI-XA UNFRAC HEPARIN: >1.1 IU/L (ref 0.3–0.7)
BASOPHILS # BLD: 0 K/UL (ref 0–0.2)
BASOPHILS NFR BLD: 0 % (ref 0–2)
BUN SERPL-MCNC: 20 MG/DL (ref 8–23)
CALCIUM SERPL-MCNC: 8 MG/DL (ref 8.6–10.4)
CHLORIDE SERPL-SCNC: 104 MMOL/L (ref 98–107)
CO2 SERPL-SCNC: 23 MMOL/L (ref 20–31)
CREAT SERPL-MCNC: 1 MG/DL (ref 0.7–1.2)
EOSINOPHIL # BLD: 0 K/UL (ref 0–0.4)
EOSINOPHILS RELATIVE PERCENT: 0 % (ref 0–4)
ERYTHROCYTE [DISTWIDTH] IN BLOOD BY AUTOMATED COUNT: 16.2 % (ref 11.5–14.9)
ERYTHROCYTE [DISTWIDTH] IN BLOOD BY AUTOMATED COUNT: 17.4 % (ref 11.5–14.9)
GFR, ESTIMATED: 71 ML/MIN/1.73M2
GLUCOSE SERPL-MCNC: 144 MG/DL (ref 74–99)
HCT VFR BLD AUTO: 26.1 % (ref 41–53)
HCT VFR BLD AUTO: 27 % (ref 41–53)
HCT VFR BLD AUTO: 28.1 % (ref 41–53)
HGB BLD-MCNC: 8.4 G/DL (ref 13.5–17.5)
HGB BLD-MCNC: 8.7 G/DL (ref 13.5–17.5)
HGB BLD-MCNC: 9.1 G/DL (ref 13.5–17.5)
INR PPP: 1.2
LYMPHOCYTES NFR BLD: 0.4 K/UL (ref 1–4.8)
LYMPHOCYTES RELATIVE PERCENT: 11 % (ref 24–44)
MCH RBC QN AUTO: 30.3 PG (ref 26–34)
MCH RBC QN AUTO: 30.6 PG (ref 26–34)
MCHC RBC AUTO-ENTMCNC: 32.3 G/DL (ref 31–37)
MCHC RBC AUTO-ENTMCNC: 32.3 G/DL (ref 31–37)
MCV RBC AUTO: 93.6 FL (ref 80–100)
MCV RBC AUTO: 94.7 FL (ref 80–100)
MONOCYTES NFR BLD: 0.3 K/UL (ref 0.1–1.3)
MONOCYTES NFR BLD: 8 % (ref 1–7)
NEUTROPHILS NFR BLD: 81 % (ref 36–66)
NEUTS SEG NFR BLD: 3.1 K/UL (ref 1.3–9.1)
PARTIAL THROMBOPLASTIN TIME: 36.6 SEC (ref 24–36)
PLATELET # BLD AUTO: 203 K/UL (ref 150–450)
PLATELET # BLD AUTO: 220 K/UL (ref 150–450)
PMV BLD AUTO: 7 FL (ref 6–12)
PMV BLD AUTO: 7.1 FL (ref 6–12)
POTASSIUM SERPL-SCNC: 4.5 MMOL/L (ref 3.7–5.3)
PROTHROMBIN TIME: 16.3 SEC (ref 11.8–14.6)
RBC # BLD AUTO: 2.85 M/UL (ref 4.5–5.9)
RBC # BLD AUTO: 3 M/UL (ref 4.5–5.9)
SODIUM SERPL-SCNC: 135 MMOL/L (ref 136–145)
WBC OTHER # BLD: 3.7 K/UL (ref 3.5–11)
WBC OTHER # BLD: 3.8 K/UL (ref 3.5–11)

## 2025-01-29 PROCEDURE — 85014 HEMATOCRIT: CPT

## 2025-01-29 PROCEDURE — 85520 HEPARIN ASSAY: CPT

## 2025-01-29 PROCEDURE — 85730 THROMBOPLASTIN TIME PARTIAL: CPT

## 2025-01-29 PROCEDURE — 85610 PROTHROMBIN TIME: CPT

## 2025-01-29 PROCEDURE — 6370000000 HC RX 637 (ALT 250 FOR IP)

## 2025-01-29 PROCEDURE — 6360000002 HC RX W HCPCS: Performed by: INTERNAL MEDICINE

## 2025-01-29 PROCEDURE — 93971 EXTREMITY STUDY: CPT

## 2025-01-29 PROCEDURE — 6360000002 HC RX W HCPCS: Performed by: NURSE PRACTITIONER

## 2025-01-29 PROCEDURE — 99232 SBSQ HOSP IP/OBS MODERATE 35: CPT | Performed by: NURSE PRACTITIONER

## 2025-01-29 PROCEDURE — 99233 SBSQ HOSP IP/OBS HIGH 50: CPT | Performed by: INTERNAL MEDICINE

## 2025-01-29 PROCEDURE — 2060000000 HC ICU INTERMEDIATE R&B

## 2025-01-29 PROCEDURE — 80048 BASIC METABOLIC PNL TOTAL CA: CPT

## 2025-01-29 PROCEDURE — G0545 PR INHERENT VISIT TO INPT: HCPCS | Performed by: INTERNAL MEDICINE

## 2025-01-29 PROCEDURE — 85018 HEMOGLOBIN: CPT

## 2025-01-29 PROCEDURE — 2580000003 HC RX 258: Performed by: INTERNAL MEDICINE

## 2025-01-29 PROCEDURE — 2500000003 HC RX 250 WO HCPCS: Performed by: INTERNAL MEDICINE

## 2025-01-29 PROCEDURE — 6370000000 HC RX 637 (ALT 250 FOR IP): Performed by: INTERNAL MEDICINE

## 2025-01-29 PROCEDURE — 85027 COMPLETE CBC AUTOMATED: CPT

## 2025-01-29 PROCEDURE — 85025 COMPLETE CBC W/AUTO DIFF WBC: CPT

## 2025-01-29 PROCEDURE — 71260 CT THORAX DX C+: CPT

## 2025-01-29 PROCEDURE — 36415 COLL VENOUS BLD VENIPUNCTURE: CPT

## 2025-01-29 PROCEDURE — 71045 X-RAY EXAM CHEST 1 VIEW: CPT

## 2025-01-29 PROCEDURE — 99222 1ST HOSP IP/OBS MODERATE 55: CPT | Performed by: STUDENT IN AN ORGANIZED HEALTH CARE EDUCATION/TRAINING PROGRAM

## 2025-01-29 PROCEDURE — 6360000004 HC RX CONTRAST MEDICATION: Performed by: INTERNAL MEDICINE

## 2025-01-29 RX ORDER — HEPARIN SODIUM 1000 [USP'U]/ML
40 INJECTION, SOLUTION INTRAVENOUS; SUBCUTANEOUS PRN
Status: DISCONTINUED | OUTPATIENT
Start: 2025-01-29 | End: 2025-02-01

## 2025-01-29 RX ORDER — HEPARIN SODIUM 1000 [USP'U]/ML
80 INJECTION, SOLUTION INTRAVENOUS; SUBCUTANEOUS ONCE
Status: COMPLETED | OUTPATIENT
Start: 2025-01-29 | End: 2025-01-29

## 2025-01-29 RX ORDER — 0.9 % SODIUM CHLORIDE 0.9 %
100 INTRAVENOUS SOLUTION INTRAVENOUS ONCE
Status: COMPLETED | OUTPATIENT
Start: 2025-01-29 | End: 2025-01-29

## 2025-01-29 RX ORDER — IOPAMIDOL 755 MG/ML
75 INJECTION, SOLUTION INTRAVASCULAR
Status: COMPLETED | OUTPATIENT
Start: 2025-01-29 | End: 2025-01-29

## 2025-01-29 RX ORDER — HEPARIN SODIUM 10000 [USP'U]/100ML
5-30 INJECTION, SOLUTION INTRAVENOUS CONTINUOUS
Status: DISCONTINUED | OUTPATIENT
Start: 2025-01-29 | End: 2025-02-01

## 2025-01-29 RX ORDER — SODIUM CHLORIDE 0.9 % (FLUSH) 0.9 %
10 SYRINGE (ML) INJECTION PRN
Status: DISCONTINUED | OUTPATIENT
Start: 2025-01-29 | End: 2025-02-05 | Stop reason: HOSPADM

## 2025-01-29 RX ORDER — HEPARIN SODIUM 1000 [USP'U]/ML
80 INJECTION, SOLUTION INTRAVENOUS; SUBCUTANEOUS PRN
Status: DISCONTINUED | OUTPATIENT
Start: 2025-01-29 | End: 2025-02-01

## 2025-01-29 RX ADMIN — OXYCODONE 7.5 MG: 5 TABLET ORAL at 02:41

## 2025-01-29 RX ADMIN — SODIUM CHLORIDE, PRESERVATIVE FREE 10 ML: 5 INJECTION INTRAVENOUS at 13:48

## 2025-01-29 RX ADMIN — OXYCODONE 2.5 MG: 5 TABLET ORAL at 08:47

## 2025-01-29 RX ADMIN — OXYCODONE 7.5 MG: 5 TABLET ORAL at 12:47

## 2025-01-29 RX ADMIN — HEPARIN SODIUM 18 UNITS/KG/HR: 10000 INJECTION, SOLUTION INTRAVENOUS at 14:37

## 2025-01-29 RX ADMIN — OXYCODONE 7.5 MG: 5 TABLET ORAL at 17:17

## 2025-01-29 RX ADMIN — METOPROLOL SUCCINATE 100 MG: 50 TABLET, EXTENDED RELEASE ORAL at 08:45

## 2025-01-29 RX ADMIN — FINASTERIDE 5 MG: 5 TABLET, FILM COATED ORAL at 08:45

## 2025-01-29 RX ADMIN — HEPARIN SODIUM 6200 UNITS: 1000 INJECTION INTRAVENOUS; SUBCUTANEOUS at 14:28

## 2025-01-29 RX ADMIN — DILTIAZEM HYDROCHLORIDE 120 MG: 120 CAPSULE, COATED, EXTENDED RELEASE ORAL at 08:45

## 2025-01-29 RX ADMIN — IOPAMIDOL 75 ML: 755 INJECTION, SOLUTION INTRAVENOUS at 13:48

## 2025-01-29 RX ADMIN — ACETAMINOPHEN 1000 MG: 500 TABLET, FILM COATED ORAL at 14:27

## 2025-01-29 RX ADMIN — FERROUS SULFATE TAB 325 MG (65 MG ELEMENTAL FE) 325 MG: 325 (65 FE) TAB at 08:45

## 2025-01-29 RX ADMIN — ISOSORBIDE MONONITRATE 30 MG: 30 TABLET, EXTENDED RELEASE ORAL at 08:45

## 2025-01-29 RX ADMIN — Medication 2000 MG: at 04:30

## 2025-01-29 RX ADMIN — FERROUS SULFATE TAB 325 MG (65 MG ELEMENTAL FE) 325 MG: 325 (65 FE) TAB at 17:14

## 2025-01-29 RX ADMIN — SODIUM CHLORIDE 100 ML: 9 INJECTION, SOLUTION INTRAVENOUS at 13:48

## 2025-01-29 RX ADMIN — ATORVASTATIN CALCIUM 40 MG: 40 TABLET, FILM COATED ORAL at 21:13

## 2025-01-29 RX ADMIN — ACETAMINOPHEN 1000 MG: 500 TABLET, FILM COATED ORAL at 06:01

## 2025-01-29 RX ADMIN — ACETAMINOPHEN 1000 MG: 500 TABLET, FILM COATED ORAL at 21:13

## 2025-01-29 RX ADMIN — PANTOPRAZOLE SODIUM 40 MG: 40 TABLET, DELAYED RELEASE ORAL at 06:01

## 2025-01-29 ASSESSMENT — PAIN SCALES - GENERAL
PAINLEVEL_OUTOF10: 4
PAINLEVEL_OUTOF10: 6
PAINLEVEL_OUTOF10: 3
PAINLEVEL_OUTOF10: 9
PAINLEVEL_OUTOF10: 5
PAINLEVEL_OUTOF10: 4
PAINLEVEL_OUTOF10: 9

## 2025-01-29 ASSESSMENT — PAIN DESCRIPTION - LOCATION
LOCATION: CHEST
LOCATION: RIB CAGE

## 2025-01-29 ASSESSMENT — PAIN DESCRIPTION - ORIENTATION
ORIENTATION: LEFT
ORIENTATION: LEFT

## 2025-01-29 NOTE — PROGRESS NOTES
Pulmonary Progress Note  [facility]      Patient - Hemanth Barrera,  Age - 89 y.o.    - 1935      Room Number -    MRN -  [id number]   [facility] # - [id number]  Date of Admission -  2025  9:06 AM    Consulting Service/Physician   Consulting - Kaitlin Dsouza MD  Primary Care Physician - Neftaly Contreras MD     SUBJECTIVE   Patient is now in intermediate care, he did have an increase shortness of breath prompting the transfer    OBJECTIVE   VITALS    height is 1.854 m (6' 1\") and weight is 77.7 kg (171 lb 4.8 oz). His axillary temperature is 98.8 °F (37.1 °C). His blood pressure is 105/72 and his pulse is 70. His respiration is 17 and oxygen saturation is 94%.     Body mass index is 22.6 kg/m².  Temperature Range: Temp: 98.8 °F (37.1 °C) Temp  Av.7 °F (37.1 °C)  Min: 98.5 °F (36.9 °C)  Max: 98.8 °F (37.1 °C)  BP Range:  Systolic (24hrs), Av , Min:95 , Max:133     Diastolic (24hrs), Av, Min:56, Max:86    Pulse Range: Pulse  Av.1  Min: 70  Max: 84  Respiration Range: Resp  Av  Min: 17  Max: 31  Current Pulse Ox::  SpO2: 94 %  24HR Pulse Ox Range:  SpO2  Av.4 %  Min: 80 %  Max: 99 %  Oxygen Amount and Delivery:      Wt Readings from Last 3 Encounters:   25 77.7 kg (171 lb 4.8 oz)   25 73.1 kg (161 lb 2.5 oz)   25 63.5 kg (140 lb)       I/O (24 Hours)    Intake/Output Summary (Last 24 hours) at 2025 1618  Last data filed at 2025 1742  Gross per 24 hour   Intake --   Output 200 ml   Net -200 ml       EXAM     General Appearance  Awake, alert, oriented, in no acute distress  HEENT - normocephalic, atraumatic.  Neck - Supple,  trachea midline   Lungs -coarse breath sounds with some decreased breath sounds at the base on the  Heart Exam:PMI normal. No lifts, heaves, or thrills. RRR. No murmurs, clicks, gallops, or rubs  Abdomen Exam: Abdomen soft, non-tender. BS normal.   Extremity Exam: No signs of cyanosis    MEDS       \"AMYLASE\", \"BILIDIR\", \"BILITOT\", \"ALKPHOS\" in the last 72 hours.    Invalid input(s): \"ALB\"  INR   Recent Labs     01/29/25  1234   INR 1.2     PTT   Lab Results   Component Value Date    APTT 36.6 (H) 01/29/2025         RADIOLOGY     (See actual reports for details)    ASSESSMENT/PLAN     Patient Active Problem List   Diagnosis    On continuous oral anticoagulation    CHF, acute on chronic (HCC)    Hyperlipidemia    Former smoker    Pacemaker    History of DVT of lower extremity    Enlarged prostate    Cataract of both eyes    GERD (gastroesophageal reflux disease)    History of inguinal hernia repair    Hypertension    Pneumonia    History of right hip replacement    History of gastric surgery    Low back pain    Chest pain    Fluid overload    VARSHA (acute kidney injury) ([facility])    History of acute myocardial infarction    Chronic CHF (congestive heart failure) ([facility])    Unstable angina ([facility])    Acute on chronic diastolic congestive heart failure ([facility])    SBO (small bowel obstruction) ([facility])    Stage 3b chronic kidney disease ([facility])    Iron deficiency    Chronic systolic (congestive) heart failure    Chronic anemia    Bradycardia    Class 1 obesity    Degeneration of intervertebral disc    Edema    Fatigue    Glaucoma suspect of both eyes    Sick sinus syndrome ([facility])    Venous insufficiency    Paroxysmal atrial fibrillation (HCC)    Coronary artery disease involving native coronary artery of native heart with angina pectoris ([facility])    B12 deficiency    Functional constipation    Slow transit constipation    Complex regional pain syndrome type 2 of lower extremity    Open dislocation of knee    Pain in limb    Psychosexual dysfunction associated with inhibited libido    Nausea and vomiting    Right upper quadrant abdominal pain    Altered bowel habits    Abnormal finding on GI tract imaging    Abnormal LFTs    Abdominal pain    Ileus ([facility])    Non-prs chr ulcer oth prt r low leg limited to brkdwn skin (HCC)    Right wrist

## 2025-01-29 NOTE — PROGRESS NOTES
Patient as transferred to ICU room 2013 with 2 RN present and on monitor. Patient connected to ICU monitor and oriented to room. Call light within reach, bed in lowest position.

## 2025-01-29 NOTE — PROGRESS NOTES
[facility]   OCCUPATIONAL THERAPY MISSED TREATMENT NOTE   INPATIENT   Date: 25  Patient Name: Hemanth Barrera       Room:   MRN: [id number]   Account #: [id number]    : 1935  (89 y.o.)  Gender: male                 REASON FOR MISSED TREATMENT:     -    Checked on pt at 1105AM, pt getting dopplers. Re-checked at 1357PM and pt is getting chest CT. Will continue to follow and check back as able.        Electronically signed by GAGAN Sparks on 25 at 1:56 PM EST

## 2025-01-29 NOTE — PROGRESS NOTES
[facility]  IN-PATIENT SERVICE  [facility]    PROGRESS NOTE             1/29/2025    8:14 AM    Name:   Hemanth Barrera  MRN:     [id number]     Acct:      [id number]   Room:   2013/2013-01  IP Day:  9  Admit Date:  1/20/2025  9:06 AM    PCP:  Neftaly Contreras MD  Code Status:  Full Code    Subjective:     C/C:   Chief Complaint   Patient presents with    Fall    Rib Pain (injury)     Interval History:    Vitally stable.  On room air  Hemoglobin is 7 and 7.1 today  Chest x-ray no significant interval change.   Patient had severe chest pain 9/10  Kidney function WNL  CRP 20.4  Pleural fluid positive for gram-positive cocci in clusters; had a dose of vancomycin; ID on board and recommendation  Neutrophils count 80%, lymphocyte count 6%, monocyte 14%  Per PM&R physiatrist Dr. Lawrence Jamison, patient appropriate for Acute Inpatient Rehabilitation level of care. Waiting authorization      Brief History:     The patient is a 89 y.o. male who slipped and fell 01/19/2025 and landed on his left side, after which severe pain in his left side started to bother him. Denies loss of consciousness, numbness, weakness, headache, neck pain, back pain.     PMH: coronary artery disease with stent placement, afib, HFpEF with pacemaker, CKD stage IIIb, chronic anemia, abdominal hernia status post repair.     In the ER, he was stable. Hb 6.6 -- received RBC transfusion. CT head and C-spine showed no critical findings. CT chest whowed a large left pleural effusion with compressive atelectasis, segmented fractures of the left post erior 10th and 11th ribs. CT abdomen and pelvis revealed findings suggestive of ileus/obstruction.     Admitted for management of his multiple health issues.    2 units of RBCs transfused, Hb stabilized: 8.2. Evaluated by general surgery and pulmonology.     Medications:     Allergies:    Allergies   Allergen Reactions    Aspirin Other (See Comments)     Pt told not  compressive atelectasis. 2. Segmented fractures of the left posterior 10th and 11th ribs with a moderate subcutaneous hematoma and equivocal tiny amount of subcutaneous gas. 3. Fluid-filled dilated small bowel and colon with stool throughout the colon and rectum. Findings may represent ileus/obstruction or constipation. 4. Extensive degenerative changes of the thoracic and lumbar spine and postoperative changes of the lumbar spine and right hip.     CT CERVICAL SPINE WO CONTRAST    Result Date: 1/20/2025  EXAMINATION: CT OF THE CERVICAL SPINE WITHOUT CONTRAST 1/20/2025 10:02 am TECHNIQUE: CT of the cervical spine was performed without the administration of intravenous contrast. Multiplanar reformatted images are provided for review. Automated exposure control, iterative reconstruction, and/or weight based adjustment of the mA/kV was utilized to reduce the radiation dose to as low as reasonably achievable. COMPARISON: CT cervical spine performed 04/18/2024. HISTORY: ORDERING SYSTEM PROVIDED HISTORY: Fall, pain TECHNOLOGIST PROVIDED HISTORY: Fall, pain Decision Support Exception - unselect if not a suspected or confirmed emergency medical condition->Emergency Medical Condition (MA) Reason for Exam: pt fell FINDINGS: BONES/ALIGNMENT: The vertebral body heights are maintained.  The facet joints are aligned.  There is no acute fracture or malalignment.  There is no spondylolisthesis. DEGENERATIVE CHANGES: There is multilevel degenerative disc disease with uncovertebral and facet hypertrophy.  There is diffuse disc space narrowing. There is no significant bony canal stenosis.  There is bilateral bony foraminal narrowing. SOFT TISSUES: There is no prevertebral soft tissue swelling.     No acute fracture or malalignment of the cervical spine. Multilevel degenerative change with bilateral bony foraminal narrowing.     CT HEAD WO CONTRAST    Result Date: 1/20/2025  EXAMINATION: CT OF THE HEAD WITHOUT CONTRAST  1/20/2025 10:02

## 2025-01-29 NOTE — PROGRESS NOTES
Pharmacy monitoring of Heparin infusion  Heparin Infusion New Order    Patient on heparin for:  DVT    Was patient on previous oral anticoagulant/dose?:  Yes Xarelto 15 mg , Confirmed with RN/Pt/Pts family/Surescripts?: Medlist, on hold since admission on 1/20/25    Factor Xa inhibitor/LMWH use within the past 72 hours? NO  If yes, date of last administration: before 1/20/25    Recent Labs     01/27/25  0543 01/28/25  0533 01/28/25  1808 01/29/25  0814   HGB 8.1* 8.7* 8.7* 9.1*   INR  --  1.1  --   --     242  --  220       Heparin to be monitored using anti-Xa for duration of therapy.(aPTT should be used for patients who have received a DOAC in the past 72 hours)?      Have admin instructions been updated to reflect appropriate protocol? YES    Have appropriate labs been ordered for corresponding protocol? YES   *Discontinue anti-Xa lab monitoring if using aPTT protocol    Is the starting rate/last rate adjustment appropriate? yes    Concurrent anticoagulants: none  (Note it is not appropriate to be on most anticoagulants while on a heparin drip)    Review platelets from the past few days.  Any concerns?: No    Lilliana Avery PharmD, BCPS 1/29/2025 12:27 PM

## 2025-01-29 NOTE — PROGRESS NOTES
Physical Therapy  DATE: 2025    NAME: Hemanth Barrera  MRN: [id number]   : 1935    Patient not seen this date for Physical Therapy due to:      [] Cancel by RN or physician due to:    [] Hemodialysis    [] Critical Lab Value Level     [] Blood transfusion in progress    [] Acute or unstable cardiovascular status   _MAP < 55 or more than >115  _HR < 40 or > 130    [] Acute or unstable pulmonary status   -FiO2 > 60%   _RR < 5 or >40    _O2 sats < 85%    [] Strict Bedrest    [] Off Unit for surgery or procedure    [] Off Unit for testing       [] Pending imaging to R/O fracture    [] Refusal by Patient      [x] Other; checked on pt @ 1105, pt getting dopplers. Re-checked at 1342, pt being taken for a CT. Will continue to follow.       [] PT being discontinued at this time. Patient independent. No further needs.     [] PT being discontinued at this time as the patient has been transferred to hospice care. No further needs.      Electronically signed by Mabel Chirinos PTA on 25 at 2:27 PM EST

## 2025-01-29 NOTE — PROGRESS NOTES
RN spoke with Dr. Haskins regarding Dr. Roman's recommendations of moving patient to intermediate ICU. Dr. Haskins agrees, orders placed.

## 2025-01-29 NOTE — PROGRESS NOTES
[facility] -   Infectious diseases evaluation  admission date 1/20/2025    reason for consultation:   Gram-positive cocci in clusters on pleural fluid culture    Impression :   Current:  Rib fractures with hemothorax to the left side  Status post thoracentesis 1/21/2025 Staph epi/staph capitis growth on culture likely contamination.  Status post thoracentesis 1/28/2025  Pulmonary embolism and DVTs  Anemia  Mechanical fall  Coronary artery disease status post stent placement  Pacemaker  Chronic kidney disease stage IIIb  Chronic anemia  Abdominal hernia s/p repair  A-fib    HENCE:   Monitor off antibiotics.  The patient was started on heparin drip  Follow CBC       Infection Control Recommendations   [place] Precautions      Antimicrobial Stewardship Recommendations   Simplification of therapy  Targeted therapy      History of Present Illness:   Initial history:  Hemanth Barrera is a 89 y.o.-year-old male presented to the hospital after he fell on 1/19/2025, landed on his left side, complaining of pain to the left side chest pain.  The patient was on aspirin, Plavix and Xarelto.  At the ER hemoglobin was 6.6 received blood transfusion  CT head and C-spine unremarkable.  CT chest showed large left pleural effusion suspected hemothorax with atelectasis, fractures of the 10th and 11th ribs with moderate subcutaneous hematoma and tiny amount of subcutaneous gas .  CT abdomen and pelvis suggestive of constipation, ileus versus obstruction  Status post thoracentesis 1/21/2025 showed 1, 875 WBC, 1, 386, 211 RBCs, 4.3 of protein, 414 LD, gram-positive cocci in clusters staph epi on culture      Interval changes  1/29/2025  He was seen and examined earlier today at the intermediate ICU, complaining of left-sided chest pain, shortness of breath and left arm swelling, no significant redness.  On Room air  CT chest showed bilateral pulmonary emboli, left lower lobe

## 2025-01-29 NOTE — PLAN OF CARE
Problem: Chronic Conditions and Co-morbidities  Goal: Patient's chronic conditions and co-morbidity symptoms are monitored and maintained or improved  1/29/2025 1619 by Deepali Aragon RN  Outcome: Progressing  1/29/2025 1421 by Shasta Castillo RN  Outcome: Progressing  1/29/2025 0537 by Luz Maria Kirkpatrick RN  Outcome: Progressing     Problem: Discharge Planning  Goal: Discharge to home or other facility with appropriate resources  1/29/2025 1619 by Deepali Aragon RN  Outcome: Progressing  1/29/2025 1421 by Shasta Castillo RN  Outcome: Progressing  1/29/2025 0537 by Luz Maria Kirkpatrick RN  Outcome: Progressing     Problem: Pain  Goal: Verbalizes/displays adequate comfort level or baseline comfort level  1/29/2025 1619 by Deepali Aragon RN  Outcome: Progressing  1/29/2025 1421 by Shasta Castillo RN  Outcome: Progressing  1/29/2025 0537 by Luz Maria Kirkpatrick RN  Outcome: Progressing     Problem: Safety - Adult  Goal: Free from fall injury  1/29/2025 1619 by Deepali Aragon RN  Outcome: Progressing  1/29/2025 1421 by Shasta Castillo RN  Outcome: Progressing  1/29/2025 0537 by Luz Maria Kirkpatrick RN  Outcome: Progressing     Problem: ABCDS Injury Assessment  Goal: Absence of physical injury  1/29/2025 1619 by Deepali Aragon RN  Outcome: Progressing  1/29/2025 1421 by Shasta Castillo RN  Outcome: Progressing  1/29/2025 0537 by Luz Maria Kirkpatrick RN  Outcome: Progressing     Problem: Nutrition Deficit:  Goal: Optimize nutritional status  1/29/2025 1619 by Deepali Aragon RN  Outcome: Progressing  1/29/2025 1421 by Shasta Castillo RN  Outcome: Progressing  1/29/2025 0537 by Luz Maria Kirkpatrick RN  Outcome: Progressing     Problem: Skin/Tissue Integrity  Goal: Absence of new skin breakdown  Description: 1.  Monitor for areas of redness and/or skin breakdown  2.  Assess vascular access sites hourly  3.  Every 4-6 hours minimum:  Change oxygen saturation probe site  4.  Every 4-6 hours:  If on nasal continuous positive

## 2025-01-29 NOTE — PROGRESS NOTES
[facility] General Surgery   Lebron Roman MD, FACS  Molly ANNIAFany Larary, APRN-CNP  [address], [address]  [place] [address]  P: [phone], F: [phone]    General and Robotic Surgery  Progress Note             PATIENT NAME: Hemanth Barrera   :  1935   MRN: [id number]   PCP:  Neftaly Contreras MD     TODAY'S DATE: 2025    89 y.o. male seen and examined at bedside in intermediate ICU earlier today.  Nursing staff reports that patient is positive for multiple blood clots in his left arm.  Arm is swollen and tender.  Patient was transferred to intermediate ICU yesterday evening after patient was more lethargic.  Patient states that he does still note some shortness of breath.  SpO2 has been stable.  Complains of left arm pain.  Continue to complain of some left-sided chest pain.  Has not had much of an appetite.    PAST MEDICAL HISTORY     Past Medical History:   Diagnosis Date    Acute inferolateral myocardial infarction (HCC) 2021    Arthritis     Atrial fibrillation (HCC)     CAD (coronary artery disease)     CHF (congestive heart failure) ([facility])     Glaucoma     Hx of blood clots     with hernia surgery    Hyperlipidemia     Hypertension     MI (myocardial infarction) ([facility])     NSTEMI (non-ST elevated myocardial infarction) ([facility]) 2021       PROBLEM LIST     Patient Active Problem List   Diagnosis    On continuous oral anticoagulation    CHF, acute on chronic (HCC)    Hyperlipidemia    Former smoker    Pacemaker    History of DVT of lower extremity    Enlarged prostate    Cataract of both eyes    GERD (gastroesophageal reflux disease)    History of inguinal hernia repair    Hypertension    Pneumonia    History of right hip replacement    History of gastric surgery    Low back pain    Chest pain    Fluid overload    VARSHA (acute kidney injury) (HCC)    History of acute myocardial infarction    Chronic CHF (congestive heart failure) (HCC)    Unstable angina (HCC)    Acute

## 2025-01-29 NOTE — PROGRESS NOTES
Received a call from [facility] that patient has small segmental subsegmental PE, already started on heparin drip.  Monitor H&H closely

## 2025-01-29 NOTE — CARE COORDINATION
ONGOING DISCHARGE PLAN:    Writer reviewed Chart Notes.     Following for Placement.     Multiple referrals sent. No beds available.     Writer is awaiting to hear back from [facility]. VM left. Per message, they will be back in the office criselda.    Will need to check w/ Son Dusty, for more choices if [facility], can NOT Accept.     Pt. On Heparin GTT.     CT Chest ordered.      Dopplers.     Vascular consult.     Pulmonary on board. 94% on RA.     Will continue to follow for additional discharge needs.    If patient is discharged prior to next notation, then this note serves as note for discharge by case management.    Electronically signed by Jill Glover RN on 1/29/2025 at 2:16 PM

## 2025-01-29 NOTE — PROGRESS NOTES
[facility]  PROGRESS NOTE    Shift date: 1/29/2025   Shift day: Wednesday   Shift # 1    Room # 2013/2013-01   Name: Hemanth Barrera                Pentecostalism: Religion   Place of Confucianism: Unknown    Referral: Routine Visit    Admit Date & Time: 1/20/2025  9:06 AM    Assessment:  Hemanth Barrera is a 89 y.o. male in the hospital. Upon entering the room writer observes patient is sitting up in bed, with a family member present.  He does not appear to be in any immediate distress during the time of this visit.      Intervention:  Writer introduced self and title as chaplain Lashawn from [facility].  Writer offered space for patient and family member  to express feelings, needs, and concerns and provided a ministry presence. Patient shares he is an active Religion believer.    Outcome:  Writer offers prayer and scripture.  Patient and family member are receptive and express gratitude for visit.    Plan:  Chaplains will remain available to offer spiritual and emotional support as needed.     01/29/25 1411   Encounter Summary   Encounter Overview/Reason Initial Encounter   Service Provided For Patient and family together   Referral/Consult From [facility]   Support System Children;Family members   Last Encounter  01/29/25   Complexity of Encounter Low   Begin Time 1310   End Time  1320   Total Time Calculated 10 min   Assessment/Intervention/Outcome   Assessment Calm;Coping   Intervention Active listening;Sustaining Presence/Ministry of presence;Prayer (assurance of)/Liberty   Outcome Coping;Receptive           Electronically signed by Chaplain LEENA, on 1/29/2025 at 2:13 PM.  [facility]

## 2025-01-29 NOTE — PROGRESS NOTES
Doppler Tech stated patient had many visible clots in Left Upper Extremity. Results are not yet visible in computer but will be available soon per tech. Dr. Haskins notified. Dr. Haskins asked writer to also inform Pulmonology and General Surgery.     Dr. Morocho sent secure message of above information. No new orders at this time.     ANIA Woodruff NP notified of doppler findings. She stated from a general surgery stand point no new orders, and to notify pulmonologist.     Dr. Morocho notified that General Surgery is aware and deferred to him.    Attending and PA/NP shared services statement (NON-critical care):

## 2025-01-29 NOTE — PLAN OF CARE
Problem: Chronic Conditions and Co-morbidities  Goal: Patient's chronic conditions and co-morbidity symptoms are monitored and maintained or improved  1/29/2025 0537 by Luz Maria Kirkpatrick RN  Outcome: Progressing  1/28/2025 1543 by Tamika Haynes RN  Outcome: Progressing     Problem: Discharge Planning  Goal: Discharge to home or other facility with appropriate resources  1/29/2025 0537 by Luz Maria Kirkpatrick RN  Outcome: Progressing  1/28/2025 1543 by Tamika Haynse RN  Outcome: Progressing     Problem: Pain  Goal: Verbalizes/displays adequate comfort level or baseline comfort level  1/29/2025 0537 by Luz Maria Kirkpatrick RN  Outcome: Progressing  1/28/2025 1543 by Tamika Haynes RN  Outcome: Progressing     Problem: Safety - Adult  Goal: Free from fall injury  1/29/2025 0537 by Luz Maria Kirkpatrick RN  Outcome: Progressing  1/28/2025 1543 by Tamika Haynes RN  Outcome: Progressing     Problem: ABCDS Injury Assessment  Goal: Absence of physical injury  1/29/2025 0537 by Luz Maria Kirkpatrick RN  Outcome: Progressing  1/28/2025 1543 by Tamika Haynes RN  Outcome: Progressing     Problem: Nutrition Deficit:  Goal: Optimize nutritional status  Outcome: Progressing     Problem: Skin/Tissue Integrity  Goal: Absence of new skin breakdown  Description: 1.  Monitor for areas of redness and/or skin breakdown  2.  Assess vascular access sites hourly  3.  Every 4-6 hours minimum:  Change oxygen saturation probe site  4.  Every 4-6 hours:  If on nasal continuous positive airway pressure, respiratory therapy assess nares and determine need for appliance change or resting period.  1/29/2025 0537 by Luz Maria Kirkpatrick RN  Outcome: Progressing  1/28/2025 1543 by Tamika Haynes RN  Outcome: Progressing

## 2025-01-29 NOTE — CONSULTS
Division of Vascular Surgery        New Consult    History of Present Illness:      Hemanth Barrera is a 89 y.o. male who presents after fall. Has history of left sided pacemaker. He underwent CT chest, which I reviewed. Shows bilateral PE with distal clot. Has left arm swelling. Venous duplex revealed left subclavian, axillary and brachial vein DVT.    Medical History:     Past Medical History:   Diagnosis Date    Acute inferolateral myocardial infarction (HCC) 11/18/2021    Arthritis     Atrial fibrillation (HCC)     CAD (coronary artery disease)     CHF (congestive heart failure) (HCC)     Glaucoma     Hx of blood clots     with hernia surgery    Hyperlipidemia     Hypertension     MI (myocardial infarction) (HCC)     NSTEMI (non-ST elevated myocardial infarction) ([facility]) 11/17/2021       Surgical History:     Past Surgical History:   Procedure Laterality Date    ABDOMEN SURGERY      gastric resection    APPENDECTOMY      BONE MARROW BIOPSY      CARDIAC CATHETERIZATION      CARDIAC PROCEDURE N/A 9/17/2024    zafrull / Left heart cath / coronary angiography / rm 512 performed by Cathie Hastings MD at [facility] CARDIAC CATH LAB    CARDIAC PROCEDURE N/A 9/17/2024    Percutaneous coronary intervention performed by Cathie Hastings MD at [facility] CARDIAC CATH LAB    CARDIAC PROCEDURE Right 1/9/2025    Left heart cath / coronary angiography performed by Guy Paz MD at [facility] CARDIAC CATH LAB    COLONOSCOPY      COLONOSCOPY N/A 8/3/2023    COLONOSCOPY WITH BIOPSY performed by Isauro Razo MD at [facility] ENDO    CT BIOPSY BONE MARROW  5/31/2022    CT BONE MARROW BIOPSY 5/31/2022 [facility] CT SCAN    EYE SURGERY      smiley cataracts    HERNIA REPAIR      inguinal smiley    INVASIVE VASCULAR N/A 1/9/2025    Ultrasound guided vascular access performed by Guy Paz MD at [facility] CARDIAC CATH LAB    JOINT REPLACEMENT      rt hip x 2, lt knee    PACEMAKER PLACEMENT      UPPER GASTROINTESTINAL ENDOSCOPY N/A 8/2/2023    EGD BIOPSY

## 2025-01-29 NOTE — PROGRESS NOTES
Pt transferred to intermediate ICU room 2013, lifepack applied, belonging transported with patient. Report given to LEON Loera at bedside in department.

## 2025-01-30 ENCOUNTER — APPOINTMENT (OUTPATIENT)
Dept: GENERAL RADIOLOGY | Age: 89
DRG: 199 | End: 2025-01-30
Payer: MEDICARE

## 2025-01-30 ENCOUNTER — APPOINTMENT (OUTPATIENT)
Dept: CT IMAGING | Age: 89
DRG: 199 | End: 2025-01-30
Payer: MEDICARE

## 2025-01-30 PROBLEM — Z95.0 CARDIAC PACEMAKER IN SITU: Status: ACTIVE | Noted: 2025-01-30

## 2025-01-30 PROBLEM — I48.0 PAF (PAROXYSMAL ATRIAL FIBRILLATION) (HCC): Status: ACTIVE | Noted: 2025-01-30

## 2025-01-30 LAB
ANION GAP SERPL CALCULATED.3IONS-SCNC: 8 MMOL/L (ref 9–16)
ANTI-XA UNFRAC HEPARIN: 0.34 IU/L (ref 0.3–0.7)
ANTI-XA UNFRAC HEPARIN: 0.79 IU/L (ref 0.3–0.7)
ANTI-XA UNFRAC HEPARIN: 1 IU/L (ref 0.3–0.7)
ANTI-XA UNFRAC HEPARIN: >1.1 IU/L (ref 0.3–0.7)
BUN SERPL-MCNC: 17 MG/DL (ref 8–23)
CALCIUM SERPL-MCNC: 7.8 MG/DL (ref 8.6–10.4)
CHLORIDE SERPL-SCNC: 103 MMOL/L (ref 98–107)
CO2 SERPL-SCNC: 24 MMOL/L (ref 20–31)
CREAT SERPL-MCNC: 0.8 MG/DL (ref 0.7–1.2)
DATE, STOOL #1: NORMAL
ECHO BSA: 1.95 M2
ERYTHROCYTE [DISTWIDTH] IN BLOOD BY AUTOMATED COUNT: 17.9 % (ref 11.5–14.9)
GFR, ESTIMATED: 84 ML/MIN/1.73M2
GLUCOSE SERPL-MCNC: 103 MG/DL (ref 74–99)
HCT VFR BLD AUTO: 23.6 % (ref 41–53)
HCT VFR BLD AUTO: 24 % (ref 41–53)
HCT VFR BLD AUTO: 24 % (ref 41–53)
HCT VFR BLD AUTO: 24.1 % (ref 41–53)
HEMOCCULT SP1 STL QL: NEGATIVE
HGB BLD-MCNC: 7.3 G/DL (ref 13.5–17.5)
HGB BLD-MCNC: 7.5 G/DL (ref 13.5–17.5)
HGB BLD-MCNC: 7.5 G/DL (ref 13.5–17.5)
HGB BLD-MCNC: 8 G/DL (ref 13.5–17.5)
MCH RBC QN AUTO: 30.4 PG (ref 26–34)
MCHC RBC AUTO-ENTMCNC: 30.3 G/DL (ref 31–37)
MCV RBC AUTO: 100.3 FL (ref 80–100)
PLATELET # BLD AUTO: 186 K/UL (ref 150–450)
PMV BLD AUTO: 7.6 FL (ref 6–12)
POTASSIUM SERPL-SCNC: 4.4 MMOL/L (ref 3.7–5.3)
RBC # BLD AUTO: 2.4 M/UL (ref 4.5–5.9)
SODIUM SERPL-SCNC: 135 MMOL/L (ref 136–145)
TIME, STOOL #1: 1050
TROPONIN I SERPL HS-MCNC: 56 NG/L (ref 0–22)
WBC OTHER # BLD: 3.1 K/UL (ref 3.5–11)

## 2025-01-30 PROCEDURE — 84484 ASSAY OF TROPONIN QUANT: CPT

## 2025-01-30 PROCEDURE — 71045 X-RAY EXAM CHEST 1 VIEW: CPT

## 2025-01-30 PROCEDURE — G0545 PR INHERENT VISIT TO INPT: HCPCS | Performed by: INTERNAL MEDICINE

## 2025-01-30 PROCEDURE — 6370000000 HC RX 637 (ALT 250 FOR IP)

## 2025-01-30 PROCEDURE — 93005 ELECTROCARDIOGRAM TRACING: CPT | Performed by: INTERNAL MEDICINE

## 2025-01-30 PROCEDURE — 2500000003 HC RX 250 WO HCPCS

## 2025-01-30 PROCEDURE — 6370000000 HC RX 637 (ALT 250 FOR IP): Performed by: INTERNAL MEDICINE

## 2025-01-30 PROCEDURE — 93971 EXTREMITY STUDY: CPT | Performed by: STUDENT IN AN ORGANIZED HEALTH CARE EDUCATION/TRAINING PROGRAM

## 2025-01-30 PROCEDURE — 99231 SBSQ HOSP IP/OBS SF/LOW 25: CPT | Performed by: STUDENT IN AN ORGANIZED HEALTH CARE EDUCATION/TRAINING PROGRAM

## 2025-01-30 PROCEDURE — 99233 SBSQ HOSP IP/OBS HIGH 50: CPT | Performed by: SURGERY

## 2025-01-30 PROCEDURE — 99233 SBSQ HOSP IP/OBS HIGH 50: CPT | Performed by: INTERNAL MEDICINE

## 2025-01-30 PROCEDURE — 85027 COMPLETE CBC AUTOMATED: CPT

## 2025-01-30 PROCEDURE — 99223 1ST HOSP IP/OBS HIGH 75: CPT | Performed by: NURSE PRACTITIONER

## 2025-01-30 PROCEDURE — 6360000002 HC RX W HCPCS: Performed by: INTERNAL MEDICINE

## 2025-01-30 PROCEDURE — 36415 COLL VENOUS BLD VENIPUNCTURE: CPT

## 2025-01-30 PROCEDURE — 97530 THERAPEUTIC ACTIVITIES: CPT

## 2025-01-30 PROCEDURE — 80048 BASIC METABOLIC PNL TOTAL CA: CPT

## 2025-01-30 PROCEDURE — 85014 HEMATOCRIT: CPT

## 2025-01-30 PROCEDURE — 85520 HEPARIN ASSAY: CPT

## 2025-01-30 PROCEDURE — 74176 CT ABD & PELVIS W/O CONTRAST: CPT

## 2025-01-30 PROCEDURE — 2060000000 HC ICU INTERMEDIATE R&B

## 2025-01-30 PROCEDURE — 82270 OCCULT BLOOD FECES: CPT

## 2025-01-30 PROCEDURE — 85018 HEMOGLOBIN: CPT

## 2025-01-30 PROCEDURE — 99232 SBSQ HOSP IP/OBS MODERATE 35: CPT | Performed by: INTERNAL MEDICINE

## 2025-01-30 RX ADMIN — OXYCODONE 7.5 MG: 5 TABLET ORAL at 00:07

## 2025-01-30 RX ADMIN — OXYCODONE 7.5 MG: 5 TABLET ORAL at 12:31

## 2025-01-30 RX ADMIN — OXYCODONE 7.5 MG: 5 TABLET ORAL at 08:40

## 2025-01-30 RX ADMIN — HEPARIN SODIUM 11 UNITS/KG/HR: 10000 INJECTION, SOLUTION INTRAVENOUS at 14:08

## 2025-01-30 RX ADMIN — OXYCODONE 7.5 MG: 5 TABLET ORAL at 16:34

## 2025-01-30 RX ADMIN — ACETAMINOPHEN 1000 MG: 500 TABLET, FILM COATED ORAL at 21:59

## 2025-01-30 RX ADMIN — SODIUM CHLORIDE, PRESERVATIVE FREE 10 ML: 5 INJECTION INTRAVENOUS at 20:13

## 2025-01-30 RX ADMIN — ATORVASTATIN CALCIUM 40 MG: 40 TABLET, FILM COATED ORAL at 20:21

## 2025-01-30 RX ADMIN — ACETAMINOPHEN 1000 MG: 500 TABLET, FILM COATED ORAL at 14:31

## 2025-01-30 RX ADMIN — METOPROLOL SUCCINATE 100 MG: 50 TABLET, EXTENDED RELEASE ORAL at 08:39

## 2025-01-30 RX ADMIN — FINASTERIDE 5 MG: 5 TABLET, FILM COATED ORAL at 08:42

## 2025-01-30 RX ADMIN — ISOSORBIDE MONONITRATE 30 MG: 30 TABLET, EXTENDED RELEASE ORAL at 08:39

## 2025-01-30 RX ADMIN — DILTIAZEM HYDROCHLORIDE 120 MG: 120 CAPSULE, COATED, EXTENDED RELEASE ORAL at 08:42

## 2025-01-30 RX ADMIN — FERROUS SULFATE TAB 325 MG (65 MG ELEMENTAL FE) 325 MG: 325 (65 FE) TAB at 16:33

## 2025-01-30 RX ADMIN — FERROUS SULFATE TAB 325 MG (65 MG ELEMENTAL FE) 325 MG: 325 (65 FE) TAB at 08:39

## 2025-01-30 RX ADMIN — OXYCODONE 7.5 MG: 5 TABLET ORAL at 20:21

## 2025-01-30 RX ADMIN — ACETAMINOPHEN 1000 MG: 500 TABLET, FILM COATED ORAL at 05:36

## 2025-01-30 RX ADMIN — PANTOPRAZOLE SODIUM 40 MG: 40 TABLET, DELAYED RELEASE ORAL at 05:36

## 2025-01-30 ASSESSMENT — PAIN DESCRIPTION - ORIENTATION
ORIENTATION: LEFT

## 2025-01-30 ASSESSMENT — PAIN SCALES - GENERAL
PAINLEVEL_OUTOF10: 7
PAINLEVEL_OUTOF10: 6
PAINLEVEL_OUTOF10: 5
PAINLEVEL_OUTOF10: 9
PAINLEVEL_OUTOF10: 9
PAINLEVEL_OUTOF10: 10
PAINLEVEL_OUTOF10: 9
PAINLEVEL_OUTOF10: 6
PAINLEVEL_OUTOF10: 10

## 2025-01-30 ASSESSMENT — PAIN DESCRIPTION - DESCRIPTORS
DESCRIPTORS: SHARP
DESCRIPTORS: SHARP;STABBING
DESCRIPTORS: STABBING;SHARP;THROBBING
DESCRIPTORS: SHARP
DESCRIPTORS: THROBBING;SHARP

## 2025-01-30 ASSESSMENT — PAIN DESCRIPTION - LOCATION
LOCATION: BACK;FLANK
LOCATION: ARM
LOCATION: ARM
LOCATION: RIB CAGE
LOCATION: CHEST
LOCATION: SHOULDER
LOCATION: CHEST

## 2025-01-30 NOTE — PROGRESS NOTES
Comprehensive Nutrition Assessment    Type and Reason for Visit:  Reassess    Nutrition Recommendations/Plan:   Continue Regular diet with Ensure Plus High Protein all trays     Malnutrition Assessment:  Malnutrition Status:  Moderate malnutrition (01/21/25 1409)    Context:  Acute Illness     Findings of the 6 clinical characteristics of malnutrition:  Energy Intake:  Unable to assess  Weight Loss:   (7.5% wt loss over 4 months)     Body Fat Loss:  Mild body fat loss Orbital   Muscle Mass Loss:  Mild muscle mass loss Hand (interosseous)  Fluid Accumulation:  Mild Extremities   Strength:  Not Performed    Nutrition Assessment:    Pt has nausea at lunch today. Requesting only soup. He is s/p (1/28) Thoracentesis. Intake is documented at 25-50%. Pt is drinking Ensure provided    Nutrition Related Findings:    mild BLE edema, Labs/Meds: reviewed, BM 1/29 Wound Type: Pressure Injury, Stage II       Current Nutrition Intake & Therapies:    Average Meal Intake: 26-50%  Average Supplements Intake: 51-75%  ADULT DIET; Regular  ADULT ORAL NUTRITION SUPPLEMENT; Breakfast, Dinner; Standard High Calorie/High Protein Oral Supplement    Anthropometric Measures:  Height: 185.4 cm (6' 1\")  Ideal Body Weight (IBW): 184 lbs (84 kg)    Admission Body Weight: 73.5 kg (162 lb) (stated)  Current Body Weight: 78.5 kg (173 lb 1 oz) (obtained by RD), 94.1 % IBW. Weight Source: Bed scale  Current BMI (kg/m2): 22.8  Usual Body Weight: 84.8 kg (187 lb) (9/24 bedscale)     % Weight Change (Calculated): -7.5                    BMI Categories: Normal Weight (BMI 22.0 to 24.9) age over 65    Estimated Daily Nutrient Needs:  Energy Requirements Based On: Formula  Weight Used for Energy Requirements: Current  Energy (kcal/day): [place] x 1.2-= 1800 kcal  Weight Used for Protein Requirements: Current  Protein (g/day): 1.5g/kg= 115-120 g  Method Used for Fluid Requirements: Defer to provider  Fluid (ml/day): at least 1500 ml    Nutrition  Diagnosis:   Moderate malnutrition related to inadequate protein-energy intake as evidenced by criteria as identified in malnutrition assessment    Nutrition Interventions:   Food and/or Nutrient Delivery: Continue Current Diet, Continue Oral Nutrition Supplement  Nutrition Education/Counseling: Education/Counseling completed (Encouraged intake)  Coordination of Nutrition Care: Continue to monitor while inpatient       Goals:  Goals: PO intake 75% or greater  Type of Goal: Continue current goal  Previous Goal Met: Progress towards Goal(s) Declining    Nutrition Monitoring and Evaluation:      Food/Nutrient Intake Outcomes: Food and Nutrient Intake, Supplement Intake  Physical Signs/Symptoms Outcomes: Biochemical Data, Chewing or Swallowing, GI Status, Fluid Status or Edema, Skin, Weight    Discharge Planning:    Continue current diet, Continue Oral Nutrition Supplement     Tessa Aiken RD, LD  Contact: [phone]

## 2025-01-30 NOTE — PLAN OF CARE
Problem: Discharge Planning  Goal: Discharge to home or other facility with appropriate resources  Outcome: Progressing  Flowsheets (Taken 1/30/2025 0830)  Discharge to home or other facility with appropriate resources:   Identify barriers to discharge with patient and caregiver   Arrange for needed discharge resources and transportation as appropriate   Identify discharge learning needs (meds, wound care, etc)   Refer to discharge planning if patient needs post-hospital services based on physician order or complex needs related to functional status, cognitive ability or social support system     Problem: Pain  Goal: Verbalizes/displays adequate comfort level or baseline comfort level  Outcome: Progressing     Problem: Safety - Adult  Goal: Free from fall injury  Outcome: Progressing     Problem: ABCDS Injury Assessment  Goal: Absence of physical injury  Outcome: Progressing     Problem: Nutrition Deficit:  Goal: Optimize nutritional status  Outcome: Progressing  Flowsheets (Taken 1/30/2025 1510 by Tessa Aiken, RD, LD)  Nutrient intake appropriate for improving, restoring, or maintaining nutritional needs: Monitor oral intake, labs, and treatment plans     Problem: Skin/Tissue Integrity  Goal: Absence of new skin breakdown  Description: 1.  Monitor for areas of redness and/or skin breakdown  2.  Assess vascular access sites hourly  3.  Every 4-6 hours minimum:  Change oxygen saturation probe site  4.  Every 4-6 hours:  If on nasal continuous positive airway pressure, respiratory therapy assess nares and determine need for appliance change or resting period.  Outcome: Progressing

## 2025-01-30 NOTE — PROGRESS NOTES
Occupational Therapy  [facility]   INPATIENT OCCUPATIONAL THERAPY  PROGRESS NOTE  Date: 2025  Patient Name: Hemanth Barrera       Room:   MRN: [id number]    : 1935  (89 y.o.)  Gender: male             Discharge Recommendations:  Further Occupational Therapy is recommended upon facility discharge.    OT Equipment Recommendations  Mobility Devices: ADL Assistive Devices  ADL Assistive Devices: Sock-Aid Hard, Reacher    Restrictions/Precautions  Restrictions/Precautions  Restrictions/Precautions: General Precautions;Fall Risk  Required Braces or Orthoses?: No  Implants Present? :  (R AGSUTO, L TKA)  Position Activity Restriction  Other Position/Activity Restrictions: L posterior 10th-11th rib fx s/p fall    Pulse: 71  SpO2: 98 %  O2 Device: None (Room air)  BP: 111/75  MAP (Calculated): 87  BP Location: Right upper arm  Comment: Pt reports \"feeling like I can't breathe\" but vitals remain stable    Subjective  Subjective  Subjective: Pt in bed upon arrival. pt declines sitting EOB 2* pain  Pain  Pre-Pain: 9  Post-Pain: 9  Pain Location: Left (rib cage)  Pain Descriptor: Sharp  Pain Interventions: Repositioning;Rest  Comments: RN idalmis tx    Objective  Orientation  Overall Orientation Status: Within Functional Limits  Orientation Level: Oriented X4  Cognition  Overall Cognitive Status: Exceptions  Arousal/Alertness: Appears intact  Following Commands: Follows multistep commands with increased time;Follows multistep commands with repitition  Attention Span: Appears intact  Memory: Appears intact  Safety Judgement: Appears intact  Problem Solving: Assistance required to implement solutions  Insights: Appears intact  Initiation: Requires cues for some  Sequencing: Requires cues for some    Transfers/Mobility  Bed mobility  Bed Mobility Comments: pt declines sitting up EOB 2* pain         OT Exercises  A/AROM Exercises: pt engages in BUE AROM in all shoulder/elbow planes x 15 reps

## 2025-01-30 NOTE — PROGRESS NOTES
[facility]  IN-PATIENT SERVICE  [facility]    PROGRESS NOTE             1/30/2025    8:44 AM    Name:   Hemanth Barrera  MRN:     [id number]     Acct:      [id number]   Room:   2013/2013-01  IP Day:  10  Admit Date:  1/20/2025  9:06 AM    PCP:  Neftaly Contreras MD  Code Status:  Full Code    Subjective:     C/C:   Chief Complaint   Patient presents with    Fall    Rib Pain (injury)     Interval History:    Vitally stable.  On room air  Hemoglobin is 7 and 7.1 today  Chest x-ray no significant interval change.   Patient had severe chest pain 9/10  Kidney function WNL  CRP 20.4  Pleural fluid positive for gram-positive cocci in clusters; had a dose of vancomycin; ID on board and recommendation  Neutrophils count 80%, lymphocyte count 6%, monocyte 14%  Per PM&R physiatrist Dr. Lawrence Jamison, patient appropriate for Acute Inpatient Rehabilitation level of care. Waiting authorization      Brief History:     The patient is a 89 y.o. male who slipped and fell 01/19/2025 and landed on his left side, after which severe pain in his left side started to bother him. Denies loss of consciousness, numbness, weakness, headache, neck pain, back pain.     PMH: coronary artery disease with stent placement, afib, HFpEF with pacemaker, CKD stage IIIb, chronic anemia, abdominal hernia status post repair.     In the ER, he was stable. Hb 6.6 -- received RBC transfusion. CT head and C-spine showed no critical findings. CT chest whowed a large left pleural effusion with compressive atelectasis, segmented fractures of the left post erior 10th and 11th ribs. CT abdomen and pelvis revealed findings suggestive of ileus/obstruction.     Admitted for management of his multiple health issues.    2 units of RBCs transfused, Hb stabilized: 8.2. Evaluated by general surgery and pulmonology.     Medications:     Allergies:    Allergies   Allergen Reactions    Aspirin Other (See Comments)     Pt told not

## 2025-01-30 NOTE — PROGRESS NOTES
Physical Therapy  DATE: 2025    NAME: Hemanth Barrera  MRN: [id number]   : 1935    Patient not seen this date for Physical Therapy due to:      [] Cancel by RN or physician due to:    [] Hemodialysis    [] Critical Lab Value Level     [] Blood transfusion in progress    [] Acute or unstable cardiovascular status   _MAP < 55 or more than >115  _HR < 40 or > 130    [] Acute or unstable pulmonary status   -FiO2 > 60%   _RR < 5 or >40    _O2 sats < 85%    [] Strict Bedrest    [] Off Unit for surgery or procedure    [] Off Unit for testing       [] Pending imaging to R/O fracture    [x] Refusal by Patient; checked on pt @ 1149. Pt lying in bed stating he is in 5/10 pain on his \"whole left side\". Pt stating \"it hurts whenever I move\". Will continue to follow.       [] Other      [] PT being discontinued at this time. Patient independent. No further needs.     [] PT being discontinued at this time as the patient has been transferred to hospice care. No further needs.      Electronically signed by Mabel Chirinos PTA on 25 at 1:17 PM EST

## 2025-01-30 NOTE — PROGRESS NOTES
Vascular Surgery    Left arm swelling appears somewhat improved since yesterday. Elevate left arm and compress as tolerated. Continue heparin gtt and transition to PO anticoagulation when possible.    Electronically signed by AURELIO CALVIN MD on 1/30/2025 at 4:54 PM

## 2025-01-30 NOTE — CARE COORDINATION
DISCHARGE PLANNING NOTE:    LSW following for potential discharge to SNF. Writer spoke to yovani Griffin to discuss facility choices and denials. He requests writer reach out to Kim[facility] again for bed availability. Per Gisselle in admissions there is 1 bed currently open. Admissions will review patient to see if he is clinically appropriate.

## 2025-01-30 NOTE — PLAN OF CARE
Problem: Chronic Conditions and Co-morbidities  Goal: Patient's chronic conditions and co-morbidity symptoms are monitored and maintained or improved  1/30/2025 0131 by Tonja Fishman RN  Outcome: Progressing     Problem: Discharge Planning  Goal: Discharge to home or other facility with appropriate resources  1/30/2025 0131 by Tonja Fishman RN  Outcome: Progressing     Problem: Pain  Goal: Verbalizes/displays adequate comfort level or baseline comfort level  1/30/2025 0131 by Tonja Fishman RN  Outcome: Progressing     Problem: Safety - Adult  Goal: Free from fall injury  1/30/2025 0131 by Tonja Fishman RN  Outcome: Progressing     Problem: ABCDS Injury Assessment  Goal: Absence of physical injury  1/30/2025 0131 by Tonja Fishman RN  Outcome: Progressing     Problem: Nutrition Deficit:  Goal: Optimize nutritional status  1/30/2025 0131 by Tonja Fishman RN  Outcome: Progressing     Problem: Skin/Tissue Integrity  Goal: Absence of new skin breakdown  Description: 1.  Monitor for areas of redness and/or skin breakdown  2.  Assess vascular access sites hourly  3.  Every 4-6 hours minimum:  Change oxygen saturation probe site  4.  Every 4-6 hours:  If on nasal continuous positive airway pressure, respiratory therapy assess nares and determine need for appliance change or resting period.  1/30/2025 0131 by Tonja Fishman RN  Outcome: Progressing

## 2025-01-30 NOTE — CONSULTS
[facility]    Consult                        Today's Date: 1/30/2025  Patient Name: Hemanth Barrera  Date of admission: 1/20/2025  9:06 AM  Patient's age: 89 y.o., 1935  Admission Dx: Ileus (HCC) [K56.7]  Pleural effusion [J90]  Fall, initial encounter [W19.XXXA]  Traumatic closed displaced fracture of rib on left side, initial encounter [S22.32XA]  Closed fracture of multiple ribs of left side, initial encounter [S22.42XA]  Anemia, unspecified type [D64.9]    Reason for Consult:  Cardiac evaluation    Requesting Physician: Kaitlin Dsouza MD    CHIEF COMPLAINT:  slip and fall    History Obtained From:  patient, electronic medical record    HISTORY OF PRESENT ILLNESS:      Per previous documentation: \"The patient is a 89 y.o. male who slipped and fell 01/19/2025 and landed on his left side, after which severe pain in his left side started to bother him. Denies loss of consciousness, numbness, weakness, headache, neck pain, back pain.     PMH: coronary artery disease with stent placement, afib, HFpEF with pacemaker, CKD stage IIIb, chronic anemia, abdominal hernia status post repair.     In the ER, he was stable. Hb 6.6 -- received RBC transfusion. CT head and C-spine showed no critical findings. CT chest whowed a large left pleural effusion with compressive atelectasis, segmented fractures of the left post erior 10th and 11th ribs. CT abdomen and pelvis revealed findings suggestive of ileus/obstruction.     Admitted for management of his multiple health issues.\"    Pt seen and examined in the room.  Patient resting in bed. Pt denies any CP or sob.  Labs, vitals and tele reviewed- Paced  Heparin drip infusing     Cardiology consulted for CAD and need for antiplatelets    Past Medical History:   has a past medical history of Acute inferolateral myocardial infarction (HCC), Arthritis, Atrial fibrillation (HCC), CAD (coronary artery disease), CHF (congestive heart failure) (HCC), Glaucoma, Hx of    Constitutional and General Appearance: alert, cooperative, no distress and appears stated age  HEENT: PERRL, no cervical lymphadenopathy. No masses palpable. Normal oral mucosa  Respiratory:  Normal excursion and expansion without use of accessory muscles  Resp Auscultation: Good respiratory effort. No for increased work of breathing. On auscultation: clear to auscultation bilaterally  Cardiovascular:  Heart tones are crisp and normal. regular S1 and S2.  Jugular venous pulsation Normal  The carotid upstroke is normal in amplitude and contour without delay or bruit   Abdomen:   soft  Bowel sounds present  Extremities:   + lower extremity edema  Neurological:  Alert and oriented.      DATA:    Diagnostics:    EKG: normal sinus rhythm.    ECHO 9/19/24: reviewed.     Left Ventricle: Normal left ventricular systolic function. EF by 2D Simpsons Biplane is 63%. Left ventricle size is normal. Mildly increased wall thickness. Normal wall motion. Diastolic dysfunction present with normal LV EF.    Right Ventricle: Right ventricle size is normal. Increased wall thickness. Lead present in the right ventricle.    Aortic Valve: Trileaflet valve. Thickened cusps. Mild to moderate regurgitation. AV PHT is 390.0 ms.    Mitral Valve: Mild regurgitation.    Tricuspid Valve: Mild regurgitation. Normal RVSP. The estimated RVSP is 32 mmHg.    Left Atrium: Left atrium is mildly dilated. LA Vol Index is  37 ml/m2.    Right Atrium: Lead present in the right atrium. Right atrium is dilated.    Image quality is adequate.    Stress Test 8/1/23: reviewed.    Stress Combined Conclusion: Normal pharmacological myocardial perfusion study. Findings suggest a low risk of cardiac events.    ECG: Resting ECG demonstrates normal sinus rhythm and left bundle branch block.    ECG: The ECG was not diagnostic due to baseline ECG abnormality.    Stress Test: A pharmacological stress test was performed using lexiscan  IMPRESSION:  Perfusion:

## 2025-01-30 NOTE — PROGRESS NOTES
[facility] General Surgery   Lebron Roman MD, FACS  Molly Larary, APRN-CNP  [address], [address]  [place] [address]  P: [phone], F: [phone]    General and Robotic Surgery  Progress Note             PATIENT NAME: Hemanth Barrera   :  1935   MRN: [id number]   PCP:  Neftaly Contreras MD     TODAY'S DATE: 2025    89 y.o. male seen and examined.  Complaining of left upper extremity pain.  Was diagnosed with severe DVT on IV heparin.  CT of the abdomen and pelvis shows no hematoma.  Oxygen saturation 97%.  Vascular surgery on the case.  Cardiology was consulted.  Oral intake is fair.  Hemoglobin is being checked every 6 hours.  No acute shortness of breath.    PAST MEDICAL HISTORY     Past Medical History:   Diagnosis Date    Acute inferolateral myocardial infarction (HCC) 2021    Arthritis     Atrial fibrillation (HCC)     CAD (coronary artery disease)     CHF (congestive heart failure) (HCC)     Glaucoma     Hx of blood clots     with hernia surgery    Hyperlipidemia     Hypertension     MI (myocardial infarction) (HCC)     NSTEMI (non-ST elevated myocardial infarction) (HCC) 2021       PROBLEM LIST     Patient Active Problem List   Diagnosis    On continuous oral anticoagulation    CHF, acute on chronic (HCC)    Hyperlipidemia    Former smoker    Pacemaker    History of DVT of lower extremity    Enlarged prostate    Cataract of both eyes    GERD (gastroesophageal reflux disease)    History of inguinal hernia repair    Hypertension    Pneumonia    History of right hip replacement    History of gastric surgery    Low back pain    Chest pain    Fluid overload    VARSHA (acute kidney injury) (HCC)    History of acute myocardial infarction    Chronic CHF (congestive heart failure) (HCC)    Unstable angina (HCC)    Acute on chronic diastolic congestive heart failure (HCC)    SBO (small bowel obstruction) ([facility])    Stage 3b chronic kidney disease (HCC)    Iron  started on heparin, hemoglobin dropped to  7.3 get chest x-ray and abdominal CT monitor for any hematoma\" from ordering  docs note  Relevant Medical/Surgical History: gastric resection, appendectomy, pacemaker    FINDINGS:  Lower Chest: Trace right and small left pleural effusions with associated  bilateral lower lobe atelectasis and or consolidation.  Heart is enlarged.  Partially visualized pacing wires terminate in the right atrium and right  ventricle.  No pericardial effusion.    Organs: Lack of intravenous contrast limits evaluation of the abdominal  viscera.    Stable morphology.  The gallbladder is nondistended.  No intra or  extrahepatic biliary dilatation.    Adrenal glands appear stable.  Spleen is atrophic.  The pancreas is grossly  normal.    Stable simple appearing cysts in the right upper and lower renal poles.  No  renal calculi or hydronephrosis bilaterally.  The bladder contains excreted  contrast, and appears normal.    GI/Bowel: The stomach appears normal.  Stable dilated/patulous bowel loop in  the right upper quadrant.  The appendix is not seen.    Pelvis: Normal prostate.  Seminal vesicles are not well seen.    Peritoneum/Retroperitoneum: No ascites or pneumoperitoneum.  There is stable  asymmetry of the iliopsoas muscles, with the left being larger than the  right.  No bulky abdominal or pelvic lymphadenopathy, although evaluation is  quite limited.    Bones/Soft Tissues: Postoperative changes of the right hip and sacroiliac  joint.  Remote right acetabular and superior pubic ramus fractures.  Posterior fusion of L3 and L4.  Straightening of the normal lumbar lordosis.  Subcutaneous edema.    Impression  No hematoma within the abdomen or pelvis.    Trace right and small left pleural effusions with associated bilateral lower  lobe atelectasis and or consolidation.    Cardiomegaly.      Hospital Problems             Last Modified POA    * (Principal) Traumatic closed displaced fracture of rib

## 2025-01-30 NOTE — PROGRESS NOTES
[facility] -   Infectious diseases evaluation  admission date 1/20/2025    reason for consultation:   Gram-positive cocci in clusters on pleural fluid culture    Impression :   Current:  Rib fractures with hemothorax to the left side  Status post thoracentesis 1/21/2025 Staph epi/staph capitis growth on culture likely contamination.  Status post thoracentesis 1/28/2025  Pulmonary embolism and DVTs  Anemia  Mechanical fall  Coronary artery disease status post stent placement  Pacemaker  Chronic kidney disease stage IIIb  Chronic anemia  Abdominal hernia s/p repair  A-fib    HENCE:   Monitor off antibiotics.  The patient  on heparin drip  Follow CBC       Infection Control Recommendations   [place] Precautions      Antimicrobial Stewardship Recommendations   Simplification of therapy  Targeted therapy      History of Present Illness:   Initial history:  Hemanth Barrera is a 89 y.o.-year-old male presented to the hospital after he fell on 1/19/2025, landed on his left side, complaining of pain to the left side chest pain.  The patient was on aspirin, Plavix and Xarelto.  At the ER hemoglobin was 6.6 received blood transfusion  CT head and C-spine unremarkable.  CT chest showed large left pleural effusion suspected hemothorax with atelectasis, fractures of the 10th and 11th ribs with moderate subcutaneous hematoma and tiny amount of subcutaneous gas .  CT abdomen and pelvis suggestive of constipation, ileus versus obstruction  Status post thoracentesis 1/21/2025 showed 1, 875 WBC, 1, 386, 211 RBCs, 4.3 of protein, 414 LD, gram-positive cocci in clusters staph epi on culture      Interval changes  1/30/2025  He was seen and examined, still complaining of left arm and pleuritic pain, comfortable on room air, on heparin drip  CT chest showed bilateral pulmonary emboli, left lower lobe atelectasis/infiltrate.  Small pleural effusion, rib fractures.Ectasia of the thoracic  aorta measuring 3.7 cm with concentric thrombus.    Pleural fluid Cx - staph epi  and staph capitis contaminants    Venous Doppler showed    Acute deep vein thrombosis in the left subclavian vein.    Acute deep vein thrombosis in the left axillary vein.    Acute deep vein thrombosis in the left brachial vein.    Superficial vein thrombosis in the basilic vein of the left upper arm.  Patient Vitals for the past 8 hrs:   Resp   25 1634 20   25 1231 20         Imagin25 CT chest and abdomen   IMPRESSION:  1. Large left pleural effusion with compressive atelectasis.  2. Segmented fractures of the left posterior 10th and 11th ribs with a moderate subcutaneous hematoma and equivocal tiny amount of subcutaneous gas.  3. Fluid-filled dilated small bowel and colon with stool throughout the colon and rectum. Findings may represent ileus/obstruction or constipation.  4. Extensive degenerative changes of the thoracic and lumbar spine andpostoperative changes of the lumbar spine and right hip    I have personally reviewed the past medical history, past surgical history, medications, social history, and family history, and I haveupdated the database accordingly.      Allergies:   Aspirin, Cyclobenzaprine, Ibuprofen, Azithromycin, and Hydrocodone-acetaminophen     Review of Systems:     Review of Systems   Musculoskeletal:         Left sided rib pain from rib fractures, thoracentesis.   All other systems reviewed and are negative.    Physical Examination :       Physical Exam  Vitals and nursing note reviewed.   Constitutional:       Appearance: He is not ill-appearing.   HENT:      Head: Normocephalic and atraumatic.      Right Ear: External ear normal.      Left Ear: External ear normal.      Nose: Nose normal.      Mouth/Throat:      Mouth: Mucous membranes are dry.      Pharynx: Oropharynx is clear.   Eyes:      General: No scleral icterus.     Conjunctiva/sclera: Conjunctivae normal.   Cardiovascular:

## 2025-01-30 NOTE — PROGRESS NOTES
[facility] PULMONARY,CRITICAL CARE & SLEEP   Hitracy Morocho MD/Choco BEJARANO AGACNP-BC, NP-C      Janet BEJARANO NP-C    Hemanth BEJARANO NP-C                                         Pulmonary Progress Note    Patient - Hemanth Barrera   Age - 89 y.o.   - 1935  MRN - [id number]  [facility] - [id number]  Date of Admission - 2025  9:06 AM    Consulting Service/Physician:       Primary Care Physician: Neftaly Contreras MD    SUBJECTIVE:     Chief Complaint:   Chief Complaint   Patient presents with    Fall    Rib Pain (injury)     Subjective:    He states breathing wise he is doing much better.  He still has some pain on the left side periodically.  He states he wrapped his left upper extremity, and he feels that the swelling has improved.  He denies any fevers or chills.  He continues on a heparin infusion for recent DVT/PE.  He denies any chest pain or pleuritic pain.  He currently is off oxygen.    CT abdomen pelvis shows trace right and small left pleural effusion with associated atelectasis, no worsening effusion, no ascites or pneumoperitoneum no bulky adenopathy, no noticeable hematoma in the abdomen or pelvis    VITALS  /76   Pulse 70   Temp 97.5 °F (36.4 °C) (Oral)   Resp 20   Ht 1.854 m (6' 1\")   Wt 77.7 kg (171 lb 4.8 oz)   SpO2 96%   BMI 22.60 kg/m²   Wt Readings from Last 3 Encounters:   25 77.7 kg (171 lb 4.8 oz)   25 73.1 kg (161 lb 2.5 oz)   25 63.5 kg (140 lb)     I/O (24 Hours)    Intake/Output Summary (Last 24 hours) at 2025 1327  Last data filed at 2025 0701  Gross per 24 hour   Intake 176.43 ml   Output 300 ml   Net -123.57 ml     Ventilator:      Exam:   Physical Exam   Constitutional: Elderly thin male, looks significantly more comfortable than he did a few days ago  HENT: Unremarkable,  Head: Normocephalic and atraumatic.   Eyes: EOM are normal. Pupils are equal, round, and reactive to light.  polyethylene glycol (GLYCOLAX) packet 17 g, 17 g, Oral, Daily PRN, Jose Ring MD, 17 g at 01/28/25 1114    0.9 % sodium chloride infusion, , IntraVENous, PRN, Kaitlin Dsouza MD    Lab Results:     Lab Results   Component Value Date    WBC 3.1 (L) 01/30/2025    HGB 7.3 (L) 01/30/2025    HCT 24.1 (L) 01/30/2025    .3 (H) 01/30/2025     01/30/2025     Lab Results   Component Value Date    CALCIUM 7.8 (L) 01/30/2025     (L) 01/30/2025    K 4.4 01/30/2025    CO2 24 01/30/2025     01/30/2025    BUN 17 01/30/2025    CREATININE 0.8 01/30/2025       Lab Results   Component Value Date    INR 1.2 01/29/2025    PROTIME 16.3 (H) 01/29/2025       Radiology:     CT chest does show improvement in left-sided hemothorax, associated compressive atelectasis noted                                                                                         ASSESSMENT:       Large left hemothorax status post fall, s/p left thoracentesis 1/21 for 1.6L bloody fluid, exudate, also on January 28 by Dr. Morocho for 900 mL of bloody fluid, again exudative  Acute left upper extremity DVT with acute pulmonary embolism diagnosed January 29, 2025, intraluminal filling defects to the left upper lobe, lower lobe and subsegmental right upper lobe and lower lobe  Left 10/11 rib fractures  Anemia, receiving 2 units packed red blood cells, admission hemoglobin 6.6  Mechanical fall  Atrial fibrillation, normally on aspirin, Plavix, Xarelto, last dose 1/20 a.m.  CHF  Mild renal insufficiency, likely volume related, creatinine 1.4  History of pacemaker  CAD with stent  Full code  PLAN:   Continue with heparin drip, continue to monitor for any signs of bleeding, Plavix has been on hold  Pain control per others  Wean O2 to keep sats greater 88%  Aggressive PT/OT  Reviewed CT abdomen pelvis, no recurrent or worsening pleural effusion noted  Supportive care  Discussed with RN      Electronically signed by DARCI Roger -

## 2025-01-31 LAB
ANION GAP SERPL CALCULATED.3IONS-SCNC: 8 MMOL/L (ref 9–16)
ANTI-XA UNFRAC HEPARIN: 0.23 IU/L (ref 0.3–0.7)
ANTI-XA UNFRAC HEPARIN: 0.44 IU/L (ref 0.3–0.7)
ANTI-XA UNFRAC HEPARIN: 0.49 IU/L (ref 0.3–0.7)
BUN SERPL-MCNC: 15 MG/DL (ref 8–23)
CALCIUM SERPL-MCNC: 7.6 MG/DL (ref 8.6–10.4)
CHLORIDE SERPL-SCNC: 105 MMOL/L (ref 98–107)
CO2 SERPL-SCNC: 21 MMOL/L (ref 20–31)
CREAT SERPL-MCNC: 0.7 MG/DL (ref 0.7–1.2)
ERYTHROCYTE [DISTWIDTH] IN BLOOD BY AUTOMATED COUNT: 16.7 % (ref 11.5–14.9)
GFR, ESTIMATED: 88 ML/MIN/1.73M2
GLUCOSE SERPL-MCNC: 92 MG/DL (ref 74–99)
HCT VFR BLD AUTO: 24.5 % (ref 41–53)
HCT VFR BLD AUTO: 26.3 % (ref 41–53)
HGB BLD-MCNC: 7.9 G/DL (ref 13.5–17.5)
HGB BLD-MCNC: 8.1 G/DL (ref 13.5–17.5)
MCH RBC QN AUTO: 30.6 PG (ref 26–34)
MCHC RBC AUTO-ENTMCNC: 32.2 G/DL (ref 31–37)
MCV RBC AUTO: 94.9 FL (ref 80–100)
PLATELET # BLD AUTO: 203 K/UL (ref 150–450)
PMV BLD AUTO: 7.3 FL (ref 6–12)
POTASSIUM SERPL-SCNC: 4.4 MMOL/L (ref 3.7–5.3)
RBC # BLD AUTO: 2.58 M/UL (ref 4.5–5.9)
SODIUM SERPL-SCNC: 134 MMOL/L (ref 136–145)
WBC OTHER # BLD: 3 K/UL (ref 3.5–11)

## 2025-01-31 PROCEDURE — 99232 SBSQ HOSP IP/OBS MODERATE 35: CPT | Performed by: INTERNAL MEDICINE

## 2025-01-31 PROCEDURE — 97530 THERAPEUTIC ACTIVITIES: CPT

## 2025-01-31 PROCEDURE — 99232 SBSQ HOSP IP/OBS MODERATE 35: CPT | Performed by: NURSE PRACTITIONER

## 2025-01-31 PROCEDURE — 80048 BASIC METABOLIC PNL TOTAL CA: CPT

## 2025-01-31 PROCEDURE — 6370000000 HC RX 637 (ALT 250 FOR IP): Performed by: NURSE PRACTITIONER

## 2025-01-31 PROCEDURE — 36415 COLL VENOUS BLD VENIPUNCTURE: CPT

## 2025-01-31 PROCEDURE — 85014 HEMATOCRIT: CPT

## 2025-01-31 PROCEDURE — 85027 COMPLETE CBC AUTOMATED: CPT

## 2025-01-31 PROCEDURE — 85520 HEPARIN ASSAY: CPT

## 2025-01-31 PROCEDURE — 85018 HEMOGLOBIN: CPT

## 2025-01-31 PROCEDURE — 2060000000 HC ICU INTERMEDIATE R&B

## 2025-01-31 PROCEDURE — 99233 SBSQ HOSP IP/OBS HIGH 50: CPT | Performed by: NURSE PRACTITIONER

## 2025-01-31 PROCEDURE — 6370000000 HC RX 637 (ALT 250 FOR IP)

## 2025-01-31 PROCEDURE — 99233 SBSQ HOSP IP/OBS HIGH 50: CPT | Performed by: INTERNAL MEDICINE

## 2025-01-31 PROCEDURE — 6370000000 HC RX 637 (ALT 250 FOR IP): Performed by: INTERNAL MEDICINE

## 2025-01-31 PROCEDURE — G0545 PR INHERENT VISIT TO INPT: HCPCS | Performed by: INTERNAL MEDICINE

## 2025-01-31 PROCEDURE — 2500000003 HC RX 250 WO HCPCS

## 2025-01-31 PROCEDURE — 6360000002 HC RX W HCPCS: Performed by: INTERNAL MEDICINE

## 2025-01-31 RX ORDER — MIDODRINE HYDROCHLORIDE 2.5 MG/1
2.5 TABLET ORAL
Status: DISCONTINUED | OUTPATIENT
Start: 2025-01-31 | End: 2025-02-05 | Stop reason: HOSPADM

## 2025-01-31 RX ADMIN — OXYCODONE 7.5 MG: 5 TABLET ORAL at 08:50

## 2025-01-31 RX ADMIN — DICLOFENAC SODIUM 2 G: 10 GEL TOPICAL at 11:52

## 2025-01-31 RX ADMIN — SODIUM CHLORIDE, PRESERVATIVE FREE 10 ML: 5 INJECTION INTRAVENOUS at 19:55

## 2025-01-31 RX ADMIN — PANTOPRAZOLE SODIUM 40 MG: 40 TABLET, DELAYED RELEASE ORAL at 05:45

## 2025-01-31 RX ADMIN — FERROUS SULFATE TAB 325 MG (65 MG ELEMENTAL FE) 325 MG: 325 (65 FE) TAB at 08:52

## 2025-01-31 RX ADMIN — METOPROLOL SUCCINATE 100 MG: 50 TABLET, EXTENDED RELEASE ORAL at 08:53

## 2025-01-31 RX ADMIN — OXYCODONE 7.5 MG: 5 TABLET ORAL at 16:05

## 2025-01-31 RX ADMIN — ATORVASTATIN CALCIUM 40 MG: 40 TABLET, FILM COATED ORAL at 19:55

## 2025-01-31 RX ADMIN — FERROUS SULFATE TAB 325 MG (65 MG ELEMENTAL FE) 325 MG: 325 (65 FE) TAB at 16:29

## 2025-01-31 RX ADMIN — BUMETANIDE 2 MG: 1 TABLET ORAL at 08:52

## 2025-01-31 RX ADMIN — ACETAMINOPHEN 1000 MG: 500 TABLET, FILM COATED ORAL at 14:04

## 2025-01-31 RX ADMIN — CLOPIDOGREL BISULFATE 75 MG: 75 TABLET ORAL at 08:53

## 2025-01-31 RX ADMIN — MIDODRINE HYDROCHLORIDE 2.5 MG: 2.5 TABLET ORAL at 12:29

## 2025-01-31 RX ADMIN — ACETAMINOPHEN 1000 MG: 500 TABLET, FILM COATED ORAL at 05:45

## 2025-01-31 RX ADMIN — DICLOFENAC SODIUM 2 G: 10 GEL TOPICAL at 01:03

## 2025-01-31 RX ADMIN — HEPARIN SODIUM 3100 UNITS: 1000 INJECTION INTRAVENOUS; SUBCUTANEOUS at 06:13

## 2025-01-31 RX ADMIN — ISOSORBIDE MONONITRATE 30 MG: 30 TABLET, EXTENDED RELEASE ORAL at 08:52

## 2025-01-31 RX ADMIN — OXYCODONE 7.5 MG: 5 TABLET ORAL at 19:55

## 2025-01-31 RX ADMIN — FINASTERIDE 5 MG: 5 TABLET, FILM COATED ORAL at 08:52

## 2025-01-31 RX ADMIN — OXYCODONE 7.5 MG: 5 TABLET ORAL at 00:30

## 2025-01-31 RX ADMIN — ACETAMINOPHEN 1000 MG: 500 TABLET, FILM COATED ORAL at 21:54

## 2025-01-31 RX ADMIN — MIDODRINE HYDROCHLORIDE 2.5 MG: 2.5 TABLET ORAL at 16:29

## 2025-01-31 RX ADMIN — DILTIAZEM HYDROCHLORIDE 120 MG: 120 CAPSULE, COATED, EXTENDED RELEASE ORAL at 08:52

## 2025-01-31 RX ADMIN — HEPARIN SODIUM 10 UNITS/KG/HR: 10000 INJECTION, SOLUTION INTRAVENOUS at 12:31

## 2025-01-31 ASSESSMENT — PAIN SCALES - GENERAL
PAINLEVEL_OUTOF10: 5
PAINLEVEL_OUTOF10: 9
PAINLEVEL_OUTOF10: 7
PAINLEVEL_OUTOF10: 9
PAINLEVEL_OUTOF10: 9
PAINLEVEL_OUTOF10: 8
PAINLEVEL_OUTOF10: 9

## 2025-01-31 ASSESSMENT — PAIN DESCRIPTION - PAIN TYPE: TYPE: ACUTE PAIN

## 2025-01-31 ASSESSMENT — PAIN DESCRIPTION - ORIENTATION
ORIENTATION: LEFT
ORIENTATION: LEFT
ORIENTATION: LOWER
ORIENTATION: LEFT

## 2025-01-31 ASSESSMENT — PAIN DESCRIPTION - LOCATION
LOCATION: BACK
LOCATION: RIB CAGE
LOCATION: CHEST;ARM
LOCATION: CHEST;ARM
LOCATION: RIB CAGE
LOCATION: ARM
LOCATION: CHEST;ARM
LOCATION: ARM;CHEST
LOCATION: ARM

## 2025-01-31 ASSESSMENT — PAIN DESCRIPTION - DESCRIPTORS
DESCRIPTORS: SHARP
DESCRIPTORS: ACHING
DESCRIPTORS: SHARP
DESCRIPTORS: ACHING;SHARP
DESCRIPTORS: SHARP
DESCRIPTORS: ACHING;SHARP
DESCRIPTORS: ACHING;JABBING

## 2025-01-31 ASSESSMENT — PAIN SCALES - WONG BAKER: WONGBAKER_NUMERICALRESPONSE: HURTS EVEN MORE

## 2025-01-31 ASSESSMENT — PAIN - FUNCTIONAL ASSESSMENT
PAIN_FUNCTIONAL_ASSESSMENT: PREVENTS OR INTERFERES SOME ACTIVE ACTIVITIES AND ADLS
PAIN_FUNCTIONAL_ASSESSMENT: PREVENTS OR INTERFERES WITH MANY ACTIVE NOT PASSIVE ACTIVITIES
PAIN_FUNCTIONAL_ASSESSMENT: PREVENTS OR INTERFERES SOME ACTIVE ACTIVITIES AND ADLS

## 2025-01-31 ASSESSMENT — PAIN DESCRIPTION - FREQUENCY
FREQUENCY: CONTINUOUS
FREQUENCY: CONTINUOUS

## 2025-01-31 ASSESSMENT — PAIN DESCRIPTION - ONSET: ONSET: ON-GOING

## 2025-01-31 NOTE — PROGRESS NOTES
Writer reached out to pharmacy staff and confirmed if the patient can have Voltaren gel if the patient is allergic to Aspirin. Per pharmacy staff, it's mostly unlikely that the patient is going to have a reaction since the ordered medication is a topical one, but staff advised to put a small amount first on the patient's skin to check if there's any allergic reaction.

## 2025-01-31 NOTE — PROGRESS NOTES
Patient working with PT/OT. Assisted up out of bed. HR increased into the 150's. Patient assisted back to bed.

## 2025-01-31 NOTE — PLAN OF CARE
Problem: Chronic Conditions and Co-morbidities  Goal: Patient's chronic conditions and co-morbidity symptoms are monitored and maintained or improved  1/31/2025 0130 by Laura Sherman RN  Outcome: Progressing  Flowsheets (Taken 1/30/2025 2000)  Care Plan - Patient's Chronic Conditions and Co-Morbidity Symptoms are Monitored and Maintained or Improved:   Monitor and assess patient's chronic conditions and comorbid symptoms for stability, deterioration, or improvement   Collaborate with multidisciplinary team to address chronic and comorbid conditions and prevent exacerbation or deterioration   Update acute care plan with appropriate goals if chronic or comorbid symptoms are exacerbated and prevent overall improvement and discharge     Problem: Discharge Planning  Goal: Discharge to home or other facility with appropriate resources  1/31/2025 0130 by Laura Sherman RN  Outcome: Progressing  Flowsheets (Taken 1/30/2025 2000)  Discharge to home or other facility with appropriate resources:   Identify barriers to discharge with patient and caregiver   Identify discharge learning needs (meds, wound care, etc)     Problem: Pain  Goal: Verbalizes/displays adequate comfort level or baseline comfort level  1/31/2025 0130 by Laura Sherman RN  Outcome: Progressing  Flowsheets (Taken 1/30/2025 1930)  Verbalizes/displays adequate comfort level or baseline comfort level:   Encourage patient to monitor pain and request assistance   Administer analgesics based on type and severity of pain and evaluate response   Assess pain using appropriate pain scale   Implement non-pharmacological measures as appropriate and evaluate response     Problem: Safety - Adult  Goal: Free from fall injury  1/31/2025 0130 by Laura Sherman RN  Outcome: Progressing  Note: Bed was locked and in lowest position. Call light within reach. Bed alarm on.     Problem: ABCDS Injury Assessment  Goal:

## 2025-01-31 NOTE — PROGRESS NOTES
Physical Therapy  [facility]   Physical Therapy Treatment  Date: 25  Patient Name: Hemanth Barrera       Room:   MRN: [id number]  Account: [id number]   : 1935  (89 y.o.) Gender: male     Discharge Recommendations:  Discharge Recommendations: Patient would benefit from continued therapy after discharge, Therapy recommended at discharge     PT Equipment Recommendations  Equipment Needed: No    General  Chart Reviewed: Yes  Response To Previous Treatment: Patient with no complaints from previous session.  Family/Caregiver Present: No  Follows Commands: Within Functional Limits    Past Medical History:  has a past medical history of Acute inferolateral myocardial infarction (HCC), Arthritis, Atrial fibrillation ([facility]), CAD (coronary artery disease), CHF (congestive heart failure) ([facility]), Glaucoma, Hx of blood clots, Hyperlipidemia, Hypertension, MI (myocardial infarction) ([facility]), and NSTEMI (non-ST elevated myocardial infarction) ([facility]).  Past Surgical History:   has a past surgical history that includes pacemaker placement; Abdomen surgery; Cardiac catheterization; Appendectomy; Colonoscopy; eye surgery; hernia repair; bone marrow biopsy; joint replacement; CT BIOPSY BONE MARROW (2022); Upper gastrointestinal endoscopy (N/A, 2023); Colonoscopy (N/A, 8/3/2023); Cardiac procedure (N/A, 2024); Cardiac procedure (N/A, 2024); Cardiac procedure (Right, 2025); and invasive vascular (N/A, 2025).    Restrictions  Restrictions/Precautions  Restrictions/Precautions: General Precautions, Fall Risk  Required Braces or Orthoses?: No  Implants Present? :  (R AGUSTO, L TKA)  Position Activity Restriction  Other Position/Activity Restrictions: L posterior 10th-11th rib fx s/p fall     Subjective  Subjective  Subjective: Pt lying in bed upon arrival, agreeable to therapy   General  General Comments: LEON Melchor approved therapy; co-treat with HORACE Srinivasan

## 2025-01-31 NOTE — PROGRESS NOTES
[facility]   Progress Note                   Date:   1/31/2025  Patient name: Hemanth Barrera  Date of admission:  1/20/2025  9:06 AM  MRN:   [id number]  YOB: 1935  PCP: Neftaly Contreras MD    Reason for Admission: Ileus (HCC) [K56.7]  Pleural effusion [J90]  Fall, initial encounter [W19.XXXA]  Traumatic closed displaced fracture of rib on left side, initial encounter [S22.32XA]  Closed fracture of multiple ribs of left side, initial encounter [S22.42XA]  Anemia, unspecified type [D64.9]    Subjective:       Clinical Changes / Abnormalities:Pt seen and examined in the room.  Patient resting in chair. Pt denies any cardiac CP or sob. Does complain of  left sided rib pain Labs, vitals and tele reviewed- patient's heart rate jumped up to 130's-140's while working with PT/OT and patient subsequently became short of breath and dizzy. His heart rate and dizziness improved with rest and his SOB resolved.     BP's soft   Medications:   Scheduled Meds:   acetaminophen  1,000 mg Oral 3 times per day    ferrous sulfate  325 mg Oral BID WC    lidocaine  1 patch TransDERmal Daily    [Held by provider] aspirin  81 mg Oral Daily    atorvastatin  40 mg Oral Nightly    bumetanide  2 mg Oral Daily    clopidogrel  75 mg Oral Daily    dilTIAZem  120 mg Oral Daily    finasteride  5 mg Oral Daily    isosorbide mononitrate  30 mg Oral Daily    metoprolol succinate  100 mg Oral Daily    pantoprazole  40 mg Oral QAM AC    [Held by provider] rivaroxaban  15 mg Oral Daily    vitamin D  50,000 Units Oral Weekly    sodium chloride flush  5-40 mL IntraVENous 2 times per day     Continuous Infusions:   heparin (PORCINE) Infusion 10 Units/kg/hr (01/31/25 0614)    sodium chloride      sodium chloride       CBC:   Recent Labs     01/29/25  1234 01/29/25  1849 01/30/25  0820 01/30/25  1428 01/30/25  1851 01/31/25  0530   WBC 3.7  --  3.1*  --   --  3.0*   HGB 8.7*   < > 7.3* 7.5* 7.5* 7.9*     --  186  --   --

## 2025-01-31 NOTE — CARE COORDINATION
DISCHARGE PLANNING NOTE:    LSW following for potential discharge to SNF.Patient accepted at [facility] per Gisselle in admissions. Insurance authorization to be submitted today.

## 2025-01-31 NOTE — PROGRESS NOTES
Patient refused compression with ace wraps on his left arm and prefers the left upper extremity to be elevated on a pillow.

## 2025-01-31 NOTE — PROGRESS NOTES
Occupational Therapy  [facility]   INPATIENT OCCUPATIONAL THERAPY  PROGRESS NOTE  Date: 2025  Patient Name: Hemanth Barrera       Room:   MRN: [id number]    : 1935  (89 y.o.)  Gender: male      Discharge Recommendations:  Further Occupational Therapy is recommended upon facility discharge.    OT Equipment Recommendations  Mobility Devices: ADL Assistive Devices  ADL Assistive Devices: Sock-Aid Hard, Reacher    Restrictions/Precautions  Restrictions/Precautions  Restrictions/Precautions: General Precautions;Fall Risk  Required Braces or Orthoses?: No  Implants Present? :  (R AGUSTO, L TKA)  Position Activity Restriction  Other Position/Activity Restrictions: L posterior 10th-11th rib fx s/p fall    Pulse: (!) 153  SpO2: 98 %  O2 Device: None (Room air)  BP: 111/75  MAP (Calculated): 87  BP Location: Right upper arm  Comment: HR increases to 140-153 after bed>chair transfer and then again after chair>bed transfer c HR improved to 70-80 bpm after 3 min RB.    Subjective  Subjective  Subjective: Pt in bed upon arrival. pt agreeable to sit up in chair.  Pain  Pre-Pain: 9  Post-Pain: 9  Pain Location: Left (rib cage)  Pain Descriptor: Sharp  Pain Interventions: Repositioning;Rest  Comments: Jessica RN oks tx and states that she would like pt up in chair. after pts HR increased to 140-153 after bed>chair transfer RN requests that pt be returned to bed at end of session. co-tx rio Monroe PTA    Objective  Orientation  Overall Orientation Status: Within Functional Limits  Orientation Level: Oriented X4  Cognition  Overall Cognitive Status: Exceptions  Arousal/Alertness: Appears intact  Following Commands: Follows multistep commands with increased time;Follows multistep commands with repitition  Attention Span: Appears intact  Memory: Appears intact  Safety Judgement: Appears intact  Problem Solving: Assistance required to implement solutions  Insights: Appears intact  Initiation:

## 2025-01-31 NOTE — PROGRESS NOTES
[facility]  IN-PATIENT SERVICE  [facility]    PROGRESS NOTE             1/31/2025    7:59 AM    Name:   Hemanth Barrera  MRN:     [id number]     Acct:      [id number]   Room:   2013/2013-01  IP Day:  11  Admit Date:  1/20/2025  9:06 AM    PCP:  Neftaly Contreras MD  Code Status:  Full Code    Subjective:     C/C:   Chief Complaint   Patient presents with    Fall    Rib Pain (injury)     Interval History:    Vitally stable.  On room air  Hemoglobin is 7 and 7.1 today  Chest x-ray no significant interval change.   Patient had severe chest pain 9/10  Kidney function WNL  CRP 20.4  Pleural fluid positive for gram-positive cocci in clusters; had a dose of vancomycin; ID on board and recommendation  Neutrophils count 80%, lymphocyte count 6%, monocyte 14%  Per PM&R physiatrist Dr. Lawrence Jamison, patient appropriate for Acute Inpatient Rehabilitation level of care. Waiting authorization      Brief History:     The patient is a 89 y.o. male who slipped and fell 01/19/2025 and landed on his left side, after which severe pain in his left side started to bother him. Denies loss of consciousness, numbness, weakness, headache, neck pain, back pain.     PMH: coronary artery disease with stent placement, afib, HFpEF with pacemaker, CKD stage IIIb, chronic anemia, abdominal hernia status post repair.     In the ER, he was stable. Hb 6.6 -- received RBC transfusion. CT head and C-spine showed no critical findings. CT chest whowed a large left pleural effusion with compressive atelectasis, segmented fractures of the left post erior 10th and 11th ribs. CT abdomen and pelvis revealed findings suggestive of ileus/obstruction.     Admitted for management of his multiple health issues.    2 units of RBCs transfused, Hb stabilized: 8.2. Evaluated by general surgery and pulmonology.     Medications:     Allergies:    Allergies   Allergen Reactions    Aspirin Other (See Comments)     Pt told not  fracture of the vertebral bodies..     1. Large left pleural effusion with compressive atelectasis. 2. Segmented fractures of the left posterior 10th and 11th ribs with a moderate subcutaneous hematoma and equivocal tiny amount of subcutaneous gas. 3. Fluid-filled dilated small bowel and colon with stool throughout the colon and rectum. Findings may represent ileus/obstruction or constipation. 4. Extensive degenerative changes of the thoracic and lumbar spine and postoperative changes of the lumbar spine and right hip.     CT CERVICAL SPINE WO CONTRAST    Result Date: 1/20/2025  EXAMINATION: CT OF THE CERVICAL SPINE WITHOUT CONTRAST 1/20/2025 10:02 am TECHNIQUE: CT of the cervical spine was performed without the administration of intravenous contrast. Multiplanar reformatted images are provided for review. Automated exposure control, iterative reconstruction, and/or weight based adjustment of the mA/kV was utilized to reduce the radiation dose to as low as reasonably achievable. COMPARISON: CT cervical spine performed 04/18/2024. HISTORY: ORDERING SYSTEM PROVIDED HISTORY: Fall, pain TECHNOLOGIST PROVIDED HISTORY: Fall, pain Decision Support Exception - unselect if not a suspected or confirmed emergency medical condition->Emergency Medical Condition (MA) Reason for Exam: pt fell FINDINGS: BONES/ALIGNMENT: The vertebral body heights are maintained.  The facet joints are aligned.  There is no acute fracture or malalignment.  There is no spondylolisthesis. DEGENERATIVE CHANGES: There is multilevel degenerative disc disease with uncovertebral and facet hypertrophy.  There is diffuse disc space narrowing. There is no significant bony canal stenosis.  There is bilateral bony foraminal narrowing. SOFT TISSUES: There is no prevertebral soft tissue swelling.     No acute fracture or malalignment of the cervical spine. Multilevel degenerative change with bilateral bony foraminal narrowing.     CT HEAD WO CONTRAST    Result

## 2025-01-31 NOTE — PROGRESS NOTES
Writer reached out to SID Rausch if the patient can have a topical pain medication to help lessen the pain on the patient's left arm.    Order for Voltaren gel entered.

## 2025-01-31 NOTE — PLAN OF CARE
Problem: Chronic Conditions and Co-morbidities  Goal: Patient's chronic conditions and co-morbidity symptoms are monitored and maintained or improved  Outcome: Progressing  Flowsheets (Taken 1/31/2025 0800)  Care Plan - Patient's Chronic Conditions and Co-Morbidity Symptoms are Monitored and Maintained or Improved:   Monitor and assess patient's chronic conditions and comorbid symptoms for stability, deterioration, or improvement   Collaborate with multidisciplinary team to address chronic and comorbid conditions and prevent exacerbation or deterioration   Update acute care plan with appropriate goals if chronic or comorbid symptoms are exacerbated and prevent overall improvement and discharge     Problem: Discharge Planning  Goal: Discharge to home or other facility with appropriate resources  Outcome: Progressing  Flowsheets (Taken 1/31/2025 0800)  Discharge to home or other facility with appropriate resources:   Identify barriers to discharge with patient and caregiver   Arrange for needed discharge resources and transportation as appropriate   Refer to discharge planning if patient needs post-hospital services based on physician order or complex needs related to functional status, cognitive ability or social support system     Problem: Pain  Goal: Verbalizes/displays adequate comfort level or baseline comfort level  Outcome: Progressing     Problem: Safety - Adult  Goal: Free from fall injury  Outcome: Progressing     Problem: ABCDS Injury Assessment  Goal: Absence of physical injury  Outcome: Progressing  Flowsheets (Taken 1/31/2025 0800)  Absence of Physical Injury: Implement safety measures based on patient assessment     Problem: Nutrition Deficit:  Goal: Optimize nutritional status  Outcome: Progressing     Problem: Skin/Tissue Integrity  Goal: Absence of new skin breakdown  Description: 1.  Monitor for areas of redness and/or skin breakdown  2.  Assess vascular access sites hourly  3.  Every 4-6 hours  body part  Outcome: Progressing     Problem: Gastrointestinal - Adult  Goal: Maintains or returns to baseline bowel function  Outcome: Progressing  Goal: Maintains adequate nutritional intake  Outcome: Progressing     Problem: Genitourinary - Adult  Goal: Absence of urinary retention  Outcome: Progressing  Flowsheets (Taken 1/31/2025 0800)  Absence of urinary retention:   Assess patient’s ability to void and empty bladder   Monitor intake/output and perform bladder scan as needed     Problem: Hematologic - Adult  Goal: Maintains hematologic stability  Outcome: Progressing

## 2025-01-31 NOTE — PROGRESS NOTES
[facility] PULMONARY,CRITICAL CARE & SLEEP   Hi Morocho MD/Choco BEJARANO AGACNP-BC, NP-C      Janet BEJARANO NP-C    Hemanth BEJARANO NP-C                                         Pulmonary Progress Note    Patient - Hemanth Barrera   Age - 89 y.o.   - 1935  MRN - [id number]  Acct # - [id number]  Date of Admission - 2025  9:06 AM    Consulting Service/Physician:       Primary Care Physician: Neftaly Contreras MD    SUBJECTIVE:     Chief Complaint:   Chief Complaint   Patient presents with    Fall    Rib Pain (injury)     Subjective:    He was sleeping during my exam, looks fairly comfortable.  No acute issues noted.  He has been on room air without any desaturations.  He continues on heparin infusion.  Hemoglobin 7.9 this morning.  Plavix was resumed yesterday.    VITALS  BP (!) 104/94   Pulse 73   Temp 99 °F (37.2 °C) (Oral)   Resp 18   Ht 1.854 m (6' 1\")   Wt 74 kg (163 lb 2.3 oz)   SpO2 93%   BMI 21.52 kg/m²   Wt Readings from Last 3 Encounters:   25 74 kg (163 lb 2.3 oz)   25 73.1 kg (161 lb 2.5 oz)   25 63.5 kg (140 lb)     I/O (24 Hours)    Intake/Output Summary (Last 24 hours) at 2025 1443  Last data filed at 2025 0900  Gross per 24 hour   Intake 1287.01 ml   Output 700 ml   Net 587.01 ml     Ventilator:      Exam:   Physical Exam   Constitutional: Elderly thin male, sleeping, looks comfortable  HENT: Unremarkable,  Head: Normocephalic and atraumatic.   Eyes: EOM are normal. Pupils are equal, round, and reactive to light.   Neck: Neck supple.   Cardiovascular:  Regular rate and rhythm.  Normal heart tones.  No JVD.    Pulmonary/Chest: Lung sounds fairly clear, not in any distress on room air, pulse ox 95 to 96% while sleeping  Abdominal: Soft. Bowel sounds are present  Musculoskeletal: Did not assess while sleeping  Neurological:patient is sleeping, I did not awaken  Skin: Skin is warm and dry.  Left upper  clopidogrel (PLAVIX) tablet 75 mg, 75 mg, Oral, Daily, Vaishali Lucio APRN - CNP, 75 mg at 01/31/25 0853    dilTIAZem (CARDIZEM CD) extended release capsule 120 mg, 120 mg, Oral, Daily, Jose Ring MD, 120 mg at 01/31/25 0852    finasteride (PROSCAR) tablet 5 mg, 5 mg, Oral, Daily, Jose Ring MD, 5 mg at 01/31/25 0852    isosorbide mononitrate (IMDUR) extended release tablet 30 mg, 30 mg, Oral, Daily, Jose Ring MD, 30 mg at 01/31/25 0852    metoprolol succinate (TOPROL XL) extended release tablet 100 mg, 100 mg, Oral, Daily, Jose Ring MD, 100 mg at 01/31/25 0853    nitroGLYCERIN (NITROSTAT) SL tablet 0.4 mg, 0.4 mg, SubLINGual, Q5 Min PRN, Jose Ring MD    pantoprazole (PROTONIX) tablet 40 mg, 40 mg, Oral, QAM AC, Jose Ring MD, 40 mg at 01/31/25 0545    [Held by provider] rivaroxaban (XARELTO) tablet 15 mg, 15 mg, Oral, Daily, Jose Ring MD    vitamin D (ERGOCALCIFEROL) capsule 50,000 Units, 50,000 Units, Oral, Weekly, Jose Ring MD, 50,000 Units at 01/27/25 1712    sodium chloride flush 0.9 % injection 5-40 mL, 5-40 mL, IntraVENous, 2 times per day, Jose Ring MD, 10 mL at 01/30/25 2013    sodium chloride flush 0.9 % injection 5-40 mL, 5-40 mL, IntraVENous, PRN, Jose Ring MD    0.9 % sodium chloride infusion, , IntraVENous, PRN, Jose Ring MD    potassium chloride (KLOR-CON M) extended release tablet 40 mEq, 40 mEq, Oral, PRN **OR** potassium bicarb-citric acid (EFFER-K) effervescent tablet 40 mEq, 40 mEq, Oral, PRN **OR** potassium chloride 10 mEq/100 mL IVPB (Peripheral Line), 10 mEq, IntraVENous, PRN, Jose Ring MD    magnesium sulfate 2000 mg in water 50 mL IVPB, 2,000 mg, IntraVENous, PRN, Jose Ring MD    polyethylene glycol (GLYCOLAX) packet 17 g, 17 g, Oral, Daily PRN, Jose Ring MD, 17 g at 01/28/25 1114    0.9 % sodium chloride infusion, , IntraVENous, PRN, Kaitlin Dsouza MD    Lab Results:     Lab Results

## 2025-01-31 NOTE — PROGRESS NOTES
[facility] General Surgery   Lebron Roman MD, FACS  Molly Woodruff, APRN-CNP  [address], [address]  [place] [address]  P: [phone], F: [phone]    General and Robotic Surgery  Progress Note             PATIENT NAME: Hemanth Barrera   :  1935   MRN: [id number]   PCP:  Neftaly Contreras MD     TODAY'S DATE: 2025    89 y.o. male seen and examined at bedside in intermediate ICU earlier today.  Patient continued to complain of left-sided arm pain.  Not much of an appetite.  Has had a bowel movement, passing gas.  No fever or chills.    PAST MEDICAL HISTORY     Past Medical History:   Diagnosis Date    Acute inferolateral myocardial infarction (HCC) 2021    Arthritis     Atrial fibrillation (HCC)     CAD (coronary artery disease)     CHF (congestive heart failure) (HCC)     Glaucoma     Hx of blood clots     with hernia surgery    Hyperlipidemia     Hypertension     MI (myocardial infarction) (HCC)     NSTEMI (non-ST elevated myocardial infarction) (HCC) 2021       PROBLEM LIST     Patient Active Problem List   Diagnosis    On continuous oral anticoagulation    CHF, acute on chronic (HCC)    Hyperlipidemia    Former smoker    Pacemaker    History of DVT of lower extremity    Enlarged prostate    Cataract of both eyes    GERD (gastroesophageal reflux disease)    History of inguinal hernia repair    Hypertension    Pneumonia    History of right hip replacement    History of gastric surgery    Low back pain    Chest pain    Fluid overload    VARSHA (acute kidney injury) (HCC)    History of acute myocardial infarction    Chronic CHF (congestive heart failure) (HCC)    Unstable angina (HCC)    Acute on chronic diastolic congestive heart failure (HCC)    SBO (small bowel obstruction) (HCC)    Stage 3b chronic kidney disease (HCC)    Iron deficiency    Chronic systolic (congestive) heart failure    Chronic anemia    Bradycardia    Class 1 obesity    Degeneration of  time component.      Please note that portions of this note were completed with Dragon dictation, the computer voice recognition software.  Quite often unanticipated grammatical, syntax, homophones, and other interpretive errors are inadvertently transcribed by the computer software.  Please disregard these errors and any other errors that may have escaped final proofreading.     Electronically signed by DARCI Sanders CNP  on 1/31/2025 at 3:22 PM

## 2025-02-01 LAB
ANION GAP SERPL CALCULATED.3IONS-SCNC: 7 MMOL/L (ref 9–16)
ANTI-XA UNFRAC HEPARIN: 0.31 IU/L (ref 0.3–0.7)
BUN SERPL-MCNC: 16 MG/DL (ref 8–23)
CALCIUM SERPL-MCNC: 7.9 MG/DL (ref 8.6–10.4)
CHLORIDE SERPL-SCNC: 102 MMOL/L (ref 98–107)
CO2 SERPL-SCNC: 25 MMOL/L (ref 20–31)
CREAT SERPL-MCNC: 0.8 MG/DL (ref 0.7–1.2)
ERYTHROCYTE [DISTWIDTH] IN BLOOD BY AUTOMATED COUNT: 16.8 % (ref 11.5–14.9)
GFR, ESTIMATED: 84 ML/MIN/1.73M2
GLUCOSE SERPL-MCNC: 97 MG/DL (ref 74–99)
HCT VFR BLD AUTO: 24.8 % (ref 41–53)
HCT VFR BLD AUTO: 27.5 % (ref 41–53)
HGB BLD-MCNC: 8.1 G/DL (ref 13.5–17.5)
HGB BLD-MCNC: 8.8 G/DL (ref 13.5–17.5)
MCH RBC QN AUTO: 30.7 PG (ref 26–34)
MCHC RBC AUTO-ENTMCNC: 32.6 G/DL (ref 31–37)
MCV RBC AUTO: 94.2 FL (ref 80–100)
PLATELET # BLD AUTO: 199 K/UL (ref 150–450)
PMV BLD AUTO: 6.8 FL (ref 6–12)
POTASSIUM SERPL-SCNC: 4.5 MMOL/L (ref 3.7–5.3)
RBC # BLD AUTO: 2.64 M/UL (ref 4.5–5.9)
SODIUM SERPL-SCNC: 134 MMOL/L (ref 136–145)
WBC OTHER # BLD: 3.1 K/UL (ref 3.5–11)

## 2025-02-01 PROCEDURE — 85014 HEMATOCRIT: CPT

## 2025-02-01 PROCEDURE — 94761 N-INVAS EAR/PLS OXIMETRY MLT: CPT

## 2025-02-01 PROCEDURE — 99232 SBSQ HOSP IP/OBS MODERATE 35: CPT | Performed by: INTERNAL MEDICINE

## 2025-02-01 PROCEDURE — 80048 BASIC METABOLIC PNL TOTAL CA: CPT

## 2025-02-01 PROCEDURE — 99233 SBSQ HOSP IP/OBS HIGH 50: CPT | Performed by: INTERNAL MEDICINE

## 2025-02-01 PROCEDURE — 85027 COMPLETE CBC AUTOMATED: CPT

## 2025-02-01 PROCEDURE — 2060000000 HC ICU INTERMEDIATE R&B

## 2025-02-01 PROCEDURE — 6370000000 HC RX 637 (ALT 250 FOR IP): Performed by: NURSE PRACTITIONER

## 2025-02-01 PROCEDURE — 6370000000 HC RX 637 (ALT 250 FOR IP): Performed by: INTERNAL MEDICINE

## 2025-02-01 PROCEDURE — G0545 PR INHERENT VISIT TO INPT: HCPCS | Performed by: INTERNAL MEDICINE

## 2025-02-01 PROCEDURE — 85018 HEMOGLOBIN: CPT

## 2025-02-01 PROCEDURE — 2500000003 HC RX 250 WO HCPCS

## 2025-02-01 PROCEDURE — 6370000000 HC RX 637 (ALT 250 FOR IP)

## 2025-02-01 PROCEDURE — 36415 COLL VENOUS BLD VENIPUNCTURE: CPT

## 2025-02-01 PROCEDURE — 85520 HEPARIN ASSAY: CPT

## 2025-02-01 PROCEDURE — 99232 SBSQ HOSP IP/OBS MODERATE 35: CPT

## 2025-02-01 RX ORDER — OXYCODONE HYDROCHLORIDE 5 MG/1
5 TABLET ORAL ONCE
Status: COMPLETED | OUTPATIENT
Start: 2025-02-02 | End: 2025-02-02

## 2025-02-01 RX ADMIN — PANTOPRAZOLE SODIUM 40 MG: 40 TABLET, DELAYED RELEASE ORAL at 06:17

## 2025-02-01 RX ADMIN — MIDODRINE HYDROCHLORIDE 2.5 MG: 2.5 TABLET ORAL at 12:37

## 2025-02-01 RX ADMIN — FINASTERIDE 5 MG: 5 TABLET, FILM COATED ORAL at 09:12

## 2025-02-01 RX ADMIN — RIVAROXABAN 15 MG: 15 TABLET, FILM COATED ORAL at 17:58

## 2025-02-01 RX ADMIN — OXYCODONE 7.5 MG: 5 TABLET ORAL at 14:34

## 2025-02-01 RX ADMIN — ATORVASTATIN CALCIUM 40 MG: 40 TABLET, FILM COATED ORAL at 21:05

## 2025-02-01 RX ADMIN — SODIUM CHLORIDE, PRESERVATIVE FREE 10 ML: 5 INJECTION INTRAVENOUS at 21:06

## 2025-02-01 RX ADMIN — FERROUS SULFATE TAB 325 MG (65 MG ELEMENTAL FE) 325 MG: 325 (65 FE) TAB at 09:18

## 2025-02-01 RX ADMIN — ACETAMINOPHEN 1000 MG: 500 TABLET, FILM COATED ORAL at 21:05

## 2025-02-01 RX ADMIN — FERROUS SULFATE TAB 325 MG (65 MG ELEMENTAL FE) 325 MG: 325 (65 FE) TAB at 17:59

## 2025-02-01 RX ADMIN — ISOSORBIDE MONONITRATE 30 MG: 30 TABLET, EXTENDED RELEASE ORAL at 09:12

## 2025-02-01 RX ADMIN — OXYCODONE 7.5 MG: 5 TABLET ORAL at 00:20

## 2025-02-01 RX ADMIN — OXYCODONE 7.5 MG: 5 TABLET ORAL at 04:14

## 2025-02-01 RX ADMIN — METOPROLOL SUCCINATE 100 MG: 50 TABLET, EXTENDED RELEASE ORAL at 09:11

## 2025-02-01 RX ADMIN — DILTIAZEM HYDROCHLORIDE 120 MG: 120 CAPSULE, COATED, EXTENDED RELEASE ORAL at 09:12

## 2025-02-01 RX ADMIN — MIDODRINE HYDROCHLORIDE 2.5 MG: 2.5 TABLET ORAL at 17:58

## 2025-02-01 RX ADMIN — ACETAMINOPHEN 1000 MG: 500 TABLET, FILM COATED ORAL at 14:34

## 2025-02-01 RX ADMIN — BUMETANIDE 2 MG: 1 TABLET ORAL at 09:11

## 2025-02-01 RX ADMIN — ACETAMINOPHEN 1000 MG: 500 TABLET, FILM COATED ORAL at 06:17

## 2025-02-01 RX ADMIN — OXYCODONE 7.5 MG: 5 TABLET ORAL at 09:18

## 2025-02-01 RX ADMIN — CLOPIDOGREL BISULFATE 75 MG: 75 TABLET ORAL at 09:12

## 2025-02-01 ASSESSMENT — PAIN DESCRIPTION - ORIENTATION
ORIENTATION: LEFT

## 2025-02-01 ASSESSMENT — PAIN SCALES - GENERAL
PAINLEVEL_OUTOF10: 9
PAINLEVEL_OUTOF10: 4
PAINLEVEL_OUTOF10: 9
PAINLEVEL_OUTOF10: 4
PAINLEVEL_OUTOF10: 6
PAINLEVEL_OUTOF10: 9
PAINLEVEL_OUTOF10: 9
PAINLEVEL_OUTOF10: 6
PAINLEVEL_OUTOF10: 5

## 2025-02-01 ASSESSMENT — PAIN DESCRIPTION - PAIN TYPE
TYPE: ACUTE PAIN
TYPE: ACUTE PAIN

## 2025-02-01 ASSESSMENT — PAIN DESCRIPTION - DESCRIPTORS
DESCRIPTORS: SHARP
DESCRIPTORS: SHARP
DESCRIPTORS: ACHING;DISCOMFORT
DESCRIPTORS: SHARP
DESCRIPTORS: ACHING;DISCOMFORT

## 2025-02-01 ASSESSMENT — PAIN DESCRIPTION - FREQUENCY
FREQUENCY: INTERMITTENT
FREQUENCY: INTERMITTENT

## 2025-02-01 ASSESSMENT — PAIN DESCRIPTION - LOCATION
LOCATION: RIB CAGE
LOCATION: RIB CAGE
LOCATION: HAND
LOCATION: RIB CAGE
LOCATION: RIB CAGE
LOCATION: HAND
LOCATION: RIB CAGE

## 2025-02-01 ASSESSMENT — PAIN DESCRIPTION - ONSET
ONSET: ON-GOING
ONSET: ON-GOING

## 2025-02-01 NOTE — PLAN OF CARE
Problem: Chronic Conditions and Co-morbidities  Goal: Patient's chronic conditions and co-morbidity symptoms are monitored and maintained or improved  2/1/2025 0316 by Cordell Roberts RN  Outcome: Progressing  1/31/2025 1549 by Jill Alaniz RN  Outcome: Progressing  Flowsheets (Taken 1/31/2025 0800)  Care Plan - Patient's Chronic Conditions and Co-Morbidity Symptoms are Monitored and Maintained or Improved:   Monitor and assess patient's chronic conditions and comorbid symptoms for stability, deterioration, or improvement   Collaborate with multidisciplinary team to address chronic and comorbid conditions and prevent exacerbation or deterioration   Update acute care plan with appropriate goals if chronic or comorbid symptoms are exacerbated and prevent overall improvement and discharge     Problem: Discharge Planning  Goal: Discharge to home or other facility with appropriate resources  2/1/2025 0316 by Cordell Roberts RN  Outcome: Progressing  1/31/2025 1549 by Jill Alaniz RN  Outcome: Progressing  Flowsheets (Taken 1/31/2025 0800)  Discharge to home or other facility with appropriate resources:   Identify barriers to discharge with patient and caregiver   Arrange for needed discharge resources and transportation as appropriate   Refer to discharge planning if patient needs post-hospital services based on physician order or complex needs related to functional status, cognitive ability or social support system     Problem: Pain  Goal: Verbalizes/displays adequate comfort level or baseline comfort level  2/1/2025 0316 by Cordell Roberts RN  Outcome: Progressing  1/31/2025 1549 by Jill Alaniz RN  Outcome: Progressing     Problem: Safety - Adult  Goal: Free from fall injury  2/1/2025 0316 by Cordell Roberts RN  Outcome: Progressing  1/31/2025 1549 by Jill Alaniz RN  Outcome: Progressing     Problem: ABCDS Injury Assessment  Goal: Absence of physical injury  2/1/2025 0316 by Cordell Roberts RN  Outcome:

## 2025-02-01 NOTE — PROGRESS NOTES
Pt remains with wound to coccyx. Bandage removed, area cleansed with soap/water. Zinc paste applied. Area bleeding minutely. Pt has been eating but meals are very small (oatmeal for breakfast, soup for lunch). He is very weak and was unable to turn sufficiently when bed bath given. Urine output has been sufficient. Encouraged nutrition with pt to maintain skin integrity. BLE very dry and flaky. No other skin issues noted when pt was bathed. He is being turned regularly to maintain skin health.

## 2025-02-01 NOTE — PLAN OF CARE
LEON Sampson  Outcome: Progressing  Note: No N/V     Problem: Genitourinary - Adult  Goal: Absence of urinary retention  2/1/2025 1502 by Camilla Cleveland RN  Outcome: Progressing     Problem: Hematologic - Adult  Goal: Maintains hematologic stability  2/1/2025 1502 by Camilla Cleveland RN  Outcome: Progressing     Problem: Neurosensory - Adult  Goal: Achieves stable or improved neurological status  2/1/2025 1502 by Camilla Cleveland RN  Outcome: Progressing     Problem: Infection - Adult  Goal: Absence of infection at discharge  2/1/2025 1502 by Camilla Cleveland RN  Outcome: Progressing     Problem: Metabolic/Fluid and Electrolytes - Adult  Goal: Electrolytes maintained within normal limits  2/1/2025 1502 by Camilla Cleveland RN  Outcome: Progressing     Problem: Pain  Goal: Verbalizes/displays adequate comfort level or baseline comfort level  2/1/2025 1502 by Camilla Cleveland RN  Outcome: Not Progressing  Flowsheets (Taken 2/1/2025 1502)  Verbalizes/displays adequate comfort level or baseline comfort level: Assess pain using appropriate pain scale  Note: PRN pain meds given regularly  2/1/2025 0316 by Cordell Roberts RN  Outcome: Progressing     Problem: Nutrition Deficit:  Goal: Optimize nutritional status  2/1/2025 1502 by Camilla Cleveland RN  Outcome: Not Progressing  Flowsheets (Taken 2/1/2025 1502)  Nutrient intake appropriate for improving, restoring, or maintaining nutritional needs: Monitor oral intake, labs, and treatment plans  Note: Pt eating minimal amount of food  2/1/2025 0316 by Cordell Roberts RN  Outcome: Progressing     Problem: Skin/Tissue Integrity  Goal: Absence of new skin breakdown  Description: 1.  Monitor for areas of redness and/or skin breakdown  2.  Assess vascular access sites hourly  3.  Every 4-6 hours minimum:  Change oxygen saturation probe site  4.  Every 4-6 hours:  If on nasal continuous positive airway pressure, respiratory therapy assess nares and determine need for  appliance change or resting period.  2/1/2025 1502 by Camilla Cleveland RN  Outcome: Not Progressing  Note: Skin remains problematic on his coccyx  2/1/2025 0316 by Cordell Roberts RN  Outcome: Progressing     Problem: Skin/Tissue Integrity - Adult  Goal: Skin integrity remains intact  2/1/2025 1502 by Camilla Cleveland RN  Outcome: Not Progressing  Flowsheets  Taken 2/1/2025 1502  Skin Integrity Remains Intact: Monitor for areas of redness and/or skin breakdown  Taken 2/1/2025 1025  Skin Integrity Remains Intact: Monitor for areas of redness and/or skin breakdown  2/1/2025 0316 by Crodell Roberts RN  Outcome: Progressing     Problem: Musculoskeletal - Adult  Goal: Return mobility to safest level of function  2/1/2025 1502 by Camilla Cleveland RN  Outcome: Not Progressing  Note: Pt very weak  2/1/2025 0316 by Cordell Roberts RN  Outcome: Progressing     Problem: Gastrointestinal - Adult  Goal: Maintains or returns to baseline bowel function  2/1/2025 1502 by Camilla Cleveland RN  Outcome: Not Progressing  Note: Stool remains loose  2/1/2025 0316 by Cordell Roberts RN  Outcome: Progressing  Goal: Maintains adequate nutritional intake  2/1/2025 1502 by Camilla Cleveland RN  Outcome: Not Progressing  Note: See above  2/1/2025 0316 by Cordell Roberts RN  Outcome: Progressing

## 2025-02-01 NOTE — PROGRESS NOTES
Laboratory hours  ---------------------------------------------------  Monday through Thursday 7:30am to 7:30pm    Friday 7:30am to 5:00pm    Saturday/Sunday 8:00am to 7:45pm ([facility] only - [place] and [place])      NO fasting    No appointment is needed.     [facility]   Progress Note                   Date:   2/1/2025  Patient name: Hemanth Barrera  Date of admission:  1/20/2025  9:06 AM  MRN:   [id number]  YOB: 1935  PCP: Neftaly Contreras MD    Reason for Admission: Ileus (HCC) [K56.7]  Pleural effusion [J90]  Fall, initial encounter [W19.XXXA]  Traumatic closed displaced fracture of rib on left side, initial encounter [S22.32XA]  Closed fracture of multiple ribs of left side, initial encounter [S22.42XA]  Anemia, unspecified type [D64.9]    Subjective:       Clinical Changes / Abnormalities:  No chest pain  No shortness of breath  No lower extremity edema  No syncope    Medications:   Scheduled Meds:   midodrine  2.5 mg Oral TID WC    acetaminophen  1,000 mg Oral 3 times per day    ferrous sulfate  325 mg Oral BID WC    lidocaine  1 patch TransDERmal Daily    [Held by provider] aspirin  81 mg Oral Daily    atorvastatin  40 mg Oral Nightly    bumetanide  2 mg Oral Daily    clopidogrel  75 mg Oral Daily    dilTIAZem  120 mg Oral Daily    finasteride  5 mg Oral Daily    isosorbide mononitrate  30 mg Oral Daily    metoprolol succinate  100 mg Oral Daily    pantoprazole  40 mg Oral QAM AC    [Held by provider] rivaroxaban  15 mg Oral Daily    vitamin D  50,000 Units Oral Weekly    sodium chloride flush  5-40 mL IntraVENous 2 times per day     Continuous Infusions:   heparin (PORCINE) Infusion 10 Units/kg/hr (02/01/25 0652)    sodium chloride      sodium chloride       CBC:   Recent Labs     01/30/25  0820 01/30/25  1428 01/31/25  0530 01/31/25  2309 02/01/25  0423   WBC 3.1*  --  3.0*  --  3.1*   HGB 7.3*   < > 7.9* 8.1* 8.1*     --  203  --  199    < > = values in this interval not displayed.     BMP:    Recent Labs     01/30/25  0820 01/31/25  0700 02/01/25  0423   * 134* 134*   K 4.4 4.4 4.5    105 102   CO2 24 21 25   BUN 17 15 16   CREATININE 0.8 0.7 0.8   GLUCOSE 103* 92 97     Hepatic: No results for input(s):

## 2025-02-01 NOTE — PROGRESS NOTES
[facility] -   Infectious diseases evaluation  admission date 1/20/2025    reason for consultation:   Gram-positive cocci in clusters on pleural fluid culture    Impression :   Current:  Rib fractures with hemothorax to the left side  Status post thoracentesis 1/21/2025 Staph epi/staph capitis growth on culture likely contamination.  Status post thoracentesis 1/28/2025  Pulmonary embolism and DVTs  Thoracic aorta thrombus  Anemia  Mechanical fall  Coronary artery disease status post stent placement  Pacemaker  Chronic kidney disease stage IIIb  Chronic anemia  Abdominal hernia s/p repair  A-fib    HENCE:   Monitor off antibiotics.  The patient  on heparin drip  Follow CBC       Infection Control Recommendations   [place] Precautions      Antimicrobial Stewardship Recommendations   Simplification of therapy  Targeted therapy      History of Present Illness:   Initial history:  Hemanth Barrera is a 89 y.o.-year-old male presented to the hospital after he fell on 1/19/2025, landed on his left side, complaining of pain to the left side chest pain.  The patient was on aspirin, Plavix and Xarelto.  At the ER hemoglobin was 6.6 received blood transfusion  CT head and C-spine unremarkable.  CT chest showed large left pleural effusion suspected hemothorax with atelectasis, fractures of the 10th and 11th ribs with moderate subcutaneous hematoma and tiny amount of subcutaneous gas .  CT abdomen and pelvis suggestive of constipation, ileus versus obstruction  Status post thoracentesis 1/21/2025 showed 1, 875 WBC, 1, 386, 211 RBCs, 4.3 of protein, 414 LD, gram-positive cocci in clusters staph epi on culture      Interval changes  2/1/2025  He is afebrile, transferred to progressive care unit, still complaining of left pleuritic pain, room air, left arm swelling improving, no new events.  He remains on heparin  CT abdomen pelvis 1/30/2025 reviewed showed no hematoma, trace right

## 2025-02-01 NOTE — PROGRESS NOTES
[facility] -   Infectious diseases evaluation  admission date 1/20/2025    reason for consultation:   Gram-positive cocci in clusters on pleural fluid culture    Impression :   Current:  Rib fractures with hemothorax to the left side  Status post thoracentesis 1/21/2025 Staph epi/staph capitis growth on culture likely contamination.  Status post thoracentesis 1/28/2025  Pulmonary embolism and DVTs  Thoracic aorta thrombus  Anemia  Mechanical fall  Coronary artery disease status post stent placement  Pacemaker  Chronic kidney disease stage IIIb  Chronic anemia  Abdominal hernia s/p repair  A-fib    HENCE:   Monitor off antibiotics.  The patient  on heparin drip  Follow CBC       Infection Control Recommendations   [place] Precautions      Antimicrobial Stewardship Recommendations   Simplification of therapy  Targeted therapy      History of Present Illness:   Initial history:  Hemanth Barrera is a 89 y.o.-year-old male presented to the hospital after he fell on 1/19/2025, landed on his left side, complaining of pain to the left side chest pain.  The patient was on aspirin, Plavix and Xarelto.  At the ER hemoglobin was 6.6 received blood transfusion  CT head and C-spine unremarkable.  CT chest showed large left pleural effusion suspected hemothorax with atelectasis, fractures of the 10th and 11th ribs with moderate subcutaneous hematoma and tiny amount of subcutaneous gas .  CT abdomen and pelvis suggestive of constipation, ileus versus obstruction  Status post thoracentesis 1/21/2025 showed 1, 875 WBC, 1, 386, 211 RBCs, 4.3 of protein, 414 LD, gram-positive cocci in clusters staph epi on culture      Interval changes  1/31/2025  He was seen and examined, still complaining of left arm swelling and pain, left pleuritic chest pain.  CT abdomen pelvis 1/30/2025 reviewed showed no hematoma, trace right and small left pleural effusion with atelectasis/consolidation  CT  Laterality Date    ABDOMEN SURGERY      gastric resection    APPENDECTOMY      BONE MARROW BIOPSY      CARDIAC CATHETERIZATION      CARDIAC PROCEDURE N/A 9/17/2024    [id number] / Left heart cath / coronary angiography / rm 512 performed by Cathie Hastings MD at [facility] CARDIAC CATH LAB    CARDIAC PROCEDURE N/A 9/17/2024    Percutaneous coronary intervention performed by Cathie Hastings MD at [facility] CARDIAC CATH LAB    CARDIAC PROCEDURE Right 1/9/2025    Left heart cath / coronary angiography performed by Guy Paz MD at [facility] CARDIAC CATH LAB    COLONOSCOPY      COLONOSCOPY N/A 8/3/2023    COLONOSCOPY WITH BIOPSY performed by Isauro Razo MD at [facility] ENDO    CT BIOPSY BONE MARROW  5/31/2022    CT BONE MARROW BIOPSY 5/31/2022 [facility] CT SCAN    EYE SURGERY      smiley cataracts    HERNIA REPAIR      inguinal smiley    INVASIVE VASCULAR N/A 1/9/2025    Ultrasound guided vascular access performed by Guy Paz MD at [facility] CARDIAC CATH LAB    JOINT REPLACEMENT      rt hip x 2, lt knee    PACEMAKER PLACEMENT      UPPER GASTROINTESTINAL ENDOSCOPY N/A 8/2/2023    EGD BIOPSY performed by Isauro Razo MD at [facility] ENDO       Medications:      midodrine  2.5 mg Oral TID WC    acetaminophen  1,000 mg Oral 3 times per day    ferrous sulfate  325 mg Oral BID WC    lidocaine  1 patch TransDERmal Daily    [Held by provider] aspirin  81 mg Oral Daily    atorvastatin  40 mg Oral Nightly    bumetanide  2 mg Oral Daily    clopidogrel  75 mg Oral Daily    dilTIAZem  120 mg Oral Daily    finasteride  5 mg Oral Daily    isosorbide mononitrate  30 mg Oral Daily    metoprolol succinate  100 mg Oral Daily    pantoprazole  40 mg Oral QAM AC    [Held by provider] rivaroxaban  15 mg Oral Daily    vitamin D  50,000 Units Oral Weekly    sodium chloride flush  5-40 mL IntraVENous 2 times per day       Social History:     Social History     Socioeconomic History    Marital status:      Spouse name: Not on file    Number of children: 4

## 2025-02-01 NOTE — CARE COORDINATION
ONGOING DISCHARGE PLAN:    Patient is alert and oriented x4.    Spoke with patient regarding discharge plan and patient confirms that plan is still [facility]. Auth pending. Reviewed on [employer] and no approval  noted.     100% on room air  Pulm consult    Cardio consult  Resume xarelto today    ID consult  Monitor off atb    PT/OT    Will continue to follow for additional discharge needs.    If patient is discharged prior to next notation, then this note serves as note for discharge by case management.    Electronically signed by Yudelka Damian RN on 2/1/2025 at 12:26 PM

## 2025-02-01 NOTE — PROGRESS NOTES
[facility]  IN-PATIENT SERVICE  [facility]    PROGRESS NOTE             2/1/2025    5:04 PM    Name:   Hemanth Barrera  MRN:     [id number]     Acct:      [id number]   Room:   2085/2085-01   Day:  12  Admit Date:  1/20/2025  9:06 AM    PCP:  Neftaly Contreras MD  Code Status:  Full Code    Subjective:     C/C:   Chief Complaint   Patient presents with    Fall    Rib Pain (injury)     Brief History:     The patient is a 89 y.o. male who slipped and fell 01/19/2025 and landed on his left side, after which severe pain in his left side started to bother him. Denies loss of consciousness, numbness, weakness, headache, neck pain, back pain.     PMH: coronary artery disease with stent placement, afib, HFpEF with pacemaker, CKD stage IIIb, chronic anemia, abdominal hernia status post repair.     In the ER, he was stable. Hb 6.6 -- received RBC transfusion. CT head and C-spine showed no critical findings. CT chest whowed a large left pleural effusion with compressive atelectasis, segmented fractures of the left post erior 10th and 11th ribs. CT abdomen and pelvis revealed findings suggestive of ileus/obstruction.     Admitted for management of his multiple health issues.    2 units of RBCs transfused, Hb stabilized: 8.2. Evaluated by general surgery and pulmonology.     Medications:     Allergies:    Allergies   Allergen Reactions    Aspirin Other (See Comments)     Pt told not to take since he has only a partial stomach    Cyclobenzaprine Other (See Comments)     Pt stated heart palpitations and he felt funny    Ibuprofen Nausea Only    Azithromycin Nausea Only    Hydrocodone-Acetaminophen Nausea Only     per pt, he is having upset stomach when taking norco       Current Meds:   Scheduled Meds:    rivaroxaban  15 mg Oral BID WC    Followed by    [START ON 2/22/2025] rivaroxaban  20 mg Oral Dinner    midodrine  2.5 mg Oral TID WC    acetaminophen  1,000 mg Oral 3 times per

## 2025-02-02 LAB
ANION GAP SERPL CALCULATED.3IONS-SCNC: 8 MMOL/L (ref 9–16)
BUN SERPL-MCNC: 14 MG/DL (ref 8–23)
CALCIUM SERPL-MCNC: 8 MG/DL (ref 8.6–10.4)
CHLORIDE SERPL-SCNC: 101 MMOL/L (ref 98–107)
CO2 SERPL-SCNC: 26 MMOL/L (ref 20–31)
CREAT SERPL-MCNC: 0.7 MG/DL (ref 0.7–1.2)
GFR, ESTIMATED: 88 ML/MIN/1.73M2
GLUCOSE SERPL-MCNC: 104 MG/DL (ref 74–99)
HCT VFR BLD AUTO: 24.7 % (ref 41–53)
HCT VFR BLD AUTO: 25.2 % (ref 41–53)
HGB BLD-MCNC: 8.1 G/DL (ref 13.5–17.5)
HGB BLD-MCNC: 8.2 G/DL (ref 13.5–17.5)
POTASSIUM SERPL-SCNC: 4.3 MMOL/L (ref 3.7–5.3)
SODIUM SERPL-SCNC: 135 MMOL/L (ref 136–145)

## 2025-02-02 PROCEDURE — 85018 HEMOGLOBIN: CPT

## 2025-02-02 PROCEDURE — 6370000000 HC RX 637 (ALT 250 FOR IP)

## 2025-02-02 PROCEDURE — 85014 HEMATOCRIT: CPT

## 2025-02-02 PROCEDURE — 6370000000 HC RX 637 (ALT 250 FOR IP): Performed by: NURSE PRACTITIONER

## 2025-02-02 PROCEDURE — 80048 BASIC METABOLIC PNL TOTAL CA: CPT

## 2025-02-02 PROCEDURE — 36415 COLL VENOUS BLD VENIPUNCTURE: CPT

## 2025-02-02 PROCEDURE — 6370000000 HC RX 637 (ALT 250 FOR IP): Performed by: INTERNAL MEDICINE

## 2025-02-02 PROCEDURE — 99232 SBSQ HOSP IP/OBS MODERATE 35: CPT

## 2025-02-02 PROCEDURE — 2060000000 HC ICU INTERMEDIATE R&B

## 2025-02-02 PROCEDURE — 2500000003 HC RX 250 WO HCPCS

## 2025-02-02 PROCEDURE — G0545 PR INHERENT VISIT TO INPT: HCPCS | Performed by: INTERNAL MEDICINE

## 2025-02-02 PROCEDURE — 99232 SBSQ HOSP IP/OBS MODERATE 35: CPT | Performed by: INTERNAL MEDICINE

## 2025-02-02 RX ADMIN — SODIUM CHLORIDE, PRESERVATIVE FREE 10 ML: 5 INJECTION INTRAVENOUS at 08:00

## 2025-02-02 RX ADMIN — ISOSORBIDE MONONITRATE 30 MG: 30 TABLET, EXTENDED RELEASE ORAL at 07:50

## 2025-02-02 RX ADMIN — MIDODRINE HYDROCHLORIDE 2.5 MG: 2.5 TABLET ORAL at 16:22

## 2025-02-02 RX ADMIN — MIDODRINE HYDROCHLORIDE 2.5 MG: 2.5 TABLET ORAL at 07:50

## 2025-02-02 RX ADMIN — FINASTERIDE 5 MG: 5 TABLET, FILM COATED ORAL at 07:50

## 2025-02-02 RX ADMIN — ATORVASTATIN CALCIUM 40 MG: 40 TABLET, FILM COATED ORAL at 20:49

## 2025-02-02 RX ADMIN — BUMETANIDE 2 MG: 1 TABLET ORAL at 07:50

## 2025-02-02 RX ADMIN — PANTOPRAZOLE SODIUM 40 MG: 40 TABLET, DELAYED RELEASE ORAL at 05:17

## 2025-02-02 RX ADMIN — MIDODRINE HYDROCHLORIDE 2.5 MG: 2.5 TABLET ORAL at 11:58

## 2025-02-02 RX ADMIN — OXYCODONE HYDROCHLORIDE 5 MG: 5 TABLET ORAL at 00:15

## 2025-02-02 RX ADMIN — OXYCODONE 7.5 MG: 5 TABLET ORAL at 07:47

## 2025-02-02 RX ADMIN — METOPROLOL SUCCINATE 100 MG: 50 TABLET, EXTENDED RELEASE ORAL at 07:50

## 2025-02-02 RX ADMIN — ACETAMINOPHEN 1000 MG: 500 TABLET, FILM COATED ORAL at 20:49

## 2025-02-02 RX ADMIN — ACETAMINOPHEN 1000 MG: 500 TABLET, FILM COATED ORAL at 15:06

## 2025-02-02 RX ADMIN — RIVAROXABAN 15 MG: 15 TABLET, FILM COATED ORAL at 16:22

## 2025-02-02 RX ADMIN — RIVAROXABAN 15 MG: 15 TABLET, FILM COATED ORAL at 07:50

## 2025-02-02 RX ADMIN — OXYCODONE 5 MG: 5 TABLET ORAL at 00:13

## 2025-02-02 RX ADMIN — DILTIAZEM HYDROCHLORIDE 120 MG: 120 CAPSULE, COATED, EXTENDED RELEASE ORAL at 07:50

## 2025-02-02 RX ADMIN — FERROUS SULFATE TAB 325 MG (65 MG ELEMENTAL FE) 325 MG: 325 (65 FE) TAB at 07:50

## 2025-02-02 RX ADMIN — CLOPIDOGREL BISULFATE 75 MG: 75 TABLET ORAL at 07:50

## 2025-02-02 RX ADMIN — OXYCODONE 7.5 MG: 5 TABLET ORAL at 11:58

## 2025-02-02 RX ADMIN — FERROUS SULFATE TAB 325 MG (65 MG ELEMENTAL FE) 325 MG: 325 (65 FE) TAB at 16:22

## 2025-02-02 RX ADMIN — SODIUM CHLORIDE, PRESERVATIVE FREE 10 ML: 5 INJECTION INTRAVENOUS at 20:49

## 2025-02-02 RX ADMIN — OXYCODONE 7.5 MG: 5 TABLET ORAL at 16:22

## 2025-02-02 RX ADMIN — ACETAMINOPHEN 1000 MG: 500 TABLET, FILM COATED ORAL at 05:17

## 2025-02-02 RX ADMIN — OXYCODONE 7.5 MG: 5 TABLET ORAL at 23:48

## 2025-02-02 ASSESSMENT — PAIN DESCRIPTION - PAIN TYPE
TYPE: ACUTE PAIN

## 2025-02-02 ASSESSMENT — PAIN DESCRIPTION - FREQUENCY
FREQUENCY: INTERMITTENT

## 2025-02-02 ASSESSMENT — PAIN - FUNCTIONAL ASSESSMENT
PAIN_FUNCTIONAL_ASSESSMENT: PREVENTS OR INTERFERES WITH ALL ACTIVE AND SOME PASSIVE ACTIVITIES
PAIN_FUNCTIONAL_ASSESSMENT: PREVENTS OR INTERFERES SOME ACTIVE ACTIVITIES AND ADLS
PAIN_FUNCTIONAL_ASSESSMENT: PREVENTS OR INTERFERES WITH ALL ACTIVE AND SOME PASSIVE ACTIVITIES

## 2025-02-02 ASSESSMENT — PAIN DESCRIPTION - DESCRIPTORS
DESCRIPTORS: DISCOMFORT
DESCRIPTORS: ACHING;DISCOMFORT
DESCRIPTORS: ACHING
DESCRIPTORS: SHARP
DESCRIPTORS: ACHING;DISCOMFORT
DESCRIPTORS: SHARP
DESCRIPTORS: STABBING
DESCRIPTORS: SHARP
DESCRIPTORS: SHARP
DESCRIPTORS: ACHING;DISCOMFORT
DESCRIPTORS: SHARP

## 2025-02-02 ASSESSMENT — PAIN DESCRIPTION - ORIENTATION
ORIENTATION: LEFT

## 2025-02-02 ASSESSMENT — PAIN DESCRIPTION - LOCATION
LOCATION: CHEST
LOCATION: CHEST;ARM
LOCATION: RIB CAGE
LOCATION: CHEST
LOCATION: HAND
LOCATION: CHEST;ARM
LOCATION: HAND
LOCATION: CHEST;ARM
LOCATION: CHEST

## 2025-02-02 ASSESSMENT — PAIN DESCRIPTION - ONSET
ONSET: ON-GOING

## 2025-02-02 ASSESSMENT — PAIN SCALES - GENERAL
PAINLEVEL_OUTOF10: 8
PAINLEVEL_OUTOF10: 3
PAINLEVEL_OUTOF10: 9
PAINLEVEL_OUTOF10: 4
PAINLEVEL_OUTOF10: 5
PAINLEVEL_OUTOF10: 9
PAINLEVEL_OUTOF10: 9
PAINLEVEL_OUTOF10: 7
PAINLEVEL_OUTOF10: 5

## 2025-02-02 NOTE — PROGRESS NOTES
Writer took out one pill of Roxicodone 5 mg from [id number] and wasted half of it with LEON Burnham. Writer notified Pharmacy that, I needed to take two pills of Roxicodone 5 mg each in order to give 7.5 mg and waste the rest per orders. Pharmacy ok 'd to place orders for ] Roxicodone 5 mg once time dose. See orders.

## 2025-02-02 NOTE — PROGRESS NOTES
[facility]  IN-PATIENT SERVICE  [facility]    PROGRESS NOTE             2/2/2025    8:36 AM    Name:   Hemanth Barrera  MRN:     [id number]     Acct:      [id number]   Room:   2085/2085-01   Day:  13  Admit Date:  1/20/2025  9:06 AM    PCP:  Neftaly Contreras MD  Code Status:  Full Code    Subjective:     C/C:   Chief Complaint   Patient presents with    Fall    Rib Pain (injury)     Brief History:     The patient is a 89 y.o. male who slipped and fell 01/19/2025 and landed on his left side, after which severe pain in his left side started to bother him. Denies loss of consciousness, numbness, weakness, headache, neck pain, back pain.     PMH: coronary artery disease with stent placement, afib, HFpEF with pacemaker, CKD stage IIIb, chronic anemia, abdominal hernia status post repair.     In the ER, he was stable. Hb 6.6 -- received RBC transfusion. CT head and C-spine showed no critical findings. CT chest whowed a large left pleural effusion with compressive atelectasis, segmented fractures of the left post erior 10th and 11th ribs. CT abdomen and pelvis revealed findings suggestive of ileus/obstruction.     Admitted for management of his multiple health issues.    2 units of RBCs transfused, Hb stabilized: 8.2. Evaluated by general surgery and pulmonology.     Medications:     Allergies:    Allergies   Allergen Reactions    Aspirin Other (See Comments)     Pt told not to take since he has only a partial stomach    Cyclobenzaprine Other (See Comments)     Pt stated heart palpitations and he felt funny    Ibuprofen Nausea Only    Azithromycin Nausea Only    Hydrocodone-Acetaminophen Nausea Only     per pt, he is having upset stomach when taking norco       Current Meds:   Scheduled Meds:    rivaroxaban  15 mg Oral BID WC    Followed by    [START ON 2/22/2025] rivaroxaban  20 mg Oral Dinner    midodrine  2.5 mg Oral TID WC    acetaminophen  1,000 mg Oral 3 times per

## 2025-02-02 NOTE — CARE COORDINATION
ONGOING DISCHARGE PLAN:    Chart Review:    Plan is Kimmy Rivas. Auth pending.     96% on room air  Pulm consult    Cardio consult  Resume xarelto   Hgb 8.2    ID consult  Monitor off atb    PT/OT    Will continue to follow for additional discharge needs.    If patient is discharged prior to next notation, then this note serves as note for discharge by case management.    Electronically signed by Yudelka Damian RN on 2/2/2025 at 1:29 PM

## 2025-02-02 NOTE — PROGRESS NOTES
General Surgery Progress Note    Subjective:     Patient without complaints. No nausea or vomiting. Voiding well.   Still complaining of pain due to rib fractures.  Slightly stronger pain medicine has been ordered  Scheduled Meds:   rivaroxaban  15 mg Oral BID WC    Followed by    [START ON 2/22/2025] rivaroxaban  20 mg Oral Dinner    midodrine  2.5 mg Oral TID WC    acetaminophen  1,000 mg Oral 3 times per day    ferrous sulfate  325 mg Oral BID WC    lidocaine  1 patch TransDERmal Daily    atorvastatin  40 mg Oral Nightly    bumetanide  2 mg Oral Daily    clopidogrel  75 mg Oral Daily    dilTIAZem  120 mg Oral Daily    finasteride  5 mg Oral Daily    isosorbide mononitrate  30 mg Oral Daily    metoprolol succinate  100 mg Oral Daily    pantoprazole  40 mg Oral QAM AC    vitamin D  50,000 Units Oral Weekly    sodium chloride flush  5-40 mL IntraVENous 2 times per day     Continuous Infusions:   sodium chloride      sodium chloride       PRN Meds:diclofenac sodium, sodium chloride flush, oxyCODONE, sodium chloride flush, nitroGLYCERIN, sodium chloride flush, sodium chloride, potassium chloride **OR** potassium alternative oral replacement **OR** potassium chloride, magnesium sulfate, polyethylene glycol, sodium chloride    Objective:   /77   Pulse 70   Temp 98.6 °F (37 °C) (Oral)   Resp 18   Ht 1.854 m (6' 1\")   Wt 74 kg (163 lb 2.3 oz)   SpO2 96%   BMI 21.52 kg/m²     I/O last 3 completed shifts:  In: -   Out: 2875 [Urine:2875]    Chest: Breath sounds were clear and equal with no rales, wheezes, or rhonchi.  Still complaining of pain due to rib fractures but able to bring up phlegm with cough    Cardiovascular: Heart sounds were normal     Abdomen:  Bowel sounds were normal.  The abdomen was soft and non distended.  There was no tenderness, guarding, rebound, or rigidity.      LABS:  Lab Results   Component Value Date/Time    WBC 3.1 02/01/2025 04:23 AM    RBC 2.64 02/01/2025 04:23 AM    HGB 8.2

## 2025-02-02 NOTE — PLAN OF CARE
Problem: Pain  Goal: Verbalizes/displays adequate comfort level or baseline comfort level  2/2/2025 0336 by Yusra Bloom RN  Outcome: Progressing   Note: Pt medicated with pain medication prn.  Assessed all pain characteristics including level, type, location, frequency, and onset.  Non-pharmacologic interventions offered to pt as well.  Pt states pain is tolerable at this time.    Problem: Respiratory - Adult  Goal: Achieves optimal ventilation and oxygenation  2/2/2025 0335 by Yusra Bloom RN  Outcome: Progressing     Problem: Safety - Adult  Goal: Free from fall injury  2/2/2025 0335 by Yusra Bloom RN  Outcome: Progressing   Note: Pt assessed as a fall risk this shift. Remains free from falls and accidental injury at this time. Fall precautions in place, including falling star sign and fall risk band on pt. Floor free from obstacles, and bed is locked and in lowest position. Adequate lighting provided.  Pt encouraged to call before getting OOB for any need.  Bed alarm activated. Will continue to monitor needs during hourly rounding, and reinforce education on use of call light.     Problem: Musculoskeletal - Adult  Goal: Return mobility to safest level of function  2/2/2025 0335 by Yusra Bloom RN  Outcome: Progressing     Problem: Skin/Tissue Integrity - Adult  Goal: Skin integrity remains intact  2/2/2025 0335 by Yusra Bloom RN  Outcome: Progressing   Note:Skin assessment complete. Waffle mattress in place. Coccyx reddened. Sensicare applied PRN. Turned and repositioned every two hours.  Area kept free from moisture. Proper nourishment and fluids encouraged, as appropriate. Will continue to monitor for additional needs and changes in skin breakdown.     Problem: Metabolic/Fluid and Electrolytes - Adult  Goal: Electrolytes maintained within normal limits  2/2/2025 0335 by Yusra Bloom RN  Outcome: Progressing

## 2025-02-02 NOTE — PROGRESS NOTES
[facility] -   Infectious diseases evaluation  admission date 1/20/2025    reason for consultation:   Gram-positive cocci in clusters on pleural fluid culture    Impression :   Current:  Rib fractures with hemothorax to the left side  Status post thoracentesis 1/21/2025 Staph epi/staph capitis growth on culture likely contamination.  Status post thoracentesis 1/28/2025  Pulmonary embolism and DVTs  Thoracic aorta thrombus  Anemia  Mechanical fall  Coronary artery disease status post stent placement  Pacemaker  Chronic kidney disease stage IIIb  Chronic anemia  Abdominal hernia s/p repair  A-fib    HENCE:   Monitor off antibiotics.  Await discharge        Infection Control Recommendations   [place] Precautions      Antimicrobial Stewardship Recommendations   Simplification of therapy  Targeted therapy      History of Present Illness:   Initial history:  Hemanth Barrera is a 89 y.o.-year-old male presented to the hospital after he fell on 1/19/2025, landed on his left side, complaining of pain to the left side chest pain.  The patient was on aspirin, Plavix and Xarelto.  At the ER hemoglobin was 6.6 received blood transfusion  CT head and C-spine unremarkable.  CT chest showed large left pleural effusion suspected hemothorax with atelectasis, fractures of the 10th and 11th ribs with moderate subcutaneous hematoma and tiny amount of subcutaneous gas .  CT abdomen and pelvis suggestive of constipation, ileus versus obstruction  Status post thoracentesis 1/21/2025 showed 1, 875 WBC, 1, 386, 211 RBCs, 4.3 of protein, 414 LD, gram-positive cocci in clusters staph epi on culture      Interval changes  2/2/2025  He was seen and examined, still complaining of left arm and left chest pleuritic pain, less swelling to the left arm, no new events  On Xarelto  CT abdomen pelvis 1/30/2025 reviewed showed no hematoma, trace right and small left pleural effusion with

## 2025-02-02 NOTE — PLAN OF CARE
Problem: Discharge Planning  Goal: Discharge to home or other facility with appropriate resources  Outcome: Progressing   Dc barrier: awaiting placement to new rehab    Problem: Pain  Goal: Verbalizes/displays adequate comfort level or baseline comfort level  2/2/2025 1524 by Blessing Wills, RN  Outcome: Progressing  Pt medicated with pain medication prn.  Assessed all pain characteristics including level, type, location, frequency, and onset.  Non-pharmacologic interventions offered to pt as well.  Pt states pain is tolerable at this time.      Problem: Safety - Adult  Goal: Free from fall injury  2/2/2025 1524 by Blessing Wills, RN  Outcome: Progressing  Pt assessed as a fall risk this shift. Remains free from falls and accidental injury at this time. Fall precautions in place, including falling star sign and fall risk band on pt. Floor free from obstacles, and bed is locked and in lowest position. Adequate lighting provided.  Pt encouraged to call before getting OOB for any need.  Bed alarm activated. Will continue to monitor needs during hourly rounding, and reinforce education on use of call light.

## 2025-02-03 LAB
EKG ATRIAL RATE: 133 BPM
EKG Q-T INTERVAL: 436 MS
EKG QRS DURATION: 130 MS
EKG QTC CALCULATION (BAZETT): 470 MS
EKG R AXIS: -83 DEGREES
EKG T AXIS: 87 DEGREES
EKG VENTRICULAR RATE: 70 BPM
HCT VFR BLD AUTO: 23.5 % (ref 41–53)
HGB BLD-MCNC: 7.7 G/DL (ref 13.5–17.5)

## 2025-02-03 PROCEDURE — 85018 HEMOGLOBIN: CPT

## 2025-02-03 PROCEDURE — 99232 SBSQ HOSP IP/OBS MODERATE 35: CPT

## 2025-02-03 PROCEDURE — 99232 SBSQ HOSP IP/OBS MODERATE 35: CPT | Performed by: INTERNAL MEDICINE

## 2025-02-03 PROCEDURE — 2060000000 HC ICU INTERMEDIATE R&B

## 2025-02-03 PROCEDURE — 6370000000 HC RX 637 (ALT 250 FOR IP): Performed by: INTERNAL MEDICINE

## 2025-02-03 PROCEDURE — 97530 THERAPEUTIC ACTIVITIES: CPT

## 2025-02-03 PROCEDURE — 6370000000 HC RX 637 (ALT 250 FOR IP): Performed by: NURSE PRACTITIONER

## 2025-02-03 PROCEDURE — G0545 PR INHERENT VISIT TO INPT: HCPCS | Performed by: INTERNAL MEDICINE

## 2025-02-03 PROCEDURE — 2500000003 HC RX 250 WO HCPCS

## 2025-02-03 PROCEDURE — 97110 THERAPEUTIC EXERCISES: CPT

## 2025-02-03 PROCEDURE — 36415 COLL VENOUS BLD VENIPUNCTURE: CPT

## 2025-02-03 PROCEDURE — 6370000000 HC RX 637 (ALT 250 FOR IP)

## 2025-02-03 PROCEDURE — 93010 ELECTROCARDIOGRAM REPORT: CPT | Performed by: INTERNAL MEDICINE

## 2025-02-03 PROCEDURE — 85014 HEMATOCRIT: CPT

## 2025-02-03 RX ORDER — MIDODRINE HYDROCHLORIDE 2.5 MG/1
2.5 TABLET ORAL
Qty: 90 TABLET | Refills: 1 | Status: SHIPPED | OUTPATIENT
Start: 2025-02-03

## 2025-02-03 RX ORDER — FERROUS SULFATE 325(65) MG
325 TABLET ORAL 2 TIMES DAILY WITH MEALS
Qty: 30 TABLET | Refills: 1 | Status: SHIPPED | OUTPATIENT
Start: 2025-02-03

## 2025-02-03 RX ADMIN — OXYCODONE 7.5 MG: 5 TABLET ORAL at 16:22

## 2025-02-03 RX ADMIN — ACETAMINOPHEN 1000 MG: 500 TABLET, FILM COATED ORAL at 06:15

## 2025-02-03 RX ADMIN — FERROUS SULFATE TAB 325 MG (65 MG ELEMENTAL FE) 325 MG: 325 (65 FE) TAB at 16:22

## 2025-02-03 RX ADMIN — RIVAROXABAN 15 MG: 15 TABLET, FILM COATED ORAL at 16:22

## 2025-02-03 RX ADMIN — ERGOCALCIFEROL 50000 UNITS: 1.25 CAPSULE ORAL at 16:29

## 2025-02-03 RX ADMIN — CLOPIDOGREL BISULFATE 75 MG: 75 TABLET ORAL at 10:26

## 2025-02-03 RX ADMIN — ACETAMINOPHEN 1000 MG: 500 TABLET, FILM COATED ORAL at 21:10

## 2025-02-03 RX ADMIN — SODIUM CHLORIDE, PRESERVATIVE FREE 10 ML: 5 INJECTION INTRAVENOUS at 10:27

## 2025-02-03 RX ADMIN — ATORVASTATIN CALCIUM 40 MG: 40 TABLET, FILM COATED ORAL at 21:10

## 2025-02-03 RX ADMIN — OXYCODONE 7.5 MG: 5 TABLET ORAL at 03:48

## 2025-02-03 RX ADMIN — MIDODRINE HYDROCHLORIDE 2.5 MG: 2.5 TABLET ORAL at 10:26

## 2025-02-03 RX ADMIN — BUMETANIDE 2 MG: 1 TABLET ORAL at 10:26

## 2025-02-03 RX ADMIN — FINASTERIDE 5 MG: 5 TABLET, FILM COATED ORAL at 10:26

## 2025-02-03 RX ADMIN — PANTOPRAZOLE SODIUM 40 MG: 40 TABLET, DELAYED RELEASE ORAL at 06:15

## 2025-02-03 RX ADMIN — FERROUS SULFATE TAB 325 MG (65 MG ELEMENTAL FE) 325 MG: 325 (65 FE) TAB at 10:27

## 2025-02-03 RX ADMIN — SODIUM CHLORIDE, PRESERVATIVE FREE 10 ML: 5 INJECTION INTRAVENOUS at 21:10

## 2025-02-03 RX ADMIN — OXYCODONE 7.5 MG: 5 TABLET ORAL at 12:14

## 2025-02-03 RX ADMIN — DILTIAZEM HYDROCHLORIDE 120 MG: 120 CAPSULE, COATED, EXTENDED RELEASE ORAL at 10:26

## 2025-02-03 RX ADMIN — ISOSORBIDE MONONITRATE 30 MG: 30 TABLET, EXTENDED RELEASE ORAL at 10:26

## 2025-02-03 RX ADMIN — RIVAROXABAN 15 MG: 15 TABLET, FILM COATED ORAL at 10:27

## 2025-02-03 RX ADMIN — MIDODRINE HYDROCHLORIDE 2.5 MG: 2.5 TABLET ORAL at 14:44

## 2025-02-03 RX ADMIN — OXYCODONE 7.5 MG: 5 TABLET ORAL at 08:00

## 2025-02-03 RX ADMIN — ACETAMINOPHEN 1000 MG: 500 TABLET, FILM COATED ORAL at 12:14

## 2025-02-03 RX ADMIN — MIDODRINE HYDROCHLORIDE 2.5 MG: 2.5 TABLET ORAL at 16:22

## 2025-02-03 RX ADMIN — METOPROLOL SUCCINATE 100 MG: 50 TABLET, EXTENDED RELEASE ORAL at 10:27

## 2025-02-03 ASSESSMENT — PAIN DESCRIPTION - LOCATION
LOCATION: HAND
LOCATION: ABDOMEN
LOCATION: RIB CAGE
LOCATION: HAND

## 2025-02-03 ASSESSMENT — PAIN DESCRIPTION - ONSET
ONSET: ON-GOING
ONSET: ON-GOING

## 2025-02-03 ASSESSMENT — PAIN SCALES - GENERAL
PAINLEVEL_OUTOF10: 9
PAINLEVEL_OUTOF10: 6
PAINLEVEL_OUTOF10: 7
PAINLEVEL_OUTOF10: 7
PAINLEVEL_OUTOF10: 9
PAINLEVEL_OUTOF10: 7
PAINLEVEL_OUTOF10: 4

## 2025-02-03 ASSESSMENT — PAIN DESCRIPTION - FREQUENCY
FREQUENCY: CONTINUOUS
FREQUENCY: CONTINUOUS

## 2025-02-03 ASSESSMENT — PAIN DESCRIPTION - ORIENTATION
ORIENTATION: LEFT
ORIENTATION: RIGHT
ORIENTATION: LEFT
ORIENTATION: LEFT

## 2025-02-03 ASSESSMENT — PAIN DESCRIPTION - DESCRIPTORS
DESCRIPTORS: STABBING
DESCRIPTORS: ACHING
DESCRIPTORS: STABBING
DESCRIPTORS: ACHING
DESCRIPTORS: STABBING

## 2025-02-03 ASSESSMENT — PAIN DESCRIPTION - PAIN TYPE
TYPE: ACUTE PAIN
TYPE: ACUTE PAIN

## 2025-02-03 NOTE — PROGRESS NOTES
[facility]  IN-PATIENT SERVICE  [facility]    PROGRESS NOTE             2/3/2025    1:17 PM    Name:   Hemanth Barrera  MRN:     [id number]     Acct:      [id number]   Room:   2085/2085-01   Day:  14  Admit Date:  1/20/2025  9:06 AM    PCP:  Neftaly Contreras MD  Code Status:  Full Code    Subjective:     C/C:   Chief Complaint   Patient presents with    Fall    Rib Pain (injury)     Brief History:     The patient is a 89 y.o. male who slipped and fell 01/19/2025 and landed on his left side, after which severe pain in his left side started to bother him. Denies loss of consciousness, numbness, weakness, headache, neck pain, back pain.     PMH: coronary artery disease with stent placement, afib, HFpEF with pacemaker, CKD stage IIIb, chronic anemia, abdominal hernia status post repair.     In the ER, he was stable. Hb 6.6 -- received RBC transfusion. CT head and C-spine showed no critical findings. CT chest whowed a large left pleural effusion with compressive atelectasis, segmented fractures of the left post erior 10th and 11th ribs. CT abdomen and pelvis revealed findings suggestive of ileus/obstruction.     Admitted for management of his multiple health issues.    2 units of RBCs transfused, Hb stabilized: 8.2. Evaluated by general surgery and pulmonology.     Medications:     Allergies:    Allergies   Allergen Reactions    Aspirin Other (See Comments)     Pt told not to take since he has only a partial stomach    Cyclobenzaprine Other (See Comments)     Pt stated heart palpitations and he felt funny    Ibuprofen Nausea Only    Azithromycin Nausea Only    Hydrocodone-Acetaminophen Nausea Only     per pt, he is having upset stomach when taking norco       Current Meds:   Scheduled Meds:    rivaroxaban  15 mg Oral BID WC    Followed by    [START ON 2/22/2025] rivaroxaban  20 mg Oral Dinner    midodrine  2.5 mg Oral TID WC    acetaminophen  1,000 mg Oral 3 times per  on chronic anemia, Hb 6.6; drop from 8.8. Possibly posthemorrhagic.  -RBC transfused.  -H7H q6h.  Hemoglobin is 7 and 7.1 on 1/23  -Anticoagulation and antiplatelets held.       Mechanical fall from standing height.  Left 10th and 11th ribs fracture with left moderate-to-large hydro- (possibly hemo) thorax.  -General surgery consulted  -Per PM&R physiatrist Dr. Lawrence Jamison, patient appropriate for Acute Inpatient Rehabilitation level of care. Waiting authorization     Coronary artery disease with stent placement  -Anticoagulation and antiplatelets held.     Afib  -Continue home meds     HFpEF with pacemaker  -Continue home meds  -EKG     CKD stage IIIb  -Monitor  -Continue home meds     CT abdomen and pelvis revealed findings suggestive of ileus/obstruction  -General surgery consulted      DVT prophylaxis: reason for no prophylaxis: bleeding  GI prophylaxis: Protonix 40 mg daily        1/31    Patient seen and examined , patient complaining of pain in the left upper arm, likely because of the DVT, restricted range of motion due to pain  Patient found to have left upper extremity DVT and PE  With thoracic aorta thrombus  Cardiology consulted due to history of recent cath, patient started on Plavix, hemoglobin has been stable, now on heparin drip and Plavix, was taking Xarelto at home, CT abdomen did not show any hematoma x-ray showed small pleural effusion,  Will switch to p.o. anticoagulation if okay with pulmonary  Vascular consult, no surgical intervention currently  Developing trace pedal edema and pleural effusion, resume the home dose of Bumex  Mild leukopenia likely reactive, continue to monitor,  CT reviewed possible mild consolidation seen by ID, monitor off antibiotics  Vital stable  On room air saturating well, transfer out of ICU if okay with pulm and general surgery  Order placed      2/1  89-year-old male who slipped and fell on 1/19/2025 and got admitted to [facility] on 1/20/2025.  Trauma workup

## 2025-02-03 NOTE — PROGRESS NOTES
Comprehensive Nutrition Assessment    Type and Reason for Visit:  Reassess    Nutrition Recommendations/Plan:   Continue current diet.  Provide Ensure Plus High Protein with breakfast and dinner trays.     Malnutrition Assessment:  Malnutrition Status:  Moderate malnutrition (01/21/25 1409)    Context:  Acute Illness     Findings of the 6 clinical characteristics of malnutrition:  Energy Intake:  Unable to assess  Weight Loss:   (7.5% wt loss over 4 months)     Body Fat Loss:  Mild body fat loss Orbital   Muscle Mass Loss:  Mild muscle mass loss Hand (interosseous)  Fluid Accumulation:  Mild Extremities   Strength:  Not Performed    Nutrition Assessment:    Pt in PCU, was sleeping at the time of visit. Observed lunch tray with 25% of the foods consumed; 25-50% intake per nursing documentation.    Nutrition Related Findings:    Edema: +3 LUE. BM 2/2. Meds and labs reviewed. Wound Type: Pressure Injury, Stage II       Current Nutrition Intake & Therapies:    Average Meal Intake: 26-50%  Average Supplements Intake: Unable to assess  ADULT DIET; Regular  ADULT ORAL NUTRITION SUPPLEMENT; Breakfast, Dinner; Standard High Calorie/High Protein Oral Supplement    Anthropometric Measures:  Height: 185.4 cm (6' 1\")  Ideal Body Weight (IBW): 184 lbs (84 kg)    Admission Body Weight: 73.5 kg (162 lb) (stated)  Current Body Weight: 74 kg (163 lb 2.3 oz), 94.1 % IBW. Weight Source: Bed scale  Current BMI (kg/m2): 21.5  Usual Body Weight: 84.8 kg (187 lb) (9/24 bedscale)     % Weight Change (Calculated): -7.5  Weight Adjustment For: No Adjustment                 BMI Categories: Underweight (BMI less than 22) age over 65    Estimated Daily Nutrient Needs:  Energy Requirements Based On: Formula  Weight Used for Energy Requirements: Current  Energy (kcal/day): [place] x 1.2-= 1800 kcal  Weight Used for Protein Requirements: Current  Protein (g/day): 1.5g/kg= 115-120 g  Method Used for Fluid Requirements: 1 ml/kcal  Fluid (ml/day):

## 2025-02-03 NOTE — PLAN OF CARE
Problem: Chronic Conditions and Co-morbidities  Goal: Patient's chronic conditions and co-morbidity symptoms are monitored and maintained or improved  2/3/2025 1752 by Joie Sanchez RN  Outcome: Progressing  Flowsheets (Taken 2/1/2025 2010 by Yusra Bloom RN)  Care Plan - Patient's Chronic Conditions and Co-Morbidity Symptoms are Monitored and Maintained or Improved: Monitor and assess patient's chronic conditions and comorbid symptoms for stability, deterioration, or improvement  2/3/2025 0537 by Madhuri Heaton RN  Outcome: Progressing     Problem: Discharge Planning  Goal: Discharge to home or other facility with appropriate resources  2/3/2025 1752 by Joie Sanchez RN  Outcome: Progressing  Flowsheets (Taken 2/3/2025 1752)  Discharge to home or other facility with appropriate resources: Identify barriers to discharge with patient and caregiver  2/3/2025 0537 by Madhuri Heaton RN  Outcome: Progressing     Problem: Pain  Goal: Verbalizes/displays adequate comfort level or baseline comfort level  2/3/2025 0537 by Madhuri Heaton RN  Outcome: Progressing     Problem: Safety - Adult  Goal: Free from fall injury  2/3/2025 0537 by Madhuri Heaton RN  Outcome: Progressing     Problem: ABCDS Injury Assessment  Goal: Absence of physical injury  Recent Flowsheet Documentation  Taken 2/3/2025 1125 by Joie Sanchez RN  Absence of Physical Injury: Implement safety measures based on patient assessment  2/3/2025 0537 by Madhuri Heaton RN  Outcome: Progressing     Problem: Nutrition Deficit:  Goal: Optimize nutritional status  Recent Flowsheet Documentation  Taken 2/3/2025 1459 by Debbie Castro RD  Nutrient intake appropriate for improving, restoring, or maintaining nutritional needs: Monitor oral intake, labs, and treatment plans  2/3/2025 0537 by Madhuri Heaton RN  Outcome: Progressing     Problem: Skin/Tissue Integrity  Goal: Absence of new skin breakdown  Description: 1.  Monitor for areas of redness and/or skin breakdown  2.   Assess vascular access sites hourly  3.  Every 4-6 hours minimum:  Change oxygen saturation probe site  4.  Every 4-6 hours:  If on nasal continuous positive airway pressure, respiratory therapy assess nares and determine need for appliance change or resting period.  2/3/2025 0537 by Madhuri Heaton RN  Outcome: Progressing     Problem: Respiratory - Adult  Goal: Achieves optimal ventilation and oxygenation  2/3/2025 0537 by Madhuri Heaton RN  Outcome: Progressing     Problem: Cardiovascular - Adult  Goal: Maintains optimal cardiac output and hemodynamic stability  2/3/2025 0537 by Madhuri Heaton RN  Outcome: Progressing     Problem: Skin/Tissue Integrity - Adult  Goal: Incisions, wounds, or drain sites healing without S/S of infection  2/3/2025 0537 by Madhuri Heaton RN  Outcome: Progressing      medical problems. VPA will verify her insurance then call the patient by middle of next week to schedule an appt. Then an MD or NP will visit her at least every 4 weeks, take over ordering her medications and any testing needed, manage her Coumadin dosing, order home health visits if needed. I faxed order for additional labs to [facility].   General Assessment    Do you have any symptoms that are causing concern?:  Yes  Reported Symptoms:  Pain (Comment: leg pain, yeast infection)                 Care Coordination Interventions    Program Enrollment:  Rising Risk  Referral from Primary Care Provider:  Yes  Suggested Interventions and [facility] [address]:  Declined (Comment: referred to [facility], patient refused to go)  [facility]  (Comment:  )  Physical Therapy:  Declined  Social Work:  Completed (Comment: from [facility])  Specialty Services Referral:  Completed (Comment: [address] Worker)  Other Therapy Services:  [address] Self Management   Worsening (1/3/2018)             CC Self Management Goal  Patient Goal (What steps will patient take to achieve goal?): medication compliance, keep active, recognize signs of worsening cellulitis  Patient is able to discuss self-management of condition(s): chronic cellulitis  Pt demonstrates adherence to medications  Pt demonstrates understanding of self-monitoring of s/s of worsening  Patient is able to identify Red Flags:  Alert to potential adverse drug reactions(s) or side effects and actions to take should they arise  Discuss target symptoms and actions to take should they arise  Identify problems that require immediate PCP or specialist visit  Patient demonstrates understanding of access to PCP/Specialist:  Understands about scheduling routine Follow Up appointments   Understands about sick day appointment options for worsening of taking differently: Take 80 mg by mouth daily Not taking 6/12/17   Silvestre Mathew CNP   levothyroxine (SYNTHROID) 25 MCG tablet TAKE 1 TABLET BY MOUTH DAILY 6/12/17   Silvestre Mathew CNP   potassium bicarbonate (EFFER-K) 25 MEQ disintegrating tablet Take 1 tablet by mouth daily 6/12/17 6/12/18  Silvestre Mathew CNP   warfarin (COUMADIN) 10 MG tablet Take 1.5 tablets by mouth on Saturday, Sunday, Tues and Thursday  And 1 tablet by mouth on Monday Wednesday and Friday 6/12/17   Silvestre Mathew, [address] [place] [address] 43 Bed [facility] Patient needs bariatric size. 3/9/17   Silvestre Mathew CNP   Misc. Devices (BARIATRIC ROLLATOR) MISC 1 Units by Does not apply route continuous 3/9/17   Silvestre Mathew CNP   zolpidem (AMBIEN) 5 MG tablet Take 5 mg by mouth nightly as needed for Sleep    Historical Provider, MD   magnesium hydroxide (MILK OF MAGNESIA) 400 MG/5ML suspension Take 30 mLs by mouth daily as needed for Constipation 8/10/16   Ines Sun MD   acetaminophen 650 MG TABS Take 650 mg by mouth every 4 hours as needed 1/27/16   Ines Sun MD       No future appointments.

## 2025-02-03 NOTE — CARE COORDINATION
DISCHARGE PLANNING NOTE:    Writer is following for potential discharge to [facility]. Authorization status checked in [email or web address] portal, states 'error'. Message placed to Carley with [facility]. Authorization is pending at this time.    Writer met with pt for update on pending authorization, no further questions at this time.   Electronically signed by JEFFRY Tesfaye on 2/3/2025 at 1:54 PM

## 2025-02-03 NOTE — PROGRESS NOTES
[facility] -   Infectious diseases evaluation  admission date 1/20/2025    reason for consultation:   Gram-positive cocci in clusters on pleural fluid culture    Impression :   Current:  Rib fractures with hemothorax to the left side  Status post thoracentesis 1/21/2025 Staph epi/staph capitis growth on culture likely contamination.  Status post thoracentesis 1/28/2025  Pulmonary embolism and DVTs  Thoracic aorta thrombus  Anemia  Mechanical fall  Coronary artery disease status post stent placement  Pacemaker  Chronic kidney disease stage IIIb  Chronic anemia  Abdominal hernia s/p repair  A-fib    HENCE:   Monitor off antibiotics.  Await discharge        Infection Control Recommendations   [place] Precautions      Antimicrobial Stewardship Recommendations   Simplification of therapy  Targeted therapy      History of Present Illness:   Initial history:  Hemanth Barrera is a 89 y.o.-year-old male presented to the hospital after he fell on 1/19/2025, landed on his left side, complaining of pain to the left side chest pain.  The patient was on aspirin, Plavix and Xarelto.  At the ER hemoglobin was 6.6 received blood transfusion  CT head and C-spine unremarkable.  CT chest showed large left pleural effusion suspected hemothorax with atelectasis, fractures of the 10th and 11th ribs with moderate subcutaneous hematoma and tiny amount of subcutaneous gas .  CT abdomen and pelvis suggestive of constipation, ileus versus obstruction  Status post thoracentesis 1/21/2025 showed 1, 875 WBC, 1, 386, 211 RBCs, 4.3 of protein, 414 LD, gram-positive cocci in clusters staph epi on culture      Interval changes  2/3/2025  He was seen and examined, less swelling to the left arm, still complaining of left arm pain and left chest pleuritic pain, no new events.  On Xarelto  CT abdomen pelvis 1/30/2025 reviewed showed no hematoma, trace right and small left pleural effusion with  atelectasis/consolidation  CT chest showed bilateral pulmonary emboli, left lower lobe atelectasis/infiltrate.  Small pleural effusion, rib fractures.Ectasia of the thoracic aorta measuring 3.7 cm with concentric thrombus.    Pleural fluid Cx - staph epi  and staph capitis contaminants    Venous Doppler showed    Acute deep vein thrombosis in the left subclavian vein.    Acute deep vein thrombosis in the left axillary vein.    Acute deep vein thrombosis in the left brachial vein.    Superficial vein thrombosis in the basilic vein of the left upper arm.  Patient Vitals for the past 8 hrs:   BP Temp Temp src Pulse Resp SpO2   25 1207 108/75 97.9 °F (36.6 °C) Oral 70 18 94 %   25 0800 124/79 97.5 °F (36.4 °C) Oral 71 16 94 %         Imagin25 CT chest and abdomen   IMPRESSION:  1. Large left pleural effusion with compressive atelectasis.  2. Segmented fractures of the left posterior 10th and 11th ribs with a moderate subcutaneous hematoma and equivocal tiny amount of subcutaneous gas.  3. Fluid-filled dilated small bowel and colon with stool throughout the colon and rectum. Findings may represent ileus/obstruction or constipation.  4. Extensive degenerative changes of the thoracic and lumbar spine andpostoperative changes of the lumbar spine and right hip    I have personally reviewed the past medical history, past surgical history, medications, social history, and family history, and I haveupdated the database accordingly.      Allergies:   Aspirin, Cyclobenzaprine, Ibuprofen, Azithromycin, and Hydrocodone-acetaminophen     Review of Systems:     Review of Systems   Musculoskeletal:         Left sided rib pain from rib fractures, thoracentesis.   All other systems reviewed and are negative.    Physical Examination :       Physical Exam  Vitals and nursing note reviewed.   Constitutional:       Appearance: He is not ill-appearing.   HENT:      Head: Normocephalic and atraumatic.      Right Ear:

## 2025-02-03 NOTE — PROGRESS NOTES
General Surgery Progress Note    Subjective:     Patient without complaints. No nausea or vomiting. Voiding well.  Still having pain on left side due to rib fractures    Scheduled Meds:   rivaroxaban  15 mg Oral BID WC    Followed by    [START ON 2/22/2025] rivaroxaban  20 mg Oral Dinner    midodrine  2.5 mg Oral TID WC    acetaminophen  1,000 mg Oral 3 times per day    ferrous sulfate  325 mg Oral BID WC    lidocaine  1 patch TransDERmal Daily    atorvastatin  40 mg Oral Nightly    bumetanide  2 mg Oral Daily    clopidogrel  75 mg Oral Daily    dilTIAZem  120 mg Oral Daily    finasteride  5 mg Oral Daily    isosorbide mononitrate  30 mg Oral Daily    metoprolol succinate  100 mg Oral Daily    pantoprazole  40 mg Oral QAM AC    vitamin D  50,000 Units Oral Weekly    sodium chloride flush  5-40 mL IntraVENous 2 times per day     Continuous Infusions:   sodium chloride       PRN Meds:diclofenac sodium, sodium chloride flush, oxyCODONE, sodium chloride flush, nitroGLYCERIN, sodium chloride flush, sodium chloride, potassium chloride **OR** potassium alternative oral replacement **OR** potassium chloride, magnesium sulfate, polyethylene glycol    Objective:   /78   Pulse 69   Temp 98.6 °F (37 °C) (Axillary)   Resp 19   Ht 1.854 m (6' 1\")   Wt 74 kg (163 lb 2.3 oz)   SpO2 96%   BMI 21.52 kg/m²     I/O last 3 completed shifts:  In: -   Out: 2400 [Urine:2400]    Chest: Breath sounds were clear and equal with no rales, wheezes, or rhonchi.      Cardiovascular: Heart sounds were normal     Abdomen:  Bowel sounds were normal.  The abdomen was soft and non distended.  There was no tenderness, guarding, rebound, or rigidity. .    LABS:  Lab Results   Component Value Date/Time    WBC 3.1 02/01/2025 04:23 AM    RBC 2.64 02/01/2025 04:23 AM    HGB 7.7 02/03/2025 01:36 AM    HCT 23.5 02/03/2025 01:36 AM    MCV 94.2 02/01/2025 04:23 AM    MCH 30.7 02/01/2025 04:23 AM    MCHC 32.6 02/01/2025 04:23 AM    RDW 16.8

## 2025-02-03 NOTE — PROGRESS NOTES
[facility] PULMONARY,CRITICAL CARE & SLEEP   Hi Morocho MD/Choco Cohen APRN AGABRADLEYP-BC, NP-C      Janet Bermudez APRN NP-C    Hemanth BEJARANO NP-C                                         Pulmonary Progress Note    Patient - Hemanth Barrera   Age - 89 y.o.   - 1935  MRN - [id number]  Acct # - [id number]  Date of Admission - 2025  9:06 AM    Consulting Service/Physician:       Primary Care Physician: Neftaly Contreras MD    SUBJECTIVE:     Chief Complaint:   Chief Complaint   Patient presents with    Fall    Rib Pain (injury)     Subjective:    Patient resting in bed  Remains on room air  Still having some left-sided rib pain    VITALS  BP 98/60   Pulse 70   Temp 98.2 °F (36.8 °C) (Oral)   Resp 17   Ht 1.854 m (6' 1\")   Wt 74 kg (163 lb 2.3 oz)   SpO2 97%   BMI 21.52 kg/m²   Wt Readings from Last 3 Encounters:   25 74 kg (163 lb 2.3 oz)   25 73.1 kg (161 lb 2.5 oz)   25 63.5 kg (140 lb)     I/O (24 Hours)    Intake/Output Summary (Last 24 hours) at 2/3/2025 1619  Last data filed at 2/3/2025 1445  Gross per 24 hour   Intake 360 ml   Output 2300 ml   Net -1940 ml     Ventilator:      Exam:   Physical Exam   Constitutional: Thin, elderly, room air, no acute distress  HENT: Unremarkable,  Head: Normocephalic and atraumatic.   Eyes: EOM are normal. Pupils are equal, round, and reactive to light.   Neck: Neck supple.   Cardiovascular:  Regular rate and rhythm.  Normal heart tones.  No JVD.    Pulmonary/Chest: Lungs clear to auscultation anteriorly; tender to left chest  Abdominal: Soft. Bowel sounds are present  Musculoskeletal: Some generalized weakness but moves all extremities  Neurological:patient is alert and appropriate  Skin: Skin is warm and dry.  Left upper extremity still with significant less edema  Extremities: Less edema in the left upper extremity  Infusions:      sodium chloride       Meds:     Current Facility-Administered

## 2025-02-03 NOTE — PLAN OF CARE
Problem: Chronic Conditions and Co-morbidities  Goal: Patient's chronic conditions and co-morbidity symptoms are monitored and maintained or improved  Outcome: Progressing     Problem: Discharge Planning  Goal: Discharge to home or other facility with appropriate resources  Outcome: Progressing     Problem: Pain  Goal: Verbalizes/displays adequate comfort level or baseline comfort level  Outcome: Progressing     Problem: Safety - Adult  Goal: Free from fall injury  Outcome: Progressing     Problem: ABCDS Injury Assessment  Goal: Absence of physical injury  Outcome: Progressing     Problem: Nutrition Deficit:  Goal: Optimize nutritional status  Outcome: Progressing     Problem: Skin/Tissue Integrity  Goal: Absence of new skin breakdown  Description: 1.  Monitor for areas of redness and/or skin breakdown  2.  Assess vascular access sites hourly  3.  Every 4-6 hours minimum:  Change oxygen saturation probe site  4.  Every 4-6 hours:  If on nasal continuous positive airway pressure, respiratory therapy assess nares and determine need for appliance change or resting period.  Outcome: Progressing     Problem: Respiratory - Adult  Goal: Achieves optimal ventilation and oxygenation  Outcome: Progressing     Problem: Cardiovascular - Adult  Goal: Maintains optimal cardiac output and hemodynamic stability  Outcome: Progressing     Problem: Skin/Tissue Integrity - Adult  Goal: Incisions, wounds, or drain sites healing without S/S of infection  Outcome: Progressing

## 2025-02-03 NOTE — PROGRESS NOTES
[facility]   OCCUPATIONAL THERAPY MISSED TREATMENT NOTE   INPATIENT   Date: 2/3/25  Patient Name: Hemanth Barrera       Room:   MRN: [id number]   Account #: [id number]    : 1935  (89 y.o.)  Gender: male                 REASON FOR MISSED TREATMENT:        Attempt @1134 Pt resting in bed, easy to wake. Writer introduces self and role. Pt politely refusing all occupational therapy services at this time d/t significant pain in ribs. Will continue to follow as time permits.       Electronically signed by GAGAN Rogers on 2/3/25 at 11:51 AM EST

## 2025-02-03 NOTE — PROGRESS NOTES
Physical Therapy  [facility]   Physical Therapy Treatment  Date: 2/3/25  Patient Name: Hemanth Barrera       Room: -  MRN: [id number]  Account: [id number]   : 1935  (89 y.o.) Gender: male     Discharge Recommendations:  Discharge Recommendations: Patient would benefit from continued therapy after discharge, Therapy recommended at discharge     PT Equipment Recommendations  Equipment Needed: No    General  Chart Reviewed: Yes  Response To Previous Treatment: Patient with no complaints from previous session.  Family/Caregiver Present: No  Follows Commands: Within Functional Limits    Past Medical History:  has a past medical history of Acute inferolateral myocardial infarction (HCC), Arthritis, Atrial fibrillation ([facility]), CAD (coronary artery disease), CHF (congestive heart failure) ([facility]), Glaucoma, Hx of blood clots, Hyperlipidemia, Hypertension, MI (myocardial infarction) ([facility]), and NSTEMI (non-ST elevated myocardial infarction) (Formerly [facility]).  Past Surgical History:   has a past surgical history that includes pacemaker placement; Abdomen surgery; Cardiac catheterization; Appendectomy; Colonoscopy; eye surgery; hernia repair; bone marrow biopsy; joint replacement; CT BIOPSY BONE MARROW (2022); Upper gastrointestinal endoscopy (N/A, 2023); Colonoscopy (N/A, 8/3/2023); Cardiac procedure (N/A, 2024); Cardiac procedure (N/A, 2024); Cardiac procedure (Right, 2025); and invasive vascular (N/A, 2025).    Restrictions  Restrictions/Precautions  Restrictions/Precautions: General Precautions, Fall Risk  Required Braces or Orthoses?: No  Implants Present? :  (R AGUSTO, L TKA)  Position Activity Restriction  Other Position/Activity Restrictions: L posterior 10th-11th rib fx s/p fall     Subjective  Subjective  Subjective: Pt resting in bed on arrival. Pleasant and cooperative with therapy   General  General Comments: First attempt with pt made at [phone]. Pt initially  Devices  Type of Devices: All fall risk precautions in place, Call light within reach, Left in bed, Patient at risk for falls, Bed alarm in place, Gait belt, Nurse notified       PT Individual Minutes  Time In: 0908  Time Out: 0938  Minutes: 30           Electronically signed by Eli Tipton PTA on 2/3/25 at 11:46 AM EST

## 2025-02-04 ENCOUNTER — APPOINTMENT (OUTPATIENT)
Dept: GENERAL RADIOLOGY | Age: 89
DRG: 199 | End: 2025-02-04
Payer: MEDICARE

## 2025-02-04 LAB
ANION GAP SERPL CALCULATED.3IONS-SCNC: 11 MMOL/L (ref 9–16)
BASOPHILS # BLD: 0 K/UL (ref 0–0.2)
BASOPHILS NFR BLD: 0 % (ref 0–2)
BUN SERPL-MCNC: 18 MG/DL (ref 8–23)
CALCIUM SERPL-MCNC: 8.3 MG/DL (ref 8.6–10.4)
CHLORIDE SERPL-SCNC: 100 MMOL/L (ref 98–107)
CO2 SERPL-SCNC: 25 MMOL/L (ref 20–31)
CREAT SERPL-MCNC: 0.9 MG/DL (ref 0.7–1.2)
EOSINOPHIL # BLD: 0 K/UL (ref 0–0.4)
EOSINOPHILS RELATIVE PERCENT: 1 % (ref 0–4)
ERYTHROCYTE [DISTWIDTH] IN BLOOD BY AUTOMATED COUNT: 16.9 % (ref 11.5–14.9)
GFR, ESTIMATED: 81 ML/MIN/1.73M2
GLUCOSE SERPL-MCNC: 100 MG/DL (ref 74–99)
HCT VFR BLD AUTO: 27.2 % (ref 41–53)
HGB BLD-MCNC: 8.6 G/DL (ref 13.5–17.5)
LYMPHOCYTES NFR BLD: 0.5 K/UL (ref 1–4.8)
LYMPHOCYTES RELATIVE PERCENT: 13 % (ref 24–44)
MCH RBC QN AUTO: 30.3 PG (ref 26–34)
MCHC RBC AUTO-ENTMCNC: 31.7 G/DL (ref 31–37)
MCV RBC AUTO: 95.6 FL (ref 80–100)
MONOCYTES NFR BLD: 0.3 K/UL (ref 0.1–1.3)
MONOCYTES NFR BLD: 8 % (ref 1–7)
NEUTROPHILS NFR BLD: 78 % (ref 36–66)
NEUTS SEG NFR BLD: 3 K/UL (ref 1.3–9.1)
PLATELET # BLD AUTO: 307 K/UL (ref 150–450)
PMV BLD AUTO: 7 FL (ref 6–12)
POTASSIUM SERPL-SCNC: 4.3 MMOL/L (ref 3.7–5.3)
RBC # BLD AUTO: 2.85 M/UL (ref 4.5–5.9)
SODIUM SERPL-SCNC: 136 MMOL/L (ref 136–145)
WBC OTHER # BLD: 3.9 K/UL (ref 3.5–11)

## 2025-02-04 PROCEDURE — 85025 COMPLETE CBC W/AUTO DIFF WBC: CPT

## 2025-02-04 PROCEDURE — 6370000000 HC RX 637 (ALT 250 FOR IP): Performed by: NURSE PRACTITIONER

## 2025-02-04 PROCEDURE — 36415 COLL VENOUS BLD VENIPUNCTURE: CPT

## 2025-02-04 PROCEDURE — 99232 SBSQ HOSP IP/OBS MODERATE 35: CPT | Performed by: INTERNAL MEDICINE

## 2025-02-04 PROCEDURE — 71045 X-RAY EXAM CHEST 1 VIEW: CPT

## 2025-02-04 PROCEDURE — 6370000000 HC RX 637 (ALT 250 FOR IP): Performed by: INTERNAL MEDICINE

## 2025-02-04 PROCEDURE — 1200000000 HC SEMI PRIVATE

## 2025-02-04 PROCEDURE — 99233 SBSQ HOSP IP/OBS HIGH 50: CPT

## 2025-02-04 PROCEDURE — 2500000003 HC RX 250 WO HCPCS

## 2025-02-04 PROCEDURE — 6370000000 HC RX 637 (ALT 250 FOR IP)

## 2025-02-04 PROCEDURE — 99232 SBSQ HOSP IP/OBS MODERATE 35: CPT | Performed by: NURSE PRACTITIONER

## 2025-02-04 PROCEDURE — G0545 PR INHERENT VISIT TO INPT: HCPCS | Performed by: INTERNAL MEDICINE

## 2025-02-04 PROCEDURE — 80048 BASIC METABOLIC PNL TOTAL CA: CPT

## 2025-02-04 RX ADMIN — MIDODRINE HYDROCHLORIDE 2.5 MG: 2.5 TABLET ORAL at 16:32

## 2025-02-04 RX ADMIN — RIVAROXABAN 15 MG: 15 TABLET, FILM COATED ORAL at 16:32

## 2025-02-04 RX ADMIN — FERROUS SULFATE TAB 325 MG (65 MG ELEMENTAL FE) 325 MG: 325 (65 FE) TAB at 16:32

## 2025-02-04 RX ADMIN — SODIUM CHLORIDE, PRESERVATIVE FREE 10 ML: 5 INJECTION INTRAVENOUS at 21:39

## 2025-02-04 RX ADMIN — ACETAMINOPHEN 1000 MG: 500 TABLET, FILM COATED ORAL at 05:03

## 2025-02-04 RX ADMIN — FERROUS SULFATE TAB 325 MG (65 MG ELEMENTAL FE) 325 MG: 325 (65 FE) TAB at 08:04

## 2025-02-04 RX ADMIN — OXYCODONE 7.5 MG: 5 TABLET ORAL at 00:55

## 2025-02-04 RX ADMIN — FINASTERIDE 5 MG: 5 TABLET, FILM COATED ORAL at 08:04

## 2025-02-04 RX ADMIN — DILTIAZEM HYDROCHLORIDE 120 MG: 120 CAPSULE, COATED, EXTENDED RELEASE ORAL at 08:04

## 2025-02-04 RX ADMIN — CLOPIDOGREL BISULFATE 75 MG: 75 TABLET ORAL at 08:04

## 2025-02-04 RX ADMIN — OXYCODONE 7.5 MG: 5 TABLET ORAL at 16:32

## 2025-02-04 RX ADMIN — METOPROLOL SUCCINATE 100 MG: 50 TABLET, EXTENDED RELEASE ORAL at 08:04

## 2025-02-04 RX ADMIN — ATORVASTATIN CALCIUM 40 MG: 40 TABLET, FILM COATED ORAL at 21:38

## 2025-02-04 RX ADMIN — MIDODRINE HYDROCHLORIDE 2.5 MG: 2.5 TABLET ORAL at 11:33

## 2025-02-04 RX ADMIN — SODIUM CHLORIDE, PRESERVATIVE FREE 10 ML: 5 INJECTION INTRAVENOUS at 08:07

## 2025-02-04 RX ADMIN — OXYCODONE 7.5 MG: 5 TABLET ORAL at 21:42

## 2025-02-04 RX ADMIN — MIDODRINE HYDROCHLORIDE 2.5 MG: 2.5 TABLET ORAL at 08:04

## 2025-02-04 RX ADMIN — ACETAMINOPHEN 1000 MG: 500 TABLET, FILM COATED ORAL at 21:38

## 2025-02-04 RX ADMIN — BUMETANIDE 2 MG: 1 TABLET ORAL at 08:04

## 2025-02-04 RX ADMIN — ACETAMINOPHEN 1000 MG: 500 TABLET, FILM COATED ORAL at 13:56

## 2025-02-04 RX ADMIN — PANTOPRAZOLE SODIUM 40 MG: 40 TABLET, DELAYED RELEASE ORAL at 05:03

## 2025-02-04 RX ADMIN — RIVAROXABAN 15 MG: 15 TABLET, FILM COATED ORAL at 08:04

## 2025-02-04 RX ADMIN — ISOSORBIDE MONONITRATE 30 MG: 30 TABLET, EXTENDED RELEASE ORAL at 08:04

## 2025-02-04 RX ADMIN — OXYCODONE 7.5 MG: 5 TABLET ORAL at 08:04

## 2025-02-04 ASSESSMENT — PAIN DESCRIPTION - ORIENTATION
ORIENTATION: LEFT
ORIENTATION: RIGHT
ORIENTATION: LEFT

## 2025-02-04 ASSESSMENT — PAIN DESCRIPTION - LOCATION
LOCATION: RIB CAGE
LOCATION: ARM;LEG
LOCATION: BUTTOCKS;ARM
LOCATION: HAND
LOCATION: HAND
LOCATION: RIB CAGE
LOCATION: ARM

## 2025-02-04 ASSESSMENT — PAIN DESCRIPTION - FREQUENCY
FREQUENCY: CONTINUOUS

## 2025-02-04 ASSESSMENT — PAIN SCALES - GENERAL
PAINLEVEL_OUTOF10: 3
PAINLEVEL_OUTOF10: 3
PAINLEVEL_OUTOF10: 9

## 2025-02-04 ASSESSMENT — PAIN DESCRIPTION - PAIN TYPE
TYPE: ACUTE PAIN

## 2025-02-04 ASSESSMENT — PAIN - FUNCTIONAL ASSESSMENT
PAIN_FUNCTIONAL_ASSESSMENT: PREVENTS OR INTERFERES SOME ACTIVE ACTIVITIES AND ADLS
PAIN_FUNCTIONAL_ASSESSMENT: ACTIVITIES ARE NOT PREVENTED
PAIN_FUNCTIONAL_ASSESSMENT: PREVENTS OR INTERFERES SOME ACTIVE ACTIVITIES AND ADLS
PAIN_FUNCTIONAL_ASSESSMENT: PREVENTS OR INTERFERES SOME ACTIVE ACTIVITIES AND ADLS
PAIN_FUNCTIONAL_ASSESSMENT: ACTIVITIES ARE NOT PREVENTED
PAIN_FUNCTIONAL_ASSESSMENT: PREVENTS OR INTERFERES SOME ACTIVE ACTIVITIES AND ADLS

## 2025-02-04 ASSESSMENT — ENCOUNTER SYMPTOMS
COUGH: 0
SORE THROAT: 0
SHORTNESS OF BREATH: 1
ABDOMINAL PAIN: 0
RHINORRHEA: 0

## 2025-02-04 ASSESSMENT — PAIN DESCRIPTION - ONSET
ONSET: ON-GOING

## 2025-02-04 ASSESSMENT — PAIN DESCRIPTION - DESCRIPTORS
DESCRIPTORS: ACHING;DISCOMFORT
DESCRIPTORS: ACHING
DESCRIPTORS: ACHING;DISCOMFORT
DESCRIPTORS: SHARP
DESCRIPTORS: SHARP
DESCRIPTORS: ACHING

## 2025-02-04 NOTE — PLAN OF CARE
Problem: Chronic Conditions and Co-morbidities  Goal: Patient's chronic conditions and co-morbidity symptoms are monitored and maintained or improved  2/4/2025 0229 by Trini Bro RN  Outcome: Progressing    Problem: Discharge Planning  Goal: Discharge to home or other facility with appropriate resources  2/4/2025 0229 by Trini Bro RN  Outcome: Progressing    Problem: Pain  Goal: Verbalizes/displays adequate comfort level or baseline comfort level  Outcome: Progressing    Problem: Safety - Adult  Goal: Free from fall injury  Outcome: Progressing     Problem: ABCDS Injury Assessment  Goal: Absence of physical injury  Outcome: Progressing    Problem: Skin/Tissue Integrity  Goal: Absence of new skin breakdown  Description: 1.  Monitor for areas of redness and/or skin breakdown  2.  Assess vascular access sites hourly  3.  Every 4-6 hours minimum:  Change oxygen saturation probe site  4.  Every 4-6 hours:  If on nasal continuous positive airway pressure, respiratory therapy assess nares and determine need for appliance change or resting period.  Outcome: Progressing     Problem: Respiratory - Adult  Goal: Achieves optimal ventilation and oxygenation  Outcome: Progressing    Problem: Cardiovascular - Adult  Goal: Maintains optimal cardiac output and hemodynamic stability  Outcome: Progressing    Problem: Skin/Tissue Integrity - Adult  Goal: Incisions, wounds, or drain sites healing without S/S of infection  Outcome: Progressing    Problem: Skin/Tissue Integrity - Adult  Goal: Skin integrity remains intact  Outcome: Progressing    Problem: Musculoskeletal - Adult  Goal: Maintain proper alignment of affected body part  Outcome: Progressing     Problem: Gastrointestinal - Adult  Goal: Maintains or returns to baseline bowel function  Outcome: Progressing     Problem: Gastrointestinal - Adult  Goal: Maintains adequate nutritional intake  Outcome: Progressing     Problem: Gastrointestinal - Adult  Goal: Minimal or  absence of nausea and vomiting  Outcome: Progressing

## 2025-02-04 NOTE — PROGRESS NOTES
Daily Progress Note        PNA (pneumonia)    Assessment:    Right middle lobe pneumonia due to unspecified pathogen    bronchoscopy completed 6/11/2024  Very thick mucoid secretions suctioned therapeutically  Severe reactive airway disease with bronchospasm  Moderate inflammatory airway disease     COPD acute exacerbation  Pulmonary function test 10/19/2023.  FVC 2.1 L which is 90%  FEV1 1.69 L which is 95%, improved to 99% postbronchodilator  FEV1/FVC ratio 79  FEF 25-75% 104%  MVV 82%.  Total lung capacity 88%  Residual volume 80%.  Diffusion capacity 48%.    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 November 2023    ESRD on hemodialysis Monday Wednesday Friday    Chronic anemia due to chronic kidney disease  CAD  HTN  HLD  DM  History of CVA  Former smoker: Quit 2016        Recommendations:    Mucomyst   Mucinex     antibiotics  Oxygen supplement and titration to maintain saturation 90 to 95%  Bronchodilators  Inhaled corticosteroids  IV steroids  Mucinex  Encourage use of incentive spirometry     HD/electrolyte management as per nephrology  BP/glucose control  DVT/GI prophylaxis  Check daily labs and correct electrolytes as needed  I personally reviewed the radiological studies             LOS: 4 days     Subjective         Objective     Vital signs for last 24 hours:  Vitals:    06/12/24 0730 06/12/24 0731 06/12/24 0732 06/12/24 0736   BP:       BP Location:       Patient Position:       Pulse: 84 85 82 81   Resp: 18 18 18 18   Temp:       TempSrc:       SpO2: 92% 92% 92% 92%   Weight:       Height:           Intake/Output last 3 shifts:  I/O last 3 completed shifts:  In: 960 [P.O.:960]  Out: 0   Intake/Output this shift:  No intake/output data recorded.      Radiology  Imaging Results (Last 24 Hours)       ** No results found for the last 24 hours. **            Labs:  Results from last 7 days   Lab  Writer gave report to TRISTON Tavera RN, all questions answered.   Units 06/12/24  0258   WBC 10*3/mm3 11.08*   HEMOGLOBIN g/dL 9.6*   HEMATOCRIT % 31.7*   PLATELETS 10*3/mm3 393     Results from last 7 days   Lab Units 06/12/24  0258   SODIUM mmol/L 138   POTASSIUM mmol/L 5.5*   CHLORIDE mmol/L 98   CO2 mmol/L 22.8   BUN mg/dL 68*   CREATININE mg/dL 6.62*   CALCIUM mg/dL 8.4*   BILIRUBIN mg/dL 0.2   ALK PHOS U/L 100   ALT (SGPT) U/L 21   AST (SGOT) U/L 26   GLUCOSE mg/dL 105*     Results from last 7 days   Lab Units 06/09/24  1458   PH, ARTERIAL pH units 7.195*   PO2 ART mm Hg 82.8*   PCO2, ARTERIAL mm Hg 61.6*   HCO3 ART mmol/L 23.8     Results from last 7 days   Lab Units 06/12/24  0258 06/11/24  2115 06/11/24  0431   ALBUMIN g/dL 3.9 4.0 3.5                               Meds:   SCHEDULE  amLODIPine, 10 mg, Oral, Daily  budesonide, 0.5 mg, Nebulization, BID - RT  calcium acetate, 667 mg, Oral, TID  carvedilol, 3.125 mg, Oral, BID With Meals  cefTRIAXone, 1,000 mg, Intravenous, Q24H  enoxaparin, 30 mg, Subcutaneous, Nightly  guaiFENesin, 1,200 mg, Oral, Q12H  insulin lispro, 10 Units, Subcutaneous, TID With Meals  insulin lispro, 2-9 Units, Subcutaneous, 4x Daily AC & at Bedtime  ipratropium-albuterol, 3 mL, Nebulization, 4x Daily - RT  methylPREDNISolone sodium succinate, 40 mg, Intravenous, Q24H  pantoprazole, 40 mg, Oral, BID  sevelamer, 2,400 mg, Oral, TID With Meals  sodium bicarbonate, 1,300 mg, Oral, BID  sodium bicarbonate, 1,300 mg, Oral, TID  sodium chloride, 10 mL, Intravenous, Q12H  sucralfate, 1 g, Oral, 4x Daily  temazepam, 30 mg, Oral, Nightly      Infusions     PRNs    acetaminophen    benzonatate    senna-docusate sodium **AND** polyethylene glycol **AND** bisacodyl **AND** bisacodyl    dextrose    dextrose    glucagon (human recombinant)    LORazepam    nitroglycerin    ondansetron ODT **OR** ondansetron    oxyCODONE    sodium chloride    [COMPLETED] Insert Peripheral IV **AND** sodium chloride    sodium chloride    sodium chloride    Physical Exam:  Physical  Exam  Cardiovascular:      Heart sounds: Murmur heard.      No gallop.   Pulmonary:      Effort: No respiratory distress.      Breath sounds: No stridor. Rhonchi and rales present. No wheezing.   Chest:      Chest wall: No tenderness.         ROS  Review of Systems   Respiratory:  Positive for cough and shortness of breath. Negative for wheezing and stridor.    Cardiovascular:  Negative for chest pain, palpitations and leg swelling.             Total time spent with patient greater than: 45 Minutes

## 2025-02-04 NOTE — PLAN OF CARE
Problem: Chronic Conditions and Co-morbidities  Goal: Patient's chronic conditions and co-morbidity symptoms are monitored and maintained or improved  Outcome: Progressing     Problem: Discharge Planning  Goal: Discharge to home or other facility with appropriate resources  Outcome: Progressing     Problem: Pain  Goal: Verbalizes/displays adequate comfort level or baseline comfort level  Outcome: Progressing     Problem: Safety - Adult  Goal: Free from fall injury  Outcome: Progressing     Problem: ABCDS Injury Assessment  Goal: Absence of physical injury  Outcome: Progressing     Problem: Nutrition Deficit:  Goal: Optimize nutritional status  Outcome: Progressing     Problem: Skin/Tissue Integrity  Goal: Absence of new skin breakdown  Description: 1.  Monitor for areas of redness and/or skin breakdown  2.  Assess vascular access sites hourly  3.  Every 4-6 hours minimum:  Change oxygen saturation probe site  4.  Every 4-6 hours:  If on nasal continuous positive airway pressure, respiratory therapy assess nares and determine need for appliance change or resting period.  Outcome: Progressing     Problem: Respiratory - Adult  Goal: Achieves optimal ventilation and oxygenation  Outcome: Progressing     Problem: Cardiovascular - Adult  Goal: Maintains optimal cardiac output and hemodynamic stability  Outcome: Progressing     Problem: Skin/Tissue Integrity - Adult  Goal: Incisions, wounds, or drain sites healing without S/S of infection  Outcome: Progressing  Goal: Skin integrity remains intact  Outcome: Progressing     Problem: Musculoskeletal - Adult  Goal: Return mobility to safest level of function  Outcome: Progressing  Goal: Maintain proper alignment of affected body part  Outcome: Progressing     Problem: Gastrointestinal - Adult  Goal: Maintains or returns to baseline bowel function  Outcome: Progressing  Goal: Maintains adequate nutritional intake  Outcome: Progressing  Goal: Minimal or absence of nausea and

## 2025-02-04 NOTE — CARE COORDINATION
ONGOING DISCHARGE PLAN:    Patient is alert and oriented x4.    Spoke with patient regarding discharge plan and patient confirms that plan is still Kimmy [facility]OCK[facility]. Authorization started 1/31, still pending.    Discharge order in.    Will continue to follow for additional discharge needs.    If patient is discharged prior to next notation, then this note serves as note for discharge by case management.    Electronically signed by Negrita Gama RN on 2/4/2025 at 1:53 PM

## 2025-02-04 NOTE — PROGRESS NOTES
[facility] PULMONARY,CRITICAL CARE & SLEEP   Hi Morocho MD/Choco Cohen APRN AGACNP-BC, NP-C      Janet BEJARANO NP-C    Hemanth BEJARANO NP-C                                         Pulmonary Progress Note    Patient - Hemanth Barrera   Age - 89 y.o.   - 1935  MRN - [id number]  Acct # - [id number]  Date of Admission - 2025  9:06 AM    Consulting Service/Physician:       Primary Care Physician: Neftaly Contreras MD    SUBJECTIVE:     Chief Complaint:   Chief Complaint   Patient presents with    Fall    Rib Pain (injury)     Subjective:    Patient resting in bed  Remains on room air  Continues to complain of left-sided rib pain which causes some dyspnea  He does have oxycodone ordered and lidocaine patch    VITALS  BP 96/70   Pulse 70   Temp 97.9 °F (36.6 °C) (Oral)   Resp 20   Ht 1.854 m (6' 1\")   Wt 74 kg (163 lb 2.3 oz)   SpO2 97%   BMI 21.52 kg/m²   Wt Readings from Last 3 Encounters:   25 74 kg (163 lb 2.3 oz)   25 73.1 kg (161 lb 2.5 oz)   25 63.5 kg (140 lb)     I/O (24 Hours)    Intake/Output Summary (Last 24 hours) at 2025 1307  Last data filed at 2025 1206  Gross per 24 hour   Intake 760 ml   Output 3400 ml   Net -2640 ml     Ventilator:      Exam:   Physical Exam   Constitutional: Thin, elderly, room air, no acute distress  HENT: Unremarkable,  Head: Normocephalic and atraumatic.   Eyes: EOM are normal. Pupils are equal, round, and reactive to light.   Neck: Neck supple.   Cardiovascular:  Regular rate and rhythm.  Normal heart tones.  No JVD.    Pulmonary/Chest: Lungs clear to auscultation anteriorly; tenderness persists the left chest is unchanged  Abdominal: Soft. Bowel sounds are present  Musculoskeletal: Some generalized weakness but moves all extremities  Neurological:patient is alert and appropriate  Skin: Skin is warm and dry.  Left upper extremity still with significant less edema  Extremities: Less

## 2025-02-04 NOTE — PROGRESS NOTES
Writer spoke with Dr. Tomlinson regarding pt complaints of pain that is not controlled by his current orders and he feels like his hand is swelling more. Writer noted that patient has been repeating himself, forgetting that he has received pain medication already, and patient hand/arm swelling has not changed since the beginning of this shift. Dr. Tomlinson noted that pt does have mild dementia and he will assess patient at bedside.

## 2025-02-04 NOTE — PROGRESS NOTES
[facility]   OCCUPATIONAL THERAPY MISSED TREATMENT NOTE   INPATIENT   Date: 25  Patient Name: Hemanth Barrera       Room:   MRN: [id number]   Account #: [id number]    : 1935  (89 y.o.)  Gender: male                 REASON FOR MISSED TREATMENT:  2528-3080: Pt politely declines OT treatment at this time due to significant rib pain and shortness of breath. He requests check back and is agreeable to writer returning in ~1 hr.     6583-0642: Pt continues with shortness of breath and significant rib pain. He requests writer to notify RN Lesia he would like more pain meds. Pt appears anxious about his currently symptoms. - RN notified, and states pt is requesting for OT to return for third attempt. Will check back as time permits.       Electronically signed by SHEELA Zhang on 25 at 11:59 AM EST

## 2025-02-04 NOTE — PROGRESS NOTES
[facility]  IN-PATIENT SERVICE  [facility]    PROGRESS NOTE             2/4/2025    11:31 AM    Name:   Hemanth Barrera  MRN:     [id number]     Acct:      [id number]   Room:   2085/2085-01   Day:  15  Admit Date:  1/20/2025  9:06 AM    PCP:  Neftaly Contreras MD  Code Status:  Full Code    Subjective:     C/C:   Chief Complaint   Patient presents with    Fall    Rib Pain (injury)     Brief History:     The patient is a 89 y.o. male who slipped and fell 01/19/2025 and landed on his left side, after which severe pain in his left side started to bother him. Denies loss of consciousness, numbness, weakness, headache, neck pain, back pain.     PMH: coronary artery disease with stent placement, afib, HFpEF with pacemaker, CKD stage IIIb, chronic anemia, abdominal hernia status post repair.     In the ER, he was stable. Hb 6.6 -- received RBC transfusion. CT head and C-spine showed no critical findings. CT chest whowed a large left pleural effusion with compressive atelectasis, segmented fractures of the left post erior 10th and 11th ribs. CT abdomen and pelvis revealed findings suggestive of ileus/obstruction.     Admitted for management of his multiple health issues.    2 units of RBCs transfused, Hb stabilized: 8.2. Evaluated by general surgery and pulmonology.     Medications:     Allergies:    Allergies   Allergen Reactions    Aspirin Other (See Comments)     Pt told not to take since he has only a partial stomach    Cyclobenzaprine Other (See Comments)     Pt stated heart palpitations and he felt funny    Ibuprofen Nausea Only    Azithromycin Nausea Only    Hydrocodone-Acetaminophen Nausea Only     per pt, he is having upset stomach when taking norco       Current Meds:   Scheduled Meds:    rivaroxaban  15 mg Oral BID WC    Followed by    [START ON 2/22/2025] rivaroxaban  20 mg Oral Dinner    midodrine  2.5 mg Oral TID WC    acetaminophen  1,000 mg Oral 3 times per

## 2025-02-04 NOTE — PROGRESS NOTES
[facility] -   Infectious diseases evaluation  admission date 1/20/2025    reason for consultation:   Gram-positive cocci in clusters on pleural fluid culture    Impression :   Current:  Rib fractures with hemothorax to the left side  Status post thoracentesis 1/21/2025 Staph epi/staph capitis growth on culture likely contamination.  Status post thoracentesis 1/28/2025  Pulmonary embolism and DVTs  Thoracic aorta thrombus  Anemia  Mechanical fall  Coronary artery disease status post stent placement  Pacemaker  Chronic kidney disease stage IIIb  Chronic anemia  Abdominal hernia s/p repair  A-fib    HENCE:   Monitor off antibiotics.  Await discharge        Infection Control Recommendations   [place] Precautions      Antimicrobial Stewardship Recommendations   Simplification of therapy  Targeted therapy      History of Present Illness:   Initial history:  Hemanth Barrera is a 89 y.o.-year-old male presented to the hospital after he fell on 1/19/2025, landed on his left side, complaining of pain to the left side chest pain.  The patient was on aspirin, Plavix and Xarelto.  At the ER hemoglobin was 6.6 received blood transfusion  CT head and C-spine unremarkable.  CT chest showed large left pleural effusion suspected hemothorax with atelectasis, fractures of the 10th and 11th ribs with moderate subcutaneous hematoma and tiny amount of subcutaneous gas .  CT abdomen and pelvis suggestive of constipation, ileus versus obstruction  Status post thoracentesis 1/21/2025 showed 1, 875 WBC, 1, 386, 211 RBCs, 4.3 of protein, 414 LD, gram-positive cocci in clusters staph epi on culture      Interval changes  2/4/2025  He was seen and examined earlier today, comfortable on room air, still complaining of left-sided chest pain, less swelling to the left arm, no new complaints.  On Xarelto  CT abdomen pelvis 1/30/2025 reviewed showed no hematoma, trace right and small left pleural

## 2025-02-04 NOTE — PROGRESS NOTES
Physical Therapy        Physical Therapy Cancel Note      DATE: 2025    NAME: Hemanth Barrera  MRN: [id number]   : 1935      Patient not seen this date for Physical Therapy due to:    Patient Declined: Pt declining therapy at this time reporting significant rib pain and having difficulty breathing. Pt agreeable to check back at later time if symptoms improve. Will continue to follow.      Electronically signed by Eli Tipton PTA on 2025 at 11:44 AM

## 2025-02-05 ENCOUNTER — HOSPITAL ENCOUNTER (OUTPATIENT)
Dept: INFUSION THERAPY | Age: 89
Discharge: HOME | End: 2025-02-05

## 2025-02-05 VITALS
HEIGHT: 73 IN | RESPIRATION RATE: 24 BRPM | BODY MASS INDEX: 21.62 KG/M2 | DIASTOLIC BLOOD PRESSURE: 62 MMHG | TEMPERATURE: 97.9 F | SYSTOLIC BLOOD PRESSURE: 102 MMHG | HEART RATE: 70 BPM | OXYGEN SATURATION: 97 % | WEIGHT: 163.14 LBS

## 2025-02-05 LAB
ANION GAP SERPL CALCULATED.3IONS-SCNC: 7 MMOL/L (ref 9–16)
BASOPHILS # BLD: 0 K/UL (ref 0–0.2)
BASOPHILS NFR BLD: 1 % (ref 0–2)
BUN SERPL-MCNC: 19 MG/DL (ref 8–23)
CALCIUM SERPL-MCNC: 8.2 MG/DL (ref 8.6–10.4)
CHLORIDE SERPL-SCNC: 100 MMOL/L (ref 98–107)
CO2 SERPL-SCNC: 27 MMOL/L (ref 20–31)
CREAT SERPL-MCNC: 0.9 MG/DL (ref 0.7–1.2)
EOSINOPHIL # BLD: 0.1 K/UL (ref 0–0.4)
EOSINOPHILS RELATIVE PERCENT: 2 % (ref 0–4)
ERYTHROCYTE [DISTWIDTH] IN BLOOD BY AUTOMATED COUNT: 16.9 % (ref 11.5–14.9)
GFR, ESTIMATED: 81 ML/MIN/1.73M2
GLUCOSE SERPL-MCNC: 98 MG/DL (ref 74–99)
HCT VFR BLD AUTO: 27.1 % (ref 41–53)
HGB BLD-MCNC: 8.6 G/DL (ref 13.5–17.5)
LYMPHOCYTES NFR BLD: 0.6 K/UL (ref 1–4.8)
LYMPHOCYTES RELATIVE PERCENT: 15 % (ref 24–44)
MCH RBC QN AUTO: 30.6 PG (ref 26–34)
MCHC RBC AUTO-ENTMCNC: 31.8 G/DL (ref 31–37)
MCV RBC AUTO: 96.1 FL (ref 80–100)
MONOCYTES NFR BLD: 0.3 K/UL (ref 0.1–1.3)
MONOCYTES NFR BLD: 9 % (ref 1–7)
NEUTROPHILS NFR BLD: 73 % (ref 36–66)
NEUTS SEG NFR BLD: 2.8 K/UL (ref 1.3–9.1)
PLATELET # BLD AUTO: 313 K/UL (ref 150–450)
PMV BLD AUTO: 6.7 FL (ref 6–12)
POTASSIUM SERPL-SCNC: 4.3 MMOL/L (ref 3.7–5.3)
RBC # BLD AUTO: 2.82 M/UL (ref 4.5–5.9)
SODIUM SERPL-SCNC: 134 MMOL/L (ref 136–145)
WBC OTHER # BLD: 3.8 K/UL (ref 3.5–11)

## 2025-02-05 PROCEDURE — G0545 PR INHERENT VISIT TO INPT: HCPCS | Performed by: INTERNAL MEDICINE

## 2025-02-05 PROCEDURE — 85025 COMPLETE CBC W/AUTO DIFF WBC: CPT

## 2025-02-05 PROCEDURE — 99232 SBSQ HOSP IP/OBS MODERATE 35: CPT | Performed by: INTERNAL MEDICINE

## 2025-02-05 PROCEDURE — 6370000000 HC RX 637 (ALT 250 FOR IP): Performed by: NURSE PRACTITIONER

## 2025-02-05 PROCEDURE — 2500000003 HC RX 250 WO HCPCS

## 2025-02-05 PROCEDURE — 6370000000 HC RX 637 (ALT 250 FOR IP): Performed by: INTERNAL MEDICINE

## 2025-02-05 PROCEDURE — 97530 THERAPEUTIC ACTIVITIES: CPT

## 2025-02-05 PROCEDURE — 80048 BASIC METABOLIC PNL TOTAL CA: CPT

## 2025-02-05 PROCEDURE — 36415 COLL VENOUS BLD VENIPUNCTURE: CPT

## 2025-02-05 PROCEDURE — 99231 SBSQ HOSP IP/OBS SF/LOW 25: CPT | Performed by: INTERNAL MEDICINE

## 2025-02-05 PROCEDURE — 99232 SBSQ HOSP IP/OBS MODERATE 35: CPT | Performed by: NURSE PRACTITIONER

## 2025-02-05 PROCEDURE — 6370000000 HC RX 637 (ALT 250 FOR IP)

## 2025-02-05 RX ADMIN — SODIUM CHLORIDE, PRESERVATIVE FREE 10 ML: 5 INJECTION INTRAVENOUS at 08:22

## 2025-02-05 RX ADMIN — OXYCODONE 7.5 MG: 5 TABLET ORAL at 01:41

## 2025-02-05 RX ADMIN — PANTOPRAZOLE SODIUM 40 MG: 40 TABLET, DELAYED RELEASE ORAL at 05:54

## 2025-02-05 RX ADMIN — FINASTERIDE 5 MG: 5 TABLET, FILM COATED ORAL at 08:21

## 2025-02-05 RX ADMIN — CLOPIDOGREL BISULFATE 75 MG: 75 TABLET ORAL at 08:22

## 2025-02-05 RX ADMIN — OXYCODONE 7.5 MG: 5 TABLET ORAL at 05:54

## 2025-02-05 RX ADMIN — ACETAMINOPHEN 1000 MG: 500 TABLET, FILM COATED ORAL at 05:54

## 2025-02-05 RX ADMIN — FERROUS SULFATE TAB 325 MG (65 MG ELEMENTAL FE) 325 MG: 325 (65 FE) TAB at 08:21

## 2025-02-05 RX ADMIN — RIVAROXABAN 15 MG: 15 TABLET, FILM COATED ORAL at 08:40

## 2025-02-05 RX ADMIN — METOPROLOL SUCCINATE 100 MG: 50 TABLET, EXTENDED RELEASE ORAL at 08:40

## 2025-02-05 RX ADMIN — DILTIAZEM HYDROCHLORIDE 120 MG: 120 CAPSULE, COATED, EXTENDED RELEASE ORAL at 08:22

## 2025-02-05 RX ADMIN — ISOSORBIDE MONONITRATE 30 MG: 30 TABLET, EXTENDED RELEASE ORAL at 08:22

## 2025-02-05 RX ADMIN — ACETAMINOPHEN 1000 MG: 500 TABLET, FILM COATED ORAL at 12:45

## 2025-02-05 RX ADMIN — BUMETANIDE 2 MG: 1 TABLET ORAL at 08:22

## 2025-02-05 RX ADMIN — MIDODRINE HYDROCHLORIDE 2.5 MG: 2.5 TABLET ORAL at 11:27

## 2025-02-05 RX ADMIN — MIDODRINE HYDROCHLORIDE 2.5 MG: 2.5 TABLET ORAL at 08:21

## 2025-02-05 ASSESSMENT — PAIN DESCRIPTION - LOCATION
LOCATION: ABDOMEN
LOCATION: RIB CAGE
LOCATION: RIB CAGE

## 2025-02-05 ASSESSMENT — PAIN DESCRIPTION - DESCRIPTORS
DESCRIPTORS: ACHING
DESCRIPTORS: DULL;SORE
DESCRIPTORS: ACHING;SORE

## 2025-02-05 ASSESSMENT — PAIN DESCRIPTION - ORIENTATION
ORIENTATION: LEFT

## 2025-02-05 ASSESSMENT — ENCOUNTER SYMPTOMS
COUGH: 0
ABDOMINAL PAIN: 0
SHORTNESS OF BREATH: 1
SORE THROAT: 0
RHINORRHEA: 0

## 2025-02-05 ASSESSMENT — PAIN - FUNCTIONAL ASSESSMENT
PAIN_FUNCTIONAL_ASSESSMENT: ACTIVITIES ARE NOT PREVENTED
PAIN_FUNCTIONAL_ASSESSMENT: ACTIVITIES ARE NOT PREVENTED

## 2025-02-05 ASSESSMENT — PAIN SCALES - GENERAL
PAINLEVEL_OUTOF10: 9

## 2025-02-05 NOTE — CARE COORDINATION
HENS COMPLETE  Document ID : [id number]  Electronically signed by JEFFRY Tesfaye on 2/5/2025 at 3:48 PM

## 2025-02-05 NOTE — PROGRESS NOTES
[facility] -   Infectious diseases evaluation  admission date 1/20/2025    reason for consultation:   Gram-positive cocci in clusters on pleural fluid culture    Impression :   Current:  Rib fractures with hemothorax to the left side  Status post thoracentesis 1/21/2025 Staph epi/staph capitis growth on culture likely contamination.  Status post thoracentesis 1/28/2025  Pulmonary embolism and DVTs  Thoracic aorta thrombus  Anemia  Mechanical fall  Coronary artery disease status post stent placement  Pacemaker  Chronic kidney disease stage IIIb  Chronic anemia  Abdominal hernia s/p repair  A-fib    HENCE:   Monitor off antibiotics.  Await discharge        Infection Control Recommendations   [place] Precautions      Antimicrobial Stewardship Recommendations   Simplification of therapy  Targeted therapy      History of Present Illness:   Initial history:  Hemanth Barrera is a 89 y.o.-year-old male presented to the hospital after he fell on 1/19/2025, landed on his left side, complaining of pain to the left side chest pain.  The patient was on aspirin, Plavix and Xarelto.  At the ER hemoglobin was 6.6 received blood transfusion  CT head and C-spine unremarkable.  CT chest showed large left pleural effusion suspected hemothorax with atelectasis, fractures of the 10th and 11th ribs with moderate subcutaneous hematoma and tiny amount of subcutaneous gas .  CT abdomen and pelvis suggestive of constipation, ileus versus obstruction  Status post thoracentesis 1/21/2025 showed 1, 875 WBC, 1, 386, 211 RBCs, 4.3 of protein, 414 LD, gram-positive cocci in clusters staph epi on culture      Interval changes  2/5/2025  He was seen and examined earlier today, afebrile, still complaining of left pleuritic pain, less swelling and redness to the left arm, denied fever or chills, no other complaints  On Xarelto  CT abdomen pelvis 1/30/2025 reviewed showed no hematoma, trace right and

## 2025-02-05 NOTE — PROGRESS NOTES
Patient discharge education complete. IV removed without complication. All questions answered at this time. Attempted to call report to Kimmy Rivas 3 times with no answer.

## 2025-02-05 NOTE — PROGRESS NOTES
[facility] PULMONARY,CRITICAL CARE & SLEEP   Hitracy Morocho MD/Choco BEJARANO AGABRADLEYP-BC, NP-C      Janet BEJARANO NP-C    Hemanth BEJARANO NP-C                                         Pulmonary Progress Note    Patient - Hemanth Barrera   Age - 89 y.o.   - 1935  MRN - [id number]  [id number]  Date of Admission - 2025  9:06 AM    Consulting Service/Physician:       Primary Care Physician: Neftaly Contreras MD    SUBJECTIVE:     Chief Complaint:   Chief Complaint   Patient presents with    Fall    Rib Pain (injury)     Subjective:    Patient is resting in bed  This pain seems to be somewhat better controlled today  Still pending authorization is significant looks        VITALS  /70   Pulse 69   Temp 97.5 °F (36.4 °C) (Oral)   Resp 16   Ht 1.854 m (6' 1\")   Wt 74 kg (163 lb 2.3 oz)   SpO2 100%   BMI 21.52 kg/m²   Wt Readings from Last 3 Encounters:   25 74 kg (163 lb 2.3 oz)   25 73.1 kg (161 lb 2.5 oz)   25 63.5 kg (140 lb)     I/O (24 Hours)    Intake/Output Summary (Last 24 hours) at 2025 0854  Last data filed at 2025 0239  Gross per 24 hour   Intake 280 ml   Output 2150 ml   Net -1870 ml     Ventilator:      Exam:   Physical Exam   Constitutional: Thin, elderly, room air, no acute distress  HENT: Unremarkable,  Head: Normocephalic and atraumatic.   Eyes: EOM are normal. Pupils are equal, round, and reactive to light.   Neck: Neck supple.   Cardiovascular:  Regular rate and rhythm.  Normal heart tones.  No JVD.    Pulmonary/Chest: Lungs clear to auscultation anteriorly, diminished at left base, left rib pain persist  Abdominal: Soft. Bowel sounds are present  Musculoskeletal: Some generalized weakness but moves all extremities  Neurological:patient is alert and appropriate  Skin: Skin is warm and dry.  Left upper extremity still with significant less edema  Extremities: Less edema in the left upper  (TOPROL XL) extended release tablet 100 mg, 100 mg, Oral, Daily, Jose Ring MD, 100 mg at 02/05/25 0840    nitroGLYCERIN (NITROSTAT) SL tablet 0.4 mg, 0.4 mg, SubLINGual, Q5 Min PRN, Jose Ring MD    pantoprazole (PROTONIX) tablet 40 mg, 40 mg, Oral, QAM AC, Jose Ring MD, 40 mg at 02/05/25 0554    vitamin D (ERGOCALCIFEROL) capsule 50,000 Units, 50,000 Units, Oral, Weekly, Jose Ring MD, 50,000 Units at 02/03/25 1629    sodium chloride flush 0.9 % injection 5-40 mL, 5-40 mL, IntraVENous, 2 times per day, Jose Ring MD, 10 mL at 02/05/25 0822    sodium chloride flush 0.9 % injection 5-40 mL, 5-40 mL, IntraVENous, PRN, Jose Ring MD    0.9 % sodium chloride infusion, , IntraVENous, PRN, Jose Ring MD    potassium chloride (KLOR-CON M) extended release tablet 40 mEq, 40 mEq, Oral, PRN **OR** potassium bicarb-citric acid (EFFER-K) effervescent tablet 40 mEq, 40 mEq, Oral, PRN **OR** potassium chloride 10 mEq/100 mL IVPB (Peripheral Line), 10 mEq, IntraVENous, PRN, Jose Ring MD    magnesium sulfate 2000 mg in water 50 mL IVPB, 2,000 mg, IntraVENous, PRN, Jose Ring MD    polyethylene glycol (GLYCOLAX) packet 17 g, 17 g, Oral, Daily PRN, Jose Ring MD, 17 g at 01/28/25 1114    Lab Results:     Lab Results   Component Value Date    WBC 3.8 02/05/2025    HGB 8.6 (L) 02/05/2025    HCT 27.1 (L) 02/05/2025    MCV 96.1 02/05/2025     02/05/2025     Lab Results   Component Value Date    CALCIUM 8.2 (L) 02/05/2025     (L) 02/05/2025    K 4.3 02/05/2025    CO2 27 02/05/2025     02/05/2025    BUN 19 02/05/2025    CREATININE 0.9 02/05/2025       Lab Results   Component Value Date    INR 1.2 01/29/2025    PROTIME 16.3 (H) 01/29/2025       Radiology:   CXR 2/4 vs 1/30: stable small left effusion, mildly displaced 9th rib fx; atelectasis LLL                                                                                      ASSESSMENT:       Large left

## 2025-02-05 NOTE — CARE COORDINATION
Continuity of Care Form    Patient Name: Hemanth Barrera   :  1935  MRN:  [id number]    Admit date:  2025  Discharge date:  2025    Code Status Order: Full Code   Advance Directives:   Advance Care Flowsheet Documentation             Admitting Physician:  Kaitlin Dsouza MD  PCP: Neftaly Contreras MD    Discharging Nurse: Ayse Ricci RN  Discharging Hospital Unit/Room#: 2103/2103-01  Discharging Unit Phone Number: [phone]    Emergency Contact:   Extended Emergency Contact Information  Primary Emergency Contact: Dusty Barrera  Home Phone: [phone]  Mobile Phone: [phone]  Relation: Child    Past Surgical History:  Past Surgical History:   Procedure Laterality Date    ABDOMEN SURGERY      gastric resection    APPENDECTOMY      BONE MARROW BIOPSY      CARDIAC CATHETERIZATION      CARDIAC PROCEDURE N/A 2024    julio cesar / Left heart cath / coronary angiography / rm 512 performed by Cathie Hastings MD at [facility] CARDIAC CATH LAB    CARDIAC PROCEDURE N/A 2024    Percutaneous coronary intervention performed by Cathie Hastings MD at [facility] CARDIAC CATH LAB    CARDIAC PROCEDURE Right 2025    Left heart cath / coronary angiography performed by Guy Paz MD at [facility] CARDIAC CATH LAB    COLONOSCOPY      COLONOSCOPY N/A 8/3/2023    COLONOSCOPY WITH BIOPSY performed by Isauro Razo MD at [facility]    CT BIOPSY BONE MARROW  2022    CT BONE MARROW BIOPSY 2022 [facility] CT SCAN    EYE SURGERY      smiley cataracts    HERNIA REPAIR      inguinal smiley    INVASIVE VASCULAR N/A 2025    Ultrasound guided vascular access performed by Guy Paz MD at [facility] CARDIAC CATH LAB    JOINT REPLACEMENT      rt hip x 2, lt knee    PACEMAKER PLACEMENT      UPPER GASTROINTESTINAL ENDOSCOPY N/A 2023    EGD BIOPSY performed by Isauro Razo MD at [facility]       Immunization History:   Immunization History   Administered Date(s) Administered    COVID-19, PFIZER PURPLE top, DILUTE for use,

## 2025-02-05 NOTE — PROGRESS NOTES
Updated Dr. Dsouza of the following via perfect serve:  Patient SBP has been running soft today. Pt did recieve 2.5 at breakfast and lunch of midodrine. Pt creatinine 0.9. Pt RR at 24. Wanting to give pain medication, but concerned about bp. Pt only has oxy, sched tylenol. Can we change to tramadol? Pt has been lethargic at times, but painful. Please advise.    Awaiting response.

## 2025-02-05 NOTE — CARE COORDINATION
DISCHARGE PLANNING NOTE:    Writer is following for potential discharge to [facility]. Message placed to Carley with [place] to check authorization status. Authorization is pending at this time.     Writer met with pt for update, pt requests call placed to yovani Montano for update on pending insurance authorization. No further questions at this time.   Electronically signed by JEFFRY Tesfaye on 2/5/2025 at 12:27 PM    Call received call from Carley with [employer]. Availity portal showed authorization as pending, insurance claims authorization was approved or this pt. Per Carley, insurance has offered to extend this authorization through today. Perfectserve message placed to Dr. Dsouza for update and to request discharge order.    Perfectserve message placed to bedside RN Ayse requesting CARLOS to be signed and completed.  Electronically signed by JEFFRY Tesfaye on 2/5/2025 at 1:58 PM    Forms faxed to [employer] for scheduling.  Electronically signed by JEFFRY Tesfaye on 2/5/2025 at 2:35 PM    Call placed to pt yovani Montano to confirm transportation time. No option for voicemail, mailbox full. SMS notification sent.  Electronically signed by JEFFRY Tesfaye on 2/5/2025 at 2:58 PM

## 2025-02-05 NOTE — PROGRESS NOTES
[facility]  IN-PATIENT SERVICE  [facility]    PROGRESS NOTE             2/5/2025    12:06 PM    Name:   Hemanth Barrera  MRN:     [id number]     Acct:      [id number]   Room:   2036/2036-01   Day:  16  Admit Date:  1/20/2025  9:06 AM    PCP:  Neftaly Contreras MD  Code Status:  Full Code    Subjective:     C/C:   Chief Complaint   Patient presents with    Fall    Rib Pain (injury)     Brief History:     The patient is a 89 y.o. male who slipped and fell 01/19/2025 and landed on his left side, after which severe pain in his left side started to bother him. Denies loss of consciousness, numbness, weakness, headache, neck pain, back pain.     PMH: coronary artery disease with stent placement, afib, HFpEF with pacemaker, CKD stage IIIb, chronic anemia, abdominal hernia status post repair.     In the ER, he was stable. Hb 6.6 -- received RBC transfusion. CT head and C-spine showed no critical findings. CT chest whowed a large left pleural effusion with compressive atelectasis, segmented fractures of the left post erior 10th and 11th ribs. CT abdomen and pelvis revealed findings suggestive of ileus/obstruction.     Admitted for management of his multiple health issues.    2 units of RBCs transfused, Hb stabilized: 8.2. Evaluated by general surgery and pulmonology.     Medications:     Allergies:    Allergies   Allergen Reactions    Aspirin Other (See Comments)     Pt told not to take since he has only a partial stomach    Cyclobenzaprine Other (See Comments)     Pt stated heart palpitations and he felt funny    Ibuprofen Nausea Only    Azithromycin Nausea Only    Hydrocodone-Acetaminophen Nausea Only     per pt, he is having upset stomach when taking norco       Current Meds:   Scheduled Meds:    rivaroxaban  15 mg Oral BID WC    Followed by    [START ON 2/22/2025] rivaroxaban  20 mg Oral Dinner    midodrine  2.5 mg Oral TID WC    acetaminophen  1,000 mg Oral 3 times per

## 2025-02-05 NOTE — CARE COORDINATION
Transportation arranged for patient to go to [facility] at 1630 via [facility]. Facility informed. Bedside nurse informed.     Number for Report: [phone]  Electronically signed by JEFFRY Tesfaye on 2/5/2025 at 2:54 PM

## 2025-02-05 NOTE — PROGRESS NOTES
[facility] General Surgery   Lebron Roman MD, FACS  Molly Woodruff, APRN-CNP  [address], [address]  [place] [address]  P: [phone], F: [phone]    General and Robotic Surgery  Progress Note             PATIENT NAME: Hemanth Barrera   :  1935   MRN: [id number]   PCP:  Neftaly Contreras MD     TODAY'S DATE: 2025    89 y.o. male seen and examined at bedside on med surg unit earlier today. Patient continued to complain of left-sided rib pain, some SOB.  Not much of an appetite.  Has had a bowel movement, passing gas.  No fever or chills.    PAST MEDICAL HISTORY     Past Medical History:   Diagnosis Date    Acute inferolateral myocardial infarction (HCC) 2021    Arthritis     Atrial fibrillation (HCC)     CAD (coronary artery disease)     CHF (congestive heart failure) (HCC)     Glaucoma     Hx of blood clots     with hernia surgery    Hyperlipidemia     Hypertension     MI (myocardial infarction) (HCC)     NSTEMI (non-ST elevated myocardial infarction) (HCC) 2021       PROBLEM LIST     Patient Active Problem List   Diagnosis    On continuous oral anticoagulation    CHF, acute on chronic (HCC)    Hyperlipidemia    Former smoker    Pacemaker    History of DVT of lower extremity    Enlarged prostate    Cataract of both eyes    GERD (gastroesophageal reflux disease)    History of inguinal hernia repair    Hypertension    Pneumonia    History of right hip replacement    History of gastric surgery    Low back pain    Chest pain    Fluid overload    VARSHA (acute kidney injury) (HCC)    History of acute myocardial infarction    Chronic CHF (congestive heart failure) (HCC)    Unstable angina (HCC)    Acute on chronic diastolic congestive heart failure (HCC)    SBO (small bowel obstruction) (HCC)    Stage 3b chronic kidney disease (HCC)    Iron deficiency    Chronic systolic (congestive) heart failure    Chronic anemia    Bradycardia    Class 1 obesity    Degeneration of  diagnoses were also included in determining the time component.      Please note that portions of this note were completed with Dragon dictation, the computer voice recognition software.  Quite often unanticipated grammatical, syntax, homophones, and other interpretive errors are inadvertently transcribed by the computer software.  Please disregard these errors and any other errors that may have escaped final proofreading.     Electronically signed by DARCI Sanders CNP  on 2/4/2025 at 8:52 PM

## 2025-02-05 NOTE — PLAN OF CARE
Problem: Chronic Conditions and Co-morbidities  Goal: Patient's chronic conditions and co-morbidity symptoms are monitored and maintained or improved  Outcome: Adequate for Discharge     Problem: Discharge Planning  Goal: Discharge to home or other facility with appropriate resources  Outcome: Adequate for Discharge     Problem: Pain  Goal: Verbalizes/displays adequate comfort level or baseline comfort level  Outcome: Adequate for Discharge     Problem: Safety - Adult  Goal: Free from fall injury  Outcome: Adequate for Discharge  Flowsheets (Taken 2/5/2025 1226)  Free From Fall Injury: Instruct family/caregiver on patient safety     Problem: ABCDS Injury Assessment  Goal: Absence of physical injury  Outcome: Adequate for Discharge  Flowsheets (Taken 2/5/2025 1226)  Absence of Physical Injury: Implement safety measures based on patient assessment     Problem: Nutrition Deficit:  Goal: Optimize nutritional status  Outcome: Adequate for Discharge     Problem: Skin/Tissue Integrity  Goal: Absence of new skin breakdown  Description: 1.  Monitor for areas of redness and/or skin breakdown  2.  Assess vascular access sites hourly  3.  Every 4-6 hours minimum:  Change oxygen saturation probe site  4.  Every 4-6 hours:  If on nasal continuous positive airway pressure, respiratory therapy assess nares and determine need for appliance change or resting period.  Outcome: Adequate for Discharge     Problem: Respiratory - Adult  Goal: Achieves optimal ventilation and oxygenation  Outcome: Adequate for Discharge  Flowsheets (Taken 2/5/2025 0916)  Achieves optimal ventilation and oxygenation: Assess for changes in respiratory status     Problem: Cardiovascular - Adult  Goal: Maintains optimal cardiac output and hemodynamic stability  Outcome: Adequate for Discharge  Flowsheets (Taken 2/5/2025 0916)  Maintains optimal cardiac output and hemodynamic stability: Monitor blood pressure and heart rate     Problem: Skin/Tissue Integrity

## 2025-02-05 NOTE — PLAN OF CARE
Problem: Chronic Conditions and Co-morbidities  Goal: Patient's chronic conditions and co-morbidity symptoms are monitored and maintained or improved  2/5/2025 0155 by Yeison Parham RN  Outcome: Progressing     Problem: Discharge Planning  Goal: Discharge to home or other facility with appropriate resources  2/5/2025 0155 by Yeison Parham RN  Outcome: Progressing     Problem: Pain  Goal: Verbalizes/displays adequate comfort level or baseline comfort level  2/5/2025 0155 by Yeison Parham RN  Outcome: Progressing     Problem: Safety - Adult  Goal: Free from fall injury  2/5/2025 0155 by Yeison Parham RN  Outcome: Progressing     Problem: Skin/Tissue Integrity  Goal: Absence of new skin breakdown  Description: 1.  Monitor for areas of redness and/or skin breakdown  2.  Assess vascular access sites hourly  3.  Every 4-6 hours minimum:  Change oxygen saturation probe site  4.  Every 4-6 hours:  If on nasal continuous positive airway pressure, respiratory therapy assess nares and determine need for appliance change or resting period.  2/5/2025 0155 by Yeison Parham RN  Outcome: Progressing

## 2025-02-05 NOTE — PROGRESS NOTES
[facility]   Physical Therapy Treatment  Date: 25  Patient Name: Hemanth Barrera       Room:   MRN: [id number]  Account: [id number]   : 1935  (89 y.o.) Gender: male     Discharge Recommendations:  Patient would benefit from continued therapy after discharge, Therapy recommended at discharge     PT Equipment Recommendations  Equipment Needed: No    General  Chart Reviewed: Yes  Response To Previous Treatment: Patient with no complaints from previous session.  Family/Caregiver Present: No  Follows Commands: Within Functional Limits    Past Medical History:  has a past medical history of Acute inferolateral myocardial infarction (HCC), Arthritis, Atrial fibrillation ([facility]), CAD (coronary artery disease), CHF (congestive heart failure) ([facility]), Glaucoma, Hx of blood clots, Hyperlipidemia, Hypertension, MI (myocardial infarction) ([facility]), and NSTEMI (non-ST elevated myocardial infarction) ([facility]).  Past Surgical History:   has a past surgical history that includes pacemaker placement; Abdomen surgery; Cardiac catheterization; Appendectomy; Colonoscopy; eye surgery; hernia repair; bone marrow biopsy; joint replacement; CT BIOPSY BONE MARROW (2022); Upper gastrointestinal endoscopy (N/A, 2023); Colonoscopy (N/A, 8/3/2023); Cardiac procedure (N/A, 2024); Cardiac procedure (N/A, 2024); Cardiac procedure (Right, 2025); and invasive vascular (N/A, 2025).    Restrictions  Restrictions/Precautions  Restrictions/Precautions: General Precautions, Fall Risk  Required Braces or Orthoses?: No  Implants Present? :  (R AGUSTO, L TKA)  Position Activity Restriction  Other Position/Activity Restrictions: L posterior 10th-11th rib fx s/p fall     Subjective      General  General Comments: Pt resting in bed upon entering room, nursing okay'd PT.     Pain  Pre-Pain: 7  Post-Pain: 7  Pain Location:  (Ribs)  Pain Descriptor: Sharp     Objective           Vitals  Pulse: 70  Heart  Rate Source: Monitor  SpO2: 96 %  O2 Device: None (Room air)  BP: 104/65  MAP (Calculated): 78  Comment: Pt c/o feeling dizzy upon sitting EOB, returned to supine vitals taken, nursing was notified.    Transfers  Comment: unable to complete d/t increased dizziness sitting EOB, vitals taken.     Mobility      Ambulation did not occur d/t c/o dizziness.    Bed Mobility  Rolling to Left: Minimal assistance  Supine to Sit: Minimal assistance (HOB elevated, slow movements d/t increased pain when sitting, ribs.)  Sit to Supine: Minimal assistance  (assist for LE progression onto bed.)  Scooting: Stand by assistance (scooting hips to EOB, scooting up in bed c bed flat.)         PT Exercises  Static Sitting Balance Exercises: Pt was able to sit EOB x 15 minutes c SBA for seated balance, c/o feeling dizzy c increased seated time, dizziness did dissipate returned to supine.  Dynamic Sitting Balance Exercises: donning/doffing gown MAX A for placement.       Decision Making: Medium Complexity  History: Fall with Rib Fxs  Exam: decreased balance, endurance, mobility; increased pain    Activity Tolerance: Treatment limited secondary to medical complications (low BP, nursing notified)     Patient Education  Education Given To: Patient  Education Provided: Role of Therapy, Mobility Training  Education Method: Verbal  Barriers to Learning: None  Education Outcome: Continued education needed     Functional Outcome Measures  AM-PAC Basic Mobility - Inpatient   How much help is needed turning from your back to your side while in a flat bed without using bedrails?: A Little  How much help is needed moving from lying on your back to sitting on the side of a flat bed without using bedrails?: A Little  How much help is needed moving to and from a bed to a chair?: A Lot  How much help is needed standing up from a chair using your arms?: A Lot  How much help is needed walking in hospital room?: A Lot  How much help is needed climbing 3-5

## 2025-02-06 ENCOUNTER — TELEPHONE (OUTPATIENT)
Dept: ONCOLOGY | Age: 89
End: 2025-02-06

## 2025-02-06 NOTE — PROGRESS NOTES
[facility] General Surgery   Lebron Roman MD, FACS  Molly Larary, APRN-CNP  [address], [address]  [place] [address]  P: [phone], F: [phone]    General and Robotic Surgery  Progress Note             PATIENT NAME: Hemanth Barrera   :  1935   MRN: [id number]   PCP:  Neftaly Contreras MD     TODAY'S DATE: 2025    89 y.o. male seen and examined at bedside on med surg unit earlier today. Patient continued to complain of left-sided rib pain, some SOB.  Not much of an appetite.  Has had a bowel movement, passing gas.  No fever or chills. Please note this is a late entry and patient has since been discharged.    PAST MEDICAL HISTORY     Past Medical History:   Diagnosis Date    Acute inferolateral myocardial infarction (HCC) 2021    Arthritis     Atrial fibrillation (HCC)     CAD (coronary artery disease)     CHF (congestive heart failure) (HCC)     Glaucoma     Hx of blood clots     with hernia surgery    Hyperlipidemia     Hypertension     MI (myocardial infarction) (HCC)     NSTEMI (non-ST elevated myocardial infarction) (HCC) 2021       PROBLEM LIST     Patient Active Problem List   Diagnosis    On continuous oral anticoagulation    CHF, acute on chronic (HCC)    Hyperlipidemia    Former smoker    Pacemaker    History of DVT of lower extremity    Enlarged prostate    Cataract of both eyes    GERD (gastroesophageal reflux disease)    History of inguinal hernia repair    Hypertension    Pneumonia    History of right hip replacement    History of gastric surgery    Low back pain    Chest pain    Fluid overload    VARSHA (acute kidney injury) (HCC)    History of acute myocardial infarction    Chronic CHF (congestive heart failure) (HCC)    Unstable angina (HCC)    Acute on chronic diastolic congestive heart failure (HCC)    SBO (small bowel obstruction) ([facility])    Stage 3b chronic kidney disease (HCC)    Iron deficiency    Chronic systolic (congestive) heart failure     (Principal) Traumatic closed displaced fracture of rib on left side, initial encounter 1/20/2025 Yes    Cardiac pacemaker in situ 1/30/2025 Yes    PAF (paroxysmal atrial fibrillation) ([facility]) 1/30/2025 Yes    Multiple closed fractures of ribs of left side 1/20/2025 Yes    Pleural effusion 1/20/2025 Yes    Anemia 1/20/2025 Yes    Fall 1/20/2025 Yes    Moderate malnutrition (HCC) 1/21/2025 Yes    Abnormal pleural fluid 1/25/2025 Yes    Acute deep vein thrombosis (DVT) of axillary vein of left upper extremity ([facility]) 1/29/2025 Yes         ASSESSMENT   89 y.o. male with history of fall left-sided rib fractures  Patient diagnosed with multiple left upper extremity DVTs  On Plavix and Xarelto  Hemothorax status post thoracentesis x2  CT abdomen pelvis completed last week revealed no hematoma within the abdomen or pelvis.  Trace right and small left pleural effusions with associated bilateral lower lobe atelectasis and/or consolidation.  Cardiomegaly.  Lab work from today is reviewed.  WBC 3.8.  Hemoglobin 8.6.  Platelets 313.  Sodium 134.  Potassium 4.3.  Creatinine 0.9.  Chest x-ray completed yesterday reveals mildly displaced fracture of the left rib.  There is a small left pleural effusion with left basilar airspace disease which could reflect atelectasis or pneumonia.  Vital signs stable, afebrile  SpO2 has been stable on room air    PLAN  Patient is surgically stable for discharge, late entry, patient has since been d/c'd  Diet as tolerated.  Pulmonary toilet.  Pain control.  Lidoderm patch.  Incentive spirometry.  Deep breathe and cough.  Continuous pulse ox  Continue medical management per admitting service, consultants    The patient, Hemanth Barrera is a 89 y.o. male, was seen with a total time spent of 35 minutes for the visit on this date of service by the E/M provider. The time component had both face to face and non face to face time spent in determining the total time component.  Counseling and education

## 2025-02-06 NOTE — CARE COORDINATION
Writer called luis antonio at [phone] to see why pre cert was not showing as approved in avality.  Spoke with Mana.  She is unable to see why it is not showing and what date it was approved.  She just shows it  25.  The Approval number is [id number].  Mana referred writer to Krystyna ERINA at extension [phone], however her voice mail message says she is off work until 25.  Was referred to a Utilization RN  Sona Wiley at ext [phone], and reached her voice mail message.  Writer left a message asking to call writer back.,  Waiting on return call.    Electronically signed by Britni Goncalves RN on 2025 at 12:24 PM       We received support ticket number [id number], that was recently submitted to [employer] by you or a colleague that included you as a reference.     To view the details or status of this ticket, please visit My [employer] Support Ticket     My Tracking Number:    Primary Contact: Britni Goncalves  Account: [facility]    Electronically signed by Britni Goncalves RN on 2025 at 1:28 PM

## 2025-02-07 ENCOUNTER — TELEPHONE (OUTPATIENT)
Age: 89
End: 2025-02-07

## 2025-02-12 DIAGNOSIS — N40.0 ENLARGED PROSTATE: ICD-10-CM

## 2025-02-12 RX ORDER — FINASTERIDE 5 MG/1
5 TABLET, FILM COATED ORAL DAILY
Qty: 90 TABLET | Refills: 1 | Status: SHIPPED | OUTPATIENT
Start: 2025-02-12
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
## 2025-02-23 NOTE — DISCHARGE SUMMARY
[facility] Internal Medicine    Raf Roque MD; Neftaly Contreras MD, Terrell De Los Santos MD, Sana Haskins MD,Dr. SHAYLA Tomlinson MD. ; Kaitlin Dsouza MD      [facility] Internal Medicine  IN-PATIENT SERVICE   [facility]    Discharge Summary     Patient ID: Hemanth Barrera  :  1935   MRN: [id number]     ACCOUNT:  [id number]   Patient's PCP: Neftaly Contreras MD  Admit Date: 2025   Discharge Date: 2025     Length of Stay: 16  Code Status:  Prior  Admitting Physician: Kaitlin Dsouza MD  Discharge Physician: Kaitlin Dsouza MD     Active Discharge Diagnoses:     Hospital Problem Lists:  Principal Problem:    Traumatic closed displaced fracture of rib on left side, initial encounter  Active Problems:    Cardiac pacemaker in situ    PAF (paroxysmal atrial fibrillation) ([facility])    Multiple closed fractures of ribs of left side    Pleural effusion    Anemia    Moderate malnutrition    Abnormal pleural fluid    Acute deep vein thrombosis (DVT) of axillary vein of left upper extremity ([facility])  Resolved Problems:    Fall      Admission Condition:  Serious      Discharged Condition: Stable     Hospital Stay:     Hospital Course:    89-year-old male who slipped and fell on 2025 and got admitted to [facility] on 2025. Trauma workup was done which showed CT chest showing left-sided pleural effusion and concerns of fracture on the ribs. Hemoglobin was 6.6 for which he received transfusion. S/p thoracentesis bloody pleural fluid. Significant past medical history of CAD s/p left heart cath 2024 with RAHUL placement, initially Plavix on hold, resumed. Multiple upper extremity DVT with PE, initially on heparin drip, switched to Xarelto, vascular surgery was consulted they recommended conservative management with blood thinners. Does have history of sick sinus syndrome s/p PPM. Was admitted to ICU , transferred out further   Rib fractures with hemothorax to the left side  Status

## 2025-03-04 ENCOUNTER — HOSPITAL ENCOUNTER (INPATIENT)
Age: 89
LOS: 7 days | Discharge: NURSING, REHAB OR LONG-TERM CARE | End: 2025-03-12
Attending: EMERGENCY MEDICINE | Admitting: STUDENT IN AN ORGANIZED HEALTH CARE EDUCATION/TRAINING PROGRAM
Payer: MEDICARE

## 2025-03-04 DIAGNOSIS — K92.2 GASTROINTESTINAL HEMORRHAGE, UNSPECIFIED GASTROINTESTINAL HEMORRHAGE TYPE: Primary | ICD-10-CM

## 2025-03-04 PROCEDURE — 99285 EMERGENCY DEPT VISIT HI MDM: CPT

## 2025-03-04 PROCEDURE — 86901 BLOOD TYPING SEROLOGIC RH(D): CPT

## 2025-03-04 PROCEDURE — 86850 RBC ANTIBODY SCREEN: CPT

## 2025-03-04 PROCEDURE — 93005 ELECTROCARDIOGRAM TRACING: CPT | Performed by: EMERGENCY MEDICINE

## 2025-03-04 PROCEDURE — 86923 COMPATIBILITY TEST ELECTRIC: CPT

## 2025-03-04 PROCEDURE — 86920 COMPATIBILITY TEST SPIN: CPT

## 2025-03-04 PROCEDURE — 85025 COMPLETE CBC W/AUTO DIFF WBC: CPT

## 2025-03-04 PROCEDURE — 82533 TOTAL CORTISOL: CPT

## 2025-03-04 PROCEDURE — 86900 BLOOD TYPING SEROLOGIC ABO: CPT

## 2025-03-04 PROCEDURE — 80048 BASIC METABOLIC PNL TOTAL CA: CPT

## 2025-03-04 PROCEDURE — 36415 COLL VENOUS BLD VENIPUNCTURE: CPT

## 2025-03-04 PROCEDURE — 85610 PROTHROMBIN TIME: CPT

## 2025-03-04 PROCEDURE — 80076 HEPATIC FUNCTION PANEL: CPT

## 2025-03-04 ASSESSMENT — PAIN DESCRIPTION - LOCATION: LOCATION: BUTTOCKS

## 2025-03-04 ASSESSMENT — PAIN - FUNCTIONAL ASSESSMENT: PAIN_FUNCTIONAL_ASSESSMENT: 0-10

## 2025-03-04 ASSESSMENT — PAIN SCALES - GENERAL: PAINLEVEL_OUTOF10: 9

## 2025-03-04 ASSESSMENT — PAIN DESCRIPTION - DESCRIPTORS: DESCRIPTORS: SORE

## 2025-03-05 ENCOUNTER — APPOINTMENT (OUTPATIENT)
Dept: GENERAL RADIOLOGY | Age: 89
End: 2025-03-05
Payer: MEDICARE

## 2025-03-05 ENCOUNTER — ANESTHESIA EVENT (OUTPATIENT)
Dept: OPERATING ROOM | Age: 89
End: 2025-03-05
Payer: MEDICARE

## 2025-03-05 ENCOUNTER — APPOINTMENT (OUTPATIENT)
Dept: CT IMAGING | Age: 89
End: 2025-03-05
Payer: MEDICARE

## 2025-03-05 ENCOUNTER — HOSPITAL ENCOUNTER (OUTPATIENT)
Dept: INFUSION THERAPY | Age: 89
End: 2025-03-05

## 2025-03-05 PROBLEM — K92.2 GI BLEED: Status: ACTIVE | Noted: 2025-03-05

## 2025-03-05 LAB
ALBUMIN SERPL-MCNC: 2.9 G/DL (ref 3.5–5.2)
ALBUMIN/GLOB SERPL: 1 {RATIO} (ref 1–2.5)
ALP SERPL-CCNC: 78 U/L (ref 40–129)
ALT SERPL-CCNC: 9 U/L (ref 5–41)
ANION GAP SERPL CALCULATED.3IONS-SCNC: 10 MMOL/L (ref 9–17)
AST SERPL-CCNC: 14 U/L
BASOPHILS # BLD: 0.04 K/UL (ref 0–0.2)
BASOPHILS NFR BLD: 1 % (ref 0–2)
BILIRUB DIRECT SERPL-MCNC: <0.1 MG/DL
BILIRUB INDIRECT SERPL-MCNC: ABNORMAL MG/DL (ref 0–1)
BILIRUB SERPL-MCNC: 0.2 MG/DL (ref 0.3–1.2)
BUN SERPL-MCNC: 47 MG/DL (ref 8–23)
CALCIUM SERPL-MCNC: 8.2 MG/DL (ref 8.6–10.4)
CHLORIDE SERPL-SCNC: 103 MMOL/L (ref 98–107)
CO2 SERPL-SCNC: 24 MMOL/L (ref 20–31)
CORTIS SERPL-MCNC: 13.8 UG/DL (ref 2.5–19.5)
CORTISOL COLLECTION INFO: NORMAL
CREAT SERPL-MCNC: 1.4 MG/DL (ref 0.7–1.2)
EOSINOPHIL # BLD: 0.04 K/UL (ref 0–0.4)
EOSINOPHILS RELATIVE PERCENT: 1 % (ref 1–4)
ERYTHROCYTE [DISTWIDTH] IN BLOOD BY AUTOMATED COUNT: 22.3 % (ref 12.5–15.4)
GFR, ESTIMATED: 48 ML/MIN/1.73M2
GLUCOSE SERPL-MCNC: 98 MG/DL (ref 70–99)
HCT VFR BLD AUTO: 14.4 % (ref 41–53)
HCT VFR BLD AUTO: 17 % (ref 41–53)
HCT VFR BLD AUTO: 24.3 % (ref 41–53)
HCT VFR BLD AUTO: 26.2 % (ref 41–53)
HGB BLD-MCNC: 4.5 G/DL (ref 13.5–17.5)
HGB BLD-MCNC: 5.6 G/DL (ref 13.5–17.5)
HGB BLD-MCNC: 8.2 G/DL (ref 13.5–17.5)
HGB BLD-MCNC: 8.9 G/DL (ref 13.5–17.5)
INR PPP: 1.5 (ref 0.9–1.2)
LYMPHOCYTES NFR BLD: 0.65 K/UL (ref 1–4.8)
LYMPHOCYTES RELATIVE PERCENT: 15 % (ref 24–44)
MCH RBC QN AUTO: 31.6 PG (ref 26–34)
MCHC RBC AUTO-ENTMCNC: 31.6 G/DL (ref 31–37)
MCV RBC AUTO: 99.9 FL (ref 80–100)
MONOCYTES NFR BLD: 0.3 K/UL (ref 0.1–1.2)
MONOCYTES NFR BLD: 7 % (ref 2–11)
MORPHOLOGY: ABNORMAL
NEUTROPHILS NFR BLD: 76 % (ref 36–66)
NEUTS SEG NFR BLD: 3.27 K/UL (ref 1.8–7.7)
PLATELET # BLD AUTO: 277 K/UL (ref 140–450)
PMV BLD AUTO: 7.2 FL (ref 6–12)
POTASSIUM SERPL-SCNC: 4.7 MMOL/L (ref 3.7–5.3)
PROT SERPL-MCNC: 5.7 G/DL (ref 6.4–8.3)
PROTHROMBIN TIME: 17.5 SEC (ref 11.8–14.6)
RBC # BLD AUTO: 1.44 M/UL (ref 4.5–5.9)
SODIUM SERPL-SCNC: 137 MMOL/L (ref 135–144)
WBC OTHER # BLD: 4.3 K/UL (ref 3.5–11)

## 2025-03-05 PROCEDURE — 74176 CT ABD & PELVIS W/O CONTRAST: CPT

## 2025-03-05 PROCEDURE — 2500000003 HC RX 250 WO HCPCS: Performed by: INTERNAL MEDICINE

## 2025-03-05 PROCEDURE — 71045 X-RAY EXAM CHEST 1 VIEW: CPT

## 2025-03-05 PROCEDURE — 6370000000 HC RX 637 (ALT 250 FOR IP): Performed by: NURSE PRACTITIONER

## 2025-03-05 PROCEDURE — 99223 1ST HOSP IP/OBS HIGH 75: CPT | Performed by: INTERNAL MEDICINE

## 2025-03-05 PROCEDURE — 85014 HEMATOCRIT: CPT

## 2025-03-05 PROCEDURE — 2060000000 HC ICU INTERMEDIATE R&B

## 2025-03-05 PROCEDURE — 2580000003 HC RX 258: Performed by: STUDENT IN AN ORGANIZED HEALTH CARE EDUCATION/TRAINING PROGRAM

## 2025-03-05 PROCEDURE — P9016 RBC LEUKOCYTES REDUCED: HCPCS

## 2025-03-05 PROCEDURE — 2500000003 HC RX 250 WO HCPCS: Performed by: STUDENT IN AN ORGANIZED HEALTH CARE EDUCATION/TRAINING PROGRAM

## 2025-03-05 PROCEDURE — 6370000000 HC RX 637 (ALT 250 FOR IP): Performed by: INTERNAL MEDICINE

## 2025-03-05 PROCEDURE — 6360000002 HC RX W HCPCS: Performed by: STUDENT IN AN ORGANIZED HEALTH CARE EDUCATION/TRAINING PROGRAM

## 2025-03-05 PROCEDURE — P9040 RBC LEUKOREDUCED IRRADIATED: HCPCS

## 2025-03-05 PROCEDURE — 85018 HEMOGLOBIN: CPT

## 2025-03-05 PROCEDURE — 36430 TRANSFUSION BLD/BLD COMPNT: CPT

## 2025-03-05 PROCEDURE — APPNB60 APP NON BILLABLE TIME 46-60 MINS: Performed by: NURSE PRACTITIONER

## 2025-03-05 PROCEDURE — 36415 COLL VENOUS BLD VENIPUNCTURE: CPT

## 2025-03-05 PROCEDURE — 6370000000 HC RX 637 (ALT 250 FOR IP): Performed by: STUDENT IN AN ORGANIZED HEALTH CARE EDUCATION/TRAINING PROGRAM

## 2025-03-05 PROCEDURE — 30233N1 TRANSFUSION OF NONAUTOLOGOUS RED BLOOD CELLS INTO PERIPHERAL VEIN, PERCUTANEOUS APPROACH: ICD-10-PCS | Performed by: INTERNAL MEDICINE

## 2025-03-05 RX ORDER — OXYCODONE HYDROCHLORIDE 5 MG/1
5 TABLET ORAL EVERY 4 HOURS PRN
Status: DISCONTINUED | OUTPATIENT
Start: 2025-03-05 | End: 2025-03-12 | Stop reason: HOSPADM

## 2025-03-05 RX ORDER — ISOSORBIDE MONONITRATE 30 MG/1
30 TABLET, EXTENDED RELEASE ORAL DAILY
Status: DISCONTINUED | OUTPATIENT
Start: 2025-03-05 | End: 2025-03-12 | Stop reason: HOSPADM

## 2025-03-05 RX ORDER — ERGOCALCIFEROL 1.25 MG/1
50000 CAPSULE, LIQUID FILLED ORAL WEEKLY
Status: DISCONTINUED | OUTPATIENT
Start: 2025-03-05 | End: 2025-03-05

## 2025-03-05 RX ORDER — ACETAMINOPHEN 325 MG/1
650 TABLET ORAL EVERY 6 HOURS PRN
Status: DISCONTINUED | OUTPATIENT
Start: 2025-03-05 | End: 2025-03-12 | Stop reason: HOSPADM

## 2025-03-05 RX ORDER — SODIUM CHLORIDE 0.9 % (FLUSH) 0.9 %
5-40 SYRINGE (ML) INJECTION PRN
Status: DISCONTINUED | OUTPATIENT
Start: 2025-03-05 | End: 2025-03-12 | Stop reason: HOSPADM

## 2025-03-05 RX ORDER — SODIUM CHLORIDE 0.9 % (FLUSH) 0.9 %
5-40 SYRINGE (ML) INJECTION EVERY 12 HOURS SCHEDULED
Status: DISCONTINUED | OUTPATIENT
Start: 2025-03-05 | End: 2025-03-12 | Stop reason: HOSPADM

## 2025-03-05 RX ORDER — SODIUM CHLORIDE 9 MG/ML
INJECTION, SOLUTION INTRAVENOUS PRN
Status: DISCONTINUED | OUTPATIENT
Start: 2025-03-05 | End: 2025-03-09

## 2025-03-05 RX ORDER — MAGNESIUM SULFATE IN WATER 40 MG/ML
2000 INJECTION, SOLUTION INTRAVENOUS PRN
Status: DISCONTINUED | OUTPATIENT
Start: 2025-03-05 | End: 2025-03-12 | Stop reason: HOSPADM

## 2025-03-05 RX ORDER — SODIUM CHLORIDE 0.9 % (FLUSH) 0.9 %
5-40 SYRINGE (ML) INJECTION EVERY 12 HOURS SCHEDULED
Status: CANCELLED | OUTPATIENT
Start: 2025-03-05

## 2025-03-05 RX ORDER — SODIUM CHLORIDE 9 MG/ML
INJECTION, SOLUTION INTRAVENOUS PRN
Status: DISCONTINUED | OUTPATIENT
Start: 2025-03-05 | End: 2025-03-12 | Stop reason: HOSPADM

## 2025-03-05 RX ORDER — NITROGLYCERIN 0.4 MG/1
0.4 TABLET SUBLINGUAL EVERY 5 MIN PRN
Status: DISCONTINUED | OUTPATIENT
Start: 2025-03-05 | End: 2025-03-12 | Stop reason: HOSPADM

## 2025-03-05 RX ORDER — FINASTERIDE 5 MG/1
5 TABLET, FILM COATED ORAL DAILY
Status: DISCONTINUED | OUTPATIENT
Start: 2025-03-05 | End: 2025-03-12 | Stop reason: HOSPADM

## 2025-03-05 RX ORDER — POTASSIUM CHLORIDE 1500 MG/1
40 TABLET, EXTENDED RELEASE ORAL PRN
Status: DISCONTINUED | OUTPATIENT
Start: 2025-03-05 | End: 2025-03-12 | Stop reason: HOSPADM

## 2025-03-05 RX ORDER — ATORVASTATIN CALCIUM 40 MG/1
40 TABLET, FILM COATED ORAL NIGHTLY
Status: DISCONTINUED | OUTPATIENT
Start: 2025-03-05 | End: 2025-03-12 | Stop reason: HOSPADM

## 2025-03-05 RX ORDER — SODIUM CHLORIDE, SODIUM LACTATE, POTASSIUM CHLORIDE, CALCIUM CHLORIDE 600; 310; 30; 20 MG/100ML; MG/100ML; MG/100ML; MG/100ML
INJECTION, SOLUTION INTRAVENOUS CONTINUOUS
Status: CANCELLED | OUTPATIENT
Start: 2025-03-05

## 2025-03-05 RX ORDER — METOPROLOL SUCCINATE 100 MG/1
100 TABLET, EXTENDED RELEASE ORAL DAILY
Status: DISCONTINUED | OUTPATIENT
Start: 2025-03-05 | End: 2025-03-12 | Stop reason: HOSPADM

## 2025-03-05 RX ORDER — POLYETHYLENE GLYCOL 3350 17 G/17G
17 POWDER, FOR SOLUTION ORAL DAILY PRN
Status: DISCONTINUED | OUTPATIENT
Start: 2025-03-05 | End: 2025-03-12 | Stop reason: HOSPADM

## 2025-03-05 RX ORDER — LATANOPROST 50 UG/ML
1 SOLUTION/ DROPS OPHTHALMIC NIGHTLY
Status: DISCONTINUED | OUTPATIENT
Start: 2025-03-05 | End: 2025-03-12 | Stop reason: HOSPADM

## 2025-03-05 RX ORDER — ASPIRIN 81 MG/1
81 TABLET, CHEWABLE ORAL DAILY
Status: DISCONTINUED | OUTPATIENT
Start: 2025-03-05 | End: 2025-03-05

## 2025-03-05 RX ORDER — BUMETANIDE 1 MG/1
2 TABLET ORAL DAILY
Status: DISCONTINUED | OUTPATIENT
Start: 2025-03-05 | End: 2025-03-12 | Stop reason: HOSPADM

## 2025-03-05 RX ORDER — ACETAMINOPHEN 325 MG/1
650 TABLET ORAL EVERY 6 HOURS PRN
Status: DISCONTINUED | OUTPATIENT
Start: 2025-03-05 | End: 2025-03-07

## 2025-03-05 RX ORDER — SODIUM CHLORIDE 0.9 % (FLUSH) 0.9 %
5-40 SYRINGE (ML) INJECTION PRN
Status: CANCELLED | OUTPATIENT
Start: 2025-03-05

## 2025-03-05 RX ORDER — PANTOPRAZOLE SODIUM 40 MG/1
40 TABLET, DELAYED RELEASE ORAL
Status: DISCONTINUED | OUTPATIENT
Start: 2025-03-05 | End: 2025-03-05

## 2025-03-05 RX ORDER — DILTIAZEM HYDROCHLORIDE 120 MG/1
120 CAPSULE, COATED, EXTENDED RELEASE ORAL DAILY
Status: DISCONTINUED | OUTPATIENT
Start: 2025-03-05 | End: 2025-03-12 | Stop reason: HOSPADM

## 2025-03-05 RX ORDER — CLOPIDOGREL BISULFATE 75 MG/1
75 TABLET ORAL DAILY
Status: DISCONTINUED | OUTPATIENT
Start: 2025-03-05 | End: 2025-03-05

## 2025-03-05 RX ORDER — FERROUS SULFATE 325(65) MG
325 TABLET, DELAYED RELEASE (ENTERIC COATED) ORAL 2 TIMES DAILY WITH MEALS
Status: DISCONTINUED | OUTPATIENT
Start: 2025-03-05 | End: 2025-03-05

## 2025-03-05 RX ORDER — POTASSIUM CHLORIDE 7.45 MG/ML
10 INJECTION INTRAVENOUS PRN
Status: DISCONTINUED | OUTPATIENT
Start: 2025-03-05 | End: 2025-03-12 | Stop reason: HOSPADM

## 2025-03-05 RX ORDER — SODIUM CHLORIDE 9 MG/ML
INJECTION, SOLUTION INTRAVENOUS PRN
Status: CANCELLED | OUTPATIENT
Start: 2025-03-05

## 2025-03-05 RX ORDER — MIDODRINE HYDROCHLORIDE 2.5 MG/1
2.5 TABLET ORAL
Status: DISCONTINUED | OUTPATIENT
Start: 2025-03-05 | End: 2025-03-12 | Stop reason: HOSPADM

## 2025-03-05 RX ORDER — LIDOCAINE HYDROCHLORIDE 10 MG/ML
1 INJECTION, SOLUTION EPIDURAL; INFILTRATION; INTRACAUDAL; PERINEURAL
Status: CANCELLED | OUTPATIENT
Start: 2025-03-05 | End: 2025-03-06

## 2025-03-05 RX ORDER — ONDANSETRON 4 MG/1
4 TABLET, ORALLY DISINTEGRATING ORAL EVERY 8 HOURS PRN
Status: DISCONTINUED | OUTPATIENT
Start: 2025-03-05 | End: 2025-03-12 | Stop reason: HOSPADM

## 2025-03-05 RX ORDER — ACETAMINOPHEN 650 MG/1
650 SUPPOSITORY RECTAL EVERY 6 HOURS PRN
Status: DISCONTINUED | OUTPATIENT
Start: 2025-03-05 | End: 2025-03-12 | Stop reason: HOSPADM

## 2025-03-05 RX ORDER — ONDANSETRON 2 MG/ML
4 INJECTION INTRAMUSCULAR; INTRAVENOUS EVERY 6 HOURS PRN
Status: DISCONTINUED | OUTPATIENT
Start: 2025-03-05 | End: 2025-03-12 | Stop reason: HOSPADM

## 2025-03-05 RX ADMIN — PANTOPRAZOLE SODIUM 8 MG/HR: 40 INJECTION, POWDER, FOR SOLUTION INTRAVENOUS at 09:25

## 2025-03-05 RX ADMIN — DILTIAZEM HYDROCHLORIDE 120 MG: 120 CAPSULE, COATED, EXTENDED RELEASE ORAL at 13:18

## 2025-03-05 RX ADMIN — MIDODRINE HYDROCHLORIDE 2.5 MG: 2.5 TABLET ORAL at 17:32

## 2025-03-05 RX ADMIN — OXYCODONE HYDROCHLORIDE 5 MG: 5 TABLET ORAL at 01:03

## 2025-03-05 RX ADMIN — ATORVASTATIN CALCIUM 40 MG: 40 TABLET, FILM COATED ORAL at 20:16

## 2025-03-05 RX ADMIN — BUMETANIDE 2 MG: 1 TABLET ORAL at 13:19

## 2025-03-05 RX ADMIN — MIDODRINE HYDROCHLORIDE 2.5 MG: 2.5 TABLET ORAL at 13:18

## 2025-03-05 RX ADMIN — METOPROLOL SUCCINATE 100 MG: 100 TABLET, EXTENDED RELEASE ORAL at 13:18

## 2025-03-05 RX ADMIN — ACETAMINOPHEN 650 MG: 325 TABLET ORAL at 18:47

## 2025-03-05 RX ADMIN — SODIUM CHLORIDE, PRESERVATIVE FREE 10 ML: 5 INJECTION INTRAVENOUS at 20:16

## 2025-03-05 RX ADMIN — FINASTERIDE 5 MG: 5 TABLET, FILM COATED ORAL at 13:18

## 2025-03-05 RX ADMIN — OXYCODONE HYDROCHLORIDE 5 MG: 5 TABLET ORAL at 17:32

## 2025-03-05 RX ADMIN — OXYCODONE HYDROCHLORIDE 5 MG: 5 TABLET ORAL at 05:25

## 2025-03-05 RX ADMIN — SODIUM CHLORIDE, PRESERVATIVE FREE 80 MG: 5 INJECTION INTRAVENOUS at 07:26

## 2025-03-05 RX ADMIN — PANTOPRAZOLE SODIUM 8 MG/HR: 40 INJECTION, POWDER, FOR SOLUTION INTRAVENOUS at 18:43

## 2025-03-05 RX ADMIN — ISOSORBIDE MONONITRATE 30 MG: 30 TABLET, EXTENDED RELEASE ORAL at 13:19

## 2025-03-05 RX ADMIN — SODIUM CHLORIDE, PRESERVATIVE FREE 10 ML: 5 INJECTION INTRAVENOUS at 07:28

## 2025-03-05 ASSESSMENT — PAIN SCALES - GENERAL
PAINLEVEL_OUTOF10: 3
PAINLEVEL_OUTOF10: 9
PAINLEVEL_OUTOF10: 7
PAINLEVEL_OUTOF10: 3

## 2025-03-05 ASSESSMENT — PAIN DESCRIPTION - LOCATION: LOCATION: BUTTOCKS

## 2025-03-05 NOTE — CONSENT
Informed Consent for Blood Component Transfusion Note    I have discussed with the patient the rationale for blood component transfusion; its benefits in treating or preventing fatigue, organ damage, or death; and its risk which includes mild transfusion reactions, rare risk of blood borne infection, or more serious but rare reactions. I have discussed the alternatives to transfusion, including the risk and consequences of not receiving transfusion. The patient had an opportunity to ask questions and had agreed to proceed with transfusion of blood components.    Electronically signed by Govind Waller III, MD on 3/5/25 at 12:10 AM EST

## 2025-03-05 NOTE — ED PROVIDER NOTES
eMERGENCY dEPARTMENT eNCOUnter      Pt Name: Hemanth Barrera  MRN: [id number]  Birthdate 1/13/1935  Date of evaluation: 3/4/2025      CHIEF COMPLAINT       Chief Complaint   Patient presents with    abnormal lab     BIB EMS from [facility]; per facility, hemoglobin of 4.4 from routine labs today; facility did not provide lab results to EMS          HISTORY OF PRESENT ILLNESS    Hemanth Barrera is a 89 y.o. male who presents to the emergency department by EMS from [facility] per the facility with a hemoglobin of 4.4.  However the the facility did not have current labs to prove that provide to EMS the laboratory draw they have is several weeks old.  Patient denies any chest pain he does look frail and weak.    REVIEW OF SYSTEMS     Constitutional: No fevers or chills  HEENT: No sore throat, rhinorrhea, or earache  Eyes: No blurry vision or double vision no drainage  Cardiovascular: No chest pain or tachycardia  Respiratory: No wheezing or shortness of breath no cough  Gastrointestinal: No nausea, vomiting, diarrhea, constipation, or abdominal pain   : No hematuria or dysuria  Musculoskeletal: No swelling or pain  Skin: No rash   Neurological: No focal neurologic complaints, paresthesias, weakness, or headache    PAST MEDICAL HISTORY    has a past medical history of Acute inferolateral myocardial infarction (HCC), Arthritis, Atrial fibrillation ([facility]), CAD (coronary artery disease), CHF (congestive heart failure) ([facility]), Glaucoma, Hx of blood clots, Hyperlipidemia, Hypertension, MI (myocardial infarction) ([facility]), and NSTEMI (non-ST elevated myocardial infarction) ([facility]).    SURGICAL HISTORY      has a past surgical history that includes pacemaker placement; Abdomen surgery; Cardiac catheterization; Appendectomy; Colonoscopy; eye surgery; hernia repair; bone marrow biopsy; joint replacement; CT BIOPSY BONE MARROW (5/31/2022); Upper gastrointestinal endoscopy (N/A, 8/2/2023); Colonoscopy (N/A, 8/3/2023);  tablet Take 2 tablets by mouth every 6 hours as needed for PainHistorical Med      buprenorphine-naloxone (SUBOXONE) 8-2 MG FILM SL film Place 1 Film under the tongue 2 times daily. Indications: painHistorical Med      latanoprost (XALATAN) 0.005 % ophthalmic solution Place 1 drop into both eyes nightlyHistorical Med             ALLERGIES     is allergic to aspirin, cyclobenzaprine, ibuprofen, azithromycin, and hydrocodone-acetaminophen.    FAMILY HISTORY     He indicated that the status of his mother is unknown. He indicated that the status of his father is unknown.     family history includes Heart Failure in his father and mother.    SOCIAL HISTORY      reports that he quit smoking about 35 years ago. His smoking use included cigarettes. He has never used smokeless tobacco. He reports that he does not drink alcohol and does not use drugs.    PHYSICAL EXAM     INITIAL VITALS:  height is 1.854 m (6' 1\") and weight is 61.5 kg (135 lb 9.3 oz). His oral temperature is 97.7 °F (36.5 °C). His blood pressure is 99/76 and his pulse is 70. His respiration is 16 and oxygen saturation is 99%.   Physical Exam  Vitals reviewed.   Constitutional:       General: He is in acute distress.      Appearance: He is not toxic-appearing.   HENT:      Head: Normocephalic and atraumatic.      Nose: Nose normal.      Mouth/Throat:      Mouth: Mucous membranes are dry.   Eyes:      Extraocular Movements: Extraocular movements intact.      Pupils: Pupils are equal, round, and reactive to light.   Pulmonary:      Effort: Pulmonary effort is normal. No respiratory distress.      Breath sounds: Normal breath sounds.   Abdominal:      General: Abdomen is flat. Bowel sounds are normal.      Palpations: Abdomen is soft.   Musculoskeletal:         General: Normal range of motion.      Cervical back: Normal range of motion and neck supple.   Skin:     General: Skin is warm.      Capillary Refill: Capillary refill takes 2 to 3 seconds.

## 2025-03-05 NOTE — CONSULTS
Gastroenterology Consult Note    Patient:   Hemanth Barrera   Admit date:  3/4/2025  Facility:   [facility]  Referring/PCP: Neftaly Contreras MD  Date:     3/5/2025  Consultant:   DARCI Siegel - SID, Isauro Razo MD    Subjective:     This 89 y.o. male was admitted 3/4/2025 with a diagnosis of \"GI bleed [K92.2]  Gastrointestinal hemorrhage, unspecified gastrointestinal hemorrhage type [K92.2]\" and is seen in consultation regarding   Chief Complaint   Patient presents with    abnormal lab     BIB EMS from [place]; per facility, hemoglobin of 4.4 from routine labs today; facility did not provide lab results to EMS     89-year-old male with past medical history of CAD, CKD, prior cardiac catheterization with RAHUL 9/14/2024 presents to the ED with melena, low hemoglobin.  Hemoglobin at facility 4.3.  On arrival hemoglobin 4.5.  Hgb  5.6 after 1 unit PRBCs.  Second unit of PRBCs infusing.  Additional unit ordered. Other pertinent labs on admission include BUN 47, creatinine 1.4, albumin 2.9.   Patient on DAPT for hx of stent, bilateral pulmonary emboli. On xarelto for a.fib.  Patient able to give limited history.  States her was told by [facility]CF that he had dark stools. Unclear how long he has been experiencing this.  Patient denies any hematemesis, coffee-ground emesis.  No abdominal pain, nausea, vomiting.  No fevers, chills, chest pain.  Has some mild shortness of breath.  Feels weak.    Reports that he has hx of ulcers in the past.  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    Patient was seen by GI in August 2023 for nausea, vomiting.  EGD showed white plaque lesions in the upper and middle esophagus with suspicion of candida (bx showed candida esophagitis).  Surgical changes likely consistent with Billroth II reconstruction. The anastomotic area had few erosions and erythema (bx showed chemical gastropathy).   The afferent limb appeared to continue into small bowel and  was spent taking care of this patient in addition to the nurse practitioner time.  That also included history taking follow-up physical examination and review of system.    Electronically signed by Isauro Razo MD

## 2025-03-05 NOTE — PROGRESS NOTES
[facility]  Office: [phone]  Antwon Bernardo DO, Fernando Vyas DO, Drake Haynes DO, Geovani Honeycutt DO, Yani Pritchard MD, Isabella Meade MD, Richard Hidalgo MD, Miriam Michael MD,  Keo Pope MD, Eli Barbour MD, Giovanna Louie MD,  Yee Kerr DO, Dion Gilliland MD, Jonny Basurto MD, Alex Bernardo DO, Nafisa Lyles MD,  Jarred Johnson DO, Camila Mabry MD, Karen Shipley MD, Jessika Ravi MD, Carlos De León MD,  Bola Callaway MD, Roly Brunson MD, Rojas Barfield MD, Francisco J Monzon MD, Jerry Maher MD, Jelani Jameson MD, Rudy Webb DO, Teodoro Casas MD, Yee Meehan MD, Mohsin Reza, MD, Shirley Waterhouse, CNP,  Isidra Bianchi CNP, Rudy Castaneda, CNP,  Magda Cannon, DNP, Jaleesa Meeks, CNP, Rakel Purdy, CNP, Tatiana Walton, CNP, Stephanie Cyr CNP, Luz Maria Castellanos PA-C, Joie Song, CNP, Evelio Bear, CNP,  Venecia Shanks, CNP, Erin Alaniz, CNP, Rachel Ling, CNP,  Maura Raman, CNS, Vaishali Lucio CNP, Sarah Ramirez CNP,   Graciela Blue, CNP         [facility]   IN-PATIENT SERVICE   [facility]    Progress Note    3/5/2025    12:31 PM    Name:   Hemanth Barrera  MRN:     [id number]     Acct:      [id number]   Room:   [facility] Day:  0  Admit Date:  3/4/2025 11:17 PM    PCP:   Neftaly Contreras MD  Code Status:  Full Code    Subjective:     Patient was seen in follow-up for acute upper GI bleed. He is resting comfortably but is complaining of lower back pain. Gastroenterology has been consulted; appreciate recommendations.     Medications:     Allergies:    Allergies   Allergen Reactions    Aspirin Other (See Comments)     Pt told not to take since he has only a partial stomach    Cyclobenzaprine Other (See Comments)     Pt stated heart palpitations and he felt funny    Ibuprofen Nausea Only    Azithromycin Nausea Only    Hydrocodone-Acetaminophen Nausea Only     per pt, he is having upset stomach when taking norco  appreciate recommendations     Paroxsymal atrial fibrillation   -Holding Xarelto in the setting of GI bleed   -Continue home diltiazem and metoprolol     Hyperlipidemia   -Continue home atorvastatin     Advanced care planning   -Family would like the patient to remain a full code       Jonny Basurto MD  3/5/2025  12:31 PM

## 2025-03-05 NOTE — ED NOTES
ED to inpatient nurses report      Chief Complaint:  Chief Complaint   Patient presents with    abnormal lab     BIB EMS from [place]; per facility, hemoglobin of 4.4 from routine labs today; facility did not provide lab results to EMS     Present to ED from: [facility]    MOA:     LOC: alert and orientated to name and place  Mobility: Requires assistance * 2  Oxygen Baseline: Room air    Current needs required: Room air   Pending ED orders: 2nd unit of blood  Present condition: stable    Why did the patient come to the ED? Abnormal hbg results of 4.4, black tarry stools.  What is the plan? Admit for blood transfusion and GI consult   Any procedures or intervention occur? Blood transfusion   Any safety concerns??fall risk  CODE STATUS Full Code  Diet Diet NPO    Mental Status:  Level of Consciousness: Alert (0)    Psych Assessment:      Vital signs   Vitals:    03/05/25 0940 03/05/25 0945 03/05/25 0950 03/05/25 1015   BP: 116/71 114/68 115/70 113/70   Pulse: 69 70 70    Resp: 16 18 16    Temp: 97.9 °F (36.6 °C) 98.2 °F (36.8 °C) 96.8 °F (36 °C)    TempSrc: Oral Oral Oral    SpO2: 100% 100% 100% 100%   Weight:       Height:            Vitals:  Patient Vitals for the past 24 hrs:   BP Temp Temp src Pulse Resp SpO2 Height Weight   03/05/25 1015 113/70 -- -- -- -- 100 % -- --   03/05/25 0950 115/70 96.8 °F (36 °C) Oral 70 16 100 % -- --   03/05/25 0945 114/68 98.2 °F (36.8 °C) Oral 70 18 100 % -- --   03/05/25 0940 116/71 97.9 °F (36.6 °C) Oral 69 16 100 % -- --   03/05/25 0930 113/69 98.1 °F (36.7 °C) Oral 70 18 100 % -- --   03/05/25 0615 113/70 -- -- 70 16 100 % -- --   03/05/25 0600 111/69 -- -- -- -- -- -- --   03/05/25 0545 107/70 -- -- -- -- -- -- --   03/05/25 0530 114/71 -- -- -- -- -- -- --   03/05/25 0435 108/67 -- -- -- -- -- -- --   03/05/25 0430 113/70 -- -- -- -- -- -- --   03/05/25 0425 111/69 -- -- -- -- 97 % -- --   03/05/25 0400 108/69 -- -- -- -- -- -- --   03/05/25 0358 104/61 -- -- 70  16 100 % -- --   03/05/25 0330 109/69 -- -- -- -- -- -- --   03/05/25 0325 99/68 98.1 °F (36.7 °C) -- 71 16 100 % -- --   03/05/25 0245 104/65 -- -- -- -- 100 % -- --   03/05/25 0230 (!) 97/59 -- -- -- -- -- -- --   03/05/25 0215 (!) 98/58 -- -- -- -- -- -- --   03/05/25 0200 96/62 -- -- -- -- -- -- --   03/05/25 0145 (!) 94/59 97.5 °F (36.4 °C) Oral 70 16 100 % -- --   03/05/25 0131 101/61 97.9 °F (36.6 °C) -- 70 16 100 % -- --   03/05/25 0102 -- -- -- -- -- 100 % -- --   03/05/25 0100 108/68 -- -- 70 20 -- -- --   03/05/25 0045 (!) 107/59 -- -- -- -- -- -- --   03/05/25 0030 105/62 -- -- -- -- 100 % -- --   03/05/25 0000 116/83 -- -- -- -- -- -- --   03/04/25 2345 102/60 -- -- -- -- 100 % -- --   03/04/25 2319 106/67 98.2 °F (36.8 °C) Oral 71 20 100 % 1.854 m (6' 1\") 67.1 kg (148 lb)      Visit Vitals  /70   Pulse 70   Temp 96.8 °F (36 °C) (Oral)   Resp 16   Ht 1.854 m (6' 1\")   Wt 67.1 kg (148 lb)   SpO2 100%   BMI 19.53 kg/m²        LDAs:   Peripheral IV 03/04/25 Left Forearm (Active)   Site Assessment Clean, dry & intact 03/04/25 2346   Line Status Infusing 03/04/25 2346       Peripheral IV 03/04/25 Right Forearm (Active)   Site Assessment Clean, dry & intact 03/04/25 2347   Line Status Blood return noted;Normal saline locked;Flushed 03/04/25 2347   Dressing Type Transparent 03/04/25 2347       Meds:  Medications   0.9 % sodium chloride infusion (has no administration in time range)   sodium chloride flush 0.9 % injection 5-40 mL (10 mLs IntraVENous Given 3/5/25 0728)   sodium chloride flush 0.9 % injection 5-40 mL (has no administration in time range)   0.9 % sodium chloride infusion (has no administration in time range)   potassium chloride (KLOR-CON M) extended release tablet 40 mEq (has no administration in time range)     Or   potassium bicarb-citric acid (EFFER-K) effervescent tablet 40 mEq (has no administration in time range)     Or   potassium chloride 10 mEq/100 mL IVPB (Peripheral Line) (has no

## 2025-03-05 NOTE — H&P
[facility]  Office: [phone]  Antwon Bernardo DO, Fernando Vyas DO, Drake Haynes DO, Geovani Honeycutt DO, Yani Pritchard MD, Isabella Meade MD, Richard Hidalgo MD, Miriam Michael MD,  Keo Pope MD, Eli Barbour MD, Giovanna Louie MD,  Yee Kerr DO, Dion Gilliland MD, Jonny Basurto MD, Alex Bernardo DO, Nafisa Lyles MD,  Jarred Johnson DO, Camila Mabry MD, Karen Shipley MD, Jessika Ravi MD, Carlos De León MD,  Bola Callaway MD, Roly Brunson MD, Rojas Barfield MD, Francisco J Monzon MD, Jerry Maher MD, Jelani Jameson MD, Rudy Webb DO, Teodoro Casas MD, Yee Meehan MD, Mohsin Reza, MD, Shirley Waterhouse, CNP,  Isidra Bianchi CNP, Rudy Castaneda, CNP,  Magda Cannno, DNP, Jaleesa Meeks, CNP, Rakel Purdy, CNP, Tatiana Walton, CNP, Stephanie Cyr CNP, TERENCE Bailey-TED, Joie Song, CNP, Evelio Bear, CNP,  Venecia Shanks, CNP, Erin Alaniz, CNP, Rachel Ling, CNP,  Maura Raman, CNS, Vaishali Lucio CNP, Sarah Ramirez CNP,   Graciela Blue, CNP         [facility]   IN-PATIENT SERVICE   [facility]    HISTORY AND PHYSICAL EXAMINATION            Date:   3/5/2025  Patient name:  Hemanth Barrera  Date of admission:  3/4/2025 11:17 PM  MRN:   [id number]  Account:  [id number]  YOB: 1935  PCP:    Neftaly Contreras MD  [facility]  Code Status:    Full Code    Chief Complaint:     Chief Complaint   Patient presents with    abnormal lab     BIB EMS from [place]; per facility, hemoglobin of 4.4 from routine labs today; facility did not provide lab results to EMS       History Obtained From:     patient    History of Present Illness:         89 yo male from nursing facility coronary artery disease, CKD stage IIIb, hernia status post repair, prior cardiac catheterization 9/14/2024  presented from his facility with concerns for melana episode , weakens's, poor po intake and hgb of 4 which prompted facility to  send him to get checked out. He reported fatigue, sy, lightheadedness and multiple bouts of lose stools. He is a poor historian.  On arrival received transfusion for low hgb. Previous admission reported blood pleural effusion after a rib fracture He reports ongoing discomfort and chest tightness. Denies any other symptoms     Past Medical History:     Past Medical History:   Diagnosis Date    Acute inferolateral myocardial infarction (HCC) 11/18/2021    Arthritis     Atrial fibrillation (HCC)     CAD (coronary artery disease)     CHF (congestive heart failure) (HCC)     Glaucoma     Hx of blood clots     with hernia surgery    Hyperlipidemia     Hypertension     MI (myocardial infarction) (HCC)     NSTEMI (non-ST elevated myocardial infarction) (HCC) 11/17/2021        Past Surgical History:     Past Surgical History:   Procedure Laterality Date    ABDOMEN SURGERY      gastric resection    APPENDECTOMY      BONE MARROW BIOPSY      CARDIAC CATHETERIZATION      CARDIAC PROCEDURE N/A 9/17/2024    aimeefrconcepcion / Left heart cath / coronary angiography / rm 512 performed by Cathie Hastings MD at [facility] CARDIAC CATH LAB    CARDIAC PROCEDURE N/A 9/17/2024    Percutaneous coronary intervention performed by Cathie Hastings MD at [facility] CARDIAC CATH LAB    CARDIAC PROCEDURE Right 1/9/2025    Left heart cath / coronary angiography performed by Guy Paz MD at [facility] CARDIAC CATH LAB    COLONOSCOPY      COLONOSCOPY N/A 8/3/2023    COLONOSCOPY WITH BIOPSY performed by Isauro Razo MD at [facility] ENDO    CT BIOPSY BONE MARROW  5/31/2022    CT BONE MARROW BIOPSY 5/31/2022 [facility] CT SCAN    EYE SURGERY      smiley cataracts    HERNIA REPAIR      inguinal smiley    INVASIVE VASCULAR N/A 1/9/2025    Ultrasound guided vascular access performed by Guy Paz MD at [facility] CARDIAC CATH LAB    JOINT REPLACEMENT      rt hip x 2, lt knee    PACEMAKER PLACEMENT      UPPER GASTROINTESTINAL ENDOSCOPY N/A 8/2/2023    EGD BIOPSY performed by Isauro

## 2025-03-06 ENCOUNTER — ANESTHESIA (OUTPATIENT)
Dept: OPERATING ROOM | Age: 89
End: 2025-03-06
Payer: MEDICARE

## 2025-03-06 LAB
ABO/RH: NORMAL
ANTIBODY SCREEN: NEGATIVE
ARM BAND NUMBER: NORMAL
BASOPHILS # BLD: 0 K/UL (ref 0–0.2)
BASOPHILS NFR BLD: 0 % (ref 0–2)
BLOOD BANK BLOOD PRODUCT EXPIRATION DATE: NORMAL
BLOOD BANK DISPENSE STATUS: NORMAL
BLOOD BANK ISBT PRODUCT BLOOD TYPE: 9500
BLOOD BANK PRODUCT CODE: NORMAL
BLOOD BANK SAMPLE EXPIRATION: NORMAL
BLOOD BANK UNIT TYPE AND RH: NORMAL
BPU ID: NORMAL
COMPONENT: NORMAL
CROSSMATCH RESULT: NORMAL
EKG ATRIAL RATE: 280 BPM
EKG P AXIS: 88 DEGREES
EKG Q-T INTERVAL: 412 MS
EKG QRS DURATION: 76 MS
EKG QTC CALCULATION (BAZETT): 444 MS
EKG R AXIS: 8 DEGREES
EKG T AXIS: -94 DEGREES
EKG VENTRICULAR RATE: 70 BPM
EOSINOPHIL # BLD: 0.09 K/UL (ref 0–0.4)
EOSINOPHILS RELATIVE PERCENT: 2 % (ref 1–4)
ERYTHROCYTE [DISTWIDTH] IN BLOOD BY AUTOMATED COUNT: 21.1 % (ref 12.5–15.4)
HCT VFR BLD AUTO: 25.1 % (ref 41–53)
HCT VFR BLD AUTO: 26.9 % (ref 41–53)
HCT VFR BLD AUTO: 28 % (ref 41–53)
HGB BLD-MCNC: 8.5 G/DL (ref 13.5–17.5)
HGB BLD-MCNC: 9 G/DL (ref 13.5–17.5)
HGB BLD-MCNC: 9.4 G/DL (ref 13.5–17.5)
LYMPHOCYTES NFR BLD: 0.66 K/UL (ref 1–4.8)
LYMPHOCYTES RELATIVE PERCENT: 14 % (ref 24–44)
MCH RBC QN AUTO: 29.5 PG (ref 26–34)
MCHC RBC AUTO-ENTMCNC: 33.7 G/DL (ref 31–37)
MCV RBC AUTO: 87.5 FL (ref 80–100)
MONOCYTES NFR BLD: 0.33 K/UL (ref 0.1–1.2)
MONOCYTES NFR BLD: 7 % (ref 2–11)
MORPHOLOGY: ABNORMAL
NEUTROPHILS NFR BLD: 77 % (ref 36–66)
NEUTS SEG NFR BLD: 3.62 K/UL (ref 1.8–7.7)
PLATELET # BLD AUTO: 242 K/UL (ref 140–450)
PMV BLD AUTO: 7.1 FL (ref 6–12)
RBC # BLD AUTO: 2.87 M/UL (ref 4.5–5.9)
TRANSFUSION STATUS: NORMAL
UNIT DIVISION: 0
UNIT ISSUE DATE/TIME: NORMAL
WBC OTHER # BLD: 4.7 K/UL (ref 3.5–11)

## 2025-03-06 PROCEDURE — 0W3P8ZZ CONTROL BLEEDING IN GASTROINTESTINAL TRACT, VIA NATURAL OR ARTIFICIAL OPENING ENDOSCOPIC: ICD-10-PCS | Performed by: INTERNAL MEDICINE

## 2025-03-06 PROCEDURE — 3700000000 HC ANESTHESIA ATTENDED CARE: Performed by: INTERNAL MEDICINE

## 2025-03-06 PROCEDURE — 6360000002 HC RX W HCPCS: Performed by: STUDENT IN AN ORGANIZED HEALTH CARE EDUCATION/TRAINING PROGRAM

## 2025-03-06 PROCEDURE — 6370000000 HC RX 637 (ALT 250 FOR IP): Performed by: INTERNAL MEDICINE

## 2025-03-06 PROCEDURE — 7100000001 HC PACU RECOVERY - ADDTL 15 MIN: Performed by: INTERNAL MEDICINE

## 2025-03-06 PROCEDURE — 2580000003 HC RX 258: Performed by: STUDENT IN AN ORGANIZED HEALTH CARE EDUCATION/TRAINING PROGRAM

## 2025-03-06 PROCEDURE — 85014 HEMATOCRIT: CPT

## 2025-03-06 PROCEDURE — 36415 COLL VENOUS BLD VENIPUNCTURE: CPT

## 2025-03-06 PROCEDURE — 2709999900 HC NON-CHARGEABLE SUPPLY: Performed by: INTERNAL MEDICINE

## 2025-03-06 PROCEDURE — 99232 SBSQ HOSP IP/OBS MODERATE 35: CPT | Performed by: STUDENT IN AN ORGANIZED HEALTH CARE EDUCATION/TRAINING PROGRAM

## 2025-03-06 PROCEDURE — 2500000003 HC RX 250 WO HCPCS: Performed by: INTERNAL MEDICINE

## 2025-03-06 PROCEDURE — 85018 HEMOGLOBIN: CPT

## 2025-03-06 PROCEDURE — 93010 ELECTROCARDIOGRAM REPORT: CPT | Performed by: INTERNAL MEDICINE

## 2025-03-06 PROCEDURE — 6360000002 HC RX W HCPCS

## 2025-03-06 PROCEDURE — 85025 COMPLETE CBC W/AUTO DIFF WBC: CPT

## 2025-03-06 PROCEDURE — 2580000003 HC RX 258: Performed by: INTERNAL MEDICINE

## 2025-03-06 PROCEDURE — 3609013000 HC EGD TRANSORAL CONTROL BLEEDING ANY METHOD: Performed by: INTERNAL MEDICINE

## 2025-03-06 PROCEDURE — 2060000000 HC ICU INTERMEDIATE R&B

## 2025-03-06 PROCEDURE — 6360000002 HC RX W HCPCS: Performed by: INTERNAL MEDICINE

## 2025-03-06 PROCEDURE — 6370000000 HC RX 637 (ALT 250 FOR IP): Performed by: NURSE PRACTITIONER

## 2025-03-06 PROCEDURE — 7100000000 HC PACU RECOVERY - FIRST 15 MIN: Performed by: INTERNAL MEDICINE

## 2025-03-06 PROCEDURE — 6370000000 HC RX 637 (ALT 250 FOR IP): Performed by: STUDENT IN AN ORGANIZED HEALTH CARE EDUCATION/TRAINING PROGRAM

## 2025-03-06 PROCEDURE — 43235 EGD DIAGNOSTIC BRUSH WASH: CPT | Performed by: INTERNAL MEDICINE

## 2025-03-06 RX ORDER — SODIUM CHLORIDE 0.9 % (FLUSH) 0.9 %
5-40 SYRINGE (ML) INJECTION PRN
Status: DISCONTINUED | OUTPATIENT
Start: 2025-03-06 | End: 2025-03-06 | Stop reason: HOSPADM

## 2025-03-06 RX ORDER — POLYETHYLENE GLYCOL 3350 17 G/17G
238 POWDER, FOR SOLUTION ORAL ONCE
Status: COMPLETED | OUTPATIENT
Start: 2025-03-06 | End: 2025-03-06

## 2025-03-06 RX ORDER — LIDOCAINE HYDROCHLORIDE 10 MG/ML
INJECTION, SOLUTION EPIDURAL; INFILTRATION; INTRACAUDAL; PERINEURAL
Status: DISCONTINUED | OUTPATIENT
Start: 2025-03-06 | End: 2025-03-06 | Stop reason: SDUPTHER

## 2025-03-06 RX ORDER — SODIUM CHLORIDE 9 MG/ML
INJECTION, SOLUTION INTRAVENOUS PRN
Status: DISCONTINUED | OUTPATIENT
Start: 2025-03-06 | End: 2025-03-06 | Stop reason: HOSPADM

## 2025-03-06 RX ORDER — PROPOFOL 10 MG/ML
INJECTION, EMULSION INTRAVENOUS
Status: DISCONTINUED | OUTPATIENT
Start: 2025-03-06 | End: 2025-03-06 | Stop reason: SDUPTHER

## 2025-03-06 RX ORDER — PROCHLORPERAZINE EDISYLATE 5 MG/ML
5 INJECTION INTRAMUSCULAR; INTRAVENOUS
Status: DISCONTINUED | OUTPATIENT
Start: 2025-03-06 | End: 2025-03-06 | Stop reason: HOSPADM

## 2025-03-06 RX ORDER — HYDRALAZINE HYDROCHLORIDE 20 MG/ML
10 INJECTION INTRAMUSCULAR; INTRAVENOUS
Status: DISCONTINUED | OUTPATIENT
Start: 2025-03-06 | End: 2025-03-06 | Stop reason: HOSPADM

## 2025-03-06 RX ORDER — NALOXONE HYDROCHLORIDE 0.4 MG/ML
INJECTION, SOLUTION INTRAMUSCULAR; INTRAVENOUS; SUBCUTANEOUS PRN
Status: DISCONTINUED | OUTPATIENT
Start: 2025-03-06 | End: 2025-03-06 | Stop reason: HOSPADM

## 2025-03-06 RX ORDER — LABETALOL HYDROCHLORIDE 5 MG/ML
10 INJECTION, SOLUTION INTRAVENOUS
Status: DISCONTINUED | OUTPATIENT
Start: 2025-03-06 | End: 2025-03-06 | Stop reason: HOSPADM

## 2025-03-06 RX ORDER — SODIUM CHLORIDE 0.9 % (FLUSH) 0.9 %
5-40 SYRINGE (ML) INJECTION EVERY 12 HOURS SCHEDULED
Status: DISCONTINUED | OUTPATIENT
Start: 2025-03-06 | End: 2025-03-06 | Stop reason: HOSPADM

## 2025-03-06 RX ORDER — PHENYLEPHRINE HCL IN 0.9% NACL 1 MG/10 ML
SYRINGE (ML) INTRAVENOUS
Status: DISCONTINUED | OUTPATIENT
Start: 2025-03-06 | End: 2025-03-06 | Stop reason: SDUPTHER

## 2025-03-06 RX ADMIN — PROPOFOL 30 MG: 10 INJECTION, EMULSION INTRAVENOUS at 10:48

## 2025-03-06 RX ADMIN — OXYCODONE HYDROCHLORIDE 5 MG: 5 TABLET ORAL at 19:55

## 2025-03-06 RX ADMIN — ISOSORBIDE MONONITRATE 30 MG: 30 TABLET, EXTENDED RELEASE ORAL at 07:51

## 2025-03-06 RX ADMIN — OXYCODONE HYDROCHLORIDE 5 MG: 5 TABLET ORAL at 07:51

## 2025-03-06 RX ADMIN — SODIUM CHLORIDE: 9 INJECTION, SOLUTION INTRAVENOUS at 09:29

## 2025-03-06 RX ADMIN — FINASTERIDE 5 MG: 5 TABLET, FILM COATED ORAL at 07:51

## 2025-03-06 RX ADMIN — SODIUM CHLORIDE, PRESERVATIVE FREE 10 ML: 5 INJECTION INTRAVENOUS at 09:28

## 2025-03-06 RX ADMIN — PROPOFOL 40 MG: 10 INJECTION, EMULSION INTRAVENOUS at 10:51

## 2025-03-06 RX ADMIN — METOPROLOL SUCCINATE 100 MG: 100 TABLET, EXTENDED RELEASE ORAL at 07:51

## 2025-03-06 RX ADMIN — Medication 200 MCG: at 10:58

## 2025-03-06 RX ADMIN — Medication 100 MCG: at 10:54

## 2025-03-06 RX ADMIN — BUMETANIDE 2 MG: 1 TABLET ORAL at 07:51

## 2025-03-06 RX ADMIN — OXYCODONE HYDROCHLORIDE 5 MG: 5 TABLET ORAL at 15:10

## 2025-03-06 RX ADMIN — MIDODRINE HYDROCHLORIDE 2.5 MG: 2.5 TABLET ORAL at 18:09

## 2025-03-06 RX ADMIN — PROPOFOL 30 MG: 10 INJECTION, EMULSION INTRAVENOUS at 10:46

## 2025-03-06 RX ADMIN — POLYETHYLENE GLYCOL 3350 238 G: 17 POWDER, FOR SOLUTION ORAL at 18:09

## 2025-03-06 RX ADMIN — MIDODRINE HYDROCHLORIDE 2.5 MG: 2.5 TABLET ORAL at 14:02

## 2025-03-06 RX ADMIN — LIDOCAINE HYDROCHLORIDE 50 MG: 10 INJECTION, SOLUTION EPIDURAL; INFILTRATION; INTRACAUDAL; PERINEURAL at 10:46

## 2025-03-06 RX ADMIN — DILTIAZEM HYDROCHLORIDE 120 MG: 120 CAPSULE, COATED, EXTENDED RELEASE ORAL at 07:51

## 2025-03-06 RX ADMIN — PANTOPRAZOLE SODIUM 8 MG/HR: 40 INJECTION, POWDER, FOR SOLUTION INTRAVENOUS at 04:54

## 2025-03-06 RX ADMIN — SODIUM CHLORIDE, PRESERVATIVE FREE 10 ML: 5 INJECTION INTRAVENOUS at 19:55

## 2025-03-06 RX ADMIN — PANTOPRAZOLE SODIUM 8 MG/HR: 40 INJECTION, POWDER, FOR SOLUTION INTRAVENOUS at 19:42

## 2025-03-06 RX ADMIN — MIDODRINE HYDROCHLORIDE 2.5 MG: 2.5 TABLET ORAL at 07:51

## 2025-03-06 RX ADMIN — ATORVASTATIN CALCIUM 40 MG: 40 TABLET, FILM COATED ORAL at 19:54

## 2025-03-06 RX ADMIN — Medication 100 MCG: at 10:56

## 2025-03-06 ASSESSMENT — PAIN DESCRIPTION - DESCRIPTORS: DESCRIPTORS: ACHING

## 2025-03-06 ASSESSMENT — PAIN DESCRIPTION - LOCATION
LOCATION: BUTTOCKS;BACK
LOCATION: BUTTOCKS

## 2025-03-06 ASSESSMENT — PAIN SCALES - GENERAL
PAINLEVEL_OUTOF10: 6
PAINLEVEL_OUTOF10: 7
PAINLEVEL_OUTOF10: 7

## 2025-03-06 ASSESSMENT — PAIN DESCRIPTION - ORIENTATION
ORIENTATION: LEFT
ORIENTATION: LEFT;POSTERIOR

## 2025-03-06 NOTE — ACP (ADVANCE CARE PLANNING)
Advance Care Planning     Advance Care Planning Activator (Inpatient)  Conversation Note      Date of ACP Conversation: 3/6/2025     Conversation Conducted with: Patient with Decision Making Capacity    ACP Activator: Essie Block    Health Care Decision Maker:     Current Designated Health Care Decision Maker:     Primary Decision Maker: Dusty Barrera - RUST - [phone]  Click here to complete Healthcare Decision Makers including section of the Healthcare Decision Maker Relationship (ie \"Primary\")      Care Preferences    Ventilation:  \"If you were in your present state of health and suddenly became very ill and were unable to breathe on your own, what would your preference be about the use of a ventilator (breathing machine) if it were available to you?\"      Would the patient desire the use of ventilator (breathing machine)?: yes    \"If your health worsens and it becomes clear that your chance of recovery is unlikely, what would your preference be about the use of a ventilator (breathing machine) if it were available to you?\"     Would the patient desire the use of ventilator (breathing machine)?: Yes      Resuscitation  \"CPR works best to restart the heart when there is a sudden event, like a heart attack, in someone who is otherwise healthy. Unfortunately, CPR does not typically restart the heart for people who have serious health conditions or who are very sick.\"    \"In the event your heart stopped as a result of an underlying serious health condition, would you want attempts to be made to restart your heart (answer \"yes\" for attempt to resuscitate) or would you prefer a natural death (answer \"no\" for do not attempt to resuscitate)?\" yes        Conversation Outcomes:  ACP discussion completed and Existing advance directive reviewed with patient; no changes to patient's previously recorded wishes

## 2025-03-06 NOTE — OP NOTE
PROCEDURE NOTE    DATE OF PROCEDURE: 3/6/2025     SURGEON: Alejandra Kraus MD  Facility: [facility]   ASSISTANT: None  Anesthesia: Monitored anesthesia care  PREOPERATIVE DIAGNOSIS: Melena with severe anemia    Diagnosis:    POSTOPERATIVE DIAGNOSIS: As described below    OPERATION: Upper GI endoscopy with Biopsy    ANESTHESIA: Moderate Sedation     ESTIMATED BLOOD LOSS: Less than 50 ml    COMPLICATIONS: None.     SPECIMENS:  Was Not Obtained    HISTORY: The patient is a 89 y.o. year old male with history of above preop diagnosis.  I recommended esophagogastroduodenoscopy with possible biopsy and I explained the risk, benefits, expected outcome, and alternatives to the procedure.  Risks included but are not limited to bleeding, infection, respiratory distress, hypotension, and perforation of the esophagus, stomach, or duodenum.  Patient understands and is in agreement.      PROCEDURE: The patient was given IV conscious sedation.  The patient's SPO2 remained above 90% throughout the procedure.The gastroscope was inserted orally and advanced under direct vision through the esophagus, through the stomach, through the pylorus, and into the descending duodenum.      Post sedation note :The patient's SPO2 remained above 90% throughout the procedure.the vital signs remained stable , and no immediate complication form the procedure noted, patient will be ready for d/c when criteria is met .      Findings:    Retropharyngeal area was grossly normal appearing    Esophagus: normal;    Esophagogastric markings: Diaphragmatic hiatus- 42 cm; GE junction- 41 cm; Squamo-columnar junction- 41 cm    Stomach:     Surgical changes of partial gastrectomy with gastro-jejunal and jejuno-jejunal anastomosis; surgical margins normal; Afferent and efferent limbs intubated- normal       The scope was removed and the patient tolerated the procedure well.     Impression- Surgical changes of partial gastrectomy and gastrojejunal

## 2025-03-06 NOTE — PROGRESS NOTES
[facility]  Office: [phone]  Antwon Bernardo DO, Fernando Vyas DO, Drake Haynes DO, Geovani Honeycutt DO, Yani Pritchard MD, Isabella Meade MD, Richard Hidalgo MD, Miriam Michael MD,  Keo Pope MD, Eli Barbour MD, Giovanna Louie MD,  Yee Kerr DO, Dion Gilliland MD, Jonny Basurto MD, Alex Bernardo DO, Nafisa Lyles MD,  Jarred Johnson DO, Camila Mabry MD, Karen Shipley MD, Jessika Ravi MD, Carlos De León MD,  Bola Callaway MD, Roly Brunson MD, Rojas Barfield MD, Francisco J Monzon MD, Jerry Maher MD, Jelani Jameson MD, Rudy Webb DO, Teodoro Casas MD, Yee Meehan MD, Mohsin Reza, MD, Shirley Waterhouse, CNP,  Isidra Bianchi CNP, Rudy Castaneda, CNP,  Magda Cannon, DNP, Jaleesa Meeks, CNP, Rakel Purdy, CNP, Tatiana Walton, CNP, Stephanie Cyr CNP, Luz Maria Castellanos PA-C, Joie Song, CNP, Evelio Bear, CNP,  Venecia Shanks, CNP, Erin Alaniz, CNP, Rachel Ling, CNP,  Maura Raman, CNS, Vaishali Lucio CNP, Sarah Ramirez CNP,   Graciela Blue, CNP         [facility]   IN-PATIENT SERVICE   [facility]    Progress Note    3/6/2025    8:46 AM    Name:   Hemanth Barrera  MRN:     [id number]     Acct:      [id number]   Room:   337/337-01   Day:  1  Admit Date:  3/4/2025 11:17 PM    PCP:   Neftaly Contreras MD  Code Status:  Full Code    Subjective:     Patient was seen in follow-up for acute upper GI bleed. He is resting comfortably but is complaining of generalized musculoskeletal pain. The patient remains pleasantly confused and I suspect that he has an underlying dementia that has not yet been formally diagnosed. HgB is stable following transfusion. GI is following; plan for EGD this morning.     Medications:     Allergies:    Allergies   Allergen Reactions    Aspirin Other (See Comments)     Pt told not to take since he has only a partial stomach    Cyclobenzaprine Other (See Comments)     Pt stated heart palpitations and he felt  Yes    Paroxysmal atrial fibrillation (HCC) 3/5/2025 Yes    Hyperlipidemia 3/5/2025 Yes    History of DVT of lower extremity 3/5/2025 Yes    Chronic CHF (congestive heart failure) (HCC) 3/5/2025 Yes    Secondary hypercoagulable state 3/5/2025 Yes       Plan:     Acute on chronic blood loss anemia   -Secondary to UGIB   -Hemoglobin has improved to 8.5 s/p transfusion   -Continue pantoprazole infusion   -Gastroenterology is following; plan for EGD today     Paroxsymal atrial fibrillation   -Holding Xarelto in the setting of GI bleed   -Continue home diltiazem and metoprolol     Hyperlipidemia   -Continue home atorvastatin     Advanced care planning   -Family would like the patient to remain a full code       Jonny Basurto MD  3/6/2025  8:46 AM

## 2025-03-06 NOTE — ANESTHESIA PRE PROCEDURE
Department of Anesthesiology  Preprocedure Note       Name:  Hemanth Barrera   Age:  89 y.o.  :  1935                                          MRN:  [id number]         Date:  3/6/2025      Surgeon: Surgeon(s):  Alejandra Kraus MD    Procedure: Procedure(s):  ESOPHAGOGASTRODUODENOSCOPY CONTROL HEMORRHAGE    Medications prior to admission:   Prior to Admission medications    Medication Sig Start Date End Date Taking? Authorizing Provider   finasteride (PROSCAR) 5 MG tablet TAKE 1 TABLET BY MOUTH DAILY 25  Yes Raisa Tomlinson MD   rivaroxaban (XARELTO) 2.5 MG TABS tablet Take 6 tablets by mouth 2 times daily (with meals) for 19 days, THEN 8 tablets Daily with supper. 2/3/25 3/24/25 Yes Raisa Tomlinson MD   ferrous sulfate (IRON 325) 325 (65 Fe) MG tablet Take 1 tablet by mouth 2 times daily (with meals) 2/3/25  Yes Raisa Tomlinson MD   midodrine (PROAMATINE) 2.5 MG tablet Take 1 tablet by mouth 3 times daily (with meals) Hold if SBP more than 100 2/3/25  Yes Raisa Tomlinson MD   isosorbide mononitrate (IMDUR) 30 MG extended release tablet Take 1 tablet by mouth daily 24  Yes Nichole Noguera, DARCI - NP   metoprolol succinate (TOPROL XL) 100 MG extended release tablet Take 1 tablet by mouth daily 24  Yes Dion Gilliland MD   bumetanide (BUMEX) 2 MG tablet Take 1 tablet by mouth daily 24  Yes Dion Gilliland MD   aspirin 81 MG chewable tablet Take 1 tablet by mouth daily 24  Yes Dion Gilliland MD   pantoprazole (PROTONIX) 40 MG tablet Take 1 tablet by mouth every morning (before breakfast) 24  Yes Dion Gilliland MD   clopidogrel (PLAVIX) 75 MG tablet Take 1 tablet by mouth daily 24  Yes Dion Gilliland MD   vitamin D (ERGOCALCIFEROL) 1.25 MG (06641 UT) CAPS capsule TAKE 1 CAPSULE BY MOUTH EVERY WEEK 24  Yes Wolf Sharma MD   atorvastatin (LIPITOR) 40 MG tablet Take 1 tablet by mouth nightly 24  Yes Sana Haskins MD   dilTIAZem (CARDIZEM CD) 120 MG

## 2025-03-06 NOTE — CARE COORDINATION
Does patient qualify for Remote Patient Monitoring (RPM)? Yes    Offered Remote Patient Monitoring to patient. Patient Accepted    Patient would be interested but we are unsure  if patient will be going back to a SNF or home on discharge at this time.

## 2025-03-06 NOTE — PLAN OF CARE
Problem: Chronic Conditions and Co-morbidities  Goal: Patient's chronic conditions and co-morbidity symptoms are monitored and maintained or improved  Outcome: Progressing  Flowsheets (Taken 3/6/2025 1200)  Care Plan - Patient's Chronic Conditions and Co-Morbidity Symptoms are Monitored and Maintained or Improved:   Monitor and assess patient's chronic conditions and comorbid symptoms for stability, deterioration, or improvement   Collaborate with multidisciplinary team to address chronic and comorbid conditions and prevent exacerbation or deterioration   Update acute care plan with appropriate goals if chronic or comorbid symptoms are exacerbated and prevent overall improvement and discharge     Problem: Discharge Planning  Goal: Discharge to home or other facility with appropriate resources  Outcome: Progressing  Flowsheets (Taken 3/6/2025 1200)  Discharge to home or other facility with appropriate resources:   Identify barriers to discharge with patient and caregiver   Arrange for needed discharge resources and transportation as appropriate   Identify discharge learning needs (meds, wound care, etc)   Refer to discharge planning if patient needs post-hospital services based on physician order or complex needs related to functional status, cognitive ability or social support system     Problem: Safety - Adult  Goal: Free from fall injury  Outcome: Progressing     Problem: Skin/Tissue Integrity  Goal: Skin integrity remains intact  Description: 1.  Monitor for areas of redness and/or skin breakdown  2.  Assess vascular access sites hourly  3.  Every 4-6 hours minimum:  Change oxygen saturation probe site  4.  Every 4-6 hours:  If on nasal continuous positive airway pressure, respiratory therapy assess nares and determine need for appliance change or resting period  Outcome: Progressing  Flowsheets (Taken 3/6/2025 1200)  Skin Integrity Remains Intact:   Monitor for areas of redness and/or skin breakdown   Every  4-6 hours minimum: Change oxygen saturation probe site   Assess vascular access sites hourly     Problem: ABCDS Injury Assessment  Goal: Absence of physical injury  Outcome: Progressing     Problem: Cardiovascular - Adult  Goal: Maintains optimal cardiac output and hemodynamic stability  Outcome: Progressing  Flowsheets (Taken 3/6/2025 1200)  Maintains optimal cardiac output and hemodynamic stability:   Monitor blood pressure and heart rate   Monitor urine output and notify Licensed Independent Practitioner for values outside of normal range   Assess for signs of decreased cardiac output   Administer fluid and/or volume expanders as ordered     Problem: Skin/Tissue Integrity - Adult  Goal: Skin integrity remains intact  Description: 1.  Monitor for areas of redness and/or skin breakdown  2.  Assess vascular access sites hourly  3.  Every 4-6 hours minimum:  Change oxygen saturation probe site  4.  Every 4-6 hours:  If on nasal continuous positive airway pressure, respiratory therapy assess nares and determine need for appliance change or resting period  Outcome: Progressing  Flowsheets (Taken 3/6/2025 1200)  Skin Integrity Remains Intact:   Monitor for areas of redness and/or skin breakdown   Every 4-6 hours minimum: Change oxygen saturation probe site   Assess vascular access sites hourly     Problem: Skin/Tissue Integrity - Adult  Goal: Incisions, wounds, or drain sites healing without S/S of infection  Outcome: Progressing  Flowsheets (Taken 3/6/2025 1200)  Incisions, Wounds, or Drain Sites Healing Without Sign and Symptoms of Infection: ADMISSION and DAILY: Assess and document risk factors for pressure ulcer development     Problem: Musculoskeletal - Adult  Goal: Return mobility to safest level of function  Outcome: Progressing     Problem: Gastrointestinal - Adult  Goal: Maintains or returns to baseline bowel function  Outcome: Progressing  Flowsheets (Taken 3/6/2025 1200)  Maintains or returns to baseline

## 2025-03-06 NOTE — PLAN OF CARE
EGD unremarkable  Plan for colonoscopy tomorrow  CLD today  Prep as ordered  NPO after midnight      Electronically signed by DARCI Siegel NP on 3/6/2025 at 4:49 PM

## 2025-03-06 NOTE — PLAN OF CARE
Problem: Chronic Conditions and Co-morbidities  Goal: Patient's chronic conditions and co-morbidity symptoms are monitored and maintained or improved  Outcome: Progressing     Problem: Discharge Planning  Goal: Discharge to home or other facility with appropriate resources  Outcome: Progressing     Problem: Safety - Adult  Goal: Free from fall injury  Outcome: Progressing     Problem: Skin/Tissue Integrity  Goal: Skin integrity remains intact  Description: 1.  Monitor for areas of redness and/or skin breakdown  2.  Assess vascular access sites hourly  3.  Every 4-6 hours minimum:  Change oxygen saturation probe site  4.  Every 4-6 hours:  If on nasal continuous positive airway pressure, respiratory therapy assess nares and determine need for appliance change or resting period  Outcome: Progressing     Problem: ABCDS Injury Assessment  Goal: Absence of physical injury  Outcome: Progressing     Problem: Neurosensory - Adult  Goal: Achieves maximal functionality and self care  Outcome: Progressing     Problem: Cardiovascular - Adult  Goal: Maintains optimal cardiac output and hemodynamic stability  Outcome: Progressing     Problem: Skin/Tissue Integrity - Adult  Goal: Skin integrity remains intact  Description: 1.  Monitor for areas of redness and/or skin breakdown  2.  Assess vascular access sites hourly  3.  Every 4-6 hours minimum:  Change oxygen saturation probe site  4.  Every 4-6 hours:  If on nasal continuous positive airway pressure, respiratory therapy assess nares and determine need for appliance change or resting period  Outcome: Progressing  Goal: Incisions, wounds, or drain sites healing without S/S of infection  Outcome: Progressing     Problem: Musculoskeletal - Adult  Goal: Return mobility to safest level of function  Outcome: Progressing     Problem: Gastrointestinal - Adult  Goal: Maintains or returns to baseline bowel function  Outcome: Progressing     Problem: Genitourinary - Adult  Goal: Absence

## 2025-03-06 NOTE — CARE COORDINATION
Case Management Assessment  Initial Evaluation    Date/Time of Evaluation: 3/6/2025 5:37 PM  Assessment Completed by: Essie Block    If patient is discharged prior to next notation, then this note serves as note for discharge by case management.    Patient Name: Hemanth Barrera                   YOB: 1935  Diagnosis: GI bleed [K92.2]  Gastrointestinal hemorrhage, unspecified gastrointestinal hemorrhage type [K92.2]                   Date / Time: 3/4/2025 11:17 PM    Patient Admission Status: Inpatient   Readmission Risk (Low < 19, Mod (19-27), High > 27): Readmission Risk Score: 29.5    Current PCP: Neftaly Contreras MD  PCP verified by CM? Yes    Chart Reviewed: Yes      History Provided by: Patient  Patient Orientation: Alert and Oriented    Patient Cognition: Alert    Hospitalization in the last 30 days (Readmission):  Yes    If yes, Readmission Assessment in  Navigator will be completed.    Advance Directives:      Code Status: Full Code   Patient's Primary Decision Maker is: Legal Next of Kin (son Dusty)    Primary Decision Maker: Dusty Barrera - Child - [phone]    Discharge Planning:    Patient lives with: Children Type of Home: Skilled Nursing Facility, Other (Comment), House (to SNF or Home with family)  Primary Care Giver: Self  Patient Support Systems include: Children   Current Financial resources: Medicare  Current community resources: ECF/Home Care, Other (Comment) (home care or SNF)  Current services prior to admission: Durable Medical Equipment            Current DME: Cane, Walker            Type of Home Care services:  Nursing Services, Skilled Therapy, PT, OT (TBD)    ADLS  Prior functional level: Assistance with the following:, Mobility, Housework, Cooking, Shopping (uses cane and walker, lives with son and his family who drives him to appointments)  Current functional level: Assistance with the following:, Cooking, Housework, Shopping, Mobility (has been in  [facility] for rehabilitation after a fall and fractured ribs)    PT AM-PAC:   /24  OT AM-PAC:   /24    Family can provide assistance at DC: Yes (some what)  Would you like Case Management to discuss the discharge plan with any other family members/significant others, and if so, who? No  Plans to Return to Present Housing: Other (see comment) (back to [facility] for rehabilitation or to home with home care to be determined.)  Other Identified Issues/Barriers to RETURNING to current housing: pending PT/OT evaluation  Potential Assistance needed at discharge: N/A (TBD)            Potential DME:    Patient expects to discharge to: Other (comment) (TBD, back to [facility] or home with [facility])  Plan for transportation at discharge: Family (back to [facility] or [place] with [facility])    Financial    Payor: HU[employer] MEDICARE / Plan: HU[employer]NA GOLD PLUS HMO / Product Type: *No Product type* /     Does insurance require precert for SNF: Yes    Potential assistance Purchasing Medications: No  Meds-to-Beds request:        [id number] [place], [place] - [address] - P [phone] -  [phone]  [address] [facility] [address]  Phone: [phone] Fax: [phone]    [facility] [address] [place] [address] [address] -  [phone] - F [phone]  [address] [address] [id number]  Phone: [phone] Fax: [phone]      Notes:    Factors facilitating achievement of predicted outcomes: Family support, Motivated, Cooperative, Pleasant, and Sense of humor    Barriers to discharge: Limited family support, Decreased endurance, Long standing deficits, Medical complications, and Medication managment    Additional Case Management Notes: Patient wants to go home with his son on discharge and use [facility] which he has used in the past for nursing/PT and OT but he was admitted here from [facility], (there

## 2025-03-06 NOTE — ANESTHESIA POSTPROCEDURE EVALUATION
Department of Anesthesiology  Postprocedure Note    Patient: Hemanth Barrera  MRN: [id number]  YOB: 1935  Date of evaluation: 3/6/2025    Procedure Summary       Date: 03/06/25 Room / Location: [facility] PROCEDURE ROOM / [facility]    Anesthesia Start: 1040 Anesthesia Stop: 1102    Procedure: ESOPHAGOGASTRODUODENOSCOPY Diagnosis:       Gastrointestinal hemorrhage, unspecified gastrointestinal hemorrhage type      (Gastrointestinal hemorrhage, unspecified gastrointestinal hemorrhage type [K92.2])    Surgeons: Alejandra Kraus MD Responsible Provider: Fan Parnell MD    Anesthesia Type: TIVA ASA Status: 3 - Emergent            Anesthesia Type: No value filed.    Radha Phase I: Radha Score: 9    Radha Phase II:      Anesthesia Post Evaluation    Patient location during evaluation: PACU  Patient participation: complete - patient participated  Level of consciousness: awake and alert  Airway patency: patent  Nausea & Vomiting: no nausea and no vomiting  Cardiovascular status: blood pressure returned to baseline  Respiratory status: acceptable and room air  Hydration status: euvolemic  Pain management: adequate and satisfactory to patient    No notable events documented.

## 2025-03-07 ENCOUNTER — ANESTHESIA (OUTPATIENT)
Dept: OPERATING ROOM | Age: 89
End: 2025-03-07
Payer: MEDICARE

## 2025-03-07 ENCOUNTER — ANESTHESIA EVENT (OUTPATIENT)
Dept: OPERATING ROOM | Age: 89
End: 2025-03-07
Payer: MEDICARE

## 2025-03-07 LAB
HCT VFR BLD AUTO: 26 % (ref 41–53)
HCT VFR BLD AUTO: 30.2 % (ref 41–53)
HCT VFR BLD AUTO: 30.7 % (ref 41–53)
HGB BLD-MCNC: 10.1 G/DL (ref 13.5–17.5)
HGB BLD-MCNC: 8.9 G/DL (ref 13.5–17.5)
HGB BLD-MCNC: 9.9 G/DL (ref 13.5–17.5)

## 2025-03-07 PROCEDURE — 36415 COLL VENOUS BLD VENIPUNCTURE: CPT

## 2025-03-07 PROCEDURE — 99232 SBSQ HOSP IP/OBS MODERATE 35: CPT | Performed by: STUDENT IN AN ORGANIZED HEALTH CARE EDUCATION/TRAINING PROGRAM

## 2025-03-07 PROCEDURE — 3609008400 HC SIGMOIDOSCOPY DIAGNOSTIC: Performed by: INTERNAL MEDICINE

## 2025-03-07 PROCEDURE — 2709999900 HC NON-CHARGEABLE SUPPLY: Performed by: INTERNAL MEDICINE

## 2025-03-07 PROCEDURE — 7100000001 HC PACU RECOVERY - ADDTL 15 MIN: Performed by: INTERNAL MEDICINE

## 2025-03-07 PROCEDURE — 6360000002 HC RX W HCPCS: Performed by: NURSE ANESTHETIST, CERTIFIED REGISTERED

## 2025-03-07 PROCEDURE — 7100000000 HC PACU RECOVERY - FIRST 15 MIN: Performed by: INTERNAL MEDICINE

## 2025-03-07 PROCEDURE — 6370000000 HC RX 637 (ALT 250 FOR IP): Performed by: INTERNAL MEDICINE

## 2025-03-07 PROCEDURE — 85018 HEMOGLOBIN: CPT

## 2025-03-07 PROCEDURE — 6360000002 HC RX W HCPCS: Performed by: INTERNAL MEDICINE

## 2025-03-07 PROCEDURE — 3700000000 HC ANESTHESIA ATTENDED CARE: Performed by: INTERNAL MEDICINE

## 2025-03-07 PROCEDURE — 0DJD8ZZ INSPECTION OF LOWER INTESTINAL TRACT, VIA NATURAL OR ARTIFICIAL OPENING ENDOSCOPIC: ICD-10-PCS | Performed by: INTERNAL MEDICINE

## 2025-03-07 PROCEDURE — 2580000003 HC RX 258: Performed by: INTERNAL MEDICINE

## 2025-03-07 PROCEDURE — 85014 HEMATOCRIT: CPT

## 2025-03-07 PROCEDURE — 2500000003 HC RX 250 WO HCPCS: Performed by: INTERNAL MEDICINE

## 2025-03-07 PROCEDURE — 2060000000 HC ICU INTERMEDIATE R&B

## 2025-03-07 PROCEDURE — 45330 DIAGNOSTIC SIGMOIDOSCOPY: CPT | Performed by: INTERNAL MEDICINE

## 2025-03-07 RX ORDER — SODIUM CHLORIDE 9 MG/ML
INJECTION, SOLUTION INTRAVENOUS PRN
Status: DISCONTINUED | OUTPATIENT
Start: 2025-03-07 | End: 2025-03-07 | Stop reason: HOSPADM

## 2025-03-07 RX ORDER — LABETALOL HYDROCHLORIDE 5 MG/ML
10 INJECTION, SOLUTION INTRAVENOUS
Status: DISCONTINUED | OUTPATIENT
Start: 2025-03-07 | End: 2025-03-07 | Stop reason: HOSPADM

## 2025-03-07 RX ORDER — NALOXONE HYDROCHLORIDE 0.4 MG/ML
INJECTION, SOLUTION INTRAMUSCULAR; INTRAVENOUS; SUBCUTANEOUS PRN
Status: DISCONTINUED | OUTPATIENT
Start: 2025-03-07 | End: 2025-03-07 | Stop reason: HOSPADM

## 2025-03-07 RX ORDER — HYDRALAZINE HYDROCHLORIDE 20 MG/ML
10 INJECTION INTRAMUSCULAR; INTRAVENOUS
Status: DISCONTINUED | OUTPATIENT
Start: 2025-03-07 | End: 2025-03-07 | Stop reason: HOSPADM

## 2025-03-07 RX ORDER — GLYCOPYRROLATE 0.2 MG/ML
0.4 INJECTION INTRAMUSCULAR; INTRAVENOUS ONCE
Status: DISCONTINUED | OUTPATIENT
Start: 2025-03-07 | End: 2025-03-07 | Stop reason: HOSPADM

## 2025-03-07 RX ORDER — MORPHINE SULFATE 2 MG/ML
2 INJECTION, SOLUTION INTRAMUSCULAR; INTRAVENOUS EVERY 5 MIN PRN
Status: DISCONTINUED | OUTPATIENT
Start: 2025-03-07 | End: 2025-03-07 | Stop reason: HOSPADM

## 2025-03-07 RX ORDER — SODIUM CHLORIDE 0.9 % (FLUSH) 0.9 %
5-40 SYRINGE (ML) INJECTION EVERY 12 HOURS SCHEDULED
Status: DISCONTINUED | OUTPATIENT
Start: 2025-03-07 | End: 2025-03-07 | Stop reason: HOSPADM

## 2025-03-07 RX ORDER — PROPOFOL 10 MG/ML
INJECTION, EMULSION INTRAVENOUS
Status: DISCONTINUED | OUTPATIENT
Start: 2025-03-07 | End: 2025-03-07 | Stop reason: SDUPTHER

## 2025-03-07 RX ORDER — OXYCODONE AND ACETAMINOPHEN 5; 325 MG/1; MG/1
1 TABLET ORAL
Status: DISCONTINUED | OUTPATIENT
Start: 2025-03-07 | End: 2025-03-07 | Stop reason: HOSPADM

## 2025-03-07 RX ORDER — IPRATROPIUM BROMIDE AND ALBUTEROL SULFATE 2.5; .5 MG/3ML; MG/3ML
1 SOLUTION RESPIRATORY (INHALATION)
Status: DISCONTINUED | OUTPATIENT
Start: 2025-03-07 | End: 2025-03-07 | Stop reason: HOSPADM

## 2025-03-07 RX ORDER — ONDANSETRON 2 MG/ML
4 INJECTION INTRAMUSCULAR; INTRAVENOUS
Status: DISCONTINUED | OUTPATIENT
Start: 2025-03-07 | End: 2025-03-07 | Stop reason: HOSPADM

## 2025-03-07 RX ORDER — OXYCODONE AND ACETAMINOPHEN 5; 325 MG/1; MG/1
2 TABLET ORAL
Status: DISCONTINUED | OUTPATIENT
Start: 2025-03-07 | End: 2025-03-07 | Stop reason: HOSPADM

## 2025-03-07 RX ORDER — METOCLOPRAMIDE HYDROCHLORIDE 5 MG/ML
10 INJECTION INTRAMUSCULAR; INTRAVENOUS
Status: DISCONTINUED | OUTPATIENT
Start: 2025-03-07 | End: 2025-03-07 | Stop reason: HOSPADM

## 2025-03-07 RX ORDER — SODIUM CHLORIDE 0.9 % (FLUSH) 0.9 %
5-40 SYRINGE (ML) INJECTION PRN
Status: DISCONTINUED | OUTPATIENT
Start: 2025-03-07 | End: 2025-03-07 | Stop reason: HOSPADM

## 2025-03-07 RX ORDER — LIDOCAINE HYDROCHLORIDE 10 MG/ML
INJECTION, SOLUTION EPIDURAL; INFILTRATION; INTRACAUDAL; PERINEURAL
Status: DISCONTINUED | OUTPATIENT
Start: 2025-03-07 | End: 2025-03-07 | Stop reason: SDUPTHER

## 2025-03-07 RX ORDER — MEPERIDINE HYDROCHLORIDE 50 MG/ML
12.5 INJECTION INTRAMUSCULAR; INTRAVENOUS; SUBCUTANEOUS EVERY 5 MIN PRN
Status: DISCONTINUED | OUTPATIENT
Start: 2025-03-07 | End: 2025-03-07 | Stop reason: HOSPADM

## 2025-03-07 RX ORDER — DIPHENHYDRAMINE HYDROCHLORIDE 50 MG/ML
12.5 INJECTION, SOLUTION INTRAMUSCULAR; INTRAVENOUS
Status: DISCONTINUED | OUTPATIENT
Start: 2025-03-07 | End: 2025-03-07 | Stop reason: HOSPADM

## 2025-03-07 RX ORDER — MIDAZOLAM HYDROCHLORIDE 2 MG/2ML
2 INJECTION, SOLUTION INTRAMUSCULAR; INTRAVENOUS
Status: DISCONTINUED | OUTPATIENT
Start: 2025-03-07 | End: 2025-03-07 | Stop reason: HOSPADM

## 2025-03-07 RX ORDER — PANTOPRAZOLE SODIUM 40 MG/1
40 TABLET, DELAYED RELEASE ORAL
Status: DISCONTINUED | OUTPATIENT
Start: 2025-03-08 | End: 2025-03-12 | Stop reason: HOSPADM

## 2025-03-07 RX ADMIN — PROPOFOL 100 MCG/KG/MIN: 10 INJECTION, EMULSION INTRAVENOUS at 11:30

## 2025-03-07 RX ADMIN — OXYCODONE HYDROCHLORIDE 5 MG: 5 TABLET ORAL at 20:10

## 2025-03-07 RX ADMIN — MIDODRINE HYDROCHLORIDE 2.5 MG: 2.5 TABLET ORAL at 12:45

## 2025-03-07 RX ADMIN — LIDOCAINE HYDROCHLORIDE 50 MG: 10 INJECTION, SOLUTION EPIDURAL; INFILTRATION; INTRACAUDAL; PERINEURAL at 11:30

## 2025-03-07 RX ADMIN — LATANOPROST 1 DROP: 50 SOLUTION OPHTHALMIC at 20:11

## 2025-03-07 RX ADMIN — PANTOPRAZOLE SODIUM 8 MG/HR: 40 INJECTION, POWDER, FOR SOLUTION INTRAVENOUS at 07:27

## 2025-03-07 RX ADMIN — PROPOFOL 100 MG: 10 INJECTION, EMULSION INTRAVENOUS at 11:30

## 2025-03-07 RX ADMIN — SODIUM CHLORIDE, PRESERVATIVE FREE 10 ML: 5 INJECTION INTRAVENOUS at 20:11

## 2025-03-07 RX ADMIN — MIDODRINE HYDROCHLORIDE 2.5 MG: 2.5 TABLET ORAL at 16:25

## 2025-03-07 RX ADMIN — ATORVASTATIN CALCIUM 40 MG: 40 TABLET, FILM COATED ORAL at 20:11

## 2025-03-07 RX ADMIN — OXYCODONE HYDROCHLORIDE 5 MG: 5 TABLET ORAL at 12:45

## 2025-03-07 RX ADMIN — MIDODRINE HYDROCHLORIDE 2.5 MG: 2.5 TABLET ORAL at 08:11

## 2025-03-07 RX ADMIN — OXYCODONE HYDROCHLORIDE 5 MG: 5 TABLET ORAL at 00:06

## 2025-03-07 RX ADMIN — OXYCODONE HYDROCHLORIDE 5 MG: 5 TABLET ORAL at 08:15

## 2025-03-07 ASSESSMENT — PAIN SCALES - WONG BAKER
WONGBAKER_NUMERICALRESPONSE: NO HURT

## 2025-03-07 ASSESSMENT — PAIN SCALES - GENERAL
PAINLEVEL_OUTOF10: 0
PAINLEVEL_OUTOF10: 7
PAINLEVEL_OUTOF10: 7
PAINLEVEL_OUTOF10: 3
PAINLEVEL_OUTOF10: 0
PAINLEVEL_OUTOF10: 4
PAINLEVEL_OUTOF10: 8

## 2025-03-07 ASSESSMENT — PAIN - FUNCTIONAL ASSESSMENT
PAIN_FUNCTIONAL_ASSESSMENT: NONE - DENIES PAIN
PAIN_FUNCTIONAL_ASSESSMENT: ACTIVITIES ARE NOT PREVENTED
PAIN_FUNCTIONAL_ASSESSMENT: NONE - DENIES PAIN

## 2025-03-07 ASSESSMENT — PAIN DESCRIPTION - DESCRIPTORS
DESCRIPTORS: ACHING;DISCOMFORT
DESCRIPTORS: ACHING

## 2025-03-07 ASSESSMENT — PAIN DESCRIPTION - ORIENTATION
ORIENTATION: MID
ORIENTATION: LEFT

## 2025-03-07 ASSESSMENT — PAIN DESCRIPTION - LOCATION
LOCATION: ABDOMEN
LOCATION: RIB CAGE

## 2025-03-07 NOTE — PROGRESS NOTES
Spiritual Health History and Assessment/Progress Note  [facility]    (P) Spiritual/Emotional Needs,  , (P) Life Adjustments, Adjustment to illness,      Name: Hemanth Barrera MRN: [id number]    Age: 89 y.o.     Sex: male   Language: English   Taoist: Anabaptist   GI bleed     Date: 3/6/2025            Total Time Calculated: (P) 15 min              Spiritual Assessment began in [address]        Referral/Consult From: (P) Rounding   Encounter Overview/Reason: (P) Spiritual/Emotional Needs  Service Provided For: (P) Patient          provided support and care to Pt. No family was present during visit. Pt. Was open to conversation about spiritual things and open to prayer for support.  provided support and encouragement for Pt. And prayer for strength and comfort.  Deepali, Belief, Meaning:   Patient has beliefs or practices that help with coping during difficult times  Family/Friends No family/friends present      Importance and Influence:  Patient has spiritual/personal beliefs that influence decisions regarding their health  Family/Friends No family/friends present    Community:  Patient feels well-supported. Support system includes: Children  Family/Friends No family/friends present    Assessment and Plan of Care:     Patient Interventions include: Facilitated expression of thoughts and feelings and Explored spiritual coping/struggle/distress  Family/Friends Interventions include: No family/friends present    Patient Plan of Care: Spiritual Care available upon further referral  Family/Friends Plan of Care: No family/friends present    Electronically signed by Chaplain KELBY on 3/6/2025 at 8:41 PM    03/06/25 2039   Encounter Summary   Encounter Overview/Reason Spiritual/Emotional Needs   Service Provided For Patient   Referral/Consult From [facility]   Support System Children   Last Encounter  03/06/25   Complexity of Encounter Moderate   Begin Time 1825   End Time

## 2025-03-07 NOTE — PLAN OF CARE
Problem: Chronic Conditions and Co-morbidities  Goal: Patient's chronic conditions and co-morbidity symptoms are monitored and maintained or improved  3/7/2025 0704 by Soumya Grajeda RN  Outcome: Progressing  Flowsheets (Taken 3/6/2025 2030)  Care Plan - Patient's Chronic Conditions and Co-Morbidity Symptoms are Monitored and Maintained or Improved: Monitor and assess patient's chronic conditions and comorbid symptoms for stability, deterioration, or improvement     Problem: Discharge Planning  Goal: Discharge to home or other facility with appropriate resources  3/7/2025 0704 by Soumya Grajeda RN  Outcome: Progressing  Flowsheets (Taken 3/6/2025 2030)  Discharge to home or other facility with appropriate resources: Identify barriers to discharge with patient and caregiver     Problem: Safety - Adult  Goal: Free from fall injury  3/7/2025 0704 by Soumya Grajeda RN  Outcome: Progressing     Problem: Skin/Tissue Integrity  Goal: Skin integrity remains intact  Description: 1.  Monitor for areas of redness and/or skin breakdown  2.  Assess vascular access sites hourly  3.  Every 4-6 hours minimum:  Change oxygen saturation probe site  4.  Every 4-6 hours:  If on nasal continuous positive airway pressure, respiratory therapy assess nares and determine need for appliance change or resting period  3/7/2025 0704 by Soumya Grajeda RN  Outcome: Progressing     Problem: ABCDS Injury Assessment  Goal: Absence of physical injury  3/7/2025 0704 by Soumya Grajeda RN  Outcome: Progressing     Problem: Neurosensory - Adult  Goal: Achieves maximal functionality and self care  Outcome: Progressing     Problem: Musculoskeletal - Adult  Goal: Return mobility to safest level of function  3/7/2025 0704 by Soumya Grajeda RN  Outcome: Progressing     Problem: Gastrointestinal - Adult  Goal: Maintains or returns to baseline bowel function  3/7/2025 0704 by Soumya Grajeda RN  Outcome: Progressing     Problem: Genitourinary - Adult  Goal: Absence  of urinary retention  Outcome: Progressing     Problem: Metabolic/Fluid and Electrolytes - Adult  Goal: Electrolytes maintained within normal limits  Outcome: Progressing  Flowsheets (Taken 3/6/2025 2030)  Electrolytes maintained within normal limits:   Monitor labs and assess patient for signs and symptoms of electrolyte imbalances   Administer electrolyte replacement as ordered   Monitor response to electrolyte replacements, including repeat lab results as appropriate     Problem: Metabolic/Fluid and Electrolytes - Adult  Goal: Hemodynamic stability and optimal renal function maintained  Outcome: Progressing  Flowsheets (Taken 3/6/2025 2030)  Hemodynamic stability and optimal renal function maintained:   Monitor labs and assess for signs and symptoms of volume excess or deficit   Monitor intake, output and patient weight     Problem: Confusion  Goal: Confusion, delirium, dementia, or psychosis is improved or at baseline  Description: INTERVENTIONS:  1. Assess for possible contributors to thought disturbance, including medications, impaired vision or hearing, underlying metabolic abnormalities, dehydration, psychiatric diagnoses, and notify attending LIP  2. [place] high risk fall precautions, as indicated  3. Provide frequent short contacts to provide reality reorientation, refocusing and direction  4. Decrease environmental stimuli, including noise as appropriate  5. Monitor and intervene to maintain adequate nutrition, hydration, elimination, sleep and activity  6. If unable to ensure safety without constant attention obtain sitter and review sitter guidelines with assigned personnel  7. Initiate Psychosocial CNS and Spiritual Care consult, as indicated  Outcome: Progressing

## 2025-03-07 NOTE — CARE COORDINATION
YAIR spoke with patient he is agreeable to returning to [facility],referral sent.       1525 YAIR spoke with Gwen @ [facility], they can accept patient. Will need new precert.    Patient can transport to [facility] if precert received.  over weekend will need to contact Gwen @ [phone] for coordination of transport time.     YAIR spoke with son Elias he is in agreement with patient returning to [facility].

## 2025-03-07 NOTE — PROGRESS NOTES
Occupational Therapy    [facility]  Occupational Therapy Not Seen Note    DATE: 3/7/2025    NAME: Hemanth Barrera  MRN: [id number]   : 1935      Patient not seen this date for Occupational Therapy due to:    Pt currently off floor at a colonoscopy. Will continue to pursue OT eval.    Electronically signed by ALHAJI HINSON OTR/L on 3/7/2025 at 11:31 AM

## 2025-03-07 NOTE — PROGRESS NOTES
Pt returned from PACU, vital signs obtained, patient allowed to eat. Food provided to [employer]. Call light within reach.

## 2025-03-07 NOTE — PROGRESS NOTES
Physical Therapy        Physical Therapy Cancel Note      DATE: 3/7/2025    NAME: Hemanth Barrera  MRN: [id number]   : 1935      Patient not seen this date for Physical Therapy due to:    Pt currently off floor at a colonoscopy. Will continue to pursue PT eval       Electronically signed by EMMANUEL VILLALOBOS PT on 3/7/2025 at 11:15 AM

## 2025-03-07 NOTE — PROGRESS NOTES
Rn spoke with GI regarding bowel prep and patient not currently being clear. Verbal orders for tap water enema 500ml at 0930.    0920 enema given patient tolerated well. No stool chunks noted,  dark brown liquid. Noted. Perfect serve sent Bishnu LAU no further orders

## 2025-03-07 NOTE — ANESTHESIA PRE PROCEDURE
Department of Anesthesiology  Preprocedure Note       Name:  Hemanth Barrera   Age:  89 y.o.  :  1935                                          MRN:  [id number]         Date:  3/7/2025      Surgeon: Surgeon(s):  Isauro Razo MD    Procedure: Procedure(s):  COLONOSCOPY DIAGNOSTIC    Medications prior to admission:   Prior to Admission medications    Medication Sig Start Date End Date Taking? Authorizing Provider   finasteride (PROSCAR) 5 MG tablet TAKE 1 TABLET BY MOUTH DAILY 25  Yes Raisa Tomlinson MD   rivaroxaban (XARELTO) 2.5 MG TABS tablet Take 6 tablets by mouth 2 times daily (with meals) for 19 days, THEN 8 tablets Daily with supper. 2/3/25 3/24/25 Yes Raisa Tomlinson MD   ferrous sulfate (IRON 325) 325 (65 Fe) MG tablet Take 1 tablet by mouth 2 times daily (with meals) 2/3/25  Yes Raisa Tomlinson MD   midodrine (PROAMATINE) 2.5 MG tablet Take 1 tablet by mouth 3 times daily (with meals) Hold if SBP more than 100 2/3/25  Yes Raisa Tomlinson MD   isosorbide mononitrate (IMDUR) 30 MG extended release tablet Take 1 tablet by mouth daily 24  Yes Nichole Noguera, APRN - NP   metoprolol succinate (TOPROL XL) 100 MG extended release tablet Take 1 tablet by mouth daily 24  Yes Dion Gilliland MD   bumetanide (BUMEX) 2 MG tablet Take 1 tablet by mouth daily 24  Yes Dion Gilliland MD   aspirin 81 MG chewable tablet Take 1 tablet by mouth daily 24  Yes Dion Gilliland MD   pantoprazole (PROTONIX) 40 MG tablet Take 1 tablet by mouth every morning (before breakfast) 24  Yes Dion Gilliland MD   clopidogrel (PLAVIX) 75 MG tablet Take 1 tablet by mouth daily 24  Yes Dion Gilliland MD   vitamin D (ERGOCALCIFEROL) 1.25 MG (91905 UT) CAPS capsule TAKE 1 CAPSULE BY MOUTH EVERY WEEK 24  Yes Wolf Sharma MD   atorvastatin (LIPITOR) 40 MG tablet Take 1 tablet by mouth nightly 24  Yes Sana Haskins MD   dilTIAZem (CARDIZEM CD) 120 MG extended release

## 2025-03-07 NOTE — PROGRESS NOTES
[facility]  Office: [phone]  Antwon Bernardo DO, Fernando Vyas DO, Drake Haynes DO, Geovani Honeycutt DO, Yani Pritchard MD, Isabella Meade MD, Richard Hidalgo MD, Miriam Michael MD,  Keo Pope MD, Eli Barbour MD, Giovanna Louie MD,  Yee Kerr DO, Dion Gilliland MD, Jonny Basurto MD, Alex Bernardo DO, Nafisa Lyles MD,  Jarred Johnson DO, Camila Mabry MD, Karen Shipley MD, Jessika Ravi MD, Carlos De León MD,  Bola Callaway MD, Roly Brunson MD, Rojas Barfield MD, Francisco J Monzon MD, Jerry Maher MD, Jelani Jameson MD, Rudy Webb DO, Teodoro Casas MD, Yee Meehan MD, Mohsin Reza, MD, Shirley Waterhouse, CNP,  Isidra Bianchi CNP, Rudy Castaneda, CNP,  Magda Cannon, DAVID, Jaleesa Meeks, CNP, Rakel Purdy, CNP, Tatiana Walton, CNP, Stephanie Cyr CNP, TERENCE Bailey-TED, Joie Song, CNP, Evelio Bear, CNP,  Venecia Shanks, CNP, Erin Alaniz, CNP, Rachel Ling, CNP,  Maura Raman, CNS, Vaishali Lucio CNP, Sarah Ramirez CNP,   Graciela Blue, CNP         [facility]   IN-PATIENT SERVICE   [facility]    Progress Note    3/7/2025    8:10 AM    Name:   Hemanth Barrera  MRN:     [id number]     Acct:      [id number]   Room:   331/331-01   Day:  2  Admit Date:  3/4/2025 11:17 PM    PCP:   Neftaly Contreras MD  Code Status:  Full Code    Subjective:     Patient was seen in follow-up for acute upper GI bleed. HgB remains stable and is 9.9 this morning. Gastroenterology is following; plan for colonoscopy today.     Medications:     Allergies:    Allergies   Allergen Reactions    Aspirin Other (See Comments)     Pt told not to take since he has only a partial stomach    Cyclobenzaprine Other (See Comments)     Pt stated heart palpitations and he felt funny    Ibuprofen Nausea Only    Azithromycin Nausea Only    Hydrocodone-Acetaminophen Nausea Only     per pt, he is having upset stomach when taking norco       Current Meds:   Scheduled Meds:

## 2025-03-07 NOTE — ANESTHESIA POSTPROCEDURE EVALUATION
Department of Anesthesiology  Postprocedure Note    Patient: Hemanth Barrera  MRN: [id number]  YOB: 1935  Date of evaluation: 3/7/2025    Procedure Summary       Date: 03/07/25 Room / Location: [facility] PROCEDURE ROOM / [facility]    Anesthesia Start: 1126 Anesthesia Stop: 1143    Procedure: SIGMOIDOSCOPY DIAGNOSTIC FLEXIBLE (Anus) Diagnosis:       Gastrointestinal hemorrhage, unspecified gastrointestinal hemorrhage type      (Gastrointestinal hemorrhage, unspecified gastrointestinal hemorrhage type [K92.2])    Surgeons: Isauro Razo MD Responsible Provider: Selvin Casiano MD    Anesthesia Type: MAC ASA Status: 4            Anesthesia Type: No value filed.    Radha Phase I: Radha Score: 8    Radha Phase II:      Anesthesia Post Evaluation    Patient location during evaluation: PACU  Patient participation: complete - patient participated  Level of consciousness: awake and alert  Airway patency: patent  Nausea & Vomiting: no nausea and no vomiting  Cardiovascular status: hemodynamically stable  Respiratory status: room air and spontaneous ventilation  Hydration status: euvolemic  Multimodal analgesia pain management approach  Pain management: adequate    No notable events documented.

## 2025-03-07 NOTE — OP NOTE
Flexible Sigmoidoscopy report    Flexible Sigmoidoscopy Procedure Note    Procedure:  Flexible Sigmoidoscopy    Indications: Anemia    Sedation:  MAC    Procedure Date: 3/7/2025    Attending Physician:  Dr. Isauro Razo MD.     Assistant Physician: None    Consent:   Informed consent was obtained for the procedure after explaining the risks including bleeding, perforation, aspiration, arrhythmia, risks related to sedation, reaction to medications and rarely death, benefits and alternatives to the patient. The patient verbalized understanding and agreed to proceed with the procedure.       Procedure Details:  The patient was placed in the left lateral decubitus position.  Oxygen and cardiac monitoring equipment was attached. The patient's vital signs were monitored continuously  throughout the procedure. After appropriate sedation was achieved, a rectal examination was performed.  The pediatric colonoscope was inserted into the rectum and advanced under direct vision to the sigmoid colon.  The quality of the colonic preparation was unsatisfactory.  A careful inspection was made as the colonoscope was withdrawn, including a retroflexed view of the rectum; findings and interventions are described below.  Appropriate photodocumentation was obtained.           Complications:  None    Estimated blood loss:  Minimal           Disposition:  Home           Condition: stable    Withdrawal Time: Not applicable    Specimen Collected: None    Findings:   The procedure was intended as a total colonoscopy however extremely poor prep noted.  The scope was only advanced up the sigmoid colon before the procedure was aborted.    Endoscopic Impression:    Extremely poor prep limiting endoscopic visualization.    Recommendations:  The patient will need repeat colonoscopy with a 2-day prep.  This can either be done on Monday or outpatient if the hemoglobin stays stable.  Okay to resume diet today, will leave this to primary team's

## 2025-03-08 PROBLEM — R54 FRAILTY: Status: ACTIVE | Noted: 2025-03-08

## 2025-03-08 LAB
HCT VFR BLD AUTO: 27.2 % (ref 41–53)
HCT VFR BLD AUTO: 30.2 % (ref 41–53)
HGB BLD-MCNC: 9 G/DL (ref 13.5–17.5)
HGB BLD-MCNC: 9.9 G/DL (ref 13.5–17.5)

## 2025-03-08 PROCEDURE — 97166 OT EVAL MOD COMPLEX 45 MIN: CPT

## 2025-03-08 PROCEDURE — 85018 HEMOGLOBIN: CPT

## 2025-03-08 PROCEDURE — 99232 SBSQ HOSP IP/OBS MODERATE 35: CPT | Performed by: STUDENT IN AN ORGANIZED HEALTH CARE EDUCATION/TRAINING PROGRAM

## 2025-03-08 PROCEDURE — 2060000000 HC ICU INTERMEDIATE R&B

## 2025-03-08 PROCEDURE — 6370000000 HC RX 637 (ALT 250 FOR IP): Performed by: INTERNAL MEDICINE

## 2025-03-08 PROCEDURE — 36415 COLL VENOUS BLD VENIPUNCTURE: CPT

## 2025-03-08 PROCEDURE — 2500000003 HC RX 250 WO HCPCS: Performed by: INTERNAL MEDICINE

## 2025-03-08 PROCEDURE — 97162 PT EVAL MOD COMPLEX 30 MIN: CPT

## 2025-03-08 PROCEDURE — 97530 THERAPEUTIC ACTIVITIES: CPT

## 2025-03-08 PROCEDURE — APPSS30 APP SPLIT SHARED TIME 16-30 MINUTES: Performed by: NURSE PRACTITIONER

## 2025-03-08 PROCEDURE — 85014 HEMATOCRIT: CPT

## 2025-03-08 PROCEDURE — 6370000000 HC RX 637 (ALT 250 FOR IP): Performed by: STUDENT IN AN ORGANIZED HEALTH CARE EDUCATION/TRAINING PROGRAM

## 2025-03-08 PROCEDURE — 6370000000 HC RX 637 (ALT 250 FOR IP): Performed by: NURSE PRACTITIONER

## 2025-03-08 RX ORDER — POLYETHYLENE GLYCOL 3350 17 G/17G
170 POWDER, FOR SOLUTION ORAL ONCE
Status: COMPLETED | OUTPATIENT
Start: 2025-03-08 | End: 2025-03-08

## 2025-03-08 RX ADMIN — OXYCODONE HYDROCHLORIDE 5 MG: 5 TABLET ORAL at 02:32

## 2025-03-08 RX ADMIN — ISOSORBIDE MONONITRATE 30 MG: 30 TABLET, EXTENDED RELEASE ORAL at 08:40

## 2025-03-08 RX ADMIN — OXYCODONE HYDROCHLORIDE 5 MG: 5 TABLET ORAL at 06:40

## 2025-03-08 RX ADMIN — MIDODRINE HYDROCHLORIDE 2.5 MG: 2.5 TABLET ORAL at 16:53

## 2025-03-08 RX ADMIN — BUMETANIDE 2 MG: 1 TABLET ORAL at 08:40

## 2025-03-08 RX ADMIN — PANTOPRAZOLE SODIUM 40 MG: 40 TABLET, DELAYED RELEASE ORAL at 06:40

## 2025-03-08 RX ADMIN — ACETAMINOPHEN 650 MG: 325 TABLET ORAL at 12:40

## 2025-03-08 RX ADMIN — SODIUM CHLORIDE, PRESERVATIVE FREE 10 ML: 5 INJECTION INTRAVENOUS at 20:33

## 2025-03-08 RX ADMIN — MIDODRINE HYDROCHLORIDE 2.5 MG: 2.5 TABLET ORAL at 08:40

## 2025-03-08 RX ADMIN — ATORVASTATIN CALCIUM 40 MG: 40 TABLET, FILM COATED ORAL at 20:33

## 2025-03-08 RX ADMIN — LATANOPROST 1 DROP: 50 SOLUTION OPHTHALMIC at 20:33

## 2025-03-08 RX ADMIN — MIDODRINE HYDROCHLORIDE 2.5 MG: 2.5 TABLET ORAL at 12:40

## 2025-03-08 RX ADMIN — POLYETHYLENE GLYCOL 3350 170 G: 17 POWDER, FOR SOLUTION ORAL at 17:02

## 2025-03-08 RX ADMIN — SODIUM CHLORIDE, PRESERVATIVE FREE 10 ML: 5 INJECTION INTRAVENOUS at 08:41

## 2025-03-08 RX ADMIN — DILTIAZEM HYDROCHLORIDE 120 MG: 120 CAPSULE, COATED, EXTENDED RELEASE ORAL at 08:40

## 2025-03-08 RX ADMIN — FINASTERIDE 5 MG: 5 TABLET, FILM COATED ORAL at 08:40

## 2025-03-08 ASSESSMENT — PAIN SCALES - GENERAL
PAINLEVEL_OUTOF10: 0
PAINLEVEL_OUTOF10: 5
PAINLEVEL_OUTOF10: 4
PAINLEVEL_OUTOF10: 6
PAINLEVEL_OUTOF10: 5

## 2025-03-08 ASSESSMENT — PAIN SCALES - WONG BAKER
WONGBAKER_NUMERICALRESPONSE: NO HURT

## 2025-03-08 ASSESSMENT — PAIN DESCRIPTION - DESCRIPTORS: DESCRIPTORS: ACHING

## 2025-03-08 ASSESSMENT — PAIN - FUNCTIONAL ASSESSMENT: PAIN_FUNCTIONAL_ASSESSMENT: ACTIVITIES ARE NOT PREVENTED

## 2025-03-08 ASSESSMENT — PAIN DESCRIPTION - LOCATION: LOCATION: COCCYX

## 2025-03-08 ASSESSMENT — PAIN DESCRIPTION - ORIENTATION: ORIENTATION: INNER

## 2025-03-08 NOTE — PROGRESS NOTES
[facility]  Office: [phone]  Antwon Bernardo DO, Fernando Vyas DO, Drake Haynes DO, Geovani Honeycutt DO, Yani Pritchard MD, Isabella Meade MD, Richard Hidalgo MD, Miriam Michael MD,  Keo Pope MD, Eli Barbour MD, Giovanna Louie MD,  Yee Kerr DO, Dion Gilliland MD, Jonny Basurto MD, Alex Bernardo DO, Nafisa Lyles MD,  Jarred Johnson DO, Camila Mabry MD, Karen Shipley MD, Jessika Ravi MD, Carlos De León MD,  Bola Callaway MD, Roly Brunson MD, Rojas Barfield MD, Francisco J Monzon MD, Jerry Maher MD, Jelani Jameson MD, Rudy Webb DO, Teodoro Casas MD, Yee Meehan MD, Mohsin Reza, MD, Shirley Waterhouse, CNP,  Isidra Bianchi CNP, Rudy Castaneda, CNP,  Magda Cannon, DAVID, Jaleesa Meeks, CNP, Rakel Purdy, CNP, Tatiana Walton, CNP, Stephanie Cyr CNP, TERENCE Bailey-TED, Joie Song, CNP, Evelio Bear, CNP,  Venecia Shanks, CNP, Erin Alaniz, CNP, Rachel Ling, CNP,  Maura Raman, CNS, Vaishali Lucio CNP, Sarah Ramirez CNP,   Graciela Blue, CNP         [facility]   IN-PATIENT SERVICE   [facility]    Progress Note    3/8/2025    5:21 AM    Name:   Hemanth Barrera  MRN:     [id number]     Acct:      [id number]   Room:   331/331-01   Day:  3  Admit Date:  3/4/2025 11:17 PM    PCP:   Neftaly Contreras MD  Code Status:  Full Code    Subjective:     Patient was seen in follow-up for acute upper GI bleed. He reports feeling well and has no specific complaints this morning. Hemoglobin is stable at 8.9. Gastroenterology has recommended repeat colonoscopy with 2-day prep and the patient is agreeable to this. Plan for repeat colonoscopy Monday.     Medications:     Allergies:    Allergies   Allergen Reactions    Aspirin Other (See Comments)     Pt told not to take since he has only a partial stomach    Cyclobenzaprine Other (See Comments)     Pt stated heart palpitations and he felt funny    Ibuprofen Nausea Only    Azithromycin Nausea Only     Hydrocodone-Acetaminophen Nausea Only     per pt, he is having upset stomach when taking norco       Current Meds:   Scheduled Meds:    pantoprazole  40 mg Oral QAM AC    sodium chloride flush  5-40 mL IntraVENous 2 times per day    bumetanide  2 mg Oral Daily    dilTIAZem  120 mg Oral Daily    finasteride  5 mg Oral Daily    isosorbide mononitrate  30 mg Oral Daily    metoprolol succinate  100 mg Oral Daily    midodrine  2.5 mg Oral TID WC    atorvastatin  40 mg Oral Nightly    latanoprost  1 drop Both Eyes Nightly     Continuous Infusions:    sodium chloride      sodium chloride 100 mL/hr at 25 1126    sodium chloride      sodium chloride       PRN Meds: sodium chloride, sodium chloride flush, sodium chloride, potassium chloride **OR** potassium alternative oral replacement **OR** potassium chloride, magnesium sulfate, ondansetron **OR** ondansetron, polyethylene glycol, acetaminophen **OR** acetaminophen, oxyCODONE, sodium chloride, nitroGLYCERIN, sodium chloride    Data:     Vitals:  /84   Pulse 70   Temp 97.9 °F (36.6 °C) (Oral)   Resp 16   Ht 1.854 m (6' 1\")   Wt 65.5 kg (144 lb 6.4 oz)   SpO2 92%   BMI 19.05 kg/m²   Temp (24hrs), Av.1 °F (36.7 °C), Min:97.8 °F (36.6 °C), Max:99 °F (37.2 °C)    No results for input(s): \"POCGLU\" in the last 72 hours.    I/O (24Hr):    Intake/Output Summary (Last 24 hours) at 3/8/2025 0521  Last data filed at 3/8/2025 0305  Gross per 24 hour   Intake 1140 ml   Output 1200 ml   Net -60 ml       Labs:  Hematology:  Recent Labs     25  0249 25  1416 25  0255 25  0854 25  2221   WBC 4.7  --   --   --   --    RBC 2.87*  --   --   --   --    HGB 8.5*   < > 9.9* 10.1* 8.9*   HCT 25.1*   < > 30.2* 30.7* 26.0*   MCV 87.5  --   --   --   --    MCH 29.5  --   --   --   --    MCHC 33.7  --   --   --   --    RDW 21.1*  --   --   --   --      --   --   --   --    MPV 7.1  --   --   --   --     < > = values in this interval not

## 2025-03-08 NOTE — PROGRESS NOTES
[facility]    Gastroenterology Daily Progress Note      Patient:   Hemanth Barrera   :    1935   [facility]  Date:     3/8/2025  Consultant:   DARCI Sanches - CNP, CNP      SUBJECTIVE  89 y.o. male admitted 3/4/2025 with GI bleed [K92.2]  Gastrointestinal hemorrhage, unspecified gastrointestinal hemorrhage type [K92.2] and seen for anemia. The pt was seen and examined. He is s/p  flex sig yesterday that are discussed below. Hgb 9.9 c/o LLQ pain states he is having non bloody stools.         OBJECTIVE  Scheduled Meds:   pantoprazole  40 mg Oral QAM AC    sodium chloride flush  5-40 mL IntraVENous 2 times per day    bumetanide  2 mg Oral Daily    dilTIAZem  120 mg Oral Daily    finasteride  5 mg Oral Daily    isosorbide mononitrate  30 mg Oral Daily    metoprolol succinate  100 mg Oral Daily    midodrine  2.5 mg Oral TID WC    atorvastatin  40 mg Oral Nightly    latanoprost  1 drop Both Eyes Nightly       Vital Signs:  BP 99/71   Pulse 71   Temp 98.2 °F (36.8 °C) (Oral)   Resp 18   Ht 1.854 m (6' 1\")   Wt 65.5 kg (144 lb 6.4 oz)   SpO2 92%   BMI 19.05 kg/m²    Review of Systems - History obtained from chart review and the patient  General ROS: negative  Respiratory ROS: no cough, shortness of breath, or wheezing  Cardiovascular ROS: no chest pain or dyspnea on exertion  Gastrointestinal ROS: positive for - abdominal pain   Physical Exam:     General Appearance: alert and oriented to person, place and time, well-developed and well-nourished, in no acute distress  Skin: warm and dry, no rash or erythema  Head: normocephalic and atraumatic  Eyes: pupils equal, round, and reactive to light, extraocular eye movements intact, conjunctivae normal  ENT: hearing grossly normal bilaterally  Neck: neck supple and non tender without mass, no thyromegaly or thyroid nodules, no cervical lymphadenopathy   Pulmonary/Chest: clear to auscultation bilaterally- no wheezes,

## 2025-03-08 NOTE — PROGRESS NOTES
Occupational Therapy  Facility/Department: [address]  Occupational Therapy Initial Assessment    Name: Hemanth Barrera  : 1935  MRN: [id number]  Date of Service: 3/8/2025    Chief Complaint   Patient presents with    abnormal lab     BIB EMS from [place]; per facility, hemoglobin of 4.4 from routine labs today; facility did not provide lab results to EMS        Discharge Recommendations:  Patient would benefit from continued therapy after discharge  OT Equipment Recommendations  Equipment Needed: Yes (DME/AD recs TBD)       Patient Diagnosis(es): The encounter diagnosis was Gastrointestinal hemorrhage, unspecified gastrointestinal hemorrhage type.  Past Medical History:  has a past medical history of Acute inferolateral myocardial infarction (HCC), Arthritis, Atrial fibrillation (HCC), CAD (coronary artery disease), CHF (congestive heart failure) (HCC), Glaucoma, Hx of blood clots, Hyperlipidemia, Hypertension, MI (myocardial infarction) (HCC), and NSTEMI (non-ST elevated myocardial infarction) ([facility]).  Past Surgical History:  has a past surgical history that includes pacemaker placement; Abdomen surgery; Cardiac catheterization; Appendectomy; Colonoscopy; eye surgery; hernia repair; bone marrow biopsy; joint replacement; CT BIOPSY BONE MARROW (2022); Upper gastrointestinal endoscopy (N/A, 2023); Colonoscopy (N/A, 8/3/2023); Cardiac procedure (N/A, 2024); Cardiac procedure (N/A, 2024); Cardiac procedure (Right, 2025); invasive vascular (N/A, 2025); and Upper gastrointestinal endoscopy (N/A, 3/6/2025).      Assessment  Performance deficits / Impairments: Decreased functional mobility ;Decreased ADL status;Decreased strength;Decreased safe awareness;Decreased endurance;Decreased balance  Assessment: The patient was admitted to [facility] from [facility] s/p GI bleed. The patient was admitted from [facility] where he was residing after a fall with rib fractures  stimuli  Following Commands: Follows one step commands consistently  Attention Span: Attends with cues to redirect  Safety Judgement: Decreased awareness of need for safety;Decreased awareness of need for assistance  Insights: Decreased awareness of deficits  Initiation: Requires cues for all  Sequencing: Requires cues for all  Cognition Comment: lethargic  Orientation  Overall Orientation Status: Within Functional Limits  Orientation Level: Oriented to place;Oriented to situation;Oriented to person         Education Given To: Patient  Education Provided: Role of Therapy;Plan of Care;Transfer Training;Equipment;Fall Prevention Strategies  Education Method: Verbal  Barriers to Learning: Cognition  Education Outcome: Verbalized understanding;Continued education needed             AM-PAC - ADL  AM-PAC Daily Activity - Inpatient   How much help is needed for putting on and taking off regular lower body clothing?: Total  How much help is needed for bathing (which includes washing, rinsing, drying)?: A Lot  How much help is needed for toileting (which includes using toilet, bedpan, or urinal)?: Total  How much help is needed for putting on and taking off regular upper body clothing?: A Lot  How much help is needed for taking care of personal grooming?: A Lot  How much help for eating meals?: A Little  AM-[facility] Inpatient Daily Activity Raw Score: 11  AM-PAC Inpatient ADL T-Scale Score : 29.04  ADL Inpatient CMS 0-100% Score: 70.42  ADL Inpatient CMS G-Code Modifier : CL      Goals  Short Term Goals  Time Frame for Short Term Goals: 14 days  Short Term Goal 1: UB ADLs Min A  Short Term Goal 2: LB ADLs Mod A  Short Term Goal 3: Tolerate EOB sitting ~10 mins  Short Term Goal 4: Hygiene ADLs SBA while seated EOB      Therapy Time   Individual Concurrent Group Co-treatment   Time In 1019         Time Out 1047         Minutes 28         Timed Code Treatment Minutes: 8 Minutes       Negrita Sheikh OTR/L

## 2025-03-08 NOTE — PROGRESS NOTES
Physical Therapy  Facility/Department: [address]   Physical Therapy Initial Evaluation    Patient Name: Hemanth Barrera        MRN: [id number]    : 1935    Date of Service: 3/8/2025    Chief Complaint   Patient presents with    abnormal lab     BIB EMS from [place]; per facility, hemoglobin of 4.4 from routine labs today; facility did not provide lab results to EMS     Past Medical History:  has a past medical history of Acute inferolateral myocardial infarction (HCC), Arthritis, Atrial fibrillation (HCC), CAD (coronary artery disease), CHF (congestive heart failure) ([facility]), Glaucoma, Hx of blood clots, Hyperlipidemia, Hypertension, MI (myocardial infarction) ([facility]), and NSTEMI (non-ST elevated myocardial infarction) ([facility]).  Past Surgical History:  has a past surgical history that includes pacemaker placement; Abdomen surgery; Cardiac catheterization; Appendectomy; Colonoscopy; eye surgery; hernia repair; bone marrow biopsy; joint replacement; CT BIOPSY BONE MARROW (2022); Upper gastrointestinal endoscopy (N/A, 2023); Colonoscopy (N/A, 8/3/2023); Cardiac procedure (N/A, 2024); Cardiac procedure (N/A, 2024); Cardiac procedure (Right, 2025); invasive vascular (N/A, 2025); and Upper gastrointestinal endoscopy (N/A, 3/6/2025).    Discharge Recommendations  Discharge Recommendations: Therapy recommended at discharge  PT Equipment Recommendations  Equipment Needed: No    Assessment  Body Structures, Functions, Activity Limitations Requiring Skilled Therapeutic Intervention: Decreased functional mobility , Decreased endurance, Decreased balance, Decreased posture, Decreased safe awareness, Increased pain  Assessment: The patient was admitted from SNF where he was residing after a fall with rib fractures and pneumothorax in 2025. He was found to have low HgB and is suspected to have a GI bleed during this admission. HgB was 9.9 at time of PT evaluation. He reports that prior

## 2025-03-08 NOTE — PLAN OF CARE
Problem: Chronic Conditions and Co-morbidities  Goal: Patient's chronic conditions and co-morbidity symptoms are monitored and maintained or improved  3/8/2025 1221 by Mabel Angeles RN  Outcome: Progressing  3/8/2025 0602 by Laila Barrera RN  Outcome: Progressing     Problem: Discharge Planning  Goal: Discharge to home or other facility with appropriate resources  3/8/2025 1221 by Mabel Angeles RN  Outcome: Progressing  3/8/2025 0602 by Laila Barrera RN  Outcome: Progressing     Problem: Safety - Adult  Goal: Free from fall injury  3/8/2025 1221 by Mabel Angeles RN  Outcome: Progressing  3/8/2025 0602 by Laila Barrera RN  Outcome: Progressing     Problem: Skin/Tissue Integrity  Goal: Skin integrity remains intact  Description: 1.  Monitor for areas of redness and/or skin breakdown  2.  Assess vascular access sites hourly  3.  Every 4-6 hours minimum:  Change oxygen saturation probe site  4.  Every 4-6 hours:  If on nasal continuous positive airway pressure, respiratory therapy assess nares and determine need for appliance change or resting period  3/8/2025 1221 by Mabel Angeles RN  Outcome: Progressing  3/8/2025 0602 by Laila Barrera RN  Outcome: Progressing     Problem: ABCDS Injury Assessment  Goal: Absence of physical injury  3/8/2025 1221 by Mabel Angeles RN  Outcome: Progressing  3/8/2025 0602 by Laila Barrera RN  Outcome: Progressing     Problem: Neurosensory - Adult  Goal: Achieves maximal functionality and self care  3/8/2025 1221 by Mabel Angeles RN  Outcome: Progressing  3/8/2025 0602 by Laila Barrera RN  Outcome: Progressing     Problem: Cardiovascular - Adult  Goal: Maintains optimal cardiac output and hemodynamic stability  3/8/2025 1221 by Mabel Angeles RN  Outcome: Progressing  3/8/2025 0602 by Laila Barrera RN  Outcome: Progressing     Problem: Skin/Tissue Integrity - Adult  Goal: Skin integrity remains intact  Description: 1.  Monitor for areas of redness and/or skin breakdown  2.  Assess  indicated  3. Provide frequent short contacts to provide reality reorientation, refocusing and direction  4. Decrease environmental stimuli, including noise as appropriate  5. Monitor and intervene to maintain adequate nutrition, hydration, elimination, sleep and activity  6. If unable to ensure safety without constant attention obtain sitter and review sitter guidelines with assigned personnel  7. Initiate Psychosocial CNS and Spiritual Care consult, as indicated  3/8/2025 1221 by Mabel Angeles, RN  Outcome: Progressing  3/8/2025 0602 by Laila Barrera, RN  Outcome: Progressing

## 2025-03-08 NOTE — PROGRESS NOTES
Spiritual Health History and Assessment/Progress Note  [facility]    (P) Follow-up,  , (P) Life Adjustments, Adjustment to illness,      Name: Hemanth Barrera MRN: [id number]    Age: 89 y.o.     Sex: male   Language: English   Denominational: [employer]   GI bleed     Date: 3/7/2025            Total Time Calculated: (P) 25 min              Spiritual Assessment continued in [facility]        Referral/Consult From: (P) Rounding   Encounter Overview/Reason: (P) Follow-up  Service Provided For: (P) Patient       Pt. Was open to conversation and very welcoming. Talked about family , his dad's Zoroastrianism. Deepali and the love of music and hymns.  provided support and care to pt.  provided a song of hymn and hope. . Prayer was offered for comfort, strength and encouragement.    Deepali, Belief, Meaning:   Patient has beliefs or practices that help with coping during difficult times  Family/Friends No family/friends present      Importance and Influence:  Patient has spiritual/personal beliefs that influence decisions regarding their health  Family/Friends No family/friends present    Community:  Patient feels well-supported. Support system includes: Children  Family/Friends No family/friends present    Assessment and Plan of Care:     Patient Interventions include: Facilitated expression of thoughts and feelings, Explored spiritual coping/struggle/distress, and Engaged in theological reflection  Family/Friends Interventions include: No family/friends present    Patient Plan of Care: Spiritual Care available upon further referral  Family/Friends Plan of Care: No family/friends present    Electronically signed by Chaplain KELBY on 3/7/2025 at 8:23 PM    03/07/25 2021   Encounter Summary   Encounter Overview/Reason Follow-up   Service Provided For Patient   Referral/Consult From [facility]   [employer] Children   Last Encounter  03/07/25   Complexity of Encounter Moderate   Begin Time 1925    End Time  1950   Total Time Calculated 25 min   Spiritual/Emotional needs   Type Spiritual Support   Grief, Loss, and Adjustments   Type Life Adjustments;Adjustment to illness   Assessment/Intervention/Outcome   Assessment Calm;Coping;Hopeful;Peaceful   Intervention Prayer (assurance of)/Cost;Sustaining Presence/Ministry of presence;Explored/Affirmed feelings, thoughts, concerns;Discussed relationship with God;Discussed belief system/Congregation practices/bridgette;Active listening   Outcome Acceptance;Comfort;Coping;Encouraged;Engaged in conversation;Expressed feelings, needs, and concerns;Expressed Gratitude;Receptive   Plan and Referrals   Plan/Referrals Continue to visit, (comment);Continue Support (comment)

## 2025-03-08 NOTE — PLAN OF CARE
Problem: Chronic Conditions and Co-morbidities  Goal: Patient's chronic conditions and co-morbidity symptoms are monitored and maintained or improved  Outcome: Progressing     Problem: Discharge Planning  Goal: Discharge to home or other facility with appropriate resources  Outcome: Progressing     Problem: Safety - Adult  Goal: Free from fall injury  Outcome: Progressing     Problem: Skin/Tissue Integrity  Goal: Skin integrity remains intact  Description: 1.  Monitor for areas of redness and/or skin breakdown  2.  Assess vascular access sites hourly  3.  Every 4-6 hours minimum:  Change oxygen saturation probe site  4.  Every 4-6 hours:  If on nasal continuous positive airway pressure, respiratory therapy assess nares and determine need for appliance change or resting period  Outcome: Progressing     Problem: ABCDS Injury Assessment  Goal: Absence of physical injury  Outcome: Progressing     Problem: Neurosensory - Adult  Goal: Achieves maximal functionality and self care  Outcome: Progressing     Problem: Cardiovascular - Adult  Goal: Maintains optimal cardiac output and hemodynamic stability  Outcome: Progressing     Problem: Skin/Tissue Integrity - Adult  Goal: Skin integrity remains intact  Description: 1.  Monitor for areas of redness and/or skin breakdown  2.  Assess vascular access sites hourly  3.  Every 4-6 hours minimum:  Change oxygen saturation probe site  4.  Every 4-6 hours:  If on nasal continuous positive airway pressure, respiratory therapy assess nares and determine need for appliance change or resting period  Outcome: Progressing     Problem: Skin/Tissue Integrity - Adult  Goal: Incisions, wounds, or drain sites healing without S/S of infection  Outcome: Progressing     Problem: Musculoskeletal - Adult  Goal: Return mobility to safest level of function  Outcome: Progressing     Problem: Gastrointestinal - Adult  Goal: Maintains or returns to baseline bowel function  Outcome: Progressing      Problem: Genitourinary - Adult  Goal: Absence of urinary retention  Outcome: Progressing     Problem: Metabolic/Fluid and Electrolytes - Adult  Goal: Electrolytes maintained within normal limits  Outcome: Progressing     Problem: Metabolic/Fluid and Electrolytes - Adult  Goal: Hemodynamic stability and optimal renal function maintained  Outcome: Progressing     Problem: Confusion  Goal: Confusion, delirium, dementia, or psychosis is improved or at baseline  Description: INTERVENTIONS:  1. Assess for possible contributors to thought disturbance, including medications, impaired vision or hearing, underlying metabolic abnormalities, dehydration, psychiatric diagnoses, and notify attending LIP  2. [place] high risk fall precautions, as indicated  3. Provide frequent short contacts to provide reality reorientation, refocusing and direction  4. Decrease environmental stimuli, including noise as appropriate  5. Monitor and intervene to maintain adequate nutrition, hydration, elimination, sleep and activity  6. If unable to ensure safety without constant attention obtain sitter and review sitter guidelines with assigned personnel  7. Initiate Psychosocial CNS and Spiritual Care consult, as indicated  Outcome: Progressing

## 2025-03-09 LAB
ANION GAP SERPL CALCULATED.3IONS-SCNC: 11 MMOL/L (ref 9–17)
BASOPHILS # BLD: 0.06 K/UL (ref 0–0.2)
BASOPHILS NFR BLD: 1 % (ref 0–2)
BUN SERPL-MCNC: 13 MG/DL (ref 8–23)
CALCIUM SERPL-MCNC: 8.2 MG/DL (ref 8.6–10.4)
CHLORIDE SERPL-SCNC: 100 MMOL/L (ref 98–107)
CO2 SERPL-SCNC: 23 MMOL/L (ref 20–31)
CREAT SERPL-MCNC: 0.9 MG/DL (ref 0.7–1.2)
EOSINOPHIL # BLD: 0 K/UL (ref 0–0.4)
EOSINOPHILS RELATIVE PERCENT: 0 % (ref 1–4)
ERYTHROCYTE [DISTWIDTH] IN BLOOD BY AUTOMATED COUNT: 21.6 % (ref 12.5–15.4)
GFR, ESTIMATED: 81 ML/MIN/1.73M2
GLUCOSE SERPL-MCNC: 101 MG/DL (ref 70–99)
HCT VFR BLD AUTO: 29.8 % (ref 41–53)
HGB BLD-MCNC: 9.8 G/DL (ref 13.5–17.5)
LYMPHOCYTES NFR BLD: 0.53 K/UL (ref 1–4.8)
LYMPHOCYTES RELATIVE PERCENT: 9 % (ref 24–44)
MCH RBC QN AUTO: 30.3 PG (ref 26–34)
MCHC RBC AUTO-ENTMCNC: 32.7 G/DL (ref 31–37)
MCV RBC AUTO: 92.5 FL (ref 80–100)
MONOCYTES NFR BLD: 0.24 K/UL (ref 0.1–0.8)
MONOCYTES NFR BLD: 4 % (ref 1–7)
MORPHOLOGY: ABNORMAL
NEUTROPHILS NFR BLD: 86 % (ref 36–66)
NEUTS SEG NFR BLD: 5.07 K/UL (ref 1.8–7.7)
PLATELET # BLD AUTO: 223 K/UL (ref 140–450)
PMV BLD AUTO: 6.8 FL (ref 6–12)
POTASSIUM SERPL-SCNC: 3.8 MMOL/L (ref 3.7–5.3)
RBC # BLD AUTO: 3.22 M/UL (ref 4.5–5.9)
SODIUM SERPL-SCNC: 134 MMOL/L (ref 135–144)
WBC OTHER # BLD: 5.9 K/UL (ref 3.5–11)

## 2025-03-09 PROCEDURE — 6370000000 HC RX 637 (ALT 250 FOR IP): Performed by: INTERNAL MEDICINE

## 2025-03-09 PROCEDURE — 2500000003 HC RX 250 WO HCPCS: Performed by: INTERNAL MEDICINE

## 2025-03-09 PROCEDURE — 36415 COLL VENOUS BLD VENIPUNCTURE: CPT

## 2025-03-09 PROCEDURE — 80048 BASIC METABOLIC PNL TOTAL CA: CPT

## 2025-03-09 PROCEDURE — APPSS30 APP SPLIT SHARED TIME 16-30 MINUTES: Performed by: NURSE PRACTITIONER

## 2025-03-09 PROCEDURE — 99232 SBSQ HOSP IP/OBS MODERATE 35: CPT | Performed by: STUDENT IN AN ORGANIZED HEALTH CARE EDUCATION/TRAINING PROGRAM

## 2025-03-09 PROCEDURE — 6370000000 HC RX 637 (ALT 250 FOR IP): Performed by: NURSE PRACTITIONER

## 2025-03-09 PROCEDURE — 2060000000 HC ICU INTERMEDIATE R&B

## 2025-03-09 PROCEDURE — 85025 COMPLETE CBC W/AUTO DIFF WBC: CPT

## 2025-03-09 PROCEDURE — 6370000000 HC RX 637 (ALT 250 FOR IP): Performed by: STUDENT IN AN ORGANIZED HEALTH CARE EDUCATION/TRAINING PROGRAM

## 2025-03-09 RX ORDER — POLYETHYLENE GLYCOL 3350 17 G/17G
170 POWDER, FOR SOLUTION ORAL ONCE
Status: COMPLETED | OUTPATIENT
Start: 2025-03-09 | End: 2025-03-09

## 2025-03-09 RX ADMIN — PANTOPRAZOLE SODIUM 40 MG: 40 TABLET, DELAYED RELEASE ORAL at 08:02

## 2025-03-09 RX ADMIN — ISOSORBIDE MONONITRATE 30 MG: 30 TABLET, EXTENDED RELEASE ORAL at 08:02

## 2025-03-09 RX ADMIN — BUMETANIDE 2 MG: 1 TABLET ORAL at 08:02

## 2025-03-09 RX ADMIN — SODIUM CHLORIDE, PRESERVATIVE FREE 10 ML: 5 INJECTION INTRAVENOUS at 21:37

## 2025-03-09 RX ADMIN — ACETAMINOPHEN 650 MG: 325 TABLET ORAL at 08:03

## 2025-03-09 RX ADMIN — METOPROLOL SUCCINATE 100 MG: 100 TABLET, EXTENDED RELEASE ORAL at 08:02

## 2025-03-09 RX ADMIN — ACETAMINOPHEN 650 MG: 325 TABLET ORAL at 01:21

## 2025-03-09 RX ADMIN — ACETAMINOPHEN 650 MG: 325 TABLET ORAL at 16:24

## 2025-03-09 RX ADMIN — MIDODRINE HYDROCHLORIDE 2.5 MG: 2.5 TABLET ORAL at 08:02

## 2025-03-09 RX ADMIN — MIDODRINE HYDROCHLORIDE 2.5 MG: 2.5 TABLET ORAL at 16:23

## 2025-03-09 RX ADMIN — DILTIAZEM HYDROCHLORIDE 120 MG: 120 CAPSULE, COATED, EXTENDED RELEASE ORAL at 08:02

## 2025-03-09 RX ADMIN — ATORVASTATIN CALCIUM 40 MG: 40 TABLET, FILM COATED ORAL at 21:37

## 2025-03-09 RX ADMIN — FINASTERIDE 5 MG: 5 TABLET, FILM COATED ORAL at 08:03

## 2025-03-09 RX ADMIN — LATANOPROST 1 DROP: 50 SOLUTION OPHTHALMIC at 21:37

## 2025-03-09 RX ADMIN — MIDODRINE HYDROCHLORIDE 2.5 MG: 2.5 TABLET ORAL at 11:09

## 2025-03-09 RX ADMIN — OXYCODONE HYDROCHLORIDE 5 MG: 5 TABLET ORAL at 21:46

## 2025-03-09 RX ADMIN — POLYETHYLENE GLYCOL 3350 170 G: 17 POWDER, FOR SOLUTION ORAL at 14:20

## 2025-03-09 RX ADMIN — ACETAMINOPHEN 650 MG: 325 TABLET ORAL at 21:46

## 2025-03-09 RX ADMIN — SODIUM CHLORIDE, PRESERVATIVE FREE 10 ML: 5 INJECTION INTRAVENOUS at 08:02

## 2025-03-09 ASSESSMENT — PAIN SCALES - GENERAL
PAINLEVEL_OUTOF10: 8
PAINLEVEL_OUTOF10: 8
PAINLEVEL_OUTOF10: 3
PAINLEVEL_OUTOF10: 4
PAINLEVEL_OUTOF10: 4

## 2025-03-09 ASSESSMENT — PAIN SCALES - WONG BAKER: WONGBAKER_NUMERICALRESPONSE: NO HURT

## 2025-03-09 ASSESSMENT — PAIN DESCRIPTION - ORIENTATION: ORIENTATION: LEFT

## 2025-03-09 ASSESSMENT — PAIN - FUNCTIONAL ASSESSMENT: PAIN_FUNCTIONAL_ASSESSMENT: PREVENTS OR INTERFERES SOME ACTIVE ACTIVITIES AND ADLS

## 2025-03-09 ASSESSMENT — PAIN DESCRIPTION - DESCRIPTORS
DESCRIPTORS: ACHING
DESCRIPTORS: DISCOMFORT
DESCRIPTORS: ACHING

## 2025-03-09 ASSESSMENT — PAIN DESCRIPTION - LOCATION
LOCATION: CHEST
LOCATION: RIB CAGE
LOCATION: OTHER (COMMENT)

## 2025-03-09 NOTE — PROGRESS NOTES
[facility]    Gastroenterology Daily Progress Note      Patient:   Hemanth Barrera   :    1935   [facility]  Date:     3/9/2025  Consultant:   DARCI Sanches - CNP, CNP      SUBJECTIVE  89 y.o. male admitted 3/4/2025 with GI bleed [K92.2]  Gastrointestinal hemorrhage, unspecified gastrointestinal hemorrhage type [K92.2] and seen for anemia. The pt was seen and examined. Hgb 9 last night, did have several non bloody stools. C/o left sided abdominal pain. .        OBJECTIVE  Scheduled Meds:   pantoprazole  40 mg Oral QAM AC    sodium chloride flush  5-40 mL IntraVENous 2 times per day    bumetanide  2 mg Oral Daily    dilTIAZem  120 mg Oral Daily    finasteride  5 mg Oral Daily    isosorbide mononitrate  30 mg Oral Daily    metoprolol succinate  100 mg Oral Daily    midodrine  2.5 mg Oral TID WC    atorvastatin  40 mg Oral Nightly    latanoprost  1 drop Both Eyes Nightly       Vital Signs:  /71   Pulse 70   Temp 97.9 °F (36.6 °C) (Axillary)   Resp 15   Ht 1.854 m (6' 1\")   Wt 65.5 kg (144 lb 6.4 oz)   SpO2 97%   BMI 19.05 kg/m²    Review of Systems - History obtained from chart review and the patient  General ROS: negative  Respiratory ROS: no cough, shortness of breath, or wheezing  Cardiovascular ROS: no chest pain or dyspnea on exertion  Gastrointestinal ROS: positive for - abdominal pain   Physical Exam:       General Appearance: alert and oriented to person, place and time, well-developed and well-nourished, in no acute distress  Skin: warm and dry, no rash or erythema  Head: normocephalic and atraumatic  Eyes: pupils equal, round, and reactive to light, extraocular eye movements intact, conjunctivae normal  ENT: hearing grossly normal bilaterally  Neck: neck supple and non tender without mass, no thyromegaly or thyroid nodules, no cervical lymphadenopathy   Pulmonary/Chest: clear to auscultation bilaterally- no wheezes, rales or rhonchi, normal  Normal     Bones: Bones are osteopenic.  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  There  is posterior fusion hardware at L2-3.  Fixation device at the right  sacroiliac joint.     IMPRESSION:  1.  No acute abnormality identified in the abdomen or pelvis.  No  retroperitoneal hemorrhage.     2.  Nonspecific urinary bladder wall thickening.  Correlate with urinalysis  for cystitis.      ASSESSMENT/plan  Anemia s/p egd 3/6 that showed no acute findings, attempted flex sig but was aborted due to poor prep   Colonoscopy tomorrow, additional prep ordered  Trend hh  Clear diet  Cbc and bmp daily    This plan was formulated in collaboration with Dr. sullivan .    Electronically signed by: DARCI Sanches - CNP on 3/9/2025 at 6:56 AM     Attending Attestation:    I have discussed the care of Hemanth Barrera and I have examined the patient myselft and taken ros and hpi , including pertinent history and exam findings,  with the author of this note . I have reviewed the key elements of all parts of the encounter with the nurse practitioner/resident.  I agree with the assessment, plan and orders as documented by the above health care provider with the following addendum.     More than 50% of the time was spent taking care of this patient in addition to the nurse practitioner time.  That also included history taking follow-up physical examination and review of system.    Electronically signed by Brenton Sullivan MD

## 2025-03-09 NOTE — PLAN OF CARE
Problem: Chronic Conditions and Co-morbidities  Goal: Patient's chronic conditions and co-morbidity symptoms are monitored and maintained or improved  3/9/2025 1313 by Akanksha Rogel RN  Outcome: Progressing  3/9/2025 0352 by Laila Barrera RN  Outcome: Progressing     Problem: Discharge Planning  Goal: Discharge to home or other facility with appropriate resources  3/9/2025 1313 by Akanskha Rogel RN  Outcome: Progressing  3/9/2025 0352 by Laila Barrera RN  Outcome: Progressing     Problem: Safety - Adult  Goal: Free from fall injury  3/9/2025 1313 by Akanksha Rogel RN  Outcome: Progressing  3/9/2025 0352 by Laila Barrera RN  Outcome: Progressing     Problem: Skin/Tissue Integrity  Goal: Skin integrity remains intact  Description: 1.  Monitor for areas of redness and/or skin breakdown  2.  Assess vascular access sites hourly  3.  Every 4-6 hours minimum:  Change oxygen saturation probe site  4.  Every 4-6 hours:  If on nasal continuous positive airway pressure, respiratory therapy assess nares and determine need for appliance change or resting period  3/9/2025 1313 by Akanksha Rogel RN  Outcome: Progressing  3/9/2025 0352 by Laila Barrera RN  Outcome: Progressing     Problem: ABCDS Injury Assessment  Goal: Absence of physical injury  3/9/2025 1313 by Akanksha Rogel RN  Outcome: Progressing  3/9/2025 0352 by Laila Barrera RN  Outcome: Progressing     Problem: Neurosensory - Adult  Goal: Achieves maximal functionality and self care  3/9/2025 1313 by Akanksha Rogel RN  Outcome: Progressing  3/9/2025 0352 by Laila Barrera RN  Outcome: Progressing     Problem: Cardiovascular - Adult  Goal: Maintains optimal cardiac output and hemodynamic stability  3/9/2025 1313 by Akanksha Rogel RN  Outcome: Progressing  3/9/2025 0352 by Laila Barrera RN  Outcome: Progressing     Problem: Skin/Tissue Integrity - Adult  Goal: Skin integrity remains intact  Description: 1.  Monitor for  dehydration, psychiatric diagnoses, and notify attending LIP  2. [place] high risk fall precautions, as indicated  3. Provide frequent short contacts to provide reality reorientation, refocusing and direction  4. Decrease environmental stimuli, including noise as appropriate  5. Monitor and intervene to maintain adequate nutrition, hydration, elimination, sleep and activity  6. If unable to ensure safety without constant attention obtain sitter and review sitter guidelines with assigned personnel  7. Initiate Psychosocial CNS and Spiritual Care consult, as indicated  3/9/2025 1313 by Akanksha Rogel RN  Outcome: Progressing  3/9/2025 0352 by Laila Barrera, RN  Outcome: Progressing     Problem: Pain  Goal: Verbalizes/displays adequate comfort level or baseline comfort level  3/9/2025 1313 by Akanksha Rogel RN  Outcome: Progressing  3/9/2025 0352 by Laila Barrera, RN  Outcome: Progressing

## 2025-03-09 NOTE — PROGRESS NOTES
Spiritual Health History and Assessment/Progress Note  [facility]    (P) Follow-up, (P) Emotional distress, (P) Life Adjustments, Adjustment to illness,      Name: Hemanth Barrera MRN: [id number]    Age: 89 y.o.     Sex: male   Language: English   Mandaeism: Alevism   GI bleed     Date: 3/8/2025            Total Time Calculated: (P) 15 min              Spiritual Assessment continued in [address]        Referral/Consult From: (P) Rounding   Encounter Overview/Reason: (P) Follow-up  Service Provided For: (P) Patient       followed up on Pt. In room. Pt. Was not feeling very well tonight. Did not talk a lot.Pt. was very quiet and subdued.  provided pastoral care and support. Provided calmness and a short visit.  will revisit tomorrow.      Deepali, Belief, Meaning:   Patient has beliefs or practices that help with coping during difficult times  Family/Friends No family/friends present      Importance and Influence:  Patient has spiritual/personal beliefs that influence decisions regarding their health  Family/Friends No family/friends present    Community:  Patient feels well-supported. Support system includes: Children  Family/Friends No family/friends present    Assessment and Plan of Care:     Patient Interventions include: Facilitated expression of thoughts and feelings  Family/Friends Interventions include: No family/friends present    Patient Plan of Care: Spiritual Care available upon further referral  Family/Friends Plan of Care: Spiritual Care available upon further referral    Electronically signed by Chaplain KELBY on 3/8/2025 at 8:36 PM    03/08/25 2033   Encounter Summary   Encounter Overview/Reason Follow-up   Service Provided For Patient   Referral/Consult From [facility]   Support System Children   Last Encounter  03/08/25   Complexity of Encounter Low   Begin Time 1855   End Time  1910   Total Time Calculated 15 min   Crisis   Type Emotional  distress   Spiritual/Emotional needs   Type Spiritual Support;Emotional Distress   Grief, Loss, and Adjustments   Type Life Adjustments;Adjustment to illness   Assessment/Intervention/Outcome   Assessment Anxious;Coping;Impaired social interaction   Intervention Sustaining Presence/Ministry of presence;Prayer (assurance of)/[place];Nurtured Hope;Explored/Affirmed feelings, thoughts, concerns   Outcome Comfort;Coping;Encouraged   Plan and Referrals   Plan/Referrals Continue to visit, (comment)

## 2025-03-09 NOTE — PLAN OF CARE
Problem: Chronic Conditions and Co-morbidities  Goal: Patient's chronic conditions and co-morbidity symptoms are monitored and maintained or improved  Outcome: Progressing     Problem: Discharge Planning  Goal: Discharge to home or other facility with appropriate resources  Outcome: Progressing     Problem: Safety - Adult  Goal: Free from fall injury  Outcome: Progressing     Problem: Skin/Tissue Integrity  Goal: Skin integrity remains intact  Description: 1.  Monitor for areas of redness and/or skin breakdown  2.  Assess vascular access sites hourly  3.  Every 4-6 hours minimum:  Change oxygen saturation probe site  4.  Every 4-6 hours:  If on nasal continuous positive airway pressure, respiratory therapy assess nares and determine need for appliance change or resting period  Outcome: Progressing     Problem: ABCDS Injury Assessment  Goal: Absence of physical injury  Outcome: Progressing     Problem: Neurosensory - Adult  Goal: Achieves maximal functionality and self care  Outcome: Progressing     Problem: Cardiovascular - Adult  Goal: Maintains optimal cardiac output and hemodynamic stability  Outcome: Progressing     Problem: Skin/Tissue Integrity - Adult  Goal: Skin integrity remains intact  Description: 1.  Monitor for areas of redness and/or skin breakdown  2.  Assess vascular access sites hourly  3.  Every 4-6 hours minimum:  Change oxygen saturation probe site  4.  Every 4-6 hours:  If on nasal continuous positive airway pressure, respiratory therapy assess nares and determine need for appliance change or resting period  Outcome: Progressing     Problem: Skin/Tissue Integrity - Adult  Goal: Incisions, wounds, or drain sites healing without S/S of infection  Outcome: Progressing     Problem: Musculoskeletal - Adult  Goal: Return mobility to safest level of function  Outcome: Progressing     Problem: Gastrointestinal - Adult  Goal: Maintains or returns to baseline bowel function  Outcome: Progressing

## 2025-03-09 NOTE — PROGRESS NOTES
[facility]  Office: [phone]  Antwon Bernardo DO, Fernando Vyas DO, Drake Haynes DO, Geovani Honeycutt DO, Yani Pritchard MD, Isabella Meade MD, Richard Hidalgo MD, Miriam Michael MD,  Keo Pope MD, Eli Barbour MD, Giovanna Louie MD,  Yee Kerr DO, Dion Gilliland MD, Jonny Basurto MD, Alex Bernardo DO, Nafisa Lyles MD,  Jarred Johnson DO, Camila Mabry MD, Karen Shipley MD, Jessika Ravi MD, Carlos De León MD,  Bola Callaway MD, Roly Brunson MD, Rojas Barfield MD, Francisco J Monzon MD, Jerry Maher MD, Jelani Jameson MD, Rudy Webb DO, Teodoro Casas MD, Yee Meehan MD, Mohsin Reza, MD, Shirley Waterhouse, CNP,  Isidra Bianchi CNP, Rudy Castaneda, CNP,  Magda Cannno, DAVID, Jaleesa Meeks, CNP, Rakel Purdy, CNP, Tatiana Walton, CNP, Stephanie Cyr CNP, TERENCE Bailey-TED, Joie Song, CNP, Evelio Bear, CNP,  Venecia Shanks, CNP, Erin Alaniz, CNP, Rachel Ling, CNP,  Maura Raman, CNS, Vaishali Lucio CNP, Sarah Ramirez CNP,   Graciela Blue, CNP         [facility]   IN-PATIENT SERVICE   [facility]    Progress Note    3/9/2025    8:42 AM    Name:   Hemanth Barrera  MRN:     [id number]     Acct:      [id number]   Room:   331/331-01   Day:  4  Admit Date:  3/4/2025 11:17 PM    PCP:   Neftaly Contreras MD  Code Status:  Full Code    Subjective:     Patient was seen in follow-up for acute upper GI bleed. He is resting comfortably and has no complaints this morning. Gastroenterology is following; plan for repeat colonoscopy tomorrow.     Medications:     Allergies:    Allergies   Allergen Reactions    Aspirin Other (See Comments)     Pt told not to take since he has only a partial stomach    Cyclobenzaprine Other (See Comments)     Pt stated heart palpitations and he felt funny    Ibuprofen Nausea Only    Azithromycin Nausea Only    Hydrocodone-Acetaminophen Nausea Only     per pt, he is having upset stomach when taking norco

## 2025-03-10 ENCOUNTER — ANESTHESIA EVENT (OUTPATIENT)
Dept: OPERATING ROOM | Age: 89
End: 2025-03-10
Payer: MEDICARE

## 2025-03-10 ENCOUNTER — ANESTHESIA (OUTPATIENT)
Dept: OPERATING ROOM | Age: 89
End: 2025-03-10
Payer: MEDICARE

## 2025-03-10 LAB
ANION GAP SERPL CALCULATED.3IONS-SCNC: 9 MMOL/L (ref 9–17)
BASOPHILS # BLD: 0 K/UL (ref 0–0.2)
BASOPHILS NFR BLD: 0 % (ref 0–2)
BUN SERPL-MCNC: 11 MG/DL (ref 8–23)
CALCIUM SERPL-MCNC: 8.3 MG/DL (ref 8.6–10.4)
CHLORIDE SERPL-SCNC: 101 MMOL/L (ref 98–107)
CO2 SERPL-SCNC: 25 MMOL/L (ref 20–31)
CREAT SERPL-MCNC: 0.7 MG/DL (ref 0.7–1.2)
EOSINOPHIL # BLD: 0.08 K/UL (ref 0–0.4)
EOSINOPHILS RELATIVE PERCENT: 2 % (ref 1–4)
ERYTHROCYTE [DISTWIDTH] IN BLOOD BY AUTOMATED COUNT: 20.9 % (ref 12.5–15.4)
GFR, ESTIMATED: 88 ML/MIN/1.73M2
GLUCOSE SERPL-MCNC: 90 MG/DL (ref 70–99)
HCT VFR BLD AUTO: 29.8 % (ref 41–53)
HGB BLD-MCNC: 9.9 G/DL (ref 13.5–17.5)
LYMPHOCYTES NFR BLD: 0.49 K/UL (ref 1–4.8)
LYMPHOCYTES RELATIVE PERCENT: 12 % (ref 24–44)
MCH RBC QN AUTO: 30 PG (ref 26–34)
MCHC RBC AUTO-ENTMCNC: 33.3 G/DL (ref 31–37)
MCV RBC AUTO: 89.9 FL (ref 80–100)
MONOCYTES NFR BLD: 0.12 K/UL (ref 0.1–0.8)
MONOCYTES NFR BLD: 3 % (ref 1–7)
MORPHOLOGY: ABNORMAL
NEUTROPHILS NFR BLD: 83 % (ref 36–66)
NEUTS SEG NFR BLD: 3.41 K/UL (ref 1.8–7.7)
PLATELET # BLD AUTO: 231 K/UL (ref 140–450)
PMV BLD AUTO: 7 FL (ref 6–12)
POTASSIUM SERPL-SCNC: 3.7 MMOL/L (ref 3.7–5.3)
RBC # BLD AUTO: 3.32 M/UL (ref 4.5–5.9)
SODIUM SERPL-SCNC: 135 MMOL/L (ref 135–144)
WBC OTHER # BLD: 4.1 K/UL (ref 3.5–11)

## 2025-03-10 PROCEDURE — 2580000003 HC RX 258: Performed by: INTERNAL MEDICINE

## 2025-03-10 PROCEDURE — 6370000000 HC RX 637 (ALT 250 FOR IP): Performed by: INTERNAL MEDICINE

## 2025-03-10 PROCEDURE — 7100000000 HC PACU RECOVERY - FIRST 15 MIN: Performed by: INTERNAL MEDICINE

## 2025-03-10 PROCEDURE — 3700000001 HC ADD 15 MINUTES (ANESTHESIA): Performed by: INTERNAL MEDICINE

## 2025-03-10 PROCEDURE — 99232 SBSQ HOSP IP/OBS MODERATE 35: CPT | Performed by: STUDENT IN AN ORGANIZED HEALTH CARE EDUCATION/TRAINING PROGRAM

## 2025-03-10 PROCEDURE — 6360000002 HC RX W HCPCS: Performed by: NURSE ANESTHETIST, CERTIFIED REGISTERED

## 2025-03-10 PROCEDURE — 3609027000 HC COLONOSCOPY: Performed by: INTERNAL MEDICINE

## 2025-03-10 PROCEDURE — 2060000000 HC ICU INTERMEDIATE R&B

## 2025-03-10 PROCEDURE — 6370000000 HC RX 637 (ALT 250 FOR IP): Performed by: STUDENT IN AN ORGANIZED HEALTH CARE EDUCATION/TRAINING PROGRAM

## 2025-03-10 PROCEDURE — 36415 COLL VENOUS BLD VENIPUNCTURE: CPT

## 2025-03-10 PROCEDURE — 80048 BASIC METABOLIC PNL TOTAL CA: CPT

## 2025-03-10 PROCEDURE — 0DJD8ZZ INSPECTION OF LOWER INTESTINAL TRACT, VIA NATURAL OR ARTIFICIAL OPENING ENDOSCOPIC: ICD-10-PCS | Performed by: INTERNAL MEDICINE

## 2025-03-10 PROCEDURE — 2500000003 HC RX 250 WO HCPCS: Performed by: INTERNAL MEDICINE

## 2025-03-10 PROCEDURE — 3700000000 HC ANESTHESIA ATTENDED CARE: Performed by: INTERNAL MEDICINE

## 2025-03-10 PROCEDURE — 85025 COMPLETE CBC W/AUTO DIFF WBC: CPT

## 2025-03-10 PROCEDURE — 2709999900 HC NON-CHARGEABLE SUPPLY: Performed by: INTERNAL MEDICINE

## 2025-03-10 PROCEDURE — 7100000001 HC PACU RECOVERY - ADDTL 15 MIN: Performed by: INTERNAL MEDICINE

## 2025-03-10 RX ORDER — PROPOFOL 10 MG/ML
INJECTION, EMULSION INTRAVENOUS
Status: DISCONTINUED | OUTPATIENT
Start: 2025-03-10 | End: 2025-03-10 | Stop reason: SDUPTHER

## 2025-03-10 RX ORDER — MORPHINE SULFATE 2 MG/ML
2 INJECTION, SOLUTION INTRAMUSCULAR; INTRAVENOUS EVERY 5 MIN PRN
Status: DISCONTINUED | OUTPATIENT
Start: 2025-03-10 | End: 2025-03-10 | Stop reason: HOSPADM

## 2025-03-10 RX ORDER — SODIUM CHLORIDE 0.9 % (FLUSH) 0.9 %
5-40 SYRINGE (ML) INJECTION EVERY 12 HOURS SCHEDULED
Status: DISCONTINUED | OUTPATIENT
Start: 2025-03-10 | End: 2025-03-10 | Stop reason: HOSPADM

## 2025-03-10 RX ORDER — GLYCOPYRROLATE 0.2 MG/ML
0.4 INJECTION INTRAMUSCULAR; INTRAVENOUS ONCE
Status: DISCONTINUED | OUTPATIENT
Start: 2025-03-10 | End: 2025-03-10 | Stop reason: HOSPADM

## 2025-03-10 RX ORDER — IPRATROPIUM BROMIDE AND ALBUTEROL SULFATE 2.5; .5 MG/3ML; MG/3ML
1 SOLUTION RESPIRATORY (INHALATION)
Status: DISCONTINUED | OUTPATIENT
Start: 2025-03-10 | End: 2025-03-10 | Stop reason: HOSPADM

## 2025-03-10 RX ORDER — NALOXONE HYDROCHLORIDE 0.4 MG/ML
INJECTION, SOLUTION INTRAMUSCULAR; INTRAVENOUS; SUBCUTANEOUS PRN
Status: DISCONTINUED | OUTPATIENT
Start: 2025-03-10 | End: 2025-03-10 | Stop reason: HOSPADM

## 2025-03-10 RX ORDER — SODIUM CHLORIDE, SODIUM LACTATE, POTASSIUM CHLORIDE, CALCIUM CHLORIDE 600; 310; 30; 20 MG/100ML; MG/100ML; MG/100ML; MG/100ML
INJECTION, SOLUTION INTRAVENOUS CONTINUOUS
Status: CANCELLED | OUTPATIENT
Start: 2025-03-10

## 2025-03-10 RX ORDER — MIDAZOLAM HYDROCHLORIDE 2 MG/2ML
2 INJECTION, SOLUTION INTRAMUSCULAR; INTRAVENOUS
Status: DISCONTINUED | OUTPATIENT
Start: 2025-03-10 | End: 2025-03-10 | Stop reason: HOSPADM

## 2025-03-10 RX ORDER — LIDOCAINE HYDROCHLORIDE 10 MG/ML
1 INJECTION, SOLUTION EPIDURAL; INFILTRATION; INTRACAUDAL; PERINEURAL
Status: CANCELLED | OUTPATIENT
Start: 2025-03-10 | End: 2025-03-11

## 2025-03-10 RX ORDER — DIPHENHYDRAMINE HYDROCHLORIDE 50 MG/ML
12.5 INJECTION, SOLUTION INTRAMUSCULAR; INTRAVENOUS
Status: DISCONTINUED | OUTPATIENT
Start: 2025-03-10 | End: 2025-03-10 | Stop reason: HOSPADM

## 2025-03-10 RX ORDER — SODIUM CHLORIDE 9 MG/ML
INJECTION, SOLUTION INTRAVENOUS PRN
Status: DISCONTINUED | OUTPATIENT
Start: 2025-03-10 | End: 2025-03-10 | Stop reason: HOSPADM

## 2025-03-10 RX ORDER — MEPERIDINE HYDROCHLORIDE 50 MG/ML
12.5 INJECTION INTRAMUSCULAR; INTRAVENOUS; SUBCUTANEOUS EVERY 5 MIN PRN
Status: DISCONTINUED | OUTPATIENT
Start: 2025-03-10 | End: 2025-03-10 | Stop reason: HOSPADM

## 2025-03-10 RX ORDER — SODIUM CHLORIDE 9 MG/ML
INJECTION, SOLUTION INTRAVENOUS CONTINUOUS
Status: DISCONTINUED | OUTPATIENT
Start: 2025-03-10 | End: 2025-03-11

## 2025-03-10 RX ORDER — ONDANSETRON 2 MG/ML
4 INJECTION INTRAMUSCULAR; INTRAVENOUS
Status: DISCONTINUED | OUTPATIENT
Start: 2025-03-10 | End: 2025-03-10 | Stop reason: HOSPADM

## 2025-03-10 RX ORDER — OXYCODONE AND ACETAMINOPHEN 5; 325 MG/1; MG/1
1 TABLET ORAL
Status: DISCONTINUED | OUTPATIENT
Start: 2025-03-10 | End: 2025-03-10 | Stop reason: HOSPADM

## 2025-03-10 RX ORDER — LABETALOL HYDROCHLORIDE 5 MG/ML
10 INJECTION, SOLUTION INTRAVENOUS
Status: DISCONTINUED | OUTPATIENT
Start: 2025-03-10 | End: 2025-03-10 | Stop reason: HOSPADM

## 2025-03-10 RX ORDER — HYDRALAZINE HYDROCHLORIDE 20 MG/ML
10 INJECTION INTRAMUSCULAR; INTRAVENOUS
Status: DISCONTINUED | OUTPATIENT
Start: 2025-03-10 | End: 2025-03-10 | Stop reason: HOSPADM

## 2025-03-10 RX ORDER — OXYCODONE AND ACETAMINOPHEN 5; 325 MG/1; MG/1
2 TABLET ORAL
Status: DISCONTINUED | OUTPATIENT
Start: 2025-03-10 | End: 2025-03-10 | Stop reason: HOSPADM

## 2025-03-10 RX ORDER — SODIUM CHLORIDE 9 MG/ML
INJECTION, SOLUTION INTRAVENOUS CONTINUOUS
Status: DISCONTINUED | OUTPATIENT
Start: 2025-03-10 | End: 2025-03-10

## 2025-03-10 RX ORDER — SODIUM CHLORIDE 9 MG/ML
INJECTION, SOLUTION INTRAVENOUS PRN
Status: CANCELLED | OUTPATIENT
Start: 2025-03-10

## 2025-03-10 RX ORDER — SODIUM CHLORIDE 0.9 % (FLUSH) 0.9 %
5-40 SYRINGE (ML) INJECTION PRN
Status: DISCONTINUED | OUTPATIENT
Start: 2025-03-10 | End: 2025-03-10 | Stop reason: HOSPADM

## 2025-03-10 RX ORDER — SODIUM CHLORIDE 0.9 % (FLUSH) 0.9 %
5-40 SYRINGE (ML) INJECTION PRN
Status: CANCELLED | OUTPATIENT
Start: 2025-03-10

## 2025-03-10 RX ORDER — METOCLOPRAMIDE HYDROCHLORIDE 5 MG/ML
10 INJECTION INTRAMUSCULAR; INTRAVENOUS
Status: DISCONTINUED | OUTPATIENT
Start: 2025-03-10 | End: 2025-03-10 | Stop reason: HOSPADM

## 2025-03-10 RX ORDER — SODIUM CHLORIDE 0.9 % (FLUSH) 0.9 %
5-40 SYRINGE (ML) INJECTION EVERY 12 HOURS SCHEDULED
Status: CANCELLED | OUTPATIENT
Start: 2025-03-10

## 2025-03-10 RX ADMIN — PHENYLEPHRINE HYDROCHLORIDE 100 MCG: 10 INJECTION INTRAVENOUS at 12:32

## 2025-03-10 RX ADMIN — METOPROLOL SUCCINATE 100 MG: 100 TABLET, EXTENDED RELEASE ORAL at 08:46

## 2025-03-10 RX ADMIN — OXYCODONE HYDROCHLORIDE 5 MG: 5 TABLET ORAL at 08:45

## 2025-03-10 RX ADMIN — PANTOPRAZOLE SODIUM 40 MG: 40 TABLET, DELAYED RELEASE ORAL at 05:48

## 2025-03-10 RX ADMIN — OXYCODONE HYDROCHLORIDE 5 MG: 5 TABLET ORAL at 02:23

## 2025-03-10 RX ADMIN — FINASTERIDE 5 MG: 5 TABLET, FILM COATED ORAL at 08:46

## 2025-03-10 RX ADMIN — OXYCODONE HYDROCHLORIDE 5 MG: 5 TABLET ORAL at 18:21

## 2025-03-10 RX ADMIN — PHENYLEPHRINE HYDROCHLORIDE 150 MCG: 10 INJECTION INTRAVENOUS at 12:42

## 2025-03-10 RX ADMIN — PHENYLEPHRINE HYDROCHLORIDE 200 MCG: 10 INJECTION INTRAVENOUS at 12:48

## 2025-03-10 RX ADMIN — SODIUM CHLORIDE: 9 INJECTION, SOLUTION INTRAVENOUS at 12:56

## 2025-03-10 RX ADMIN — PROPOFOL 20 MG: 10 INJECTION, EMULSION INTRAVENOUS at 12:46

## 2025-03-10 RX ADMIN — SODIUM CHLORIDE, PRESERVATIVE FREE 10 ML: 5 INJECTION INTRAVENOUS at 09:23

## 2025-03-10 RX ADMIN — ISOSORBIDE MONONITRATE 30 MG: 30 TABLET, EXTENDED RELEASE ORAL at 08:46

## 2025-03-10 RX ADMIN — PHENYLEPHRINE HYDROCHLORIDE 100 MCG: 10 INJECTION INTRAVENOUS at 12:39

## 2025-03-10 RX ADMIN — LATANOPROST 1 DROP: 50 SOLUTION OPHTHALMIC at 20:24

## 2025-03-10 RX ADMIN — ACETAMINOPHEN 650 MG: 325 TABLET ORAL at 20:24

## 2025-03-10 RX ADMIN — PROPOFOL 30 MG: 10 INJECTION, EMULSION INTRAVENOUS at 12:32

## 2025-03-10 RX ADMIN — PHENYLEPHRINE HYDROCHLORIDE 100 MCG: 10 INJECTION INTRAVENOUS at 12:54

## 2025-03-10 RX ADMIN — MIDODRINE HYDROCHLORIDE 2.5 MG: 2.5 TABLET ORAL at 08:46

## 2025-03-10 RX ADMIN — ATORVASTATIN CALCIUM 40 MG: 40 TABLET, FILM COATED ORAL at 20:24

## 2025-03-10 RX ADMIN — BUMETANIDE 2 MG: 1 TABLET ORAL at 08:46

## 2025-03-10 RX ADMIN — DILTIAZEM HYDROCHLORIDE 120 MG: 120 CAPSULE, COATED, EXTENDED RELEASE ORAL at 08:46

## 2025-03-10 RX ADMIN — MIDODRINE HYDROCHLORIDE 2.5 MG: 2.5 TABLET ORAL at 18:21

## 2025-03-10 RX ADMIN — PROPOFOL 20 MG: 10 INJECTION, EMULSION INTRAVENOUS at 12:38

## 2025-03-10 RX ADMIN — SODIUM CHLORIDE: 9 INJECTION, SOLUTION INTRAVENOUS at 12:29

## 2025-03-10 ASSESSMENT — PAIN - FUNCTIONAL ASSESSMENT
PAIN_FUNCTIONAL_ASSESSMENT: 0-10
PAIN_FUNCTIONAL_ASSESSMENT: NONE - DENIES PAIN
PAIN_FUNCTIONAL_ASSESSMENT: PREVENTS OR INTERFERES SOME ACTIVE ACTIVITIES AND ADLS
PAIN_FUNCTIONAL_ASSESSMENT: PREVENTS OR INTERFERES SOME ACTIVE ACTIVITIES AND ADLS

## 2025-03-10 ASSESSMENT — PAIN SCALES - GENERAL
PAINLEVEL_OUTOF10: 7
PAINLEVEL_OUTOF10: 4
PAINLEVEL_OUTOF10: 4
PAINLEVEL_OUTOF10: 5
PAINLEVEL_OUTOF10: 7
PAINLEVEL_OUTOF10: 2
PAINLEVEL_OUTOF10: 4

## 2025-03-10 ASSESSMENT — PAIN DESCRIPTION - LOCATION
LOCATION: CHEST
LOCATION: RIB CAGE
LOCATION: CHEST;BACK
LOCATION: BACK

## 2025-03-10 ASSESSMENT — PAIN DESCRIPTION - DESCRIPTORS
DESCRIPTORS: ACHING
DESCRIPTORS: ACHING;SHOOTING

## 2025-03-10 ASSESSMENT — PAIN DESCRIPTION - ORIENTATION
ORIENTATION: LEFT
ORIENTATION: LEFT
ORIENTATION: RIGHT;LEFT
ORIENTATION: LOWER

## 2025-03-10 NOTE — PROGRESS NOTES
[facility]  Office: [phone]  Antwon Bernardo DO, Fernando Vyas DO, Drake Haynes DO, Geovani Honeycutt DO, Yani Pritchard MD, Isabella Meade MD, Richard Hidalgo MD, Miriam Michael MD,  Keo Pope MD, Eli Barbour MD, Giovanna Louie MD,  Yee Kerr DO, Dion Gilliland MD, Jonny Basurto MD, Alex Bernardo DO, Nafisa Lyles MD,  Jarred Johnson DO, Camila Mabry MD, Karen Shipley MD, Jessika Ravi MD, Carlos De León MD,  Bola Callaway MD, Roly Brunson MD, Rojas Barfield MD, Francisco J Monzon MD, Jerry Maher MD, Jelani Jameson MD, Rudy Webb DO, Teodoro Casas MD, Yee Meehan MD, Mohsin Reza, MD, Shirley Waterhouse, CNP,  Isidra Bianchi CNP, Rudy Castaneda, CNP,  Magda Cannon, DNP, Jaleesa Meeks, CNP, Rakel Purdy, CNP, Tatiana Walton, CNP, Stephanie Cyr CNP, Luz Maria Castellanos PA-C, Joie Song, CNP, Evelio Bear, CNP,  Venecia Shanks, CNP, Erin Alaniz, CNP, Rachel Ling, CNP,  Maura Raman, CNS, Vaishali Lucio CNP, Sarah Ramirez CNP,   Graciela Blue, CNP         [facility]   IN-PATIENT SERVICE   [facility]    Second Visit Note  For more detailed information please refer to the progress note of the day      3/10/2025    5:28 PM    Name:   Hemanth Barrera  MRN:     [id number]     Acct:      [id number]   Room:   331/331-01   Day:  5  Admit Date:  3/4/2025 11:17 PM    PCP:   Neftaly Contreras MD  Code Status:  Full Code      Pt vitals were reviewed   New labs were reviewed     Updated plan :     Repeat colonoscopy was negative for a source of active bleeding and hemoglobin has been stable for several days. Plan to hydrate the patient overnight with anticipated discharge tomorrow. Recommend stopping anticoagulation due to repeated GI bleeds, advanced age and risk of falls.         Jonny Basurto MD  3/10/2025  5:28 PM

## 2025-03-10 NOTE — DISCHARGE INSTR - COC
Continuity of Care Form    Patient Name: Hemanth Barrera   :  1935  MRN:  [id number]    Admit date:  3/4/2025  Discharge date:  3/12/25    Code Status Order: Full Code   Advance Directives:    Date/Time Healthcare Directive Type of Healthcare Directive Copy in Chart Healthcare Agent Appointed Healthcare Agent's Name Healthcare Agent's Phone Number    25 1018 No, patient does not have an advance directive for healthcare treatment  --  --  --  --  --     25 0923 No, patient does not have an advance directive for healthcare treatment  --  --  --  --  --             Admitting Physician:  Jonny Basurto MD  PCP: Neftaly Contreras MD    Discharging Nurse: Malika Morris RN  Discharging Hospital Unit/Room#: 331/331-01  Discharging Unit Phone Number: [phone]    Emergency Contact:   Extended Emergency Contact Information  Primary Emergency Contact: Dusty Barrera  Home Phone: [phone]  Mobile Phone: [phone]  Relation: Child    Past Surgical History:  Past Surgical History:   Procedure Laterality Date    ABDOMEN SURGERY      gastric resection    APPENDECTOMY      BONE MARROW BIOPSY      CARDIAC CATHETERIZATION      CARDIAC PROCEDURE N/A 2024    julio cesar / Left heart cath / coronary angiography / rm 512 performed by Cathie Hastings MD at [facility] CARDIAC CATH LAB    CARDIAC PROCEDURE N/A 2024    Percutaneous coronary intervention performed by Cathie Hastings MD at [facility] CARDIAC CATH LAB    CARDIAC PROCEDURE Right 2025    Left heart cath / coronary angiography performed by Guy Paz MD at [facility] CARDIAC CATH LAB    COLONOSCOPY      COLONOSCOPY N/A 8/3/2023    COLONOSCOPY WITH BIOPSY performed by Isauro Razo MD at [facility] ENDO    CT BIOPSY BONE MARROW  2022    CT BONE MARROW BIOPSY 2022 [facility] CT SCAN    EYE SURGERY      smiley cataracts    HERNIA REPAIR      inguinal smiley    INVASIVE VASCULAR N/A 2025    Ultrasound guided vascular access performed by Guy Paz

## 2025-03-10 NOTE — PROGRESS NOTES
Physical Therapy        Physical Therapy Cancel Note      DATE: 3/10/2025    NAME: Hemanth Barrera  MRN: [id number]   : 1935      Patient not seen this date for Physical Therapy due to:    Surgery/Procedure: Out of room to colonoscopy this morning.       Electronically signed by Viri Moses PT on 3/10/2025 at 11:27 AM

## 2025-03-10 NOTE — CARE COORDINATION
Patient had a colonoscopy today, however poor prep limited the exam. Plan for patient to return to [facility] at discharge. Spoke with Gwen and patient will need new precert. Awaiting updated PT/OT notes to initiate precert.

## 2025-03-10 NOTE — PLAN OF CARE
Endoscopy results d/w dr perkins, gi signing off pt to follow up outpt ok to restart anticoagulation from a gi standpoint..Erin Guidry, APRN - CNP

## 2025-03-10 NOTE — PROGRESS NOTES
Spiritual Health History and Assessment/Progress Note  [facility]    (P) Follow-up, Emotional distress, (P) Life Adjustments, Adjustment to illness,      Name: Hemanth Barrera MRN: [id number]    Age: 89 y.o.     Sex: male   Language: English   Sikh: Tenriism   GI bleed     Date: 3/9/2025            Total Time Calculated: (P) 15 min              Spiritual Assessment continued in [address]        Referral/Consult From: (P) Palliative Care, Rounding   Encounter Overview/Reason: (P) Follow-up  Service Provided For: (P) Patient, Family      introduced self and role to Family. Pt. Was awake and alert. Pt. Was open to conversation and spiritual things. Pt. Was thankful for visit. Pt. Was welcoming and interested in talking about deepali, music and [employer]. Family was present.  provided support and pastoral care to Pt. And family. Prayer was provided as requested for support, care comfort and encouragement.    Deepali, Belief, Meaning:   Patient has beliefs or practices that help with coping during difficult times  Family/Friends have beliefs or practices that help with coping during difficult times      Importance and Influence:  Patient has spiritual/personal beliefs that influence decisions regarding their health  Family/Friends have spiritual/personal beliefs that influence decisions regarding the patient's health    Community:  Patient feels well-supported. Support system includes: Children  Family/Friends feel well-supported. Support system includes: Children    Assessment and Plan of Care:     Patient Interventions include: Facilitated expression of thoughts and feelings and Explored spiritual coping/struggle/distress  Family/Friends Interventions include: Facilitated expression of thoughts and feelings and Explored spiritual coping/struggle/distress    Patient Plan of Care: Spiritual Care available upon further referral  Family/Friends Plan of Care: Spiritual Care available  upon further referral    Electronically signed by Chaplain KELBY on 3/9/2025 at 8:24 PM    03/09/25 2020   Encounter Summary   Encounter Overview/Reason Follow-up   Service Provided For Patient;Family   Referral/Consult From Palliative Care;Rounding   Support System Children   Last Encounter  03/09/25   Complexity of Encounter Moderate   Begin Time 1825   End Time  1840   Total Time Calculated 15 min   Spiritual/Emotional needs   Type Spiritual Support   Grief, Loss, and Adjustments   Type Life Adjustments;Adjustment to illness   Palliative Care   Type Palliative Care, Follow-up   Assessment/Intervention/Outcome   Assessment Calm;Coping   Intervention Prayer (assurance of)/[place];Sustaining Presence/Ministry of presence;Explored Coping Skills/Resources   Outcome Comfort;Deescalated;Encouraged;Engaged in conversation;Expressed feelings, needs, and concerns;Expressed Gratitude   Plan and Referrals   Plan/Referrals Continue to visit, (comment);Continue Support (comment)

## 2025-03-10 NOTE — PROGRESS NOTES
[facility]  Office: [phone]  Antwon Bernardo DO, Fernando Vyas DO, Drake Haynes DO, Geovani Honeycutt DO, Yani Pritchard MD, Isabella Meade MD, Richard Hidalgo MD, Miriam Michael MD,  Keo Pope MD, Eli Barbour MD, Giovanna Louie MD,  Yee Kerr DO, Dion Gilliland MD, Jonny Basurto MD, Alex Bernardo DO, Nafisa Lyles MD,  Jarred Johnson DO, Camila Mabry MD, Karen Shipley MD, Jessika Ravi MD, Carlos De León MD,  Bola Callaway MD, Roly Brunson MD, Rojas Barfield MD, Francisco J Monzon MD, Jerry Maher MD, Jelani Jameson MD, Rudy Webb DO, Teodoro Casas MD, Yee Meehan MD, Mohsin Reza, MD, Shirley Waterhouse, CNP,  Isidra Bianchi CNP, Rudy Castaneda, CNP,  Magda Cannon, DAVID, Jaleesa Meeks, CNP, Rakel Purdy, CNP, Tatiana Walton, CNP, Stephanie Cyr CNP, TERENCE Bailey-TED, Joie Song, CNP, Evelio Bear, CNP,  Venecia Shanks, CNP, Erin Alaniz, CNP, Rachel Ling, CNP,  Maura Raman, CNS, Vaishali Lucio CNP, Sarah Ramirez CNP,   Graciela Blue, CNP         [facility]   IN-PATIENT SERVICE   [facility]    Progress Note    3/10/2025    9:12 AM    Name:   Hemanth Barrera  MRN:     [id number]     Acct:      [id number]   Room:   331/331-01   Day:  5  Admit Date:  3/4/2025 11:17 PM    PCP:   Neftaly Contreras MD  Code Status:  Full Code    Subjective:     Patient was seen in follow-up for acute upper GI bleed. Hemoglobin remains stable. The patient reports feeling \"not good\" and states that he feels very weak from the prep. Gastroenterology is following; plan for repeat colonoscopy today. Anticipate discharge within the next 24-48 hours pending clinical course.     Medications:     Allergies:    Allergies   Allergen Reactions    Aspirin Other (See Comments)     Pt told not to take since he has only a partial stomach    Cyclobenzaprine Other (See Comments)     Pt stated heart palpitations and he felt funny    Ibuprofen Nausea Only

## 2025-03-10 NOTE — PROGRESS NOTES
Occupational Therapy    [facility]  Occupational Therapy Not Seen Note    DATE: 3/10/2025    NAME: Hemanth Barrera  MRN: [id number]   : 1935      Patient not seen this date for Occupational Therapy due to:      Surgery/Procedure: Colonoscopy this AM      Next Scheduled Treatment: Will check back PM or 3/11/2025 as able     Electronically signed by Sangita Wadsworth OT on 3/10/2025 at 11:30 AM

## 2025-03-10 NOTE — ANESTHESIA POSTPROCEDURE EVALUATION
Department of Anesthesiology  Postprocedure Note    Patient: Hemanth Barrera  MRN: [id number]  YOB: 1935  Date of evaluation: 3/10/2025    Procedure Summary       Date: 03/10/25 Room / Location: [facility] PROCEDURE ROOM / [facility]    Anesthesia Start: 1229 Anesthesia Stop: 1301    Procedure: COLONOSCOPY DIAGNOSTIC Diagnosis:       Anemia, unspecified type      (Anemia, unspecified type [D64.9])    Surgeons: Shirin Torre MD Responsible Provider: Selvin Casiano MD    Anesthesia Type: MAC ASA Status: 4            Anesthesia Type: No value filed.    Radha Phase I: Radha Score: 10    Radha Phase II:      Anesthesia Post Evaluation    Patient location during evaluation: PACU  Patient participation: complete - patient participated  Level of consciousness: awake and alert  Airway patency: patent  Nausea & Vomiting: no nausea and no vomiting  Cardiovascular status: hemodynamically stable  Respiratory status: nasal cannula and spontaneous ventilation  Hydration status: euvolemic  Multimodal analgesia pain management approach  Pain management: adequate    No notable events documented.  
21-Jun-2020

## 2025-03-10 NOTE — ANESTHESIA PRE PROCEDURE
Department of Anesthesiology  Preprocedure Note       Name:  Hemanth Barrera   Age:  89 y.o.  :  1935                                          MRN:  [id number]         Date:  3/10/2025      Surgeon: Surgeon(s):  Shirin Torre MD    Procedure: Procedure(s):  COLONOSCOPY DIAGNOSTIC    Medications prior to admission:   Prior to Admission medications    Medication Sig Start Date End Date Taking? Authorizing Provider   finasteride (PROSCAR) 5 MG tablet TAKE 1 TABLET BY MOUTH DAILY 25  Yes Raisa Tomlinson MD   ferrous sulfate (IRON 325) 325 (65 Fe) MG tablet Take 1 tablet by mouth 2 times daily (with meals) 2/3/25  Yes Raisa Tomlinson MD   midodrine (PROAMATINE) 2.5 MG tablet Take 1 tablet by mouth 3 times daily (with meals) Hold if SBP more than 100 2/3/25  Yes Raisa Tomlinson MD   isosorbide mononitrate (IMDUR) 30 MG extended release tablet Take 1 tablet by mouth daily 24  Yes Nichole Noguera, APRN - NP   metoprolol succinate (TOPROL XL) 100 MG extended release tablet Take 1 tablet by mouth daily 24  Yes Dion Gilliland MD   bumetanide (BUMEX) 2 MG tablet Take 1 tablet by mouth daily 24  Yes Dion Gilliland MD   aspirin 81 MG chewable tablet Take 1 tablet by mouth daily 24  Yes Dion Gilliland MD   pantoprazole (PROTONIX) 40 MG tablet Take 1 tablet by mouth every morning (before breakfast) 24  Yes Dion Gilliland MD   clopidogrel (PLAVIX) 75 MG tablet Take 1 tablet by mouth daily 24  Yes Dion Gilliland MD   vitamin D (ERGOCALCIFEROL) 1.25 MG (39151 UT) CAPS capsule TAKE 1 CAPSULE BY MOUTH EVERY WEEK 24  Yes Wolf Sharma MD   atorvastatin (LIPITOR) 40 MG tablet Take 1 tablet by mouth nightly 24  Yes Sana Haskins MD   dilTIAZem (CARDIZEM CD) 120 MG extended release capsule TAKE 1 CAPSULE BY MOUTH DAILY 3/30/24  Yes Josh Ames DO   acetaminophen (TYLENOL) 325 MG tablet Take 2 tablets by mouth every 6 hours as needed for Pain   Yes Provider,

## 2025-03-10 NOTE — OP NOTE
PROCEDURE NOTE    DATE OF PROCEDURE: 3/10/2025    SURGEON: Shirin Torre MD  Facility : [facility]   ASSISTANT: None  Anesthesia: MAC  PREOPERATIVE DIAGNOSIS:   Anemia    POSTOPERATIVE DIAGNOSIS: as described below    OPERATION: Total colonoscopy     ANESTHESIA: Moderate Sedation    ESTIMATED BLOOD LOSS: less than 50     COMPLICATIONS: None.     SPECIMENS:  Was Not Obtained    HISTORY: The patient is a 89 y.o. year old male with history of above preop diagnosis.  I recommended colonoscopy with possible biopsy or polypectomy and I explained the risk, benefits, expected outcome, and alternatives to the procedure.  Risks included but are not limited to bleeding, infection, respiratory distress, hypotension, and perforation of the colon and possibility of missing a lesion.  The patient understands and is in agreement.        The patient was counseled at length about the risks of jourdan Covid-19 during their perioperative period and any recovery window from their procedure.  The patient was made aware that jourdan Covid-19  may worsen their prognosis for recovering from their procedure  and lend to a higher morbidity and/or mortality risk.  All material risks, benefits, and reasonable alternatives including postponing the procedure were discussed. The patient does wish to proceed with the procedure at this time.       PROCEDURE: The patient was given IV conscious sedation.  The patient's SPO2 remained above 90% throughout the procedure.     The colonoscope was inserted per rectum and advanced under direct vision to the cecum without difficulty.      Post sedation note :The patient's SPO2 remained above 90% throughout the procedure.the vital signs remained stable , and no immediate complication form the procedure noted, patient will be ready for d/c when criteria is met .        The prep was poor.  Large amount of liquid and solid stool throughout the entire colon limiting this exam, pathology could be

## 2025-03-11 LAB
HCT VFR BLD AUTO: 32.2 % (ref 41–53)
HGB BLD-MCNC: 10.5 G/DL (ref 13.5–17.5)

## 2025-03-11 PROCEDURE — 2500000003 HC RX 250 WO HCPCS: Performed by: INTERNAL MEDICINE

## 2025-03-11 PROCEDURE — 6370000000 HC RX 637 (ALT 250 FOR IP): Performed by: INTERNAL MEDICINE

## 2025-03-11 PROCEDURE — 36415 COLL VENOUS BLD VENIPUNCTURE: CPT

## 2025-03-11 PROCEDURE — 97535 SELF CARE MNGMENT TRAINING: CPT

## 2025-03-11 PROCEDURE — 85018 HEMOGLOBIN: CPT

## 2025-03-11 PROCEDURE — 2580000003 HC RX 258: Performed by: STUDENT IN AN ORGANIZED HEALTH CARE EDUCATION/TRAINING PROGRAM

## 2025-03-11 PROCEDURE — 85014 HEMATOCRIT: CPT

## 2025-03-11 PROCEDURE — 99232 SBSQ HOSP IP/OBS MODERATE 35: CPT | Performed by: HOSPITALIST

## 2025-03-11 PROCEDURE — 2060000000 HC ICU INTERMEDIATE R&B

## 2025-03-11 PROCEDURE — 97116 GAIT TRAINING THERAPY: CPT

## 2025-03-11 RX ADMIN — OXYCODONE HYDROCHLORIDE 5 MG: 5 TABLET ORAL at 08:13

## 2025-03-11 RX ADMIN — DILTIAZEM HYDROCHLORIDE 120 MG: 120 CAPSULE, COATED, EXTENDED RELEASE ORAL at 08:13

## 2025-03-11 RX ADMIN — SODIUM CHLORIDE: 0.9 INJECTION, SOLUTION INTRAVENOUS at 00:15

## 2025-03-11 RX ADMIN — METOPROLOL SUCCINATE 100 MG: 100 TABLET, EXTENDED RELEASE ORAL at 08:13

## 2025-03-11 RX ADMIN — MIDODRINE HYDROCHLORIDE 2.5 MG: 2.5 TABLET ORAL at 08:13

## 2025-03-11 RX ADMIN — SODIUM CHLORIDE, PRESERVATIVE FREE 10 ML: 5 INJECTION INTRAVENOUS at 21:43

## 2025-03-11 RX ADMIN — FINASTERIDE 5 MG: 5 TABLET, FILM COATED ORAL at 08:13

## 2025-03-11 RX ADMIN — OXYCODONE HYDROCHLORIDE 5 MG: 5 TABLET ORAL at 20:49

## 2025-03-11 RX ADMIN — PANTOPRAZOLE SODIUM 40 MG: 40 TABLET, DELAYED RELEASE ORAL at 05:54

## 2025-03-11 RX ADMIN — OXYCODONE HYDROCHLORIDE 5 MG: 5 TABLET ORAL at 16:07

## 2025-03-11 RX ADMIN — ISOSORBIDE MONONITRATE 30 MG: 30 TABLET, EXTENDED RELEASE ORAL at 08:13

## 2025-03-11 RX ADMIN — BUMETANIDE 2 MG: 1 TABLET ORAL at 08:13

## 2025-03-11 RX ADMIN — LATANOPROST 1 DROP: 50 SOLUTION OPHTHALMIC at 20:50

## 2025-03-11 RX ADMIN — ATORVASTATIN CALCIUM 40 MG: 40 TABLET, FILM COATED ORAL at 20:49

## 2025-03-11 RX ADMIN — MIDODRINE HYDROCHLORIDE 2.5 MG: 2.5 TABLET ORAL at 12:04

## 2025-03-11 RX ADMIN — MIDODRINE HYDROCHLORIDE 2.5 MG: 2.5 TABLET ORAL at 16:07

## 2025-03-11 ASSESSMENT — PAIN SCALES - GENERAL
PAINLEVEL_OUTOF10: 8
PAINLEVEL_OUTOF10: 4
PAINLEVEL_OUTOF10: 3
PAINLEVEL_OUTOF10: 7

## 2025-03-11 ASSESSMENT — PAIN DESCRIPTION - ORIENTATION: ORIENTATION: MID

## 2025-03-11 ASSESSMENT — PAIN DESCRIPTION - LOCATION
LOCATION: BACK
LOCATION: BUTTOCKS

## 2025-03-11 ASSESSMENT — PAIN DESCRIPTION - DESCRIPTORS
DESCRIPTORS: ACHING
DESCRIPTORS: ACHING

## 2025-03-11 NOTE — PROGRESS NOTES
Physician Progress Note      PATIENT:               DONNA DYSON  CSN #:                  [id number]  :                       1935  ADMIT DATE:       3/4/2025 11:17 PM  DISCH DATE:  RESPONDING  PROVIDER #:        Alex Bernardo DO          QUERY TEXT:    Patient admitted with Hgb of 4.4 and concerns for a melena episode. EGD and   colonoscopy are normal with exception of hemorrhoids. Pt taking Xarelto for   afib. Pt also taking aspirin. PT/INR 17.5/1.5. Please document in progress   notes and discharge summary the cause of the GI bleeding:    The medical record reflects the following:  Risk Factors: taking anticoagulant, age  Clinical Indicators: EGD and colonoscopy are normal with exception of   hemorrhoids. Pt taking Xarelto for afib. Pt also taking aspirin. PT/INR   17.5/1.5  Treatment: Hold anticoagulant, EGD, Colonoscopy, PRBC, Serial labs, vital   signs    Thank you for your time  Options provided:  -- GI bleeding likely due to being on an anticoagulant.  -- GI bleeding likely due to hemorrhoids and taking anticoagulants  -- GI bleeding etiology is unkown  -- Other - I will add my own diagnosis  -- Disagree - Not applicable / Not valid  -- Disagree - Clinically unable to determine / Unknown  -- Refer to Clinical Documentation Reviewer    PROVIDER RESPONSE TEXT:    This patient has GI bleeding likely due to being on an anticoagulant    Query created by: Venice Lucas on 3/11/2025 4:18 PM      Electronically signed by:  Alex Bernardo DO 3/11/2025 5:20 PM

## 2025-03-11 NOTE — PROGRESS NOTES
Physical Therapy  Facility/Department: [address]   Physical Therapy Daily Treatment Note    Patient Name: Hemanth Barrera        MRN: [id number]    : 1935    Date of Service: 3/11/2025    Chief Complaint   Patient presents with    abnormal lab     BIB EMS from [place]; per facility, hemoglobin of 4.4 from routine labs today; facility did not provide lab results to EMS     Past Medical History:  has a past medical history of Acute inferolateral myocardial infarction (HCC), Arthritis, Atrial fibrillation (HCC), CAD (coronary artery disease), CHF (congestive heart failure) ([facility]), Glaucoma, Hx of blood clots, Hyperlipidemia, Hypertension, MI (myocardial infarction) ([facility]), and NSTEMI (non-ST elevated myocardial infarction) ([facility]).  Past Surgical History:  has a past surgical history that includes pacemaker placement; Abdomen surgery; Cardiac catheterization; Appendectomy; Colonoscopy; eye surgery; hernia repair; bone marrow biopsy; joint replacement; CT BIOPSY BONE MARROW (2022); Upper gastrointestinal endoscopy (N/A, 2023); Colonoscopy (N/A, 8/3/2023); Cardiac procedure (N/A, 2024); Cardiac procedure (N/A, 2024); Cardiac procedure (Right, 2025); invasive vascular (N/A, 2025); Upper gastrointestinal endoscopy (N/A, 3/6/2025); and sigmoidoscopy (N/A, 3/7/2025).    Discharge Recommendations  Discharge Recommendations: Therapy recommended at discharge  PT Equipment Recommendations  Equipment Needed: No    Assessment  Body Structures, Functions, Activity Limitations Requiring Skilled Therapeutic Intervention: Decreased functional mobility ;Decreased endurance;Decreased balance;Decreased posture;Decreased safe awareness;Increased pain    Assessment: The patient was admitted from SNF where he was residing after a fall with rib fractures and pneumothorax in 2025. He was found to have low HgB and is suspected to have a GI bleed during this admission. He reports that prior to

## 2025-03-11 NOTE — PLAN OF CARE
Problem: Chronic Conditions and Co-morbidities  Goal: Patient's chronic conditions and co-morbidity symptoms are monitored and maintained or improved  3/11/2025 1447 by Renee Burt RN  Outcome: Progressing  3/11/2025 0227 by Sylvia Tse RN  Outcome: Progressing     Problem: Discharge Planning  Goal: Discharge to home or other facility with appropriate resources  3/11/2025 1447 by Renee Burt RN  Outcome: Progressing  3/11/2025 0227 by Sylvia Tse RN  Outcome: Progressing     Problem: Safety - Adult  Goal: Free from fall injury  3/11/2025 1447 by Renee Burt RN  Outcome: Progressing  3/11/2025 0227 by Sylvia Tse RN  Outcome: Progressing     Problem: Skin/Tissue Integrity  Goal: Skin integrity remains intact  Description: 1.  Monitor for areas of redness and/or skin breakdown  2.  Assess vascular access sites hourly  3.  Every 4-6 hours minimum:  Change oxygen saturation probe site  4.  Every 4-6 hours:  If on nasal continuous positive airway pressure, respiratory therapy assess nares and determine need for appliance change or resting period  3/11/2025 1447 by Renee Burt RN  Outcome: Progressing  3/11/2025 0227 by Sylvia Tse RN  Outcome: Progressing     Problem: ABCDS Injury Assessment  Goal: Absence of physical injury  3/11/2025 1447 by Renee Burt RN  Outcome: Progressing  3/11/2025 0227 by Sylvia Tse RN  Outcome: Progressing     Problem: Neurosensory - Adult  Goal: Achieves maximal functionality and self care  3/11/2025 1447 by Renee Burt RN  Outcome: Progressing  3/11/2025 0227 by Sylvia Tse RN  Outcome: Progressing     Problem: Cardiovascular - Adult  Goal: Maintains optimal cardiac output and hemodynamic stability  3/11/2025 1447 by Renee Burt RN  Outcome: Progressing  3/11/2025 0227 by Sylvia Tse RN  Outcome: Progressing     Problem: Skin/Tissue Integrity - Adult  Goal: Skin integrity remains intact  Description: 1.  Monitor  activity  6. If unable to ensure safety without constant attention obtain sitter and review sitter guidelines with assigned personnel  7. Initiate Psychosocial CNS and Spiritual Care consult, as indicated  3/11/2025 0227 by Sylvia Tse RN  Outcome: Progressing     Problem: Pain  Goal: Verbalizes/displays adequate comfort level or baseline comfort level  3/11/2025 0227 by Sylvia Tse, RN  Outcome: Progressing

## 2025-03-11 NOTE — CARE COORDINATION
Plans to return to [facility] at discharge. Updated PT/OT notes entered. [employer] authorization submitted, pending. Patient will need transport arranged at discharge.

## 2025-03-11 NOTE — PROGRESS NOTES
Occupational Therapy  Occupational Therapy Daily Treatment Note  Facility/Department: [address]   Patient Name: Hemanth Barrera        MRN: [id number]    : 1935    Date of Service: 3/11/2025    Chief Complaint   Patient presents with    abnormal lab     BIB EMS from [place]; per facility, hemoglobin of 4.4 from routine labs today; facility did not provide lab results to EMS     Past Medical History:  has a past medical history of Acute inferolateral myocardial infarction (HCC), Arthritis, Atrial fibrillation (HCC), CAD (coronary artery disease), CHF (congestive heart failure) (HCC), Glaucoma, Hx of blood clots, Hyperlipidemia, Hypertension, MI (myocardial infarction) ([facility]), and NSTEMI (non-ST elevated myocardial infarction) ([facility]).  Past Surgical History:  has a past surgical history that includes pacemaker placement; Abdomen surgery; Cardiac catheterization; Appendectomy; Colonoscopy; eye surgery; hernia repair; bone marrow biopsy; joint replacement; CT BIOPSY BONE MARROW (2022); Upper gastrointestinal endoscopy (N/A, 2023); Colonoscopy (N/A, 8/3/2023); Cardiac procedure (N/A, 2024); Cardiac procedure (N/A, 2024); Cardiac procedure (Right, 2025); invasive vascular (N/A, 2025); Upper gastrointestinal endoscopy (N/A, 3/6/2025); and sigmoidoscopy (N/A, 3/7/2025).    Discharge Recommendations  Discharge Recommendations: Patient would benefit from continued therapy after discharge  OT Equipment Recommendations  Other: continue to assess    Assessment  Performance deficits / Impairments: Decreased functional mobility ;Decreased ADL status;Decreased strength;Decreased safe awareness;Decreased endurance;Decreased balance  Assessment: Patient continues to demonstrate above deficits which impact ADL performance and functional mobility. Patient is progressing with STGs. Currently patient is requiring MOD x2 for bed mobility and transfers and MIN x2 for amb w RW and has not demonstrated

## 2025-03-11 NOTE — PROGRESS NOTES
[facility]  Office: [phone]  Antwon Bernardo DO, Fernando Vyas DO, Drake Haynes DO, Geovani Honeycutt DO, Yani Pritchard MD, Isabella Meade MD, Richard Hidalgo MD, Miriam Michael MD,  Keo Pope MD, Eli Barbour MD, Giovanna Louie MD,  Yee Kerr DO, Dion Gilliland MD, Jonny Basurto MD, Alex Bernardo DO, Nafisa Lyles MD,  Jarred Johnson DO, Camila Mabry MD, Karen Shipley MD, Jessika Ravi MD, Carlos De León MD,  Bola Callaway MD, Roly Brunson MD, Rojas Barfield MD, Francisco J Monzon MD, Jerry Maher MD, Jelani Jameson MD, Rudy Webb DO, Teodoro Casas MD, Yee Meehan MD, Mohsin Reza, MD, Shirley Waterhouse, CNP,  Isidra Bianchi CNP, Rudy Castaneda, CNP,  Magda Cannon, DAVID, Jaleesa Meeks, CNP, Rakel Purdy, CNP, Tatiana Walton, CNP, Stephanie Cyr CNP, TERENCE Bailey-TED, Joie Song, CNP, Evelio Bear, CNP,  Venecia Shanks, CNP, Erin Alaniz, CNP, Rachel Ling, CNP,  Maura Ramna, CNS, Vaishali Lucio CNP, Sarah Ramirez CNP,   Graciela Blue, CNP         [facility]   IN-PATIENT SERVICE   [facility]    Progress Note    3/11/2025    1:21 PM    Name:   Hemanth Barrera  MRN:     [id number]     Acct:      [id number]   Room:   [facility]/331-01   Day:  6  Admit Date:  3/4/2025 11:17 PM    PCP:   Neftaly Contreras MD  Code Status:  Full Code    Subjective:     Patient seen and follow-up for anemia/GI bleed.  Patient states \"I feel okay\"    Patient is doing well overall.  No major issues overnight.  Injuries noted, appreciate all services.  Patient has undergone multiple interventions and source of bleeding has not been identified.  Recommendations at this point in time is to discontinue novel anticoagulant.  Had a long discussion with the patient regarding the fact that this is the proverbial rock and a hard place scenario.  We discussed the fact that off anticoagulation he has a risk of stroke, we discussed the fact that on anticoagulation he  Examination:     General appearance:  alert, cooperative and no distress  Mental Status:  oriented to person, place and time and normal affect  Lungs:  clear to auscultation bilaterally, normal effort  Heart:  regular rate and rhythm, no murmur  Abdomen:  soft, nontender, nondistended, normal bowel sounds, no masses, hepatomegaly, splenomegaly  Extremities:  no edema, redness, tenderness in the calves  Skin:  no gross lesions, rashes, induration    Assessment:     Hospital Problems           Last Modified POA    * (Principal) GI bleed 3/5/2025 Yes    Paroxysmal atrial fibrillation (HCC) 3/5/2025 Yes    Hyperlipidemia 3/5/2025 Yes    History of DVT of lower extremity 3/5/2025 Yes    Chronic CHF (congestive heart failure) (HCC) 3/5/2025 Yes    Secondary hypercoagulable state 3/5/2025 Yes    Moderate protein-calorie malnutrition 3/8/2025 Yes    Frailty 3/8/2025 Yes       Plan:     Anemia/GI bleed  Patient has undergone EGD and colonoscopy x2  No further attempts at this point in time  Source of anemia unknown  DC novel anticoagulant due to risk versus benefit ratio  Paroxysmal atrial fibrillation/history of DVT  Normal sinus rhythm on exam  Continue metoprolol and Cardizem  Level anticoagulant to be discontinued secondary to above  Hyperlipidemia  Continue atorvastatin    Discharge to skilled nursing facility when arrangements made    Medical Decision Making: Wagner Bernardo DO  3/11/2025  1:21 PM

## 2025-03-11 NOTE — DISCHARGE SUMMARY
[facility]  Office: [phone]  Antwon Bernardo DO, Fernando Vyas DO, Drake Haynes DO, Geovani Honeycutt DO, Yani Pritchard MD, Isabella Meade MD, Rcihard Hidalgo MD, Miriam Michael MD,  Keo Pope MD, Eli Barbour MD, Giovanna Louie MD,  Yee Kerr DO, Dion Gilliland MD, Jonny Basurto MD, Alex Bernardo DO, Nafisa Lyles MD,  Jarred Johnson DO, Camila Mabry MD, Karen Shipley MD, Jessika Ravi MD, Carlos De León MD,  Bola Callaway MD, Roly Brunson MD, Rojas Barfield MD, Francisco J Monzon MD, Jerry Maher MD, Jelani Jameson MD, Rudy Webb DO, Teodoro Casas MD, Yee Meehan MD, Mohsin Reza, MD, Shirley Waterhouse, CNP,  Isidra Bianchi CNP, Rudy Castaneda, CNP,  Magda Cannon, [facility], Jaleesa Meeks, CNP, Rakel Purdy, CNP, Tatiana Walton, CNP, Stephanie Cyr CNP, Luz Maria Castellanos PA-C, Joie Song, CNP, Evelio Bear, CNP,  Venecia Shanks, CNP, Erin Alaniz, CNP, Rachel Ling, CNP,  Maura Raman, CNS, Vaishali Lucio CNP, Sarah Ramirez CNP,   Graciela Blue, CNP         [facility]   IN-PATIENT SERVICE   [facility]    Discharge Summary     Patient ID: Hemanth Barrera  :  1935   MRN: [id number]     ACCOUNT:  [id number]   Patient's PCP: Neftaly Contreras MD  Admit Date: 3/4/2025   Discharge Date: 3/12/2025  Length of Stay: 7  Code Status:  Full Code  Admitting Physician: Jonny Basurto MD  Discharge Physician: Giovanna Louie MD     Active Discharge Diagnoses:     Hospital Problem Lists:  Principal Problem:    GI bleed  Active Problems:    Paroxysmal atrial fibrillation (HCC)    Hyperlipidemia    History of DVT of lower extremity    Chronic CHF (congestive heart failure) (HCC)    Secondary hypercoagulable state    Moderate protein-calorie malnutrition    Frailty  Resolved Problems:    * No resolved hospital problems. *      Admission Condition:  fair     Discharged Condition: good    Hospital Stay:     Hospital Course:  Hemanth Barrera is

## 2025-03-11 NOTE — PROGRESS NOTES
Spiritual Health History and Assessment/Progress Note  [facility]    (P) Pre-Procedural, (P) Emotional distress, (P) Adjustment to illness, Anticipatory Grief, Life Adjustments,      Name: Hemanth Barrera MRN: [id number]    Age: 89 y.o.     Sex: male   Language: English   Voodoo: Evangelical   GI bleed     Date: 3/10/2025            Total Time Calculated: (P) 10 min              Spiritual Assessment continued in [address]        Referral/Consult From: (P) Palliative Care   Encounter Overview/Reason: (P) Pre-Procedural  Service Provided For: (P) Patient, Family           followed up on Pt. From OR post OP. Pt. Was very tired and not feeling well.  Provided support and pastoral comfort during visit. Pt. Was very quiet and visibly uncomfortable from surgery.  provided prayer for comfort, strength and encouragement.   Deepali, Belief, Meaning:   Patient has beliefs or practices that help with coping during difficult times  Family/Friends No family/friends present      Importance and Influence:  Patient has spiritual/personal beliefs that influence decisions regarding their health  Family/Friends No family/friends present    Community:  Patient feels well-supported. Support system includes: Children  Family/Friends No family/friends present    Assessment and Plan of Care:     Patient Interventions include: Facilitated expression of thoughts and feelings  Family/Friends Interventions include: No family/friends present    Patient Plan of Care: Spiritual Care available upon further referral  Family/Friends Plan of Care: Spiritual Care available upon further referral    Electronically signed by Chaplain KELBY on 3/10/2025 at 8:18 PM    03/10/25 2016   Encounter Summary   Encounter Overview/Reason Pre-Procedural   Service Provided For Patient;Family   Referral/Consult From Palliative Care   Support System Children   Last Encounter  03/10/25   Complexity of Encounter

## 2025-03-12 VITALS
OXYGEN SATURATION: 99 % | HEART RATE: 70 BPM | SYSTOLIC BLOOD PRESSURE: 99 MMHG | WEIGHT: 135.58 LBS | DIASTOLIC BLOOD PRESSURE: 76 MMHG | BODY MASS INDEX: 17.97 KG/M2 | TEMPERATURE: 97.7 F | RESPIRATION RATE: 16 BRPM | HEIGHT: 73 IN

## 2025-03-12 PROCEDURE — 2500000003 HC RX 250 WO HCPCS: Performed by: INTERNAL MEDICINE

## 2025-03-12 PROCEDURE — 6370000000 HC RX 637 (ALT 250 FOR IP): Performed by: INTERNAL MEDICINE

## 2025-03-12 PROCEDURE — 97535 SELF CARE MNGMENT TRAINING: CPT

## 2025-03-12 PROCEDURE — 97116 GAIT TRAINING THERAPY: CPT

## 2025-03-12 PROCEDURE — 99231 SBSQ HOSP IP/OBS SF/LOW 25: CPT | Performed by: STUDENT IN AN ORGANIZED HEALTH CARE EDUCATION/TRAINING PROGRAM

## 2025-03-12 RX ADMIN — FINASTERIDE 5 MG: 5 TABLET, FILM COATED ORAL at 08:08

## 2025-03-12 RX ADMIN — OXYCODONE HYDROCHLORIDE 5 MG: 5 TABLET ORAL at 12:18

## 2025-03-12 RX ADMIN — MIDODRINE HYDROCHLORIDE 2.5 MG: 2.5 TABLET ORAL at 12:18

## 2025-03-12 RX ADMIN — SODIUM CHLORIDE, PRESERVATIVE FREE 10 ML: 5 INJECTION INTRAVENOUS at 08:14

## 2025-03-12 RX ADMIN — MIDODRINE HYDROCHLORIDE 2.5 MG: 2.5 TABLET ORAL at 08:08

## 2025-03-12 RX ADMIN — PANTOPRAZOLE SODIUM 40 MG: 40 TABLET, DELAYED RELEASE ORAL at 08:08

## 2025-03-12 RX ADMIN — BUMETANIDE 2 MG: 1 TABLET ORAL at 08:07

## 2025-03-12 RX ADMIN — ISOSORBIDE MONONITRATE 30 MG: 30 TABLET, EXTENDED RELEASE ORAL at 08:07

## 2025-03-12 RX ADMIN — DILTIAZEM HYDROCHLORIDE 120 MG: 120 CAPSULE, COATED, EXTENDED RELEASE ORAL at 08:08

## 2025-03-12 RX ADMIN — OXYCODONE HYDROCHLORIDE 5 MG: 5 TABLET ORAL at 08:08

## 2025-03-12 RX ADMIN — METOPROLOL SUCCINATE 100 MG: 100 TABLET, EXTENDED RELEASE ORAL at 08:08

## 2025-03-12 ASSESSMENT — PAIN SCALES - GENERAL: PAINLEVEL_OUTOF10: 7

## 2025-03-12 ASSESSMENT — PAIN DESCRIPTION - ORIENTATION: ORIENTATION: LEFT

## 2025-03-12 ASSESSMENT — PAIN DESCRIPTION - LOCATION: LOCATION: BACK;RIB CAGE

## 2025-03-12 NOTE — CARE COORDINATION
[facility]    1130-  Updated clinicals added to pending [facility] precert and instand approval received. Auth faxed to Kimmy Bhatia. 2pm transportation request faxed to [facility].    1150- CM spoke with Kenia from [facility] and transportation scheduled for 2pm with LEORA.    RN to call report to [phone]

## 2025-03-12 NOTE — PROGRESS NOTES
[facility]  Office: [phone]  Antwon Bernardo DO, Fernando Vyas DO, Drake Haynes DO, Geovani Honeycutt DO, Yani Pritchard MD, Isabella Meade MD, Richard Hidalgo MD, Miriam Michael MD,  Keo Pope MD, Eli Barbour MD, Giovanna Louie MD,  Yee Kerr DO, Dion Gilliland MD, Jonny Basurto MD, Alex Bernardo DO, Nafisa Lyles MD,  Jarred Johnson DO, Camila Mabry MD, Karen Shipley MD, Jessika Ravi MD, Carlos De León MD,  Bola Callaway MD, Roly Brunson MD, Rojas Barfield MD, Francisco J Monzon MD, Jerry Maher MD, Jelani Jameson MD, Rudy Webb DO, Teodoro Casas MD, Yee Meehan MD, Mohsin Reza, MD, Shirley Waterhouse, CNP,  Isidra Bianchi CNP, Rudy Castaneda, CNP,  Magda Cannon, DNP, Jaleesa Meeks, CNP, Rakel Purdy, CNP, Tatiana Walton, CNP, Stephanie Cyr, CNP, Luz Maria Castellanos, PA-C, Joie Song, CNP, Evelio Bear, CNP,  Venecia Shanks, CNP, Erin Alaniz, CNP, Rachel Ling, CNP,  Maura Raman, CNS, Vaishali Lucio CNP, Sarah Ramirez CNP,   Graciela Blue, CNP         [facility]   IN-PATIENT SERVICE   [facility]    Progress Note    3/12/2025    10:33 AM    Name:   Hemanth Barrera  MRN:     [id number]     Acct:      [id number]   Room:   331/331-01   Day:  7  Admit Date:  3/4/2025 11:17 PM    PCP:   Neftaly Contreras MD  Code Status:  Full Code    Subjective:     C/C:   Chief Complaint   Patient presents with    abnormal lab     BIB EMS from [place]; per facility, hemoglobin of 4.4 from routine labs today; facility did not provide lab results to EMS     Interval History Status: not changed.     Patient seen examined at bedside.  Overall doing much better.  No acute issues.  He is waiting to go to rehab.  No other complaints at this time.    Brief History:     89-year-old male presented from outlying facility due to low hemoglobin.  Patient was transferred to [facility] for further evaluation.  Underwent EGD and colonoscopy no  Value Date/Time    CULTURE  01/21/2025 01:23 PM     (NOTE) Direct Gram Stain from bottle result called to and read back by:CALVIN CRUZ [id number]    CULTURE POSITIVE Fluid Culture 01/21/2025 01:23 PM    CULTURE  01/21/2025 01:23 PM     DIRECT GRAM STAIN FROM BOTTLE: GRAM POSITIVE COCCI IN CLUSTERS    CULTURE (A) 01/21/2025 01:23 PM     STAPHYLOCOCCUS EPIDERMIDIS Identification by MALDI-TOF    CULTURE STAPHYLOCOCCUS CAPITIS Identification by MALDI-TOF (A) 01/21/2025 01:23 PM       Radiology:  CT ABDOMEN PELVIS WO CONTRAST Additional Contrast? None  Result Date: 3/5/2025  1.  No acute abnormality identified in the abdomen or pelvis.  No retroperitoneal hemorrhage. 2.  Nonspecific urinary bladder wall thickening.  Correlate with urinalysis for cystitis.       Physical Examination:       General appearance:  alert, cooperative and no distress  Mental Status:  oriented to person, place and time and normal affect  Lungs:  clear to auscultation bilaterally, normal effort  Heart:  regular rate and rhythm, sytolic murmur  Abdomen:  soft, nontender, nondistended, normal bowel sounds  Extremities:  no edema, redness, tenderness in the calves  Skin:  no gross lesions, rashes, induration    Assessment:     Hospital Problems           Last Modified POA    * (Principal) GI bleed 3/5/2025 Yes    Paroxysmal atrial fibrillation (HCC) 3/5/2025 Yes    Hyperlipidemia 3/5/2025 Yes    History of DVT of lower extremity 3/5/2025 Yes    Chronic CHF (congestive heart failure) ([facility]) 3/5/2025 Yes    Secondary hypercoagulable state 3/5/2025 Yes    Moderate protein-calorie malnutrition 3/8/2025 Yes    Frailty 3/8/2025 Yes       Plan:     GI bleed.  Resolved after stopping Xarelto.  A-fib, hypertension continue aspirin, Lipitor, Bumex, Plavix, Imdur, Toprol-XL  Hypotension midodrine.  GERD Protonix  BPH finasteride  Discharge planning hopefully today    Giovanna Louie MD  3/12/2025  10:33 AM

## 2025-03-12 NOTE — PROGRESS NOTES
Physical Therapy  Facility/Department: [address]   Physical Therapy Daily Treatment Note    Patient Name: Hemanth Barrera        MRN: [id number]    : 1935    Date of Service: 3/12/2025    Chief Complaint   Patient presents with    abnormal lab     BIB EMS from [place]; per facility, hemoglobin of 4.4 from routine labs today; facility did not provide lab results to EMS     Past Medical History:  has a past medical history of Acute inferolateral myocardial infarction (HCC), Arthritis, Atrial fibrillation (HCC), CAD (coronary artery disease), CHF (congestive heart failure) ([facility]), Glaucoma, Hx of blood clots, Hyperlipidemia, Hypertension, MI (myocardial infarction) ([facility]), and NSTEMI (non-ST elevated myocardial infarction) ([facility]).  Past Surgical History:  has a past surgical history that includes pacemaker placement; Abdomen surgery; Cardiac catheterization; Appendectomy; Colonoscopy; eye surgery; hernia repair; bone marrow biopsy; joint replacement; CT BIOPSY BONE MARROW (2022); Upper gastrointestinal endoscopy (N/A, 2023); Colonoscopy (N/A, 8/3/2023); Cardiac procedure (N/A, 2024); Cardiac procedure (N/A, 2024); Cardiac procedure (Right, 2025); invasive vascular (N/A, 2025); Upper gastrointestinal endoscopy (N/A, 3/6/2025); sigmoidoscopy (N/A, 3/7/2025); and Colonoscopy (N/A, 3/10/2025).    Discharge Recommendations  Discharge Recommendations: Therapy recommended at discharge  PT Equipment Recommendations  Equipment Needed: No    Assessment  Body Structures, Functions, Activity Limitations Requiring Skilled Therapeutic Intervention: Decreased functional mobility ;Decreased endurance;Decreased balance;Decreased posture;Decreased safe awareness;Increased pain    Assessment: The patient was admitted from SNF where he was residing after a fall with rib fractures and pneumothorax in 2025. He was found to have low HgB and is suspected to have a GI bleed during this admission.  improved balance. Noted shuffling gait with increase in distance  Gait Deviations: Increased PAVITHRA;Decreased step length;Decreased step height  Distance: 45ft                      Exercise  PT Exercises  Exercise Treatment: While seated edge of bed performed pre-gait smiley knee AAROM and stretching for improved foot flat for transfers    Patient Education  Patient Education  Education Given To: Patient  Education Provided: Role of Therapy;Plan of Care;Mobility Training;Fall Prevention Strategies;Transfer Training;Equipment  Education Provided Comments: upright posture while ambulating  Education Method: Demonstration;Verbal  Barriers to Learning: Cognition  Education Outcome: Verbalized understanding;Demonstrated understanding;Continued education needed    Plan  Physical Therapy Plan  General Plan:  (5-6x/week)  Current Treatment Recommendations: Strengthening, Functional mobility training, Transfer training, Gait training, Safety education & training, Therapeutic activities, Balance training, Patient/Caregiver education & training, Endurance training    Goals  Patient Goals   Patient Goals : to have less pain and to feel better  Short Term Goals  Time Frame for Short Term Goals: 14 visits  Short Term Goal 1: The patient will be able to perform bed mobility with SBA.  Short Term Goal 2: The patient will be able to perform transfers with CGA and RW.  Short Term Goal 3: The patient will be able to ambulate 50ft with RW and CGA.  Short Term Goal 4: The patient will have good sitting balance for safety with transfers.  Short Term Goal 5: The patient will have fair standing balance to prevent falls.    Minutes  PT Individual Minutes  Time In: 1058  Time Out: 1121  Minutes: 23  Time Code Minutes  Timed Code Treatment Minutes: 23 Minutes (co-treat)    Co- treatment with OT warranted secondary to decreased patient safety and independence with functional mobility requiring skilled physical assistance of two professionals to

## 2025-03-12 NOTE — PROGRESS NOTES
Occupational Therapy  Occupational Therapy Daily Treatment Note  Facility/Department: [address]   Patient Name: Hemanth Barrera        MRN: [id number]    : 1935    Date of Service: 3/12/2025    Chief Complaint   Patient presents with    abnormal lab     BIB EMS from [place]; per facility, hemoglobin of 4.4 from routine labs today; facility did not provide lab results to EMS     Past Medical History:  has a past medical history of Acute inferolateral myocardial infarction (HCC), Arthritis, Atrial fibrillation (HCC), CAD (coronary artery disease), CHF (congestive heart failure) (HCC), Glaucoma, Hx of blood clots, Hyperlipidemia, Hypertension, MI (myocardial infarction) ([facility]), and NSTEMI (non-ST elevated myocardial infarction) ([facility]).  Past Surgical History:  has a past surgical history that includes pacemaker placement; Abdomen surgery; Cardiac catheterization; Appendectomy; Colonoscopy; eye surgery; hernia repair; bone marrow biopsy; joint replacement; CT BIOPSY BONE MARROW (2022); Upper gastrointestinal endoscopy (N/A, 2023); Colonoscopy (N/A, 8/3/2023); Cardiac procedure (N/A, 2024); Cardiac procedure (N/A, 2024); Cardiac procedure (Right, 2025); invasive vascular (N/A, 2025); Upper gastrointestinal endoscopy (N/A, 3/6/2025); sigmoidoscopy (N/A, 3/7/2025); and Colonoscopy (N/A, 3/10/2025).    Discharge Recommendations  Discharge Recommendations: Patient would benefit from continued therapy after discharge  OT Equipment Recommendations  Other: continue to assess    Assessment  Performance deficits / Impairments: Decreased functional mobility ;Decreased ADL status;Decreased strength;Decreased safe awareness;Decreased endurance;Decreased balance  Assessment: Patient continues to demonstrate above deficits which impact ADL performance and functional mobility. Patient is progressing with STGs. Currently patient is requiring MOD x2 for bed mobility and transfers and MIN x2 for amb w  CGA    Toilet Transfers  Toilet Transfers Comments: declined need    Functional Mobility: Moderate assistance  Functional Mobility Skilled Clinical Factors: bed mob MOD x2, sit<>stand MOD x2, amb RW MIN x2         Patient Education  Patient Education  Education Given To: Patient  Education Provided: Role of Therapy;Transfer Training;Equipment;Fall Prevention Strategies;Mobility Training  Education Provided Comments: activity promation, activity engagement  Education Method: Verbal  Barriers to Learning: Cognition  Education Outcome: Verbalized understanding;Continued education needed    Goals  Patient Goals   Patient goals : decrease pain  Short Term Goals  Time Frame for Short Term Goals: 14 days  Short Term Goal 1: UB ADLs Min A  Short Term Goal 2: LB ADLs Mod A  Short Term Goal 3: Tolerate EOB sitting ~10 mins  Short Term Goal 4: Hygiene ADLs SBA while seated EOB    Plan  Occupational Therapy Plan  Times Per Week: 5-6x/wk  Current Treatment Recommendations: Strengthening, Balance training, Functional mobility training, Endurance training, Safety education & training, Patient/Caregiver education & training, Equipment evaluation, education, & procurement, Self-Care / ADL    Minutes  OT Individual Minutes  Time In: 1058  Time Out: 1121  Minutes: 23  Time Code Minutes   Timed Code Treatment Minutes: 23 Minutes    Co- treatment with PT warranted secondary to decreased patient safety and independence with functional mobility requiring skilled physical assistance of two professionals to simultaneously address individualized discipline goals. OT is addressing activity engagement, activity initiation, activity tolerance, ADLs, functional transfers as precursor to ADL completion , while PT is addressing their individualized functional mobility task.     Electronically signed by ALHAJI HINSON OTR/L on 3/12/25 at 11:34 AM EDT

## 2025-03-12 NOTE — PLAN OF CARE
Problem: Chronic Conditions and Co-morbidities  Goal: Patient's chronic conditions and co-morbidity symptoms are monitored and maintained or improved  3/12/2025 0947 by Malika Lewis RN  Outcome: Progressing     Problem: Discharge Planning  Goal: Discharge to home or other facility with appropriate resources  3/12/2025 0947 by Malika Lewis RN  Outcome: Progressing     Problem: Safety - Adult  Goal: Free from fall injury  3/12/2025 0052 by Anita Mclean RN  Outcome: Progressing     Problem: Skin/Tissue Integrity  Goal: Skin integrity remains intact  Description: 1.  Monitor for areas of redness and/or skin breakdown  2.  Assess vascular access sites hourly  3.  Every 4-6 hours minimum:  Change oxygen saturation probe site  4.  Every 4-6 hours:  If on nasal continuous positive airway pressure, respiratory therapy assess nares and determine need for appliance change or resting period  3/12/2025 0947 by Malika Lewis RN  Outcome: Progressing     Problem: ABCDS Injury Assessment  Goal: Absence of physical injury  3/12/2025 0947 by Malika Lewis RN  Outcome: Progressing     Problem: Skin/Tissue Integrity - Adult  Goal: Skin integrity remains intact  Description: 1.  Monitor for areas of redness and/or skin breakdown  2.  Assess vascular access sites hourly  3.  Every 4-6 hours minimum:  Change oxygen saturation probe site  4.  Every 4-6 hours:  If on nasal continuous positive airway pressure, respiratory therapy assess nares and determine need for appliance change or resting period  3/12/2025 0947 by Malika Lewis RN  Outcome: Progressing     Problem: Gastrointestinal - Adult  Goal: Maintains or returns to baseline bowel function  3/12/2025 0947 by Malika Lewis RN  Outcome: Progressing     Problem: Confusion  Goal: Confusion, delirium, dementia, or psychosis is improved or at baseline  Description: INTERVENTIONS:  1. Assess for possible contributors to thought disturbance, including medications, impaired  vision or hearing, underlying metabolic abnormalities, dehydration, psychiatric diagnoses, and notify attending LIP  2. Clark high risk fall precautions, as indicated  3. Provide frequent short contacts to provide reality reorientation, refocusing and direction  4. Decrease environmental stimuli, including noise as appropriate  5. Monitor and intervene to maintain adequate nutrition, hydration, elimination, sleep and activity  6. If unable to ensure safety without constant attention obtain sitter and review sitter guidelines with assigned personnel  7. Initiate Psychosocial CNS and Spiritual Care consult, as indicated  3/12/2025 0947 by Malika Lewis RN  Outcome: Progressing     Problem: Pain  Goal: Verbalizes/displays adequate comfort level or baseline comfort level  3/12/2025 0947 by Malika Lewis, RN  Outcome: Progressing

## 2025-03-12 NOTE — PLAN OF CARE
Problem: Chronic Conditions and Co-morbidities  Goal: Patient's chronic conditions and co-morbidity symptoms are monitored and maintained or improved  3/12/2025 0052 by Anita Mclean RN  Outcome: Progressing  Flowsheets (Taken 3/11/2025 2000)  Care Plan - Patient's Chronic Conditions and Co-Morbidity Symptoms are Monitored and Maintained or Improved: Monitor and assess patient's chronic conditions and comorbid symptoms for stability, deterioration, or improvement  3/11/2025 1447 by Renee Burt RN  Outcome: Progressing     Problem: Discharge Planning  Goal: Discharge to home or other facility with appropriate resources  3/12/2025 0052 by Anita Mclean RN  Outcome: Progressing  Flowsheets (Taken 3/11/2025 2000)  Discharge to home or other facility with appropriate resources: Identify barriers to discharge with patient and caregiver  3/11/2025 1447 by Renee Burt RN  Outcome: Progressing     Problem: Safety - Adult  Goal: Free from fall injury  3/12/2025 0052 by Anita Mclean RN  Outcome: Progressing  3/11/2025 1447 by Renee Burt RN  Outcome: Progressing     Problem: Skin/Tissue Integrity  Goal: Skin integrity remains intact  Description: 1.  Monitor for areas of redness and/or skin breakdown  2.  Assess vascular access sites hourly  3.  Every 4-6 hours minimum:  Change oxygen saturation probe site  4.  Every 4-6 hours:  If on nasal continuous positive airway pressure, respiratory therapy assess nares and determine need for appliance change or resting period  3/12/2025 0052 by Anita Mclean RN  Outcome: Progressing  Flowsheets (Taken 3/11/2025 2000)  Skin Integrity Remains Intact: Monitor for areas of redness and/or skin breakdown  3/11/2025 1447 by Renee Burt RN  Outcome: Progressing     Problem: ABCDS Injury Assessment  Goal: Absence of physical injury  3/12/2025 0052 by Anita Mclean RN  Outcome: Progressing  3/11/2025 1447 by Renee Burt RN  Outcome: Progressing     Problem:  Encourage patient to monitor pain and request assistance

## 2025-03-12 NOTE — PROGRESS NOTES
Pt discharged with his packet, AVS and his cell phone. Report called and given to Hang at [facility]. Family made aware that the pt is transporting to facility. Pt left via wheelchair with hospital transport. PIV access was removed without complications and dressing applied.

## 2025-03-12 NOTE — CARE COORDINATION
CM faxed CARLOS, HENS and MAR to [place]. Gwen from [place] confirmed 2pm admission, transport set for 2pm. Patient updated and agreeable.     RN to call report to [phone]

## 2025-03-31 ENCOUNTER — HOSPITAL ENCOUNTER (OUTPATIENT)
Age: 89
Setting detail: SPECIMEN
Discharge: HOME | End: 2025-03-31

## 2025-03-31 LAB
ALBUMIN SERPL-MCNC: 3.3 G/DL (ref 3.5–5.2)
ALBUMIN/GLOB SERPL: 1.1 {RATIO} (ref 1–2.5)
ALP SERPL-CCNC: 77 U/L (ref 40–129)
ALT SERPL-CCNC: 19 U/L (ref 10–50)
ANION GAP SERPL CALCULATED.3IONS-SCNC: 13 MMOL/L (ref 9–16)
AST SERPL-CCNC: 24 U/L (ref 10–50)
BILIRUB SERPL-MCNC: 0.4 MG/DL (ref 0–1.2)
BUN SERPL-MCNC: 19 MG/DL (ref 8–23)
CALCIUM SERPL-MCNC: 8.7 MG/DL (ref 8.2–9.6)
CHLORIDE SERPL-SCNC: 109 MMOL/L (ref 98–107)
CO2 SERPL-SCNC: 22 MMOL/L (ref 20–31)
CREAT SERPL-MCNC: 0.9 MG/DL (ref 0.7–1.2)
ERYTHROCYTE [DISTWIDTH] IN BLOOD BY AUTOMATED COUNT: 17.5 % (ref 11.8–14.4)
GFR, ESTIMATED: 78 ML/MIN/1.73M2
GLUCOSE SERPL-MCNC: 111 MG/DL (ref 75–121)
HCT VFR BLD AUTO: 32.6 % (ref 40.7–50.3)
HGB BLD-MCNC: 9.8 G/DL (ref 13–17)
MAGNESIUM SERPL-MCNC: 1.9 MG/DL (ref 1.7–2.3)
MCH RBC QN AUTO: 29.3 PG (ref 25.2–33.5)
MCHC RBC AUTO-ENTMCNC: 30.1 G/DL (ref 28.4–34.8)
MCV RBC AUTO: 97.6 FL (ref 82.6–102.9)
NRBC BLD-RTO: 0 PER 100 WBC
PLATELET # BLD AUTO: 306 K/UL (ref 138–453)
PMV BLD AUTO: 9.6 FL (ref 8.1–13.5)
POTASSIUM SERPL-SCNC: 4.1 MMOL/L (ref 3.7–5.3)
PROT SERPL-MCNC: 6.3 G/DL (ref 6.6–8.7)
RBC # BLD AUTO: 3.34 M/UL (ref 4.21–5.77)
SODIUM SERPL-SCNC: 144 MMOL/L (ref 136–145)
TSH SERPL DL<=0.05 MIU/L-ACNC: 1.6 UIU/ML (ref 0.27–4.2)
WBC OTHER # BLD: 4.2 K/UL (ref 3.5–11.3)

## 2025-03-31 PROCEDURE — 36415 COLL VENOUS BLD VENIPUNCTURE: CPT

## 2025-03-31 PROCEDURE — 84443 ASSAY THYROID STIM HORMONE: CPT

## 2025-03-31 PROCEDURE — 85027 COMPLETE CBC AUTOMATED: CPT

## 2025-03-31 PROCEDURE — 83735 ASSAY OF MAGNESIUM: CPT

## 2025-03-31 PROCEDURE — 80053 COMPREHEN METABOLIC PANEL: CPT

## 2025-04-09 ENCOUNTER — TELEPHONE (OUTPATIENT)
Dept: INTERNAL MEDICINE CLINIC | Age: 89
End: 2025-04-09

## 2025-04-09 NOTE — TELEPHONE ENCOUNTER
Mailed letter for the date of scheduled appointment for the date of 4/9/2025 scheduled with Dr Contreras

## 2025-04-17 ENCOUNTER — HOSPITAL ENCOUNTER (OUTPATIENT)
Age: 89
Setting detail: SPECIMEN
Discharge: HOME | End: 2025-04-17

## 2025-04-17 LAB
ANION GAP SERPL CALCULATED.3IONS-SCNC: 11 MMOL/L (ref 9–16)
BUN SERPL-MCNC: 21 MG/DL (ref 8–23)
CALCIUM SERPL-MCNC: 8.7 MG/DL (ref 8.2–9.6)
CHLORIDE SERPL-SCNC: 103 MMOL/L (ref 98–107)
CO2 SERPL-SCNC: 24 MMOL/L (ref 20–31)
CREAT SERPL-MCNC: 0.9 MG/DL (ref 0.7–1.2)
ERYTHROCYTE [DISTWIDTH] IN BLOOD BY AUTOMATED COUNT: 18.4 % (ref 11.8–14.4)
GFR, ESTIMATED: 77 ML/MIN/1.73M2
GLUCOSE SERPL-MCNC: 89 MG/DL (ref 75–121)
HCT VFR BLD AUTO: 31.7 % (ref 40.7–50.3)
HGB BLD-MCNC: 9.8 G/DL (ref 13–17)
MAGNESIUM SERPL-MCNC: 2 MG/DL (ref 1.7–2.3)
MCH RBC QN AUTO: 29.5 PG (ref 25.2–33.5)
MCHC RBC AUTO-ENTMCNC: 30.9 G/DL (ref 28.4–34.8)
MCV RBC AUTO: 95.5 FL (ref 82.6–102.9)
NRBC BLD-RTO: 0 PER 100 WBC
PLATELET # BLD AUTO: 204 K/UL (ref 138–453)
PMV BLD AUTO: 9.7 FL (ref 8.1–13.5)
POTASSIUM SERPL-SCNC: 3.8 MMOL/L (ref 3.7–5.3)
RBC # BLD AUTO: 3.32 M/UL (ref 4.21–5.77)
SODIUM SERPL-SCNC: 138 MMOL/L (ref 136–145)
WBC OTHER # BLD: 4 K/UL (ref 3.5–11.3)

## 2025-04-17 PROCEDURE — 85027 COMPLETE CBC AUTOMATED: CPT

## 2025-04-17 PROCEDURE — 80048 BASIC METABOLIC PNL TOTAL CA: CPT

## 2025-04-17 PROCEDURE — 36415 COLL VENOUS BLD VENIPUNCTURE: CPT

## 2025-04-17 PROCEDURE — 83735 ASSAY OF MAGNESIUM: CPT

## 2025-04-25 ENCOUNTER — TELEPHONE (OUTPATIENT)
Dept: INTERNAL MEDICINE CLINIC | Age: 89
End: 2025-04-25

## 2025-05-01 ENCOUNTER — HOSPITAL ENCOUNTER (OUTPATIENT)
Age: 89
Setting detail: SPECIMEN
Discharge: HOME | End: 2025-05-01

## 2025-05-01 ENCOUNTER — HOSPITAL ENCOUNTER (INPATIENT)
Age: 89
LOS: 4 days | Discharge: HOME HEALTH | DRG: 302 | End: 2025-05-05
Attending: EMERGENCY MEDICINE | Admitting: STUDENT IN AN ORGANIZED HEALTH CARE EDUCATION/TRAINING PROGRAM
Payer: MEDICARE

## 2025-05-01 ENCOUNTER — APPOINTMENT (OUTPATIENT)
Dept: CT IMAGING | Age: 89
DRG: 302 | End: 2025-05-01
Payer: MEDICARE

## 2025-05-01 ENCOUNTER — APPOINTMENT (OUTPATIENT)
Dept: GENERAL RADIOLOGY | Age: 89
DRG: 302 | End: 2025-05-01
Payer: MEDICARE

## 2025-05-01 DIAGNOSIS — N40.0 ENLARGED PROSTATE: ICD-10-CM

## 2025-05-01 DIAGNOSIS — I20.89 ANGINA AT REST: Primary | ICD-10-CM

## 2025-05-01 LAB
ALBUMIN SERPL-MCNC: 3.6 G/DL (ref 3.5–5.2)
ALBUMIN/GLOB SERPL: 1.1 {RATIO} (ref 1–2.5)
ALP SERPL-CCNC: 82 U/L (ref 40–129)
ALT SERPL-CCNC: 9 U/L (ref 5–41)
ANION GAP SERPL CALCULATED.3IONS-SCNC: 10 MMOL/L (ref 9–17)
ANION GAP SERPL CALCULATED.3IONS-SCNC: 11 MMOL/L (ref 9–16)
ANTI-XA UNFRAC HEPARIN: >1.1 IU/L (ref 0.3–0.7)
ANTI-XA UNFRAC HEPARIN: >1.1 IU/L (ref 0.3–0.7)
AST SERPL-CCNC: 14 U/L
BASOPHILS # BLD: 0 K/UL (ref 0–0.2)
BASOPHILS NFR BLD: 1 % (ref 0–2)
BILIRUB SERPL-MCNC: 0.7 MG/DL (ref 0.3–1.2)
BNP SERPL-MCNC: 1644 PG/ML
BUN SERPL-MCNC: 20 MG/DL (ref 8–23)
BUN SERPL-MCNC: 22 MG/DL (ref 8–23)
CALCIUM SERPL-MCNC: 8.8 MG/DL (ref 8.2–9.6)
CALCIUM SERPL-MCNC: 9 MG/DL (ref 8.6–10.4)
CHLORIDE SERPL-SCNC: 103 MMOL/L (ref 98–107)
CHLORIDE SERPL-SCNC: 104 MMOL/L (ref 98–107)
CO2 SERPL-SCNC: 25 MMOL/L (ref 20–31)
CO2 SERPL-SCNC: 26 MMOL/L (ref 20–31)
CREAT SERPL-MCNC: 0.8 MG/DL (ref 0.7–1.2)
CREAT SERPL-MCNC: 0.9 MG/DL (ref 0.7–1.2)
EOSINOPHIL # BLD: 0.1 K/UL (ref 0–0.4)
EOSINOPHILS RELATIVE PERCENT: 2 % (ref 1–4)
ERYTHROCYTE [DISTWIDTH] IN BLOOD BY AUTOMATED COUNT: 18.6 % (ref 11.8–14.4)
ERYTHROCYTE [DISTWIDTH] IN BLOOD BY AUTOMATED COUNT: 19.8 % (ref 12.5–15.4)
ERYTHROCYTE [DISTWIDTH] IN BLOOD BY AUTOMATED COUNT: 20 % (ref 12.5–15.4)
GFR, ESTIMATED: 81 ML/MIN/1.73M2
GFR, ESTIMATED: 84 ML/MIN/1.73M2
GLUCOSE SERPL-MCNC: 127 MG/DL (ref 70–99)
GLUCOSE SERPL-MCNC: 82 MG/DL (ref 75–121)
HCT VFR BLD AUTO: 29 % (ref 40.7–50.3)
HCT VFR BLD AUTO: 30.1 % (ref 41–53)
HCT VFR BLD AUTO: 30.4 % (ref 41–53)
HGB BLD-MCNC: 10 G/DL (ref 13.5–17.5)
HGB BLD-MCNC: 9.3 G/DL (ref 13–17)
HGB BLD-MCNC: 9.9 G/DL (ref 13.5–17.5)
INR PPP: 1.5 (ref 0.9–1.2)
LIPASE SERPL-CCNC: 21 U/L (ref 13–60)
LYMPHOCYTES NFR BLD: 0.8 K/UL (ref 1–4.8)
LYMPHOCYTES RELATIVE PERCENT: 19 % (ref 24–44)
MCH RBC QN AUTO: 29.7 PG (ref 26–34)
MCH RBC QN AUTO: 29.7 PG (ref 26–34)
MCH RBC QN AUTO: 29.9 PG (ref 25.2–33.5)
MCHC RBC AUTO-ENTMCNC: 32.1 G/DL (ref 28.4–34.8)
MCHC RBC AUTO-ENTMCNC: 32.9 G/DL (ref 31–37)
MCHC RBC AUTO-ENTMCNC: 32.9 G/DL (ref 31–37)
MCV RBC AUTO: 90.4 FL (ref 80–100)
MCV RBC AUTO: 90.4 FL (ref 80–100)
MCV RBC AUTO: 93.2 FL (ref 82.6–102.9)
MONOCYTES NFR BLD: 0.4 K/UL (ref 0.1–1.2)
MONOCYTES NFR BLD: 9 % (ref 2–11)
NEUTROPHILS NFR BLD: 69 % (ref 36–66)
NEUTS SEG NFR BLD: 2.7 K/UL (ref 1.8–7.7)
NRBC BLD-RTO: 0 PER 100 WBC
PARTIAL THROMBOPLASTIN TIME: 31.8 SEC (ref 24–36)
PLATELET # BLD AUTO: 190 K/UL (ref 138–453)
PLATELET # BLD AUTO: 202 K/UL (ref 140–450)
PLATELET # BLD AUTO: 222 K/UL (ref 140–450)
PMV BLD AUTO: 10.4 FL (ref 8.1–13.5)
PMV BLD AUTO: 7.4 FL (ref 6–12)
PMV BLD AUTO: 7.8 FL (ref 6–12)
POTASSIUM SERPL-SCNC: 3.8 MMOL/L (ref 3.7–5.3)
POTASSIUM SERPL-SCNC: 4.2 MMOL/L (ref 3.7–5.3)
PROT SERPL-MCNC: 6.9 G/DL (ref 6.4–8.3)
PROTHROMBIN TIME: 17.4 SEC (ref 11.8–14.6)
RBC # BLD AUTO: 3.11 M/UL (ref 4.21–5.77)
RBC # BLD AUTO: 3.33 M/UL (ref 4.5–5.9)
RBC # BLD AUTO: 3.37 M/UL (ref 4.5–5.9)
SODIUM SERPL-SCNC: 139 MMOL/L (ref 135–144)
SODIUM SERPL-SCNC: 139 MMOL/L (ref 136–145)
TROPONIN I SERPL HS-MCNC: 29 NG/L (ref 0–22)
TROPONIN I SERPL HS-MCNC: 30 NG/L (ref 0–22)
WBC OTHER # BLD: 3.2 K/UL (ref 3.5–11)
WBC OTHER # BLD: 3.2 K/UL (ref 3.5–11.3)
WBC OTHER # BLD: 3.9 K/UL (ref 3.5–11)

## 2025-05-01 PROCEDURE — 99285 EMERGENCY DEPT VISIT HI MDM: CPT

## 2025-05-01 PROCEDURE — 85610 PROTHROMBIN TIME: CPT

## 2025-05-01 PROCEDURE — 80053 COMPREHEN METABOLIC PANEL: CPT

## 2025-05-01 PROCEDURE — 6370000000 HC RX 637 (ALT 250 FOR IP): Performed by: NURSE PRACTITIONER

## 2025-05-01 PROCEDURE — 85520 HEPARIN ASSAY: CPT

## 2025-05-01 PROCEDURE — 85025 COMPLETE CBC W/AUTO DIFF WBC: CPT

## 2025-05-01 PROCEDURE — 6360000002 HC RX W HCPCS: Performed by: NURSE PRACTITIONER

## 2025-05-01 PROCEDURE — 36415 COLL VENOUS BLD VENIPUNCTURE: CPT

## 2025-05-01 PROCEDURE — 84484 ASSAY OF TROPONIN QUANT: CPT

## 2025-05-01 PROCEDURE — 85027 COMPLETE CBC AUTOMATED: CPT

## 2025-05-01 PROCEDURE — 96374 THER/PROPH/DIAG INJ IV PUSH: CPT

## 2025-05-01 PROCEDURE — 6360000002 HC RX W HCPCS: Performed by: STUDENT IN AN ORGANIZED HEALTH CARE EDUCATION/TRAINING PROGRAM

## 2025-05-01 PROCEDURE — 70491 CT SOFT TISSUE NECK W/DYE: CPT

## 2025-05-01 PROCEDURE — 6370000000 HC RX 637 (ALT 250 FOR IP): Performed by: STUDENT IN AN ORGANIZED HEALTH CARE EDUCATION/TRAINING PROGRAM

## 2025-05-01 PROCEDURE — 2060000000 HC ICU INTERMEDIATE R&B

## 2025-05-01 PROCEDURE — 71045 X-RAY EXAM CHEST 1 VIEW: CPT

## 2025-05-01 PROCEDURE — 93005 ELECTROCARDIOGRAM TRACING: CPT | Performed by: EMERGENCY MEDICINE

## 2025-05-01 PROCEDURE — 83690 ASSAY OF LIPASE: CPT

## 2025-05-01 PROCEDURE — 85730 THROMBOPLASTIN TIME PARTIAL: CPT

## 2025-05-01 PROCEDURE — 6360000004 HC RX CONTRAST MEDICATION: Performed by: NURSE PRACTITIONER

## 2025-05-01 PROCEDURE — 2500000003 HC RX 250 WO HCPCS: Performed by: NURSE PRACTITIONER

## 2025-05-01 PROCEDURE — 80048 BASIC METABOLIC PNL TOTAL CA: CPT

## 2025-05-01 PROCEDURE — 83880 ASSAY OF NATRIURETIC PEPTIDE: CPT

## 2025-05-01 PROCEDURE — 74176 CT ABD & PELVIS W/O CONTRAST: CPT

## 2025-05-01 PROCEDURE — 99222 1ST HOSP IP/OBS MODERATE 55: CPT | Performed by: STUDENT IN AN ORGANIZED HEALTH CARE EDUCATION/TRAINING PROGRAM

## 2025-05-01 RX ORDER — SODIUM CHLORIDE 0.9 % (FLUSH) 0.9 %
10 SYRINGE (ML) INJECTION ONCE
Status: COMPLETED | OUTPATIENT
Start: 2025-05-01 | End: 2025-05-01

## 2025-05-01 RX ORDER — BUMETANIDE 1 MG/1
2 TABLET ORAL DAILY
Status: DISCONTINUED | OUTPATIENT
Start: 2025-05-02 | End: 2025-05-05 | Stop reason: HOSPADM

## 2025-05-01 RX ORDER — BUPRENORPHINE HYDROCHLORIDE AND NALOXONE HYDROCHLORIDE DIHYDRATE 8; 2 MG/1; MG/1
1 TABLET SUBLINGUAL 2 TIMES DAILY
Status: DISCONTINUED | OUTPATIENT
Start: 2025-05-01 | End: 2025-05-02

## 2025-05-01 RX ORDER — HEPARIN SODIUM 1000 [USP'U]/ML
30 INJECTION, SOLUTION INTRAVENOUS; SUBCUTANEOUS PRN
Status: DISCONTINUED | OUTPATIENT
Start: 2025-05-01 | End: 2025-05-02

## 2025-05-01 RX ORDER — ONDANSETRON 2 MG/ML
4 INJECTION INTRAMUSCULAR; INTRAVENOUS EVERY 6 HOURS PRN
Status: DISCONTINUED | OUTPATIENT
Start: 2025-05-01 | End: 2025-05-05 | Stop reason: HOSPADM

## 2025-05-01 RX ORDER — HEPARIN SODIUM 10000 [USP'U]/100ML
5-30 INJECTION, SOLUTION INTRAVENOUS CONTINUOUS
Status: DISCONTINUED | OUTPATIENT
Start: 2025-05-01 | End: 2025-05-02

## 2025-05-01 RX ORDER — SODIUM CHLORIDE 9 MG/ML
INJECTION, SOLUTION INTRAVENOUS PRN
Status: DISCONTINUED | OUTPATIENT
Start: 2025-05-01 | End: 2025-05-05 | Stop reason: HOSPADM

## 2025-05-01 RX ORDER — MAGNESIUM HYDROXIDE/ALUMINUM HYDROXICE/SIMETHICONE 120; 1200; 1200 MG/30ML; MG/30ML; MG/30ML
30 SUSPENSION ORAL ONCE
Status: COMPLETED | OUTPATIENT
Start: 2025-05-01 | End: 2025-05-01

## 2025-05-01 RX ORDER — POTASSIUM CHLORIDE 7.45 MG/ML
10 INJECTION INTRAVENOUS PRN
Status: DISCONTINUED | OUTPATIENT
Start: 2025-05-01 | End: 2025-05-05 | Stop reason: HOSPADM

## 2025-05-01 RX ORDER — POTASSIUM CHLORIDE 1500 MG/1
40 TABLET, EXTENDED RELEASE ORAL PRN
Status: DISCONTINUED | OUTPATIENT
Start: 2025-05-01 | End: 2025-05-05 | Stop reason: HOSPADM

## 2025-05-01 RX ORDER — PANTOPRAZOLE SODIUM 40 MG/1
40 TABLET, DELAYED RELEASE ORAL
Status: DISCONTINUED | OUTPATIENT
Start: 2025-05-02 | End: 2025-05-05 | Stop reason: HOSPADM

## 2025-05-01 RX ORDER — NITROGLYCERIN 0.4 MG/1
0.4 TABLET SUBLINGUAL ONCE
Status: COMPLETED | OUTPATIENT
Start: 2025-05-01 | End: 2025-05-01

## 2025-05-01 RX ORDER — MIDODRINE HYDROCHLORIDE 5 MG/1
2.5 TABLET ORAL
Status: DISCONTINUED | OUTPATIENT
Start: 2025-05-01 | End: 2025-05-05 | Stop reason: HOSPADM

## 2025-05-01 RX ORDER — POLYETHYLENE GLYCOL 3350 17 G/17G
17 POWDER, FOR SOLUTION ORAL DAILY PRN
Status: DISCONTINUED | OUTPATIENT
Start: 2025-05-01 | End: 2025-05-05 | Stop reason: HOSPADM

## 2025-05-01 RX ORDER — MAGNESIUM SULFATE IN WATER 40 MG/ML
2000 INJECTION, SOLUTION INTRAVENOUS PRN
Status: DISCONTINUED | OUTPATIENT
Start: 2025-05-01 | End: 2025-05-05 | Stop reason: HOSPADM

## 2025-05-01 RX ORDER — ATORVASTATIN CALCIUM 40 MG/1
40 TABLET, FILM COATED ORAL NIGHTLY
Status: DISCONTINUED | OUTPATIENT
Start: 2025-05-01 | End: 2025-05-05 | Stop reason: HOSPADM

## 2025-05-01 RX ORDER — CLOPIDOGREL BISULFATE 75 MG/1
75 TABLET ORAL DAILY
Status: DISCONTINUED | OUTPATIENT
Start: 2025-05-02 | End: 2025-05-05 | Stop reason: HOSPADM

## 2025-05-01 RX ORDER — 0.9 % SODIUM CHLORIDE 0.9 %
80 INTRAVENOUS SOLUTION INTRAVENOUS ONCE
Status: DISCONTINUED | OUTPATIENT
Start: 2025-05-01 | End: 2025-05-05 | Stop reason: HOSPADM

## 2025-05-01 RX ORDER — ONDANSETRON 4 MG/1
4 TABLET, ORALLY DISINTEGRATING ORAL EVERY 8 HOURS PRN
Status: DISCONTINUED | OUTPATIENT
Start: 2025-05-01 | End: 2025-05-05 | Stop reason: HOSPADM

## 2025-05-01 RX ORDER — NITROGLYCERIN 40 MG/1
1 PATCH TRANSDERMAL DAILY
Status: DISCONTINUED | OUTPATIENT
Start: 2025-05-01 | End: 2025-05-01

## 2025-05-01 RX ORDER — HEPARIN SODIUM 1000 [USP'U]/ML
60 INJECTION, SOLUTION INTRAVENOUS; SUBCUTANEOUS ONCE
Status: COMPLETED | OUTPATIENT
Start: 2025-05-01 | End: 2025-05-01

## 2025-05-01 RX ORDER — IOPAMIDOL 755 MG/ML
75 INJECTION, SOLUTION INTRAVASCULAR
Status: COMPLETED | OUTPATIENT
Start: 2025-05-01 | End: 2025-05-01

## 2025-05-01 RX ORDER — ASPIRIN 81 MG/1
81 TABLET, CHEWABLE ORAL DAILY
Status: DISCONTINUED | OUTPATIENT
Start: 2025-05-02 | End: 2025-05-03

## 2025-05-01 RX ORDER — SODIUM CHLORIDE 0.9 % (FLUSH) 0.9 %
5-40 SYRINGE (ML) INJECTION PRN
Status: DISCONTINUED | OUTPATIENT
Start: 2025-05-01 | End: 2025-05-05 | Stop reason: HOSPADM

## 2025-05-01 RX ORDER — METOPROLOL SUCCINATE 25 MG/1
100 TABLET, EXTENDED RELEASE ORAL DAILY
Status: DISCONTINUED | OUTPATIENT
Start: 2025-05-02 | End: 2025-05-02

## 2025-05-01 RX ORDER — NITROGLYCERIN 20 MG/100ML
5-200 INJECTION INTRAVENOUS CONTINUOUS
Status: DISCONTINUED | OUTPATIENT
Start: 2025-05-01 | End: 2025-05-02

## 2025-05-01 RX ORDER — ISOSORBIDE MONONITRATE 30 MG/1
30 TABLET, EXTENDED RELEASE ORAL DAILY
Status: DISCONTINUED | OUTPATIENT
Start: 2025-05-01 | End: 2025-05-05 | Stop reason: HOSPADM

## 2025-05-01 RX ORDER — HEPARIN SODIUM 1000 [USP'U]/ML
60 INJECTION, SOLUTION INTRAVENOUS; SUBCUTANEOUS PRN
Status: DISCONTINUED | OUTPATIENT
Start: 2025-05-01 | End: 2025-05-02

## 2025-05-01 RX ORDER — ACETAMINOPHEN 650 MG/1
650 SUPPOSITORY RECTAL EVERY 6 HOURS PRN
Status: DISCONTINUED | OUTPATIENT
Start: 2025-05-01 | End: 2025-05-05 | Stop reason: HOSPADM

## 2025-05-01 RX ORDER — SODIUM CHLORIDE 0.9 % (FLUSH) 0.9 %
5-40 SYRINGE (ML) INJECTION EVERY 12 HOURS SCHEDULED
Status: DISCONTINUED | OUTPATIENT
Start: 2025-05-01 | End: 2025-05-05 | Stop reason: HOSPADM

## 2025-05-01 RX ORDER — DILTIAZEM HYDROCHLORIDE 120 MG/1
120 CAPSULE, COATED, EXTENDED RELEASE ORAL DAILY
Status: DISCONTINUED | OUTPATIENT
Start: 2025-05-02 | End: 2025-05-05 | Stop reason: HOSPADM

## 2025-05-01 RX ORDER — ACETAMINOPHEN 325 MG/1
650 TABLET ORAL EVERY 6 HOURS PRN
Status: DISCONTINUED | OUTPATIENT
Start: 2025-05-01 | End: 2025-05-05 | Stop reason: HOSPADM

## 2025-05-01 RX ADMIN — ATORVASTATIN CALCIUM 40 MG: 40 TABLET, FILM COATED ORAL at 21:45

## 2025-05-01 RX ADMIN — HEPARIN SODIUM 12 UNITS/KG/HR: 10000 INJECTION, SOLUTION INTRAVENOUS at 16:53

## 2025-05-01 RX ADMIN — ALUMINUM HYDROXIDE, MAGNESIUM HYDROXIDE, AND SIMETHICONE 30 ML: 200; 200; 20 SUSPENSION ORAL at 13:33

## 2025-05-01 RX ADMIN — HEPARIN SODIUM 3700 UNITS: 1000 INJECTION INTRAVENOUS; SUBCUTANEOUS at 16:49

## 2025-05-01 RX ADMIN — NITROGLYCERIN 5 MCG/MIN: 20 INJECTION INTRAVENOUS at 13:31

## 2025-05-01 RX ADMIN — Medication 80 ML: at 12:44

## 2025-05-01 RX ADMIN — IOPAMIDOL 75 ML: 755 INJECTION, SOLUTION INTRAVENOUS at 12:43

## 2025-05-01 RX ADMIN — BUPRENORPHINE HYDROCHLORIDE AND NALOXONE HYDROCHLORIDE DIHYDRATE 1 TABLET: 8; 2 TABLET SUBLINGUAL at 21:45

## 2025-05-01 RX ADMIN — NITROGLYCERIN 0.4 MG: 0.4 TABLET SUBLINGUAL at 12:01

## 2025-05-01 RX ADMIN — SODIUM CHLORIDE, PRESERVATIVE FREE 10 ML: 5 INJECTION INTRAVENOUS at 12:43

## 2025-05-01 ASSESSMENT — HEART SCORE: ECG: NON-SPECIFC REPOLARIZATION DISTURBANCE/LBTB/PM

## 2025-05-01 ASSESSMENT — PAIN SCALES - GENERAL: PAINLEVEL_OUTOF10: 4

## 2025-05-01 ASSESSMENT — PAIN - FUNCTIONAL ASSESSMENT: PAIN_FUNCTIONAL_ASSESSMENT: 0-10

## 2025-05-01 NOTE — PLAN OF CARE
Problem: Chronic Conditions and Co-morbidities  Goal: Patient's chronic conditions and co-morbidity symptoms are monitored and maintained or improved  Outcome: Progressing  Flowsheets (Taken 5/1/2025 1800)  Care Plan - Patient's Chronic Conditions and Co-Morbidity Symptoms are Monitored and Maintained or Improved: Monitor and assess patient's chronic conditions and comorbid symptoms for stability, deterioration, or improvement     Problem: Discharge Planning  Goal: Discharge to home or other facility with appropriate resources  Outcome: Progressing  Flowsheets (Taken 5/1/2025 1800)  Discharge to home or other facility with appropriate resources:   Identify barriers to discharge with patient and caregiver   Arrange for needed discharge resources and transportation as appropriate   Identify discharge learning needs (meds, wound care, etc)     Problem: Pain  Goal: Verbalizes/displays adequate comfort level or baseline comfort level  Outcome: Progressing  Flowsheets (Taken 5/1/2025 1807)  Verbalizes/displays adequate comfort level or baseline comfort level:   Encourage patient to monitor pain and request assistance   Assess pain using appropriate pain scale   Administer analgesics based on type and severity of pain and evaluate response   Implement non-pharmacological measures as appropriate and evaluate response   Notify Licensed Independent Practitioner if interventions unsuccessful or patient reports new pain     Problem: ABCDS Injury Assessment  Goal: Absence of physical injury  Outcome: Progressing     Problem: Safety - Adult  Goal: Free from fall injury  Outcome: Progressing

## 2025-05-01 NOTE — PROGRESS NOTES
Stopped heparin gtt during hand off, unsure of changes make in ER, will  get stat anti XA and titrate from there

## 2025-05-01 NOTE — PLAN OF CARE
Problem: Chronic Conditions and Co-morbidities  Goal: Patient's chronic conditions and co-morbidity symptoms are monitored and maintained or improved  5/1/2025 1944 by Sylvia Tse RN  Outcome: Progressing  5/1/2025 1850 by Iglesia Perez RN  Outcome: Progressing  Flowsheets (Taken 5/1/2025 1800)  Care Plan - Patient's Chronic Conditions and Co-Morbidity Symptoms are Monitored and Maintained or Improved: Monitor and assess patient's chronic conditions and comorbid symptoms for stability, deterioration, or improvement     Problem: Discharge Planning  Goal: Discharge to home or other facility with appropriate resources  5/1/2025 1944 by Sylvia Tse RN  Outcome: Progressing  5/1/2025 1850 by Iglesia Perez RN  Outcome: Progressing  Flowsheets (Taken 5/1/2025 1800)  Discharge to home or other facility with appropriate resources:   Identify barriers to discharge with patient and caregiver   Arrange for needed discharge resources and transportation as appropriate   Identify discharge learning needs (meds, wound care, etc)     Problem: Pain  Goal: Verbalizes/displays adequate comfort level or baseline comfort level  5/1/2025 1944 by Sylvia Tse RN  Outcome: Progressing  5/1/2025 1850 by Iglesia Perez RN  Outcome: Progressing  Flowsheets (Taken 5/1/2025 1807)  Verbalizes/displays adequate comfort level or baseline comfort level:   Encourage patient to monitor pain and request assistance   Assess pain using appropriate pain scale   Administer analgesics based on type and severity of pain and evaluate response   Implement non-pharmacological measures as appropriate and evaluate response   Notify Licensed Independent Practitioner if interventions unsuccessful or patient reports new pain     Problem: ABCDS Injury Assessment  Goal: Absence of physical injury  5/1/2025 1944 by Sylvia Tse RN  Outcome: Progressing  5/1/2025 1850 by Iglesia Perez RN  Outcome: Progressing     Problem: Safety - Adult  Goal: Free

## 2025-05-01 NOTE — ED PROVIDER NOTES
[address]  EMERGENCY DEPARTMENT ENCOUNTER      Pt Name: Hemanth Barrera  MRN: [id number]  Birthdate 1/13/1935  Date of evaluation: 5/1/2025  Provider: DARCI Valentin CNP  9:07 AM    CHIEF COMPLAINT       Chief Complaint   Patient presents with    Shortness of Breath     From [facility], started today, staff stated they gave x2 ntg 50 minutes apart with no relief, has a dx dvt/PE, pt reports chest pressure and feels like \"everything is closing in\", full code         HISTORY OF PRESENT ILLNESS    Hemanth Barrera is a 89 y.o. male who presents to the emergency department for evaluation of shortness of breath, feels like his throat is closing chest is closing in.  He states this has been ongoing and intermittent since January.  History of ACS, NSTEMI, has a pacemaker.  Was recently admitted for a GI bleed, had EGD as no source of bleeding was found.  Colonoscopy, no source of bleeding was found.  He was then admitted for a DVT of the extremity and multiple subsegmental PEs, placed on blood thinners.  States that today he felt like his throat was closing.  He denies any swallowing difficulty.  He denies any breathing difficulty just feels as though the source is his throat.  He states this is intermittent and ongoing for quite some time.  No abdominal pain no nausea no vomit    HPI    Nursing Notes were reviewed.    REVIEW OF SYSTEMS       Review of Systems   All other systems reviewed and are negative.      Except as noted above the remainder of the review of systems was reviewed and negative.       PAST MEDICAL HISTORY     Past Medical History:   Diagnosis Date    Acute inferolateral myocardial infarction (HCC) 11/18/2021    Arthritis     Atrial fibrillation (HCC)     CAD (coronary artery disease)     CHF (congestive heart failure) (HCC)     Glaucoma     Hx of blood clots     with hernia surgery    Hyperlipidemia     Hypertension     MI (myocardial infarction) (HCC)     NSTEMI (non-ST elevated

## 2025-05-01 NOTE — ED PROVIDER NOTES
[facility] Emergency Department  [address]  [facility] [address]  Phone: [phone]  Fax: [phone]      Attending Physician Attestation          CHIEF COMPLAINT       Chief Complaint   Patient presents with    Shortness of Breath     From [facility], started today, staff stated they gave x2 ntg 50 minutes apart with no relief, has a dx dvt/PE, pt reports chest pressure and feels like \"everything is closing in\", full code       DIAGNOSTIC RESULTS     LABS:  Labs Reviewed   CBC WITH AUTO DIFFERENTIAL - Abnormal; Notable for the following components:       Result Value    RBC 3.37 (*)     Hemoglobin 10.0 (*)     Hematocrit 30.4 (*)     RDW 19.8 (*)     Neutrophils % 69 (*)     Lymphocytes % 19 (*)     Lymphocytes Absolute 0.80 (*)     All other components within normal limits   COMPREHENSIVE METABOLIC PANEL - Abnormal; Notable for the following components:    Glucose 127 (*)     All other components within normal limits   TROPONIN - Abnormal; Notable for the following components:    Troponin, High Sensitivity 30 (*)     All other components within normal limits   BRAIN NATRIURETIC PEPTIDE - Abnormal; Notable for the following components:    NT Pro-BNP 1,644 (*)     All other components within normal limits   TROPONIN - Abnormal; Notable for the following components:    Troponin, High Sensitivity 29 (*)     All other components within normal limits   LIPASE       All other labs were within normal range or not returned as of this dictation.    RADIOLOGY:  CT SOFT TISSUE NECK W CONTRAST   Final Result   1. No acute abnormality of the soft tissue structures of the neck.   2. Linear filling defect within the proximal right subclavian artery, which   is unchanged from the prior CT chest. No significant luminal narrowing.  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         XR CHEST PORTABLE   Final Result   1. No acute cardiopulmonary process.   2. Bandlike

## 2025-05-01 NOTE — H&P
[facility]  Office: [phone]  Antwon Bernardo DO, Fernando Vyas DO, Drake Haynes DO, Geovani Honeycutt DO, Yani Pritchard MD, Isabella Meade MD, Richard Hidalgo MD, Miriam Michael MD,  Keo Pope MD, Eli Barbour MD, Giovanna Louie MD,  Yee Kerr DO, Dion Gilliland MD, Jonny Basurto MD, Alex Bernardo DO, Nafisa Lyles MD,  Jarred Johnson DO, Karen Shipley MD, Jessika Ravi MD, Carlos De León MD,  Bola Callaway MD, Roly Brunson MD, Rojas Barfield MD, Francisco J Monzon MD, Jerry Maher MD, Jelani Jameson MD, Rudy Webb DO, Susan Asher MD, Teodoro Casas MD, Yee Meehan MD, Mohsin Reza, MD, Lane Burnette MD, Shirley Waterhouse, CNP,  Isidra Bianchi, CNP, Rudy Castaneda, CNP,  Magda Cannon, DNP, Jaleesa Meeks, CNP, Rakel Purdy, CNP, Tatiana Walton, CNP, Stephanie Cyr, CNP, Luz Maria Castellanos, PA-C, Joie Song, CNP, Evelio Bear, CNP,  Venecia Shanks, CNP, Erin Alaniz, CNP, Rocky Robins, PA-C, Rachel Ling, CNP,  Maura Raman, CNS, Vaishali Lucio, CNP, Sarah Ramirez, CNP,   Graciela Blue, CNP         [facility]   IN-PATIENT SERVICE   [facility]    HISTORY AND PHYSICAL EXAMINATION            Date:   5/1/2025  Patient name:  Hemanth Barrera  Date of admission:  5/1/2025 10:07 AM  MRN:   [id number]  Account:  [id number]  YOB: 1935  PCP:    Neftaly Contreras MD  Room:   1TRAUMA/1TRAUMA  Code Status:    Prior      History Obtained From:     patient    History of Present Illness:     Hemanth Barrera is a 89 y.o. male with a past medical history of CAD s/p stent placement, DVT/PE (on Eliquis), atrial fibrillation, HTN and HLD who presented to the emergency department on 5/1/2025 complaining of chest and neck pain. The patient states that his symptoms began suddenly this morning and were relieved by nitroglycerin. In the ED, the patient was afebrile and nontoxic but uncomfortable appearing. The patient continued to complain of

## 2025-05-01 NOTE — ED NOTES
ED to inpatient nurses report      Chief Complaint:  Chief Complaint   Patient presents with    Shortness of Breath     From [facility], started today, staff stated they gave x2 ntg 50 minutes apart with no relief, has a dx dvt/PE, pt reports chest pressure and feels like \"everything is closing in\", full code     Present to ED from: [facility]    MOA:     LOC: alert and orientated to name, place, date  Mobility: Requires assistance * 1, pt states he   Oxygen Baseline: 100% room air     Current needs required: room air   Pending ED orders: Admit to inpatient, Consult to Cardiology, Inpatient consult to palliative care   Present condition: pt is stable     Why did the patient come to the ED? Shortness of breath and chest pain   What is the plan? Consults to palliative care and cardiology Heparin   Any procedures or intervention occur? CT Soft Tissue Neck W Contrast, EKG, XR Chest Portable   Any safety concerns??Fall Risk   CODE STATUS Full Code  Diet Diet NPO    Mental Status:  Level of Consciousness: Alert (0)    Psych Assessment:      Vital signs   Vitals:    05/01/25 1435 05/01/25 1450 05/01/25 1515 05/01/25 1523   BP: (!) 141/90 (!) 139/94 (!) 133/93 (!) 133/93   Pulse: 70 70 70 70   Resp:   18    Temp:   98.5 °F (36.9 °C)    TempSrc:   Oral    SpO2: 100%      Weight:       Height:            Vitals:  Patient Vitals for the past 24 hrs:   BP Temp Temp src Pulse Resp SpO2 Height Weight   05/01/25 1523 (!) 133/93 -- -- 70 -- -- -- --   05/01/25 1515 (!) 133/93 98.5 °F (36.9 °C) Oral 70 18 -- -- --   05/01/25 1450 (!) 139/94 -- -- 70 -- -- -- --   05/01/25 1435 (!) 141/90 -- -- 70 -- 100 % -- --   05/01/25 1420 126/83 -- -- 70 -- 99 % -- --   05/01/25 1405 122/85 -- -- 70 -- 99 % -- --   05/01/25 1401 (!) 128/90 -- -- 70 -- 99 % -- --   05/01/25 1335 (!) 141/94 98 °F (36.7 °C) -- 70 -- 100 % -- --   05/01/25 1329 (!) 129/114 -- -- 70 -- -- -- --   05/01/25 1328 (!) 129/114 -- -- 70 -- 100 % -- --  MD at [facility] CARDIAC CATH LAB    CARDIAC PROCEDURE N/A 9/17/2024    Percutaneous coronary intervention performed by Cathie Hastings MD at [facility] CARDIAC CATH LAB    CARDIAC PROCEDURE Right 1/9/2025    Left heart cath / coronary angiography performed by Guy Paz MD at [facility] CARDIAC CATH LAB    COLONOSCOPY      COLONOSCOPY N/A 8/3/2023    COLONOSCOPY WITH BIOPSY performed by Isauro Razo MD at [facility] ENDO    COLONOSCOPY N/A 3/10/2025    COLONOSCOPY DIAGNOSTIC performed by Shirin Torre MD at [facility] OR    CT BIOPSY BONE MARROW  5/31/2022    CT BONE MARROW BIOPSY 5/31/2022 [facility] CT SCAN    EYE SURGERY      smiley cataracts    HERNIA REPAIR      inguinal smiley    INVASIVE VASCULAR N/A 1/9/2025    Ultrasound guided vascular access performed by Guy Paz MD at [facility] CARDIAC CATH LAB    JOINT REPLACEMENT      rt hip x 2, lt knee    PACEMAKER PLACEMENT      SIGMOIDOSCOPY N/A 3/7/2025    SIGMOIDOSCOPY DIAGNOSTIC FLEXIBLE performed by Isauro Razo MD at [facility] OR    UPPER GASTROINTESTINAL ENDOSCOPY N/A 8/2/2023    EGD BIOPSY performed by Isauro Razo MD at [facility] ENDO    UPPER GASTROINTESTINAL ENDOSCOPY N/A 3/6/2025    ESOPHAGOGASTRODUODENOSCOPY performed by Alejandra Kraus MD at [facility] OR       PAST MEDICAL HISTORY       Past Medical History:   Diagnosis Date    Acute inferolateral myocardial infarction (HCC) 11/18/2021    Arthritis     Atrial fibrillation (HCC)     CAD (coronary artery disease)     CHF (congestive heart failure) ([facility])     Glaucoma     Hx of blood clots     with hernia surgery    Hyperlipidemia     Hypertension     MI (myocardial infarction) ([facility])     NSTEMI (non-ST elevated myocardial infarction) ([facility]) 11/17/2021           Consults:  IP CONSULT TO CARDIOLOGY  IP CONSULT TO PALLIATIVE CARE     Treatment Team:   Treatment Team:   Jonny Basurto MD Rashleigh, Megan M, APRN - CNP  Judie Duval RN Singh, Hemindermeet, MD    Treatment:  ED Course as of 05/01/25 1656   u May 01,

## 2025-05-01 NOTE — PROGRESS NOTES
Spiritual Health History and Assessment/Progress Note  [facility]    (P) Spiritual/Emotional Needs, Initial Encounter,  , (P) Anticipatory Grief, Life Adjustments, Adjustment to illness,      Name: Hemanth Barrera MRN: [id number]    Age: 89 y.o.     Sex: male   Language: English   Taoist: [employer]ian   Unstable angina (HCC)     Date: 5/1/2025            Total Time Calculated: (P) 15 min              Spiritual Assessment began in [facility] EMERGENCY DEPARTMENT        Referral/Consult From: (P) Rounding   Encounter Overview/Reason: (P) Spiritual/Emotional Needs, Initial Encounter  Service Provided For: (P) Patient        visited Pt. In [place]. Pt. Was seen before. Pt. Was uneasy and anxious. Pt. Expressed concern about eating. Pt. Was welcoming and open to conversation about deepali, care and peace of mind.  provided prayer for comfort, strength and hope.    Deepali, Belief, Meaning:   Patient has beliefs or practices that help with coping during difficult times  Family/Friends No family/friends present      Importance and Influence:  Patient has spiritual/personal beliefs that influence decisions regarding their health  Family/Friends No family/friends present    Community:  Patient feels well-supported. Support system includes: Children  Family/Friends No family/friends present    Assessment and Plan of Care:     Patient Interventions include: Facilitated expression of thoughts and feelings and Explored spiritual coping/struggle/distress  Family/Friends Interventions include: No family/friends present    Patient Plan of Care: Spiritual Care available upon further referral  Family/Friends Plan of Care: No family/friends present    Electronically signed by Chaplain KELBY on 5/1/2025 at 4:48 PM    05/01/25 [id number]   Encounter Summary   Encounter Overview/Reason Spiritual/Emotional Needs;Initial Encounter   Service Provided For Patient   Referral/Consult From Rounding

## 2025-05-02 PROBLEM — Z51.5 ENCOUNTER FOR PALLIATIVE CARE: Status: ACTIVE | Noted: 2025-05-02

## 2025-05-02 PROBLEM — I20.89 ANGINA AT REST: Status: ACTIVE | Noted: 2025-05-02

## 2025-05-02 PROBLEM — Z71.89 GOALS OF CARE, COUNSELING/DISCUSSION: Status: ACTIVE | Noted: 2025-05-02

## 2025-05-02 LAB
ANION GAP SERPL CALCULATED.3IONS-SCNC: 8 MMOL/L (ref 9–17)
ANTI-XA UNFRAC HEPARIN: >1.1 IU/L (ref 0.3–0.7)
BASOPHILS # BLD: 0 K/UL (ref 0–0.2)
BASOPHILS NFR BLD: 0 % (ref 0–2)
BUN SERPL-MCNC: 17 MG/DL (ref 8–23)
CALCIUM SERPL-MCNC: 8.9 MG/DL (ref 8.6–10.4)
CHLORIDE SERPL-SCNC: 105 MMOL/L (ref 98–107)
CO2 SERPL-SCNC: 28 MMOL/L (ref 20–31)
CREAT SERPL-MCNC: 0.7 MG/DL (ref 0.7–1.2)
EKG ATRIAL RATE: 288 BPM
EKG P AXIS: 221 DEGREES
EKG Q-T INTERVAL: 446 MS
EKG QRS DURATION: 148 MS
EKG QTC CALCULATION (BAZETT): 481 MS
EKG R AXIS: -80 DEGREES
EKG T AXIS: 93 DEGREES
EKG VENTRICULAR RATE: 70 BPM
EOSINOPHIL # BLD: 0.06 K/UL (ref 0–0.4)
EOSINOPHILS RELATIVE PERCENT: 2 % (ref 1–4)
ERYTHROCYTE [DISTWIDTH] IN BLOOD BY AUTOMATED COUNT: 19.8 % (ref 12.5–15.4)
GFR, ESTIMATED: 88 ML/MIN/1.73M2
GLUCOSE SERPL-MCNC: 86 MG/DL (ref 70–99)
HCT VFR BLD AUTO: 30.5 % (ref 41–53)
HGB BLD-MCNC: 10.1 G/DL (ref 13.5–17.5)
LYMPHOCYTES NFR BLD: 0.96 K/UL (ref 1–4.8)
LYMPHOCYTES RELATIVE PERCENT: 31 % (ref 24–44)
MCH RBC QN AUTO: 29.9 PG (ref 26–34)
MCHC RBC AUTO-ENTMCNC: 33 G/DL (ref 31–37)
MCV RBC AUTO: 90.6 FL (ref 80–100)
MONOCYTES NFR BLD: 0.09 K/UL (ref 0.1–0.8)
MONOCYTES NFR BLD: 3 % (ref 1–7)
MORPHOLOGY: NORMAL
NEUTROPHILS NFR BLD: 64 % (ref 36–66)
NEUTS SEG NFR BLD: 1.99 K/UL (ref 1.8–7.7)
PARTIAL THROMBOPLASTIN TIME: 32.2 SEC (ref 24–36)
PLATELET # BLD AUTO: 199 K/UL (ref 140–450)
PMV BLD AUTO: 7.4 FL (ref 6–12)
POTASSIUM SERPL-SCNC: 4 MMOL/L (ref 3.7–5.3)
RBC # BLD AUTO: 3.36 M/UL (ref 4.5–5.9)
SODIUM SERPL-SCNC: 141 MMOL/L (ref 135–144)
WBC OTHER # BLD: 3.1 K/UL (ref 3.5–11)

## 2025-05-02 PROCEDURE — 6370000000 HC RX 637 (ALT 250 FOR IP): Performed by: STUDENT IN AN ORGANIZED HEALTH CARE EDUCATION/TRAINING PROGRAM

## 2025-05-02 PROCEDURE — 99232 SBSQ HOSP IP/OBS MODERATE 35: CPT | Performed by: STUDENT IN AN ORGANIZED HEALTH CARE EDUCATION/TRAINING PROGRAM

## 2025-05-02 PROCEDURE — 99222 1ST HOSP IP/OBS MODERATE 55: CPT

## 2025-05-02 PROCEDURE — 2060000000 HC ICU INTERMEDIATE R&B

## 2025-05-02 PROCEDURE — 93010 ELECTROCARDIOGRAM REPORT: CPT | Performed by: INTERNAL MEDICINE

## 2025-05-02 PROCEDURE — 85730 THROMBOPLASTIN TIME PARTIAL: CPT

## 2025-05-02 PROCEDURE — 97166 OT EVAL MOD COMPLEX 45 MIN: CPT

## 2025-05-02 PROCEDURE — 6360000002 HC RX W HCPCS: Performed by: STUDENT IN AN ORGANIZED HEALTH CARE EDUCATION/TRAINING PROGRAM

## 2025-05-02 PROCEDURE — 85520 HEPARIN ASSAY: CPT

## 2025-05-02 PROCEDURE — 97535 SELF CARE MNGMENT TRAINING: CPT

## 2025-05-02 PROCEDURE — 6370000000 HC RX 637 (ALT 250 FOR IP): Performed by: NURSE PRACTITIONER

## 2025-05-02 PROCEDURE — 97162 PT EVAL MOD COMPLEX 30 MIN: CPT

## 2025-05-02 PROCEDURE — 99223 1ST HOSP IP/OBS HIGH 75: CPT | Performed by: NURSE PRACTITIONER

## 2025-05-02 PROCEDURE — 6360000002 HC RX W HCPCS: Performed by: NURSE PRACTITIONER

## 2025-05-02 PROCEDURE — 36415 COLL VENOUS BLD VENIPUNCTURE: CPT

## 2025-05-02 PROCEDURE — 85025 COMPLETE CBC W/AUTO DIFF WBC: CPT

## 2025-05-02 PROCEDURE — 97116 GAIT TRAINING THERAPY: CPT

## 2025-05-02 PROCEDURE — 2500000003 HC RX 250 WO HCPCS: Performed by: STUDENT IN AN ORGANIZED HEALTH CARE EDUCATION/TRAINING PROGRAM

## 2025-05-02 PROCEDURE — 80048 BASIC METABOLIC PNL TOTAL CA: CPT

## 2025-05-02 RX ORDER — METOPROLOL SUCCINATE 50 MG/1
50 TABLET, EXTENDED RELEASE ORAL DAILY
Status: DISCONTINUED | OUTPATIENT
Start: 2025-05-03 | End: 2025-05-05 | Stop reason: HOSPADM

## 2025-05-02 RX ORDER — GABAPENTIN 100 MG/1
100 CAPSULE ORAL 2 TIMES DAILY
Status: DISCONTINUED | OUTPATIENT
Start: 2025-05-02 | End: 2025-05-05 | Stop reason: HOSPADM

## 2025-05-02 RX ORDER — HEPARIN SODIUM 10000 [USP'U]/100ML
5-30 INJECTION, SOLUTION INTRAVENOUS CONTINUOUS
Status: DISCONTINUED | OUTPATIENT
Start: 2025-05-02 | End: 2025-05-02

## 2025-05-02 RX ORDER — HEPARIN SODIUM 1000 [USP'U]/ML
30 INJECTION, SOLUTION INTRAVENOUS; SUBCUTANEOUS PRN
Status: DISCONTINUED | OUTPATIENT
Start: 2025-05-02 | End: 2025-05-02

## 2025-05-02 RX ORDER — GABAPENTIN 100 MG/1
100 CAPSULE ORAL 2 TIMES DAILY
COMMUNITY
End: 2025-05-06

## 2025-05-02 RX ORDER — OXYCODONE AND ACETAMINOPHEN 5; 325 MG/1; MG/1
1 TABLET ORAL EVERY 8 HOURS PRN
Refills: 0 | Status: DISCONTINUED | OUTPATIENT
Start: 2025-05-02 | End: 2025-05-05 | Stop reason: HOSPADM

## 2025-05-02 RX ORDER — HEPARIN SODIUM 1000 [USP'U]/ML
60 INJECTION, SOLUTION INTRAVENOUS; SUBCUTANEOUS PRN
Status: DISCONTINUED | OUTPATIENT
Start: 2025-05-02 | End: 2025-05-02

## 2025-05-02 RX ORDER — MIDODRINE HYDROCHLORIDE 5 MG/1
5 TABLET ORAL ONCE
Status: COMPLETED | OUTPATIENT
Start: 2025-05-02 | End: 2025-05-02

## 2025-05-02 RX ORDER — OXYCODONE AND ACETAMINOPHEN 5; 325 MG/1; MG/1
1 TABLET ORAL EVERY 8 HOURS PRN
Status: ON HOLD | COMMUNITY

## 2025-05-02 RX ADMIN — GABAPENTIN 100 MG: 100 CAPSULE ORAL at 13:32

## 2025-05-02 RX ADMIN — MIDODRINE HYDROCHLORIDE 5 MG: 5 TABLET ORAL at 21:59

## 2025-05-02 RX ADMIN — ONDANSETRON 4 MG: 4 TABLET, ORALLY DISINTEGRATING ORAL at 17:54

## 2025-05-02 RX ADMIN — ATORVASTATIN CALCIUM 40 MG: 40 TABLET, FILM COATED ORAL at 20:26

## 2025-05-02 RX ADMIN — DILTIAZEM HYDROCHLORIDE 120 MG: 120 CAPSULE, COATED, EXTENDED RELEASE ORAL at 13:32

## 2025-05-02 RX ADMIN — BUPRENORPHINE HYDROCHLORIDE AND NALOXONE HYDROCHLORIDE DIHYDRATE 1 TABLET: 8; 2 TABLET SUBLINGUAL at 10:05

## 2025-05-02 RX ADMIN — ISOSORBIDE MONONITRATE 30 MG: 30 TABLET, EXTENDED RELEASE ORAL at 13:32

## 2025-05-02 RX ADMIN — APIXABAN 5 MG: 5 TABLET, FILM COATED ORAL at 13:32

## 2025-05-02 RX ADMIN — GABAPENTIN 100 MG: 100 CAPSULE ORAL at 20:26

## 2025-05-02 RX ADMIN — ASPIRIN 81 MG: 81 TABLET, CHEWABLE ORAL at 13:32

## 2025-05-02 RX ADMIN — BUMETANIDE 2 MG: 1 TABLET ORAL at 13:32

## 2025-05-02 RX ADMIN — HEPARIN SODIUM 3700 UNITS: 1000 INJECTION INTRAVENOUS; SUBCUTANEOUS at 04:34

## 2025-05-02 RX ADMIN — PANTOPRAZOLE SODIUM 40 MG: 40 TABLET, DELAYED RELEASE ORAL at 06:42

## 2025-05-02 RX ADMIN — SODIUM CHLORIDE, PRESERVATIVE FREE 10 ML: 5 INJECTION INTRAVENOUS at 20:26

## 2025-05-02 RX ADMIN — OXYCODONE HYDROCHLORIDE AND ACETAMINOPHEN 1 TABLET: 5; 325 TABLET ORAL at 20:26

## 2025-05-02 RX ADMIN — APIXABAN 5 MG: 5 TABLET, FILM COATED ORAL at 20:26

## 2025-05-02 ASSESSMENT — PAIN DESCRIPTION - DESCRIPTORS
DESCRIPTORS: ACHING
DESCRIPTORS: ACHING

## 2025-05-02 ASSESSMENT — PAIN DESCRIPTION - PAIN TYPE
TYPE: CHRONIC PAIN
TYPE: CHRONIC PAIN

## 2025-05-02 ASSESSMENT — PAIN SCALES - GENERAL
PAINLEVEL_OUTOF10: 3
PAINLEVEL_OUTOF10: 6
PAINLEVEL_OUTOF10: 7

## 2025-05-02 ASSESSMENT — PAIN DESCRIPTION - LOCATION
LOCATION: BACK;HIP
LOCATION: HIP;BACK

## 2025-05-02 ASSESSMENT — PAIN DESCRIPTION - ORIENTATION
ORIENTATION: RIGHT
ORIENTATION: RIGHT

## 2025-05-02 NOTE — CARE COORDINATION
Case Management Assessment  Initial Evaluation    Date/Time of Evaluation: 5/2/2025 11:00 AM  Assessment Completed by: Alonso Llanes    If patient is discharged prior to next notation, then this note serves as note for discharge by case management.    Patient Name: Hemanth Barrera                   YOB: 1935  Diagnosis: Unstable angina (HCC) [I20.0]  Angina at rest [I20.89]                   Date / Time: 5/1/2025 10:07 AM    Patient Admission Status: Inpatient   Readmission Risk (Low < 19, Mod (19-27), High > 27): Readmission Risk Score: 29.3    Current PCP: Neftaly Contreras MD  PCP verified by CM? (P) Yes    Chart Reviewed: Yes      History Provided by: (P) Patient  Patient Orientation: (P) Alert and Oriented    Patient Cognition: (P) Alert    Hospitalization in the last 30 days (Readmission):  No    If yes, Readmission Assessment in CM Navigator will be completed.    Advance Directives:      Code Status: Full Code   Patient's Primary Decision Maker is: (P) Legal Next of Kin    Primary Decision Maker: Dusty Barrera - Child - [phone]    Discharge Planning:    Patient lives with: (P) Alone Type of Home: (P) Skilled Nursing Facility  Primary Care Giver: (P) Self  Patient Support Systems include: (P) Family Members, Children   Current Financial resources: (P) Medicare  Current community resources: (P) None  Current services prior to admission: (P) Durable Medical Equipment            Current DME: (P) Walker, Wheelchair, Shower Chair, Cane            Type of Home Care services:  (P) None    ADLS  Prior functional level: (P) Assistance with the following:, Shopping, Mobility  Current functional level: (P) Assistance with the following:, Mobility, Shopping    PT AM-PAC: 17 /24  OT AM-PAC: 15 /24    Family can provide assistance at DC: (P) No  Would you like Case Management to discuss the discharge plan with any other family members/significant others, and if so, who? (P) No  Plans to Return to  Patient   Patient Representative Name:       The Patient and/or Patient Representative Agree with the Discharge Plan? (P) Yes    Alonso Llanes  Case Management Department

## 2025-05-02 NOTE — PROGRESS NOTES
Writer called son back to provide an update on father's condition. All questions answered to his satisfaction.    Detail Level: Detailed

## 2025-05-02 NOTE — ACP (ADVANCE CARE PLANNING)
Advance Care Planning     Advance Care Planning Activator (Inpatient)  Conversation Note      Date of ACP Conversation: 5/2/2025     Conversation Conducted with: Patient with Decision Making Capacity    ACP Activator: Alonso Llanes        Health Care Decision Maker:     Current Designated Health Care Decision Maker:     Primary Decision Maker: Dusty Barrera - Demarcus - [phone]  Click here to complete Healthcare Decision Makers including section of the Healthcare Decision Maker Relationship (ie \"Primary\")  Today we documented Decision Maker(s) consistent with Legal Next of Kin hierarchy.    Care Preferences    Ventilation:  \"If you were in your present state of health and suddenly became very ill and were unable to breathe on your own, what would your preference be about the use of a ventilator (breathing machine) if it were available to you?\"      Would the patient desire the use of ventilator (breathing machine)?: yes    \"If your health worsens and it becomes clear that your chance of recovery is unlikely, what would your preference be about the use of a ventilator (breathing machine) if it were available to you?\"     Would the patient desire the use of ventilator (breathing machine)?: Yes      Resuscitation  \"CPR works best to restart the heart when there is a sudden event, like a heart attack, in someone who is otherwise healthy. Unfortunately, CPR does not typically restart the heart for people who have serious health conditions or who are very sick.\"    \"In the event your heart stopped as a result of an underlying serious health condition, would you want attempts to be made to restart your heart (answer \"yes\" for attempt to resuscitate) or would you prefer a natural death (answer \"no\" for do not attempt to resuscitate)?\" yes       [] Yes   [] No   Educated Patient / Decision Maker regarding differences between Advance Directives and portable DNR orders.    Length of ACP Conversation in minutes:       Conversation Outcomes:  ACP discussion completed    Follow-up plan:    [] Schedule follow-up conversation to continue planning  [] Referred individual to Provider for additional questions/concerns   [] Advised patient/agent/surrogate to review completed ACP document and update if needed with changes in condition, patient preferences or care setting    [] This note routed to one or more involved healthcare providers

## 2025-05-02 NOTE — CARE COORDINATION
Remote Patient Monitoring Enrollment Note    Date/Time:  5/2/2025 11:09 AM    Offered patient enrollment in the [facility] Remote Patient Monitoring (RPM) program for in home monitoring for CHF.  Patient declined RPM services.         All questions about RPM program answered at this time.

## 2025-05-02 NOTE — PROGRESS NOTES
Pt complains of being hungry , states every time I am here they don't feed me and I can't afford to loose any more weight.

## 2025-05-02 NOTE — CONSULTS
[facility]    Consult                        Today's Date: 5/2/2025  Patient Name: Hemanth Barrera  Date of admission: 5/1/2025 10:07 AM  Patient's age: 89 y.o., 1/13/1935  Admission Dx: Unstable angina (HCC) [I20.0]  Angina at rest [I20.89]    Reason for Consult:  Cardiac evaluation    Requesting Physician: Jonny Basurto MD    CHIEF COMPLAINT:  Chest pain    History Obtained From:  patient, electronic medical record    HISTORY OF PRESENT ILLNESS:      Per previous documentation: \"Hemanth Barrera is a 89 y.o. male with a past medical history of CAD s/p stent placement, DVT/PE (on Eliquis), atrial fibrillation, HTN and HLD who presented to the emergency department on 5/1/2025 complaining of chest and neck pain. The patient states that his symptoms began suddenly this morning and were relieved by nitroglycerin. In the ED, the patient was afebrile and nontoxic but uncomfortable appearing. The patient continued to complain of crushing neck and chest pain and was started on a nitroglycerin infusion. EKG was negative for evidence of ischemia. Troponin was 30 but trended down to 29 on repeat draw. The patient's chest pain significantly improved with nitroglycerin infusion. He is admitted to internal medicine for further management.\"    Pt seen and examined in the room.  Patient resting in chair. Pt denies any current CP or sob. Patient only currently reports that his throat feels like its tight.  Labs, vitals and tele reviewed- paced    Cardiology consulted for chest pains.    Patient last seen in office 10/31/24:  Medical History     1. A Fib Xarelto, HTN, CHF, CAD, HLD  2.  SSS PPM  3. CATH 4/30/2021 Franco Jones LAD 40% LCx 40% RCA mild irregularities.  4. ECHO LV normal size and wall thickness.  LVEF 60-65%. G1DD. LA and RA mildly dilated. RV mildly enlarged with normal RV systolic function.  Prominent moderator band. Mild MR and TR.  Est RVSP 35 mmHg.   5.  [facility]  DAILY 2/12/25   Raisa Tomlinson MD   ferrous sulfate (IRON 325) 325 (65 Fe) MG tablet Take 1 tablet by mouth 2 times daily (with meals) 2/3/25   Raisa Tomlinson MD   midodrine (PROAMATINE) 2.5 MG tablet Take 1 tablet by mouth 3 times daily (with meals) Hold if SBP more than 100 2/3/25   Raisa Tomlinson MD   nitroGLYCERIN (NITROSTAT) 0.4 MG SL tablet up to max of 3 total doses. If no relief after 1 dose, call 911. 11/8/24   Cathie Hastings MD   isosorbide mononitrate (IMDUR) 30 MG extended release tablet Take 1 tablet by mouth daily 11/1/24   Nichole Noguera APRN - NP   metoprolol succinate (TOPROL XL) 100 MG extended release tablet Take 1 tablet by mouth daily 9/20/24   Dion Gilliland MD   bumetanide (BUMEX) 2 MG tablet Take 1 tablet by mouth daily 9/20/24   Dion Gilliland MD   aspirin 81 MG chewable tablet Take 1 tablet by mouth daily 9/18/24   Dion Gilliland MD   pantoprazole (PROTONIX) 40 MG tablet Take 1 tablet by mouth every morning (before breakfast) 9/19/24   Dion Gilliland MD   clopidogrel (PLAVIX) 75 MG tablet Take 1 tablet by mouth daily 9/19/24   Dion Gilliland MD   vitamin D (ERGOCALCIFEROL) 1.25 MG (82727 UT) CAPS capsule TAKE 1 CAPSULE BY MOUTH EVERY WEEK 7/22/24   Wolf Sharma MD   atorvastatin (LIPITOR) 40 MG tablet Take 1 tablet by mouth nightly 4/19/24   Sana Haskins MD   dilTIAZem (CARDIZEM CD) 120 MG extended release capsule TAKE 1 CAPSULE BY MOUTH DAILY 3/30/24   Josh Ames DO   acetaminophen (TYLENOL) 325 MG tablet Take 2 tablets by mouth every 6 hours as needed for Pain    Provider, MD Marciano   buprenorphine-naloxone (SUBOXONE) 8-2 MG FILM SL film Place 1 Film under the tongue 2 times daily. Indications: pain    Provider, MD Marciano   latanoprost (XALATAN) 0.005 % ophthalmic solution Place 1 drop into both eyes nightly    Provider, Historical, MD       Allergies:  Aspirin, Cyclobenzaprine, Ibuprofen, Azithromycin, and Hydrocodone-acetaminophen    Social

## 2025-05-02 NOTE — PROGRESS NOTES
Physical Therapy  Facility/Department: [address]   Physical Therapy Initial Evaluation    Patient Name: Hemanth Barrera        MRN: [id number]    : 1935    Date of Service: 2025    Chief Complaint   Patient presents with    Shortness of Breath     From [facility], started today, staff stated they gave x2 ntg 50 minutes apart with no relief, has a dx dvt/PE, pt reports chest pressure and feels like \"everything is closing in\", full code     Past Medical History:  has a past medical history of Acute inferolateral myocardial infarction (HCC), Arthritis, Atrial fibrillation (HCC), CAD (coronary artery disease), CHF (congestive heart failure) (HCC), Glaucoma, Hx of blood clots, Hyperlipidemia, Hypertension, MI (myocardial infarction) (HCC), and NSTEMI (non-ST elevated myocardial infarction) ([facility]).  Past Surgical History:  has a past surgical history that includes pacemaker placement; Abdomen surgery; Cardiac catheterization; Appendectomy; Colonoscopy; eye surgery; hernia repair; bone marrow biopsy; joint replacement; CT BIOPSY BONE MARROW (2022); Upper gastrointestinal endoscopy (N/A, 2023); Colonoscopy (N/A, 8/3/2023); Cardiac procedure (N/A, 2024); Cardiac procedure (N/A, 2024); Cardiac procedure (Right, 2025); invasive vascular (N/A, 2025); Upper gastrointestinal endoscopy (N/A, 3/6/2025); sigmoidoscopy (N/A, 3/7/2025); and Colonoscopy (N/A, 3/10/2025).    Discharge Recommendations  Discharge Recommendations: Therapy recommended at discharge  PT Equipment Recommendations  Equipment Needed: No    Assessment  Body Structures, Functions, Activity Limitations Requiring Skilled Therapeutic Intervention: Decreased functional mobility , Decreased safe awareness, Decreased ADL status, Decreased endurance, Decreased posture, Decreased balance, Decreased ROM, Decreased strength    Assessment: Pt presents from SNF with chest pain, SOB. At baseline (prior to fall in 2025) pt  Precautions, Fall Risk, Bed Alarm  Required Braces or Orthoses?: No  Position Activity Restriction  Other Position/Activity Restrictions: Hx of LUE DVT 3/2025 with ace wrap, tachycardia       Subjective  General  Patient assessed for rehabilitation services?: Yes  Additional Pertinent Hx: a-fib, CHF, MI, glaucoma, DVT's, MI, PPM, hernia repair, R AGUSTO, L TKA, rib fx's after fall 1/2025, DVT LUE 3/2025  Family/Caregiver Present: No  Follows Commands: Within Functional Limits    General  General Comments: Admit 5/1 from SNF with SOB, chest pain. Pt c/o throat \"tightness\" and \"constriction\". CXR, CT neck soft tissue (-). CT abd/pelvis (+) small L side pleural effusion    Subjective  Subjective: Pt reports pain 5/10 abdomen, HA and throat. Pt left up in recliner at end of therapy session, legs elevated and pt positioned for comfort       Home Setup/Prior Level of Function  Social/Functional History  Type of Home: Facility (Has been in hospitals/SNF sin Jan of this year)  Home Equipment: Walker - Rolling, Cane, Rollator  Has the patient had two or more falls in the past year or any fall with injury in the past year?: Yes (Fall in Jan of this year with L rib fx)  Prior Level of Assist for ADLs: Needs assistance (Per pt report assist for most LB ADLs, able to complete UB ADLs, assist for showers and toileting; prior to January of this year was IND-MOD I all ADLS)  Bath: Minimal assistance  Dressing: Minimal assistance  Grooming: Modified independent   Feeding: Independent  Toileting: Needs assistance (Pt reporting always having assist with toileting at SNF; prior to January of this year was IND-MOD I all ADLS)  Homemaking Responsibilities: No  Prior Level of Assist for Ambulation: Independent household ambulator, with or without device (With RW and chair follow at SNF per pt report)  Prior Level of Assist for Transfers: Needs assistance (Reports x1 assist for bed, toilet, shower, chair transfers)  Active :

## 2025-05-02 NOTE — CARE COORDINATION
[facility] precert started for patient to return to [place] at [place] and listed as pending.

## 2025-05-02 NOTE — PLAN OF CARE
Problem: Chronic Conditions and Co-morbidities  Goal: Patient's chronic conditions and co-morbidity symptoms are monitored and maintained or improved  Outcome: Progressing     Problem: Discharge Planning  Goal: Discharge to home or other facility with appropriate resources  Outcome: Progressing     Problem: Pain  Goal: Verbalizes/displays adequate comfort level or baseline comfort level  Outcome: Progressing     Problem: ABCDS Injury Assessment  Goal: Absence of physical injury  Outcome: Progressing     Problem: Safety - Adult  Goal: Free from fall injury  Outcome: Progressing     Problem: Skin/Tissue Integrity  Goal: Skin integrity remains intact  Description: 1.  Monitor for areas of redness and/or skin breakdown2.  Assess vascular access sites hourly3.  Every 4-6 hours minimum:  Change oxygen saturation probe site4.  Every 4-6 hours:  If on nasal continuous positive airway pressure, respiratory therapy assess nares and determine need for appliance change or resting period  Outcome: Progressing

## 2025-05-02 NOTE — CONSULTS
XL) 100 MG extended release tablet Take 1 tablet by mouth daily 30 tablet 3    bumetanide (BUMEX) 2 MG tablet Take 1 tablet by mouth daily 30 tablet 3    aspirin 81 MG chewable tablet Take 1 tablet by mouth daily 14 tablet 0    pantoprazole (PROTONIX) 40 MG tablet Take 1 tablet by mouth every morning (before breakfast) 30 tablet 3    clopidogrel (PLAVIX) 75 MG tablet Take 1 tablet by mouth daily 30 tablet 11    vitamin D (ERGOCALCIFEROL) 1.25 MG (24564 UT) CAPS capsule TAKE 1 CAPSULE BY MOUTH EVERY WEEK 12 capsule 2    atorvastatin (LIPITOR) 40 MG tablet Take 1 tablet by mouth nightly 30 tablet 3    dilTIAZem (CARDIZEM CD) 120 MG extended release capsule TAKE 1 CAPSULE BY MOUTH DAILY 90 capsule 3    acetaminophen (TYLENOL) 325 MG tablet Take 2 tablets by mouth every 6 hours as needed for Pain      buprenorphine-naloxone (SUBOXONE) 8-2 MG FILM SL film Place 1 Film under the tongue 2 times daily. Indications: pain      latanoprost (XALATAN) 0.005 % ophthalmic solution Place 1 drop into both eyes nightly         Data         /82   Pulse 70   Temp 98.1 °F (36.7 °C) (Oral)   Resp 16   Ht 1.854 m (6' 1\")   Wt 61.2 kg (135 lb)   SpO2 96%   BMI 17.81 kg/m²     Wt Readings from Last 3 Encounters:   05/01/25 61.2 kg (135 lb)   03/09/25 61.5 kg (135 lb 9.3 oz)   01/31/25 74 kg (163 lb 2.3 oz)        Code Status: Full Code     ADVANCE CARE PLANNING:  Patient has capacity for medical decisions: forgetful   Health Care Power of : no  Living Will: no     Personal, Social, and Family History  Marital Status:   Living situation: nursing home  Importance of bridgette/Gnosticist/spiritual beliefs: [] Very [] Somewhat [] Not   Psychological Distress: moderate  Does patient understand diagnosis/treatment? yes  Does caregiver understand diagnosis/treatment? yes    Assessment        REVIEW OF SYSTEMS  CONSTITUTIONAL:  negative for fevers, chills, sweats, fatigue, weight loss  HEENT:  throat pain   RESPIRATORY:   negative for shortness of breath, cough, congestion, wheezing  CARDIOVASCULAR:  negative for chest pain, palpitations  GASTROINTESTINAL:  negative for nausea, vomiting, diarrhea, constipation, change in bowel habits, abdominal pain   GENITOURINARY:  negative for difficulty of urination, burning with urination, frequency   INTEGUMENT:  negative for rash, skin lesions, easy bruising   HEMATOLOGIC/LYMPHATIC:  negative for swelling/edema   ALLERGIC/IMMUNOLOGIC:  negative for urticaria , itching  ENDOCRINE:  negative increase in drinking, increase in urination, hot or cold intolerance  MUSCULOSKELETAL:  negative joint pains, muscle aches, swelling of joints  NEUROLOGICAL:  negative for headaches, dizziness, lightheadedness, numbness, pain, tingling extremities  BEHAVIOR/PSYCH:  negative for depression, anxiety    PHYSICAL ASSESSMENT:  General Appearance: alert, frail   Mental status: oriented to person, place, and time  Head: normocephalic, atraumatic  Eye: no icterus, redness, pupils equal and reactive, extraocular eye movements intact, conjunctiva clear  Ear: normal external ear, no discharge, hearing intact  Nose: no drainage noted  Mouth: mucous membranes moist  Neck: supple,  Lungs: Bilateral equal air entry, clear to ausculation, no wheezing, rales or rhonchi, normal effort  Cardiovascular: normal rate, regular rhythm, no murmur, gallop, rub  Abdomen: Soft, nontender, nondistended, normal bowel sounds  Neurologic: Awake and alert, spontaneously moving all four extremities  Skin: No gross lesions, rashes, bruising or bleeding on exposed skin area  Extremities: peripheral pulses palpable, no pedal edema or calf pain with palpation  Psych: normal affect    Palliative Performance Scale:  ___100% Full ambulation; normal activity/No disease; full self-care; normal intake; LOC full  ___90% full ambulation; normal activity/some disease; full self-care; normal intake; LOC full  ___80% ambulation full; normal activity with

## 2025-05-02 NOTE — PROGRESS NOTES
[facility]  Office: [phone]  Antwon Bernardo DO, Fernando Vyas DO, Drake Haynes DO, Geovani Honeycutt DO, Yani Pritchard MD, Isabella Meade MD, Richard Hidalgo MD, Miraim Michael MD,  Keo Pope MD, Eli Barbour MD, Giovanna Louie MD,  Yee Kerr DO, Dion Gilliland MD, Jonny Basurto MD, Alex Bernardo DO, Nafisa Lyles MD,  Jarred Johnson DO, Karen Shipley MD, Jessika Ravi MD, Carlos De León MD,  Bola Callaway MD, Roly Brunson MD, Rojas Barfield MD, Francisco J Monzon MD, Jerry Maher MD, Jelani Jameson MD, Rudy Webb DO, Susan Asher MD, Teodoro Casas MD, Yee Meehan MD, Mohsin Reza, MD, Lane Burnette MD, Shirley Waterhouse, CNP,  Isidra Bianchi, CNP, Rudy Castaneda, CNP,  Magda Cannon, DNP, Jaleesa Meeks, CNP, Rakel Purdy, CNP, Tatiana Walton, CNP, Stephanie Cyr, CNP, Luz Maria Castellanos, PA-C, Joie Song, CNP, Evelio Bear, CNP,  Venecia Shanks, CNP, Erin Alaniz, CNP, Rocky Robins, PA-C, Rachel Ling, CNP,  Maura Raman, CNS, Vaishali Lucio, CNP, Sarah Ramirez, CNP,   Graciela Blue, CNP         [facility]   IN-PATIENT SERVICE   [facility]    Progress Note    5/2/2025    7:54 AM    Name:   Hemanth Barrera  MRN:     [id number]     Acct:      [id number]   Room:   327/327-01  IP Day:  1  Admit Date:  5/1/2025 10:07 AM    PCP:   Neftaly Contreras MD  Code Status:  Full Code    Subjective:     Patient was seen in follow-up for unstable angina. The patient states that his chest pain has resolved but says he has pain \"all over\" his abdomen. CT scan of the abdomen and pelvis was done yesterday and was negative for an acute process. The patient is confused this morning and repeatedly said that he was in a [employer] store (this appears to be baseline as the patient had some confusion his last admission). Heparin and nitroglycerin infusions have been stopped. Cardiology has been consulted; appreciate recommendations.     Medications:     Allergies:     Allergies   Allergen Reactions    Aspirin Other (See Comments)     Pt told not to take since he has only a partial stomach    Cyclobenzaprine Other (See Comments)     Pt stated heart palpitations and he felt funny    Ibuprofen Nausea Only    Azithromycin Nausea Only    Hydrocodone-Acetaminophen Nausea Only     per pt, he is having upset stomach when taking norco       Current Meds:   Scheduled Meds:    sodium chloride  80 mL IntraVENous Once    aspirin  81 mg Oral Daily    atorvastatin  40 mg Oral Nightly    bumetanide  2 mg Oral Daily    buprenorphine-naloxone  1 tablet SubLINGual BID    clopidogrel  75 mg Oral Daily    dilTIAZem  120 mg Oral Daily    isosorbide mononitrate  30 mg Oral Daily    metoprolol succinate  100 mg Oral Daily    midodrine  2.5 mg Oral TID WC    pantoprazole  40 mg Oral QAM AC    sodium chloride flush  5-40 mL IntraVENous 2 times per day     Continuous Infusions:    nitroGLYCERIN Stopped (25)    sodium chloride       PRN Meds: sodium chloride flush, sodium chloride, potassium chloride **OR** potassium alternative oral replacement **OR** potassium chloride, magnesium sulfate, ondansetron **OR** ondansetron, polyethylene glycol, acetaminophen **OR** acetaminophen    Data:     Vitals:  /85   Pulse 70   Temp 98.1 °F (36.7 °C) (Oral)   Resp 16   Ht 1.854 m (6' 1\")   Wt 61.2 kg (135 lb)   SpO2 100%   BMI 17.81 kg/m²   Temp (24hrs), Av.4 °F (36.9 °C), Min:98 °F (36.7 °C), Max:99.1 °F (37.3 °C)    No results for input(s): \"POCGLU\" in the last 72 hours.    I/O (24Hr):    Intake/Output Summary (Last 24 hours) at 2025 0754  Last data filed at 2025  Gross per 24 hour   Intake --   Output 850 ml   Net -850 ml       Labs:  Hematology:  Recent Labs     25  1006 25  1531 25  0210   WBC 3.9 3.2* 3.1*   RBC 3.37* 3.33* 3.36*   HGB 10.0* 9.9* 10.1*   HCT 30.4* 30.1* 30.5*   MCV 90.4 90.4 90.6   MCH 29.7 29.7 29.9   MCHC 32.9 32.9 33.0   RDW 19.8*

## 2025-05-02 NOTE — PROGRESS NOTES
Occupational Therapy  Occupational Therapy Initial Evaluation  Facility/Department: [address]   Patient Name: Hemanth Barrera        MRN: [id number]    : 1935    Date of Service: 2025    Chief Complaint   Patient presents with    Shortness of Breath     From [facility], started today, staff stated they gave x2 ntg 50 minutes apart with no relief, has a dx dvt/PE, pt reports chest pressure and feels like \"everything is closing in\", full code     Past Medical History:  has a past medical history of Acute inferolateral myocardial infarction (HCC), Arthritis, Atrial fibrillation (HCC), CAD (coronary artery disease), CHF (congestive heart failure) (HCC), Glaucoma, Hx of blood clots, Hyperlipidemia, Hypertension, MI (myocardial infarction) (HCC), and NSTEMI (non-ST elevated myocardial infarction) ([facility]).  Past Surgical History:  has a past surgical history that includes pacemaker placement; Abdomen surgery; Cardiac catheterization; Appendectomy; Colonoscopy; eye surgery; hernia repair; bone marrow biopsy; joint replacement; CT BIOPSY BONE MARROW (2022); Upper gastrointestinal endoscopy (N/A, 2023); Colonoscopy (N/A, 8/3/2023); Cardiac procedure (N/A, 2024); Cardiac procedure (N/A, 2024); Cardiac procedure (Right, 2025); invasive vascular (N/A, 2025); Upper gastrointestinal endoscopy (N/A, 3/6/2025); sigmoidoscopy (N/A, 3/7/2025); and Colonoscopy (N/A, 3/10/2025).    Discharge Recommendations  Discharge Recommendations: Patient would benefit from continued therapy after discharge  OT Equipment Recommendations  Other: AE/DME recommendations TBD    Assessment  Performance deficits / Impairments: Decreased ADL status;Decreased functional mobility ;Decreased balance;Decreased endurance;Decreased strength;Decreased cognition;Decreased safe awareness;Decreased posture  Assessment: Pt seen for therapy eval s/p admission with unstable angina. Pt has been in SNF since

## 2025-05-02 NOTE — CARE COORDINATION
1049ajoan MAZARIEGOS spoke with Anel at [facility], able to accept patient back, starting precert.

## 2025-05-03 LAB
BASOPHILS # BLD: 0 K/UL (ref 0–0.2)
BASOPHILS NFR BLD: 1 % (ref 0–2)
EOSINOPHIL # BLD: 0.1 K/UL (ref 0–0.4)
EOSINOPHILS RELATIVE PERCENT: 1 % (ref 1–4)
ERYTHROCYTE [DISTWIDTH] IN BLOOD BY AUTOMATED COUNT: 19.6 % (ref 12.5–15.4)
HCT VFR BLD AUTO: 29.6 % (ref 41–53)
HGB BLD-MCNC: 9.8 G/DL (ref 13.5–17.5)
LYMPHOCYTES NFR BLD: 0.5 K/UL (ref 1–4.8)
LYMPHOCYTES RELATIVE PERCENT: 11 % (ref 24–44)
MCH RBC QN AUTO: 30.3 PG (ref 26–34)
MCHC RBC AUTO-ENTMCNC: 33 G/DL (ref 31–37)
MCV RBC AUTO: 91.7 FL (ref 80–100)
MONOCYTES NFR BLD: 0.4 K/UL (ref 0.1–1.2)
MONOCYTES NFR BLD: 9 % (ref 2–11)
NEUTROPHILS NFR BLD: 78 % (ref 36–66)
NEUTS SEG NFR BLD: 3.8 K/UL (ref 1.8–7.7)
PLATELET # BLD AUTO: 193 K/UL (ref 140–450)
PMV BLD AUTO: 7.6 FL (ref 6–12)
RBC # BLD AUTO: 3.23 M/UL (ref 4.5–5.9)
WBC OTHER # BLD: 4.9 K/UL (ref 3.5–11)

## 2025-05-03 PROCEDURE — 99233 SBSQ HOSP IP/OBS HIGH 50: CPT | Performed by: INTERNAL MEDICINE

## 2025-05-03 PROCEDURE — 2500000003 HC RX 250 WO HCPCS: Performed by: STUDENT IN AN ORGANIZED HEALTH CARE EDUCATION/TRAINING PROGRAM

## 2025-05-03 PROCEDURE — 85025 COMPLETE CBC W/AUTO DIFF WBC: CPT

## 2025-05-03 PROCEDURE — 99232 SBSQ HOSP IP/OBS MODERATE 35: CPT | Performed by: STUDENT IN AN ORGANIZED HEALTH CARE EDUCATION/TRAINING PROGRAM

## 2025-05-03 PROCEDURE — 6370000000 HC RX 637 (ALT 250 FOR IP): Performed by: STUDENT IN AN ORGANIZED HEALTH CARE EDUCATION/TRAINING PROGRAM

## 2025-05-03 PROCEDURE — 2060000000 HC ICU INTERMEDIATE R&B

## 2025-05-03 PROCEDURE — 6370000000 HC RX 637 (ALT 250 FOR IP): Performed by: INTERNAL MEDICINE

## 2025-05-03 PROCEDURE — 36415 COLL VENOUS BLD VENIPUNCTURE: CPT

## 2025-05-03 RX ORDER — RANOLAZINE 500 MG/1
500 TABLET, EXTENDED RELEASE ORAL 2 TIMES DAILY
Status: DISCONTINUED | OUTPATIENT
Start: 2025-05-03 | End: 2025-05-05 | Stop reason: HOSPADM

## 2025-05-03 RX ADMIN — GABAPENTIN 100 MG: 100 CAPSULE ORAL at 21:27

## 2025-05-03 RX ADMIN — RANOLAZINE 500 MG: 500 TABLET, EXTENDED RELEASE ORAL at 12:30

## 2025-05-03 RX ADMIN — ACETAMINOPHEN 650 MG: 325 TABLET ORAL at 09:34

## 2025-05-03 RX ADMIN — GABAPENTIN 100 MG: 100 CAPSULE ORAL at 09:22

## 2025-05-03 RX ADMIN — OXYCODONE HYDROCHLORIDE AND ACETAMINOPHEN 1 TABLET: 5; 325 TABLET ORAL at 21:27

## 2025-05-03 RX ADMIN — OXYCODONE HYDROCHLORIDE AND ACETAMINOPHEN 1 TABLET: 5; 325 TABLET ORAL at 05:50

## 2025-05-03 RX ADMIN — APIXABAN 5 MG: 5 TABLET, FILM COATED ORAL at 09:22

## 2025-05-03 RX ADMIN — SODIUM CHLORIDE, PRESERVATIVE FREE 10 ML: 5 INJECTION INTRAVENOUS at 21:26

## 2025-05-03 RX ADMIN — DILTIAZEM HYDROCHLORIDE 120 MG: 120 CAPSULE, COATED, EXTENDED RELEASE ORAL at 09:22

## 2025-05-03 RX ADMIN — ATORVASTATIN CALCIUM 40 MG: 40 TABLET, FILM COATED ORAL at 21:27

## 2025-05-03 RX ADMIN — ASPIRIN 81 MG: 81 TABLET, CHEWABLE ORAL at 09:22

## 2025-05-03 RX ADMIN — APIXABAN 5 MG: 5 TABLET, FILM COATED ORAL at 21:27

## 2025-05-03 RX ADMIN — METOPROLOL SUCCINATE 50 MG: 50 TABLET, EXTENDED RELEASE ORAL at 09:22

## 2025-05-03 RX ADMIN — MIDODRINE HYDROCHLORIDE 2.5 MG: 5 TABLET ORAL at 09:27

## 2025-05-03 RX ADMIN — MIDODRINE HYDROCHLORIDE 2.5 MG: 5 TABLET ORAL at 12:31

## 2025-05-03 RX ADMIN — BUMETANIDE 2 MG: 1 TABLET ORAL at 09:22

## 2025-05-03 RX ADMIN — PANTOPRAZOLE SODIUM 40 MG: 40 TABLET, DELAYED RELEASE ORAL at 05:50

## 2025-05-03 RX ADMIN — SODIUM CHLORIDE, PRESERVATIVE FREE 10 ML: 5 INJECTION INTRAVENOUS at 10:34

## 2025-05-03 RX ADMIN — ISOSORBIDE MONONITRATE 30 MG: 30 TABLET, EXTENDED RELEASE ORAL at 09:22

## 2025-05-03 RX ADMIN — RANOLAZINE 500 MG: 500 TABLET, EXTENDED RELEASE ORAL at 21:27

## 2025-05-03 ASSESSMENT — PAIN DESCRIPTION - PAIN TYPE
TYPE: CHRONIC PAIN
TYPE: CHRONIC PAIN

## 2025-05-03 ASSESSMENT — PAIN SCALES - GENERAL
PAINLEVEL_OUTOF10: 3
PAINLEVEL_OUTOF10: 7
PAINLEVEL_OUTOF10: 3
PAINLEVEL_OUTOF10: 5
PAINLEVEL_OUTOF10: 3
PAINLEVEL_OUTOF10: 4
PAINLEVEL_OUTOF10: 3
PAINLEVEL_OUTOF10: 2
PAINLEVEL_OUTOF10: 7

## 2025-05-03 ASSESSMENT — PAIN DESCRIPTION - LOCATION
LOCATION: ABDOMEN;BACK
LOCATION: BACK
LOCATION: BACK;HIP
LOCATION: BACK
LOCATION: BACK
LOCATION: BACK;HIP

## 2025-05-03 ASSESSMENT — PAIN DESCRIPTION - DESCRIPTORS
DESCRIPTORS: DISCOMFORT
DESCRIPTORS: ACHING

## 2025-05-03 ASSESSMENT — PAIN - FUNCTIONAL ASSESSMENT
PAIN_FUNCTIONAL_ASSESSMENT: ACTIVITIES ARE NOT PREVENTED
PAIN_FUNCTIONAL_ASSESSMENT: PREVENTS OR INTERFERES WITH MANY ACTIVE NOT PASSIVE ACTIVITIES
PAIN_FUNCTIONAL_ASSESSMENT: PREVENTS OR INTERFERES WITH ALL ACTIVE AND SOME PASSIVE ACTIVITIES
PAIN_FUNCTIONAL_ASSESSMENT: ACTIVITIES ARE NOT PREVENTED

## 2025-05-03 ASSESSMENT — PAIN DESCRIPTION - ORIENTATION
ORIENTATION: RIGHT;LEFT
ORIENTATION: LEFT
ORIENTATION: LEFT

## 2025-05-03 ASSESSMENT — PAIN SCALES - WONG BAKER: WONGBAKER_NUMERICALRESPONSE: NO HURT

## 2025-05-03 NOTE — PROGRESS NOTES
[facility]  Office: [phone]  Antwon Bernardo DO, Fernando Vyas DO, Drake Haynes DO, Geovani Honeycutt DO, Yani Pritchard MD, Isabella Meade MD, Richard Hidalgo MD, Miriam Michael MD,  Keo Pope MD, Eli Barbour MD, Giovanna Louie MD,  Yee Kerr DO, Dion Gilliland MD, Jonny Basurto MD, Alex Bernardo DO, Nafisa Lyles MD,  Jarred Johnson DO, Karen Shipley MD, Jessika Ravi MD, Carlos De León MD,  Bola Callaway MD, Roly Brunson MD, Rojas Barfield MD, Francisco J Monzon MD, Jerry Maher MD, Jelani Jameson MD, Rudy Webb DO, Susan Asher MD, Teodoro Casas MD, Yee Meehan MD, Mohsin Reza, MD, Lane Burnette MD, Shirley Waterhouse, CNP,  Isidra Bianchi, CNP, Rudy Castaneda, CNP,  Magda Cannon, DNP, Jaleesa Meeks, CNP, Rakel Purdy, CNP, Tatiana Walton, CNP, Stephanie Cyr, CNP, Luz Maria Castellanos, PA-C, Joie Song, CNP, Evelio Bear, CNP,  Venecia Shanks, CNP, Erin Alaniz, CNP, Rocky Robins, PA-C, Rachel Ling, CNP,  Maura Raman, CNS, Vaishali Lucio, CNP, Sarah Ramirez, CNP,   Graciela Blue, CNP         [facility]   IN-PATIENT SERVICE   [facility]    Progress Note    5/3/2025    10:16 AM    Name:   Hemanth Barrera  MRN:     [id number]     Acct:      [id number]   Room:   327/327-01  IP Day:  2  Admit Date:  5/1/2025 10:07 AM    PCP:   Neftaly Contreras MD  Code Status:  Full Code    Subjective:     Patient was seen in follow-up for unstable angina. He reports feeling \"great\" and has no specific complaints this morning. The patient states that his chest pain resolved yesterday. Awaiting placement.     Medications:     Allergies:    Allergies   Allergen Reactions    Aspirin Other (See Comments)     Pt told not to take since he has only a partial stomach    Cyclobenzaprine Other (See Comments)     Pt stated heart palpitations and he felt funny    Ibuprofen Nausea Only    Azithromycin Nausea Only    Hydrocodone-Acetaminophen Nausea Only     per

## 2025-05-03 NOTE — PROGRESS NOTES
[facility]    Consult                        Today's Date: 5/3/2025  Patient Name: Hemanth Barrera  Date of admission: 5/1/2025 10:07 AM  Patient's age: 89 y.o., 1/13/1935  Admission Dx: Unstable angina (HCC) [I20.0]  Angina at rest [I20.89]    Reason for Consult:  Cardiac evaluation    Subjective:  CP free this AM  No le edema  No syncope  No palpitation  No SOB        Current Facility-Administered Medications:     ranolazine (RANEXA) extended release tablet 500 mg, 500 mg, Oral, BID, Kwaku, Josh, DO    metoprolol succinate (TOPROL XL) extended release tablet 50 mg, 50 mg, Oral, Daily, Jonny Basurto MD, 50 mg at 05/03/25 0922    gabapentin (NEURONTIN) capsule 100 mg, 100 mg, Oral, BID, Jonny Basurto MD, 100 mg at 05/03/25 0922    apixaban (ELIQUIS) tablet 5 mg, 5 mg, Oral, BID, Jonny Basurto MD, 5 mg at 05/03/25 0922    oxyCODONE-acetaminophen (PERCOCET) 5-325 MG per tablet 1 tablet, 1 tablet, Oral, Q8H PRN, Jonny Bausrto MD, 1 tablet at 05/03/25 0550    sodium chloride 0.9 % bolus 80 mL, 80 mL, IntraVENous, Once, Jonny Basurto MD    atorvastatin (LIPITOR) tablet 40 mg, 40 mg, Oral, Nightly, Jonny Basurto MD, 40 mg at 05/02/25 2026    bumetanide (BUMEX) tablet 2 mg, 2 mg, Oral, Daily, Jonny Basurto MD, 2 mg at 05/03/25 0922    clopidogrel (PLAVIX) tablet 75 mg, 75 mg, Oral, Daily, Jonny Basurto MD    dilTIAZem (CARDIZEM CD) extended release capsule 120 mg, 120 mg, Oral, Daily, Jonny Basurto MD, 120 mg at 05/03/25 0922    isosorbide mononitrate (IMDUR) extended release tablet 30 mg, 30 mg, Oral, Daily, Jonny Basurto MD, 30 mg at 05/03/25 0922    midodrine (PROAMATINE) tablet 2.5 mg, 2.5 mg, Oral, TID WC, Jonny Basurto MD, 2.5 mg at 05/03/25 0927    pantoprazole (PROTONIX) tablet 40 mg, 40 mg, Oral, QAM AC, Jonny Basurto MD, 40 mg at 05/03/25 0550    sodium chloride flush 0.9 % injection 5-40 mL, 5-40 mL, IntraVENous, 2 times per day, Jonny Basurto MD, 10 mL at 05/03/25 1034    sodium

## 2025-05-03 NOTE — CARE COORDINATION
Availity precert for patient to return to [place] at [place] still listed as pending.    1330- Availity precert still listed as pending.

## 2025-05-03 NOTE — PLAN OF CARE
Problem: Chronic Conditions and Co-morbidities  Goal: Patient's chronic conditions and co-morbidity symptoms are monitored and maintained or improved  Outcome: Progressing  Flowsheets (Taken 5/2/2025 0815 by Gwen Sams, RN)  Care Plan - Patient's Chronic Conditions and Co-Morbidity Symptoms are Monitored and Maintained or Improved: Monitor and assess patient's chronic conditions and comorbid symptoms for stability, deterioration, or improvement     Problem: Discharge Planning  Goal: Discharge to home or other facility with appropriate resources  Outcome: Progressing  Flowsheets (Taken 5/2/2025 0815 by Gwen Sams, RN)  Discharge to home or other facility with appropriate resources: Identify barriers to discharge with patient and caregiver     Problem: Pain  Goal: Verbalizes/displays adequate comfort level or baseline comfort level  Outcome: Progressing  Flowsheets (Taken 5/1/2025 1807 by Iglesia Perez RN)  Verbalizes/displays adequate comfort level or baseline comfort level:   Encourage patient to monitor pain and request assistance   Assess pain using appropriate pain scale   Administer analgesics based on type and severity of pain and evaluate response   Implement non-pharmacological measures as appropriate and evaluate response   Notify Licensed Independent Practitioner if interventions unsuccessful or patient reports new pain     Problem: Safety - Adult  Goal: Free from fall injury  Outcome: Progressing  Flowsheets (Taken 5/3/2025 0200)  Free From Fall Injury: Instruct family/caregiver on patient safety     Problem: Skin/Tissue Integrity  Goal: Skin integrity remains intact  Description: 1.  Monitor for areas of redness and/or skin breakdown2.  Assess vascular access sites hourly3.  Every 4-6 hours minimum:  Change oxygen saturation probe site4.  Every 4-6 hours:  If on nasal continuous positive airway pressure, respiratory therapy assess nares and determine need for appliance

## 2025-05-03 NOTE — PLAN OF CARE
Problem: Chronic Conditions and Co-morbidities  Goal: Patient's chronic conditions and co-morbidity symptoms are monitored and maintained or improved  5/3/2025 1128 by Gwen Sams RN  Outcome: Progressing  5/3/2025 0200 by Blessing Nunez RN  Outcome: Progressing  Flowsheets (Taken 5/2/2025 0815 by Gwen Sasm, RN)  Care Plan - Patient's Chronic Conditions and Co-Morbidity Symptoms are Monitored and Maintained or Improved: Monitor and assess patient's chronic conditions and comorbid symptoms for stability, deterioration, or improvement     Problem: Discharge Planning  Goal: Discharge to home or other facility with appropriate resources  5/3/2025 1128 by Gwen Sams RN  Outcome: Progressing  5/3/2025 0200 by Blessing Nunez RN  Outcome: Progressing  Flowsheets (Taken 5/2/2025 0815 by Gwen Sams, RN)  Discharge to home or other facility with appropriate resources: Identify barriers to discharge with patient and caregiver     Problem: Pain  Goal: Verbalizes/displays adequate comfort level or baseline comfort level  5/3/2025 1128 by Gwen Sams RN  Outcome: Progressing  5/3/2025 0200 by Blessing Nunez RN  Outcome: Progressing  Flowsheets (Taken 5/1/2025 1807 by Iglesia Perez, RN)  Verbalizes/displays adequate comfort level or baseline comfort level:   Encourage patient to monitor pain and request assistance   Assess pain using appropriate pain scale   Administer analgesics based on type and severity of pain and evaluate response   Implement non-pharmacological measures as appropriate and evaluate response   Notify Licensed Independent Practitioner if interventions unsuccessful or patient reports new pain     Problem: ABCDS Injury Assessment  Goal: Absence of physical injury  Outcome: Progressing     Problem: Safety - Adult  Goal: Free from fall injury  5/3/2025 1128 by Gwen Sams RN  Outcome: Progressing  5/3/2025

## 2025-05-03 NOTE — PROGRESS NOTES
Spiritual Health History and Assessment/Progress Note  [facility]    (P) Palliative Care,  , (P) Life Adjustments, Adjustment to illness, Anticipatory Grief,      Name: Hemanth Barrera MRN: [id number]    Age: 89 y.o.     Sex: male   Language: English   Taoism: Faith   Unstable angina (HCC)     Date: 5/2/2025            Total Time Calculated: (P) 20 min              Spiritual Assessment continued in [facility]        Referral/Consult From: (P) Patient, Palliative Care   Encounter Overview/Reason: (P) Palliative Care  Service Provided For: (P) Patient        followed up on Pt. In Palliative care. Pt. Was open to conversation and welcoming. Pt. Wanted to discuss deepali, God's provision and family.  provided support and pastoral care and encouragement.  offered a hymn of song and prayer for comfort, strength and hope, Very good visit. Will follow up.    Deepali, Belief, Meaning:   Patient has beliefs or practices that help with coping during difficult times  Family/Friends No family/friends present      Importance and Influence:  Patient has spiritual/personal beliefs that influence decisions regarding their health  Family/Friends No family/friends present    Community:  Patient feels well-supported. Support system includes: Children  Family/Friends No family/friends present    Assessment and Plan of Care:     Patient Interventions include: Facilitated expression of thoughts and feelings, Explored spiritual coping/struggle/distress, and Engaged in theological reflection  Family/Friends Interventions include: No family/friends present    Patient Plan of Care: Spiritual Care available upon further referral  Family/Friends Plan of Care: No family/friends present    Electronically signed by Chaplain KELBY on 5/2/2025 at 8:21 PM    05/02/25 2019   Encounter Summary   Encounter Overview/Reason Palliative Care   Service Provided For Patient   Referral/Consult From

## 2025-05-04 LAB
BASOPHILS # BLD: 0 K/UL (ref 0–0.2)
BASOPHILS NFR BLD: 1 % (ref 0–2)
EOSINOPHIL # BLD: 0.1 K/UL (ref 0–0.4)
EOSINOPHILS RELATIVE PERCENT: 1 % (ref 1–4)
ERYTHROCYTE [DISTWIDTH] IN BLOOD BY AUTOMATED COUNT: 19.6 % (ref 12.5–15.4)
HCT VFR BLD AUTO: 28.7 % (ref 41–53)
HGB BLD-MCNC: 9.4 G/DL (ref 13.5–17.5)
LYMPHOCYTES NFR BLD: 0.7 K/UL (ref 1–4.8)
LYMPHOCYTES RELATIVE PERCENT: 18 % (ref 24–44)
MCH RBC QN AUTO: 29.8 PG (ref 26–34)
MCHC RBC AUTO-ENTMCNC: 32.9 G/DL (ref 31–37)
MCV RBC AUTO: 90.7 FL (ref 80–100)
MONOCYTES NFR BLD: 0.4 K/UL (ref 0.1–1.2)
MONOCYTES NFR BLD: 11 % (ref 2–11)
NEUTROPHILS NFR BLD: 69 % (ref 36–66)
NEUTS SEG NFR BLD: 2.8 K/UL (ref 1.8–7.7)
PLATELET # BLD AUTO: 196 K/UL (ref 140–450)
PMV BLD AUTO: 7.9 FL (ref 6–12)
RBC # BLD AUTO: 3.17 M/UL (ref 4.5–5.9)
WBC OTHER # BLD: 4 K/UL (ref 3.5–11)

## 2025-05-04 PROCEDURE — 97110 THERAPEUTIC EXERCISES: CPT

## 2025-05-04 PROCEDURE — 99233 SBSQ HOSP IP/OBS HIGH 50: CPT | Performed by: INTERNAL MEDICINE

## 2025-05-04 PROCEDURE — 6370000000 HC RX 637 (ALT 250 FOR IP): Performed by: INTERNAL MEDICINE

## 2025-05-04 PROCEDURE — 97116 GAIT TRAINING THERAPY: CPT

## 2025-05-04 PROCEDURE — 99232 SBSQ HOSP IP/OBS MODERATE 35: CPT | Performed by: STUDENT IN AN ORGANIZED HEALTH CARE EDUCATION/TRAINING PROGRAM

## 2025-05-04 PROCEDURE — 2060000000 HC ICU INTERMEDIATE R&B

## 2025-05-04 PROCEDURE — 97535 SELF CARE MNGMENT TRAINING: CPT

## 2025-05-04 PROCEDURE — 2500000003 HC RX 250 WO HCPCS: Performed by: STUDENT IN AN ORGANIZED HEALTH CARE EDUCATION/TRAINING PROGRAM

## 2025-05-04 PROCEDURE — 85025 COMPLETE CBC W/AUTO DIFF WBC: CPT

## 2025-05-04 PROCEDURE — 36415 COLL VENOUS BLD VENIPUNCTURE: CPT

## 2025-05-04 PROCEDURE — 6370000000 HC RX 637 (ALT 250 FOR IP): Performed by: STUDENT IN AN ORGANIZED HEALTH CARE EDUCATION/TRAINING PROGRAM

## 2025-05-04 RX ADMIN — MIDODRINE HYDROCHLORIDE 2.5 MG: 5 TABLET ORAL at 13:17

## 2025-05-04 RX ADMIN — SODIUM CHLORIDE, PRESERVATIVE FREE 10 ML: 5 INJECTION INTRAVENOUS at 08:54

## 2025-05-04 RX ADMIN — APIXABAN 5 MG: 5 TABLET, FILM COATED ORAL at 08:54

## 2025-05-04 RX ADMIN — BUMETANIDE 2 MG: 1 TABLET ORAL at 08:54

## 2025-05-04 RX ADMIN — OXYCODONE HYDROCHLORIDE AND ACETAMINOPHEN 1 TABLET: 5; 325 TABLET ORAL at 20:17

## 2025-05-04 RX ADMIN — SODIUM CHLORIDE, PRESERVATIVE FREE 10 ML: 5 INJECTION INTRAVENOUS at 20:18

## 2025-05-04 RX ADMIN — APIXABAN 5 MG: 5 TABLET, FILM COATED ORAL at 20:18

## 2025-05-04 RX ADMIN — DILTIAZEM HYDROCHLORIDE 120 MG: 120 CAPSULE, COATED, EXTENDED RELEASE ORAL at 08:53

## 2025-05-04 RX ADMIN — MIDODRINE HYDROCHLORIDE 2.5 MG: 5 TABLET ORAL at 08:53

## 2025-05-04 RX ADMIN — GABAPENTIN 100 MG: 100 CAPSULE ORAL at 20:18

## 2025-05-04 RX ADMIN — RANOLAZINE 500 MG: 500 TABLET, EXTENDED RELEASE ORAL at 08:54

## 2025-05-04 RX ADMIN — OXYCODONE HYDROCHLORIDE AND ACETAMINOPHEN 1 TABLET: 5; 325 TABLET ORAL at 06:48

## 2025-05-04 RX ADMIN — PANTOPRAZOLE SODIUM 40 MG: 40 TABLET, DELAYED RELEASE ORAL at 06:48

## 2025-05-04 RX ADMIN — RANOLAZINE 500 MG: 500 TABLET, EXTENDED RELEASE ORAL at 20:18

## 2025-05-04 RX ADMIN — ATORVASTATIN CALCIUM 40 MG: 40 TABLET, FILM COATED ORAL at 20:18

## 2025-05-04 RX ADMIN — GABAPENTIN 100 MG: 100 CAPSULE ORAL at 08:53

## 2025-05-04 RX ADMIN — METOPROLOL SUCCINATE 50 MG: 50 TABLET, EXTENDED RELEASE ORAL at 08:53

## 2025-05-04 RX ADMIN — MIDODRINE HYDROCHLORIDE 2.5 MG: 5 TABLET ORAL at 17:43

## 2025-05-04 RX ADMIN — ISOSORBIDE MONONITRATE 30 MG: 30 TABLET, EXTENDED RELEASE ORAL at 08:54

## 2025-05-04 ASSESSMENT — PAIN DESCRIPTION - DESCRIPTORS
DESCRIPTORS: ACHING

## 2025-05-04 ASSESSMENT — PAIN SCALES - GENERAL
PAINLEVEL_OUTOF10: 7
PAINLEVEL_OUTOF10: 7
PAINLEVEL_OUTOF10: 3
PAINLEVEL_OUTOF10: 0
PAINLEVEL_OUTOF10: 3

## 2025-05-04 ASSESSMENT — PAIN DESCRIPTION - LOCATION
LOCATION: BACK;HIP
LOCATION: ABDOMEN
LOCATION: BACK;HIP
LOCATION: BACK;HIP

## 2025-05-04 ASSESSMENT — PAIN DESCRIPTION - FREQUENCY: FREQUENCY: INTERMITTENT

## 2025-05-04 ASSESSMENT — PAIN DESCRIPTION - PAIN TYPE
TYPE: CHRONIC PAIN

## 2025-05-04 ASSESSMENT — PAIN DESCRIPTION - ONSET: ONSET: GRADUAL

## 2025-05-04 ASSESSMENT — PAIN DESCRIPTION - ORIENTATION
ORIENTATION: LEFT
ORIENTATION: LEFT
ORIENTATION: MID;UPPER
ORIENTATION: LEFT

## 2025-05-04 ASSESSMENT — PAIN - FUNCTIONAL ASSESSMENT
PAIN_FUNCTIONAL_ASSESSMENT: PREVENTS OR INTERFERES SOME ACTIVE ACTIVITIES AND ADLS
PAIN_FUNCTIONAL_ASSESSMENT: PREVENTS OR INTERFERES WITH ALL ACTIVE AND SOME PASSIVE ACTIVITIES

## 2025-05-04 NOTE — CARE COORDINATION
Availity precert for patient to return to [place] [facility] [place] still listed as pending. Updated PT/OT notes needed for pending precert.    1450- Availity precert to return to [place] [facility] [place] still listed as pending.

## 2025-05-04 NOTE — PROGRESS NOTES
Occupational Therapy  Facility/Department: [address]  Occupational Therapy Initial Assessment    Name: Hemanth Barrera  : 1935  MRN: [id number]  Date of Service: 2025    Chief Complaint   Patient presents with    Shortness of Breath     From [facility], started today, staff stated they gave x2 ntg 50 minutes apart with no relief, has a dx dvt/PE, pt reports chest pressure and feels like \"everything is closing in\", full code      Discharge Recommendations:  Patient would benefit from continued therapy after discharge  OT Equipment Recommendations  Other: AE/DME recommendations TBD     Patient Diagnosis(es): The encounter diagnosis was Angina at rest.    Past Medical History:  has a past medical history of Acute inferolateral myocardial infarction (HCC), Arthritis, Atrial fibrillation (HCC), CAD (coronary artery disease), CHF (congestive heart failure) (HCC), Glaucoma, Hx of blood clots, Hyperlipidemia, Hypertension, MI (myocardial infarction) (HCC), and NSTEMI (non-ST elevated myocardial infarction) ([facility]).    Past Surgical History:  has a past surgical history that includes pacemaker placement; Abdomen surgery; Cardiac catheterization; Appendectomy; Colonoscopy; eye surgery; hernia repair; bone marrow biopsy; joint replacement; CT BIOPSY BONE MARROW (2022); Upper gastrointestinal endoscopy (N/A, 2023); Colonoscopy (N/A, 8/3/2023); Cardiac procedure (N/A, 2024); Cardiac procedure (N/A, 2024); Cardiac procedure (Right, 2025); invasive vascular (N/A, 2025); Upper gastrointestinal endoscopy (N/A, 3/6/2025); sigmoidoscopy (N/A, 3/7/2025); and Colonoscopy (N/A, 3/10/2025).    Plan  Occupational Therapy Plan  Times Per Week: 5-6x/wk  Times Per Day: Once a day  Current Treatment Recommendations: Strengthening, ROM, Balance training, Functional mobility training, Endurance training, Safety education & training, Patient/Caregiver education & training, Equipment evaluation,  Comments: Assist for IADLs  Additional Comments: Pt fell in January of 2025 with L rib fractures and has been in SNF since; prior to january of this year was living with son and MOD I-IND all ADLs and functional mobility with RW; since being in SNF has required assist for most LB ADLs, toileting and showering as well as ambulating with RW and w/c follow    Objective  Temp: 97.9 °F (36.6 °C)  Pulse: 70  Heart Rate Source: Monitor  Respirations: 18  SpO2: 97 %  O2 Device: None (Room air)  BP: 99/65  MAP (Calculated): 76  BP Location: Left upper arm  BP Method: Automatic  Patient Position: Sitting       Observation/Palpation  Observation: Decreased right knee flexion (baseline) requiring patient to modify body mechanics for transfers  Safety Devices  Type of Devices: Gait belt;Nurse notified;Chair alarm in place;Left in chair;Call light within reach;Patient at risk for falls  Restraints  Restraints Initially in Place: No  Balance  Sitting: Impaired (static-SBA, Dynamic seated balance-CGA)  Sitting - Static: Fair (occasional)  Standing: Impaired  Standing - Static: Occasional;Fair     ADL  Feeding: Setup  Feeding Skilled Clinical Factors: Pt. setup for lunch meal while in room recliner. Pt. was able to open food containers using appropriate hand/finger strength. MI use of utensils to feed self and drink mgt  Grooming: Stand by assistance;Setup  Grooming Skilled Clinical Factors: SBA/SETUP simple grooming. Pt. setup at recliner for brushing teeth  UE Bathing: Contact guard assistance  UE Bathing Skilled Clinical Factors: Sponge bathing UB, B arms/underarms, face/neck(lateral, anterior and posterior)  LE Bathing: Minimal assistance;Moderate assistance  LE Bathing Skilled Clinical Factors: Sponge bathing of anterior upper/ LB with good anterior reaching and seated tolerance  UE Dressing: Contact guard assistance;Minimal assistance  UE Dressing Skilled Clinical Factors: Pt. provided gown after sponge bathing. Pt. after

## 2025-05-04 NOTE — PROGRESS NOTES
Physical Therapy  Facility/Department: [address]   Physical Therapy Daily Treatment Note    Patient Name: Hemanth Barrera        MRN: [id number]    : 1935    Date of Service: 2025    Chief Complaint   Patient presents with    Shortness of Breath     From [facility], started today, staff stated they gave x2 ntg 50 minutes apart with no relief, has a dx dvt/PE, pt reports chest pressure and feels like \"everything is closing in\", full code     Past Medical History:  has a past medical history of Acute inferolateral myocardial infarction (HCC), Arthritis, Atrial fibrillation (HCC), CAD (coronary artery disease), CHF (congestive heart failure) (HCC), Glaucoma, Hx of blood clots, Hyperlipidemia, Hypertension, MI (myocardial infarction) (HCC), and NSTEMI (non-ST elevated myocardial infarction) ([facility]).  Past Surgical History:  has a past surgical history that includes pacemaker placement; Abdomen surgery; Cardiac catheterization; Appendectomy; Colonoscopy; eye surgery; hernia repair; bone marrow biopsy; joint replacement; CT BIOPSY BONE MARROW (2022); Upper gastrointestinal endoscopy (N/A, 2023); Colonoscopy (N/A, 8/3/2023); Cardiac procedure (N/A, 2024); Cardiac procedure (N/A, 2024); Cardiac procedure (Right, 2025); invasive vascular (N/A, 2025); Upper gastrointestinal endoscopy (N/A, 3/6/2025); sigmoidoscopy (N/A, 3/7/2025); and Colonoscopy (N/A, 3/10/2025).    Discharge Recommendations  Discharge Recommendations: Therapy recommended at discharge  PT Equipment Recommendations  Equipment Needed: No    Assessment  Body Structures, Functions, Activity Limitations Requiring Skilled Therapeutic Intervention: Decreased functional mobility ;Decreased safe awareness;Decreased ADL status;Decreased endurance;Decreased posture;Decreased balance;Decreased ROM;Decreased strength  Assessment: Pt presents from SNF with chest pain, SOB. At baseline (prior to fall in 2025) pt lived with  guard assistance  Bed Mobility Comments: HOB elevated 30 degrees, use of L bedrail; retired to recliner at end of session         Transfers  Sit to Stand: Minimal Assistance  Stand to Sit: Contact guard assistance  Comment: verbal cueing for body mechanics and positioning as patient tends to be slightly impulsive at times    Ambulation  Surface: Level tile  Device: Rolling Walker  Assistance: Minimal assistance  Quality of Gait: short step length, pace fluctuates throughout, assistance at times required to maneuver walker throughout environment. Increased trunk flexion as the patient fatigues. Limited heel strike and push off bilaterally.  Gait Deviations: Slow Madeline;Decreased step length;Decreased step height  Distance: 75ft + 75ft  Comments: standing rest breaks               Balance   Balance  Posture: Fair (tends to increase trunk flexion when fatigued)  Sitting - Static: Good;-  Sitting - Dynamic: Fair;+  Standing - Static: Fair;+  Standing - Dynamic: Fair;-  Comments: standing balance assessed with RW    Exercise  PT Exercises  Exercise Treatment: Seated BLE AROM x 15 reps: marches, long arc quad sets, ankle pumps, hip abd/add.    Patient Education  Patient Education  Education Given To: Patient  Education Provided: Role of Therapy;Plan of Care  Education Provided Comments: only to get up with assistance from therapy or nursing team and how to use call button  Education Method: Verbal;Demonstration  Barriers to Learning: None  Education Outcome: Verbalized understanding;Demonstrated understanding;Continued education needed    Plan  Physical Therapy Plan  General Plan:  (5-6x/week)  Current Treatment Recommendations: Strengthening, ROM, Balance training, Gait training, Functional mobility training, Transfer training, Home exercise program, Safety education & training, Therapeutic activities, Co-Treatment, ADL/Self-care training    Goals  Patient Goals   Patient Goals : to find out what's wrong with his

## 2025-05-04 NOTE — PROGRESS NOTES
[facility]  Office: [phone]  Antwon Bernardo DO, Fernando Vyas DO, Drake Haynes DO, Goevani Honeycutt DO, Yani Pritchard MD, Isabella Meade MD, Richard Hidalgo MD, Miriam Michael MD,  Keo Pope MD, Eli Barbour MD, Giovanna Louie MD,  Yee Kerr DO, Dion Gilliland MD, Jonny Basurto MD, Alex Bernardo DO, Nafisa Lyles MD,  Jarred Johnson DO, Karen Shipley MD, Jessika Ravi MD, Carlos De León MD,  Bola Callaway MD, Roly Brunson MD, Rojas Barfield MD, Francisco J Monzon MD, Jerry Maher MD, Jelani Jameson MD, Rudy Webb DO, Susan Asher MD, Teodoro Casas MD, Yee Meehan MD, Mohsin Reza, MD, Lane Burnette MD, Shirley Waterhouse, CNP,  Isidra Bianchi, CNP, Rudy Castaneda, CNP,  Magda Cannon, DNP, Jaleesa Meeks, CNP, Rakel Purdy, CNP, Tatiana Walton, CNP, Stephanie Cyr, CNP, Luz Maria Castellanos, PA-C, Joie Song, CNP, Evelio Bear, CNP,  Venecia Shanks, CNP, Erin Alaniz, CNP, Rocky Robins, PA-C, Rachel Ling, CNP,  Maura Raman, CNS, Vaishali Lucio, CNP, Sarah Ramirez, CNP,   Graciela Blue, CNP         [facility]   IN-PATIENT SERVICE   [facility]    Progress Note    5/4/2025    9:59 AM    Name:   Hemanth Barrera  MRN:     [id number]     Acct:      [id number]   Room:   327/327-01  IP Day:  3  Admit Date:  5/1/2025 10:07 AM    PCP:   Neftaly Contreras MD  Code Status:  Full Code    Subjective:     Patient was seen in follow-up for unstable angina. He reports feeling well and has no specific complaints this morning. Awaiting placement.     Medications:     Allergies:    Allergies   Allergen Reactions    Aspirin Other (See Comments)     Pt told not to take since he has only a partial stomach    Cyclobenzaprine Other (See Comments)     Pt stated heart palpitations and he felt funny    Ibuprofen Nausea Only    Azithromycin Nausea Only    Hydrocodone-Acetaminophen Nausea Only     per pt, he is having upset stomach when taking norco       Current

## 2025-05-04 NOTE — PROGRESS NOTES
acoustic windows.     Left Ventricle: Left ventricle appears normal in size. Systolic function is normal with an ejection fraction of 55-60%.     Right Ventricle: Right ventricular size appears normal. Systolic function is normal.     ECHO: 8/2023    Left Ventricle: Normal left ventricular systolic function with a visually estimated EF of 55 - 60%. Left ventricle size is normal. Mildly increased wall thickness. Normal wall motion. Normal diastolic function.    Aortic Valve: Mild to moderate regurgitation.    Mitral Valve: Mild regurgitation.    Tricuspid Valve: The estimated RVSP is 36 mmHg.    Left Atrium: Left atrium is mildly dilated.    Right Atrium: Right atrium is mildly dilated..      Cardiac Cath 9/17/2024        Right radial access changed to left groin access because patient more than 6 feet.    We selected left groin access instead of right groin access because he had a previously hernia repair and right groin access with the mesh placement.    Patient has severe stenosis of mid LAD 80%, severely calcified status post shockwave atherectomy and drug-eluting stent.    LAD also has 40% stenosis,    Mid left circumflex artery has a 70% stenosis, we will do staged PCI if patient is symptomatic.  Otherwise we will prefer antianginal medications    Patient loaded with Plavix, aspirin, continue aspirin 81 mg daily and Plavix 75 mg daily.    As per patient he is allergic to aspirin, the allergy is nausea, patient counseled that he needs aspirin for now.    Continue Angiomax infusion until it finished    No family to discuss the heart  cath finding and report.    Left groin access closed with Angio-Seal    Labs:     CBC:   Recent Labs     05/02/25  0210 05/03/25  0754   WBC 3.1* 4.9   HGB 10.1* 9.8*   HCT 30.5* 29.6*    193     BMP:   Recent Labs     05/01/25  1006 05/02/25  0210    141   K 4.2 4.0   CO2 26 28   BUN 20 17   CREATININE 0.9 0.7   LABGLOM 81 88   GLUCOSE 127* 86     BNP: No results for

## 2025-05-05 ENCOUNTER — TELEPHONE (OUTPATIENT)
Dept: INTERNAL MEDICINE CLINIC | Age: 89
End: 2025-05-05

## 2025-05-05 VITALS
HEIGHT: 73 IN | RESPIRATION RATE: 16 BRPM | TEMPERATURE: 98.2 F | SYSTOLIC BLOOD PRESSURE: 94 MMHG | HEART RATE: 71 BPM | BODY MASS INDEX: 17.89 KG/M2 | WEIGHT: 135 LBS | OXYGEN SATURATION: 99 % | DIASTOLIC BLOOD PRESSURE: 62 MMHG

## 2025-05-05 LAB
ERYTHROCYTE [DISTWIDTH] IN BLOOD BY AUTOMATED COUNT: 20.1 % (ref 12.5–15.4)
HCT VFR BLD AUTO: 28 % (ref 41–53)
HGB BLD-MCNC: 9 G/DL (ref 13.5–17.5)
MCH RBC QN AUTO: 29.5 PG (ref 26–34)
MCHC RBC AUTO-ENTMCNC: 32.3 G/DL (ref 31–37)
MCV RBC AUTO: 91.5 FL (ref 80–100)
PLATELET # BLD AUTO: 197 K/UL (ref 140–450)
PMV BLD AUTO: 7.6 FL (ref 6–12)
RBC # BLD AUTO: 3.06 M/UL (ref 4.5–5.9)
WBC OTHER # BLD: 3.3 K/UL (ref 3.5–11)

## 2025-05-05 PROCEDURE — 2500000003 HC RX 250 WO HCPCS: Performed by: STUDENT IN AN ORGANIZED HEALTH CARE EDUCATION/TRAINING PROGRAM

## 2025-05-05 PROCEDURE — 6370000000 HC RX 637 (ALT 250 FOR IP): Performed by: STUDENT IN AN ORGANIZED HEALTH CARE EDUCATION/TRAINING PROGRAM

## 2025-05-05 PROCEDURE — 6370000000 HC RX 637 (ALT 250 FOR IP): Performed by: INTERNAL MEDICINE

## 2025-05-05 PROCEDURE — 85027 COMPLETE CBC AUTOMATED: CPT

## 2025-05-05 PROCEDURE — 36415 COLL VENOUS BLD VENIPUNCTURE: CPT

## 2025-05-05 PROCEDURE — 99238 HOSP IP/OBS DSCHRG MGMT 30/<: CPT | Performed by: STUDENT IN AN ORGANIZED HEALTH CARE EDUCATION/TRAINING PROGRAM

## 2025-05-05 RX ORDER — RANOLAZINE 500 MG/1
500 TABLET, EXTENDED RELEASE ORAL 2 TIMES DAILY
DISCHARGE
Start: 2025-05-05 | End: 2025-05-05

## 2025-05-05 RX ORDER — RANOLAZINE 500 MG/1
500 TABLET, EXTENDED RELEASE ORAL 2 TIMES DAILY
Qty: 60 TABLET | Refills: 0 | Status: SHIPPED | OUTPATIENT
Start: 2025-05-05 | End: 2025-05-06

## 2025-05-05 RX ORDER — METOPROLOL SUCCINATE 100 MG/1
50 TABLET, EXTENDED RELEASE ORAL DAILY
DISCHARGE
Start: 2025-05-05 | End: 2025-05-06

## 2025-05-05 RX ADMIN — MIDODRINE HYDROCHLORIDE 2.5 MG: 5 TABLET ORAL at 16:13

## 2025-05-05 RX ADMIN — RANOLAZINE 500 MG: 500 TABLET, EXTENDED RELEASE ORAL at 10:50

## 2025-05-05 RX ADMIN — PANTOPRAZOLE SODIUM 40 MG: 40 TABLET, DELAYED RELEASE ORAL at 06:47

## 2025-05-05 RX ADMIN — DILTIAZEM HYDROCHLORIDE 120 MG: 120 CAPSULE, COATED, EXTENDED RELEASE ORAL at 10:51

## 2025-05-05 RX ADMIN — GABAPENTIN 100 MG: 100 CAPSULE ORAL at 10:51

## 2025-05-05 RX ADMIN — SODIUM CHLORIDE, PRESERVATIVE FREE 10 ML: 5 INJECTION INTRAVENOUS at 11:07

## 2025-05-05 RX ADMIN — MIDODRINE HYDROCHLORIDE 2.5 MG: 5 TABLET ORAL at 10:51

## 2025-05-05 RX ADMIN — ISOSORBIDE MONONITRATE 30 MG: 30 TABLET, EXTENDED RELEASE ORAL at 10:51

## 2025-05-05 RX ADMIN — APIXABAN 5 MG: 5 TABLET, FILM COATED ORAL at 10:51

## 2025-05-05 RX ADMIN — METOPROLOL SUCCINATE 50 MG: 50 TABLET, EXTENDED RELEASE ORAL at 10:51

## 2025-05-05 RX ADMIN — CLOPIDOGREL BISULFATE 75 MG: 75 TABLET, FILM COATED ORAL at 10:51

## 2025-05-05 RX ADMIN — OXYCODONE HYDROCHLORIDE AND ACETAMINOPHEN 1 TABLET: 5; 325 TABLET ORAL at 16:13

## 2025-05-05 RX ADMIN — BUMETANIDE 2 MG: 1 TABLET ORAL at 10:51

## 2025-05-05 ASSESSMENT — PAIN DESCRIPTION - DESCRIPTORS: DESCRIPTORS: DISCOMFORT

## 2025-05-05 ASSESSMENT — PAIN DESCRIPTION - ONSET: ONSET: GRADUAL

## 2025-05-05 ASSESSMENT — PAIN DESCRIPTION - FREQUENCY: FREQUENCY: INTERMITTENT

## 2025-05-05 ASSESSMENT — PAIN SCALES - GENERAL: PAINLEVEL_OUTOF10: 7

## 2025-05-05 ASSESSMENT — PAIN DESCRIPTION - ORIENTATION: ORIENTATION: MID

## 2025-05-05 ASSESSMENT — PAIN DESCRIPTION - LOCATION: LOCATION: ABDOMEN

## 2025-05-05 ASSESSMENT — PAIN DESCRIPTION - PAIN TYPE: TYPE: CHRONIC PAIN

## 2025-05-05 ASSESSMENT — PAIN - FUNCTIONAL ASSESSMENT: PAIN_FUNCTIONAL_ASSESSMENT: ACTIVITIES ARE NOT PREVENTED

## 2025-05-05 NOTE — DISCHARGE SUMMARY
[facility]  Office: [phone]  Antwon Bernardo DO, Fernando Vyas DO, Drake Haynes DO, Geovani Honeycutt DO, Yani Pritchard MD, Isabella Meade MD, Richard Hidalgo MD, Miriam Michael MD,  Keo Pope MD, Eli Barbour MD, Giovanna Louie MD,  Yee Kerr DO, Dion Gilliland MD, Jonny Basurto MD, Alex Bernardo DO, Nafisa Lyles MD,  Jarred Johnson DO, Karen Shipley MD, Jessika Ravi MD, Carlos De León MD,  Bola Callaway MD, Roly Brunson MD, Rojas Barfield MD, Francisco J Monzon MD, Jerry Maher MD, Jelani Jameson MD, Rudy Webb DO, Susan Asher MD, Teodoro Casas MD, Yee Meehan MD, Mohsin Reza, MD, Lane Burnette MD, Shirley Waterhouse, CNP,  Isidra Bianchi, CNP, Rudy Castaneda, CNP,  Magda Cannon, DNP, Jaleesa Meeks, CNP, Rakel Purdy, CNP, Tatiana Walton, CNP, Stephanie Cyr, CNP, Luz Maria Castellanos, PA-C, Joie Song, CNP, Evelio Bear, CNP,  Venecia Shanks, CNP, Erin Alaniz, CNP, Rocky Robins, PA-C, Rachel Ling, CNP,  Maura Raman, CNS, Vaishali Lucio, CNP, Sarah Ramirez, CNP,   Graciela Blue, CNP         [facility]   IN-PATIENT SERVICE   [facility]    Discharge Summary     Patient ID: Hemanth Barrera  :  1935   MRN: [id number]     ACCOUNT:  [id number]   Patient's PCP: Neftaly Contreras MD  Admit Date: 2025   Discharge Date: 2025  Length of Stay: 4  Code Status:  Full Code  Admitting Physician: Jonny Basurto MD  Discharge Physician: Jnony Basurto MD     Active Discharge Diagnoses:     Hospital Problem Lists:  Principal Problem:    Unstable angina (HCC)  Active Problems:    Paroxysmal atrial fibrillation (HCC)    Hyperlipidemia    Hypertension    Chronic CHF (congestive heart failure) (HCC)    Acute on chronic diastolic congestive heart failure (HCC)    Angina at rest    Encounter for palliative care    Goals of care, counseling/discussion  Resolved Problems:    * No resolved hospital problems. *      Admission Condition:   \"CHOL\", \"HDL\", \"CHOLHDLRATIO\", \"TRIG\", \"VLDL\", \"ETZ66XB\", \"PHENYTOIN\", \"PHENYF\", \"URICACID\", \"POCGLU\" in the last 72 hours.    Invalid input(s): \"PROT\", \"D2DCQVA\", \"LABGGT\", \"LDLCHOLESTEROL\"  ABG:No results found for: \"POCPH\", \"PHART\", \"PH\", \"POCPCO2\", \"JKJ3AAB\", \"PCO2\", \"POCPO2\", \"PO2ART\", \"PO2\", \"POCHCO3\", \"GHY6RVU\", \"HCO3\", \"NBEA\", \"PBEA\", \"BEART\", \"BE\", \"THGBART\", \"THB\", \"XKG3ORM\", \"JAFO9UMC\", \"L7GOIJWI\", \"O2SAT\", \"FIO2\"  Lab Results   Component Value Date/Time    SPECIAL Site: Body Fluid 01/21/2025 01:23 PM     Lab Results   Component Value Date/Time    CULTURE  01/21/2025 01:23 PM     (NOTE) Direct Gram Stain from bottle result called to and read back by:CALVIN CRUZ [id number]    CULTURE POSITIVE Fluid Culture 01/21/2025 01:23 PM    CULTURE  01/21/2025 01:23 PM     DIRECT GRAM STAIN FROM BOTTLE: GRAM POSITIVE COCCI IN CLUSTERS    CULTURE (A) 01/21/2025 01:23 PM     STAPHYLOCOCCUS EPIDERMIDIS Identification by MALDI-TOF    CULTURE STAPHYLOCOCCUS CAPITIS Identification by MALDI-TOF (A) 01/21/2025 01:23 PM       Radiology:  CT ABDOMEN PELVIS WO CONTRAST Additional Contrast? None  Result Date: 5/1/2025  1. No acute inflammatory or obstructive process seen in the abdomen or pelvis. 2. Small left-sided pleural effusion with atelectasis.     CT SOFT TISSUE NECK W CONTRAST  Result Date: 5/1/2025  1. No acute abnormality of the soft tissue structures of the neck. 2. Linear filling defect within the proximal right subclavian artery, which is unchanged from the prior CT chest. No significant luminal narrowing.  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     XR CHEST PORTABLE  Result Date: 5/1/2025  1. No acute cardiopulmonary process. 2. Bandlike scarring/atelectasis right lung base unchanged.       Consultations:    Consults:     Final Specialist Recommendations/Findings:   IP CONSULT TO PALLIATIVE CARE  IP CONSULT TO CARDIOLOGY      The patient was seen and examined on day of discharge and this discharge

## 2025-05-05 NOTE — PROGRESS NOTES
Spiritual Health History and Assessment/Progress Note  [facility]    (P) Palliative Care,  , (P) Life Adjustments, Adjustment to illness, Anticipatory Grief,      Name: Hemanth Barrera MRN: [id number]    Age: 89 y.o.     Sex: male   Language: English   Adventist: Congregational   Unstable angina (HCC)     Date: 5/4/2025            Total Time Calculated: (P) 40 min              Spiritual Assessment continued in [facility]        Referral/Consult From: (P) Palliative Care   Encounter Overview/Reason: (P) Palliative Care  Service Provided For: (P) Patient        provided pastoral support and empathic listening. Pt. Was welcoming and open to conversation and life legacy. No family was present during visit. provided encouragement and comfort. Provided a song of Hymn for comfort and inspiration. Prayer was provided for comfort, strength and calmness.    Deepali, Belief, Meaning:   Patient has beliefs or practices that help with coping during difficult times  Family/Friends No family/friends present      Importance and Influence:  Patient has spiritual/personal beliefs that influence decisions regarding their health  Family/Friends No family/friends present    Community:  Patient feels well-supported. Support system includes: Children  Family/Friends No family/friends present    Assessment and Plan of Care:     Patient Interventions include: Facilitated expression of thoughts and feelings and Explored spiritual coping/struggle/distress  Family/Friends Interventions include: No family/friends present    Patient Plan of Care: Other:    Family/Friends Plan of Care: Spiritual Care available upon further referral    Electronically signed by Chaplain KELBY on 5/4/2025 at 8:07 PM    05/04/25 2005   Encounter Summary   Encounter Overview/Reason Palliative Care   Service Provided For Patient   Referral/Consult From Palliative Care   Support System Children   Last Encounter  05/04/25    Complexity of Encounter Moderate   Begin Time 1820   End Time  1900   Total Time Calculated 40 min   Spiritual/Emotional needs   Type Spiritual Support   Grief, Loss, and Adjustments   Type Life Adjustments;Adjustment to illness;Anticipatory Grief   Palliative Care   Type Palliative Care, Follow-up   Assessment/Intervention/Outcome   Assessment Calm;Coping   Intervention Prayer (assurance of)/[place];Sustaining Presence/Ministry of presence;Discussed belief system/Latter-day practices/bridgette;Discussed meaning/purpose;Active listening   Outcome Comfort;Coping;Encouraged;Engaged in conversation;Expressed feelings, needs, and concerns;Expressed Gratitude   Plan and Referrals   Plan/Referrals Continue to visit, (comment);Continue Support (comment)

## 2025-05-05 NOTE — PALLIATIVE CARE
..PALLIATIVE CARE TEAM    Patient: Hemanth Barrera  Room: 327/327-01    Reason For Consult   Goals of care evaluation  Distress management  Symptom Management  Guidance and support  Facilitate communications  Assistance in coordinating care  Recommendations for the above    Impression: Hemanth Barrera is a 89 y.o. year old male with  has a past medical history of Acute inferolateral myocardial infarction (HCC), Arthritis, Atrial fibrillation (HCC), CAD (coronary artery disease), CHF (congestive heart failure) (HCC), Glaucoma, Hx of blood clots, Hyperlipidemia, Hypertension, MI (myocardial infarction) (HCC), and NSTEMI (non-ST elevated myocardial infarction) ([facility])..  Currently hospitalized for the management of chest pain.  The Palliative Care Team is following to assist with goals of care an support.     Code Status  Full Code  PLAN;   - the patient is discharged today   - I complete his HCPOA and appoint his son Dusty. I give the original and a copy to the patient and I call son Dusty and update him   - the patient wishes to remain a full code   - will follow for goals of care and support.   Vital Signs:  BP (!) 103/58   Pulse 70   Temp 97.9 °F (36.6 °C) (Oral)   Resp 16   Ht 1.854 m (6' 1\")   Wt 61.2 kg (135 lb)   SpO2 98%   BMI 17.81 kg/m²     Patient Active Problem List   Diagnosis    On continuous oral anticoagulation    CHF, acute on chronic (HCC)    Hyperlipidemia    Former smoker    Pacemaker    History of DVT of lower extremity    Enlarged prostate    Cataract of both eyes    GERD (gastroesophageal reflux disease)    History of inguinal hernia repair    Hypertension    Pneumonia    History of right hip replacement    History of gastric surgery    Low back pain    Chest pain    Fluid overload    VARSHA (acute kidney injury)    History of acute myocardial infarction    Chronic CHF (congestive heart failure) (HCC)    Unstable angina (HCC)    Acute on chronic diastolic congestive heart failure (HCC)

## 2025-05-05 NOTE — PROGRESS NOTES
[facility]  Office: [phone]  Antwon Bernardo DO, Fernando Vyas DO, Drake Haynes DO, Geovani Honeycutt DO, Yani Pritchard MD, Isabella Meade MD, Richard Hidalgo MD, Miriam Michael MD,  Keo Pope MD, Eli Barbour MD, Giovanna Louie MD,  Yee Kerr DO, Dion Gilliland MD, Jonny Basurto MD, Alex Bernardo DO, Naifsa Lyles MD,  Jarred Johnson DO, Karen Shipley MD, Jessika Ravi MD, Carlos De León MD,  Bola Callaway MD, Roly Brunson MD, Rojas Barfield MD, Francisco J Monzon MD, Jerry Maher MD, Jelani Jameson MD, Rudy Webb DO, Susan Asher MD, Teodoro Casas MD, Yee Meehan MD, Mohsin Reza, MD, Lane Burnette MD, Shirley Waterhouse, CNP,  Isidra Bianchi, CNP, Rudy Castaneda, CNP,  Magda Cannon, DNP, Jaleesa Meeks, CNP, Rakel Purdy, CNP, Tatiana Walton, CNP, Stephanie Cyr, CNP, Luz Maria Castellanos, PA-C, Joie Song, CNP, Evelio Bear, CNP,  Venecia Shanks, CNP, Erin Alaniz, CNP, Rocky Robins, PA-C, Rachel Ling, CNP,  Maura Raman, CNS, Vaishali Lucio, CNP, Sarah Ramirez, CNP,   Graciela Blue, CNP         [facility]   IN-PATIENT SERVICE   [facility]    Progress Note    5/5/2025    8:25 AM    Name:   Hemanth Barrera  MRN:     [id number]     Acct:      [id number]   Room:   327/327-01   Day:  4  Admit Date:  5/1/2025 10:07 AM    PCP:   Neftaly Contreras MD  Code Status:  Full Code    Subjective:     Patient was seen in follow-up for unstable angina. He reports feeling \"great\" and has no specific complaints this morning. Awaiting placement.     Medications:     Allergies:    Allergies   Allergen Reactions    Aspirin Other (See Comments)     Pt told not to take since he has only a partial stomach    Cyclobenzaprine Other (See Comments)     Pt stated heart palpitations and he felt funny    Ibuprofen Nausea Only    Azithromycin Nausea Only    Hydrocodone-Acetaminophen Nausea Only     per pt, he is having upset stomach when taking norco       Current  fever

## 2025-05-05 NOTE — CARE COORDINATION
I spoke with Doctor Elsi and she feels patient  would be better served in a LTC situation or home care with assistance there at this time.

## 2025-05-05 NOTE — DISCHARGE INSTR - COC
Continuity of Care Form    Patient Name: Hemanth Barrera   :  1935  MRN:  [id number]    Admit date:  2025  Discharge date:  25    Code Status Order: Full Code   Advance Directives:     Admitting Physician:  Jonny Basurto MD  PCP: Neftaly Contreras MD    Discharging Nurse: Elizabeth CABALLERO RN  Discharging Hospital Unit/Room#: 327/327-01  Discharging Unit Phone Number: [phone]    Emergency Contact:   Extended Emergency Contact Information  Primary Emergency Contact: Dusty Barrera  Home Phone: [phone]  Mobile Phone: [phone]  Relation: Child    Past Surgical History:  Past Surgical History:   Procedure Laterality Date    ABDOMEN SURGERY      gastric resection    APPENDECTOMY      BONE MARROW BIOPSY      CARDIAC CATHETERIZATION      CARDIAC PROCEDURE N/A 2024    zafrconcepcion / Left heart cath / coronary angiography / rm 512 performed by Cathie Hastings MD at [facility] CARDIAC CATH LAB    CARDIAC PROCEDURE N/A 2024    Percutaneous coronary intervention performed by Cathie Hastings MD at [facility] CARDIAC CATH LAB    CARDIAC PROCEDURE Right 2025    Left heart cath / coronary angiography performed by Guy Paz MD at [facility] CARDIAC CATH LAB    COLONOSCOPY      COLONOSCOPY N/A 8/3/2023    COLONOSCOPY WITH BIOPSY performed by Isauro Razo MD at [facility] ENDO    COLONOSCOPY N/A 3/10/2025    COLONOSCOPY DIAGNOSTIC performed by Shirin Torre MD at [facility] OR    CT BIOPSY BONE MARROW  2022    CT BONE MARROW BIOPSY 2022 [facility] CT SCAN    EYE SURGERY      smiley cataracts    HERNIA REPAIR      inguinal smiley    INVASIVE VASCULAR N/A 2025    Ultrasound guided vascular access performed by Guy Paz MD at [facility] CARDIAC CATH LAB    JOINT REPLACEMENT      rt hip x 2, lt knee    PACEMAKER PLACEMENT      SIGMOIDOSCOPY N/A 3/7/2025    SIGMOIDOSCOPY DIAGNOSTIC FLEXIBLE performed by Isauro Razo MD at [facility] OR    UPPER GASTROINTESTINAL ENDOSCOPY N/A 2023    EGD BIOPSY performed by

## 2025-05-05 NOTE — PLAN OF CARE
Problem: Chronic Conditions and Co-morbidities  Goal: Patient's chronic conditions and co-morbidity symptoms are monitored and maintained or improved  5/5/2025 1329 by Elizabeth Lebron RN  Outcome: Adequate for Discharge  Flowsheets (Taken 5/5/2025 0740)  Care Plan - Patient's Chronic Conditions and Co-Morbidity Symptoms are Monitored and Maintained or Improved: Monitor and assess patient's chronic conditions and comorbid symptoms for stability, deterioration, or improvement  5/5/2025 0036 by Gisele Reyes RN  Outcome: Progressing  Flowsheets (Taken 5/5/2025 0036)  Care Plan - Patient's Chronic Conditions and Co-Morbidity Symptoms are Monitored and Maintained or Improved:   Monitor and assess patient's chronic conditions and comorbid symptoms for stability, deterioration, or improvement   Collaborate with multidisciplinary team to address chronic and comorbid conditions and prevent exacerbation or deterioration     Problem: Discharge Planning  Goal: Discharge to home or other facility with appropriate resources  5/5/2025 1329 by Elizabeth Lebron RN  Outcome: Adequate for Discharge  Flowsheets (Taken 5/5/2025 0740)  Discharge to home or other facility with appropriate resources: Identify barriers to discharge with patient and caregiver  5/5/2025 0036 by Gisele Reyes RN  Outcome: Progressing  Flowsheets (Taken 5/5/2025 0036)  Discharge to home or other facility with appropriate resources:   Identify barriers to discharge with patient and caregiver   Arrange for needed discharge resources and transportation as appropriate   Identify discharge learning needs (meds, wound care, etc)   Arrange for interpreters to assist at discharge as needed     Problem: Pain  Goal: Verbalizes/displays adequate comfort level or baseline comfort level  5/5/2025 1329 by Elizabeth Lebron RN  Outcome: Adequate for Discharge  5/5/2025 0036 by Gisele Reyes RN  Outcome: Progressing  Flowsheets (Taken 5/5/2025 0036)  Verbalizes/displays

## 2025-05-05 NOTE — PROGRESS NOTES
The patient requires a raised toilet as he is unable to use a regular toilet seat due to underlying physical debility. This was discussed with the patient and he is in agreement.

## 2025-05-05 NOTE — PLAN OF CARE
Problem: Chronic Conditions and Co-morbidities  Goal: Patient's chronic conditions and co-morbidity symptoms are monitored and maintained or improved  5/5/2025 0036 by Gisele Reyes RN  Outcome: Progressing  Flowsheets (Taken 5/5/2025 0036)  Care Plan - Patient's Chronic Conditions and Co-Morbidity Symptoms are Monitored and Maintained or Improved:   Monitor and assess patient's chronic conditions and comorbid symptoms for stability, deterioration, or improvement   Collaborate with multidisciplinary team to address chronic and comorbid conditions and prevent exacerbation or deterioration  5/4/2025 1238 by Gwen Sams RN  Outcome: Progressing     Problem: Discharge Planning  Goal: Discharge to home or other facility with appropriate resources  5/5/2025 0036 by Gisele Reyes RN  Outcome: Progressing  Flowsheets (Taken 5/5/2025 0036)  Discharge to home or other facility with appropriate resources:   Identify barriers to discharge with patient and caregiver   Arrange for needed discharge resources and transportation as appropriate   Identify discharge learning needs (meds, wound care, etc)   Arrange for interpreters to assist at discharge as needed  5/4/2025 1238 by Gwen Sams RN  Outcome: Progressing     Problem: Pain  Goal: Verbalizes/displays adequate comfort level or baseline comfort level  5/5/2025 0036 by Gisele Reyes RN  Outcome: Progressing  Flowsheets (Taken 5/5/2025 0036)  Verbalizes/displays adequate comfort level or baseline comfort level:   Encourage patient to monitor pain and request assistance   Assess pain using appropriate pain scale   Administer analgesics based on type and severity of pain and evaluate response  5/4/2025 1238 by Gwen Sams RN  Outcome: Progressing     Problem: ABCDS Injury Assessment  Goal: Absence of physical injury  5/4/2025 1238 by Gwen Sams RN  Outcome: Progressing     Problem: Safety - Adult  Goal: Free from fall

## 2025-05-05 NOTE — CARE COORDINATION
Received a call from Genet from [employer] offering a Peer to Peer for precert for admission to SNF, call #[phone], option #1, authorization # is [id number], [employer] ID is [id number], YOB: 1935, need to call by 1300 today.    0922-Dr. Basurto requesting that we have Dr. Calzada do the peer to peer if possible.

## 2025-05-05 NOTE — ACP (ADVANCE CARE PLANNING)
..Advance Care Planning     Palliative Team Advance Care Planning (ACP) Conversation    Date of Conversation: 05/05/25    Individuals present for the conversation: Patient with decision making capacity     ACP documents on file prior to discussion:  there was a HCPOA document scanned into the chart and it was totally blank. A new HCPOA document was completed and the patient appoints his son Dusty Barrera as his primary decision maker and not alternates.     Previously completed document/s not on file: Not discussed.    Healthcare Decision Maker:    Primary Decision Maker: Dusty Barrera - Child - [phone]     Conversation Summary:  The new HCPOA is completed and witnessed. I place one in the paper chart. I offer the patient the original and a copy. I as well update yovani Montano that it is completed and the original and copy is in his Dad's room.     Resuscitation Status:   Code Status: Full Code     Documentation Completed:  -Power of  for Healthcare        Susanne Ortega RN         Unable to assess

## 2025-05-05 NOTE — CARE COORDINATION
Received a call from Negrita at [facility] and they cannot provide a raised toilet seat as [facility] does not cover it.    1653-Spoke with Genet from [facility] and stay at [facility] has been denied.

## 2025-05-05 NOTE — CARE COORDINATION
Contacted Dr Calzada for peer to peer per Dr Basurto request at [phone] - Info given and requested email with information. Email sent to [email or web address]. Contact information given if have any further questions.   1200 Spoke with pt about denial from [facility]  [place] . Pt has planned on going home - Lives with Son will send referral for home care .   1230 Spoke with son Elias  and notified of inability to send pt to [facility] and plan per his Dads request to return home with him . Elias is agreeable and able to pick him up later as he is in [place] working - DME needs a raised toilet seat and will send to [facility] . Home care referral also will be sent -agreeable to referrals to [facility] and [employer] . Pt had prior services and he will let me know what company . I will also send referrals .My phone number given to Elias and requested he call me with update as to time of picking pt up and if he remembers company   WINTER DYSON [phone] Child         name .       [facility]    Pt agreeable to discharge 2nd IMM given   [facility] not previous caretakers.   Spoke with DME Company not in network will send referral to [facility] and will also let pt know can be purchased at [employer] or [employer]  5379 spoke with  Elias  and he relates he will pick one up and send referral as well   1260 Sent referral to [facility] -(for raised toilet seat ) [phone] Faxed order face to face and face sheet to [phone].   Contacted son and he plans to be up here at 530 ill let nursing know

## 2025-05-05 NOTE — PROGRESS NOTES
Spiritual Health History and Assessment/Progress Note  [facility]    (P) Palliative Care,  , (P) Life Adjustments, Adjustment to illness, Anticipatory Grief,      Name: Hemanth Barrera MRN: [id number]    Age: 89 y.o.     Sex: male   Language: English   Yarsani: Zoroastrian   Unstable angina (HCC)     Date: 5/5/2025            Total Time Calculated: (P) 25 min              Spiritual Assessment continued in [facility]        Referral/Consult From: (P) Palliative Care   Encounter Overview/Reason: (P) Palliative Care  Service Provided For: (P) Patient       followed up with Pt. During rounding. Pt. Was very friendly and welcoming. Open to conversation.  read  scripture and  prayed for Pt. For encouragement, comfort and hope. Pt. Expressed appreciation for visit.    Deepali, Belief, Meaning:   Patient has beliefs or practices that help with coping during difficult times  Family/Friends No family/friends present      Importance and Influence:  Patient has spiritual/personal beliefs that influence decisions regarding their health  Family/Friends No family/friends present    Community:  Patient feels well-supported. Support system includes: Children  Family/Friends feel well-supported. Support system includes: Parent/s and Extended family    Assessment and Plan of Care:     Patient Interventions include: Facilitated expression of thoughts and feelings, Explored spiritual coping/struggle/distress, and Engaged in theological reflection  Family/Friends Interventions include: No family/friends present    Patient Plan of Care: Spiritual Care available upon further referral  Family/Friends Plan of Care: No family/friends present    Electronically signed by Chaplain KELBY on 5/5/2025 at 7:07 PM    05/05/25 1905   Encounter Summary   Encounter Overview/Reason Palliative Care   Service Provided For Patient   Referral/Consult From Palliative Care   Support System Children   Last

## 2025-05-05 NOTE — CARE COORDINATION
Spoke with Germaine from [facility] and they can take patient on discharge, cancel them if we find who is following him at home and they want to stay with them.     1325-Spoke with Adelaida from [facility] and they can take patient on discharge if needed, let them know if needed. He is not current with them.

## 2025-05-06 ENCOUNTER — CARE COORDINATION (OUTPATIENT)
Dept: CARE COORDINATION | Age: 89
End: 2025-05-06

## 2025-05-06 RX ORDER — DILTIAZEM HYDROCHLORIDE 120 MG/1
120 CAPSULE, COATED, EXTENDED RELEASE ORAL DAILY
Qty: 90 CAPSULE | Refills: 1 | Status: ON HOLD | OUTPATIENT
Start: 2025-05-06

## 2025-05-06 RX ORDER — NITROGLYCERIN 0.4 MG/1
TABLET SUBLINGUAL
Qty: 25 TABLET | Refills: 0 | Status: ON HOLD | OUTPATIENT
Start: 2025-05-06

## 2025-05-06 RX ORDER — LATANOPROST 50 UG/ML
1 SOLUTION/ DROPS OPHTHALMIC NIGHTLY
Qty: 1 EACH | Refills: 1 | Status: ON HOLD | OUTPATIENT
Start: 2025-05-06

## 2025-05-06 RX ORDER — METOPROLOL SUCCINATE 50 MG/1
50 TABLET, EXTENDED RELEASE ORAL DAILY
Qty: 30 TABLET | Refills: 1 | Status: ON HOLD | OUTPATIENT
Start: 2025-05-06

## 2025-05-06 RX ORDER — ERGOCALCIFEROL 1.25 MG/1
50000 CAPSULE, LIQUID FILLED ORAL WEEKLY
Qty: 12 CAPSULE | Refills: 1 | Status: ON HOLD | OUTPATIENT
Start: 2025-05-06

## 2025-05-06 RX ORDER — MIDODRINE HYDROCHLORIDE 2.5 MG/1
2.5 TABLET ORAL
Qty: 90 TABLET | Refills: 1 | Status: ON HOLD | OUTPATIENT
Start: 2025-05-06

## 2025-05-06 RX ORDER — ISOSORBIDE MONONITRATE 30 MG/1
30 TABLET, EXTENDED RELEASE ORAL DAILY
Qty: 30 TABLET | Refills: 1 | Status: ON HOLD | OUTPATIENT
Start: 2025-05-06

## 2025-05-06 RX ORDER — ASPIRIN 81 MG/1
81 TABLET, CHEWABLE ORAL DAILY
Qty: 14 TABLET | Refills: 0 | Status: ON HOLD | OUTPATIENT
Start: 2025-05-06

## 2025-05-06 RX ORDER — FERROUS SULFATE 325(65) MG
325 TABLET ORAL 2 TIMES DAILY WITH MEALS
Qty: 30 TABLET | Refills: 1 | Status: ON HOLD | OUTPATIENT
Start: 2025-05-06

## 2025-05-06 RX ORDER — FINASTERIDE 5 MG/1
5 TABLET, FILM COATED ORAL DAILY
Qty: 90 TABLET | Refills: 1 | Status: ON HOLD | OUTPATIENT
Start: 2025-05-06

## 2025-05-06 RX ORDER — GABAPENTIN 100 MG/1
100 CAPSULE ORAL 2 TIMES DAILY
Qty: 90 CAPSULE | Refills: 1 | Status: ON HOLD | OUTPATIENT
Start: 2025-05-06 | End: 2025-06-05

## 2025-05-06 RX ORDER — ATORVASTATIN CALCIUM 40 MG/1
40 TABLET, FILM COATED ORAL NIGHTLY
Qty: 30 TABLET | Refills: 1 | Status: ON HOLD | OUTPATIENT
Start: 2025-05-06

## 2025-05-06 RX ORDER — PANTOPRAZOLE SODIUM 40 MG/1
40 TABLET, DELAYED RELEASE ORAL
Qty: 30 TABLET | Refills: 1 | Status: ON HOLD | OUTPATIENT
Start: 2025-05-06

## 2025-05-06 RX ORDER — RANOLAZINE 500 MG/1
500 TABLET, EXTENDED RELEASE ORAL 2 TIMES DAILY
Qty: 60 TABLET | Refills: 1 | Status: ON HOLD | OUTPATIENT
Start: 2025-05-06

## 2025-05-06 RX ORDER — BUMETANIDE 2 MG/1
2 TABLET ORAL DAILY
Qty: 30 TABLET | Refills: 1 | Status: ON HOLD | OUTPATIENT
Start: 2025-05-06

## 2025-05-06 NOTE — CARE COORDINATION
Care Transitions Note    Initial Call attempt #1 - Call within 2 business days of discharge: Yes  Attempted to reach patient, son  for transitions of care follow up. Unable to reach patient, son .  Son's contact # is unreachable according to [employer] message.    Outreach Attempts:   HIPAA compliant voicemail left for patient.   Reached [facility] - will attempt to reach and visit patient    Patient: Hemanth Barrera      Patient : 1935   MRN: [id number]      Reason for Admission: chest pain, conservative treatment per cardiology based on advanced age  Discharge Date: 25    RURS: Readmission Risk Score: 29.4    - PB admission for chest pain requiring NTG IV drip.  Determined conservative treatment per cardiology based on advanced age of 89.  Ranexa prescribed and continue other medications as prescribed.  Initial plan was to discharge to SNF, but insurance denied placement.  Discharged home with [facility].    Last Discharge Facility       Date Complaint Diagnosis Description Type Department Provider    25 Shortness of Breath Angina at rest ... ED to Hosp-Admission (Discharged) (ADMITTED) [facility] 3 Jonny Ochoa MD; Saima,...            Was this an external facility discharge? No    Follow Up Appointment:   Patient does not have a follow up appointment scheduled at time of call.  Routed to PCP clinical pool      Plan for follow-up on next business day.      Kristin Fermin RN

## 2025-05-07 ENCOUNTER — CARE COORDINATION (OUTPATIENT)
Dept: CARE COORDINATION | Age: 89
End: 2025-05-07

## 2025-05-07 DIAGNOSIS — I20.89 ANGINA AT REST: Primary | ICD-10-CM

## 2025-05-07 PROCEDURE — 1111F DSCHRG MED/CURRENT MED MERGE: CPT | Performed by: INTERNAL MEDICINE

## 2025-05-08 NOTE — TELEPHONE ENCOUNTER
I was able to contact patient and schedule appointment with Dr. Haskins on Monday.   Patient is requesting pain medication. States he broke a few ribs and is in a lot of pain, please review chart and advise if you are willing to order anything for patient.

## 2025-05-12 ENCOUNTER — TELEPHONE (OUTPATIENT)
Dept: INTERNAL MEDICINE CLINIC | Age: 89
End: 2025-05-12

## 2025-05-14 ENCOUNTER — CARE COORDINATION (OUTPATIENT)
Dept: CARE COORDINATION | Age: 89
End: 2025-05-14

## 2025-05-14 NOTE — CARE COORDINATION
Care Transitions Note    Follow Up Call     Patient Current Location:  Home: [address] [place] [address]    [facility] Care Coordinator contacted the patient by telephone. Verified name and  as identifiers.    Additional needs identified to be addressed with provider   No needs identified                 Method of communication with provider: none.    Care Summary Note: Writer spoke with Hemanth for a follow up care transitions call. He states he is doing okay. He denies having any more chest pain but does have fatigue. He reports therapy is going well and he can feel that he is already getting some of his strength back. He reports he has been in and out of the hospital since January and this has taken a lot of his strength. He states the [facility] nurses are coming out twice a week and his vitals have been good when checked. He misses his hospital follow up due to his sin having to work but will be seeing PCP now on 25. He has not yet scheduled with his cardiologist due to having to schedule around his sons work schedule. He states he has been taking the Ranexa without any side effects-his DIL sets up his meds for him. He thanked writer for calling and denied having any other needs or concerns at this time.     Plan of care updates since last contact:  Review of patient management of conditions/medications:         Advance Care Planning:   Does patient have an Advance Directive: reviewed and current.    Medication Review:  No changes since last call.     Remote Patient Monitoring:  Offered patient enrollment in the Remote Patient Monitoring (RPM) program for in-home monitoring: Yes, but did not enroll at this time: limited patient ability to navigate RPM/equipment.    Assessments:  Care Transitions Subsequent and Final Call    Subsequent and Final Calls  Do you have any ongoing symptoms?: No  Have your medications changed?: No  Do you have any questions related to your medications?: No  Do you currently have any

## 2025-05-15 ENCOUNTER — TELEPHONE (OUTPATIENT)
Dept: INTERNAL MEDICINE CLINIC | Age: 89
End: 2025-05-15

## 2025-05-18 ENCOUNTER — HOSPITAL ENCOUNTER (INPATIENT)
Age: 89
LOS: 8 days | Discharge: HOME | DRG: 682 | End: 2025-05-26
Attending: EMERGENCY MEDICINE | Admitting: INTERNAL MEDICINE
Payer: MEDICARE

## 2025-05-18 ENCOUNTER — APPOINTMENT (OUTPATIENT)
Dept: CT IMAGING | Age: 89
DRG: 682 | End: 2025-05-18
Payer: MEDICARE

## 2025-05-18 DIAGNOSIS — R13.10 DYSPHAGIA, UNSPECIFIED TYPE: ICD-10-CM

## 2025-05-18 DIAGNOSIS — N17.9 AKI (ACUTE KIDNEY INJURY): ICD-10-CM

## 2025-05-18 DIAGNOSIS — R06.00 DYSPNEA, UNSPECIFIED TYPE: Primary | ICD-10-CM

## 2025-05-18 PROBLEM — N18.9 ACUTE KIDNEY INJURY SUPERIMPOSED ON CKD: Status: ACTIVE | Noted: 2025-05-18

## 2025-05-18 LAB
ALBUMIN SERPL-MCNC: 3.7 G/DL (ref 3.5–5.2)
ALP SERPL-CCNC: 85 U/L (ref 40–129)
ALT SERPL-CCNC: 10 U/L (ref 10–50)
ANION GAP SERPL CALCULATED.3IONS-SCNC: 13 MMOL/L (ref 9–16)
AST SERPL-CCNC: 21 U/L (ref 10–50)
B PARAP IS1001 DNA NPH QL NAA+NON-PROBE: NOT DETECTED
B PERT DNA SPEC QL NAA+PROBE: NOT DETECTED
BASOPHILS # BLD: 0 K/UL (ref 0–0.2)
BASOPHILS NFR BLD: 0 % (ref 0–2)
BILIRUB SERPL-MCNC: 0.6 MG/DL (ref 0–1.2)
BUN SERPL-MCNC: 21 MG/DL (ref 8–23)
C PNEUM DNA NPH QL NAA+NON-PROBE: NOT DETECTED
CALCIUM SERPL-MCNC: 8.3 MG/DL (ref 8.6–10.4)
CHLORIDE SERPL-SCNC: 107 MMOL/L (ref 98–107)
CO2 SERPL-SCNC: 24 MMOL/L (ref 20–31)
CREAT SERPL-MCNC: 1.3 MG/DL (ref 0.7–1.2)
EOSINOPHIL # BLD: 0.11 K/UL (ref 0–0.4)
EOSINOPHILS RELATIVE PERCENT: 3 % (ref 0–4)
ERYTHROCYTE [DISTWIDTH] IN BLOOD BY AUTOMATED COUNT: 18.6 % (ref 11.5–14.9)
FLUAV RNA NPH QL NAA+NON-PROBE: NOT DETECTED
FLUBV RNA NPH QL NAA+NON-PROBE: NOT DETECTED
GFR, ESTIMATED: 52 ML/MIN/1.73M2
GLUCOSE SERPL-MCNC: 108 MG/DL (ref 74–99)
HADV DNA NPH QL NAA+NON-PROBE: NOT DETECTED
HCOV 229E RNA NPH QL NAA+NON-PROBE: NOT DETECTED
HCOV HKU1 RNA NPH QL NAA+NON-PROBE: NOT DETECTED
HCOV NL63 RNA NPH QL NAA+NON-PROBE: NOT DETECTED
HCOV OC43 RNA NPH QL NAA+NON-PROBE: NOT DETECTED
HCT VFR BLD AUTO: 30.9 % (ref 41–53)
HGB BLD-MCNC: 10.1 G/DL (ref 13.5–17.5)
HMPV RNA NPH QL NAA+NON-PROBE: DETECTED
HPIV1 RNA NPH QL NAA+NON-PROBE: NOT DETECTED
HPIV2 RNA NPH QL NAA+NON-PROBE: NOT DETECTED
HPIV3 RNA NPH QL NAA+NON-PROBE: NOT DETECTED
HPIV4 RNA NPH QL NAA+NON-PROBE: NOT DETECTED
IMM GRANULOCYTES # BLD AUTO: 0 K/UL (ref 0–0.3)
IMM GRANULOCYTES NFR BLD: 0 %
INR PPP: 1.4
LIPASE SERPL-CCNC: 24 U/L (ref 13–60)
LYMPHOCYTES NFR BLD: 0.68 K/UL (ref 1–4.8)
LYMPHOCYTES RELATIVE PERCENT: 18 % (ref 24–44)
M PNEUMO DNA NPH QL NAA+NON-PROBE: NOT DETECTED
MAGNESIUM SERPL-MCNC: 1.4 MG/DL (ref 1.7–2.3)
MAGNESIUM SERPL-MCNC: 1.8 MG/DL (ref 1.7–2.3)
MCH RBC QN AUTO: 30.3 PG (ref 26–34)
MCHC RBC AUTO-ENTMCNC: 32.7 G/DL (ref 31–37)
MCV RBC AUTO: 92.8 FL (ref 80–100)
MONOCYTES NFR BLD: 0.19 K/UL (ref 0.1–1.3)
MONOCYTES NFR BLD: 5 % (ref 1–7)
MORPHOLOGY: ABNORMAL
NEUTROPHILS NFR BLD: 74 % (ref 36–66)
NEUTS SEG NFR BLD: 2.82 K/UL (ref 1.3–9.1)
NRBC BLD-RTO: 0 PER 100 WBC
PLATELET # BLD AUTO: 252 K/UL (ref 150–450)
PMV BLD AUTO: 9.8 FL (ref 8–13.5)
POTASSIUM SERPL-SCNC: 3.3 MMOL/L (ref 3.7–5.3)
POTASSIUM SERPL-SCNC: 3.4 MMOL/L (ref 3.7–5.3)
PROT SERPL-MCNC: 6.9 G/DL (ref 6.6–8.7)
PROTHROMBIN TIME: 18.2 SEC (ref 11.8–14.6)
RBC # BLD AUTO: 3.33 M/UL (ref 4.21–5.77)
RSV RNA NPH QL NAA+NON-PROBE: NOT DETECTED
RV+EV RNA NPH QL NAA+NON-PROBE: NOT DETECTED
S PNEUM AG SPEC QL: NEGATIVE
SARS-COV-2 RNA NPH QL NAA+NON-PROBE: NOT DETECTED
SODIUM SERPL-SCNC: 144 MMOL/L (ref 136–145)
SPECIMEN DESCRIPTION: ABNORMAL
SPECIMEN SOURCE: NORMAL
TROPONIN I SERPL HS-MCNC: 44 NG/L (ref 0–22)
TROPONIN I SERPL HS-MCNC: 44 NG/L (ref 0–22)
WBC OTHER # BLD: 3.8 K/UL (ref 3.5–11)

## 2025-05-18 PROCEDURE — 2060000000 HC ICU INTERMEDIATE R&B

## 2025-05-18 PROCEDURE — 99223 1ST HOSP IP/OBS HIGH 75: CPT | Performed by: INTERNAL MEDICINE

## 2025-05-18 PROCEDURE — 2580000003 HC RX 258: Performed by: EMERGENCY MEDICINE

## 2025-05-18 PROCEDURE — 6360000002 HC RX W HCPCS: Performed by: NURSE PRACTITIONER

## 2025-05-18 PROCEDURE — 85025 COMPLETE CBC W/AUTO DIFF WBC: CPT

## 2025-05-18 PROCEDURE — 36415 COLL VENOUS BLD VENIPUNCTURE: CPT

## 2025-05-18 PROCEDURE — 2500000003 HC RX 250 WO HCPCS: Performed by: EMERGENCY MEDICINE

## 2025-05-18 PROCEDURE — 0202U NFCT DS 22 TRGT SARS-COV-2: CPT

## 2025-05-18 PROCEDURE — 93005 ELECTROCARDIOGRAM TRACING: CPT | Performed by: EMERGENCY MEDICINE

## 2025-05-18 PROCEDURE — 87899 AGENT NOS ASSAY W/OPTIC: CPT

## 2025-05-18 PROCEDURE — 6370000000 HC RX 637 (ALT 250 FOR IP)

## 2025-05-18 PROCEDURE — 86738 MYCOPLASMA ANTIBODY: CPT

## 2025-05-18 PROCEDURE — 96375 TX/PRO/DX INJ NEW DRUG ADDON: CPT

## 2025-05-18 PROCEDURE — 71260 CT THORAX DX C+: CPT

## 2025-05-18 PROCEDURE — 6360000004 HC RX CONTRAST MEDICATION: Performed by: EMERGENCY MEDICINE

## 2025-05-18 PROCEDURE — 84132 ASSAY OF SERUM POTASSIUM: CPT

## 2025-05-18 PROCEDURE — 6360000002 HC RX W HCPCS: Performed by: EMERGENCY MEDICINE

## 2025-05-18 PROCEDURE — 83735 ASSAY OF MAGNESIUM: CPT

## 2025-05-18 PROCEDURE — 85610 PROTHROMBIN TIME: CPT

## 2025-05-18 PROCEDURE — 84484 ASSAY OF TROPONIN QUANT: CPT

## 2025-05-18 PROCEDURE — 6370000000 HC RX 637 (ALT 250 FOR IP): Performed by: EMERGENCY MEDICINE

## 2025-05-18 PROCEDURE — 6360000002 HC RX W HCPCS

## 2025-05-18 PROCEDURE — 83690 ASSAY OF LIPASE: CPT

## 2025-05-18 PROCEDURE — 96374 THER/PROPH/DIAG INJ IV PUSH: CPT

## 2025-05-18 PROCEDURE — 80053 COMPREHEN METABOLIC PANEL: CPT

## 2025-05-18 PROCEDURE — 99285 EMERGENCY DEPT VISIT HI MDM: CPT

## 2025-05-18 RX ORDER — GABAPENTIN 100 MG/1
100 CAPSULE ORAL 2 TIMES DAILY
Status: DISCONTINUED | OUTPATIENT
Start: 2025-05-18 | End: 2025-05-26 | Stop reason: HOSPADM

## 2025-05-18 RX ORDER — SODIUM CHLORIDE 0.9 % (FLUSH) 0.9 %
10 SYRINGE (ML) INJECTION PRN
Status: DISCONTINUED | OUTPATIENT
Start: 2025-05-18 | End: 2025-05-26 | Stop reason: HOSPADM

## 2025-05-18 RX ORDER — METOPROLOL SUCCINATE 50 MG/1
50 TABLET, EXTENDED RELEASE ORAL DAILY
Status: DISCONTINUED | OUTPATIENT
Start: 2025-05-18 | End: 2025-05-26 | Stop reason: HOSPADM

## 2025-05-18 RX ORDER — ATORVASTATIN CALCIUM 40 MG/1
40 TABLET, FILM COATED ORAL NIGHTLY
Status: DISCONTINUED | OUTPATIENT
Start: 2025-05-18 | End: 2025-05-26 | Stop reason: HOSPADM

## 2025-05-18 RX ORDER — 0.9 % SODIUM CHLORIDE 0.9 %
100 INTRAVENOUS SOLUTION INTRAVENOUS ONCE
Status: COMPLETED | OUTPATIENT
Start: 2025-05-18 | End: 2025-05-18

## 2025-05-18 RX ORDER — SODIUM CHLORIDE 9 MG/ML
INJECTION, SOLUTION INTRAVENOUS CONTINUOUS
Status: DISCONTINUED | OUTPATIENT
Start: 2025-05-18 | End: 2025-05-19

## 2025-05-18 RX ORDER — MAGNESIUM SULFATE HEPTAHYDRATE 40 MG/ML
2000 INJECTION, SOLUTION INTRAVENOUS ONCE
Status: COMPLETED | OUTPATIENT
Start: 2025-05-18 | End: 2025-05-18

## 2025-05-18 RX ORDER — MORPHINE SULFATE 4 MG/ML
4 INJECTION, SOLUTION INTRAMUSCULAR; INTRAVENOUS
Refills: 0 | Status: COMPLETED | OUTPATIENT
Start: 2025-05-18 | End: 2025-05-18

## 2025-05-18 RX ORDER — FINASTERIDE 5 MG/1
5 TABLET, FILM COATED ORAL DAILY
Status: DISCONTINUED | OUTPATIENT
Start: 2025-05-18 | End: 2025-05-26 | Stop reason: HOSPADM

## 2025-05-18 RX ORDER — 0.9 % SODIUM CHLORIDE 0.9 %
1000 INTRAVENOUS SOLUTION INTRAVENOUS ONCE
Status: COMPLETED | OUTPATIENT
Start: 2025-05-18 | End: 2025-05-18

## 2025-05-18 RX ORDER — MAGNESIUM SULFATE HEPTAHYDRATE 40 MG/ML
2000 INJECTION, SOLUTION INTRAVENOUS PRN
Status: DISCONTINUED | OUTPATIENT
Start: 2025-05-18 | End: 2025-05-26 | Stop reason: HOSPADM

## 2025-05-18 RX ORDER — IOPAMIDOL 755 MG/ML
75 INJECTION, SOLUTION INTRAVASCULAR
Status: COMPLETED | OUTPATIENT
Start: 2025-05-18 | End: 2025-05-18

## 2025-05-18 RX ORDER — LIDOCAINE 4 G/G
1 PATCH TOPICAL ONCE
Status: COMPLETED | OUTPATIENT
Start: 2025-05-18 | End: 2025-05-18

## 2025-05-18 RX ORDER — MIDODRINE HYDROCHLORIDE 2.5 MG/1
2.5 TABLET ORAL
Status: DISCONTINUED | OUTPATIENT
Start: 2025-05-18 | End: 2025-05-26 | Stop reason: HOSPADM

## 2025-05-18 RX ORDER — RANOLAZINE 500 MG/1
500 TABLET, EXTENDED RELEASE ORAL 2 TIMES DAILY
Status: DISCONTINUED | OUTPATIENT
Start: 2025-05-18 | End: 2025-05-19

## 2025-05-18 RX ORDER — ASPIRIN 81 MG/1
81 TABLET, CHEWABLE ORAL DAILY
Status: CANCELLED | OUTPATIENT
Start: 2025-05-18

## 2025-05-18 RX ORDER — NITROGLYCERIN 0.4 MG/1
0.4 TABLET SUBLINGUAL EVERY 5 MIN PRN
Status: DISCONTINUED | OUTPATIENT
Start: 2025-05-18 | End: 2025-05-26 | Stop reason: HOSPADM

## 2025-05-18 RX ORDER — POTASSIUM CHLORIDE 1500 MG/1
40 TABLET, EXTENDED RELEASE ORAL PRN
Status: DISCONTINUED | OUTPATIENT
Start: 2025-05-18 | End: 2025-05-26 | Stop reason: HOSPADM

## 2025-05-18 RX ORDER — SODIUM CHLORIDE 9 MG/ML
INJECTION, SOLUTION INTRAVENOUS PRN
Status: DISCONTINUED | OUTPATIENT
Start: 2025-05-18 | End: 2025-05-26 | Stop reason: HOSPADM

## 2025-05-18 RX ORDER — POLYETHYLENE GLYCOL 3350 17 G/17G
17 POWDER, FOR SOLUTION ORAL DAILY PRN
Status: DISCONTINUED | OUTPATIENT
Start: 2025-05-18 | End: 2025-05-26 | Stop reason: HOSPADM

## 2025-05-18 RX ORDER — ACETAMINOPHEN 650 MG/1
650 SUPPOSITORY RECTAL EVERY 6 HOURS PRN
Status: DISCONTINUED | OUTPATIENT
Start: 2025-05-18 | End: 2025-05-25

## 2025-05-18 RX ORDER — ACETAMINOPHEN 325 MG/1
650 TABLET ORAL EVERY 6 HOURS PRN
Status: DISCONTINUED | OUTPATIENT
Start: 2025-05-18 | End: 2025-05-25

## 2025-05-18 RX ORDER — SODIUM CHLORIDE 0.9 % (FLUSH) 0.9 %
5-40 SYRINGE (ML) INJECTION PRN
Status: DISCONTINUED | OUTPATIENT
Start: 2025-05-18 | End: 2025-05-26 | Stop reason: HOSPADM

## 2025-05-18 RX ORDER — LATANOPROST 50 UG/ML
1 SOLUTION/ DROPS OPHTHALMIC NIGHTLY
Status: DISCONTINUED | OUTPATIENT
Start: 2025-05-18 | End: 2025-05-26 | Stop reason: HOSPADM

## 2025-05-18 RX ORDER — BUMETANIDE 1 MG/1
2 TABLET ORAL DAILY
Status: DISCONTINUED | OUTPATIENT
Start: 2025-05-18 | End: 2025-05-19

## 2025-05-18 RX ORDER — PANTOPRAZOLE SODIUM 40 MG/1
40 TABLET, DELAYED RELEASE ORAL
Status: DISCONTINUED | OUTPATIENT
Start: 2025-05-19 | End: 2025-05-26 | Stop reason: HOSPADM

## 2025-05-18 RX ORDER — DILTIAZEM HYDROCHLORIDE 120 MG/1
120 CAPSULE, COATED, EXTENDED RELEASE ORAL DAILY
Status: DISCONTINUED | OUTPATIENT
Start: 2025-05-18 | End: 2025-05-26 | Stop reason: HOSPADM

## 2025-05-18 RX ORDER — POTASSIUM CHLORIDE 7.45 MG/ML
10 INJECTION INTRAVENOUS PRN
Status: DISCONTINUED | OUTPATIENT
Start: 2025-05-18 | End: 2025-05-26 | Stop reason: HOSPADM

## 2025-05-18 RX ORDER — ISOSORBIDE MONONITRATE 30 MG/1
30 TABLET, EXTENDED RELEASE ORAL DAILY
Status: DISCONTINUED | OUTPATIENT
Start: 2025-05-18 | End: 2025-05-26 | Stop reason: HOSPADM

## 2025-05-18 RX ORDER — LIDOCAINE 4 G/G
1 PATCH TOPICAL DAILY
Status: DISCONTINUED | OUTPATIENT
Start: 2025-05-19 | End: 2025-05-26 | Stop reason: HOSPADM

## 2025-05-18 RX ORDER — SODIUM CHLORIDE 0.9 % (FLUSH) 0.9 %
5-40 SYRINGE (ML) INJECTION EVERY 12 HOURS SCHEDULED
Status: DISCONTINUED | OUTPATIENT
Start: 2025-05-18 | End: 2025-05-26 | Stop reason: HOSPADM

## 2025-05-18 RX ADMIN — GABAPENTIN 100 MG: 100 CAPSULE ORAL at 20:21

## 2025-05-18 RX ADMIN — APIXABAN 5 MG: 5 TABLET, FILM COATED ORAL at 20:21

## 2025-05-18 RX ADMIN — FINASTERIDE 5 MG: 5 TABLET, FILM COATED ORAL at 15:26

## 2025-05-18 RX ADMIN — ISOSORBIDE MONONITRATE 30 MG: 30 TABLET, EXTENDED RELEASE ORAL at 15:26

## 2025-05-18 RX ADMIN — MAGNESIUM SULFATE HEPTAHYDRATE 2000 MG: 40 INJECTION, SOLUTION INTRAVENOUS at 13:50

## 2025-05-18 RX ADMIN — RANOLAZINE 500 MG: 500 TABLET, EXTENDED RELEASE ORAL at 20:21

## 2025-05-18 RX ADMIN — SODIUM CHLORIDE, PRESERVATIVE FREE 10 ML: 5 INJECTION INTRAVENOUS at 10:16

## 2025-05-18 RX ADMIN — LATANOPROST 1 DROP: 50 SOLUTION OPHTHALMIC at 23:21

## 2025-05-18 RX ADMIN — SODIUM CHLORIDE: 9 INJECTION, SOLUTION INTRAVENOUS at 13:06

## 2025-05-18 RX ADMIN — SODIUM CHLORIDE 1000 ML: 9 INJECTION, SOLUTION INTRAVENOUS at 13:05

## 2025-05-18 RX ADMIN — HYDROMORPHONE HYDROCHLORIDE 0.25 MG: 1 INJECTION, SOLUTION INTRAMUSCULAR; INTRAVENOUS; SUBCUTANEOUS at 18:14

## 2025-05-18 RX ADMIN — DILTIAZEM HYDROCHLORIDE 120 MG: 120 CAPSULE, COATED, EXTENDED RELEASE ORAL at 15:26

## 2025-05-18 RX ADMIN — BUMETANIDE 2 MG: 1 TABLET ORAL at 15:26

## 2025-05-18 RX ADMIN — ACETAMINOPHEN 650 MG: 325 TABLET ORAL at 20:21

## 2025-05-18 RX ADMIN — ATORVASTATIN CALCIUM 40 MG: 40 TABLET, FILM COATED ORAL at 20:20

## 2025-05-18 RX ADMIN — MORPHINE SULFATE 4 MG: 4 INJECTION, SOLUTION INTRAMUSCULAR; INTRAVENOUS at 09:35

## 2025-05-18 RX ADMIN — HYDROMORPHONE HYDROCHLORIDE 0.5 MG: 1 INJECTION, SOLUTION INTRAMUSCULAR; INTRAVENOUS; SUBCUTANEOUS at 23:20

## 2025-05-18 RX ADMIN — MORPHINE SULFATE 4 MG: 4 INJECTION, SOLUTION INTRAMUSCULAR; INTRAVENOUS at 13:07

## 2025-05-18 RX ADMIN — METOPROLOL SUCCINATE 50 MG: 50 TABLET, EXTENDED RELEASE ORAL at 15:26

## 2025-05-18 RX ADMIN — POTASSIUM BICARBONATE 40 MEQ: 782 TABLET, EFFERVESCENT ORAL at 13:06

## 2025-05-18 RX ADMIN — IOPAMIDOL 75 ML: 755 INJECTION, SOLUTION INTRAVENOUS at 10:16

## 2025-05-18 RX ADMIN — SODIUM CHLORIDE 100 ML: 9 INJECTION, SOLUTION INTRAVENOUS at 10:16

## 2025-05-18 ASSESSMENT — PAIN SCALES - GENERAL
PAINLEVEL_OUTOF10: 5
PAINLEVEL_OUTOF10: 7
PAINLEVEL_OUTOF10: 7
PAINLEVEL_OUTOF10: 6
PAINLEVEL_OUTOF10: 2
PAINLEVEL_OUTOF10: 0

## 2025-05-18 ASSESSMENT — PAIN DESCRIPTION - LOCATION
LOCATION: HIP;SHOULDER
LOCATION: HIP

## 2025-05-18 ASSESSMENT — PAIN DESCRIPTION - DESCRIPTORS
DESCRIPTORS: ACHING
DESCRIPTORS: ACHING

## 2025-05-18 ASSESSMENT — PAIN DESCRIPTION - ORIENTATION
ORIENTATION: LEFT

## 2025-05-18 NOTE — H&P
[facility]  IN-PATIENT SERVICE  [facility]  IN-PATIENT SERVICE   [facility]     HISTORY AND PHYSICAL EXAMINATION            Date:   5/18/2025  Patient name:  Hemanth Barrera  Date of admission:  5/18/2025  8:54 AM  MRN:   [id number]  Account:  [id number]  YOB: 1935  PCP:    Neftaly Contreras MD  Room:   [address] [place] [address]  Code Status:    Full Code    Chief Complaint:     Chief Complaint   Patient presents with    Shortness of Breath       History Obtained From:     patient, electronic medical record    History of Present Illness:     The patient is a 89 y.o. male with a past medical history of CAD status post stent placement, DVT/PE on Eliquis (most recent 3/2025), atrial fibrillation, hypertension, hyperlipidemia, CKD 3B, recent hospital admission for fall resulting in multiple left posterior rib fractures who presented to the ED with a chief complaint of shortness of breath and left-sided rib pain.    Patient states that over the last few weeks he has been experiencing left-sided rib pain and shortness of breath that has gotten progressively worse.  Was previously admitted 2/2025 due to multiple left-sided rib fracture secondary to fall, followed by orthopedic surgery during admission, deep breathing and coughing with splint encouraged.  Reports pain being uncontrolled since fractures resulting in inability to take deep breaths.  Also states that he has had increase in cough with clear sputum production over the last couple weeks.  Denies fever/chills, GI disturbance, known sick contacts.    Most recent hospital admission 5/1-5/4/2025 due to unstable angina.  Cardiology followed with patient reports admission and symptoms managed conservatively.  Patient was then discharged to SNF in stable condition.  Most recently seen by [facility] ED 5/12/2025 for shortness of breath and back pain, workup  1.3 k/uL    Eosinophils Absolute 0.11 0.0 - 0.4 k/uL    Basophils Absolute 0.00 0.0 - 0.2 k/uL    Immature Granulocytes Absolute 0.00 0.00 - 0.30 k/uL    Morphology ANISOCYTOSIS PRESENT     Morphology FEW SCHISTOCYTES     Morphology 2+ ACANTHOCYTES    CMP    Collection Time: 05/18/25  9:07 AM   Result Value Ref Range    Sodium 144 136 - 145 mmol/L    Potassium 3.3 (L) 3.7 - 5.3 mmol/L    Chloride 107 98 - 107 mmol/L    CO2 24 20 - 31 mmol/L    Anion Gap 13 9 - 16 mmol/L    Glucose 108 (H) 74 - 99 mg/dL    BUN 21 8 - 23 mg/dL    Creatinine 1.3 (H) 0.7 - 1.2 mg/dL    Est, Glom Filt Rate 52 (L) >60 mL/min/1.73m2    Calcium 8.3 (L) 8.6 - 10.4 mg/dL    Total Protein 6.9 6.6 - 8.7 g/dL    Albumin 3.7 3.5 - 5.2 g/dL    Total Bilirubin 0.6 0.0 - 1.2 mg/dL    Alkaline Phosphatase 85 40 - 129 U/L    ALT 10 10 - 50 U/L    AST 21 10 - 50 U/L   Lipase    Collection Time: 05/18/25  9:07 AM   Result Value Ref Range    Lipase 24 13 - 60 U/L   Magnesium    Collection Time: 05/18/25  9:07 AM   Result Value Ref Range    Magnesium 1.4 (L) 1.7 - 2.3 mg/dL   Protime-INR    Collection Time: 05/18/25  9:07 AM   Result Value Ref Range    Protime 18.2 (H) 11.8 - 14.6 sec    INR 1.4    Troponin    Collection Time: 05/18/25  9:07 AM   Result Value Ref Range    Troponin, High Sensitivity 44 (H) 0 - 22 ng/L   EKG 12 Lead    Collection Time: 05/18/25  9:34 AM   Result Value Ref Range    Ventricular Rate 70 BPM    Atrial Rate 326 BPM    QRS Duration 162 ms    Q-T Interval 470 ms    QTc Calculation (Bazett) 507 ms    R Axis -84 degrees    T Axis 90 degrees   Troponin    Collection Time: 05/18/25 12:08 PM   Result Value Ref Range    Troponin, High Sensitivity 44 (H) 0 - 22 ng/L       Imaging/Diagnostics:  CT CHEST PULMONARY EMBOLISM W CONTRAST  Result Date: 5/18/2025  EXAMINATION: CTA OF THE CHEST 5/18/2025 10:10 am TECHNIQUE: CTA of the chest was performed after the administration of intravenous contrast.  Multiplanar reformatted images are  stenosis  Continue Imdur 30 mg daily, Nitrostat as needed  Lipitor 40 mg nightly    HFpEF with EF 56%  Continue home dose Bumex 2 mg daily    Dysphagia  Patient reports recent history of pain with swallowing  Speech and GI consulted for further recommendation      Diet: Adult regular  DVT: On Eliquis  GI: Protonix 40 mg daily   PT/OT    Consultations:   IP CONSULT TO INTERNAL MEDICINE  IP CONSULT TO SOCIAL WORK  IP CONSULT TO NEPHROLOGY  IP CONSULT TO GI    Patient is admitted as inpatient status because of co-morbiditieslisted above, severity of signs and symptoms as outlined, requirement for current medical therapies and most importantly because of direct risk to patient if care not provided in a hospital setting.    Annetta Martin MD  PGY I Transitional Year Resident  5/18/2025  4:49 PM    Copy sent to Neftaly Choudhary MD  Attending Physician Statement  I have discussed the care of Hemanth KAYLYNN Barrera and I have examined the patient myself and taken ROS and HPI, including pertinent history and exam findings, with the resident. I have reviewed the key elements of all parts of the encounter with the resident.  I agree with the assessment, plan and orders as documented by the resident.      Electronically signed by Sana Haskins MD

## 2025-05-18 NOTE — ED PROVIDER NOTES
EMERGENCY DEPARTMENT ENCOUNTER    Pt Name: Hemanht Barrera  MRN: [id number]  Birthdate 1/13/1935  Date of evaluation: 5/18/25  CHIEF COMPLAINT       Chief Complaint   Patient presents with    Shortness of Breath     HISTORY OF PRESENT ILLNESS   89-year-old male presents for complaints of shortness of breath and left-sided rib pain.  Reportedly had a fall few months ago and broke ribs, states that recently has been having worsening left-sided rib pain, cough, also having shortness of breath.  States that he has had some phlegm and sputum production with the cough.              REVIEW OF SYSTEMS     Review of Systems   Constitutional:  Negative for chills and fever.   HENT:  Negative for congestion and ear pain.    Eyes:  Negative for pain.   Respiratory:  Positive for shortness of breath.    Cardiovascular:  Negative for chest pain, palpitations and leg swelling.   Gastrointestinal:  Negative for abdominal pain.   Genitourinary:  Negative for dysuria and flank pain.   Musculoskeletal:  Negative for back pain.        Ribs Pain   Skin:  Negative for color change.   Neurological:  Negative for numbness and headaches.   Psychiatric/Behavioral:  Negative for confusion.    All other systems reviewed and are negative.    PASTMEDICAL HISTORY     Past Medical History:   Diagnosis Date    Acute inferolateral myocardial infarction (HCC) 11/18/2021    Arthritis     Atrial fibrillation (HCC)     CAD (coronary artery disease)     CHF (congestive heart failure) ([facility])     Glaucoma     Hx of blood clots     with hernia surgery    Hyperlipidemia     Hypertension     MI (myocardial infarction) ([facility])     NSTEMI (non-ST elevated myocardial infarction) ([facility]) 11/17/2021     Past Problem List  Patient Active Problem List   Diagnosis Code    On continuous oral anticoagulation Z79.01    CHF, acute on chronic (HCC) I50.9    Hyperlipidemia E78.5    Former smoker Z87.891    Pacemaker Z95.0    History of DVT of lower extremity Z86.718    Enlarged  Troponin, High Sensitivity 44 (*)     All other components within normal limits   TROPONIN - Abnormal; Notable for the following components:    Troponin, High Sensitivity 44 (*)     All other components within normal limits   POTASSIUM - Abnormal; Notable for the following components:    Potassium 3.4 (*)     All other components within normal limits   STREP PNEUMONIAE ANTIGEN   RESPIRATORY PANEL, MOLECULAR, WITH COVID-19   CULTURE, RESPIRATORY (WITH GRAM STAIN)   LIPASE   MAGNESIUM   MYCOPLASMA PNEUMONIAE ANTIBODY, IGM   BASIC METABOLIC PANEL W/ REFLEX TO MG FOR LOW K   CBC WITH AUTO DIFFERENTIAL       Vitals Reviewed:    Vitals:    05/18/25 1407 05/18/25 1500 05/18/25 1814 05/18/25 2021   BP:  130/81  101/71   Pulse: 70 70  70   Resp: 18 14 16 16   Temp:  97.8 °F (36.6 °C)  98.1 °F (36.7 °C)   TempSrc:  Oral  Oral   SpO2: 100% 100%  99%   Weight:       Height:         MEDICATIONS GIVEN TO PATIENT THIS ENCOUNTER:  Orders Placed This Encounter   Medications    morphine injection 4 mg     Refill:  0    lidocaine 4 % external patch 1 patch    iopamidol (ISOVUE-370) 76 % injection 75 mL    sodium chloride 0.9 % bolus 100 mL    sodium chloride flush 0.9 % injection 10 mL    morphine injection 4 mg     Refill:  0    potassium bicarb-citric acid (EFFER-K) effervescent tablet 40 mEq    sodium chloride 0.9 % bolus 1,000 mL    0.9 % sodium chloride infusion    magnesium sulfate 2000 mg in water 50 mL IVPB    pantoprazole (PROTONIX) tablet 40 mg    nitroGLYCERIN (NITROSTAT) SL tablet 0.4 mg    apixaban (ELIQUIS) tablet 5 mg     Indication of Use:   Treatment-DVT/PE    atorvastatin (LIPITOR) tablet 40 mg    latanoprost (XALATAN) 0.005 % ophthalmic solution 1 drop    finasteride (PROSCAR) tablet 5 mg    bumetanide (BUMEX) tablet 2 mg    dilTIAZem (CARDIZEM CD) extended release capsule 120 mg    gabapentin (NEURONTIN) capsule 100 mg    isosorbide mononitrate (IMDUR) extended release tablet 30 mg    metoprolol succinate (TOPROL

## 2025-05-18 NOTE — ED NOTES
Situation  Name: Hemanth Barrera  Admitting: Dx VARSHA (acute kidney injury) [N17.9]  Isolation Precautions No active isolations  Code Status: Full Code  Alerts: N/A  Where is the patient from? Home  HPI: SOB, L hip  pain    Background  PMH:   Past Medical History:   Diagnosis Date    Acute inferolateral myocardial infarction (HCC) 11/18/2021    Arthritis     Atrial fibrillation (HCC)     CAD (coronary artery disease)     CHF (congestive heart failure) ([facility])     Glaucoma     Hx of blood clots     with hernia surgery    Hyperlipidemia     Hypertension     MI (myocardial infarction) ([facility])     NSTEMI (non-ST elevated myocardial infarction) ([facility]) 11/17/2021     Allergies:   Allergies   Allergen Reactions    Aspirin Other (See Comments)     Pt told not to take since he has only a partial stomach    Cyclobenzaprine Other (See Comments)     Pt stated heart palpitations and he felt funny    Ibuprofen Nausea Only    Azithromycin Nausea Only    Hydrocodone-Acetaminophen Nausea Only     per pt, he is having upset stomach when taking norco     Diet: No diet orders on file  Activity:   Ambulation status: Walker  Precautions: fall  Flu & Covid:  n/a  Medications Administered         0.9 % sodium chloride infusion Admin Date  05/18/2025 Action  New Bag Dose   Rate  75 mL/hr Route  IntraVENous Documented By  Kike Tong, RN        iopamidol (ISOVUE-370) 76 % injection 75 mL Admin Date  05/18/2025 Action  Given Dose  75 mL Rate   Route  IntraVENous Documented By  Amber Sorensen        lidocaine 4 % external patch 1 patch Admin Date  05/18/2025 Action  Patch Applied Dose  1 patch Rate   Route  TransDERmal Documented By  Kasandra Hernández, RN        magnesium sulfate 2000 mg in water 50 mL IVPB Admin Date  05/18/2025 Action  New Bag Dose  2,000 mg Rate  25 mL/hr Route  IntraVENous Documented By  Kike Tong, RN        morphine injection 4 mg Admin Date  05/18/2025 Action  Given Dose  4 mg Rate   Route  IntraVENous Documented    Lab Results   Component Value Date/Time    WBC 3.8 05/18/2025 09:07 AM    RBC 3.33 05/18/2025 09:07 AM    HGB 10.1 05/18/2025 09:07 AM     05/18/2025 09:07 AM     CMP:   Lab Results   Component Value Date/Time     05/18/2025 09:07 AM    K 3.3 05/18/2025 09:07 AM     05/18/2025 09:07 AM    CO2 24 05/18/2025 09:07 AM    BUN 21 05/18/2025 09:07 AM     BMP:   Lab Results   Component Value Date/Time     05/18/2025 09:07 AM    K 3.3 05/18/2025 09:07 AM     05/18/2025 09:07 AM    CO2 24 05/18/2025 09:07 AM    BUN 21 05/18/2025 09:07 AM     Liver Enzymes: No results found for: \"TP\"  Cardiac Enzymes:   Lab Results   Component Value Date/Time    TROPHS 44 05/18/2025 12:08 PM    TROPHS 44 05/18/2025 09:07 AM    CKTOTAL 501 09/25/2021 06:31 AM    MYOGLOBIN 532 09/16/2024 10:48 AM     Coags:   Lab Results   Component Value Date/Time    PROTIME 18.2 05/18/2025 09:07 AM    PROTIME 15.5 09/17/2024 01:11 PM    INR 1.4 05/18/2025 09:07 AM    APTT 32.2 05/02/2025 03:57 AM    ANTIXAUHEP >1.10 05/02/2025 02:10 AM     BNP:   Lab Results   Component Value Date/Time    PROBNP 1,644 05/01/2025 10:06 AM     Lactic Acid:No results found for: \"LACTATE\"  Radiology results:   CT CHEST PULMONARY EMBOLISM W CONTRAST   Final Result   No evidence of pulmonary embolism or acute pulmonary abnormality.           Wounds   Wound 10/05/23 Leg Left;Lower (Active)   Number of days: 590       Wound 01/20/25 Coccyx Posterior (Active)   Number of days: 117       Recommendation  Outstanding testing: None  Family notified:  Patient notified  Contact information:   Decision Maker: Self  Consulting MD: IP CONSULT TO INTERNAL MEDICINE    ARSLAN NAJERA RN

## 2025-05-19 ENCOUNTER — APPOINTMENT (OUTPATIENT)
Dept: GENERAL RADIOLOGY | Age: 89
DRG: 682 | End: 2025-05-19
Payer: MEDICARE

## 2025-05-19 PROBLEM — R13.10 DYSPHAGIA: Status: ACTIVE | Noted: 2025-05-19

## 2025-05-19 PROBLEM — K59.00 CONSTIPATION: Status: ACTIVE | Noted: 2023-08-02

## 2025-05-19 LAB
ANION GAP SERPL CALCULATED.3IONS-SCNC: 7 MMOL/L (ref 9–16)
BASOPHILS # BLD: <0.03 K/UL (ref 0–0.2)
BASOPHILS NFR BLD: 0 % (ref 0–2)
BUN SERPL-MCNC: 15 MG/DL (ref 8–23)
CALCIUM SERPL-MCNC: 8 MG/DL (ref 8.6–10.4)
CHLORIDE SERPL-SCNC: 108 MMOL/L (ref 98–107)
CO2 SERPL-SCNC: 28 MMOL/L (ref 20–31)
CREAT SERPL-MCNC: 0.9 MG/DL (ref 0.7–1.2)
EOSINOPHIL # BLD: 0.11 K/UL (ref 0–0.44)
EOSINOPHILS RELATIVE PERCENT: 4 % (ref 0–4)
ERYTHROCYTE [DISTWIDTH] IN BLOOD BY AUTOMATED COUNT: 18.6 % (ref 11.5–14.9)
GFR, ESTIMATED: 81 ML/MIN/1.73M2
GLUCOSE SERPL-MCNC: 118 MG/DL (ref 74–99)
HCT VFR BLD AUTO: 28.6 % (ref 41–53)
HGB BLD-MCNC: 9.3 G/DL (ref 13.5–17.5)
IMM GRANULOCYTES # BLD AUTO: <0.03 K/UL (ref 0–0.3)
IMM GRANULOCYTES NFR BLD: 0 %
LYMPHOCYTES NFR BLD: 0.88 K/UL (ref 1.1–3.7)
LYMPHOCYTES RELATIVE PERCENT: 29 % (ref 24–44)
M PNEUMO IGM SER QL IA: 0.16
MAGNESIUM SERPL-MCNC: 1.6 MG/DL (ref 1.7–2.3)
MCH RBC QN AUTO: 30.7 PG (ref 26–34)
MCHC RBC AUTO-ENTMCNC: 32.5 G/DL (ref 31–37)
MCV RBC AUTO: 94.4 FL (ref 80–100)
MONOCYTES NFR BLD: 0.28 K/UL (ref 0.1–1.2)
MONOCYTES NFR BLD: 9 % (ref 3–12)
NEUTROPHILS NFR BLD: 58 % (ref 36–66)
NEUTS SEG NFR BLD: 1.73 K/UL (ref 1.5–8.1)
NRBC BLD-RTO: 0 PER 100 WBC
PLATELET # BLD AUTO: 202 K/UL (ref 150–450)
PMV BLD AUTO: 8.6 FL (ref 8–13.5)
POTASSIUM SERPL-SCNC: 3.3 MMOL/L (ref 3.7–5.3)
POTASSIUM SERPL-SCNC: 3.8 MMOL/L (ref 3.7–5.3)
RBC # BLD AUTO: 3.03 M/UL (ref 4.21–5.77)
SODIUM SERPL-SCNC: 143 MMOL/L (ref 136–145)
WBC OTHER # BLD: 3 K/UL (ref 3.5–11)

## 2025-05-19 PROCEDURE — 6360000002 HC RX W HCPCS: Performed by: NURSE PRACTITIONER

## 2025-05-19 PROCEDURE — 6370000000 HC RX 637 (ALT 250 FOR IP)

## 2025-05-19 PROCEDURE — 80048 BASIC METABOLIC PNL TOTAL CA: CPT

## 2025-05-19 PROCEDURE — 83735 ASSAY OF MAGNESIUM: CPT

## 2025-05-19 PROCEDURE — APPNB30 APP NON BILLABLE TIME 0-30 MINS: Performed by: NURSE PRACTITIONER

## 2025-05-19 PROCEDURE — 97116 GAIT TRAINING THERAPY: CPT

## 2025-05-19 PROCEDURE — 99222 1ST HOSP IP/OBS MODERATE 55: CPT | Performed by: INTERNAL MEDICINE

## 2025-05-19 PROCEDURE — 97162 PT EVAL MOD COMPLEX 30 MIN: CPT

## 2025-05-19 PROCEDURE — 97535 SELF CARE MNGMENT TRAINING: CPT

## 2025-05-19 PROCEDURE — 99233 SBSQ HOSP IP/OBS HIGH 50: CPT | Performed by: INTERNAL MEDICINE

## 2025-05-19 PROCEDURE — 71045 X-RAY EXAM CHEST 1 VIEW: CPT

## 2025-05-19 PROCEDURE — 92611 MOTION FLUOROSCOPY/SWALLOW: CPT

## 2025-05-19 PROCEDURE — 85025 COMPLETE CBC W/AUTO DIFF WBC: CPT

## 2025-05-19 PROCEDURE — 97166 OT EVAL MOD COMPLEX 45 MIN: CPT

## 2025-05-19 PROCEDURE — 36415 COLL VENOUS BLD VENIPUNCTURE: CPT

## 2025-05-19 PROCEDURE — 84132 ASSAY OF SERUM POTASSIUM: CPT

## 2025-05-19 PROCEDURE — 2580000003 HC RX 258

## 2025-05-19 PROCEDURE — 74230 X-RAY XM SWLNG FUNCJ C+: CPT

## 2025-05-19 PROCEDURE — 2500000003 HC RX 250 WO HCPCS

## 2025-05-19 PROCEDURE — 2060000000 HC ICU INTERMEDIATE R&B

## 2025-05-19 PROCEDURE — 99212 OFFICE O/P EST SF 10 MIN: CPT

## 2025-05-19 RX ORDER — POTASSIUM CHLORIDE 1500 MG/1
40 TABLET, EXTENDED RELEASE ORAL ONCE
Status: COMPLETED | OUTPATIENT
Start: 2025-05-19 | End: 2025-05-19

## 2025-05-19 RX ORDER — BUMETANIDE 1 MG/1
1 TABLET ORAL DAILY
Status: DISCONTINUED | OUTPATIENT
Start: 2025-05-20 | End: 2025-05-26 | Stop reason: HOSPADM

## 2025-05-19 RX ORDER — MAGNESIUM SULFATE HEPTAHYDRATE 40 MG/ML
2000 INJECTION, SOLUTION INTRAVENOUS ONCE
Status: COMPLETED | OUTPATIENT
Start: 2025-05-19 | End: 2025-05-19

## 2025-05-19 RX ORDER — RANOLAZINE 500 MG/1
500 TABLET, EXTENDED RELEASE ORAL 3 TIMES DAILY
Status: DISCONTINUED | OUTPATIENT
Start: 2025-05-19 | End: 2025-05-26 | Stop reason: HOSPADM

## 2025-05-19 RX ORDER — CLOPIDOGREL BISULFATE 75 MG/1
75 TABLET ORAL DAILY
Status: DISCONTINUED | OUTPATIENT
Start: 2025-05-19 | End: 2025-05-26 | Stop reason: HOSPADM

## 2025-05-19 RX ADMIN — HYDROMORPHONE HYDROCHLORIDE 0.5 MG: 1 INJECTION, SOLUTION INTRAMUSCULAR; INTRAVENOUS; SUBCUTANEOUS at 12:50

## 2025-05-19 RX ADMIN — POTASSIUM CHLORIDE 40 MEQ: 1500 TABLET, EXTENDED RELEASE ORAL at 08:00

## 2025-05-19 RX ADMIN — SODIUM CHLORIDE: 9 INJECTION, SOLUTION INTRAVENOUS at 03:13

## 2025-05-19 RX ADMIN — GABAPENTIN 100 MG: 100 CAPSULE ORAL at 07:59

## 2025-05-19 RX ADMIN — CLOPIDOGREL BISULFATE 75 MG: 75 TABLET, FILM COATED ORAL at 11:20

## 2025-05-19 RX ADMIN — MAGNESIUM SULFATE HEPTAHYDRATE 2000 MG: 40 INJECTION, SOLUTION INTRAVENOUS at 08:03

## 2025-05-19 RX ADMIN — RANOLAZINE 500 MG: 500 TABLET, EXTENDED RELEASE ORAL at 14:39

## 2025-05-19 RX ADMIN — ISOSORBIDE MONONITRATE 30 MG: 30 TABLET, EXTENDED RELEASE ORAL at 08:00

## 2025-05-19 RX ADMIN — APIXABAN 5 MG: 5 TABLET, FILM COATED ORAL at 20:56

## 2025-05-19 RX ADMIN — HYDROMORPHONE HYDROCHLORIDE 0.5 MG: 1 INJECTION, SOLUTION INTRAMUSCULAR; INTRAVENOUS; SUBCUTANEOUS at 20:52

## 2025-05-19 RX ADMIN — SODIUM CHLORIDE, PRESERVATIVE FREE 10 ML: 5 INJECTION INTRAVENOUS at 20:54

## 2025-05-19 RX ADMIN — FINASTERIDE 5 MG: 5 TABLET, FILM COATED ORAL at 07:59

## 2025-05-19 RX ADMIN — ATORVASTATIN CALCIUM 40 MG: 40 TABLET, FILM COATED ORAL at 20:56

## 2025-05-19 RX ADMIN — PANTOPRAZOLE SODIUM 40 MG: 40 TABLET, DELAYED RELEASE ORAL at 05:10

## 2025-05-19 RX ADMIN — BUMETANIDE 2 MG: 1 TABLET ORAL at 08:00

## 2025-05-19 RX ADMIN — METOPROLOL SUCCINATE 50 MG: 50 TABLET, EXTENDED RELEASE ORAL at 07:59

## 2025-05-19 RX ADMIN — LATANOPROST 1 DROP: 50 SOLUTION OPHTHALMIC at 20:59

## 2025-05-19 RX ADMIN — GABAPENTIN 100 MG: 100 CAPSULE ORAL at 20:56

## 2025-05-19 RX ADMIN — APIXABAN 5 MG: 5 TABLET, FILM COATED ORAL at 08:00

## 2025-05-19 RX ADMIN — RANOLAZINE 500 MG: 500 TABLET, EXTENDED RELEASE ORAL at 20:56

## 2025-05-19 RX ADMIN — DILTIAZEM HYDROCHLORIDE 120 MG: 120 CAPSULE, COATED, EXTENDED RELEASE ORAL at 07:59

## 2025-05-19 RX ADMIN — RANOLAZINE 500 MG: 500 TABLET, EXTENDED RELEASE ORAL at 08:00

## 2025-05-19 RX ADMIN — MIDODRINE HYDROCHLORIDE 2.5 MG: 2.5 TABLET ORAL at 11:20

## 2025-05-19 RX ADMIN — HYDROMORPHONE HYDROCHLORIDE 0.5 MG: 1 INJECTION, SOLUTION INTRAMUSCULAR; INTRAVENOUS; SUBCUTANEOUS at 03:36

## 2025-05-19 ASSESSMENT — PAIN DESCRIPTION - PAIN TYPE
TYPE: CHRONIC PAIN
TYPE: CHRONIC PAIN

## 2025-05-19 ASSESSMENT — PAIN - FUNCTIONAL ASSESSMENT
PAIN_FUNCTIONAL_ASSESSMENT: PREVENTS OR INTERFERES SOME ACTIVE ACTIVITIES AND ADLS

## 2025-05-19 ASSESSMENT — PAIN SCALES - GENERAL
PAINLEVEL_OUTOF10: 0
PAINLEVEL_OUTOF10: 7
PAINLEVEL_OUTOF10: 6
PAINLEVEL_OUTOF10: 4
PAINLEVEL_OUTOF10: 5

## 2025-05-19 ASSESSMENT — PAIN DESCRIPTION - ORIENTATION
ORIENTATION: MID;LOWER
ORIENTATION: LEFT
ORIENTATION: LEFT
ORIENTATION: MID;LOWER

## 2025-05-19 ASSESSMENT — PAIN DESCRIPTION - LOCATION
LOCATION: ABDOMEN;BUTTOCKS
LOCATION: LEG;HIP;ARM
LOCATION: LEG;BUTTOCKS;HIP

## 2025-05-19 ASSESSMENT — PAIN DESCRIPTION - ONSET
ONSET: ON-GOING
ONSET: ON-GOING

## 2025-05-19 ASSESSMENT — PAIN DESCRIPTION - DESCRIPTORS
DESCRIPTORS: ACHING
DESCRIPTORS: ACHING
DESCRIPTORS: STABBING
DESCRIPTORS: ACHING

## 2025-05-19 ASSESSMENT — PAIN DESCRIPTION - FREQUENCY
FREQUENCY: CONTINUOUS
FREQUENCY: CONTINUOUS

## 2025-05-19 NOTE — CARE COORDINATION
Case Management Assessment  Initial Evaluation    Date/Time of Evaluation: 5/19/2025 11:32 AM  Assessment Completed by: Jill Glover RN    If patient is discharged prior to next notation, then this note serves as note for discharge by case management.    Patient Name: Hemanth Barrera                   YOB: 1935  Diagnosis: VARSHA (acute kidney injury) [N17.9]  Dyspnea, unspecified type [R06.00]                   Date / Time: 5/18/2025  8:54 AM    Patient Admission Status: Inpatient   Readmission Risk (Low < 19, Mod (19-27), High > 27): Readmission Risk Score: 31.1    Current PCP: Neftaly Contreras MD  PCP verified by CM? Yes    Chart Reviewed: Yes      History Provided by: Patient  Patient Orientation: Alert and Oriented    Patient Cognition: Alert    Hospitalization in the last 30 days (Readmission):  Yes    If yes, Readmission Assessment in CM Navigator will be completed.    Advance Directives:      Code Status: Full Code   Patient's Primary Decision Maker is: Legal Next of Kin    Primary Decision Maker: Dusty Barrera - Child - [phone]    Discharge Planning:    Patient lives with: Children (Kareem Montano) Type of Home: House  Primary Care Giver: Self  Patient Support Systems include: Family Members, Children   Current Financial resources: Medicare  Current community resources: ECF/Home Care (Current w/ [facility], [facility])  Current services prior to admission: Durable Medical Equipment            Current DME: Cane, Walker, Shower Chair (GB, Rollator)            Type of Home Care services:  PT, OT, Nursing Services    ADLS  Prior functional level: Independent in ADLs/IADLs, Assistance with the following:, Cooking, Housework, Shopping  Current functional level: Independent in ADLs/IADLs, Assistance with the following:, Cooking, Housework, Shopping    PT AM-PAC:   /24  OT AM-PAC:   /24    Family can provide assistance at DC: Yes  Would you like Case Management to discuss the discharge plan with any  with the providers was provided to: Patient   Patient Representative Name:       The Patient and/or Patient Representative Agree with the Discharge Plan? Yes    Jill Glover RN  Case Management Department  Ph:  Fax:

## 2025-05-19 NOTE — DISCHARGE INSTR - COC
Continuity of Care Form    Patient Name: Hemanth Barrera   :  1935  MRN:  [id number]    Admit date:  2025  Discharge date:  2025    Code Status Order: Full Code   Advance Directives:     Admitting Physician:  Sana Haskins MD  PCP: Neftaly Contreras MD    Discharging Nurse: Eugenia QUINTERO  Discharging Hospital Unit/Room#: 2103/2103-01  Discharging Unit Phone Number: [phone]    Emergency Contact:   Extended Emergency Contact Information  Primary Emergency Contact: Dusty Barrera  Home Phone: [phone]  Mobile Phone: [phone]  Relation: Child    Past Surgical History:  Past Surgical History:   Procedure Laterality Date    ABDOMEN SURGERY      gastric resection    APPENDECTOMY      BONE MARROW BIOPSY      CARDIAC CATHETERIZATION      CARDIAC PROCEDURE N/A 2024    zafrconcepcion / Left heart cath / coronary angiography / rm 512 performed by Cathie Hastings MD at [facility] CARDIAC CATH LAB    CARDIAC PROCEDURE N/A 2024    Percutaneous coronary intervention performed by Cathie Hastings MD at [facility] CARDIAC CATH LAB    CARDIAC PROCEDURE Right 2025    Left heart cath / coronary angiography performed by Guy Paz MD at [facility] CARDIAC CATH LAB    COLONOSCOPY      COLONOSCOPY N/A 8/3/2023    COLONOSCOPY WITH BIOPSY performed by Isauro Razo MD at [facility] ENDO    COLONOSCOPY N/A 3/10/2025    COLONOSCOPY DIAGNOSTIC performed by Shirin Torre MD at [facility] OR    CT BIOPSY BONE MARROW  2022    CT BONE MARROW BIOPSY 2022 [facility] CT SCAN    EYE SURGERY      smiley cataracts    HERNIA REPAIR      inguinal smiley    INVASIVE VASCULAR N/A 2025    Ultrasound guided vascular access performed by Guy Paz MD at [facility] CARDIAC CATH LAB    JOINT REPLACEMENT      rt hip x 2, lt knee    PACEMAKER PLACEMENT      SIGMOIDOSCOPY N/A 3/7/2025    SIGMOIDOSCOPY DIAGNOSTIC FLEXIBLE performed by Isauro Razo MD at [facility] OR    UPPER GASTROINTESTINAL ENDOSCOPY N/A 2023    EGD BIOPSY  the continuing treatment of the diagnosis listed and that he requires Home Care for greater 30 days.     Update Admission H&P: No change in H&P    PHYSICIAN SIGNATURE:  Electronically signed by Sana Haskins MD on 5/24/25 at 9:52 AM EDT

## 2025-05-19 NOTE — PROCEDURES
INSTRUMENTAL SWALLOW REPORT  MODIFIED BARIUM SWALLOW    NAME: Hemanth Barrera   : 1935  MRN: [id number]       Date of Eval: 2025        Radiologist: Dr. Latham     Referring Diagnosis(es): dysphagia    Past Medical History:  has a past medical history of Acute inferolateral myocardial infarction (HCC), Arthritis, Atrial fibrillation (HCC), CAD (coronary artery disease), CHF (congestive heart failure) (HCC), Glaucoma, Hx of blood clots, Hyperlipidemia, Hypertension, MI (myocardial infarction) (HCC), and NSTEMI (non-ST elevated myocardial infarction) ([facility]).  Past Surgical History:  has a past surgical history that includes pacemaker placement; Abdomen surgery; Cardiac catheterization; Appendectomy; Colonoscopy; eye surgery; hernia repair; bone marrow biopsy; joint replacement; CT BIOPSY BONE MARROW (2022); Upper gastrointestinal endoscopy (N/A, 2023); Colonoscopy (N/A, 8/3/2023); Cardiac procedure (N/A, 2024); Cardiac procedure (N/A, 2024); Cardiac procedure (Right, 2025); invasive vascular (N/A, 2025); Upper gastrointestinal endoscopy (N/A, 3/6/2025); sigmoidoscopy (N/A, 3/7/2025); and Colonoscopy (N/A, 3/10/2025).    Type of Study: Initial MBS    Recent CXR/CT of Chest:   CXR 25: No acute process.     Patient Complaints/Reason for Referral:  Hemanth Barrera was referred for a MBS to assess the efficiency of his/her swallow function, assess for aspiration, and to make recommendations regarding safe dietary consistencies, effective compensatory strategies, and safe eating environment.  Patient complaints: Pt. reporting globus sensation with foods when eating.    Onset of problem:   Per IM H&P: The patient is a 89 y.o. male with a past medical history of CAD status post stent placement, DVT/PE on Eliquis (most recent 3/2025), atrial fibrillation, hypertension, hyperlipidemia, CKD 3B, recent hospital admission for fall resulting in multiple left posterior rib fractures who  Deficits  Major Contributing Deficits: Decreased Esophageal Clearance    Pain   No c/o pain.    Therapy Time:   Individual Concurrent Group Co-treatment   Time In  1450         Time Out 1505         Minutes 15           Lona Naqvi M.S. CCC-SLP  5/19/2025, 3:57 PM

## 2025-05-19 NOTE — PROGRESS NOTES
[facility]   Occupational Therapy Evaluation  Date: 25  Patient Name: Hemanth Barrera       Room: -  MRN: [id number]  Account: [id number]   : 1935  (89 y.o.) Gender: male     Discharge Recommendations:  The patient would benefit from additional Occupational Therapy after discharge from the facility upon return to their Home.    OT Equipment Recommendations  Other: TBD- reporting- in process of getting shower chair, has RTS    Referring Practitioner: Annetta Martin MD  Diagnosis: Acute kidney injury superimposed on CKD        Treatment Diagnosis: impaired selfcare status    Past Medical History:  has a past medical history of Acute inferolateral myocardial infarction (HCC), Arthritis, Atrial fibrillation (HCC), CAD (coronary artery disease), CHF (congestive heart failure) (HCC), Glaucoma, Hx of blood clots, Hyperlipidemia, Hypertension, MI (myocardial infarction) (HCC), and NSTEMI (non-ST elevated myocardial infarction) (HCC).    Past Surgical History:   has a past surgical history that includes pacemaker placement; Abdomen surgery; Cardiac catheterization; Appendectomy; Colonoscopy; eye surgery; hernia repair; bone marrow biopsy; joint replacement; CT BIOPSY BONE MARROW (2022); Upper gastrointestinal endoscopy (N/A, 2023); Colonoscopy (N/A, 8/3/2023); Cardiac procedure (N/A, 2024); Cardiac procedure (N/A, 2024); Cardiac procedure (Right, 2025); invasive vascular (N/A, 2025); Upper gastrointestinal endoscopy (N/A, 3/6/2025); sigmoidoscopy (N/A, 3/7/2025); and Colonoscopy (N/A, 3/10/2025).    Restrictions  Restrictions/Precautions  Restrictions/Precautions: Contact Precautions, Isolation, Fall Risk (droplet)  Activity Level: Up as Tolerated  Required Braces or Orthoses?: No  Implants Present? : Pacemaker, Metal implants (L TKA, R AGUSTO)      Vitals  Vitals  O2 Device: None (Room air)     Subjective  Subjective: Patient pleasant and agreeable for  OT/PT eval  Comments: OK per LEON Eaton for OT/PT eval  Pain  Pre-Pain: 6  Pain Location: Abdomen, Left, Buttocks, Hip    Social/Functional History  Social/Functional History  Lives With: Son  Type of Home: House  Home Layout: One level  Home Access: Stairs to enter with rails  Entrance Stairs - Number of Steps: 2 GIOVANNI (small threshold- son assists  Entrance Stairs - Rails: None (currently in procress of installing)  Bathroom Shower/Tub: Walk-in shower, Shower chair with back  Bathroom Toilet: Standard  Bathroom Equipment: Grab bars in shower, Grab bars around toilet, Toilet raiser, Shower chair (getting shower chair)  Bathroom Accessibility: Walker accessible  Home Equipment: Walker - Rolling, Rollator, Cane, Grab bars, Alert button  Has the patient had two or more falls in the past year or any fall with injury in the past year?: Yes (previous hospital admission 1/2025)  Prior Level of Assist for ADLs: Independent  Prior Level of Assist for Homemaking: Needs assistance (son does meals, patient does own laundry)  Homemaking Responsibilities: Yes  Prior Level of Assist for Ambulation: Independent household ambulator, with or without device (use of RW)  Prior Level of Assist for Transfers: Independent  Active : No  Patient's  Info: son  Occupation: Retired  Type of Occupation: Security at [employer]  IADL Comments: patient sleeps in flat bed (low to floor)  Additional Comments: patient with multiple hospitalizations/SNF since 1/2025, report recently discharged home ~ 1 week ago, was recieving  therapy. Reporting son works but his wife is home with both to assist as needed    Objective    ADL  Feeding: Setup, Based on clinical judgement  Grooming: Setup, Based on clinical judgement  UE Bathing: Stand by assistance, Based on clinical judgement  LE Bathing: Minimal assistance, Based on clinical judgement  UE Dressing: Stand by assistance, Based on clinical judgement  LE Dressing: Minimal  selfcare tasks    Plan  Occupational Therapy Plan  Times Per Week: 3-5  Current Treatment Recommendations: Strengthening, Balance training, Functional mobility training, Endurance training, Pain management, Safety education & training, Equipment evaluation, education, & procurement, Patient/Caregiver education & training, Self-Care / ADL, Coordination training    OT Individual Minutes  OT Individual Minutes  Time In: 0958  Time Out: 1033  Minutes: 35  Time Code Minutes   Timed Code Treatment Minutes: 10 Minutes    Co-eval with PT, individual minutes adjusted accordingly    Electronically signed by SHEELA Fisher on 5/19/25 at 12:25 PM EDT

## 2025-05-19 NOTE — CONSULTS
[facility]    GASTROENTEROLOGY CONSULT    Patient:   Hemanth Barrera   :    1935   Facility:   [facility]  Date:    2025  Admission Dx:  VARSHA (acute kidney injury) [N17.9]  Dyspnea, unspecified type [R06.00]  Requesting physician: Sana Haskins MD  Reason for consult:  dysphagia      SUBJECTIVE:  History of Present Illness:  This is a 89 y.o.   male who was admitted 2025 with VARSHA (acute kidney injury) [N17.9]  Dyspnea, unspecified type [R06.00]. We have been asked to see the patient in consultation by Sana Haskins MD for  dysphagia. This is a 89 -year-old male with past medical history of CAD, CKD pe and afib on eliquis and plavix htn hld  bilroth 2 due to PUD who presented to the ED for shortness of breath he is being treated for VARSHA  and possible uri vs shortness of breath due to recent rib fractures. C/o mid abdominal pain with dysphagia with food \"for a while\". No emesis or diarrhea. No fever or chills. He denies dysphagia with fluids. 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 He has not had dyspepsia melena or hematochezia/hematemesis.         Endoscopy  Colonoscopy 3/10/25  The prep was poor.  Large amount of liquid and solid stool throughout the entire colon limiting this exam, pathology could be easily missed with this kind of prep     Findings:  Terminal ileum: normal for the last 5 cm     Cecum/Ascending colon: normal     Transverse colon: normal     Descending/Sigmoid colon: normal     Rectum/Anus: examined in normal and retroflexed positions and was abnormal: Hemorrhoids     The prep was very poor  This is very limited exam  I reached the cecum to make sure there is no apple core large lesion  None were seen  No colitis seen  The stool is yellow with no indication of active  bleeding at this time     Consider other cause of anemia    Egd 3/6/25  Retropharyngeal area was grossly normal appearing     Esophagus: normal;                          Esophagogastric markings: Diaphragmatic hiatus- 42 cm; GE junction- 41 cm; Squamo-columnar junction- 41 cm     Stomach:     Surgical changes of partial gastrectomy with gastro-jejunal and jejuno-jejunal anastomosis; surgical margins normal; Afferent and efferent limbs intubated- normal    OBJECTIVE:    PAST MEDICAL/SURGICAL HISTORY  Past Medical History:   Diagnosis Date    Acute inferolateral myocardial infarction (HCC) 11/18/2021    Arthritis     Atrial fibrillation (HCC)     CAD (coronary artery disease)     CHF (congestive heart failure) (HCC)     Glaucoma     Hx of blood clots     with hernia surgery    Hyperlipidemia     Hypertension     MI (myocardial infarction) (HCC)     NSTEMI (non-ST elevated myocardial infarction) (HCC) 11/17/2021     Past Surgical History:   Procedure Laterality Date    ABDOMEN SURGERY      gastric resection    APPENDECTOMY      BONE MARROW BIOPSY      CARDIAC CATHETERIZATION      CARDIAC PROCEDURE N/A 9/17/2024    julio cesar / Left heart cath / coronary angiography / rm 512 performed by Cathie Hastings MD at [facility] CARDIAC CATH LAB    CARDIAC PROCEDURE N/A 9/17/2024    Percutaneous coronary intervention performed by Cathie Hastings MD at [facility] CARDIAC CATH LAB    CARDIAC PROCEDURE Right 1/9/2025    Left heart cath / coronary angiography performed by Guy Paz MD at [facility] CARDIAC CATH LAB    COLONOSCOPY      COLONOSCOPY N/A 8/3/2023    COLONOSCOPY WITH BIOPSY performed by Isauro Razo MD at [facility] ENDO    COLONOSCOPY N/A 3/10/2025    COLONOSCOPY DIAGNOSTIC performed by Shirin Torre MD at [facility] OR    CT BIOPSY BONE MARROW  5/31/2022    CT BONE MARROW BIOPSY 5/31/2022 [facility] CT SCAN    EYE SURGERY      smiley cataracts    HERNIA REPAIR      inguinal smiley    INVASIVE VASCULAR N/A 1/9/2025    Ultrasound guided

## 2025-05-19 NOTE — PROGRESS NOTES
Physical Therapy  Facility/Department: [facility] PROGRESSIVE CARE  Physical Therapy Initial Assessment    Name: Hemanth Barrera  : 1935  MRN: [id number]  Date of Service: 2025    Discharge Recommendations:  Patient would benefit from continued therapy after discharge, Therapy recommended at discharge          Patient Diagnosis(es): The primary encounter diagnosis was Dyspnea, unspecified type. A diagnosis of VARSHA (acute kidney injury) was also pertinent to this visit.  Past Medical History:  has a past medical history of Acute inferolateral myocardial infarction (HCC), Arthritis, Atrial fibrillation (HCC), CAD (coronary artery disease), CHF (congestive heart failure) (HCC), Glaucoma, Hx of blood clots, Hyperlipidemia, Hypertension, MI (myocardial infarction) (HCC), and NSTEMI (non-ST elevated myocardial infarction) ([facility]).  Past Surgical History:  has a past surgical history that includes pacemaker placement; Abdomen surgery; Cardiac catheterization; Appendectomy; Colonoscopy; eye surgery; hernia repair; bone marrow biopsy; joint replacement; CT BIOPSY BONE MARROW (2022); Upper gastrointestinal endoscopy (N/A, 2023); Colonoscopy (N/A, 8/3/2023); Cardiac procedure (N/A, 2024); Cardiac procedure (N/A, 2024); Cardiac procedure (Right, 2025); invasive vascular (N/A, 2025); Upper gastrointestinal endoscopy (N/A, 3/6/2025); sigmoidoscopy (N/A, 3/7/2025); and Colonoscopy (N/A, 3/10/2025).    Assessment  Assessment: Pt presents with generalized weakness and decreased endurance, requires CGA for mobility with RW.  Treatment Diagnosis: Impaired mobility  Therapy Prognosis: Good  Decision Making: Medium Complexity  Requires PT Follow-Up: Yes  Activity Tolerance  Activity Tolerance: Patient tolerated treatment well;Patient tolerated evaluation without incident    Plan  Physical Therapy Plan  General Plan: 5-7 times per week  Current Treatment Recommendations: Strengthening, Balance training,  Functional mobility training, Transfer training, Endurance training, Gait training, Stair training, Home exercise program, Safety education & training, Patient/Caregiver education & training, Therapeutic activities, Positioning  Safety Devices  Type of Devices: All fall risk precautions in place, Call light within reach, Chair alarm in place, Gait belt, Patient at risk for falls, Left in chair, Nurse notified (son present)    Restrictions  Restrictions/Precautions  Restrictions/Precautions: Contact Precautions, Isolation, Fall Risk (droplet)  Activity Level: Up as Tolerated  Required Braces or Orthoses?: No  Implants Present? : Pacemaker, Metal implants (L TKA, R AGUSTO)     Subjective  General  Patient assessed for rehabilitation services?: Yes  Additional Pertinent Hx: The patient is a 89 y.o. male with a past medical history of CAD status post stent placement, DVT/PE on Eliquis (most recent 3/2025), atrial fibrillation, hypertension, hyperlipidemia, CKD 3B, recent hospital admission for fall resulting in multiple left posterior rib fractures who presented to the ED with a chief complaint of shortness of breath and left-sided rib pain.     Patient states that over the last few weeks he has been experiencing left-sided rib pain and shortness of breath that has gotten progressively worse.  Was previously admitted 2/2025 due to multiple left-sided rib fracture secondary to fall, followed by orthopedic surgery during admission, deep breathing and coughing with splint encouraged.  Reports pain being uncontrolled since fractures resulting in inability to take deep breaths.  Also states that he has had increase in cough with clear sputum production over the last couple weeks.  Denies fever/chills, GI disturbance, known sick contacts.     Most recent hospital admission 5/1-5/4/2025 due to unstable angina.  Cardiology followed with patient reports admission and symptoms managed conservatively.  Patient was then discharged to  Inpatient Mobility Raw Score : 17  AM-PAC Inpatient T-Scale Score : 42.13  Mobility Inpatient CMS 0-100% Score: 50.57  Mobility Inpatient CMS G-Code Modifier : CK      Goals  Short Term Goals  Time Frame for Short Term Goals: 7 visits  Short Term Goal 1: Transfer with RW SBA  Short Term Goal 2: Gait with RW distance of  100ft SBA  Short Term Goal 3: 6\"step ups x 2 with B UE support, CGA  Patient Goals   Patient Goals : I like to walk, I am so glad to see a got a walker for me.       Education  Patient Education  Education Given To: Patient  Education Provided: Role of Therapy;Plan of Care;Precautions;Transfer Training;Mobility Training;Fall Prevention Strategies  Education Method: Demonstration;Verbal  Barriers to Learning: None  Education Outcome: Verbalized understanding      Therapy Time   Individual Concurrent Group Co-treatment   Time In 0958         Time Out 1033         Minutes 35         Timed Code Treatment Minutes: 10 Minutes       Yolis Diaz, PT

## 2025-05-19 NOTE — PROGRESS NOTES
[facility]  IN-PATIENT SERVICE  [facility]    PROGRESS NOTE             5/19/2025    8:36 AM    Name:   Hemanth Barrera  MRN:     [id number]     Acct:      [id number]   Room:   2103/2103-01   Day:  1  Admit Date:  5/18/2025  8:54 AM    PCP:  Neftaly Contreras MD  Code Status:  Full Code    Subjective:     C/C:   Chief Complaint   Patient presents with    Shortness of Breath     Interval History Status: improved.    Patient seen and examined at bedside.  States that he is feeling generally well this morning.  No events overnight.    VARSHA improving creatinine trending down from 1.3 to 0.9 this morning.  Continues on IV fluid hydration at 75 mL/h.  Nephrology consulted, recommendations pending.    RPP positive for metapneumovirus.  Patient reporting improvement of cough, having some irritation of throat. Cepacol ordered.     Pain of the left rib cage relieved with as needed Dilaudid, continue to monitor symptoms.    Patient reporting some pain with swallowing foods.  Soft and bite-size diet order placed.  Speech and GI consulted for further evaluation.    Patient otherwise hemodynamically stable.  No acute concerns at this time.    Brief History:     The patient is a 89 y.o. male with a past medical history of CAD status post stent placement, DVT/PE on Eliquis (most recent 3/2025), atrial fibrillation, hypertension, hyperlipidemia, CKD 3B, recent hospital admission for fall resulting in multiple left posterior rib fractures who presented to the ED with a chief complaint of shortness of breath and left-sided rib pain.     Patient states that over the last few weeks he has been experiencing left-sided rib pain and shortness of breath that has gotten progressively worse.  Was previously admitted 2/2025 due to multiple left-sided rib fracture secondary to fall, followed by orthopedic surgery during admission, deep breathing and coughing with splint encouraged.  Reports pain  99%   BMI 17.81 kg/m²   Temp (24hrs), Av.9 °F (36.6 °C), Min:97.5 °F (36.4 °C), Max:98.2 °F (36.8 °C)    No results for input(s): \"POCGLU\" in the last 72 hours.    I/O(24Hr):    Intake/Output Summary (Last 24 hours) at 2025 0836  Last data filed at 2025 0339  Gross per 24 hour   Intake --   Output 1950 ml   Net -1950 ml       Labs:    [unfilled]    Lab Results   Component Value Date/Time    SPECIAL Site: Body Fluid 2025 01:23 PM     Lab Results   Component Value Date/Time    CULTURE  2025 01:23 PM     (NOTE) Direct Gram Stain from bottle result called to and read back by:CALVIN CRUZ [id number]    CULTURE POSITIVE Fluid Culture 2025 01:23 PM    CULTURE  2025 01:23 PM     DIRECT GRAM STAIN FROM BOTTLE: GRAM POSITIVE COCCI IN CLUSTERS    CULTURE (A) 2025 01:23 PM     STAPHYLOCOCCUS EPIDERMIDIS Identification by MALDI-TOF    CULTURE STAPHYLOCOCCUS CAPITIS Identification by MALDI-TOF (A) 2025 01:23 PM       [unfilled]    Radiology:    CT CHEST PULMONARY EMBOLISM W CONTRAST  Result Date: 2025  EXAMINATION: CTA OF THE CHEST 2025 10:10 am TECHNIQUE: CTA of the chest was performed after the administration of intravenous contrast.  Multiplanar reformatted images are provided for review.  MIP images are provided for review. Automated exposure control, iterative reconstruction, and/or weight based adjustment of the mA/kV was utilized to reduce the radiation dose to as low as reasonably achievable. COMPARISON: 2024 HISTORY: ORDERING SYSTEM PROVIDED HISTORY: hx pe, left chest pain TECHNOLOGIST PROVIDED HISTORY: hx pe, left chest pain Additional Contrast?->1 Reason for Exam: hx pe, left chest pain Additional signs and symptoms: Pt had a fall few months ago and broke ribs, states recently has been having worsening left-sided rib pain, cough, shortness of breath FINDINGS: Pulmonary Arteries: Pulmonary arteries are adequately opacified for evaluation.  No evidence of  soft.   Musculoskeletal:         General: Deformity (Reporting protrusion of the epigastric region) present.      Comments: To palpation of the left posterior ribs   Neurological:      General: No focal deficit present.      Mental Status: He is alert and oriented to person, place, and time.           Assessment:        Primary Problem  Acute kidney injury superimposed on CKD    Active Hospital Problems    Diagnosis Date Noted    Acute kidney injury superimposed on CKD [N17.9, N18.9] 05/18/2025    VARSHA (acute kidney injury) [N17.9] 09/24/2021       Plan:        VARSHA on CKD IIIB  Creatinine 1.3 on admission (baseline 0.7), GFR 52, BUN 21  Potassium 3.3, magnesium 1.4, replaced in emergency department  Potassium 3.3 this morning, replacement ordered  Magnesium 1.6 this morning, replacement ordered  Creatinine trending down 0.9  Continue IV fluid hydration at 75 mL/h  Nephrology consulted for further evaluation management  Continue to monitor renal function     SOB with cough likely secondary to URI vs Recent rib fractures   Patient reporting 1 week history of shortness of breath and cough, left-sided rib pain on inspiration  Recent history of left-sided rib fractures after a fall  Afebrile, hemodynamically stable  CTA chest exhibiting no signs of PE or acute pulmonary process  WBCs WNL  Troponin 44, 44 and repeat (baseline 30)  Metapneumovirus positive  Cepacol ordered  Dilaudid 0.5 mg IV every 4 hours as needed for pain control  Continue gabapentin 100 mg twice daily     Dysphagia  Patient reports recent history of pain with swallowing  Speech and GI consulted for further recommendation    Essential hypertension  Most recent /79  Continue Toprol-XL 50 mg daily  Continue home dose midodrine, hold for SBP greater than 100     History of DVT/PE  Recent admission for PE 3/2025  Continue Eliquis     Atrial Fibrillation   Continue Cardizem 120 mg daily, Toprol-XL 50 mg daily     CAD s/p stent placement  Echo 3/2025

## 2025-05-19 NOTE — PROGRESS NOTES
Patient complaining of Dizziness. /70 HR 70. He is still well alert and oriented. Patient repositioned high fowlers. He states that he is feeling better. Will continue to monitor

## 2025-05-19 NOTE — PLAN OF CARE
Problem: Safety - Adult  Goal: Free from fall injury  Outcome: Progressing     Problem: Chronic Conditions and Co-morbidities  Goal: Patient's chronic conditions and co-morbidity symptoms are monitored and maintained or improved  Outcome: Progressing  Flowsheets (Taken 5/18/2025 2000)  Care Plan - Patient's Chronic Conditions and Co-Morbidity Symptoms are Monitored and Maintained or Improved:   Monitor and assess patient's chronic conditions and comorbid symptoms for stability, deterioration, or improvement   Collaborate with multidisciplinary team to address chronic and comorbid conditions and prevent exacerbation or deterioration     Problem: Discharge Planning  Goal: Discharge to home or other facility with appropriate resources  Outcome: Progressing  Flowsheets (Taken 5/18/2025 2000)  Discharge to home or other facility with appropriate resources:   Identify barriers to discharge with patient and caregiver   Arrange for needed discharge resources and transportation as appropriate   Identify discharge learning needs (meds, wound care, etc)     Problem: ABCDS Injury Assessment  Goal: Absence of physical injury  Outcome: Progressing     Problem: Pain  Goal: Verbalizes/displays adequate comfort level or baseline comfort level  Outcome: Progressing

## 2025-05-19 NOTE — CONSULTS
Department of Internal Medicine  Nephrology Earnestine Villanueva MD   Consult Note    Reason for consultation: Management of acute kidney injury.    Requesting physician: Sana Haskins MD.    History of present illness: This is a 89 y.o. male with a significant past medical history of  systemic hypertension, coronary artery disease [s/p PTCA/stent], venous thromboembolism [on Eliquis], chronic atrial fibrillation [on Eliquis], hyperlipidemia and osteoarthritis, who presented to the hospital with complaints of shortness of breath and left chest wall pain.   Blood pressure was 101/71 mmHg at presentation and he was afebrile with oxygen saturation 99% on room air.  He underwent CTA of the chest which ruled out pulmonary embolism.  Chest x-ray was clear.  Laboratory studies remarkable for potassium 3.3 mmol/L, creatinine 1.3 mg/dL and magnesium 1.4 mg/dL.  Nephrology consultation was placed for management of this electrolyte anemia and acute kidney injury.  2D echocardiogram performed in March 2025 showed normal LVEF.  Today patient complains of left lower rib cage pain but does not have shortness of breath.  He states that he fell at home.    Aspirin, Cyclobenzaprine, Ibuprofen, Azithromycin, and Hydrocodone-acetaminophen    Past Medical History:   Diagnosis Date    Acute inferolateral myocardial infarction (HCC) 11/18/2021    Arthritis     Atrial fibrillation ([facility])     CAD (coronary artery disease)     CHF (congestive heart failure) ([facility])     Glaucoma     Hx of blood clots     with hernia surgery    Hyperlipidemia     Hypertension     MI (myocardial infarction) ([facility])     NSTEMI (non-ST elevated myocardial infarction) ([facility]) 11/17/2021     Scheduled Meds:   pantoprazole  40 mg Oral QAM AC    apixaban  5 mg Oral BID    atorvastatin  40 mg Oral Nightly    latanoprost  1 drop Both Eyes Nightly    finasteride  5 mg Oral Daily    bumetanide  2 mg Oral Daily    dilTIAZem  120 mg Oral Daily    gabapentin  100 mg Oral BID     isosorbide mononitrate  30 mg Oral Daily    metoprolol succinate  50 mg Oral Daily    midodrine  2.5 mg Oral TID WC    ranolazine  500 mg Oral BID    lidocaine  1 patch TransDERmal Daily    sodium chloride flush  5-40 mL IntraVENous 2 times per day     Continuous Infusions:   sodium chloride 75 mL/hr at 25 0313    sodium chloride       PRN Meds:.sodium chloride flush, nitroGLYCERIN, sodium chloride flush, sodium chloride, potassium chloride **OR** potassium alternative oral replacement **OR** potassium chloride, magnesium sulfate, polyethylene glycol, acetaminophen **OR** acetaminophen, HYDROmorphone    Family History   Problem Relation Age of Onset    Heart Failure Mother     Heart Failure Father       Social History     Socioeconomic History    Marital status:      Spouse name: None    Number of children: 4    Years of education: None    Highest education level: None   Tobacco Use    Smoking status: Former     Current packs/day: 0.00     Types: Cigarettes     Quit date:      Years since quittin.4    Smokeless tobacco: Never   Vaping Use    Vaping status: Never Used   Substance and Sexual Activity    Alcohol use: Never    Drug use: Never    Sexual activity: Not Currently     Partners: Female     Social Drivers of Health     Financial Resource Strain: Patient Declined (2024)    Overall Financial Resource Strain (CARDIA)     Difficulty of Paying Living Expenses: Patient declined   Food Insecurity: No Food Insecurity (2025)    Hunger Vital Sign     Worried About Running Out of Food in the Last Year: Never true     Ran Out of Food in the Last Year: Never true   Transportation Needs: No Transportation Needs (2025)    PRAPARE - Transportation     Lack of Transportation (Medical): No     Lack of Transportation (Non-Medical): No   Recent Concern: Transportation Needs - Unmet Transportation Needs (3/5/2025)    PRAPARE - Transportation     Lack of Transportation (Medical): Yes     Lack of

## 2025-05-19 NOTE — PROGRESS NOTES
Resident ángel notified of barium swallow results for diet:   Recommended Diet:  Solid consistency: Minced and Moist  Liquid consistency: Mildly Thick

## 2025-05-19 NOTE — ACP (ADVANCE CARE PLANNING)
Advance Care Planning     Advance Care Planning Activator (Inpatient)  Conversation Note      Date of ACP Conversation: 5/19/2025     Conversation Conducted with: Patient with Decision Making Capacity    ACP Activator: Jill Glover RN        Health Care Decision Maker:     Current Designated Health Care Decision Maker:     Primary Decision Maker: uDsty Barrera - Demarcus - [phone]        Care Preferences    Ventilation:  \"If you were in your present state of health and suddenly became very ill and were unable to breathe on your own, what would your preference be about the use of a ventilator (breathing machine) if it were available to you?\"      Would the patient desire the use of ventilator (breathing machine)?: yes    \"If your health worsens and it becomes clear that your chance of recovery is unlikely, what would your preference be about the use of a ventilator (breathing machine) if it were available to you?\"     Would the patient desire the use of ventilator (breathing machine)?: Yes      Resuscitation  \"CPR works best to restart the heart when there is a sudden event, like a heart attack, in someone who is otherwise healthy. Unfortunately, CPR does not typically restart the heart for people who have serious health conditions or who are very sick.\"    \"In the event your heart stopped as a result of an underlying serious health condition, would you want attempts to be made to restart your heart (answer \"yes\" for attempt to resuscitate) or would you prefer a natural death (answer \"no\" for do not attempt to resuscitate)?\" yes       [] Yes   [] No   Educated Patient / Decision Maker regarding differences between Advance Directives and portable DNR orders.    Length of ACP Conversation in minutes:      Conversation Outcomes:  ACP discussion completed    Follow-up plan:    [] Schedule follow-up conversation to continue planning  [] Referred individual to Provider for additional questions/concerns   [] Advised

## 2025-05-20 PROBLEM — R06.00 DYSPNEA: Status: ACTIVE | Noted: 2025-05-20

## 2025-05-20 LAB
ANION GAP SERPL CALCULATED.3IONS-SCNC: 8 MMOL/L (ref 9–16)
BASOPHILS # BLD: 0 K/UL (ref 0–0.2)
BASOPHILS NFR BLD: 0 % (ref 0–2)
BUN SERPL-MCNC: 15 MG/DL (ref 8–23)
CALCIUM SERPL-MCNC: 8.2 MG/DL (ref 8.6–10.4)
CHLORIDE SERPL-SCNC: 107 MMOL/L (ref 98–107)
CO2 SERPL-SCNC: 25 MMOL/L (ref 20–31)
CREAT SERPL-MCNC: 1 MG/DL (ref 0.7–1.2)
EKG ATRIAL RATE: 326 BPM
EKG Q-T INTERVAL: 470 MS
EKG QRS DURATION: 162 MS
EKG QTC CALCULATION (BAZETT): 507 MS
EKG R AXIS: -84 DEGREES
EKG T AXIS: 90 DEGREES
EKG VENTRICULAR RATE: 70 BPM
EOSINOPHIL # BLD: 0.06 K/UL (ref 0–0.44)
EOSINOPHILS RELATIVE PERCENT: 2 % (ref 0–4)
ERYTHROCYTE [DISTWIDTH] IN BLOOD BY AUTOMATED COUNT: 18.6 % (ref 11.5–14.9)
GFR, ESTIMATED: 71 ML/MIN/1.73M2
GLUCOSE SERPL-MCNC: 101 MG/DL (ref 74–99)
HCT VFR BLD AUTO: 30.1 % (ref 41–53)
HGB BLD-MCNC: 9.7 G/DL (ref 13.5–17.5)
IMM GRANULOCYTES # BLD AUTO: 0 K/UL (ref 0–0.3)
IMM GRANULOCYTES NFR BLD: 0 %
LYMPHOCYTES NFR BLD: 0.74 K/UL (ref 1.1–3.7)
LYMPHOCYTES RELATIVE PERCENT: 23 % (ref 24–44)
MAGNESIUM SERPL-MCNC: 1.6 MG/DL (ref 1.7–2.3)
MCH RBC QN AUTO: 30.1 PG (ref 26–34)
MCHC RBC AUTO-ENTMCNC: 32.2 G/DL (ref 31–37)
MCV RBC AUTO: 93.5 FL (ref 80–100)
MONOCYTES NFR BLD: 0.29 K/UL (ref 0.1–1.2)
MONOCYTES NFR BLD: 9 % (ref 3–12)
MORPHOLOGY: ABNORMAL
MORPHOLOGY: ABNORMAL
NEUTROPHILS NFR BLD: 66 % (ref 36–66)
NEUTS SEG NFR BLD: 2.11 K/UL (ref 1.5–8.1)
NRBC BLD-RTO: 0 PER 100 WBC
PLATELET # BLD AUTO: 220 K/UL (ref 150–450)
PMV BLD AUTO: 9 FL (ref 8–13.5)
POTASSIUM SERPL-SCNC: 3.7 MMOL/L (ref 3.7–5.3)
RBC # BLD AUTO: 3.22 M/UL (ref 4.21–5.77)
SODIUM SERPL-SCNC: 140 MMOL/L (ref 136–145)
WBC OTHER # BLD: 3.2 K/UL (ref 3.5–11)

## 2025-05-20 PROCEDURE — 6360000002 HC RX W HCPCS: Performed by: NURSE PRACTITIONER

## 2025-05-20 PROCEDURE — 6370000000 HC RX 637 (ALT 250 FOR IP)

## 2025-05-20 PROCEDURE — 2060000000 HC ICU INTERMEDIATE R&B

## 2025-05-20 PROCEDURE — 6370000000 HC RX 637 (ALT 250 FOR IP): Performed by: NURSE PRACTITIONER

## 2025-05-20 PROCEDURE — 6370000000 HC RX 637 (ALT 250 FOR IP): Performed by: INTERNAL MEDICINE

## 2025-05-20 PROCEDURE — 2500000003 HC RX 250 WO HCPCS

## 2025-05-20 PROCEDURE — APPSS30 APP SPLIT SHARED TIME 16-30 MINUTES: Performed by: NURSE PRACTITIONER

## 2025-05-20 PROCEDURE — 6360000002 HC RX W HCPCS

## 2025-05-20 PROCEDURE — 99233 SBSQ HOSP IP/OBS HIGH 50: CPT | Performed by: INTERNAL MEDICINE

## 2025-05-20 PROCEDURE — 99231 SBSQ HOSP IP/OBS SF/LOW 25: CPT | Performed by: INTERNAL MEDICINE

## 2025-05-20 PROCEDURE — 36415 COLL VENOUS BLD VENIPUNCTURE: CPT

## 2025-05-20 PROCEDURE — 83735 ASSAY OF MAGNESIUM: CPT

## 2025-05-20 PROCEDURE — 94664 DEMO&/EVAL PT USE INHALER: CPT

## 2025-05-20 PROCEDURE — 94761 N-INVAS EAR/PLS OXIMETRY MLT: CPT

## 2025-05-20 PROCEDURE — 94640 AIRWAY INHALATION TREATMENT: CPT

## 2025-05-20 PROCEDURE — 85025 COMPLETE CBC W/AUTO DIFF WBC: CPT

## 2025-05-20 PROCEDURE — 99223 1ST HOSP IP/OBS HIGH 75: CPT | Performed by: NURSE PRACTITIONER

## 2025-05-20 PROCEDURE — 92526 ORAL FUNCTION THERAPY: CPT

## 2025-05-20 PROCEDURE — 80048 BASIC METABOLIC PNL TOTAL CA: CPT

## 2025-05-20 RX ORDER — IPRATROPIUM BROMIDE AND ALBUTEROL SULFATE 2.5; .5 MG/3ML; MG/3ML
1 SOLUTION RESPIRATORY (INHALATION)
Status: DISCONTINUED | OUTPATIENT
Start: 2025-05-20 | End: 2025-05-21

## 2025-05-20 RX ORDER — MAGNESIUM SULFATE HEPTAHYDRATE 40 MG/ML
2000 INJECTION, SOLUTION INTRAVENOUS ONCE
Status: COMPLETED | OUTPATIENT
Start: 2025-05-20 | End: 2025-05-20

## 2025-05-20 RX ORDER — POTASSIUM CHLORIDE 1500 MG/1
20 TABLET, EXTENDED RELEASE ORAL DAILY
Status: DISCONTINUED | OUTPATIENT
Start: 2025-05-21 | End: 2025-05-26 | Stop reason: HOSPADM

## 2025-05-20 RX ORDER — POTASSIUM CHLORIDE 1500 MG/1
40 TABLET, EXTENDED RELEASE ORAL ONCE
Status: COMPLETED | OUTPATIENT
Start: 2025-05-20 | End: 2025-05-20

## 2025-05-20 RX ORDER — GUAIFENESIN 600 MG/1
600 TABLET, EXTENDED RELEASE ORAL 2 TIMES DAILY
Status: DISCONTINUED | OUTPATIENT
Start: 2025-05-20 | End: 2025-05-26 | Stop reason: HOSPADM

## 2025-05-20 RX ORDER — DICYCLOMINE HYDROCHLORIDE 10 MG/ML
10 INJECTION INTRAMUSCULAR ONCE
Status: COMPLETED | OUTPATIENT
Start: 2025-05-20 | End: 2025-05-20

## 2025-05-20 RX ADMIN — MAGNESIUM SULFATE HEPTAHYDRATE 2000 MG: 40 INJECTION, SOLUTION INTRAVENOUS at 16:37

## 2025-05-20 RX ADMIN — IPRATROPIUM BROMIDE AND ALBUTEROL SULFATE 1 DOSE: .5; 2.5 SOLUTION RESPIRATORY (INHALATION) at 10:50

## 2025-05-20 RX ADMIN — HYDROMORPHONE HYDROCHLORIDE 0.25 MG: 1 INJECTION, SOLUTION INTRAMUSCULAR; INTRAVENOUS; SUBCUTANEOUS at 14:29

## 2025-05-20 RX ADMIN — FINASTERIDE 5 MG: 5 TABLET, FILM COATED ORAL at 07:49

## 2025-05-20 RX ADMIN — RANOLAZINE 500 MG: 500 TABLET, EXTENDED RELEASE ORAL at 15:46

## 2025-05-20 RX ADMIN — RANOLAZINE 500 MG: 500 TABLET, EXTENDED RELEASE ORAL at 07:49

## 2025-05-20 RX ADMIN — GABAPENTIN 100 MG: 100 CAPSULE ORAL at 07:48

## 2025-05-20 RX ADMIN — PANTOPRAZOLE SODIUM 40 MG: 40 TABLET, DELAYED RELEASE ORAL at 05:44

## 2025-05-20 RX ADMIN — WATER 40 MG: 1 INJECTION INTRAMUSCULAR; INTRAVENOUS; SUBCUTANEOUS at 11:00

## 2025-05-20 RX ADMIN — HYDROMORPHONE HYDROCHLORIDE 0.5 MG: 1 INJECTION, SOLUTION INTRAMUSCULAR; INTRAVENOUS; SUBCUTANEOUS at 04:01

## 2025-05-20 RX ADMIN — SODIUM CHLORIDE, PRESERVATIVE FREE 10 ML: 5 INJECTION INTRAVENOUS at 21:14

## 2025-05-20 RX ADMIN — HYDROMORPHONE HYDROCHLORIDE 0.25 MG: 1 INJECTION, SOLUTION INTRAMUSCULAR; INTRAVENOUS; SUBCUTANEOUS at 18:46

## 2025-05-20 RX ADMIN — LATANOPROST 1 DROP: 50 SOLUTION OPHTHALMIC at 21:15

## 2025-05-20 RX ADMIN — GABAPENTIN 100 MG: 100 CAPSULE ORAL at 21:12

## 2025-05-20 RX ADMIN — RANOLAZINE 500 MG: 500 TABLET, EXTENDED RELEASE ORAL at 21:12

## 2025-05-20 RX ADMIN — IPRATROPIUM BROMIDE AND ALBUTEROL SULFATE 1 DOSE: .5; 2.5 SOLUTION RESPIRATORY (INHALATION) at 19:50

## 2025-05-20 RX ADMIN — MIDODRINE HYDROCHLORIDE 2.5 MG: 2.5 TABLET ORAL at 07:49

## 2025-05-20 RX ADMIN — POTASSIUM CHLORIDE 40 MEQ: 1500 TABLET, EXTENDED RELEASE ORAL at 11:00

## 2025-05-20 RX ADMIN — WATER 40 MG: 1 INJECTION INTRAMUSCULAR; INTRAVENOUS; SUBCUTANEOUS at 17:10

## 2025-05-20 RX ADMIN — DILTIAZEM HYDROCHLORIDE 120 MG: 120 CAPSULE, COATED, EXTENDED RELEASE ORAL at 07:49

## 2025-05-20 RX ADMIN — BUMETANIDE 1 MG: 1 TABLET ORAL at 07:48

## 2025-05-20 RX ADMIN — HYDROMORPHONE HYDROCHLORIDE 0.25 MG: 1 INJECTION, SOLUTION INTRAMUSCULAR; INTRAVENOUS; SUBCUTANEOUS at 22:48

## 2025-05-20 RX ADMIN — METOPROLOL SUCCINATE 50 MG: 50 TABLET, EXTENDED RELEASE ORAL at 07:49

## 2025-05-20 RX ADMIN — SODIUM CHLORIDE, PRESERVATIVE FREE 10 ML: 5 INJECTION INTRAVENOUS at 07:49

## 2025-05-20 RX ADMIN — GUAIFENESIN 600 MG: 600 TABLET, EXTENDED RELEASE ORAL at 11:00

## 2025-05-20 RX ADMIN — ISOSORBIDE MONONITRATE 30 MG: 30 TABLET, EXTENDED RELEASE ORAL at 07:49

## 2025-05-20 RX ADMIN — DICYCLOMINE HYDROCHLORIDE 10 MG: 10 INJECTION, SOLUTION INTRAMUSCULAR at 20:59

## 2025-05-20 RX ADMIN — ATORVASTATIN CALCIUM 40 MG: 40 TABLET, FILM COATED ORAL at 21:12

## 2025-05-20 RX ADMIN — IPRATROPIUM BROMIDE AND ALBUTEROL SULFATE 1 DOSE: .5; 2.5 SOLUTION RESPIRATORY (INHALATION) at 15:02

## 2025-05-20 RX ADMIN — GUAIFENESIN 600 MG: 600 TABLET, EXTENDED RELEASE ORAL at 21:12

## 2025-05-20 ASSESSMENT — PAIN DESCRIPTION - DESCRIPTORS
DESCRIPTORS: STABBING
DESCRIPTORS: ACHING
DESCRIPTORS: ACHING;CRAMPING;SORE

## 2025-05-20 ASSESSMENT — PAIN SCALES - GENERAL
PAINLEVEL_OUTOF10: 7
PAINLEVEL_OUTOF10: 6
PAINLEVEL_OUTOF10: 0
PAINLEVEL_OUTOF10: 8
PAINLEVEL_OUTOF10: 4
PAINLEVEL_OUTOF10: 4
PAINLEVEL_OUTOF10: 6

## 2025-05-20 ASSESSMENT — PAIN DESCRIPTION - LOCATION
LOCATION: RIB CAGE
LOCATION: ABDOMEN
LOCATION: ABDOMEN;RIB CAGE
LOCATION: RIB CAGE

## 2025-05-20 ASSESSMENT — PAIN DESCRIPTION - ORIENTATION
ORIENTATION: LEFT

## 2025-05-20 ASSESSMENT — PAIN DESCRIPTION - ONSET: ONSET: ON-GOING

## 2025-05-20 ASSESSMENT — PAIN DESCRIPTION - FREQUENCY: FREQUENCY: CONTINUOUS

## 2025-05-20 ASSESSMENT — PAIN DESCRIPTION - PAIN TYPE: TYPE: ACUTE PAIN

## 2025-05-20 NOTE — PROGRESS NOTES
[facility]  IN-PATIENT SERVICE  [facility]    PROGRESS NOTE             5/20/2025    11:46 AM    Name:   Hemanth Barrera  MRN:     [id number]     Acct:      [id number]   Room:   2103/2103-01   Day:  2  Admit Date:  5/18/2025  8:54 AM    PCP:  Neftaly Contreras MD  Code Status:  Full Code    Subjective:     C/C:   Chief Complaint   Patient presents with    Shortness of Breath     Interval History Status: improved.    Patient seen and examined at bedside.  States that he is feeling generally well this morning.  No events overnight.  EGD scheduled for Thursday at this time.     VARSHA improving creatinine trending down.  Off fluids. nephrology consulted, recommendations pending.    RPP positive for metapneumovirus.  Patient reporting improvement of cough, having some irritation of throat. Cepacol ordered.  Wheezing bilaterally on physical exam today.     Pain of the left rib cage relieved with as needed Dilaudid, continue to monitor symptoms.    Patient reporting some pain with swallowing foods, feels like something is stuck in his throat.  Diet placed per speech recommendations but patient reports still having difficulty eating the food.  GI planning for endoscope Thursday, holding blood thinners for 2 days prior to procedure.     Patient otherwise hemodynamically stable.  No acute concerns at this time.    Brief History:     The patient is a 89 y.o. male with a past medical history of CAD status post stent placement, DVT/PE on Eliquis (most recent 3/2025), atrial fibrillation, hypertension, hyperlipidemia, CKD 3B, recent hospital admission for fall resulting in multiple left posterior rib fractures who presented to the ED with a chief complaint of shortness of breath and left-sided rib pain.     Patient states that over the last few weeks he has been experiencing left-sided rib pain and shortness of breath that has gotten progressively worse.  Was previously admitted  2/2025 due to multiple left-sided rib fracture secondary to fall, followed by orthopedic surgery during admission, deep breathing and coughing with splint encouraged.  Reports pain being uncontrolled since fractures resulting in inability to take deep breaths.  Also states that he has had increase in cough with clear sputum production over the last couple weeks.  Denies fever/chills, GI disturbance, known sick contacts.     Most recent hospital admission 5/1-5/4/2025 due to unstable angina.  Cardiology followed with patient reports admission and symptoms managed conservatively.  Patient was then discharged to SNF in stable condition.  Most recently seen by [facility] ED 5/12/2025 for shortness of breath and back pain, workup negative for ACS, relief of symptoms with morphine, patient was then discharged from the ED in stable condition.     Patient ANO x 3 and hemodynamically stable with adequate oxygen saturation on room air in emergency department.  CT chest negative for PE or acute pulmonary process WBC WNL, hemoglobin 10.1, lipase WNL. Troponin 44 on initial and repeat.  VARSHA with creatinine at 1.3, GFR 52, BUN 21 (baseline 0.7).     Potassium 3.3, magnesium 1.4, replaced in emergency department.  Received morphine in emergency department for pain control.  IV fluids at 75 mL/h initiated for VARSHA.  Nephrology consulted for further evaluation.     Patient admitted to PCU for management of VARSHA on CKD and shortness of breath.    Review of Systems:     Review of Systems   Constitutional:  Positive for appetite change (difficulty swallowing). Negative for activity change, chills and fever.   HENT:  Sore throat: Reporting mild throat irritation.         Dysphagia and feeling of \"something stuck in throat\"   Respiratory:  Positive for cough (Reports improvement in cough) and wheezing. Negative for chest tightness.    Cardiovascular:  Negative for chest pain.   Gastrointestinal:  Negative for abdominal pain and  reasonably achievable. COMPARISON: 04/18/2024 HISTORY: ORDERING SYSTEM PROVIDED HISTORY: hx pe, left chest pain TECHNOLOGIST PROVIDED HISTORY: hx pe, left chest pain Additional Contrast?->1 Reason for Exam: hx pe, left chest pain Additional signs and symptoms: Pt had a fall few months ago and broke ribs, states recently has been having worsening left-sided rib pain, cough, shortness of breath FINDINGS: Pulmonary Arteries: Pulmonary arteries are adequately opacified for evaluation.  No evidence of intraluminal filling defect to suggest pulmonary embolism.  Main pulmonary artery is normal in caliber. Mediastinum: No evidence of mediastinal lymphadenopathy.  The heart and pericardium demonstrate no acute abnormality.  There is no acute abnormality of the thoracic aorta.  Three-vessel coronary artery calcification Lungs/pleura: The lungs are without acute process.  No focal consolidation or pulmonary edema.  No evidence of pleural effusion or pneumothorax.  Mild emphysema. Upper Abdomen: Limited images of the upper abdomen are unremarkable. Soft Tissues/Bones: No acute bone or soft tissue abnormality.     No evidence of pulmonary embolism or acute pulmonary abnormality.     CT ABDOMEN PELVIS WO CONTRAST Additional Contrast? None  Result Date: 5/1/2025  EXAMINATION: CT OF THE ABDOMEN AND PELVIS WITHOUT CONTRAST 5/1/2025 3:26 pm TECHNIQUE: CT of the abdomen and pelvis was performed without the administration of intravenous contrast. Multiplanar reformatted images are provided for review. Automated exposure control, iterative reconstruction, and/or weight based adjustment of the mA/kV was utilized to reduce the radiation dose to as low as reasonably achievable. COMPARISON: 03/05/2025 HISTORY: ORDERING SYSTEM PROVIDED HISTORY: Abdominal pain TECHNOLOGIST PROVIDED HISTORY: Abdominal pain Reason for Exam: abd pain FINDINGS: Lack of intravenous contrast limits evaluation of the visceral organs. Lower chest: Trace left pleural

## 2025-05-20 NOTE — CONSULTS
[facility]    Consult                        Today's Date: 5/20/2025  Patient Name: Hemanth Barrera  Date of admission: 5/18/2025  8:54 AM  Patient's age: 89 y.o., 1/13/1935  Admission Dx: VARSHA (acute kidney injury) [N17.9]  Dyspnea, unspecified type [R06.00]    Reason for Consult:  Cardiac evaluation    Requesting Physician: Sana Haskins MD    CHIEF COMPLAINT:  Cardiac clearance for endoscopy    History Obtained From:  patient, electronic medical record    HISTORY OF PRESENT ILLNESS:      The patient is a 89 y.o.  male who is admitted to the hospital for VARSHA, back pain, rib fractures.    The patient is a 89 y.o. male with a past medical history of CAD status post stent placement, DVT/PE on Eliquis (most recent 3/2025), atrial fibrillation, hypertension, hyperlipidemia, CKD 3B, recent hospital admission for fall resulting in multiple left posterior rib fractures who presented to the ED with a chief complaint of shortness of breath and left-sided rib pain.     Patient states that over the last few weeks he has been experiencing left-sided rib pain and shortness of breath that has gotten progressively worse.  Was previously admitted 2/2025 due to multiple left-sided rib fracture secondary to fall, followed by orthopedic surgery during admission, deep breathing and coughing with splint encouraged.  Reports pain being uncontrolled since fractures resulting in inability to take deep breaths.  Also states that he has had increase in cough with clear sputum production over the last couple weeks.  Denies fever/chills, GI disturbance, known sick contacts.     Most recent hospital admission 5/1-5/4/2025 due to unstable angina.  Cardiology followed with patient reports admission and symptoms managed conservatively.  Patient was then discharged to SNF in stable condition.  Most recently seen by [facility] ED 5/12/2025 for shortness of breath and back pain, workup negative for ACS,

## 2025-05-20 NOTE — PLAN OF CARE
Problem: Safety - Adult  Goal: Free from fall injury  5/20/2025 0434 by Venice Guerrero RN  Outcome: Progressing       Problem: Chronic Conditions and Co-morbidities  Goal: Patient's chronic conditions and co-morbidity symptoms are monitored and maintained or improved  5/20/2025 0434 by Venice Guerrero RN  Outcome: Progressing       Problem: Discharge Planning  Goal: Discharge to home or other facility with appropriate resources  5/20/2025 0434 by Venice Guerrero RN  Outcome: Progressing       Problem: ABCDS Injury Assessment  Goal: Absence of physical injury  5/20/2025 0434 by Venice Guerrero RN  Outcome: Progressing       Problem: Pain  Goal: Verbalizes/displays adequate comfort level or baseline comfort level  Outcome: Progressing

## 2025-05-20 NOTE — PROGRESS NOTES
Attending Physician Statement  I have discussed the care of Hemanth Barrera and I have examined the patient myself and taken ROS and HPI, including pertinent history and exam findings, with the resident. I have reviewed the key elements of all parts of the encounter with the resident.  I agree with the assessment, plan and orders as documented by the resident.  Patient seen and examined n  Going for EGD on Thursday cardiology consulted hold Eliquis and Plavix  Patient has been having bilateral rhonchi and wheezing, positive for human metapneumovirus, gave DuoNeb, IV steroid, echo  Chest x-ray done 2 days ago did not show any acute process,  Electronically signed by Sana Haskins MD

## 2025-05-20 NOTE — PROGRESS NOTES
[facility]    Gastroenterology Daily Progress Note      Patient:   Hemanth Barrera   :    1935   Facility:   [facility]  Date:     2025  Consultant:   DARCI Sanches - CNP, CNP      SUBJECTIVE  89 y.o. male admitted 2025 with VARSHA (acute kidney injury) [N17.9]  Dyspnea, unspecified type [R06.00] and seen for dysphagia. The pt was seen and examined  still c/o dysphagia with food and liquids. No emesis. .MBS shows mild to moderate dysphagia        OBJECTIVE  Scheduled Meds:   ranolazine  500 mg Oral TID    clopidogrel  75 mg Oral Daily    bumetanide  1 mg Oral Daily    pantoprazole  40 mg Oral QAM AC    apixaban  5 mg Oral BID    atorvastatin  40 mg Oral Nightly    latanoprost  1 drop Both Eyes Nightly    finasteride  5 mg Oral Daily    dilTIAZem  120 mg Oral Daily    gabapentin  100 mg Oral BID    isosorbide mononitrate  30 mg Oral Daily    metoprolol succinate  50 mg Oral Daily    midodrine  2.5 mg Oral TID WC    lidocaine  1 patch TransDERmal Daily    sodium chloride flush  5-40 mL IntraVENous 2 times per day       Vital Signs:  /83   Pulse 69   Temp 98.6 °F (37 °C) (Oral)   Resp 16   Ht 1.854 m (6' 1\")   Wt 61.2 kg (135 lb)   SpO2 98%   BMI 17.81 kg/m²    Review of Systems - History obtained from chart review and the patient  General ROS: negative  Respiratory ROS: no cough, shortness of breath, or wheezing  Cardiovascular ROS: no chest pain or dyspnea on exertion  Gastrointestinal ROS: positive for - swallowing difficulty/pain   Physical Exam:     General Appearance: alert and oriented to person, place and time, well-developed and well-nourished, in no acute distress  Skin: warm and dry, no rash or erythema  Head: normocephalic and atraumatic  Eyes: pupils equal, round, and reactive to light, extraocular eye movements intact, conjunctivae normal  ENT: hearing grossly normal bilaterally  Neck: neck supple and non tender without mass, no thyromegaly

## 2025-05-20 NOTE — PROGRESS NOTES
DATE: 2025    NAME: Hemanth Barrera  MRN: [id number]   : 1935    Patient not seen this date for Physical Therapy due to:      [] Cancel by RN or physician due to:    [] Hemodialysis    [] Critical Lab Value Level     [] Blood transfusion in progress    [] Acute or unstable cardiovascular status   _MAP < 55 or more than >115  _HR < 40 or > 130    [] Acute or unstable pulmonary status   -FiO2 > 60%   _RR < 5 or >40    _O2 sats < 85%    [] Strict Bedrest    [] Off Unit for surgery or procedure    [] Off Unit for testing       [] Pending imaging to R/O fracture    [x] Refusal by Patient: 2nd attempt 1430 patient declined d/t breathing difficulty, not feeling well, will continue to pursue at a later date.     [x] Other: 1st attempt in am, patient receiving a breathing treatment.     [] PT being discontinued at this time. Patient independent. No further needs.     [] PT being discontinued at this time as the patient has been transferred to hospice care. No further needs.      Cindy Wilkerson, PTA

## 2025-05-20 NOTE — PROGRESS NOTES
Department of Internal Medicine  Nephrology Earnestine Villanueva MD  Progress Note    Reason for consultation: Management of acute kidney injury.    Requesting physician: Sana Haskins MD.     Interval history: Patient was seen and examined today and left lower rib cage pain is improving.  He is nonoliguric and does not have any acute complaints.    History of present illness: This is a 89 y.o. male with a significant past medical history of  systemic hypertension, coronary artery disease [s/p PTCA/stent], venous thromboembolism [on Eliquis], chronic atrial fibrillation [on Eliquis], hyperlipidemia and osteoarthritis, who presented to the hospital with complaints of shortness of breath and left chest wall pain.   Blood pressure was 101/71 mmHg at presentation and he was afebrile with oxygen saturation 99% on room air.  He underwent CTA of the chest which ruled out pulmonary embolism.  Chest x-ray was clear.  Laboratory studies remarkable for potassium 3.3 mmol/L, creatinine 1.3 mg/dL and magnesium 1.4 mg/dL.  Nephrology consultation was placed for management of this electrolyte anemia and acute kidney injury.  2D echocardiogram performed in March 2025 showed normal LVEF.  Today patient complains of left lower rib cage pain but does not have shortness of breath.  He states that he fell at home.    Scheduled Meds:   guaiFENesin  600 mg Oral BID    ipratropium 0.5 mg-albuterol 2.5 mg  1 Dose Inhalation Q4H WA RT    methylPREDNISolone  40 mg IntraVENous Q8H    [START ON 5/21/2025] potassium chloride  20 mEq Oral Daily    ranolazine  500 mg Oral TID    [Held by provider] clopidogrel  75 mg Oral Daily    bumetanide  1 mg Oral Daily    pantoprazole  40 mg Oral QAM AC    [Held by provider] apixaban  5 mg Oral BID    atorvastatin  40 mg Oral Nightly    latanoprost  1 drop Both Eyes Nightly    finasteride  5 mg Oral Daily    dilTIAZem  120 mg Oral Daily    gabapentin  100 mg Oral BID    isosorbide mononitrate  30 mg Oral

## 2025-05-20 NOTE — PLAN OF CARE
Problem: Safety - Adult  Goal: Free from fall injury  5/20/2025 1502 by Cristin Washington RN  Outcome: Progressing  5/20/2025 0434 by Venice Guerrero RN  Outcome: Progressing     Problem: Chronic Conditions and Co-morbidities  Goal: Patient's chronic conditions and co-morbidity symptoms are monitored and maintained or improved  5/20/2025 1502 by Cristin Washington RN  Outcome: Progressing  5/20/2025 0434 by Venice Guerrero RN  Outcome: Progressing     Problem: Discharge Planning  Goal: Discharge to home or other facility with appropriate resources  5/20/2025 1502 by Cristin Washington RN  Outcome: Progressing  5/20/2025 0434 by Venice Guerrero RN  Outcome: Progressing     Problem: ABCDS Injury Assessment  Goal: Absence of physical injury  5/20/2025 1502 by Cristin Washington RN  Outcome: Progressing  5/20/2025 0434 by Venice Guerrero RN  Outcome: Progressing     Problem: Pain  Goal: Verbalizes/displays adequate comfort level or baseline comfort level  5/20/2025 1502 by Cristin Washington RN  Outcome: Progressing  5/20/2025 0434 by Venice Guerrero RN  Outcome: Progressing     Problem: Skin/Tissue Integrity  Goal: Skin integrity remains intact  Description: 1.  Monitor for areas of redness and/or skin breakdown2.  Assess vascular access sites hourly3.  Every 4-6 hours minimum:  Change oxygen saturation probe site4.  Every 4-6 hours:  If on nasal continuous positive airway pressure, respiratory therapy assess nares and determine need for appliance change or resting period  Outcome: Progressing

## 2025-05-20 NOTE — PROGRESS NOTES
Speech Language Pathology  ST PROGRESSIVE CARE    Dysphagia Treatment Note    Date: 5/20/2025  Patient’s Name: Hemanth Barrera  MRN: [id number]  Diagnosis:   Patient Active Problem List   Diagnosis Code    On continuous oral anticoagulation Z79.01    CHF, acute on chronic ([facility]) I50.9    Hyperlipidemia E78.5    Former smoker Z87.891    Pacemaker Z95.0    History of DVT of lower extremity Z86.718    Enlarged prostate N40.0    Cataract of both eyes H26.9    GERD (gastroesophageal reflux disease) K21.9    History of inguinal hernia repair Z98.890, Z87.19    Hypertension I10    Pneumonia J18.9    History of right hip replacement Z96.641    History of gastric surgery Z98.890    Low back pain M54.50    Chest pain R07.9    Fluid overload E87.70    VARSHA (acute kidney injury) N17.9    History of acute myocardial infarction I25.2    Chronic CHF (congestive heart failure) ([facility]) I50.9    Unstable angina ([facility]) I20.0    Acute on chronic diastolic congestive heart failure ([facility]) I50.33    SBO (small bowel obstruction) ([facility]) K56.609    Stage 3b chronic kidney disease ([facility]) N18.32    Iron deficiency E61.1    Chronic systolic (congestive) heart failure I50.22    Chronic anemia D64.9    Bradycardia R00.1    Class 1 obesity E66.811    Degeneration of intervertebral disc [id number]    Edema R60.9    Fatigue R53.83    Glaucoma suspect of both eyes H40.003    Sick sinus syndrome ([facility]) I49.5    Venous insufficiency I87.2    Paroxysmal atrial fibrillation ([facility]) I48.0    Coronary artery disease involving native coronary artery of native heart with angina pectoris I25.119    B12 deficiency E53.8    Functional constipation K59.04    Slow transit constipation K59.01    Complex regional pain syndrome type 2 of lower extremity G57.70    Open dislocation of knee S83.106A, S81.009A    Pain in limb M79.609    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    Nausea and vomiting R11.2    Right upper quadrant abdominal pain R10.11

## 2025-05-21 LAB
ANION GAP SERPL CALCULATED.3IONS-SCNC: 10 MMOL/L (ref 9–16)
BASOPHILS # BLD: 0 K/UL (ref 0–0.2)
BASOPHILS NFR BLD: 0 % (ref 0–2)
BUN SERPL-MCNC: 19 MG/DL (ref 8–23)
C DIFF GDH + TOXINS A+B STL QL IA.RAPID: NEGATIVE
CALCIUM SERPL-MCNC: 9 MG/DL (ref 8.6–10.4)
CHLORIDE SERPL-SCNC: 104 MMOL/L (ref 98–107)
CO2 SERPL-SCNC: 24 MMOL/L (ref 20–31)
CREAT SERPL-MCNC: 1 MG/DL (ref 0.7–1.2)
EOSINOPHIL # BLD: 0 K/UL (ref 0–0.4)
EOSINOPHILS RELATIVE PERCENT: 0 % (ref 0–4)
ERYTHROCYTE [DISTWIDTH] IN BLOOD BY AUTOMATED COUNT: 18.7 % (ref 11.5–14.9)
GFR, ESTIMATED: 71 ML/MIN/1.73M2
GLUCOSE SERPL-MCNC: 153 MG/DL (ref 74–99)
HCT VFR BLD AUTO: 31.4 % (ref 41–53)
HGB BLD-MCNC: 10.2 G/DL (ref 13.5–17.5)
IMM GRANULOCYTES # BLD AUTO: 0 K/UL (ref 0–0.3)
IMM GRANULOCYTES NFR BLD: 0 %
LYMPHOCYTES NFR BLD: 0.41 K/UL (ref 1–4.8)
LYMPHOCYTES RELATIVE PERCENT: 10 % (ref 24–44)
MAGNESIUM SERPL-MCNC: 2 MG/DL (ref 1.7–2.3)
MCH RBC QN AUTO: 30.4 PG (ref 26–34)
MCHC RBC AUTO-ENTMCNC: 32.5 G/DL (ref 31–37)
MCV RBC AUTO: 93.7 FL (ref 80–100)
MONOCYTES NFR BLD: 0.04 K/UL (ref 0.1–1.3)
MONOCYTES NFR BLD: 1 % (ref 1–7)
MORPHOLOGY: ABNORMAL
NEUTROPHILS NFR BLD: 89 % (ref 36–66)
NEUTS SEG NFR BLD: 3.65 K/UL (ref 1.3–9.1)
NRBC BLD-RTO: 0 PER 100 WBC
PLATELET # BLD AUTO: 253 K/UL (ref 150–450)
PMV BLD AUTO: 9.9 FL (ref 8–13.5)
POTASSIUM SERPL-SCNC: 4.3 MMOL/L (ref 3.7–5.3)
RBC # BLD AUTO: 3.35 M/UL (ref 4.21–5.77)
SODIUM SERPL-SCNC: 138 MMOL/L (ref 136–145)
SPECIMEN DESCRIPTION: NORMAL
WBC OTHER # BLD: 4.1 K/UL (ref 3.5–11)

## 2025-05-21 PROCEDURE — 92526 ORAL FUNCTION THERAPY: CPT

## 2025-05-21 PROCEDURE — 94640 AIRWAY INHALATION TREATMENT: CPT

## 2025-05-21 PROCEDURE — 2060000000 HC ICU INTERMEDIATE R&B

## 2025-05-21 PROCEDURE — 85025 COMPLETE CBC W/AUTO DIFF WBC: CPT

## 2025-05-21 PROCEDURE — 6370000000 HC RX 637 (ALT 250 FOR IP)

## 2025-05-21 PROCEDURE — 87324 CLOSTRIDIUM AG IA: CPT

## 2025-05-21 PROCEDURE — 2500000003 HC RX 250 WO HCPCS

## 2025-05-21 PROCEDURE — 94761 N-INVAS EAR/PLS OXIMETRY MLT: CPT

## 2025-05-21 PROCEDURE — 36415 COLL VENOUS BLD VENIPUNCTURE: CPT

## 2025-05-21 PROCEDURE — 6370000000 HC RX 637 (ALT 250 FOR IP): Performed by: INTERNAL MEDICINE

## 2025-05-21 PROCEDURE — 6370000000 HC RX 637 (ALT 250 FOR IP): Performed by: NURSE PRACTITIONER

## 2025-05-21 PROCEDURE — 99231 SBSQ HOSP IP/OBS SF/LOW 25: CPT | Performed by: INTERNAL MEDICINE

## 2025-05-21 PROCEDURE — 99233 SBSQ HOSP IP/OBS HIGH 50: CPT | Performed by: INTERNAL MEDICINE

## 2025-05-21 PROCEDURE — 83735 ASSAY OF MAGNESIUM: CPT

## 2025-05-21 PROCEDURE — 6360000002 HC RX W HCPCS

## 2025-05-21 PROCEDURE — 99233 SBSQ HOSP IP/OBS HIGH 50: CPT | Performed by: NURSE PRACTITIONER

## 2025-05-21 PROCEDURE — 80048 BASIC METABOLIC PNL TOTAL CA: CPT

## 2025-05-21 PROCEDURE — 87449 NOS EACH ORGANISM AG IA: CPT

## 2025-05-21 PROCEDURE — APPSS30 APP SPLIT SHARED TIME 16-30 MINUTES: Performed by: NURSE PRACTITIONER

## 2025-05-21 PROCEDURE — 94664 DEMO&/EVAL PT USE INHALER: CPT

## 2025-05-21 RX ORDER — IPRATROPIUM BROMIDE AND ALBUTEROL SULFATE 2.5; .5 MG/3ML; MG/3ML
1 SOLUTION RESPIRATORY (INHALATION) 2 TIMES DAILY
Status: DISCONTINUED | OUTPATIENT
Start: 2025-05-21 | End: 2025-05-26 | Stop reason: HOSPADM

## 2025-05-21 RX ORDER — IPRATROPIUM BROMIDE AND ALBUTEROL SULFATE 2.5; .5 MG/3ML; MG/3ML
SOLUTION RESPIRATORY (INHALATION)
Status: COMPLETED
Start: 2025-05-21 | End: 2025-05-21

## 2025-05-21 RX ORDER — LOPERAMIDE HYDROCHLORIDE 2 MG/1
2 CAPSULE ORAL 4 TIMES DAILY PRN
Status: DISCONTINUED | OUTPATIENT
Start: 2025-05-21 | End: 2025-05-24

## 2025-05-21 RX ORDER — PREDNISONE 20 MG/1
40 TABLET ORAL DAILY
Status: DISCONTINUED | OUTPATIENT
Start: 2025-05-21 | End: 2025-05-21

## 2025-05-21 RX ORDER — PREDNISONE 20 MG/1
40 TABLET ORAL DAILY
Status: COMPLETED | OUTPATIENT
Start: 2025-05-22 | End: 2025-05-24

## 2025-05-21 RX ADMIN — RANOLAZINE 500 MG: 500 TABLET, EXTENDED RELEASE ORAL at 20:03

## 2025-05-21 RX ADMIN — LATANOPROST 1 DROP: 50 SOLUTION OPHTHALMIC at 20:05

## 2025-05-21 RX ADMIN — BUMETANIDE 1 MG: 1 TABLET ORAL at 08:42

## 2025-05-21 RX ADMIN — GUAIFENESIN 600 MG: 600 TABLET, EXTENDED RELEASE ORAL at 20:04

## 2025-05-21 RX ADMIN — WATER 40 MG: 1 INJECTION INTRAMUSCULAR; INTRAVENOUS; SUBCUTANEOUS at 08:43

## 2025-05-21 RX ADMIN — IPRATROPIUM BROMIDE AND ALBUTEROL SULFATE 1 DOSE: .5; 2.5 SOLUTION RESPIRATORY (INHALATION) at 19:30

## 2025-05-21 RX ADMIN — WATER 40 MG: 1 INJECTION INTRAMUSCULAR; INTRAVENOUS; SUBCUTANEOUS at 02:26

## 2025-05-21 RX ADMIN — HYDROMORPHONE HYDROCHLORIDE 0.25 MG: 1 INJECTION, SOLUTION INTRAMUSCULAR; INTRAVENOUS; SUBCUTANEOUS at 19:09

## 2025-05-21 RX ADMIN — GABAPENTIN 100 MG: 100 CAPSULE ORAL at 20:03

## 2025-05-21 RX ADMIN — HYDROMORPHONE HYDROCHLORIDE 0.25 MG: 1 INJECTION, SOLUTION INTRAMUSCULAR; INTRAVENOUS; SUBCUTANEOUS at 14:39

## 2025-05-21 RX ADMIN — ATORVASTATIN CALCIUM 40 MG: 40 TABLET, FILM COATED ORAL at 20:03

## 2025-05-21 RX ADMIN — GUAIFENESIN 600 MG: 600 TABLET, EXTENDED RELEASE ORAL at 08:42

## 2025-05-21 RX ADMIN — IPRATROPIUM BROMIDE AND ALBUTEROL SULFATE 1 DOSE: .5; 2.5 SOLUTION RESPIRATORY (INHALATION) at 07:11

## 2025-05-21 RX ADMIN — FINASTERIDE 5 MG: 5 TABLET, FILM COATED ORAL at 08:42

## 2025-05-21 RX ADMIN — ACETAMINOPHEN 650 MG: 325 TABLET ORAL at 08:41

## 2025-05-21 RX ADMIN — DILTIAZEM HYDROCHLORIDE 120 MG: 120 CAPSULE, COATED, EXTENDED RELEASE ORAL at 08:42

## 2025-05-21 RX ADMIN — SODIUM CHLORIDE, PRESERVATIVE FREE 10 ML: 5 INJECTION INTRAVENOUS at 20:04

## 2025-05-21 RX ADMIN — HYDROMORPHONE HYDROCHLORIDE 0.25 MG: 1 INJECTION, SOLUTION INTRAMUSCULAR; INTRAVENOUS; SUBCUTANEOUS at 10:33

## 2025-05-21 RX ADMIN — RANOLAZINE 500 MG: 500 TABLET, EXTENDED RELEASE ORAL at 08:43

## 2025-05-21 RX ADMIN — HYDROMORPHONE HYDROCHLORIDE 0.25 MG: 1 INJECTION, SOLUTION INTRAMUSCULAR; INTRAVENOUS; SUBCUTANEOUS at 06:18

## 2025-05-21 RX ADMIN — POTASSIUM CHLORIDE 20 MEQ: 1500 TABLET, EXTENDED RELEASE ORAL at 08:41

## 2025-05-21 RX ADMIN — RANOLAZINE 500 MG: 500 TABLET, EXTENDED RELEASE ORAL at 14:40

## 2025-05-21 RX ADMIN — GABAPENTIN 100 MG: 100 CAPSULE ORAL at 08:43

## 2025-05-21 RX ADMIN — METOPROLOL SUCCINATE 50 MG: 50 TABLET, EXTENDED RELEASE ORAL at 08:43

## 2025-05-21 RX ADMIN — SODIUM CHLORIDE, PRESERVATIVE FREE 10 ML: 5 INJECTION INTRAVENOUS at 08:53

## 2025-05-21 RX ADMIN — PANTOPRAZOLE SODIUM 40 MG: 40 TABLET, DELAYED RELEASE ORAL at 05:47

## 2025-05-21 RX ADMIN — LOPERAMIDE HYDROCHLORIDE 2 MG: 2 CAPSULE ORAL at 04:32

## 2025-05-21 RX ADMIN — ISOSORBIDE MONONITRATE 30 MG: 30 TABLET, EXTENDED RELEASE ORAL at 08:43

## 2025-05-21 ASSESSMENT — PAIN DESCRIPTION - DESCRIPTORS
DESCRIPTORS: ACHING;CRAMPING
DESCRIPTORS: THROBBING
DESCRIPTORS: STABBING

## 2025-05-21 ASSESSMENT — PAIN SCALES - GENERAL
PAINLEVEL_OUTOF10: 7
PAINLEVEL_OUTOF10: 5
PAINLEVEL_OUTOF10: 7

## 2025-05-21 ASSESSMENT — PAIN DESCRIPTION - LOCATION
LOCATION: ABDOMEN
LOCATION: HIP;RIB CAGE
LOCATION: ABDOMEN;RIB CAGE
LOCATION: ABDOMEN
LOCATION: RIB CAGE;HIP

## 2025-05-21 ASSESSMENT — PAIN DESCRIPTION - ORIENTATION
ORIENTATION: LEFT

## 2025-05-21 NOTE — PLAN OF CARE
Problem: Safety - Adult  Goal: Free from fall injury  5/21/2025 0448 by Imani Valdes RN  Outcome: Progressing     Problem: Chronic Conditions and Co-morbidities  Goal: Patient's chronic conditions and co-morbidity symptoms are monitored and maintained or improved  5/21/2025 0448 by Imani Valdes RN  Outcome: Progressing     Problem: Discharge Planning  Goal: Discharge to home or other facility with appropriate resources  5/21/2025 0448 by Imani Valdes RN  Outcome: Progressing     Problem: ABCDS Injury Assessment  Goal: Absence of physical injury  5/21/2025 0448 by Imani Valdes RN  Outcome: Progressing     Problem: Pain  Goal: Verbalizes/displays adequate comfort level or baseline comfort level  5/21/2025 0448 by Imani Valdes RN  Outcome: Progressing     Problem: Skin/Tissue Integrity  Goal: Skin integrity remains intact  Description: 1.  Monitor for areas of redness and/or skin breakdown2.  Assess vascular access sites hourly3.  Every 4-6 hours minimum:  Change oxygen saturation probe site4.  Every 4-6 hours:  If on nasal continuous positive airway pressure, respiratory therapy assess nares and determine need for appliance change or resting period  5/21/2025 0448 by Imani Valdes RN  Outcome: Progressing

## 2025-05-21 NOTE — PLAN OF CARE
Problem: Safety - Adult  Goal: Free from fall injury  5/21/2025 1543 by Eugenia Mantilla RN  Outcome: Progressing     Problem: Chronic Conditions and Co-morbidities  Goal: Patient's chronic conditions and co-morbidity symptoms are monitored and maintained or improved  5/21/2025 1543 by Eugenia Mantilla RN  Outcome: Progressing     Problem: Discharge Planning  Goal: Discharge to home or other facility with appropriate resources  5/21/2025 1543 by Eugenia Mantilla RN  Outcome: Progressing     Problem: ABCDS Injury Assessment  Goal: Absence of physical injury  5/21/2025 1543 by Eugenia Mantilla RN  Outcome: Progressing     Problem: Pain  Goal: Verbalizes/displays adequate comfort level or baseline comfort level  5/21/2025 1543 by Eugeina Mantilla RN  Outcome: Progressing     Problem: Skin/Tissue Integrity  Goal: Skin integrity remains intact  Description: 1.  Monitor for areas of redness and/or skin breakdown2.  Assess vascular access sites hourly3.  Every 4-6 hours minimum:  Change oxygen saturation probe site4.  Every 4-6 hours:  If on nasal continuous positive airway pressure, respiratory therapy assess nares and determine need for appliance change or resting period  5/21/2025 1543 by Eugenia Mantilla RN  Outcome: Progressing

## 2025-05-21 NOTE — PROGRESS NOTES
[facility]    Gastroenterology Daily Progress Note      Patient:   Hemanth Barrera   :    1935   Facility:   [facility]   Date:     2025  Consultant:   DARCI Sanches CNP, CNP      SUBJECTIVE  89 y.o. male admitted 2025 with VARSHA (acute kidney injury) [N17.9]  Dyspnea, unspecified type [R06.00] and seen for dysphagia. The pt was seen and examined. No c/o abdominal pain continues to c/o dysphagia but able to eat food this am. .        OBJECTIVE  Scheduled Meds:   ipratropium 0.5 mg-albuterol 2.5 mg  1 Dose Inhalation BID    guaiFENesin  600 mg Oral BID    methylPREDNISolone  40 mg IntraVENous Q8H    potassium chloride  20 mEq Oral Daily    ranolazine  500 mg Oral TID    [Held by provider] clopidogrel  75 mg Oral Daily    bumetanide  1 mg Oral Daily    pantoprazole  40 mg Oral QAM AC    [Held by provider] apixaban  5 mg Oral BID    atorvastatin  40 mg Oral Nightly    latanoprost  1 drop Both Eyes Nightly    finasteride  5 mg Oral Daily    dilTIAZem  120 mg Oral Daily    gabapentin  100 mg Oral BID    isosorbide mononitrate  30 mg Oral Daily    metoprolol succinate  50 mg Oral Daily    midodrine  2.5 mg Oral TID WC    lidocaine  1 patch TransDERmal Daily    sodium chloride flush  5-40 mL IntraVENous 2 times per day       Vital Signs:  /84   Pulse 72   Temp 97.9 °F (36.6 °C) (Axillary)   Resp 16   Ht 1.854 m (6' 1\")   Wt 61.2 kg (135 lb)   SpO2 98%   BMI 17.81 kg/m²    Review of Systems - History obtained from chart review and the patient  General ROS: negative  Respiratory ROS: no cough, shortness of breath, or wheezing  Cardiovascular ROS: no chest pain or dyspnea on exertion  Gastrointestinal ROS: positive for - swallowing difficulty/pain   Physical Exam:     General Appearance: alert and oriented to person, place and time, well-developed and well-nourished, in no acute distress  Skin: warm and dry, no rash or erythema  Head: normocephalic and  atraumatic  Eyes: pupils equal, round, and reactive to light, extraocular eye movements intact, conjunctivae normal  ENT: hearing grossly normal bilaterally  Neck: neck supple and non tender without mass, no thyromegaly or thyroid nodules, no cervical lymphadenopathy   Pulmonary/Chest: clear to auscultation bilaterally- no wheezes, rales or rhonchi, normal air movement, no respiratory distress  Cardiovascular: normal rate, regular rhythm, normal S1 and S2, no murmurs, rubs, clicks or gallops, distal pulses intact, no carotid bruits  Abdomen: soft, non-tender, non-distended, normal bowel sounds, no masses or organomegaly  Extremities: no cyanosis, clubbing or edema  Musculoskeletal: normal range of motion, no joint swelling, deformity or tenderness  Neurologic: no cranial nerve deficit and muscle strength normal    Lab and Imaging Review     CBC  Recent Labs     05/19/25  0605 05/20/25  0558 05/21/25  0535   WBC 3.0* 3.2* 4.1   HGB 9.3* 9.7* 10.2*   HCT 28.6* 30.1* 31.4*   MCV 94.4 93.5 93.7    220 253       BMP  Recent Labs     05/19/25  0605 05/19/25  1347 05/20/25  0558 05/20/25  1342 05/21/25  0535     --  140  --  138   K 3.3* 3.8 3.7  --  4.3   *  --  107  --  104   CO2 28  --  25  --  24   BUN 15  --  15  --  19   CREATININE 0.9  --  1.0  --  1.0   GLUCOSE 118*  --  101*  --  153*   CALCIUM 8.0*  --  8.2*  --  9.0   MG 1.6*  --   --  1.6* 2.0       LFTS  Recent Labs     05/18/25  0907   ALKPHOS 85   ALT 10   AST 21   BILITOT 0.6       AMYLASE/LIPASE/AMMONIA  Recent Labs     05/18/25  0907   LIPASE 24       PT/INR  Recent Labs     05/18/25  0907   PROTIME 18.2*   INR 1.4       CT ABDOMEN PELVIS WO CONTRAST Additional Contrast? None  Result Date: 5/1/2025  EXAMINATION: CT OF THE ABDOMEN AND PELVIS WITHOUT CONTRAST 5/1/2025 3:26 pm FINDINGS: Lack of intravenous contrast limits evaluation of the visceral organs. Lower chest: Trace left pleural effusion with atelectasis. EG junction, stomach and  service     5.anemia  Has not had overt gi bleeding  Trend hh  Ppi       This plan was formulated in collaboration with Dr. Razo  .    Electronically signed by: DARCI Sanches CNP on 5/21/2025 at 8:00 AM     Attending Physician Statement  I have discussed the care of Hemanth Barrera and I have examined the patient myselft and taken ros and hpi , including pertinent history and exam findings,  with the author of this note . I have reviewed the key elements of all parts of the encounter with the nurse practitioner/resident.  I agree with the assessment, plan and orders as documented by the above health care provider.  I have made necessary changes as deemed appropriate directly in the note.     More than 50% of the time was spent taking care of this patient in addition to the nurse practitioner time.  That also included history taking follow-up physical examination and review of system.    Electronically signed by Isauro Razo MD

## 2025-05-21 NOTE — PROGRESS NOTES
[facility]  Progress Note                   Date:   5/21/2025  Patient name: Hemanth Barrera  Date of admission:  5/18/2025  8:54 AM  MRN:   [id number]  YOB: 1935  PCP: Neftaly Contreras MD    Reason for Admission: VARSHA (acute kidney injury) [N17.9]  Dyspnea, unspecified type [R06.00]    Subjective:       Clinical Changes /Abnormalities: Seen & examined in room. No acute CV issues/concerns overnight. Labs, vitals, & tele reviewed. Plan for EGD w/ possible dilation tomorrow per GI    Review of Systems    Medications:   Scheduled Meds:   ipratropium 0.5 mg-albuterol 2.5 mg  1 Dose Inhalation BID    predniSONE  40 mg Oral Daily    guaiFENesin  600 mg Oral BID    potassium chloride  20 mEq Oral Daily    ranolazine  500 mg Oral TID    [Held by provider] clopidogrel  75 mg Oral Daily    bumetanide  1 mg Oral Daily    pantoprazole  40 mg Oral QAM AC    [Held by provider] apixaban  5 mg Oral BID    atorvastatin  40 mg Oral Nightly    latanoprost  1 drop Both Eyes Nightly    finasteride  5 mg Oral Daily    dilTIAZem  120 mg Oral Daily    gabapentin  100 mg Oral BID    isosorbide mononitrate  30 mg Oral Daily    metoprolol succinate  50 mg Oral Daily    midodrine  2.5 mg Oral TID WC    lidocaine  1 patch TransDERmal Daily    sodium chloride flush  5-40 mL IntraVENous 2 times per day     Continuous Infusions:   sodium chloride       CBC:   Recent Labs     05/19/25  0605 05/20/25  0558 05/21/25  0535   WBC 3.0* 3.2* 4.1   HGB 9.3* 9.7* 10.2*    220 253     BMP:    Recent Labs     05/19/25  0605 05/19/25  1347 05/20/25  0558 05/21/25  0535     --  140 138   K 3.3* 3.8 3.7 4.3   *  --  107 104   CO2 28  --  25 24   BUN 15  --  15 19   CREATININE 0.9  --  1.0 1.0   GLUCOSE 118*  --  101* 153*     Hepatic:No results for input(s): \"AST\", \"ALT\", \"BILITOT\", \"ALKPHOS\" in the last 72 hours.    Invalid input(s): \"ALB\"  Troponin:   Recent Labs     05/18/25  1208   TROPHS 44*     BNP: No

## 2025-05-21 NOTE — PROGRESS NOTES
[facility]  IN-PATIENT SERVICE  [facility]    PROGRESS NOTE             5/21/2025    9:04 AM    Name:   Hemanth Barrera  MRN:     [id number]     Acct:      [id number]   Room:   2103/2103-01   Day:  3  Admit Date:  5/18/2025  8:54 AM    PCP:  Neftaly Contreras MD  Code Status:  Full Code    Subjective:     C/C:   Chief Complaint   Patient presents with    Shortness of Breath     Interval History Status: improved.    Patient seen and examined wheezing has significantly improved, patient going for scope tomorrow VARSHA resolved  Patient denies any chest pain shortness of breath,  Had diarrhea yesterday C. difficile was negative    Brief History:     The patient is a 89 y.o. male with a past medical history of CAD status post stent placement, DVT/PE on Eliquis (most recent 3/2025), atrial fibrillation, hypertension, hyperlipidemia, CKD 3B, recent hospital admission for fall resulting in multiple left posterior rib fractures who presented to the ED with a chief complaint of shortness of breath and left-sided rib pain.     Patient states that over the last few weeks he has been experiencing left-sided rib pain and shortness of breath that has gotten progressively worse.  Was previously admitted 2/2025 due to multiple left-sided rib fracture secondary to fall, followed by orthopedic surgery during admission, deep breathing and coughing with splint encouraged.  Reports pain being uncontrolled since fractures resulting in inability to take deep breaths.  Also states that he has had increase in cough with clear sputum production over the last couple weeks.  Denies fever/chills, GI disturbance, known sick contacts.     Most recent hospital admission 5/1-5/4/2025 due to unstable angina.  Cardiology followed with patient reports admission and symptoms managed conservatively.  Patient was then discharged to SNF in stable condition.  Most recently seen by [facility] ED        Labs:    [unfilled]    Lab Results   Component Value Date/Time    SPECIAL Site: Body Fluid 01/21/2025 01:23 PM     Lab Results   Component Value Date/Time    CULTURE  01/21/2025 01:23 PM     (NOTE) Direct Gram Stain from bottle result called to and read back by:CALVIN CRUZ [id number]    CULTURE POSITIVE Fluid Culture 01/21/2025 01:23 PM    CULTURE  01/21/2025 01:23 PM     DIRECT GRAM STAIN FROM BOTTLE: GRAM POSITIVE COCCI IN CLUSTERS    CULTURE (A) 01/21/2025 01:23 PM     STAPHYLOCOCCUS EPIDERMIDIS Identification by MALDI-TOF    CULTURE STAPHYLOCOCCUS CAPITIS Identification by MALDI-TOF (A) 01/21/2025 01:23 PM       [unfilled]    Radiology:    FL MODIFIED BARIUM SWALLOW W VIDEO  Result Date: 5/19/2025  EXAMINATION: MODIFIED BARIUM SWALLOW WAS PERFORMED IN CONJUNCTION WITH SPEECH PATHOLOGY SERVICES TECHNIQUE: Under fluoroscopic evaluation cineradiography/videoradiography recordings were performed in conjunction with the speech-language pathologist (SLP). Various liquid, solid and/or semi-solid barium preparations were used to assess swallowing function. FLUOROSCOPY DOSE AND TYPE: Fluoroscopy time-2:24 Radiation Exposure Index: Kerma mGy, 11.6 COMPARISON: None HISTORY: ORDERING SYSTEM PROVIDED HISTORY: dysphagia TECHNOLOGIST PROVIDED HISTORY: dysphagia Reason for Exam: dysphagia FINDINGS: Oral phase of swallowing was studied with thin and mildly thick liquids, purees/pudding, and soft solids. Premature vallecular spillage, vallecular residue and piriform sinus cavity residue was seen with thin and mildly thick liquids, deep penetration evident with thin liquids.  Premature vallecular spillage and vallecular residue was noted with purees/pudding and soft solids; additionally, oral transit/preparatory phase difficulties and trace penetration noted with soft solids. No evidence of laryngeal aspiration.     Swallowing mechanism with preliminary findings, as above. Please see separate speech pathology report for full   Mild emphysema. Upper Abdomen: Limited images of the upper abdomen are unremarkable. Soft Tissues/Bones: No acute bone or soft tissue abnormality.     No evidence of pulmonary embolism or acute pulmonary abnormality.     CT ABDOMEN PELVIS WO CONTRAST Additional Contrast? None  Result Date: 5/1/2025  EXAMINATION: CT OF THE ABDOMEN AND PELVIS WITHOUT CONTRAST 5/1/2025 3:26 pm TECHNIQUE: CT of the abdomen and pelvis was performed without the administration of intravenous contrast. Multiplanar reformatted images are provided for review. Automated exposure control, iterative reconstruction, and/or weight based adjustment of the mA/kV was utilized to reduce the radiation dose to as low as reasonably achievable. COMPARISON: 03/05/2025 HISTORY: ORDERING SYSTEM PROVIDED HISTORY: Abdominal pain TECHNOLOGIST PROVIDED HISTORY: Abdominal pain Reason for Exam: abd pain FINDINGS: Lack of intravenous contrast limits evaluation of the visceral organs. Lower chest: Trace left pleural effusion with atelectasis. EG junction, stomach and duodenal sweep: Unremarkable Liver: Unremarkable Gallbladder: Unremarkable Biliary tree: Unremarkable Pancreas: Unremarkable for patient's age. Spleen: Unremarkable Kidneys and ureters: Contrast in the collecting system.  Simple appearing bilateral renal cysts the largest in the upper pole the right kidney measuring 2.8 cm.  No additional imaging follow-up is recommended. Adrenal glands: Stable mild nodular thickening of the left adrenal gland. Retroperitoneal structures:  Unremarkable Small bowel and colon: Small to moderate amount of stool most pronounced in the right and transverse colon.  No evidence of a bowel obstruction. Appendix: Not seen Urinary bladder: Small outpouching seen along the bladder wall suggesting diverticular disease with trabeculated appearance suggesting chronic bladder outlet obstruction.  Contrast seen within the urethra Free fluid/air: None Lymph nodes: No enlarged lymph

## 2025-05-21 NOTE — PROGRESS NOTES
Physical Therapy  DATE: 2025    NAME: Hemanth Barrera  MRN: [id number]   : 1935    Patient not seen this date for Physical Therapy due to:      [] Cancel by RN or physician due to:    [] Hemodialysis    [] Critical Lab Value Level     [] Blood transfusion in progress    [] Acute or unstable cardiovascular status   _MAP < 55 or more than >115  _HR < 40 or > 130    [] Acute or unstable pulmonary status   -FiO2 > 60%   _RR < 5 or >40    _O2 sats < 85%    [] Strict Bedrest    [] Off Unit for surgery or procedure    [] Off Unit for testing       [] Pending imaging to R/O fracture    [x] Refusal by Patient; checked on pt @ 1308. Pt lying in bed stating \"I can't breathe. I am getting a tube down my throat tomorrow, I will work with you after that.\" Will continue to follow.       [] Other      [] PT being discontinued at this time. Patient independent. No further needs.     [] PT being discontinued at this time as the patient has been transferred to hospice care. No further needs.      Electronically signed by Mabel Chirinos PTA on 25 at 3:19 PM EDT

## 2025-05-21 NOTE — PROGRESS NOTES
ISMAEL Rodriguez notified of pts negative C-Diff. Pt still having frequent diarrhea stools with abdominal discomfort.

## 2025-05-21 NOTE — PROGRESS NOTES
Speech Language Pathology  ST PROGRESSIVE CARE    Dysphagia Treatment Note    Date: 5/21/2025  Patient’s Name: Hemanth Barrera  MRN: [id number]  Diagnosis:   Patient Active Problem List   Diagnosis Code    On continuous oral anticoagulation Z79.01    CHF, acute on chronic ([facility]) I50.9    Hyperlipidemia E78.5    Former smoker Z87.891    Pacemaker Z95.0    History of DVT of lower extremity Z86.718    Enlarged prostate N40.0    Cataract of both eyes H26.9    GERD (gastroesophageal reflux disease) K21.9    History of inguinal hernia repair Z98.890, Z87.19    Hypertension I10    Pneumonia J18.9    History of right hip replacement Z96.641    History of gastric surgery Z98.890    Low back pain M54.50    Chest pain R07.9    Fluid overload E87.70    VARSHA (acute kidney injury) N17.9    History of acute myocardial infarction I25.2    Chronic CHF (congestive heart failure) ([facility]) I50.9    Unstable angina ([facility]) I20.0    Acute on chronic diastolic congestive heart failure ([facility]) I50.33    SBO (small bowel obstruction) ([facility]) K56.609    Stage 3b chronic kidney disease ([facility]) N18.32    Iron deficiency E61.1    Chronic systolic (congestive) heart failure I50.22    Chronic anemia D64.9    Bradycardia R00.1    Class 1 obesity E66.811    Degeneration of intervertebral disc [id number]    Edema R60.9    Fatigue R53.83    Glaucoma suspect of both eyes H40.003    Sick sinus syndrome ([facility]) I49.5    Venous insufficiency I87.2    Paroxysmal atrial fibrillation ([facility]) I48.0    Coronary artery disease involving native coronary artery of native heart with angina pectoris I25.119    B12 deficiency E53.8    Functional constipation K59.04    Slow transit constipation K59.01    Complex regional pain syndrome type 2 of lower extremity G57.70    Open dislocation of knee S83.106A, S81.009A    Pain in limb M79.609    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    Nausea and vomiting R11.2    Right upper quadrant abdominal pain R10.11     conversation c ST c no difficulty breathing, very tangential, frequently requiring redirection back to task/topic.    Pt. Recalled yawn/sigh technique instructed to him yesterday.   ST provided educ. Re: reducing tension when voicing as to not create damage/tension of muscles to vocal cords.   Pt. Able to then demo. X10.   Pt. Reports he is anxious to have his EGD tomorrow, hoping that that will help to \"open  my throat so I can breathe better, I feel like its really tight.\"    ST attempted to educ. Re: difference between trachea for breathing and esophagus for swallowing.   Pt. Verbalized understanding, however, continued to demo. Confusion between the two and what the EGD will potentially help.      Pt. Reports he is swallowing better on the soft diet he is on now.   ST to f/u after pt. Has EGD tomorrow as needed.       Plan:  [x] Continue ST services    [] Discharge from ST:        Discharge recommendations:  [] Further therapy recommended at discharge.   [x] No therapy recommended at discharge.         Treatment completed by: Jazmin Castellanos M.A.CCC/SLP

## 2025-05-21 NOTE — CARE COORDINATION
Case Management   Daily Progress Note       Patient Name: Hemanth Barrera                   YOB: 1935  Diagnosis: VARSHA (acute kidney injury) [N17.9]  Dyspnea, unspecified type [R06.00]                       GMLOS: 3 days  Length of Stay: 3  days    Readmission Risk (Low < 19, Mod (19-27), High > 27): Readmission Risk Score: 29.8      Patient is alert and oriented.    Spoke with Pt, and Current Transitional Plan is:    [] Home Independently    [x] Home with HC, [facility] w/Son.     [] Skilled Nursing Facility    [] Acute Rehabilitation    [] Long Term Acute Care (LTAC)    [] Other:     Medical Management: Sating 100% on RA. PO Prednisone. CR+1.0.     Testing Ordered: GI- EGD w/ Possible Dilatation on Thursday. Eliquis on Hold. Cardio- Per Notes, Moderate Risk for EGD.       Additional Notes: PT/OT on board. Will follow for rec needs. Pt's Wellness Visit rescheduled for 5/28/25 @ 11:30 AM W/ BRAD. Hospital F/U apt on 6/17 @ 3:15 PM, W/ Dr. De Los Santos. Information placed on AVS.     Electronically signed by Jill Glover RN on 5/21/2025 at 1:21 PM

## 2025-05-22 ENCOUNTER — ANESTHESIA EVENT (OUTPATIENT)
Dept: ENDOSCOPY | Age: 89
End: 2025-05-22
Payer: MEDICARE

## 2025-05-22 ENCOUNTER — ANESTHESIA (OUTPATIENT)
Dept: ENDOSCOPY | Age: 89
End: 2025-05-22
Payer: MEDICARE

## 2025-05-22 LAB
ANION GAP SERPL CALCULATED.3IONS-SCNC: 10 MMOL/L (ref 9–16)
BASOPHILS # BLD: 0 K/UL (ref 0–0.2)
BASOPHILS NFR BLD: 0 % (ref 0–2)
BUN SERPL-MCNC: 26 MG/DL (ref 8–23)
CALCIUM SERPL-MCNC: 8.8 MG/DL (ref 8.6–10.4)
CHLORIDE SERPL-SCNC: 104 MMOL/L (ref 98–107)
CO2 SERPL-SCNC: 25 MMOL/L (ref 20–31)
CREAT SERPL-MCNC: 1.1 MG/DL (ref 0.7–1.2)
EOSINOPHIL # BLD: 0 K/UL (ref 0–0.4)
EOSINOPHILS RELATIVE PERCENT: 0 % (ref 0–4)
ERYTHROCYTE [DISTWIDTH] IN BLOOD BY AUTOMATED COUNT: 18.6 % (ref 11.5–14.9)
GFR, ESTIMATED: 64 ML/MIN/1.73M2
GLUCOSE SERPL-MCNC: 114 MG/DL (ref 74–99)
HCT VFR BLD AUTO: 28.9 % (ref 41–53)
HGB BLD-MCNC: 9.4 G/DL (ref 13.5–17.5)
IMM GRANULOCYTES # BLD AUTO: 0 K/UL (ref 0–0.3)
IMM GRANULOCYTES NFR BLD: 0 %
LYMPHOCYTES NFR BLD: 0.69 K/UL (ref 1–4.8)
LYMPHOCYTES RELATIVE PERCENT: 11 % (ref 24–44)
MAGNESIUM SERPL-MCNC: 1.9 MG/DL (ref 1.7–2.3)
MCH RBC QN AUTO: 30.1 PG (ref 26–34)
MCHC RBC AUTO-ENTMCNC: 32.5 G/DL (ref 31–37)
MCV RBC AUTO: 92.6 FL (ref 80–100)
MONOCYTES NFR BLD: 0.13 K/UL (ref 0.1–1.3)
MONOCYTES NFR BLD: 2 % (ref 1–7)
MORPHOLOGY: ABNORMAL
MORPHOLOGY: ABNORMAL
NEUTROPHILS NFR BLD: 87 % (ref 36–66)
NEUTS SEG NFR BLD: 5.48 K/UL (ref 1.3–9.1)
NRBC BLD-RTO: 0 PER 100 WBC
PLATELET # BLD AUTO: 236 K/UL (ref 150–450)
PMV BLD AUTO: 8.9 FL (ref 8–13.5)
POTASSIUM SERPL-SCNC: 4.4 MMOL/L (ref 3.7–5.3)
RBC # BLD AUTO: 3.12 M/UL (ref 4.21–5.77)
SODIUM SERPL-SCNC: 139 MMOL/L (ref 136–145)
WBC OTHER # BLD: 6.3 K/UL (ref 3.5–11)

## 2025-05-22 PROCEDURE — 6370000000 HC RX 637 (ALT 250 FOR IP): Performed by: INTERNAL MEDICINE

## 2025-05-22 PROCEDURE — 80048 BASIC METABOLIC PNL TOTAL CA: CPT

## 2025-05-22 PROCEDURE — 94640 AIRWAY INHALATION TREATMENT: CPT

## 2025-05-22 PROCEDURE — 6370000000 HC RX 637 (ALT 250 FOR IP)

## 2025-05-22 PROCEDURE — 2500000003 HC RX 250 WO HCPCS: Performed by: INTERNAL MEDICINE

## 2025-05-22 PROCEDURE — 6370000000 HC RX 637 (ALT 250 FOR IP): Performed by: NURSE PRACTITIONER

## 2025-05-22 PROCEDURE — 7100000001 HC PACU RECOVERY - ADDTL 15 MIN: Performed by: INTERNAL MEDICINE

## 2025-05-22 PROCEDURE — 99233 SBSQ HOSP IP/OBS HIGH 50: CPT | Performed by: INTERNAL MEDICINE

## 2025-05-22 PROCEDURE — 88312 SPECIAL STAINS GROUP 1: CPT

## 2025-05-22 PROCEDURE — 85025 COMPLETE CBC W/AUTO DIFF WBC: CPT

## 2025-05-22 PROCEDURE — 94761 N-INVAS EAR/PLS OXIMETRY MLT: CPT

## 2025-05-22 PROCEDURE — C1726 CATH, BAL DIL, NON-VASCULAR: HCPCS | Performed by: INTERNAL MEDICINE

## 2025-05-22 PROCEDURE — 36415 COLL VENOUS BLD VENIPUNCTURE: CPT

## 2025-05-22 PROCEDURE — 83735 ASSAY OF MAGNESIUM: CPT

## 2025-05-22 PROCEDURE — 43249 ESOPH EGD DILATION <30 MM: CPT | Performed by: INTERNAL MEDICINE

## 2025-05-22 PROCEDURE — 99233 SBSQ HOSP IP/OBS HIGH 50: CPT | Performed by: NURSE PRACTITIONER

## 2025-05-22 PROCEDURE — 2500000003 HC RX 250 WO HCPCS

## 2025-05-22 PROCEDURE — 6360000002 HC RX W HCPCS: Performed by: NURSE ANESTHETIST, CERTIFIED REGISTERED

## 2025-05-22 PROCEDURE — 0D718ZZ DILATION OF UPPER ESOPHAGUS, VIA NATURAL OR ARTIFICIAL OPENING ENDOSCOPIC: ICD-10-PCS | Performed by: INTERNAL MEDICINE

## 2025-05-22 PROCEDURE — 0DB18ZX EXCISION OF UPPER ESOPHAGUS, VIA NATURAL OR ARTIFICIAL OPENING ENDOSCOPIC, DIAGNOSTIC: ICD-10-PCS | Performed by: INTERNAL MEDICINE

## 2025-05-22 PROCEDURE — 2709999900 HC NON-CHARGEABLE SUPPLY: Performed by: INTERNAL MEDICINE

## 2025-05-22 PROCEDURE — 2580000003 HC RX 258: Performed by: ANESTHESIOLOGY

## 2025-05-22 PROCEDURE — 6360000002 HC RX W HCPCS

## 2025-05-22 PROCEDURE — 2720000010 HC SURG SUPPLY STERILE: Performed by: INTERNAL MEDICINE

## 2025-05-22 PROCEDURE — 88305 TISSUE EXAM BY PATHOLOGIST: CPT

## 2025-05-22 PROCEDURE — 0W3P8ZZ CONTROL BLEEDING IN GASTROINTESTINAL TRACT, VIA NATURAL OR ARTIFICIAL OPENING ENDOSCOPIC: ICD-10-PCS | Performed by: INTERNAL MEDICINE

## 2025-05-22 PROCEDURE — 3609012400 HC EGD TRANSORAL BIOPSY SINGLE/MULTIPLE: Performed by: INTERNAL MEDICINE

## 2025-05-22 PROCEDURE — 7100000000 HC PACU RECOVERY - FIRST 15 MIN: Performed by: INTERNAL MEDICINE

## 2025-05-22 PROCEDURE — 3700000001 HC ADD 15 MINUTES (ANESTHESIA): Performed by: INTERNAL MEDICINE

## 2025-05-22 PROCEDURE — 2060000000 HC ICU INTERMEDIATE R&B

## 2025-05-22 PROCEDURE — 43255 EGD CONTROL BLEEDING ANY: CPT | Performed by: INTERNAL MEDICINE

## 2025-05-22 PROCEDURE — 3700000000 HC ANESTHESIA ATTENDED CARE: Performed by: INTERNAL MEDICINE

## 2025-05-22 PROCEDURE — 43239 EGD BIOPSY SINGLE/MULTIPLE: CPT | Performed by: INTERNAL MEDICINE

## 2025-05-22 RX ORDER — LIDOCAINE 4 G/G
1 PATCH TOPICAL DAILY
Qty: 10 EACH | Refills: 0 | Status: CANCELLED | OUTPATIENT
Start: 2025-05-23

## 2025-05-22 RX ORDER — LIDOCAINE HYDROCHLORIDE 10 MG/ML
INJECTION, SOLUTION EPIDURAL; INFILTRATION; INTRACAUDAL; PERINEURAL
Status: DISCONTINUED | OUTPATIENT
Start: 2025-05-22 | End: 2025-05-22 | Stop reason: SDUPTHER

## 2025-05-22 RX ORDER — MORPHINE SULFATE 2 MG/ML
1 INJECTION, SOLUTION INTRAMUSCULAR; INTRAVENOUS ONCE
Status: COMPLETED | OUTPATIENT
Start: 2025-05-22 | End: 2025-05-22

## 2025-05-22 RX ORDER — CLOPIDOGREL BISULFATE 75 MG/1
75 TABLET ORAL DAILY
Qty: 30 TABLET | Refills: 3 | Status: CANCELLED | OUTPATIENT
Start: 2025-05-22

## 2025-05-22 RX ORDER — PROPOFOL 10 MG/ML
INJECTION, EMULSION INTRAVENOUS
Status: DISCONTINUED | OUTPATIENT
Start: 2025-05-22 | End: 2025-05-22 | Stop reason: SDUPTHER

## 2025-05-22 RX ORDER — BUMETANIDE 1 MG/1
1 TABLET ORAL DAILY
Qty: 30 TABLET | Refills: 3 | Status: CANCELLED | OUTPATIENT
Start: 2025-05-23

## 2025-05-22 RX ORDER — SODIUM CHLORIDE, SODIUM LACTATE, POTASSIUM CHLORIDE, CALCIUM CHLORIDE 600; 310; 30; 20 MG/100ML; MG/100ML; MG/100ML; MG/100ML
INJECTION, SOLUTION INTRAVENOUS CONTINUOUS
Status: DISCONTINUED | OUTPATIENT
Start: 2025-05-22 | End: 2025-05-22 | Stop reason: HOSPADM

## 2025-05-22 RX ORDER — PREDNISONE 20 MG/1
40 TABLET ORAL DAILY
Qty: 4 TABLET | Refills: 0 | Status: CANCELLED | OUTPATIENT
Start: 2025-05-23 | End: 2025-05-25

## 2025-05-22 RX ADMIN — SODIUM CHLORIDE, PRESERVATIVE FREE 10 ML: 5 INJECTION INTRAVENOUS at 07:30

## 2025-05-22 RX ADMIN — METOPROLOL SUCCINATE 50 MG: 50 TABLET, EXTENDED RELEASE ORAL at 07:34

## 2025-05-22 RX ADMIN — ATORVASTATIN CALCIUM 40 MG: 40 TABLET, FILM COATED ORAL at 21:43

## 2025-05-22 RX ADMIN — IPRATROPIUM BROMIDE AND ALBUTEROL SULFATE 1 DOSE: .5; 2.5 SOLUTION RESPIRATORY (INHALATION) at 19:14

## 2025-05-22 RX ADMIN — PROPOFOL 40 MG: 10 INJECTION, EMULSION INTRAVENOUS at 15:08

## 2025-05-22 RX ADMIN — GABAPENTIN 100 MG: 100 CAPSULE ORAL at 21:43

## 2025-05-22 RX ADMIN — RANOLAZINE 500 MG: 500 TABLET, EXTENDED RELEASE ORAL at 07:34

## 2025-05-22 RX ADMIN — LIDOCAINE HYDROCHLORIDE 50 MG: 10 INJECTION, SOLUTION EPIDURAL; INFILTRATION; INTRACAUDAL; PERINEURAL at 15:08

## 2025-05-22 RX ADMIN — POTASSIUM CHLORIDE 20 MEQ: 1500 TABLET, EXTENDED RELEASE ORAL at 07:33

## 2025-05-22 RX ADMIN — LATANOPROST 1 DROP: 50 SOLUTION OPHTHALMIC at 21:49

## 2025-05-22 RX ADMIN — ISOSORBIDE MONONITRATE 30 MG: 30 TABLET, EXTENDED RELEASE ORAL at 07:33

## 2025-05-22 RX ADMIN — GUAIFENESIN 600 MG: 600 TABLET, EXTENDED RELEASE ORAL at 21:43

## 2025-05-22 RX ADMIN — PREDNISONE 40 MG: 20 TABLET ORAL at 07:34

## 2025-05-22 RX ADMIN — MIDODRINE HYDROCHLORIDE 2.5 MG: 2.5 TABLET ORAL at 12:27

## 2025-05-22 RX ADMIN — LOPERAMIDE HYDROCHLORIDE 2 MG: 2 CAPSULE ORAL at 00:19

## 2025-05-22 RX ADMIN — MORPHINE SULFATE 1 MG: 2 INJECTION, SOLUTION INTRAMUSCULAR; INTRAVENOUS at 16:23

## 2025-05-22 RX ADMIN — SODIUM CHLORIDE, POTASSIUM CHLORIDE, SODIUM LACTATE AND CALCIUM CHLORIDE: 600; 310; 30; 20 INJECTION, SOLUTION INTRAVENOUS at 13:37

## 2025-05-22 RX ADMIN — MIDODRINE HYDROCHLORIDE 2.5 MG: 2.5 TABLET ORAL at 16:23

## 2025-05-22 RX ADMIN — PHENYLEPHRINE HYDROCHLORIDE 100 MCG: 10 INJECTION INTRAVENOUS at 15:08

## 2025-05-22 RX ADMIN — HYDROMORPHONE HYDROCHLORIDE 0.25 MG: 1 INJECTION, SOLUTION INTRAMUSCULAR; INTRAVENOUS; SUBCUTANEOUS at 05:59

## 2025-05-22 RX ADMIN — BUMETANIDE 1 MG: 1 TABLET ORAL at 07:34

## 2025-05-22 RX ADMIN — PROPOFOL 30 MG: 10 INJECTION, EMULSION INTRAVENOUS at 15:18

## 2025-05-22 RX ADMIN — DILTIAZEM HYDROCHLORIDE 120 MG: 120 CAPSULE, COATED, EXTENDED RELEASE ORAL at 07:33

## 2025-05-22 RX ADMIN — SODIUM CHLORIDE, PRESERVATIVE FREE 10 ML: 5 INJECTION INTRAVENOUS at 21:44

## 2025-05-22 RX ADMIN — ACETAMINOPHEN 650 MG: 325 TABLET ORAL at 07:33

## 2025-05-22 RX ADMIN — HYDROMORPHONE HYDROCHLORIDE 0.25 MG: 1 INJECTION, SOLUTION INTRAMUSCULAR; INTRAVENOUS; SUBCUTANEOUS at 10:33

## 2025-05-22 RX ADMIN — RANOLAZINE 500 MG: 500 TABLET, EXTENDED RELEASE ORAL at 21:43

## 2025-05-22 RX ADMIN — SODIUM CHLORIDE, PRESERVATIVE FREE 10 ML: 5 INJECTION INTRAVENOUS at 16:23

## 2025-05-22 RX ADMIN — FINASTERIDE 5 MG: 5 TABLET, FILM COATED ORAL at 07:33

## 2025-05-22 RX ADMIN — HYDROMORPHONE HYDROCHLORIDE 0.25 MG: 1 INJECTION, SOLUTION INTRAMUSCULAR; INTRAVENOUS; SUBCUTANEOUS at 00:23

## 2025-05-22 RX ADMIN — GUAIFENESIN 600 MG: 600 TABLET, EXTENDED RELEASE ORAL at 07:34

## 2025-05-22 RX ADMIN — PROPOFOL 30 MG: 10 INJECTION, EMULSION INTRAVENOUS at 15:14

## 2025-05-22 RX ADMIN — ACETAMINOPHEN 650 MG: 325 TABLET ORAL at 21:43

## 2025-05-22 RX ADMIN — IPRATROPIUM BROMIDE AND ALBUTEROL SULFATE 1 DOSE: .5; 2.5 SOLUTION RESPIRATORY (INHALATION) at 07:31

## 2025-05-22 RX ADMIN — GABAPENTIN 100 MG: 100 CAPSULE ORAL at 07:34

## 2025-05-22 ASSESSMENT — PAIN DESCRIPTION - DESCRIPTORS
DESCRIPTORS: ACHING
DESCRIPTORS: STABBING
DESCRIPTORS: ACHING
DESCRIPTORS: ACHING
DESCRIPTORS: ACHING;CRAMPING;DISCOMFORT
DESCRIPTORS: ACHING

## 2025-05-22 ASSESSMENT — PAIN DESCRIPTION - ORIENTATION
ORIENTATION: LEFT

## 2025-05-22 ASSESSMENT — ENCOUNTER SYMPTOMS: SHORTNESS OF BREATH: 1

## 2025-05-22 ASSESSMENT — PAIN - FUNCTIONAL ASSESSMENT
PAIN_FUNCTIONAL_ASSESSMENT: PREVENTS OR INTERFERES SOME ACTIVE ACTIVITIES AND ADLS
PAIN_FUNCTIONAL_ASSESSMENT: PREVENTS OR INTERFERES SOME ACTIVE ACTIVITIES AND ADLS
PAIN_FUNCTIONAL_ASSESSMENT: ADULT NONVERBAL PAIN SCALE (NPVS)
PAIN_FUNCTIONAL_ASSESSMENT: PREVENTS OR INTERFERES SOME ACTIVE ACTIVITIES AND ADLS
PAIN_FUNCTIONAL_ASSESSMENT: 0-10

## 2025-05-22 ASSESSMENT — PAIN DESCRIPTION - LOCATION
LOCATION: HIP;RIB CAGE
LOCATION: RIB CAGE
LOCATION: ABDOMEN;BACK;BUTTOCKS
LOCATION: ABDOMEN;HIP;BUTTOCKS
LOCATION: ABDOMEN
LOCATION: HIP;RIB CAGE
LOCATION: ABDOMEN

## 2025-05-22 ASSESSMENT — PAIN DESCRIPTION - ONSET
ONSET: ON-GOING
ONSET: ON-GOING

## 2025-05-22 ASSESSMENT — PAIN SCALES - GENERAL
PAINLEVEL_OUTOF10: 7
PAINLEVEL_OUTOF10: 5
PAINLEVEL_OUTOF10: 6
PAINLEVEL_OUTOF10: 6
PAINLEVEL_OUTOF10: 7

## 2025-05-22 ASSESSMENT — PAIN DESCRIPTION - PAIN TYPE
TYPE: ACUTE PAIN
TYPE: ACUTE PAIN

## 2025-05-22 ASSESSMENT — PAIN DESCRIPTION - FREQUENCY
FREQUENCY: CONTINUOUS
FREQUENCY: CONTINUOUS

## 2025-05-22 NOTE — OP NOTE
EGD report    Esophagogastroduodenoscopy (EGD) Procedure Note    Procedure:  EGD with Biopsies, dilation with CRE balloon, ablation of AVM with APC    Procedure Date: 5/22/2025    Indications:  Anemia, Dysphagia    Sedation:  MAC    Attending Physician:  Dr. Isauro Razo MD    Assistant:  None    Procedure Details:    Informed consent was obtained for the procedure, including sedation. Risks of infection, perforation, hemorrhage, adverse drug reaction, and aspiration were discussed. The patient was placed in the left lateral decubitus position. The patient was monitored continuously with ECG tracing, pulse oximetry, blood pressure monitoring, and direct observation.      The gastroscope was inserted into the mouth and advanced under direct vision to Small bowel.  A careful inspection was made as the gastroscope was withdrawn, including a retroflexed view of the proximal stomach; findings and interventions are described below. Appropriate photodocumentation was obtained.    Findings:  Retropharyngeal area was grossly normal appearing     Esophagus: Multiple white nummular lesion noted in the proximal esophagus, biopsies obtained to rule out Candida    A CRE balloon 15 - 16.5 - 18 mm used to dilate the esophagus.  The balloon was dragged through the upper esophageal sphincter.  Mucosal disruption noted at 15 cm from the incisor suggestive of successful dilation.    2 cm hiatal hernia noted       Stomach:  Surgical changes consistent with Jose-en-Y gastric bypass noted.  2 limbs noted.  The efferent limb ended in a blind segment.  The afferent limb was examined up 20 cm and appeared normal.    The anastomosis was noted to have some erythema and superficial ulcer, there was mild oozing of blood, APC ablation performed.    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  Surgical changes consistent with gastric bypass.  Anastomosis with a small superficial ulceration with erythema, mild mucosal oozing of blood that was controlled with application of APC.  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  Protonix 40 mg twice daily.  Trend H&H and keep hemoglobin > 7  Okay to start soft diet today  Avoid NSAIDs    Attending Attestation: I performed the procedure    Isauro Razo MD

## 2025-05-22 NOTE — PROGRESS NOTES
Physical Therapy  DATE: 2025    NAME: Hemanth Barrera  MRN: [id number]   : 1935    Patient not seen this date for Physical Therapy due to:      [] Cancel by RN or physician due to:    [] Hemodialysis    [] Critical Lab Value Level     [] Blood transfusion in progress    [] Acute or unstable cardiovascular status   _MAP < 55 or more than >115  _HR < 40 or > 130    [] Acute or unstable pulmonary status   -FiO2 > 60%   _RR < 5 or >40    _O2 sats < 85%    [] Strict Bedrest    [] Off Unit for surgery or procedure    [] Off Unit for testing       [] Pending imaging to R/O fracture    [x] Refusal by Patient; checked on pt @ 0819. Pt lying in bed upon arrival. Pt stating he does not want to do therapy until after his EGD. Spoke with RN and RN does not know when that will be yet today. RN made aware of pts refusal. Will continue to follow.      [] Other      [] PT being discontinued at this time. Patient independent. No further needs.     [] PT being discontinued at this time as the patient has been transferred to hospice care. No further needs.      Electronically signed by Mabel Chirinos PTA on 25 at 8:36 AM EDT

## 2025-05-22 NOTE — PLAN OF CARE
Problem: Safety - Adult  Goal: Free from fall injury  5/22/2025 0357 by Imani Valdes RN  Outcome: Progressing     Problem: Chronic Conditions and Co-morbidities  Goal: Patient's chronic conditions and co-morbidity symptoms are monitored and maintained or improved  5/22/2025 0357 by Imani Valdes RN  Outcome: Progressing     Problem: Discharge Planning  Goal: Discharge to home or other facility with appropriate resources  5/22/2025 0357 by Imani Valdes RN  Outcome: Progressing     Problem: ABCDS Injury Assessment  Goal: Absence of physical injury  5/22/2025 0357 by Imani Valeds RN  Outcome: Progressing     Problem: Pain  Goal: Verbalizes/displays adequate comfort level or baseline comfort level  5/22/2025 0357 by Imani Valdes RN  Outcome: Progressing     Problem: Skin/Tissue Integrity  Goal: Skin integrity remains intact  Description: 1.  Monitor for areas of redness and/or skin breakdown2.  Assess vascular access sites hourly3.  Every 4-6 hours minimum:  Change oxygen saturation probe site4.  Every 4-6 hours:  If on nasal continuous positive airway pressure, respiratory therapy assess nares and determine need for appliance change or resting period  5/22/2025 0357 by Imani Valdes RN  Outcome: Progressing

## 2025-05-22 NOTE — PLAN OF CARE
Problem: Safety - Adult  Goal: Free from fall injury  5/22/2025 1536 by Jennifer Lerner RN  Outcome: Progressing     Problem: Chronic Conditions and Co-morbidities  Goal: Patient's chronic conditions and co-morbidity symptoms are monitored and maintained or improved  5/22/2025 1536 by Jennifer Lerner RN  Outcome: Progressing     Problem: Discharge Planning  Goal: Discharge to home or other facility with appropriate resources  5/22/2025 1536 by Jennifer Lerner RN  Outcome: Progressing     Problem: ABCDS Injury Assessment  Goal: Absence of physical injury  5/22/2025 1536 by Jennifer Lerner RN  Outcome: Progressing

## 2025-05-22 NOTE — CARE COORDINATION
Case Management   Daily Progress Note       Patient Name: Hemanth Barrera                   YOB: 1935  Diagnosis: VARSHA (acute kidney injury) [N17.9]  Dyspnea, unspecified type [R06.00]                       GMLOS: 3 days  Length of Stay: 4  days    Readmission Risk (Low < 19, Mod (19-27), High > 27): Readmission Risk Score: 31.3      Patient is alert and oriented.    Spoke with Patient , and Current Transitional Plan is:    [] Home Independently    [x] Home with HC with [facility]     [] Skilled Nursing Facility    [] Acute Rehabilitation    [] Long Term Acute Care (LTAC)    [] Other:     Medical Management: Patient had a EGD with Dilation today. Ok to start soft diet today,      Testing Ordered:     Additional Notes: Discharge when ok with GI. PCP and Cardio ok for discharge     Electronically signed by Joy Simmons on 5/22/2025 at 4:38 PM

## 2025-05-22 NOTE — PROGRESS NOTES
[facility]  IN-PATIENT SERVICE  [facility]    PROGRESS NOTE             5/22/2025    7:23 AM    Name:   Hemanth Barrera  MRN:     [id number]     Acct:      [id number]   Room:   2103/2103-01   Day:  4  Admit Date:  5/18/2025  8:54 AM    PCP:  Neftaly Contreras MD  Code Status:  Full Code    Subjective:     C/C:   Chief Complaint   Patient presents with    Shortness of Breath     Interval History Status: improved.    Patient seen and observed at bedside.  States he is feeling well this morning, reporting some pain of left side unchanged from admission. Patient otherwise hemodynamically stable no acute concerns at this time.    Plan for EGD today per GI due to dysphagia.  Anticoagulation held, will continue after procedure.    Prednisone 40 mg daily PO for shortness of breath/wheezing.  Improving from admission.    Continues on Dilaudid 0.25 mg IV every 4 hours as needed for left-sided rib pain.  Will continue to adjust as tolerated.    Potassium of 4.4, magnesium 1.9 this morning. Will continue to replace as needed.    Brief History:     The patient is a 89 y.o. male with a past medical history of CAD status post stent placement, DVT/PE on Eliquis (most recent 3/2025), atrial fibrillation, hypertension, hyperlipidemia, CKD 3B, recent hospital admission for fall resulting in multiple left posterior rib fractures who presented to the ED with a chief complaint of shortness of breath and left-sided rib pain.     Patient states that over the last few weeks he has been experiencing left-sided rib pain and shortness of breath that has gotten progressively worse.  Was previously admitted 2/2025 due to multiple left-sided rib fracture secondary to fall, followed by orthopedic surgery during admission, deep breathing and coughing with splint encouraged.  Reports pain being uncontrolled since fractures resulting in inability to take deep breaths.  Also states that he has had  does not drink alcohol and does not use drugs.     Family History:   Family History   Problem Relation Age of Onset    Heart Failure Mother     Heart Failure Father        Vitals:  /76   Pulse 70   Temp 97.5 °F (36.4 °C) (Oral)   Resp 18   Ht 1.854 m (6' 1\")   Wt 61.2 kg (135 lb)   SpO2 99%   BMI 17.81 kg/m²   Temp (24hrs), Av.7 °F (36.5 °C), Min:97.4 °F (36.3 °C), Max:98.2 °F (36.8 °C)    No results for input(s): \"POCGLU\" in the last 72 hours.    I/O(24Hr):    Intake/Output Summary (Last 24 hours) at 2025 0723  Last data filed at 2025 1525  Gross per 24 hour   Intake --   Output 200 ml   Net -200 ml       Labs:    [unfilled]    Lab Results   Component Value Date/Time    SPECIAL Site: Body Fluid 2025 01:23 PM     Lab Results   Component Value Date/Time    CULTURE  2025 01:23 PM     (NOTE) Direct Gram Stain from bottle result called to and read back by:CALVIN CRUZ [id number]    CULTURE POSITIVE Fluid Culture 2025 01:23 PM    CULTURE  2025 01:23 PM     DIRECT GRAM STAIN FROM BOTTLE: GRAM POSITIVE COCCI IN CLUSTERS    CULTURE (A) 2025 01:23 PM     STAPHYLOCOCCUS EPIDERMIDIS Identification by MALDI-TOF    CULTURE STAPHYLOCOCCUS CAPITIS Identification by MALDI-TOF (A) 2025 01:23 PM       [unfilled]    Radiology:    FL MODIFIED BARIUM SWALLOW W VIDEO  Result Date: 2025  EXAMINATION: MODIFIED BARIUM SWALLOW WAS PERFORMED IN CONJUNCTION WITH SPEECH PATHOLOGY SERVICES TECHNIQUE: Under fluoroscopic evaluation cineradiography/videoradiography recordings were performed in conjunction with the speech-language pathologist (SLP). Various liquid, solid and/or semi-solid barium preparations were used to assess swallowing function. FLUOROSCOPY DOSE AND TYPE: Fluoroscopy time-2:24 Radiation Exposure Index: Kerma mGy, 11.6 COMPARISON: None HISTORY: ORDERING SYSTEM PROVIDED HISTORY: dysphagia TECHNOLOGIST PROVIDED HISTORY: dysphagia Reason for Exam: dysphagia FINDINGS:  upper pole the right kidney measuring 2.8 cm.  No additional imaging follow-up is recommended. Adrenal glands: Stable mild nodular thickening of the left adrenal gland. Retroperitoneal structures:  Unremarkable Small bowel and colon: Small to moderate amount of stool most pronounced in the right and transverse colon.  No evidence of a bowel obstruction. Appendix: Not seen Urinary bladder: Small outpouching seen along the bladder wall suggesting diverticular disease with trabeculated appearance suggesting chronic bladder outlet obstruction.  Contrast seen within the urethra Free fluid/air: None Lymph nodes: No enlarged lymph nodes Osseus structures: Right total hip arthroplasty with old pelvic fractures and stabilization.  Lumbar stabilization.  Degenerative changes.  No osseous destructive lesion. Vasculature: No aneurysm Other: None     1. No acute inflammatory or obstructive process seen in the abdomen or pelvis. 2. Small left-sided pleural effusion with atelectasis.     CT SOFT TISSUE NECK W CONTRAST  Result Date: 5/1/2025  EXAMINATION: CT OF THE NECK SOFT TISSUE WITH CONTRAST  5/1/2025 TECHNIQUE: CT of the neck was performed with the administration of intravenous contrast. Multiplanar reformatted images are provided for review. Automated exposure control, iterative reconstruction, and/or weight based adjustment of the mA/kV was utilized to reduce the radiation dose to as low as reasonably achievable. COMPARISON: CT chest 04/18/2024. HISTORY: ORDERING SYSTEM PROVIDED HISTORY: difficulty swallowing TECHNOLOGIST PROVIDED HISTORY: difficulty swallowing Additional Contrast?->1 Reason for Exam: difficulty swallowing FINDINGS: PHARYNX/LARYNX:  The visualized upper aerodigestive tract appears grossly symmetric.  No discrete mass clearly identified. The epiglottis appears normal. SALIVARY GLANDS/THYROID:  The parotid, submandibular and thyroid glands demonstrate no acute abnormality. LYMPH NODES:  No evidence of cervical

## 2025-05-22 NOTE — PROGRESS NOTES
[facility]  Progress Note                   Date:   5/22/2025  Patient name: Hemanth Barrera  Date of admission:  5/18/2025  8:54 AM  MRN:   [id number]  YOB: 1935  PCP: Neftaly Contreras MD    Reason for Admission: VARSHA (acute kidney injury) [N17.9]  Dyspnea, unspecified type [R06.00]    Subjective:       Clinical Changes /Abnormalities: Seen & examined in room. No acute CV issues/concerns overnight. Labs, vitals, & tele reviewed. Plan for EGD w/ possible dilation today per GI. Remains paced on tele    Review of Systems    Medications:   Scheduled Meds:   ipratropium 0.5 mg-albuterol 2.5 mg  1 Dose Inhalation BID    predniSONE  40 mg Oral Daily    guaiFENesin  600 mg Oral BID    potassium chloride  20 mEq Oral Daily    ranolazine  500 mg Oral TID    [Held by provider] clopidogrel  75 mg Oral Daily    bumetanide  1 mg Oral Daily    pantoprazole  40 mg Oral QAM AC    [Held by provider] apixaban  5 mg Oral BID    atorvastatin  40 mg Oral Nightly    latanoprost  1 drop Both Eyes Nightly    finasteride  5 mg Oral Daily    dilTIAZem  120 mg Oral Daily    gabapentin  100 mg Oral BID    isosorbide mononitrate  30 mg Oral Daily    metoprolol succinate  50 mg Oral Daily    midodrine  2.5 mg Oral TID WC    lidocaine  1 patch TransDERmal Daily    sodium chloride flush  5-40 mL IntraVENous 2 times per day     Continuous Infusions:   sodium chloride       CBC:   Recent Labs     05/20/25  0558 05/21/25  0535 05/22/25  0610   WBC 3.2* 4.1 6.3   HGB 9.7* 10.2* 9.4*    253 236     BMP:    Recent Labs     05/20/25  0558 05/21/25  0535 05/22/25  0610    138 139   K 3.7 4.3 4.4    104 104   CO2 25 24 25   BUN 15 19 26*   CREATININE 1.0 1.0 1.1   GLUCOSE 101* 153* 114*     Hepatic:No results for input(s): \"AST\", \"ALT\", \"BILITOT\", \"ALKPHOS\" in the last 72 hours.    Invalid input(s): \"ALB\"  Troponin:   No results for input(s): \"TROPHS\" in the last 72 hours.    BNP: No results for input(s):  against object     Abnormal EKG     Severe malnutrition     Secondary hypercoagulable state     Congestive heart failure, unspecified HF chronicity, unspecified heart failure type (HCC)     S/P coronary artery stent placement     Coronary artery disease     Traumatic closed displaced fracture of rib on left side, initial encounter     Multiple closed fractures of ribs of left side     Pleural effusion     Anemia     Moderate protein-calorie malnutrition     Abnormal pleural fluid     Acute deep vein thrombosis (DVT) of axillary vein of left upper extremity ([facility])     Cardiac pacemaker in situ     PAF (paroxysmal atrial fibrillation) ([facility])     GI bleed     Frailty     Angina at rest     Encounter for palliative care     Goals of care, counseling/discussion     Acute kidney injury superimposed on CKD     Dysphagia     Dyspnea      Plan of Treatment:   Stable from CV standpoint. No distress or c/o pain  Recent echo and stress test reviewed. Moderate risk for EGD +/- dilation on Thursday from CV standpoint.   Plans for med management from cardiology standpoint- continue PO statin, BB, & Ranexa. Resume Plavix post EGD  PAF/SSS with paced rhythm. Continue PO BB & Cardizem. Resume Eliquis post EGD  Midodrine for BP support  Keep K > 4and Mg >2  No objection for d/c after EGD/dilation today. Resume Plavix and Eliquis on discharge and f/u in clinic as scheduled    Electronically signed by DARCI Cain CNP on 5/22/2025 at 8:10 AM  [facility]  [phone]

## 2025-05-22 NOTE — ANESTHESIA POSTPROCEDURE EVALUATION
Department of Anesthesiology  Postprocedure Note    Patient: Hemanth Barrera  MRN: [id number]  YOB: 1935  Date of evaluation: 5/22/2025    Procedure Summary       Date: 05/22/25 Room / Location: [place] [address] / [facility]    Anesthesia Start: 1500 Anesthesia Stop: 1533    Procedure: ESOPHAGOGASTRODUODENOSCOPY BIOPSY WITH APC AND BALLOON DILATION (Esophagus) Diagnosis:       Dysphagia, unspecified type      (Dysphagia, unspecified type [R13.10])    Surgeons: Isauro Razo MD Responsible Provider: Shani Monae MD    Anesthesia Type: general, TIVA ASA Status: 3            Anesthesia Type: No value filed.    Radha Phase I: Radha Score: 10    Radha Phase II:      Anesthesia Post Evaluation    Comments: POST- ANESTHESIA EVALUATION       Pt Name: Hemanth Barrera  MRN: [id number]  YOB: 1935  Date of evaluation: 5/22/2025  Time:  4:23 PM      /81   Pulse 70   Temp 98.2 °F (36.8 °C) (Oral)   Resp 20   Ht 1.854 m (6' 1\")   Wt 61.2 kg (135 lb)   SpO2 98%   BMI 17.81 kg/m²      Consciousness Level  Awake  Cardiopulmonary Status  Stable  Pain Adequately Treated YES  Nausea / Vomiting  NO  Adequate Hydration  YES  Anesthesia Related Complications NONE      Electronically signed by Shani Monae MD on 5/22/2025 at 4:23 PM      No notable events documented.

## 2025-05-22 NOTE — ANESTHESIA PRE PROCEDURE
Department of Anesthesiology  Preprocedure Note       Name:  Hemanth Barrera   Age:  89 y.o.  :  1935                                          MRN:  [id number]         Date:  2025      Surgeon: Surgeon(s):  Isauro Razo MD    Procedure: Procedure(s):  ESOPHAGOGASTRODUODENOSCOPY BIOPSY POSSIBLE DILATION    Medications prior to admission:   Prior to Admission medications    Medication Sig Start Date End Date Taking? Authorizing Provider   apixaban (ELIQUIS) 5 MG TABS tablet Take 1 tablet by mouth 2 times daily 25   Alex Bernardo, DO   aspirin 81 MG chewable tablet Take 1 tablet by mouth daily 25   Alex Bernardo, DO   atorvastatin (LIPITOR) 40 MG tablet Take 1 tablet by mouth nightly 25   Alex Bernardo, DO   bumetanide (BUMEX) 2 MG tablet Take 1 tablet by mouth daily 25   Alex Bernardo, DO   dilTIAZem (CARDIZEM CD) 120 MG extended release capsule Take 1 capsule by mouth daily 25   Alex Bernardo,    ferrous sulfate (IRON 325) 325 (65 Fe) MG tablet Take 1 tablet by mouth 2 times daily (with meals) 25   Alex Bernardo,    finasteride (PROSCAR) 5 MG tablet Take 1 tablet by mouth daily 25   Alex Bernardo,    gabapentin (NEURONTIN) 100 MG capsule Take 1 capsule by mouth 2 times daily for 30 days. 25  Alex Bernardo,    isosorbide mononitrate (IMDUR) 30 MG extended release tablet Take 1 tablet by mouth daily 25   Alex Bernardo,    latanoprost (XALATAN) 0.005 % ophthalmic solution Place 1 drop into both eyes nightly 25   Alex Bernardo, DO   metoprolol succinate (TOPROL XL) 50 MG extended release tablet Take 1 tablet by mouth daily 25   Alex Bernardo, DO   midodrine (PROAMATINE) 2.5 MG tablet Take 1 tablet by mouth 3 times daily (with meals) Hold if SBP more than 100 25   Alex Bernardo, DO   nitroGLYCERIN (NITROSTAT) 0.4 MG SL tablet up to max of 3 total doses. If no relief after 1

## 2025-05-23 ENCOUNTER — CARE COORDINATION (OUTPATIENT)
Dept: CARE COORDINATION | Age: 89
End: 2025-05-23

## 2025-05-23 ENCOUNTER — APPOINTMENT (OUTPATIENT)
Dept: GENERAL RADIOLOGY | Age: 89
DRG: 682 | End: 2025-05-23
Payer: MEDICARE

## 2025-05-23 LAB
ANION GAP SERPL CALCULATED.3IONS-SCNC: 8 MMOL/L (ref 9–16)
BASOPHILS # BLD: 0 K/UL (ref 0–0.2)
BASOPHILS NFR BLD: 0 % (ref 0–2)
BUN SERPL-MCNC: 23 MG/DL (ref 8–23)
CALCIUM SERPL-MCNC: 8.5 MG/DL (ref 8.6–10.4)
CHLORIDE SERPL-SCNC: 104 MMOL/L (ref 98–107)
CO2 SERPL-SCNC: 24 MMOL/L (ref 20–31)
CREAT SERPL-MCNC: 0.9 MG/DL (ref 0.7–1.2)
EOSINOPHIL # BLD: 0 K/UL (ref 0–0.44)
EOSINOPHILS RELATIVE PERCENT: 0 % (ref 0–4)
ERYTHROCYTE [DISTWIDTH] IN BLOOD BY AUTOMATED COUNT: 18.8 % (ref 11.5–14.9)
GFR, ESTIMATED: 81 ML/MIN/1.73M2
GLUCOSE SERPL-MCNC: 106 MG/DL (ref 74–99)
HCT VFR BLD AUTO: 27.4 % (ref 41–53)
HGB BLD-MCNC: 9.2 G/DL (ref 13.5–17.5)
IMM GRANULOCYTES # BLD AUTO: 0 K/UL (ref 0–0.3)
IMM GRANULOCYTES NFR BLD: 0 %
LYMPHOCYTES NFR BLD: 0.63 K/UL (ref 1.1–3.7)
LYMPHOCYTES RELATIVE PERCENT: 11 % (ref 24–44)
MAGNESIUM SERPL-MCNC: 1.8 MG/DL (ref 1.7–2.3)
MCH RBC QN AUTO: 31 PG (ref 26–34)
MCHC RBC AUTO-ENTMCNC: 33.6 G/DL (ref 31–37)
MCV RBC AUTO: 92.3 FL (ref 80–100)
MONOCYTES NFR BLD: 0.34 K/UL (ref 0.1–1.2)
MONOCYTES NFR BLD: 6 % (ref 3–12)
MORPHOLOGY: ABNORMAL
NEUTROPHILS NFR BLD: 83 % (ref 36–66)
NEUTS SEG NFR BLD: 4.73 K/UL (ref 1.5–8.1)
NRBC BLD-RTO: 0 PER 100 WBC
PLATELET # BLD AUTO: 233 K/UL (ref 150–450)
PMV BLD AUTO: 9.5 FL (ref 8–13.5)
POTASSIUM SERPL-SCNC: 4.2 MMOL/L (ref 3.7–5.3)
RBC # BLD AUTO: 2.97 M/UL (ref 4.21–5.77)
SODIUM SERPL-SCNC: 136 MMOL/L (ref 136–145)
TROPONIN I SERPL HS-MCNC: 25 NG/L (ref 0–22)
WBC OTHER # BLD: 5.7 K/UL (ref 3.5–11)

## 2025-05-23 PROCEDURE — 6370000000 HC RX 637 (ALT 250 FOR IP): Performed by: INTERNAL MEDICINE

## 2025-05-23 PROCEDURE — 99233 SBSQ HOSP IP/OBS HIGH 50: CPT | Performed by: NURSE PRACTITIONER

## 2025-05-23 PROCEDURE — 80048 BASIC METABOLIC PNL TOTAL CA: CPT

## 2025-05-23 PROCEDURE — 94669 MECHANICAL CHEST WALL OSCILL: CPT

## 2025-05-23 PROCEDURE — 99232 SBSQ HOSP IP/OBS MODERATE 35: CPT | Performed by: INTERNAL MEDICINE

## 2025-05-23 PROCEDURE — 94761 N-INVAS EAR/PLS OXIMETRY MLT: CPT

## 2025-05-23 PROCEDURE — 92526 ORAL FUNCTION THERAPY: CPT

## 2025-05-23 PROCEDURE — 71045 X-RAY EXAM CHEST 1 VIEW: CPT

## 2025-05-23 PROCEDURE — 83735 ASSAY OF MAGNESIUM: CPT

## 2025-05-23 PROCEDURE — 36415 COLL VENOUS BLD VENIPUNCTURE: CPT

## 2025-05-23 PROCEDURE — 85025 COMPLETE CBC W/AUTO DIFF WBC: CPT

## 2025-05-23 PROCEDURE — 2060000000 HC ICU INTERMEDIATE R&B

## 2025-05-23 PROCEDURE — 2500000003 HC RX 250 WO HCPCS: Performed by: INTERNAL MEDICINE

## 2025-05-23 PROCEDURE — 84484 ASSAY OF TROPONIN QUANT: CPT

## 2025-05-23 PROCEDURE — 6370000000 HC RX 637 (ALT 250 FOR IP)

## 2025-05-23 PROCEDURE — 74018 RADEX ABDOMEN 1 VIEW: CPT

## 2025-05-23 PROCEDURE — 94640 AIRWAY INHALATION TREATMENT: CPT

## 2025-05-23 PROCEDURE — APPSS30 APP SPLIT SHARED TIME 16-30 MINUTES: Performed by: NURSE PRACTITIONER

## 2025-05-23 RX ORDER — MAGNESIUM HYDROXIDE/ALUMINUM HYDROXICE/SIMETHICONE 120; 1200; 1200 MG/30ML; MG/30ML; MG/30ML
30 SUSPENSION ORAL EVERY 6 HOURS PRN
Status: DISCONTINUED | OUTPATIENT
Start: 2025-05-23 | End: 2025-05-26 | Stop reason: HOSPADM

## 2025-05-23 RX ORDER — SCOPOLAMINE 1 MG/3D
1 PATCH, EXTENDED RELEASE TRANSDERMAL
Status: DISCONTINUED | OUTPATIENT
Start: 2025-05-23 | End: 2025-05-26 | Stop reason: HOSPADM

## 2025-05-23 RX ORDER — OXYCODONE AND ACETAMINOPHEN 5; 325 MG/1; MG/1
1 TABLET ORAL EVERY 8 HOURS PRN
Refills: 0 | Status: DISCONTINUED | OUTPATIENT
Start: 2025-05-23 | End: 2025-05-25

## 2025-05-23 RX ORDER — PROMETHAZINE HYDROCHLORIDE 25 MG/ML
12.5 INJECTION, SOLUTION INTRAMUSCULAR; INTRAVENOUS EVERY 6 HOURS PRN
Status: DISCONTINUED | OUTPATIENT
Start: 2025-05-23 | End: 2025-05-23

## 2025-05-23 RX ADMIN — MIDODRINE HYDROCHLORIDE 2.5 MG: 2.5 TABLET ORAL at 12:30

## 2025-05-23 RX ADMIN — OXYCODONE HYDROCHLORIDE AND ACETAMINOPHEN 1 TABLET: 5; 325 TABLET ORAL at 10:36

## 2025-05-23 RX ADMIN — PREDNISONE 40 MG: 20 TABLET ORAL at 09:06

## 2025-05-23 RX ADMIN — DILTIAZEM HYDROCHLORIDE 120 MG: 120 CAPSULE, COATED, EXTENDED RELEASE ORAL at 09:07

## 2025-05-23 RX ADMIN — BUMETANIDE 1 MG: 1 TABLET ORAL at 09:06

## 2025-05-23 RX ADMIN — RANOLAZINE 500 MG: 500 TABLET, EXTENDED RELEASE ORAL at 15:59

## 2025-05-23 RX ADMIN — GUAIFENESIN 600 MG: 600 TABLET, EXTENDED RELEASE ORAL at 09:07

## 2025-05-23 RX ADMIN — ISOSORBIDE MONONITRATE 30 MG: 30 TABLET, EXTENDED RELEASE ORAL at 09:06

## 2025-05-23 RX ADMIN — SODIUM CHLORIDE, PRESERVATIVE FREE 10 ML: 5 INJECTION INTRAVENOUS at 21:13

## 2025-05-23 RX ADMIN — IPRATROPIUM BROMIDE AND ALBUTEROL SULFATE 1 DOSE: .5; 2.5 SOLUTION RESPIRATORY (INHALATION) at 07:49

## 2025-05-23 RX ADMIN — GABAPENTIN 100 MG: 100 CAPSULE ORAL at 09:07

## 2025-05-23 RX ADMIN — GUAIFENESIN 600 MG: 600 TABLET, EXTENDED RELEASE ORAL at 21:13

## 2025-05-23 RX ADMIN — METOPROLOL SUCCINATE 50 MG: 50 TABLET, EXTENDED RELEASE ORAL at 09:07

## 2025-05-23 RX ADMIN — APIXABAN 5 MG: 5 TABLET, FILM COATED ORAL at 21:13

## 2025-05-23 RX ADMIN — FINASTERIDE 5 MG: 5 TABLET, FILM COATED ORAL at 09:07

## 2025-05-23 RX ADMIN — POTASSIUM CHLORIDE 20 MEQ: 1500 TABLET, EXTENDED RELEASE ORAL at 09:06

## 2025-05-23 RX ADMIN — MIDODRINE HYDROCHLORIDE 2.5 MG: 2.5 TABLET ORAL at 09:06

## 2025-05-23 RX ADMIN — RANOLAZINE 500 MG: 500 TABLET, EXTENDED RELEASE ORAL at 09:06

## 2025-05-23 RX ADMIN — RANOLAZINE 500 MG: 500 TABLET, EXTENDED RELEASE ORAL at 21:12

## 2025-05-23 RX ADMIN — PANTOPRAZOLE SODIUM 40 MG: 40 TABLET, DELAYED RELEASE ORAL at 05:01

## 2025-05-23 RX ADMIN — GABAPENTIN 100 MG: 100 CAPSULE ORAL at 21:13

## 2025-05-23 RX ADMIN — ACETAMINOPHEN 650 MG: 325 TABLET ORAL at 21:12

## 2025-05-23 RX ADMIN — CLOPIDOGREL BISULFATE 75 MG: 75 TABLET, FILM COATED ORAL at 09:07

## 2025-05-23 RX ADMIN — MIDODRINE HYDROCHLORIDE 2.5 MG: 2.5 TABLET ORAL at 16:52

## 2025-05-23 RX ADMIN — SODIUM CHLORIDE, PRESERVATIVE FREE 10 ML: 5 INJECTION INTRAVENOUS at 09:06

## 2025-05-23 RX ADMIN — IPRATROPIUM BROMIDE AND ALBUTEROL SULFATE 1 DOSE: .5; 2.5 SOLUTION RESPIRATORY (INHALATION) at 19:31

## 2025-05-23 RX ADMIN — APIXABAN 5 MG: 5 TABLET, FILM COATED ORAL at 09:07

## 2025-05-23 RX ADMIN — ACETAMINOPHEN 650 MG: 325 TABLET ORAL at 04:53

## 2025-05-23 RX ADMIN — ATORVASTATIN CALCIUM 40 MG: 40 TABLET, FILM COATED ORAL at 21:12

## 2025-05-23 RX ADMIN — LATANOPROST 1 DROP: 50 SOLUTION OPHTHALMIC at 21:15

## 2025-05-23 ASSESSMENT — PAIN DESCRIPTION - DESCRIPTORS
DESCRIPTORS: ACHING
DESCRIPTORS: SHARP
DESCRIPTORS: ACHING;DISCOMFORT;SHARP
DESCRIPTORS: ACHING;CRAMPING;DISCOMFORT

## 2025-05-23 ASSESSMENT — PAIN SCALES - GENERAL
PAINLEVEL_OUTOF10: 6
PAINLEVEL_OUTOF10: 6
PAINLEVEL_OUTOF10: 7
PAINLEVEL_OUTOF10: 0
PAINLEVEL_OUTOF10: 3

## 2025-05-23 ASSESSMENT — PAIN DESCRIPTION - LOCATION
LOCATION: CHEST
LOCATION: RIB CAGE
LOCATION: RIB CAGE

## 2025-05-23 ASSESSMENT — PAIN SCALES - WONG BAKER: WONGBAKER_NUMERICALRESPONSE: NO HURT

## 2025-05-23 ASSESSMENT — PAIN - FUNCTIONAL ASSESSMENT: PAIN_FUNCTIONAL_ASSESSMENT: PREVENTS OR INTERFERES SOME ACTIVE ACTIVITIES AND ADLS

## 2025-05-23 ASSESSMENT — PAIN DESCRIPTION - ORIENTATION
ORIENTATION: LEFT
ORIENTATION: LEFT

## 2025-05-23 ASSESSMENT — PAIN DESCRIPTION - PAIN TYPE: TYPE: CHRONIC PAIN

## 2025-05-23 NOTE — PROGRESS NOTES
Patient called out and was vomiting and felt dizzy. Vitals obtained and resident made aware; resident to add orders.     1745: Patient called out and was vomiting again and complaining of sharp chest pain. Rn updated charge and resident. Obtaining EKG, Resident to place further orders.

## 2025-05-23 NOTE — CARE COORDINATION
Case Management   Daily Progress Note       Patient Name: Hemanth Barrera                   YOB: 1935  Diagnosis: VARSHA (acute kidney injury) [N17.9]  Dyspnea, unspecified type [R06.00]                       GMLOS: 3 days  Length of Stay: 5  days    Readmission Risk (Low < 19, Mod (19-27), High > 27): Readmission Risk Score: 31      Patient is alert and oriented.    Spoke with Patient , and Current Transitional Plan is:    [] Home Independently      X Home with HC [facility]      [] Skilled Nursing Facility    [] Acute Rehabilitation    [] Long Term Acute Care (LTAC)    [] Other:     Medical Management:  Patient had a EGD with dilation yesterday was started on a soft diet, cardio signed off,  Possible discharge to home today     Testing Ordered:     Additional Notes:     Electronically signed by Joy Simmons on 5/23/2025 at 12:13 PM

## 2025-05-23 NOTE — PROGRESS NOTES
[facility]    Gastroenterology Daily Progress Note      Patient:   Hemanth Barrera   :    1935   Facility:   [facility]  Date:     2025  Consultant:   DARCI Sanches CNP, CNP      SUBJECTIVE  89 y.o. male admitted 2025 with VARSHA (acute kidney injury) [N17.9]  Dyspnea, unspecified type [R06.00] and seen for dysphagia. The pt was seen and examined. He is s/p egd with dilatation yesterday that is discussed below. Denies dysphagia but c/o odynophagia. No bm overnight bx pending. .        OBJECTIVE  Scheduled Meds:   ipratropium 0.5 mg-albuterol 2.5 mg  1 Dose Inhalation BID    predniSONE  40 mg Oral Daily    guaiFENesin  600 mg Oral BID    potassium chloride  20 mEq Oral Daily    ranolazine  500 mg Oral TID    [Held by provider] clopidogrel  75 mg Oral Daily    bumetanide  1 mg Oral Daily    pantoprazole  40 mg Oral QAM AC    [Held by provider] apixaban  5 mg Oral BID    atorvastatin  40 mg Oral Nightly    latanoprost  1 drop Both Eyes Nightly    finasteride  5 mg Oral Daily    dilTIAZem  120 mg Oral Daily    gabapentin  100 mg Oral BID    isosorbide mononitrate  30 mg Oral Daily    metoprolol succinate  50 mg Oral Daily    midodrine  2.5 mg Oral TID WC    lidocaine  1 patch TransDERmal Daily    sodium chloride flush  5-40 mL IntraVENous 2 times per day       Vital Signs:  /77   Pulse 70   Temp 98.6 °F (37 °C) (Oral)   Resp 16   Ht 1.854 m (6' 1\")   Wt 61.2 kg (135 lb)   SpO2 96%   BMI 17.81 kg/m²    Review of Systems - History obtained from chart review and the patient  General ROS: negative  Respiratory ROS: no cough, shortness of breath, or wheezing  Cardiovascular ROS: no chest pain or dyspnea on exertion  Gastrointestinal ROS: positive for odynophagia  Physical Exam:       General Appearance: alert and oriented to person, place and time, well-developed and well-nourished, in no acute distress  Skin: warm and dry, no rash or erythema  Head:  ablation performed.     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 Ct abd and neck show no acute findings   -MBS with mild to moderate dysphagia  S/p egd with dilation yesterday, pt did have HH, multiple white nummular lesions in the proximal esophagus and erythema and superficial ulcer at the anastomotic site that was cauterized, non bleeding avm in jejunum also cauterized  -f/u bx results  -Ppi bid   -Avoid nsaids  -Advance diet as tolerated  -Give Maalox + Lidocaine PRN   -Okay to resume anticoagulation/antiplatelets      2.abdominal pain with hx of bilroth2 ct shows moderate amount of stool but no overt obstruction -pain currently resolved     3.jayla     4.sob with cough  Mgt per primary service     5.anemia  Has not had overt gi bleeding  Trend hh        This plan was formulated in collaboration with Dr. Razo  .    Electronically signed by: DARCI Sanches - CNP on 5/23/2025 at 6:43 AM     Attending Physician Statement  I have discussed the care of Hemanth Barrera and I have examined the patient myselft and taken ros and hpi , including pertinent history and exam findings,  with the author of this note . I have reviewed the key elements of all parts of the encounter with the nurse practitioner/resident.  I agree with the assessment, plan and orders as documented by the above health care provider.  I have made necessary changes as deemed appropriate directly in the note.     More than 50% of the time was spent taking care of this patient in addition to the nurse practitioner time.  That also included history taking follow-up physical examination and review of system.    Electronically signed by Isauro Razo MD

## 2025-05-23 NOTE — PROGRESS NOTES
[facility]  IN-PATIENT SERVICE  [facility]    PROGRESS NOTE             5/23/2025    9:07 AM    Name:   Hemanth Barrera  MRN:     [id number]     Acct:      [id number]   Room:   2103/2103-01   Day:  5  Admit Date:  5/18/2025  8:54 AM    PCP:  Neftaly Contreras MD  Code Status:  Full Code    Subjective:     C/C:   Chief Complaint   Patient presents with    Shortness of Breath     Interval History Status: improved.    Patient seen and examined at bedside eating breakfast.  EGD with dilation completed yesterday for dysphagia.  States that he is feeling well, reporting some pain of the esophagus and left side.  Able to eat dinner last night without difficulty.  Percocet every 8 hours as needed ordered for pain control.  Anticoagulation continued. Will continue to monitor.      Continues on prednisone 40 mg daily for shortness of breath/wheezing.  Improvement of wheezing on auscultation.  Patient continues to have adequate oxygen saturation on room air.    Potassium, magnesium WNL this morning.  Will continue to replace as needed.    Brief History:     The patient is a 89 y.o. male with a past medical history of CAD status post stent placement, DVT/PE on Eliquis (most recent 3/2025), atrial fibrillation, hypertension, hyperlipidemia, CKD 3B, recent hospital admission for fall resulting in multiple left posterior rib fractures who presented to the ED with a chief complaint of shortness of breath and left-sided rib pain.     Patient states that over the last few weeks he has been experiencing left-sided rib pain and shortness of breath that has gotten progressively worse.  Was previously admitted 2/2025 due to multiple left-sided rib fracture secondary to fall, followed by orthopedic surgery during admission, deep breathing and coughing with splint encouraged.  Reports pain being uncontrolled since fractures resulting in inability to take deep breaths.  Also states that he  vallecular residue and piriform sinus cavity residue was seen with thin and mildly thick liquids, deep penetration evident with thin liquids.  Premature vallecular spillage and vallecular residue was noted with purees/pudding and soft solids; additionally, oral transit/preparatory phase difficulties and trace penetration noted with soft solids. No evidence of laryngeal aspiration.     Swallowing mechanism with preliminary findings, as above. Please see separate speech pathology report for full discussion of findings and recommendations.     XR CHEST PORTABLE  Result Date: 5/19/2025  EXAMINATION: ONE XRAY VIEW OF THE CHEST 5/19/2025 11:28 am COMPARISON: 05/01/2025 HISTORY: ORDERING SYSTEM PROVIDED HISTORY: SOB TECHNOLOGIST PROVIDED HISTORY: SOB Reason for Exam: SOB FINDINGS: Transvenous pacer remains in place. The lungs are without acute focal process.  There is no effusion or pneumothorax. The cardiomediastinal silhouette is stable. The osseous structures are stable.  Right hemidiaphragm.     No acute process.     CT CHEST PULMONARY EMBOLISM W CONTRAST  Result Date: 5/18/2025  EXAMINATION: CTA OF THE CHEST 5/18/2025 10:10 am TECHNIQUE: CTA of the chest was performed after the administration of intravenous contrast.  Multiplanar reformatted images are provided for review.  MIP images are provided for review. Automated exposure control, iterative reconstruction, and/or weight based adjustment of the mA/kV was utilized to reduce the radiation dose to as low as reasonably achievable. COMPARISON: 04/18/2024 HISTORY: ORDERING SYSTEM PROVIDED HISTORY: hx pe, left chest pain TECHNOLOGIST PROVIDED HISTORY: hx pe, left chest pain Additional Contrast?->1 Reason for Exam: hx pe, left chest pain Additional signs and symptoms: Pt had a fall few months ago and broke ribs, states recently has been having worsening left-sided rib pain, cough, shortness of breath FINDINGS: Pulmonary Arteries: Pulmonary arteries are adequately  opacified for evaluation.  No evidence of intraluminal filling defect to suggest pulmonary embolism.  Main pulmonary artery is normal in caliber. Mediastinum: No evidence of mediastinal lymphadenopathy.  The heart and pericardium demonstrate no acute abnormality.  There is no acute abnormality of the thoracic aorta.  Three-vessel coronary artery calcification Lungs/pleura: The lungs are without acute process.  No focal consolidation or pulmonary edema.  No evidence of pleural effusion or pneumothorax.  Mild emphysema. Upper Abdomen: Limited images of the upper abdomen are unremarkable. Soft Tissues/Bones: No acute bone or soft tissue abnormality.     No evidence of pulmonary embolism or acute pulmonary abnormality.     CT ABDOMEN PELVIS WO CONTRAST Additional Contrast? None  Result Date: 5/1/2025  EXAMINATION: CT OF THE ABDOMEN AND PELVIS WITHOUT CONTRAST 5/1/2025 3:26 pm TECHNIQUE: CT of the abdomen and pelvis was performed without the administration of intravenous contrast. Multiplanar reformatted images are provided for review. Automated exposure control, iterative reconstruction, and/or weight based adjustment of the mA/kV was utilized to reduce the radiation dose to as low as reasonably achievable. COMPARISON: 03/05/2025 HISTORY: ORDERING SYSTEM PROVIDED HISTORY: Abdominal pain TECHNOLOGIST PROVIDED HISTORY: Abdominal pain Reason for Exam: abd pain FINDINGS: Lack of intravenous contrast limits evaluation of the visceral organs. Lower chest: Trace left pleural effusion with atelectasis. EG junction, stomach and duodenal sweep: Unremarkable Liver: Unremarkable Gallbladder: Unremarkable Biliary tree: Unremarkable Pancreas: Unremarkable for patient's age. Spleen: Unremarkable Kidneys and ureters: Contrast in the collecting system.  Simple appearing bilateral renal cysts the largest in the upper pole the right kidney measuring 2.8 cm.  No additional imaging follow-up is recommended. Adrenal glands: Stable mild

## 2025-05-23 NOTE — PROGRESS NOTES
[facility]  ST PROGRESSIVE CARE    Dysphagia Treatment Note    Date: 5/23/2025  Patient’s Name: Hemanth Barrera  MRN: [id number]  Diagnosis:   Patient Active Problem List   Diagnosis Code    On continuous oral anticoagulation Z79.01    CHF, acute on chronic ([facility]) I50.9    Hyperlipidemia E78.5    Former smoker Z87.891    Pacemaker Z95.0    History of DVT of lower extremity Z86.718    Enlarged prostate N40.0    Cataract of both eyes H26.9    GERD (gastroesophageal reflux disease) K21.9    History of inguinal hernia repair Z98.890, Z87.19    Hypertension I10    Pneumonia J18.9    History of right hip replacement Z96.641    History of gastric surgery Z98.890    Low back pain M54.50    Chest pain R07.9    Fluid overload E87.70    VARSHA (acute kidney injury) N17.9    History of acute myocardial infarction I25.2    Chronic CHF (congestive heart failure) ([facility]) I50.9    Unstable angina ([facility]) I20.0    Acute on chronic diastolic congestive heart failure ([facility]) I50.33    SBO (small bowel obstruction) ([facility]) K56.609    Stage 3b chronic kidney disease ([facility]) N18.32    Iron deficiency E61.1    Chronic systolic (congestive) heart failure I50.22    Chronic anemia D64.9    Bradycardia R00.1    Class 1 obesity E66.811    Degeneration of intervertebral disc [id number]    Edema R60.9    Fatigue R53.83    Glaucoma suspect of both eyes H40.003    Sick sinus syndrome ([facility]) I49.5    Venous insufficiency I87.2    Paroxysmal atrial fibrillation ([facility]) I48.0    Coronary artery disease involving native coronary artery of native heart with angina pectoris I25.119    B12 deficiency E53.8    Functional constipation K59.04    Slow transit constipation K59.01    Complex regional pain syndrome type 2 of lower extremity G57.70    Open dislocation of knee S83.106A, S81.009A    Pain in limb M79.609    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    Nausea and vomiting R11.2    Right upper quadrant abdominal pain R10.11     physician.  Pt. Demo. No overt s/s aspiration on sips of thin liquid via straw.    Pt. Demonstrated s/s pain from frequent spasms of abdomen, pt. Currently c ice pack in place.   Further ST not recommended.       Plan:  [] Continue ST services    [x] Discharge from ST:        Discharge recommendations:  [] Further therapy recommended at discharge.   [x] No therapy recommended at discharge.         Treatment completed by: Jazmin Castellanos M.A.CCC/SLP

## 2025-05-23 NOTE — PROGRESS NOTES
[facility]   OCCUPATIONAL THERAPY MISSED TREATMENT NOTE   INPATIENT   Date: 25  Patient Name: Hemanth Barrera       Room:   MRN: [id number]   Account #: [id number]    : 1935  (89 y.o.)  Gender: male   Referring Practitioner: Annetta Martin MD  Diagnosis: Acute kidney injury superimposed on CKD             REASON FOR MISSED TREATMENT:  Patient declined   -    Upon arrival, patient supine in bed visually upset. OT utilizing therapeutic use of self with patient reporting severe abdominal pain and requesting just to be able to rest with declining therapy at this time. OT confirming with LEON Espino, patient receiving pain meds recently. OT will continue to follow and reattempt as time permits. 1055            Electronically signed by SHEELA Fisher on 25 at 11:04 AM EDT

## 2025-05-23 NOTE — CARE COORDINATION
Care Transitions    Noted admission to [facility] on 5/18/25 for increased SOB and rib pain, hx left rib fractures.  Care Transition will follow for discharge disposition.  Current DC plan is home with son and home care.  CT program ended while hospitalized.

## 2025-05-23 NOTE — PROGRESS NOTES
[facility]  Progress Note                   Date:   5/23/2025  Patient name: Hemanth Barrera  Date of admission:  5/18/2025  8:54 AM  MRN:   [id number]  YOB: 1935  PCP: Neftaly Contreras MD    Reason for Admission: VARSHA (acute kidney injury) [N17.9]  Dyspnea, unspecified type [R06.00]    Subjective:       Clinical Changes /Abnormalities: Seen & examined in room. No acute CV issues/concerns overnight. Labs, vitals, & tele reviewed. Remains paced     Medications:   Scheduled Meds:   ipratropium 0.5 mg-albuterol 2.5 mg  1 Dose Inhalation BID    predniSONE  40 mg Oral Daily    guaiFENesin  600 mg Oral BID    potassium chloride  20 mEq Oral Daily    ranolazine  500 mg Oral TID    clopidogrel  75 mg Oral Daily    bumetanide  1 mg Oral Daily    pantoprazole  40 mg Oral QAM AC    apixaban  5 mg Oral BID    atorvastatin  40 mg Oral Nightly    latanoprost  1 drop Both Eyes Nightly    finasteride  5 mg Oral Daily    dilTIAZem  120 mg Oral Daily    gabapentin  100 mg Oral BID    isosorbide mononitrate  30 mg Oral Daily    metoprolol succinate  50 mg Oral Daily    midodrine  2.5 mg Oral TID WC    lidocaine  1 patch TransDERmal Daily    sodium chloride flush  5-40 mL IntraVENous 2 times per day     Continuous Infusions:   sodium chloride       CBC:   Recent Labs     05/21/25  0535 05/22/25  0610 05/23/25  0547   WBC 4.1 6.3 5.7   HGB 10.2* 9.4* 9.2*    236 233     BMP:    Recent Labs     05/21/25  0535 05/22/25  0610 05/23/25  0547    139 136   K 4.3 4.4 4.2    104 104   CO2 24 25 24   BUN 19 26* 23   CREATININE 1.0 1.1 0.9   GLUCOSE 153* 114* 106*     Hepatic:No results for input(s): \"AST\", \"ALT\", \"BILITOT\", \"ALKPHOS\" in the last 72 hours.    Invalid input(s): \"ALB\"  Troponin:   No results for input(s): \"TROPHS\" in the last 72 hours.    BNP: No results for input(s): \"BNP\" in the last 72 hours.  Lipids: No results for input(s): \"CHOL\", \"HDL\" in the last 72 hours.    Invalid  Problem List:     On continuous oral anticoagulation     CHF, acute on chronic ([facility])     Hyperlipidemia     Former smoker     Pacemaker     History of DVT of lower extremity     Enlarged prostate     Cataract of both eyes     GERD (gastroesophageal reflux disease)     History of inguinal hernia repair     Hypertension     Pneumonia     History of right hip replacement     History of gastric surgery     Low back pain     Chest pain     Fluid overload     VARSHA (acute kidney injury)     History of acute myocardial infarction     Chronic CHF (congestive heart failure) ([facility])     Unstable angina ([facility])     Acute on chronic diastolic congestive heart failure ([facility])     SBO (small bowel obstruction) ([facility])     Stage 3b chronic kidney disease ([facility])     Iron deficiency     Chronic systolic (congestive) heart failure     Chronic anemia     Bradycardia     Class 1 obesity     Degeneration of intervertebral disc     Edema     Fatigue     Glaucoma suspect of both eyes     Sick sinus syndrome ([facility])     Venous insufficiency     Paroxysmal atrial fibrillation ([facility])     Coronary artery disease involving native coronary artery of native heart with angina pectoris     B12 deficiency     Functional constipation     Slow transit constipation     Complex regional pain syndrome type 2 of lower extremity     Open dislocation of knee     Pain in limb     Psychosexual dysfunction associated with inhibited libido     Nausea and vomiting     Right upper quadrant abdominal pain     Constipation     Abnormal finding on GI tract imaging     Abnormal LFTs     Abdominal pain     Ileus ([facility])     Non-prs chr ulcer oth prt r low leg limited to brkdwn skin ([facility])     Right wrist pain     Abdominal pain, epigastric     Leg swelling     NSTEMI (non-ST elevated myocardial infarction) ([facility])     Presence of permanent cardiac pacemaker     Fall against object     Abnormal EKG     Severe malnutrition     Secondary hypercoagulable state     Congestive heart failure,

## 2025-05-24 PROBLEM — B37.81 CANDIDA ESOPHAGITIS (HCC): Status: ACTIVE | Noted: 2025-05-24

## 2025-05-24 LAB
ANION GAP SERPL CALCULATED.3IONS-SCNC: 11 MMOL/L (ref 9–16)
BASOPHILS # BLD: 0 K/UL (ref 0–0.2)
BASOPHILS NFR BLD: 0 % (ref 0–2)
BUN SERPL-MCNC: 25 MG/DL (ref 8–23)
CALCIUM SERPL-MCNC: 8.6 MG/DL (ref 8.6–10.4)
CHLORIDE SERPL-SCNC: 103 MMOL/L (ref 98–107)
CO2 SERPL-SCNC: 24 MMOL/L (ref 20–31)
CREAT SERPL-MCNC: 0.9 MG/DL (ref 0.7–1.2)
EOSINOPHIL # BLD: 0 K/UL (ref 0–0.4)
EOSINOPHILS RELATIVE PERCENT: 0 % (ref 0–4)
ERYTHROCYTE [DISTWIDTH] IN BLOOD BY AUTOMATED COUNT: 18.2 % (ref 11.5–14.9)
GFR, ESTIMATED: 81 ML/MIN/1.73M2
GLUCOSE SERPL-MCNC: 108 MG/DL (ref 74–99)
HCT VFR BLD AUTO: 30.6 % (ref 41–53)
HGB BLD-MCNC: 9.9 G/DL (ref 13.5–17.5)
IMM GRANULOCYTES # BLD AUTO: 0 K/UL (ref 0–0.3)
IMM GRANULOCYTES NFR BLD: 0 %
LYMPHOCYTES NFR BLD: 0.51 K/UL (ref 1–4.8)
LYMPHOCYTES RELATIVE PERCENT: 10 % (ref 24–44)
MAGNESIUM SERPL-MCNC: 1.8 MG/DL (ref 1.7–2.3)
MCH RBC QN AUTO: 29.7 PG (ref 26–34)
MCHC RBC AUTO-ENTMCNC: 32.4 G/DL (ref 31–37)
MCV RBC AUTO: 91.9 FL (ref 80–100)
MONOCYTES NFR BLD: 0.26 K/UL (ref 0.1–1.3)
MONOCYTES NFR BLD: 5 % (ref 1–7)
MORPHOLOGY: ABNORMAL
MORPHOLOGY: ABNORMAL
NEUTROPHILS NFR BLD: 85 % (ref 36–66)
NEUTS SEG NFR BLD: 4.33 K/UL (ref 1.3–9.1)
NRBC BLD-RTO: 0 PER 100 WBC
PLATELET # BLD AUTO: 239 K/UL (ref 150–450)
PMV BLD AUTO: 9.3 FL (ref 8–13.5)
POTASSIUM SERPL-SCNC: 4.4 MMOL/L (ref 3.7–5.3)
RBC # BLD AUTO: 3.33 M/UL (ref 4.21–5.77)
SODIUM SERPL-SCNC: 138 MMOL/L (ref 136–145)
WBC OTHER # BLD: 5.1 K/UL (ref 3.5–11)

## 2025-05-24 PROCEDURE — 6370000000 HC RX 637 (ALT 250 FOR IP): Performed by: INTERNAL MEDICINE

## 2025-05-24 PROCEDURE — 6370000000 HC RX 637 (ALT 250 FOR IP)

## 2025-05-24 PROCEDURE — 36415 COLL VENOUS BLD VENIPUNCTURE: CPT

## 2025-05-24 PROCEDURE — 99233 SBSQ HOSP IP/OBS HIGH 50: CPT | Performed by: INTERNAL MEDICINE

## 2025-05-24 PROCEDURE — 2500000003 HC RX 250 WO HCPCS

## 2025-05-24 PROCEDURE — 83735 ASSAY OF MAGNESIUM: CPT

## 2025-05-24 PROCEDURE — 2060000000 HC ICU INTERMEDIATE R&B

## 2025-05-24 PROCEDURE — 2500000003 HC RX 250 WO HCPCS: Performed by: INTERNAL MEDICINE

## 2025-05-24 PROCEDURE — APPSS30 APP SPLIT SHARED TIME 16-30 MINUTES: Performed by: NURSE PRACTITIONER

## 2025-05-24 PROCEDURE — 94640 AIRWAY INHALATION TREATMENT: CPT

## 2025-05-24 PROCEDURE — 85025 COMPLETE CBC W/AUTO DIFF WBC: CPT

## 2025-05-24 PROCEDURE — 6370000000 HC RX 637 (ALT 250 FOR IP): Performed by: NURSE PRACTITIONER

## 2025-05-24 PROCEDURE — 80048 BASIC METABOLIC PNL TOTAL CA: CPT

## 2025-05-24 PROCEDURE — 99231 SBSQ HOSP IP/OBS SF/LOW 25: CPT | Performed by: INTERNAL MEDICINE

## 2025-05-24 RX ORDER — BUMETANIDE 1 MG/1
1 TABLET ORAL DAILY
Qty: 30 TABLET | Refills: 3 | Status: SHIPPED | OUTPATIENT
Start: 2025-05-25 | End: 2025-05-24

## 2025-05-24 RX ORDER — SENNOSIDES 8.6 MG/1
2 TABLET ORAL ONCE
Status: COMPLETED | OUTPATIENT
Start: 2025-05-24 | End: 2025-05-24

## 2025-05-24 RX ORDER — DICYCLOMINE HCL 20 MG
20 TABLET ORAL
Status: DISCONTINUED | OUTPATIENT
Start: 2025-05-24 | End: 2025-05-26 | Stop reason: HOSPADM

## 2025-05-24 RX ORDER — POLYETHYLENE GLYCOL 3350 17 G/17G
17 POWDER, FOR SOLUTION ORAL DAILY
Status: DISCONTINUED | OUTPATIENT
Start: 2025-05-24 | End: 2025-05-26 | Stop reason: HOSPADM

## 2025-05-24 RX ORDER — OXYCODONE AND ACETAMINOPHEN 5; 325 MG/1; MG/1
1 TABLET ORAL ONCE
Refills: 0 | Status: COMPLETED | OUTPATIENT
Start: 2025-05-24 | End: 2025-05-24

## 2025-05-24 RX ORDER — FLUCONAZOLE 100 MG/1
200 TABLET ORAL DAILY
Qty: 28 TABLET | Refills: 0 | Status: SHIPPED | OUTPATIENT
Start: 2025-05-24 | End: 2025-05-26 | Stop reason: HOSPADM

## 2025-05-24 RX ORDER — SODIUM PHOSPHATE, DIBASIC AND SODIUM PHOSPHATE, MONOBASIC 7; 19 G/230ML; G/230ML
1 ENEMA RECTAL ONCE
Status: COMPLETED | OUTPATIENT
Start: 2025-05-24 | End: 2025-05-24

## 2025-05-24 RX ORDER — CLOPIDOGREL BISULFATE 75 MG/1
75 TABLET ORAL DAILY
Qty: 30 TABLET | Refills: 3 | Status: SHIPPED | OUTPATIENT
Start: 2025-05-25

## 2025-05-24 RX ORDER — MAGNESIUM SULFATE HEPTAHYDRATE 40 MG/ML
2000 INJECTION, SOLUTION INTRAVENOUS ONCE
Status: COMPLETED | OUTPATIENT
Start: 2025-05-24 | End: 2025-05-24

## 2025-05-24 RX ORDER — CLOPIDOGREL BISULFATE 75 MG/1
75 TABLET ORAL DAILY
Qty: 30 TABLET | Refills: 3 | Status: SHIPPED | OUTPATIENT
Start: 2025-05-25 | End: 2025-05-24

## 2025-05-24 RX ORDER — FLUCONAZOLE 100 MG/1
200 TABLET ORAL DAILY
Qty: 28 TABLET | Refills: 0 | Status: SHIPPED | OUTPATIENT
Start: 2025-05-24 | End: 2025-05-24

## 2025-05-24 RX ORDER — DOCUSATE SODIUM 100 MG/1
100 CAPSULE, LIQUID FILLED ORAL DAILY
Status: DISCONTINUED | OUTPATIENT
Start: 2025-05-24 | End: 2025-05-26 | Stop reason: HOSPADM

## 2025-05-24 RX ORDER — BUMETANIDE 1 MG/1
1 TABLET ORAL DAILY
Qty: 30 TABLET | Refills: 3 | Status: SHIPPED | OUTPATIENT
Start: 2025-05-25

## 2025-05-24 RX ORDER — POLYETHYLENE GLYCOL 3350 17 G/17G
17 POWDER, FOR SOLUTION ORAL ONCE
Status: COMPLETED | OUTPATIENT
Start: 2025-05-24 | End: 2025-05-24

## 2025-05-24 RX ADMIN — GUAIFENESIN 600 MG: 600 TABLET, EXTENDED RELEASE ORAL at 21:13

## 2025-05-24 RX ADMIN — RANOLAZINE 500 MG: 500 TABLET, EXTENDED RELEASE ORAL at 21:13

## 2025-05-24 RX ADMIN — APIXABAN 5 MG: 5 TABLET, FILM COATED ORAL at 21:13

## 2025-05-24 RX ADMIN — ISOSORBIDE MONONITRATE 30 MG: 30 TABLET, EXTENDED RELEASE ORAL at 08:06

## 2025-05-24 RX ADMIN — SENNOSIDES 17.2 MG: 8.6 TABLET, FILM COATED ORAL at 08:05

## 2025-05-24 RX ADMIN — DILTIAZEM HYDROCHLORIDE 120 MG: 120 CAPSULE, COATED, EXTENDED RELEASE ORAL at 08:04

## 2025-05-24 RX ADMIN — DOCUSATE SODIUM 100 MG: 100 CAPSULE, LIQUID FILLED ORAL at 10:07

## 2025-05-24 RX ADMIN — ACETAMINOPHEN 650 MG: 325 TABLET ORAL at 16:31

## 2025-05-24 RX ADMIN — POLYETHYLENE GLYCOL 3350 17 G: 17 POWDER, FOR SOLUTION ORAL at 10:20

## 2025-05-24 RX ADMIN — MAGNESIUM SULFATE HEPTAHYDRATE 2000 MG: 40 INJECTION, SOLUTION INTRAVENOUS at 10:11

## 2025-05-24 RX ADMIN — OXYCODONE HYDROCHLORIDE AND ACETAMINOPHEN 1 TABLET: 5; 325 TABLET ORAL at 06:19

## 2025-05-24 RX ADMIN — SODIUM PHOSPHATE, DIBASIC AND SODIUM PHOSPHATE, MONOBASIC 1 ENEMA: 7; 19 ENEMA RECTAL at 15:26

## 2025-05-24 RX ADMIN — MIDODRINE HYDROCHLORIDE 2.5 MG: 2.5 TABLET ORAL at 16:31

## 2025-05-24 RX ADMIN — CLOPIDOGREL BISULFATE 75 MG: 75 TABLET, FILM COATED ORAL at 08:06

## 2025-05-24 RX ADMIN — ACETAMINOPHEN 650 MG: 325 TABLET ORAL at 10:28

## 2025-05-24 RX ADMIN — GABAPENTIN 100 MG: 100 CAPSULE ORAL at 21:13

## 2025-05-24 RX ADMIN — POLYETHYLENE GLYCOL 3350 17 G: 17 POWDER, FOR SOLUTION ORAL at 06:18

## 2025-05-24 RX ADMIN — PREDNISONE 40 MG: 20 TABLET ORAL at 08:05

## 2025-05-24 RX ADMIN — LATANOPROST 1 DROP: 50 SOLUTION OPHTHALMIC at 21:19

## 2025-05-24 RX ADMIN — SODIUM CHLORIDE, PRESERVATIVE FREE 10 ML: 5 INJECTION INTRAVENOUS at 08:07

## 2025-05-24 RX ADMIN — IPRATROPIUM BROMIDE AND ALBUTEROL SULFATE 1 DOSE: .5; 2.5 SOLUTION RESPIRATORY (INHALATION) at 07:51

## 2025-05-24 RX ADMIN — SODIUM CHLORIDE, PRESERVATIVE FREE 10 ML: 5 INJECTION INTRAVENOUS at 21:13

## 2025-05-24 RX ADMIN — GUAIFENESIN 600 MG: 600 TABLET, EXTENDED RELEASE ORAL at 08:05

## 2025-05-24 RX ADMIN — GABAPENTIN 100 MG: 100 CAPSULE ORAL at 08:05

## 2025-05-24 RX ADMIN — IPRATROPIUM BROMIDE AND ALBUTEROL SULFATE 1 DOSE: .5; 2.5 SOLUTION RESPIRATORY (INHALATION) at 19:28

## 2025-05-24 RX ADMIN — PANTOPRAZOLE SODIUM 40 MG: 40 TABLET, DELAYED RELEASE ORAL at 06:19

## 2025-05-24 RX ADMIN — ACETAMINOPHEN 650 MG: 325 TABLET ORAL at 22:33

## 2025-05-24 RX ADMIN — RANOLAZINE 500 MG: 500 TABLET, EXTENDED RELEASE ORAL at 08:06

## 2025-05-24 RX ADMIN — POTASSIUM CHLORIDE 20 MEQ: 1500 TABLET, EXTENDED RELEASE ORAL at 08:05

## 2025-05-24 RX ADMIN — POLYETHYLENE GLYCOL 3350 17 G: 17 POWDER, FOR SOLUTION ORAL at 10:07

## 2025-05-24 RX ADMIN — DICYCLOMINE HYDROCHLORIDE 20 MG: 20 TABLET ORAL at 21:13

## 2025-05-24 RX ADMIN — DICYCLOMINE HYDROCHLORIDE 20 MG: 20 TABLET ORAL at 16:31

## 2025-05-24 RX ADMIN — APIXABAN 5 MG: 5 TABLET, FILM COATED ORAL at 08:06

## 2025-05-24 RX ADMIN — ATORVASTATIN CALCIUM 40 MG: 40 TABLET, FILM COATED ORAL at 21:13

## 2025-05-24 RX ADMIN — FINASTERIDE 5 MG: 5 TABLET, FILM COATED ORAL at 08:06

## 2025-05-24 RX ADMIN — BUMETANIDE 1 MG: 1 TABLET ORAL at 08:05

## 2025-05-24 RX ADMIN — ACETAMINOPHEN 650 MG: 325 TABLET ORAL at 04:26

## 2025-05-24 RX ADMIN — RANOLAZINE 500 MG: 500 TABLET, EXTENDED RELEASE ORAL at 15:25

## 2025-05-24 RX ADMIN — METOPROLOL SUCCINATE 50 MG: 50 TABLET, EXTENDED RELEASE ORAL at 08:05

## 2025-05-24 ASSESSMENT — PAIN DESCRIPTION - ORIENTATION
ORIENTATION: RIGHT;MID
ORIENTATION: LEFT
ORIENTATION: LEFT

## 2025-05-24 ASSESSMENT — PAIN DESCRIPTION - DESCRIPTORS
DESCRIPTORS: ACHING;DISCOMFORT
DESCRIPTORS: SHARP
DESCRIPTORS: ACHING
DESCRIPTORS: ACHING
DESCRIPTORS: ACHING;DISCOMFORT

## 2025-05-24 ASSESSMENT — PAIN SCALES - GENERAL
PAINLEVEL_OUTOF10: 6
PAINLEVEL_OUTOF10: 7
PAINLEVEL_OUTOF10: 3
PAINLEVEL_OUTOF10: 6

## 2025-05-24 ASSESSMENT — PAIN - FUNCTIONAL ASSESSMENT
PAIN_FUNCTIONAL_ASSESSMENT: PREVENTS OR INTERFERES SOME ACTIVE ACTIVITIES AND ADLS
PAIN_FUNCTIONAL_ASSESSMENT: ACTIVITIES ARE NOT PREVENTED
PAIN_FUNCTIONAL_ASSESSMENT: PREVENTS OR INTERFERES SOME ACTIVE ACTIVITIES AND ADLS

## 2025-05-24 ASSESSMENT — PAIN DESCRIPTION - LOCATION
LOCATION: RIB CAGE
LOCATION: ABDOMEN
LOCATION: COCCYX
LOCATION: RIB CAGE
LOCATION: ABDOMEN

## 2025-05-24 ASSESSMENT — PAIN SCALES - WONG BAKER: WONGBAKER_NUMERICALRESPONSE: NO HURT

## 2025-05-24 NOTE — PLAN OF CARE
Problem: Respiratory - Adult  Goal: Achieves optimal ventilation and oxygenation  5/24/2025 0752 by Ashley Lock RCP  Outcome: Progressing

## 2025-05-24 NOTE — PROGRESS NOTES
Writer messaged SID Goetz from GI per request from medicine resident Trini Martin; to confirm if patient is appropriate for discharge today, as well as confirm if empiric Diflucan for 2 weeks at discharge is appropriate. NP confirmed pt is good to discharge on Diflucan today. Medicine resident Trini Martin updated.

## 2025-05-24 NOTE — DISCHARGE INSTR - DIET
Good nutrition is important when healing from an illness, injury, or surgery.  Follow any nutrition recommendations given to you during your hospital stay.   If you were given an oral nutrition supplement while in the hospital, continue to take this supplement at home.  You can take it with meals, in-between meals, and/or before bedtime. These supplements can be purchased at most local grocery stores, pharmacies, and chain [employer]-stores.   If you have any questions about your diet or nutrition, call the hospital and ask for the dietitian.      Dysphagia; Soft and Bite sized; [place] Diet

## 2025-05-24 NOTE — CARE COORDINATION
ONGOING DISCHARGE PLAN:    Patient is alert and oriented x4.    Spoke with patient regarding discharge plan and patient confirms that plan is still home w/ [facility].    GI consult  5/22 EGD-dilatation, ablation of AVM w/ APC    Constipated  Miralax    100% on room air  +human metapneumovirus    PT/OT/SLP    Will continue to follow for additional discharge needs.    If patient is discharged prior to next notation, then this note serves as note for discharge by case management.    Electronically signed by Yudelka Damian RN on 5/24/2025 at 9:53 AM

## 2025-05-24 NOTE — PROGRESS NOTES
Physical Therapy          Physical Therapy Cancel Note      DATE: 2025    NAME: Hemanth Barrera  MRN: [id number]   : 1935      Patient not seen this date for Physical Therapy due to:    Pt reports his stomach is hurting. Requesting to rest, willing to try in afternoon. Pt reports his Nurse knows about his pain.   10:32-10:37 A:M.    Attempted later in P:M-pt refused again, throat is huring where they dilated 2 days ago, also reports his breathing is difficulty due to pain in his throat, LEON Herrera notified, she reports she will go and check on him.       Electronically signed by Yolis Diaz PT on 2025 at 3:14 PM

## 2025-05-24 NOTE — PLAN OF CARE
Problem: Safety - Adult  Goal: Free from fall injury  Outcome: Progressing  Flowsheets (Taken 5/24/2025 0352)  Free From Fall Injury: Instruct family/caregiver on patient safety     Problem: Chronic Conditions and Co-morbidities  Goal: Patient's chronic conditions and co-morbidity symptoms are monitored and maintained or improved  Outcome: Progressing  Flowsheets (Taken 5/18/2025 2000 by Carmen Lr RN)  Care Plan - Patient's Chronic Conditions and Co-Morbidity Symptoms are Monitored and Maintained or Improved:   Monitor and assess patient's chronic conditions and comorbid symptoms for stability, deterioration, or improvement   Collaborate with multidisciplinary team to address chronic and comorbid conditions and prevent exacerbation or deterioration     Problem: Pain  Goal: Verbalizes/displays adequate comfort level or baseline comfort level  Outcome: Progressing  Flowsheets (Taken 5/24/2025 0352)  Verbalizes/displays adequate comfort level or baseline comfort level:   Encourage patient to monitor pain and request assistance   Assess pain using appropriate pain scale   Implement non-pharmacological measures as appropriate and evaluate response     Problem: Skin/Tissue Integrity  Goal: Skin integrity remains intact  Description: 1.  Monitor for areas of redness and/or skin breakdown2.  Assess vascular access sites hourly3.  Every 4-6 hours minimum:  Change oxygen saturation probe site4.  Every 4-6 hours:  If on nasal continuous positive airway pressure, respiratory therapy assess nares and determine need for appliance change or resting period  Outcome: Progressing  Flowsheets (Taken 5/24/2025 0352)  Skin Integrity Remains Intact:   Monitor for areas of redness and/or skin breakdown   Every 4-6 hours:  If on nasal continuous positive airway pressure, assess nares and determine need for appliance change or resting period   Every 4-6 hours minimum:  Change oxygen saturation probe site     Problem: Respiratory -

## 2025-05-24 NOTE — CARE COORDINATION
[facility] Encounter Date/Time: 2025 0854    Hospital Account: [id number]    MRN: [id number]    Patient: Hemanth Barrera    Contact Serial #: [id number]      ENCOUNTER          Patient Class: I Private Enc?  No Unit RM BD: DARIUS PROG    Hospital Service: MED   Encounter DX: VARSHA (acute kidney injury*   ADM Provider: Sana Haskins MD   Procedure:     ATT Provider: Sana Haskins MD   REF Provider:        Admission DX: VARSHA (acute kidney injury), Dyspnea, unspecified type and DX codes: N17.9, R06.00      PATIENT             Name: Hemanth Barrera : 1935 (89 yrs)   Address: [address] [place], [place] [address]HUNTER ARCINIEGA Sex: Male   City: [place] [address]         Marital Status:    Employer: RETIRED         Holiness: Hindu   Primary Care Provider: Neftaly Contreras MD         Primary Phone: [phone]   EMERGENCY CONTACT   Name Home Phone Work Phone Mobile Phone Relationship Lgl Grd   KIELWINTER [phone] Child           GUARANTOR            Guarantor: Hemanth Barrera     : 1935   Address: [address] [place], [place] [address] Sex: [place] [address]     Relation to Patient: Self       Home Phone: [phone]   Guarantor ID: [id number]       Work Phone:     Guarantor Employer: RETIRED         Status: RETIRED      COVERAGE        PRIMARY INSURANCE   Payor: HU[employer] MEDICARE Plan: [employer] GOLD PLUS HMO   Payor Address: [address] [address]       Group Number: [id number] Insurance Type: INDEMNITY   Subscriber Name: HEMANTH BARRERA Subscriber : 1935   Subscriber ID: [id number] Pat. Rel. to Sub: Self   SECONDARY INSURANCE   Payor:   Plan:     Payor Address:  ,           Group Number:   Insurance Type:     Subscriber Name:   Subscriber :     Subscriber ID:   Pat. Rel. to Sub:          CSN: [id number]      Medication List    START taking these medications    START taking these medications  clopidogrel 75 MG tablet  Commonly known as: PLAVIX  Take 1  tablet by mouth daily  Start taking on: May 25, 2025   fluconazole 100 MG tablet  Commonly known as: DIFLUCAN  Take 2 tablets by mouth daily for 14 days     CHANGE how you take these medications    CHANGE how you take these medications  bumetanide 1 MG tablet  Commonly known as: BUMEX  Take 1 tablet by mouth daily  Start taking on: May 25, 2025  What changed:  medication strength  how much to take     CONTINUE taking these medications    CONTINUE taking these medications  acetaminophen 325 MG tablet  Commonly known as: TYLENOL  Take 2 tablets by mouth every 6 hours as needed for Pain   apixaban 5 MG Tabs tablet  Commonly known as: Eliquis  Take 1 tablet by mouth 2 times daily   aspirin 81 MG chewable tablet  Take 1 tablet by mouth daily   atorvastatin 40 MG tablet  Commonly known as: LIPITOR  Take 1 tablet by mouth nightly   dilTIAZem 120 MG extended release capsule  Commonly known as: CARDIZEM CD  Take 1 capsule by mouth daily   ferrous sulfate 325 (65 Fe) MG tablet  Commonly known as: IRON 325  Take 1 tablet by mouth 2 times daily (with meals)   finasteride 5 MG tablet  Commonly known as: PROSCAR  Take 1 tablet by mouth daily   gabapentin 100 MG capsule  Commonly known as: NEURONTIN  Take 1 capsule by mouth 2 times daily for 30 days.   isosorbide mononitrate 30 MG extended release tablet  Commonly known as: IMDUR  Take 1 tablet by mouth daily   latanoprost 0.005 % ophthalmic solution  Commonly known as: XALATAN  Place 1 drop into both eyes nightly   metoprolol succinate 50 MG extended release tablet  Commonly known as: TOPROL XL  Take 1 tablet by mouth daily   midodrine 2.5 MG tablet  Commonly known as: PROAMATINE  Take 1 tablet by mouth 3 times daily (with meals) Hold if SBP more than 100   nitroGLYCERIN 0.4 MG SL tablet  Commonly known as: NITROSTAT  up to max of 3 total doses. If no relief after 1 dose, call 911.   oxyCODONE-acetaminophen 5-325 MG per tablet  Commonly known as: PERCOCET  Take 1 tablet by mouth

## 2025-05-24 NOTE — PROGRESS NOTES
RN called patient's son, Dusty, about patient discharging today. Patients son is out of town for work and will not be ablt to pick him up today. Son will not be home until tomorrow. RN notified Residents. Per residents, patient may stay another night for son to pick patient up tomorrow. RN called Dusty back about update. Son very appreciative.

## 2025-05-24 NOTE — PROGRESS NOTES
[facility]  IN-PATIENT SERVICE  [facility]    PROGRESS NOTE             5/24/2025    8:41 AM    Name:   Hemanth Barrera  MRN:     [id number]     Acct:      [id number]   Room:   2103/2103-01   Day:  6  Admit Date:  5/18/2025  8:54 AM    PCP:  Neftaly Contreras MD  Code Status:  Full Code    Subjective:     C/C:   Chief Complaint   Patient presents with    Shortness of Breath     Interval History Status: improved.    Patient seen and examined at bedside.  States he is feeling well this morning in comparison to yesterday evening.  Continues to have discomfort of right lower quadrant and epigastric region along with left sided chest/rib pain. S/p EGD with dilation 5/22 for dysphagia.    Troponin at 25 (within baseline).  Chest x-ray yesterday showing no acute pulmonary findings.  Abdominal x-ray suggestive of constipation throughout colon.  Percocet held.  Senna ordered for constipation.  Will continue to monitor symptoms.    Received last dose prednisone 40 mg oral today.  Reports improvement of shortness of breath.  Improvement of wheezing on auscultation.  Adequate oxygen saturation on room air.     Magnesium at 1.8 this morning, replacement ordered.  Potassium at 4.4.    Brief History:     The patient is a 89 y.o. male with a past medical history of CAD status post stent placement, DVT/PE on Eliquis (most recent 3/2025), atrial fibrillation, hypertension, hyperlipidemia, CKD 3B, recent hospital admission for fall resulting in multiple left posterior rib fractures who presented to the ED with a chief complaint of shortness of breath and left-sided rib pain.     Patient states that over the last few weeks he has been experiencing left-sided rib pain and shortness of breath that has gotten progressively worse.  Was previously admitted 2/2025 due to multiple left-sided rib fracture secondary to fall, followed by orthopedic surgery during admission, deep breathing and  states recently has been having worsening left-sided rib pain, cough, shortness of breath FINDINGS: Pulmonary Arteries: Pulmonary arteries are adequately opacified for evaluation.  No evidence of intraluminal filling defect to suggest pulmonary embolism.  Main pulmonary artery is normal in caliber. Mediastinum: No evidence of mediastinal lymphadenopathy.  The heart and pericardium demonstrate no acute abnormality.  There is no acute abnormality of the thoracic aorta.  Three-vessel coronary artery calcification Lungs/pleura: The lungs are without acute process.  No focal consolidation or pulmonary edema.  No evidence of pleural effusion or pneumothorax.  Mild emphysema. Upper Abdomen: Limited images of the upper abdomen are unremarkable. Soft Tissues/Bones: No acute bone or soft tissue abnormality.     No evidence of pulmonary embolism or acute pulmonary abnormality.     CT ABDOMEN PELVIS WO CONTRAST Additional Contrast? None  Result Date: 5/1/2025  EXAMINATION: CT OF THE ABDOMEN AND PELVIS WITHOUT CONTRAST 5/1/2025 3:26 pm TECHNIQUE: CT of the abdomen and pelvis was performed without the administration of intravenous contrast. Multiplanar reformatted images are provided for review. Automated exposure control, iterative reconstruction, and/or weight based adjustment of the mA/kV was utilized to reduce the radiation dose to as low as reasonably achievable. COMPARISON: 03/05/2025 HISTORY: ORDERING SYSTEM PROVIDED HISTORY: Abdominal pain TECHNOLOGIST PROVIDED HISTORY: Abdominal pain Reason for Exam: abd pain FINDINGS: Lack of intravenous contrast limits evaluation of the visceral organs. Lower chest: Trace left pleural effusion with atelectasis. EG junction, stomach and duodenal sweep: Unremarkable Liver: Unremarkable Gallbladder: Unremarkable Biliary tree: Unremarkable Pancreas: Unremarkable for patient's age. Spleen: Unremarkable Kidneys and ureters: Contrast in the collecting system.  Simple appearing bilateral renal

## 2025-05-24 NOTE — PLAN OF CARE
Received call from patient's pharmacy at [facility].  Per pharmacist concern, patient on both Eliquis and Plavix.  Prescribed Diflucan for 2 weeks for esophageal Candida which pharmacist stated increases risk of bleeding.  Per cardiology recommendation, patient may hold home dose Eliquis for 2 weeks until Diflucan is completed then may resume Eliquis as prescribed.  Nursing updated. Patient updated at bedside and expresses understanding.    Annetta Martin MD  PGY I Transitional Year Resident   05/24/25 1:49 PM

## 2025-05-24 NOTE — PROGRESS NOTES
GI Progress notes    5/24/2025   9:19 AM    Name:  Hemanth Barrera  MRN:    [id number]     Acct:     [id number]   Room:  [facility] Day: 6     Admit Date: 5/18/2025  8:54 AM  PCP: Neftaly Contreras MD    Subjective:     C/C:   Chief Complaint   Patient presents with    Shortness of Breath       Interval History: Status: improved.     Patient seen and examined.  No acute events overnight.  Clinically feeling better this morning.  He is tolerating diet.  No dysphagia.  Odynophagia has improved.    ROS:  Constitutional: negative for chills, fevers and sweats  Respiratory: negative for cough, dyspnea on exertion, hemoptysis, shortness of breath and wheezing  Cardiovascular: negative for chest pain, chest pressure/discomfort, dyspnea, lower extremity edema and palpitations  Gastrointestinal: negative for abdominal pain, constipation, diarrhea, nausea and vomiting  Neurological: negative for dizziness and headaches    Medications:     Allergies:   Allergies   Allergen Reactions    Aspirin Other (See Comments)     Pt told not to take since he has only a partial stomach    Cyclobenzaprine Other (See Comments)     Pt stated heart palpitations and he felt funny    Ibuprofen Nausea Only    Azithromycin Nausea Only    Hydrocodone-Acetaminophen Nausea Only     per pt, he is having upset stomach when taking norco       Current Meds: magnesium sulfate 2000 mg in water 50 mL IVPB, Once  [Held by provider] oxyCODONE-acetaminophen (PERCOCET) 5-325 MG per tablet 1 tablet, Q8H PRN  aluminum & magnesium hydroxide-simethicone (MAALOX PLUS) 200-200-20 MG/5ML suspension 30 mL, Q6H PRN  [Held by provider] promethazine (PHENERGAN) 12.5 mg in sodium chloride 0.9 % 50 mL IVPB, Q6H PRN  scopolamine (TRANSDERM-SCOP) transdermal patch 1 patch, Q72H  ipratropium 0.5 mg-albuterol 2.5 mg (DUONEB) nebulizer solution 1 Dose, BID  guaiFENesin (MUCINEX) extended release tablet 600 mg, BID  potassium chloride (KLOR-CON M) extended release tablet  spine.     1. No acute abnormality of the soft tissue structures of the neck. 2. Linear filling defect within the proximal right subclavian artery, which is unchanged from the prior CT chest. No significant luminal narrowing.  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     XR CHEST PORTABLE  Result Date: 5/1/2025  EXAMINATION: ONE XRAY VIEW OF THE CHEST 5/1/2025 11:39 am COMPARISON: 03/05/2025 HISTORY: ORDERING SYSTEM PROVIDED HISTORY: short of breath TECHNOLOGIST PROVIDED HISTORY: short of breath Reason for Exam: sob FINDINGS: Left transvenous pacemaker remains in place.  Normal cardiomediastinal silhouette.  Bandlike scarring/atelectasis right lung base unchanged.  No focal consolidation.  No pleural effusion or pneumothorax.  The right hemidiaphragm is elevated unchanged.  No acute bony abnormality.     1. No acute cardiopulmonary process. 2. Bandlike scarring/atelectasis right lung base unchanged.        Physical Examination:        General appearance: alert, cooperative and no distress  Mental Status: oriented to person, place and time and normal affect  Lungs: clear to auscultation bilaterally, normal effort  Heart: regular rate and rhythm, no murmur,  Abdomen: soft, nontender, nondistended, bowel sounds present all four quadrants, no masses, hepatomegaly or splenomegaly  Extremities: no edema, redness or tenderness in the calves  Skin: no gross lesions, rashes, or induration    Assessment:        Primary Problem  Acute kidney injury superimposed on CKD     Active Hospital Problems    Diagnosis Date Noted    Dyspnea [R06.00] 05/20/2025    Dysphagia [R13.10] 05/19/2025    Acute kidney injury superimposed on CKD [N17.9, N18.9] 05/18/2025    Constipation [K59.00] 08/02/2023    VARSHA (acute kidney injury) [N17.9] 09/24/2021     Past Medical History:   Diagnosis Date    Acute inferolateral myocardial infarction (HCC) 11/18/2021    Arthritis     Atrial fibrillation (HCC)     CAD (coronary artery

## 2025-05-24 NOTE — PLAN OF CARE
Problem: Safety - Adult  Goal: Free from fall injury  5/24/2025 1220 by Eugenia Mantilla RN  Outcome: Adequate for Discharge     Problem: Chronic Conditions and Co-morbidities  Goal: Patient's chronic conditions and co-morbidity symptoms are monitored and maintained or improved  5/24/2025 1220 by Eugenia Mantilla RN  Outcome: Adequate for Discharge     Problem: Discharge Planning  Goal: Discharge to home or other facility with appropriate resources  Outcome: Adequate for Discharge     Problem: ABCDS Injury Assessment  Goal: Absence of physical injury  Outcome: Adequate for Discharge     Problem: Pain  Goal: Verbalizes/displays adequate comfort level or baseline comfort level  5/24/2025 1220 by Eugenia Mantilla RN  Outcome: Adequate for Discharge     Problem: Skin/Tissue Integrity  Goal: Skin integrity remains intact  Description: 1.  Monitor for areas of redness and/or skin breakdown2.  Assess vascular access sites hourly3.  Every 4-6 hours minimum:  Change oxygen saturation probe site4.  Every 4-6 hours:  If on nasal continuous positive airway pressure, respiratory therapy assess nares and determine need for appliance change or resting period  5/24/2025 1220 by Eugenia Mantilla RN  Outcome: Adequate for Discharge     Problem: Respiratory - Adult  Goal: Achieves optimal ventilation and oxygenation  5/24/2025 1220 by Eugenia Mantilla RN  Outcome: Adequate for Discharge

## 2025-05-24 NOTE — DISCHARGE INSTRUCTIONS
Follow-up with gastroenterology in 2 weeks for hospital follow-up regarding EGD with dilation  Follow-up with cardiology for scheduled appointment for hospital follow-up  Decrease Bumex 2 mg daily to 1 mg daily  Take nystatin as prescribed 4 times daily for 10 days for management of esophageal candidiasis  Continue Eliquis as prescribed

## 2025-05-25 ENCOUNTER — APPOINTMENT (OUTPATIENT)
Dept: CT IMAGING | Age: 89
DRG: 682 | End: 2025-05-25
Payer: MEDICARE

## 2025-05-25 LAB
ANION GAP SERPL CALCULATED.3IONS-SCNC: 9 MMOL/L (ref 9–16)
BACTERIA URNS QL MICRO: ABNORMAL
BASOPHILS # BLD: 0 K/UL (ref 0–0.2)
BASOPHILS NFR BLD: 0 % (ref 0–2)
BILIRUB UR QL STRIP: NEGATIVE
BUN SERPL-MCNC: 30 MG/DL (ref 8–23)
CALCIUM SERPL-MCNC: 8.7 MG/DL (ref 8.6–10.4)
CASTS #/AREA URNS LPF: ABNORMAL /LPF
CHLORIDE SERPL-SCNC: 101 MMOL/L (ref 98–107)
CLARITY UR: CLEAR
CO2 SERPL-SCNC: 24 MMOL/L (ref 20–31)
COLOR UR: YELLOW
CREAT SERPL-MCNC: 1.1 MG/DL (ref 0.7–1.2)
EOSINOPHIL # BLD: 0 K/UL (ref 0–0.44)
EOSINOPHILS RELATIVE PERCENT: 0 % (ref 0–4)
EPI CELLS #/AREA URNS HPF: ABNORMAL /HPF
ERYTHROCYTE [DISTWIDTH] IN BLOOD BY AUTOMATED COUNT: 18 % (ref 11.5–14.9)
GFR, ESTIMATED: 64 ML/MIN/1.73M2
GLUCOSE SERPL-MCNC: 110 MG/DL (ref 74–99)
GLUCOSE UR STRIP-MCNC: NEGATIVE MG/DL
HCT VFR BLD AUTO: 28.8 % (ref 41–53)
HGB BLD-MCNC: 9.4 G/DL (ref 13.5–17.5)
HGB UR QL STRIP.AUTO: NEGATIVE
IMM GRANULOCYTES # BLD AUTO: 0 K/UL (ref 0–0.3)
IMM GRANULOCYTES NFR BLD: 0 %
KETONES UR STRIP-MCNC: NEGATIVE MG/DL
LEUKOCYTE ESTERASE UR QL STRIP: ABNORMAL
LYMPHOCYTES NFR BLD: 0.65 K/UL (ref 1.1–3.7)
LYMPHOCYTES RELATIVE PERCENT: 13 % (ref 24–44)
MAGNESIUM SERPL-MCNC: 2.1 MG/DL (ref 1.7–2.3)
MCH RBC QN AUTO: 30 PG (ref 26–34)
MCHC RBC AUTO-ENTMCNC: 32.6 G/DL (ref 31–37)
MCV RBC AUTO: 92 FL (ref 80–100)
MONOCYTES NFR BLD: 0.35 K/UL (ref 0.1–1.2)
MONOCYTES NFR BLD: 7 % (ref 3–12)
MORPHOLOGY: ABNORMAL
MORPHOLOGY: ABNORMAL
NEUTROPHILS NFR BLD: 80 % (ref 36–66)
NEUTS SEG NFR BLD: 4 K/UL (ref 1.5–8.1)
NITRITE UR QL STRIP: NEGATIVE
NRBC BLD-RTO: 0 PER 100 WBC
PH UR STRIP: 5.5 [PH] (ref 5–8)
PLATELET # BLD AUTO: 242 K/UL (ref 150–450)
PMV BLD AUTO: 9.3 FL (ref 8–13.5)
POTASSIUM SERPL-SCNC: 4.4 MMOL/L (ref 3.7–5.3)
PROT UR STRIP-MCNC: NEGATIVE MG/DL
RBC # BLD AUTO: 3.13 M/UL (ref 4.21–5.77)
RBC #/AREA URNS HPF: ABNORMAL /HPF
SODIUM SERPL-SCNC: 134 MMOL/L (ref 136–145)
SP GR UR STRIP: 1.02 (ref 1–1.03)
UROBILINOGEN UR STRIP-ACNC: NORMAL EU/DL (ref 0–1)
WBC #/AREA URNS HPF: ABNORMAL /HPF
WBC OTHER # BLD: 5 K/UL (ref 3.5–11)

## 2025-05-25 PROCEDURE — 2500000003 HC RX 250 WO HCPCS

## 2025-05-25 PROCEDURE — 74176 CT ABD & PELVIS W/O CONTRAST: CPT

## 2025-05-25 PROCEDURE — 6370000000 HC RX 637 (ALT 250 FOR IP)

## 2025-05-25 PROCEDURE — 81001 URINALYSIS AUTO W/SCOPE: CPT

## 2025-05-25 PROCEDURE — 6370000000 HC RX 637 (ALT 250 FOR IP): Performed by: INTERNAL MEDICINE

## 2025-05-25 PROCEDURE — 2500000003 HC RX 250 WO HCPCS: Performed by: INTERNAL MEDICINE

## 2025-05-25 PROCEDURE — 6370000000 HC RX 637 (ALT 250 FOR IP): Performed by: NURSE PRACTITIONER

## 2025-05-25 PROCEDURE — 94640 AIRWAY INHALATION TREATMENT: CPT

## 2025-05-25 PROCEDURE — 85025 COMPLETE CBC W/AUTO DIFF WBC: CPT

## 2025-05-25 PROCEDURE — 83735 ASSAY OF MAGNESIUM: CPT

## 2025-05-25 PROCEDURE — 2060000000 HC ICU INTERMEDIATE R&B

## 2025-05-25 PROCEDURE — 94761 N-INVAS EAR/PLS OXIMETRY MLT: CPT

## 2025-05-25 PROCEDURE — 80048 BASIC METABOLIC PNL TOTAL CA: CPT

## 2025-05-25 PROCEDURE — 36415 COLL VENOUS BLD VENIPUNCTURE: CPT

## 2025-05-25 PROCEDURE — 99233 SBSQ HOSP IP/OBS HIGH 50: CPT | Performed by: INTERNAL MEDICINE

## 2025-05-25 RX ORDER — ACETAMINOPHEN 650 MG/1
650 SUPPOSITORY RECTAL EVERY 6 HOURS PRN
Status: DISCONTINUED | OUTPATIENT
Start: 2025-05-25 | End: 2025-05-26 | Stop reason: HOSPADM

## 2025-05-25 RX ORDER — ACETAMINOPHEN 500 MG
1000 TABLET ORAL EVERY 6 HOURS PRN
Status: DISCONTINUED | OUTPATIENT
Start: 2025-05-25 | End: 2025-05-26 | Stop reason: HOSPADM

## 2025-05-25 RX ORDER — NYSTATIN 100000 [USP'U]/ML
5 SUSPENSION ORAL 4 TIMES DAILY
Status: DISCONTINUED | OUTPATIENT
Start: 2025-05-25 | End: 2025-05-26 | Stop reason: HOSPADM

## 2025-05-25 RX ORDER — FLUCONAZOLE 100 MG/1
200 TABLET ORAL ONCE
Status: DISCONTINUED | OUTPATIENT
Start: 2025-05-25 | End: 2025-05-25

## 2025-05-25 RX ADMIN — NYSTATIN 500000 UNITS: 100000 SUSPENSION ORAL at 15:45

## 2025-05-25 RX ADMIN — LATANOPROST 1 DROP: 50 SOLUTION OPHTHALMIC at 20:56

## 2025-05-25 RX ADMIN — ACETAMINOPHEN 1000 MG: 500 TABLET ORAL at 11:29

## 2025-05-25 RX ADMIN — NYSTATIN 500000 UNITS: 100000 SUSPENSION ORAL at 20:55

## 2025-05-25 RX ADMIN — ACETAMINOPHEN 1000 MG: 500 TABLET ORAL at 17:27

## 2025-05-25 RX ADMIN — RANOLAZINE 500 MG: 500 TABLET, EXTENDED RELEASE ORAL at 15:44

## 2025-05-25 RX ADMIN — GABAPENTIN 100 MG: 100 CAPSULE ORAL at 08:03

## 2025-05-25 RX ADMIN — APIXABAN 5 MG: 5 TABLET, FILM COATED ORAL at 08:03

## 2025-05-25 RX ADMIN — ISOSORBIDE MONONITRATE 30 MG: 30 TABLET, EXTENDED RELEASE ORAL at 08:02

## 2025-05-25 RX ADMIN — GUAIFENESIN 600 MG: 600 TABLET, EXTENDED RELEASE ORAL at 08:03

## 2025-05-25 RX ADMIN — DICYCLOMINE HYDROCHLORIDE 20 MG: 20 TABLET ORAL at 20:03

## 2025-05-25 RX ADMIN — IPRATROPIUM BROMIDE AND ALBUTEROL SULFATE 1 DOSE: .5; 2.5 SOLUTION RESPIRATORY (INHALATION) at 19:43

## 2025-05-25 RX ADMIN — SODIUM CHLORIDE, PRESERVATIVE FREE 10 ML: 5 INJECTION INTRAVENOUS at 08:04

## 2025-05-25 RX ADMIN — ACETAMINOPHEN 1000 MG: 500 TABLET ORAL at 23:16

## 2025-05-25 RX ADMIN — IPRATROPIUM BROMIDE AND ALBUTEROL SULFATE 1 DOSE: .5; 2.5 SOLUTION RESPIRATORY (INHALATION) at 07:08

## 2025-05-25 RX ADMIN — SODIUM CHLORIDE, PRESERVATIVE FREE 10 ML: 5 INJECTION INTRAVENOUS at 20:56

## 2025-05-25 RX ADMIN — CLOPIDOGREL BISULFATE 75 MG: 75 TABLET, FILM COATED ORAL at 08:03

## 2025-05-25 RX ADMIN — BUMETANIDE 1 MG: 1 TABLET ORAL at 08:03

## 2025-05-25 RX ADMIN — ATORVASTATIN CALCIUM 40 MG: 40 TABLET, FILM COATED ORAL at 20:55

## 2025-05-25 RX ADMIN — POTASSIUM CHLORIDE 20 MEQ: 1500 TABLET, EXTENDED RELEASE ORAL at 08:02

## 2025-05-25 RX ADMIN — MIDODRINE HYDROCHLORIDE 2.5 MG: 2.5 TABLET ORAL at 16:52

## 2025-05-25 RX ADMIN — DILTIAZEM HYDROCHLORIDE 120 MG: 120 CAPSULE, COATED, EXTENDED RELEASE ORAL at 08:03

## 2025-05-25 RX ADMIN — RANOLAZINE 500 MG: 500 TABLET, EXTENDED RELEASE ORAL at 08:02

## 2025-05-25 RX ADMIN — RANOLAZINE 500 MG: 500 TABLET, EXTENDED RELEASE ORAL at 20:55

## 2025-05-25 RX ADMIN — METOPROLOL SUCCINATE 50 MG: 50 TABLET, EXTENDED RELEASE ORAL at 08:03

## 2025-05-25 RX ADMIN — DICYCLOMINE HYDROCHLORIDE 20 MG: 20 TABLET ORAL at 15:44

## 2025-05-25 RX ADMIN — DOCUSATE SODIUM 100 MG: 100 CAPSULE, LIQUID FILLED ORAL at 08:02

## 2025-05-25 RX ADMIN — GABAPENTIN 100 MG: 100 CAPSULE ORAL at 20:55

## 2025-05-25 RX ADMIN — PANTOPRAZOLE SODIUM 40 MG: 40 TABLET, DELAYED RELEASE ORAL at 05:17

## 2025-05-25 RX ADMIN — ACETAMINOPHEN 650 MG: 325 TABLET ORAL at 05:17

## 2025-05-25 RX ADMIN — APIXABAN 5 MG: 5 TABLET, FILM COATED ORAL at 20:55

## 2025-05-25 RX ADMIN — GUAIFENESIN 600 MG: 600 TABLET, EXTENDED RELEASE ORAL at 20:55

## 2025-05-25 RX ADMIN — DICYCLOMINE HYDROCHLORIDE 20 MG: 20 TABLET ORAL at 05:17

## 2025-05-25 RX ADMIN — DICYCLOMINE HYDROCHLORIDE 20 MG: 20 TABLET ORAL at 10:04

## 2025-05-25 RX ADMIN — BARIUM SULFATE 450 ML: 20 SUSPENSION ORAL at 11:16

## 2025-05-25 RX ADMIN — FINASTERIDE 5 MG: 5 TABLET, FILM COATED ORAL at 08:03

## 2025-05-25 RX ADMIN — POLYETHYLENE GLYCOL 3350 17 G: 17 POWDER, FOR SOLUTION ORAL at 08:02

## 2025-05-25 ASSESSMENT — PAIN - FUNCTIONAL ASSESSMENT
PAIN_FUNCTIONAL_ASSESSMENT: PREVENTS OR INTERFERES SOME ACTIVE ACTIVITIES AND ADLS

## 2025-05-25 ASSESSMENT — PAIN SCALES - GENERAL
PAINLEVEL_OUTOF10: 7
PAINLEVEL_OUTOF10: 6
PAINLEVEL_OUTOF10: 7
PAINLEVEL_OUTOF10: 8
PAINLEVEL_OUTOF10: 7
PAINLEVEL_OUTOF10: 4

## 2025-05-25 ASSESSMENT — PAIN DESCRIPTION - LOCATION
LOCATION: ABDOMEN
LOCATION: ABDOMEN;BACK;COCCYX
LOCATION: ABDOMEN

## 2025-05-25 ASSESSMENT — PAIN DESCRIPTION - ONSET: ONSET: ON-GOING

## 2025-05-25 ASSESSMENT — PAIN SCALES - WONG BAKER
WONGBAKER_NUMERICALRESPONSE: HURTS A LITTLE BIT
WONGBAKER_NUMERICALRESPONSE: HURTS A LITTLE BIT

## 2025-05-25 ASSESSMENT — PAIN DESCRIPTION - DESCRIPTORS
DESCRIPTORS: ACHING
DESCRIPTORS: ACHING;SHARP
DESCRIPTORS: ACHING
DESCRIPTORS: ACHING
DESCRIPTORS: SHARP
DESCRIPTORS: ACHING
DESCRIPTORS: ACHING
DESCRIPTORS: SHARP
DESCRIPTORS: ACHING;TENDER

## 2025-05-25 ASSESSMENT — PAIN DESCRIPTION - ORIENTATION
ORIENTATION: RIGHT;LOWER
ORIENTATION: RIGHT;MID
ORIENTATION: RIGHT
ORIENTATION: RIGHT;MID;LOWER
ORIENTATION: RIGHT

## 2025-05-25 ASSESSMENT — PAIN DESCRIPTION - FREQUENCY: FREQUENCY: CONTINUOUS

## 2025-05-25 NOTE — PROGRESS NOTES
RN updated patients yovani Griffin via telephone about new order for CT of abdomen due to abdominal pain and we are still planning for discharge as long as the results are negative.

## 2025-05-25 NOTE — PLAN OF CARE
Patient reporting continued right lower quadrant pain.  Preliminary reading of CT abdomen suggestive of partial versus incomplete SBO.  General surgery consulted per GI recommendation for further evaluation recommend. Patient updated with nursing and case management at bedside.  Per nursing, son updated.  Holding discharge until further workup is completed.    Annetta Martin MD  PGY I Transitional Year Resident   05/25/25 3:50 PM

## 2025-05-25 NOTE — PLAN OF CARE
Problem: Safety - Adult  Goal: Free from fall injury  5/25/2025 1453 by Eugenia Mantilla RN  Outcome: Progressing     Problem: Discharge Planning  Goal: Discharge to home or other facility with appropriate resources  5/25/2025 1453 by Eugenia Mantilla RN  Outcome: Progressing     Problem: Pain  Goal: Verbalizes/displays adequate comfort level or baseline comfort level  5/25/2025 1453 by Eugenia Mantilla, RN  Outcome: Progressing     Problem: Respiratory - Adult  Goal: Achieves optimal ventilation and oxygenation  5/25/2025 1453 by Eugenia Mantilla, RN  Outcome: Progressing

## 2025-05-25 NOTE — CARE COORDINATION
ONGOING DISCHARGE PLAN:    Patient is alert and oriented x4.    Spoke with patient regarding discharge plan and patient confirms that plan is still home w/ [facility].    GI consult  5/22 EGD-dilatation, ablation of AVM w/ APC    Constipated  Miralax    100% on room air  +human metapneumovirus    PT/OT/SLP    CT ABD-concern for partial SBO    Surgery consult    Will continue to follow for additional discharge needs.    If patient is discharged prior to next notation, then this note serves as note for discharge by case management.    Electronically signed by Yudelka Damian RN on 5/25/2025 at 3:54 PM

## 2025-05-25 NOTE — CARE COORDINATION
[facility] notified of discharge being held.     Electronically signed by Yudelka Damian RN on 5/25/2025 at 3:54 PM

## 2025-05-25 NOTE — PROGRESS NOTES
RN called to room by patient. Patient complaining of throat pain and feels his throat is swollen and would like to speak to the doctor. Residents notified.

## 2025-05-25 NOTE — PROGRESS NOTES
RN notified Patients yovani Montano on CT results and General surgery is being consulted to come evaluate patient for a suggesting SBO and patient is not longer discharging today. Son very appreciative of update.

## 2025-05-25 NOTE — PROGRESS NOTES
[facility]  IN-PATIENT SERVICE  [facility]    PROGRESS NOTE             5/25/2025    9:01 AM    Name:   Hemanth Barrera  MRN:     [id number]     Acct:      [id number]   Room:   2103/2103-01   Day:  7  Admit Date:  5/18/2025  8:54 AM    PCP:  Neftaly Contreras MD  Code Status:  Full Code    Subjective:     C/C:   Chief Complaint   Patient presents with    Shortness of Breath     Interval History Status: improved.    Patient seen and examined at bedside. Eating breakfast without difficulty/throat pain.  Continues to experience discomfort of the right lower quadrant and left ribs.    Patient had bowel movement yesterday after enema.  Reports slight improvement of right lower quadrant pain, however continued this morning.  On Bentyl for GI recommendation.  Lidocaine patch placed at left ribs.  Will continue to monitor symptoms.    Reporting improvement of shortness of breath.  Adequate oxygen saturation on room air.  Completed course of p.o. prednisone.    Magnesium 2.1, potassium 4.4 this morning.  Will continue to monitor.    Plan for discharge today if stable.    Brief History:     The patient is a 89 y.o. male with a past medical history of CAD status post stent placement, DVT/PE on Eliquis (most recent 3/2025), atrial fibrillation, hypertension, hyperlipidemia, CKD 3B, recent hospital admission for fall resulting in multiple left posterior rib fractures who presented to the ED with a chief complaint of shortness of breath and left-sided rib pain.     Patient states that over the last few weeks he has been experiencing left-sided rib pain and shortness of breath that has gotten progressively worse.  Was previously admitted 2/2025 due to multiple left-sided rib fracture secondary to fall, followed by orthopedic surgery during admission, deep breathing and coughing with splint encouraged.  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 Permanent cardiac pacemaker is in place.  Fixation hardware is in place at the L2-L3 level, without evidence of hardware complication.  Degenerative changes again noted throughout the spine.  Patient is status post prior arthrodesis of the right SI joint.  Patient is status post right hip arthroplasty, without evidence of hardware complication.     Large amount of well-formed stool throughout the colon, consistent with constipation. No evidence of bowel obstruction.     XR CHEST PORTABLE  Result Date: 5/23/2025  EXAMINATION: ONE XRAY VIEW OF THE CHEST 5/23/2025 6:03 pm COMPARISON: Chest radiographs 04/19/2025. CT PE dated 04/18/2025. HISTORY: ORDERING SYSTEM PROVIDED HISTORY: chest pain, recent esophageal dilation TECHNOLOGIST PROVIDED HISTORY: chest pain, recent esophageal dilation Reason for Exam: pain FINDINGS: Lungs: Clear. Pleura: No effusion or pneumothorax.  Gas is again noted under an elevated right hemidiaphragm, correlates with bowel loops on comparison cross-sectional imaging. Cardiomediastinal Silhouette: Unchanged. Bones: No acute findings. Soft Tissues: Implanted cardiac device overlies the left upper chest, with 2 intact leads.     No acute pulmonary findings.  Stable exam from 04/19/2025.     FL MODIFIED BARIUM SWALLOW W VIDEO  Result Date: 5/19/2025  EXAMINATION: MODIFIED BARIUM SWALLOW WAS PERFORMED IN CONJUNCTION WITH SPEECH PATHOLOGY SERVICES TECHNIQUE: Under fluoroscopic evaluation cineradiography/videoradiography recordings were performed in conjunction with the speech-language pathologist (SLP). Various liquid, solid and/or semi-solid barium preparations were used to assess swallowing function. FLUOROSCOPY DOSE AND TYPE: Fluoroscopy time-2:24 Radiation Exposure Index: Kerma mGy, 11.6 COMPARISON: None HISTORY: ORDERING SYSTEM PROVIDED HISTORY: dysphagia TECHNOLOGIST PROVIDED HISTORY: dysphagia Reason for Exam: dysphagia

## 2025-05-26 VITALS
RESPIRATION RATE: 20 BRPM | DIASTOLIC BLOOD PRESSURE: 74 MMHG | HEIGHT: 73 IN | BODY MASS INDEX: 20.01 KG/M2 | SYSTOLIC BLOOD PRESSURE: 107 MMHG | HEART RATE: 70 BPM | WEIGHT: 151.01 LBS | TEMPERATURE: 98.4 F | OXYGEN SATURATION: 97 %

## 2025-05-26 PROBLEM — K52.9 ENTERITIS: Status: ACTIVE | Noted: 2025-05-26

## 2025-05-26 LAB
ANION GAP SERPL CALCULATED.3IONS-SCNC: 9 MMOL/L (ref 9–16)
BASOPHILS # BLD: <0.03 K/UL (ref 0–0.2)
BASOPHILS NFR BLD: 0 % (ref 0–2)
BUN SERPL-MCNC: 25 MG/DL (ref 8–23)
CALCIUM SERPL-MCNC: 8.7 MG/DL (ref 8.6–10.4)
CHLORIDE SERPL-SCNC: 104 MMOL/L (ref 98–107)
CO2 SERPL-SCNC: 25 MMOL/L (ref 20–31)
CREAT SERPL-MCNC: 1.1 MG/DL (ref 0.7–1.2)
EOSINOPHIL # BLD: <0.03 K/UL (ref 0–0.44)
EOSINOPHILS RELATIVE PERCENT: 1 % (ref 0–4)
ERYTHROCYTE [DISTWIDTH] IN BLOOD BY AUTOMATED COUNT: 17.8 % (ref 11.5–14.9)
GFR, ESTIMATED: 64 ML/MIN/1.73M2
GLUCOSE SERPL-MCNC: 101 MG/DL (ref 74–99)
HCT VFR BLD AUTO: 31.5 % (ref 41–53)
HGB BLD-MCNC: 10.2 G/DL (ref 13.5–17.5)
IMM GRANULOCYTES # BLD AUTO: <0.03 K/UL (ref 0–0.3)
IMM GRANULOCYTES NFR BLD: 0 %
LYMPHOCYTES NFR BLD: 0.65 K/UL (ref 1.1–3.7)
LYMPHOCYTES RELATIVE PERCENT: 17 % (ref 24–44)
MAGNESIUM SERPL-MCNC: 2 MG/DL (ref 1.7–2.3)
MCH RBC QN AUTO: 30 PG (ref 26–34)
MCHC RBC AUTO-ENTMCNC: 32.4 G/DL (ref 31–37)
MCV RBC AUTO: 92.6 FL (ref 80–100)
MONOCYTES NFR BLD: 0.27 K/UL (ref 0.1–1.2)
MONOCYTES NFR BLD: 7 % (ref 3–12)
MORPHOLOGY: ABNORMAL
MORPHOLOGY: ABNORMAL
NEUTROPHILS NFR BLD: 75 % (ref 36–66)
NEUTS SEG NFR BLD: 2.85 K/UL (ref 1.5–8.1)
NRBC BLD-RTO: 0 PER 100 WBC
PLATELET # BLD AUTO: 234 K/UL (ref 150–450)
PMV BLD AUTO: 8.9 FL (ref 8–13.5)
POTASSIUM SERPL-SCNC: 4.6 MMOL/L (ref 3.7–5.3)
RBC # BLD AUTO: 3.4 M/UL (ref 4.21–5.77)
SODIUM SERPL-SCNC: 138 MMOL/L (ref 136–145)
WBC OTHER # BLD: 3.8 K/UL (ref 3.5–11)

## 2025-05-26 PROCEDURE — 36415 COLL VENOUS BLD VENIPUNCTURE: CPT

## 2025-05-26 PROCEDURE — 2500000003 HC RX 250 WO HCPCS: Performed by: INTERNAL MEDICINE

## 2025-05-26 PROCEDURE — 6370000000 HC RX 637 (ALT 250 FOR IP): Performed by: INTERNAL MEDICINE

## 2025-05-26 PROCEDURE — 80048 BASIC METABOLIC PNL TOTAL CA: CPT

## 2025-05-26 PROCEDURE — 99223 1ST HOSP IP/OBS HIGH 75: CPT | Performed by: SURGERY

## 2025-05-26 PROCEDURE — 94640 AIRWAY INHALATION TREATMENT: CPT

## 2025-05-26 PROCEDURE — 6370000000 HC RX 637 (ALT 250 FOR IP)

## 2025-05-26 PROCEDURE — 94761 N-INVAS EAR/PLS OXIMETRY MLT: CPT

## 2025-05-26 PROCEDURE — 99239 HOSP IP/OBS DSCHRG MGMT >30: CPT | Performed by: INTERNAL MEDICINE

## 2025-05-26 PROCEDURE — 6370000000 HC RX 637 (ALT 250 FOR IP): Performed by: NURSE PRACTITIONER

## 2025-05-26 PROCEDURE — 83735 ASSAY OF MAGNESIUM: CPT

## 2025-05-26 PROCEDURE — 85025 COMPLETE CBC W/AUTO DIFF WBC: CPT

## 2025-05-26 RX ORDER — NYSTATIN 100000 [USP'U]/ML
5 SUSPENSION ORAL 4 TIMES DAILY
Qty: 1 EACH | Refills: 0 | Status: SHIPPED | OUTPATIENT
Start: 2025-05-26 | End: 2025-06-05

## 2025-05-26 RX ORDER — SODIUM PHOSPHATE, DIBASIC AND SODIUM PHOSPHATE, MONOBASIC 7; 19 G/230ML; G/230ML
118 ENEMA RECTAL ONCE
Status: DISCONTINUED | OUTPATIENT
Start: 2025-05-26 | End: 2025-05-26 | Stop reason: CLARIF

## 2025-05-26 RX ORDER — ACETAMINOPHEN 325 MG/1
650 TABLET ORAL EVERY 4 HOURS PRN
Status: DISCONTINUED | OUTPATIENT
Start: 2025-05-26 | End: 2025-05-26 | Stop reason: HOSPADM

## 2025-05-26 RX ORDER — DOCUSATE SODIUM 100 MG/1
100 CAPSULE, LIQUID FILLED ORAL 2 TIMES DAILY
Qty: 60 CAPSULE | Refills: 0 | Status: SHIPPED | OUTPATIENT
Start: 2025-05-26 | End: 2025-06-25

## 2025-05-26 RX ORDER — SODIUM PHOSPHATE, DIBASIC AND SODIUM PHOSPHATE, MONOBASIC 7; 19 G/230ML; G/230ML
1 ENEMA RECTAL ONCE
Status: COMPLETED | OUTPATIENT
Start: 2025-05-26 | End: 2025-05-26

## 2025-05-26 RX ORDER — POLYETHYLENE GLYCOL 3350 17 G/17G
POWDER, FOR SOLUTION ORAL
Qty: 510 G | Refills: 0 | Status: ON HOLD | OUTPATIENT
Start: 2025-05-26 | End: 2025-06-12

## 2025-05-26 RX ADMIN — PANTOPRAZOLE SODIUM 40 MG: 40 TABLET, DELAYED RELEASE ORAL at 06:05

## 2025-05-26 RX ADMIN — GABAPENTIN 100 MG: 100 CAPSULE ORAL at 09:56

## 2025-05-26 RX ADMIN — APIXABAN 5 MG: 5 TABLET, FILM COATED ORAL at 09:56

## 2025-05-26 RX ADMIN — NYSTATIN 500000 UNITS: 100000 SUSPENSION ORAL at 09:56

## 2025-05-26 RX ADMIN — DICYCLOMINE HYDROCHLORIDE 20 MG: 20 TABLET ORAL at 06:06

## 2025-05-26 RX ADMIN — CLOPIDOGREL BISULFATE 75 MG: 75 TABLET, FILM COATED ORAL at 09:56

## 2025-05-26 RX ADMIN — ISOSORBIDE MONONITRATE 30 MG: 30 TABLET, EXTENDED RELEASE ORAL at 09:57

## 2025-05-26 RX ADMIN — SODIUM CHLORIDE, PRESERVATIVE FREE 10 ML: 5 INJECTION INTRAVENOUS at 09:59

## 2025-05-26 RX ADMIN — RANOLAZINE 500 MG: 500 TABLET, EXTENDED RELEASE ORAL at 13:25

## 2025-05-26 RX ADMIN — POLYETHYLENE GLYCOL 3350 17 G: 17 POWDER, FOR SOLUTION ORAL at 09:56

## 2025-05-26 RX ADMIN — POTASSIUM CHLORIDE 20 MEQ: 1500 TABLET, EXTENDED RELEASE ORAL at 09:56

## 2025-05-26 RX ADMIN — SODIUM PHOSPHATE, DIBASIC AND SODIUM PHOSPHATE, MONOBASIC 1 ENEMA: 7; 19 ENEMA RECTAL at 14:43

## 2025-05-26 RX ADMIN — FINASTERIDE 5 MG: 5 TABLET, FILM COATED ORAL at 09:57

## 2025-05-26 RX ADMIN — ACETAMINOPHEN 1000 MG: 500 TABLET ORAL at 06:11

## 2025-05-26 RX ADMIN — IPRATROPIUM BROMIDE AND ALBUTEROL SULFATE 1 DOSE: .5; 2.5 SOLUTION RESPIRATORY (INHALATION) at 07:21

## 2025-05-26 RX ADMIN — RANOLAZINE 500 MG: 500 TABLET, EXTENDED RELEASE ORAL at 09:56

## 2025-05-26 RX ADMIN — GUAIFENESIN 600 MG: 600 TABLET, EXTENDED RELEASE ORAL at 09:56

## 2025-05-26 RX ADMIN — DOCUSATE SODIUM 100 MG: 100 CAPSULE, LIQUID FILLED ORAL at 09:56

## 2025-05-26 RX ADMIN — DICYCLOMINE HYDROCHLORIDE 20 MG: 20 TABLET ORAL at 09:57

## 2025-05-26 RX ADMIN — DILTIAZEM HYDROCHLORIDE 120 MG: 120 CAPSULE, COATED, EXTENDED RELEASE ORAL at 09:59

## 2025-05-26 RX ADMIN — NYSTATIN 500000 UNITS: 100000 SUSPENSION ORAL at 13:25

## 2025-05-26 RX ADMIN — METOPROLOL SUCCINATE 50 MG: 50 TABLET, EXTENDED RELEASE ORAL at 09:56

## 2025-05-26 RX ADMIN — ACETAMINOPHEN 650 MG: 325 TABLET ORAL at 11:03

## 2025-05-26 RX ADMIN — BUMETANIDE 1 MG: 1 TABLET ORAL at 09:56

## 2025-05-26 ASSESSMENT — ENCOUNTER SYMPTOMS
SORE THROAT: 1
ABDOMINAL PAIN: 1
RESPIRATORY NEGATIVE: 1

## 2025-05-26 ASSESSMENT — PAIN DESCRIPTION - LOCATION
LOCATION: FLANK
LOCATION: ABDOMEN
LOCATION: ABDOMEN

## 2025-05-26 ASSESSMENT — PAIN DESCRIPTION - DESCRIPTORS: DESCRIPTORS: ACHING;SHOOTING

## 2025-05-26 ASSESSMENT — PAIN SCALES - GENERAL
PAINLEVEL_OUTOF10: 7
PAINLEVEL_OUTOF10: 7
PAINLEVEL_OUTOF10: 8

## 2025-05-26 ASSESSMENT — PAIN DESCRIPTION - ORIENTATION: ORIENTATION: RIGHT

## 2025-05-26 NOTE — CARE COORDINATION
performed by Isauro Razo MD at [facility] ENDO    UPPER GASTROINTESTINAL ENDOSCOPY N/A 3/6/2025    ESOPHAGOGASTRODUODENOSCOPY performed by Alejandra Kraus MD at [facility] OR       Immunization History:   Immunization History   Administered Date(s) Administered    COVID-19, PFIZER PURPLE top, DILUTE for use, (age 12 y+), 30mcg/0.3mL 03/26/2021, 05/07/2021, 11/11/2021    Influenza Virus Vaccine 10/30/2011, 10/09/2015, 11/22/2016    Influenza, FLUZONE High Dose (age 65 y+), IM, Quadv, 0.7mL 11/03/2020, 10/13/2021, 09/21/2022    Influenza, FLUZONE High Dose, (age 65 y+), IM, Trivalent PF, 0.5mL 10/20/2018, 10/01/2019    Pneumococcal, PCV-13, PREVNAR 13, (age 6w+), IM, 0.5mL 10/30/2019    Pneumococcal, PCV20, PREVNAR 20, (age 6w+), IM, 0.5mL 11/09/2022    Pneumococcal, PPSV23, PNEUMOVAX 23, (age 2y+), SC/IM, 0.5mL 10/30/2011, 11/25/2020       Active Problems:  Patient Active Problem List   Diagnosis Code    On continuous oral anticoagulation Z79.01    CHF, acute on chronic ([facility]) I50.9    Hyperlipidemia E78.5    Former smoker Z87.891    Pacemaker Z95.0    History of DVT of lower extremity Z86.718    Enlarged prostate N40.0    Cataract of both eyes H26.9    GERD (gastroesophageal reflux disease) K21.9    History of inguinal hernia repair Z98.890, Z87.19    Hypertension I10    Pneumonia J18.9    History of right hip replacement Z96.641    History of gastric surgery Z98.890    Low back pain M54.50    Chest pain R07.9    Fluid overload E87.70    VARSHA (acute kidney injury) N17.9    History of acute myocardial infarction I25.2    Chronic CHF (congestive heart failure) ([facility]) I50.9    Unstable angina ([facility]) I20.0    Acute on chronic diastolic congestive heart failure ([facility]) I50.33    SBO (small bowel obstruction) (HCC) K56.609    Stage 3b chronic kidney disease (HCC) N18.32    Iron deficiency E61.1    Chronic systolic (congestive) heart failure I50.22    Chronic anemia D64.9    Bradycardia R00.1    Class 1 obesity E66.811     1950 ml   Net -1950 ml     I/O last 3 completed shifts:  In: -   Out: 1950 [Urine:1950]    Safety Concerns:     At Risk for Falls    Impairments/Disabilities:      None    Nutrition Therapy:  Current Nutrition Therapy:   - Oral Diet:  Dysphagia - Soft and Bite Sized and [place]    Routes of Feeding: Oral  Liquids: No Restrictions  Daily Fluid Restriction: no  Last Modified Barium Swallow with Video (Video Swallowing Test): done on 5/19/2025    Treatments at the Time of Hospital Discharge:   Respiratory Treatments: Duoneb BID  Oxygen Therapy:  is not on home oxygen therapy.  Ventilator:    - No ventilator support    Rehab Therapies: Physical Therapy and Occupational Therapy  Weight Bearing Status/Restrictions: No weight bearing restrictions  Other Medical Equipment (for information only, NOT a DME order):  cane and walker  Other Treatments: Skilled Nursing assessment and monitoring. Medication education and monitoring per protocol.      Patient's personal belongings (please select all that are sent with patient):  Dentures upper and lower    RN SIGNATURE:  Electronically signed by Eugenia Mantilla RN on 5/24/25 at 10:26 AM EDT    CASE MANAGEMENT/SOCIAL WORK SECTION    Inpatient Status Date: 5/18/2025    Readmission Risk Assessment Score:  [facility] RISK OF UNPLANNED READMISSION 2.0             31.1 Total Score        Discharging to Facility/ Agency   [facility]  [address] [place] [address]  P: [phone]   F: [phone]     Dialysis Facility (if applicable)   Name:  Address:  Dialysis Schedule:  Phone:  Fax:    / signature: Electronically signed by Jill Glover RN on 5/19/25 at 11:31 AM EDT    PHYSICIAN SECTION    Prognosis: Fair    Condition at Discharge: Stable    Rehab Potential (if transferring to Rehab): Fair    Recommended Labs or Other Treatments After Discharge:     Physician Certification: I certify the above information and transfer of Hemanth Barrera  is necessary for  ophthalmic solution  Commonly known as: XALATAN  Place 1 drop into both eyes nightly   metoprolol succinate 50 MG extended release tablet  Commonly known as: TOPROL XL  Take 1 tablet by mouth daily   midodrine 2.5 MG tablet  Commonly known as: PROAMATINE  Take 1 tablet by mouth 3 times daily (with meals) Hold if SBP more than 100   nitroGLYCERIN 0.4 MG SL tablet  Commonly known as: NITROSTAT  up to max of 3 total doses. If no relief after 1 dose, call 911.   oxyCODONE-acetaminophen 5-325 MG per tablet  Commonly known as: PERCOCET  Take 1 tablet by mouth every 8 hours as needed for Pain. Max Daily Amount: 3 tablets   pantoprazole 40 MG tablet  Commonly known as: PROTONIX  Take 1 tablet by mouth every morning (before breakfast)   ranolazine 500 MG extended release tablet  Commonly known as: RANEXA  Take 1 tablet by mouth 2 times daily   vitamin D 1.25 MG (07072 UT) Caps capsule  Commonly known as: ERGOCALCIFEROL  Take 1 capsule by mouth Once a week at 5 PM   Where to Get Your Medications      These medications were sent to [id number] [place], [place] - [address] [facility] [phone] - F [phone]  [address] [place] [address] [place] [address]  Phone: [phone]       bumetanide 1 MG tablet        clopidogrel 75 MG tablet      These medications were sent to [facility] [address] [place] [address] - [address] [facility] -  [phone] - F [phone]  [address] [place] [address] [place] [address]  Phone: [phone]       docusate sodium 100 MG capsule        nystatin 180652 UNIT/ML suspension        polyethylene glycol 17 GM/SCOOP powder             Printing Report

## 2025-05-26 NOTE — DISCHARGE SUMMARY
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 male past medical history of CAD status post stent placement, DVT/PE on Eliquis (most recent 3/2025), atrial fibrillation, hypertension, hyperlipidemia, CKD 3B, recent hospital admission for fall resulting in multiple left posterior rib fractures who presented to the ED with a chief complaint of shortness of breath and left-sided rib pain.  Admitted for management of VARSHA on CKD and dyspnea.    RPP positive for metapneumovirus.  Managed with p.o. steroids.  Dyspnea resolved at time of discharge.  Antibiotics and saturation on room air throughout hospital stay.    VARSHA on admission managed with IV fluids.  Creatinine at baseline on discharge.    Patient reporting dysphagia and difficulty with PO intake due to discomfort of throat.  EGD completed per GI.  Esophageal dilation completed, white nummular lesions in the proximal esophagus likely secondary to Candida, pathology is pending at time of discharge.  Dysphagia  04/30/2024 05:04 AM    GFRAA 59 08/22/2022 10:02 AM    GFR      08/22/2022 10:02 AM     U/A:    Lab Results   Component Value Date/Time    COLORU Yellow 05/25/2025 11:29 AM    TURBIDITY Clear 05/25/2025 11:29 AM    HGBUR NEGATIVE 05/25/2025 11:29 AM    PHUR 5.5 05/25/2025 11:29 AM    PHUR 6.5 04/18/2024 10:59 PM    PROTEINU NEGATIVE 05/25/2025 11:29 AM    GLUCOSEU NEGATIVE 05/25/2025 11:29 AM    KETUA NEGATIVE 05/25/2025 11:29 AM    BILIRUBINUR NEGATIVE 05/25/2025 11:29 AM    UROBILINOGEN Normal 05/25/2025 11:29 AM    NITRU NEGATIVE 05/25/2025 11:29 AM    LEUKOCYTESUR TRACE 05/25/2025 11:29 AM         Radiology:    CT ABDOMEN PELVIS WO CONTRAST Additional Contrast? None  Result Date: 5/25/2025  EXAMINATION: CT OF THE ABDOMEN AND PELVIS WITHOUT CONTRAST 5/25/2025 8:45 pm TECHNIQUE: CT of the abdomen and pelvis was performed without the administration of intravenous contrast. Multiplanar reformatted images are provided for review. Automated exposure control, iterative reconstruction, and/or weight based adjustment of the mA/kV was utilized to reduce the radiation dose to as low as reasonably achievable. COMPARISON: October 5, 2023, May 25, 2023 at 12:36 p.m.  Abdominal radiograph dated May 23, 2025.  Images from barium swallow dated May 19, 2025 HISTORY: ORDERING SYSTEM PROVIDED HISTORY: Abdominal pain right lower quadrant, questionable SBO on previous CT TECHNOLOGIST PROVIDED HISTORY: Abdominal pain right lower quadrant, questionable SBO on previous CT Reason for Exam: Abdominal pain right lower quadrant, questionable SBO on previous CT Additional signs and symptoms: recomended follow up ct to the one completed earlier this afternoon FINDINGS: Lower Chest: Cardiac leads are partially seen, extending to the RA and RV. Coronary artery atherosclerosis.  Bibasilar dependent atelectasis.  No pleural effusion.  Descending thoracic aorta is tortuous. Organs: Unenhanced liver, spleen, and pancreas are unremarkable.  1.9 cm

## 2025-05-26 NOTE — PLAN OF CARE
Problem: Respiratory - Adult  Goal: Achieves optimal ventilation and oxygenation  5/26/2025 0723 by Ashley Lock RCP  Outcome: Progressing

## 2025-05-26 NOTE — CONSULTS
[facility] General Surgery   Lebron Roman MD, FACS  Molly MFany Ranjan, APRN-CNP  [address], [address]  [place] [address]  P: [phone], F: [phone]    General and Robotic Surgery  Consult Note         PATIENT NAME: Hemanth Barrera   :  1935   MRN: [id number]   PCP:  Neftaly Contreras MD   Referring Physician: Dr. Haskins  Reason for Consult: Abdominal pain    TODAY'S DATE: 2025    HISTORY OF PRESENT ILLNESS: 89 y.o. male presents for evaluation of shortness of breath.  Patient was recently admitted after a fall with rib fractures.  He was here for several days.  I was asked to see the patient based on abnormal CT scan done.  Patient is complaining of some nonspecific right upper quadrant pain.  Tolerating diet.  Some constipation.  He has Mycoplasma pneumoniae according to nursing staff.  CT scan from last night noted.  Blood work reviewed.  Advanced age of 89 years with multiple medical issues.  Somewhat frail.    PAST MEDICAL HISTORY     Past Medical History:   Diagnosis Date    Acute inferolateral myocardial infarction (HCC) 2021    Arthritis     Atrial fibrillation (HCC)     CAD (coronary artery disease)     CHF (congestive heart failure) (HCC)     Glaucoma     Hx of blood clots     with hernia surgery    Hyperlipidemia     Hypertension     MI (myocardial infarction) (HCC)     NSTEMI (non-ST elevated myocardial infarction) (HCC) 2021       PROBLEM LIST     Patient Active Problem List   Diagnosis    On continuous oral anticoagulation    CHF, acute on chronic (HCC)    Hyperlipidemia    Former smoker    Pacemaker    History of DVT of lower extremity    Enlarged prostate    Cataract of both eyes    GERD (gastroesophageal reflux disease)    History of inguinal hernia repair    Hypertension    Pneumonia    History of right hip replacement    History of gastric surgery    Low back pain    Chest pain    Fluid overload    VARSHA (acute kidney injury)    History of acute  at rest    Encounter for palliative care    Goals of care, counseling/discussion    Acute kidney injury superimposed on CKD    Dysphagia    Dyspnea    Candida esophagitis (HCC)       SURGICAL HISTORY       Past Surgical History:   Procedure Laterality Date    ABDOMEN SURGERY      gastric resection    APPENDECTOMY      BONE MARROW BIOPSY      CARDIAC CATHETERIZATION      CARDIAC PROCEDURE N/A 09/17/2024    aimeefrconcepcion / Left heart cath / coronary angiography / rm 512 performed by Cathie Hastings MD at [facility]    CARDIAC PROCEDURE N/A 09/17/2024    Percutaneous coronary intervention performed by Cathie Hastings MD at [facility]    CARDIAC PROCEDURE Right 01/09/2025    Left heart cath / coronary angiography performed by Guy Paz MD at [facility]    COLONOSCOPY      COLONOSCOPY N/A 08/03/2023    COLONOSCOPY WITH BIOPSY performed by Isauro Razo MD at [facility]    COLONOSCOPY N/A 03/10/2025    COLONOSCOPY DIAGNOSTIC performed by Shirin Torre MD at [facility] OR    CT BIOPSY BONE MARROW  05/31/2022    CT BONE MARROW BIOPSY 5/31/2022 [facility] CT SCAN    EYE SURGERY      smiley cataracts    HERNIA REPAIR      inguinal smiley    INVASIVE VASCULAR N/A 01/09/2025    Ultrasound guided vascular access performed by Guy Paz MD at [facility]    JOINT REPLACEMENT      rt hip x 2, lt knee    PACEMAKER PLACEMENT      SIGMOIDOSCOPY N/A 03/07/2025    SIGMOIDOSCOPY DIAGNOSTIC FLEXIBLE performed by Isauro Razo MD at [facility] OR    UPPER GASTROINTESTINAL ENDOSCOPY N/A 08/02/2023    EGD BIOPSY performed by Isauro Razo MD at [facility]    UPPER GASTROINTESTINAL ENDOSCOPY N/A 03/06/2025    ESOPHAGOGASTRODUODENOSCOPY performed by Alejandra Kraus MD at [facility] OR    UPPER GASTROINTESTINAL ENDOSCOPY N/A 5/22/2025    ESOPHAGOGASTRODUODENOSCOPY BIOPSY WITH APC AND BALLOON DILATION performed by Isauro Razo MD at [facility]       ALLERGIES     Allergies   Allergen

## 2025-05-26 NOTE — CARE COORDINATION
ONGOING DISCHARGE PLAN:    Patient is alert and oriented x4.    Spoke with patient regarding discharge plan and patient confirms that plan is still to return to home w/ Son w/ VNS, [facility].     Pt will DC to home today.     Writer faxed Dulce/DC med list to [facility] & Informed of DC & to call for start of care.     Will continue to follow for additional discharge needs.    If patient is discharged prior to next notation, then this note serves as note for discharge by case management.    Electronically signed by Jill Glover RN on 5/26/2025 at 3:31 PM

## 2025-05-26 NOTE — PROGRESS NOTES
[facility]  IN-PATIENT SERVICE  [facility]    PROGRESS NOTE             5/26/2025    9:07 AM    Name:   Hemanth Barrera  MRN:     [id number]     Acct:      [id number]   Room:   2103/2103-01   Day:  8  Admit Date:  5/18/2025  8:54 AM    PCP:  Neftaly Contreras MD  Code Status:  Full Code    Subjective:     C/C:   Chief Complaint   Patient presents with    Shortness of Breath     Interval History Status: improved.    Patient seen and examined at bedside.  No acute events overnight.  Reports continued right lower quadrant pain unchanged from yesterday.  He weeks pending trace leukocyte esterase, patient denies dysuria, urinary frequency/urgency, hematuria.  Additional dose Tylenol, Voltaren gel ordered.  Will continue to monitor symptoms.    Initial CT abdomen pelvis without contrast completed yesterday suggestive of partial versus incomplete SBO.  Repeat imaging suggestive of fluid-filled loops of small bowel without evidence of SBO, large volume of stool in rectum and colon, subcutaneous edema of abdominal pelvic wall, subacute left posterior rib fractures.  Per GI recommendation, general surgery consulted, further recommendations pending.  Patient hemodynamically stable.    Last bowel movement 5/24 after enema.  Patient reports flatus overnight through the morning.    Continues to experience irritation of throat due to suspected esophageal candidiasis.  Per GI recommendation, nystatin 4 times daily ordered.  Pathology pending.    Labs reviewed, electrolytes WNL.  Will continue to monitor.    Brief History:     The patient is a 89 y.o. male with a past medical history of CAD status post stent placement, DVT/PE on Eliquis (most recent 3/2025), atrial fibrillation, hypertension, hyperlipidemia, CKD 3B, recent hospital admission for fall resulting in multiple left posterior rib fractures who presented to the ED with a chief complaint of shortness of breath and left-sided  with EF 56%  Continue Bumex 1 mg daily         Diet: Per SLP  DVT: On Eliquis  GI: Protonix 40 mg daily   PT/OT     Annetta Martin MD  PGY I Transitional Year Resident  5/26/2025 9:07 AM   Attending Physician Statement  I have discussed the care of Hemanth KAYLYNN Barrera and I have examined the patient myself and taken ROS and HPI, including pertinent history and exam findings, with the resident. I have reviewed the key elements of all parts of the encounter with the resident.  I agree with the assessment, plan and orders as documented by the resident.    Patient had CT yesterday, if concern for SBO patient seen by general surgery cleared for discharge  Will give enema before the GI has already signed off  Discharge today     Electronically signed by Sana Haskins MD

## 2025-05-27 ENCOUNTER — CARE COORDINATION (OUTPATIENT)
Dept: CARE COORDINATION | Age: 89
End: 2025-05-27

## 2025-05-27 NOTE — CARE COORDINATION
Care Transitions Note    Initial Call - Call within 2 business days of discharge: Yes  Attempted to reach patient, Dusty (son)  for transitions of care follow up. Unable to reach  son - LVM .  I reached patient, but was unable to hear him clearly due to noise in background and static.  Was able to hear that patient said he is eating and drinking, but otherwise conversation is unclear except he did tell me to call Dusty.  LVM for Dusty and will reattempt next day.    I did reach [facility] to confirm they will resume care.    Outreach Attempts:   HIPAA compliant voicemail left for yovani Montano.     Patient: Hemanth Barrera      Patient : 1935   MRN: [id number]      Reason for Admission: Metapneumovirus, SOB  Discharge Date: 25    RURS: Readmission Risk Score: 29.7    Readmission to [facility] - - Metapneumovirus (steroid treatment), VARSHA, dysphagia, SBO  Discharged home with [facility]    Last Discharge Facility       Date Complaint Diagnosis Description Type Department Provider    25 Shortness of Breath Dyspnea, unspecified type ... ED to Hosp-Admission (Discharged) (ADMITTED) Sana Berumen MD; Maite Lowe...            Was this an external facility discharge? No    Follow Up Appointment:   Patient has hospital follow up appointment scheduled  , Medicare appt  - routed to office to check if tomorrow date can be switched to HFU     Future Appointments         Provider Specialty Dept Phone    2025 11:30 AM SCHEDULE, [facility] [place] [facility] LPN Internal Medicine [phone]    2025 11:30 AM Susana Pearl, APRN - [facility] [phone]    2025 3:15 PM Terrell De Los Santos MD Internal Medicine [phone]            Plan for follow-up on next business day.      Kristin Fermin RN

## 2025-05-28 ENCOUNTER — CARE COORDINATION (OUTPATIENT)
Dept: CARE COORDINATION | Age: 89
End: 2025-05-28

## 2025-05-28 ENCOUNTER — RESULTS FOLLOW-UP (OUTPATIENT)
Dept: GASTROENTEROLOGY | Age: 89
End: 2025-05-28

## 2025-05-28 DIAGNOSIS — B37.81 CANDIDIASIS OF ESOPHAGUS (HCC): Primary | ICD-10-CM

## 2025-05-28 LAB — SURGICAL PATHOLOGY REPORT: NORMAL

## 2025-05-28 RX ORDER — FLUCONAZOLE 200 MG/1
TABLET ORAL
Qty: 16 TABLET | Refills: 0 | Status: SHIPPED | OUTPATIENT
Start: 2025-05-28 | End: 2025-06-12

## 2025-05-28 NOTE — RESULT ENCOUNTER NOTE
Please let patient know that biopsy from the esophagus demonstrated fungal infection from Candida.  Recommend starting fluconazole at 400 mg day 1 and then 200 mg for 14 days.  Please send the prescription.

## 2025-05-28 NOTE — TELEPHONE ENCOUNTER
----- Message from Dr. Isauro Razo MD sent at 5/28/2025  9:36 AM EDT -----  Please let patient know that biopsy from the esophagus demonstrated fungal infection from Candida.  Recommend starting fluconazole at 400 mg day 1 and then 200 mg for 14 days.  Please send the prescription.    Writer notified patient but was unable to leave a VM as VM was full. Writer notified Solo patients son in regards to this and left a VM in that medication will be sent to pharmacy electronically. Please advise.

## 2025-05-28 NOTE — CARE COORDINATION
Care Transitions Note    Initial Call #2 attempt- Call within 2 business days of discharge: Yes  Attempted to reach Dusty leo son  for transitions of care follow up. Unable to reach  son .  Outreach Attempts:   HIPAA compliant voicemail left for son.     Unable to reach son - reached patient who is difficult to understand on his phone, but did understand that he has been vomiting and waiting for son to arrive. Hemanth says he does not need to go to ED.  Will try again tomorrow - may check with [facility] Outreach for possible referral since frequent readmissions.  [facility] is following for home visits.    Received following message back from PCP Clinical staff:  Patient did not show.   Will need to schedule with MD vs LPN.        Appt was scheduled for today and attempt was made to switch from medicare well visit to HFU, but noted patient did not show up for appt.    Patient: Hemanth Barrera                                      Patient : 1935   MRN: [id number]                               Reason for Admission: Metapneumovirus, SOB  Discharge Date: 25         RURS: Readmission Risk Score: 29.7     Readmission to [facility] - - Metapneumovirus (steroid treatment), VARSHA, dysphagia, SBO  Discharged home with [facility]    Last Discharge Facility       Date Complaint Diagnosis Description Type Department Provider    25 Shortness of Breath Dyspnea, unspecified type ... ED to Hosp-Admission (Discharged) (ADMITTED) Sana Berumen MD; Maite Lowe...            Was this an external facility discharge? No    Follow Up Appointment:   Patient has hospital follow up appointment scheduled within 14 days of discharge.  Cardio appt  + PCP   Future Appointments         Provider Specialty Dept Phone    2025 11:30 AM Susana Pearl, APRN - [facility] [phone]    2025 3:15 PM Terrell De Los Santos MD Internal Medicine [phone]            Plan for follow-up on next

## 2025-05-29 ENCOUNTER — CARE COORDINATION (OUTPATIENT)
Dept: CARE COORDINATION | Age: 89
End: 2025-05-29

## 2025-05-29 ENCOUNTER — CARE COORDINATION (OUTPATIENT)
Dept: INTERNAL MEDICINE CLINIC | Age: 89
End: 2025-05-29

## 2025-05-29 DIAGNOSIS — K52.9 ENTERITIS: Primary | ICD-10-CM

## 2025-05-29 PROCEDURE — 1111F DSCHRG MED/CURRENT MED MERGE: CPT | Performed by: INTERNAL MEDICINE

## 2025-05-29 NOTE — CARE COORDINATION
Care Transitions Note    Initial Call - Call within 2 business days of discharge: Yes - I reached patient on prior 2 initial call attempts, but was able to complete initial call today when yovani Montano was reached and we reviewed all.      Patient Current Location:  Home: [address] [place], [place] [id number]    Care Transition Nurse contacted the  yovani Montano  by telephone to perform post hospital discharge assessment, verified name and  as identifiers.  Provided introduction to self, and explanation of the Care Transition Nurse role.    Patient: Hemanth Barrera                                      Patient : 1935   MRN: [id number]                               Reason for Admission: Metapneumovirus, SOB  Discharge Date: 25         RURS: Readmission Risk Score: 29.7     Readmission to [facility] - - Metapneumovirus (steroid treatment), VARSHA, dysphagia, SBO  Discharged home with [facility]      Last Discharge Facility       Date Complaint Diagnosis Description Type Department Provider    25 Shortness of Breath Dyspnea, unspecified type ... ED to Hosp-Admission (Discharged) (ADMITTED) Sana Berumen MD; Maite Lowe...            Was this an external facility discharge? No    Additional needs identified to be addressed with provider                 Method of communication with provider: none.    Patients top risk factors for readmission: functional physical ability and medical condition-Metapneumovirus (steroid treatment), VARSHA, dysphagia, SBO    Interventions to address risk factors:   Review of patient management of conditions/medications: reviewed  Referrals: [facility]   Appt + home care [facility]    Care Summary Note:   Readmission to [facility] - - Metapneumovirus (steroid treatment), VARSHA, dysphagia, SBO  Discharged home with [facility]    Several phone calls made this AM:  -checked with Soniya Miller RN re: eligibility for [facility] program - she agrees to follow patient

## 2025-05-30 ENCOUNTER — CARE COORDINATION (OUTPATIENT)
Dept: CARE COORDINATION | Age: 89
End: 2025-05-30

## 2025-05-30 ENCOUNTER — TELEPHONE (OUTPATIENT)
Dept: INTERNAL MEDICINE CLINIC | Age: 89
End: 2025-05-30

## 2025-05-30 ENCOUNTER — CARE COORDINATION (OUTPATIENT)
Dept: INTERNAL MEDICINE CLINIC | Age: 89
End: 2025-05-30

## 2025-05-30 DIAGNOSIS — I50.22 CHRONIC SYSTOLIC (CONGESTIVE) HEART FAILURE (HCC): Primary | ICD-10-CM

## 2025-05-30 DIAGNOSIS — Z95.5 S/P CORONARY ARTERY STENT PLACEMENT: ICD-10-CM

## 2025-05-30 RX ORDER — OXYCODONE AND ACETAMINOPHEN 5; 325 MG/1; MG/1
1 TABLET ORAL EVERY 8 HOURS PRN
Qty: 90 TABLET | Refills: 0 | Status: CANCELLED | OUTPATIENT
Start: 2025-05-30 | End: 2025-06-29

## 2025-05-30 RX ORDER — OXYCODONE AND ACETAMINOPHEN 5; 325 MG/1; MG/1
1 TABLET ORAL EVERY 8 HOURS PRN
Qty: 15 TABLET | Refills: 0 | Status: SHIPPED | OUTPATIENT
Start: 2025-05-30 | End: 2025-06-04

## 2025-05-30 NOTE — TELEPHONE ENCOUNTER
Sally from [facility] called asking for a Palliative Care referral to be placed with [facility]. They are currently seeing patient for home care. Also is wondering if patient is okay for a Percocet refill. Sally states she believes the patient could benefit from this medication. Son states he has been out of this medication for a while.    Please advise

## 2025-05-30 NOTE — CARE COORDINATION
Referral received from Dominique Blackburn, Care Transitions RN for [facility] services.   Phone call to patient and spoke with patient son, Christin.  Elias requested assistance with medications.  He states he sets up his father's medications and was not aware that the medications were sent to alternative pharmacy.   RN scheduled home vs today between 3-4 pm to visit and review medications.  Scripts obtained from [facility] on [address] for bumex 1 mg and plavix 75 mg.  Scripts obtained for nystatin swish and swallow and Diflucan from [facility] on [facility] and delivered to patient home.     Initial visit with Hemanth at 4:25 pm.   RN arrived at patient home at 3:15 pm.  No response to door bell at the home or knocking on the door. Patient son phoned and call went to voice mail.   Patient was phoned.  Mr Barrera answered phone and stated he was unable to get off the sofa to answer the door.  He requested for nurse to wait until his son arrived to visit.  RN phoned patient son and spoke with Elias Chavezregor.  Elias reported he was not home and would return around 4 pm.  RN waited for patient son to return home.  Home vs initiated at 4:05 pm when patient son arrived at the home.     RN met with Hemanth at [facility] on program services of [facility].  Consent for services obtained.  Elias Barrera reports Skilled home care services were delayed and will begin on Friday May 30, 2025 when he is available to meet with home care staff.    Hemanth was sitting in living room on sofa.  He reports he needs assistance to transfer from sofa where he often sits because the sofa cushion is large and soft making it difficult to transfer off sofa.    Post hospital discharge assessment completed.    Post Acute Care Discharge Assessment     Review purpose of telephone call with: Hemanth  Date: 5/30/25    - To evaluate the client after hospital discharge  - To ensure the client has received and

## 2025-05-30 NOTE — CARE COORDINATION
Care Transition    Soniya contacted me yesterday afternoon while waiting at patient's door to meet up with son, Dusty and deliver meds to patient that she picked up from the pharmacies where scripts were incorrectly eprescribed at hospital discharge.   Pharmacy information corrected in chart.  Reviewed Soniya's [facility] note from yesterday's visit and confirmed her follow up with patient.    Update - Soniya contacted me today to confirm [facility] will continue to follow patient from this point on.  She is working closely with family + [facility] and obtained palliative order.  Care Transition program ended.

## 2025-06-02 NOTE — CARE COORDINATION
Hemanth CHAIDEZ Bruce  5/30/2025                [facility] nurse vs    Met with Hemanth at his home for coordination of care with Skilled Home care RN       Goals        Conditions and Symptoms      I will schedule office visits, as directed by my provider.  I will keep my appointment or reschedule if I have to cancel.  I will notify my provider of any barriers to my plan of care.  I will follow my Zone Management tool to seek urgent or emergent care.  I will notify my provider of any symptoms that indicate a worsening of my condition.    Barriers: overwhelmed by complexity of regimen  Plan for overcoming my barriers: work with ACM  Confidence: 9/10  Anticipated Goal Completion Date: 2.28.24         Patient/Family verbalizes understanding of self-management of chronic disease.      Patient/family is able to discuss self-management of CHF, Atrial fibrillation, DVT  Pt demonstrates adherence to medications  Pt /family demonstrates understanding of self-monitoring for CHF, DVT  Patient is able to identify Red Flags:  Alert to potential adverse drug reactions(s) or side effects and actions to take should they arise  Discuss target symptoms and actions to take should they arise  Identify problems that require immediate PCP or specialist visit  Patient demonstrates understanding of access to PCP/Specialist:  Understands about scheduling routine Follow Up appointments   Understands about sick day appointment options for worsening of symptoms/progression (Same Day, E Visits)    Barriers- complexity of care, fatigue    Expected date of completion 8/1/2025             Emotional/coping  Alert, oriented x 3, engaged and cooperative.   Hemanth reports he feels tired and fatigued.    States he takes frequent naps during the day.   He denies feelings of depression.    Housing   no changes    Socialization/family  He reports he has supportive family relationships and family are present throughout the day and evening to help with

## 2025-06-12 ENCOUNTER — HOSPITAL ENCOUNTER (INPATIENT)
Age: 89
LOS: 3 days | Discharge: HOME HEALTH | DRG: 309 | End: 2025-06-15
Attending: EMERGENCY MEDICINE | Admitting: INTERNAL MEDICINE
Payer: MEDICARE

## 2025-06-12 ENCOUNTER — APPOINTMENT (OUTPATIENT)
Dept: GENERAL RADIOLOGY | Age: 89
DRG: 309 | End: 2025-06-12
Payer: MEDICARE

## 2025-06-12 DIAGNOSIS — B37.81 CANDIDIASIS OF ESOPHAGUS (HCC): ICD-10-CM

## 2025-06-12 DIAGNOSIS — N17.9 AKI (ACUTE KIDNEY INJURY): Primary | ICD-10-CM

## 2025-06-12 DIAGNOSIS — N18.9 ACUTE KIDNEY INJURY SUPERIMPOSED ON CKD: ICD-10-CM

## 2025-06-12 DIAGNOSIS — N17.9 ACUTE KIDNEY INJURY SUPERIMPOSED ON CKD: ICD-10-CM

## 2025-06-12 DIAGNOSIS — R53.1 GENERAL WEAKNESS: ICD-10-CM

## 2025-06-12 DIAGNOSIS — I48.91 ATRIAL FIBRILLATION, UNSPECIFIED TYPE (HCC): ICD-10-CM

## 2025-06-12 DIAGNOSIS — D64.9 ANEMIA, UNSPECIFIED TYPE: ICD-10-CM

## 2025-06-12 LAB
ANION GAP SERPL CALCULATED.3IONS-SCNC: 12 MMOL/L (ref 9–16)
BASOPHILS # BLD: 0.04 K/UL (ref 0–0.2)
BASOPHILS NFR BLD: 1 % (ref 0–2)
BILIRUB UR QL STRIP: NEGATIVE
BNP SERPL-MCNC: 2188 PG/ML (ref 0–300)
BUN SERPL-MCNC: 45 MG/DL (ref 8–23)
CALCIUM SERPL-MCNC: 9.3 MG/DL (ref 8.6–10.4)
CHLORIDE SERPL-SCNC: 104 MMOL/L (ref 98–107)
CLARITY UR: CLEAR
CO2 SERPL-SCNC: 27 MMOL/L (ref 20–31)
COLOR UR: YELLOW
COMMENT: NORMAL
CREAT SERPL-MCNC: 2.3 MG/DL (ref 0.7–1.2)
EOSINOPHIL # BLD: 0 K/UL (ref 0–0.4)
EOSINOPHILS RELATIVE PERCENT: 0 % (ref 0–4)
ERYTHROCYTE [DISTWIDTH] IN BLOOD BY AUTOMATED COUNT: 17.5 % (ref 11.5–14.9)
GFR, ESTIMATED: 26 ML/MIN/1.73M2
GLUCOSE SERPL-MCNC: 112 MG/DL (ref 74–99)
GLUCOSE UR STRIP-MCNC: NEGATIVE MG/DL
HCT VFR BLD AUTO: 32.4 % (ref 41–53)
HGB BLD-MCNC: 10.5 G/DL (ref 13.5–17.5)
HGB UR QL STRIP.AUTO: NEGATIVE
IMM GRANULOCYTES # BLD AUTO: 0 K/UL (ref 0–0.3)
IMM GRANULOCYTES NFR BLD: 0 %
INR PPP: 2.1
KETONES UR STRIP-MCNC: NEGATIVE MG/DL
LEUKOCYTE ESTERASE UR QL STRIP: NEGATIVE
LYMPHOCYTES NFR BLD: 0.78 K/UL (ref 1–4.8)
LYMPHOCYTES RELATIVE PERCENT: 19 % (ref 24–44)
MAGNESIUM SERPL-MCNC: 2 MG/DL (ref 1.7–2.3)
MCH RBC QN AUTO: 30.6 PG (ref 26–34)
MCHC RBC AUTO-ENTMCNC: 32.4 G/DL (ref 31–37)
MCV RBC AUTO: 94.5 FL (ref 80–100)
MONOCYTES NFR BLD: 0.12 K/UL (ref 0.1–1.3)
MONOCYTES NFR BLD: 3 % (ref 1–7)
MORPHOLOGY: ABNORMAL
MORPHOLOGY: ABNORMAL
MYOGLOBIN SERPL-MCNC: 92 NG/ML (ref 28–72)
MYOGLOBIN SERPL-MCNC: 99 NG/ML (ref 28–72)
NEUTROPHILS NFR BLD: 77 % (ref 36–66)
NEUTS SEG NFR BLD: 3.16 K/UL (ref 1.3–9.1)
NITRITE UR QL STRIP: NEGATIVE
NRBC BLD-RTO: 0 PER 100 WBC
PARTIAL THROMBOPLASTIN TIME: 33.6 SEC (ref 24–36)
PH UR STRIP: 6 [PH] (ref 5–8)
PLATELET # BLD AUTO: 194 K/UL (ref 150–450)
PMV BLD AUTO: 8.9 FL (ref 8–13.5)
POTASSIUM SERPL-SCNC: 4.3 MMOL/L (ref 3.7–5.3)
PROT UR STRIP-MCNC: NEGATIVE MG/DL
PROTHROMBIN TIME: 25.4 SEC (ref 11.8–14.6)
RBC # BLD AUTO: 3.43 M/UL (ref 4.21–5.77)
SODIUM SERPL-SCNC: 143 MMOL/L (ref 136–145)
SP GR UR STRIP: 1.01 (ref 1–1.03)
T4 FREE SERPL-MCNC: 1.1 NG/DL (ref 0.9–1.7)
TROPONIN I SERPL HS-MCNC: 57 NG/L (ref 0–22)
TROPONIN I SERPL HS-MCNC: 64 NG/L (ref 0–22)
TSH SERPL DL<=0.05 MIU/L-ACNC: 1.42 UIU/ML (ref 0.27–4.2)
UROBILINOGEN UR STRIP-ACNC: NORMAL EU/DL (ref 0–1)
WBC OTHER # BLD: 4.1 K/UL (ref 3.5–11)

## 2025-06-12 PROCEDURE — 99223 1ST HOSP IP/OBS HIGH 75: CPT | Performed by: INTERNAL MEDICINE

## 2025-06-12 PROCEDURE — 84439 ASSAY OF FREE THYROXINE: CPT

## 2025-06-12 PROCEDURE — 51798 US URINE CAPACITY MEASURE: CPT

## 2025-06-12 PROCEDURE — 85730 THROMBOPLASTIN TIME PARTIAL: CPT

## 2025-06-12 PROCEDURE — 85025 COMPLETE CBC W/AUTO DIFF WBC: CPT

## 2025-06-12 PROCEDURE — 80048 BASIC METABOLIC PNL TOTAL CA: CPT

## 2025-06-12 PROCEDURE — 83735 ASSAY OF MAGNESIUM: CPT

## 2025-06-12 PROCEDURE — 83880 ASSAY OF NATRIURETIC PEPTIDE: CPT

## 2025-06-12 PROCEDURE — 99285 EMERGENCY DEPT VISIT HI MDM: CPT

## 2025-06-12 PROCEDURE — 85610 PROTHROMBIN TIME: CPT

## 2025-06-12 PROCEDURE — 6370000000 HC RX 637 (ALT 250 FOR IP): Performed by: NURSE PRACTITIONER

## 2025-06-12 PROCEDURE — 6370000000 HC RX 637 (ALT 250 FOR IP): Performed by: INTERNAL MEDICINE

## 2025-06-12 PROCEDURE — 2580000003 HC RX 258

## 2025-06-12 PROCEDURE — 93005 ELECTROCARDIOGRAM TRACING: CPT | Performed by: EMERGENCY MEDICINE

## 2025-06-12 PROCEDURE — 71045 X-RAY EXAM CHEST 1 VIEW: CPT

## 2025-06-12 PROCEDURE — 84443 ASSAY THYROID STIM HORMONE: CPT

## 2025-06-12 PROCEDURE — 1200000000 HC SEMI PRIVATE

## 2025-06-12 PROCEDURE — 6370000000 HC RX 637 (ALT 250 FOR IP)

## 2025-06-12 PROCEDURE — 36415 COLL VENOUS BLD VENIPUNCTURE: CPT

## 2025-06-12 PROCEDURE — 81003 URINALYSIS AUTO W/O SCOPE: CPT

## 2025-06-12 PROCEDURE — 84484 ASSAY OF TROPONIN QUANT: CPT

## 2025-06-12 PROCEDURE — 83874 ASSAY OF MYOGLOBIN: CPT

## 2025-06-12 RX ORDER — LATANOPROST 50 UG/ML
1 SOLUTION/ DROPS OPHTHALMIC NIGHTLY
Status: DISCONTINUED | OUTPATIENT
Start: 2025-06-12 | End: 2025-06-15 | Stop reason: HOSPADM

## 2025-06-12 RX ORDER — FINASTERIDE 5 MG/1
5 TABLET, FILM COATED ORAL DAILY
Status: DISCONTINUED | OUTPATIENT
Start: 2025-06-12 | End: 2025-06-15 | Stop reason: HOSPADM

## 2025-06-12 RX ORDER — TRAMADOL HYDROCHLORIDE 50 MG/1
50 TABLET ORAL EVERY 4 HOURS PRN
Status: DISCONTINUED | OUTPATIENT
Start: 2025-06-12 | End: 2025-06-15 | Stop reason: HOSPADM

## 2025-06-12 RX ORDER — RANOLAZINE 500 MG/1
500 TABLET, EXTENDED RELEASE ORAL 2 TIMES DAILY
Status: DISCONTINUED | OUTPATIENT
Start: 2025-06-12 | End: 2025-06-15 | Stop reason: HOSPADM

## 2025-06-12 RX ORDER — SODIUM CHLORIDE 0.9 % (FLUSH) 0.9 %
5-40 SYRINGE (ML) INJECTION EVERY 12 HOURS SCHEDULED
Status: DISCONTINUED | OUTPATIENT
Start: 2025-06-12 | End: 2025-06-15 | Stop reason: HOSPADM

## 2025-06-12 RX ORDER — CLOPIDOGREL BISULFATE 75 MG/1
75 TABLET ORAL DAILY
Status: DISCONTINUED | OUTPATIENT
Start: 2025-06-13 | End: 2025-06-15 | Stop reason: HOSPADM

## 2025-06-12 RX ORDER — SODIUM CHLORIDE 9 MG/ML
INJECTION, SOLUTION INTRAVENOUS CONTINUOUS
Status: ACTIVE | OUTPATIENT
Start: 2025-06-12 | End: 2025-06-13

## 2025-06-12 RX ORDER — ATORVASTATIN CALCIUM 40 MG/1
40 TABLET, FILM COATED ORAL NIGHTLY
Status: DISCONTINUED | OUTPATIENT
Start: 2025-06-12 | End: 2025-06-15 | Stop reason: HOSPADM

## 2025-06-12 RX ORDER — ERGOCALCIFEROL 1.25 MG/1
50000 CAPSULE, LIQUID FILLED ORAL WEEKLY
Status: DISCONTINUED | OUTPATIENT
Start: 2025-06-12 | End: 2025-06-15 | Stop reason: HOSPADM

## 2025-06-12 RX ORDER — MIDODRINE HYDROCHLORIDE 5 MG/1
5 TABLET ORAL
Status: DISCONTINUED | OUTPATIENT
Start: 2025-06-12 | End: 2025-06-13

## 2025-06-12 RX ORDER — ACETAMINOPHEN 650 MG/1
650 SUPPOSITORY RECTAL EVERY 6 HOURS PRN
Status: DISCONTINUED | OUTPATIENT
Start: 2025-06-12 | End: 2025-06-15 | Stop reason: HOSPADM

## 2025-06-12 RX ORDER — DILTIAZEM HYDROCHLORIDE 120 MG/1
120 CAPSULE, COATED, EXTENDED RELEASE ORAL DAILY
Status: DISCONTINUED | OUTPATIENT
Start: 2025-06-12 | End: 2025-06-14

## 2025-06-12 RX ORDER — METOPROLOL SUCCINATE 50 MG/1
50 TABLET, EXTENDED RELEASE ORAL DAILY
Status: DISCONTINUED | OUTPATIENT
Start: 2025-06-12 | End: 2025-06-15 | Stop reason: HOSPADM

## 2025-06-12 RX ORDER — PANTOPRAZOLE SODIUM 40 MG/1
40 TABLET, DELAYED RELEASE ORAL
Status: DISCONTINUED | OUTPATIENT
Start: 2025-06-13 | End: 2025-06-15 | Stop reason: HOSPADM

## 2025-06-12 RX ORDER — ACETAMINOPHEN 325 MG/1
650 TABLET ORAL EVERY 6 HOURS PRN
Status: DISCONTINUED | OUTPATIENT
Start: 2025-06-12 | End: 2025-06-15 | Stop reason: HOSPADM

## 2025-06-12 RX ORDER — GABAPENTIN 100 MG/1
100 CAPSULE ORAL 2 TIMES DAILY
Status: DISCONTINUED | OUTPATIENT
Start: 2025-06-12 | End: 2025-06-15 | Stop reason: HOSPADM

## 2025-06-12 RX ORDER — MIDODRINE HYDROCHLORIDE 2.5 MG/1
2.5 TABLET ORAL
Status: DISCONTINUED | OUTPATIENT
Start: 2025-06-12 | End: 2025-06-12

## 2025-06-12 RX ORDER — ONDANSETRON 2 MG/ML
4 INJECTION INTRAMUSCULAR; INTRAVENOUS EVERY 6 HOURS PRN
Status: DISCONTINUED | OUTPATIENT
Start: 2025-06-12 | End: 2025-06-15 | Stop reason: HOSPADM

## 2025-06-12 RX ORDER — POLYETHYLENE GLYCOL 3350 17 G/17G
17 POWDER, FOR SOLUTION ORAL DAILY PRN
Status: DISCONTINUED | OUTPATIENT
Start: 2025-06-12 | End: 2025-06-15 | Stop reason: HOSPADM

## 2025-06-12 RX ORDER — FERROUS SULFATE 325(65) MG
325 TABLET ORAL 2 TIMES DAILY WITH MEALS
Status: DISCONTINUED | OUTPATIENT
Start: 2025-06-13 | End: 2025-06-15 | Stop reason: HOSPADM

## 2025-06-12 RX ORDER — ISOSORBIDE MONONITRATE 30 MG/1
30 TABLET, EXTENDED RELEASE ORAL DAILY
Status: DISCONTINUED | OUTPATIENT
Start: 2025-06-12 | End: 2025-06-14

## 2025-06-12 RX ORDER — ONDANSETRON 4 MG/1
4 TABLET, ORALLY DISINTEGRATING ORAL EVERY 8 HOURS PRN
Status: DISCONTINUED | OUTPATIENT
Start: 2025-06-12 | End: 2025-06-15 | Stop reason: HOSPADM

## 2025-06-12 RX ORDER — BUMETANIDE 1 MG/1
1 TABLET ORAL DAILY
Status: DISCONTINUED | OUTPATIENT
Start: 2025-06-12 | End: 2025-06-15 | Stop reason: HOSPADM

## 2025-06-12 RX ORDER — ASPIRIN 81 MG/1
81 TABLET, CHEWABLE ORAL DAILY
Status: DISCONTINUED | OUTPATIENT
Start: 2025-06-12 | End: 2025-06-12

## 2025-06-12 RX ORDER — SODIUM CHLORIDE 0.9 % (FLUSH) 0.9 %
5-40 SYRINGE (ML) INJECTION PRN
Status: DISCONTINUED | OUTPATIENT
Start: 2025-06-12 | End: 2025-06-15 | Stop reason: HOSPADM

## 2025-06-12 RX ORDER — SODIUM CHLORIDE 9 MG/ML
INJECTION, SOLUTION INTRAVENOUS PRN
Status: DISCONTINUED | OUTPATIENT
Start: 2025-06-12 | End: 2025-06-15 | Stop reason: HOSPADM

## 2025-06-12 RX ADMIN — SODIUM CHLORIDE: 0.9 INJECTION, SOLUTION INTRAVENOUS at 18:44

## 2025-06-12 RX ADMIN — APIXABAN 5 MG: 5 TABLET, FILM COATED ORAL at 20:53

## 2025-06-12 RX ADMIN — RANOLAZINE 500 MG: 500 TABLET, EXTENDED RELEASE ORAL at 20:53

## 2025-06-12 RX ADMIN — GABAPENTIN 100 MG: 100 CAPSULE ORAL at 20:53

## 2025-06-12 RX ADMIN — MIDODRINE HYDROCHLORIDE 5 MG: 5 TABLET ORAL at 18:23

## 2025-06-12 RX ADMIN — ATORVASTATIN CALCIUM 40 MG: 40 TABLET, FILM COATED ORAL at 20:53

## 2025-06-12 RX ADMIN — LATANOPROST 1 DROP: 50 SOLUTION OPHTHALMIC at 20:57

## 2025-06-12 RX ADMIN — TRAMADOL HYDROCHLORIDE 50 MG: 50 TABLET, COATED ORAL at 20:53

## 2025-06-12 ASSESSMENT — PAIN SCALES - GENERAL
PAINLEVEL_OUTOF10: 4
PAINLEVEL_OUTOF10: 8

## 2025-06-12 ASSESSMENT — PAIN DESCRIPTION - ORIENTATION: ORIENTATION: LOWER;MID

## 2025-06-12 ASSESSMENT — PAIN - FUNCTIONAL ASSESSMENT: PAIN_FUNCTIONAL_ASSESSMENT: NONE - DENIES PAIN

## 2025-06-12 ASSESSMENT — PAIN DESCRIPTION - LOCATION: LOCATION: BACK;ABDOMEN

## 2025-06-12 ASSESSMENT — PAIN DESCRIPTION - DESCRIPTORS: DESCRIPTORS: CRAMPING;DISCOMFORT

## 2025-06-12 NOTE — ED PROVIDER NOTES
eMERGENCY dEPARTMENT eNCOUnter      Pt Name: Hemanth Barrera  MRN: [id number]  Birthdate 1/13/1935  Date of evaluation: 6/12/25      CHIEF COMPLAINT       Chief Complaint   Patient presents with    Extremity Weakness    Shortness of Breath         HISTORY OF PRESENT ILLNESS    Hemanth Barrera is a 89 y.o. male who presents complaining of general weakness.  Patient was brought in by EMS due to inability to care for himself today.  Patient has a history of CAD CHF CKD and hypertension.  Patient was recently in the hospital for VARSHA and shortness of breath.  Patient states that the only symptom that he has that some new is the weakness in his legs bilaterally that he says been about 3 days.  Family sentiment because normally he can care for himself and today evidently he was not able to care for himself at all he cannot keep any weight up when he tries to stand and walk.  Otherwise patient has been having problems with his abdomen shortness of breath vomiting and diarrhea that is all been ongoing for over a month before he was admitted last month.      REVIEW OF SYSTEMS       Review of Systems   Constitutional:  Positive for fatigue. Negative for activity change, appetite change, chills, diaphoresis and fever.   HENT:  Negative for congestion, ear pain, facial swelling, nosebleeds, rhinorrhea, sinus pressure, sore throat and trouble swallowing.    Eyes:  Negative for pain, discharge and redness.   Respiratory:  Positive for shortness of breath. Negative for cough, chest tightness and wheezing.    Cardiovascular:  Negative for chest pain, palpitations and leg swelling.   Gastrointestinal:  Positive for abdominal pain, diarrhea and nausea. Negative for blood in stool, constipation and vomiting.   Genitourinary:  Negative for difficulty urinating, dysuria, flank pain, frequency, genital sores and hematuria.   Musculoskeletal:  Negative for arthralgias, back pain, gait problem, joint swelling, myalgias and neck pain.  limits   URINALYSIS WITH REFLEX TO CULTURE   TSH   T4, FREE   APTT   MAGNESIUM       Vitals Reviewed:    Vitals:    06/12/25 1430 06/12/25 1500 06/12/25 1531 06/12/25 1600   BP: 90/65 92/68 90/71 95/71   Pulse: 70 70 70 70   Resp: 17 14     Temp:       TempSrc:       SpO2: 99% 100% 97%    Weight:       Height:         MEDICATIONS GIVEN TO PATIENT THIS ENCOUNTER:  No orders of the defined types were placed in this encounter.    DISCHARGE PRESCRIPTIONS:  New Prescriptions    No medications on file     PHYSICIAN CONSULTS ORDERED THIS ENCOUNTER:  IP CONSULT TO PRIMARY CARE PROVIDER    FINAL IMPRESSION      1. VARSHA (acute kidney injury)    2. General weakness          DISPOSITION/PLAN   DISPOSITION Decision To Admit 06/12/2025 03:49:44 PM   DISPOSITION CONDITION Stable           PATIENT REFERREDTO:  No follow-up provider specified.    DISCHARGEMEDICATIONS:  New Prescriptions    No medications on file       (Please note that portions of this note were completed with a voice recognition program.  Efforts were made to edit thedictations but occasionally words are mis-transcribed.)    Jorge Slater MD  Attending Emergency Physician                        Jorge Slater MD  06/12/25 9301

## 2025-06-12 NOTE — ED NOTES
Report given to LEON Delaney from [facility].   Report method by phone   The following was reviewed with receiving RN:   Current vital signs:  BP 93/70   Pulse 70   Temp 97.7 °F (36.5 °C) (Oral)   Resp 14   Ht 1.854 m (6' 1\")   Wt 68.5 kg (151 lb)   SpO2 97%   BMI 19.92 kg/m²                MEWS Score: 2     Any medication or safety alerts were reviewed. Any pending diagnostics and notifications were also reviewed, as well as any safety concerns or issues, abnormal labs, abnormal imaging, and abnormal assessment findings. Questions were answered.

## 2025-06-12 NOTE — H&P
[facility]  IN-PATIENT SERVICE  [facility]  IN-PATIENT SERVICE   [facility]     HISTORY AND PHYSICAL EXAMINATION            Date:   6/12/2025  Patient name:  Hemanth Barrera  Date of admission:  6/12/2025 12:57 PM  MRN:   [id number]  Account:  [id number]  YOB: 1935  PCP:    Neftaly Contreras MD  Room:   08/08  Code Status:    Prior    Chief Complaint:     Chief Complaint   Patient presents with    Extremity Weakness    Shortness of Breath       History Obtained From:     patient, electronic medical record    History of Present Illness:     The patient is a 89 y.o.  Non- / non  male who presents withExtremity Weakness and Shortness of Breath   and he is admitted to the hospital for the management of dizziness.    Patient is a 89-year-old male with a history of CAD, A-fib, PE on Eliquis, CKD, hyperlipidemia who presented with dizziness and generalized weakness.  According to the patient for the past few months patient has been complaining of dizziness and generalized weakness.  He states that when he gets up from seated position he feels dizzy.  Patient denies any headaches, loss of consciousness, falls, nausea, change in vision, or vomiting.  Patient does not endorse any chest pain.  Patient states that his appetite is good and he is eating and drinking well.    Patient was recently admitted on 5/18/2025 at [facility] for management of VARSHA on CKD and dyspnea.  At that time patient was found to be positive for human metapneumovirus and was managed with p.o. steroids as well as IV fluids for VARSHA.  He also complained of dysphagia and EGD was completed with esophageal dilatation, white nummular lesions in the proximal esophagus likely secondary to Candida.    On arrival to the ED BP 97/68, HR 70, RR 16, afebrile.  Labs were drawn which showed Hb 10.5, WBC 4.1, , K4.3, glucose 112, BUN  PACEMAKER PLACEMENT      SIGMOIDOSCOPY N/A 03/07/2025    SIGMOIDOSCOPY DIAGNOSTIC FLEXIBLE performed by Isauro Razo MD at [facility] OR    UPPER GASTROINTESTINAL ENDOSCOPY N/A 08/02/2023    EGD BIOPSY performed by Isauro Razo MD at [facility]    UPPER GASTROINTESTINAL ENDOSCOPY N/A 03/06/2025    ESOPHAGOGASTRODUODENOSCOPY performed by Alejandra Kraus MD at [facility] OR    UPPER GASTROINTESTINAL ENDOSCOPY N/A 5/22/2025    ESOPHAGOGASTRODUODENOSCOPY BIOPSY WITH APC AND BALLOON DILATION performed by Isauro Razo MD at [facility]        Medications Prior to Admission:        Prior to Admission medications    Medication Sig Start Date End Date Taking? Authorizing Provider   fluconazole (DIFLUCAN) 200 MG tablet Take 2 tablets by mouth daily for 1 day, THEN 1 tablet daily for 14 days. 5/28/25 6/12/25  Isauro Razo MD   polyethylene glycol (GLYCOLAX) 17 GM/SCOOP powder Take daily for 5 days then as needed for constipation  Patient not taking: Reported on 5/30/2025 5/26/25   Annetta Martin MD   docusate sodium (COLACE) 100 MG capsule Take 1 capsule by mouth 2 times daily  Patient not taking: Reported on 5/30/2025 5/26/25 6/25/25  Annetta Martin MD   clopidogrel (PLAVIX) 75 MG tablet Take 1 tablet by mouth daily 5/25/25   Annetta Martin MD   bumetanide (BUMEX) 1 MG tablet Take 1 tablet by mouth daily 5/25/25   Annetta Martin MD   apixaban (ELIQUIS) 5 MG TABS tablet Take 1 tablet by mouth 2 times daily 5/6/25   Alex Bernardo, DO   aspirin 81 MG chewable tablet Take 1 tablet by mouth daily 5/6/25   Alex Bernardo, DO   atorvastatin (LIPITOR) 40 MG tablet Take 1 tablet by mouth nightly 5/6/25   Alex Bernardo, DO   dilTIAZem (CARDIZEM CD) 120 MG extended release capsule Take 1 capsule by mouth daily 5/6/25   Alex Bernardo, DO   ferrous sulfate (IRON 325) 325 (65 Fe) MG tablet Take 1 tablet by mouth 2 times daily (with meals) 5/6/25   Alex Bernardo, DO   finasteride (PROSCAR) 5  FINDINGS: Transvenous pacer remains in place. The lungs are without acute focal process.  There is no effusion or pneumothorax. The cardiomediastinal silhouette is stable. The osseous structures are stable.  Right hemidiaphragm.     No acute process.     CT CHEST PULMONARY EMBOLISM W CONTRAST  Result Date: 5/18/2025  EXAMINATION: CTA OF THE CHEST 5/18/2025 10:10 am TECHNIQUE: CTA of the chest was performed after the administration of intravenous contrast.  Multiplanar reformatted images are provided for review.  MIP images are provided for review. Automated exposure control, iterative reconstruction, and/or weight based adjustment of the mA/kV was utilized to reduce the radiation dose to as low as reasonably achievable. COMPARISON: 04/18/2024 HISTORY: ORDERING SYSTEM PROVIDED HISTORY: hx pe, left chest pain TECHNOLOGIST PROVIDED HISTORY: hx pe, left chest pain Additional Contrast?->1 Reason for Exam: hx pe, left chest pain Additional signs and symptoms: Pt had a fall few months ago and broke ribs, states recently has been having worsening left-sided rib pain, cough, shortness of breath FINDINGS: Pulmonary Arteries: Pulmonary arteries are adequately opacified for evaluation.  No evidence of intraluminal filling defect to suggest pulmonary embolism.  Main pulmonary artery is normal in caliber. Mediastinum: No evidence of mediastinal lymphadenopathy.  The heart and pericardium demonstrate no acute abnormality.  There is no acute abnormality of the thoracic aorta.  Three-vessel coronary artery calcification Lungs/pleura: The lungs are without acute process.  No focal consolidation or pulmonary edema.  No evidence of pleural effusion or pneumothorax.  Mild emphysema. Upper Abdomen: Limited images of the upper abdomen are unremarkable. Soft Tissues/Bones: No acute bone or soft tissue abnormality.     No evidence of pulmonary embolism or acute pulmonary abnormality.       Assessment :      Primary

## 2025-06-13 ENCOUNTER — APPOINTMENT (OUTPATIENT)
Dept: CT IMAGING | Age: 89
DRG: 309 | End: 2025-06-13
Payer: MEDICARE

## 2025-06-13 ENCOUNTER — APPOINTMENT (OUTPATIENT)
Age: 89
DRG: 309 | End: 2025-06-13
Payer: MEDICARE

## 2025-06-13 LAB
ANION GAP SERPL CALCULATED.3IONS-SCNC: 12 MMOL/L (ref 9–16)
BASOPHILS # BLD: 0 K/UL (ref 0–0.2)
BASOPHILS NFR BLD: 0 % (ref 0–2)
BUN SERPL-MCNC: 40 MG/DL (ref 8–23)
CALCIUM SERPL-MCNC: 8.9 MG/DL (ref 8.6–10.4)
CHLORIDE SERPL-SCNC: 103 MMOL/L (ref 98–107)
CO2 SERPL-SCNC: 25 MMOL/L (ref 20–31)
CREAT SERPL-MCNC: 1.8 MG/DL (ref 0.7–1.2)
CREAT UR-MCNC: 48.6 MG/DL (ref 39–259)
ECHO AO ROOT DIAM: 3.4 CM
ECHO AO ROOT INDEX: 1.78 CM/M2
ECHO AV MEAN GRADIENT: 6 MMHG
ECHO AV MEAN VELOCITY: 1.1 M/S
ECHO AV PEAK GRADIENT: 11 MMHG
ECHO AV PEAK VELOCITY: 1.7 M/S
ECHO AV VTI: 35 CM
ECHO BSA: 1.88 M2
ECHO EST RA PRESSURE: 3 MMHG
ECHO LA AREA 4C: 18.4 CM2
ECHO LA DIAMETER INDEX: 1.83 CM/M2
ECHO LA DIAMETER: 3.5 CM
ECHO LA MAJOR AXIS: 5.6 CM
ECHO LA TO AORTIC ROOT RATIO: 1.03
ECHO LA VOL MOD A4C: 45 ML (ref 18–58)
ECHO LA VOLUME INDEX MOD A4C: 24 ML/M2 (ref 16–34)
ECHO LV E' LATERAL VELOCITY: 15.2 CM/S
ECHO LV E' SEPTAL VELOCITY: 9.57 CM/S
ECHO LV EF PHYSICIAN: 60 %
ECHO LV FRACTIONAL SHORTENING: 31 % (ref 28–44)
ECHO LV INTERNAL DIMENSION DIASTOLE INDEX: 2.2 CM/M2
ECHO LV INTERNAL DIMENSION DIASTOLIC: 4.2 CM (ref 4.2–5.9)
ECHO LV INTERNAL DIMENSION SYSTOLIC INDEX: 1.52 CM/M2
ECHO LV INTERNAL DIMENSION SYSTOLIC: 2.9 CM
ECHO LV IVSD: 1.3 CM (ref 0.6–1)
ECHO LV MASS 2D: 200.6 G (ref 88–224)
ECHO LV MASS INDEX 2D: 105 G/M2 (ref 49–115)
ECHO LV POSTERIOR WALL DIASTOLIC: 1.3 CM (ref 0.6–1)
ECHO LV RELATIVE WALL THICKNESS RATIO: 0.62
ECHO LVOT AREA: 3.1 CM2
ECHO LVOT DIAM: 2 CM
ECHO MV A VELOCITY: 0.39 M/S
ECHO MV E DECELERATION TIME (DT): 211 MS
ECHO MV E VELOCITY: 0.72 M/S
ECHO MV E/A RATIO: 1.85
ECHO MV E/E' LATERAL: 4.74
ECHO MV E/E' RATIO (AVERAGED): 6.13
ECHO MV E/E' SEPTAL: 7.52
ECHO MV REGURGITANT PEAK GRADIENT: 85 MMHG
ECHO MV REGURGITANT PEAK VELOCITY: 4.6 M/S
ECHO MV REGURGITANT VTIA: 154 CM
ECHO RA AREA 4C: 18.6 CM2
ECHO RA END SYSTOLIC VOLUME APICAL 4 CHAMBER INDEX BSA: 24 ML/M2
ECHO RA VOLUME: 46 ML
ECHO RIGHT VENTRICULAR SYSTOLIC PRESSURE (RVSP): 24 MMHG
ECHO RV TAPSE: 1.4 CM (ref 1.7–?)
ECHO TV REGURGITANT MAX VELOCITY: 2.3 M/S
ECHO TV REGURGITANT PEAK GRADIENT: 20 MMHG
EKG ATRIAL RATE: 100 BPM
EKG Q-T INTERVAL: 456 MS
EKG QRS DURATION: 128 MS
EKG QTC CALCULATION (BAZETT): 492 MS
EKG R AXIS: -83 DEGREES
EKG T AXIS: 92 DEGREES
EKG VENTRICULAR RATE: 70 BPM
EOSINOPHIL # BLD: 0.03 K/UL (ref 0–0.44)
EOSINOPHILS RELATIVE PERCENT: 1 % (ref 0–4)
ERYTHROCYTE [DISTWIDTH] IN BLOOD BY AUTOMATED COUNT: 17.4 % (ref 11.5–14.9)
GFR, ESTIMATED: 35 ML/MIN/1.73M2
GLUCOSE SERPL-MCNC: 96 MG/DL (ref 74–99)
HCT VFR BLD AUTO: 31.1 % (ref 41–53)
HGB BLD-MCNC: 10.1 G/DL (ref 13.5–17.5)
IMM GRANULOCYTES # BLD AUTO: 0 K/UL (ref 0–0.3)
IMM GRANULOCYTES NFR BLD: 0 %
LYMPHOCYTES NFR BLD: 0.68 K/UL (ref 1.1–3.7)
LYMPHOCYTES RELATIVE PERCENT: 22 % (ref 24–44)
MCH RBC QN AUTO: 31 PG (ref 26–34)
MCHC RBC AUTO-ENTMCNC: 32.5 G/DL (ref 31–37)
MCV RBC AUTO: 95.4 FL (ref 80–100)
MONOCYTES NFR BLD: 0.34 K/UL (ref 0.1–1.2)
MONOCYTES NFR BLD: 11 % (ref 3–12)
MORPHOLOGY: ABNORMAL
NEUTROPHILS NFR BLD: 66 % (ref 36–66)
NEUTS SEG NFR BLD: 2.05 K/UL (ref 1.5–8.1)
NRBC BLD-RTO: 0 PER 100 WBC
PLATELET # BLD AUTO: 192 K/UL (ref 150–450)
PMV BLD AUTO: 9.5 FL (ref 8–13.5)
POTASSIUM SERPL-SCNC: 4.5 MMOL/L (ref 3.7–5.3)
RBC # BLD AUTO: 3.26 M/UL (ref 4.21–5.77)
SODIUM SERPL-SCNC: 140 MMOL/L (ref 136–145)
SODIUM UR-SCNC: 81 MMOL/L
WBC OTHER # BLD: 3.1 K/UL (ref 3.5–11)

## 2025-06-13 PROCEDURE — 97166 OT EVAL MOD COMPLEX 45 MIN: CPT

## 2025-06-13 PROCEDURE — 85025 COMPLETE CBC W/AUTO DIFF WBC: CPT

## 2025-06-13 PROCEDURE — 93010 ELECTROCARDIOGRAM REPORT: CPT | Performed by: INTERNAL MEDICINE

## 2025-06-13 PROCEDURE — 84300 ASSAY OF URINE SODIUM: CPT

## 2025-06-13 PROCEDURE — 6370000000 HC RX 637 (ALT 250 FOR IP)

## 2025-06-13 PROCEDURE — 97530 THERAPEUTIC ACTIVITIES: CPT

## 2025-06-13 PROCEDURE — 80048 BASIC METABOLIC PNL TOTAL CA: CPT

## 2025-06-13 PROCEDURE — 6370000000 HC RX 637 (ALT 250 FOR IP): Performed by: NURSE PRACTITIONER

## 2025-06-13 PROCEDURE — 1200000000 HC SEMI PRIVATE

## 2025-06-13 PROCEDURE — 2500000003 HC RX 250 WO HCPCS

## 2025-06-13 PROCEDURE — 99233 SBSQ HOSP IP/OBS HIGH 50: CPT | Performed by: INTERNAL MEDICINE

## 2025-06-13 PROCEDURE — 2580000003 HC RX 258

## 2025-06-13 PROCEDURE — 99223 1ST HOSP IP/OBS HIGH 75: CPT | Performed by: SURGERY

## 2025-06-13 PROCEDURE — 70450 CT HEAD/BRAIN W/O DYE: CPT

## 2025-06-13 PROCEDURE — 82570 ASSAY OF URINE CREATININE: CPT

## 2025-06-13 PROCEDURE — C8929 TTE W OR WO FOL WCON,DOPPLER: HCPCS

## 2025-06-13 PROCEDURE — 6370000000 HC RX 637 (ALT 250 FOR IP): Performed by: INTERNAL MEDICINE

## 2025-06-13 PROCEDURE — 6370000000 HC RX 637 (ALT 250 FOR IP): Performed by: SURGERY

## 2025-06-13 PROCEDURE — 6360000004 HC RX CONTRAST MEDICATION: Performed by: SURGERY

## 2025-06-13 PROCEDURE — 93306 TTE W/DOPPLER COMPLETE: CPT | Performed by: INTERNAL MEDICINE

## 2025-06-13 PROCEDURE — 36415 COLL VENOUS BLD VENIPUNCTURE: CPT

## 2025-06-13 RX ORDER — MIDODRINE HYDROCHLORIDE 10 MG/1
10 TABLET ORAL
Status: DISCONTINUED | OUTPATIENT
Start: 2025-06-13 | End: 2025-06-14

## 2025-06-13 RX ORDER — TAMSULOSIN HYDROCHLORIDE 0.4 MG/1
0.4 CAPSULE ORAL DAILY
Status: DISCONTINUED | OUTPATIENT
Start: 2025-06-13 | End: 2025-06-15 | Stop reason: HOSPADM

## 2025-06-13 RX ORDER — SODIUM CHLORIDE 9 MG/ML
INJECTION, SOLUTION INTRAVENOUS CONTINUOUS
Status: DISCONTINUED | OUTPATIENT
Start: 2025-06-13 | End: 2025-06-13

## 2025-06-13 RX ADMIN — TAMSULOSIN HYDROCHLORIDE 0.4 MG: 0.4 CAPSULE ORAL at 12:42

## 2025-06-13 RX ADMIN — SODIUM CHLORIDE: 0.9 INJECTION, SOLUTION INTRAVENOUS at 10:43

## 2025-06-13 RX ADMIN — MIDODRINE HYDROCHLORIDE 5 MG: 5 TABLET ORAL at 09:00

## 2025-06-13 RX ADMIN — SODIUM CHLORIDE, PRESERVATIVE FREE 10 ML: 5 INJECTION INTRAVENOUS at 21:35

## 2025-06-13 RX ADMIN — TRAMADOL HYDROCHLORIDE 50 MG: 50 TABLET, COATED ORAL at 14:33

## 2025-06-13 RX ADMIN — FINASTERIDE 5 MG: 5 TABLET, FILM COATED ORAL at 08:59

## 2025-06-13 RX ADMIN — FERROUS SULFATE TAB 325 MG (65 MG ELEMENTAL FE) 325 MG: 325 (65 FE) TAB at 08:59

## 2025-06-13 RX ADMIN — GABAPENTIN 100 MG: 100 CAPSULE ORAL at 09:00

## 2025-06-13 RX ADMIN — METOPROLOL SUCCINATE 50 MG: 50 TABLET, EXTENDED RELEASE ORAL at 09:00

## 2025-06-13 RX ADMIN — RANOLAZINE 500 MG: 500 TABLET, EXTENDED RELEASE ORAL at 21:34

## 2025-06-13 RX ADMIN — SULFUR HEXAFLUORIDE 2.5 ML: KIT at 14:03

## 2025-06-13 RX ADMIN — GABAPENTIN 100 MG: 100 CAPSULE ORAL at 21:34

## 2025-06-13 RX ADMIN — FERROUS SULFATE TAB 325 MG (65 MG ELEMENTAL FE) 325 MG: 325 (65 FE) TAB at 16:47

## 2025-06-13 RX ADMIN — TRAMADOL HYDROCHLORIDE 50 MG: 50 TABLET, COATED ORAL at 21:42

## 2025-06-13 RX ADMIN — APIXABAN 5 MG: 5 TABLET, FILM COATED ORAL at 21:34

## 2025-06-13 RX ADMIN — ATORVASTATIN CALCIUM 40 MG: 40 TABLET, FILM COATED ORAL at 21:34

## 2025-06-13 RX ADMIN — PANTOPRAZOLE SODIUM 40 MG: 40 TABLET, DELAYED RELEASE ORAL at 05:25

## 2025-06-13 RX ADMIN — RANOLAZINE 500 MG: 500 TABLET, EXTENDED RELEASE ORAL at 08:59

## 2025-06-13 RX ADMIN — CLOPIDOGREL BISULFATE 75 MG: 75 TABLET, FILM COATED ORAL at 08:59

## 2025-06-13 RX ADMIN — LATANOPROST 1 DROP: 50 SOLUTION OPHTHALMIC at 21:34

## 2025-06-13 RX ADMIN — MIDODRINE HYDROCHLORIDE 10 MG: 10 TABLET ORAL at 16:47

## 2025-06-13 RX ADMIN — MIDODRINE HYDROCHLORIDE 10 MG: 10 TABLET ORAL at 12:42

## 2025-06-13 RX ADMIN — TRAMADOL HYDROCHLORIDE 50 MG: 50 TABLET, COATED ORAL at 03:07

## 2025-06-13 RX ADMIN — APIXABAN 5 MG: 5 TABLET, FILM COATED ORAL at 08:59

## 2025-06-13 ASSESSMENT — PAIN SCALES - WONG BAKER
WONGBAKER_NUMERICALRESPONSE: NO HURT
WONGBAKER_NUMERICALRESPONSE: NO HURT

## 2025-06-13 ASSESSMENT — PAIN DESCRIPTION - LOCATION
LOCATION: KNEE;BACK
LOCATION: HIP
LOCATION: ABDOMEN;BUTTOCKS;LEG

## 2025-06-13 ASSESSMENT — PAIN DESCRIPTION - ORIENTATION
ORIENTATION: LEFT;LOWER
ORIENTATION: LEFT
ORIENTATION: LEFT;LOWER

## 2025-06-13 ASSESSMENT — PAIN SCALES - GENERAL
PAINLEVEL_OUTOF10: 0
PAINLEVEL_OUTOF10: 7
PAINLEVEL_OUTOF10: 6
PAINLEVEL_OUTOF10: 7
PAINLEVEL_OUTOF10: 5
PAINLEVEL_OUTOF10: 2
PAINLEVEL_OUTOF10: 3

## 2025-06-13 ASSESSMENT — PAIN DESCRIPTION - DESCRIPTORS
DESCRIPTORS: SHARP;STABBING
DESCRIPTORS: THROBBING

## 2025-06-13 NOTE — PROGRESS NOTES
[facility]   Occupational Therapy Evaluation  Date: 25  Patient Name: Hemanth Barrera       Room:   MRN: [id number]  Account: [id number]   : 1935  (89 y.o.) Gender: male     Discharge Recommendations:  Further Occupational Therapy is recommended upon facility discharge.    OT Equipment Recommendations  Equipment Needed:  (CTA)    Referring Practitioner: Andre Quezada MD  Diagnosis: Exremity weakness        Treatment Diagnosis: Impaired self-care status    Past Medical History:  has a past medical history of Acute inferolateral myocardial infarction (HCC), Arthritis, Atrial fibrillation (HCC), CAD (coronary artery disease), CHF (congestive heart failure) ([facility]), Glaucoma, Hx of blood clots, Hyperlipidemia, Hypertension, MI (myocardial infarction) (HCC), and NSTEMI (non-ST elevated myocardial infarction) ([facility]).    Past Surgical History:   has a past surgical history that includes pacemaker placement; Abdomen surgery; Cardiac catheterization; Appendectomy; Colonoscopy; eye surgery; hernia repair; bone marrow biopsy; joint replacement; CT BIOPSY BONE MARROW (2022); Upper gastrointestinal endoscopy (N/A, 2023); Colonoscopy (N/A, 2023); Cardiac procedure (N/A, 2024); Cardiac procedure (N/A, 2024); Cardiac procedure (Right, 2025); invasive vascular (N/A, 2025); Upper gastrointestinal endoscopy (N/A, 2025); sigmoidoscopy (N/A, 2025); Colonoscopy (N/A, 03/10/2025); and Upper gastrointestinal endoscopy (N/A, 2025).    Restrictions  Restrictions/Precautions  Restrictions/Precautions: Fall Risk, General Precautions, Bed Alarm  Required Braces or Orthoses?: No  Implants Present? : Pacemaker, Metal implants (L TKA, R AGUSTO)      Vitals  Vitals  O2 Device: None (Room air)  Orthostatic B/P and Pulse?: Yes  Blood Pressure Lyin/79  Blood Pressure Sittin/76  Blood Pressure Standin/68  Pulse Lyin PER

## 2025-06-13 NOTE — PROGRESS NOTES
Physical Therapy        Physical Therapy Cancel Note      DATE: 2025    NAME: Hemanth Barrera  MRN: [id number]   : 1935      Patient not seen this date for Physical Therapy due to:    2025 at 1356:  pt unavailable for PT evaluation- pt having echo at bedside.       Electronically signed by Haylee Ascencio, PT on 2025 at 2:09 PM

## 2025-06-13 NOTE — PROGRESS NOTES
Comprehensive Nutrition Assessment    Type and Reason for Visit:  Positive nutrition screen (Wt loss)    Nutrition Recommendations/Plan:   Recommend discontinue Cardiac Restrictions which are not appropriate for a severely malnourished pt of advanced age  Will add chocolate Ensure Plus High Protein to all trays per pt's request to give extra protein and calories     Malnutrition Assessment:  Malnutrition Status:  Severe malnutrition (06/13/25 1245)    Context:  Chronic Illness     Findings of the 6 clinical characteristics of malnutrition:  Energy Intake:  75% or less estimated energy requirements for 1 month or longer  Weight Loss:  Greater than 10% over 6 months (20% over 9 months)     Body Fat Loss:  Severe body fat loss Orbital, Triceps   Muscle Mass Loss:  Severe muscle mass loss Temples (temporalis), Hand (interosseous)  Fluid Accumulation:  No fluid accumulation     Strength:  Not Performed    Nutrition Assessment:    Pt admitted due to Weakness/VARSHA. Pt is known to this service due to Mx previous admits. He states he has difficulty swallowing meat and eats mostly soft foods. H/O Esophageal dilation noted. Review of wt history shows 36# wt loss over the past 9 months. Pt states he, \"Drinks chocolate Ensure like crazy\" because it has helped him rebuild his strength.    Nutrition Related Findings:    no edema, Labs: Reviewed, Meds: Reviewed, PMH: CKD, CHF, dysphagia Wound Type: None       Current Nutrition Intake & Therapies:    Average Meal Intake: 51-75%, %     ADULT DIET; Regular  ADULT ORAL NUTRITION SUPPLEMENT; Breakfast, Lunch, Dinner; Standard High Calorie/High Protein Oral Supplement    Anthropometric Measures:  Height: 185.4 cm (6' 1\")  Ideal Body Weight (IBW): 184 lbs (84 kg)    Admission Body Weight: 68.5 kg (151 lb) (stated)  Current Body Weight: 63.5 kg (139 lb 15.9 oz) (obtained by RD), 76.1 % IBW. Weight Source: Bed scale  Current BMI (kg/m2): 18.5  Usual Body Weight: 79.4 kg (175 lb)

## 2025-06-13 NOTE — CARE COORDINATION
DISCHARGE PLANNING NOTE:    Called patient's son Dusty to discuss discharge planning. Dusty was at work and stated he would call writer back.     Electronically signed by Negrita Gama RN on 6/13/2025 at 1:59 PM

## 2025-06-13 NOTE — CONSULTS
Urology Consultation    Patient:  Hemanth Barrera  MRN: [id number]  YOB: 1935    CHIEF COMPLAINT: Incomplete bladder emptying    HISTORY OF PRESENT ILLNESS:   The patient is a 89 y.o. male who presents with weakness and shortness of breath.  We were asked to evaluate for incomplete bladder emptying.  On admission he was found to be constipated.  He had a postvoid residual of over 700 mL as he had not voided.  He then was able to void nearly 650 cc.  He denies pain or burning with urination.  He denies any suprapubic pain.  He has not seen a urologist in the past.    Patient's old records, notes and chart reviewed and summarized above.    Past Medical History:    Past Medical History:   Diagnosis Date    Acute inferolateral myocardial infarction (HCC) 11/18/2021    Arthritis     Atrial fibrillation (HCC)     CAD (coronary artery disease)     CHF (congestive heart failure) ([facility])     Glaucoma     Hx of blood clots     with hernia surgery    Hyperlipidemia     Hypertension     MI (myocardial infarction) ([facility])     NSTEMI (non-ST elevated myocardial infarction) ([facility]) 11/17/2021       Past Surgical History:    Past Surgical History:   Procedure Laterality Date    ABDOMEN SURGERY      gastric resection    APPENDECTOMY      BONE MARROW BIOPSY      CARDIAC CATHETERIZATION      CARDIAC PROCEDURE N/A 09/17/2024    julio cesar / Left heart cath / coronary angiography / rm 512 performed by Cathie Hastings MD at [facility] CARDIAC CATH LAB    CARDIAC PROCEDURE N/A 09/17/2024    Percutaneous coronary intervention performed by Cathie Hastings MD at [facility] CARDIAC CATH LAB    CARDIAC PROCEDURE Right 01/09/2025    Left heart cath / coronary angiography performed by Guy Paz MD at [facility] CARDIAC CATH LAB    COLONOSCOPY      COLONOSCOPY N/A 08/03/2023    COLONOSCOPY WITH BIOPSY performed by Isauro Razo MD at [facility] ENDO    COLONOSCOPY N/A 03/10/2025    COLONOSCOPY DIAGNOSTIC performed by Shirin Torre MD at [facility]

## 2025-06-13 NOTE — PROGRESS NOTES
[facility]  IN-PATIENT SERVICE  [facility]    PROGRESS NOTE             6/13/2025    7:24 AM    Name:   Hemanth Barrera  MRN:     [id number]     Acct:      [id number]   Room:   2059/2059-01   Day:  1  Admit Date:  6/12/2025 12:57 PM    PCP:  Neftaly Contreras MD  Code Status:  Full Code    Subjective:     C/C:   Chief Complaint   Patient presents with    Extremity Weakness    Shortness of Breath     Interval History Status: improved.    Patient was seen and examined at bedside.  Blood pressure this morning is 109/77, hemodynamically stable.  Per patient he is not feeling dizzy this morning.  Patient does complain of knee pain and left-sided sacral pain.  Left knee pain has been going on for the last 3 weeks and does not radiate.  He does state that there was a concern for gait instability due to knee pain.  As for the sacral pain patient states there is a wound however on examination no wound could be appreciated.  Will ask RN to do a skin/wound check.    Labs ordered this morning show BUN 40, creatinine 1.8, GFR 35, WBC 3.1, hemoglobin 10.1.  CT head pending.          Brief History:     The patient is a 89 y.o.  Non- / non  male who presents withExtremity Weakness and Shortness of Breath   and he is admitted to the hospital for the management of dizziness.     Patient is a 89-year-old male with a history of CAD, A-fib, PE on Eliquis, CKD, hyperlipidemia who presented with dizziness and generalized weakness.  According to the patient for the past few months patient has been complaining of dizziness and generalized weakness.  He states that when he gets up from seated position he feels dizzy.  Patient denies any headaches, loss of consciousness, falls, nausea, change in vision, or vomiting.  Patient does not endorse any chest pain.  Patient states that his appetite is good and he is eating and drinking well.     Patient was recently admitted on 5/18/2025  nondistended rectosigmoid, left colon and splenic flexure.  Gas-filled mildly distended transverse colon noted.  In the RUQ, mostly fluid-filled dilated presumed SB loops seen (with a minimal intraluminal contrast noted posteriorly), between hepatic flexure/liver and more inferiorly between hepatic flexure/kidney, then continuing more medially where less dilated at midline.  Some contrast in mildly distended SB loops also noted more proximally and extending into the pelvis, presumably within proximal-mid ileum, no definite high density contrast seen within the terminal ileum which appear somewhat stretched or narrowed. Pelvis: Unremarkable/unchanged urinary bladder.  No pelvic fluid collection or mass.  Multiple mostly contrast filled small bowel loops seen.  Artifact from right hip hardware. Peritoneum/Retroperitoneum: Similar heavily calcified elongated unopacified abdominal aorta and ectatic tortuous calcified iliac arteries, unchanged.  No bulky adenopathy or retroperitoneal hemorrhage.  No definite free air or large amount of pathologic free fluid. Bones/Soft Tissues: Similar subcutaneous soft tissue changes suggesting degree of anasarca and extensive postsurgical changes lumbar spine with unchanged hardware and degenerative changes, with spinal stenosis at multiple levels, unchanged.  No acute finding suspected.     1. Considerable liquid stool within right sub diaphragmatic mildly distended colon; no colitis. 2. In the RUQ and mid abdomen, fluid-filled dilated SB loops, as described in detail above, suggesting partial or incomplete SBO mid-distal ileal level. Consider follow-up CT in 4-6 hours (or later depending upon patient's symptomatology) to assess contrast progression. 3. No definite pneumoperitoneum or significant/pathologic free fluid. 4. Increased atelectasis lung bases, now more subsegmental right base. Slightly smaller left medial basilar pleural changes/effusion. 5. Additional findings, as above.  Mental Status: He is alert and oriented to person, place, and time.           Assessment:        Primary Problem  Weakness    Active Hospital Problems    Diagnosis Date Noted    Weakness [R53.1] 06/12/2025       Plan:        Dizziness likely 2/2 poor oral intake vs A-fib vs CHF exacerbation  Patient recently was admitted with dysphagia with concerns for candidal esophagitis, although patient endorses good intake  Hb 10.5 >10.1  BUN 45 >40, creatinine 2.3 >1.8, GFR 26 >35  BNP 2188  TSH and T4 within normal limits  BUN 2188  CT head pending  Continue IV normal saline at 75 mL/h   P.o. midodrine 5 mg 3 times daily  Continue home dose metoprolol XL 50 mg daily  Bumex 1 mg on hold  Diltiazem 120 mg daily on hold  Imdur 30 mg daily on hold  Device interrogation pending  Echo pending  Cardiology consulted, will appreciate their recommendation        Elevated tropes likely 2/2 NSTEMI  History of NSTEMI, CAD  Troponin 64 > 57  Continue aspirin 81 mg  Continue Ranexa 500 mg twice daily  Continue Plavix 75 mg daily  Cardiology consulted will appreciate their recommendation     VARSHA on CKD (improving)  BUN 45 >40, creatinine 2.3 >1.8, GFR 26 >35 (6/12/2025)  Bumex 1 mg on hold  Continue IV normal saline 75 mL/h   Nephrology consult, will appreciate their recommendations     A-fib  Resume home dose Eliquis      DVT prophylaxis: already anticoagulated with Eliquis 5 mg twice daily  GI prophylaxis: Protonix 40 mg daily      Plan will be discussed with the attending, Dr Filiberto Quezada MD  PGY I Family Medicine Resident  6/13/2025 7:24 AM        Attending Physician Statement  I have discussed the care of Hemanth Barrera and I have examined the patient myselft and taken ros and hpi , including pertinent history and exam findings,  with the resident. I have reviewed the key elements of all parts of the encounter with the resident.  I agree with the assessment, plan and orders as documented by the

## 2025-06-13 NOTE — PROGRESS NOTES
Bladder scan complete. Results 717  Got orders for straight cath.  Went into patients room and he had urinated 610  Did a post void bladder scan. Results 265    Contacted residents. No straight cath needed at this time

## 2025-06-13 NOTE — CONSULTS
NEPHROLOGY CONSULT     Patient :  Hemanth Barrera; 89 y.o. MRN# [id number]  Location:  2059/2059-01  Attending:  Sana Haskins MD  Admit Date:  6/12/2025   Hospital Day: 1      Reason for Consult: Acute kidney injury      Chief Complaint:    History Obtained From:  patient    History of Present Illness:    This is a 89 y.o. male with a significant past medical history of systemic hypertension, coronary artery disease [s/p PTCA/stent], venous thromboembolism [on Eliquis], chronic atrial fibrillation [on Eliquis], hyperlipidemia and osteoarthritis, who presented to the hospital with complaints of generalized weakness, fatigue and dizziness.  Patient was also noted to be hypotensive  Labs showed serum creatinine of 2.3 mg/dL with baseline creatinine of 1.1 mg/dL  Nephrology consulted for acute kidney injury  Chest x-ray showed right basilar atelectasis  CT head no acute intracranial abnormality    Home medications reviewed shows use of Bumex    Past Medical History:        Diagnosis Date    Acute inferolateral myocardial infarction (HCC) 11/18/2021    Arthritis     Atrial fibrillation (HCC)     CAD (coronary artery disease)     CHF (congestive heart failure) (HCC)     Glaucoma     Hx of blood clots     with hernia surgery    Hyperlipidemia     Hypertension     MI (myocardial infarction) (HCC)     NSTEMI (non-ST elevated myocardial infarction) ([facility]) 11/17/2021       Past Surgical History:        Procedure Laterality Date    ABDOMEN SURGERY      gastric resection    APPENDECTOMY      BONE MARROW BIOPSY      CARDIAC CATHETERIZATION      CARDIAC PROCEDURE N/A 09/17/2024    julio cesar / Left heart cath / coronary angiography / rm 512 performed by Cathie Hastings MD at [facility] CARDIAC CATH LAB    CARDIAC PROCEDURE N/A 09/17/2024    Percutaneous coronary intervention performed by Cathie Hastings MD at [facility] CARDIAC CATH LAB    CARDIAC PROCEDURE Right 01/09/2025    Left heart cath / coronary angiography performed by Guy Paz  Daily  ferrous sulfate (IRON 325) tablet 325 mg, BID WC  finasteride (PROSCAR) tablet 5 mg, Daily  [Held by provider] isosorbide mononitrate (IMDUR) extended release tablet 30 mg, Daily  latanoprost (XALATAN) 0.005 % ophthalmic solution 1 drop, Nightly  metoprolol succinate (TOPROL XL) extended release tablet 50 mg, Daily  pantoprazole (PROTONIX) tablet 40 mg, QAM AC  ranolazine (RANEXA) extended release tablet 500 mg, BID  vitamin D (ERGOCALCIFEROL) capsule 50,000 Units, Weekly  gabapentin (NEURONTIN) capsule 100 mg, BID  apixaban (ELIQUIS) tablet 5 mg, BID  traMADol (ULTRAM) tablet 50 mg, Q4H PRN        Allergies:  Aspirin, Cyclobenzaprine, Ibuprofen, Azithromycin, and Hydrocodone-acetaminophen    Social History:   Social History     Socioeconomic History    Marital status:      Spouse name: Not on file    Number of children: 4    Years of education: Not on file    Highest education level: Not on file   Occupational History    Not on file   Tobacco Use    Smoking status: Former     Current packs/day: 0.00     Types: Cigarettes     Quit date:      Years since quittin.4    Smokeless tobacco: Never   Vaping Use    Vaping status: Never Used   Substance and Sexual Activity    Alcohol use: Never    Drug use: Never    Sexual activity: Not Currently     Partners: Female   Other Topics Concern    Not on file   Social History Narrative    Not on file     Social Drivers of Health     Financial Resource Strain: Patient Declined (2024)    Overall Financial Resource Strain (CARDIA)     Difficulty of Paying Living Expenses: Patient declined   Food Insecurity: No Food Insecurity (2025)    Hunger Vital Sign     Worried About Running Out of Food in the Last Year: Never true     Ran Out of Food in the Last Year: Never true   Transportation Needs: No Transportation Needs (2025)    PRAPARE - Transportation     Lack of Transportation (Medical): No     Lack of Transportation (Non-Medical): No   Physical

## 2025-06-13 NOTE — PLAN OF CARE
Problem: Safety - Adult  Goal: Free from fall injury  Outcome: Progressing  Flowsheets (Taken 6/12/2025 2030)  Free From Fall Injury:   Instruct family/caregiver on patient safety   Based on caregiver fall risk screen, instruct family/caregiver to ask for assistance with transferring infant if caregiver noted to have fall risk factors     Problem: Chronic Conditions and Co-morbidities  Goal: Patient's chronic conditions and co-morbidity symptoms are monitored and maintained or improved  Outcome: Progressing  Flowsheets (Taken 6/12/2025 2030)  Care Plan - Patient's Chronic Conditions and Co-Morbidity Symptoms are Monitored and Maintained or Improved:   Monitor and assess patient's chronic conditions and comorbid symptoms for stability, deterioration, or improvement   Collaborate with multidisciplinary team to address chronic and comorbid conditions and prevent exacerbation or deterioration   Update acute care plan with appropriate goals if chronic or comorbid symptoms are exacerbated and prevent overall improvement and discharge     Problem: Discharge Planning  Goal: Discharge to home or other facility with appropriate resources  Outcome: Progressing  Flowsheets (Taken 6/12/2025 2030)  Discharge to home or other facility with appropriate resources:   Identify barriers to discharge with patient and caregiver   Arrange for needed discharge resources and transportation as appropriate   Identify discharge learning needs (meds, wound care, etc)   Arrange for interpreters to assist at discharge as needed   Refer to discharge planning if patient needs post-hospital services based on physician order or complex needs related to functional status, cognitive ability or social support system     Problem: ABCDS Injury Assessment  Goal: Absence of physical injury  Outcome: Progressing  Flowsheets (Taken 6/12/2025 2030)  Absence of Physical Injury: Implement safety measures based on patient assessment     Problem: Skin/Tissue  Integrity  Goal: Skin integrity remains intact  Description: 1.  Monitor for areas of redness and/or skin breakdown2.  Assess vascular access sites hourly3.  Every 4-6 hours minimum:  Change oxygen saturation probe site4.  Every 4-6 hours:  If on nasal continuous positive airway pressure, respiratory therapy assess nares and determine need for appliance change or resting period  Outcome: Progressing  Flowsheets (Taken 6/12/2025 2030)  Skin Integrity Remains Intact:   Monitor for areas of redness and/or skin breakdown   Assess vascular access sites hourly   Every 4-6 hours minimum:  Change oxygen saturation probe site   Every 4-6 hours:  If on nasal continuous positive airway pressure, assess nares and determine need for appliance change or resting period   Turn and reposition as indicated   Assess need for specialty bed   Positioning devices   Monitor skin under medical devices   Pressure redistribution bed/mattress (bed type)   Check visual cues for pain     Problem: Pain  Goal: Verbalizes/displays adequate comfort level or baseline comfort level  Outcome: Progressing

## 2025-06-13 NOTE — CONSULTS
[facility]    Consult                        Today's Date: 6/13/2025  Patient Name: Hemanth Barrera  Date of admission: 6/12/2025 12:57 PM  Patient's age: 89 y.o., 1/13/1935  Admission Dx: Weakness [R53.1]  General weakness [R53.1]  VARSHA (acute kidney injury) [N17.9]    Reason for Consult:  Cardiac evaluation    Requesting Physician: Sana Haskins MD    CHIEF COMPLAINT:  dizziness     History Obtained From:  patient, electronic medical record    HISTORY OF PRESENT ILLNESS:      The patient is a 89 y.o.  Non- / non  male who presents withExtremity Weakness and Shortness of Breath   and he is admitted to the hospital for the management of dizziness.     Patient is a 89-year-old male with a history of CAD, A-fib, PE on Eliquis, CKD, hyperlipidemia who presented with dizziness and generalized weakness.  According to the patient for the past few months patient has been complaining of dizziness and generalized weakness.  He states that when he gets up from seated position he feels dizzy.  Patient denies any headaches, loss of consciousness, falls, nausea, change in vision, or vomiting.  Patient does not endorse any chest pain.  Patient states that his appetite is good and he is eating and drinking well.     Patient was recently admitted on 5/18/2025 at [facility] for management of VARSHA on CKD and dyspnea.  At that time patient was found to be positive for human metapneumovirus and was managed with p.o. steroids as well as IV fluids for VARSHA.  He also complained of dysphagia and EGD was completed with esophageal dilatation, white nummular lesions in the proximal esophagus likely secondary to Candida.     On arrival to the ED BP 97/68, HR 70, RR 16, afebrile.  Labs were drawn which showed Hb 10.5, WBC 4.1, , K4.3, glucose 112, BUN 45, creatinine 2.3, GFR 26, troponin 64 > 57, proBNP 2188, TSH and T4 within normal limits, PT 25.4, APTT within normal limit magnesium within normal

## 2025-06-13 NOTE — PLAN OF CARE
Problem: Safety - Adult  Goal: Free from fall injury  6/13/2025 1507 by Pia Gonzalez RN  Outcome: Progressing     Problem: Chronic Conditions and Co-morbidities  Goal: Patient's chronic conditions and co-morbidity symptoms are monitored and maintained or improved  6/13/2025 1507 by Pia Gonzalez RN  Outcome: Progressing     Problem: Discharge Planning  Goal: Discharge to home or other facility with appropriate resources  6/13/2025 1507 by Pia Gonzalez RN  Outcome: Progressing     Problem: ABCDS Injury Assessment  Goal: Absence of physical injury  6/13/2025 1507 by Pia Gonzalez RN  Outcome: Progressing     Problem: Skin/Tissue Integrity  Goal: Skin integrity remains intact  Description: 1.  Monitor for areas of redness and/or skin breakdown2.  Assess vascular access sites hourly3.  Every 4-6 hours minimum:  Change oxygen saturation probe site4.  Every 4-6 hours:  If on nasal continuous positive airway pressure, respiratory therapy assess nares and determine need for appliance change or resting period  6/13/2025 1507 by Pia Gonzalez RN  Outcome: Progressing     Problem: Pain  Goal: Verbalizes/displays adequate comfort level or baseline comfort level  6/13/2025 1507 by Pia Gonzalez RN  Outcome: Progressing     Problem: Nutrition Deficit:  Goal: Optimize nutritional status  Outcome: Progressing      04-Feb-2019

## 2025-06-13 NOTE — CARE COORDINATION
Case Management Assessment  Initial Evaluation    Date/Time of Evaluation: 6/13/2025 9:40AM  Assessment Completed by: Negrita Gama RN    If patient is discharged prior to next notation, then this note serves as note for discharge by case management.    Patient Name: Hemanth Barrera                   YOB: 1935  Diagnosis: Weakness [R53.1]  General weakness [R53.1]  VARSHA (acute kidney injury) [N17.9]                   Date / Time: 6/12/2025 12:57 PM    Patient Admission Status: Inpatient   Readmission Risk (Low < 19, Mod (19-27), High > 27): Readmission Risk Score: 33.3    Current PCP: Neftaly Contreras MD  PCP verified by CM? Yes    Chart Reviewed: Yes      History Provided by: Patient, Medical Record  Patient Orientation: Alert and Oriented    Patient Cognition: Alert    Hospitalization in the last 30 days (Readmission):  Yes    If yes, Readmission Assessment in CM Navigator will be completed.    Advance Directives:      Code Status: Full Code   Patient's Primary Decision Maker is: Named in Scanned ACP Document    Primary Decision Maker: Dusty Barrera - Child - [phone]    Discharge Planning:    Patient lives with: Children Type of Home: House  Primary Care Giver: Self  Patient Support Systems include: Children, Family Members   Current Financial resources: Medicaid  Current community resources: ECF/Home Care  Current services prior to admission: Durable Medical Equipment            Current DME: Walker, Cane, Shower Chair            Type of Home Care services:  PT, OT, Nursing Services    ADLS  Prior functional level: Assistance with the following:, Shopping, Housework, Cooking, Mobility, Bathing  Current functional level: Assistance with the following:, Bathing, Shopping, Housework, Cooking, Mobility    PT AM-PAC:   /24  OT AM-PAC: 16 /24    Family can provide assistance at DC: Yes  Would you like Case Management to discuss the discharge plan with any other family members/significant others,

## 2025-06-13 NOTE — ACP (ADVANCE CARE PLANNING)
Advance Care Planning     Advance Care Planning Activator (Inpatient)  Conversation Note      Date of ACP Conversation: 6/13/2025     Conversation Conducted with: Patient with Decision Making Capacity    ACP Activator: Negrita Gama RN    Health Care Decision Maker:     Current Designated Health Care Decision Maker:     Primary Decision Maker: Dusty Barrera - Demarcus - [phone]  Click here to complete Healthcare Decision Makers including section of the Healthcare Decision Maker Relationship (ie \"Primary\")  Today we documented Decision Maker(s) consistent with ACP documents on file.    Care Preferences    Ventilation:  \"If you were in your present state of health and suddenly became very ill and were unable to breathe on your own, what would your preference be about the use of a ventilator (breathing machine) if it were available to you?\"      Would the patient desire the use of ventilator (breathing machine)?: yes    \"If your health worsens and it becomes clear that your chance of recovery is unlikely, what would your preference be about the use of a ventilator (breathing machine) if it were available to you?\"     Would the patient desire the use of ventilator (breathing machine)?: unsure      Resuscitation  \"CPR works best to restart the heart when there is a sudden event, like a heart attack, in someone who is otherwise healthy. Unfortunately, CPR does not typically restart the heart for people who have serious health conditions or who are very sick.\"    \"In the event your heart stopped as a result of an underlying serious health condition, would you want attempts to be made to restart your heart (answer \"yes\" for attempt to resuscitate) or would you prefer a natural death (answer \"no\" for do not attempt to resuscitate)?\" yes       [] Yes   [] No   Educated Patient / Decision Maker regarding differences between Advance Directives and portable DNR orders.    Length of ACP Conversation in minutes:

## 2025-06-14 LAB
ANION GAP SERPL CALCULATED.3IONS-SCNC: 11 MMOL/L (ref 9–16)
BASOPHILS # BLD: 0 K/UL (ref 0–0.2)
BASOPHILS NFR BLD: 0 % (ref 0–2)
BUN SERPL-MCNC: 32 MG/DL (ref 8–23)
CALCIUM SERPL-MCNC: 9.1 MG/DL (ref 8.6–10.4)
CHLORIDE SERPL-SCNC: 102 MMOL/L (ref 98–107)
CO2 SERPL-SCNC: 26 MMOL/L (ref 20–31)
CREAT SERPL-MCNC: 1.2 MG/DL (ref 0.7–1.2)
EOSINOPHIL # BLD: 0.07 K/UL (ref 0–0.44)
EOSINOPHILS RELATIVE PERCENT: 2 % (ref 0–4)
ERYTHROCYTE [DISTWIDTH] IN BLOOD BY AUTOMATED COUNT: 17 % (ref 11.5–14.9)
GFR, ESTIMATED: 57 ML/MIN/1.73M2
GLUCOSE SERPL-MCNC: 100 MG/DL (ref 74–99)
HCT VFR BLD AUTO: 33.9 % (ref 41–53)
HGB BLD-MCNC: 10.8 G/DL (ref 13.5–17.5)
IMM GRANULOCYTES # BLD AUTO: 0 K/UL (ref 0–0.3)
IMM GRANULOCYTES NFR BLD: 0 %
LYMPHOCYTES NFR BLD: 0.41 K/UL (ref 1.1–3.7)
LYMPHOCYTES RELATIVE PERCENT: 12 % (ref 24–44)
MCH RBC QN AUTO: 30.5 PG (ref 26–34)
MCHC RBC AUTO-ENTMCNC: 31.9 G/DL (ref 31–37)
MCV RBC AUTO: 95.8 FL (ref 80–100)
MONOCYTES NFR BLD: 0.34 K/UL (ref 0.1–1.2)
MONOCYTES NFR BLD: 10 % (ref 3–12)
MORPHOLOGY: ABNORMAL
MORPHOLOGY: ABNORMAL
NEUTROPHILS NFR BLD: 76 % (ref 36–66)
NEUTS SEG NFR BLD: 2.58 K/UL (ref 1.5–8.1)
NRBC BLD-RTO: 0 PER 100 WBC
PLATELET # BLD AUTO: 193 K/UL (ref 150–450)
PMV BLD AUTO: 9.7 FL (ref 8–13.5)
POTASSIUM SERPL-SCNC: 4.4 MMOL/L (ref 3.7–5.3)
RBC # BLD AUTO: 3.54 M/UL (ref 4.21–5.77)
SODIUM SERPL-SCNC: 139 MMOL/L (ref 136–145)
WBC OTHER # BLD: 3.4 K/UL (ref 3.5–11)

## 2025-06-14 PROCEDURE — 99233 SBSQ HOSP IP/OBS HIGH 50: CPT | Performed by: INTERNAL MEDICINE

## 2025-06-14 PROCEDURE — 97162 PT EVAL MOD COMPLEX 30 MIN: CPT

## 2025-06-14 PROCEDURE — 6370000000 HC RX 637 (ALT 250 FOR IP)

## 2025-06-14 PROCEDURE — 36415 COLL VENOUS BLD VENIPUNCTURE: CPT

## 2025-06-14 PROCEDURE — 80048 BASIC METABOLIC PNL TOTAL CA: CPT

## 2025-06-14 PROCEDURE — 97530 THERAPEUTIC ACTIVITIES: CPT

## 2025-06-14 PROCEDURE — 51798 US URINE CAPACITY MEASURE: CPT

## 2025-06-14 PROCEDURE — 6370000000 HC RX 637 (ALT 250 FOR IP): Performed by: INTERNAL MEDICINE

## 2025-06-14 PROCEDURE — 2500000003 HC RX 250 WO HCPCS

## 2025-06-14 PROCEDURE — 1200000000 HC SEMI PRIVATE

## 2025-06-14 PROCEDURE — 6370000000 HC RX 637 (ALT 250 FOR IP): Performed by: NURSE PRACTITIONER

## 2025-06-14 PROCEDURE — 6360000002 HC RX W HCPCS

## 2025-06-14 PROCEDURE — 85025 COMPLETE CBC W/AUTO DIFF WBC: CPT

## 2025-06-14 RX ORDER — LIDOCAINE 4 G/G
1 PATCH TOPICAL DAILY
Status: DISCONTINUED | OUTPATIENT
Start: 2025-06-14 | End: 2025-06-15 | Stop reason: HOSPADM

## 2025-06-14 RX ADMIN — ATORVASTATIN CALCIUM 40 MG: 40 TABLET, FILM COATED ORAL at 19:53

## 2025-06-14 RX ADMIN — DICLOFENAC SODIUM 4 G: 10 GEL TOPICAL at 19:54

## 2025-06-14 RX ADMIN — LATANOPROST 1 DROP: 50 SOLUTION OPHTHALMIC at 19:55

## 2025-06-14 RX ADMIN — FERROUS SULFATE TAB 325 MG (65 MG ELEMENTAL FE) 325 MG: 325 (65 FE) TAB at 09:38

## 2025-06-14 RX ADMIN — MIDODRINE HYDROCHLORIDE 15 MG: 10 TABLET ORAL at 16:29

## 2025-06-14 RX ADMIN — TAMSULOSIN HYDROCHLORIDE 0.4 MG: 0.4 CAPSULE ORAL at 09:38

## 2025-06-14 RX ADMIN — APIXABAN 5 MG: 5 TABLET, FILM COATED ORAL at 09:38

## 2025-06-14 RX ADMIN — FERROUS SULFATE TAB 325 MG (65 MG ELEMENTAL FE) 325 MG: 325 (65 FE) TAB at 16:29

## 2025-06-14 RX ADMIN — TRAMADOL HYDROCHLORIDE 50 MG: 50 TABLET, COATED ORAL at 05:43

## 2025-06-14 RX ADMIN — CLOPIDOGREL BISULFATE 75 MG: 75 TABLET, FILM COATED ORAL at 09:38

## 2025-06-14 RX ADMIN — ONDANSETRON 4 MG: 2 INJECTION, SOLUTION INTRAMUSCULAR; INTRAVENOUS at 09:36

## 2025-06-14 RX ADMIN — GABAPENTIN 100 MG: 100 CAPSULE ORAL at 09:38

## 2025-06-14 RX ADMIN — RANOLAZINE 500 MG: 500 TABLET, EXTENDED RELEASE ORAL at 09:38

## 2025-06-14 RX ADMIN — FINASTERIDE 5 MG: 5 TABLET, FILM COATED ORAL at 09:38

## 2025-06-14 RX ADMIN — APIXABAN 5 MG: 5 TABLET, FILM COATED ORAL at 19:53

## 2025-06-14 RX ADMIN — SODIUM CHLORIDE, PRESERVATIVE FREE 10 ML: 5 INJECTION INTRAVENOUS at 19:55

## 2025-06-14 RX ADMIN — MIDODRINE HYDROCHLORIDE 15 MG: 10 TABLET ORAL at 09:38

## 2025-06-14 RX ADMIN — RANOLAZINE 500 MG: 500 TABLET, EXTENDED RELEASE ORAL at 19:53

## 2025-06-14 RX ADMIN — DICLOFENAC SODIUM 4 G: 10 GEL TOPICAL at 10:59

## 2025-06-14 RX ADMIN — SODIUM CHLORIDE, PRESERVATIVE FREE 10 ML: 5 INJECTION INTRAVENOUS at 11:01

## 2025-06-14 RX ADMIN — GABAPENTIN 100 MG: 100 CAPSULE ORAL at 19:53

## 2025-06-14 RX ADMIN — PANTOPRAZOLE SODIUM 40 MG: 40 TABLET, DELAYED RELEASE ORAL at 05:43

## 2025-06-14 RX ADMIN — TRAMADOL HYDROCHLORIDE 50 MG: 50 TABLET, COATED ORAL at 18:27

## 2025-06-14 ASSESSMENT — PAIN DESCRIPTION - ORIENTATION
ORIENTATION: LEFT;LOWER
ORIENTATION: LEFT
ORIENTATION: LEFT

## 2025-06-14 ASSESSMENT — PAIN DESCRIPTION - LOCATION
LOCATION: HIP;BUTTOCKS;KNEE
LOCATION: JAW;TEETH
LOCATION: KNEE

## 2025-06-14 ASSESSMENT — PAIN SCALES - GENERAL
PAINLEVEL_OUTOF10: 5
PAINLEVEL_OUTOF10: 0
PAINLEVEL_OUTOF10: 6
PAINLEVEL_OUTOF10: 3
PAINLEVEL_OUTOF10: 4
PAINLEVEL_OUTOF10: 6
PAINLEVEL_OUTOF10: 0

## 2025-06-14 ASSESSMENT — PAIN DESCRIPTION - DESCRIPTORS
DESCRIPTORS: ACHING
DESCRIPTORS: ACHING
DESCRIPTORS: ACHING;STABBING
DESCRIPTORS: ACHING

## 2025-06-14 ASSESSMENT — PAIN SCALES - WONG BAKER: WONGBAKER_NUMERICALRESPONSE: NO HURT

## 2025-06-14 NOTE — PROGRESS NOTES
Physical Therapy  [facility]   Physical Therapy Evaluation  Date: 25  Patient Name: Hemanth Barrera       Room: -  MRN: [id number]  Account: [id number]   : 1935  (89 y.o.) Gender: male     Discharge Recommendations:  Discharge Recommendations: Continue to assess pending progress, Patient would benefit from continued therapy after discharge, Therapy recommended at discharge     PT Equipment Recommendations  Equipment Needed: No     Past Medical History:  has a past medical history of Acute inferolateral myocardial infarction (HCC), Arthritis, Atrial fibrillation (HCC), CAD (coronary artery disease), CHF (congestive heart failure) ([facility]), Glaucoma, Hx of blood clots, Hyperlipidemia, Hypertension, MI (myocardial infarction) ([facility]), and NSTEMI (non-ST elevated myocardial infarction) ([facility]).  Past Surgical History:   has a past surgical history that includes pacemaker placement; Abdomen surgery; Cardiac catheterization; Appendectomy; Colonoscopy; eye surgery; hernia repair; bone marrow biopsy; joint replacement; CT BIOPSY BONE MARROW (2022); Upper gastrointestinal endoscopy (N/A, 2023); Colonoscopy (N/A, 2023); Cardiac procedure (N/A, 2024); Cardiac procedure (N/A, 2024); Cardiac procedure (Right, 2025); invasive vascular (N/A, 2025); Upper gastrointestinal endoscopy (N/A, 2025); sigmoidoscopy (N/A, 2025); Colonoscopy (N/A, 03/10/2025); and Upper gastrointestinal endoscopy (N/A, 2025).    Subjective  Subjective  Subjective: pt c/o nausea     General  Family/Caregiver Present: No  Follows Commands: Within Functional Limits           Social/Functional History  Social/Functional History  Lives With: Son  Type of Home: House  Home Layout: One level  Home Access: Stairs to enter with rails  Entrance Stairs - Number of Steps: 2 GIOVANNI (small threshold- son assists)  Bathroom Shower/Tub: Walk-in shower, Shower chair with

## 2025-06-14 NOTE — PLAN OF CARE
Problem: Safety - Adult  Goal: Free from fall injury  6/14/2025 0310 by Chriss Mckeon RN  Outcome: Progressing  Flowsheets (Taken 6/13/2025 2132)  Free From Fall Injury: Instruct family/caregiver on patient safety     Problem: Chronic Conditions and Co-morbidities  Goal: Patient's chronic conditions and co-morbidity symptoms are monitored and maintained or improved  6/14/2025 0310 by Chriss Mckeon RN  Outcome: Progressing  Flowsheets (Taken 6/13/2025 2132)  Care Plan - Patient's Chronic Conditions and Co-Morbidity Symptoms are Monitored and Maintained or Improved:   Monitor and assess patient's chronic conditions and comorbid symptoms for stability, deterioration, or improvement   Collaborate with multidisciplinary team to address chronic and comorbid conditions and prevent exacerbation or deterioration     Problem: Discharge Planning  Goal: Discharge to home or other facility with appropriate resources  6/14/2025 0310 by Chriss Mckeon RN  Outcome: Progressing  Flowsheets (Taken 6/13/2025 2132)  Discharge to home or other facility with appropriate resources:   Identify barriers to discharge with patient and caregiver   Arrange for needed discharge resources and transportation as appropriate   Identify discharge learning needs (meds, wound care, etc)     Problem: ABCDS Injury Assessment  Goal: Absence of physical injury  6/14/2025 0310 by Chriss Mckeon RN  Outcome: Progressing  Flowsheets (Taken 6/13/2025 2132)  Absence of Physical Injury: Implement safety measures based on patient assessment     Problem: Skin/Tissue Integrity  Goal: Skin integrity remains intact  Description: 1.  Monitor for areas of redness and/or skin breakdown2.  Assess vascular access sites hourly3.  Every 4-6 hours minimum:  Change oxygen saturation probe site4.  Every 4-6 hours:  If on nasal continuous positive airway pressure, respiratory therapy assess nares and determine need for appliance change or resting  period  6/14/2025 0310 by Chriss Mckeon RN  Outcome: Progressing  Flowsheets (Taken 6/13/2025 2132)  Skin Integrity Remains Intact:   Monitor for areas of redness and/or skin breakdown   Assess vascular access sites hourly     Problem: Pain  Goal: Verbalizes/displays adequate comfort level or baseline comfort level  6/14/2025 0310 by Chriss Mckeon, RN  Outcome: Progressing  Flowsheets (Taken 6/13/2025 2132)  Verbalizes/displays adequate comfort level or baseline comfort level:   Encourage patient to monitor pain and request assistance   Assess pain using appropriate pain scale   Administer analgesics based on type and severity of pain and evaluate response   Implement non-pharmacological measures as appropriate and evaluate response     Problem: Nutrition Deficit:  Goal: Optimize nutritional status  6/14/2025 0310 by Chriss Mckeon, RN  Outcome: Progressing

## 2025-06-14 NOTE — PLAN OF CARE
Problem: Safety - Adult  Goal: Free from fall injury  6/14/2025 1503 by Pia Gonzalez RN  Outcome: Progressing     Problem: Chronic Conditions and Co-morbidities  Goal: Patient's chronic conditions and co-morbidity symptoms are monitored and maintained or improved  6/14/2025 1503 by Pia Gonzalez, RN  Outcome: Progressing     Problem: Discharge Planning  Goal: Discharge to home or other facility with appropriate resources  6/14/2025 1503 by Pia Gonzalez RN  Outcome: Progressing     Problem: ABCDS Injury Assessment  Goal: Absence of physical injury  6/14/2025 1503 by Pia Gonzalez RN  Outcome: Progressing     Problem: Skin/Tissue Integrity  Goal: Skin integrity remains intact  Description: 1.  Monitor for areas of redness and/or skin breakdown2.  Assess vascular access sites hourly3.  Every 4-6 hours minimum:  Change oxygen saturation probe site4.  Every 4-6 hours:  If on nasal continuous positive airway pressure, respiratory therapy assess nares and determine need for appliance change or resting period  6/14/2025 1503 by Pia Gonzalez RN  Outcome: Progressing     Problem: Pain  Goal: Verbalizes/displays adequate comfort level or baseline comfort level  6/14/2025 1503 by Pia Gonzalez RN  Outcome: Progressing     Problem: Nutrition Deficit:  Goal: Optimize nutritional status  6/14/2025 1503 by Pia Gonzalez RN  Outcome: Progressing

## 2025-06-14 NOTE — PROGRESS NOTES
Called [employer] for patients pacemaker to get it interrogated. Gave them patients information and was informed that she would reach out to our rep and have them call me back.      # [phone]

## 2025-06-14 NOTE — PROGRESS NOTES
[facility]  IN-PATIENT SERVICE  [facility]    PROGRESS NOTE             6/14/2025    7:12 AM    Name:   Hemanth Barrera  MRN:     [id number]     Acct:      [id number]   Room:   2059/2059-01  IP Day:  2  Admit Date:  6/12/2025 12:57 PM    PCP:  Neftaly Contreras MD  Code Status:  Full Code    Subjective:     C/C:   Chief Complaint   Patient presents with    Extremity Weakness    Shortness of Breath     Interval History Status: improved.    Patient was seen and examined at bedside.  Blood pressure this morning was 86/67, with recent reading of 103/68.  Patient complains of left hip, left knee pain.  Patient also complained of neck pain however he was straining his neck while talking to me and he was advised to keep head of bed up to reduce tension on neck.  Patient stated he had a episode of dizziness this morning and fluttering in his chest, per cardiology today he will have a device interrogation for pacemaker.  Patient was seen by nephrology for VARSHA, per the recommendation hold diuretics, midodrine, IV fluids, urine electrolytes were ordered, hold BP meds for systolic blood pressure less than 100.  Patient was noted to have urinary retention yesterday with bladder scan showing 717 mL, postvoid bladder scan resulted as 265.  Per urology continue Flomax.      Labs drawn this morning show glucose 100, BUN 32 (downtrending), creatinine 1.2, GFR 57, WBC 3.4, hemoglobin 10.8.  CT head was completed yesterday which showed no acute intracranial abnormalities.  Patient completed echo yesterday per cardiology recommendation which showed EF 55 to 60%, left ventricle smaller than normal, increased wall thickness, mild tricuspid regurgitation, mild aortic regurg, mild mitral regurg.        Brief History:     The patient is a 89 y.o.  Non- / non  male who presents withExtremity Weakness and Shortness of Breath   and he is admitted to the hospital for the management of

## 2025-06-14 NOTE — CARE COORDINATION
Case Management   Daily Progress Note       Patient Name: Hemanth Barrera                   YOB: 1935  Diagnosis: Weakness [R53.1]  General weakness [R53.1]  VARSHA (acute kidney injury) [N17.9]                       GMLOS: 2.4 days  Length of Stay: 2  days    Readmission Risk (Low < 19, Mod (19-27), High > 27): Readmission Risk Score: 33.3      Chart Reviewed, and Current Transitional Plan is:    [] Home Independently    [x] Home with HC. Referral sent to [facility] via [facility] for resumption of services.    [] Skilled Nursing Facility    [] Acute Rehabilitation    [] Long Term Acute Care (LTAC)    [] Other:     Additional Notes:     VARSHA improved. Creatinine 1.2 today.    Refused SNF. OT recommending SNF. Left message for son, Dusty, to discuss this as pt is refusing.    Needs pacemaker interrogation.    Electronically signed by Negrita Lubin RN on 6/14/2025 at 10:08 AM     Received message from Pretty at [facility] stating pt can be accepted back.    Asked pt's primary care RN, Pia, to let writer know if pt's son comes in today so we can discuss possible SNF.    Electronically signed by Negrita Lubin RN on 6/14/2025 at 11:04 AM

## 2025-06-14 NOTE — DISCHARGE INSTR - COC
Continuity of Care Form    Patient Name: Hemanth Barrera   :  1935  MRN:  [id number]    Admit date:  2025  Discharge date:  6/15/25    Code Status Order: Full Code   Advance Directives:     Admitting Physician:  Sana Haskins MD  PCP: Neftaly Contreras MD    Discharging Nurse: LEON Palacio  Discharging Hospital Unit/Room#: 9/9-01  Discharging Unit Phone Number: [phone]    Emergency Contact:   Extended Emergency Contact Information  Primary Emergency Contact: Dusty Barrera  Home Phone: [phone]  Mobile Phone: [phone]  Relation: Child    Past Surgical History:  Past Surgical History:   Procedure Laterality Date    ABDOMEN SURGERY      gastric resection    APPENDECTOMY      BONE MARROW BIOPSY      CARDIAC CATHETERIZATION      CARDIAC PROCEDURE N/A 2024    zafrconcepcion / Left heart cath / coronary angiography / rm 512 performed by Cathie Hastings MD at [facility] CARDIAC CATH LAB    CARDIAC PROCEDURE N/A 2024    Percutaneous coronary intervention performed by Cathie Hastings MD at [facility] CARDIAC CATH LAB    CARDIAC PROCEDURE Right 2025    Left heart cath / coronary angiography performed by Guy Paz MD at [facility] CARDIAC CATH LAB    COLONOSCOPY      COLONOSCOPY N/A 2023    COLONOSCOPY WITH BIOPSY performed by Isauro Razo MD at [facility] ENDO    COLONOSCOPY N/A 03/10/2025    COLONOSCOPY DIAGNOSTIC performed by Shirin Torre MD at [facility] OR    CT BIOPSY BONE MARROW  2022    CT BONE MARROW BIOPSY 2022 [facility] CT SCAN    EYE SURGERY      smiley cataracts    HERNIA REPAIR      inguinal smiley    INVASIVE VASCULAR N/A 2025    Ultrasound guided vascular access performed by Guy Paz MD at [facility] CARDIAC CATH LAB    JOINT REPLACEMENT      rt hip x 2, lt knee    PACEMAKER PLACEMENT      SIGMOIDOSCOPY N/A 2025    SIGMOIDOSCOPY DIAGNOSTIC FLEXIBLE performed by Isauro Razo MD at [facility] OR    UPPER GASTROINTESTINAL ENDOSCOPY N/A 2023    EGD  and At Risk for Falls    Impairments/Disabilities:      Vision and Hearing    Nutrition Therapy:  Current Nutrition Therapy:   - Oral Diet:  General    Routes of Feeding: Oral  Liquids: No Restrictions  Daily Fluid Restriction: no  Last Modified Barium Swallow with Video (Video Swallowing Test): not done    Treatments at the Time of Hospital Discharge:   Respiratory Treatments: n/a  Oxygen Therapy:  is not on home oxygen therapy.  Ventilator:    - No ventilator support    Rehab Therapies: Physical Therapy and Occupational Therapy  Weight Bearing Status/Restrictions: No weight bearing restrictions  Other Medical Equipment (for information only, NOT a DME order):  n/a  Other Treatments: Skilled Nursing assessment and monitoring. Medication education and monitoring per protocol.       Patient's personal belongings (please select all that are sent with patient):  None    RN SIGNATURE:  Electronically signed by Pia Gonzalez RN on 6/15/25 at 2:58 PM EDT    CASE MANAGEMENT/SOCIAL WORK SECTION    Inpatient Status Date: ***    Readmission Risk Assessment Score:  [facility] RISK OF UNPLANNED READMISSION 2.0             33.3 Total Score        Discharging to Facility/ Agency   [facility]  [address] [place] [address]  P: [phone]   F: [phone]      Dialysis Facility (if applicable)   Name:  Address:  Dialysis Schedule:  Phone:  Fax:    / signature: Electronically signed by Negrita Lubin RN on 6/14/25 at 11:06 AM EDT    PHYSICIAN SECTION    Prognosis: Fair    Condition at Discharge: Stable    Rehab Potential (if transferring to Rehab): Fair    Recommended Labs or Other Treatments After Discharge: see above    Physician Certification: I certify the above information and transfer of Hemanth Barrera  is necessary for the continuing treatment of the diagnosis listed and that he requires Home Care for less 30 days.     Update Admission H&P: No change in H&P    PHYSICIAN SIGNATURE:  verbal order Dr. Sana Haskins/ Electronically signed by Negrita Lubin RN on 6/15/25 at 2:32 PM EDT

## 2025-06-14 NOTE — PROGRESS NOTES
NEPHROLOGY CONSULT     Patient :  Hemanth Barrera; 89 y.o. MRN# [id number]  Location:  2059/2059-01  Attending:  Sana Haskins MD  Admit Date:  6/12/2025   Hospital Day: 2      Reason for Consult: Acute kidney injury      Interval Change/Subjective:  Pt seen doing well with no complaints. No CP or SOB  Lungs: CTA  CVS: S1 + S2  Ext: No Edema      History of Present Illness:    This is a 89 y.o. male with a significant past medical history of systemic hypertension, coronary artery disease [s/p PTCA/stent], venous thromboembolism [on Eliquis], chronic atrial fibrillation [on Eliquis], hyperlipidemia and osteoarthritis, who presented to the hospital with complaints of generalized weakness, fatigue and dizziness.  Patient was also noted to be hypotensive  Labs showed serum creatinine of 2.3 mg/dL with baseline creatinine of 1.1 mg/dL  Nephrology consulted for acute kidney injury  Chest x-ray showed right basilar atelectasis  CT head no acute intracranial abnormality    Home medications reviewed shows use of Bumex    Past Medical History:        Diagnosis Date    Acute inferolateral myocardial infarction (HCC) 11/18/2021    Arthritis     Atrial fibrillation (HCC)     CAD (coronary artery disease)     CHF (congestive heart failure) (HCC)     Glaucoma     Hx of blood clots     with hernia surgery    Hyperlipidemia     Hypertension     MI (myocardial infarction) (HCC)     NSTEMI (non-ST elevated myocardial infarction) ([facility]) 11/17/2021       Past Surgical History:        Procedure Laterality Date    ABDOMEN SURGERY      gastric resection    APPENDECTOMY      BONE MARROW BIOPSY      CARDIAC CATHETERIZATION      CARDIAC PROCEDURE N/A 09/17/2024    julio cesar / Left heart cath / coronary angiography / rm 512 performed by Cathie Hastings MD at [facility] CARDIAC CATH LAB    CARDIAC PROCEDURE N/A 09/17/2024    Percutaneous coronary intervention performed by Cathie Hastings MD at [facility] CARDIAC CATH LAB    CARDIAC PROCEDURE Right

## 2025-06-15 VITALS
WEIGHT: 143.08 LBS | DIASTOLIC BLOOD PRESSURE: 79 MMHG | BODY MASS INDEX: 18.96 KG/M2 | SYSTOLIC BLOOD PRESSURE: 106 MMHG | RESPIRATION RATE: 16 BRPM | HEART RATE: 69 BPM | OXYGEN SATURATION: 96 % | TEMPERATURE: 97.5 F | HEIGHT: 73 IN

## 2025-06-15 LAB
ANION GAP SERPL CALCULATED.3IONS-SCNC: 8 MMOL/L (ref 9–16)
BASOPHILS # BLD: 0 K/UL (ref 0–0.2)
BASOPHILS NFR BLD: 0 % (ref 0–2)
BUN SERPL-MCNC: 32 MG/DL (ref 8–23)
CALCIUM SERPL-MCNC: 8.9 MG/DL (ref 8.6–10.4)
CHLORIDE SERPL-SCNC: 102 MMOL/L (ref 98–107)
CO2 SERPL-SCNC: 26 MMOL/L (ref 20–31)
CREAT SERPL-MCNC: 1.2 MG/DL (ref 0.7–1.2)
EOSINOPHIL # BLD: 0.03 K/UL (ref 0–0.4)
EOSINOPHILS RELATIVE PERCENT: 1 % (ref 0–4)
ERYTHROCYTE [DISTWIDTH] IN BLOOD BY AUTOMATED COUNT: 17.1 % (ref 11.5–14.9)
GFR, ESTIMATED: 57 ML/MIN/1.73M2
GLUCOSE SERPL-MCNC: 90 MG/DL (ref 74–99)
HCT VFR BLD AUTO: 32.6 % (ref 41–53)
HGB BLD-MCNC: 10.3 G/DL (ref 13.5–17.5)
IMM GRANULOCYTES # BLD AUTO: 0 K/UL (ref 0–0.3)
IMM GRANULOCYTES NFR BLD: 0 %
LYMPHOCYTES NFR BLD: 0.45 K/UL (ref 1–4.8)
LYMPHOCYTES RELATIVE PERCENT: 16 % (ref 24–44)
MCH RBC QN AUTO: 29.9 PG (ref 26–34)
MCHC RBC AUTO-ENTMCNC: 31.6 G/DL (ref 31–37)
MCV RBC AUTO: 94.5 FL (ref 80–100)
MONOCYTES NFR BLD: 0.28 K/UL (ref 0.1–1.3)
MONOCYTES NFR BLD: 10 % (ref 1–7)
MORPHOLOGY: ABNORMAL
NEUTROPHILS NFR BLD: 73 % (ref 36–66)
NEUTS SEG NFR BLD: 2.04 K/UL (ref 1.3–9.1)
NRBC BLD-RTO: 0 PER 100 WBC
PLATELET # BLD AUTO: 174 K/UL (ref 150–450)
PMV BLD AUTO: 9.5 FL (ref 8–13.5)
POTASSIUM SERPL-SCNC: 4.6 MMOL/L (ref 3.7–5.3)
RBC # BLD AUTO: 3.45 M/UL (ref 4.21–5.77)
SODIUM SERPL-SCNC: 136 MMOL/L (ref 136–145)
WBC OTHER # BLD: 2.8 K/UL (ref 3.5–11)

## 2025-06-15 PROCEDURE — 6370000000 HC RX 637 (ALT 250 FOR IP): Performed by: INTERNAL MEDICINE

## 2025-06-15 PROCEDURE — 6370000000 HC RX 637 (ALT 250 FOR IP): Performed by: NURSE PRACTITIONER

## 2025-06-15 PROCEDURE — 97110 THERAPEUTIC EXERCISES: CPT

## 2025-06-15 PROCEDURE — 99233 SBSQ HOSP IP/OBS HIGH 50: CPT | Performed by: INTERNAL MEDICINE

## 2025-06-15 PROCEDURE — 2500000003 HC RX 250 WO HCPCS

## 2025-06-15 PROCEDURE — 36415 COLL VENOUS BLD VENIPUNCTURE: CPT

## 2025-06-15 PROCEDURE — 85025 COMPLETE CBC W/AUTO DIFF WBC: CPT

## 2025-06-15 PROCEDURE — 51798 US URINE CAPACITY MEASURE: CPT

## 2025-06-15 PROCEDURE — 99239 HOSP IP/OBS DSCHRG MGMT >30: CPT | Performed by: INTERNAL MEDICINE

## 2025-06-15 PROCEDURE — 80048 BASIC METABOLIC PNL TOTAL CA: CPT

## 2025-06-15 PROCEDURE — 6370000000 HC RX 637 (ALT 250 FOR IP)

## 2025-06-15 RX ORDER — MIDODRINE HYDROCHLORIDE 10 MG/1
20 TABLET ORAL
Qty: 180 TABLET | Refills: 0 | Status: SHIPPED | OUTPATIENT
Start: 2025-06-15

## 2025-06-15 RX ORDER — POLYETHYLENE GLYCOL 3350 17 G/17G
POWDER, FOR SOLUTION ORAL
Qty: 510 G | Refills: 0 | Status: SHIPPED | OUTPATIENT
Start: 2025-06-15

## 2025-06-15 RX ORDER — MIDODRINE HYDROCHLORIDE 10 MG/1
20 TABLET ORAL
Status: DISCONTINUED | OUTPATIENT
Start: 2025-06-15 | End: 2025-06-15 | Stop reason: HOSPADM

## 2025-06-15 RX ORDER — TAMSULOSIN HYDROCHLORIDE 0.4 MG/1
0.4 CAPSULE ORAL DAILY
Qty: 30 CAPSULE | Refills: 0 | Status: SHIPPED | OUTPATIENT
Start: 2025-06-16

## 2025-06-15 RX ADMIN — FERROUS SULFATE TAB 325 MG (65 MG ELEMENTAL FE) 325 MG: 325 (65 FE) TAB at 09:30

## 2025-06-15 RX ADMIN — MIDODRINE HYDROCHLORIDE 20 MG: 10 TABLET ORAL at 09:30

## 2025-06-15 RX ADMIN — CLOPIDOGREL BISULFATE 75 MG: 75 TABLET, FILM COATED ORAL at 09:30

## 2025-06-15 RX ADMIN — TAMSULOSIN HYDROCHLORIDE 0.4 MG: 0.4 CAPSULE ORAL at 09:30

## 2025-06-15 RX ADMIN — TRAMADOL HYDROCHLORIDE 50 MG: 50 TABLET, COATED ORAL at 01:44

## 2025-06-15 RX ADMIN — SODIUM CHLORIDE, PRESERVATIVE FREE 10 ML: 5 INJECTION INTRAVENOUS at 09:37

## 2025-06-15 RX ADMIN — APIXABAN 5 MG: 5 TABLET, FILM COATED ORAL at 09:30

## 2025-06-15 RX ADMIN — RANOLAZINE 500 MG: 500 TABLET, EXTENDED RELEASE ORAL at 09:30

## 2025-06-15 RX ADMIN — GABAPENTIN 100 MG: 100 CAPSULE ORAL at 09:30

## 2025-06-15 RX ADMIN — TRAMADOL HYDROCHLORIDE 50 MG: 50 TABLET, COATED ORAL at 11:11

## 2025-06-15 RX ADMIN — DICLOFENAC SODIUM 4 G: 10 GEL TOPICAL at 09:33

## 2025-06-15 RX ADMIN — FINASTERIDE 5 MG: 5 TABLET, FILM COATED ORAL at 09:30

## 2025-06-15 RX ADMIN — PANTOPRAZOLE SODIUM 40 MG: 40 TABLET, DELAYED RELEASE ORAL at 05:43

## 2025-06-15 RX ADMIN — MIDODRINE HYDROCHLORIDE 20 MG: 10 TABLET ORAL at 11:40

## 2025-06-15 ASSESSMENT — PAIN DESCRIPTION - DESCRIPTORS
DESCRIPTORS: ACHING;DISCOMFORT;SORE
DESCRIPTORS: ACHING
DESCRIPTORS: ACHING

## 2025-06-15 ASSESSMENT — PAIN DESCRIPTION - LOCATION
LOCATION: TEETH;MOUTH
LOCATION: LEG;KNEE
LOCATION: ABDOMEN;BACK

## 2025-06-15 ASSESSMENT — PAIN SCALES - GENERAL
PAINLEVEL_OUTOF10: 5
PAINLEVEL_OUTOF10: 7
PAINLEVEL_OUTOF10: 5

## 2025-06-15 ASSESSMENT — PAIN SCALES - WONG BAKER: WONGBAKER_NUMERICALRESPONSE: NO HURT

## 2025-06-15 ASSESSMENT — PAIN DESCRIPTION - ORIENTATION: ORIENTATION: LEFT

## 2025-06-15 NOTE — CARE COORDINATION
DISCHARGE PLANNING NOTE:    Spoke with pt and son Dusty at bedside. They both agree that plan is home with [facility]. Pt will be d/c'ed today. Informed [facility] of d/c. [facility] can pull CARLOS and d/c med list from [employer].    Electronically signed by Negrita Lubin RN on 6/15/2025 at 2:43 PM

## 2025-06-15 NOTE — PROGRESS NOTES
[facility]  IN-PATIENT SERVICE  [facility]    PROGRESS NOTE             6/15/2025    7:14 AM    Name:   Hemanth Barrera  MRN:     [id number]     Acct:      [id number]   Room:   2059/2059-01   Day:  3  Admit Date:  6/12/2025 12:57 PM    PCP:  Neftaly Contreras MD  Code Status:  Full Code    Subjective:     C/C:   Chief Complaint   Patient presents with    Extremity Weakness    Shortness of Breath     Interval History Status: improved.    Patient was seen and examined at bedside.  Blood pressure this morning 106/79.  Patient did not complain of dizziness.  Patient pacemaker was interrogated yesterday.  Per cardiology pacemaker checked normal function and 100% A-fib no other dizziness, okay for discharge follow-up in 2 to 4 weeks.  Per nephrology hold antihypertensives and diuretics, avoid hypotension.      Labs drawn this morning show , K4.6, BUN 32, creatinine 1.2, GFR 57, Hb 10.3, WBC 2.8 (downtrending).        Brief History:     The patient is a 89 y.o.  Non- / non  male who presents withExtremity Weakness and Shortness of Breath   and he is admitted to the hospital for the management of dizziness.     Patient is a 89-year-old male with a history of CAD, A-fib, PE on Eliquis, CKD, hyperlipidemia who presented with dizziness and generalized weakness.  According to the patient for the past few months patient has been complaining of dizziness and generalized weakness.  He states that when he gets up from seated position he feels dizzy.  Patient denies any headaches, loss of consciousness, falls, nausea, change in vision, or vomiting.  Patient does not endorse any chest pain.  Patient states that his appetite is good and he is eating and drinking well.     Patient was recently admitted on 5/18/2025 at [facility] for management of VARSHA on CKD and dyspnea.  At that time patient was found to be positive for human metapneumovirus and was managed with  p.o. steroids as well as IV fluids for VARSHA.  He also complained of dysphagia and EGD was completed with esophageal dilatation, white nummular lesions in the proximal esophagus likely secondary to Candida.     On arrival to the ED BP 97/68, HR 70, RR 16, afebrile.  Labs were drawn which showed Hb 10.5, WBC 4.1, , K4.3, glucose 112, BUN 45, creatinine 2.3, GFR 26, troponin 64 > 57, proBNP 2188, TSH and T4 within normal limits, PT 25.4, APTT within normal limit magnesium within normal limits, UA negative for UTI.  CXR showed right basilar atelectasis.  Decision was made to admit patient for dizziness likely secondary to poor oral intake versus A-fib.    Review of Systems:     Review of Systems   Constitutional: Negative.    Respiratory: Negative.     Cardiovascular: Negative.    Gastrointestinal: Negative.    Genitourinary: Negative.    Musculoskeletal:  Positive for arthralgias.   Neurological: Negative.          Medications:     Allergies:    Allergies   Allergen Reactions    Aspirin Other (See Comments)     Pt told not to take since he has only a partial stomach    Cyclobenzaprine Other (See Comments)     Pt stated heart palpitations and he felt funny    Ibuprofen Nausea Only    Azithromycin Nausea Only    Hydrocodone-Acetaminophen Nausea Only     per pt, he is having upset stomach when taking norco       Current Meds:   Scheduled Meds:    lidocaine  1 patch TransDERmal Daily    diclofenac sodium  4 g Topical BID    midodrine  15 mg Oral TID WC    tamsulosin  0.4 mg Oral Daily    sodium chloride flush  5-40 mL IntraVENous 2 times per day    atorvastatin  40 mg Oral Nightly    [Held by provider] bumetanide  1 mg Oral Daily    clopidogrel  75 mg Oral Daily    ferrous sulfate  325 mg Oral BID WC    finasteride  5 mg Oral Daily    latanoprost  1 drop Both Eyes Nightly    [Held by provider] metoprolol succinate  50 mg Oral Daily    pantoprazole  40 mg Oral QAM AC    ranolazine  500 mg Oral BID    vitamin D  50,000

## 2025-06-15 NOTE — PLAN OF CARE
Problem: Chronic Conditions and Co-morbidities  Goal: Patient's chronic conditions and co-morbidity symptoms are monitored and maintained or improved  6/14/2025 2007 by Sherie Lobo, RN  Outcome: Progressing  Flowsheets (Taken 6/14/2025 1959)  Care Plan - Patient's Chronic Conditions and Co-Morbidity Symptoms are Monitored and Maintained or Improved:   Monitor and assess patient's chronic conditions and comorbid symptoms for stability, deterioration, or improvement   Collaborate with multidisciplinary team to address chronic and comorbid conditions and prevent exacerbation or deterioration   Update acute care plan with appropriate goals if chronic or comorbid symptoms are exacerbated and prevent overall improvement and discharge     Problem: Safety - Adult  Goal: Free from fall injury  6/14/2025 2007 by Sherie Lobo, RN  Outcome: Progressing, Patient remains free of incidence/ injury. Bed remains in low position. Side rails up x2.        Problem: Discharge Planning  Goal: Discharge to home or other facility with appropriate resources  6/14/2025 2007 by Sherie Lobo, RN  Outcome: Progressing  Flowsheets (Taken 6/14/2025 1959)  Discharge to home or other facility with appropriate resources:   Identify barriers to discharge with patient and caregiver   Arrange for needed discharge resources and transportation as appropriate   Identify discharge learning needs (meds, wound care, etc)        Problem: Skin/Tissue Integrity  Goal: Skin integrity remains intact  Description: 1.  Monitor for areas of redness and/or skin breakdown2.  Assess vascular access sites hourly3.  Every 4-6 hours minimum:  Change oxygen saturation probe site4.  Every 4-6 hours:  If on nasal continuous positive airway pressure, respiratory therapy assess nares and determine need for appliance change or resting period  6/14/2025 2007 by Sherie Lobo, RN  Outcome: Progressing  Flowsheets  Taken 6/14/2025 2006  Skin Integrity Remains Intact: Monitor for

## 2025-06-15 NOTE — PROGRESS NOTES
Patient and son given discharge instructions and all questions answered. IV was removed without complications. All personal belongings were gathered. Patient was taken down by wheelchair.

## 2025-06-15 NOTE — PROGRESS NOTES
[facility]    Consult                        Today's Date: 6/15/2025  Patient Name: Hemanth Barrera  Date of admission: 6/12/2025 12:57 PM  Patient's age: 89 y.o., 1/13/1935  Admission Dx: Weakness [R53.1]  General weakness [R53.1]  VARSHA (acute kidney injury) [N17.9]    Reason for Consult:  Cardiac evaluation    Subjective:  Remains with lower BP and orthostatic  No cp  V paced on monitor      Current Facility-Administered Medications:     lidocaine 4 % external patch 1 patch, 1 patch, TransDERmal, Daily, Andre Quezada MD, 1 patch at 06/14/25 1057    diclofenac sodium (VOLTAREN) 1 % gel 4 g, 4 g, Topical, BID, Andre Quezada MD, 4 g at 06/14/25 1954    midodrine (PROAMATINE) tablet 15 mg, 15 mg, Oral, TID WC, Kwaku, Josh, DO, 15 mg at 06/14/25 1629    tamsulosin (FLOMAX) capsule 0.4 mg, 0.4 mg, Oral, Daily, Andre Quezada MD, 0.4 mg at 06/14/25 0938    sodium chloride flush 0.9 % injection 5-40 mL, 5-40 mL, IntraVENous, 2 times per day, Andre Quezada MD, 10 mL at 06/14/25 1955    sodium chloride flush 0.9 % injection 5-40 mL, 5-40 mL, IntraVENous, PRN, Andre Quezada MD    0.9 % sodium chloride infusion, , IntraVENous, PRN, Andre Quezada MD    ondansetron (ZOFRAN-ODT) disintegrating tablet 4 mg, 4 mg, Oral, Q8H PRN **OR** ondansetron (ZOFRAN) injection 4 mg, 4 mg, IntraVENous, Q6H PRN, Andre Quezada MD, 4 mg at 06/14/25 0936    polyethylene glycol (GLYCOLAX) packet 17 g, 17 g, Oral, Daily PRN, Andre Quezada MD    acetaminophen (TYLENOL) tablet 650 mg, 650 mg, Oral, Q6H PRN **OR** acetaminophen (TYLENOL) suppository 650 mg, 650 mg, Rectal, Q6H PRN, Andre Quezada MD    atorvastatin (LIPITOR) tablet 40 mg, 40 mg, Oral, Nightly, Andre Quezada MD, 40 mg at 06/14/25 1953    [Held by provider] bumetanide (BUMEX) tablet 1 mg, 1 mg, Oral, Daily, Andre Quezada MD    clopidogrel (PLAVIX) tablet 75 mg, 75 mg, Oral, Daily,  drug-eluting stent.    LAD also has 40% stenosis,    Mid left circumflex artery has a 70% stenosis, we will do staged PCI if patient is symptomatic.  Otherwise we will prefer antianginal medications    Patient loaded with Plavix, aspirin, continue aspirin 81 mg daily and Plavix 75 mg daily.    As per patient he is allergic to aspirin, the allergy is nausea, patient counseled that he needs aspirin for now.    Continue Angiomax infusion until it finished    No family to discuss the heart  cath finding and report.    Left groin access closed with Angio-Seal     Recommendations     Patient management should include: Aggressive risk factor modification, continued aggressive risk factor modification, aggressive medical management and optimal medical management.       Labs:     CBC:   Recent Labs     06/13/25  0657 06/14/25  0743   WBC 3.1* 3.4*   HGB 10.1* 10.8*   HCT 31.1* 33.9*    193     BMP:   Recent Labs     06/13/25  0657 06/14/25  0743    139   K 4.5 4.4   CO2 25 26   BUN 40* 32*   CREATININE 1.8* 1.2   LABGLOM 35* 57*   GLUCOSE 96 100*     BNP: No results for input(s): \"BNP\" in the last 72 hours.  PT/INR:   Recent Labs     06/12/25  1313   PROTIME 25.4*   INR 2.1     APTT:  Recent Labs     06/12/25  1313   APTT 33.6     CARDIAC ENZYMES:No results for input(s): \"CKTOTAL\", \"CKMB\", \"CKMBINDEX\", \"TROPONINI\" in the last 72 hours.  FASTING LIPID PANEL:  Lab Results   Component Value Date/Time    HDL 50 09/16/2024 03:04 PM    TRIG 41 09/16/2024 03:04 PM     LIVER PROFILE:No results for input(s): \"AST\", \"ALT\", \"LABALBU\" in the last 72 hours.    06/12/25    ECHO (TTE) COMPLETE (PRN CONTRAST/BUBBLE/STRAIN/3D) 06/13/2025  3:11 PM (Final)    Interpretation Summary    Left Ventricle: Normal left ventricular systolic function with a visually estimated EF of 55 - 60%. Left ventricle is smaller than normal. Mildly increased wall thickness. Normal wall motion. E/E' Ratio (Averaged) is 6.13.    Right Ventricle: Right

## 2025-06-15 NOTE — PLAN OF CARE
Problem: Safety - Adult  Goal: Free from fall injury  Outcome: Completed     Problem: Chronic Conditions and Co-morbidities  Goal: Patient's chronic conditions and co-morbidity symptoms are monitored and maintained or improved  Outcome: Completed     Problem: Discharge Planning  Goal: Discharge to home or other facility with appropriate resources  Outcome: Completed     Problem: ABCDS Injury Assessment  Goal: Absence of physical injury  Outcome: Completed     Problem: Skin/Tissue Integrity  Goal: Skin integrity remains intact  Description: 1.  Monitor for areas of redness and/or skin breakdown2.  Assess vascular access sites hourly3.  Every 4-6 hours minimum:  Change oxygen saturation probe site4.  Every 4-6 hours:  If on nasal continuous positive airway pressure, respiratory therapy assess nares and determine need for appliance change or resting period  Outcome: Completed     Problem: Pain  Goal: Verbalizes/displays adequate comfort level or baseline comfort level  Outcome: Completed     Problem: Nutrition Deficit:  Goal: Optimize nutritional status  Outcome: Completed

## 2025-06-15 NOTE — PROGRESS NOTES
NEPHROLOGY CONSULT     Patient :  Hemanth Barrera; 89 y.o. MRN# [id number]  Location:  2059/2059-01  Attending:  Sana Haskins MD  Admit Date:  6/12/2025   Hospital Day: 3      Reason for Consult: Acute kidney injury      Interval Change/Subjective:  Pt seen doing well with no complaints. No CP or SOB  Lungs: CTA  CVS: S1 + S2  Ext: No Edema      History of Present Illness:    This is a 89 y.o. male with a significant past medical history of systemic hypertension, coronary artery disease [s/p PTCA/stent], venous thromboembolism [on Eliquis], chronic atrial fibrillation [on Eliquis], hyperlipidemia and osteoarthritis, who presented to the hospital with complaints of generalized weakness, fatigue and dizziness.  Patient was also noted to be hypotensive  Labs showed serum creatinine of 2.3 mg/dL with baseline creatinine of 1.1 mg/dL  Nephrology consulted for acute kidney injury  Chest x-ray showed right basilar atelectasis  CT head no acute intracranial abnormality    Home medications reviewed shows use of Bumex    Past Medical History:        Diagnosis Date    Acute inferolateral myocardial infarction (HCC) 11/18/2021    Arthritis     Atrial fibrillation (HCC)     CAD (coronary artery disease)     CHF (congestive heart failure) (HCC)     Glaucoma     Hx of blood clots     with hernia surgery    Hyperlipidemia     Hypertension     MI (myocardial infarction) (HCC)     NSTEMI (non-ST elevated myocardial infarction) ([facility]) 11/17/2021       Past Surgical History:        Procedure Laterality Date    ABDOMEN SURGERY      gastric resection    APPENDECTOMY      BONE MARROW BIOPSY      CARDIAC CATHETERIZATION      CARDIAC PROCEDURE N/A 09/17/2024    julio cesar / Left heart cath / coronary angiography / rm 512 performed by Cathie Hastings MD at [facility] CARDIAC CATH LAB    CARDIAC PROCEDURE N/A 09/17/2024    Percutaneous coronary intervention performed by Cathie Hastings MD at [facility] CARDIAC CATH LAB    CARDIAC PROCEDURE Right  01/09/2025    Left heart cath / coronary angiography performed by Guy Paz MD at [facility] CARDIAC CATH LAB    COLONOSCOPY      COLONOSCOPY N/A 08/03/2023    COLONOSCOPY WITH BIOPSY performed by Isauro Razo MD at [facility]    COLONOSCOPY N/A 03/10/2025    COLONOSCOPY DIAGNOSTIC performed by Shirin Torre MD at [facility] OR    CT BIOPSY BONE MARROW  05/31/2022    CT BONE MARROW BIOPSY 5/31/2022 [facility] CT SCAN    EYE SURGERY      smiley cataracts    HERNIA REPAIR      inguinal smiley    INVASIVE VASCULAR N/A 01/09/2025    Ultrasound guided vascular access performed by Guy Paz MD at [facility] CARDIAC CATH LAB    JOINT REPLACEMENT      rt hip x 2, lt knee    PACEMAKER PLACEMENT      SIGMOIDOSCOPY N/A 03/07/2025    SIGMOIDOSCOPY DIAGNOSTIC FLEXIBLE performed by Isauro Razo MD at [facility] OR    UPPER GASTROINTESTINAL ENDOSCOPY N/A 08/02/2023    EGD BIOPSY performed by Isauro Razo MD at [facility]    UPPER GASTROINTESTINAL ENDOSCOPY N/A 03/06/2025    ESOPHAGOGASTRODUODENOSCOPY performed by Alejandra Kraus MD at [facility] OR    UPPER GASTROINTESTINAL ENDOSCOPY N/A 5/22/2025    ESOPHAGOGASTRODUODENOSCOPY BIOPSY WITH APC AND BALLOON DILATION performed by Isauro Razo MD at [facility]       Current Medications:    midodrine (PROAMATINE) tablet 20 mg, TID WC  lidocaine 4 % external patch 1 patch, Daily  diclofenac sodium (VOLTAREN) 1 % gel 4 g, BID  tamsulosin (FLOMAX) capsule 0.4 mg, Daily  sodium chloride flush 0.9 % injection 5-40 mL, 2 times per day  sodium chloride flush 0.9 % injection 5-40 mL, PRN  0.9 % sodium chloride infusion, PRN  ondansetron (ZOFRAN-ODT) disintegrating tablet 4 mg, Q8H PRN   Or  ondansetron (ZOFRAN) injection 4 mg, Q6H PRN  polyethylene glycol (GLYCOLAX) packet 17 g, Daily PRN  acetaminophen (TYLENOL) tablet 650 mg, Q6H PRN   Or  acetaminophen (TYLENOL) suppository 650 mg, Q6H PRN  atorvastatin (LIPITOR) tablet 40 mg, Nightly  [Held by provider] bumetanide (BUMEX) tablet 1

## 2025-06-15 NOTE — DISCHARGE INSTRUCTIONS
Follow up with your primary care physician, Neftaly Contreras MD , in 1 week  Follow up with cardiology, [facility], in 2 weeks  Follow-up with nephrology, Colin Burleson MD, in 2 weeks  Take all your medication as prescribed Flomax 0.4 mg (take 1 tablet by mouth daily), Eliquis 5 mg (take 1 tablet by mouth 2 times daily), midodrine 10 mg (take 2 tablets by mouth 3 times daily). If your prescription has refills, obtain the medication refills from the pharmacy before you run out. Seek medical attention if you run out of a medication you need and do not have any refills of.   Reasons to call your doctor or go to the ER: Nausea, vomiting, dizziness, lightheadedness, chest pain, loss of consciousness, or any other concerning symptoms.  Stop taking Bumex 1 mg, diltiazem 120 mg, Imdur 30 mg, metoprolol XL 50 mg; please follow-up with your PCP and cardiologist for further instruction on resuming medication  Your midodrine dose has been increased to midodrine 10 mg, take 2 tablets by mouth 3 times daily  Please complete lab work including CBC and BMP in 1 week

## 2025-06-15 NOTE — PROGRESS NOTES
[facility]   Physical Therapy Treatment  Date: 6/15/25  Patient Name: Hemanth Barrera       Room: -  MRN: [id number]  Account: [id number]   : 1935  (89 y.o.) Gender: male     Discharge Recommendations:  Discharge Recommendations: Continue to assess pending progress, Patient would benefit from continued therapy after discharge, Therapy recommended at discharge     PT Equipment Recommendations  Equipment Needed: No         Past Medical History:  has a past medical history of Acute inferolateral myocardial infarction (HCC), Arthritis, Atrial fibrillation (HCC), CAD (coronary artery disease), CHF (congestive heart failure) ([facility]), Glaucoma, Hx of blood clots, Hyperlipidemia, Hypertension, MI (myocardial infarction) ([facility]), and NSTEMI (non-ST elevated myocardial infarction) ([facility]).  Past Surgical History:   has a past surgical history that includes pacemaker placement; Abdomen surgery; Cardiac catheterization; Appendectomy; Colonoscopy; eye surgery; hernia repair; bone marrow biopsy; joint replacement; CT BIOPSY BONE MARROW (2022); Upper gastrointestinal endoscopy (N/A, 2023); Colonoscopy (N/A, 2023); Cardiac procedure (N/A, 2024); Cardiac procedure (N/A, 2024); Cardiac procedure (Right, 2025); invasive vascular (N/A, 2025); Upper gastrointestinal endoscopy (N/A, 2025); sigmoidoscopy (N/A, 2025); Colonoscopy (N/A, 03/10/2025); and Upper gastrointestinal endoscopy (N/A, 2025).    Restrictions  Restrictions/Precautions  Restrictions/Precautions: Fall Risk, General Precautions, Bed Alarm  Required Braces or Orthoses?: No  Implants Present? : Pacemaker, Metal implants (L TKA, R AGUSTO)     Subjective  Peasant and cooperative.     General Comments: Pt resting in bed upon entering room     Pain  Pre-Pain: 7  Post-Pain: 7  Pain Location: Left, Knee     Objective    Transfers  Comment: Pt declined.     Mobility     help is needed climbing 3-5 steps with a railing?: Total  AM-PAC Inpatient Mobility Raw Score : 16  AM-PAC Inpatient T-Scale Score : 40.78  Mobility Inpatient CMS 0-100% Score: 54.16  Mobility Inpatient CMS G-Code Modifier : CK     Goals     Short Term Goals  Time Frame for Short Term Goals: 2-3 days  Short Term Goal 1: pt able to perform bed mobility mod-I  Short Term Goal 2: pt able to transfer from various surfaces with supervision  Short Term Goal 3: Improve standing balance with RW to Fair for increased safety of transfers/ADLs  Short Term Goal 4: pt able to ambulate 25-30ft with RW and supervision for household mobility  Short Term Goal 5: pt able to negotiate 2 steps with rail and CGA for home entry      Plan  Physical Therapy Plan  General Plan: 5-7 times per week  Current Treatment Recommendations: Strengthening, Balance training, Functional mobility training, Gait training, Stair training, Therapeutic activities, Transfer training, Endurance training   Safety Devices  Type of Devices: Bed alarm in place, Call light within reach, Left in bed       PT Individual Minutes  Time In: 0909  Time Out: 0932  Minutes: 23           Electronically signed by Cindy Wilkerson PTA on 6/15/25 at 9:45 AM EDT

## 2025-06-15 NOTE — CARE COORDINATION
Case Management   Daily Progress Note       Patient Name: Hemanth Barrera                   YOB: 1935  Diagnosis: Weakness [R53.1]  General weakness [R53.1]  VARSHA (acute kidney injury) [N17.9]                       GMLOS: 2.4 days  Length of Stay: 3  days    Readmission Risk (Low < 19, Mod (19-27), High > 27): Readmission Risk Score: 32.4      Chart Reviewed, and Current Transitional Plan is:    [] Home Independently    [x] Home with  - [facility] (accepted back).    [] Skilled Nursing Facility    [] Acute Rehabilitation    [] Long Term Acute Care (LTAC)    [] Other:     Additional Notes:     VARSHA remains improved with Creatinine 1.2 today.    Left another message for pt's son, Dusty, to make sure he is okay with pt returning home. Have not received call back.    Electronically signed by Negrita Lubin RN on 6/15/2025 at 8:24 AM

## 2025-06-16 ENCOUNTER — TELEPHONE (OUTPATIENT)
Dept: INTERNAL MEDICINE CLINIC | Age: 89
End: 2025-06-16

## 2025-06-16 NOTE — PROGRESS NOTES
Physician Progress Note      PATIENT:               DONNA DYSON  [facility] #:                  [id number]  :                       1935  ADMIT DATE:       2025 8:54 AM  DISCH DATE:        2025 5:24 PM  RESPONDING  PROVIDER #:        Sana Haskins MD          QUERY TEXT:    Based on your medical judgment, please clarify these findings and document if   any of the following are being evaluated and/or treated:    The clinical indicators include:  This 89-year-old male presents for complaints of shortness of breath and   left-sided rib pain  -89 years, CHF, HTN  -Atrial Fibrillation, Continue Cardizem 120 mg daily, Toprol-XL 50 mg daily   (H&P , Sana Haskins MD)  -HFpEF with EF 56%,   Continue home dose Bumex 2 mg daily (H&P , Sana Haskins MD)  -Atrial Fibrillation, Continue Cardizem 120 mg daily, Toprol-XL 50 mg daily,     Continue home dose Eliquis (IM PN , Sana Haskins MD)  -CAD s/p stent placement , Echo 3/2025 showing normal LVSF with EF 55 to   60% (IM PN , Sana Haskins MD)  -HFpEF with EF 56%, Continue Bumex 1 mg daily (IM PN , Sana Haskins MD)  -PAF/SSS with paced rhythm. Continue PO BB & Cardizem. Resume Eliquis post EGD   (Cardiology PN , Susana Pearl, APRN - CNP)  -Prothrombin Time: 18.2 ()  -INR: 1.4 ()  -apixaban (ELIQUIS) tablet 5 mg, INR Monitoring  Thank You  [facility] CDS  Options provided:  -- Secondary hypercoagulable state in a patient with atrial fibrillation  -- Other - I will add my own diagnosis  -- Disagree - Not applicable / Not valid  -- Disagree - Clinically unable to determine / Unknown  -- Refer to Clinical Documentation Reviewer    PROVIDER RESPONSE TEXT:    This patient has secondary hypercoagulable state in a patient with atrial   fibrillation.    Query created by: Parish Jacobs on 2025 12:36 AM      Electronically signed by:  Sana Haskins MD 2025 2:53 PM

## 2025-06-16 NOTE — TELEPHONE ENCOUNTER
Care Transitions Initial Follow Up Call    Outreach made within 2 business days of discharge: Yes    Patient: Hemanth Barrera Patient : 1935   MRN: [id number]  Reason for Admission: weakness  Discharge Date: 6/15/25       Flipped upcoming appointment     Follow Up  Future Appointments   Date Time Provider Department Center   2025  3:15 PM Terrell De Los Santos MD [facility] DEP       Anette Riley MA

## 2025-06-18 ENCOUNTER — TELEPHONE (OUTPATIENT)
Dept: INTERNAL MEDICINE CLINIC | Age: 89
End: 2025-06-18

## 2025-06-20 NOTE — DISCHARGE SUMMARY
[facility]   IN-PATIENT SERVICE   [facility]    Discharge Summary     Patient ID: Hemanth Barrera  :  1935   MRN: [id number]     ACCOUNT:  [id number]   Patient's PCP: Neftaly Contreras MD  Admit Date: 2025   Discharge Date: 2025     Length of Stay: 3  Code Status:  Prior  Admitting Physician: Sana Haskins MD  Discharge Physician: Andre Quezada MD     Active Discharge Diagnoses:       Primary Problem  Weakness      Hospital Problems  Active Hospital Problems    Diagnosis Date Noted    Weakness [R53.1] 2025    Severe malnutrition [E43] 2024       Admission Condition:  poor     Discharged Condition: good    Hospital Stay:       Hospital Course:  Hemanth Barrera is a 89 y.o. male who was admitted for the management of   Weakness , presented to ER with Extremity Weakness and Shortness of Breath    Patient is a 89-year-old male with a history of CAD, A-fib, PE on Eliquis, CKD, hyperlipidemia who presented with dizziness and generalized weakness.  According to the patient for the past few months patient has been complaining of dizziness and generalized weakness.  He stated that when he gets up from seated position he feels dizzy.  Patient denied any headaches, loss of consciousness, falls, nausea, change in vision, or vomiting.  Patient did not endorse any chest pain.  Patient stated that his appetite is good and he is eating and drinking well. Patient was recently admitted on 2025 at [facility] for management of VARSHA on CKD and dyspnea.  At that time patient was found to be positive for human metapneumovirus and was managed with p.o. steroids as well as IV fluids for VARSHA.  He also complained of dysphagia and EGD was completed with esophageal dilatation, white nummular lesions in the proximal esophagus likely secondary to Candida. On arrival to the ED BP 97/68, HR 70, RR 16, afebrile.  Labs were drawn which showed Hb 10.5, WBC 4.1, ,

## 2025-06-22 NOTE — PROGRESS NOTES
Physician Progress Note      PATIENT:               DONNA DYSON  CSN #:                  [id number]  :                       1935  ADMIT DATE:       2025 12:57 PM  DISCH DATE:        6/15/2025 3:18 PM  RESPONDING  PROVIDER #:        Sana Haskins MD          QUERY TEXT:    Elevated tropes likely 2/2 type II NSTEMI is documented in the medical record   by Progress Notes by Sana Haskins MD at 6/15/2025. Please specify the type   of MI:    The clinical indicators include:  89 y.o.   male who presents with Extremity Weakness and Shortness of Breath    -\"Elevated tropes likely 2/2 NSTEMI-History of NSTEMI, CAD-Troponin 64 > 57-   Continue aspirin 81 mg-Continue Ranexa 500 mg twice -daily-Continue Plavix 75   mg daily\"- (from H&P by Sana Haskins MD at 2025)    -\"mildly elevated troponin type II flat secondary to VARSHA-Patient loaded with   Plavix, aspirin, continue aspirin 81 mg daily and Plavix 75 mg daily\"- (from   Consults by Denny Ames DO)    -\"Elevated tropes likely 2/2 NSTEMI\"- (from Progress Notes by Kaitlin Dsouza MD at 2025)    -\"mildly elevated troponin type II flat secondary to VARSHA-Continue   Plavix\"-(from Progress Notes by Josh Ames DO)    -\"Elevated tropes likely 2/2 type II NSTEMI-Patient presented with SOB and   Troponin 64 > 57-Continue Plavix 75 mg daily-Cardiology on board\"- (from   Progress Notes by Sana Haskins MD at 2025)    -\"Elevated tropes likely 2/2 type II NSTEMI-\"- (from Progress Notes by Sana Haskins MD at 6/15/2025)    -Troponin- 64, 57, (from labs on )    -Treated by aspirin chewable tablet 81 mg, ranolazine (RANEXA)   extended-release tablet 500 mg, clopidogrel (PLAVIX) tablet 75 mg-(from MAR on   )      Thank you.  Cholo Bowser [facility] CDS  Options provided:  -- Non-ST elevation  -- Type 2 MI secondary to VARSHA  -- Other - I will add my own diagnosis  -- Disagree - Not applicable / Not valid  -- Disagree - Clinically unable to

## 2025-06-23 NOTE — PROGRESS NOTES
Physician Progress Note      PATIENT:               DONNA DYSON  CSN #:                  [id number]  :                       1935  ADMIT DATE:       2025 12:57 PM  DISCH DATE:        6/15/2025 3:18 PM  RESPONDING  PROVIDER #:        Sana Haskins MD          QUERY TEXT:    Enlarged prostate is documented in the medical record DS 6/15 by Sana Haskins MD. Please specify the associated urinary conditions:    The clinical indicators include:  -Age 89 yrs M, Prostate enlargement, urinary retention.    -\"Patient evaluated for weakness, Patient has a history of prostate   enlargement, Urinary retention Patient noted to have an adequate urine output   on 2025, bladder scan was completed which showed prevoid volume of 717,   order for straight cath was placed however patient had urinated 610 mL and   postvoid result was 265.  Flomax 0.4 mg daily (PN , 6/15 by Sana Beck MD)    -\"Due to patient's history of prostate enlargement and urinary retention,   urology was consulted, and patient was started on Flomax 0.4 mg daily. Patient   continued to improve throughout his admission and decision was made to   discharge the patient on 6/15/25 with Flomax 0.4 mg daily, midodrine 20 mg   TID\" (DS 6/15 by Andre Francis MD)    -Treated with tamsulosin (FLOMAX) capsule 0.4 mg- (from EPIC MAR )    Thank you.  [facility] CDS  Options provided:  -- BPH with partial/complete urinary obstruction  -- Other - I will add my own diagnosis  -- Disagree - Not applicable / Not valid  -- Disagree - Clinically unable to determine / Unknown  -- Refer to Clinical Documentation Reviewer    PROVIDER RESPONSE TEXT:    This patient has BPH with partial/complete urinary obstruction.    Query created by: Markel Figuerao on 2025 11:06 AM      Electronically signed by:  Sana Haskins MD 2025 2:12 PM

## 2025-06-23 NOTE — PROGRESS NOTES
Physician Progress Note      PATIENT:               DONNA DYSON  CSN #:                  [id number]  :                       1935  ADMIT DATE:       2025 12:57 PM  DISCH DATE:        6/15/2025 3:18 PM  RESPONDING  PROVIDER #:        Sana Haskins MD          QUERY TEXT:    Noted to have Atrial fibrillation on Eliquis by H&P by Sana Haskins MD at   2025.Based on your medical judgment, please clarify these findings and   document if any of the following are being evaluated and/or treated:    The clinical indicators include:  -Age 58 F with HTN, CHF, NSTEMI type 2 having \"[id number] score 5.    -\"Patient admitted to the hospital for the management of Extremity Weakness   and Shortness of Breath. Medical history significant CAD, A-fib, PE on   Eliquis, CKD, hyperlipidemia who presented with dizziness and generalized   weakness\" (HP  by Sana Beck MD)    -\"Patient has a past medical history of Acute inferolateral myocardial   infarction, Arthritis, Atrial fibrillation, CAD (coronary artery disease), CHF   (congestive heart failure), Glaucoma, Hx of blood clots, Hyperlipidemia,   Hypertension, MI (myocardial infarction), and NSTEMI (non-ST elevated   myocardial infarction). A-fib. Resume home dose Eliquis\" (PN  by Kaitlin Dotson MD)    -\"Chronic atrial fibrillation [on Eliquis].\"  (PN 6/15 by nephrology Andi Paz MD)    -apixaban (ELIQUIS) tablet 2.5 mg -(from EPIC 'MAR' on )    Thank you.  Markel Figueroa S CDS  Options provided:  -- Secondary hypercoagulable state in a patient with atrial fibrillation  -- Other - I will add my own diagnosis  -- Disagree - Not applicable / Not valid  -- Disagree - Clinically unable to determine / Unknown  -- Refer to Clinical Documentation Reviewer    PROVIDER RESPONSE TEXT:    This patient has secondary hypercoagulable state in a patient with atrial   fibrillation.    Query created by: Markel Figueroa on 2025 11:03

## 2025-06-24 ENCOUNTER — APPOINTMENT (OUTPATIENT)
Dept: CT IMAGING | Age: 89
DRG: 552 | End: 2025-06-24
Payer: MEDICARE

## 2025-06-24 ENCOUNTER — APPOINTMENT (OUTPATIENT)
Dept: GENERAL RADIOLOGY | Age: 89
DRG: 552 | End: 2025-06-24
Payer: MEDICARE

## 2025-06-24 ENCOUNTER — HOSPITAL ENCOUNTER (INPATIENT)
Age: 89
LOS: 3 days | Discharge: NURSING, REHAB OR LONG-TERM CARE | DRG: 552 | End: 2025-06-27
Attending: EMERGENCY MEDICINE
Payer: MEDICARE

## 2025-06-24 DIAGNOSIS — M25.562 ACUTE PAIN OF LEFT KNEE: ICD-10-CM
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
## 2025-06-24 DIAGNOSIS — R52 INTRACTABLE PAIN: ICD-10-CM

## 2025-06-24 DIAGNOSIS — S39.012A BACK STRAIN, INITIAL ENCOUNTER: ICD-10-CM

## 2025-06-24 LAB
ANION GAP SERPL CALCULATED.3IONS-SCNC: 10 MMOL/L (ref 9–16)
BASOPHILS # BLD: 0 K/UL (ref 0–0.2)
BASOPHILS NFR BLD: 0 % (ref 0–2)
BUN SERPL-MCNC: 16 MG/DL (ref 8–23)
CALCIUM SERPL-MCNC: 8.8 MG/DL (ref 8.6–10.4)
CHLORIDE SERPL-SCNC: 107 MMOL/L (ref 98–107)
CO2 SERPL-SCNC: 22 MMOL/L (ref 20–31)
CREAT SERPL-MCNC: 1.1 MG/DL (ref 0.7–1.2)
EOSINOPHIL # BLD: 0 K/UL (ref 0–0.4)
EOSINOPHILS RELATIVE PERCENT: 0 % (ref 0–4)
ERYTHROCYTE [DISTWIDTH] IN BLOOD BY AUTOMATED COUNT: 17.1 % (ref 11.5–14.9)
GFR, ESTIMATED: 64 ML/MIN/1.73M2
GLUCOSE SERPL-MCNC: 110 MG/DL (ref 74–99)
HCT VFR BLD AUTO: 32.2 % (ref 41–53)
HGB BLD-MCNC: 10.4 G/DL (ref 13.5–17.5)
IMM GRANULOCYTES # BLD AUTO: 0 K/UL (ref 0–0.3)
IMM GRANULOCYTES NFR BLD: 0 %
LYMPHOCYTES NFR BLD: 0.53 K/UL (ref 1–4.8)
LYMPHOCYTES RELATIVE PERCENT: 21 % (ref 24–44)
MCH RBC QN AUTO: 30.7 PG (ref 26–34)
MCHC RBC AUTO-ENTMCNC: 32.3 G/DL (ref 31–37)
MCV RBC AUTO: 95 FL (ref 80–100)
MONOCYTES NFR BLD: 0.13 K/UL (ref 0.1–1.3)
MONOCYTES NFR BLD: 5 % (ref 1–7)
MORPHOLOGY: ABNORMAL
NEUTROPHILS NFR BLD: 74 % (ref 36–66)
NEUTS SEG NFR BLD: 1.84 K/UL (ref 1.3–9.1)
NRBC BLD-RTO: 0 PER 100 WBC
PLATELET # BLD AUTO: 204 K/UL (ref 150–450)
PMV BLD AUTO: 9.4 FL (ref 8–13.5)
POTASSIUM SERPL-SCNC: 3.9 MMOL/L (ref 3.7–5.3)
RBC # BLD AUTO: 3.39 M/UL (ref 4.21–5.77)
SODIUM SERPL-SCNC: 139 MMOL/L (ref 136–145)
WBC OTHER # BLD: 2.5 K/UL (ref 3.5–11)

## 2025-06-24 PROCEDURE — 72128 CT CHEST SPINE W/O DYE: CPT

## 2025-06-24 PROCEDURE — 99285 EMERGENCY DEPT VISIT HI MDM: CPT

## 2025-06-24 PROCEDURE — 96374 THER/PROPH/DIAG INJ IV PUSH: CPT

## 2025-06-24 PROCEDURE — 6370000000 HC RX 637 (ALT 250 FOR IP)

## 2025-06-24 PROCEDURE — 6360000002 HC RX W HCPCS: Performed by: PHYSICIAN ASSISTANT

## 2025-06-24 PROCEDURE — 73700 CT LOWER EXTREMITY W/O DYE: CPT

## 2025-06-24 PROCEDURE — 6360000002 HC RX W HCPCS

## 2025-06-24 PROCEDURE — 72131 CT LUMBAR SPINE W/O DYE: CPT

## 2025-06-24 PROCEDURE — 70450 CT HEAD/BRAIN W/O DYE: CPT

## 2025-06-24 PROCEDURE — 72125 CT NECK SPINE W/O DYE: CPT

## 2025-06-24 PROCEDURE — 73610 X-RAY EXAM OF ANKLE: CPT

## 2025-06-24 PROCEDURE — 96376 TX/PRO/DX INJ SAME DRUG ADON: CPT

## 2025-06-24 PROCEDURE — 85025 COMPLETE CBC W/AUTO DIFF WBC: CPT

## 2025-06-24 PROCEDURE — 80048 BASIC METABOLIC PNL TOTAL CA: CPT

## 2025-06-24 PROCEDURE — 36415 COLL VENOUS BLD VENIPUNCTURE: CPT

## 2025-06-24 PROCEDURE — 1200000000 HC SEMI PRIVATE

## 2025-06-24 PROCEDURE — 73502 X-RAY EXAM HIP UNI 2-3 VIEWS: CPT

## 2025-06-24 PROCEDURE — 2500000003 HC RX 250 WO HCPCS

## 2025-06-24 PROCEDURE — 6370000000 HC RX 637 (ALT 250 FOR IP): Performed by: PHYSICIAN ASSISTANT

## 2025-06-24 PROCEDURE — 73552 X-RAY EXAM OF FEMUR 2/>: CPT

## 2025-06-24 RX ORDER — ONDANSETRON 4 MG/1
4 TABLET, ORALLY DISINTEGRATING ORAL EVERY 8 HOURS PRN
Status: DISCONTINUED | OUTPATIENT
Start: 2025-06-24 | End: 2025-06-27 | Stop reason: HOSPADM

## 2025-06-24 RX ORDER — ACETAMINOPHEN 325 MG/1
650 TABLET ORAL EVERY 6 HOURS PRN
Status: DISCONTINUED | OUTPATIENT
Start: 2025-06-24 | End: 2025-06-27 | Stop reason: HOSPADM

## 2025-06-24 RX ORDER — ONDANSETRON 2 MG/ML
4 INJECTION INTRAMUSCULAR; INTRAVENOUS EVERY 6 HOURS PRN
Status: DISCONTINUED | OUTPATIENT
Start: 2025-06-24 | End: 2025-06-27 | Stop reason: HOSPADM

## 2025-06-24 RX ORDER — CLOPIDOGREL BISULFATE 75 MG/1
75 TABLET ORAL DAILY
Status: DISCONTINUED | OUTPATIENT
Start: 2025-06-24 | End: 2025-06-27 | Stop reason: HOSPADM

## 2025-06-24 RX ORDER — ATORVASTATIN CALCIUM 40 MG/1
40 TABLET, FILM COATED ORAL NIGHTLY
Status: DISCONTINUED | OUTPATIENT
Start: 2025-06-24 | End: 2025-06-27 | Stop reason: HOSPADM

## 2025-06-24 RX ORDER — FENTANYL CITRATE 0.05 MG/ML
25 INJECTION, SOLUTION INTRAMUSCULAR; INTRAVENOUS
Status: DISCONTINUED | OUTPATIENT
Start: 2025-06-24 | End: 2025-06-25

## 2025-06-24 RX ORDER — MIDODRINE HYDROCHLORIDE 10 MG/1
20 TABLET ORAL
Status: DISCONTINUED | OUTPATIENT
Start: 2025-06-24 | End: 2025-06-27 | Stop reason: HOSPADM

## 2025-06-24 RX ORDER — MAGNESIUM SULFATE HEPTAHYDRATE 40 MG/ML
2000 INJECTION, SOLUTION INTRAVENOUS PRN
Status: DISCONTINUED | OUTPATIENT
Start: 2025-06-24 | End: 2025-06-27 | Stop reason: HOSPADM

## 2025-06-24 RX ORDER — ASPIRIN 81 MG/1
81 TABLET, CHEWABLE ORAL DAILY
Status: DISCONTINUED | OUTPATIENT
Start: 2025-06-24 | End: 2025-06-27 | Stop reason: HOSPADM

## 2025-06-24 RX ORDER — ACETAMINOPHEN 325 MG/1
650 TABLET ORAL ONCE
Status: COMPLETED | OUTPATIENT
Start: 2025-06-24 | End: 2025-06-24

## 2025-06-24 RX ORDER — SODIUM CHLORIDE 0.9 % (FLUSH) 0.9 %
5-40 SYRINGE (ML) INJECTION EVERY 12 HOURS SCHEDULED
Status: DISCONTINUED | OUTPATIENT
Start: 2025-06-24 | End: 2025-06-27 | Stop reason: HOSPADM

## 2025-06-24 RX ORDER — OXYCODONE HYDROCHLORIDE 5 MG/1
5 TABLET ORAL EVERY 6 HOURS PRN
Status: DISCONTINUED | OUTPATIENT
Start: 2025-06-24 | End: 2025-06-27 | Stop reason: HOSPADM

## 2025-06-24 RX ORDER — POLYETHYLENE GLYCOL 3350 17 G/17G
17 POWDER, FOR SOLUTION ORAL DAILY PRN
Status: DISCONTINUED | OUTPATIENT
Start: 2025-06-24 | End: 2025-06-27

## 2025-06-24 RX ORDER — FENTANYL CITRATE 0.05 MG/ML
25 INJECTION, SOLUTION INTRAMUSCULAR; INTRAVENOUS ONCE
Refills: 0 | Status: COMPLETED | OUTPATIENT
Start: 2025-06-24 | End: 2025-06-24

## 2025-06-24 RX ORDER — POTASSIUM CHLORIDE 7.45 MG/ML
10 INJECTION INTRAVENOUS PRN
Status: DISCONTINUED | OUTPATIENT
Start: 2025-06-24 | End: 2025-06-27 | Stop reason: HOSPADM

## 2025-06-24 RX ORDER — ACETAMINOPHEN 650 MG/1
650 SUPPOSITORY RECTAL EVERY 6 HOURS PRN
Status: DISCONTINUED | OUTPATIENT
Start: 2025-06-24 | End: 2025-06-27 | Stop reason: HOSPADM

## 2025-06-24 RX ORDER — FINASTERIDE 5 MG/1
5 TABLET, FILM COATED ORAL DAILY
Status: DISCONTINUED | OUTPATIENT
Start: 2025-06-24 | End: 2025-06-27 | Stop reason: HOSPADM

## 2025-06-24 RX ORDER — FENTANYL CITRATE 0.05 MG/ML
25 INJECTION, SOLUTION INTRAMUSCULAR; INTRAVENOUS ONCE
Status: COMPLETED | OUTPATIENT
Start: 2025-06-24 | End: 2025-06-24

## 2025-06-24 RX ORDER — TAMSULOSIN HYDROCHLORIDE 0.4 MG/1
0.4 CAPSULE ORAL DAILY
Status: DISCONTINUED | OUTPATIENT
Start: 2025-06-24 | End: 2025-06-27 | Stop reason: HOSPADM

## 2025-06-24 RX ORDER — POTASSIUM CHLORIDE 1500 MG/1
40 TABLET, EXTENDED RELEASE ORAL PRN
Status: DISCONTINUED | OUTPATIENT
Start: 2025-06-24 | End: 2025-06-27 | Stop reason: HOSPADM

## 2025-06-24 RX ORDER — PANTOPRAZOLE SODIUM 40 MG/1
40 TABLET, DELAYED RELEASE ORAL
Status: DISCONTINUED | OUTPATIENT
Start: 2025-06-25 | End: 2025-06-27 | Stop reason: HOSPADM

## 2025-06-24 RX ORDER — SODIUM CHLORIDE 0.9 % (FLUSH) 0.9 %
5-40 SYRINGE (ML) INJECTION PRN
Status: DISCONTINUED | OUTPATIENT
Start: 2025-06-24 | End: 2025-06-27 | Stop reason: HOSPADM

## 2025-06-24 RX ORDER — NITROGLYCERIN 0.4 MG/1
0.4 TABLET SUBLINGUAL PRN
Status: DISCONTINUED | OUTPATIENT
Start: 2025-06-24 | End: 2025-06-27 | Stop reason: HOSPADM

## 2025-06-24 RX ORDER — SODIUM CHLORIDE 9 MG/ML
INJECTION, SOLUTION INTRAVENOUS PRN
Status: DISCONTINUED | OUTPATIENT
Start: 2025-06-24 | End: 2025-06-27 | Stop reason: HOSPADM

## 2025-06-24 RX ORDER — RANOLAZINE 500 MG/1
500 TABLET, EXTENDED RELEASE ORAL 2 TIMES DAILY
Status: DISCONTINUED | OUTPATIENT
Start: 2025-06-24 | End: 2025-06-27 | Stop reason: HOSPADM

## 2025-06-24 RX ORDER — FERROUS SULFATE 325(65) MG
325 TABLET ORAL 2 TIMES DAILY WITH MEALS
Status: DISCONTINUED | OUTPATIENT
Start: 2025-06-24 | End: 2025-06-27 | Stop reason: HOSPADM

## 2025-06-24 RX ADMIN — FENTANYL CITRATE 25 MCG: 0.05 INJECTION, SOLUTION INTRAMUSCULAR; INTRAVENOUS at 12:47

## 2025-06-24 RX ADMIN — FENTANYL CITRATE 25 MCG: 0.05 INJECTION, SOLUTION INTRAMUSCULAR; INTRAVENOUS at 14:43

## 2025-06-24 RX ADMIN — OXYCODONE HYDROCHLORIDE 5 MG: 5 TABLET ORAL at 19:48

## 2025-06-24 RX ADMIN — ATORVASTATIN CALCIUM 40 MG: 40 TABLET, FILM COATED ORAL at 19:48

## 2025-06-24 RX ADMIN — APIXABAN 5 MG: 5 TABLET, FILM COATED ORAL at 22:01

## 2025-06-24 RX ADMIN — RANOLAZINE 500 MG: 500 TABLET, EXTENDED RELEASE ORAL at 19:49

## 2025-06-24 RX ADMIN — ACETAMINOPHEN 650 MG: 325 TABLET ORAL at 14:43

## 2025-06-24 RX ADMIN — FENTANYL CITRATE 25 MCG: 0.05 INJECTION, SOLUTION INTRAMUSCULAR; INTRAVENOUS at 22:07

## 2025-06-24 RX ADMIN — SODIUM CHLORIDE, PRESERVATIVE FREE 10 ML: 5 INJECTION INTRAVENOUS at 22:01

## 2025-06-24 ASSESSMENT — PAIN DESCRIPTION - LOCATION
LOCATION: KNEE
LOCATION: KNEE
LOCATION: HIP;LEG
LOCATION: KNEE
LOCATION: HIP;LEG

## 2025-06-24 ASSESSMENT — PAIN SCALES - GENERAL
PAINLEVEL_OUTOF10: 9
PAINLEVEL_OUTOF10: 7
PAINLEVEL_OUTOF10: 7
PAINLEVEL_OUTOF10: 6
PAINLEVEL_OUTOF10: 7
PAINLEVEL_OUTOF10: 5
PAINLEVEL_OUTOF10: 9

## 2025-06-24 ASSESSMENT — PAIN DESCRIPTION - ORIENTATION
ORIENTATION: LEFT
ORIENTATION: LEFT

## 2025-06-24 ASSESSMENT — VISUAL ACUITY: OU: 1

## 2025-06-24 ASSESSMENT — PAIN - FUNCTIONAL ASSESSMENT: PAIN_FUNCTIONAL_ASSESSMENT: 0-10

## 2025-06-24 NOTE — PROGRESS NOTES
Pt admitted to room 2060 per cart from ED.  Awake and alert but forgetful.  No complaints except that he hasn't eaten.  Food ordered.  Oriented to room and call system.

## 2025-06-24 NOTE — ED TRIAGE NOTES
Mode of arrival (squad #, walk in, police, etc) : [place]        Chief complaint(s): Fall, knee pain        Arrival Note (brief scenario, treatment PTA, etc).: Pt arrives to ED c/o left knee pain following a fall. Patient reports he fell last night after \"my knee just gave out.\" Patient denies hitting his head and denies loss of consciousness. Patient is not on any blood thinners. Patient was found by his son this morning unable to get up off the floor on his own.

## 2025-06-24 NOTE — ED NOTES
Report given to LEON Ortega from ED.   Report method in person   The following was reviewed with receiving RN:   Current vital signs:  BP (!) 132/92   Pulse 72   Temp 98 °F (36.7 °C) (Oral)   Resp 17   Ht 1.854 m (6' 1\")   Wt 63.5 kg (140 lb)   SpO2 100%   BMI 18.47 kg/m²                MEWS Score: 1     Any medication or safety alerts were reviewed. Any pending diagnostics and notifications were also reviewed, as well as any safety concerns or issues, abnormal labs, abnormal imaging, and abnormal assessment findings. Questions were answered.

## 2025-06-24 NOTE — ED NOTES
Report given to Mey RN from [facility].   Report method by phone   The following was reviewed with receiving RN:   Current vital signs:  BP (!) 131/90   Pulse 72   Temp 98 °F (36.7 °C) (Oral)   Resp 17   Ht 1.854 m (6' 1\")   Wt 63.5 kg (140 lb)   SpO2 100%   BMI 18.47 kg/m²                MEWS Score: 1     Any medication or safety alerts were reviewed. Any pending diagnostics and notifications were also reviewed, as well as any safety concerns or issues, abnormal labs, abnormal imaging, and abnormal assessment findings. Questions were answered.

## 2025-06-24 NOTE — ED PROVIDER NOTES
or rebound.      Hernia: No hernia is present.   Musculoskeletal:         General: Swelling and tenderness present. No deformity or signs of injury.      Cervical back: Normal, full passive range of motion without pain and normal range of motion. No rigidity, tenderness or bony tenderness. No pain with movement, spinous process tenderness or muscular tenderness.      Thoracic back: Tenderness and bony tenderness present. No swelling, edema, deformity, signs of trauma, lacerations or spasms. Normal range of motion. No scoliosis.      Lumbar back: Tenderness and bony tenderness present. No swelling, edema, deformity, signs of trauma, lacerations or spasms. Normal range of motion. No scoliosis.        Back:       Left hip: Tenderness and bony tenderness present. No deformity, lacerations or crepitus. Normal range of motion. Normal strength.      Left upper leg: Tenderness and bony tenderness present. No swelling, edema, deformity or lacerations.      Left knee: Swelling and bony tenderness present. No deformity, effusion, erythema, ecchymosis or lacerations. Decreased range of motion. Tenderness present over the medial joint line and lateral joint line.      Right lower leg: No edema.      Left lower leg: Normal. No swelling, deformity, lacerations, tenderness or bony tenderness. No edema.      Left ankle: No swelling, deformity, ecchymosis or lacerations. Tenderness present over the lateral malleolus and medial malleolus. No base of 5th metatarsal tenderness. Normal range of motion. Anterior drawer test negative. Normal pulse.      Left Achilles Tendon: Normal.      Right foot: No foot drop.      Left foot: Normal. No foot drop.      Comments: Lower extremity strength bilaterally:   Hip Flexor / iliopsoas (L2): 5/5  Knee Extensors / quadriceps (L3): 5/5  Ankle dorsiflexors / tibialis anterior (L4): 5/5  Long toe extensors  / EHL (L5): 5/5  Downgoing babinski. Proprioception intact.      Lymphadenopathy:      Cervical:    soft tissues at the outer margin of the medial tibial plateau.  Correlation   with prior baseline left knee radiographs likely from outside institution is   recommended to assess the stability of this hardware and its position.  No   preceding left knee radiographs or baseline imaging is present within the   patient jacket.   4. Mild edema in the subcutaneous fat about the left knee. No organized fluid   collection.         XR FEMUR LEFT (MIN 2 VIEWS)   Final Result   No acute fracture of the left hip or femur.         XR ANKLE LEFT (MIN 3 VIEWS)   Final Result   No acute bony abnormalities are noted         XR HIP 2-3 VW W PELVIS LEFT   Final Result   No acute fracture of the left hip or femur.             LABS: Lab orders shown below, the results are reviewed by myself, and all abnormals are listed below.  Labs Reviewed   CBC WITH AUTO DIFFERENTIAL - Abnormal; Notable for the following components:       Result Value    WBC 2.5 (*)     RBC 3.39 (*)     Hemoglobin 10.4 (*)     Hematocrit 32.2 (*)     RDW 17.1 (*)     Neutrophils % 74 (*)     Lymphocytes % 21 (*)     Lymphocytes Absolute 0.53 (*)     All other components within normal limits   BASIC METABOLIC PANEL - Abnormal; Notable for the following components:    Glucose 110 (*)     All other components within normal limits   CULTURE, URINE   URINALYSIS WITH MICROSCOPIC   BASIC METABOLIC PANEL W/ REFLEX TO MG FOR LOW K   CBC WITH AUTO DIFFERENTIAL       Vitals Reviewed:    Vitals:    06/24/25 1545 06/24/25 1716 06/24/25 1745 06/24/25 1948   BP: (!) 132/92 (!) 131/90 (!) 146/95    Pulse:   70    Resp:   16 19   Temp:   98.2 °F (36.8 °C)    TempSrc:   Oral    SpO2:       Weight:       Height:           Initial Pain Score: Pain Level: 9 (06/24/25 1215)  Last Pain Score: Pain Level: 7 (06/24/25 1948)      MEDICATIONS GIVEN TO PATIENT THIS ENCOUNTER:  Orders Placed This Encounter   Medications    fentaNYL (SUBLIMAZE) injection 25 mcg    fentaNYL (SUBLIMAZE)  injection 25 mcg     Refill:  0    acetaminophen (TYLENOL) tablet 650 mg    sodium chloride flush 0.9 % injection 5-40 mL    sodium chloride flush 0.9 % injection 5-40 mL    0.9 % sodium chloride infusion    OR Linked Order Group     potassium chloride (KLOR-CON M) extended release tablet 40 mEq     potassium bicarb-citric acid (EFFER-K) effervescent tablet 40 mEq     potassium chloride 10 mEq/100 mL IVPB (Peripheral Line)    magnesium sulfate 2000 mg in water 50 mL IVPB    OR Linked Order Group     ondansetron (ZOFRAN-ODT) disintegrating tablet 4 mg     ondansetron (ZOFRAN) injection 4 mg    polyethylene glycol (GLYCOLAX) packet 17 g    OR Linked Order Group     acetaminophen (TYLENOL) tablet 650 mg     acetaminophen (TYLENOL) suppository 650 mg    apixaban (ELIQUIS) tablet 5 mg     Indication of Use:   History of DVT/PE (indefinite)     Indication of Use:   A Fib/A Flutter    aspirin chewable tablet 81 mg    atorvastatin (LIPITOR) tablet 40 mg    clopidogrel (PLAVIX) tablet 75 mg    ferrous sulfate (IRON 325) tablet 325 mg    finasteride (PROSCAR) tablet 5 mg    midodrine (PROAMATINE) tablet 20 mg    nitroGLYCERIN (NITROSTAT) SL tablet 0.4 mg    pantoprazole (PROTONIX) tablet 40 mg    ranolazine (RANEXA) extended release tablet 500 mg    tamsulosin (FLOMAX) capsule 0.4 mg    oxyCODONE (ROXICODONE) immediate release tablet 5 mg    fentaNYL (SUBLIMAZE) injection 25 mcg     DISCHARGE PRESCRIPTIONS:  Current Discharge Medication List        PHYSICIAN CONSULTS ORDERED THIS ENCOUNTER:  IP CONSULT TO INTERNAL MEDICINE  IP CONSULT TO SOCIAL WORK  FINAL IMPRESSION      1. Fall, initial encounter    2. Intractable pain    3. Acute pain of left knee    4. Back strain, initial encounter          DISPOSITION/PLAN   DISPOSITION Admitted 06/24/2025 04:21:49 PM               OUTPATIENT FOLLOW UP THE PATIENT:  No follow-up provider specified.    FROYLAN Oconnor Adrienne C, PA-C  06/24/25 2021

## 2025-06-24 NOTE — PROGRESS NOTES
Pharmacy Medication History Note      List of current medications patient is taking is complete.    [place] [facility] [place]  No change to medication list from AVS. Per [place], last fill of Percocet was on 5/30/25 with quantity 15 for 5 days. Per [place], last fill of gabapentin was on 5/6/25 with quantity 90 for 45 days.     Current Home Medication List at Time of Admission:  Prior to Admission medications    Medication Sig   polyethylene glycol (GLYCOLAX) 17 GM/SCOOP powder Take daily for 5 days then as needed for constipation   tamsulosin (FLOMAX) 0.4 MG capsule Take 1 capsule by mouth daily   midodrine (PROAMATINE) 10 MG tablet Take 2 tablets by mouth 3 times daily (with meals)   docusate sodium (COLACE) 100 MG capsule Take 1 capsule by mouth 2 times daily   clopidogrel (PLAVIX) 75 MG tablet Take 1 tablet by mouth daily   bumetanide (BUMEX) 1 MG tablet Take 1 tablet by mouth daily  Patient taking differently: Take 2 tablets by mouth daily   apixaban (ELIQUIS) 5 MG TABS tablet Take 1 tablet by mouth 2 times daily   aspirin 81 MG chewable tablet Take 1 tablet by mouth daily   atorvastatin (LIPITOR) 40 MG tablet Take 1 tablet by mouth nightly   dilTIAZem (CARDIZEM CD) 120 MG extended release capsule Take 1 capsule by mouth daily   ferrous sulfate (IRON 325) 325 (65 Fe) MG tablet Take 1 tablet by mouth 2 times daily (with meals)   finasteride (PROSCAR) 5 MG tablet Take 1 tablet by mouth daily   gabapentin (NEURONTIN) 100 MG capsule Take 1 capsule by mouth 2 times daily for 30 days.   isosorbide mononitrate (IMDUR) 30 MG extended release tablet Take 1 tablet by mouth daily   latanoprost (XALATAN) 0.005 % ophthalmic solution Place 1 drop into both eyes nightly   metoprolol succinate (TOPROL XL) 50 MG extended release tablet Take 1 tablet by mouth daily   nitroGLYCERIN (NITROSTAT) 0.4 MG SL tablet up to max of 3 total doses. If no relief after 1 dose, call 911.   pantoprazole (PROTONIX) 40 MG tablet  Take 1 tablet by mouth every morning (before breakfast)   vitamin D (ERGOCALCIFEROL) 1.25 MG (01075 UT) CAPS capsule Take 1 capsule by mouth Once a week at 5 PM   ranolazine (RANEXA) 500 MG extended release tablet Take 1 tablet by mouth 2 times daily       Please let me know if you have any questions about this encounter. Thank you!    Electronically signed by Crystal Johnson RPH on 6/24/2025 at 4:48 PM

## 2025-06-24 NOTE — H&P
[facility]  IN-PATIENT SERVICE   [facility]     HISTORY AND PHYSICAL EXAMINATION            Date:   6/24/2025  Patient name:  Hemanth Barrera  Date of admission:  6/24/2025 12:12 PM  MRN:   [id number]  Account:  [id number]  YOB: 1935  PCP:    Neftaly Contreras MD  Room:   2060/2060-01  Code Status:    Full Code    Chief Complaint:     Chief Complaint   Patient presents with    Fall    Knee Pain     History Obtained From:     Patient, electronic medical record.    History of Present Illness:     HPI: The patient is a 89 y.o. male with a significant past medical history including labile blood pressure, HFpEF s/p pacemaker implantation in 2019, CAD s/p stent placement in 2025, paroxysmal atrial fibrillation, history of multiple DVT's, BPH, chronic anemia, GERD, and prior left hip and knee replacements who presents with a 1-day history of left lower extremity and back pain following a fall.     The patient reports that while using his walker to walk down the hallway at home, his left lower extremity gave out, causing him to fall onto his left side. He denies any dizziness, lightheadedness, head injury, or loss of consciousness at the time of the fall. His daughter-in-law heard the walker fall and his son found him on the floor. The patient rates his pain as 5/10 immediately after the fall, with the pain progressively worsening. He is unable to bear weight on the left leg.     The patient also notes chronic lower abdominal pain but denies any new bladder or bowel dysfunction. He has a past history of urinary retention and had a Laguna catheter in place previously.     In the emergency department, the patient was hemodynamically stable on arrival. Laboratory results including CBC and BMP were largely unremarkable, except for a baseline low hemoglobin of 10.4 g/dL. Imaging results include an X-ray of the left femur, which showed no acute fracture of the left hip or femur;  FINDINGS: BRAIN/VENTRICLES: There is no acute intracranial hemorrhage, mass effect or midline shift.  No abnormal extra-axial fluid collection.  The gray-white differentiation is maintained without evidence of an acute infarct.  There is no evidence of hydrocephalus. ORBITS: The visualized portion of the orbits demonstrate no acute abnormality. SINUSES: The visualized paranasal sinuses and mastoid air cells demonstrate no acute abnormality. SOFT TISSUES/SKULL:  No acute abnormality of the visualized skull or soft tissues. CERVICAL SPINE BONES/ALIGNMENT: There is no acute fracture or traumatic malalignment. DEGENERATIVE CHANGES: No significant degenerative changes. SOFT TISSUES: There is no prevertebral soft tissue swelling.     No acute intracranial abnormality. No acute fracture in the cervical spine.     CT CERVICAL SPINE WO CONTRAST  Result Date: 6/24/2025  EXAMINATION: CT OF THE CERVICAL SPINE WITHOUT CONTRAST; CT OF THE HEAD WITHOUT CONTRAST 6/24/2025 1:25 pm TECHNIQUE: CT of the cervical spine was performed without the administration of intravenous contrast. Multiplanar reformatted images are provided for review. Automated exposure control, iterative reconstruction, and/or weight based adjustment of the mA/kV was utilized to reduce the radiation dose to as low as reasonably achievable.; CT of the head was performed without the administration of intravenous contrast. Automated exposure control, iterative reconstruction, and/or weight based adjustment of the mA/kV was utilized to reduce the radiation dose to as low as reasonably achievable. COMPARISON: None. HISTORY: ORDERING SYSTEM PROVIDED HISTORY: fall TECHNOLOGIST PROVIDED HISTORY: fall Decision Support Exception - unselect if not a suspected or confirmed emergency medical condition->Emergency Medical Condition (MA) Reason for Exam: fall Additional signs and symptoms: Pt arrives to ED c/o left knee pain following a fall. Patient reports he fell last night  time of the fall.  In the ED, the patient was hemodynamically stable on arrival.   Imaging results did not reveal any acute fractures.  Administered 1 dose of Tylenol 650 mg and 2 doses of IV fentanyl 25 mcg, achieving moderate pain control.   On the floor, pain control: Roxicodone 5 mg every 6 hours as needed for moderate to severe pain, IV fentanyl 25 mcg every 2 hours as needed for severe, breakthrough pain.  Ice pack.    Labile blood pressures  In the ED, the patient was hemodynamically stable on arrival.   Patient's Bumex, Imdur and metoprolol were discontinued during last admission.  Continue home medications: Midodrine 10 mg, 2 tablets 3 times daily.  Parameters in place: Hold for SBP > 120 mmHg.    HFpEF s/p pacemaker in place in 2019  EF of 55 to 60% on echo in 6/2025.  Patient's Bumex, Imdur and metoprolol were discontinued during last admission.  Continue home medications:    CAD s/p stent in 2025  Continue home medications: Aspirin 81 mg daily, clopidogrel 75 mg daily, atorvastatin 40 mg nightly, ranolazine 500 mg twice daily.    Paroxysmal A-fib, history of multiple DVT's  Most recent EKG from 6/12/2025 showing ventricular paced rhythm.  Patient's Cardizem was discontinued during last admission.  Continue home medications: Eliquis 5 mg twice daily.    BPH  Continue home medications: Tamsulosin 0.4 mg daily, finasteride 5 mg daily.  Bladder scan for possible urinary retention pending.  UA, urine culture pending.    Chronic anemia  In the ED, hemoglobin of 10.4 g/dL, which is patient's baseline.  Continue home medications: Ferrous sulfate 325 mg twice daily.    GERD  Continue medications: Pantoprazole 40 mg daily.    DVT prophylaxis: Continue home medications: Eliquis 5 mg twice daily.  GI prophylaxis: Continue home medications: Pantoprazole 40 mg daily.  Diet: Adult, soft and bite-sized due to history of dysphagia.    Consultations:  IP CONSULT TO INTERNAL MEDICINE  IP CONSULT TO SOCIAL WORK    Patient

## 2025-06-25 LAB
ANION GAP SERPL CALCULATED.3IONS-SCNC: 9 MMOL/L (ref 9–16)
BASOPHILS # BLD: 0.03 K/UL (ref 0–0.2)
BASOPHILS NFR BLD: 1 % (ref 0–2)
BUN SERPL-MCNC: 14 MG/DL (ref 8–23)
CALCIUM SERPL-MCNC: 8.5 MG/DL (ref 8.6–10.4)
CHLORIDE SERPL-SCNC: 108 MMOL/L (ref 98–107)
CO2 SERPL-SCNC: 22 MMOL/L (ref 20–31)
CREAT SERPL-MCNC: 0.9 MG/DL (ref 0.7–1.2)
EOSINOPHIL # BLD: 0.05 K/UL (ref 0–0.44)
EOSINOPHILS RELATIVE PERCENT: 2 % (ref 0–4)
ERYTHROCYTE [DISTWIDTH] IN BLOOD BY AUTOMATED COUNT: 16.9 % (ref 11.5–14.9)
GFR, ESTIMATED: 81 ML/MIN/1.73M2
GLUCOSE SERPL-MCNC: 117 MG/DL (ref 74–99)
HCT VFR BLD AUTO: 31 % (ref 41–53)
HGB BLD-MCNC: 10 G/DL (ref 13.5–17.5)
IMM GRANULOCYTES # BLD AUTO: 0 K/UL (ref 0–0.3)
IMM GRANULOCYTES NFR BLD: 0 %
LYMPHOCYTES NFR BLD: 0.7 K/UL (ref 1.1–3.7)
LYMPHOCYTES RELATIVE PERCENT: 28 % (ref 24–44)
MCH RBC QN AUTO: 30.7 PG (ref 26–34)
MCHC RBC AUTO-ENTMCNC: 32.3 G/DL (ref 31–37)
MCV RBC AUTO: 95.1 FL (ref 80–100)
MONOCYTES NFR BLD: 0.25 K/UL (ref 0.1–1.2)
MONOCYTES NFR BLD: 10 % (ref 3–12)
MORPHOLOGY: ABNORMAL
NEUTROPHILS NFR BLD: 59 % (ref 36–66)
NEUTS SEG NFR BLD: 1.47 K/UL (ref 1.5–8.1)
NRBC BLD-RTO: 0 PER 100 WBC
PLATELET # BLD AUTO: 216 K/UL (ref 150–450)
PMV BLD AUTO: 9.7 FL (ref 8–13.5)
POTASSIUM SERPL-SCNC: 4 MMOL/L (ref 3.7–5.3)
RBC # BLD AUTO: 3.26 M/UL (ref 4.21–5.77)
SODIUM SERPL-SCNC: 139 MMOL/L (ref 136–145)
WBC OTHER # BLD: 2.5 K/UL (ref 3.5–11)

## 2025-06-25 PROCEDURE — 97116 GAIT TRAINING THERAPY: CPT

## 2025-06-25 PROCEDURE — 6370000000 HC RX 637 (ALT 250 FOR IP)

## 2025-06-25 PROCEDURE — 1200000000 HC SEMI PRIVATE

## 2025-06-25 PROCEDURE — 80048 BASIC METABOLIC PNL TOTAL CA: CPT

## 2025-06-25 PROCEDURE — 99223 1ST HOSP IP/OBS HIGH 75: CPT

## 2025-06-25 PROCEDURE — 6360000002 HC RX W HCPCS

## 2025-06-25 PROCEDURE — 97166 OT EVAL MOD COMPLEX 45 MIN: CPT

## 2025-06-25 PROCEDURE — 36415 COLL VENOUS BLD VENIPUNCTURE: CPT

## 2025-06-25 PROCEDURE — 97110 THERAPEUTIC EXERCISES: CPT

## 2025-06-25 PROCEDURE — 85025 COMPLETE CBC W/AUTO DIFF WBC: CPT

## 2025-06-25 PROCEDURE — 97162 PT EVAL MOD COMPLEX 30 MIN: CPT

## 2025-06-25 PROCEDURE — 2500000003 HC RX 250 WO HCPCS

## 2025-06-25 PROCEDURE — 97530 THERAPEUTIC ACTIVITIES: CPT

## 2025-06-25 RX ORDER — LATANOPROST 50 UG/ML
1 SOLUTION/ DROPS OPHTHALMIC NIGHTLY
Status: DISCONTINUED | OUTPATIENT
Start: 2025-06-25 | End: 2025-06-27 | Stop reason: HOSPADM

## 2025-06-25 RX ADMIN — ASPIRIN 81 MG: 81 TABLET, CHEWABLE ORAL at 08:42

## 2025-06-25 RX ADMIN — MIDODRINE HYDROCHLORIDE 20 MG: 10 TABLET ORAL at 12:36

## 2025-06-25 RX ADMIN — FINASTERIDE 5 MG: 5 TABLET, FILM COATED ORAL at 08:42

## 2025-06-25 RX ADMIN — FENTANYL CITRATE 25 MCG: 0.05 INJECTION, SOLUTION INTRAMUSCULAR; INTRAVENOUS at 01:13

## 2025-06-25 RX ADMIN — CLOPIDOGREL BISULFATE 75 MG: 75 TABLET, FILM COATED ORAL at 08:42

## 2025-06-25 RX ADMIN — APIXABAN 5 MG: 5 TABLET, FILM COATED ORAL at 08:42

## 2025-06-25 RX ADMIN — RANOLAZINE 500 MG: 500 TABLET, EXTENDED RELEASE ORAL at 20:58

## 2025-06-25 RX ADMIN — OXYCODONE HYDROCHLORIDE 5 MG: 5 TABLET ORAL at 15:27

## 2025-06-25 RX ADMIN — TAMSULOSIN HYDROCHLORIDE 0.4 MG: 0.4 CAPSULE ORAL at 08:41

## 2025-06-25 RX ADMIN — APIXABAN 5 MG: 5 TABLET, FILM COATED ORAL at 20:57

## 2025-06-25 RX ADMIN — PANTOPRAZOLE SODIUM 40 MG: 40 TABLET, DELAYED RELEASE ORAL at 07:00

## 2025-06-25 RX ADMIN — SODIUM CHLORIDE, PRESERVATIVE FREE 10 ML: 5 INJECTION INTRAVENOUS at 20:58

## 2025-06-25 RX ADMIN — LATANOPROST 1 DROP: 50 SOLUTION OPHTHALMIC at 22:30

## 2025-06-25 RX ADMIN — RANOLAZINE 500 MG: 500 TABLET, EXTENDED RELEASE ORAL at 08:41

## 2025-06-25 RX ADMIN — OXYCODONE HYDROCHLORIDE 5 MG: 5 TABLET ORAL at 02:52

## 2025-06-25 RX ADMIN — SODIUM CHLORIDE, PRESERVATIVE FREE 10 ML: 5 INJECTION INTRAVENOUS at 08:43

## 2025-06-25 RX ADMIN — ATORVASTATIN CALCIUM 40 MG: 40 TABLET, FILM COATED ORAL at 20:57

## 2025-06-25 RX ADMIN — FERROUS SULFATE TAB 325 MG (65 MG ELEMENTAL FE) 325 MG: 325 (65 FE) TAB at 17:04

## 2025-06-25 RX ADMIN — OXYCODONE HYDROCHLORIDE 5 MG: 5 TABLET ORAL at 08:41

## 2025-06-25 RX ADMIN — FERROUS SULFATE TAB 325 MG (65 MG ELEMENTAL FE) 325 MG: 325 (65 FE) TAB at 08:42

## 2025-06-25 ASSESSMENT — PAIN DESCRIPTION - PAIN TYPE: TYPE: ACUTE PAIN

## 2025-06-25 ASSESSMENT — PAIN SCALES - GENERAL
PAINLEVEL_OUTOF10: 7
PAINLEVEL_OUTOF10: 6
PAINLEVEL_OUTOF10: 6
PAINLEVEL_OUTOF10: 7
PAINLEVEL_OUTOF10: 2

## 2025-06-25 ASSESSMENT — PAIN DESCRIPTION - DESCRIPTORS
DESCRIPTORS: ACHING;DISCOMFORT
DESCRIPTORS: ACHING

## 2025-06-25 ASSESSMENT — PAIN DESCRIPTION - LOCATION
LOCATION: HIP;LEG;KNEE
LOCATION: GENERALIZED
LOCATION: HIP

## 2025-06-25 ASSESSMENT — PAIN DESCRIPTION - FREQUENCY: FREQUENCY: INTERMITTENT

## 2025-06-25 ASSESSMENT — PAIN - FUNCTIONAL ASSESSMENT
PAIN_FUNCTIONAL_ASSESSMENT: PREVENTS OR INTERFERES SOME ACTIVE ACTIVITIES AND ADLS
PAIN_FUNCTIONAL_ASSESSMENT: ACTIVITIES ARE NOT PREVENTED

## 2025-06-25 ASSESSMENT — PAIN DESCRIPTION - ONSET: ONSET: GRADUAL

## 2025-06-25 ASSESSMENT — PAIN DESCRIPTION - ORIENTATION: ORIENTATION: LEFT

## 2025-06-25 NOTE — CARE COORDINATION
Case Management Assessment  Initial Evaluation    Date/Time of Evaluation: 6/25/2025 9:25 AM  Assessment Completed by: Janice Hernandez RN    If patient is discharged prior to next notation, then this note serves as note for discharge by case management.    Patient Name: Hemanth Barrera                   YOB: 1935  Diagnosis: Intractable pain [R52]  Back strain, initial encounter [S39.012A]  Fall, initial encounter [W19.XXXA]  Acute pain of left knee [M25.562]                   Date / Time: 6/24/2025 12:12 PM    Patient Admission Status: Inpatient   Readmission Risk (Low < 19, Mod (19-27), High > 27): Readmission Risk Score: 37.7    Current PCP: Neftaly Contreras MD  PCP verified by CM? Yes    Chart Reviewed: Yes      History Provided by: Patient  Patient Orientation: Alert and Oriented    Patient Cognition: Alert    Hospitalization in the last 30 days (Readmission):  Yes    If yes, Readmission Assessment in CM Navigator will be completed.    Advance Directives:      Code Status: Full Code   Patient's Primary Decision Maker is: Named in Scanned ACP Document    Primary Decision Maker: Dusty Barrera - Child - [phone]    Discharge Planning:    Patient lives with: Children, Family Members Type of Home: House  Primary Care Giver: Self  Patient Support Systems include: Children, Family Members   Current Financial resources: Medicare  Current community resources: ECF/Home Care  Current services prior to admission: Durable Medical Equipment            Current DME: Cane, Walker, Wheelchair, Shower Chair, Other (Comment) (Raised toilet)            Type of Home Care services:  OT, PT, Nursing Services    ADLS  Prior functional level: Assistance with the following:, Bathing, Cooking, Housework, Shopping, Mobility  Current functional level: Assistance with the following:, Bathing, Cooking, Housework, Shopping, Mobility    PT AM-PAC:   /24  OT AM-PAC:   /24    Family can provide assistance at DC: Yes  Would

## 2025-06-25 NOTE — CARE COORDINATION
Writer was notified by PT, Nilda, that Pt. Would Benefit from SNF, If he does not have enough support at home. Per Past Notes, Pt. Has [facility]. Will have CM follow with this criselda.

## 2025-06-25 NOTE — ED PROVIDER NOTES
MED SURG  eMERGENCY dEPARTMENT eNCOUnter   Independent Attestation     Pt Name: Hemanth Barrera  MRN: [id number]  Birthdate 1/13/1935  Date of evaluation: 6/24/25   Hemanth Barrera is a 89 y.o. male who presents with Fall and Knee Pain    Vitals:   Vitals:    06/24/25 1745 06/24/25 1948 06/24/25 2018 06/24/25 2207   BP: (!) 146/95      Pulse: 70      Resp: 16 19 18 18   Temp: 98.2 °F (36.8 °C)      TempSrc: Oral      SpO2:       Weight:       Height:         Impression:   1. Fall, initial encounter    2. Intractable pain    3. Acute pain of left knee    4. Back strain, initial encounter      I was personally available for consultation in the Emergency Department. I have reviewed the chart and agree with the documentation as recorded by the MLP, including the assessment, treatment plan and disposition.  Tevin gAee MD  Attending Emergency  Physician                 Tevin Agee MD  06/24/25 3478

## 2025-06-25 NOTE — PROGRESS NOTES
[facility]   Occupational Therapy Evaluation  Date: 25  Patient Name: Hemanth Barrera       Room:   MRN: [id number]  Account: [id number]   : 1935  (89 y.o.) Gender: male     Discharge Recommendations:  Further Occupational Therapy is recommended upon facility discharge.    OT Equipment Recommendations  Other: continue to assess    Referring Practitioner: Raisa Tomlinson MD and  Shana Ibarra MD  Diagnosis: Intractable pain        Treatment Diagnosis: impaired self care status    Past Medical History:  has a past medical history of Acute inferolateral myocardial infarction (HCC), Arthritis, Atrial fibrillation (HCC), CAD (coronary artery disease), CHF (congestive heart failure) ([facility]), Glaucoma, Hx of blood clots, Hyperlipidemia, Hypertension, MI (myocardial infarction) (HCC), and NSTEMI (non-ST elevated myocardial infarction) ([facility]).    Past Surgical History:   has a past surgical history that includes pacemaker placement; Abdomen surgery; Cardiac catheterization; Appendectomy; Colonoscopy; eye surgery; hernia repair; bone marrow biopsy; joint replacement; CT BIOPSY BONE MARROW (2022); Upper gastrointestinal endoscopy (N/A, 2023); Colonoscopy (N/A, 2023); Cardiac procedure (N/A, 2024); Cardiac procedure (N/A, 2024); Cardiac procedure (Right, 2025); invasive vascular (N/A, 2025); Upper gastrointestinal endoscopy (N/A, 2025); sigmoidoscopy (N/A, 2025); Colonoscopy (N/A, 03/10/2025); and Upper gastrointestinal endoscopy (N/A, 2025).    Restrictions  Restrictions/Precautions  Restrictions/Precautions: Fall Risk  Activity Level: Up as Tolerated, Up with Assist, Other (Comment) (up in chair)  Required Braces or Orthoses?: No      Vitals  Vitals  Pulse: 68  Heart Rate Source: Monitor  SpO2: 92 %  BP: 122/78 (taken during seated rest break while pt reports dizziness)  MAP (Calculated): 93     Subjective  Subjective:  \"I love going to the gym\"  Comments: Pt very pleasant and motivated for therapy. OK to see per LEON Johnson.  Pain  Pre-Pain: 7  Pain Location: Left, Knee  Pain Interventions: Repositioning    Social/Functional History  Social/Functional History  Lives With: Son  Type of Home: House  Home Layout: One level  Home Access: Stairs to enter with rails  Entrance Stairs - Number of Steps: 2  Entrance Stairs - Rails: Both  Bathroom Shower/Tub: Walk-in shower, Shower chair with back, Curtain  Bathroom Toilet: Standard (with toilet safety frame that elevates height)  Bathroom Equipment: Grab bars in shower, Toilet raiser, Safety frame, Shower chair  Bathroom Accessibility: Walker accessible  Home Equipment: Cane, Alert button, Grab bars, Walker - Rolling, Rollator  Has the patient had two or more falls in the past year or any fall with injury in the past year?: Yes  Prior Level of Assist for ADLs: Independent  Prior Level of Assist for Homemaking: Needs assistance (son does meals, patient does own laundry)  Homemaking Responsibilities: Yes  Prior Level of Assist for Ambulation: Needs assistance (uses rollator in home, son assists with rollator for community distances. Initially pt reports he is independent then later reports family stays behind him while walking)  Prior Level of Assist for Transfers: Needs assistance (Initially pt reports he is independent then later reports family stays with him during transfers)  Active : No  Patient's  Info: son  Occupation: Retired  Type of Occupation: Security at [employer]  IADL Comments: patient sleeps in flat bed (low to floor)  Additional Comments: son still works in [place], daughter in law also works some days. Pt reports someone is always with him, when asked who he states \"the home health nurse and therapists.\" Later reports either son or daughter in law are home with him. Pt is questionable historian    Objective    ADL  Feeding: Setup, Based on

## 2025-06-25 NOTE — PROGRESS NOTES
[facility]  IN-PATIENT SERVICE   [facility]     Progress Note            Date:   6/25/2025  Patient name:  Hemanth Barrera  Date of admission:  6/24/2025 12:12 PM  MRN:   [id number]  Account:  [id number]  YOB: 1935  PCP:    Neftaly Contreras MD  Room:   2060/2060-  Code Status:    Full Code    Chief Complaint:     Chief Complaint   Patient presents with    Fall    Knee Pain     History Obtained From:     Patient, electronic medical record.    History of Present Illness:     HPI: The patient is a 89 y.o. male with a significant past medical history including labile blood pressure, HFpEF s/p pacemaker implantation in 2019, CAD s/p stent placement in 2025, paroxysmal atrial fibrillation, history of multiple DVT's, BPH, chronic anemia, GERD, and prior left hip and knee replacements who presents with a 1-day history of left lower extremity and back pain following a fall.     The patient reports that while using his walker to walk down the hallway at home, his left lower extremity gave out, causing him to fall onto his left side. He denies any dizziness, lightheadedness, head injury, or loss of consciousness at the time of the fall. His daughter-in-law heard the walker fall and his son found him on the floor. The patient rates his pain as 5/10 immediately after the fall, with the pain progressively worsening. He is unable to bear weight on the left leg.     The patient also notes chronic lower abdominal pain but denies any new bladder or bowel dysfunction. He has a past history of urinary retention and had a Laguna catheter in place previously.     In the emergency department, the patient was hemodynamically stable on arrival. Laboratory results including CBC and BMP were largely unremarkable, except for a baseline low hemoglobin of 10.4 g/dL. Imaging results include an X-ray of the left femur, which showed no acute fracture of the left hip or femur; an X-ray of the  left ankle, which showed no acute bony abnormalities; a CT of the left knee, which demonstrated prior left knee arthroplasty with patellar resurfacing and mild subcutaneous edema around the left knee, but no evidence of an organized fluid collection or acute osseous abnormality; a CT of the thoracic spine, which showed a trace left pleural effusion but no acute osseous abnormality; a CT of the lumbar spine, which showed prior posterior fixation laminectomy at L 2-3 without acute osseous abnormality; and a CT of the head and cervical spine, which revealed no acute intracranial findings or fractures in the cervical spine. The patient was administered 1 dose of Tylenol 650 mg and 2 doses of IV fentanyl 25 mcg, achieving moderate pain control.     He is being admitted to the hospital for further management of left lower extremity and back pain secondary to the fall.    Past Medical History:     Past Medical History:   Diagnosis Date    Acute inferolateral myocardial infarction (HCC) 11/18/2021    Arthritis     Atrial fibrillation (HCC)     CAD (coronary artery disease)     CHF (congestive heart failure) (HCC)     Glaucoma     Hx of blood clots     with hernia surgery    Hyperlipidemia     Hypertension     MI (myocardial infarction) ([facility])     NSTEMI (non-ST elevated myocardial infarction) ([facility]) 11/17/2021     Past Surgical History:     Past Surgical History:   Procedure Laterality Date    ABDOMEN SURGERY      gastric resection    APPENDECTOMY      BONE MARROW BIOPSY      CARDIAC CATHETERIZATION      CARDIAC PROCEDURE N/A 09/17/2024    julio cesar / Left heart cath / coronary angiography / rm 512 performed by Cathie Hastings MD at [facility] CARDIAC CATH LAB    CARDIAC PROCEDURE N/A 09/17/2024    Percutaneous coronary intervention performed by Cathie Hastings MD at [facility] CARDIAC CATH LAB    CARDIAC PROCEDURE Right 01/09/2025    Left heart cath / coronary angiography performed by Guy Paz MD at [facility] CARDIAC CATH LAB

## 2025-06-25 NOTE — PROGRESS NOTES
Physical Therapy    [facility]   Physical Therapy Evaluation  Date: 25  Patient Name: Hemanth Barrera       Room:   MRN: [id number]  Account: [id number]   : 1935  (89 y.o.) Gender: male     Discharge Recommendations:  Discharge Recommendations: Patient would benefit from continued therapy after discharge, Therapy recommended at discharge           Past Medical History:  has a past medical history of Acute inferolateral myocardial infarction (HCC), Arthritis, Atrial fibrillation ([facility]), CAD (coronary artery disease), CHF (congestive heart failure) ([facility]), Glaucoma, Hx of blood clots, Hyperlipidemia, Hypertension, MI (myocardial infarction) ([facility]), and NSTEMI (non-ST elevated myocardial infarction) ([facility]).  Past Surgical History:   has a past surgical history that includes pacemaker placement; Abdomen surgery; Cardiac catheterization; Appendectomy; Colonoscopy; eye surgery; hernia repair; bone marrow biopsy; joint replacement; CT BIOPSY BONE MARROW (2022); Upper gastrointestinal endoscopy (N/A, 2023); Colonoscopy (N/A, 2023); Cardiac procedure (N/A, 2024); Cardiac procedure (N/A, 2024); Cardiac procedure (Right, 2025); invasive vascular (N/A, 2025); Upper gastrointestinal endoscopy (N/A, 2025); sigmoidoscopy (N/A, 2025); Colonoscopy (N/A, 03/10/2025); and Upper gastrointestinal endoscopy (N/A, 2025).    Subjective  Subjective  Subjective: OK per RN for PT/OT     General  Patient assessed for rehabilitation services?: Yes  Additional Pertinent Hx: Fix this HPI: The patient is a 89 y.o. male with a significant past medical history including labile blood pressure, HFpEF s/p pacemaker implantation in , CAD s/p stent placement in , paroxysmal atrial fibrillation, history of multiple DVT's, BPH, chronic anemia, GERD, and prior left hip and knee replacements who presents with a 1-day history of left lower extremity  SBA  Short Term Goal 5: Tolerate B Knee ROM/B LE strengthening ex's for 20 minutes atleast to improve ROM/strength for mobility  Short Term Goal 6: Pt eusebia           orm step ups x 2(6\") with B UE support, min A      Plan  Physical Therapy Plan  General Plan: 5-7 times per week  Specific Instructions for Next Treatment: Work on B Knee ROM as able  Current Treatment Recommendations: Strengthening, ROM, Balance training, Functional mobility training, Transfer training, Endurance training, Gait training, Stair training, Return to work related activity, Home exercise program, Safety education & training, Patient/Caregiver education & training, Vestibular rehab   Safety Devices  Type of Devices: All fall risk precautions in place, Call light within reach, Chair alarm in place, Gait belt, Patient at risk for falls, Left in chair, Nurse notified (Notified RN Daily that pt reports he has not been getting Ensures. PT discussed discharge rec with case management at end of session.)  Restraints  Restraints Initially in Place: No       PT Individual Minutes  Time In: 1435  Time Out: 1518  Minutes: 43   Time Code Minutes  Timed Code Treatment Minutes: 23 Minutes       Electronically signed by Yolis Diaz, PT on 6/25/25 at 5:31 PM EDT

## 2025-06-25 NOTE — PLAN OF CARE
Problem: Chronic Conditions and Co-morbidities  Goal: Patient's chronic conditions and co-morbidity symptoms are monitored and maintained or improved  6/25/2025 1720 by Daily Plaza RN  Outcome: Progressing  Flowsheets (Taken 6/25/2025 0845 by Elizabeth Lebron RN)  Care Plan - Patient's Chronic Conditions and Co-Morbidity Symptoms are Monitored and Maintained or Improved: Monitor and assess patient's chronic conditions and comorbid symptoms for stability, deterioration, or improvement  6/25/2025 0508 by Roxanna Pepper RN  Outcome: Progressing     Problem: Discharge Planning  Goal: Discharge to home or other facility with appropriate resources  6/25/2025 1720 by Daily Plaza RN  Outcome: Progressing  Flowsheets (Taken 6/25/2025 0845 by Elizabeth Lebron RN)  Discharge to home or other facility with appropriate resources: Identify barriers to discharge with patient and caregiver  6/25/2025 0508 by Roxanna Pepper RN  Outcome: Progressing     Problem: Pain  Goal: Verbalizes/displays adequate comfort level or baseline comfort level  6/25/2025 1720 by Daily Plaza RN  Outcome: Progressing  6/25/2025 0508 by Roxanna Pepper RN  Outcome: Progressing     Problem: Skin/Tissue Integrity  Goal: Skin integrity remains intact  Description: 1.  Monitor for areas of redness and/or skin breakdown  2.  Assess vascular access sites hourly  3.  Every 4-6 hours minimum:  Change oxygen saturation probe site  4.  Every 4-6 hours:  If on nasal continuous positive airway pressure, respiratory therapy assess nares and determine need for appliance change or resting period  6/25/2025 1720 by Daily Plaza RN  Outcome: Progressing  Flowsheets (Taken 6/25/2025 0845 by Elizabeth Lebron RN)  Skin Integrity Remains Intact: Monitor for areas of redness and/or skin breakdown  6/25/2025 0508 by Roxanna Pepper RN  Outcome: Progressing     Problem: Skin/Tissue Integrity  Goal: Skin integrity remains intact  Description: 1.  Monitor for areas of  redness and/or skin breakdown  2.  Assess vascular access sites hourly  3.  Every 4-6 hours minimum:  Change oxygen saturation probe site  4.  Every 4-6 hours:  If on nasal continuous positive airway pressure, respiratory therapy assess nares and determine need for appliance change or resting period  6/25/2025 1720 by Daily Plaza, RN  Outcome: Progressing  Flowsheets (Taken 6/25/2025 0845 by Elizabeth Lebron, RN)  Skin Integrity Remains Intact: Monitor for areas of redness and/or skin breakdown  6/25/2025 0508 by Roxanna Pepper, RN  Outcome: Progressing

## 2025-06-25 NOTE — DISCHARGE INSTR - COC
Continuity of Care Form    Patient Name: Hemanth Barrera   :  1935  MRN:  [id number]    Admit date:  2025  Discharge date:  2025    Code Status Order: Full Code   Advance Directives:     Admitting Physician:  Raisa Tomlinson MD  PCP: Neftaly Contreras MD    Discharging Nurse:   Discharging Hospital Unit/Room#: -  Discharging Unit Phone Number: [phone]    Emergency Contact:   Extended Emergency Contact Information  Primary Emergency Contact: Dusty Barrera  Home Phone: [phone]  Mobile Phone: [phone]  Relation: Child    Past Surgical History:  Past Surgical History:   Procedure Laterality Date    ABDOMEN SURGERY      gastric resection    APPENDECTOMY      BONE MARROW BIOPSY      CARDIAC CATHETERIZATION      CARDIAC PROCEDURE N/A 2024    zafrconcepcion / Left heart cath / coronary angiography / rm 512 performed by Cathie Hastings MD at [facility] CARDIAC CATH LAB    CARDIAC PROCEDURE N/A 2024    Percutaneous coronary intervention performed by Cathie Hastings MD at [facility] CARDIAC CATH LAB    CARDIAC PROCEDURE Right 2025    Left heart cath / coronary angiography performed by Guy Paz MD at [facility] CARDIAC CATH LAB    COLONOSCOPY      COLONOSCOPY N/A 2023    COLONOSCOPY WITH BIOPSY performed by Isauro Razo MD at [facility] ENDO    COLONOSCOPY N/A 03/10/2025    COLONOSCOPY DIAGNOSTIC performed by Shirin Torre MD at [facility] OR    CT BIOPSY BONE MARROW  2022    CT BONE MARROW BIOPSY 2022 [facility] CT SCAN    EYE SURGERY      smiley cataracts    HERNIA REPAIR      inguinal smiley    INVASIVE VASCULAR N/A 2025    Ultrasound guided vascular access performed by Guy Paz MD at [facility] CARDIAC CATH LAB    JOINT REPLACEMENT      rt hip x 2, lt knee    PACEMAKER PLACEMENT      SIGMOIDOSCOPY N/A 2025    SIGMOIDOSCOPY DIAGNOSTIC FLEXIBLE performed by Isauro Razo MD at [facility] OR    UPPER GASTROINTESTINAL ENDOSCOPY N/A 2023    EGD

## 2025-06-25 NOTE — ACP (ADVANCE CARE PLANNING)
Advance Care Planning     Advance Care Planning Activator (Inpatient)  Conversation Note      Date of ACP Conversation: 6/25/2025     Conversation Conducted with: Patient with Decision Making Capacity    ACP Activator: Janice Hernandez RN    Health Care Decision Maker:     Current Designated Health Care Decision Maker:     Primary Decision Maker: Dusty Barrera - Demarcus - [phone]  Click here to complete Healthcare Decision Makers including section of the Healthcare Decision Maker Relationship (ie \"Primary\")  Today we documented Decision Maker(s) consistent with ACP documents on file.    Care Preferences    Ventilation:  \"If you were in your present state of health and suddenly became very ill and were unable to breathe on your own, what would your preference be about the use of a ventilator (breathing machine) if it were available to you?\"      Would the patient desire the use of ventilator (breathing machine)?: yes    \"If your health worsens and it becomes clear that your chance of recovery is unlikely, what would your preference be about the use of a ventilator (breathing machine) if it were available to you?\"     Would the patient desire the use of ventilator (breathing machine)?: No      Resuscitation  \"CPR works best to restart the heart when there is a sudden event, like a heart attack, in someone who is otherwise healthy. Unfortunately, CPR does not typically restart the heart for people who have serious health conditions or who are very sick.\"    \"In the event your heart stopped as a result of an underlying serious health condition, would you want attempts to be made to restart your heart (answer \"yes\" for attempt to resuscitate) or would you prefer a natural death (answer \"no\" for do not attempt to resuscitate)?\" yes       [] Yes   [x] No   Educated Patient / Decision Maker regarding differences between Advance Directives and portable DNR orders.    Length of ACP Conversation in minutes:

## 2025-06-25 NOTE — PLAN OF CARE
Problem: Chronic Conditions and Co-morbidities  Goal: Patient's chronic conditions and co-morbidity symptoms are monitored and maintained or improved  6/25/2025 0508 by Roxanna Pepper RN  Outcome: Progressing     Problem: Discharge Planning  Goal: Discharge to home or other facility with appropriate resources  6/25/2025 0508 by Roxanna Pepper RN  Outcome: Progressing     Problem: Pain  Goal: Verbalizes/displays adequate comfort level or baseline comfort level  6/25/2025 0508 by Roxanna Pepper RN  Outcome: Progressing     Problem: Safety - Adult  Goal: Free from fall injury  6/25/2025 0508 by Roxanna Pepper RN  Outcome: Progressing     Problem: ABCDS Injury Assessment  Goal: Absence of physical injury  6/25/2025 0508 by Roxanna Pepper RN  Outcome: Progressing  Flowsheets (Taken 6/24/2025 2000)  Absence of Physical Injury: Implement safety measures based on patient assessment     Problem: Skin/Tissue Integrity  Goal: Skin integrity remains intact  Description: 1.  Monitor for areas of redness and/or skin breakdown  2.  Assess vascular access sites hourly  3.  Every 4-6 hours minimum:  Change oxygen saturation probe site  4.  Every 4-6 hours:  If on nasal continuous positive airway pressure, respiratory therapy assess nares and determine need for appliance change or resting period  Outcome: Progressing

## 2025-06-26 LAB
ANION GAP SERPL CALCULATED.3IONS-SCNC: 8 MMOL/L (ref 9–16)
BACTERIA URNS QL MICRO: ABNORMAL
BASOPHILS # BLD: 0 K/UL (ref 0–0.2)
BASOPHILS NFR BLD: 0 % (ref 0–2)
BILIRUB UR QL STRIP: NEGATIVE
BUN SERPL-MCNC: 14 MG/DL (ref 8–23)
CALCIUM SERPL-MCNC: 8.6 MG/DL (ref 8.6–10.4)
CASTS #/AREA URNS LPF: ABNORMAL /LPF
CHLORIDE SERPL-SCNC: 107 MMOL/L (ref 98–107)
CLARITY UR: CLEAR
CO2 SERPL-SCNC: 23 MMOL/L (ref 20–31)
COLOR UR: YELLOW
CREAT SERPL-MCNC: 0.9 MG/DL (ref 0.7–1.2)
EOSINOPHIL # BLD: 0.05 K/UL (ref 0–0.44)
EOSINOPHILS RELATIVE PERCENT: 2 % (ref 0–4)
EPI CELLS #/AREA URNS HPF: ABNORMAL /HPF
ERYTHROCYTE [DISTWIDTH] IN BLOOD BY AUTOMATED COUNT: 16.8 % (ref 11.5–14.9)
GFR, ESTIMATED: 81 ML/MIN/1.73M2
GLUCOSE SERPL-MCNC: 101 MG/DL (ref 74–99)
GLUCOSE UR STRIP-MCNC: NEGATIVE MG/DL
HCT VFR BLD AUTO: 29.2 % (ref 41–53)
HGB BLD-MCNC: 9.5 G/DL (ref 13.5–17.5)
HGB UR QL STRIP.AUTO: NEGATIVE
IMM GRANULOCYTES # BLD AUTO: 0 K/UL (ref 0–0.3)
IMM GRANULOCYTES NFR BLD: 0 %
KETONES UR STRIP-MCNC: ABNORMAL MG/DL
LEUKOCYTE ESTERASE UR QL STRIP: NEGATIVE
LYMPHOCYTES NFR BLD: 0.55 K/UL (ref 1.1–3.7)
LYMPHOCYTES RELATIVE PERCENT: 23 % (ref 24–44)
MCH RBC QN AUTO: 30.3 PG (ref 26–34)
MCHC RBC AUTO-ENTMCNC: 32.5 G/DL (ref 31–37)
MCV RBC AUTO: 93 FL (ref 80–100)
MONOCYTES NFR BLD: 0.24 K/UL (ref 0.1–1.2)
MONOCYTES NFR BLD: 10 % (ref 3–12)
MORPHOLOGY: ABNORMAL
MORPHOLOGY: ABNORMAL
NEUTROPHILS NFR BLD: 65 % (ref 36–66)
NEUTS SEG NFR BLD: 1.56 K/UL (ref 1.5–8.1)
NITRITE UR QL STRIP: NEGATIVE
NRBC BLD-RTO: 0 PER 100 WBC
PH UR STRIP: 5.5 [PH] (ref 5–8)
PLATELET # BLD AUTO: 207 K/UL (ref 150–450)
PMV BLD AUTO: 9.4 FL (ref 8–13.5)
POTASSIUM SERPL-SCNC: 4.3 MMOL/L (ref 3.7–5.3)
PROT UR STRIP-MCNC: ABNORMAL MG/DL
RBC # BLD AUTO: 3.14 M/UL (ref 4.21–5.77)
RBC #/AREA URNS HPF: ABNORMAL /HPF
SODIUM SERPL-SCNC: 138 MMOL/L (ref 136–145)
SP GR UR STRIP: 1.02 (ref 1–1.03)
UROBILINOGEN UR STRIP-ACNC: NORMAL EU/DL (ref 0–1)
WBC #/AREA URNS HPF: ABNORMAL /HPF
WBC OTHER # BLD: 2.4 K/UL (ref 3.5–11)

## 2025-06-26 PROCEDURE — 85025 COMPLETE CBC W/AUTO DIFF WBC: CPT

## 2025-06-26 PROCEDURE — 2500000003 HC RX 250 WO HCPCS

## 2025-06-26 PROCEDURE — 81001 URINALYSIS AUTO W/SCOPE: CPT

## 2025-06-26 PROCEDURE — 80048 BASIC METABOLIC PNL TOTAL CA: CPT

## 2025-06-26 PROCEDURE — 36415 COLL VENOUS BLD VENIPUNCTURE: CPT

## 2025-06-26 PROCEDURE — 87086 URINE CULTURE/COLONY COUNT: CPT

## 2025-06-26 PROCEDURE — 1200000000 HC SEMI PRIVATE

## 2025-06-26 PROCEDURE — 6370000000 HC RX 637 (ALT 250 FOR IP)

## 2025-06-26 PROCEDURE — 99233 SBSQ HOSP IP/OBS HIGH 50: CPT

## 2025-06-26 RX ADMIN — FERROUS SULFATE TAB 325 MG (65 MG ELEMENTAL FE) 325 MG: 325 (65 FE) TAB at 09:20

## 2025-06-26 RX ADMIN — OXYCODONE HYDROCHLORIDE 5 MG: 5 TABLET ORAL at 21:22

## 2025-06-26 RX ADMIN — SODIUM CHLORIDE, PRESERVATIVE FREE 10 ML: 5 INJECTION INTRAVENOUS at 21:23

## 2025-06-26 RX ADMIN — ACETAMINOPHEN 650 MG: 325 TABLET ORAL at 09:27

## 2025-06-26 RX ADMIN — OXYCODONE HYDROCHLORIDE 5 MG: 5 TABLET ORAL at 15:26

## 2025-06-26 RX ADMIN — SODIUM CHLORIDE, PRESERVATIVE FREE 10 ML: 5 INJECTION INTRAVENOUS at 09:22

## 2025-06-26 RX ADMIN — RANOLAZINE 500 MG: 500 TABLET, EXTENDED RELEASE ORAL at 09:21

## 2025-06-26 RX ADMIN — PANTOPRAZOLE SODIUM 40 MG: 40 TABLET, DELAYED RELEASE ORAL at 05:24

## 2025-06-26 RX ADMIN — FERROUS SULFATE TAB 325 MG (65 MG ELEMENTAL FE) 325 MG: 325 (65 FE) TAB at 17:54

## 2025-06-26 RX ADMIN — MIDODRINE HYDROCHLORIDE 20 MG: 10 TABLET ORAL at 12:35

## 2025-06-26 RX ADMIN — OXYCODONE HYDROCHLORIDE 5 MG: 5 TABLET ORAL at 05:24

## 2025-06-26 RX ADMIN — APIXABAN 5 MG: 5 TABLET, FILM COATED ORAL at 09:20

## 2025-06-26 RX ADMIN — LATANOPROST 1 DROP: 50 SOLUTION OPHTHALMIC at 21:22

## 2025-06-26 RX ADMIN — FINASTERIDE 5 MG: 5 TABLET, FILM COATED ORAL at 09:20

## 2025-06-26 RX ADMIN — APIXABAN 5 MG: 5 TABLET, FILM COATED ORAL at 21:22

## 2025-06-26 RX ADMIN — RANOLAZINE 500 MG: 500 TABLET, EXTENDED RELEASE ORAL at 21:22

## 2025-06-26 RX ADMIN — ATORVASTATIN CALCIUM 40 MG: 40 TABLET, FILM COATED ORAL at 21:22

## 2025-06-26 RX ADMIN — MIDODRINE HYDROCHLORIDE 20 MG: 10 TABLET ORAL at 17:54

## 2025-06-26 RX ADMIN — TAMSULOSIN HYDROCHLORIDE 0.4 MG: 0.4 CAPSULE ORAL at 09:20

## 2025-06-26 RX ADMIN — ASPIRIN 81 MG: 81 TABLET, CHEWABLE ORAL at 09:20

## 2025-06-26 RX ADMIN — ACETAMINOPHEN 650 MG: 325 TABLET ORAL at 15:26

## 2025-06-26 RX ADMIN — MIDODRINE HYDROCHLORIDE 20 MG: 10 TABLET ORAL at 09:20

## 2025-06-26 RX ADMIN — CLOPIDOGREL BISULFATE 75 MG: 75 TABLET, FILM COATED ORAL at 09:22

## 2025-06-26 ASSESSMENT — PAIN SCALES - GENERAL
PAINLEVEL_OUTOF10: 7
PAINLEVEL_OUTOF10: 6
PAINLEVEL_OUTOF10: 0
PAINLEVEL_OUTOF10: 6
PAINLEVEL_OUTOF10: 5
PAINLEVEL_OUTOF10: 4
PAINLEVEL_OUTOF10: 6

## 2025-06-26 ASSESSMENT — PAIN DESCRIPTION - DESCRIPTORS
DESCRIPTORS: SHARP
DESCRIPTORS: SHARP;SHOOTING
DESCRIPTORS: ACHING;THROBBING
DESCRIPTORS: SHARP

## 2025-06-26 ASSESSMENT — PAIN DESCRIPTION - LOCATION
LOCATION: KNEE
LOCATION: LEG
LOCATION: KNEE

## 2025-06-26 ASSESSMENT — PAIN DESCRIPTION - ORIENTATION
ORIENTATION: LEFT
ORIENTATION: RIGHT

## 2025-06-26 NOTE — PLAN OF CARE
Problem: Discharge Planning  Goal: Discharge to home or other facility with appropriate resources  6/25/2025 2353 by Alem Felder RN  Outcome: Progressing  Flowsheets (Taken 6/25/2025 2236)  Discharge to home or other facility with appropriate resources:   Identify barriers to discharge with patient and caregiver   Arrange for needed discharge resources and transportation as appropriate   Identify discharge learning needs (meds, wound care, etc)   Refer to discharge planning if patient needs post-hospital services based on physician order or complex needs related to functional status, cognitive ability or social support system  6/25/2025 1720 by Daily Plaza RN  Outcome: Progressing  Flowsheets (Taken 6/25/2025 0845 by Elizabeth Lebron, RN)  Discharge to home or other facility with appropriate resources: Identify barriers to discharge with patient and caregiver     Problem: Safety - Adult  Goal: Free from fall injury  6/25/2025 2353 by Alem Felder RN  Outcome: Progressing  6/25/2025 1720 by Daily Plaza RN  Outcome: Progressing  Flowsheets (Taken 6/25/2025 0845 by Elizabeth Lebron RN)  Free From Fall Injury: Instruct family/caregiver on patient safety     Problem: Pain  Goal: Verbalizes/displays adequate comfort level or baseline comfort level  6/25/2025 2353 by Alem Felder RN  Outcome: Progressing  6/25/2025 1720 by Daily Plaza RN  Outcome: Progressing     Problem: Skin/Tissue Integrity  Goal: Skin integrity remains intact  Description: 1.  Monitor for areas of redness and/or skin breakdown  2.  Assess vascular access sites hourly  3.  Every 4-6 hours minimum:  Change oxygen saturation probe site  4.  Every 4-6 hours:  If on nasal continuous positive airway pressure, respiratory therapy assess nares and determine need for appliance change or resting period  6/25/2025 2353 by Alem Felder RN  Outcome: Progressing  Flowsheets (Taken 6/25/2025 2236)  Skin Integrity Remains  Physical Therapy Evaluation    Visit Type: Initial Evaluation  Visit: 1  Referring Provider: Vivek Mcclain MD  Medical Diagnosis (from order): Diagnosis Information    Diagnosis  625.9 (ICD-9-CM) - R10.2 (ICD-10-CM) - Pelvic pain in female       Treatment Diagnosis: pelvic health with increased pain/symptoms, impaired posture, impaired range of motion, impaired strength, impaired muscle length/flexibility, impaired sexual health and impaired gait.  Onset  - Date of onset:  2 months into pregnancy   Chart reviewed at time of initial evaluation (relevant co-morbidities, allergies, tests and medications listed):   unremarkable  Past Medical History:  No date: Hidradenitis  No date: Morbid obesity (CMS/HCC)  No date: PCOS (polycystic ovarian syndrome)    MEDICAL/SURGICAL HISTORY:     OB GYN HISTORY    OB History     T0    L0    SAB0  IAB0  Ectopic0  Molar0  Multiple0  Live Births 0      - 1       SUBJECTIVE                                                                                                               CHIEF COMPLAINT: 2023     Date of Onset: Patient currently 29w4d pregnant. Endorsees severe pelvic and back pain, constant 10/10, nothing relieves the pain. Notes that pain is worse with pillows in between legs. Has been having brooke reddy contractions for 2 months now.     Pain / Symptoms  - Pain/symptom is: constant  - Pain rating (out of 10): ; Best: 1; Worst: 10  - Location: Pelvic pain, hip, pubic symphysis   - Quality / Description: throbbing     - Hip felt stuck, painful   - Alleviating Factors: unable to state anything that helps reduce the pain  - Progression since onset: worsening    Function:   Limitations / Exacerbation Factors:   - Patient reports difficulty, pain and increased time with function reported below.  - bed mobility, grocery shopping, driving/riding in a vehicle, house/yard work, standing, squatting/lifting, sitting, bending/squatting/lifting and walking,  car transfers, floor transfers, low transfer (toilet/couch) and positional transitions  - Reason for Referral: Angie Duron is a 39 year old year old female presenting with pelvic pain at 29 weeks pregnant.   Prior Level of Function: declining function, therefore referred to therapy,     Patient Goals: decreased pain.    Prior treatment  - no therapies  - Discharged from hospital, home health, or skilled nursing facility in last 30 days: no  Home Environment   - Patient lives with: significant other  - Assistance available: consistent  - Denies 2 or more falls or an unexplained fall with injury in the last year.  - Feel safe at home / work / school: yes      OBJECTIVE                                                                                                                    Posture:  - Seated: anterior pelvic tilt and trunk lean posterior      Range of Motion (ROM)   (degrees unless noted; active unless noted; norms in ( ); negative=lacking to 0, positive=beyond 0)  Lumbar:  Impairment Level:       - Flexion: pain and moderate impairment    - Extension: pain and minimal impairment    - Rotation:        • Left: WNL         • Right: WNL     - Side Bend:        • Left: minimal impairment, pain         • Right: pain, minimal impairment     Strength  (out of 5 unless noted, standard test position unless noted)   Hip:    - Flexion:        • Left: 4        • Right: 4    - Extension:        • Left: 4-        • Right: 4-    - Abduction:        • Left: 4-        • Right: 4-    - Adduction:        • Left: pain, 4-        • Right: pain, 4-    - Internal Rotation:        • Left: pain, 4-        • Right: pain, 4-    - External Rotation:        • Left: pain, 4-        • Right: pain, 4-         Palpation  Left  - Quadratus Lumborum: hypertonic  - Gluteus Naren: no palpable tenderness  - Gluteus Medius: no palpable tenderness  - Piriformis: no palpable tenderness  - Obturator Externus: no palpable tenderness  Right  -  Quadratus Lumborum: hypertonic  - Gluteus Naren: no palpable tenderness  - Gluteus Medius: no palpable tenderness  - Piriformis: no palpable tenderness  - Obturator Externus: no palpable tenderness  Lumbar  - L5-S1: - Left: tenderness - Right: tenderness  - SIJ: - Left: tenderness - Right: tenderness  Hip: Potrero/Tendon/Bone  - Pubic Symphysis: terderness     Special Tests  Sacroiliac Joint:  - Sacral Compression:  Left: positive Right: positive            Outcome/Assessments  PGQ: 60      Treatment     Therapeutic Exercise  -initial home exercise program and patient education listed below    Skilled input: verbal instruction/cues    Writer verbally educated and received verbal consent for hand placement, positioning of patient, and techniques to be performed today from patient for therapist position for techniques as described above and how they are pertinent to the patient's plan of care.    Home Exercise Program  Access Code: [email or web address]  URL: [email or web address]  Date: 01/03/2023  Prepared by: Carli Oquendo    Exercises  ? Seated Diaphragmatic Breathing - 1 x daily - 7 x weekly - 2 sets - 10 reps  ? Standing with Back Flat Against Wall - 1 x daily - 7 x weekly - 3 sets - 10 reps - 2 hold  ? Seated Transversus Abdominis Bracing - 1 x daily - 7 x weekly - 3 sets - 10 reps      ASSESSMENT                                                                                                          39 year old patient has reported functional limitations listed above impacted by signs and symptoms consistent with treatment diagnosis below.  Treatment Diagnosis:   - Involved: pelvic health.  - Symptoms/impairments: increased pain/symptoms, impaired posture, impaired range of motion, impaired strength, impaired muscle length/flexibility, impaired sexual health and impaired gait.     Angie Duron is a 39 year old year old female presenting with pelvic pain at 29 weeks pregnant.   After objective  exam suspect pubic symphysis / sacroiliac joint dysfunction. Patient is appropriate for pelvic floor physical therapy treatment to address impairments associated with pelvic pain at 29 weeks pregnant.  to improve functional mobility and quality of life.       Prognosis: Patient will benefit from skilled therapy.  Rehabilitative potential is: good.  Predicted patient presentation: Low (stable) - Patient comorbidities and complexities, as defined above, will have little effect on progress for prescribed plan of care.    Education:   - Present and ready to learn: patient  - Results of above outlined education: Verbalizes understanding    PLAN                                                                                                                         The following skilled interventions to be implemented to achieve goals listed below:  Manual Therapy (04148)  Therapeutic Activity (17024)  Activities of Daily Living/Self Care (01127)  Gait Training (51465)  Neuromuscular Re-Education (82861)  Therapeutic Exercise (00323)    Frequency / Duration  1 times per week tapering as patient progresses for 8 weeks for an estimated total of 8 visits    Patient involved in and agreed to plan of care and goals.  Patient given attendance policy at time of initial evaluation.    Suggestions for next session as indicated: Progress per plan of care  -postural weight shifts, bracing + movement, serola belt?  -diaphragmatic breathing + pelvic floor connection  -review bracing + posterior pelvic tilt, quadruped/standing/elevated, pallof press  -add bridges      Goals  Long Term Goals: to be met by end of plan of care  1. Patient will be independent with HEP so pt can stand for more than 30 minutes.   2. Patient will learn and perform proper body mechanics when performing lifting to limit symptom exacerbation and improve pain levels.  3. Patient will learn how to engage transverse abdominis to demonstrate improved core activation  and posture to decrease symptom exacerbation and improve quality of life.   4. Patient will improve Oswestry score by 5 points so pt can improve quality of life.           Therapy procedure time and total treatment time can be found documented on the Time Entry flowsheet

## 2025-06-26 NOTE — PROGRESS NOTES
Physical Therapy        Physical Therapy Cancel Note      DATE: 2025    NAME: Hemanth Barrera  MRN: [id number]   : 1935      Patient not seen this date for Physical Therapy due to:    Patient Declined: Cx; patient declingin PT itervention at thsi time citing pain in left LE. LEON Lopez approved treatment reporting patient is havign pain & that she had just given him a pain medicatin. Will contienue to follow for patient needs/updates.      Electronically signed by Jenny Cevallos PTA on 2025 at 3:47 PM

## 2025-06-26 NOTE — CARE COORDINATION
DISCHARGE PLANNING NOTE:    LSW following for potential discharge to SNF. Patient is now agreeable to admitting to a facility. Writer spoke to son Elias, who requests [facility] be [facility] as [facility] in the past.     [facility]. Will need insurance authorization once accepting facility has been determined.     [facility] not [facility] available. [facility] list left in room for patients son as he plans to visit this evening.

## 2025-06-26 NOTE — CARE COORDINATION
DISCHARGE PLANNING NOTE:    This writer spoke with patient at bedside this AM to discuss PT/OT recommendations for skilled nursing facility. Patient states he is agreeable, but would like us to reach out to his son to discuss facility choice.     LACIE Figueroa, LSW updated.    Electronically signed by Janice Hernandez RN on 6/26/2025 at 9:20 AM

## 2025-06-26 NOTE — PROGRESS NOTES
Patient requested eyedrops for a HX of glaucoma. NP notified and added a order for the eye drops See MAR.

## 2025-06-26 NOTE — PROGRESS NOTES
[facility]  IN-PATIENT SERVICE  [facility]    PROGRESS NOTE             6/26/2025    9:21 AM    Name:   Hemanth Barrera  MRN:     [id number]     Acct:      [id number]   Room:   2060/2060-01   Day:  2  Admit Date:  6/24/2025 12:12 PM    PCP:  Neftaly Contreras MD  Code Status:  Full Code    Subjective:     C/C:   Chief Complaint   Patient presents with    Fall    Knee Pain     Interval History Status: Significantly improved.    -Patient seen and examined at bedside.    -No new, acute events overnight.    -Patient hemodynamically stable, saturating in the high 90's on room air.   -Pain control: Roxicodone 5 mg every 6 hours as needed.  -Consults:  -PT/OT: Continued therapy after discharge, may benefit from SNF.  -Continues to endorse left knee pain radiating to his leg, much improved from yesterday and upon admission.  -Denies any chest pain, shortness of breath, dizziness, abdominal pain, nausea or vomiting.  Reports normal appetite, tolerating diet well, normal bowel and bladder functions.  -Intervention/discharge planning: Home (patient states that he is never home alone, he always either has his son or a nurse 3-4 times a week to look after him).    Brief History:     The patient is a 89 y.o. male with a significant past medical history including labile blood pressure, HFpEF s/p pacemaker implantation in 2019, CAD s/p stent placement in 2025, paroxysmal atrial fibrillation, history of multiple DVT's, BPH, chronic anemia, GERD, and prior left hip and knee replacements who presents with a 1-day history of left lower extremity and back pain following a fall.      The patient reports that while using his walker to walk down the hallway at home, his left lower extremity gave out, causing him to fall onto his left side. He denies any dizziness, lightheadedness, head injury, or loss of consciousness at the time of the fall. His daughter-in-law heard the walker fall and his  radiation dose to as low as reasonably achievable.; CT of the head was performed without the administration of intravenous contrast. Automated exposure control, iterative reconstruction, and/or weight based adjustment of the mA/kV was utilized to reduce the radiation dose to as low as reasonably achievable. COMPARISON: None. HISTORY: ORDERING SYSTEM PROVIDED HISTORY: fall TECHNOLOGIST PROVIDED HISTORY: fall Decision Support Exception - unselect if not a suspected or confirmed emergency medical condition->Emergency Medical Condition (MA) Reason for Exam: fall Additional signs and symptoms: Pt arrives to ED c/o left knee pain following a fall. Patient reports he fell last night after \"my knee just gave out.\" Patient denies hitting his head and denies loss of consciousness. Patient is not on any blood thinners. Patient was found by his son this morning unable to get up off the floor on his own.; ORDERING SYSTEM PROVIDED HISTORY: fall TECHNOLOGIST PROVIDED HISTORY: fall Decision Support Exception - unselect if not a suspected or confirmed emergency medical condition->Emergency Medical Condition (MA) Reason for Exam: fall Additional signs and symptoms: Pt arrives to ED c/o left knee pain following a fall. Patient reports he fell last night after \"my knee just gave out.\" Patient denies hitting his head and denies loss of consciousness. Patient is not on any blood thinners. Patient was found by his son this morning unable to get up off the floor on his own. FINDINGS: BRAIN/VENTRICLES: There is no acute intracranial hemorrhage, mass effect or midline shift.  No abnormal extra-axial fluid collection.  The gray-white differentiation is maintained without evidence of an acute infarct.  There is no evidence of hydrocephalus. ORBITS: The visualized portion of the orbits demonstrate no acute abnormality. SINUSES: The visualized paranasal sinuses and mastoid air cells demonstrate no acute abnormality. SOFT TISSUES/SKULL:  No acute

## 2025-06-26 NOTE — PLAN OF CARE
Problem: Chronic Conditions and Co-morbidities  Goal: Patient's chronic conditions and co-morbidity symptoms are monitored and maintained or improved  Outcome: Progressing  Flowsheets (Taken 6/26/2025 0910)  Care Plan - Patient's Chronic Conditions and Co-Morbidity Symptoms are Monitored and Maintained or Improved:   Monitor and assess patient's chronic conditions and comorbid symptoms for stability, deterioration, or improvement   Collaborate with multidisciplinary team to address chronic and comorbid conditions and prevent exacerbation or deterioration   Update acute care plan with appropriate goals if chronic or comorbid symptoms are exacerbated and prevent overall improvement and discharge     Problem: Discharge Planning  Goal: Discharge to home or other facility with appropriate resources  Outcome: Progressing  Flowsheets (Taken 6/26/2025 0910)  Discharge to home or other facility with appropriate resources:   Identify barriers to discharge with patient and caregiver   Identify discharge learning needs (meds, wound care, etc)   Arrange for needed discharge resources and transportation as appropriate   Refer to discharge planning if patient needs post-hospital services based on physician order or complex needs related to functional status, cognitive ability or social support system     Problem: Pain  Goal: Verbalizes/displays adequate comfort level or baseline comfort level  Outcome: Progressing     Problem: Safety - Adult  Goal: Free from fall injury  Outcome: Progressing     Problem: ABCDS Injury Assessment  Goal: Absence of physical injury  Outcome: Progressing     Problem: Skin/Tissue Integrity  Goal: Skin integrity remains intact  Description: 1.  Monitor for areas of redness and/or skin breakdown  2.  Assess vascular access sites hourly  3.  Every 4-6 hours minimum:  Change oxygen saturation probe site  4.  Every 4-6 hours:  If on nasal continuous positive airway pressure, respiratory therapy assess

## 2025-06-26 NOTE — PROGRESS NOTES
[facility]   OCCUPATIONAL THERAPY MISSED TREATMENT NOTE   INPATIENT   Date: 25  Patient Name: Hemanth Barrera       Room:   MRN: [id number]   Account #: [id number]    : 1935  (89 y.o.)  Gender: male   Referring Practitioner: Raisa Tomlinson MD and  Shana Ibarra MD  Diagnosis: Intractable pain             REASON FOR MISSED TREATMENT:  Patient declined   -    Patient very pleasantly declined participation in session due to knee pain. Would like to see therapy in the morning. Will follow patient progress and treat as time allows.             Electronically signed by GAGAN Leal on 25 at 3:33 PM EDT

## 2025-06-27 VITALS
HEIGHT: 73 IN | RESPIRATION RATE: 18 BRPM | HEART RATE: 67 BPM | OXYGEN SATURATION: 99 % | WEIGHT: 134.04 LBS | SYSTOLIC BLOOD PRESSURE: 109 MMHG | BODY MASS INDEX: 17.76 KG/M2 | DIASTOLIC BLOOD PRESSURE: 74 MMHG | TEMPERATURE: 98.2 F

## 2025-06-27 LAB
ANION GAP SERPL CALCULATED.3IONS-SCNC: 15 MMOL/L (ref 9–16)
BASOPHILS # BLD: 0 K/UL (ref 0–0.2)
BASOPHILS NFR BLD: 0 % (ref 0–2)
BUN SERPL-MCNC: 18 MG/DL (ref 8–23)
CALCIUM SERPL-MCNC: 8.8 MG/DL (ref 8.6–10.4)
CHLORIDE SERPL-SCNC: 106 MMOL/L (ref 98–107)
CO2 SERPL-SCNC: 16 MMOL/L (ref 20–31)
CREAT SERPL-MCNC: 0.9 MG/DL (ref 0.7–1.2)
EOSINOPHIL # BLD: 0.05 K/UL (ref 0–0.4)
EOSINOPHILS RELATIVE PERCENT: 2 % (ref 0–4)
ERYTHROCYTE [DISTWIDTH] IN BLOOD BY AUTOMATED COUNT: 16.8 % (ref 11.5–14.9)
GFR, ESTIMATED: 81 ML/MIN/1.73M2
GLUCOSE SERPL-MCNC: 105 MG/DL (ref 74–99)
HCT VFR BLD AUTO: 32.9 % (ref 41–53)
HGB BLD-MCNC: 10.8 G/DL (ref 13.5–17.5)
IMM GRANULOCYTES # BLD AUTO: 0 K/UL (ref 0–0.3)
IMM GRANULOCYTES NFR BLD: 0 %
IMM RETICS NFR: 17.4 % (ref 2.7–18.3)
LYMPHOCYTES NFR BLD: 0.57 K/UL (ref 1–4.8)
LYMPHOCYTES RELATIVE PERCENT: 22 % (ref 24–44)
MCH RBC QN AUTO: 30.6 PG (ref 26–34)
MCHC RBC AUTO-ENTMCNC: 32.8 G/DL (ref 31–37)
MCV RBC AUTO: 93.2 FL (ref 80–100)
MICROORGANISM SPEC CULT: NORMAL
MONOCYTES NFR BLD: 0.23 K/UL (ref 0.1–1.3)
MONOCYTES NFR BLD: 9 % (ref 1–7)
MORPHOLOGY: ABNORMAL
NEUTROPHILS NFR BLD: 67 % (ref 36–66)
NEUTS SEG NFR BLD: 1.75 K/UL (ref 1.3–9.1)
NRBC BLD-RTO: 0 PER 100 WBC
PLATELET # BLD AUTO: 206 K/UL (ref 150–450)
PMV BLD AUTO: 9.4 FL (ref 8–13.5)
POTASSIUM SERPL-SCNC: 4.6 MMOL/L (ref 3.7–5.3)
RBC # BLD AUTO: 3.53 M/UL (ref 4.21–5.77)
RETIC HEMOGLOBIN: 35.6 PG (ref 28.2–35.7)
RETICS # AUTO: 0.06 M/UL (ref 0.03–0.08)
RETICS/RBC NFR AUTO: 1.8 % (ref 0.5–2.5)
SERVICE CMNT-IMP: NORMAL
SODIUM SERPL-SCNC: 137 MMOL/L (ref 136–145)
SPECIMEN DESCRIPTION: NORMAL
WBC OTHER # BLD: 2.6 K/UL (ref 3.5–11)

## 2025-06-27 PROCEDURE — 6370000000 HC RX 637 (ALT 250 FOR IP)

## 2025-06-27 PROCEDURE — 85025 COMPLETE CBC W/AUTO DIFF WBC: CPT

## 2025-06-27 PROCEDURE — 80048 BASIC METABOLIC PNL TOTAL CA: CPT

## 2025-06-27 PROCEDURE — 2500000003 HC RX 250 WO HCPCS

## 2025-06-27 PROCEDURE — 36415 COLL VENOUS BLD VENIPUNCTURE: CPT

## 2025-06-27 PROCEDURE — 99232 SBSQ HOSP IP/OBS MODERATE 35: CPT | Performed by: INTERNAL MEDICINE

## 2025-06-27 PROCEDURE — 85045 AUTOMATED RETICULOCYTE COUNT: CPT

## 2025-06-27 RX ORDER — POLYETHYLENE GLYCOL 3350 17 G/17G
17 POWDER, FOR SOLUTION ORAL DAILY
Status: DISCONTINUED | OUTPATIENT
Start: 2025-06-27 | End: 2025-06-27 | Stop reason: HOSPADM

## 2025-06-27 RX ORDER — SENNA AND DOCUSATE SODIUM 50; 8.6 MG/1; MG/1
2 TABLET, FILM COATED ORAL DAILY
Status: DISCONTINUED | OUTPATIENT
Start: 2025-06-27 | End: 2025-06-27 | Stop reason: HOSPADM

## 2025-06-27 RX ORDER — LIDOCAINE 50 MG/G
1 PATCH TOPICAL DAILY PRN
Qty: 30 PATCH | Refills: 0 | Status: SHIPPED | OUTPATIENT
Start: 2025-06-27 | End: 2025-07-27

## 2025-06-27 RX ORDER — DOCUSATE SODIUM 100 MG/1
100 CAPSULE, LIQUID FILLED ORAL 2 TIMES DAILY
Qty: 60 CAPSULE | Refills: 0 | Status: SHIPPED | OUTPATIENT
Start: 2025-06-27 | End: 2025-07-27

## 2025-06-27 RX ADMIN — RANOLAZINE 500 MG: 500 TABLET, EXTENDED RELEASE ORAL at 07:38

## 2025-06-27 RX ADMIN — MIDODRINE HYDROCHLORIDE 20 MG: 10 TABLET ORAL at 16:35

## 2025-06-27 RX ADMIN — FERROUS SULFATE TAB 325 MG (65 MG ELEMENTAL FE) 325 MG: 325 (65 FE) TAB at 07:38

## 2025-06-27 RX ADMIN — TAMSULOSIN HYDROCHLORIDE 0.4 MG: 0.4 CAPSULE ORAL at 07:38

## 2025-06-27 RX ADMIN — SODIUM CHLORIDE, PRESERVATIVE FREE 10 ML: 5 INJECTION INTRAVENOUS at 07:38

## 2025-06-27 RX ADMIN — ACETAMINOPHEN 650 MG: 325 TABLET ORAL at 05:53

## 2025-06-27 RX ADMIN — FINASTERIDE 5 MG: 5 TABLET, FILM COATED ORAL at 07:38

## 2025-06-27 RX ADMIN — DOCUSATE SODIUM 50MG AND SENNOSIDES 8.6MG 2 TABLET: 8.6; 5 TABLET, FILM COATED ORAL at 10:22

## 2025-06-27 RX ADMIN — POLYETHYLENE GLYCOL 3350 17 G: 17 POWDER, FOR SOLUTION ORAL at 10:22

## 2025-06-27 RX ADMIN — MIDODRINE HYDROCHLORIDE 20 MG: 10 TABLET ORAL at 12:37

## 2025-06-27 RX ADMIN — ONDANSETRON 4 MG: 4 TABLET, ORALLY DISINTEGRATING ORAL at 10:22

## 2025-06-27 RX ADMIN — ACETAMINOPHEN 650 MG: 325 TABLET ORAL at 12:37

## 2025-06-27 RX ADMIN — OXYCODONE HYDROCHLORIDE 5 MG: 5 TABLET ORAL at 12:37

## 2025-06-27 RX ADMIN — ASPIRIN 81 MG: 81 TABLET, CHEWABLE ORAL at 07:38

## 2025-06-27 RX ADMIN — OXYCODONE HYDROCHLORIDE 5 MG: 5 TABLET ORAL at 05:53

## 2025-06-27 RX ADMIN — CLOPIDOGREL BISULFATE 75 MG: 75 TABLET, FILM COATED ORAL at 07:38

## 2025-06-27 RX ADMIN — PANTOPRAZOLE SODIUM 40 MG: 40 TABLET, DELAYED RELEASE ORAL at 05:33

## 2025-06-27 RX ADMIN — APIXABAN 5 MG: 5 TABLET, FILM COATED ORAL at 07:38

## 2025-06-27 ASSESSMENT — PAIN SCALES - GENERAL
PAINLEVEL_OUTOF10: 7
PAINLEVEL_OUTOF10: 2
PAINLEVEL_OUTOF10: 7
PAINLEVEL_OUTOF10: 3

## 2025-06-27 ASSESSMENT — PAIN DESCRIPTION - LOCATION
LOCATION: KNEE
LOCATION: KNEE
LOCATION: LEG

## 2025-06-27 ASSESSMENT — PAIN DESCRIPTION - ORIENTATION
ORIENTATION: LEFT

## 2025-06-27 ASSESSMENT — PAIN DESCRIPTION - DESCRIPTORS
DESCRIPTORS: STABBING
DESCRIPTORS: ACHING
DESCRIPTORS: ACHING;GNAWING

## 2025-06-27 NOTE — PROGRESS NOTES
Report called to Erin at [facility]. IV removed, tip intact. No complications at this time.

## 2025-06-27 NOTE — PROGRESS NOTES
Physical Therapy        Physical Therapy Cancel Note      DATE: 2025    NAME: Hemanth Barrera  MRN: [id number]   : 1935      Patient not seen this date for Physical Therapy due to:    Other: Cx; patint eating breakfast. Writer plans to reproach at a later time. Will continue to follow for patient needs/updates. Attempt made @ 5790-0400.      Electronically signed by Jenny Cevallos PTA on 2025 at 9:42 AM

## 2025-06-27 NOTE — PROGRESS NOTES
[facility]  IN-PATIENT SERVICE  [facility]    PROGRESS NOTE             6/27/2025    10:15 AM    Name:   Hemanth Barrera  MRN:     [id number]     Acct:      [id number]   Room:   2060/2060-01   Day:  3  Admit Date:  6/24/2025 12:12 PM    PCP:  Neftaly Contreras MD  Code Status:  Full Code    Subjective:     C/C:   Chief Complaint   Patient presents with    Fall    Knee Pain     Interval History Status: Significantly improved.    -Patient seen and examined at bedside.    -No new, acute events overnight.    -Patient hemodynamically stable, saturating in the high 90's on room air.   -Pain control: Roxicodone 5 mg every 6 hours as needed.  -Consults:  -PT/OT: Continued therapy after discharge, recommending SNF.  -Left knee pain improved.  -Complaining of abdominal pain, nausea, has not had a bowel movement since admission and on narcotics for pain control, CT abdomen pelvis without contrast 05/2025 showing moderate to large stool burden, ordered Zofran, GlycoLax, Senokot.  -Denies any chest pain, shortness of breath, dizziness, or vomiting.  Reports normal appetite, tolerating diet well, normal bowel and bladder functions.  -Intervention/discharge planning: SNF, waiting for patient/family to choose preferred facility.    Brief History:     The patient is a 89 y.o. male with a significant past medical history including labile blood pressure, HFpEF s/p pacemaker implantation in 2019, CAD s/p stent placement in 2025, paroxysmal atrial fibrillation, history of multiple DVT's, BPH, chronic anemia, GERD, and prior left hip and knee replacements who presents with a 1-day history of left lower extremity and back pain following a fall.      The patient reports that while using his walker to walk down the hallway at home, his left lower extremity gave out, causing him to fall onto his left side. He denies any dizziness, lightheadedness, head injury, or loss of consciousness at the  acute abnormality. SOFT TISSUES/SKULL:  No acute abnormality of the visualized skull or soft tissues.     No acute intracranial abnormality. Senescent changes including chronic microvascular change.     XR CHEST PORTABLE  Result Date: 6/12/2025  EXAMINATION: ONE XRAY VIEW OF THE CHEST 6/12/2025 1:28 pm COMPARISON: Chest x-ray dated May 23, 2025 HISTORY: ORDERING SYSTEM PROVIDED HISTORY: SOB TECHNOLOGIST PROVIDED HISTORY: SOB Reason for Exam: sob FINDINGS: Air-filled loops of colon under both hemidiaphragms.  Right basilar atelectasis.  No pneumothorax or pleural effusion.  Normal cardiomediastinal silhouette.  Left-sided pacemaker device     [] Right basilar atelectasis     Physical Examination:        Physical Exam  Vitals and nursing note reviewed.   Constitutional:       General: He is not in acute distress.  Cardiovascular:      Rate and Rhythm: Normal rate and regular rhythm.      Pulses: Normal pulses.      Heart sounds: Normal heart sounds.   Pulmonary:      Effort: Pulmonary effort is normal.      Breath sounds: Normal breath sounds. No wheezing.   Abdominal:      General: There is no distension.      Tenderness: There is no abdominal tenderness. There is no guarding or rebound.   Musculoskeletal:      Right lower leg: No tenderness. Edema present.      Left lower leg: Tenderness present. Edema present.      Right foot: Normal range of motion. Normal pulse.      Left foot: Normal range of motion. Normal pulse.   Skin:     General: Skin is warm and dry.   Neurological:      Mental Status: He is alert and oriented to person, place, and time.       Assessment:        Primary Problem  Intractable pain    Active Hospital Problems    Diagnosis Date Noted    Intractable pain [R52] 06/24/2025     Plan:        Left lower extremity and back pain 2/2 fall  Presents with a 1-day history of left lower extremity and back pain following a fall.  Denies any dizziness, lightheadedness, head injury, or loss of consciousness

## 2025-06-27 NOTE — DISCHARGE SUMMARY
[facility]   IN-PATIENT SERVICE   [facility]    DISCHARGE SUMMARY     Patient ID:    Hemanth Barrera  :      1935  MRN:     [id number]  ACCOUNT:     [id number]  Patient's PCP:   Neftaly Contreras MD  Admit Date:    2025  Discharge Date:   2025  Length of Stay:   3  Code Status:    Prior  Admitting Physician:  Raisa Tomlinson MD  Discharge Physician:  Shana Ibarra MD    Active Discharge Diagnoses:     Primary Problem  Intractable pain      Hospital Problems  Active Hospital Problems    Diagnosis Date Noted    Intractable pain [R52] 2025     Admission Condition: Poor.     Discharged Condition: Good.    Hospital Stay:     Hospital Course: The patient is a 89-year-old male with a significant past medical history of labile blood pressure, HFpEF s/p pacemaker implantation (), CAD s/p stent placement (), paroxysmal atrial fibrillation, history of multiple DVT's, BPH, chronic anemia, GERD, and prior left hip and knee replacements. He presented with a 1-day history of left lower extremity and back pain following a fall. He denied any dizziness, lightheadedness, head trauma, or loss of consciousness.    In the ED, the patient was hemodynamically stable. Imaging revealed no acute osseous abnormalities. He was treated for moderate pain with oral Roxicodone and IV fentanyl for breakthrough pain. Daily physical and occupational therapy were initiated, with recommendations for discharge to a skilled nursing facility for continued rehabilitation.    During discharge planning, the patient initially expressed a desire to return home, stating that he lives with his son and daughter-in-law and receives home health nursing four times a week. However, upon further discussion, it was clarified that his son works out of state ([place]) and is frequently unavailable. After joint discussions with the patient and his family, a decision was made to discharge the patient to  after \"my knee just gave out.\" Patient denies hitting his head and denies loss of consciousness. Patient is not on any blood thinners. Patient was found by his son this morning unable to get up off the floor on his own.; ORDERING SYSTEM PROVIDED HISTORY: fall TECHNOLOGIST PROVIDED HISTORY: fall Decision Support Exception - unselect if not a suspected or confirmed emergency medical condition->Emergency Medical Condition (MA) Reason for Exam: fall Additional signs and symptoms: Pt arrives to ED c/o left knee pain following a fall. Patient reports he fell last night after \"my knee just gave out.\" Patient denies hitting his head and denies loss of consciousness. Patient is not on any blood thinners. Patient was found by his son this morning unable to get up off the floor on his own. FINDINGS: BRAIN/VENTRICLES: There is no acute intracranial hemorrhage, mass effect or midline shift.  No abnormal extra-axial fluid collection.  The gray-white differentiation is maintained without evidence of an acute infarct.  There is no evidence of hydrocephalus. ORBITS: The visualized portion of the orbits demonstrate no acute abnormality. SINUSES: The visualized paranasal sinuses and mastoid air cells demonstrate no acute abnormality. SOFT TISSUES/SKULL:  No acute abnormality of the visualized skull or soft tissues. CERVICAL SPINE BONES/ALIGNMENT: There is no acute fracture or traumatic malalignment. DEGENERATIVE CHANGES: No significant degenerative changes. SOFT TISSUES: There is no prevertebral soft tissue swelling.     No acute intracranial abnormality. No acute fracture in the cervical spine.     Echo (TTE) complete (PRN contrast/bubble/strain/3D)  Result Date: 6/13/2025    Left Ventricle: Normal left ventricular systolic function with a visually estimated EF of 55 - 60%. Left ventricle is smaller than normal. Mildly increased wall thickness. Normal wall motion. E/E' Ratio (Averaged) is 6.13.   Right Ventricle: Right ventricle size  lightheadedness, dizziness, loss of consciousness, active bleeding, or any other concerning symptoms.    Discharge Medications:      Medication List        START taking these medications      diclofenac sodium 1 % Gel  Commonly known as: VOLTAREN  Apply 2 g topically 2 times daily as needed for Pain     lidocaine 5 %  Commonly known as: LIDODERM  Place 1 patch onto the skin daily as needed for Pain 12 hours on, 12 hours off.            CONTINUE taking these medications      apixaban 5 MG Tabs tablet  Commonly known as: Eliquis  Take 1 tablet by mouth 2 times daily     aspirin 81 MG chewable tablet  Take 1 tablet by mouth daily     atorvastatin 40 MG tablet  Commonly known as: LIPITOR  Take 1 tablet by mouth nightly     clopidogrel 75 MG tablet  Commonly known as: PLAVIX  Take 1 tablet by mouth daily     docusate sodium 100 MG capsule  Commonly known as: COLACE  Take 1 capsule by mouth 2 times daily     ferrous sulfate 325 (65 Fe) MG tablet  Commonly known as: IRON 325  Take 1 tablet by mouth 2 times daily (with meals)     finasteride 5 MG tablet  Commonly known as: PROSCAR  Take 1 tablet by mouth daily     gabapentin 100 MG capsule  Commonly known as: NEURONTIN  Take 1 capsule by mouth 2 times daily for 30 days.     latanoprost 0.005 % ophthalmic solution  Commonly known as: XALATAN  Place 1 drop into both eyes nightly     midodrine 10 MG tablet  Commonly known as: PROAMATINE  Take 2 tablets by mouth 3 times daily (with meals)     nitroGLYCERIN 0.4 MG SL tablet  Commonly known as: NITROSTAT  up to max of 3 total doses. If no relief after 1 dose, call 911.     pantoprazole 40 MG tablet  Commonly known as: PROTONIX  Take 1 tablet by mouth every morning (before breakfast)     polyethylene glycol 17 GM/SCOOP powder  Commonly known as: GLYCOLAX  Take daily for 5 days then as needed for constipation     ranolazine 500 MG extended release tablet  Commonly known as: RANEXA  Take 1 tablet by mouth 2 times daily

## 2025-06-27 NOTE — PROGRESS NOTES
Physical Therapy        Physical Therapy Cancel Note      DATE: 2025    NAME: Hemanth Barrera  MRN: [id number]   : 1935      Patient not seen this date for Physical Therapy due to:    Patient Declined: Cx; patient politely & apologetically declining due to high pain levels & nausea. Patient with \"barf bag\" at mouth stating \"I'm feeling like I have to throw up.\" Will continue to follow for patient needs/updates.Second attempt this date made from [id number].      Electronically signed by Jenny Cevallos PTA on 2025 at 2:45 PM

## 2025-06-27 NOTE — PLAN OF CARE
Problem: Chronic Conditions and Co-morbidities  Goal: Patient's chronic conditions and co-morbidity symptoms are monitored and maintained or improved  6/27/2025 0006 by Alem Felder RN  Outcome: Progressing  Flowsheets (Taken 6/26/2025 2126)  Care Plan - Patient's Chronic Conditions and Co-Morbidity Symptoms are Monitored and Maintained or Improved:   Monitor and assess patient's chronic conditions and comorbid symptoms for stability, deterioration, or improvement   Collaborate with multidisciplinary team to address chronic and comorbid conditions and prevent exacerbation or deterioration   Update acute care plan with appropriate goals if chronic or comorbid symptoms are exacerbated and prevent overall improvement and discharge  6/26/2025 1508 by Jessica Shaikh RN  Outcome: Progressing  Flowsheets (Taken 6/26/2025 0910)  Care Plan - Patient's Chronic Conditions and Co-Morbidity Symptoms are Monitored and Maintained or Improved:   Monitor and assess patient's chronic conditions and comorbid symptoms for stability, deterioration, or improvement   Collaborate with multidisciplinary team to address chronic and comorbid conditions and prevent exacerbation or deterioration   Update acute care plan with appropriate goals if chronic or comorbid symptoms are exacerbated and prevent overall improvement and discharge     Problem: Discharge Planning  Goal: Discharge to home or other facility with appropriate resources  6/27/2025 0006 by Alem Felder RN  Outcome: Progressing  Flowsheets (Taken 6/26/2025 2126)  Discharge to home or other facility with appropriate resources:   Identify barriers to discharge with patient and caregiver   Arrange for needed discharge resources and transportation as appropriate   Identify discharge learning needs (meds, wound care, etc)   Refer to discharge planning if patient needs post-hospital services based on physician order or complex needs related to functional status, cognitive

## 2025-06-27 NOTE — CARE COORDINATION
DISCHARGE PLANNING NOTE:    LSW following for potential discharge to SNF. Patient is now agreeable to admitting to a facility. Patient accepted at [facility]. Writer spoke to yovani Griffin, who is agreeable facility as patient has been there previously.     insurance authorization submitted via [employer] online portal       Reference Number  [id number]     Certification Number  [id number]     Transaction ID  [id number]     Approved through 6/30.     Patient can admit to facility once medically ready for discharge.  Dr. Haskins notified.

## 2025-06-27 NOTE — CARE COORDINATION
Transportation arranged for patient to go to [facility] at 6[facility] via [facility]. Facility informed. Bedside nurse informed. Son updated. AVS faxed to facility. HENS completed. Document ID : [id number]     IMM letter provided to patient.  Patient offered four hours to make informed decision regarding appeal process; patient agreeable to discharge.     Number for Report: [phone]

## 2025-06-27 NOTE — PROGRESS NOTES
Patient discharge order to [facility]. Transport arriving to  patient. IV removed with no complications, and all patient belongings sent with patient. Patient left via transport in wheelchair.

## 2025-06-27 NOTE — PROGRESS NOTES
Physician Progress Note      PATIENT:               DONNA DYSON  [facility] #:                  [id number]  :                       1935  ADMIT DATE:       2025 12:57 PM  DISCH DATE:        6/15/2025 3:18 PM  RESPONDING  PROVIDER #:        Sana Haskins MD          QUERY TEXT:    Weakness is documented in medical record DS 6/15 by Sana Beck MD.   Please clarify in documentation the Cause of weakness.    The clinical indicators include:  -Age 89 yrs M, VARSHA, Afib.    -\"Patient is 89-year-old male multiple comorbidities including history of CAD,   paroxysmal A-fib, CKD stage III, presented with dizziness and generalized   weakness and fatigue. According to the patient for the past few months patient   has been complaining of dizziness and generalized weakness.\" (HP  by Sana Beck MD) (PN  by Kaitlin Dotson MD)    -\"Dizziness, due to hypotension, remains orthostatic.\" (PN 6/15 by cardiology   Josh Ames DO)    -\"Extremity Weakness.  Patient did not complain of dizziness.  Patient   pacemaker was interrogated yesterday.  Per cardiology pacemaker checked normal   function and 100% A-fib no other dizziness, okay for discharge follow-up in 2   to 4 weeks.\" (PN 6/15 by Sana Beck MD)    -\"Admitted for the management of weakness, presented to ER with Extremity   Weakness and Shortness of Breath. According to the patient for the past few   months patient has been complaining of dizziness and generalized weakness.  He   stated that when he gets up from seated position, he feels dizzy.  Patient   denied any headaches, loss of consciousness, falls, nausea, change in vision,   or vomiting.  Patient did not endorse any chest pain. Patient's VARSHA improved   throughout his admission and nephrology was consulted.\" (DS 6/15 by Sana Beck MD)    -\"BP: 86/67 \"    -Treated with IV fluids -(from [facility]' on )    Thank you.  Markel ORTIZ CDS  Options provided:  --  Weakness is related to Atrial fibrillation  -- Weakness is related to VARSHA  -- Weakness is related to Orthostatic hypotension  -- Other - I will add my own diagnosis  -- Disagree - Not applicable / Not valid  -- Disagree - Clinically unable to determine / Unknown  -- Refer to Clinical Documentation Reviewer    PROVIDER RESPONSE TEXT:    Patient weakness is related to Atrial fibrillation.    Query created by: Markel Figueroa on 6/25/2025 9:29 AM      QUERY TEXT:    Severe malnutrition is documented in Consults by Tessa Aiken, ANI, JENARO at   6/13/2025. Please clarify the patient?s nutritional status:    The clinical indicators include:  -Age 89yrs M with HTN, CKD, CHF presents with VARSHA.    -\"Active hospital problem list: Severe malnutrition\" (PN 6/15 by Sana Beck MD), (DS 6/15 by Sana Beck MD).    -\"BMI 19.92 kg/m?\" (HP 6/12 by Sana Beck MD)    -Dietician PN (from Progress Notes by Tessa Aiken, ANI, JENARO at 6/13/2025):  -\"Malnutrition Status: Severe malnutrition  -Energy Intake: 75% or less estimated energy requirements for 1 month or   longer  -Weight Loss:  Greater than 10% over 6 months (20% over 9 months)  -Body Fat Loss:   Severe body fat loss Orbital, Triceps  -Muscle Mass Loss:  Severe muscle mass loss Temples (temporalis), Hand   (interosseous)  -Fluid Accumulation:  No fluid accumulation  - Strength:  Not Performed  -Anthropometric Measures: 185.4 cm (6' 1\"), Current Body Weight: 63.5 kg (139   lb 15.9 oz) (obtained by ANI), 76.1 % IBW, Current BMI (kg/m2): 18.5.  -Nutrition Diagnosis: Severe malnutrition related to inadequate protein-energy   intake as evidenced by criteria as identified in malnutrition assessment\"    -Treated with Current Diet, Oral Nutrition Supplement, Nutrition consult.      Thank you.  Markel ORTIZ CDS  Options provided:  -- Protein calorie malnutrition severe  -- Severe protein calorie malnutrition ruled out after study  -- Other - I will add my own

## 2025-06-30 ENCOUNTER — HOSPITAL ENCOUNTER (OUTPATIENT)
Age: 89
Setting detail: SPECIMEN
Discharge: HOME | End: 2025-06-30

## 2025-06-30 LAB
ALBUMIN SERPL-MCNC: 3.1 G/DL (ref 3.5–5.2)
ALBUMIN/GLOB SERPL: 1.2 {RATIO} (ref 1–2.5)
ALP SERPL-CCNC: 69 U/L (ref 40–129)
ALT SERPL-CCNC: 8 U/L (ref 10–50)
ANION GAP SERPL CALCULATED.3IONS-SCNC: 9 MMOL/L (ref 9–16)
AST SERPL-CCNC: 19 U/L (ref 10–50)
BILIRUB SERPL-MCNC: 0.3 MG/DL (ref 0–1.2)
BUN SERPL-MCNC: 20 MG/DL (ref 8–23)
CALCIUM SERPL-MCNC: 8.7 MG/DL (ref 8.2–9.6)
CHLORIDE SERPL-SCNC: 108 MMOL/L (ref 98–107)
CO2 SERPL-SCNC: 23 MMOL/L (ref 20–31)
CREAT SERPL-MCNC: 1.2 MG/DL (ref 0.7–1.2)
ERYTHROCYTE [DISTWIDTH] IN BLOOD BY AUTOMATED COUNT: 17.1 % (ref 11.8–14.4)
GFR, ESTIMATED: 60 ML/MIN/1.73M2
GLUCOSE SERPL-MCNC: 81 MG/DL (ref 75–121)
HCT VFR BLD AUTO: 31.4 % (ref 40.7–50.3)
HGB BLD-MCNC: 10 G/DL (ref 13–17)
MCH RBC QN AUTO: 30.8 PG (ref 25.2–33.5)
MCHC RBC AUTO-ENTMCNC: 31.8 G/DL (ref 28.4–34.8)
MCV RBC AUTO: 96.6 FL (ref 82.6–102.9)
NRBC BLD-RTO: 0 PER 100 WBC
PATH REV BLD -IMP: NORMAL
PLATELET # BLD AUTO: 239 K/UL (ref 138–453)
PMV BLD AUTO: 10 FL (ref 8.1–13.5)
POTASSIUM SERPL-SCNC: 5 MMOL/L (ref 3.7–5.3)
PROT SERPL-MCNC: 5.7 G/DL (ref 6.6–8.7)
RBC # BLD AUTO: 3.25 M/UL (ref 4.21–5.77)
SODIUM SERPL-SCNC: 140 MMOL/L (ref 136–145)
SURGICAL PATHOLOGY REPORT: NORMAL
WBC OTHER # BLD: 2 K/UL (ref 3.5–11.3)

## 2025-06-30 PROCEDURE — 85027 COMPLETE CBC AUTOMATED: CPT

## 2025-06-30 PROCEDURE — 36415 COLL VENOUS BLD VENIPUNCTURE: CPT

## 2025-06-30 PROCEDURE — 80053 COMPREHEN METABOLIC PANEL: CPT

## 2025-06-30 NOTE — PLAN OF CARE
Problem: Falls - Risk of:  Goal: Will remain free from falls  Description: Will remain free from falls  3/15/2022 0440 by James Mcleod RN  Outcome: Ongoing  3/14/2022 1759 by Roberto Carlos Arguello RN  Outcome: Met This Shift  Goal: Absence of physical injury  Description: Absence of physical injury  3/15/2022 0440 by James Mcleod RN  Outcome: Ongoing  3/14/2022 1759 by Roberto Carlos Arguello RN  Outcome: Met This Shift     Problem: Pain:  Goal: Pain level will decrease  Description: Pain level will decrease  3/15/2022 0440 by James Mcleod RN  Outcome: Ongoing  3/14/2022 1759 by Roberto Carlos Arguello RN  Outcome: Met This Shift  Goal: Control of acute pain  Description: Control of acute pain  3/15/2022 0440 by James Mcleod RN  Outcome: Ongoing  3/14/2022 1759 by Roberto Carlos Arguello RN  Outcome: Met This Shift  Goal: Control of chronic pain  Description: Control of chronic pain  3/15/2022 0440 by James Mcleod RN  Outcome: Ongoing  3/14/2022 1759 by Roberto Carlos Arguello RN  Outcome: Met This Shift     Problem: Musculor/Skeletal Functional Status  Goal: Highest potential functional level  3/15/2022 0440 by James Mcleod RN  Outcome: Ongoing  3/14/2022 1759 by Roberto Carlos Arguello RN  Outcome: Met This Shift  Goal: Absence of falls  3/15/2022 0440 by James Mcleod RN  Outcome: Ongoing  3/14/2022 1759 by Roberto Carlos Arguello RN  Outcome: Met This Shift 5

## 2025-07-07 ENCOUNTER — OFFICE VISIT (OUTPATIENT)
Dept: ORTHOPEDIC SURGERY | Age: 89
End: 2025-07-07
Payer: MEDICARE

## 2025-07-07 ENCOUNTER — TELEPHONE (OUTPATIENT)
Dept: ORTHOPEDIC SURGERY | Age: 89
End: 2025-07-07

## 2025-07-07 VITALS — BODY MASS INDEX: 17.76 KG/M2 | HEIGHT: 73 IN | WEIGHT: 134 LBS

## 2025-07-07 DIAGNOSIS — Z96.652 HISTORY OF TOTAL KNEE ARTHROPLASTY, LEFT: ICD-10-CM
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
## 2025-07-07 DIAGNOSIS — M25.562 LEFT KNEE PAIN, UNSPECIFIED CHRONICITY: Primary | ICD-10-CM

## 2025-07-07 DIAGNOSIS — T84.84XA PAINFUL ORTHOPAEDIC HARDWARE: ICD-10-CM

## 2025-07-07 PROCEDURE — 1125F AMNT PAIN NOTED PAIN PRSNT: CPT | Performed by: PHYSICIAN ASSISTANT

## 2025-07-07 PROCEDURE — 1159F MED LIST DOCD IN RCRD: CPT | Performed by: PHYSICIAN ASSISTANT

## 2025-07-07 PROCEDURE — 1123F ACP DISCUSS/DSCN MKR DOCD: CPT | Performed by: PHYSICIAN ASSISTANT

## 2025-07-07 PROCEDURE — G8427 DOCREV CUR MEDS BY ELIG CLIN: HCPCS | Performed by: PHYSICIAN ASSISTANT

## 2025-07-07 PROCEDURE — 1036F TOBACCO NON-USER: CPT | Performed by: PHYSICIAN ASSISTANT

## 2025-07-07 PROCEDURE — 1111F DSCHRG MED/CURRENT MED MERGE: CPT | Performed by: PHYSICIAN ASSISTANT

## 2025-07-07 PROCEDURE — G8419 CALC BMI OUT NRM PARAM NOF/U: HCPCS | Performed by: PHYSICIAN ASSISTANT

## 2025-07-07 PROCEDURE — 99203 OFFICE O/P NEW LOW 30 MIN: CPT | Performed by: PHYSICIAN ASSISTANT

## 2025-07-07 RX ORDER — OXYCODONE AND ACETAMINOPHEN 5; 325 MG/1; MG/1
1 TABLET ORAL EVERY 4 HOURS PRN
COMMUNITY

## 2025-07-07 RX ORDER — TRAMADOL HYDROCHLORIDE 50 MG/1
TABLET ORAL
COMMUNITY
Start: 2025-05-12

## 2025-07-07 RX ORDER — ONDANSETRON 4 MG/1
4 TABLET, FILM COATED ORAL EVERY 8 HOURS PRN
COMMUNITY

## 2025-07-07 ASSESSMENT — ENCOUNTER SYMPTOMS
COUGH: 0
VOMITING: 0
SHORTNESS OF BREATH: 0
COLOR CHANGE: 0

## 2025-07-07 NOTE — PROGRESS NOTES
[facility] PHYSICIANS [facility] ORTHOPEDICS AND SPORTS MEDICINE  [address]  [address]  [place] [id number]  Dept: [phone]    Ambulatory Orthopedic New Patient Visit      CHIEF COMPLAINT:    Chief Complaint   Patient presents with    Knee Pain     L knee pain       Date of Injury: 6/24/2025     HISTORY OF PRESENT ILLNESS:      History of Present Illness  The patient is a 89-year-old male here today for follow-up regarding left knee pain. He is currently at a skilled nursing facility, [facility], and was in the hospital at [facility] on 06/24/2025 after a fall from standing height and was discharged home on 6/27/2025.     He reports that he was using his walker to walk down the hallway at home when his left knee gave out, causing him to fall onto his left side. He has a history of a left total knee arthroplasty but does not remember who performed the surgery and notes that his left knee has been sore since then. He underwent a knee replacement surgery approximately 8 years ago in [place].     He recalls that his knee was sore prior to the fall on 6/24/2025 and that it gave out completely, leading to the fall. His son assisted him after the fall. He was informed by a doctor during his hospital stay that something had broken off inside his knee. Despite taking pain medication, he continues to experience significant pain. He is currently undergoing therapy at the nursing facility, which he reports is not providing relief. He has been advised against bearing too much weight on his leg. X-rays and blood work were conducted to rule out infection while in the hospital.    Patient did not have family or representative from the skilled nursing facility at his appointment today.    Patient has significant past medical history as listed below.    PAST SURGICAL HISTORY:  Left total knee arthroplasty approximately 8 years ago in [place].      Past

## 2025-07-07 NOTE — TELEPHONE ENCOUNTER
Called [facility] again and they were able to answer the phone. I notified them that we had faxed a new note and that patients next appointment is in the [place] location.

## 2025-07-07 NOTE — TELEPHONE ENCOUNTER
Patient was given note for facility with wrong location for his appt.   [facility] however I could not reach anyone.    A fax was sent to [facility] with a corrected note.     I will retry the tim strauss [place] at [phone].    Fax was successful in going through.

## 2025-07-09 ENCOUNTER — CARE COORDINATION (OUTPATIENT)
Dept: CARE COORDINATION | Age: 89
End: 2025-07-09

## 2025-07-09 NOTE — CARE COORDINATION
Hemanth Barrera  7/9/2025                Nurse Care Coordinator Telephone Note    Writer was unable to connect with client and family after son had stated he did not want services. Clients son did not reach out to writer for any follow ups. No connection initiated. Client had a fall at home and was recently into a skilled nursing facility. Client currently stable at skilled nursing facility     Goals        Conditions and Symptoms      I will schedule office visits, as directed by my provider.  I will keep my appointment or reschedule if I have to cancel.  I will notify my provider of any barriers to my plan of care.  I will follow my Zone Management tool to seek urgent or emergent care.  I will notify my provider of any symptoms that indicate a worsening of my condition.    Barriers: overwhelmed by complexity of regimen  Plan for overcoming my barriers: work with ACM  Confidence: 9/10  Anticipated Goal Completion Date: 2.28.24         Patient/Family verbalizes understanding of self-management of chronic disease.      Patient/family is able to discuss self-management of CHF, Atrial fibrillation, DVT  Pt demonstrates adherence to medications  Pt /family demonstrates understanding of self-monitoring for CHF, DVT  Patient is able to identify Red Flags:  Alert to potential adverse drug reactions(s) or side effects and actions to take should they arise  Discuss target symptoms and actions to take should they arise  Identify problems that require immediate PCP or specialist visit  Patient demonstrates understanding of access to PCP/Specialist:  Understands about scheduling routine Follow Up appointments   Understands about sick day appointment options for worsening of symptoms/progression (Same Day, E Visits)    Barriers- complexity of care, fatigue    Expected date of completion 8/1/2025                 Lily Kirkpatrick RN BSN  [facility]

## 2025-07-15 ENCOUNTER — HOSPITAL ENCOUNTER (OUTPATIENT)
Age: 89
Setting detail: SPECIMEN
Discharge: HOME | End: 2025-07-15

## 2025-07-15 LAB
ANION GAP SERPL CALCULATED.3IONS-SCNC: 11 MMOL/L (ref 9–16)
BUN SERPL-MCNC: 16 MG/DL (ref 8–23)
CALCIUM SERPL-MCNC: 8.3 MG/DL (ref 8.2–9.6)
CHLORIDE SERPL-SCNC: 108 MMOL/L (ref 98–107)
CO2 SERPL-SCNC: 19 MMOL/L (ref 20–31)
CREAT SERPL-MCNC: 1 MG/DL (ref 0.7–1.2)
ERYTHROCYTE [DISTWIDTH] IN BLOOD BY AUTOMATED COUNT: 16.4 % (ref 11.8–14.4)
GFR, ESTIMATED: 71 ML/MIN/1.73M2
GLUCOSE SERPL-MCNC: 92 MG/DL (ref 75–121)
HCT VFR BLD AUTO: 34 % (ref 40.7–50.3)
HGB BLD-MCNC: 11.1 G/DL (ref 13–17)
MCH RBC QN AUTO: 31.7 PG (ref 25.2–33.5)
MCHC RBC AUTO-ENTMCNC: 32.6 G/DL (ref 28.4–34.8)
MCV RBC AUTO: 97.1 FL (ref 82.6–102.9)
NRBC BLD-RTO: 0 PER 100 WBC
PLATELET # BLD AUTO: 205 K/UL (ref 138–453)
PMV BLD AUTO: 9.6 FL (ref 8.1–13.5)
POTASSIUM SERPL-SCNC: 4.3 MMOL/L (ref 3.7–5.3)
RBC # BLD AUTO: 3.5 M/UL (ref 4.21–5.77)
SODIUM SERPL-SCNC: 138 MMOL/L (ref 136–145)
WBC OTHER # BLD: 2.2 K/UL (ref 3.5–11.3)

## 2025-07-15 PROCEDURE — 36415 COLL VENOUS BLD VENIPUNCTURE: CPT

## 2025-07-15 PROCEDURE — 80048 BASIC METABOLIC PNL TOTAL CA: CPT

## 2025-07-15 PROCEDURE — 85027 COMPLETE CBC AUTOMATED: CPT

## 2025-08-08 ENCOUNTER — TELEPHONE (OUTPATIENT)
Dept: INTERNAL MEDICINE CLINIC | Age: 89
End: 2025-08-08

## 2025-08-15 ENCOUNTER — HOSPITAL ENCOUNTER (INPATIENT)
Age: 89
LOS: 2 days | Discharge: HOME | DRG: 313 | End: 2025-08-17
Attending: EMERGENCY MEDICINE | Admitting: INTERNAL MEDICINE
Payer: MEDICARE

## 2025-08-15 ENCOUNTER — APPOINTMENT (OUTPATIENT)
Dept: GENERAL RADIOLOGY | Age: 89
DRG: 313 | End: 2025-08-15
Attending: EMERGENCY MEDICINE
Payer: MEDICARE

## 2025-08-15 DIAGNOSIS — R07.9 CHEST PAIN, UNSPECIFIED TYPE: Primary | ICD-10-CM

## 2025-08-15 DIAGNOSIS — G89.29 CHRONIC PAIN OF LEFT KNEE: ICD-10-CM

## 2025-08-15 DIAGNOSIS — M25.562 CHRONIC PAIN OF LEFT KNEE: ICD-10-CM

## 2025-08-15 LAB
ALBUMIN SERPL-MCNC: 3.6 G/DL (ref 3.5–5.2)
ALP SERPL-CCNC: 86 U/L (ref 40–129)
ALT SERPL-CCNC: 15 U/L (ref 10–50)
ANION GAP SERPL CALCULATED.3IONS-SCNC: 10 MMOL/L (ref 9–16)
AST SERPL-CCNC: 24 U/L (ref 10–50)
BASOPHILS # BLD: 0 K/UL (ref 0–0.2)
BASOPHILS NFR BLD: 0 % (ref 0–2)
BILIRUB SERPL-MCNC: 0.5 MG/DL (ref 0–1.2)
BUN SERPL-MCNC: 16 MG/DL (ref 8–23)
CALCIUM SERPL-MCNC: 8.7 MG/DL (ref 8.6–10.4)
CHLORIDE SERPL-SCNC: 106 MMOL/L (ref 98–107)
CO2 SERPL-SCNC: 26 MMOL/L (ref 20–31)
CREAT SERPL-MCNC: 1.2 MG/DL (ref 0.7–1.2)
EKG ATRIAL RATE: 468 BPM
EKG Q-T INTERVAL: 406 MS
EKG QRS DURATION: 76 MS
EKG QTC CALCULATION (BAZETT): 435 MS
EKG R AXIS: -8 DEGREES
EKG T AXIS: -89 DEGREES
EKG VENTRICULAR RATE: 69 BPM
EOSINOPHIL # BLD: 0.1 K/UL (ref 0–0.4)
EOSINOPHILS RELATIVE PERCENT: 4 % (ref 0–4)
ERYTHROCYTE [DISTWIDTH] IN BLOOD BY AUTOMATED COUNT: 14.6 % (ref 11.5–14.9)
GFR, ESTIMATED: 57 ML/MIN/1.73M2
GLUCOSE SERPL-MCNC: 99 MG/DL (ref 74–99)
HCT VFR BLD AUTO: 37.3 % (ref 41–53)
HGB BLD-MCNC: 12.2 G/DL (ref 13.5–17.5)
IMM GRANULOCYTES # BLD AUTO: 0 K/UL (ref 0–0.3)
IMM GRANULOCYTES NFR BLD: 0 %
LYMPHOCYTES NFR BLD: 0.52 K/UL (ref 1–4.8)
LYMPHOCYTES RELATIVE PERCENT: 20 % (ref 24–44)
MAGNESIUM SERPL-MCNC: 1.8 MG/DL (ref 1.7–2.3)
MCH RBC QN AUTO: 32 PG (ref 26–34)
MCHC RBC AUTO-ENTMCNC: 32.7 G/DL (ref 31–37)
MCV RBC AUTO: 97.9 FL (ref 80–100)
MONOCYTES NFR BLD: 0.29 K/UL (ref 0.1–1.3)
MONOCYTES NFR BLD: 11 % (ref 1–7)
MORPHOLOGY: NORMAL
NEUTROPHILS NFR BLD: 65 % (ref 36–66)
NEUTS SEG NFR BLD: 1.69 K/UL (ref 1.3–9.1)
NRBC BLD-RTO: 0 PER 100 WBC
PLATELET # BLD AUTO: 252 K/UL (ref 150–450)
PMV BLD AUTO: 9.2 FL (ref 8–13.5)
POTASSIUM SERPL-SCNC: 3.4 MMOL/L (ref 3.7–5.3)
PROT SERPL-MCNC: 6.7 G/DL (ref 6.6–8.7)
RBC # BLD AUTO: 3.81 M/UL (ref 4.21–5.77)
SODIUM SERPL-SCNC: 142 MMOL/L (ref 136–145)
TROPONIN I SERPL HS-MCNC: 37 NG/L (ref 0–22)
TROPONIN I SERPL HS-MCNC: 40 NG/L (ref 0–22)
WBC OTHER # BLD: 2.6 K/UL (ref 3.5–11)

## 2025-08-15 PROCEDURE — 1200000000 HC SEMI PRIVATE

## 2025-08-15 PROCEDURE — 71045 X-RAY EXAM CHEST 1 VIEW: CPT

## 2025-08-15 PROCEDURE — 85025 COMPLETE CBC W/AUTO DIFF WBC: CPT

## 2025-08-15 PROCEDURE — 99223 1ST HOSP IP/OBS HIGH 75: CPT | Performed by: INTERNAL MEDICINE

## 2025-08-15 PROCEDURE — 96374 THER/PROPH/DIAG INJ IV PUSH: CPT

## 2025-08-15 PROCEDURE — 6370000000 HC RX 637 (ALT 250 FOR IP): Performed by: INTERNAL MEDICINE

## 2025-08-15 PROCEDURE — 99285 EMERGENCY DEPT VISIT HI MDM: CPT

## 2025-08-15 PROCEDURE — 2500000003 HC RX 250 WO HCPCS: Performed by: INTERNAL MEDICINE

## 2025-08-15 PROCEDURE — 93010 ELECTROCARDIOGRAM REPORT: CPT | Performed by: INTERNAL MEDICINE

## 2025-08-15 PROCEDURE — 83735 ASSAY OF MAGNESIUM: CPT

## 2025-08-15 PROCEDURE — 6360000002 HC RX W HCPCS: Performed by: EMERGENCY MEDICINE

## 2025-08-15 PROCEDURE — 93005 ELECTROCARDIOGRAM TRACING: CPT | Performed by: EMERGENCY MEDICINE

## 2025-08-15 PROCEDURE — 84484 ASSAY OF TROPONIN QUANT: CPT

## 2025-08-15 PROCEDURE — 36415 COLL VENOUS BLD VENIPUNCTURE: CPT

## 2025-08-15 PROCEDURE — 80053 COMPREHEN METABOLIC PANEL: CPT

## 2025-08-15 RX ORDER — CLOPIDOGREL BISULFATE 75 MG/1
75 TABLET ORAL DAILY
Status: DISCONTINUED | OUTPATIENT
Start: 2025-08-16 | End: 2025-08-17 | Stop reason: HOSPADM

## 2025-08-15 RX ORDER — POLYETHYLENE GLYCOL 3350 17 G/17G
17 POWDER, FOR SOLUTION ORAL DAILY PRN
Status: DISCONTINUED | OUTPATIENT
Start: 2025-08-15 | End: 2025-08-17 | Stop reason: HOSPADM

## 2025-08-15 RX ORDER — ACETAMINOPHEN 650 MG/1
650 SUPPOSITORY RECTAL EVERY 6 HOURS PRN
Status: DISCONTINUED | OUTPATIENT
Start: 2025-08-15 | End: 2025-08-17 | Stop reason: HOSPADM

## 2025-08-15 RX ORDER — MIDODRINE HYDROCHLORIDE 10 MG/1
20 TABLET ORAL
Status: DISCONTINUED | OUTPATIENT
Start: 2025-08-16 | End: 2025-08-17 | Stop reason: HOSPADM

## 2025-08-15 RX ORDER — NITROGLYCERIN 0.4 MG/1
0.4 TABLET SUBLINGUAL PRN
Status: DISCONTINUED | OUTPATIENT
Start: 2025-08-15 | End: 2025-08-17 | Stop reason: HOSPADM

## 2025-08-15 RX ORDER — SODIUM CHLORIDE 9 MG/ML
INJECTION, SOLUTION INTRAVENOUS PRN
Status: DISCONTINUED | OUTPATIENT
Start: 2025-08-15 | End: 2025-08-17 | Stop reason: HOSPADM

## 2025-08-15 RX ORDER — SODIUM CHLORIDE 0.9 % (FLUSH) 0.9 %
5-40 SYRINGE (ML) INJECTION PRN
Status: DISCONTINUED | OUTPATIENT
Start: 2025-08-15 | End: 2025-08-17 | Stop reason: HOSPADM

## 2025-08-15 RX ORDER — ONDANSETRON 2 MG/ML
4 INJECTION INTRAMUSCULAR; INTRAVENOUS EVERY 6 HOURS PRN
Status: DISCONTINUED | OUTPATIENT
Start: 2025-08-15 | End: 2025-08-17 | Stop reason: HOSPADM

## 2025-08-15 RX ORDER — SODIUM CHLORIDE 0.9 % (FLUSH) 0.9 %
5-40 SYRINGE (ML) INJECTION EVERY 12 HOURS SCHEDULED
Status: DISCONTINUED | OUTPATIENT
Start: 2025-08-15 | End: 2025-08-17 | Stop reason: HOSPADM

## 2025-08-15 RX ORDER — LIDOCAINE 4 G/G
1 PATCH TOPICAL DAILY
Status: DISCONTINUED | OUTPATIENT
Start: 2025-08-15 | End: 2025-08-17 | Stop reason: HOSPADM

## 2025-08-15 RX ORDER — GABAPENTIN 100 MG/1
100 CAPSULE ORAL 2 TIMES DAILY
Status: DISCONTINUED | OUTPATIENT
Start: 2025-08-15 | End: 2025-08-17 | Stop reason: HOSPADM

## 2025-08-15 RX ORDER — POTASSIUM CHLORIDE 1500 MG/1
40 TABLET, EXTENDED RELEASE ORAL PRN
Status: DISCONTINUED | OUTPATIENT
Start: 2025-08-15 | End: 2025-08-17 | Stop reason: HOSPADM

## 2025-08-15 RX ORDER — POTASSIUM CHLORIDE 7.45 MG/ML
10 INJECTION INTRAVENOUS PRN
Status: DISCONTINUED | OUTPATIENT
Start: 2025-08-15 | End: 2025-08-17 | Stop reason: HOSPADM

## 2025-08-15 RX ORDER — ONDANSETRON 4 MG/1
4 TABLET, ORALLY DISINTEGRATING ORAL EVERY 8 HOURS PRN
Status: DISCONTINUED | OUTPATIENT
Start: 2025-08-15 | End: 2025-08-17 | Stop reason: HOSPADM

## 2025-08-15 RX ORDER — ATORVASTATIN CALCIUM 40 MG/1
40 TABLET, FILM COATED ORAL NIGHTLY
Status: DISCONTINUED | OUTPATIENT
Start: 2025-08-15 | End: 2025-08-17 | Stop reason: HOSPADM

## 2025-08-15 RX ORDER — ASPIRIN 81 MG/1
81 TABLET, CHEWABLE ORAL DAILY
Status: DISCONTINUED | OUTPATIENT
Start: 2025-08-16 | End: 2025-08-17 | Stop reason: HOSPADM

## 2025-08-15 RX ORDER — MAGNESIUM SULFATE HEPTAHYDRATE 40 MG/ML
2000 INJECTION, SOLUTION INTRAVENOUS PRN
Status: DISCONTINUED | OUTPATIENT
Start: 2025-08-15 | End: 2025-08-17 | Stop reason: HOSPADM

## 2025-08-15 RX ORDER — MORPHINE SULFATE 2 MG/ML
2 INJECTION, SOLUTION INTRAMUSCULAR; INTRAVENOUS ONCE
Refills: 0 | Status: COMPLETED | OUTPATIENT
Start: 2025-08-15 | End: 2025-08-15

## 2025-08-15 RX ORDER — ACETAMINOPHEN 325 MG/1
650 TABLET ORAL EVERY 6 HOURS PRN
Status: DISCONTINUED | OUTPATIENT
Start: 2025-08-15 | End: 2025-08-17 | Stop reason: HOSPADM

## 2025-08-15 RX ORDER — RANOLAZINE 500 MG/1
500 TABLET, EXTENDED RELEASE ORAL 2 TIMES DAILY
Status: DISCONTINUED | OUTPATIENT
Start: 2025-08-15 | End: 2025-08-17 | Stop reason: HOSPADM

## 2025-08-15 RX ORDER — PANTOPRAZOLE SODIUM 40 MG/1
40 TABLET, DELAYED RELEASE ORAL
Status: DISCONTINUED | OUTPATIENT
Start: 2025-08-16 | End: 2025-08-17 | Stop reason: HOSPADM

## 2025-08-15 RX ORDER — LIDOCAINE 50 MG/G
1 PATCH TOPICAL DAILY PRN
COMMUNITY

## 2025-08-15 RX ORDER — DOCUSATE SODIUM 100 MG/1
100 CAPSULE, LIQUID FILLED ORAL 2 TIMES DAILY
COMMUNITY

## 2025-08-15 RX ADMIN — MORPHINE SULFATE 2 MG: 2 INJECTION, SOLUTION INTRAMUSCULAR; INTRAVENOUS at 14:35

## 2025-08-15 RX ADMIN — ATORVASTATIN CALCIUM 40 MG: 40 TABLET, FILM COATED ORAL at 20:53

## 2025-08-15 RX ADMIN — GABAPENTIN 100 MG: 100 CAPSULE ORAL at 20:53

## 2025-08-15 RX ADMIN — ONDANSETRON 4 MG: 4 TABLET, ORALLY DISINTEGRATING ORAL at 20:54

## 2025-08-15 RX ADMIN — APIXABAN 5 MG: 5 TABLET, FILM COATED ORAL at 20:53

## 2025-08-15 RX ADMIN — RANOLAZINE 500 MG: 500 TABLET, EXTENDED RELEASE ORAL at 20:53

## 2025-08-15 RX ADMIN — POTASSIUM CHLORIDE 40 MEQ: 1500 TABLET, EXTENDED RELEASE ORAL at 18:49

## 2025-08-15 RX ADMIN — SODIUM CHLORIDE, PRESERVATIVE FREE 10 ML: 5 INJECTION INTRAVENOUS at 21:05

## 2025-08-15 RX ADMIN — ACETAMINOPHEN 650 MG: 325 TABLET ORAL at 22:32

## 2025-08-15 ASSESSMENT — PAIN - FUNCTIONAL ASSESSMENT
PAIN_FUNCTIONAL_ASSESSMENT: 0-10

## 2025-08-15 ASSESSMENT — PAIN DESCRIPTION - LOCATION
LOCATION: ABDOMEN;KNEE
LOCATION: CHEST
LOCATION: CHEST

## 2025-08-15 ASSESSMENT — PAIN SCALES - GENERAL
PAINLEVEL_OUTOF10: 7
PAINLEVEL_OUTOF10: 1
PAINLEVEL_OUTOF10: 3
PAINLEVEL_OUTOF10: 0
PAINLEVEL_OUTOF10: 8

## 2025-08-15 ASSESSMENT — PAIN DESCRIPTION - DESCRIPTORS
DESCRIPTORS: DISCOMFORT
DESCRIPTORS: DISCOMFORT
DESCRIPTORS: SHARP;TENDER

## 2025-08-15 ASSESSMENT — PAIN DESCRIPTION - ORIENTATION: ORIENTATION: LEFT

## 2025-08-16 PROCEDURE — 99223 1ST HOSP IP/OBS HIGH 75: CPT | Performed by: STUDENT IN AN ORGANIZED HEALTH CARE EDUCATION/TRAINING PROGRAM

## 2025-08-16 PROCEDURE — 6370000000 HC RX 637 (ALT 250 FOR IP): Performed by: NURSE PRACTITIONER

## 2025-08-16 PROCEDURE — 6370000000 HC RX 637 (ALT 250 FOR IP): Performed by: INTERNAL MEDICINE

## 2025-08-16 PROCEDURE — 99232 SBSQ HOSP IP/OBS MODERATE 35: CPT | Performed by: INTERNAL MEDICINE

## 2025-08-16 PROCEDURE — 2500000003 HC RX 250 WO HCPCS: Performed by: INTERNAL MEDICINE

## 2025-08-16 PROCEDURE — 1200000000 HC SEMI PRIVATE

## 2025-08-16 RX ORDER — TRAMADOL HYDROCHLORIDE 50 MG/1
50 TABLET ORAL EVERY 6 HOURS PRN
Status: DISCONTINUED | OUTPATIENT
Start: 2025-08-16 | End: 2025-08-17 | Stop reason: HOSPADM

## 2025-08-16 RX ORDER — LACTOBACILLUS RHAMNOSUS GG 10B CELL
1 CAPSULE ORAL
Status: DISCONTINUED | OUTPATIENT
Start: 2025-08-17 | End: 2025-08-17 | Stop reason: HOSPADM

## 2025-08-16 RX ADMIN — ACETAMINOPHEN 650 MG: 325 TABLET ORAL at 05:23

## 2025-08-16 RX ADMIN — ATORVASTATIN CALCIUM 40 MG: 40 TABLET, FILM COATED ORAL at 19:24

## 2025-08-16 RX ADMIN — TRAMADOL HYDROCHLORIDE 50 MG: 50 TABLET, COATED ORAL at 14:16

## 2025-08-16 RX ADMIN — MIDODRINE HYDROCHLORIDE 20 MG: 10 TABLET ORAL at 18:35

## 2025-08-16 RX ADMIN — APIXABAN 5 MG: 5 TABLET, FILM COATED ORAL at 19:24

## 2025-08-16 RX ADMIN — APIXABAN 5 MG: 5 TABLET, FILM COATED ORAL at 08:42

## 2025-08-16 RX ADMIN — PANTOPRAZOLE SODIUM 40 MG: 40 TABLET, DELAYED RELEASE ORAL at 05:23

## 2025-08-16 RX ADMIN — GABAPENTIN 100 MG: 100 CAPSULE ORAL at 19:24

## 2025-08-16 RX ADMIN — SODIUM CHLORIDE, PRESERVATIVE FREE 10 ML: 5 INJECTION INTRAVENOUS at 19:24

## 2025-08-16 RX ADMIN — SODIUM CHLORIDE, PRESERVATIVE FREE 10 ML: 5 INJECTION INTRAVENOUS at 08:47

## 2025-08-16 RX ADMIN — RANOLAZINE 500 MG: 500 TABLET, EXTENDED RELEASE ORAL at 19:24

## 2025-08-16 RX ADMIN — BENZOCAINE 6 MG-MENTHOL 10 MG LOZENGES 1 LOZENGE: at 06:02

## 2025-08-16 RX ADMIN — TRAMADOL HYDROCHLORIDE 50 MG: 50 TABLET, COATED ORAL at 06:01

## 2025-08-16 RX ADMIN — TRAMADOL HYDROCHLORIDE 50 MG: 50 TABLET, COATED ORAL at 23:55

## 2025-08-16 RX ADMIN — GABAPENTIN 100 MG: 100 CAPSULE ORAL at 08:41

## 2025-08-16 RX ADMIN — MIDODRINE HYDROCHLORIDE 20 MG: 10 TABLET ORAL at 12:21

## 2025-08-16 RX ADMIN — POLYETHYLENE GLYCOL 3350 17 G: 17 POWDER, FOR SOLUTION ORAL at 11:37

## 2025-08-16 RX ADMIN — ASPIRIN 81 MG: 81 TABLET, CHEWABLE ORAL at 08:42

## 2025-08-16 RX ADMIN — CLOPIDOGREL BISULFATE 75 MG: 75 TABLET, FILM COATED ORAL at 08:41

## 2025-08-16 RX ADMIN — ACETAMINOPHEN 650 MG: 325 TABLET ORAL at 18:38

## 2025-08-16 RX ADMIN — RANOLAZINE 500 MG: 500 TABLET, EXTENDED RELEASE ORAL at 08:41

## 2025-08-16 ASSESSMENT — PAIN SCALES - GENERAL
PAINLEVEL_OUTOF10: 7
PAINLEVEL_OUTOF10: 3
PAINLEVEL_OUTOF10: 7
PAINLEVEL_OUTOF10: 10
PAINLEVEL_OUTOF10: 3
PAINLEVEL_OUTOF10: 10
PAINLEVEL_OUTOF10: 3
PAINLEVEL_OUTOF10: 7
PAINLEVEL_OUTOF10: 4

## 2025-08-16 ASSESSMENT — PAIN DESCRIPTION - LOCATION
LOCATION: LEG;ABDOMEN
LOCATION: CHEST;KNEE
LOCATION: LEG
LOCATION: THROAT
LOCATION: KNEE;BACK

## 2025-08-16 ASSESSMENT — PAIN DESCRIPTION - ORIENTATION
ORIENTATION: MID
ORIENTATION: LEFT

## 2025-08-16 ASSESSMENT — PAIN - FUNCTIONAL ASSESSMENT
PAIN_FUNCTIONAL_ASSESSMENT: 0-10
PAIN_FUNCTIONAL_ASSESSMENT: ACTIVITIES ARE NOT PREVENTED
PAIN_FUNCTIONAL_ASSESSMENT: 0-10
PAIN_FUNCTIONAL_ASSESSMENT: ACTIVITIES ARE NOT PREVENTED
PAIN_FUNCTIONAL_ASSESSMENT: 0-10
PAIN_FUNCTIONAL_ASSESSMENT: ACTIVITIES ARE NOT PREVENTED

## 2025-08-16 ASSESSMENT — PAIN DESCRIPTION - DESCRIPTORS
DESCRIPTORS: ACHING
DESCRIPTORS: ACHING;DULL
DESCRIPTORS: SORE
DESCRIPTORS: THROBBING

## 2025-08-16 ASSESSMENT — PAIN DESCRIPTION - ONSET: ONSET: ON-GOING

## 2025-08-16 ASSESSMENT — PAIN DESCRIPTION - FREQUENCY: FREQUENCY: CONTINUOUS

## 2025-08-16 ASSESSMENT — PAIN DESCRIPTION - PAIN TYPE: TYPE: ACUTE PAIN

## 2025-08-16 ASSESSMENT — HEART SCORE: ECG: NORMAL

## 2025-08-17 VITALS
RESPIRATION RATE: 17 BRPM | OXYGEN SATURATION: 98 % | DIASTOLIC BLOOD PRESSURE: 91 MMHG | HEIGHT: 72 IN | SYSTOLIC BLOOD PRESSURE: 129 MMHG | WEIGHT: 140 LBS | HEART RATE: 70 BPM | TEMPERATURE: 97.7 F | BODY MASS INDEX: 18.96 KG/M2

## 2025-08-17 PROCEDURE — 6370000000 HC RX 637 (ALT 250 FOR IP): Performed by: INTERNAL MEDICINE

## 2025-08-17 PROCEDURE — 99239 HOSP IP/OBS DSCHRG MGMT >30: CPT | Performed by: INTERNAL MEDICINE

## 2025-08-17 PROCEDURE — 2500000003 HC RX 250 WO HCPCS: Performed by: INTERNAL MEDICINE

## 2025-08-17 PROCEDURE — 6370000000 HC RX 637 (ALT 250 FOR IP): Performed by: NURSE PRACTITIONER

## 2025-08-17 PROCEDURE — 99233 SBSQ HOSP IP/OBS HIGH 50: CPT | Performed by: STUDENT IN AN ORGANIZED HEALTH CARE EDUCATION/TRAINING PROGRAM

## 2025-08-17 RX ADMIN — APIXABAN 5 MG: 5 TABLET, FILM COATED ORAL at 07:16

## 2025-08-17 RX ADMIN — Medication 1 CAPSULE: at 07:16

## 2025-08-17 RX ADMIN — SODIUM CHLORIDE, PRESERVATIVE FREE 10 ML: 5 INJECTION INTRAVENOUS at 07:18

## 2025-08-17 RX ADMIN — GABAPENTIN 100 MG: 100 CAPSULE ORAL at 07:16

## 2025-08-17 RX ADMIN — PANTOPRAZOLE SODIUM 40 MG: 40 TABLET, DELAYED RELEASE ORAL at 05:01

## 2025-08-17 RX ADMIN — RANOLAZINE 500 MG: 500 TABLET, EXTENDED RELEASE ORAL at 07:16

## 2025-08-17 RX ADMIN — TRAMADOL HYDROCHLORIDE 50 MG: 50 TABLET, COATED ORAL at 09:51

## 2025-08-17 RX ADMIN — CLOPIDOGREL BISULFATE 75 MG: 75 TABLET, FILM COATED ORAL at 07:16

## 2025-08-17 RX ADMIN — POLYETHYLENE GLYCOL 3350 17 G: 17 POWDER, FOR SOLUTION ORAL at 07:17

## 2025-08-17 RX ADMIN — ASPIRIN 81 MG: 81 TABLET, CHEWABLE ORAL at 07:16

## 2025-08-17 RX ADMIN — ONDANSETRON 4 MG: 4 TABLET, ORALLY DISINTEGRATING ORAL at 07:15

## 2025-08-17 RX ADMIN — ACETAMINOPHEN 650 MG: 325 TABLET ORAL at 07:15

## 2025-08-17 ASSESSMENT — PAIN SCALES - WONG BAKER: WONGBAKER_NUMERICALRESPONSE: NO HURT

## 2025-08-17 ASSESSMENT — PAIN - FUNCTIONAL ASSESSMENT
PAIN_FUNCTIONAL_ASSESSMENT: 0-10
PAIN_FUNCTIONAL_ASSESSMENT: 0-10
PAIN_FUNCTIONAL_ASSESSMENT: ACTIVITIES ARE NOT PREVENTED
PAIN_FUNCTIONAL_ASSESSMENT: ACTIVITIES ARE NOT PREVENTED
PAIN_FUNCTIONAL_ASSESSMENT: 0-10
PAIN_FUNCTIONAL_ASSESSMENT: 0-10

## 2025-08-17 ASSESSMENT — PAIN SCALES - GENERAL
PAINLEVEL_OUTOF10: 2
PAINLEVEL_OUTOF10: 7
PAINLEVEL_OUTOF10: 8
PAINLEVEL_OUTOF10: 3

## 2025-08-17 ASSESSMENT — PAIN DESCRIPTION - ORIENTATION
ORIENTATION: LEFT
ORIENTATION: LEFT

## 2025-08-17 ASSESSMENT — PAIN DESCRIPTION - DESCRIPTORS
DESCRIPTORS: STABBING
DESCRIPTORS: SHARP

## 2025-08-17 ASSESSMENT — PAIN DESCRIPTION - LOCATION
LOCATION: CHEST
LOCATION: KNEE

## 2025-08-18 ENCOUNTER — TELEPHONE (OUTPATIENT)
Dept: INTERNAL MEDICINE CLINIC | Age: 89
End: 2025-08-18

## 2025-08-31 ENCOUNTER — HOSPITAL ENCOUNTER (INPATIENT)
Age: 89
LOS: 3 days | Discharge: HOME | DRG: 641 | End: 2025-09-03
Attending: EMERGENCY MEDICINE | Admitting: INTERNAL MEDICINE
Payer: MEDICARE

## 2025-08-31 ENCOUNTER — APPOINTMENT (OUTPATIENT)
Dept: CT IMAGING | Age: 89
DRG: 641 | End: 2025-08-31
Payer: MEDICARE

## 2025-08-31 DIAGNOSIS — R10.13 ABDOMINAL PAIN, EPIGASTRIC: ICD-10-CM

## 2025-08-31 DIAGNOSIS — R10.30 LOWER ABDOMINAL PAIN: Primary | ICD-10-CM

## 2025-08-31 DIAGNOSIS — R19.7 DIARRHEA, UNSPECIFIED TYPE: ICD-10-CM

## 2025-08-31 PROBLEM — E87.6 HYPOKALEMIA: Status: ACTIVE | Noted: 2025-08-31

## 2025-08-31 LAB
ALBUMIN SERPL-MCNC: 3.7 G/DL (ref 3.5–5.2)
ALP SERPL-CCNC: 88 U/L (ref 40–129)
ALT SERPL-CCNC: 14 U/L (ref 10–50)
ANION GAP SERPL CALCULATED.3IONS-SCNC: 12 MMOL/L (ref 9–16)
AST SERPL-CCNC: 21 U/L (ref 10–50)
BASOPHILS # BLD: 0.04 K/UL (ref 0–0.2)
BASOPHILS NFR BLD: 1 % (ref 0–2)
BILIRUB SERPL-MCNC: 0.9 MG/DL (ref 0–1.2)
BUN SERPL-MCNC: 17 MG/DL (ref 8–23)
CALCIUM SERPL-MCNC: 8.9 MG/DL (ref 8.6–10.4)
CHLORIDE SERPL-SCNC: 107 MMOL/L (ref 98–107)
CO2 SERPL-SCNC: 22 MMOL/L (ref 20–31)
CREAT SERPL-MCNC: 1.2 MG/DL (ref 0.7–1.2)
EOSINOPHIL # BLD: 0 K/UL (ref 0–0.4)
EOSINOPHILS RELATIVE PERCENT: 0 % (ref 0–4)
ERYTHROCYTE [DISTWIDTH] IN BLOOD BY AUTOMATED COUNT: 14.1 % (ref 11.5–14.9)
GFR, ESTIMATED: 57 ML/MIN/1.73M2
GLUCOSE SERPL-MCNC: 128 MG/DL (ref 74–99)
HCT VFR BLD AUTO: 38 % (ref 41–53)
HGB BLD-MCNC: 12 G/DL (ref 13.5–17.5)
IMM GRANULOCYTES # BLD AUTO: 0 K/UL (ref 0–0.3)
IMM GRANULOCYTES NFR BLD: 0 %
LACTATE BLDV-SCNC: 2.5 MMOL/L (ref 0.5–2.2)
LIPASE SERPL-CCNC: 22 U/L (ref 13–60)
LYMPHOCYTES NFR BLD: 0.42 K/UL (ref 1–4.8)
LYMPHOCYTES RELATIVE PERCENT: 11 % (ref 24–44)
MAGNESIUM SERPL-MCNC: 1.7 MG/DL (ref 1.7–2.3)
MCH RBC QN AUTO: 31 PG (ref 26–34)
MCHC RBC AUTO-ENTMCNC: 31.6 G/DL (ref 31–37)
MCV RBC AUTO: 98.2 FL (ref 80–100)
MONOCYTES NFR BLD: 0.04 K/UL (ref 0.1–1.3)
MONOCYTES NFR BLD: 1 % (ref 1–7)
MORPHOLOGY: ABNORMAL
NEUTROPHILS NFR BLD: 87 % (ref 36–66)
NEUTS SEG NFR BLD: 3.3 K/UL (ref 1.3–9.1)
NRBC BLD-RTO: 0 PER 100 WBC
PLATELET # BLD AUTO: 227 K/UL (ref 150–450)
PMV BLD AUTO: 9.3 FL (ref 8–13.5)
POTASSIUM SERPL-SCNC: 3 MMOL/L (ref 3.7–5.3)
PROT SERPL-MCNC: 6.8 G/DL (ref 6.6–8.7)
RBC # BLD AUTO: 3.87 M/UL (ref 4.21–5.77)
SODIUM SERPL-SCNC: 141 MMOL/L (ref 136–145)
TROPONIN I SERPL HS-MCNC: 40 NG/L (ref 0–22)
TROPONIN I SERPL HS-MCNC: 40 NG/L (ref 0–22)
WBC OTHER # BLD: 3.8 K/UL (ref 3.5–11)

## 2025-08-31 PROCEDURE — 6360000004 HC RX CONTRAST MEDICATION: Performed by: EMERGENCY MEDICINE

## 2025-08-31 PROCEDURE — 6360000002 HC RX W HCPCS

## 2025-08-31 PROCEDURE — 80053 COMPREHEN METABOLIC PANEL: CPT

## 2025-08-31 PROCEDURE — 2580000003 HC RX 258: Performed by: EMERGENCY MEDICINE

## 2025-08-31 PROCEDURE — 36415 COLL VENOUS BLD VENIPUNCTURE: CPT

## 2025-08-31 PROCEDURE — 83735 ASSAY OF MAGNESIUM: CPT

## 2025-08-31 PROCEDURE — 96374 THER/PROPH/DIAG INJ IV PUSH: CPT

## 2025-08-31 PROCEDURE — 99285 EMERGENCY DEPT VISIT HI MDM: CPT

## 2025-08-31 PROCEDURE — 84484 ASSAY OF TROPONIN QUANT: CPT

## 2025-08-31 PROCEDURE — 96376 TX/PRO/DX INJ SAME DRUG ADON: CPT

## 2025-08-31 PROCEDURE — 96375 TX/PRO/DX INJ NEW DRUG ADDON: CPT

## 2025-08-31 PROCEDURE — 2500000003 HC RX 250 WO HCPCS: Performed by: EMERGENCY MEDICINE

## 2025-08-31 PROCEDURE — 74177 CT ABD & PELVIS W/CONTRAST: CPT

## 2025-08-31 PROCEDURE — 83605 ASSAY OF LACTIC ACID: CPT

## 2025-08-31 PROCEDURE — 85025 COMPLETE CBC W/AUTO DIFF WBC: CPT

## 2025-08-31 PROCEDURE — 83690 ASSAY OF LIPASE: CPT

## 2025-08-31 PROCEDURE — 1200000000 HC SEMI PRIVATE

## 2025-08-31 PROCEDURE — 6360000002 HC RX W HCPCS: Performed by: EMERGENCY MEDICINE

## 2025-08-31 PROCEDURE — 93005 ELECTROCARDIOGRAM TRACING: CPT | Performed by: EMERGENCY MEDICINE

## 2025-08-31 RX ORDER — ONDANSETRON 4 MG/1
4 TABLET, ORALLY DISINTEGRATING ORAL EVERY 8 HOURS PRN
Status: DISCONTINUED | OUTPATIENT
Start: 2025-08-31 | End: 2025-09-03 | Stop reason: HOSPADM

## 2025-08-31 RX ORDER — IOPAMIDOL 755 MG/ML
75 INJECTION, SOLUTION INTRAVASCULAR
Status: COMPLETED | OUTPATIENT
Start: 2025-08-31 | End: 2025-08-31

## 2025-08-31 RX ORDER — ENOXAPARIN SODIUM 100 MG/ML
40 INJECTION SUBCUTANEOUS DAILY
Status: DISCONTINUED | OUTPATIENT
Start: 2025-08-31 | End: 2025-09-01

## 2025-08-31 RX ORDER — POTASSIUM CHLORIDE 7.45 MG/ML
10 INJECTION INTRAVENOUS ONCE
Status: DISCONTINUED | OUTPATIENT
Start: 2025-08-31 | End: 2025-09-03 | Stop reason: HOSPADM

## 2025-08-31 RX ORDER — ACETAMINOPHEN 650 MG/1
650 SUPPOSITORY RECTAL EVERY 6 HOURS PRN
Status: DISCONTINUED | OUTPATIENT
Start: 2025-08-31 | End: 2025-09-03 | Stop reason: HOSPADM

## 2025-08-31 RX ORDER — SODIUM CHLORIDE 0.9 % (FLUSH) 0.9 %
10 SYRINGE (ML) INJECTION PRN
Status: DISCONTINUED | OUTPATIENT
Start: 2025-08-31 | End: 2025-09-03 | Stop reason: HOSPADM

## 2025-08-31 RX ORDER — SODIUM CHLORIDE 0.9 % (FLUSH) 0.9 %
5-40 SYRINGE (ML) INJECTION EVERY 12 HOURS SCHEDULED
Status: DISCONTINUED | OUTPATIENT
Start: 2025-08-31 | End: 2025-09-03 | Stop reason: HOSPADM

## 2025-08-31 RX ORDER — ACETAMINOPHEN 325 MG/1
650 TABLET ORAL EVERY 6 HOURS PRN
Status: DISCONTINUED | OUTPATIENT
Start: 2025-08-31 | End: 2025-09-03 | Stop reason: HOSPADM

## 2025-08-31 RX ORDER — POTASSIUM CHLORIDE 1500 MG/1
40 TABLET, EXTENDED RELEASE ORAL PRN
Status: DISCONTINUED | OUTPATIENT
Start: 2025-08-31 | End: 2025-09-03 | Stop reason: HOSPADM

## 2025-08-31 RX ORDER — 0.9 % SODIUM CHLORIDE 0.9 %
1000 INTRAVENOUS SOLUTION INTRAVENOUS ONCE
Status: COMPLETED | OUTPATIENT
Start: 2025-08-31 | End: 2025-08-31

## 2025-08-31 RX ORDER — SODIUM CHLORIDE 9 MG/ML
INJECTION, SOLUTION INTRAVENOUS PRN
Status: DISCONTINUED | OUTPATIENT
Start: 2025-08-31 | End: 2025-09-03 | Stop reason: HOSPADM

## 2025-08-31 RX ORDER — ONDANSETRON 2 MG/ML
4 INJECTION INTRAMUSCULAR; INTRAVENOUS EVERY 6 HOURS PRN
Status: DISCONTINUED | OUTPATIENT
Start: 2025-08-31 | End: 2025-09-03 | Stop reason: HOSPADM

## 2025-08-31 RX ORDER — SODIUM CHLORIDE 9 MG/ML
INJECTION, SOLUTION INTRAVENOUS ONCE
Status: COMPLETED | OUTPATIENT
Start: 2025-08-31 | End: 2025-08-31

## 2025-08-31 RX ORDER — MORPHINE SULFATE 4 MG/ML
4 INJECTION, SOLUTION INTRAMUSCULAR; INTRAVENOUS EVERY 4 HOURS PRN
Status: DISCONTINUED | OUTPATIENT
Start: 2025-08-31 | End: 2025-09-03 | Stop reason: HOSPADM

## 2025-08-31 RX ORDER — MAGNESIUM SULFATE HEPTAHYDRATE 40 MG/ML
2000 INJECTION, SOLUTION INTRAVENOUS PRN
Status: DISCONTINUED | OUTPATIENT
Start: 2025-08-31 | End: 2025-09-03 | Stop reason: HOSPADM

## 2025-08-31 RX ORDER — POTASSIUM CHLORIDE 7.45 MG/ML
10 INJECTION INTRAVENOUS PRN
Status: DISCONTINUED | OUTPATIENT
Start: 2025-08-31 | End: 2025-09-03 | Stop reason: HOSPADM

## 2025-08-31 RX ORDER — ONDANSETRON 2 MG/ML
4 INJECTION INTRAMUSCULAR; INTRAVENOUS ONCE
Status: COMPLETED | OUTPATIENT
Start: 2025-08-31 | End: 2025-08-31

## 2025-08-31 RX ORDER — MORPHINE SULFATE 4 MG/ML
4 INJECTION, SOLUTION INTRAMUSCULAR; INTRAVENOUS
Refills: 0 | Status: COMPLETED | OUTPATIENT
Start: 2025-08-31 | End: 2025-08-31

## 2025-08-31 RX ORDER — POTASSIUM CHLORIDE 7.45 MG/ML
10 INJECTION INTRAVENOUS ONCE
Status: COMPLETED | OUTPATIENT
Start: 2025-08-31 | End: 2025-08-31

## 2025-08-31 RX ORDER — SODIUM CHLORIDE 0.9 % (FLUSH) 0.9 %
10 SYRINGE (ML) INJECTION PRN
Status: COMPLETED | OUTPATIENT
Start: 2025-08-31 | End: 2025-08-31

## 2025-08-31 RX ADMIN — ONDANSETRON 4 MG: 2 INJECTION, SOLUTION INTRAMUSCULAR; INTRAVENOUS at 13:04

## 2025-08-31 RX ADMIN — MORPHINE SULFATE 4 MG: 4 INJECTION, SOLUTION INTRAMUSCULAR; INTRAVENOUS at 18:52

## 2025-08-31 RX ADMIN — POTASSIUM CHLORIDE 10 MEQ: 7.46 INJECTION, SOLUTION INTRAVENOUS at 21:55

## 2025-08-31 RX ADMIN — MORPHINE SULFATE 4 MG: 4 INJECTION, SOLUTION INTRAMUSCULAR; INTRAVENOUS at 22:36

## 2025-08-31 RX ADMIN — POTASSIUM CHLORIDE 10 MEQ: 10 INJECTION, SOLUTION INTRAVENOUS at 19:40

## 2025-08-31 RX ADMIN — IOPAMIDOL 75 ML: 755 INJECTION, SOLUTION INTRAVENOUS at 14:51

## 2025-08-31 RX ADMIN — SODIUM CHLORIDE 1000 ML: 0.9 INJECTION, SOLUTION INTRAVENOUS at 13:01

## 2025-08-31 RX ADMIN — MORPHINE SULFATE 4 MG: 4 INJECTION, SOLUTION INTRAMUSCULAR; INTRAVENOUS at 14:15

## 2025-08-31 RX ADMIN — ENOXAPARIN SODIUM 40 MG: 100 INJECTION SUBCUTANEOUS at 20:53

## 2025-08-31 RX ADMIN — SODIUM CHLORIDE: 9 INJECTION, SOLUTION INTRAVENOUS at 14:51

## 2025-08-31 RX ADMIN — MORPHINE SULFATE 4 MG: 4 INJECTION, SOLUTION INTRAMUSCULAR; INTRAVENOUS at 13:04

## 2025-08-31 RX ADMIN — POTASSIUM CHLORIDE 10 MEQ: 7.46 INJECTION, SOLUTION INTRAVENOUS at 23:44

## 2025-08-31 RX ADMIN — POTASSIUM CHLORIDE 10 MEQ: 7.46 INJECTION, SOLUTION INTRAVENOUS at 20:52

## 2025-08-31 RX ADMIN — SODIUM CHLORIDE, PRESERVATIVE FREE 10 ML: 5 INJECTION INTRAVENOUS at 14:51

## 2025-08-31 ASSESSMENT — PAIN DESCRIPTION - LOCATION
LOCATION: ABDOMEN
LOCATION: ABDOMEN
LOCATION: ABDOMEN;KNEE
LOCATION: ABDOMEN

## 2025-08-31 ASSESSMENT — PAIN SCALES - GENERAL
PAINLEVEL_OUTOF10: 0
PAINLEVEL_OUTOF10: 6
PAINLEVEL_OUTOF10: 7
PAINLEVEL_OUTOF10: 8

## 2025-08-31 ASSESSMENT — PAIN DESCRIPTION - ORIENTATION: ORIENTATION: LEFT

## 2025-08-31 ASSESSMENT — PAIN - FUNCTIONAL ASSESSMENT
PAIN_FUNCTIONAL_ASSESSMENT: 0-10

## 2025-08-31 ASSESSMENT — PAIN SCALES - WONG BAKER: WONGBAKER_NUMERICALRESPONSE: NO HURT

## 2025-08-31 ASSESSMENT — PAIN DESCRIPTION - DESCRIPTORS: DESCRIPTORS: DISCOMFORT;SORE;CRAMPING

## 2025-09-01 ENCOUNTER — APPOINTMENT (OUTPATIENT)
Dept: GENERAL RADIOLOGY | Age: 89
DRG: 641 | End: 2025-09-01
Payer: MEDICARE

## 2025-09-01 LAB
ANION GAP SERPL CALCULATED.3IONS-SCNC: 9 MMOL/L (ref 9–16)
BACTERIA URNS QL MICRO: ABNORMAL
BASOPHILS # BLD: 0 K/UL (ref 0–0.2)
BASOPHILS NFR BLD: 0 % (ref 0–2)
BILIRUB UR QL STRIP: NEGATIVE
BUN SERPL-MCNC: 16 MG/DL (ref 8–23)
CALCIUM SERPL-MCNC: 8.5 MG/DL (ref 8.6–10.4)
CASTS #/AREA URNS LPF: ABNORMAL /LPF
CHLORIDE SERPL-SCNC: 109 MMOL/L (ref 98–107)
CLARITY UR: ABNORMAL
CO2 SERPL-SCNC: 23 MMOL/L (ref 20–31)
COLOR UR: ABNORMAL
CREAT SERPL-MCNC: 1 MG/DL (ref 0.7–1.2)
EOSINOPHIL # BLD: 0.08 K/UL (ref 0–0.4)
EOSINOPHILS RELATIVE PERCENT: 2 % (ref 0–4)
EPI CELLS #/AREA URNS HPF: ABNORMAL /HPF
ERYTHROCYTE [DISTWIDTH] IN BLOOD BY AUTOMATED COUNT: 14.2 % (ref 11.5–14.9)
GFR, ESTIMATED: 71 ML/MIN/1.73M2
GLUCOSE SERPL-MCNC: 102 MG/DL (ref 74–99)
GLUCOSE UR STRIP-MCNC: NEGATIVE MG/DL
HCT VFR BLD AUTO: 32.1 % (ref 41–53)
HGB BLD-MCNC: 10.2 G/DL (ref 13.5–17.5)
HGB UR QL STRIP.AUTO: ABNORMAL
IMM GRANULOCYTES # BLD AUTO: 0 K/UL (ref 0–0.3)
IMM GRANULOCYTES NFR BLD: 0 %
KETONES UR STRIP-MCNC: NEGATIVE MG/DL
LEUKOCYTE ESTERASE UR QL STRIP: ABNORMAL
LYMPHOCYTES NFR BLD: 0.47 K/UL (ref 1–4.8)
LYMPHOCYTES RELATIVE PERCENT: 12 % (ref 24–44)
MCH RBC QN AUTO: 31.2 PG (ref 26–34)
MCHC RBC AUTO-ENTMCNC: 31.8 G/DL (ref 31–37)
MCV RBC AUTO: 98.2 FL (ref 80–100)
MONOCYTES NFR BLD: 0.27 K/UL (ref 0.1–1.3)
MONOCYTES NFR BLD: 7 % (ref 1–7)
MORPHOLOGY: ABNORMAL
NEUTROPHILS NFR BLD: 79 % (ref 36–66)
NEUTS SEG NFR BLD: 3.08 K/UL (ref 1.3–9.1)
NITRITE UR QL STRIP: NEGATIVE
NRBC BLD-RTO: 0 PER 100 WBC
PH UR STRIP: 5.5 [PH] (ref 5–8)
PLATELET # BLD AUTO: 200 K/UL (ref 150–450)
PMV BLD AUTO: 9.4 FL (ref 8–13.5)
POTASSIUM SERPL-SCNC: 4.1 MMOL/L (ref 3.7–5.3)
PROT UR STRIP-MCNC: ABNORMAL MG/DL
RBC # BLD AUTO: 3.27 M/UL (ref 4.21–5.77)
RBC #/AREA URNS HPF: ABNORMAL /HPF
SODIUM SERPL-SCNC: 141 MMOL/L (ref 136–145)
SP GR UR STRIP: 1.04 (ref 1–1.03)
UROBILINOGEN UR STRIP-ACNC: NORMAL EU/DL (ref 0–1)
WBC #/AREA URNS HPF: ABNORMAL /HPF
WBC OTHER # BLD: 3.9 K/UL (ref 3.5–11)

## 2025-09-01 PROCEDURE — 1200000000 HC SEMI PRIVATE

## 2025-09-01 PROCEDURE — 99223 1ST HOSP IP/OBS HIGH 75: CPT | Performed by: INTERNAL MEDICINE

## 2025-09-01 PROCEDURE — 36415 COLL VENOUS BLD VENIPUNCTURE: CPT

## 2025-09-01 PROCEDURE — 71045 X-RAY EXAM CHEST 1 VIEW: CPT

## 2025-09-01 PROCEDURE — 6360000002 HC RX W HCPCS

## 2025-09-01 PROCEDURE — 80048 BASIC METABOLIC PNL TOTAL CA: CPT

## 2025-09-01 PROCEDURE — 2500000003 HC RX 250 WO HCPCS: Performed by: INTERNAL MEDICINE

## 2025-09-01 PROCEDURE — 6360000002 HC RX W HCPCS: Performed by: INTERNAL MEDICINE

## 2025-09-01 PROCEDURE — 81001 URINALYSIS AUTO W/SCOPE: CPT

## 2025-09-01 PROCEDURE — 6370000000 HC RX 637 (ALT 250 FOR IP)

## 2025-09-01 PROCEDURE — 2500000003 HC RX 250 WO HCPCS

## 2025-09-01 PROCEDURE — 85025 COMPLETE CBC W/AUTO DIFF WBC: CPT

## 2025-09-01 RX ORDER — MIDODRINE HYDROCHLORIDE 10 MG/1
20 TABLET ORAL
Status: DISCONTINUED | OUTPATIENT
Start: 2025-09-01 | End: 2025-09-03 | Stop reason: HOSPADM

## 2025-09-01 RX ORDER — LATANOPROST 50 UG/ML
1 SOLUTION/ DROPS OPHTHALMIC NIGHTLY
Status: DISCONTINUED | OUTPATIENT
Start: 2025-09-01 | End: 2025-09-03 | Stop reason: HOSPADM

## 2025-09-01 RX ORDER — LIDOCAINE 4 G/G
1 PATCH TOPICAL DAILY
Status: DISCONTINUED | OUTPATIENT
Start: 2025-09-01 | End: 2025-09-03 | Stop reason: HOSPADM

## 2025-09-01 RX ORDER — NITROGLYCERIN 0.4 MG/1
0.4 TABLET SUBLINGUAL PRN
Status: DISCONTINUED | OUTPATIENT
Start: 2025-09-01 | End: 2025-09-03 | Stop reason: HOSPADM

## 2025-09-01 RX ORDER — RANOLAZINE 500 MG/1
500 TABLET, EXTENDED RELEASE ORAL 2 TIMES DAILY
Status: DISCONTINUED | OUTPATIENT
Start: 2025-09-01 | End: 2025-09-03 | Stop reason: HOSPADM

## 2025-09-01 RX ORDER — TAMSULOSIN HYDROCHLORIDE 0.4 MG/1
0.4 CAPSULE ORAL DAILY
Status: DISCONTINUED | OUTPATIENT
Start: 2025-09-01 | End: 2025-09-03 | Stop reason: HOSPADM

## 2025-09-01 RX ORDER — ERGOCALCIFEROL 1.25 MG/1
50000 CAPSULE, LIQUID FILLED ORAL WEEKLY
Status: DISCONTINUED | OUTPATIENT
Start: 2025-09-07 | End: 2025-09-03 | Stop reason: HOSPADM

## 2025-09-01 RX ORDER — CLOPIDOGREL BISULFATE 75 MG/1
75 TABLET ORAL DAILY
Status: DISCONTINUED | OUTPATIENT
Start: 2025-09-01 | End: 2025-09-03 | Stop reason: HOSPADM

## 2025-09-01 RX ORDER — GABAPENTIN 100 MG/1
100 CAPSULE ORAL 2 TIMES DAILY
Status: DISCONTINUED | OUTPATIENT
Start: 2025-09-01 | End: 2025-09-03 | Stop reason: HOSPADM

## 2025-09-01 RX ORDER — FERROUS SULFATE 325(65) MG
325 TABLET ORAL 2 TIMES DAILY WITH MEALS
Status: DISCONTINUED | OUTPATIENT
Start: 2025-09-01 | End: 2025-09-03 | Stop reason: HOSPADM

## 2025-09-01 RX ORDER — FINASTERIDE 5 MG/1
5 TABLET, FILM COATED ORAL DAILY
Status: DISCONTINUED | OUTPATIENT
Start: 2025-09-01 | End: 2025-09-03 | Stop reason: HOSPADM

## 2025-09-01 RX ORDER — ATORVASTATIN CALCIUM 40 MG/1
40 TABLET, FILM COATED ORAL NIGHTLY
Status: DISCONTINUED | OUTPATIENT
Start: 2025-09-01 | End: 2025-09-03 | Stop reason: HOSPADM

## 2025-09-01 RX ADMIN — WATER 1000 MG: 1 INJECTION INTRAMUSCULAR; INTRAVENOUS; SUBCUTANEOUS at 13:34

## 2025-09-01 RX ADMIN — RANOLAZINE 500 MG: 500 TABLET, EXTENDED RELEASE ORAL at 01:11

## 2025-09-01 RX ADMIN — LATANOPROST 1 DROP: 50 SOLUTION OPHTHALMIC at 21:04

## 2025-09-01 RX ADMIN — LATANOPROST 1 DROP: 50 SOLUTION OPHTHALMIC at 01:11

## 2025-09-01 RX ADMIN — GABAPENTIN 100 MG: 100 CAPSULE ORAL at 01:11

## 2025-09-01 RX ADMIN — MORPHINE SULFATE 4 MG: 4 INJECTION, SOLUTION INTRAMUSCULAR; INTRAVENOUS at 08:37

## 2025-09-01 RX ADMIN — APIXABAN 5 MG: 5 TABLET, FILM COATED ORAL at 21:05

## 2025-09-01 RX ADMIN — ATORVASTATIN CALCIUM 40 MG: 40 TABLET, FILM COATED ORAL at 21:05

## 2025-09-01 RX ADMIN — ATORVASTATIN CALCIUM 40 MG: 40 TABLET, FILM COATED ORAL at 01:11

## 2025-09-01 RX ADMIN — CLOPIDOGREL BISULFATE 75 MG: 75 TABLET, FILM COATED ORAL at 08:39

## 2025-09-01 RX ADMIN — MORPHINE SULFATE 4 MG: 4 INJECTION, SOLUTION INTRAMUSCULAR; INTRAVENOUS at 13:34

## 2025-09-01 RX ADMIN — RANOLAZINE 500 MG: 500 TABLET, EXTENDED RELEASE ORAL at 21:05

## 2025-09-01 RX ADMIN — MORPHINE SULFATE 4 MG: 4 INJECTION, SOLUTION INTRAMUSCULAR; INTRAVENOUS at 22:43

## 2025-09-01 RX ADMIN — FERROUS SULFATE TAB 325 MG (65 MG ELEMENTAL FE) 325 MG: 325 (65 FE) TAB at 08:39

## 2025-09-01 RX ADMIN — APIXABAN 5 MG: 5 TABLET, FILM COATED ORAL at 01:11

## 2025-09-01 RX ADMIN — TAMSULOSIN HYDROCHLORIDE 0.4 MG: 0.4 CAPSULE ORAL at 08:39

## 2025-09-01 RX ADMIN — FINASTERIDE 5 MG: 5 TABLET, FILM COATED ORAL at 08:39

## 2025-09-01 RX ADMIN — ACETAMINOPHEN 650 MG: 325 TABLET ORAL at 10:56

## 2025-09-01 RX ADMIN — FERROUS SULFATE TAB 325 MG (65 MG ELEMENTAL FE) 325 MG: 325 (65 FE) TAB at 17:22

## 2025-09-01 RX ADMIN — GABAPENTIN 100 MG: 100 CAPSULE ORAL at 08:39

## 2025-09-01 RX ADMIN — POTASSIUM CHLORIDE 10 MEQ: 7.46 INJECTION, SOLUTION INTRAVENOUS at 00:48

## 2025-09-01 RX ADMIN — GABAPENTIN 100 MG: 100 CAPSULE ORAL at 21:05

## 2025-09-01 RX ADMIN — SODIUM CHLORIDE, PRESERVATIVE FREE 10 ML: 5 INJECTION INTRAVENOUS at 21:06

## 2025-09-01 RX ADMIN — APIXABAN 5 MG: 5 TABLET, FILM COATED ORAL at 08:39

## 2025-09-01 RX ADMIN — SODIUM CHLORIDE, PRESERVATIVE FREE 10 ML: 5 INJECTION INTRAVENOUS at 08:37

## 2025-09-01 RX ADMIN — RANOLAZINE 500 MG: 500 TABLET, EXTENDED RELEASE ORAL at 08:39

## 2025-09-01 RX ADMIN — Medication 3 MG: at 01:11

## 2025-09-01 RX ADMIN — SODIUM CHLORIDE, PRESERVATIVE FREE 10 ML: 5 INJECTION INTRAVENOUS at 13:33

## 2025-09-01 RX ADMIN — POTASSIUM CHLORIDE 10 MEQ: 7.46 INJECTION, SOLUTION INTRAVENOUS at 03:01

## 2025-09-01 ASSESSMENT — PAIN - FUNCTIONAL ASSESSMENT
PAIN_FUNCTIONAL_ASSESSMENT: 0-10
PAIN_FUNCTIONAL_ASSESSMENT: 0-10
PAIN_FUNCTIONAL_ASSESSMENT: PREVENTS OR INTERFERES SOME ACTIVE ACTIVITIES AND ADLS
PAIN_FUNCTIONAL_ASSESSMENT: PREVENTS OR INTERFERES SOME ACTIVE ACTIVITIES AND ADLS
PAIN_FUNCTIONAL_ASSESSMENT: 0-10

## 2025-09-01 ASSESSMENT — PAIN SCALES - GENERAL
PAINLEVEL_OUTOF10: 8
PAINLEVEL_OUTOF10: 8
PAINLEVEL_OUTOF10: 2
PAINLEVEL_OUTOF10: 6
PAINLEVEL_OUTOF10: 4
PAINLEVEL_OUTOF10: 5
PAINLEVEL_OUTOF10: 2

## 2025-09-01 ASSESSMENT — PAIN DESCRIPTION - DESCRIPTORS
DESCRIPTORS: ACHING;TENDER
DESCRIPTORS: ACHING;CRAMPING
DESCRIPTORS: SHARP;TENDER

## 2025-09-01 ASSESSMENT — PAIN DESCRIPTION - LOCATION
LOCATION: ABDOMEN
LOCATION: ABDOMEN;LEG;KNEE
LOCATION: ABDOMEN

## 2025-09-01 ASSESSMENT — PAIN DESCRIPTION - ORIENTATION: ORIENTATION: LEFT

## 2025-09-02 ENCOUNTER — APPOINTMENT (OUTPATIENT)
Dept: GENERAL RADIOLOGY | Age: 89
DRG: 641 | End: 2025-09-02
Payer: MEDICARE

## 2025-09-02 ENCOUNTER — TELEPHONE (OUTPATIENT)
Dept: INTERNAL MEDICINE CLINIC | Age: 89
End: 2025-09-02

## 2025-09-02 LAB
ANION GAP SERPL CALCULATED.3IONS-SCNC: 10 MMOL/L (ref 9–16)
BASOPHILS # BLD: 0.03 K/UL (ref 0–0.2)
BASOPHILS NFR BLD: 1 % (ref 0–2)
BUN SERPL-MCNC: 16 MG/DL (ref 8–23)
CALCIUM SERPL-MCNC: 8.4 MG/DL (ref 8.6–10.4)
CHLORIDE SERPL-SCNC: 107 MMOL/L (ref 98–107)
CO2 SERPL-SCNC: 23 MMOL/L (ref 20–31)
CREAT SERPL-MCNC: 1 MG/DL (ref 0.7–1.2)
EKG ATRIAL RATE: 375 BPM
EKG Q-T INTERVAL: 452 MS
EKG QRS DURATION: 130 MS
EKG QTC CALCULATION (BAZETT): 488 MS
EKG R AXIS: -80 DEGREES
EKG T AXIS: 100 DEGREES
EKG VENTRICULAR RATE: 70 BPM
EOSINOPHIL # BLD: 0.06 K/UL (ref 0–0.44)
EOSINOPHILS RELATIVE PERCENT: 2 % (ref 0–4)
ERYTHROCYTE [DISTWIDTH] IN BLOOD BY AUTOMATED COUNT: 14.5 % (ref 11.5–14.9)
GFR, ESTIMATED: 71 ML/MIN/1.73M2
GLUCOSE SERPL-MCNC: 90 MG/DL (ref 74–99)
HCT VFR BLD AUTO: 31.3 % (ref 41–53)
HGB BLD-MCNC: 10 G/DL (ref 13.5–17.5)
IMM GRANULOCYTES # BLD AUTO: 0 K/UL (ref 0–0.3)
IMM GRANULOCYTES NFR BLD: 0 %
LYMPHOCYTES NFR BLD: 0.45 K/UL (ref 1.1–3.7)
LYMPHOCYTES RELATIVE PERCENT: 15 % (ref 24–44)
MCH RBC QN AUTO: 31.7 PG (ref 26–34)
MCHC RBC AUTO-ENTMCNC: 31.9 G/DL (ref 31–37)
MCV RBC AUTO: 99.4 FL (ref 80–100)
MONOCYTES NFR BLD: 0.27 K/UL (ref 0.1–1.2)
MONOCYTES NFR BLD: 9 % (ref 3–12)
MORPHOLOGY: ABNORMAL
MORPHOLOGY: ABNORMAL
NEUTROPHILS NFR BLD: 73 % (ref 36–66)
NEUTS SEG NFR BLD: 2.19 K/UL (ref 1.5–8.1)
NRBC BLD-RTO: 0 PER 100 WBC
PLATELET # BLD AUTO: 201 K/UL (ref 150–450)
PMV BLD AUTO: 9.4 FL (ref 8–13.5)
POTASSIUM SERPL-SCNC: 4.1 MMOL/L (ref 3.7–5.3)
RBC # BLD AUTO: 3.15 M/UL (ref 4.21–5.77)
SODIUM SERPL-SCNC: 140 MMOL/L (ref 136–145)
WBC OTHER # BLD: 3 K/UL (ref 3.5–11)

## 2025-09-02 PROCEDURE — 97530 THERAPEUTIC ACTIVITIES: CPT

## 2025-09-02 PROCEDURE — 97162 PT EVAL MOD COMPLEX 30 MIN: CPT

## 2025-09-02 PROCEDURE — 80048 BASIC METABOLIC PNL TOTAL CA: CPT

## 2025-09-02 PROCEDURE — 6360000002 HC RX W HCPCS

## 2025-09-02 PROCEDURE — 97166 OT EVAL MOD COMPLEX 45 MIN: CPT

## 2025-09-02 PROCEDURE — APPNB60 APP NON BILLABLE TIME 46-60 MINS: Performed by: NURSE PRACTITIONER

## 2025-09-02 PROCEDURE — 93010 ELECTROCARDIOGRAM REPORT: CPT | Performed by: INTERNAL MEDICINE

## 2025-09-02 PROCEDURE — 2500000003 HC RX 250 WO HCPCS

## 2025-09-02 PROCEDURE — 2500000003 HC RX 250 WO HCPCS: Performed by: INTERNAL MEDICINE

## 2025-09-02 PROCEDURE — 99223 1ST HOSP IP/OBS HIGH 75: CPT | Performed by: INTERNAL MEDICINE

## 2025-09-02 PROCEDURE — 6370000000 HC RX 637 (ALT 250 FOR IP)

## 2025-09-02 PROCEDURE — 74018 RADEX ABDOMEN 1 VIEW: CPT

## 2025-09-02 PROCEDURE — 36415 COLL VENOUS BLD VENIPUNCTURE: CPT

## 2025-09-02 PROCEDURE — 85025 COMPLETE CBC W/AUTO DIFF WBC: CPT

## 2025-09-02 PROCEDURE — 99232 SBSQ HOSP IP/OBS MODERATE 35: CPT | Performed by: INTERNAL MEDICINE

## 2025-09-02 PROCEDURE — 6360000002 HC RX W HCPCS: Performed by: INTERNAL MEDICINE

## 2025-09-02 PROCEDURE — 1200000000 HC SEMI PRIVATE

## 2025-09-02 RX ADMIN — FINASTERIDE 5 MG: 5 TABLET, FILM COATED ORAL at 08:41

## 2025-09-02 RX ADMIN — ATORVASTATIN CALCIUM 40 MG: 40 TABLET, FILM COATED ORAL at 20:34

## 2025-09-02 RX ADMIN — MORPHINE SULFATE 4 MG: 4 INJECTION, SOLUTION INTRAMUSCULAR; INTRAVENOUS at 12:43

## 2025-09-02 RX ADMIN — GABAPENTIN 100 MG: 100 CAPSULE ORAL at 08:41

## 2025-09-02 RX ADMIN — MORPHINE SULFATE 4 MG: 4 INJECTION, SOLUTION INTRAMUSCULAR; INTRAVENOUS at 03:57

## 2025-09-02 RX ADMIN — SODIUM CHLORIDE, PRESERVATIVE FREE 10 ML: 5 INJECTION INTRAVENOUS at 22:38

## 2025-09-02 RX ADMIN — WATER 1000 MG: 1 INJECTION INTRAMUSCULAR; INTRAVENOUS; SUBCUTANEOUS at 12:39

## 2025-09-02 RX ADMIN — FERROUS SULFATE TAB 325 MG (65 MG ELEMENTAL FE) 325 MG: 325 (65 FE) TAB at 17:09

## 2025-09-02 RX ADMIN — RANOLAZINE 500 MG: 500 TABLET, EXTENDED RELEASE ORAL at 08:41

## 2025-09-02 RX ADMIN — SODIUM CHLORIDE, PRESERVATIVE FREE 10 ML: 5 INJECTION INTRAVENOUS at 08:41

## 2025-09-02 RX ADMIN — RANOLAZINE 500 MG: 500 TABLET, EXTENDED RELEASE ORAL at 20:34

## 2025-09-02 RX ADMIN — TAMSULOSIN HYDROCHLORIDE 0.4 MG: 0.4 CAPSULE ORAL at 08:41

## 2025-09-02 RX ADMIN — SODIUM CHLORIDE, PRESERVATIVE FREE 10 ML: 5 INJECTION INTRAVENOUS at 17:09

## 2025-09-02 RX ADMIN — GABAPENTIN 100 MG: 100 CAPSULE ORAL at 20:34

## 2025-09-02 RX ADMIN — ACETAMINOPHEN 650 MG: 325 TABLET ORAL at 15:23

## 2025-09-02 RX ADMIN — FERROUS SULFATE TAB 325 MG (65 MG ELEMENTAL FE) 325 MG: 325 (65 FE) TAB at 08:41

## 2025-09-02 RX ADMIN — MORPHINE SULFATE 4 MG: 4 INJECTION, SOLUTION INTRAMUSCULAR; INTRAVENOUS at 22:18

## 2025-09-02 RX ADMIN — CLOPIDOGREL BISULFATE 75 MG: 75 TABLET, FILM COATED ORAL at 08:41

## 2025-09-02 RX ADMIN — LATANOPROST 1 DROP: 50 SOLUTION OPHTHALMIC at 20:34

## 2025-09-02 RX ADMIN — SODIUM CHLORIDE, PRESERVATIVE FREE 10 ML: 5 INJECTION INTRAVENOUS at 12:38

## 2025-09-02 RX ADMIN — MORPHINE SULFATE 4 MG: 4 INJECTION, SOLUTION INTRAMUSCULAR; INTRAVENOUS at 17:09

## 2025-09-02 RX ADMIN — APIXABAN 5 MG: 5 TABLET, FILM COATED ORAL at 08:41

## 2025-09-02 RX ADMIN — APIXABAN 5 MG: 5 TABLET, FILM COATED ORAL at 20:34

## 2025-09-02 ASSESSMENT — PAIN SCALES - GENERAL
PAINLEVEL_OUTOF10: 5
PAINLEVEL_OUTOF10: 2
PAINLEVEL_OUTOF10: 7
PAINLEVEL_OUTOF10: 3
PAINLEVEL_OUTOF10: 7
PAINLEVEL_OUTOF10: 8
PAINLEVEL_OUTOF10: 2
PAINLEVEL_OUTOF10: 7
PAINLEVEL_OUTOF10: 6
PAINLEVEL_OUTOF10: 3

## 2025-09-02 ASSESSMENT — PAIN DESCRIPTION - DESCRIPTORS
DESCRIPTORS: ACHING;TENDER
DESCRIPTORS: ACHING;CRAMPING;SHARP
DESCRIPTORS: STABBING
DESCRIPTORS: ACHING;CRAMPING;TENDER

## 2025-09-02 ASSESSMENT — PAIN DESCRIPTION - ORIENTATION: ORIENTATION: LOWER;LEFT

## 2025-09-02 ASSESSMENT — PAIN DESCRIPTION - LOCATION
LOCATION: ABDOMEN
LOCATION: ABDOMEN;LEG
LOCATION: ABDOMEN

## 2025-09-02 ASSESSMENT — PAIN - FUNCTIONAL ASSESSMENT
PAIN_FUNCTIONAL_ASSESSMENT: 0-10
PAIN_FUNCTIONAL_ASSESSMENT: ACTIVITIES ARE NOT PREVENTED
PAIN_FUNCTIONAL_ASSESSMENT: 0-10
PAIN_FUNCTIONAL_ASSESSMENT: PREVENTS OR INTERFERES SOME ACTIVE ACTIVITIES AND ADLS
PAIN_FUNCTIONAL_ASSESSMENT: PREVENTS OR INTERFERES SOME ACTIVE ACTIVITIES AND ADLS
PAIN_FUNCTIONAL_ASSESSMENT: 0-10

## 2025-09-03 VITALS
TEMPERATURE: 97.7 F | DIASTOLIC BLOOD PRESSURE: 75 MMHG | SYSTOLIC BLOOD PRESSURE: 135 MMHG | RESPIRATION RATE: 16 BRPM | OXYGEN SATURATION: 70 % | BODY MASS INDEX: 19.64 KG/M2 | HEART RATE: 15 BPM | WEIGHT: 145 LBS | HEIGHT: 72 IN

## 2025-09-03 LAB
ANION GAP SERPL CALCULATED.3IONS-SCNC: 9 MMOL/L (ref 9–16)
BASOPHILS # BLD: 0 K/UL (ref 0–0.2)
BASOPHILS NFR BLD: 0 % (ref 0–2)
BUN SERPL-MCNC: 16 MG/DL (ref 8–23)
CALCIUM SERPL-MCNC: 8.5 MG/DL (ref 8.6–10.4)
CHLORIDE SERPL-SCNC: 108 MMOL/L (ref 98–107)
CO2 SERPL-SCNC: 25 MMOL/L (ref 20–31)
CREAT SERPL-MCNC: 0.9 MG/DL (ref 0.7–1.2)
EOSINOPHIL # BLD: 0.11 K/UL (ref 0–0.44)
EOSINOPHILS RELATIVE PERCENT: 3 % (ref 0–4)
ERYTHROCYTE [DISTWIDTH] IN BLOOD BY AUTOMATED COUNT: 14.4 % (ref 11.5–14.9)
GFR, ESTIMATED: 81 ML/MIN/1.73M2
GLUCOSE SERPL-MCNC: 95 MG/DL (ref 74–99)
HCT VFR BLD AUTO: 29.1 % (ref 41–53)
HGB BLD-MCNC: 9.4 G/DL (ref 13.5–17.5)
IMM GRANULOCYTES # BLD AUTO: 0 K/UL (ref 0–0.3)
IMM GRANULOCYTES NFR BLD: 0 %
LYMPHOCYTES NFR BLD: 0.54 K/UL (ref 1.1–3.7)
LYMPHOCYTES RELATIVE PERCENT: 15 % (ref 24–44)
MCH RBC QN AUTO: 31.9 PG (ref 26–34)
MCHC RBC AUTO-ENTMCNC: 32.3 G/DL (ref 31–37)
MCV RBC AUTO: 98.6 FL (ref 80–100)
MONOCYTES NFR BLD: 0.32 K/UL (ref 0.1–1.2)
MONOCYTES NFR BLD: 9 % (ref 3–12)
MORPHOLOGY: ABNORMAL
MORPHOLOGY: ABNORMAL
NEUTROPHILS NFR BLD: 73 % (ref 36–66)
NEUTS SEG NFR BLD: 2.63 K/UL (ref 1.5–8.1)
NRBC BLD-RTO: 0 PER 100 WBC
PLATELET # BLD AUTO: 195 K/UL (ref 150–450)
PMV BLD AUTO: 9.4 FL (ref 8–13.5)
POTASSIUM SERPL-SCNC: 4.7 MMOL/L (ref 3.7–5.3)
RBC # BLD AUTO: 2.95 M/UL (ref 4.21–5.77)
SODIUM SERPL-SCNC: 142 MMOL/L (ref 136–145)
WBC OTHER # BLD: 3.6 K/UL (ref 3.5–11)

## 2025-09-03 PROCEDURE — 36415 COLL VENOUS BLD VENIPUNCTURE: CPT

## 2025-09-03 PROCEDURE — 6360000002 HC RX W HCPCS: Performed by: INTERNAL MEDICINE

## 2025-09-03 PROCEDURE — 6360000002 HC RX W HCPCS

## 2025-09-03 PROCEDURE — 80048 BASIC METABOLIC PNL TOTAL CA: CPT

## 2025-09-03 PROCEDURE — 99231 SBSQ HOSP IP/OBS SF/LOW 25: CPT | Performed by: INTERNAL MEDICINE

## 2025-09-03 PROCEDURE — 85025 COMPLETE CBC W/AUTO DIFF WBC: CPT

## 2025-09-03 PROCEDURE — 2500000003 HC RX 250 WO HCPCS: Performed by: INTERNAL MEDICINE

## 2025-09-03 PROCEDURE — 6370000000 HC RX 637 (ALT 250 FOR IP)

## 2025-09-03 PROCEDURE — 99239 HOSP IP/OBS DSCHRG MGMT >30: CPT | Performed by: INTERNAL MEDICINE

## 2025-09-03 PROCEDURE — 97530 THERAPEUTIC ACTIVITIES: CPT

## 2025-09-03 PROCEDURE — 97116 GAIT TRAINING THERAPY: CPT

## 2025-09-03 PROCEDURE — 2500000003 HC RX 250 WO HCPCS

## 2025-09-03 PROCEDURE — APPSS30 APP SPLIT SHARED TIME 16-30 MINUTES: Performed by: NURSE PRACTITIONER

## 2025-09-03 RX ORDER — POLYETHYLENE GLYCOL 3350 17 G/17G
17 POWDER, FOR SOLUTION ORAL DAILY
Status: DISCONTINUED | OUTPATIENT
Start: 2025-09-03 | End: 2025-09-03 | Stop reason: HOSPADM

## 2025-09-03 RX ORDER — SENNOSIDES 8.6 MG/1
1 TABLET ORAL 2 TIMES DAILY
Qty: 60 TABLET | Refills: 11 | Status: SHIPPED | OUTPATIENT
Start: 2025-09-03 | End: 2026-09-03

## 2025-09-03 RX ORDER — HYDROCODONE BITARTRATE AND ACETAMINOPHEN 5; 325 MG/1; MG/1
1 TABLET ORAL EVERY 8 HOURS PRN
Qty: 21 TABLET | Refills: 0 | Status: SHIPPED | OUTPATIENT
Start: 2025-09-03 | End: 2025-09-10

## 2025-09-03 RX ADMIN — RANOLAZINE 500 MG: 500 TABLET, EXTENDED RELEASE ORAL at 08:36

## 2025-09-03 RX ADMIN — SODIUM CHLORIDE, PRESERVATIVE FREE 10 ML: 5 INJECTION INTRAVENOUS at 08:39

## 2025-09-03 RX ADMIN — FERROUS SULFATE TAB 325 MG (65 MG ELEMENTAL FE) 325 MG: 325 (65 FE) TAB at 17:43

## 2025-09-03 RX ADMIN — MORPHINE SULFATE 4 MG: 4 INJECTION, SOLUTION INTRAMUSCULAR; INTRAVENOUS at 02:27

## 2025-09-03 RX ADMIN — CLOPIDOGREL BISULFATE 75 MG: 75 TABLET, FILM COATED ORAL at 08:37

## 2025-09-03 RX ADMIN — GABAPENTIN 100 MG: 100 CAPSULE ORAL at 08:36

## 2025-09-03 RX ADMIN — WATER 1000 MG: 1 INJECTION INTRAMUSCULAR; INTRAVENOUS; SUBCUTANEOUS at 14:39

## 2025-09-03 RX ADMIN — FINASTERIDE 5 MG: 5 TABLET, FILM COATED ORAL at 08:36

## 2025-09-03 RX ADMIN — APIXABAN 5 MG: 5 TABLET, FILM COATED ORAL at 08:36

## 2025-09-03 RX ADMIN — TAMSULOSIN HYDROCHLORIDE 0.4 MG: 0.4 CAPSULE ORAL at 08:36

## 2025-09-03 RX ADMIN — FERROUS SULFATE TAB 325 MG (65 MG ELEMENTAL FE) 325 MG: 325 (65 FE) TAB at 08:37

## 2025-09-03 RX ADMIN — MORPHINE SULFATE 4 MG: 4 INJECTION, SOLUTION INTRAMUSCULAR; INTRAVENOUS at 08:46

## 2025-09-03 ASSESSMENT — PAIN DESCRIPTION - LOCATION
LOCATION: ABDOMEN
LOCATION: ABDOMEN;LEG

## 2025-09-03 ASSESSMENT — PAIN DESCRIPTION - ORIENTATION
ORIENTATION: MID;LOWER
ORIENTATION: LOWER;LEFT

## 2025-09-03 ASSESSMENT — PAIN DESCRIPTION - DESCRIPTORS
DESCRIPTORS: PRESSURE
DESCRIPTORS: STABBING

## 2025-09-03 ASSESSMENT — PAIN SCALES - GENERAL
PAINLEVEL_OUTOF10: 7
PAINLEVEL_OUTOF10: 5
PAINLEVEL_OUTOF10: 7

## 2025-09-03 ASSESSMENT — PAIN - FUNCTIONAL ASSESSMENT
PAIN_FUNCTIONAL_ASSESSMENT: 0-10
PAIN_FUNCTIONAL_ASSESSMENT: PREVENTS OR INTERFERES SOME ACTIVE ACTIVITIES AND ADLS
PAIN_FUNCTIONAL_ASSESSMENT: 0-10

## 2025-09-04 ENCOUNTER — TELEPHONE (OUTPATIENT)
Dept: INTERNAL MEDICINE CLINIC | Age: 89
End: 2025-09-04

## (undated) DEVICE — CATHETER GUID EBU3.5 6 FRX100 CM VISTA BRT TIP

## (undated) DEVICE — GLOVE ORANGE PI 7 1/2   MSG9075

## (undated) DEVICE — KIT MICRO INTRO 4FR STIFF 40CM NIGHTENALL TUNGSTEN 7SMT

## (undated) DEVICE — CATHETER GUIDE AD 6FR L100CM COR PERIPH GRN NYL PTFE XB L 3

## (undated) DEVICE — PERRYSBURG ENDO PACK: Brand: MEDLINE INDUSTRIES, INC.

## (undated) DEVICE — GUIDEWIRE WITH ICE™ HYDROPHILIC COATING: Brand: LUGE™

## (undated) DEVICE — ERBE NESSY® OMEGA PLATE USA (85+23)CM² , WITH CABLE 3 M: Brand: ERBE

## (undated) DEVICE — ENDOSCOPIC KIT 1.1+ 10 FT OP4 CA DE

## (undated) DEVICE — FORCEPS BX L240CM WRK CHN 2.8MM STD CAP W/ NDL MIC MESH

## (undated) DEVICE — ENDO KIT W/SYRINGE: Brand: MEDLINE INDUSTRIES, INC.

## (undated) DEVICE — INTRODUCER SHTH 6FR L11CM 0.038IN STD SIDEPRT EXTN 3 W

## (undated) DEVICE — Device

## (undated) DEVICE — BAND COMPR L24CM REG CLR PLAS HEMSTAT EXT HK AND LOOP RETEN

## (undated) DEVICE — BITEBLOCK 54FR W/ DENT RIM BLOX

## (undated) DEVICE — CATHETER IVL C2PLUS SHOCKWAVE 4.0MM X 12MM

## (undated) DEVICE — FORCEPS BX L240CM JAW DIA2.8MM L CAP W/ NDL MIC MESH TOOTH

## (undated) DEVICE — ANGIOGRAPHIC CATHETER: Brand: EXPO™

## (undated) DEVICE — 4-PORT MANIFOLD: Brand: NEPTUNE 2

## (undated) DEVICE — SOLUTION IRRIG 1000ML STRL H2O USP PLAS POUR BTL

## (undated) DEVICE — TRAY SURG CUST CRD CATH SVMMC

## (undated) DEVICE — GUIDEWIRE 35/260/FC/PTFE/3J: Brand: GUIDEWIRE

## (undated) DEVICE — FIAPC® PROBE W/ FILTER 2200 A OD 2.3MM/6.9FR; L 2.2M/7.2FT: Brand: ERBE

## (undated) DEVICE — CATH BLLN ANGIO 4.50X15MM NC EUPHORIA RX

## (undated) DEVICE — CANNULA IV 18GA L15IN BLNT FILL LUERLOCK HUB MJCT

## (undated) DEVICE — GOWN,POLY REINFORCED,LG: Brand: MEDLINE

## (undated) DEVICE — RUNTHROUGH NS EXTRA FLOPPY PTCA GUIDEWIRE: Brand: RUNTHROUGH

## (undated) DEVICE — GLIDESHEATH SLENDER STAINLESS STEEL KIT: Brand: GLIDESHEATH SLENDER

## (undated) DEVICE — ADAPTER,CATHETER/SYRINGE/LUER,STERILE: Brand: MEDLINE

## (undated) DEVICE — TRAY SURG CUST CRD CATH TOLEDO

## (undated) DEVICE — CONNECTOR TBNG AUX H2O JET DISP FOR OLY 160/180 SER

## (undated) DEVICE — ESOPHAGEAL BALLOON DILATATION CATHETER: Brand: CRE FIXED WIRE

## (undated) DEVICE — CATHETER GUID 6FR L100CM COR PERIPH BLU NYL COAT PTFE LNR 67000400] CORDIS]

## (undated) DEVICE — DEFENDO AIR WATER SUCTION AND BIOPSY VALVE KIT FOR  OLYMPUS: Brand: DEFENDO AIR/WATER/SUCTION AND BIOPSY VALVE

## (undated) DEVICE — GAUZE,SPONGE,4"X4",16PLY,STRL,LF,10/TRAY: Brand: MEDLINE

## (undated) DEVICE — ANGIO-SEAL VIP VASCULAR CLOSURE DEVICE: Brand: ANGIO-SEAL

## (undated) DEVICE — SYRINGE INFL 60ML DISP ALLIANCE II

## (undated) DEVICE — Device: Brand: DEFENDO VALVE AND CONNECTOR KIT

## (undated) DEVICE — ANGIOGRAPHIC CATHETER: Brand: IMPULSE™

## (undated) DEVICE — ADAPTER CLEANING PORPOISE CLEANING